# Patient Record
Sex: MALE | Race: WHITE | NOT HISPANIC OR LATINO | Employment: OTHER | URBAN - METROPOLITAN AREA
[De-identification: names, ages, dates, MRNs, and addresses within clinical notes are randomized per-mention and may not be internally consistent; named-entity substitution may affect disease eponyms.]

---

## 2018-12-18 ENCOUNTER — OFFICE VISIT (OUTPATIENT)
Dept: FAMILY MEDICINE CLINIC | Facility: CLINIC | Age: 48
End: 2018-12-18
Payer: MEDICARE

## 2018-12-18 ENCOUNTER — TRANSCRIBE ORDERS (OUTPATIENT)
Dept: ADMINISTRATIVE | Facility: HOSPITAL | Age: 48
End: 2018-12-18

## 2018-12-18 ENCOUNTER — APPOINTMENT (OUTPATIENT)
Dept: LAB | Facility: HOSPITAL | Age: 48
End: 2018-12-18
Payer: MEDICARE

## 2018-12-18 VITALS
TEMPERATURE: 97.8 F | HEIGHT: 70 IN | WEIGHT: 155 LBS | OXYGEN SATURATION: 98 % | DIASTOLIC BLOOD PRESSURE: 70 MMHG | BODY MASS INDEX: 22.19 KG/M2 | HEART RATE: 72 BPM | SYSTOLIC BLOOD PRESSURE: 130 MMHG

## 2018-12-18 DIAGNOSIS — F31.32 MODERATE DEPRESSED BIPOLAR I DISORDER (HCC): ICD-10-CM

## 2018-12-18 DIAGNOSIS — Z11.4 SCREENING FOR HIV (HUMAN IMMUNODEFICIENCY VIRUS): ICD-10-CM

## 2018-12-18 DIAGNOSIS — Z23 NEED FOR INFLUENZA VACCINATION: ICD-10-CM

## 2018-12-18 DIAGNOSIS — Z00.01 ENCOUNTER FOR WELL ADULT EXAM WITH ABNORMAL FINDINGS: Primary | ICD-10-CM

## 2018-12-18 DIAGNOSIS — F17.200 TOBACCO DEPENDENCE SYNDROME: ICD-10-CM

## 2018-12-18 DIAGNOSIS — Z11.59 NEED FOR HEPATITIS C SCREENING TEST: ICD-10-CM

## 2018-12-18 DIAGNOSIS — E78.2 MIXED HYPERLIPIDEMIA: ICD-10-CM

## 2018-12-18 DIAGNOSIS — Z79.899 ENCOUNTER FOR LONG-TERM (CURRENT) USE OF MEDICATIONS: Primary | ICD-10-CM

## 2018-12-18 DIAGNOSIS — Z79.899 ENCOUNTER FOR LONG-TERM (CURRENT) USE OF MEDICATIONS: ICD-10-CM

## 2018-12-18 LAB
ALBUMIN SERPL BCP-MCNC: 4.5 G/DL (ref 3–5.2)
ALP SERPL-CCNC: 100 U/L (ref 43–122)
ALT SERPL W P-5'-P-CCNC: 41 U/L (ref 9–52)
ANION GAP SERPL CALCULATED.3IONS-SCNC: 10 MMOL/L (ref 5–14)
AST SERPL W P-5'-P-CCNC: 28 U/L (ref 17–59)
BASOPHILS # BLD AUTO: 0.1 THOUSANDS/ΜL (ref 0–0.1)
BASOPHILS NFR BLD AUTO: 1 % (ref 0–1)
BILIRUB SERPL-MCNC: 0.8 MG/DL
BUN SERPL-MCNC: 10 MG/DL (ref 5–25)
CALCIUM SERPL-MCNC: 10.1 MG/DL (ref 8.4–10.2)
CHLORIDE SERPL-SCNC: 100 MMOL/L (ref 97–108)
CHOLEST SERPL-MCNC: 231 MG/DL
CO2 SERPL-SCNC: 28 MMOL/L (ref 22–30)
CREAT SERPL-MCNC: 0.67 MG/DL (ref 0.7–1.5)
EOSINOPHIL # BLD AUTO: 0.1 THOUSAND/ΜL (ref 0–0.4)
EOSINOPHIL NFR BLD AUTO: 1 % (ref 0–6)
ERYTHROCYTE [DISTWIDTH] IN BLOOD BY AUTOMATED COUNT: 13.7 %
GFR SERPL CREATININE-BSD FRML MDRD: 113 ML/MIN/1.73SQ M
GLUCOSE P FAST SERPL-MCNC: 71 MG/DL (ref 70–99)
HCT VFR BLD AUTO: 48.6 % (ref 41–53)
HDLC SERPL-MCNC: 44 MG/DL (ref 40–59)
HGB BLD-MCNC: 16.1 G/DL (ref 13.5–17.5)
LDLC SERPL CALC-MCNC: 165 MG/DL
LYMPHOCYTES # BLD AUTO: 2.2 THOUSANDS/ΜL (ref 0.5–4)
LYMPHOCYTES NFR BLD AUTO: 20 % (ref 25–45)
MCH RBC QN AUTO: 32.8 PG (ref 26–34)
MCHC RBC AUTO-ENTMCNC: 33.1 G/DL (ref 31–36)
MCV RBC AUTO: 99 FL (ref 80–100)
MONOCYTES # BLD AUTO: 0.5 THOUSAND/ΜL (ref 0.2–0.9)
MONOCYTES NFR BLD AUTO: 5 % (ref 1–10)
NEUTROPHILS # BLD AUTO: 7.9 THOUSANDS/ΜL (ref 1.8–7.8)
NEUTS SEG NFR BLD AUTO: 73 % (ref 45–65)
NONHDLC SERPL-MCNC: 187 MG/DL
PLATELET # BLD AUTO: 315 THOUSANDS/UL (ref 150–450)
PMV BLD AUTO: 8.4 FL (ref 8.9–12.7)
POTASSIUM SERPL-SCNC: 3.9 MMOL/L (ref 3.6–5)
PROT SERPL-MCNC: 7.5 G/DL (ref 5.9–8.4)
RBC # BLD AUTO: 4.9 MILLION/UL (ref 4.5–5.9)
SODIUM SERPL-SCNC: 138 MMOL/L (ref 137–147)
TRIGL SERPL-MCNC: 109 MG/DL
WBC # BLD AUTO: 10.7 THOUSAND/UL (ref 4.5–11)

## 2018-12-18 PROCEDURE — G0439 PPPS, SUBSEQ VISIT: HCPCS | Performed by: FAMILY MEDICINE

## 2018-12-18 PROCEDURE — 87389 HIV-1 AG W/HIV-1&-2 AB AG IA: CPT

## 2018-12-18 PROCEDURE — 99406 BEHAV CHNG SMOKING 3-10 MIN: CPT | Performed by: FAMILY MEDICINE

## 2018-12-18 PROCEDURE — 86803 HEPATITIS C AB TEST: CPT

## 2018-12-18 PROCEDURE — 80061 LIPID PANEL: CPT

## 2018-12-18 PROCEDURE — 85025 COMPLETE CBC W/AUTO DIFF WBC: CPT

## 2018-12-18 PROCEDURE — 36415 COLL VENOUS BLD VENIPUNCTURE: CPT

## 2018-12-18 PROCEDURE — 80053 COMPREHEN METABOLIC PANEL: CPT

## 2018-12-18 RX ORDER — LITHIUM CARBONATE 300 MG/1
CAPSULE ORAL
COMMUNITY
Start: 2012-11-05

## 2018-12-18 RX ORDER — LISINOPRIL 10 MG/1
TABLET ORAL EVERY 24 HOURS
COMMUNITY
Start: 2017-10-03 | End: 2019-03-14 | Stop reason: SDUPTHER

## 2018-12-18 RX ORDER — QUETIAPINE FUMARATE 200 MG/1
TABLET, FILM COATED ORAL
COMMUNITY
Start: 2012-11-05 | End: 2020-05-27 | Stop reason: DRUGHIGH

## 2018-12-18 NOTE — PROGRESS NOTES
Assessment and Plan:    Problem List Items Addressed This Visit     Tobacco dependence syndrome     Tobacco Use/Cessation  i     I advised patient to quit, and offered support  Discussed current use pattern  Asked patient to inform me when they set a quit date     At discussed with the patient complication of for smoking increase the risk of multiple kind of cancer he is aware  We offer him script for nicotine patch patient decline it                       Encounter for well adult exam with abnormal findings - Primary     Advice and education were given regarding nutrition, aerobic exercises, weight bearing exercises, cardiovascular risk reduction, fall risk reduction, and age appropriate supplements  The patient was counseled regarding instructions for management, risk factor reductions, prognosis, risks and benefits of treatment options, patient and family education, and importance of compliance with treatment  Other Visit Diagnoses     Need for influenza vaccination        Relevant Orders    SYRINGE/SINGLE-DOSE VIAL: influenza vaccine, [id number], quadrivalent, 0 5 mL, preservative-free, for patients 3+ yr Am[facility] Amb[facility])        Health Maintenance Due   Topic Date Due    Medicare Annual Wellness Visit (AWV)  1970    Pneumococcal PPSV23 Medium Risk Adult (1 of 1 - PPSV23) 04/09/1988         HPI:  Ramin Marques is a 51 y o  male here for his Subsequent Wellness Visit  Patient Active Problem List   Diagnosis    Acne    Benign essential hypertension    Mixed hyperlipidemia    [facility]    Tobacco dependence syndrome    Encounter for well adult exam with abnormal findings     Past Medical History:   Diagnosis Date    Acne     [facility]ipolar disorder ([facility] [facility]     Hyperlipidemia     Hypertension     Manic depression ([facility] [facility]     Tobacco abuse      History reviewed  No pertinent surgical history    Family History   Problem Relation Age of Onset    Depression Mother    Enriqueta Vipin Hyperlipidemia Mother     Hypertension Father     Anxiety disorder Sister      History   Smoking Status    Current Every Day Smoker    Types: Cigarettes   Smokeless Tobacco    Current User     Comment: no passive smoke exposure     History   Alcohol Use No      History   Drug Use No       Current Outpatient Prescriptions   Medication Sig Dispense Refill    lisinopril (ZESTRIL) 10 mg tablet every 24 hours      lithium carbonate 300 mg capsule take 2 capsules daily      metroNIDAZOLE (METROCREAM) 0 75 % cream Every 12 hours      QUEtiapine (SEROQUEL) 200 mg tablet take 1 tablet daily       No current facility-administered medications for this visit  No Known Allergies  Immunization History   Administered Date(s) Administered    Tdap 04/04/2017       Patient Care Team:  Doris Velásquez MD as PCP - General (Family Medicine)    Medicare Screening Tests and Risk Assessments:  Lew Sommer is here for his Subsequent Wellness visit  Last Medicare Wellness visit information reviewed, patient interviewed and updates made to the record today  Health Risk Assessment:  Patient rates overall health as good  Patient feels that their physical health rating is Same  Eyesight was rated as Same  Hearing was rated as Same  Patient feels that their emotional and mental health rating is Same  Pain experienced by patient in the last 7 days has been None  Patient states that he has experienced no weight loss or gain in last 6 months  Emotional/Mental Health:  Patient has been feeling nervous/anxious  PHQ-9 Depression Screening:    Frequency of the following problems over the past two weeks:      1  Little interest or pleasure in doing things: 0 - not at all      2  Feeling down, depressed, or hopeless: 0 - not at all  PHQ-2 Score: 0          Broken Bones/Falls:     Fall Risk Assessment:    In the past year, patient has experienced: No history of falling in past year          Bladder/Bowel:  Patient has not leaked urine accidently in the last six months  Patient reports no loss of bowel control  Immunizations:  Patient has not had a flu vaccination within the last year  Patient has not received a pneumonia shot  Patient has not received a shingles shot  Patient has received tetanus/diphtheria shot  Home Safety:  Patient does not have trouble with stairs inside or outside of their home  Patient currently reports that there are no safety hazards present in home, working smoke alarms, working carbon monoxide detectors  Preventative Screenings:   no prostate cancer screen performed, no colon cancer screen completed, cholesterol screen completed, glaucoma eye exam completed,     Nutrition:  Current diet: Regular with servings of the following:    Medications:  Patient is currently taking over-the-counter supplements  List of OTC medications includes: vitamins tylenol  Patient is able to manage medications  Lifestyle Choices:  Patient reports current tobacco use  Patient reports no alcohol use  Patient does not drive a vehicle  Patient wears seat belt  Current level of exercise of physical activity described by patient as: walking sit-ups  Activities of Daily Living:  Can get out of bed by his or her self, able to dress self, able to make own meals, able to do own shopping, able to bathe self, can do own laundry/housekeeping, can manage own money, pay bills and track expenses    Previous Hospitalizations:  No hospitalization or ED visit in past 12 months        Advanced Directives:  Patient has not decided on power of   Patient has not completed advanced directive          Preventative Screening/Counseling:      Cardiovascular:      General: Risks and Benefits Discussed      Counseling: Healthy Diet, Healthy Weight, Improve Cholesterol and Improve Blood Pressure          Diabetes:      General: Risks and Benefits Discussed      Counseling: Healthy Diet and Healthy Weight          Colorectal Cancer:      General: Risks and Benefits Discussed and Screening Not Indicated          Prostate Cancer:      General: Risks and Benefits Discussed and Patient Declines          Osteoporosis:      General: Risks and Benefits Discussed and Screening Not Indicated          AAA:      General: Risks and Benefits Discussed and Screening Not Indicated          Glaucoma:      General: Risks and Benefits Discussed and Screening Current      Comments: Patient does followed by Ophthalmology        HIV:      General: Risks and Benefits Discussed          Hepatitis C:      General: Risks and Benefits Discussed        Advanced Directives:   Patient has no living will for healthcare, does not have durable POA for healthcare, 5 wishes given  Other Preventative Counseling (Non-Medicare):   Fall Prevention and Nutrition Counseling

## 2018-12-18 NOTE — ASSESSMENT & PLAN NOTE
Tobacco Use/Cessation  i     I advised patient to quit, and offered support  Discussed current use pattern  Asked patient to inform me when they set a quit date     At discussed with the patient complication of for smoking increase the risk of multiple kind of cancer he is aware  We offer him script for nicotine patch patient decline it

## 2018-12-19 LAB
HCV AB SER QL: NORMAL
HIV 1+2 AB+HIV1 P24 AG SERPL QL IA: NORMAL

## 2018-12-26 ENCOUNTER — TRANSCRIBE ORDERS (OUTPATIENT)
Dept: ADMINISTRATIVE | Facility: HOSPITAL | Age: 48
End: 2018-12-26

## 2018-12-26 ENCOUNTER — APPOINTMENT (OUTPATIENT)
Dept: LAB | Facility: HOSPITAL | Age: 48
End: 2018-12-26
Payer: MEDICARE

## 2018-12-26 DIAGNOSIS — Z11.59 SCREENING EXAMINATION FOR POLIOMYELITIS: ICD-10-CM

## 2018-12-26 DIAGNOSIS — Z11.4 SCREENING FOR HUMAN IMMUNODEFICIENCY VIRUS: ICD-10-CM

## 2018-12-26 DIAGNOSIS — Z11.4 SCREENING FOR HUMAN IMMUNODEFICIENCY VIRUS: Primary | ICD-10-CM

## 2018-12-26 LAB — TSH SERPL DL<=0.05 MIU/L-ACNC: 2.53 UIU/ML (ref 0.47–4.68)

## 2018-12-26 PROCEDURE — 87389 HIV-1 AG W/HIV-1&-2 AB AG IA: CPT

## 2018-12-26 PROCEDURE — 36415 COLL VENOUS BLD VENIPUNCTURE: CPT

## 2018-12-26 PROCEDURE — 86803 HEPATITIS C AB TEST: CPT

## 2018-12-26 PROCEDURE — 84443 ASSAY THYROID STIM HORMONE: CPT

## 2018-12-27 LAB
HCV AB SER QL: NORMAL
HIV 1+2 AB+HIV1 P24 AG SERPL QL IA: NORMAL

## 2019-01-02 DIAGNOSIS — L70.9 ACNE, UNSPECIFIED ACNE TYPE: Primary | ICD-10-CM

## 2019-01-18 ENCOUNTER — OFFICE VISIT (OUTPATIENT)
Dept: FAMILY MEDICINE CLINIC | Facility: CLINIC | Age: 49
End: 2019-01-18
Payer: MEDICARE

## 2019-01-18 VITALS
HEIGHT: 69 IN | SYSTOLIC BLOOD PRESSURE: 124 MMHG | WEIGHT: 159 LBS | OXYGEN SATURATION: 97 % | DIASTOLIC BLOOD PRESSURE: 70 MMHG | HEART RATE: 93 BPM | TEMPERATURE: 98.3 F | BODY MASS INDEX: 23.55 KG/M2

## 2019-01-18 DIAGNOSIS — I10 BENIGN ESSENTIAL HYPERTENSION: ICD-10-CM

## 2019-01-18 DIAGNOSIS — Z23 NEED FOR PNEUMOCOCCAL VACCINATION: ICD-10-CM

## 2019-01-18 DIAGNOSIS — F31.9 BIPOLAR I DISORDER (HCC): ICD-10-CM

## 2019-01-18 DIAGNOSIS — E78.2 MIXED HYPERLIPIDEMIA: Primary | ICD-10-CM

## 2019-01-18 PROCEDURE — 90732 PPSV23 VACC 2 YRS+ SUBQ/IM: CPT | Performed by: FAMILY MEDICINE

## 2019-01-18 PROCEDURE — 99214 OFFICE O/P EST MOD 30 MIN: CPT | Performed by: FAMILY MEDICINE

## 2019-01-18 PROCEDURE — G0009 ADMIN PNEUMOCOCCAL VACCINE: HCPCS | Performed by: FAMILY MEDICINE

## 2019-01-18 RX ORDER — ATORVASTATIN CALCIUM 10 MG/1
10 TABLET, FILM COATED ORAL DAILY
Qty: 30 TABLET | Refills: 2 | Status: SHIPPED | OUTPATIENT
Start: 2019-01-18 | End: 2019-03-13 | Stop reason: SDUPTHER

## 2019-01-18 NOTE — PROGRESS NOTES
Subjective:   Chief Complaint   Patient presents with    Follow-up     chronic conditions        Patient ID: Sakina Grady is a 51 y o  male  Patient office follow up with a chronic condition   The patient has long history of hypertension the blood pressure today is in control patient tolerates medication well and no chest pain or short of breath no palpitation no headache  Patient's history of hyperlipidemia try to controlled with the low-fat diet deny any chest pain short of breath no palpitation no headache no blurred vision no weakness or lateralized of the symptom  Patient's history of bipolar disorder the chronic and he has been in leads him and Seroquel tolerated well without any side effect he patient is stable and he does follow up with the psychiatric  Recent blood work discussed with the patient        The following portions of the patient's history were reviewed and updated as appropriate: allergies, current medications, past family history, past medical history, past social history, past surgical history and problem list     Review of Systems   Constitutional: Negative for fatigue and fever  HENT: Negative for ear pain, sinus pain, sinus pressure and sore throat  Eyes: Negative for pain and redness  Respiratory: Negative for cough, chest tightness and shortness of breath  Cardiovascular: Negative for chest pain, palpitations and leg swelling  Gastrointestinal: Negative for abdominal pain, blood in stool, constipation, diarrhea and nausea  Genitourinary: Negative for flank pain, frequency and hematuria  Musculoskeletal: Negative for back pain and joint swelling  Skin: Negative for rash  Neurological: Negative for dizziness, numbness and headaches  Hematological: Does not bruise/bleed easily           Objective:  Vitals:    01/18/19 1014   BP: 124/70   Pulse: 93   Temp: 98 3 °F (36 8 °C)   TempSrc: Oral   SpO2: 97%   Weight: 72 1 kg (159 lb)   Height: 5' 9" (1 753 m) Physical Exam   Constitutional: He is oriented to person, place, and time  He appears well-developed and well-nourished  HENT:   Head: Normocephalic  Right Ear: External ear normal    Left Ear: External ear normal    Eyes: Conjunctivae and EOM are normal  Right eye exhibits no discharge  Left eye exhibits no discharge  Neck: No JVD present  Cardiovascular: Normal rate, regular rhythm and normal heart sounds  Exam reveals no gallop  No murmur heard  Pulmonary/Chest: Effort normal  No respiratory distress  He has no wheezes  He has no rales  He exhibits no tenderness  Abdominal: He exhibits no mass  There is no tenderness  There is no rebound  Musculoskeletal: He exhibits no edema or tenderness  Neurological: He is alert and oriented to person, place, and time  Skin: No rash noted  No erythema  Assessment/Plan:    Benign essential hypertension  Chronic well controlled continue current medication  Lisinopril 10 mg po/day   low-salt diet less than 2 g a day ,   low caffeine intake   regular aerobic exercise 20 to totally minute a day diet and important lose weight discussed with patient    Mixed hyperlipidemia  Chronic uncontrolled will start patient on atorvastatin 10 mg once a day proper use of medication possible side effect discussed with the patient we discussed with the patient the low-fat diet    [facility]  Chronic , fair control on current medication list him and Seroquel   Patient does F/up with psychiatric       Diagnoses and all orders for this visit:    Mixed hyperlipidemia  -     atorvastatin (LIPITOR) 10 mg tablet; Take 1 tablet (10 mg total) by mouth daily  -     CBC and differential; Future  -     Comprehensive metabolic panel; Future  -     Lipid Panel with Direct LDL reflex; Future  -     TSH, 3rd generation with Free T4 reflex; Future    Bipolar I disorder (HCC)  -     CBC and differential; Future  -     Comprehensive metabolic panel;  Future  -     Lipid Panel with Direct LDL reflex; Future  -     TSH, 3rd generation with Free T4 reflex; Future    Benign essential hypertension  -     CBC and differential; Future  -     Comprehensive metabolic panel; Future  -     Lipid Panel with Direct LDL reflex; Future  -     TSH, 3rd generation with Free T4 reflex;  Future    Need for pneumococcal vaccination  -     Pneumococcal Polysaccharide Vaccine 23-Valent =>3yo SQ IM

## 2019-01-19 NOTE — ASSESSMENT & PLAN NOTE
Chronic , fair control on current medication list him and Seroquel   Patient does F/up with psychiatric

## 2019-01-19 NOTE — ASSESSMENT & PLAN NOTE
Chronic uncontrolled will start patient on atorvastatin 10 mg once a day proper use of medication possible side effect discussed with the patient we discussed with the patient the low-fat diet

## 2019-01-19 NOTE — ASSESSMENT & PLAN NOTE
Chronic well controlled continue current medication  Lisinopril 10 mg po/day   low-salt diet less than 2 g a day ,   low caffeine intake   regular aerobic exercise 20 to totally minute a day diet and important lose weight discussed with patient

## 2019-01-23 ENCOUNTER — APPOINTMENT (OUTPATIENT)
Dept: LAB | Facility: HOSPITAL | Age: 49
End: 2019-01-23
Payer: MEDICARE

## 2019-01-23 DIAGNOSIS — I10 BENIGN ESSENTIAL HYPERTENSION: ICD-10-CM

## 2019-01-23 DIAGNOSIS — E78.2 MIXED HYPERLIPIDEMIA: ICD-10-CM

## 2019-01-23 DIAGNOSIS — F31.9 BIPOLAR I DISORDER (HCC): ICD-10-CM

## 2019-01-23 LAB
ALBUMIN SERPL BCP-MCNC: 4.5 G/DL (ref 3–5.2)
ALP SERPL-CCNC: 100 U/L (ref 43–122)
ALT SERPL W P-5'-P-CCNC: 32 U/L (ref 9–52)
ANION GAP SERPL CALCULATED.3IONS-SCNC: 8 MMOL/L (ref 5–14)
AST SERPL W P-5'-P-CCNC: 26 U/L (ref 17–59)
BASOPHILS # BLD AUTO: 0.1 THOUSANDS/ΜL (ref 0–0.1)
BASOPHILS NFR BLD AUTO: 1 % (ref 0–1)
BILIRUB SERPL-MCNC: 0.6 MG/DL
BUN SERPL-MCNC: 11 MG/DL (ref 5–25)
CALCIUM ALBUM COR SERPL-MCNC: 10.1 MG/DL (ref 8.3–10.1)
CALCIUM SERPL-MCNC: 10.5 MG/DL (ref 8.4–10.2)
CHLORIDE SERPL-SCNC: 100 MMOL/L (ref 97–108)
CHOLEST SERPL-MCNC: 220 MG/DL
CO2 SERPL-SCNC: 30 MMOL/L (ref 22–30)
CREAT SERPL-MCNC: 0.74 MG/DL (ref 0.7–1.5)
EOSINOPHIL # BLD AUTO: 0.1 THOUSAND/ΜL (ref 0–0.4)
EOSINOPHIL NFR BLD AUTO: 2 % (ref 0–6)
ERYTHROCYTE [DISTWIDTH] IN BLOOD BY AUTOMATED COUNT: 14.2 %
GFR SERPL CREATININE-BSD FRML MDRD: 108 ML/MIN/1.73SQ M
GLUCOSE P FAST SERPL-MCNC: 86 MG/DL (ref 70–99)
HCT VFR BLD AUTO: 49.5 % (ref 41–53)
HDLC SERPL-MCNC: 45 MG/DL (ref 40–59)
HGB BLD-MCNC: 16.5 G/DL (ref 13.5–17.5)
LDLC SERPL CALC-MCNC: 149 MG/DL
LYMPHOCYTES # BLD AUTO: 1.9 THOUSANDS/ΜL (ref 0.5–4)
LYMPHOCYTES NFR BLD AUTO: 22 % (ref 25–45)
MCH RBC QN AUTO: 33.2 PG (ref 26–34)
MCHC RBC AUTO-ENTMCNC: 33.4 G/DL (ref 31–36)
MCV RBC AUTO: 100 FL (ref 80–100)
MONOCYTES # BLD AUTO: 0.4 THOUSAND/ΜL (ref 0.2–0.9)
MONOCYTES NFR BLD AUTO: 4 % (ref 1–10)
NEUTROPHILS # BLD AUTO: 6.3 THOUSANDS/ΜL (ref 1.8–7.8)
NEUTS SEG NFR BLD AUTO: 71 % (ref 45–65)
PLATELET # BLD AUTO: 313 THOUSANDS/UL (ref 150–450)
PMV BLD AUTO: 7.8 FL (ref 8.9–12.7)
POTASSIUM SERPL-SCNC: 4.2 MMOL/L (ref 3.6–5)
PROT SERPL-MCNC: 7.8 G/DL (ref 5.9–8.4)
RBC # BLD AUTO: 4.97 MILLION/UL (ref 4.5–5.9)
SODIUM SERPL-SCNC: 138 MMOL/L (ref 137–147)
TRIGL SERPL-MCNC: 131 MG/DL
TSH SERPL DL<=0.05 MIU/L-ACNC: 2.05 UIU/ML (ref 0.47–4.68)
WBC # BLD AUTO: 8.9 THOUSAND/UL (ref 4.5–11)

## 2019-01-23 PROCEDURE — 36415 COLL VENOUS BLD VENIPUNCTURE: CPT

## 2019-01-23 PROCEDURE — 85025 COMPLETE CBC W/AUTO DIFF WBC: CPT

## 2019-01-23 PROCEDURE — 80061 LIPID PANEL: CPT

## 2019-01-23 PROCEDURE — 80053 COMPREHEN METABOLIC PANEL: CPT

## 2019-01-23 PROCEDURE — 84443 ASSAY THYROID STIM HORMONE: CPT

## 2019-03-13 DIAGNOSIS — E78.2 MIXED HYPERLIPIDEMIA: ICD-10-CM

## 2019-03-13 RX ORDER — ATORVASTATIN CALCIUM 10 MG/1
10 TABLET, FILM COATED ORAL DAILY
Qty: 90 TABLET | Refills: 0 | Status: SHIPPED | OUTPATIENT
Start: 2019-03-13 | End: 2019-03-14 | Stop reason: SDUPTHER

## 2019-03-14 DIAGNOSIS — E78.2 MIXED HYPERLIPIDEMIA: ICD-10-CM

## 2019-03-14 DIAGNOSIS — L70.9 ACNE, UNSPECIFIED ACNE TYPE: ICD-10-CM

## 2019-03-14 DIAGNOSIS — I10 BENIGN ESSENTIAL HYPERTENSION: Primary | ICD-10-CM

## 2019-03-14 RX ORDER — ATORVASTATIN CALCIUM 10 MG/1
10 TABLET, FILM COATED ORAL DAILY
Qty: 90 TABLET | Refills: 1 | Status: SHIPPED | OUTPATIENT
Start: 2019-03-14 | End: 2019-04-08 | Stop reason: SDUPTHER

## 2019-03-14 RX ORDER — LISINOPRIL 10 MG/1
10 TABLET ORAL DAILY
Qty: 90 TABLET | Refills: 1 | Status: SHIPPED | OUTPATIENT
Start: 2019-03-14 | End: 2019-05-20 | Stop reason: SDUPTHER

## 2019-04-08 DIAGNOSIS — E78.2 MIXED HYPERLIPIDEMIA: ICD-10-CM

## 2019-04-08 RX ORDER — ATORVASTATIN CALCIUM 10 MG/1
10 TABLET, FILM COATED ORAL DAILY
Qty: 90 TABLET | Refills: 1 | Status: SHIPPED | OUTPATIENT
Start: 2019-04-08 | End: 2019-05-20 | Stop reason: SDUPTHER

## 2019-04-23 ENCOUNTER — APPOINTMENT (OUTPATIENT)
Dept: LAB | Facility: HOSPITAL | Age: 49
End: 2019-04-23
Payer: MEDICARE

## 2019-04-23 ENCOUNTER — TRANSCRIBE ORDERS (OUTPATIENT)
Dept: ADMINISTRATIVE | Facility: HOSPITAL | Age: 49
End: 2019-04-23

## 2019-04-23 DIAGNOSIS — Z79.899 ENCOUNTER FOR LONG-TERM (CURRENT) USE OF OTHER MEDICATIONS: Primary | ICD-10-CM

## 2019-04-23 DIAGNOSIS — Z79.899 ENCOUNTER FOR LONG-TERM (CURRENT) USE OF OTHER MEDICATIONS: ICD-10-CM

## 2019-04-23 LAB — LITHIUM SERPL-SCNC: 0.5 MMOL/L (ref 0.6–1.2)

## 2019-04-23 PROCEDURE — 80178 ASSAY OF LITHIUM: CPT

## 2019-04-23 PROCEDURE — 36415 COLL VENOUS BLD VENIPUNCTURE: CPT

## 2019-05-20 ENCOUNTER — OFFICE VISIT (OUTPATIENT)
Dept: FAMILY MEDICINE CLINIC | Facility: CLINIC | Age: 49
End: 2019-05-20
Payer: MEDICARE

## 2019-05-20 VITALS
OXYGEN SATURATION: 96 % | HEART RATE: 87 BPM | TEMPERATURE: 98.8 F | HEIGHT: 69 IN | WEIGHT: 161 LBS | DIASTOLIC BLOOD PRESSURE: 78 MMHG | SYSTOLIC BLOOD PRESSURE: 132 MMHG | RESPIRATION RATE: 16 BRPM | BODY MASS INDEX: 23.85 KG/M2

## 2019-05-20 DIAGNOSIS — J30.89 SEASONAL ALLERGIC RHINITIS DUE TO OTHER ALLERGIC TRIGGER: Primary | ICD-10-CM

## 2019-05-20 DIAGNOSIS — I10 BENIGN ESSENTIAL HYPERTENSION: ICD-10-CM

## 2019-05-20 DIAGNOSIS — F17.200 TOBACCO DEPENDENCE SYNDROME: ICD-10-CM

## 2019-05-20 DIAGNOSIS — L70.9 ACNE, UNSPECIFIED ACNE TYPE: ICD-10-CM

## 2019-05-20 DIAGNOSIS — E78.2 MIXED HYPERLIPIDEMIA: ICD-10-CM

## 2019-05-20 DIAGNOSIS — Z12.11 ENCOUNTER FOR SCREENING COLONOSCOPY: ICD-10-CM

## 2019-05-20 PROBLEM — J30.9 ALLERGIC RHINITIS DUE TO ALLERGEN: Status: ACTIVE | Noted: 2019-05-20

## 2019-05-20 PROCEDURE — 99214 OFFICE O/P EST MOD 30 MIN: CPT | Performed by: FAMILY MEDICINE

## 2019-05-20 RX ORDER — ATORVASTATIN CALCIUM 10 MG/1
10 TABLET, FILM COATED ORAL DAILY
Qty: 90 TABLET | Refills: 1 | Status: SHIPPED | OUTPATIENT
Start: 2019-05-20 | End: 2019-09-23 | Stop reason: SDUPTHER

## 2019-05-20 RX ORDER — LISINOPRIL 10 MG/1
10 TABLET ORAL DAILY
Qty: 90 TABLET | Refills: 1 | Status: SHIPPED | OUTPATIENT
Start: 2019-05-20 | End: 2019-09-23 | Stop reason: SDUPTHER

## 2019-05-20 RX ORDER — LORATADINE 10 MG/1
10 TABLET ORAL DAILY
Qty: 30 TABLET | Refills: 1 | Status: SHIPPED | OUTPATIENT
Start: 2019-05-20

## 2019-05-21 ENCOUNTER — APPOINTMENT (OUTPATIENT)
Dept: LAB | Facility: HOSPITAL | Age: 49
End: 2019-05-21
Payer: MEDICARE

## 2019-05-21 DIAGNOSIS — Z12.11 ENCOUNTER FOR SCREENING COLONOSCOPY: ICD-10-CM

## 2019-05-21 DIAGNOSIS — E78.2 MIXED HYPERLIPIDEMIA: ICD-10-CM

## 2019-05-21 DIAGNOSIS — I10 BENIGN ESSENTIAL HYPERTENSION: ICD-10-CM

## 2019-05-21 LAB
ALBUMIN SERPL BCP-MCNC: 4.6 G/DL (ref 3–5.2)
ANION GAP SERPL CALCULATED.3IONS-SCNC: 9 MMOL/L (ref 5–14)
BASOPHILS # BLD AUTO: 0.1 THOUSANDS/ΜL (ref 0–0.1)
BASOPHILS NFR BLD AUTO: 1 % (ref 0–1)
BUN SERPL-MCNC: 14 MG/DL (ref 5–25)
CALCIUM ALBUM COR SERPL-MCNC: 9.7 MG/DL (ref 8.3–10.1)
CALCIUM SERPL-MCNC: 10.2 MG/DL (ref 8.3–10.1)
CALCIUM SERPL-MCNC: 10.2 MG/DL (ref 8.4–10.2)
CHLORIDE SERPL-SCNC: 96 MMOL/L (ref 97–108)
CHOLEST SERPL-MCNC: 174 MG/DL
CO2 SERPL-SCNC: 29 MMOL/L (ref 22–30)
CREAT SERPL-MCNC: 0.72 MG/DL (ref 0.7–1.5)
EOSINOPHIL # BLD AUTO: 0.2 THOUSAND/ΜL (ref 0–0.4)
EOSINOPHIL NFR BLD AUTO: 2 % (ref 0–6)
ERYTHROCYTE [DISTWIDTH] IN BLOOD BY AUTOMATED COUNT: 14 %
GFR SERPL CREATININE-BSD FRML MDRD: 109 ML/MIN/1.73SQ M
GLUCOSE P FAST SERPL-MCNC: 74 MG/DL (ref 70–99)
HCT VFR BLD AUTO: 47.6 % (ref 41–53)
HDLC SERPL-MCNC: 33 MG/DL (ref 40–59)
HEMOCCULT STL QL IA: POSITIVE
HGB BLD-MCNC: 16.2 G/DL (ref 13.5–17.5)
LDLC SERPL CALC-MCNC: 117 MG/DL
LYMPHOCYTES # BLD AUTO: 2.6 THOUSANDS/ΜL (ref 0.5–4)
LYMPHOCYTES NFR BLD AUTO: 28 % (ref 25–45)
MCH RBC QN AUTO: 33.1 PG (ref 26–34)
MCHC RBC AUTO-ENTMCNC: 34.1 G/DL (ref 31–36)
MCV RBC AUTO: 97 FL (ref 80–100)
MONOCYTES # BLD AUTO: 0.5 THOUSAND/ΜL (ref 0.2–0.9)
MONOCYTES NFR BLD AUTO: 5 % (ref 1–10)
NEUTROPHILS # BLD AUTO: 6 THOUSANDS/ΜL (ref 1.8–7.8)
NEUTS SEG NFR BLD AUTO: 64 % (ref 45–65)
PLATELET # BLD AUTO: 327 THOUSANDS/UL (ref 150–450)
PMV BLD AUTO: 8 FL (ref 8.9–12.7)
POTASSIUM SERPL-SCNC: 4.1 MMOL/L (ref 3.6–5)
RBC # BLD AUTO: 4.89 MILLION/UL (ref 4.5–5.9)
SODIUM SERPL-SCNC: 134 MMOL/L (ref 137–147)
TRIGL SERPL-MCNC: 121 MG/DL
TSH SERPL DL<=0.05 MIU/L-ACNC: 2.3 UIU/ML (ref 0.47–4.68)
WBC # BLD AUTO: 9.3 THOUSAND/UL (ref 4.5–11)

## 2019-05-21 PROCEDURE — 84443 ASSAY THYROID STIM HORMONE: CPT

## 2019-05-21 PROCEDURE — G0328 FECAL BLOOD SCRN IMMUNOASSAY: HCPCS

## 2019-05-21 PROCEDURE — 36415 COLL VENOUS BLD VENIPUNCTURE: CPT

## 2019-05-21 PROCEDURE — 80061 LIPID PANEL: CPT

## 2019-05-21 PROCEDURE — 85025 COMPLETE CBC W/AUTO DIFF WBC: CPT

## 2019-05-21 PROCEDURE — 80048 BASIC METABOLIC PNL TOTAL CA: CPT

## 2019-05-21 PROCEDURE — 82040 ASSAY OF SERUM ALBUMIN: CPT

## 2019-05-24 ENCOUNTER — TELEPHONE (OUTPATIENT)
Dept: FAMILY MEDICINE CLINIC | Facility: CLINIC | Age: 49
End: 2019-05-24

## 2019-05-24 DIAGNOSIS — R19.5 POSITIVE OCCULT STOOL BLOOD TEST: Primary | ICD-10-CM

## 2019-08-14 ENCOUNTER — APPOINTMENT (OUTPATIENT)
Dept: LAB | Facility: HOSPITAL | Age: 49
End: 2019-08-14
Payer: MEDICARE

## 2019-08-14 ENCOUNTER — TRANSCRIBE ORDERS (OUTPATIENT)
Dept: ADMINISTRATIVE | Facility: HOSPITAL | Age: 49
End: 2019-08-14

## 2019-08-14 DIAGNOSIS — Z79.899 ENCOUNTER FOR LONG-TERM (CURRENT) USE OF OTHER MEDICATIONS: ICD-10-CM

## 2019-08-14 DIAGNOSIS — F31.32 MODERATE DEPRESSED BIPOLAR I DISORDER (HCC): ICD-10-CM

## 2019-08-14 DIAGNOSIS — Z79.899 ENCOUNTER FOR LONG-TERM (CURRENT) USE OF OTHER MEDICATIONS: Primary | ICD-10-CM

## 2019-08-14 LAB
25(OH)D3 SERPL-MCNC: 25.5 NG/ML (ref 30–100)
ALBUMIN SERPL BCP-MCNC: 4.5 G/DL (ref 3–5.2)
ALP SERPL-CCNC: 99 U/L (ref 43–122)
ALT SERPL W P-5'-P-CCNC: 24 U/L (ref 9–52)
ANION GAP SERPL CALCULATED.3IONS-SCNC: 10 MMOL/L (ref 5–14)
AST SERPL W P-5'-P-CCNC: 23 U/L (ref 17–59)
BASOPHILS # BLD AUTO: 0.1 THOUSANDS/ΜL (ref 0–0.1)
BASOPHILS NFR BLD AUTO: 1 % (ref 0–1)
BILIRUB SERPL-MCNC: 0.7 MG/DL
BUN SERPL-MCNC: 9 MG/DL (ref 5–25)
CALCIUM SERPL-MCNC: 10 MG/DL (ref 8.4–10.2)
CHLORIDE SERPL-SCNC: 99 MMOL/L (ref 97–108)
CHOLEST SERPL-MCNC: 227 MG/DL
CO2 SERPL-SCNC: 26 MMOL/L (ref 22–30)
CREAT SERPL-MCNC: 0.7 MG/DL (ref 0.7–1.5)
EOSINOPHIL # BLD AUTO: 0.1 THOUSAND/ΜL (ref 0–0.4)
EOSINOPHIL NFR BLD AUTO: 1 % (ref 0–6)
ERYTHROCYTE [DISTWIDTH] IN BLOOD BY AUTOMATED COUNT: 14.2 %
FOLATE SERPL-MCNC: >20 NG/ML (ref 3.1–17.5)
GFR SERPL CREATININE-BSD FRML MDRD: 110 ML/MIN/1.73SQ M
GLUCOSE P FAST SERPL-MCNC: 72 MG/DL (ref 70–99)
HCT VFR BLD AUTO: 46.1 % (ref 41–53)
HDLC SERPL-MCNC: 34 MG/DL (ref 40–59)
HGB BLD-MCNC: 15.9 G/DL (ref 13.5–17.5)
LDLC SERPL CALC-MCNC: 151 MG/DL
LITHIUM SERPL-SCNC: 0.6 MMOL/L (ref 0.6–1.2)
LYMPHOCYTES # BLD AUTO: 2.3 THOUSANDS/ΜL (ref 0.5–4)
LYMPHOCYTES NFR BLD AUTO: 21 % (ref 25–45)
MCH RBC QN AUTO: 33.7 PG (ref 26–34)
MCHC RBC AUTO-ENTMCNC: 34.4 G/DL (ref 31–36)
MCV RBC AUTO: 98 FL (ref 80–100)
MONOCYTES # BLD AUTO: 0.5 THOUSAND/ΜL (ref 0.2–0.9)
MONOCYTES NFR BLD AUTO: 5 % (ref 1–10)
NEUTROPHILS # BLD AUTO: 7.9 THOUSANDS/ΜL (ref 1.8–7.8)
NEUTS SEG NFR BLD AUTO: 72 % (ref 45–65)
NONHDLC SERPL-MCNC: 193 MG/DL
PLATELET # BLD AUTO: 308 THOUSANDS/UL (ref 150–450)
PMV BLD AUTO: 8.5 FL (ref 8.9–12.7)
POTASSIUM SERPL-SCNC: 3.8 MMOL/L (ref 3.6–5)
PROT SERPL-MCNC: 7.5 G/DL (ref 5.9–8.4)
RBC # BLD AUTO: 4.71 MILLION/UL (ref 4.5–5.9)
SODIUM SERPL-SCNC: 135 MMOL/L (ref 137–147)
TRIGL SERPL-MCNC: 212 MG/DL
TSH SERPL DL<=0.05 MIU/L-ACNC: 1.57 UIU/ML (ref 0.47–4.68)
VIT B12 SERPL-MCNC: 533 PG/ML (ref 100–900)
WBC # BLD AUTO: 10.9 THOUSAND/UL (ref 4.5–11)

## 2019-08-14 PROCEDURE — 82607 VITAMIN B-12: CPT

## 2019-08-14 PROCEDURE — 36415 COLL VENOUS BLD VENIPUNCTURE: CPT

## 2019-08-14 PROCEDURE — 80307 DRUG TEST PRSMV CHEM ANLYZR: CPT | Performed by: PSYCHIATRY & NEUROLOGY

## 2019-08-14 PROCEDURE — 80061 LIPID PANEL: CPT

## 2019-08-14 PROCEDURE — 80178 ASSAY OF LITHIUM: CPT

## 2019-08-14 PROCEDURE — 80053 COMPREHEN METABOLIC PANEL: CPT

## 2019-08-14 PROCEDURE — 84443 ASSAY THYROID STIM HORMONE: CPT

## 2019-08-14 PROCEDURE — 85025 COMPLETE CBC W/AUTO DIFF WBC: CPT

## 2019-08-14 PROCEDURE — 82306 VITAMIN D 25 HYDROXY: CPT

## 2019-08-14 PROCEDURE — 82746 ASSAY OF FOLIC ACID SERUM: CPT

## 2019-08-15 LAB
AMPHETAMINES UR QL SCN: NEGATIVE NG/ML
BARBITURATES UR QL SCN: NEGATIVE NG/ML
BENZODIAZ UR QL: NEGATIVE NG/ML
BZE UR QL: NEGATIVE NG/ML
CANNABINOIDS UR QL SCN: NEGATIVE NG/ML
METHADONE UR QL SCN: NEGATIVE NG/ML
OPIATES UR QL: NEGATIVE NG/ML
PCP UR QL: NEGATIVE NG/ML
PROPOXYPH UR QL SCN: NEGATIVE NG/ML

## 2019-08-19 DIAGNOSIS — L70.9 ACNE, UNSPECIFIED ACNE TYPE: ICD-10-CM

## 2019-09-23 ENCOUNTER — OFFICE VISIT (OUTPATIENT)
Dept: FAMILY MEDICINE CLINIC | Facility: CLINIC | Age: 49
End: 2019-09-23
Payer: MEDICARE

## 2019-09-23 VITALS
BODY MASS INDEX: 23.19 KG/M2 | DIASTOLIC BLOOD PRESSURE: 80 MMHG | TEMPERATURE: 99.1 F | SYSTOLIC BLOOD PRESSURE: 130 MMHG | HEIGHT: 70 IN | HEART RATE: 91 BPM | WEIGHT: 162 LBS | OXYGEN SATURATION: 96 %

## 2019-09-23 DIAGNOSIS — E78.2 MIXED HYPERLIPIDEMIA: Primary | ICD-10-CM

## 2019-09-23 DIAGNOSIS — I10 BENIGN ESSENTIAL HYPERTENSION: ICD-10-CM

## 2019-09-23 DIAGNOSIS — Z12.5 SCREENING FOR PROSTATE CANCER: ICD-10-CM

## 2019-09-23 DIAGNOSIS — F17.200 TOBACCO DEPENDENCE SYNDROME: ICD-10-CM

## 2019-09-23 DIAGNOSIS — L70.9 ACNE, UNSPECIFIED ACNE TYPE: ICD-10-CM

## 2019-09-23 PROCEDURE — 90686 IIV4 VACC NO PRSV 0.5 ML IM: CPT | Performed by: FAMILY MEDICINE

## 2019-09-23 PROCEDURE — G0008 ADMIN INFLUENZA VIRUS VAC: HCPCS | Performed by: FAMILY MEDICINE

## 2019-09-23 PROCEDURE — 99214 OFFICE O/P EST MOD 30 MIN: CPT | Performed by: FAMILY MEDICINE

## 2019-09-23 RX ORDER — LISINOPRIL 10 MG/1
10 TABLET ORAL DAILY
Qty: 90 TABLET | Refills: 1 | Status: SHIPPED | OUTPATIENT
Start: 2019-09-23 | End: 2020-01-27 | Stop reason: SDUPTHER

## 2019-09-23 RX ORDER — ATORVASTATIN CALCIUM 10 MG/1
10 TABLET, FILM COATED ORAL DAILY
Qty: 90 TABLET | Refills: 1 | Status: SHIPPED | OUTPATIENT
Start: 2019-09-23 | End: 2020-01-27 | Stop reason: SDUPTHER

## 2019-09-23 NOTE — PROGRESS NOTES
Subjective:   Chief Complaint   Patient presents with    Follow-up     chronic conditions        Patient ID: Rosanna Colorado is a 47 y o  male  Patient here follow-up with a chronic condition patient was history of hypertension on lisinopril tolerated well without any side effect blood pressure fair control deny any chest pain short of breath no dyspnea on exertion and no lower extremity edema patient was history of hyperlipidemia on statin tolerated well no rash no muscle pain deny any chest pain short of breath no palpitation no TIA symptom patient was history of smoking he been smoking for more than 30 years the he is aware of the complication of smoking patient he is not ready to quit smoke and patient was history of the acne he been on Metrogel and he tolerated well without any side effect and the his face clear on it  Recent blood work discussed with the patient      The following portions of the patient's history were reviewed and updated as appropriate: allergies, current medications, past family history, past medical history, past social history, past surgical history and problem list     Review of Systems   Constitutional: Negative for fatigue and fever  HENT: Negative for ear pain, sinus pressure, sinus pain and sore throat  Eyes: Negative for pain and redness  Respiratory: Negative for cough, chest tightness and shortness of breath  Cardiovascular: Negative for chest pain, palpitations and leg swelling  Gastrointestinal: Negative for abdominal pain, blood in stool, constipation, diarrhea and nausea  Genitourinary: Negative for flank pain, frequency and hematuria  Musculoskeletal: Negative for back pain and joint swelling  Skin: Negative for rash  Neurological: Negative for dizziness, numbness and headaches  Hematological: Does not bruise/bleed easily           Objective:  Vitals:    09/23/19 0953   BP: 130/80   Pulse: 91   Temp: 99 1 °F (37 3 °C)   TempSrc: Tympanic   SpO2: 96% Weight: 73 5 kg (162 lb)   Height: 5' 10 25" (1 784 m)      Physical Exam   Constitutional: He is oriented to person, place, and time  He appears well-developed and well-nourished  HENT:   Head: Normocephalic  Right Ear: External ear normal    Left Ear: External ear normal    Eyes: Conjunctivae and EOM are normal  Right eye exhibits no discharge  Left eye exhibits no discharge  Neck: No JVD present  Cardiovascular: Normal rate, regular rhythm and normal heart sounds  Exam reveals no gallop  No murmur heard  Pulmonary/Chest: Effort normal  No respiratory distress  He has no wheezes  He has no rales  He exhibits no tenderness  Abdominal: He exhibits no mass  There is no tenderness  There is no rebound  Musculoskeletal: He exhibits no edema or tenderness  Neurological: He is alert and oriented to person, place, and time  Skin: No rash noted  No erythema           Assessment/Plan:    Benign essential hypertension  Chronic asymptomatic fair control continue with lisinopril 10 mg once a day encouraged patient to follow up with the low-salt diet important to quit smoking increase physical activity    Tobacco dependence syndrome  The chronic uncontrolled I discussed with the patient sedated smoking cessation he decline patient his not ready to quit smoking he is aware of the complication of the smoking    Mixed hyperlipidemia  A chronic asymptomatic patient has not been taking his cholesterol medication every day a discussed the patient important compliant with the medication to avoid complication of hyperlipidemia continue current dose increase the physical activity and low-fat diet discussed with the patient    Acne  A chronic fair control continue current management   The wash face no more than twice a day use warm water  Use most are eyes without sent or dyes  Do not take or squeeze a pimple  Avoid oral base makeup       Diagnoses and all orders for this visit:    Mixed hyperlipidemia  - atorvastatin (LIPITOR) 10 mg tablet; Take 1 tablet (10 mg total) by mouth daily  -     CBC and differential; Future  -     Basic metabolic panel; Future  -     Lipid panel; Future  -     TSH, 3rd generation with Free T4 reflex; Future    Benign essential hypertension  -     lisinopril (ZESTRIL) 10 mg tablet; Take 1 tablet (10 mg total) by mouth daily  -     CBC and differential; Future  -     Basic metabolic panel; Future  -     Lipid panel; Future  -     TSH, 3rd generation with Free T4 reflex; Future    Screening for prostate cancer  -     PSA, Total Screen; Future    Acne, unspecified acne type  -     metroNIDAZOLE (METROCREAM) 0 75 % cream; Apply topically 2 (two) times a day  -     CBC and differential; Future  -     Basic metabolic panel; Future  -     Lipid panel; Future  -     TSH, 3rd generation with Free T4 reflex;  Future    Tobacco dependence syndrome

## 2019-09-23 NOTE — ASSESSMENT & PLAN NOTE
A chronic fair control continue current management   The wash face no more than twice a day use warm water  Use most are eyes without sent or dyes  Do not take or squeeze a pimple  Avoid oral base makeup

## 2019-09-23 NOTE — ASSESSMENT & PLAN NOTE
The chronic uncontrolled I discussed with the patient sedated smoking cessation he decline patient his not ready to quit smoking he is aware of the complication of the smoking

## 2019-09-23 NOTE — ASSESSMENT & PLAN NOTE
A chronic asymptomatic patient has not been taking his cholesterol medication every day a discussed the patient important compliant with the medication to avoid complication of hyperlipidemia continue current dose increase the physical activity and low-fat diet discussed with the patient

## 2019-09-23 NOTE — ASSESSMENT & PLAN NOTE
Chronic asymptomatic fair control continue with lisinopril 10 mg once a day encouraged patient to follow up with the low-salt diet important to quit smoking increase physical activity

## 2019-12-30 DIAGNOSIS — L70.9 ACNE, UNSPECIFIED ACNE TYPE: ICD-10-CM

## 2020-01-14 ENCOUNTER — APPOINTMENT (OUTPATIENT)
Dept: LAB | Facility: HOSPITAL | Age: 50
End: 2020-01-14
Payer: MEDICARE

## 2020-01-14 ENCOUNTER — TRANSCRIBE ORDERS (OUTPATIENT)
Dept: ADMINISTRATIVE | Facility: HOSPITAL | Age: 50
End: 2020-01-14

## 2020-01-14 DIAGNOSIS — L70.9 ACNE, UNSPECIFIED ACNE TYPE: ICD-10-CM

## 2020-01-14 DIAGNOSIS — Z12.5 SCREENING FOR PROSTATE CANCER: ICD-10-CM

## 2020-01-14 DIAGNOSIS — Z79.899 ENCOUNTER FOR LONG-TERM (CURRENT) USE OF OTHER MEDICATIONS: Primary | ICD-10-CM

## 2020-01-14 DIAGNOSIS — E78.2 MIXED HYPERLIPIDEMIA: ICD-10-CM

## 2020-01-14 DIAGNOSIS — I10 BENIGN ESSENTIAL HYPERTENSION: ICD-10-CM

## 2020-01-14 DIAGNOSIS — Z79.899 ENCOUNTER FOR LONG-TERM (CURRENT) USE OF OTHER MEDICATIONS: ICD-10-CM

## 2020-01-14 LAB
25(OH)D3 SERPL-MCNC: 30 NG/ML (ref 30–100)
ALBUMIN SERPL BCP-MCNC: 4.5 G/DL (ref 3–5.2)
ALP SERPL-CCNC: 89 U/L (ref 43–122)
ALT SERPL W P-5'-P-CCNC: 54 U/L (ref 9–52)
ANION GAP SERPL CALCULATED.3IONS-SCNC: 11 MMOL/L (ref 5–14)
AST SERPL W P-5'-P-CCNC: 39 U/L (ref 17–59)
BASOPHILS # BLD AUTO: 0.1 THOUSANDS/ΜL (ref 0–0.1)
BASOPHILS NFR BLD AUTO: 1 % (ref 0–1)
BILIRUB SERPL-MCNC: 0.6 MG/DL
BUN SERPL-MCNC: 11 MG/DL (ref 5–25)
CALCIUM SERPL-MCNC: 10.1 MG/DL (ref 8.4–10.2)
CHLORIDE SERPL-SCNC: 100 MMOL/L (ref 97–108)
CHOLEST SERPL-MCNC: 174 MG/DL
CO2 SERPL-SCNC: 25 MMOL/L (ref 22–30)
CREAT SERPL-MCNC: 0.67 MG/DL (ref 0.7–1.5)
EOSINOPHIL # BLD AUTO: 0.2 THOUSAND/ΜL (ref 0–0.4)
EOSINOPHIL NFR BLD AUTO: 2 % (ref 0–6)
ERYTHROCYTE [DISTWIDTH] IN BLOOD BY AUTOMATED COUNT: 13.7 %
FOLATE SERPL-MCNC: >20 NG/ML (ref 3.1–17.5)
GFR SERPL CREATININE-BSD FRML MDRD: 112 ML/MIN/1.73SQ M
GLUCOSE P FAST SERPL-MCNC: 91 MG/DL (ref 70–99)
HCT VFR BLD AUTO: 46.6 % (ref 41–53)
HDLC SERPL-MCNC: 37 MG/DL
HGB BLD-MCNC: 16.1 G/DL (ref 13.5–17.5)
LDLC SERPL CALC-MCNC: 103 MG/DL
LITHIUM SERPL-SCNC: 0.6 MMOL/L (ref 0.6–1.2)
LYMPHOCYTES # BLD AUTO: 2.5 THOUSANDS/ΜL (ref 0.5–4)
LYMPHOCYTES NFR BLD AUTO: 27 % (ref 25–45)
MCH RBC QN AUTO: 34.5 PG (ref 26–34)
MCHC RBC AUTO-ENTMCNC: 34.5 G/DL (ref 31–36)
MCV RBC AUTO: 100 FL (ref 80–100)
MONOCYTES # BLD AUTO: 0.5 THOUSAND/ΜL (ref 0.2–0.9)
MONOCYTES NFR BLD AUTO: 5 % (ref 1–10)
NEUTROPHILS # BLD AUTO: 5.9 THOUSANDS/ΜL (ref 1.8–7.8)
NEUTS SEG NFR BLD AUTO: 65 % (ref 45–65)
NONHDLC SERPL-MCNC: 137 MG/DL
PLATELET # BLD AUTO: 319 THOUSANDS/UL (ref 150–450)
PLATELET BLD QL SMEAR: ADEQUATE
PMV BLD AUTO: 8.1 FL (ref 8.9–12.7)
POTASSIUM SERPL-SCNC: 3.8 MMOL/L (ref 3.6–5)
PROT SERPL-MCNC: 7.5 G/DL (ref 5.9–8.4)
PSA SERPL-MCNC: 0.8 NG/ML (ref 0–4)
RBC # BLD AUTO: 4.65 MILLION/UL (ref 4.5–5.9)
RBC MORPH BLD: NORMAL
SODIUM SERPL-SCNC: 136 MMOL/L (ref 137–147)
TRIGL SERPL-MCNC: 170 MG/DL
TSH SERPL DL<=0.05 MIU/L-ACNC: 2.35 UIU/ML (ref 0.47–4.68)
VIT B12 SERPL-MCNC: 526 PG/ML (ref 100–900)
WBC # BLD AUTO: 9.1 THOUSAND/UL (ref 4.5–11)

## 2020-01-14 PROCEDURE — G0103 PSA SCREENING: HCPCS

## 2020-01-14 PROCEDURE — 84443 ASSAY THYROID STIM HORMONE: CPT

## 2020-01-14 PROCEDURE — 82306 VITAMIN D 25 HYDROXY: CPT

## 2020-01-14 PROCEDURE — 85025 COMPLETE CBC W/AUTO DIFF WBC: CPT

## 2020-01-14 PROCEDURE — 80053 COMPREHEN METABOLIC PANEL: CPT

## 2020-01-14 PROCEDURE — 80307 DRUG TEST PRSMV CHEM ANLYZR: CPT | Performed by: PSYCHIATRY & NEUROLOGY

## 2020-01-14 PROCEDURE — 36415 COLL VENOUS BLD VENIPUNCTURE: CPT

## 2020-01-14 PROCEDURE — 80061 LIPID PANEL: CPT

## 2020-01-14 PROCEDURE — 80178 ASSAY OF LITHIUM: CPT

## 2020-01-14 PROCEDURE — 82746 ASSAY OF FOLIC ACID SERUM: CPT

## 2020-01-14 PROCEDURE — 82607 VITAMIN B-12: CPT

## 2020-01-27 ENCOUNTER — OFFICE VISIT (OUTPATIENT)
Dept: FAMILY MEDICINE CLINIC | Facility: CLINIC | Age: 50
End: 2020-01-27
Payer: MEDICARE

## 2020-01-27 VITALS
HEART RATE: 92 BPM | DIASTOLIC BLOOD PRESSURE: 80 MMHG | HEIGHT: 70 IN | WEIGHT: 162 LBS | BODY MASS INDEX: 23.19 KG/M2 | SYSTOLIC BLOOD PRESSURE: 130 MMHG | OXYGEN SATURATION: 98 % | TEMPERATURE: 98.1 F

## 2020-01-27 DIAGNOSIS — L70.9 ACNE, UNSPECIFIED ACNE TYPE: ICD-10-CM

## 2020-01-27 DIAGNOSIS — Z12.11 COLON CANCER SCREENING: ICD-10-CM

## 2020-01-27 DIAGNOSIS — F17.200 TOBACCO DEPENDENCE SYNDROME: ICD-10-CM

## 2020-01-27 DIAGNOSIS — I10 BENIGN ESSENTIAL HYPERTENSION: ICD-10-CM

## 2020-01-27 DIAGNOSIS — E78.2 MIXED HYPERLIPIDEMIA: ICD-10-CM

## 2020-01-27 DIAGNOSIS — F31.32 MODERATE DEPRESSED BIPOLAR I DISORDER (HCC): ICD-10-CM

## 2020-01-27 DIAGNOSIS — Z00.00 MEDICARE ANNUAL WELLNESS VISIT, SUBSEQUENT: Primary | ICD-10-CM

## 2020-01-27 PROCEDURE — 99214 OFFICE O/P EST MOD 30 MIN: CPT | Performed by: FAMILY MEDICINE

## 2020-01-27 PROCEDURE — G0439 PPPS, SUBSEQ VISIT: HCPCS | Performed by: FAMILY MEDICINE

## 2020-01-27 RX ORDER — ATORVASTATIN CALCIUM 10 MG/1
10 TABLET, FILM COATED ORAL DAILY
Qty: 90 TABLET | Refills: 1 | Status: SHIPPED | OUTPATIENT
Start: 2020-01-27 | End: 2020-05-27 | Stop reason: SDUPTHER

## 2020-01-27 RX ORDER — LISINOPRIL 10 MG/1
10 TABLET ORAL DAILY
Qty: 90 TABLET | Refills: 1 | Status: SHIPPED | OUTPATIENT
Start: 2020-01-27 | End: 2020-05-27 | Stop reason: SDUPTHER

## 2020-01-27 RX ORDER — AVOBENZONE, HOMOSALATE, OCTISALATE, OCTOCRYLENE 30; 100; 50; 25 MG/ML; MG/ML; MG/ML; MG/ML
1000 SPRAY TOPICAL DAILY
COMMUNITY
Start: 2020-01-03

## 2020-01-27 NOTE — PROGRESS NOTES
Subjective:   Chief Complaint   Patient presents with    Medicare Wellness Visit    Follow-up     chronic conditions        Patient ID: Elmer Angeles is a 47 y o  male  Patient here for annual physical exam follow-up with a chronic condition patient history of hyperlipidemia on statin tolerated well without any side effect deny any chest pain short of breath no palpitation no TIA symptom patient history of hypertension on lisinopril 10 mg tolerated well without any side effect deny any chest pain short of breath no palpitation no dyspnea on exertion no lower extremity edema no headache no blurred vision patient was history of acne he is on Metrogel tolerated well no recent exacerbation and no rash no dryness in the skin and patient been smoking and he been smoking for more than 30 years and he is not ready to quit smoking yet he used to smoke cigar now uses cigarette and cigar no cough no wheezing no hematosis  Recent blood work discussed with the patient      The following portions of the patient's history were reviewed and updated as appropriate: allergies, current medications, past family history, past medical history, past social history, past surgical history and problem list     Review of Systems   Constitutional: Negative for fatigue and fever  HENT: Negative for ear pain, sinus pressure, sinus pain and sore throat  Eyes: Negative for pain and redness  Respiratory: Negative for cough, chest tightness and shortness of breath  Cardiovascular: Negative for chest pain, palpitations and leg swelling  Gastrointestinal: Negative for abdominal pain, blood in stool, constipation, diarrhea and nausea  Genitourinary: Negative for flank pain, frequency and hematuria  Musculoskeletal: Negative for back pain and joint swelling  Skin: Negative for rash  Neurological: Negative for dizziness, numbness and headaches  Hematological: Does not bruise/bleed easily               Objective:  Vitals: 01/27/20 0948 01/27/20 1016   BP: 150/90 130/80   Pulse: 92    Temp: 98 1 °F (36 7 °C)    TempSrc: Tympanic    SpO2: 98%    Weight: 73 5 kg (162 lb)    Height: 5' 10" (1 778 m)       Physical Exam   Constitutional: He is oriented to person, place, and time  He appears well-developed and well-nourished  HENT:   Head: Normocephalic  Right Ear: External ear normal    Left Ear: External ear normal    Eyes: Conjunctivae and EOM are normal  Right eye exhibits no discharge  Left eye exhibits no discharge  Neck: No JVD present  Cardiovascular: Normal rate, regular rhythm and normal heart sounds  Exam reveals no gallop  No murmur heard  Pulmonary/Chest: Effort normal  No respiratory distress  He has no wheezes  He has no rales  He exhibits no tenderness  Abdominal: He exhibits no mass  There is no tenderness  There is no rebound  Musculoskeletal: He exhibits no edema or tenderness  Neurological: He is alert and oriented to person, place, and time  Skin: No rash noted  No erythema  Assessment/Plan:    Medicare annual wellness visit, subsequent  Advice and education were given regarding nutrition, aerobic exercises, weight bearing exercises, cardiovascular risk reduction, fall risk reduction, and age appropriate supplements  The patient was counseled regarding instructions for management, risk factor reductions, prognosis, risks and benefits of treatment options, patient and family education, and importance of compliance with treatment           Tobacco dependence syndrome  Patient has been smoking for more than 2 years and he is aware of the complication of smoking he is not ready to quit smoking we did offer him help to quit and he decline it    Mixed hyperlipidemia  Chronic asymptomatic fair control continue current management the low-fat diet increase physical activity discussed with the patient    Bipolar I disorder ([facility] [address] )  Chronic asymptomatic no recent exacerbation no recent hospitalization tolerated his medication patient does follow up with the psychiatric who managed his medication    Benign essential hypertension  Chronic asymptomatic fair control continue current management encouraged patient to stop smoke increase physical activity and low-salt diet    Acne  Chronic asymptomatic fair control continue current management patient tolerated medication without side effect       Diagnoses and all orders for this visit:    Medicare annual wellness visit, subsequent    Tobacco dependence syndrome  -     CBC and differential; Future  -     Basic metabolic panel; Future  -     Lipid Panel with Direct LDL reflex; Future  -     TSH, 3rd generation with Free T4 reflex; Future    Mixed hyperlipidemia  -     atorvastatin (LIPITOR) 10 mg tablet; Take 1 tablet (10 mg total) by mouth daily  -     CBC and differential; Future  -     Basic metabolic panel; Future  -     Lipid Panel with Direct LDL reflex; Future  -     TSH, 3rd generation with Free T4 reflex; Future    Benign essential hypertension  -     lisinopril (ZESTRIL) 10 mg tablet; Take 1 tablet (10 mg total) by mouth daily  -     CBC and differential; Future  -     Basic metabolic panel; Future  -     Lipid Panel with Direct LDL reflex; Future  -     TSH, 3rd generation with Free T4 reflex; Future    Acne, unspecified acne type  -     metroNIDAZOLE (METROCREAM) 0 75 % cream; Apply topically 2 (two) times a day    Moderate depressed bipolar I disorder (HCC)  -     CBC and differential; Future  -     Basic metabolic panel; Future  -     Lipid Panel with Direct LDL reflex; Future  -     TSH, 3rd generation with Free T4 reflex; Future    Colon cancer screening  -     Cologuard; Future    Other orders  -     RA VITAMIN D-3 25 MCG (1000 UT) tablet;  Take 1,000 Units by mouth daily

## 2020-01-27 NOTE — ASSESSMENT & PLAN NOTE
Chronic asymptomatic fair control continue current management the low-fat diet increase physical activity discussed with the patient

## 2020-01-27 NOTE — PATIENT INSTRUCTIONS
Medicare Preventive Visit Patient Instructions  Thank you for completing your Welcome to Medicare Visit or Medicare Annual Wellness Visit today  Your next wellness visit will be due in one year (1/27/2021)  The screening/preventive services that you may require over the next 5-10 years are detailed below  Some tests may not apply to you based off risk factors and/or age  Screening tests ordered at today's visit but not completed yet may show as past due  Also, please note that scanned in results may not display below  Preventive Screenings:  Service Recommendations Previous Testing/Comments   Colorectal Cancer Screening  · Colonoscopy    · Fecal Occult Blood Test (FOBT)/Fecal Immunochemical Test (FIT)  · Fecal DNA/Cologuard Test  · Flexible Sigmoidoscopy Age: 54-65 years old   Colonoscopy: every 10 years (May be performed more frequently if at higher risk)  OR  FOBT/FIT: every 1 year  OR  Cologuard: every 3 years  OR  Sigmoidoscopy: every 5 years  Screening may be recommended earlier than age 48 if at higher risk for colorectal cancer  Also, an individualized decision between you and your healthcare provider will decide whether screening between the ages of 74-80 would be appropriate   Colonoscopy: Not on file  FOBT/FIT: 05/21/2019  Cologuard: Not on file  Sigmoidoscopy: Not on file    Screening Current     Prostate Cancer Screening Individualized decision between patient and health care provider in men between ages of 53-78   Medicare will cover every 12 months beginning on the day after your 50th birthday PSA: 0 8 ng/mL     Screening Not Indicated     Hepatitis C Screening Once for adults born between 1945 and 1965  More frequently in patients at high risk for Hepatitis C Hep C Antibody: 12/26/2018    Screening Current   Diabetes Screening 1-2 times per year if you're at risk for diabetes or have pre-diabetes Fasting glucose: 91 mg/dL   A1C: No results in last 5 years    Screening Current   Cholesterol Screening Once every 5 years if you don't have a lipid disorder  May order more often based on risk factors  Lipid panel: 01/14/2020    Screening Not Indicated  History Lipid Disorder      Other Preventive Screenings Covered by Medicare:  1  Abdominal Aortic Aneurysm (AAA) Screening: covered once if your at risk  You're considered to be at risk if you have a family history of AAA or a male between the age of 73-68 who smoking at least 100 cigarettes in your lifetime  2  Lung Cancer Screening: covers low dose CT scan once per year if you meet all of the following conditions: (1) Age 50-69; (2) No signs or symptoms of lung cancer; (3) Current smoker or have quit smoking within the last 15 years; (4) You have a tobacco smoking history of at least 30 pack years (packs per day x number of years you smoked); (5) You get a written order from a healthcare provider  3  Glaucoma Screening: covered annually if you're considered high risk: (1) You have diabetes OR (2) Family history of glaucoma OR (3)  aged 48 and older OR (3)  American aged 72 and older  3  Osteoporosis Screening: covered every 2 years if you meet one of the following conditions: (1) Have a vertebral abnormality; (2) On glucocorticoid therapy for more than 3 months; (3) Have primary hyperparathyroidism; (4) On osteoporosis medications and need to assess response to drug therapy  5  HIV Screening: covered annually if you're between the age of 12-76  Also covered annually if you are younger than 13 and older than 72 with risk factors for HIV infection  For pregnant patients, it is covered up to 3 times per pregnancy      Immunizations:  Immunization Recommendations   Influenza Vaccine Annual influenza vaccination during flu season is recommended for all persons aged >= 6 months who do not have contraindications   Pneumococcal Vaccine (Prevnar and Pneumovax)  * Prevnar = PCV13  * Pneumovax = PPSV23 Adults 25-60 years old: 1-3 doses may be recommended based on certain risk factors  Adults 72 years old: Prevnar (PCV13) vaccine recommended followed by Pneumovax (PPSV23) vaccine  If already received PPSV23 since turning 65, then PCV13 recommended at least one year after PPSV23 dose  Hepatitis B Vaccine 3 dose series if at intermediate or high risk (ex: diabetes, end stage renal disease, liver disease)   Tetanus (Td) Vaccine - COST NOT COVERED BY MEDICARE PART B Following completion of primary series, a booster dose should be given every 10 years to maintain immunity against tetanus  Td may also be given as tetanus wound prophylaxis  Tdap Vaccine - COST NOT COVERED BY MEDICARE PART B Recommended at least once for all adults  For pregnant patients, recommended with each pregnancy  Shingles Vaccine (Shingrix) - COST NOT COVERED BY MEDICARE PART B  2 shot series recommended in those aged 48 and above     Health Maintenance Due:      Topic Date Due    CRC Screening: FOBTx3/FIT  05/21/2020    Hepatitis C Screening  Completed     Immunizations Due:  There are no preventive care reminders to display for this patient  Advance Directives   What are advance directives? Advance directives are legal documents that state your wishes and plans for medical care  These plans are made ahead of time in case you lose your ability to make decisions for yourself  Advance directives can apply to any medical decision, such as the treatments you want, and if you want to donate organs  What are the types of advance directives? There are many types of advance directives, and each state has rules about how to use them  You may choose a combination of any of the following:  · Living will: This is a written record of the treatment you want  You can also choose which treatments you do not want, which to limit, and which to stop at a certain time  This includes surgery, medicine, IV fluid, and tube feedings  · Durable power of  for healthcare [facility]):   This is a written record that states who you want to make healthcare choices for you when you are unable to make them for yourself  This person, called a proxy, is usually a family member or a friend  You may choose more than 1 proxy  · Do not resuscitate (DNR) order:  A DNR order is used in case your heart stops beating or you stop breathing  It is a request not to have certain forms of treatment, such as CPR  A DNR order may be included in other types of advance directives  · Medical directive: This covers the care that you want if you are in a coma, near death, or unable to make decisions for yourself  You can list the treatments you want for each condition  Treatment may include pain medicine, surgery, blood transfusions, dialysis, IV or tube feedings, and a ventilator (breathing machine)  · Values history: This document has questions about your views, beliefs, and how you feel and think about life  This information can help others choose the care that you would choose  Why are advance directives important? An advance directive helps you control your care  Although spoken wishes may be used, it is better to have your wishes written down  Spoken wishes can be misunderstood, or not followed  Treatments may be given even if you do not want them  An advance directive may make it easier for your family to make difficult choices about your care  Cigarette Smoking and Your Health   Risks to your health if you smoke:  Nicotine and other chemicals found in tobacco damage every cell in your body  Even if you are a light smoker, you have an increased risk for cancer, heart disease, and lung disease  If you are pregnant or have diabetes, smoking increases your risk for complications  Benefits to your health if you stop smoking:   · You decrease respiratory symptoms such as coughing, wheezing, and shortness of breath     · You reduce your risk for cancers of the lung, mouth, throat, kidney, bladder, pancreas, stomach, and cervix  If you already have cancer, you increase the benefits of chemotherapy  You also reduce your risk for cancer returning or a second cancer from developing  · You reduce your risk for heart disease, blood clots, heart attack, and stroke  · You reduce your risk for lung infections, and diseases such as pneumonia, asthma, chronic bronchitis, and emphysema  · Your circulation improves  More oxygen can be delivered to your body  If you have diabetes, you lower your risk for complications, such as kidney, artery, and eye diseases  You also lower your risk for nerve damage  Nerve damage can lead to amputations, poor vision, and blindness  · You improve your body's ability to heal and to fight infections  For more information and support to stop smoking:   · [employer]  Phone: 5- 342 - 267-4179  Web Address: [employer]  How to Quit Using Smokeless Tobacco   Why it is important to stop using smokeless tobacco:  Smokeless tobacco comes in many forms  Examples include chew, snuff, dip, dissolvable tobacco, and snus  All smokeless tobacco products contain nicotine and may contain as much nicotine as 3 cigarettes  You may be physically dependent on nicotine  You may also be emotionally addicted to it  The cravings can be strong, but it is important to quit using smokeless tobacco  You will improve your health and decrease your cancer, stroke, and heart attack risk  Mouth sores and tooth problems will also improve when you quit  You can benefit from quitting no matter how long you have used smokeless tobacco    Prepare to stop using smokeless tobacco:  Nicotine is a highly addictive drug  Withdrawal symptoms can happen when you stop and make it hard to quit  The following can help keep you on track:  · Set a quit date  · Tell friends, family, and coworkers that you plan to quit  · Remove all smokeless tobacco products from your home, car, and workplace      Manage weight gain after you quit: Nicotine can affect your metabolism  You may gain a few pounds after you quit  The following can help you control your weight:  · Eat healthy foods  · Drink water before, during, and between meals  · Exercise as directed  © Copyright [employer] 2018 Information is for End User's use only and may not be sold, redistributed or otherwise used for commercial purposes   All illustrations and images included in CareNotes® are the copyrighted property of [employer] D A M , [employer]  or [address] [place] [address]

## 2020-01-27 NOTE — ASSESSMENT & PLAN NOTE
Patient has been smoking for more than 2 years and he is aware of the complication of smoking he is not ready to quit smoking we did offer him help to quit and he decline it

## 2020-01-27 NOTE — ASSESSMENT & PLAN NOTE
Chronic asymptomatic no recent exacerbation no recent hospitalization tolerated his medication patient does follow up with the psychiatric who managed his medication

## 2020-01-27 NOTE — ASSESSMENT & PLAN NOTE
Chronic asymptomatic fair control continue current management encouraged patient to stop smoke increase physical activity and low-salt diet

## 2020-01-27 NOTE — ASSESSMENT & PLAN NOTE
Chronic asymptomatic fair control continue current management patient tolerated medication without side effect

## 2020-01-27 NOTE — PROGRESS NOTES
Assessment and Plan:     Problem List Items Addressed This Visit        Cardiovascular and Mediastinum    Benign essential hypertension     Chronic asymptomatic fair control continue current management encouraged patient to stop smoke increase physical activity and low-salt diet         Relevant Medications    lisinopril (ZESTRIL) 10 mg tablet    Other Relevant Orders    CBC and differential    Basic metabolic panel    Lipid Panel with Direct LDL reflex    TSH, 3rd generation with Free T4 reflex       Musculoskeletal and Integument    Acne     Chronic asymptomatic fair control continue current management patient tolerated medication without side effect         Relevant Medications    metroNIDAZOLE (METROCREAM) 0 75 % cream       Other    Mixed hyperlipidemia     Chronic asymptomatic fair control continue current management the low-fat diet increase physical activity discussed with the patient         Relevant Medications    atorvastatin (LIPITOR) 10 mg tablet    Other Relevant Orders    CBC and differential    Basic metabolic panel    Lipid Panel with Direct LDL reflex    TSH, 3rd generation with Free T4 reflex    Tobacco dependence syndrome     Patient has been smoking for more than 2 years and he is aware of the complication of smoking he is not ready to quit smoking we did offer him help to quit and he decline it         Relevant Orders    CBC and differential    Basic metabolic panel    Lipid Panel with Direct LDL reflex    TSH, 3rd generation with Free T4 reflex    Medicare annual wellness visit, subsequent - Primary     Advice and education were given regarding nutrition, aerobic exercises, weight bearing exercises, cardiovascular risk reduction, fall risk reduction, and age appropriate supplements         The patient was counseled regarding instructions for management, risk factor reductions, prognosis, risks and benefits of treatment options, patient and family education, and importance of compliance with treatment  Other Visit Diagnoses     Moderate depressed bipolar I disorder (HCC)        Relevant Orders    CBC and differential    Basic metabolic panel    Lipid Panel with Direct LDL reflex    TSH, 3rd generation with Free T4 reflex    Colon cancer screening        Relevant Orders    Cologuard          Tobacco Cessation Counseling: Tobacco cessation counseling was provided  The patient is sincerely urged to quit consumption of tobacco  He is not ready to quit tobacco  Medication options discussed  Patient refused medication  Preventive health issues were discussed with patient, and age appropriate screening tests were ordered as noted in patient's After Visit Summary  Personalized health advice and appropriate referrals for health education or preventive services given if needed, as noted in patient's After Visit Summary  History of Present Illness:     Patient presents for Medicare Annual Wellness visit    Patient Care Team:  Forest Coles MD as PCP - General (Family Medicine)  Marietta Nj MD (Psychiatry)     Problem List:     Patient Active Problem List   Diagnosis    Acne    Benign essential hypertension    Mixed hyperlipidemia    [facility]    Tobacco dependence syndrome    Medicare annual wellness visit, subsequent    Allergic rhinitis due to allergen      Past Medical and Surgical History:     Past Medical History:   Diagnosis Date    Acne     [facility]     Hyperlipidemia     Hypertension     [facility]ic depression [facility]     Tobacco abuse      History reviewed  No pertinent surgical history     Family History:     Family History   Problem Relation Age of Onset    Depression Mother     Hyperlipidemia Mother     Hypertension Father     Anxiety disorder Sister       Social History:     Social History     Socioeconomic History    Marital status: Single     Spouse name: None    Number of children: None    Years of education: None    Highest education level: None   Occupational History    None   Social Needs    Financial resource strain: Not hard at all   [employer] insecurity:     Worry: Never true     Inability: Never true   sougou needs:     Medical: No     Non-medical: No   Tobacco Use    Smoking status: Current Every Day Smoker     Types: Cigarettes    Smokeless tobacco: Current User    Tobacco comment: no passive smoke exposure   Substance and Sexual Activity    Alcohol use: No    Drug use: No    Sexual activity: None   Lifestyle    Physical activity:     Days per week: 3 days     Minutes per session: 30 min    Stress: Not at all   Relationships    Social connections:     Talks on phone: Once a week     Gets together: Never     Attends Sikhism service: Never     Active member of club or organization: No     Attends meetings of clubs or organizations: Never     Relationship status: Never     Intimate partner violence:     Fear of current or ex partner: No     Emotionally abused: No     Physically abused: No     Forced sexual activity: No   Other Topics Concern    None   Social History Narrative    None       Medications and Allergies:     Current Outpatient Medications   Medication Sig Dispense Refill    atorvastatin (LIPITOR) 10 mg tablet Take 1 tablet (10 mg total) by mouth daily 90 tablet 1    lisinopril (ZESTRIL) 10 mg tablet Take 1 tablet (10 mg total) by mouth daily 90 tablet 1    lithium carbonate 300 mg capsule take 2 capsules daily      loratadine (CLARITIN) 10 mg tablet Take 1 tablet (10 mg total) by mouth daily 30 tablet 1    metroNIDAZOLE (METROCREAM) 0 75 % cream Apply topically 2 (two) times a day 45 g 2    QUEtiapine (SEROQUEL) 200 mg tablet take 1 tablet daily      RA VITAMIN D-3 25 MCG (1000 UT) tablet Take 1,000 Units by mouth daily       No current facility-administered medications for this visit        No Known Allergies   Immunizations:     Immunization History   Administered Date(s) Administered    Influenza, injectable, quadrivalent, preservative free 0 5 mL 09/23/2019    Pneumococcal Polysaccharide PPV23 01/18/2019    Tdap 04/04/2017      Health Maintenance:         Topic Date Due    CRC Screening: FOBTx3/FIT  05/21/2020    Hepatitis C Screening  Completed     There are no preventive care reminders to display for this patient  Medicare Health Risk Assessment:     /80   Pulse 92   Temp 98 1 °F (36 7 °C) (Tympanic)   Ht 5' 10" (1 778 m)   Wt 73 5 kg (162 lb)   SpO2 98%   BMI 23 24 kg/m²      Eduin Swenson is here for his Subsequent Wellness visit  Last Medicare Wellness visit information reviewed, patient interviewed and updates made to the record today  Health Risk Assessment:   Patient rates overall health as good  Patient feels that their physical health rating is same  Eyesight was rated as same  Hearing was rated as same  Patient feels that their emotional and mental health rating is same  Pain experienced in the last 7 days has been none  Patient states that he has experienced no weight loss or gain in last 6 months  Depression Screening:   PHQ-2 Score: 0      Fall Risk Screening: In the past year, patient has experienced: no history of falling in past year      Home Safety:  Patient does not have trouble with stairs inside or outside of their home  Patient has working smoke alarms and has working carbon monoxide detector  Home safety hazards include: none  Nutrition:   Current diet is Regular  Medications:   Patient is currently taking over-the-counter supplements  OTC medications include: see medication list  Patient is able to manage medications  Activities of Daily Living (ADLs)/Instrumental Activities of Daily Living (IADLs):   Walk and transfer into and out of bed and chair?: Yes  Dress and groom yourself?: Yes    Bathe or shower yourself?: Yes    Feed yourself?  Yes  Do your laundry/housekeeping?: Yes  Manage your money, pay your bills and track your expenses?: Yes  Make your own meals?: Yes    Do your own shopping?: Yes    Durable Medical Equipment Suppliers  none    Previous Hospitalizations:   Any hospitalizations or ED visits within the last 12 months?: No      Advance Care Planning:   Living will: No    Durable POA for healthcare: No    Advanced directive: No    Advanced directive counseling given: No    Five wishes given: Yes    Patient declined ACP directive: No    End of Life Decisions reviewed with patient: No    Provider agrees with end of life decisions: Yes      Cognitive Screening:   Provider or family/friend/caregiver concerned regarding cognition?: No    PREVENTIVE SCREENINGS      Cardiovascular Screening:    General: Screening Not Indicated and History Lipid Disorder      Diabetes Screening:     General: Screening Current      Colorectal Cancer Screening:     General: Screening Current      Prostate Cancer Screening:    General: Screening Not Indicated      Osteoporosis Screening:    General: Screening Not Indicated      Abdominal Aortic Aneurysm (AAA) Screening:    Risk factors include: tobacco use        General: Screening Not Indicated      Lung Cancer Screening:     General: Screening Not Indicated      Hepatitis C Screening:    General: Screening Current      Herve Dunlap MD

## 2020-04-29 DIAGNOSIS — L70.9 ACNE, UNSPECIFIED ACNE TYPE: ICD-10-CM

## 2020-05-13 ENCOUNTER — APPOINTMENT (OUTPATIENT)
Dept: LAB | Facility: HOSPITAL | Age: 50
End: 2020-05-13
Payer: MEDICARE

## 2020-05-13 DIAGNOSIS — E78.2 MIXED HYPERLIPIDEMIA: ICD-10-CM

## 2020-05-13 DIAGNOSIS — F31.32 MODERATE DEPRESSED BIPOLAR I DISORDER (HCC): ICD-10-CM

## 2020-05-13 DIAGNOSIS — I10 BENIGN ESSENTIAL HYPERTENSION: ICD-10-CM

## 2020-05-13 DIAGNOSIS — F17.200 TOBACCO DEPENDENCE SYNDROME: ICD-10-CM

## 2020-05-13 LAB
ALBUMIN SERPL BCP-MCNC: 4.6 G/DL (ref 3–5.2)
ANION GAP SERPL CALCULATED.3IONS-SCNC: 9 MMOL/L (ref 5–14)
BASOPHILS # BLD AUTO: 0.1 THOUSANDS/ΜL (ref 0–0.1)
BASOPHILS NFR BLD AUTO: 1 % (ref 0–1)
BUN SERPL-MCNC: 11 MG/DL (ref 5–25)
CALCIUM ALBUM COR SERPL-MCNC: 9.9 MG/DL (ref 8.3–10.1)
CALCIUM SERPL-MCNC: 10.4 MG/DL (ref 8.3–10.1)
CALCIUM SERPL-MCNC: 10.4 MG/DL (ref 8.4–10.2)
CHLORIDE SERPL-SCNC: 97 MMOL/L (ref 97–108)
CHOLEST SERPL-MCNC: 167 MG/DL
CO2 SERPL-SCNC: 29 MMOL/L (ref 22–30)
CREAT SERPL-MCNC: 0.64 MG/DL (ref 0.7–1.5)
EOSINOPHIL # BLD AUTO: 0.2 THOUSAND/ΜL (ref 0–0.4)
EOSINOPHIL NFR BLD AUTO: 2 % (ref 0–6)
ERYTHROCYTE [DISTWIDTH] IN BLOOD BY AUTOMATED COUNT: 13.7 %
GFR SERPL CREATININE-BSD FRML MDRD: 113 ML/MIN/1.73SQ M
GLUCOSE P FAST SERPL-MCNC: 85 MG/DL (ref 70–99)
HCT VFR BLD AUTO: 46.5 % (ref 41–53)
HDLC SERPL-MCNC: 42 MG/DL
HGB BLD-MCNC: 15.9 G/DL (ref 13.5–17.5)
LDLC SERPL CALC-MCNC: 107 MG/DL
LYMPHOCYTES # BLD AUTO: 2.3 THOUSANDS/ΜL (ref 0.5–4)
LYMPHOCYTES NFR BLD AUTO: 23 % (ref 25–45)
MCH RBC QN AUTO: 33.7 PG (ref 26–34)
MCHC RBC AUTO-ENTMCNC: 34.1 G/DL (ref 31–36)
MCV RBC AUTO: 99 FL (ref 80–100)
MONOCYTES # BLD AUTO: 0.5 THOUSAND/ΜL (ref 0.2–0.9)
MONOCYTES NFR BLD AUTO: 5 % (ref 1–10)
NEUTROPHILS # BLD AUTO: 7.2 THOUSANDS/ΜL (ref 1.8–7.8)
NEUTS SEG NFR BLD AUTO: 71 % (ref 45–65)
PLATELET # BLD AUTO: 330 THOUSANDS/UL (ref 150–450)
PMV BLD AUTO: 8.4 FL (ref 8.9–12.7)
POTASSIUM SERPL-SCNC: 4.2 MMOL/L (ref 3.6–5)
RBC # BLD AUTO: 4.71 MILLION/UL (ref 4.5–5.9)
SODIUM SERPL-SCNC: 135 MMOL/L (ref 137–147)
TRIGL SERPL-MCNC: 91 MG/DL
TSH SERPL DL<=0.05 MIU/L-ACNC: 2.02 UIU/ML (ref 0.47–4.68)
WBC # BLD AUTO: 10.2 THOUSAND/UL (ref 4.5–11)

## 2020-05-13 PROCEDURE — 82040 ASSAY OF SERUM ALBUMIN: CPT

## 2020-05-13 PROCEDURE — 36415 COLL VENOUS BLD VENIPUNCTURE: CPT

## 2020-05-13 PROCEDURE — 80048 BASIC METABOLIC PNL TOTAL CA: CPT

## 2020-05-13 PROCEDURE — 85025 COMPLETE CBC W/AUTO DIFF WBC: CPT

## 2020-05-13 PROCEDURE — 80061 LIPID PANEL: CPT

## 2020-05-13 PROCEDURE — 84443 ASSAY THYROID STIM HORMONE: CPT

## 2020-05-27 ENCOUNTER — OFFICE VISIT (OUTPATIENT)
Dept: FAMILY MEDICINE CLINIC | Facility: CLINIC | Age: 50
End: 2020-05-27
Payer: MEDICARE

## 2020-05-27 VITALS
HEIGHT: 71 IN | DIASTOLIC BLOOD PRESSURE: 80 MMHG | OXYGEN SATURATION: 96 % | BODY MASS INDEX: 23.1 KG/M2 | SYSTOLIC BLOOD PRESSURE: 138 MMHG | HEART RATE: 85 BPM | TEMPERATURE: 99.2 F | WEIGHT: 165 LBS

## 2020-05-27 DIAGNOSIS — L70.8 OTHER ACNE: ICD-10-CM

## 2020-05-27 DIAGNOSIS — Z12.11 COLON CANCER SCREENING: ICD-10-CM

## 2020-05-27 DIAGNOSIS — E78.2 MIXED HYPERLIPIDEMIA: Primary | ICD-10-CM

## 2020-05-27 DIAGNOSIS — I10 BENIGN ESSENTIAL HYPERTENSION: ICD-10-CM

## 2020-05-27 PROCEDURE — 3079F DIAST BP 80-89 MM HG: CPT | Performed by: FAMILY MEDICINE

## 2020-05-27 PROCEDURE — 3075F SYST BP GE 130 - 139MM HG: CPT | Performed by: FAMILY MEDICINE

## 2020-05-27 PROCEDURE — 3008F BODY MASS INDEX DOCD: CPT | Performed by: FAMILY MEDICINE

## 2020-05-27 PROCEDURE — 4004F PT TOBACCO SCREEN RCVD TLK: CPT | Performed by: FAMILY MEDICINE

## 2020-05-27 PROCEDURE — 99214 OFFICE O/P EST MOD 30 MIN: CPT | Performed by: FAMILY MEDICINE

## 2020-05-27 RX ORDER — QUETIAPINE FUMARATE 300 MG/1
300 TABLET, FILM COATED ORAL
COMMUNITY

## 2020-05-27 RX ORDER — ATORVASTATIN CALCIUM 10 MG/1
10 TABLET, FILM COATED ORAL DAILY
Qty: 90 TABLET | Refills: 1 | Status: SHIPPED | OUTPATIENT
Start: 2020-05-27 | End: 2020-11-16 | Stop reason: SDUPTHER

## 2020-05-27 RX ORDER — LISINOPRIL 10 MG/1
10 TABLET ORAL DAILY
Qty: 90 TABLET | Refills: 1 | Status: SHIPPED | OUTPATIENT
Start: 2020-05-27 | End: 2020-11-16 | Stop reason: SDUPTHER

## 2020-06-08 DIAGNOSIS — Z12.11 COLON CANCER SCREENING: ICD-10-CM

## 2020-06-17 ENCOUNTER — TELEPHONE (OUTPATIENT)
Dept: FAMILY MEDICINE CLINIC | Facility: CLINIC | Age: 50
End: 2020-06-17

## 2020-06-17 DIAGNOSIS — R19.5 POSITIVE COLORECTAL CANCER SCREENING USING COLOGUARD TEST: Primary | ICD-10-CM

## 2020-07-10 ENCOUNTER — CONSULT (OUTPATIENT)
Dept: GASTROENTEROLOGY | Facility: MEDICAL CENTER | Age: 50
End: 2020-07-10
Payer: MEDICARE

## 2020-07-10 VITALS
BODY MASS INDEX: 22.12 KG/M2 | TEMPERATURE: 97.7 F | HEIGHT: 71 IN | DIASTOLIC BLOOD PRESSURE: 84 MMHG | HEART RATE: 87 BPM | SYSTOLIC BLOOD PRESSURE: 151 MMHG | WEIGHT: 158 LBS

## 2020-07-10 DIAGNOSIS — R19.5 POSITIVE COLORECTAL CANCER SCREENING USING COLOGUARD TEST: ICD-10-CM

## 2020-07-10 PROCEDURE — 99203 OFFICE O/P NEW LOW 30 MIN: CPT | Performed by: INTERNAL MEDICINE

## 2020-07-10 NOTE — PROGRESS NOTES
[facility] - Outpatient Consultation  Giovanny Lorenzana 45 y o  male MRN: [id number]  Encounter: [id number]          ASSESSMENT AND PLAN:      1  Positive colorectal cancer screening using Cologuard test  Stool testing June 2020 colo guard was positive  He also has positive fit testing from May of 2019 for which she was referred to GI but did not schedule appointment  I discussed given his 2 positive tests he will require diagnostic colonoscopy for evaluation  I discussed that a positive colo guard test We discussed the colo guard test and how a  positive result could indicate advanced adenomas (20%), non advanced adenomas (31%), colorectal cancer (4%) or nocolorectal neoplasia (45%)  The patient states multiple times during the office visit that he has a living will stating he would have no life-saving measures including resuscitation or intubation performed  I discussed I would strongly recommend diagnostic colonoscopy given that he has had a positive stool test at least for the last year  We discussed that sedation includes monitored anesthesia care with propofol and he would discuss the risk/benefits with the anesthesiologist that day  We discussed the indication, risk and benefit of colonoscopy for diagnostic purposes  He states that even if colon polyps were discovered on his colonoscopy he would not want them to be removed  He states if colon cancer was discovered he would not want medical or surgical treatment  He states that he wants to pass this life naturally and without intervention  I recommended if he changes his mind regarding diagnostic colonoscopy he can call to schedule or if he has additional questions we would be happy to speak with him over the phone or another office visit if needed      Follow up as needed      ______________________________________________________________________    HPI:  Giovanny Lorenzana is a 45 y o  male with a history of hypertension, bipolar disorder, and hyperlipidemia who presents for evaluation of positive Cologuard testing  He had colo guard testing in June 2020 which was positive  He also had positive fit testing May 2019 was referred to GI at that time but did not schedule appointment  He reports or every other day bowel movements which are formed  He intermittently has constipation for which he uses a high-fiber diet  He denies melena or hematochezia  He states his appetite is good and his weight has been stable  He denies abdominal pain, nausea, vomiting or change in bowel habits  He has no prior colonoscopy in the past   He reports no family history gastrointestinal disease including colorectal cancer  He reports no GI surgery history  He takes no anti-platelet or anticoagulant medications  REVIEW OF SYSTEMS:    CONSTITUTIONAL: Denies any fever, chills, rigors, and weight loss  HEENT: No earache or tinnitus  Denies hearing loss or visual disturbances  CARDIOVASCULAR: No chest pain or palpitations  RESPIRATORY: Denies any cough, hemoptysis, shortness of breath or dyspnea on exertion  GASTROINTESTINAL: As noted in the History of Present Illness  GENITOURINARY: No problems with urination  Denies any hematuria or dysuria  NEUROLOGIC: No dizziness or vertigo, denies headaches  MUSCULOSKELETAL: Denies any muscle or joint pain  SKIN: Denies skin rashes or itching  ENDOCRINE: Denies excessive thirst  Denies intolerance to heat or cold  PSYCHOSOCIAL: Denies depression or anxiety  Denies any recent memory loss  Historical Information   Past Medical History:   Diagnosis Date    Acne     [facility]     Hyperlipidemia     Hypertension     [facility] ([facility]     Tobacco abuse      No past surgical history on file    Social History   Social History     Substance and Sexual Activity   Alcohol Use No     Social History     Substance and Sexual Activity   Drug Use No     Social History Tobacco Use   Smoking Status Current Every Day Smoker    Types: Cigarettes   Smokeless Tobacco Current User   Tobacco Comment    no passive smoke exposure     Family History   Problem Relation Age of Onset    Depression Mother     Hyperlipidemia Mother     Hypertension Father     Anxiety disorder Sister        Meds/Allergies       Current Outpatient Medications:     atorvastatin (LIPITOR) 10 mg tablet    lisinopril (ZESTRIL) 10 mg tablet    lithium carbonate 300 mg capsule    loratadine (CLARITIN) 10 mg tablet    metroNIDAZOLE (MetroCream) 0 75 % cream    QUEtiapine (SEROquel) 300 mg tablet    RA VITAMIN D-3 25 MCG (1000 UT) tablet    No Known Allergies        Objective     Blood pressure 151/84, pulse 87, temperature 97 7 °F (36 5 °C), temperature source Tympanic, height 5' 10 5" (1 791 m), weight 71 7 kg (158 lb)  Body mass index is 22 35 kg/m²  PHYSICAL EXAM:      General Appearance:   Well-appearing older male Alert, cooperative, no distress   HEENT:   Normocephalic, atraumatic, anicteric  Neck:  Supple, symmetrical, trachea midline   Lungs:   Clear to auscultation bilaterally; no rales, rhonchi or wheezing; respirations unlabored    Heart[de-identified]   Regular rate and rhythm; no murmur, rub, or gallop  Abdomen:   Soft, non-tender, non-distended; normal bowel sounds; no masses, no organomegaly    Genitalia:   Deferred    Rectal:   Deferred    Extremities:  No cyanosis, clubbing or edema    Pulses:  2+ and symmetric    Skin:  No jaundice, rashes, or lesions    Lymph nodes:  No palpable cervical lymphadenopathy        Lab Results:   No visits with results within 1 Day(s) from this visit     Latest known visit with results is:   Appointment on 05/13/2020   Component Date Value    WBC 05/13/2020 10 20     RBC 05/13/2020 4 71     Hemoglobin 05/13/2020 15 9     Hematocrit 05/13/2020 46 5     MCV 05/13/2020 99     MCH 05/13/2020 33 7     MCHC 05/13/2020 34 1     RDW 05/13/2020 13 7     MPV 05/13/2020 8 4*    Platelets 39/40/4861 330     Neutrophils Relative 05/13/2020 71*    Lymphocytes Relative 05/13/2020 23*    Monocytes Relative 05/13/2020 5     Eosinophils Relative 05/13/2020 2     Basophils Relative 05/13/2020 1     Neutrophils Absolute 05/13/2020 7 20     Lymphocytes Absolute 05/13/2020 2 30     Monocytes Absolute 05/13/2020 0 50     Eosinophils Absolute 05/13/2020 0 20     Basophils Absolute 05/13/2020 0 10     Sodium 05/13/2020 135*    Potassium 05/13/2020 4 2     Chloride 05/13/2020 97     CO2 05/13/2020 29     ANION GAP 05/13/2020 9     BUN 05/13/2020 11     Creatinine 05/13/2020 0 64*    Glucose, Fasting 05/13/2020 85     Calcium 05/13/2020 10 4*    eGFR 05/13/2020 113     Cholesterol 05/13/2020 167     Triglycerides 05/13/2020 91     HDL, Direct 05/13/2020 42     LDL Calculated 05/13/2020 107     TSH 3RD GENERATON 05/13/2020 2 020     Albumin 05/13/2020 4 6     Corrected Calcium 05/13/2020 9 9     CALCIUM FOR CA/ALB 05/13/2020 10 4*         Radiology Results:   No results found

## 2020-08-31 DIAGNOSIS — L70.8 OTHER ACNE: ICD-10-CM

## 2020-09-14 ENCOUNTER — APPOINTMENT (OUTPATIENT)
Dept: LAB | Facility: HOSPITAL | Age: 50
End: 2020-09-14
Payer: MEDICARE

## 2020-09-14 DIAGNOSIS — E78.2 MIXED HYPERLIPIDEMIA: ICD-10-CM

## 2020-09-14 DIAGNOSIS — I10 BENIGN ESSENTIAL HYPERTENSION: ICD-10-CM

## 2020-09-14 LAB
ALBUMIN SERPL BCP-MCNC: 4.5 G/DL (ref 3–5.2)
ANION GAP SERPL CALCULATED.3IONS-SCNC: 8 MMOL/L (ref 5–14)
BASOPHILS # BLD AUTO: 0.1 THOUSANDS/ΜL (ref 0–0.1)
BASOPHILS NFR BLD AUTO: 1 % (ref 0–1)
BUN SERPL-MCNC: 15 MG/DL (ref 5–25)
CALCIUM ALBUM COR SERPL-MCNC: 10.2 MG/DL (ref 8.3–10.1)
CALCIUM SERPL-MCNC: 10.6 MG/DL (ref 8.3–10.1)
CALCIUM SERPL-MCNC: 10.6 MG/DL (ref 8.4–10.2)
CHLORIDE SERPL-SCNC: 99 MMOL/L (ref 97–108)
CHOLEST SERPL-MCNC: 177 MG/DL
CO2 SERPL-SCNC: 29 MMOL/L (ref 22–30)
CREAT SERPL-MCNC: 0.69 MG/DL (ref 0.7–1.5)
EOSINOPHIL # BLD AUTO: 0.1 THOUSAND/ΜL (ref 0–0.4)
EOSINOPHIL NFR BLD AUTO: 1 % (ref 0–6)
ERYTHROCYTE [DISTWIDTH] IN BLOOD BY AUTOMATED COUNT: 14.1 %
GFR SERPL CREATININE-BSD FRML MDRD: 110 ML/MIN/1.73SQ M
GLUCOSE P FAST SERPL-MCNC: 91 MG/DL (ref 70–99)
HCT VFR BLD AUTO: 45.8 % (ref 41–53)
HDLC SERPL-MCNC: 38 MG/DL
HGB BLD-MCNC: 15.8 G/DL (ref 13.5–17.5)
LDLC SERPL CALC-MCNC: 107 MG/DL
LYMPHOCYTES # BLD AUTO: 2.1 THOUSANDS/ΜL (ref 0.5–4)
LYMPHOCYTES NFR BLD AUTO: 24 % (ref 25–45)
MCH RBC QN AUTO: 33.9 PG (ref 26–34)
MCHC RBC AUTO-ENTMCNC: 34.5 G/DL (ref 31–36)
MCV RBC AUTO: 98 FL (ref 80–100)
MONOCYTES # BLD AUTO: 0.4 THOUSAND/ΜL (ref 0.2–0.9)
MONOCYTES NFR BLD AUTO: 5 % (ref 1–10)
NEUTROPHILS # BLD AUTO: 6 THOUSANDS/ΜL (ref 1.8–7.8)
NEUTS SEG NFR BLD AUTO: 69 % (ref 45–65)
PLATELET # BLD AUTO: 359 THOUSANDS/UL (ref 150–450)
PMV BLD AUTO: 8 FL (ref 8.9–12.7)
POTASSIUM SERPL-SCNC: 4.1 MMOL/L (ref 3.6–5)
RBC # BLD AUTO: 4.66 MILLION/UL (ref 4.5–5.9)
SODIUM SERPL-SCNC: 136 MMOL/L (ref 137–147)
TRIGL SERPL-MCNC: 161 MG/DL
WBC # BLD AUTO: 8.8 THOUSAND/UL (ref 4.5–11)

## 2020-09-14 PROCEDURE — 36415 COLL VENOUS BLD VENIPUNCTURE: CPT

## 2020-09-14 PROCEDURE — 80061 LIPID PANEL: CPT

## 2020-09-14 PROCEDURE — 85025 COMPLETE CBC W/AUTO DIFF WBC: CPT

## 2020-09-14 PROCEDURE — 82040 ASSAY OF SERUM ALBUMIN: CPT

## 2020-09-14 PROCEDURE — 80048 BASIC METABOLIC PNL TOTAL CA: CPT

## 2020-09-28 ENCOUNTER — OFFICE VISIT (OUTPATIENT)
Dept: FAMILY MEDICINE CLINIC | Facility: CLINIC | Age: 50
End: 2020-09-28
Payer: MEDICARE

## 2020-09-28 VITALS
BODY MASS INDEX: 23.19 KG/M2 | OXYGEN SATURATION: 97 % | SYSTOLIC BLOOD PRESSURE: 120 MMHG | HEIGHT: 70 IN | WEIGHT: 162 LBS | DIASTOLIC BLOOD PRESSURE: 80 MMHG | HEART RATE: 106 BPM | TEMPERATURE: 99.5 F

## 2020-09-28 DIAGNOSIS — Z23 NEED FOR INFLUENZA VACCINATION: ICD-10-CM

## 2020-09-28 DIAGNOSIS — L70.8 OTHER ACNE: ICD-10-CM

## 2020-09-28 DIAGNOSIS — I10 BENIGN ESSENTIAL HYPERTENSION: ICD-10-CM

## 2020-09-28 DIAGNOSIS — F17.200 TOBACCO DEPENDENCE SYNDROME: ICD-10-CM

## 2020-09-28 DIAGNOSIS — E78.2 MIXED HYPERLIPIDEMIA: Primary | ICD-10-CM

## 2020-09-28 PROCEDURE — 90686 IIV4 VACC NO PRSV 0.5 ML IM: CPT

## 2020-09-28 PROCEDURE — 99214 OFFICE O/P EST MOD 30 MIN: CPT | Performed by: FAMILY MEDICINE

## 2020-09-28 PROCEDURE — G0008 ADMIN INFLUENZA VIRUS VAC: HCPCS

## 2020-09-28 NOTE — ASSESSMENT & PLAN NOTE
Chronic asymptomatic fair control continue current management  The wash face no more than twice a day use warm water  Use most are eyes without sent or dyes  Do not take or squeeze a pimple  Avoid oral base makeup

## 2020-09-28 NOTE — PROGRESS NOTES
Tobacco Cessation Counseling: Tobacco cessation counseling was provided  The patient is sincerely urged to quit consumption of tobacco  He is not ready to quit tobacco  Medication options discussed  Patient refused medication  Subjective:   Chief Complaint   Patient presents with    Follow-up     chronic conditions        Patient ID: Sakina Grady is a 45 y o  male  Patient here follow-up with a chronic condition patient was history of the hyperlipidemia on atorvastatin tolerated well without any side effect deny any chest pain short of breath no palpitation no TIA symptom patient's history  acne he been using the Metro cream and he tolerated well improved on the acne on his face happy with the result the patient history of hypertension on lisinopril tolerated well without any side effect deny any chest pain short of breath no palpitation no cough no lower extremity edema and no dyspnea on exertion the patient continued to smoke but he decrease the number of the cigarette smoke per day and he try to cut down gradually  Recent blood work discussed with the patient        The following portions of the patient's history were reviewed and updated as appropriate: allergies, current medications, past family history, past medical history, past social history, past surgical history and problem list     Review of Systems   Constitutional: Negative for activity change, appetite change, fatigue and fever  HENT: Negative for congestion, ear pain, sinus pressure, sinus pain and sore throat  Eyes: Negative for pain, discharge, redness and itching  Respiratory: Negative for cough, chest tightness, shortness of breath and stridor  Cardiovascular: Negative for chest pain, palpitations and leg swelling  Gastrointestinal: Negative for abdominal pain, blood in stool, constipation, diarrhea and nausea  Genitourinary: Negative for dysuria, flank pain, frequency and hematuria     Musculoskeletal: Negative for back pain, joint swelling and neck pain  Skin: Negative for pallor and rash  Neurological: Negative for dizziness, tremors, weakness, numbness and headaches  Hematological: Does not bruise/bleed easily  Objective:  Vitals:    09/28/20 1031 09/28/20 1046   BP: 130/90 120/80   Pulse: (!) 106    Temp: 99 5 °F (37 5 °C)    TempSrc: Tympanic    SpO2: 97%    Weight: 73 5 kg (162 lb)    Height: 5' 10" (1 778 m)       Physical Exam  Vitals signs and nursing note reviewed  Constitutional:       General: He is not in acute distress  Appearance: Normal appearance  He is well-developed  He is not diaphoretic  HENT:      Head: Normocephalic  Right Ear: Tympanic membrane, ear canal and external ear normal       Left Ear: Tympanic membrane, ear canal and external ear normal       Nose: Nose normal  No congestion or rhinorrhea  Mouth/Throat:      Mouth: Mucous membranes are moist       Pharynx: Oropharynx is clear  No oropharyngeal exudate or posterior oropharyngeal erythema  Eyes:      General:         Right eye: No discharge  Left eye: No discharge  Conjunctiva/sclera: Conjunctivae normal    Neck:      Musculoskeletal: Normal range of motion and neck supple  Vascular: No JVD  Cardiovascular:      Rate and Rhythm: Normal rate and regular rhythm  Heart sounds: Normal heart sounds  No murmur  No gallop  Pulmonary:      Effort: Pulmonary effort is normal  No respiratory distress  Breath sounds: Normal breath sounds  No stridor  No wheezing or rales  Chest:      Chest wall: No tenderness  Abdominal:      General: There is no distension  Palpations: Abdomen is soft  There is no mass  Tenderness: There is no abdominal tenderness  There is no rebound  Musculoskeletal: Normal range of motion  General: No tenderness  Lymphadenopathy:      Cervical: No cervical adenopathy  Skin:     General: Skin is warm  Findings: No erythema or rash  Neurological:      Mental Status: He is alert and oriented to person, place, and time  Sensory: No sensory deficit        Gait: Gait normal    Psychiatric:         Mood and Affect: Mood normal          Behavior: Behavior normal            Assessment/Plan:    Benign essential hypertension  Chronic asymptomatic fair controlled continue Lisinopril 10 mg po/day   Low-salt diet increase physical activity discussed with the patient also recommend important stop smoking    Mixed hyperlipidemia  Chronic asymptomatic fair controlled continue with atorvastatin 10 mg once a day low-fat diet discussed the patient    Tobacco dependence syndrome  A uncontrolled patient try to cut down number of cigarettes a day patient aware of tobacco use complication    Acne  Chronic asymptomatic fair control continue current management  The wash face no more than twice a day use warm water  Use most are eyes without sent or dyes  Do not take or squeeze a pimple  Avoid oral base makeup       Diagnoses and all orders for this visit:    Mixed hyperlipidemia    Benign essential hypertension    Tobacco dependence syndrome    Need for influenza vaccination  -     FLUZONE: influenza vaccine, quadrivalent, 0 5 mL    Other acne

## 2020-09-28 NOTE — ASSESSMENT & PLAN NOTE
Chronic asymptomatic fair controlled continue with atorvastatin 10 mg once a day low-fat diet discussed the patient

## 2020-09-28 NOTE — ASSESSMENT & PLAN NOTE
A uncontrolled patient try to cut down number of cigarettes a day patient aware of tobacco use complication

## 2020-09-28 NOTE — ASSESSMENT & PLAN NOTE
Chronic asymptomatic fair controlled continue Lisinopril 10 mg po/day   Low-salt diet increase physical activity discussed with the patient also recommend important stop smoking

## 2020-11-16 DIAGNOSIS — L70.8 OTHER ACNE: ICD-10-CM

## 2020-11-16 DIAGNOSIS — E78.2 MIXED HYPERLIPIDEMIA: ICD-10-CM

## 2020-11-16 DIAGNOSIS — I10 BENIGN ESSENTIAL HYPERTENSION: ICD-10-CM

## 2020-11-16 RX ORDER — ATORVASTATIN CALCIUM 10 MG/1
10 TABLET, FILM COATED ORAL DAILY
Qty: 90 TABLET | Refills: 0 | Status: SHIPPED | OUTPATIENT
Start: 2020-11-16 | End: 2021-01-27 | Stop reason: SDUPTHER

## 2020-11-16 RX ORDER — LISINOPRIL 10 MG/1
10 TABLET ORAL DAILY
Qty: 90 TABLET | Refills: 0 | Status: SHIPPED | OUTPATIENT
Start: 2020-11-16 | End: 2021-01-27 | Stop reason: SDUPTHER

## 2020-12-28 DIAGNOSIS — L70.8 OTHER ACNE: ICD-10-CM

## 2021-01-14 ENCOUNTER — TRANSCRIBE ORDERS (OUTPATIENT)
Dept: LAB | Facility: HOSPITAL | Age: 51
End: 2021-01-14

## 2021-01-27 ENCOUNTER — OFFICE VISIT (OUTPATIENT)
Dept: FAMILY MEDICINE CLINIC | Facility: CLINIC | Age: 51
End: 2021-01-27
Payer: MEDICARE

## 2021-01-27 VITALS
SYSTOLIC BLOOD PRESSURE: 130 MMHG | DIASTOLIC BLOOD PRESSURE: 80 MMHG | TEMPERATURE: 99.5 F | HEART RATE: 88 BPM | BODY MASS INDEX: 21.33 KG/M2 | OXYGEN SATURATION: 97 % | HEIGHT: 70 IN | WEIGHT: 149 LBS

## 2021-01-27 DIAGNOSIS — I10 BENIGN ESSENTIAL HYPERTENSION: ICD-10-CM

## 2021-01-27 DIAGNOSIS — Z23 NEED FOR SHINGLES VACCINE: Primary | ICD-10-CM

## 2021-01-27 DIAGNOSIS — E78.2 MIXED HYPERLIPIDEMIA: ICD-10-CM

## 2021-01-27 DIAGNOSIS — F31.32 MODERATE DEPRESSED BIPOLAR I DISORDER (HCC): ICD-10-CM

## 2021-01-27 DIAGNOSIS — F17.200 TOBACCO DEPENDENCE SYNDROME: ICD-10-CM

## 2021-01-27 DIAGNOSIS — L70.8 OTHER ACNE: ICD-10-CM

## 2021-01-27 DIAGNOSIS — Z12.5 SCREENING PSA (PROSTATE SPECIFIC ANTIGEN): ICD-10-CM

## 2021-01-27 DIAGNOSIS — Z00.00 MEDICARE ANNUAL WELLNESS VISIT, SUBSEQUENT: ICD-10-CM

## 2021-01-27 DIAGNOSIS — F31.9 BIPOLAR I DISORDER (HCC): ICD-10-CM

## 2021-01-27 PROCEDURE — 99214 OFFICE O/P EST MOD 30 MIN: CPT | Performed by: FAMILY MEDICINE

## 2021-01-27 PROCEDURE — G0439 PPPS, SUBSEQ VISIT: HCPCS | Performed by: FAMILY MEDICINE

## 2021-01-27 RX ORDER — LISINOPRIL 10 MG/1
10 TABLET ORAL DAILY
Qty: 90 TABLET | Refills: 0 | Status: SHIPPED | OUTPATIENT
Start: 2021-01-27 | End: 2021-04-26 | Stop reason: SDUPTHER

## 2021-01-27 RX ORDER — ATORVASTATIN CALCIUM 10 MG/1
10 TABLET, FILM COATED ORAL DAILY
Qty: 90 TABLET | Refills: 0 | Status: SHIPPED | OUTPATIENT
Start: 2021-01-27 | End: 2021-04-26 | Stop reason: SDUPTHER

## 2021-01-27 NOTE — PROGRESS NOTES
Assessment and Plan:     Problem List Items Addressed This Visit        Cardiovascular and Mediastinum    Benign essential hypertension     Chronic asymptomatic fair control continue with the lisinopril 10 mg once a day a low-salt diet discussed the patient         Relevant Medications    lisinopril (ZESTRIL) 10 mg tablet    Other Relevant Orders    CBC and differential    Comprehensive metabolic panel    Lipid Panel with Direct LDL reflex    TSH, 3rd generation with Free T4 reflex       Musculoskeletal and Integument    Acne     A chronic asymptomatic fair control continue MetroCream The wash face no more than twice a day use warm water  Use most are eyes without sent or dyes  Do not take or squeeze a pimple  Avoid oral base makeup         Relevant Medications    metroNIDAZOLE (MetroCream) 0 75 % cream       Other    Mixed hyperlipidemia     Chronic asymptomatic continue atorvastatin 10 mg once a day due for lipid panel before next appointment         Relevant Medications    atorvastatin (LIPITOR) 10 mg tablet    Other Relevant Orders    CBC and differential    Comprehensive metabolic panel    Lipid Panel with Direct LDL reflex    TSH, 3rd generation with Free T4 reflex    Bipolar I disorder (HCC)     Chronic asymptomatic patient does follow up with the psychiatric who managed his medication currently on lithium and Seroquel         Tobacco dependence syndrome     A chronic uncontrolled patient continued to smoking he is down to half pack a day aware of the complication of smoking not ready to quit smoking yet         Relevant Orders    CBC and differential    Comprehensive metabolic panel    Lipid Panel with Direct LDL reflex    TSH, 3rd generation with Free T4 reflex    Medicare annual wellness visit, subsequent     Advice and education were given regarding nutrition, aerobic exercises, weight bearing exercises, cardiovascular risk reduction, fall risk reduction, and age appropriate supplements         The patient was counseled regarding instructions for management, risk factor reductions, prognosis, risks and benefits of treatment options, patient and family education, and importance of compliance with treatment  I discussed with the patient shingle vaccine and he agree script has been given to the patient to take it in the pharmacy secondary to coverage           Other Visit Diagnoses     Need for shingles vaccine    -  Primary    Relevant Medications    Zoster Vac Recomb Adjuvanted 50 MCG/0 5ML SUSR    Moderate depressed bipolar I disorder (HCC)        Relevant Orders    CBC and differential    Comprehensive metabolic panel    Lipid Panel with Direct LDL reflex    TSH, 3rd generation with Free T4 reflex    Lithium level    Screening PSA (prostate specific antigen)        Relevant Orders    PSA, Total Screen          Tobacco Cessation Counseling: Tobacco cessation counseling was provided  The patient is sincerely urged to quit consumption of tobacco  He is not ready to quit tobacco  Medication options discussed  Patient refused medication  Preventive health issues were discussed with patient, and age appropriate screening tests were ordered as noted in patient's After Visit Summary  Personalized health advice and appropriate referrals for health education or preventive services given if needed, as noted in patient's After Visit Summary       History of Present Illness:     Patient presents for Medicare Annual Wellness visit    Patient Care Team:  Kathy Norris MD as PCP - General (Family Medicine)  Ethan Duque MD (Psychiatry)  Taylor Finn NP as Nurse Practitioner     Problem List:     Patient Active Problem List   Diagnosis    Acne    Benign essential hypertension    Mixed hyperlipidemia    [facility]    Tobacco dependence syndrome    Medicare annual wellness visit, subsequent    Allergic rhinitis due to allergen    Colon cancer screening      Past Medical and Surgical History: Past Medical History:   Diagnosis Date    Acne     Bipolar disorder ([facility] )     Hyperlipidemia     Hypertension     Manic depression ([facility] )     Tobacco abuse      History reviewed  No pertinent surgical history     Family History:     Family History   Problem Relation Age of Onset    Depression Mother     Hyperlipidemia Mother     Hypertension Father     Anxiety disorder Sister       Social History:        Social History     Socioeconomic History    Marital status: Single     Spouse name: None    Number of children: None    Years of education: None    Highest education level: None   Occupational History    None   Social Needs    Financial resource strain: Not hard at all   [place] insecurity     Worry: Never true     Inability: Never true    Transportation needs     Medical: No     Non-medical: No   Tobacco Use    Smoking status: Current Every Day Smoker     Types: Cigarettes    Smokeless tobacco: Current User    Tobacco comment: no passive smoke exposure   Substance and Sexual Activity    Alcohol use: No    Drug use: No    Sexual activity: None   Lifestyle    Physical activity     Days per week: 3 days     Minutes per session: 30 min    Stress: Not at all   Relationships    Social connections     Talks on phone: Once a week     Gets together: Never     Attends [employer] service: Never     Active member of club or organization: No     Attends meetings of clubs or organizations: Never     Relationship status: Never     Intimate partner violence     Fear of current or ex partner: No     Emotionally abused: No     Physically abused: No     Forced sexual activity: No   Other Topics Concern    None   Social History Narrative    None      Medications and Allergies:     Current Outpatient Medications   Medication Sig Dispense Refill    atorvastatin (LIPITOR) 10 mg tablet Take 1 tablet (10 mg total) by mouth daily 90 tablet 0    lisinopril (ZESTRIL) 10 mg tablet Take 1 tablet (10 mg total) by mouth daily 90 tablet 0    lithium carbonate 300 mg capsule take 2 capsules daily      loratadine (CLARITIN) 10 mg tablet Take 1 tablet (10 mg total) by mouth daily 30 tablet 1    metroNIDAZOLE (MetroCream) 0 75 % cream Apply topically 2 (two) times a day 45 g 2    QUEtiapine (SEROquel) 300 mg tablet Take 300 mg by mouth daily at bedtime      RA VITAMIN D-3 25 MCG (1000 UT) tablet Take 1,000 Units by mouth daily      Zoster Vac Recomb Adjuvanted 50 MCG/0 5ML SUSR Inject 1 vial into a muscle once for 1 dose 1 each 1     No current facility-administered medications for this visit  No Known Allergies   Immunizations:     Immunization History   Administered Date(s) Administered    Influenza, injectable, quadrivalent, preservative free 0 5 mL 09/23/2019, 09/28/2020    Pneumococcal Polysaccharide PPV23 01/18/2019    Tdap 04/04/2017      Health Maintenance:         Topic Date Due    Colorectal Cancer Screening  06/08/2023    HIV Screening  Completed    Hepatitis C Screening  Completed     There are no preventive care reminders to display for this patient  Medicare Health Risk Assessment:     /80   Pulse 88   Temp 99 5 °F (37 5 °C) (Tympanic)   Ht 5' 10 25" (1 784 m)   Wt 67 6 kg (149 lb)   SpO2 97%   BMI 21 23 kg/m²      Polly Abrams is here for his Subsequent Wellness visit  Last Medicare Wellness visit information reviewed, patient interviewed and updates made to the record today  Health Risk Assessment:   Patient rates overall health as good  Patient feels that their physical health rating is slightly better  Eyesight was rated as same  Hearing was rated as same  Patient feels that their emotional and mental health rating is same  Pain experienced in the last 7 days has been none  Patient states that he has experienced no weight loss or gain in last 6 months  Fall Risk Screening:    In the past year, patient has experienced: no history of falling in past year      Home Safety:  Patient does not have trouble with stairs inside or outside of their home  Patient has working smoke alarms and has working carbon monoxide detector  Home safety hazards include: none  Nutrition:   Current diet is Low Saturated Fat  Medications:   Patient is currently taking over-the-counter supplements  OTC medications include: see medication list  Patient is able to manage medications  Activities of Daily Living (ADLs)/Instrumental Activities of Daily Living (IADLs):   Walk and transfer into and out of bed and chair?: Yes  Dress and groom yourself?: Yes    Bathe or shower yourself?: Yes    Feed yourself? Yes  Do your laundry/housekeeping?: Yes  Manage your money, pay your bills and track your expenses?: Yes  Make your own meals?: Yes    Do your own shopping?: Yes    Durable Medical Equipment Suppliers  none    Previous Hospitalizations:   Any hospitalizations or ED visits within the last 12 months?: No      Advance Care Planning:   Living will: No    Durable POA for healthcare:  Yes    Advanced directive: No    Advanced directive counseling given: No    Five wishes given: Yes    Patient declined ACP directive: No    End of Life Decisions reviewed with patient: Yes    Provider agrees with end of life decisions: Yes      Cognitive Screening:   Provider or family/friend/caregiver concerned regarding cognition?: No    PREVENTIVE SCREENINGS      Cardiovascular Screening:    General: Screening Not Indicated and History Lipid Disorder      Diabetes Screening:     General: Screening Current      Colorectal Cancer Screening:     General: Screening Current      Prostate Cancer Screening:    General: Risks and Benefits Discussed    Due for: PSA      Osteoporosis Screening:    General: Screening Not Indicated      Abdominal Aortic Aneurysm (AAA) Screening:    Risk factors include: tobacco use        General: Screening Not Indicated      Lung Cancer Screening:     General: Screening Not Indicated Hepatitis C Screening:    General: Screening Current      Beulah Cruz MD

## 2021-01-27 NOTE — ASSESSMENT & PLAN NOTE
Chronic asymptomatic continue atorvastatin 10 mg once a day due for lipid panel before next appointment

## 2021-01-27 NOTE — PATIENT INSTRUCTIONS
Medicare Preventive Visit Patient Instructions  Thank you for completing your Welcome to Medicare Visit or Medicare Annual Wellness Visit today  Your next wellness visit will be due in one year (1/27/2022)  The screening/preventive services that you may require over the next 5-10 years are detailed below  Some tests may not apply to you based off risk factors and/or age  Screening tests ordered at today's visit but not completed yet may show as past due  Also, please note that scanned in results may not display below  Preventive Screenings:  Service Recommendations Previous Testing/Comments   Colorectal Cancer Screening  · Colonoscopy    · Fecal Occult Blood Test (FOBT)/Fecal Immunochemical Test (FIT)  · Fecal DNA/Cologuard Test  · Flexible Sigmoidoscopy Age: 54-65 years old   Colonoscopy: every 10 years (May be performed more frequently if at higher risk)  OR  FOBT/FIT: every 1 year  OR  Cologuard: every 3 years  OR  Sigmoidoscopy: every 5 years  Screening may be recommended earlier than age 48 if at higher risk for colorectal cancer  Also, an individualized decision between you and your healthcare provider will decide whether screening between the ages of 74-80 would be appropriate   Colonoscopy: Not on file  FOBT/FIT: 05/21/2019  Cologuard: 06/08/2020  Sigmoidoscopy: Not on file    Screening Current     Prostate Cancer Screening Individualized decision between patient and health care provider in men between ages of 53-78   Medicare will cover every 12 months beginning on the day after your 50th birthday PSA: 0 8 ng/mL          Hepatitis C Screening Once for adults born between 1945 and 1965  More frequently in patients at high risk for Hepatitis C Hep C Antibody: 12/26/2018    Screening Current   Diabetes Screening 1-2 times per year if you're at risk for diabetes or have pre-diabetes Fasting glucose: 91 mg/dL   A1C: No results in last 5 years    Screening Current   Cholesterol Screening Once every 5 years if you don't have a lipid disorder  May order more often based on risk factors  Lipid panel: 09/14/2020    Screening Not Indicated  History Lipid Disorder      Other Preventive Screenings Covered by Medicare:  1  Abdominal Aortic Aneurysm (AAA) Screening: covered once if your at risk  You're considered to be at risk if you have a family history of AAA or a male between the age of 73-68 who smoking at least 100 cigarettes in your lifetime  2  Lung Cancer Screening: covers low dose CT scan once per year if you meet all of the following conditions: (1) Age 50-69; (2) No signs or symptoms of lung cancer; (3) Current smoker or have quit smoking within the last 15 years; (4) You have a tobacco smoking history of at least 30 pack years (packs per day x number of years you smoked); (5) You get a written order from a healthcare provider  3  Glaucoma Screening: covered annually if you're considered high risk: (1) You have diabetes OR (2) Family history of glaucoma OR (3)  aged 48 and older OR (3)  American aged 72 and older  3  Osteoporosis Screening: covered every 2 years if you meet one of the following conditions: (1) Have a vertebral abnormality; (2) On glucocorticoid therapy for more than 3 months; (3) Have primary hyperparathyroidism; (4) On osteoporosis medications and need to assess response to drug therapy  5  HIV Screening: covered annually if you're between the age of 12-76  Also covered annually if you are younger than 13 and older than 72 with risk factors for HIV infection  For pregnant patients, it is covered up to 3 times per pregnancy      Immunizations:  Immunization Recommendations   Influenza Vaccine Annual influenza vaccination during flu season is recommended for all persons aged >= 6 months who do not have contraindications   Pneumococcal Vaccine (Prevnar and Pneumovax)  * Prevnar = PCV13  * Pneumovax = PPSV23 Adults 25-60 years old: 1-3 doses may be recommended based on certain risk factors  Adults 72 years old: Prevnar (PCV13) vaccine recommended followed by Pneumovax (PPSV23) vaccine  If already received PPSV23 since turning 65, then PCV13 recommended at least one year after PPSV23 dose  Hepatitis B Vaccine 3 dose series if at intermediate or high risk (ex: diabetes, end stage renal disease, liver disease)   Tetanus (Td) Vaccine - COST NOT COVERED BY MEDICARE PART B Following completion of primary series, a booster dose should be given every 10 years to maintain immunity against tetanus  Td may also be given as tetanus wound prophylaxis  Tdap Vaccine - COST NOT COVERED BY MEDICARE PART B Recommended at least once for all adults  For pregnant patients, recommended with each pregnancy  Shingles Vaccine (Shingrix) - COST NOT COVERED BY MEDICARE PART B  2 shot series recommended in those aged 48 and above     Health Maintenance Due:      Topic Date Due    Colorectal Cancer Screening  06/08/2023    HIV Screening  Completed    Hepatitis C Screening  Completed     Immunizations Due:  There are no preventive care reminders to display for this patient  Advance Directives   What are advance directives? Advance directives are legal documents that state your wishes and plans for medical care  These plans are made ahead of time in case you lose your ability to make decisions for yourself  Advance directives can apply to any medical decision, such as the treatments you want, and if you want to donate organs  What are the types of advance directives? There are many types of advance directives, and each state has rules about how to use them  You may choose a combination of any of the following:  · Living will: This is a written record of the treatment you want  You can also choose which treatments you do not want, which to limit, and which to stop at a certain time  This includes surgery, medicine, IV fluid, and tube feedings  · Durable power of  for healthcare [facility]):   This is a written record that states who you want to make healthcare choices for you when you are unable to make them for yourself  This person, called a proxy, is usually a family member or a friend  You may choose more than 1 proxy  · Do not resuscitate (DNR) order:  A DNR order is used in case your heart stops beating or you stop breathing  It is a request not to have certain forms of treatment, such as CPR  A DNR order may be included in other types of advance directives  · Medical directive: This covers the care that you want if you are in a coma, near death, or unable to make decisions for yourself  You can list the treatments you want for each condition  Treatment may include pain medicine, surgery, blood transfusions, dialysis, IV or tube feedings, and a ventilator (breathing machine)  · Values history: This document has questions about your views, beliefs, and how you feel and think about life  This information can help others choose the care that you would choose  Why are advance directives important? An advance directive helps you control your care  Although spoken wishes may be used, it is better to have your wishes written down  Spoken wishes can be misunderstood, or not followed  Treatments may be given even if you do not want them  An advance directive may make it easier for your family to make difficult choices about your care  Cigarette Smoking and Your Health   Risks to your health if you smoke:  Nicotine and other chemicals found in tobacco damage every cell in your body  Even if you are a light smoker, you have an increased risk for cancer, heart disease, and lung disease  If you are pregnant or have diabetes, smoking increases your risk for complications  Benefits to your health if you stop smoking:   · You decrease respiratory symptoms such as coughing, wheezing, and shortness of breath     · You reduce your risk for cancers of the lung, mouth, throat, kidney, bladder, pancreas, stomach, and cervix  If you already have cancer, you increase the benefits of chemotherapy  You also reduce your risk for cancer returning or a second cancer from developing  · You reduce your risk for heart disease, blood clots, heart attack, and stroke  · You reduce your risk for lung infections, and diseases such as pneumonia, asthma, chronic bronchitis, and emphysema  · Your circulation improves  More oxygen can be delivered to your body  If you have diabetes, you lower your risk for complications, such as kidney, artery, and eye diseases  You also lower your risk for nerve damage  Nerve damage can lead to amputations, poor vision, and blindness  · You improve your body's ability to heal and to fight infections  For more information and support to stop smoking:   · "[employer]  Phone: 6- 581 - 240-0674  Web Address: [employer]  How to Quit Using Smokeless Tobacco   Why it is important to stop using smokeless tobacco:  Smokeless tobacco comes in many forms  Examples include chew, snuff, dip, dissolvable tobacco, and snus  All smokeless tobacco products contain nicotine and may contain as much nicotine as 3 cigarettes  You may be physically dependent on nicotine  You may also be emotionally addicted to it  The cravings can be strong, but it is important to quit using smokeless tobacco  You will improve your health and decrease your cancer, stroke, and heart attack risk  Mouth sores and tooth problems will also improve when you quit  You can benefit from quitting no matter how long you have used smokeless tobacco    Prepare to stop using smokeless tobacco:  Nicotine is a highly addictive drug  Withdrawal symptoms can happen when you stop and make it hard to quit  The following can help keep you on track:  · Set a quit date  · Tell friends, family, and coworkers that you plan to quit  · Remove all smokeless tobacco products from your home, car, and workplace      Manage weight gain after you quit: Nicotine can affect your metabolism  You may gain a few pounds after you quit  The following can help you control your weight:  · Eat healthy foods  · Drink water before, during, and between meals  · Exercise as directed  © Copyright [employer] 2018 Information is for End User's use only and may not be sold, redistributed or otherwise used for commercial purposes   All illustrations and images included in CareNotes® are the copyrighted property of [employer]  or [address] [place] [address]

## 2021-01-27 NOTE — ASSESSMENT & PLAN NOTE
Chronic asymptomatic fair control continue with the lisinopril 10 mg once a day a low-salt diet discussed the patient

## 2021-01-27 NOTE — ASSESSMENT & PLAN NOTE
Advice and education were given regarding nutrition, aerobic exercises, weight bearing exercises, cardiovascular risk reduction, fall risk reduction, and age appropriate supplements  The patient was counseled regarding instructions for management, risk factor reductions, prognosis, risks and benefits of treatment options, patient and family education, and importance of compliance with treatment       I discussed with the patient shingle vaccine and he agree script has been given to the patient to take it in the pharmacy secondary to coverage

## 2021-01-27 NOTE — ASSESSMENT & PLAN NOTE
A chronic uncontrolled patient continued to smoking he is down to half pack a day aware of the complication of smoking not ready to quit smoking yet

## 2021-01-27 NOTE — ASSESSMENT & PLAN NOTE
A chronic asymptomatic fair control continue MetroCream The wash face no more than twice a day use warm water  Use most are eyes without sent or dyes  Do not take or squeeze a pimple  Avoid oral base makeup

## 2021-01-27 NOTE — PROGRESS NOTES
Subjective:   Chief Complaint   Patient presents with    Medicare Wellness Visit        Patient ID: Randell Reeder is a 45 y o  male  Patient here Medicare physical exam follow-up with a chronic condition patient history of hypertension on lisinopril tolerated well deny any chest pain short of breath no palpitation no dyspnea on exertion no lower extremity edema patient history of acne on Metrogel and has been watching for his diet improve in his acne he is happy with the result patient history of hyperlipidemia on statin tolerated well no rash or muscle pain      The following portions of the patient's history were reviewed and updated as appropriate: allergies, current medications, past family history, past medical history, past social history, past surgical history and problem list     Review of Systems   Constitutional: Negative for activity change, appetite change, fatigue and fever  HENT: Negative for congestion, ear pain, sinus pressure, sinus pain and sore throat  Eyes: Negative for pain, discharge, redness and itching  Respiratory: Negative for cough, chest tightness, shortness of breath and stridor  Cardiovascular: Negative for chest pain, palpitations and leg swelling  Gastrointestinal: Negative for abdominal pain, blood in stool, constipation, diarrhea and nausea  Genitourinary: Negative for dysuria, flank pain, frequency and hematuria  Musculoskeletal: Negative for back pain, joint swelling and neck pain  Skin: Negative for pallor and rash  Neurological: Negative for dizziness, tremors, weakness, numbness and headaches  Hematological: Does not bruise/bleed easily  Objective:  Vitals:    01/27/21 1026   BP: 130/80   Pulse: 88   Temp: 99 5 °F (37 5 °C)   TempSrc: Tympanic   SpO2: 97%   Weight: 67 6 kg (149 lb)   Height: 5' 10 25" (1 784 m)      Physical Exam  Vitals signs and nursing note reviewed  Constitutional:       General: He is not in acute distress  Appearance: Normal appearance  He is well-developed  He is not diaphoretic  HENT:      Head: Normocephalic  Right Ear: Tympanic membrane, ear canal and external ear normal       Left Ear: Tympanic membrane, ear canal and external ear normal       Nose: Nose normal  No congestion or rhinorrhea  Mouth/Throat:      Mouth: Mucous membranes are moist       Pharynx: Oropharynx is clear  No oropharyngeal exudate or posterior oropharyngeal erythema  Eyes:      General:         Right eye: No discharge  Left eye: No discharge  Conjunctiva/sclera: Conjunctivae normal    Neck:      Musculoskeletal: Normal range of motion and neck supple  Vascular: No JVD  Cardiovascular:      Rate and Rhythm: Normal rate and regular rhythm  Heart sounds: Normal heart sounds  No murmur  No gallop  Pulmonary:      Effort: Pulmonary effort is normal  No respiratory distress  Breath sounds: Normal breath sounds  No stridor  No wheezing or rales  Chest:      Chest wall: No tenderness  Abdominal:      General: There is no distension  Palpations: Abdomen is soft  There is no mass  Tenderness: There is no abdominal tenderness  There is no rebound  Musculoskeletal: Normal range of motion  General: No tenderness  Lymphadenopathy:      Cervical: No cervical adenopathy  Skin:     General: Skin is warm  Findings: No erythema or rash  Neurological:      Mental Status: He is alert and oriented to person, place, and time  Sensory: No sensory deficit  Gait: Gait normal    Psychiatric:         Mood and Affect: Mood normal          Behavior: Behavior normal            Assessment/Plan:    Medicare annual wellness visit, subsequent  Advice and education were given regarding nutrition, aerobic exercises, weight bearing exercises, cardiovascular risk reduction, fall risk reduction, and age appropriate supplements         The patient was counseled regarding instructions for management, risk factor reductions, prognosis, risks and benefits of treatment options, patient and family education, and importance of compliance with treatment  I discussed with the patient shingle vaccine and he agree script has been given to the patient to take it in the pharmacy secondary to coverage    Tobacco dependence syndrome  A chronic uncontrolled patient continued to smoking he is down to half pack a day aware of the complication of smoking not ready to quit smoking yet    Mixed hyperlipidemia  Chronic asymptomatic continue atorvastatin 10 mg once a day due for lipid panel before next appointment    Acne  A chronic asymptomatic fair control continue MetroCream The wash face no more than twice a day use warm water  Use most are eyes without sent or dyes  Do not take or squeeze a pimple  Avoid oral base makeup    Benign essential hypertension  Chronic asymptomatic fair control continue with the lisinopril 10 mg once a day a low-salt diet discussed the patient    Bipolar I disorder ([facility] [address] )  Chronic asymptomatic patient does follow up with the psychiatric who managed his medication currently on lithium and Seroquel       Diagnoses and all orders for this visit:    Need for shingles vaccine  -     Zoster Vac Recomb Adjuvanted 50 MCG/0 5ML SUSR; Inject 1 vial into a muscle once for 1 dose    Mixed hyperlipidemia  -     atorvastatin (LIPITOR) 10 mg tablet; Take 1 tablet (10 mg total) by mouth daily  -     CBC and differential; Future  -     Comprehensive metabolic panel; Future  -     Lipid Panel with Direct LDL reflex; Future  -     TSH, 3rd generation with Free T4 reflex; Future    Other acne  -     metroNIDAZOLE (MetroCream) 0 75 % cream; Apply topically 2 (two) times a day    Benign essential hypertension  -     lisinopril (ZESTRIL) 10 mg tablet; Take 1 tablet (10 mg total) by mouth daily  -     CBC and differential; Future  -     Comprehensive metabolic panel;  Future  -     Lipid Panel with Direct LDL reflex; Future  -     TSH, 3rd generation with Free T4 reflex; Future    Moderate depressed bipolar I disorder (HCC)  -     CBC and differential; Future  -     Comprehensive metabolic panel; Future  -     Lipid Panel with Direct LDL reflex; Future  -     TSH, 3rd generation with Free T4 reflex; Future  -     Lithium level; Future    Medicare annual wellness visit, subsequent    Tobacco dependence syndrome  -     CBC and differential; Future  -     Comprehensive metabolic panel; Future  -     Lipid Panel with Direct LDL reflex; Future  -     TSH, 3rd generation with Free T4 reflex; Future    Screening PSA (prostate specific antigen)  -     PSA, Total Screen;  Future    Bipolar I disorder ([facility] [address] )

## 2021-01-27 NOTE — ASSESSMENT & PLAN NOTE
Chronic asymptomatic patient does follow up with the psychiatric who managed his medication currently on lithium and Seroquel

## 2021-01-28 ENCOUNTER — LAB (OUTPATIENT)
Dept: LAB | Facility: HOSPITAL | Age: 51
End: 2021-01-28
Payer: MEDICARE

## 2021-01-28 ENCOUNTER — TELEPHONE (OUTPATIENT)
Dept: FAMILY MEDICINE CLINIC | Facility: CLINIC | Age: 51
End: 2021-01-28

## 2021-01-28 DIAGNOSIS — F31.32 MODERATE DEPRESSED BIPOLAR I DISORDER (HCC): ICD-10-CM

## 2021-01-28 DIAGNOSIS — E78.2 MIXED HYPERLIPIDEMIA: ICD-10-CM

## 2021-01-28 DIAGNOSIS — F17.200 TOBACCO DEPENDENCE SYNDROME: ICD-10-CM

## 2021-01-28 DIAGNOSIS — Z12.5 SCREENING PSA (PROSTATE SPECIFIC ANTIGEN): ICD-10-CM

## 2021-01-28 DIAGNOSIS — I10 BENIGN ESSENTIAL HYPERTENSION: ICD-10-CM

## 2021-01-28 LAB
ALBUMIN SERPL BCP-MCNC: 4.7 G/DL (ref 3–5.2)
ALP SERPL-CCNC: 100 U/L (ref 43–122)
ALT SERPL W P-5'-P-CCNC: 31 U/L (ref 9–52)
ANION GAP SERPL CALCULATED.3IONS-SCNC: 7 MMOL/L (ref 5–14)
AST SERPL W P-5'-P-CCNC: 27 U/L (ref 17–59)
BASOPHILS # BLD AUTO: 0.1 THOUSANDS/ΜL (ref 0–0.1)
BASOPHILS NFR BLD AUTO: 1 % (ref 0–1)
BILIRUB SERPL-MCNC: 0.8 MG/DL
BUN SERPL-MCNC: 12 MG/DL (ref 5–25)
CALCIUM SERPL-MCNC: 10.7 MG/DL (ref 8.4–10.2)
CHLORIDE SERPL-SCNC: 98 MMOL/L (ref 97–108)
CHOLEST SERPL-MCNC: 173 MG/DL
CO2 SERPL-SCNC: 31 MMOL/L (ref 22–30)
CREAT SERPL-MCNC: 0.66 MG/DL (ref 0.7–1.5)
EOSINOPHIL # BLD AUTO: 0.1 THOUSAND/ΜL (ref 0–0.4)
EOSINOPHIL NFR BLD AUTO: 1 % (ref 0–6)
ERYTHROCYTE [DISTWIDTH] IN BLOOD BY AUTOMATED COUNT: 13.9 %
GFR SERPL CREATININE-BSD FRML MDRD: 112 ML/MIN/1.73SQ M
GLUCOSE P FAST SERPL-MCNC: 88 MG/DL (ref 70–99)
HCT VFR BLD AUTO: 46.8 % (ref 41–53)
HDLC SERPL-MCNC: 47 MG/DL
HGB BLD-MCNC: 15.7 G/DL (ref 13.5–17.5)
LDLC SERPL CALC-MCNC: 107 MG/DL
LITHIUM SERPL-SCNC: 0.4 MMOL/L (ref 0.6–1.2)
LYMPHOCYTES # BLD AUTO: 2.3 THOUSANDS/ΜL (ref 0.5–4)
LYMPHOCYTES NFR BLD AUTO: 17 % (ref 25–45)
MCH RBC QN AUTO: 33.3 PG (ref 26–34)
MCHC RBC AUTO-ENTMCNC: 33.5 G/DL (ref 31–36)
MCV RBC AUTO: 99 FL (ref 80–100)
MONOCYTES # BLD AUTO: 0.6 THOUSAND/ΜL (ref 0.2–0.9)
MONOCYTES NFR BLD AUTO: 4 % (ref 1–10)
NEUTROPHILS # BLD AUTO: 10.3 THOUSANDS/ΜL (ref 1.8–7.8)
NEUTS SEG NFR BLD AUTO: 78 % (ref 45–65)
PLATELET # BLD AUTO: 327 THOUSANDS/UL (ref 150–450)
PMV BLD AUTO: 8.1 FL (ref 8.9–12.7)
POTASSIUM SERPL-SCNC: 4.6 MMOL/L (ref 3.6–5)
PROT SERPL-MCNC: 7.5 G/DL (ref 5.9–8.4)
PSA SERPL-MCNC: 0.8 NG/ML (ref 0–4)
RBC # BLD AUTO: 4.71 MILLION/UL (ref 4.5–5.9)
SODIUM SERPL-SCNC: 136 MMOL/L (ref 137–147)
TRIGL SERPL-MCNC: 93 MG/DL
TSH SERPL DL<=0.05 MIU/L-ACNC: 1.78 UIU/ML (ref 0.47–4.68)
WBC # BLD AUTO: 13.3 THOUSAND/UL (ref 4.5–11)

## 2021-01-28 PROCEDURE — 80178 ASSAY OF LITHIUM: CPT

## 2021-01-28 PROCEDURE — 80053 COMPREHEN METABOLIC PANEL: CPT

## 2021-01-28 PROCEDURE — 84443 ASSAY THYROID STIM HORMONE: CPT

## 2021-01-28 PROCEDURE — G0103 PSA SCREENING: HCPCS

## 2021-01-28 PROCEDURE — 80061 LIPID PANEL: CPT

## 2021-01-28 PROCEDURE — 36415 COLL VENOUS BLD VENIPUNCTURE: CPT

## 2021-01-28 PROCEDURE — 85025 COMPLETE CBC W/AUTO DIFF WBC: CPT

## 2021-04-26 DIAGNOSIS — I10 BENIGN ESSENTIAL HYPERTENSION: ICD-10-CM

## 2021-04-26 DIAGNOSIS — L70.8 OTHER ACNE: ICD-10-CM

## 2021-04-26 DIAGNOSIS — E78.2 MIXED HYPERLIPIDEMIA: ICD-10-CM

## 2021-04-26 RX ORDER — ATORVASTATIN CALCIUM 10 MG/1
10 TABLET, FILM COATED ORAL DAILY
Qty: 90 TABLET | Refills: 0 | Status: SHIPPED | OUTPATIENT
Start: 2021-04-26 | End: 2021-07-26 | Stop reason: SDUPTHER

## 2021-04-26 RX ORDER — LISINOPRIL 10 MG/1
10 TABLET ORAL DAILY
Qty: 90 TABLET | Refills: 0 | Status: SHIPPED | OUTPATIENT
Start: 2021-04-26 | End: 2021-07-26 | Stop reason: SDUPTHER

## 2021-04-28 ENCOUNTER — IMMUNIZATIONS (OUTPATIENT)
Dept: FAMILY MEDICINE CLINIC | Facility: HOSPITAL | Age: 51
End: 2021-04-28

## 2021-04-28 DIAGNOSIS — Z23 ENCOUNTER FOR IMMUNIZATION: Primary | ICD-10-CM

## 2021-04-28 PROCEDURE — 0011A SARS-COV-2 / COVID-19 MRNA VACCINE (MODERNA) 100 MCG: CPT

## 2021-04-28 PROCEDURE — 91301 SARS-COV-2 / COVID-19 MRNA VACCINE (MODERNA) 100 MCG: CPT

## 2021-05-26 ENCOUNTER — IMMUNIZATIONS (OUTPATIENT)
Dept: FAMILY MEDICINE CLINIC | Facility: HOSPITAL | Age: 51
End: 2021-05-26

## 2021-05-26 DIAGNOSIS — Z23 ENCOUNTER FOR IMMUNIZATION: Primary | ICD-10-CM

## 2021-05-26 PROCEDURE — 91301 SARS-COV-2 / COVID-19 MRNA VACCINE (MODERNA) 100 MCG: CPT

## 2021-05-26 PROCEDURE — 0012A SARS-COV-2 / COVID-19 MRNA VACCINE (MODERNA) 100 MCG: CPT

## 2021-05-27 ENCOUNTER — OFFICE VISIT (OUTPATIENT)
Dept: FAMILY MEDICINE CLINIC | Facility: CLINIC | Age: 51
End: 2021-05-27
Payer: MEDICARE

## 2021-05-27 VITALS
TEMPERATURE: 99.4 F | SYSTOLIC BLOOD PRESSURE: 130 MMHG | DIASTOLIC BLOOD PRESSURE: 70 MMHG | OXYGEN SATURATION: 96 % | HEIGHT: 70 IN | WEIGHT: 151 LBS | HEART RATE: 93 BPM | BODY MASS INDEX: 21.62 KG/M2

## 2021-05-27 DIAGNOSIS — J30.89 SEASONAL ALLERGIC RHINITIS DUE TO OTHER ALLERGIC TRIGGER: ICD-10-CM

## 2021-05-27 DIAGNOSIS — I10 BENIGN ESSENTIAL HYPERTENSION: Primary | ICD-10-CM

## 2021-05-27 DIAGNOSIS — E78.2 MIXED HYPERLIPIDEMIA: ICD-10-CM

## 2021-05-27 DIAGNOSIS — F31.32 MODERATE DEPRESSED BIPOLAR I DISORDER (HCC): ICD-10-CM

## 2021-05-27 DIAGNOSIS — Z53.20 LUNG CANCER SCREENING DECLINED BY PATIENT: ICD-10-CM

## 2021-05-27 PROCEDURE — 99214 OFFICE O/P EST MOD 30 MIN: CPT | Performed by: FAMILY MEDICINE

## 2021-05-27 NOTE — PROGRESS NOTES
Subjective:   Chief Complaint   Patient presents with    Follow-up     chronic conditions        Patient ID: Caitlin Medina is a 45 y o  male  The patient here follow-up with a chronic condition patient history of hypertension blood pressure fair control on current medication tolerated well without any side effect and he had history of hyperlipidemia on statin tolerated well without any muscle pain or rash patient asymptomatic deny any chest pain short of breath no palpitation no dyspnea on exertion no extremity edema patient history of allergy and recently with the weather change he had some congestion and runny nose start taking his loratadine 10 mg once a day deny any itchy a to the no wheezing no cough recent blood work review with the patient      The following portions of the patient's history were reviewed and updated as appropriate: allergies, current medications, past family history, past medical history, past social history, past surgical history and problem list     Review of Systems   Constitutional: Negative for activity change, appetite change, fatigue and fever  HENT: Positive for congestion and rhinorrhea  Negative for ear pain, sinus pressure, sinus pain and sore throat  Eyes: Negative for pain, discharge, redness and itching  Respiratory: Negative for cough, chest tightness, shortness of breath and stridor  Cardiovascular: Negative for chest pain, palpitations and leg swelling  Gastrointestinal: Negative for abdominal pain, blood in stool, constipation, diarrhea and nausea  Genitourinary: Negative for dysuria, flank pain, frequency and hematuria  Musculoskeletal: Negative for back pain, joint swelling and neck pain  Skin: Negative for pallor and rash  Neurological: Negative for dizziness, tremors, weakness, numbness and headaches  Hematological: Does not bruise/bleed easily               Objective:  Vitals:    05/27/21 1341   BP: 130/70   Pulse: 93   Temp: 99 4 °F (37 4 °C)   TempSrc: Tympanic   SpO2: 96%   Weight: 68 5 kg (151 lb)   Height: 5' 10" (1 778 m)      Physical Exam  Vitals signs and nursing note reviewed  Constitutional:       General: He is not in acute distress  Appearance: Normal appearance  He is well-developed  He is not diaphoretic  HENT:      Head: Normocephalic  Right Ear: Tympanic membrane, ear canal and external ear normal       Left Ear: Tympanic membrane, ear canal and external ear normal       Nose: Nose normal  No congestion or rhinorrhea  Mouth/Throat:      Mouth: Mucous membranes are moist       Pharynx: Oropharynx is clear  No oropharyngeal exudate or posterior oropharyngeal erythema  Eyes:      General:         Right eye: No discharge  Left eye: No discharge  Conjunctiva/sclera: Conjunctivae normal    Neck:      Musculoskeletal: Normal range of motion and neck supple  Vascular: No JVD  Cardiovascular:      Rate and Rhythm: Normal rate and regular rhythm  Heart sounds: Normal heart sounds  No murmur  No gallop  Pulmonary:      Effort: Pulmonary effort is normal  No respiratory distress  Breath sounds: Normal breath sounds  No stridor  No wheezing or rales  Chest:      Chest wall: No tenderness  Abdominal:      General: There is no distension  Palpations: Abdomen is soft  There is no mass  Tenderness: There is no abdominal tenderness  There is no rebound  Musculoskeletal: Normal range of motion  General: No tenderness  Lymphadenopathy:      Cervical: No cervical adenopathy  Skin:     General: Skin is warm  Findings: No erythema or rash  Neurological:      Mental Status: He is alert and oriented to person, place, and time  Sensory: No sensory deficit        Gait: Gait normal    Psychiatric:         Mood and Affect: Mood normal          Behavior: Behavior normal            Assessment/Plan:    Benign essential hypertension   Chronic asymptomatic fair control continue current recent medication lisinopril 10 mg once a day low-salt diet discussed with the patient    Allergic rhinitis due to allergen   Chronic symptomatic was sinus congestion with the weather change patient has been using his loratadine 10 mg and it help with his symptom proper use discussed with the patient    Mixed hyperlipidemia   A chronic asymptomatic fair control continue with the atorvastatin 10 mg once a day low fat diet discussed the patient       Diagnoses and all orders for this visit:    Benign essential hypertension  -     CBC and differential; Future  -     Comprehensive metabolic panel; Future  -     Lipid Panel with Direct LDL reflex; Future  -     TSH, 3rd generation with Free T4 reflex; Future    Mixed hyperlipidemia  -     CBC and differential; Future  -     Comprehensive metabolic panel; Future  -     Lipid Panel with Direct LDL reflex; Future  -     TSH, 3rd generation with Free T4 reflex; Future    Lung cancer screening declined by patient    Seasonal allergic rhinitis due to other allergic trigger    Moderate depressed bipolar I disorder (HCC)  -     TSH, 3rd generation with Free T4 reflex;  Future No

## 2021-05-29 NOTE — ASSESSMENT & PLAN NOTE
Chronic symptomatic was sinus congestion with the weather change patient has been using his loratadine 10 mg and it help with his symptom proper use discussed with the patient

## 2021-05-29 NOTE — ASSESSMENT & PLAN NOTE
A chronic asymptomatic fair control continue with the atorvastatin 10 mg once a day low fat diet discussed the patient

## 2021-05-29 NOTE — ASSESSMENT & PLAN NOTE
Chronic asymptomatic fair control continue current recent medication lisinopril 10 mg once a day low-salt diet discussed with the patient

## 2021-07-26 DIAGNOSIS — I10 BENIGN ESSENTIAL HYPERTENSION: ICD-10-CM

## 2021-07-26 DIAGNOSIS — E78.2 MIXED HYPERLIPIDEMIA: ICD-10-CM

## 2021-07-26 DIAGNOSIS — L70.8 OTHER ACNE: ICD-10-CM

## 2021-07-26 RX ORDER — ATORVASTATIN CALCIUM 10 MG/1
10 TABLET, FILM COATED ORAL DAILY
Qty: 90 TABLET | Refills: 0 | Status: SHIPPED | OUTPATIENT
Start: 2021-07-26 | End: 2021-09-27 | Stop reason: SDUPTHER

## 2021-07-26 RX ORDER — LISINOPRIL 10 MG/1
10 TABLET ORAL DAILY
Qty: 90 TABLET | Refills: 0 | Status: SHIPPED | OUTPATIENT
Start: 2021-07-26 | End: 2021-09-27 | Stop reason: SDUPTHER

## 2021-09-13 ENCOUNTER — APPOINTMENT (OUTPATIENT)
Dept: LAB | Facility: HOSPITAL | Age: 51
End: 2021-09-13
Payer: MEDICARE

## 2021-09-13 DIAGNOSIS — F31.32 MODERATE DEPRESSED BIPOLAR I DISORDER (HCC): ICD-10-CM

## 2021-09-13 DIAGNOSIS — E78.2 MIXED HYPERLIPIDEMIA: ICD-10-CM

## 2021-09-13 DIAGNOSIS — I10 BENIGN ESSENTIAL HYPERTENSION: ICD-10-CM

## 2021-09-13 LAB
ALBUMIN SERPL BCP-MCNC: 4.6 G/DL (ref 3–5.2)
ALP SERPL-CCNC: 112 U/L (ref 43–122)
ALT SERPL W P-5'-P-CCNC: 35 U/L
ANION GAP SERPL CALCULATED.3IONS-SCNC: 10 MMOL/L (ref 5–14)
AST SERPL W P-5'-P-CCNC: 32 U/L (ref 17–59)
BASOPHILS # BLD AUTO: 0.1 THOUSANDS/ΜL (ref 0–0.1)
BASOPHILS NFR BLD AUTO: 1 % (ref 0–1)
BILIRUB SERPL-MCNC: 0.64 MG/DL
BUN SERPL-MCNC: 12 MG/DL (ref 5–25)
CALCIUM SERPL-MCNC: 10.1 MG/DL (ref 8.4–10.2)
CHLORIDE SERPL-SCNC: 100 MMOL/L (ref 97–108)
CHOLEST SERPL-MCNC: 169 MG/DL
CO2 SERPL-SCNC: 27 MMOL/L (ref 22–30)
CREAT SERPL-MCNC: 0.64 MG/DL (ref 0.7–1.5)
EOSINOPHIL # BLD AUTO: 0.2 THOUSAND/ΜL (ref 0–0.4)
EOSINOPHIL NFR BLD AUTO: 2 % (ref 0–6)
ERYTHROCYTE [DISTWIDTH] IN BLOOD BY AUTOMATED COUNT: 13.8 %
GFR SERPL CREATININE-BSD FRML MDRD: 113 ML/MIN/1.73SQ M
GLUCOSE P FAST SERPL-MCNC: 92 MG/DL (ref 70–99)
HCT VFR BLD AUTO: 46.8 % (ref 41–53)
HDLC SERPL-MCNC: 43 MG/DL
HGB BLD-MCNC: 16.1 G/DL (ref 13.5–17.5)
LDLC SERPL CALC-MCNC: 111 MG/DL
LYMPHOCYTES # BLD AUTO: 2.3 THOUSANDS/ΜL (ref 0.5–4)
LYMPHOCYTES NFR BLD AUTO: 25 % (ref 25–45)
MCH RBC QN AUTO: 34.5 PG (ref 26–34)
MCHC RBC AUTO-ENTMCNC: 34.4 G/DL (ref 31–36)
MCV RBC AUTO: 100 FL (ref 80–100)
MONOCYTES # BLD AUTO: 0.5 THOUSAND/ΜL (ref 0.2–0.9)
MONOCYTES NFR BLD AUTO: 5 % (ref 1–10)
NEUTROPHILS # BLD AUTO: 6.2 THOUSANDS/ΜL (ref 1.8–7.8)
NEUTS SEG NFR BLD AUTO: 67 % (ref 45–65)
PLATELET # BLD AUTO: 345 THOUSANDS/UL (ref 150–450)
PMV BLD AUTO: 8.1 FL (ref 8.9–12.7)
POTASSIUM SERPL-SCNC: 4.1 MMOL/L (ref 3.6–5)
PROT SERPL-MCNC: 7.8 G/DL (ref 5.9–8.4)
RBC # BLD AUTO: 4.68 MILLION/UL (ref 4.5–5.9)
SODIUM SERPL-SCNC: 137 MMOL/L (ref 137–147)
TRIGL SERPL-MCNC: 75 MG/DL
TSH SERPL DL<=0.05 MIU/L-ACNC: 1.63 UIU/ML (ref 0.47–4.68)
WBC # BLD AUTO: 9.3 THOUSAND/UL (ref 4.5–11)

## 2021-09-13 PROCEDURE — 80053 COMPREHEN METABOLIC PANEL: CPT

## 2021-09-13 PROCEDURE — 85025 COMPLETE CBC W/AUTO DIFF WBC: CPT

## 2021-09-13 PROCEDURE — 36415 COLL VENOUS BLD VENIPUNCTURE: CPT

## 2021-09-13 PROCEDURE — 84443 ASSAY THYROID STIM HORMONE: CPT

## 2021-09-13 PROCEDURE — 80061 LIPID PANEL: CPT

## 2021-09-27 ENCOUNTER — OFFICE VISIT (OUTPATIENT)
Dept: FAMILY MEDICINE CLINIC | Facility: CLINIC | Age: 51
End: 2021-09-27
Payer: MEDICARE

## 2021-09-27 VITALS
WEIGHT: 160 LBS | DIASTOLIC BLOOD PRESSURE: 70 MMHG | TEMPERATURE: 98.2 F | HEART RATE: 84 BPM | OXYGEN SATURATION: 96 % | BODY MASS INDEX: 22.9 KG/M2 | SYSTOLIC BLOOD PRESSURE: 130 MMHG | HEIGHT: 70 IN

## 2021-09-27 DIAGNOSIS — Z23 NEED FOR INFLUENZA VACCINATION: ICD-10-CM

## 2021-09-27 DIAGNOSIS — L70.8 OTHER ACNE: ICD-10-CM

## 2021-09-27 DIAGNOSIS — E78.2 MIXED HYPERLIPIDEMIA: ICD-10-CM

## 2021-09-27 DIAGNOSIS — I10 BENIGN ESSENTIAL HYPERTENSION: Primary | ICD-10-CM

## 2021-09-27 PROCEDURE — G0008 ADMIN INFLUENZA VIRUS VAC: HCPCS

## 2021-09-27 PROCEDURE — 90682 RIV4 VACC RECOMBINANT DNA IM: CPT

## 2021-09-27 PROCEDURE — 99214 OFFICE O/P EST MOD 30 MIN: CPT | Performed by: FAMILY MEDICINE

## 2021-09-27 RX ORDER — ATORVASTATIN CALCIUM 10 MG/1
10 TABLET, FILM COATED ORAL DAILY
Qty: 90 TABLET | Refills: 0 | Status: SHIPPED | OUTPATIENT
Start: 2021-09-27 | End: 2022-01-28 | Stop reason: SDUPTHER

## 2021-09-27 RX ORDER — LISINOPRIL 10 MG/1
10 TABLET ORAL DAILY
Qty: 90 TABLET | Refills: 0 | Status: SHIPPED | OUTPATIENT
Start: 2021-09-27 | End: 2022-01-28 | Stop reason: SDUPTHER

## 2021-09-27 NOTE — ASSESSMENT & PLAN NOTE
A chronic asymptomatic fair control continue with the lisinopril 10 mg once a day low-salt diet increase physical activity discussed with the patient

## 2021-09-27 NOTE — PROGRESS NOTES
Subjective:   Chief Complaint   Patient presents with    Follow-up     review labs         Patient ID: Chelbrandy Pinzon is a 45 y o  male  The patient here follow-up with a chronic condition patient history of hypertension on lisinopril tolerated well without side effect deny any chest pain short of breath no palpitation no lower extremity edema patient history of hyperlipidemia on statin deny any rash or muscle pain asymptomatic deny any chest pain short of breath no palpitation no TIA symptom patient history of acne Metrogel tolerated well on respond well to the medication happy with the result recent blood work review with the patient      The following portions of the patient's history were reviewed and updated as appropriate: allergies, current medications, past family history, past medical history, past social history, past surgical history and problem list     Review of Systems   Constitutional: Negative for activity change, appetite change, fatigue and fever  HENT: Negative for congestion, ear pain, sinus pressure, sinus pain and sore throat  Eyes: Negative for pain, discharge, redness and itching  Respiratory: Negative for cough, chest tightness, shortness of breath and stridor  Cardiovascular: Negative for chest pain, palpitations and leg swelling  Gastrointestinal: Negative for abdominal pain, blood in stool, constipation, diarrhea and nausea  Genitourinary: Negative for dysuria, flank pain, frequency and hematuria  Musculoskeletal: Negative for back pain, joint swelling and neck pain  Skin: Negative for pallor and rash  Neurological: Negative for dizziness, tremors, weakness, numbness and headaches  Hematological: Does not bruise/bleed easily               Objective:  Vitals:    09/27/21 1107   BP: 130/70   BP Location: Left arm   Patient Position: Sitting   Cuff Size: Large   Pulse: 84   Temp: 98 2 °F (36 8 °C)   TempSrc: Tympanic   SpO2: 96%   Weight: 72 6 kg (160 lb) Height: 5' 10" (1 778 m)      Physical Exam  Vitals and nursing note reviewed  Constitutional:       General: He is not in acute distress  Appearance: Normal appearance  He is well-developed  He is not diaphoretic  HENT:      Head: Normocephalic  Right Ear: Tympanic membrane, ear canal and external ear normal       Left Ear: Tympanic membrane, ear canal and external ear normal       Nose: Nose normal  No congestion or rhinorrhea  Mouth/Throat:      Mouth: Mucous membranes are moist       Pharynx: Oropharynx is clear  No oropharyngeal exudate or posterior oropharyngeal erythema  Eyes:      General:         Right eye: No discharge  Left eye: No discharge  Conjunctiva/sclera: Conjunctivae normal    Neck:      Vascular: No JVD  Cardiovascular:      Rate and Rhythm: Normal rate and regular rhythm  Heart sounds: Normal heart sounds  No murmur heard  No gallop  Pulmonary:      Effort: Pulmonary effort is normal  No respiratory distress  Breath sounds: Normal breath sounds  No stridor  No wheezing or rales  Chest:      Chest wall: No tenderness  Abdominal:      General: There is no distension  Palpations: Abdomen is soft  There is no mass  Tenderness: There is no abdominal tenderness  There is no rebound  Musculoskeletal:         General: No tenderness  Normal range of motion  Cervical back: Normal range of motion and neck supple  Lymphadenopathy:      Cervical: No cervical adenopathy  Skin:     General: Skin is warm  Findings: No erythema or rash  Neurological:      Mental Status: He is alert and oriented to person, place, and time  Sensory: No sensory deficit        Gait: Gait normal    Psychiatric:         Mood and Affect: Mood normal          Behavior: Behavior normal            Assessment/Plan:    Benign essential hypertension  A chronic asymptomatic fair control continue with the lisinopril 10 mg once a day low-salt diet increase physical activity discussed with the patient    Mixed hyperlipidemia  Chronic asymptomatic fair control continue with atorvastatin 10 mg once a day low-fat diet discussed the patient    Acne  Chronic asymptomatic will control with the MetroCream continue current dose  The wash face no more than twice a day use warm water  Use most are eyes without sent or dyes  Do not take or squeeze a pimple  Avoid oral base makeup       Diagnoses and all orders for this visit:    Benign essential hypertension  -     lisinopril (ZESTRIL) 10 mg tablet; Take 1 tablet (10 mg total) by mouth daily    Other acne  -     metroNIDAZOLE (MetroCream) 0 75 % cream; Apply topically 2 (two) times a day    Mixed hyperlipidemia  -     atorvastatin (LIPITOR) 10 mg tablet;  Take 1 tablet (10 mg total) by mouth daily    Need for influenza vaccination  -     FLUBLOK: influenza vaccine, quadrivalent, recombinant, PF, 0 5 mL

## 2021-09-27 NOTE — ASSESSMENT & PLAN NOTE
Chronic asymptomatic will control with the MetroCream continue current dose  The wash face no more than twice a day use warm water  Use most are eyes without sent or dyes  Do not take or squeeze a pimple  Avoid oral base makeup

## 2021-09-27 NOTE — ASSESSMENT & PLAN NOTE
Chronic asymptomatic fair control continue with atorvastatin 10 mg once a day low-fat diet discussed the patient

## 2021-12-27 DIAGNOSIS — L70.8 OTHER ACNE: ICD-10-CM

## 2022-01-28 ENCOUNTER — OFFICE VISIT (OUTPATIENT)
Dept: FAMILY MEDICINE CLINIC | Facility: CLINIC | Age: 52
End: 2022-01-28
Payer: MEDICARE

## 2022-01-28 VITALS
DIASTOLIC BLOOD PRESSURE: 80 MMHG | TEMPERATURE: 99.2 F | OXYGEN SATURATION: 97 % | WEIGHT: 165 LBS | HEART RATE: 82 BPM | SYSTOLIC BLOOD PRESSURE: 130 MMHG | HEIGHT: 70 IN | BODY MASS INDEX: 23.62 KG/M2

## 2022-01-28 DIAGNOSIS — E78.2 MIXED HYPERLIPIDEMIA: ICD-10-CM

## 2022-01-28 DIAGNOSIS — F31.9 BIPOLAR I DISORDER (HCC): ICD-10-CM

## 2022-01-28 DIAGNOSIS — I10 BENIGN ESSENTIAL HYPERTENSION: ICD-10-CM

## 2022-01-28 DIAGNOSIS — L70.8 OTHER ACNE: ICD-10-CM

## 2022-01-28 DIAGNOSIS — F31.32 MODERATE DEPRESSED BIPOLAR I DISORDER (HCC): ICD-10-CM

## 2022-01-28 DIAGNOSIS — Z00.00 MEDICARE ANNUAL WELLNESS VISIT, SUBSEQUENT: Primary | ICD-10-CM

## 2022-01-28 DIAGNOSIS — F17.200 TOBACCO DEPENDENCE SYNDROME: ICD-10-CM

## 2022-01-28 DIAGNOSIS — Z12.5 ENCOUNTER FOR SCREENING FOR MALIGNANT NEOPLASM OF PROSTATE: ICD-10-CM

## 2022-01-28 PROCEDURE — G0439 PPPS, SUBSEQ VISIT: HCPCS | Performed by: FAMILY MEDICINE

## 2022-01-28 PROCEDURE — 99214 OFFICE O/P EST MOD 30 MIN: CPT | Performed by: FAMILY MEDICINE

## 2022-01-28 RX ORDER — LISINOPRIL 10 MG/1
10 TABLET ORAL DAILY
Qty: 90 TABLET | Refills: 0 | Status: SHIPPED | OUTPATIENT
Start: 2022-01-28

## 2022-01-28 RX ORDER — ATORVASTATIN CALCIUM 10 MG/1
10 TABLET, FILM COATED ORAL DAILY
Qty: 90 TABLET | Refills: 0 | Status: SHIPPED | OUTPATIENT
Start: 2022-01-28

## 2022-01-28 NOTE — PATIENT INSTRUCTIONS
Medicare Preventive Visit Patient Instructions  Thank you for completing your Welcome to Medicare Visit or Medicare Annual Wellness Visit today  Your next wellness visit will be due in one year (1/29/2023)  The screening/preventive services that you may require over the next 5-10 years are detailed below  Some tests may not apply to you based off risk factors and/or age  Screening tests ordered at today's visit but not completed yet may show as past due  Also, please note that scanned in results may not display below  Preventive Screenings:  Service Recommendations Previous Testing/Comments   Colorectal Cancer Screening  · Colonoscopy    · Fecal Occult Blood Test (FOBT)/Fecal Immunochemical Test (FIT)  · Fecal DNA/Cologuard Test  · Flexible Sigmoidoscopy Age: 54-65 years old   Colonoscopy: every 10 years (May be performed more frequently if at higher risk)  OR  FOBT/FIT: every 1 year  OR  Cologuard: every 3 years  OR  Sigmoidoscopy: every 5 years  Screening may be recommended earlier than age 48 if at higher risk for colorectal cancer  Also, an individualized decision between you and your healthcare provider will decide whether screening between the ages of 74-80 would be appropriate   Colonoscopy: Not on file  FOBT/FIT: 05/21/2019  Cologuard: 06/08/2020  Sigmoidoscopy: Not on file    Screening Current     Prostate Cancer Screening Individualized decision between patient and health care provider in men between ages of 53-78   Medicare will cover every 12 months beginning on the day after your 50th birthday PSA: 0 8 ng/mL     Screening Current     Hepatitis C Screening Once for adults born between 1945 and 1965  More frequently in patients at high risk for Hepatitis C Hep C Antibody: 12/26/2018    Screening Current   Diabetes Screening 1-2 times per year if you're at risk for diabetes or have pre-diabetes Fasting glucose: 92 mg/dL   A1C: No results in last 5 years    Screening Current   Cholesterol Screening Once every 5 years if you don't have a lipid disorder  May order more often based on risk factors  Lipid panel: 09/13/2021    Screening Not Indicated  History Lipid Disorder      Other Preventive Screenings Covered by Medicare:  1  Abdominal Aortic Aneurysm (AAA) Screening: covered once if your at risk  You're considered to be at risk if you have a family history of AAA or a male between the age of 73-68 who smoking at least 100 cigarettes in your lifetime  2  Lung Cancer Screening: covers low dose CT scan once per year if you meet all of the following conditions: (1) Age 50-69; (2) No signs or symptoms of lung cancer; (3) Current smoker or have quit smoking within the last 15 years; (4) You have a tobacco smoking history of at least 30 pack years (packs per day x number of years you smoked); (5) You get a written order from a healthcare provider  3  Glaucoma Screening: covered annually if you're considered high risk: (1) You have diabetes OR (2) Family history of glaucoma OR (3)  aged 48 and older OR (3)  American aged 72 and older  3  Osteoporosis Screening: covered every 2 years if you meet one of the following conditions: (1) Have a vertebral abnormality; (2) On glucocorticoid therapy for more than 3 months; (3) Have primary hyperparathyroidism; (4) On osteoporosis medications and need to assess response to drug therapy  5  HIV Screening: covered annually if you're between the age of 12-76  Also covered annually if you are younger than 13 and older than 72 with risk factors for HIV infection  For pregnant patients, it is covered up to 3 times per pregnancy      Immunizations:  Immunization Recommendations   Influenza Vaccine Annual influenza vaccination during flu season is recommended for all persons aged >= 6 months who do not have contraindications   Pneumococcal Vaccine (Prevnar and Pneumovax)  * Prevnar = PCV13  * Pneumovax = PPSV23 Adults 25-60 years old: 1-3 doses may be recommended based on certain risk factors  Adults 72 years old: Prevnar (PCV13) vaccine recommended followed by Pneumovax (PPSV23) vaccine  If already received PPSV23 since turning 65, then PCV13 recommended at least one year after PPSV23 dose  Hepatitis B Vaccine 3 dose series if at intermediate or high risk (ex: diabetes, end stage renal disease, liver disease)   Tetanus (Td) Vaccine - COST NOT COVERED BY MEDICARE PART B Following completion of primary series, a booster dose should be given every 10 years to maintain immunity against tetanus  Td may also be given as tetanus wound prophylaxis  Tdap Vaccine - COST NOT COVERED BY MEDICARE PART B Recommended at least once for all adults  For pregnant patients, recommended with each pregnancy  Shingles Vaccine (Shingrix) - COST NOT COVERED BY MEDICARE PART B  2 shot series recommended in those aged 48 and above     Health Maintenance Due:      Topic Date Due    Colorectal Cancer Screening  06/08/2023    HIV Screening  Completed    Hepatitis C Screening  Completed     Immunizations Due:      Topic Date Due    COVID-19 Vaccine (3 - Booster for Arielle Semaj series) 11/26/2021     Advance Directives   What are advance directives? Advance directives are legal documents that state your wishes and plans for medical care  These plans are made ahead of time in case you lose your ability to make decisions for yourself  Advance directives can apply to any medical decision, such as the treatments you want, and if you want to donate organs  What are the types of advance directives? There are many types of advance directives, and each state has rules about how to use them  You may choose a combination of any of the following:  · Living will: This is a written record of the treatment you want  You can also choose which treatments you do not want, which to limit, and which to stop at a certain time  This includes surgery, medicine, IV fluid, and tube feedings     · Durable power of  for [facility]): This is a written record that states who you want to make healthcare choices for you when you are unable to make them for yourself  This person, called a proxy, is usually a family member or a friend  You may choose more than 1 proxy  · Do not resuscitate (DNR) order:  A DNR order is used in case your heart stops beating or you stop breathing  It is a request not to have certain forms of treatment, such as CPR  A DNR order may be included in other types of advance directives  · Medical directive: This covers the care that you want if you are in a coma, near death, or unable to make decisions for yourself  You can list the treatments you want for each condition  Treatment may include pain medicine, surgery, blood transfusions, dialysis, IV or tube feedings, and a ventilator (breathing machine)  · Values history: This document has questions about your views, beliefs, and how you feel and think about life  This information can help others choose the care that you would choose  Why are advance directives important? An advance directive helps you control your care  Although spoken wishes may be used, it is better to have your wishes written down  Spoken wishes can be misunderstood, or not followed  Treatments may be given even if you do not want them  An advance directive may make it easier for your family to make difficult choices about your care  Cigarette Smoking and Your Health   Risks to your health if you smoke:  Nicotine and other chemicals found in tobacco damage every cell in your body  Even if you are a light smoker, you have an increased risk for cancer, heart disease, and lung disease  If you are pregnant or have diabetes, smoking increases your risk for complications  Benefits to your health if you stop smoking:   · You decrease respiratory symptoms such as coughing, wheezing, and shortness of breath     · You reduce your risk for cancers of the lung, mouth, throat, kidney, bladder, pancreas, stomach, and cervix  If you already have cancer, you increase the benefits of chemotherapy  You also reduce your risk for cancer returning or a second cancer from developing  · You reduce your risk for heart disease, blood clots, heart attack, and stroke  · You reduce your risk for lung infections, and diseases such as pneumonia, asthma, chronic bronchitis, and emphysema  · Your circulation improves  More oxygen can be delivered to your body  If you have diabetes, you lower your risk for complications, such as kidney, artery, and eye diseases  You also lower your risk for nerve damage  Nerve damage can lead to amputations, poor vision, and blindness  · You improve your body's ability to heal and to fight infections  For more information and support to stop smoking:   · [employer]  Phone: 1- 115 - 745-3048  Web Address: [employer]  How to Quit Using Smokeless Tobacco   Why it is important to stop using smokeless tobacco:  Smokeless tobacco comes in many forms  Examples include chew, snuff, dip, dissolvable tobacco, and snus  All smokeless tobacco products contain nicotine and may contain as much nicotine as 3 cigarettes  You may be physically dependent on nicotine  You may also be emotionally addicted to it  The cravings can be strong, but it is important to quit using smokeless tobacco  You will improve your health and decrease your cancer, stroke, and heart attack risk  Mouth sores and tooth problems will also improve when you quit  You can benefit from quitting no matter how long you have used smokeless tobacco    Prepare to stop using smokeless tobacco:  Nicotine is a highly addictive drug  Withdrawal symptoms can happen when you stop and make it hard to quit  The following can help keep you on track:  · Set a quit date  · Tell friends, family, and coworkers that you plan to quit  · Remove all smokeless tobacco products from your home, car, and workplace  Manage weight gain after you quit:  Nicotine can affect your metabolism  You may gain a few pounds after you quit  The following can help you control your weight:  · Eat healthy foods  · Drink water before, during, and between meals  · Exercise as directed  © Copyright [employer] 2018 Information is for End User's use only and may not be sold, redistributed or otherwise used for commercial purposes   All illustrations and images included in CareNotes® are the copyrighted property of [employer]  or [address] [place] [address]

## 2022-01-28 NOTE — PROGRESS NOTES
Assessment and Plan:     Problem List Items Addressed This Visit        Cardiovascular and Mediastinum    Benign essential hypertension     A chronic asymptomatic fair control continue with the lisinopril 10 mg once a day low-salt diet discussed with the patient         Relevant Medications    lisinopril (ZESTRIL) 10 mg tablet    Other Relevant Orders    CBC and differential    Comprehensive metabolic panel    Lipid Panel with Direct LDL reflex    PSA, Total Screen       Musculoskeletal and Integument    Acne     Chronic will control on Metrogel tolerated well without side effect  The wash face no more than twice a day use warm water  Use most are eyes without sent or dyes  Do not take or squeeze a pimple  Avoid oral base makeup            Other    Mixed hyperlipidemia    Relevant Medications    atorvastatin (LIPITOR) 10 mg tablet    Other Relevant Orders    CBC and differential    Comprehensive metabolic panel    Lipid Panel with Direct LDL reflex    PSA, Total Screen    Bipolar I disorder (HCC)     Chronic no recent the hospitalization tolerate the med without the side effect the manage by psychiatric         Tobacco dependence syndrome     A chronic uncontrolled patient continues smoke wear of the complication of smoking not ready to quit smoking it  Patient candidate for low dose CT scan screening for lung cancer he would like to put that on hold until he come back from his trip         Medicare annual wellness visit, subsequent - Primary     Advice and education were given regarding nutrition, aerobic exercises, weight bearing exercises, cardiovascular risk reduction, fall risk reduction, and age appropriate supplements  The patient was counseled regarding instructions for management, risk factor reductions, prognosis, risks and benefits of treatment options, patient and family education, and importance of compliance with treatment                Relevant Orders    CBC and differential    Comprehensive metabolic panel    Lipid Panel with Direct LDL reflex    PSA, Total Screen      Other Visit Diagnoses     Moderate depressed bipolar I disorder ([place] [address] )        Encounter for screening for malignant neoplasm of prostate         Relevant Orders    PSA, Total Screen          Tobacco Cessation Counseling: Tobacco cessation counseling was provided  The patient is sincerely urged to quit consumption of tobacco  He is not ready to quit tobacco  Medication options discussed  Patient refused medication  Preventive health issues were discussed with patient, and age appropriate screening tests were ordered as noted in patient's After Visit Summary  Personalized health advice and appropriate referrals for health education or preventive services given if needed, as noted in patient's After Visit Summary  History of Present Illness:     Patient presents for Medicare Annual Wellness visit    Patient Care Team:  Vikram De Jesus MD as PCP - General (Family Medicine)  Tony Waldron MD (Psychiatry)  Grisel Anderson NP as Nurse Practitioner     Problem List:     Patient Active Problem List   Diagnosis    Acne    Benign essential hypertension    Mixed hyperlipidemia    Bipolar I disorder ([place] [address] )    Tobacco dependence syndrome    Medicare annual wellness visit, subsequent    Allergic rhinitis due to allergen    Colon cancer screening      Past Medical and Surgical History:     Past Medical History:   Diagnosis Date    Acne     Bipolar disorder ([place] [address] )     Hyperlipidemia     Hypertension     Manic depression ([place] [address] )     Tobacco abuse      History reviewed  No pertinent surgical history     Family History:     Family History   Problem Relation Age of Onset    Depression Mother     Hyperlipidemia Mother     Hypertension Father     Anxiety disorder Sister       Social History:     Social History     Socioeconomic History    Marital status: Single     Spouse name: None    Number of children: None    Years of education: None  Highest education level: None   Occupational History    None   Tobacco Use    Smoking status: Current Every Day Smoker     Types: Cigarettes    Smokeless tobacco: Current User    Tobacco comment: no passive smoke exposure   Substance and Sexual Activity    Alcohol use: No    Drug use: No    Sexual activity: None   Other Topics Concern    None   Social History Narrative    None     Social Determinants of Health     Financial Resource Strain: Low Risk     Difficulty of Paying Living Expenses: Not hard at all   Food Insecurity: No Food Insecurity    Worried About Running Out of Food in the Last Year: Never true    Adan of Food in the Last Year: Never true   Transportation Needs: No Transportation Needs    Lack of Transportation (Medical): No    Lack of Transportation (Non-Medical):  No   Physical Activity: Insufficiently Active    Days of Exercise per Week: 3 days    Minutes of Exercise per Session: 30 min   Stress: No Stress Concern Present    Feeling of Stress : Not at all   Social Connections: Socially Isolated    Frequency of Communication with Friends and Family: Once a week    Frequency of Social Gatherings with Friends and Family: Never    Attends Baptism Services: Never    Active Member of Clubs or Organizations: No    Attends Club or Organization Meetings: Never    Marital Status: Never    Intimate Partner Violence: Not At Risk    Fear of Current or Ex-Partner: No    Emotionally Abused: No    Physically Abused: No    Sexually Abused: No   Housing Stability: Not on file      Medications and Allergies:     Current Outpatient Medications   Medication Sig Dispense Refill    atorvastatin (LIPITOR) 10 mg tablet Take 1 tablet (10 mg total) by mouth daily 90 tablet 0    lisinopril (ZESTRIL) 10 mg tablet Take 1 tablet (10 mg total) by mouth daily 90 tablet 0    lithium carbonate 300 mg capsule take 2 capsules daily      loratadine (CLARITIN) 10 mg tablet Take 1 tablet (10 mg total) by mouth daily 30 tablet 1    metroNIDAZOLE (MetroCream) 0 75 % cream Apply topically 2 (two) times a day 45 g 2    QUEtiapine (SEROquel) 300 mg tablet Take 300 mg by mouth daily at bedtime      RA VITAMIN D-3 25 MCG (1000 UT) tablet Take 1,000 Units by mouth daily       No current facility-administered medications for this visit  No Known Allergies   Immunizations:     Immunization History   Administered Date(s) Administered    COVID-19 MODERNA VACC 0 5 ML IM 04/28/2021, 05/26/2021    Influenza, injectable, quadrivalent, preservative free 0 5 mL 09/23/2019, 09/28/2020    Influenza, recombinant, quadrivalent,injectable, preservative free 09/27/2021    Pneumococcal Polysaccharide PPV23 01/18/2019    Tdap 04/04/2017      Health Maintenance:         Topic Date Due    Colorectal Cancer Screening  06/08/2023    HIV Screening  Completed    Hepatitis C Screening  Completed         Topic Date Due    COVID-19 Vaccine (3 - Booster for Moderna series) 10/26/2021      Medicare Health Risk Assessment:     /80   Pulse 82   Temp 99 2 °F (37 3 °C) (Tympanic)   Ht 5' 10" (1 778 m)   Wt 74 8 kg (165 lb)   SpO2 97%   BMI 23 68 kg/m²      Susana Isidro is here for his Subsequent Wellness visit  Last Medicare Wellness visit information reviewed, patient interviewed and updates made to the record today  Health Risk Assessment:   Patient rates overall health as good  Patient feels that their physical health rating is same  Patient is satisfied with their life  Eyesight was rated as same  Hearing was rated as same  Patient feels that their emotional and mental health rating is same  Patients states they are never, rarely angry  Patient states they are never, rarely unusually tired/fatigued  Pain experienced in the last 7 days has been none  Patient states that he has experienced no weight loss or gain in last 6 months  Fall Risk Screening:    In the past year, patient has experienced: no history of falling in past year      Home Safety:  Patient does not have trouble with stairs inside or outside of their home  Patient has working smoke alarms and has working carbon monoxide detector  Home safety hazards include: none  Nutrition:   Current diet is Regular  Medications:   Patient is currently taking over-the-counter supplements  OTC medications include: see medication list  Patient is able to manage medications  Activities of Daily Living (ADLs)/Instrumental Activities of Daily Living (IADLs):   Walk and transfer into and out of bed and chair?: Yes  Dress and groom yourself?: Yes    Bathe or shower yourself?: Yes    Feed yourself?  Yes  Do your laundry/housekeeping?: Yes  Manage your money, pay your bills and track your expenses?: Yes  Make your own meals?: Yes    Do your own shopping?: Yes    Durable Medical Equipment Suppliers  none    Previous Hospitalizations:   Any hospitalizations or ED visits within the last 12 months?: No      Advance Care Planning:   Living will: No    Durable POA for healthcare: No    Advanced directive: No    Advanced directive counseling given: No    Five wishes given: Yes    Patient declined ACP directive: Yes    End of Life Decisions reviewed with patient: Yes    Provider agrees with end of life decisions: Yes      Cognitive Screening:   Provider or family/friend/caregiver concerned regarding cognition?: No    PREVENTIVE SCREENINGS      Cardiovascular Screening:    General: Screening Not Indicated and History Lipid Disorder      Diabetes Screening:     General: Screening Current      Colorectal Cancer Screening:     General: Screening Current      Prostate Cancer Screening:    General: Screening Current      Osteoporosis Screening:    General: Screening Not Indicated      Abdominal Aortic Aneurysm (AAA) Screening:    Risk factors include: tobacco use        General: Screening Not Indicated      Lung Cancer Screening:     General: Screening Not Indicated      Hepatitis C Screening:    General: Screening Current    Screening, Brief Intervention, and Referral to Treatment (SBIRT)    Screening  Typical number of drinks in a day: 0  Typical number of drinks in a week: 0  Interpretation: Low risk drinking behavior  AUDIT-C Screenin) How often did you have a drink containing alcohol in the past year? never  2) How many drinks did you have on a typical day when you were drinking in the past year? 0  3) How often did you have 6 or more drinks on one occasion in the past year? never    AUDIT-C Score: 0  Interpretation: Score 0-3 (male): Negative screen for alcohol misuse    Single Item Drug Screening:  How often have you used an illegal drug (including marijuana) or a prescription medication for non-medical reasons in the past year? never    Single Item Drug Screen Score: 0  Interpretation: Negative screen for possible drug use disorder    Brief Intervention  Alcohol & drug use screenings were reviewed  No concerns regarding substance use disorder identified         Lluvia Hernandez MD

## 2022-01-29 NOTE — ASSESSMENT & PLAN NOTE
A chronic uncontrolled patient continues smoke wear of the complication of smoking not ready to quit smoking it  Patient candidate for low dose CT scan screening for lung cancer he would like to put that on hold until he come back from his trip

## 2022-01-29 NOTE — ASSESSMENT & PLAN NOTE
Chronic will control on Metrogel tolerated well without side effect  The wash face no more than twice a day use warm water  Use most are eyes without sent or dyes  Do not take or squeeze a pimple  Avoid oral base makeup

## 2022-01-29 NOTE — PROGRESS NOTES
Subjective:   Chief Complaint   Patient presents with    Medicare Wellness Visit        Patient ID: Alfonso Hwang is a 45 y o  male  The patient here for Medicare exam follow-up with a chronic condition including hypertension the acne tobacco use patient the tolerated medication well without side effect the and he asymptomatic blood pressure will control and patient continued to smoke he has the aware of the complication smoking no cough no wheezing no hematosis      The following portions of the patient's history were reviewed and updated as appropriate: allergies, current medications, past family history, past medical history, past social history, past surgical history and problem list     Review of Systems   Constitutional: Negative for activity change, appetite change, fatigue and fever  HENT: Negative for congestion, ear pain, sinus pressure, sinus pain and sore throat  Eyes: Negative for pain, discharge, redness and itching  Respiratory: Negative for cough, chest tightness, shortness of breath and stridor  Cardiovascular: Negative for chest pain, palpitations and leg swelling  Gastrointestinal: Negative for abdominal pain, blood in stool, constipation, diarrhea and nausea  Genitourinary: Negative for dysuria, flank pain, frequency and hematuria  Musculoskeletal: Negative for back pain, joint swelling and neck pain  Skin: Negative for pallor and rash  Neurological: Negative for dizziness, tremors, weakness, numbness and headaches  Hematological: Does not bruise/bleed easily  Objective:  Vitals:    01/28/22 0952   BP: 130/80   Pulse: 82   Temp: 99 2 °F (37 3 °C)   TempSrc: Tympanic   SpO2: 97%   Weight: 74 8 kg (165 lb)   Height: 5' 10" (1 778 m)      Physical Exam  Vitals and nursing note reviewed  Constitutional:       General: He is not in acute distress  Appearance: Normal appearance  He is well-developed  He is not diaphoretic     HENT:      Head: Normocephalic  Right Ear: Tympanic membrane, ear canal and external ear normal       Left Ear: Tympanic membrane, ear canal and external ear normal       Nose: Nose normal  No congestion or rhinorrhea  Mouth/Throat:      Mouth: Mucous membranes are moist       Pharynx: Oropharynx is clear  No oropharyngeal exudate or posterior oropharyngeal erythema  Eyes:      General:         Right eye: No discharge  Left eye: No discharge  Conjunctiva/sclera: Conjunctivae normal    Neck:      Vascular: No JVD  Cardiovascular:      Rate and Rhythm: Normal rate and regular rhythm  Heart sounds: Normal heart sounds  No murmur heard  No gallop  Pulmonary:      Effort: Pulmonary effort is normal  No respiratory distress  Breath sounds: Normal breath sounds  No stridor  No wheezing or rales  Chest:      Chest wall: No tenderness  Abdominal:      General: There is no distension  Palpations: Abdomen is soft  There is no mass  Tenderness: There is no abdominal tenderness  There is no rebound  Musculoskeletal:         General: No tenderness  Normal range of motion  Cervical back: Normal range of motion and neck supple  Lymphadenopathy:      Cervical: No cervical adenopathy  Skin:     General: Skin is warm  Coloration: Skin is not pale  Neurological:      Mental Status: He is alert and oriented to person, place, and time  Sensory: No sensory deficit  Gait: Gait normal    Psychiatric:         Mood and Affect: Mood normal          Behavior: Behavior normal            Assessment/Plan:    Medicare annual wellness visit, subsequent  Advice and education were given regarding nutrition, aerobic exercises, weight bearing exercises, cardiovascular risk reduction, fall risk reduction, and age appropriate supplements         The patient was counseled regarding instructions for management, risk factor reductions, prognosis, risks and benefits of treatment options, patient and family education, and importance of compliance with treatment  Tobacco dependence syndrome  A chronic uncontrolled patient continues smoke wear of the complication of smoking not ready to quit smoking it  Patient candidate for low dose CT scan screening for lung cancer he would like to put that on hold until he come back from his trip    Benign essential hypertension  A chronic asymptomatic fair control continue with the lisinopril 10 mg once a day low-salt diet discussed with the patient    Bipolar I disorder ([place] [address] )  Chronic no recent the hospitalization tolerate the med without the side effect the manage by psychiatric    Acne  Chronic will control on Metrogel tolerated well without side effect  The wash face no more than twice a day use warm water  Use most are eyes without sent or dyes  Do not take or squeeze a pimple  Avoid oral base makeup       Diagnoses and all orders for this visit:    Medicare annual wellness visit, subsequent  -     CBC and differential; Future  -     Comprehensive metabolic panel; Future  -     Lipid Panel with Direct LDL reflex; Future  -     PSA, Total Screen; Future    Tobacco dependence syndrome    Mixed hyperlipidemia  -     atorvastatin (LIPITOR) 10 mg tablet; Take 1 tablet (10 mg total) by mouth daily  -     CBC and differential; Future  -     Comprehensive metabolic panel; Future  -     Lipid Panel with Direct LDL reflex; Future  -     PSA, Total Screen; Future    Benign essential hypertension  -     lisinopril (ZESTRIL) 10 mg tablet; Take 1 tablet (10 mg total) by mouth daily  -     CBC and differential; Future  -     Comprehensive metabolic panel; Future  -     Lipid Panel with Direct LDL reflex; Future  -     PSA, Total Screen; Future    Moderate depressed bipolar I disorder ([place] [address] )    Encounter for screening for malignant neoplasm of prostate   -     PSA, Total Screen;  Future    Bipolar I disorder (HCC)    Other acne

## 2022-01-29 NOTE — ASSESSMENT & PLAN NOTE
A chronic asymptomatic fair control continue with the lisinopril 10 mg once a day low-salt diet discussed with the patient

## 2022-01-31 ENCOUNTER — APPOINTMENT (OUTPATIENT)
Dept: LAB | Facility: HOSPITAL | Age: 52
End: 2022-01-31
Payer: MEDICARE

## 2022-01-31 DIAGNOSIS — Z00.00 MEDICARE ANNUAL WELLNESS VISIT, SUBSEQUENT: ICD-10-CM

## 2022-01-31 DIAGNOSIS — Z12.5 ENCOUNTER FOR SCREENING FOR MALIGNANT NEOPLASM OF PROSTATE: ICD-10-CM

## 2022-01-31 DIAGNOSIS — I10 BENIGN ESSENTIAL HYPERTENSION: ICD-10-CM

## 2022-01-31 DIAGNOSIS — E78.2 MIXED HYPERLIPIDEMIA: ICD-10-CM

## 2022-01-31 LAB
ALBUMIN SERPL BCP-MCNC: 4.5 G/DL (ref 3–5.2)
ALP SERPL-CCNC: 105 U/L (ref 43–122)
ALT SERPL W P-5'-P-CCNC: 29 U/L
ANION GAP SERPL CALCULATED.3IONS-SCNC: 6 MMOL/L (ref 5–14)
AST SERPL W P-5'-P-CCNC: 28 U/L (ref 17–59)
BASOPHILS # BLD AUTO: 0.1 THOUSANDS/ΜL (ref 0–0.1)
BASOPHILS NFR BLD AUTO: 1 % (ref 0–1)
BILIRUB SERPL-MCNC: 0.58 MG/DL
BUN SERPL-MCNC: 14 MG/DL (ref 5–25)
CALCIUM SERPL-MCNC: 9.7 MG/DL (ref 8.4–10.2)
CHLORIDE SERPL-SCNC: 101 MMOL/L (ref 97–108)
CHOLEST SERPL-MCNC: 196 MG/DL
CO2 SERPL-SCNC: 29 MMOL/L (ref 22–30)
CREAT SERPL-MCNC: 0.63 MG/DL (ref 0.7–1.5)
EOSINOPHIL # BLD AUTO: 0.1 THOUSAND/ΜL (ref 0–0.4)
EOSINOPHIL NFR BLD AUTO: 1 % (ref 0–6)
ERYTHROCYTE [DISTWIDTH] IN BLOOD BY AUTOMATED COUNT: 14.3 %
GFR SERPL CREATININE-BSD FRML MDRD: 113 ML/MIN/1.73SQ M
GLUCOSE P FAST SERPL-MCNC: 96 MG/DL (ref 70–99)
HCT VFR BLD AUTO: 46.4 % (ref 41–53)
HDLC SERPL-MCNC: 38 MG/DL
HGB BLD-MCNC: 16.1 G/DL (ref 13.5–17.5)
LDLC SERPL CALC-MCNC: 132 MG/DL
LYMPHOCYTES # BLD AUTO: 1.5 THOUSANDS/ΜL (ref 0.5–4)
LYMPHOCYTES NFR BLD AUTO: 17 % (ref 25–45)
MCH RBC QN AUTO: 33.9 PG (ref 26–34)
MCHC RBC AUTO-ENTMCNC: 34.8 G/DL (ref 31–36)
MCV RBC AUTO: 97 FL (ref 80–100)
MONOCYTES # BLD AUTO: 0.4 THOUSAND/ΜL (ref 0.2–0.9)
MONOCYTES NFR BLD AUTO: 5 % (ref 1–10)
NEUTROPHILS # BLD AUTO: 6.9 THOUSANDS/ΜL (ref 1.8–7.8)
NEUTS SEG NFR BLD AUTO: 77 % (ref 45–65)
PLATELET # BLD AUTO: 352 THOUSANDS/UL (ref 150–450)
PMV BLD AUTO: 7.5 FL (ref 8.9–12.7)
POTASSIUM SERPL-SCNC: 3.9 MMOL/L (ref 3.6–5)
PROT SERPL-MCNC: 7.6 G/DL (ref 5.9–8.4)
PSA SERPL-MCNC: 1 NG/ML (ref 0–4)
RBC # BLD AUTO: 4.77 MILLION/UL (ref 4.5–5.9)
SODIUM SERPL-SCNC: 136 MMOL/L (ref 137–147)
TRIGL SERPL-MCNC: 129 MG/DL
WBC # BLD AUTO: 8.9 THOUSAND/UL (ref 4.5–11)

## 2022-01-31 PROCEDURE — 85025 COMPLETE CBC W/AUTO DIFF WBC: CPT

## 2022-01-31 PROCEDURE — 80053 COMPREHEN METABOLIC PANEL: CPT

## 2022-01-31 PROCEDURE — G0103 PSA SCREENING: HCPCS

## 2022-01-31 PROCEDURE — 36415 COLL VENOUS BLD VENIPUNCTURE: CPT

## 2022-01-31 PROCEDURE — 80061 LIPID PANEL: CPT

## 2023-03-19 ENCOUNTER — HOSPITAL ENCOUNTER (EMERGENCY)
Facility: HOSPITAL | Age: 53
Discharge: HOME | End: 2023-03-19
Attending: INTERNAL MEDICINE

## 2023-03-19 VITALS
HEART RATE: 100 BPM | RESPIRATION RATE: 18 BRPM | SYSTOLIC BLOOD PRESSURE: 177 MMHG | WEIGHT: 160.3 LBS | HEIGHT: 71 IN | BODY MASS INDEX: 22.44 KG/M2 | DIASTOLIC BLOOD PRESSURE: 92 MMHG | OXYGEN SATURATION: 98 % | TEMPERATURE: 97.8 F

## 2023-03-19 DIAGNOSIS — Z00.8 ENCOUNTER FOR PSYCHOLOGICAL EVALUATION: Primary | ICD-10-CM

## 2023-03-19 NOTE — ED PROVIDER NOTES
History  Chief Complaint   Patient presents with   • Psychiatric Evaluation     Pt looking for outpatient resources in [facility]  54-year-old man presents to ED for outpatient resources  Patient states he just [place] state  He states he has long history of psychiatric disorder and he needs to establish a psychiatrist here  He would also like outpatient psychiatric resources  He  denies any suicide ideation, homicidal ideation, auditory hallucinations or visual hallucinations  He states he has been taking his medications as prescribed and has had no issues  He denies any physical complaints or concerns  Prior to Admission Medications   Prescriptions Last Dose Informant Patient Reported? Taking? QUEtiapine (SEROquel) 300 mg tablet   Yes No   Sig: Take 300 mg by mouth daily at bedtime   RA VITAMIN D-3 25 MCG (1000 UT) tablet   Yes No   Sig: Take 1,000 Units by mouth daily   atorvastatin (LIPITOR) 10 mg tablet   No No   Sig: Take 1 tablet (10 mg total) by mouth daily   lisinopril (ZESTRIL) 10 mg tablet   No No   Sig: Take 1 tablet (10 mg total) by mouth daily   lithium carbonate 300 mg capsule   Yes No   Sig: take 2 capsules daily   loratadine (CLARITIN) 10 mg tablet   No No   Sig: Take 1 tablet (10 mg total) by mouth daily   metroNIDAZOLE (MetroCream) 0 75 % cream   No No   Sig: Apply topically 2 (two) times a day      Facility-Administered Medications: None       Past Medical History:   Diagnosis Date   • Acne    • Bipolar disorder (HCC)    • Hyperlipidemia    • Hypertension    • Manic depression ([address] )    • Psychiatric disorder    • Tobacco abuse        History reviewed  No pertinent surgical history  Family History   Problem Relation Age of Onset   • Depression Mother    • Hyperlipidemia Mother    • Hypertension Father    • Anxiety disorder Sister      I have reviewed and agree with the history as documented      E-Cigarette/Vaping   • E-Cigarette Use Never User E-Cigarette/Vaping Substances     Social History     Tobacco Use   • Smoking status: Every Day     Packs/day: 1 00     Types: Cigarettes   • Smokeless tobacco: Current   • Tobacco comments:     no passive smoke exposure   Vaping Use   • Vaping Use: Never used   Substance Use Topics   • Alcohol use: No   • Drug use: No       Review of Systems   All other systems reviewed and are negative  Physical Exam  Physical Exam   PHYSICAL EXAM    Constitutional:  Well developed, no acute distress  HEENT:  Conjunctiva normal  Oropharynx moist  Respiratory:  No respiratory distress  Cardiovascular:  Normal rate  GI:  Soft, nondistended, nontender  :  No costovertebral angle tenderness   Musculoskeletal:  No edema, no tenderness, no deformities  Integument:  Well hydrated, no rash   Lymphatic:  No lymphadenopathy noted   Neurologic:  Alert & oriented x 3, normal motor function, no focal deficits noted   Psychiatric:  Speech and behavior appropriate       Vital Signs  ED Triage Vitals [03/19/23 1032]   Temperature Pulse Respirations Blood Pressure SpO2   97 8 °F (36 6 °C) 100 18 (!) 177/92 98 %      Temp Source Heart Rate Source Patient Position - Orthostatic VS BP Location FiO2 (%)   Oral Monitor Sitting Left arm --      Pain Score       No Pain           Vitals:    03/19/23 1032   BP: (!) 177/92   Pulse: 100   Patient Position - Orthostatic VS: Sitting         Visual Acuity      ED Medications  Medications - No data to display    Diagnostic Studies  Results Reviewed     None                 No orders to display              Procedures  Procedures         ED Course                               SBIRT 22yo+    Flowsheet Row Most Recent Value   SBIRT (25 yo +)    In order to provide better care to our patients, we are screening all of our patients for alcohol and drug use  Would it be okay to ask you these screening questions? Yes Filed at: 03/19/2023 1037   Initial Alcohol Screen: US AUDIT-C     1   How often do you have a drink containing alcohol? 0 Filed at: 03/19/2023 1037   2  How many drinks containing alcohol do you have on a typical day you are drinking? 0 Filed at: 03/19/2023 1037   3a  Male UNDER 65: How often do you have five or more drinks on one occasion? 0 Filed at: 03/19/2023 1037   Audit-C Score 0 Filed at: 03/19/2023 1037   EDITH: How many times in the past year have you    Used an illegal drug or used a prescription medication for non-medical reasons? Never Filed at: 03/19/2023 1037                    Medical Decision Making  Ambulatory referral placed for psychiatry  [facility] information given to patient  Return to the ER with any new or worsening psychiatric issues    Encounter for psychological evaluation: self-limited or minor problem      Disposition  Final diagnoses:   Encounter for psychological evaluation     Time reflects when diagnosis was documented in both MDM as applicable and the Disposition within this note     Time User Action Codes Description Comment    3/19/2023 10:33 AM Diedra Aschoff Add [Z00 8] Encounter for psychological evaluation       ED Disposition     ED Disposition   Discharge    Condition   Stable    Date/Time   Sun Mar 19, 2023 10:33 AM    Comment   Kun Perkins discharge to home/self care                 Follow-up Information     Follow up With Specialties Details Why [facility] [address]  Call  As needed [address] [place] [address]  [phone]          Discharge Medication List as of 3/19/2023 10:34 AM      CONTINUE these medications which have NOT CHANGED    Details   atorvastatin (LIPITOR) 10 mg tablet Take 1 tablet (10 mg total) by mouth daily, Starting Fri 1/28/2022, Normal      lisinopril (ZESTRIL) 10 mg tablet Take 1 tablet (10 mg total) by mouth daily, Starting Fri 1/28/2022, Normal      lithium carbonate 300 mg capsule take 2 capsules daily, Historical Med      loratadine (CLARITIN) 10 mg tablet Take 1 tablet (10 mg total) by mouth daily, Starting Mon 5/20/2019, Normal      metroNIDAZOLE (MetroCream) 0 75 % cream Apply topically 2 (two) times a day, Starting Mon 12/27/2021, Normal      QUEtiapine (SEROquel) 300 mg tablet Take 300 mg by mouth daily at bedtime, Historical Med      RA VITAMIN D-3 25 MCG (1000 UT) tablet Take 1,000 Units by mouth daily, Starting Fri 1/3/2020, Historical Med                 PDMP Review     None          ED Provider  Electronically Signed by           Shirley Bell MD  03/19/23 3753

## 2023-03-27 ENCOUNTER — TELEPHONE (OUTPATIENT)
Dept: PSYCHIATRY | Facility: CLINIC | Age: 53
End: 2023-03-27

## 2023-03-31 ENCOUNTER — TELEPHONE (OUTPATIENT)
Dept: PSYCHIATRY | Facility: CLINIC | Age: 53
End: 2023-03-31

## 2023-04-05 ENCOUNTER — OFFICE VISIT (OUTPATIENT)
Dept: FAMILY MEDICINE CLINIC | Facility: CLINIC | Age: 53
End: 2023-04-05

## 2023-04-05 VITALS
BODY MASS INDEX: 22.33 KG/M2 | TEMPERATURE: 97.9 F | OXYGEN SATURATION: 98 % | DIASTOLIC BLOOD PRESSURE: 70 MMHG | HEART RATE: 92 BPM | WEIGHT: 156 LBS | SYSTOLIC BLOOD PRESSURE: 110 MMHG | HEIGHT: 70 IN

## 2023-04-05 DIAGNOSIS — I10 BENIGN ESSENTIAL HYPERTENSION: ICD-10-CM

## 2023-04-05 DIAGNOSIS — Z12.5 SCREENING FOR PROSTATE CANCER: ICD-10-CM

## 2023-04-05 DIAGNOSIS — Z00.00 MEDICARE ANNUAL WELLNESS VISIT, SUBSEQUENT: Primary | ICD-10-CM

## 2023-04-05 DIAGNOSIS — Z12.11 SCREENING FOR COLON CANCER: ICD-10-CM

## 2023-04-05 DIAGNOSIS — F17.200 TOBACCO DEPENDENCE SYNDROME: ICD-10-CM

## 2023-04-05 DIAGNOSIS — Z53.20 LUNG CANCER SCREENING DECLINED BY PATIENT: ICD-10-CM

## 2023-04-05 DIAGNOSIS — L70.8 OTHER ACNE: ICD-10-CM

## 2023-04-05 DIAGNOSIS — Z13.29 SCREENING FOR THYROID DISORDER: ICD-10-CM

## 2023-04-05 DIAGNOSIS — E78.2 MIXED HYPERLIPIDEMIA: ICD-10-CM

## 2023-04-05 DIAGNOSIS — F31.32 MODERATE DEPRESSED BIPOLAR I DISORDER (HCC): ICD-10-CM

## 2023-04-05 RX ORDER — ATORVASTATIN CALCIUM 10 MG/1
10 TABLET, FILM COATED ORAL DAILY
Qty: 30 TABLET | Refills: 0 | Status: SHIPPED | OUTPATIENT
Start: 2023-04-05

## 2023-04-05 RX ORDER — LISINOPRIL 10 MG/1
10 TABLET ORAL DAILY
Qty: 30 TABLET | Refills: 0 | Status: SHIPPED | OUTPATIENT
Start: 2023-04-05

## 2023-04-05 NOTE — ASSESSMENT & PLAN NOTE
Advice and education were given regarding nutrition, aerobic exercises, weight-bearing exercises, cardiovascular risk reduction, fall risk reduction, and age-appropriate supplements  The patient was counseled regarding instructions for management, risk factor reductions, prognosis, risks and benefits of treatment options, patient and family education, and importance of compliance with treatment     Patient declined Shingrix for today and he declined pneumonia shot

## 2023-04-05 NOTE — ASSESSMENT & PLAN NOTE
Chronic asymptomatic fair control continue current management lisinopril 10 mg once a day low-salt diet importance stop smoking discussed the patient

## 2023-04-05 NOTE — PROGRESS NOTES
I discussed with him that he is a candidate for lung cancer CT screening  The following Shared Decision-Making points were covered:  Benefits of screening were discussed, including the rates of reduction in death from lung cancer and other causes  Harms of screening were reviewed, including false positive tests, radiation exposure levels, risks of invasive procedures, risks of complications of screening, and risk of overdiagnosis  I counseled on the importance of adherence to annual lung cancer LDCT screening, impact of co-morbidities, and ability or willingness to undergo diagnosis and treatment  I counseled on the importance of maintaining abstinence as a former smoker or was counseled on the importance of smoking cessation if a current smoker    Review of Eligibility Criteria: He meets all of the criteria for Lung Cancer Screening  He is 47 y o  He has 20 pack year tobacco history and is a current smoker or has quit within the past 15 years  He presents no signs or symptoms of lung cancer    After discussion, the patient decided to decline lung cancer screening   Assessment and Plan:     Problem List Items Addressed This Visit        Cardiovascular and Mediastinum    Benign essential hypertension     Chronic asymptomatic fair control continue current management lisinopril 10 mg once a day low-salt diet importance stop smoking discussed the patient         Relevant Medications    lisinopril (ZESTRIL) 10 mg tablet    Other Relevant Orders    CBC and differential    Comprehensive metabolic panel    Lipid Panel with Direct LDL reflex    TSH, 3rd generation with Free T4 reflex       Musculoskeletal and Integument    Acne    Relevant Medications    metroNIDAZOLE (MetroCream) 0 75 % cream    Other Relevant Orders    CBC and differential    Comprehensive metabolic panel    Lipid Panel with Direct LDL reflex    TSH, 3rd generation with Free T4 reflex       Other    Mixed hyperlipidemia    Relevant Medications atorvastatin (LIPITOR) 10 mg tablet    Other Relevant Orders    CBC and differential    Comprehensive metabolic panel    Lipid Panel with Direct LDL reflex    TSH, 3rd generation with Free T4 reflex    Tobacco dependence syndrome     Patient has been smoking since 1990 one pack a day he had 32-year history of smoking discussed with patient stop smoking he declined he is candidate for lung cancer screening but patient declined         Medicare annual wellness visit, subsequent - Primary     Advice and education were given regarding nutrition, aerobic exercises, weight-bearing exercises, cardiovascular risk reduction, fall risk reduction, and age-appropriate supplements  The patient was counseled regarding instructions for management, risk factor reductions, prognosis, risks and benefits of treatment options, patient and family education, and importance of compliance with treatment     Patient declined Shingrix for today and he declined pneumonia shot         Relevant Orders    CBC and differential    Comprehensive metabolic panel    Lipid Panel with Direct LDL reflex    TSH, 3rd generation with Free T4 reflex    Lung cancer screening declined by patient    Relevant Orders    CBC and differential    Comprehensive metabolic panel    Lipid Panel with Direct LDL reflex    TSH, 3rd generation with Free T4 reflex    Moderate depressed bipolar I disorder (HCC)     Patient follow-up with psychiatric currently on lithium and Seroquel         Relevant Orders    CBC and differential    Comprehensive metabolic panel    Lipid Panel with Direct LDL reflex    TSH, 3rd generation with Free T4 reflex   Other Visit Diagnoses     Screening for colon cancer        Relevant Orders    CBC and differential    Comprehensive metabolic panel    Lipid Panel with Direct LDL reflex    TSH, 3rd generation with Free T4 reflex    Cologuard    Screening for prostate cancer        Relevant Orders    PSA, Total Screen    TSH, 3rd generation with Free T4 reflex    Screening for thyroid disorder        Relevant Orders    TSH, 3rd generation with Free T4 reflex          Tobacco Cessation Counseling: Tobacco cessation counseling was provided  The patient is sincerely urged to quit consumption of tobacco  He is not ready to quit tobacco  Medication options discussed  Patient refused medication  Preventive health issues were discussed with patient, and age appropriate screening tests were ordered as noted in patient's After Visit Summary  Personalized health advice and appropriate referrals for health education or preventive services given if needed, as noted in patient's After Visit Summary  History of Present Illness:     Patient presents for a Medicare Wellness Visit    Patient here for Medicare exam and follow-up of the chronic condition patient moved recently from [place] and has been evaluated by psychiatry today recently was in the ER for psych evaluation and patient at that time referred to sleep psychiatric no appointment yet past medical surgical family and social history reviewed     No care team member to display     Review of Systems:     Review of Systems   Constitutional: Negative for chills and fever  HENT: Negative for congestion, ear pain and sore throat  Eyes: Negative for pain and visual disturbance  Respiratory: Negative for cough and shortness of breath  Cardiovascular: Negative for chest pain and palpitations  Gastrointestinal: Negative for abdominal pain, blood in stool and vomiting  Genitourinary: Negative for dysuria and hematuria  Musculoskeletal: Negative for arthralgias and back pain  Skin: Negative for color change and rash  Neurological: Negative for seizures and syncope  Psychiatric/Behavioral: Negative for behavioral problems  All other systems reviewed and are negative         Problem List:     Patient Active Problem List   Diagnosis   • Acne   • Benign essential hypertension   • Mixed hyperlipidemia   • Tobacco dependence syndrome   • Medicare annual wellness visit, subsequent   • Allergic rhinitis due to allergen   • Colon cancer screening   • Lung cancer screening declined by patient   • Moderate depressed bipolar I disorder [facility])      Past Medical and Surgical History:     Past Medical History:   Diagnosis Date   • Acne    • Bipolar disorder ([facility] )    • Bipolar I disorder ([facility] ) 10/31/2012   • Hyperlipidemia    • Hypertension    • Manic depression ([facility] )    • Psychiatric disorder    • Tobacco abuse      History reviewed  No pertinent surgical history  Family History:     Family History   Problem Relation Age of Onset   • Depression Mother    • Hyperlipidemia Mother    • Hypertension Father    • Anxiety disorder Sister       Social History:     Social History     Socioeconomic History   • Marital status: Single     Spouse name: None   • Number of children: None   • Years of education: None   • Highest education level: None   Occupational History   • None   Tobacco Use   • Smoking status: Every Day     Packs/day: 1 00     Years: 33 00     Pack years: 33 00     Types: Cigarettes     Start date: 1990   • Smokeless tobacco: Current   • Tobacco comments:     no passive smoke exposure   Vaping Use   • Vaping Use: Never used   Substance and Sexual Activity   • Alcohol use: No   • Drug use: No   • Sexual activity: None   Other Topics Concern   • None   Social History Narrative   • None     Social Determinants of Health     Financial Resource Strain: Low Risk    • Difficulty of Paying Living Expenses: Not hard at all   Food Insecurity: Not on file   Transportation Needs: No Transportation Needs   • Lack of Transportation (Medical): No   • Lack of Transportation (Non-Medical):  No   Physical Activity: Not on file   Stress: Not on file   Social Connections: Not on file   Intimate Partner Violence: Not on file   Housing Stability: Not on file      Medications and Allergies:     Current Outpatient Medications Medication Sig Dispense Refill   • atorvastatin (LIPITOR) 10 mg tablet Take 1 tablet (10 mg total) by mouth daily 30 tablet 0   • lisinopril (ZESTRIL) 10 mg tablet Take 1 tablet (10 mg total) by mouth daily 30 tablet 0   • metroNIDAZOLE (MetroCream) 0 75 % cream Apply topically 2 (two) times a day 30 g 0   • lithium carbonate 300 mg capsule take 2 capsules daily     • loratadine (CLARITIN) 10 mg tablet Take 1 tablet (10 mg total) by mouth daily 30 tablet 1   • QUEtiapine (SEROquel) 300 mg tablet Take 300 mg by mouth daily at bedtime     • RA VITAMIN D-3 25 MCG (1000 UT) tablet Take 1,000 Units by mouth daily       No current facility-administered medications for this visit  No Known Allergies   Immunizations:     Immunization History   Administered Date(s) Administered   • COVID-19 MODERNA VACC 0 5 ML IM 04/28/2021, 05/26/2021   • Influenza, injectable, quadrivalent, preservative free 0 5 mL 09/23/2019, 09/28/2020   • Influenza, recombinant, quadrivalent,injectable, preservative free 09/27/2021   • Pneumococcal Polysaccharide PPV23 01/18/2019   • Tdap 04/04/2017      Health Maintenance:         Topic Date Due   • Lung Cancer Screening  Never done   • Colorectal Cancer Screening  06/08/2023   • HIV Screening  Completed   • Hepatitis C Screening  Completed         Topic Date Due   • Hepatitis A Vaccine (1 of 2 - Risk 2-dose series) Never done   • Pneumococcal Vaccine: Pediatrics (0 to 5 Years) and At-Risk Patients (6 to 59 Years) (2 - PCV) 01/18/2020   • COVID-19 Vaccine (3 - Booster for Moderna series) 07/21/2021   • Influenza Vaccine (1) 09/01/2022      Medicare Screening Tests and Risk Assessments:     Parker Bernal is here for his Subsequent Wellness visit  Last Medicare Wellness visit information reviewed, patient interviewed and updates made to the record today  Health Risk Assessment:   Patient rates overall health as good  Patient feels that their physical health rating is same   Patient is satisfied with their life  Eyesight was rated as same  Hearing was rated as same  Patient feels that their emotional and mental health rating is same  Patients states they are never, rarely angry  Patient states they are never, rarely unusually tired/fatigued  Pain experienced in the last 7 days has been none  Patient states that he has experienced no weight loss or gain in last 6 months  Fall Risk Screening: In the past year, patient has experienced: no history of falling in past year      Home Safety:  Patient does not have trouble with stairs inside or outside of their home  Patient has working smoke alarms and has working carbon monoxide detector  Home safety hazards include: none  Nutrition:   Current diet is Regular  Medications:   Patient is currently taking over-the-counter supplements  OTC medications include: see medication list  Patient is able to manage medications  Activities of Daily Living (ADLs)/Instrumental Activities of Daily Living (IADLs):   Walk and transfer into and out of bed and chair?: Yes  Dress and groom yourself?: Yes    Bathe or shower yourself?: Yes    Feed yourself?  Yes  Do your laundry/housekeeping?: Yes  Manage your money, pay your bills and track your expenses?: Yes  Make your own meals?: Yes    Do your own shopping?: Yes    Durable Medical Equipment Suppliers  none    Previous Hospitalizations:   Any hospitalizations or ED visits within the last 12 months?: Yes    How many hospitalizations have you had in the last year?: 1-2    Advance Care Planning:   Living will: No    Durable POA for healthcare: No    Advanced directive: Yes    Advanced directive counseling given: No    Five wishes given: Yes    Patient declined ACP directive: No    End of Life Decisions reviewed with patient: No    Provider agrees with end of life decisions: Yes      Cognitive Screening:   Provider or family/friend/caregiver concerned regarding cognition?: No    PREVENTIVE SCREENINGS      Cardiovascular "Screening:    General: Screening Not Indicated and History Lipid Disorder      Diabetes Screening:     General: Risks and Benefits Discussed    Due for: Blood Glucose      Colorectal Cancer Screening:     General: Screening Current      Osteoporosis Screening:    General: Screening Not Indicated      Abdominal Aortic Aneurysm (AAA) Screening:    Risk factors include: tobacco use        Lung Cancer Screening:     General: Screening Not Indicated      Hepatitis C Screening:    General: Screening Current    Screening, Brief Intervention, and Referral to Treatment (SBIRT)    Screening  Typical number of drinks in a day: 0  Typical number of drinks in a week: 0  Interpretation: Low risk drinking behavior  AUDIT-C Screenin) How often did you have a drink containing alcohol in the past year? never  2) How many drinks did you have on a typical day when you were drinking in the past year? 0  3) How often did you have 6 or more drinks on one occasion in the past year? never    AUDIT-C Score: 0  Interpretation: Score 0-3 (male): Negative screen for alcohol misuse    Single Item Drug Screening:  How often have you used an illegal drug (including marijuana) or a prescription medication for non-medical reasons in the past year? never    Single Item Drug Screen Score: 0  Interpretation: Negative screen for possible drug use disorder    Brief Intervention  Alcohol & drug use screenings were reviewed  No concerns regarding substance use disorder identified  No results found  Physical Exam:     /70 (BP Location: Left arm, Patient Position: Sitting)   Pulse 92   Temp 97 9 °F (36 6 °C) (Tympanic)   Ht 5' 9 5\" (1 765 m)   Wt 70 8 kg (156 lb)   SpO2 98%   BMI 22 71 kg/m²     Physical Exam  Vitals and nursing note reviewed  Constitutional:       General: He is not in acute distress  Appearance: He is well-developed  HENT:      Head: Normocephalic and atraumatic        Right Ear: Tympanic membrane normal  " There is no impacted cerumen  Left Ear: Tympanic membrane normal  There is no impacted cerumen  Nose: No congestion  Mouth/Throat:      Pharynx: No oropharyngeal exudate  Eyes:      Conjunctiva/sclera: Conjunctivae normal    Cardiovascular:      Rate and Rhythm: Normal rate and regular rhythm  Heart sounds: No murmur heard  Pulmonary:      Effort: Pulmonary effort is normal  No respiratory distress  Breath sounds: Normal breath sounds  Abdominal:      Palpations: Abdomen is soft  Tenderness: There is no abdominal tenderness  Musculoskeletal:         General: No swelling  Cervical back: Neck supple  Skin:     General: Skin is warm and dry  Capillary Refill: Capillary refill takes less than 2 seconds  Findings: No bruising or erythema  Neurological:      Mental Status: He is alert and oriented to person, place, and time     Psychiatric:         Mood and Affect: Mood normal           Josefina Gibbons MD

## 2023-04-05 NOTE — PATIENT INSTRUCTIONS
Medicare Preventive Visit Patient Instructions  Thank you for completing your Welcome to Medicare Visit or Medicare Annual Wellness Visit today  Your next wellness visit will be due in one year (4/5/2024)  The screening/preventive services that you may require over the next 5-10 years are detailed below  Some tests may not apply to you based off risk factors and/or age  Screening tests ordered at today's visit but not completed yet may show as past due  Also, please note that scanned in results may not display below  Preventive Screenings:  Service Recommendations Previous Testing/Comments   Colorectal Cancer Screening  · Colonoscopy    · Fecal Occult Blood Test (FOBT)/Fecal Immunochemical Test (FIT)  · Fecal DNA/Cologuard Test  · Flexible Sigmoidoscopy Age: 39-70 years old   Colonoscopy: every 10 years (May be performed more frequently if at higher risk)  OR  FOBT/FIT: every 1 year  OR  Cologuard: every 3 years  OR  Sigmoidoscopy: every 5 years  Screening may be recommended earlier than age 39 if at higher risk for colorectal cancer  Also, an individualized decision between you and your healthcare provider will decide whether screening between the ages of 74-80 would be appropriate   Colonoscopy: Not on file  FOBT/FIT: Not on file  Cologuard: 06/08/2020  Sigmoidoscopy: Not on file    Screening Current     Prostate Cancer Screening Individualized decision between patient and health care provider in men between ages of 53-78   Medicare will cover every 12 months beginning on the day after your 50th birthday PSA: 1 0 ng/mL           Hepatitis C Screening Once for adults born between 1945 and 1965  More frequently in patients at high risk for Hepatitis C Hep C Antibody: 12/26/2018    Screening Current   Diabetes Screening 1-2 times per year if you're at risk for diabetes or have pre-diabetes Fasting glucose: 96 mg/dL (1/31/2022)  A1C: No results in last 5 years (No results in last 5 years)      Cholesterol Screening Once every 5 years if you don't have a lipid disorder  May order more often based on risk factors  Lipid panel: 01/31/2022  Screening Not Indicated  History Lipid Disorder      Other Preventive Screenings Covered by Medicare:  1  Abdominal Aortic Aneurysm (AAA) Screening: covered once if your at risk  You're considered to be at risk if you have a family history of AAA or a male between the age of 73-68 who smoking at least 100 cigarettes in your lifetime  2  Lung Cancer Screening: covers low dose CT scan once per year if you meet all of the following conditions: (1) Age 50-69; (2) No signs or symptoms of lung cancer; (3) Current smoker or have quit smoking within the last 15 years; (4) You have a tobacco smoking history of at least 20 pack years (packs per day x number of years you smoked); (5) You get a written order from a healthcare provider  3  Glaucoma Screening: covered annually if you're considered high risk: (1) You have diabetes OR (2) Family history of glaucoma OR (3)  aged 48 and older OR (3)  American aged 72 and older  3  Osteoporosis Screening: covered every 2 years if you meet one of the following conditions: (1) Have a vertebral abnormality; (2) On glucocorticoid therapy for more than 3 months; (3) Have primary hyperparathyroidism; (4) On osteoporosis medications and need to assess response to drug therapy  5  HIV Screening: covered annually if you're between the age of 12-76  Also covered annually if you are younger than 13 and older than 72 with risk factors for HIV infection  For pregnant patients, it is covered up to 3 times per pregnancy      Immunizations:  Immunization Recommendations   Influenza Vaccine Annual influenza vaccination during flu season is recommended for all persons aged >= 6 months who do not have contraindications   Pneumococcal Vaccine   * Pneumococcal conjugate vaccine = PCV13 (Prevnar 13), PCV15 (Vaxneuvance), PCV20 (Prevnar 20)  * Pneumococcal polysaccharide vaccine = PPSV23 (Pneumovax) Adults 2364 years old: 1-3 doses may be recommended based on certain risk factors  Adults 72 years old: 1-2 doses may be recommended based off what pneumonia vaccine you previously received   Hepatitis B Vaccine 3 dose series if at intermediate or high risk (ex: diabetes, end stage renal disease, liver disease)   Tetanus (Td) Vaccine - COST NOT COVERED BY MEDICARE PART B Following completion of primary series, a booster dose should be given every 10 years to maintain immunity against tetanus  Td may also be given as tetanus wound prophylaxis  Tdap Vaccine - COST NOT COVERED BY MEDICARE PART B Recommended at least once for all adults  For pregnant patients, recommended with each pregnancy  Shingles Vaccine (Shingrix) - COST NOT COVERED BY MEDICARE PART B  2 shot series recommended in those aged 48 and above     Health Maintenance Due:      Topic Date Due   • Colorectal Cancer Screening  06/08/2023   • HIV Screening  Completed   • Hepatitis C Screening  Completed     Immunizations Due:      Topic Date Due   • Hepatitis A Vaccine (1 of 2 - Risk 2-dose series) Never done   • Pneumococcal Vaccine: Pediatrics (0 to 5 Years) and At-Risk Patients (6 to 59 Years) (2 - PCV) 01/18/2020   • COVID-19 Vaccine (3 - Booster for Moderna series) 07/21/2021   • Influenza Vaccine (1) 09/01/2022     Advance Directives   What are advance directives? Advance directives are legal documents that state your wishes and plans for medical care  These plans are made ahead of time in case you lose your ability to make decisions for yourself  Advance directives can apply to any medical decision, such as the treatments you want, and if you want to donate organs  What are the types of advance directives? There are many types of advance directives, and each state has rules about how to use them  You may choose a combination of any of the following:  · Living will:   This is a written record of the treatment you want  You can also choose which treatments you do not want, which to limit, and which to stop at a certain time  This includes surgery, medicine, IV fluid, and tube feedings  · Durable power of  for healthcare [facility]): This is a written record that states who you want to make healthcare choices for you when you are unable to make them for yourself  This person, called a proxy, is usually a family member or a friend  You may choose more than 1 proxy  · Do not resuscitate (DNR) order:  A DNR order is used in case your heart stops beating or you stop breathing  It is a request not to have certain forms of treatment, such as CPR  A DNR order may be included in other types of advance directives  · Medical directive: This covers the care that you want if you are in a coma, near death, or unable to make decisions for yourself  You can list the treatments you want for each condition  Treatment may include pain medicine, surgery, blood transfusions, dialysis, IV or tube feedings, and a ventilator (breathing machine)  · Values history: This document has questions about your views, beliefs, and how you feel and think about life  This information can help others choose the care that you would choose  Why are advance directives important? An advance directive helps you control your care  Although spoken wishes may be used, it is better to have your wishes written down  Spoken wishes can be misunderstood, or not followed  Treatments may be given even if you do not want them  An advance directive may make it easier for your family to make difficult choices about your care  Cigarette Smoking and Your Health   Risks to your health if you smoke:  Nicotine and other chemicals found in tobacco damage every cell in your body  Even if you are a light smoker, you have an increased risk for cancer, heart disease, and lung disease   If you are pregnant or have diabetes, smoking increases your risk for complications  Benefits to your health if you stop smoking:   · You decrease respiratory symptoms such as coughing, wheezing, and shortness of breath  · You reduce your risk for cancers of the lung, mouth, throat, kidney, bladder, pancreas, stomach, and cervix  If you already have cancer, you increase the benefits of chemotherapy  You also reduce your risk for cancer returning or a second cancer from developing  · You reduce your risk for heart disease, blood clots, heart attack, and stroke  · You reduce your risk for lung infections, and diseases such as pneumonia, asthma, chronic bronchitis, and emphysema  · Your circulation improves  More oxygen can be delivered to your body  If you have diabetes, you lower your risk for complications, such as kidney, artery, and eye diseases  You also lower your risk for nerve damage  Nerve damage can lead to amputations, poor vision, and blindness  · You improve your body's ability to heal and to fight infections  For more information and support to stop smoking:   · [employer] [facility]  Phone: 5- 250 - 732-1081  Web Address: [employer]  How to Quit Using Smokeless Tobacco   Why it is important to stop using smokeless tobacco:  Smokeless tobacco comes in many forms  Examples include chew, snuff, dip, dissolvable tobacco, and snus  All smokeless tobacco products contain nicotine and may contain as much nicotine as 3 cigarettes  You may be physically dependent on nicotine  You may also be emotionally addicted to it  The cravings can be strong, but it is important to quit using smokeless tobacco  You will improve your health and decrease your cancer, stroke, and heart attack risk  Mouth sores and tooth problems will also improve when you quit  You can benefit from quitting no matter how long you have used smokeless tobacco    Prepare to stop using smokeless tobacco:  Nicotine is a highly addictive drug   Withdrawal symptoms can happen when you stop and make it hard to quit  The following can help keep you on track:  · Set a quit date  · Tell friends, family, and coworkers that you plan to quit  · Remove all smokeless tobacco products from your home, car, and workplace  Manage weight gain after you quit:  Nicotine can affect your metabolism  You may gain a few pounds after you quit  The following can help you control your weight:  · Eat healthy foods  · Drink water before, during, and between meals  · Exercise as directed  © Copyright [employer] 2018 Information is for End User's use only and may not be sold, redistributed or otherwise used for commercial purposes   All illustrations and images included in CareNotes® are the copyrighted property of [employer]  or [address] [place] [address] [employer]

## 2023-04-05 NOTE — ASSESSMENT & PLAN NOTE
Patient has been smoking since 1990 one pack a day he had 32-year history of smoking discussed with patient stop smoking he declined he is candidate for lung cancer screening but patient declined

## 2023-04-06 ENCOUNTER — TELEPHONE (OUTPATIENT)
Dept: FAMILY MEDICINE CLINIC | Facility: CLINIC | Age: 53
End: 2023-04-06

## 2023-04-06 ENCOUNTER — APPOINTMENT (OUTPATIENT)
Dept: LAB | Facility: HOSPITAL | Age: 53
End: 2023-04-06

## 2023-04-06 DIAGNOSIS — I10 BENIGN ESSENTIAL HYPERTENSION: ICD-10-CM

## 2023-04-06 DIAGNOSIS — Z13.29 SCREENING FOR THYROID DISORDER: ICD-10-CM

## 2023-04-06 DIAGNOSIS — Z12.5 SCREENING FOR PROSTATE CANCER: ICD-10-CM

## 2023-04-06 DIAGNOSIS — Z12.11 SCREENING FOR COLON CANCER: ICD-10-CM

## 2023-04-06 DIAGNOSIS — Z00.00 MEDICARE ANNUAL WELLNESS VISIT, SUBSEQUENT: ICD-10-CM

## 2023-04-06 DIAGNOSIS — L70.8 OTHER ACNE: ICD-10-CM

## 2023-04-06 DIAGNOSIS — F31.32 MODERATE DEPRESSED BIPOLAR I DISORDER (HCC): ICD-10-CM

## 2023-04-06 DIAGNOSIS — E78.2 MIXED HYPERLIPIDEMIA: ICD-10-CM

## 2023-04-06 DIAGNOSIS — Z53.20 LUNG CANCER SCREENING DECLINED BY PATIENT: ICD-10-CM

## 2023-04-06 LAB
ALBUMIN SERPL BCP-MCNC: 4.5 G/DL (ref 3.5–5)
ALP SERPL-CCNC: 101 U/L (ref 34–104)
ALT SERPL W P-5'-P-CCNC: 15 U/L (ref 7–52)
ANION GAP SERPL CALCULATED.3IONS-SCNC: 9 MMOL/L (ref 4–13)
AST SERPL W P-5'-P-CCNC: 17 U/L (ref 13–39)
BASOPHILS # BLD AUTO: 0.12 THOUSANDS/ÂΜL (ref 0–0.1)
BASOPHILS NFR BLD AUTO: 1 % (ref 0–1)
BILIRUB SERPL-MCNC: 0.47 MG/DL (ref 0.2–1)
BUN SERPL-MCNC: 9 MG/DL (ref 5–25)
CALCIUM SERPL-MCNC: 10 MG/DL (ref 8.4–10.2)
CHLORIDE SERPL-SCNC: 104 MMOL/L (ref 96–108)
CHOLEST SERPL-MCNC: 146 MG/DL
CO2 SERPL-SCNC: 25 MMOL/L (ref 21–32)
CREAT SERPL-MCNC: 0.7 MG/DL (ref 0.6–1.3)
EOSINOPHIL # BLD AUTO: 0.19 THOUSAND/ÂΜL (ref 0–0.61)
EOSINOPHIL NFR BLD AUTO: 2 % (ref 0–6)
ERYTHROCYTE [DISTWIDTH] IN BLOOD BY AUTOMATED COUNT: 14.3 % (ref 11.6–15.1)
GFR SERPL CREATININE-BSD FRML MDRD: 107 ML/MIN/1.73SQ M
GLUCOSE P FAST SERPL-MCNC: 87 MG/DL (ref 65–99)
HCT VFR BLD AUTO: 47.4 % (ref 36.5–49.3)
HDLC SERPL-MCNC: 51 MG/DL
HGB BLD-MCNC: 15.3 G/DL (ref 12–17)
IMM GRANULOCYTES # BLD AUTO: 0.04 THOUSAND/UL (ref 0–0.2)
IMM GRANULOCYTES NFR BLD AUTO: 0 % (ref 0–2)
LDLC SERPL CALC-MCNC: 82 MG/DL (ref 0–100)
LYMPHOCYTES # BLD AUTO: 2.8 THOUSANDS/ÂΜL (ref 0.6–4.47)
LYMPHOCYTES NFR BLD AUTO: 22 % (ref 14–44)
MCH RBC QN AUTO: 33.3 PG (ref 26.8–34.3)
MCHC RBC AUTO-ENTMCNC: 32.3 G/DL (ref 31.4–37.4)
MCV RBC AUTO: 103 FL (ref 82–98)
MONOCYTES # BLD AUTO: 0.64 THOUSAND/ÂΜL (ref 0.17–1.22)
MONOCYTES NFR BLD AUTO: 5 % (ref 4–12)
NEUTROPHILS # BLD AUTO: 8.83 THOUSANDS/ÂΜL (ref 1.85–7.62)
NEUTS SEG NFR BLD AUTO: 70 % (ref 43–75)
NRBC BLD AUTO-RTO: 0 /100 WBCS
PLATELET # BLD AUTO: 402 THOUSANDS/UL (ref 149–390)
PMV BLD AUTO: 9.6 FL (ref 8.9–12.7)
POTASSIUM SERPL-SCNC: 4.1 MMOL/L (ref 3.5–5.3)
PROT SERPL-MCNC: 7.2 G/DL (ref 6.4–8.4)
PSA SERPL-MCNC: 1.1 NG/ML (ref 0–4)
RBC # BLD AUTO: 4.6 MILLION/UL (ref 3.88–5.62)
SODIUM SERPL-SCNC: 138 MMOL/L (ref 135–147)
TRIGL SERPL-MCNC: 65 MG/DL
TSH SERPL DL<=0.05 MIU/L-ACNC: 1.56 UIU/ML (ref 0.45–4.5)
WBC # BLD AUTO: 12.62 THOUSAND/UL (ref 4.31–10.16)

## 2023-04-06 NOTE — TELEPHONE ENCOUNTER
fyi- patient said he was going to ween himself off lithium  Was on 2 daily now taking 1 daily   Hasnt taking seroquel in years so took off med list  Will call office if he needs something

## 2023-05-03 DIAGNOSIS — I10 BENIGN ESSENTIAL HYPERTENSION: ICD-10-CM

## 2023-05-03 DIAGNOSIS — E78.2 MIXED HYPERLIPIDEMIA: ICD-10-CM

## 2023-05-03 RX ORDER — LISINOPRIL 10 MG/1
TABLET ORAL
Qty: 30 TABLET | Refills: 0 | Status: SHIPPED | OUTPATIENT
Start: 2023-05-03

## 2023-05-03 RX ORDER — ATORVASTATIN CALCIUM 10 MG/1
TABLET, FILM COATED ORAL
Qty: 30 TABLET | Refills: 0 | Status: SHIPPED | OUTPATIENT
Start: 2023-05-03

## 2023-05-10 ENCOUNTER — OFFICE VISIT (OUTPATIENT)
Dept: FAMILY MEDICINE CLINIC | Facility: CLINIC | Age: 53
End: 2023-05-10

## 2023-05-10 VITALS
HEIGHT: 70 IN | TEMPERATURE: 99.3 F | DIASTOLIC BLOOD PRESSURE: 80 MMHG | SYSTOLIC BLOOD PRESSURE: 130 MMHG | WEIGHT: 150 LBS | HEART RATE: 89 BPM | BODY MASS INDEX: 21.47 KG/M2 | OXYGEN SATURATION: 96 %

## 2023-05-10 DIAGNOSIS — I10 BENIGN ESSENTIAL HYPERTENSION: ICD-10-CM

## 2023-05-10 DIAGNOSIS — E78.2 MIXED HYPERLIPIDEMIA: ICD-10-CM

## 2023-05-10 DIAGNOSIS — L70.8 OTHER ACNE: ICD-10-CM

## 2023-05-10 RX ORDER — LISINOPRIL 10 MG/1
10 TABLET ORAL DAILY
Qty: 90 TABLET | Refills: 1 | Status: SHIPPED | OUTPATIENT
Start: 2023-05-10

## 2023-05-10 RX ORDER — ATORVASTATIN CALCIUM 10 MG/1
10 TABLET, FILM COATED ORAL DAILY
Qty: 90 TABLET | Refills: 1 | Status: SHIPPED | OUTPATIENT
Start: 2023-05-10

## 2023-05-10 NOTE — ASSESSMENT & PLAN NOTE
Chronic asymptomatic fair control continue current dose of atorvastatin 10 mg once a day low-fat diet reviewed

## 2023-05-10 NOTE — PROGRESS NOTES
Name: Chidi Morrison      : 1970      MRN: [id number]  Encounter Provider: Agnes Vasquez MD  Encounter Date: 5/10/2023   Encounter department: [place] [address]  Mixed hyperlipidemia  Assessment & Plan:  Chronic asymptomatic fair control continue current dose of atorvastatin 10 mg once a day low-fat diet reviewed    Orders:  -     atorvastatin (LIPITOR) 10 mg tablet; Take 1 tablet (10 mg total) by mouth daily  -     Basic metabolic panel; Future  -     CBC and differential; Future  -     Lipid Panel with Direct LDL reflex; Future    2  Benign essential hypertension  Assessment & Plan:  Chronic asymptomatic fair control continue current dose of lisinopril 10 mg once a day low-salt diet reviewed    Orders:  -     lisinopril (ZESTRIL) 10 mg tablet; Take 1 tablet (10 mg total) by mouth daily  -     Basic metabolic panel; Future  -     CBC and differential; Future  -     Lipid Panel with Direct LDL reflex; Future    3  Other acne  Assessment & Plan:  Chronic well-controlled on current management continue metronidazole cream  The wash face no more than twice a day use warm water  Use most are eyes without sent or dyes  Do not take or squeeze a pimple  Avoid oral base makeup    Orders:  -     metroNIDAZOLE (MetroCream) 0 75 % cream; Apply topically 2 (two) times a day  -     Basic metabolic panel; Future  -     CBC and differential; Future  -     Lipid Panel with Direct LDL reflex; Future           Subjective      Patient here to follow-up with a chronic condition compliant with the medication tolerated well without side effect no new concerns recent blood work reviewed with the patient    Review of Systems   Constitutional: Negative for chills and fever  HENT: Negative for ear pain and sore throat  Eyes: Negative for pain and visual disturbance  Respiratory: Negative for cough and shortness of breath      Cardiovascular: Negative for chest pain and "palpitations  Gastrointestinal: Negative for abdominal pain, blood in stool, constipation, diarrhea and vomiting  Genitourinary: Negative for dysuria and hematuria  Musculoskeletal: Negative for arthralgias and back pain  Skin: Negative for color change and rash  Neurological: Negative for seizures and syncope  All other systems reviewed and are negative  Current Outpatient Medications on File Prior to Visit   Medication Sig   • lithium carbonate 300 mg capsule take 2 capsules daily   • loratadine (CLARITIN) 10 mg tablet Take 1 tablet (10 mg total) by mouth daily   • RA VITAMIN D-3 25 MCG (1000 UT) tablet Take 1,000 Units by mouth daily   • [DISCONTINUED] atorvastatin (LIPITOR) 10 mg tablet take 1 tablet by mouth once daily   • [DISCONTINUED] lisinopril (ZESTRIL) 10 mg tablet take 1 tablet by mouth once daily   • [DISCONTINUED] metroNIDAZOLE (MetroCream) 0 75 % cream Apply topically 2 (two) times a day       Objective     /80 (BP Location: Left arm, Patient Position: Sitting)   Pulse 89   Temp 99 3 °F (37 4 °C) (Tympanic)   Ht 5' 9 5\" (1 765 m)   Wt 68 kg (150 lb)   SpO2 96%   BMI 21 83 kg/m²     Physical Exam  Vitals and nursing note reviewed  Constitutional:       General: He is not in acute distress  Appearance: He is well-developed  He is not diaphoretic  HENT:      Head: Normocephalic  Right Ear: Tympanic membrane and external ear normal       Left Ear: Tympanic membrane and external ear normal       Nose: No congestion  Mouth/Throat:      Pharynx: No oropharyngeal exudate  Eyes:      General:         Right eye: No discharge  Left eye: No discharge  Conjunctiva/sclera: Conjunctivae normal    Neck:      Vascular: No JVD  Cardiovascular:      Rate and Rhythm: Normal rate and regular rhythm  Heart sounds: Normal heart sounds  No murmur heard  No gallop  Pulmonary:      Effort: Pulmonary effort is normal  No respiratory distress        Breath " sounds: Normal breath sounds  No stridor  No wheezing or rales  Chest:      Chest wall: No tenderness  Abdominal:      General: There is no distension  Palpations: Abdomen is soft  There is no mass  Tenderness: There is no abdominal tenderness  There is no rebound  Musculoskeletal:         General: No tenderness  Cervical back: Normal range of motion and neck supple  Lymphadenopathy:      Cervical: No cervical adenopathy  Skin:     General: Skin is warm  Findings: No erythema or rash  Neurological:      Mental Status: He is alert and oriented to person, place, and time         Ra Page MD

## 2023-05-10 NOTE — ASSESSMENT & PLAN NOTE
Chronic well-controlled on current management continue metronidazole cream  The wash face no more than twice a day use warm water  Use most are eyes without sent or dyes  Do not take or squeeze a pimple  Avoid oral base makeup

## 2023-06-28 DIAGNOSIS — L70.8 OTHER ACNE: ICD-10-CM

## 2023-07-31 DIAGNOSIS — I10 BENIGN ESSENTIAL HYPERTENSION: ICD-10-CM

## 2023-07-31 DIAGNOSIS — E78.2 MIXED HYPERLIPIDEMIA: ICD-10-CM

## 2023-07-31 RX ORDER — LISINOPRIL 10 MG/1
10 TABLET ORAL DAILY
Qty: 90 TABLET | Refills: 0 | Status: SHIPPED | OUTPATIENT
Start: 2023-07-31

## 2023-07-31 RX ORDER — ATORVASTATIN CALCIUM 10 MG/1
10 TABLET, FILM COATED ORAL DAILY
Qty: 90 TABLET | Refills: 0 | Status: SHIPPED | OUTPATIENT
Start: 2023-07-31

## 2023-10-25 DIAGNOSIS — I10 BENIGN ESSENTIAL HYPERTENSION: ICD-10-CM

## 2023-10-25 DIAGNOSIS — E78.2 MIXED HYPERLIPIDEMIA: ICD-10-CM

## 2023-10-25 RX ORDER — LISINOPRIL 10 MG/1
10 TABLET ORAL DAILY
Qty: 90 TABLET | Refills: 0 | Status: SHIPPED | OUTPATIENT
Start: 2023-10-25

## 2023-10-25 RX ORDER — ATORVASTATIN CALCIUM 10 MG/1
10 TABLET, FILM COATED ORAL DAILY
Qty: 90 TABLET | Refills: 0 | Status: SHIPPED | OUTPATIENT
Start: 2023-10-25

## 2023-10-28 ENCOUNTER — HOSPITAL ENCOUNTER (EMERGENCY)
Facility: HOSPITAL | Age: 53
Discharge: HOME | End: 2023-10-28
Attending: EMERGENCY MEDICINE | Admitting: EMERGENCY MEDICINE
Payer: COMMERCIAL

## 2023-10-28 VITALS
HEART RATE: 92 BPM | TEMPERATURE: 98.3 F | OXYGEN SATURATION: 95 % | DIASTOLIC BLOOD PRESSURE: 87 MMHG | RESPIRATION RATE: 20 BRPM | SYSTOLIC BLOOD PRESSURE: 150 MMHG

## 2023-10-28 DIAGNOSIS — R45.89 DEPRESSED MOOD: ICD-10-CM

## 2023-10-28 DIAGNOSIS — F43.9 STRESS: Primary | ICD-10-CM

## 2023-10-28 LAB — ETHANOL EXG-MCNC: 0 MG/DL

## 2023-10-28 PROCEDURE — 99284 EMERGENCY DEPT VISIT MOD MDM: CPT

## 2023-10-28 PROCEDURE — 82075 ASSAY OF BREATH ETHANOL: CPT

## 2023-10-28 NOTE — ED PROVIDER NOTES
History  Chief Complaint   Patient presents with    Psychiatric Evaluation     Pt arrives via [employer] stating he is still depressed. Recent admission to [facility], discharged yesterday     46 y.o. M with PMH of bipolar disorder presents to ED with [employer] for psychiatric evaluation. He was Discharged from 13 day psych admission at [facility] yesterday, at that time he had no SI or HI. He was asked if anything changed from yesterday to day and he said no. He reports he is still depressed and he was overwhelmed at [facility]. He denies SI or SI with plan. He states at [facility] today he called the crisis number he was given to ask if he could go back to [facility]. When asked why he called the police he said it was because crisis took 30 minutes to call him back and he wanted an answer as to whether or not he could go back to [facility] and he deserved an answer after that long. He reports he feels overwhelmed by his medications and where he is living. States he is still depressed but improved from when he was initially admitted at [facility]. Denies self harm, HI, hallucinations. Denies any acute medical complaints. History provided by:  Patient and medical records  Psychiatric Evaluation  Presenting symptoms: depression    Presenting symptoms: no self-mutilation and no suicidal thoughts    Presenting symptoms comment:  "Overwhelmed"  Context: not alcohol use and not drug abuse    Compliance with total regimen: adherent during entirety of hospitalization, was released yesterday. Time since last psychoactive medication taken:  1 day  Associated symptoms: no abdominal pain and no chest pain    Risk factors: recent psychiatric admission        Prior to Admission Medications   Prescriptions Last Dose Informant Patient Reported? Taking?    RA VITAMIN D-3 25 MCG (1000 UT) tablet  Self Yes No   Sig: Take 1,000 Units by mouth daily   atorvastatin (LIPITOR) 10 mg tablet   No No   Sig: take 1 tablet by mouth once daily   lisinopril (ZESTRIL) 10 mg tablet   No No   Sig: take 1 tablet by mouth once daily   lithium carbonate 300 mg capsule  Self Yes No   Sig: take 2 capsules daily   loratadine (CLARITIN) 10 mg tablet  Self No No   Sig: Take 1 tablet (10 mg total) by mouth daily   metroNIDAZOLE (MetroCream) 0.75 % cream   No No   Sig: Apply topically 2 (two) times a day      Facility-Administered Medications: None       Past Medical History:   Diagnosis Date    Acne     Bipolar disorder ([facility])     Bipolar I disorder ([facility]) 10/31/2012    Hyperlipidemia     Hypertension     Manic depression ([facility])     Psychiatric disorder     Tobacco abuse        History reviewed. No pertinent surgical history. Family History   Problem Relation Age of Onset    Depression Mother     Hyperlipidemia Mother     Hypertension Father     Anxiety disorder Sister      I have reviewed and agree with the history as documented. E-Cigarette/Vaping    E-Cigarette Use Never User      E-Cigarette/Vaping Substances     Social History     Tobacco Use    Smoking status: Every Day     Packs/day: 1.00     Years: 33.00     Total pack years: 33.00     Types: Cigarettes     Start date: 200     Passive exposure: Never    Smokeless tobacco: Current    Tobacco comments:     no passive smoke exposure   Vaping Use    Vaping Use: Never used   Substance Use Topics    Alcohol use: No    Drug use: No       Review of Systems   Constitutional:  Negative for fever. Respiratory:  Negative for shortness of breath. Cardiovascular:  Negative for chest pain. Gastrointestinal:  Negative for abdominal pain. Neurological:  Negative for syncope and weakness. Psychiatric/Behavioral:  Negative for confusion, self-injury and suicidal ideas. Stress, depressed mood. All other systems reviewed and are negative. Physical Exam  Physical Exam  Vitals and nursing note reviewed. Constitutional:       General: He is awake. He is not in acute distress. Appearance: Normal appearance. He is not ill-appearing. HENT:      Head: Normocephalic. Mouth/Throat:      Lips: Pink. Mouth: Mucous membranes are moist.   Eyes:      General: Vision grossly intact. Pupils: Pupils are equal, round, and reactive to light. Cardiovascular:      Rate and Rhythm: Normal rate and regular rhythm. Heart sounds: Normal heart sounds. Pulmonary:      Effort: Pulmonary effort is normal. No respiratory distress. Breath sounds: Normal breath sounds. Abdominal:      General: There is no distension. Skin:     General: Skin is warm and dry. Comments: No visible wounds   Neurological:      Mental Status: He is alert and oriented to person, place, and time. GCS: GCS eye subscore is 4. GCS verbal subscore is 5. GCS motor subscore is 6. Psychiatric:         Mood and Affect: Mood is depressed. Thought Content: Thought content does not include suicidal ideation. Thought content does not include suicidal plan.          Vital Signs  ED Triage Vitals   Temperature Pulse Respirations Blood Pressure SpO2   10/28/23 0847 10/28/23 0847 10/28/23 0847 10/28/23 0847 10/28/23 0847   98.3 °F (36.8 °C) 92 20 150/87 95 %      Temp Source Heart Rate Source Patient Position - Orthostatic VS BP Location FiO2 (%)   10/28/23 0847 10/28/23 0847 10/28/23 0847 10/28/23 0847 --   Oral Monitor Sitting Left arm       Pain Score       10/28/23 0850       No Pain           Vitals:    10/28/23 0847   BP: 150/87   Pulse: 92   Patient Position - Orthostatic VS: Sitting         Visual Acuity      ED Medications  Medications - No data to display    Diagnostic Studies  Results Reviewed       Procedure Component Value Units Date/Time    POCT alcohol breath test [[id number]]  (Normal) Resulted: 10/28/23 0905    Lab Status: Final result Updated: 10/28/23 0905     EXTBreath Alcohol 0.000    Rapid drug screen, urine [[id number]]     Lab Status: No result Specimen: Urine                    No orders to display              Procedures  Procedures ED Course  ED Course as of 10/28/23 0953   Sat Oct 28, 2023   0933 Prior to discharge again denies suicidal ideation, homicidal ideation or hallucination. SBIRT 20yo+      Flowsheet Row Most Recent Value   Initial Alcohol Screen: US AUDIT-C     1. How often do you have a drink containing alcohol? 0 Filed at: 10/28/2023 0919   2. How many drinks containing alcohol do you have on a typical day you are drinking? 0 Filed at: 10/28/2023 0919   3a. Male UNDER 65: How often do you have five or more drinks on one occasion? 0 Filed at: 10/28/2023 0919   Audit-C Score 0 Filed at: 10/28/2023 6354   EDITH: How many times in the past year have you. .. Used an illegal drug or used a prescription medication for non-medical reasons? Never Filed at: 10/28/2023 1567                      Medical Decision Making  Clinically sober- aaox4, ambulating wit steady gait. VSS. Benign exam. Denies SI/HI/hallucinations. States he is still depressed and was overwhelmed at home. Reviewed safety plan from OSH discharge yesterday, reviewed his medications and where to pick them up. Reviewed his referral to case management. Reviewed his upcoming outpatient psych appointment in 2 days. He is requesting discharge. Again denies SI, states he feels safe and will follow safety plan. Seen by crisis- see note. Stable for discharge. All imaging and/or lab testing discussed with patient, strict return to ED precautions discussed. Patient recommended to follow up promptly with appropriate outpatient provider. Patient and/or family members verbalizes understanding and agrees with plan. Patient and/or family members were given opportunity to ask questions, all questions were answered at this time. Patient is stable for discharge. Portions of the record may have been created with voice recognition software.  Occasional wrong word or "sound a like" substitutions may have occurred due to the inherent limitations of voice recognition software. Read the chart carefully and recognize, using context, where substitutions have occurred. Amount and/or Complexity of Data Reviewed  Labs: ordered. Disposition  Final diagnoses:   Stress   Depressed mood     Time reflects when diagnosis was documented in both MDM as applicable and the Disposition within this note       Time User Action Codes Description Comment    10/28/2023  9:27 AM Vlad Wynn Add [F43.9] Stress     10/28/2023  9:28 AM Vlad Wynn Add [R45.89] Depressed mood           ED Disposition       ED Disposition   Discharge    Condition   Stable    Date/Time   Sat Oct 28, 2023 0963    Comment   Thor Rodriguez discharge to home/self care. Follow-up Information       Follow up With Specialties Details [facility], 3rd floor, [address] [facility] [address] [place] [address], [phone]    Keep your 1PM appointment on Monday October 30            Patient's Medications   Discharge Prescriptions    No medications on file       No discharge procedures on file.     PDMP Review       None            ED Provider  Electronically Signed by             Damaso Carranza PA-C  10/28/23 8896

## 2023-10-28 NOTE — ED NOTES
Patient was discharged from [facility] [address] [facility] yesterday. He states that he wants to go back because he was told that he can go back. He is not suicidal or homicidal, no psychosis. He has housing issues, Lives in a boarding home and is leaving there in 1 week. He brought all his clothes with him to the Ed. Copied his discharge instructions from [facility]. He has a followup appointment at [address] [place] [address] clinic [address], and also was referred to [facility] [place] [facility] for a . Patient to be discharged, said he will get a taxi     Patient said that he did not review his discharge information. Seems very overwhelmed with his social issues. Discharge from [facility] [address]  after a 2 week stay yesterday. Has a follow up psychiatric appointment on 10/30 at [address] [place] [address] Clinic [address].      Beverly BARBA

## 2023-10-28 NOTE — DISCHARGE INSTRUCTIONS
Continue your follow up instructions given to you by [facility] yesterday and reprinted for you today. Continue to follow your safety plan signed at [facility] yesterday. Keep your follow up appointment on Monday. Take home medications as prescribed and as we went over again today. Return to ED for new or symptoms as discussed.      In the case of EMERGENCY I can call:  [facility] Crisis: [facility] Crisis: [phone]  [facility] Crisis: ________________________  [facility] Suicide Hotline: 9-518.502.3448  Crisis Text Line: Text Connect to 634072

## 2023-11-27 ENCOUNTER — TELEPHONE (OUTPATIENT)
Dept: FAMILY MEDICINE CLINIC | Facility: CLINIC | Age: 53
End: 2023-11-27

## 2023-11-27 NOTE — TELEPHONE ENCOUNTER
Patient calling to inform Dr. Helag Paez he will be moving back home to [place]. "He appreciate and thank Dr. Helga Paez and Staff for all they have done".

## 2024-02-21 PROBLEM — Z00.00 MEDICARE ANNUAL WELLNESS VISIT, SUBSEQUENT: Status: RESOLVED | Noted: 2018-12-18 | Resolved: 2024-02-21

## 2024-02-21 PROBLEM — Z12.11 COLON CANCER SCREENING: Status: RESOLVED | Noted: 2020-05-27 | Resolved: 2024-02-21

## 2024-06-25 ENCOUNTER — HOSPITAL ENCOUNTER (INPATIENT)
Facility: HOSPITAL | Age: 54
DRG: 885 | End: 2024-06-25
Attending: PSYCHIATRY & NEUROLOGY | Admitting: PSYCHIATRY & NEUROLOGY
Payer: COMMERCIAL

## 2024-06-25 DIAGNOSIS — E55.9 VITAMIN D3 DEFICIENCY: ICD-10-CM

## 2024-06-25 DIAGNOSIS — R51.9 HEADACHE: ICD-10-CM

## 2024-06-25 DIAGNOSIS — J30.89 SEASONAL ALLERGIC RHINITIS DUE TO OTHER ALLERGIC TRIGGER: ICD-10-CM

## 2024-06-25 DIAGNOSIS — F31.4 BIPOLAR DISORDER WITH SEVERE DEPRESSION (HCC): ICD-10-CM

## 2024-06-25 DIAGNOSIS — Z00.8 MEDICAL CLEARANCE FOR PSYCHIATRIC ADMISSION: Primary | ICD-10-CM

## 2024-06-25 DIAGNOSIS — E78.2 MIXED HYPERLIPIDEMIA: ICD-10-CM

## 2024-06-25 DIAGNOSIS — I10 BENIGN ESSENTIAL HYPERTENSION: ICD-10-CM

## 2024-06-25 DIAGNOSIS — R79.89 LOW VITAMIN B12 LEVEL: ICD-10-CM

## 2024-06-25 RX ORDER — ACETAMINOPHEN 325 MG/1
975 TABLET ORAL EVERY 6 HOURS PRN
Status: DISCONTINUED | OUTPATIENT
Start: 2024-06-25 | End: 2024-12-04 | Stop reason: HOSPADM

## 2024-06-25 RX ORDER — OLANZAPINE 2.5 MG/1
2.5 TABLET, FILM COATED ORAL
Status: DISCONTINUED | OUTPATIENT
Start: 2024-06-25 | End: 2024-12-04 | Stop reason: HOSPADM

## 2024-06-25 RX ORDER — HYDROXYZINE HYDROCHLORIDE 50 MG/1
50 TABLET, FILM COATED ORAL
Status: DISCONTINUED | OUTPATIENT
Start: 2024-06-25 | End: 2024-12-04 | Stop reason: HOSPADM

## 2024-06-25 RX ORDER — MAGNESIUM HYDROXIDE/ALUMINUM HYDROXICE/SIMETHICONE 120; 1200; 1200 MG/30ML; MG/30ML; MG/30ML
30 SUSPENSION ORAL EVERY 4 HOURS PRN
Status: DISCONTINUED | OUTPATIENT
Start: 2024-06-25 | End: 2024-12-04 | Stop reason: HOSPADM

## 2024-06-25 RX ORDER — OLANZAPINE 5 MG/1
5 TABLET ORAL
Status: DISCONTINUED | OUTPATIENT
Start: 2024-06-25 | End: 2024-12-04 | Stop reason: HOSPADM

## 2024-06-25 RX ORDER — BENZTROPINE MESYLATE 1 MG/1
1 TABLET ORAL
Status: DISCONTINUED | OUTPATIENT
Start: 2024-06-25 | End: 2024-12-04 | Stop reason: HOSPADM

## 2024-06-25 RX ORDER — OLANZAPINE 10 MG/2ML
5 INJECTION, POWDER, FOR SOLUTION INTRAMUSCULAR
Status: DISCONTINUED | OUTPATIENT
Start: 2024-06-25 | End: 2024-12-04 | Stop reason: HOSPADM

## 2024-06-25 RX ORDER — OLANZAPINE 10 MG/1
10 TABLET ORAL
Status: DISCONTINUED | OUTPATIENT
Start: 2024-06-25 | End: 2024-12-04 | Stop reason: HOSPADM

## 2024-06-25 RX ORDER — AMOXICILLIN 250 MG
1 CAPSULE ORAL DAILY PRN
Status: DISCONTINUED | OUTPATIENT
Start: 2024-06-25 | End: 2024-12-04 | Stop reason: HOSPADM

## 2024-06-25 RX ORDER — POLYETHYLENE GLYCOL 3350 17 G/17G
17 POWDER, FOR SOLUTION ORAL DAILY PRN
Status: DISCONTINUED | OUTPATIENT
Start: 2024-06-25 | End: 2024-12-04 | Stop reason: HOSPADM

## 2024-06-25 RX ORDER — ACETAMINOPHEN 325 MG/1
650 TABLET ORAL EVERY 6 HOURS PRN
Status: DISCONTINUED | OUTPATIENT
Start: 2024-06-25 | End: 2024-12-04 | Stop reason: HOSPADM

## 2024-06-25 RX ORDER — BENZTROPINE MESYLATE 1 MG/ML
1 INJECTION, SOLUTION INTRAMUSCULAR; INTRAVENOUS
Status: DISCONTINUED | OUTPATIENT
Start: 2024-06-25 | End: 2024-12-04 | Stop reason: HOSPADM

## 2024-06-25 RX ORDER — ATORVASTATIN CALCIUM 10 MG/1
10 TABLET, FILM COATED ORAL
Status: DISCONTINUED | OUTPATIENT
Start: 2024-06-25 | End: 2024-06-26

## 2024-06-25 RX ORDER — HYDROXYZINE HYDROCHLORIDE 25 MG/1
25 TABLET, FILM COATED ORAL
Status: DISCONTINUED | OUTPATIENT
Start: 2024-06-25 | End: 2024-12-04 | Stop reason: HOSPADM

## 2024-06-25 RX ORDER — BISACODYL 10 MG
10 SUPPOSITORY, RECTAL RECTAL DAILY PRN
Status: DISCONTINUED | OUTPATIENT
Start: 2024-06-25 | End: 2024-12-04 | Stop reason: HOSPADM

## 2024-06-25 RX ORDER — ARIPIPRAZOLE 5 MG/1
10 TABLET ORAL
Status: DISCONTINUED | OUTPATIENT
Start: 2024-06-25 | End: 2024-06-26

## 2024-06-25 RX ORDER — ACETAMINOPHEN 325 MG/1
650 TABLET ORAL EVERY 4 HOURS PRN
Status: DISCONTINUED | OUTPATIENT
Start: 2024-06-25 | End: 2024-12-04 | Stop reason: HOSPADM

## 2024-06-25 RX ORDER — LORAZEPAM 1 MG/1
1 TABLET ORAL
Status: DISCONTINUED | OUTPATIENT
Start: 2024-06-25 | End: 2024-12-04 | Stop reason: HOSPADM

## 2024-06-25 RX ORDER — SERTRALINE HYDROCHLORIDE 100 MG/1
100 TABLET, FILM COATED ORAL
Status: DISCONTINUED | OUTPATIENT
Start: 2024-06-25 | End: 2024-06-26

## 2024-06-25 RX ORDER — LORAZEPAM 2 MG/ML
2 INJECTION INTRAMUSCULAR
Status: DISCONTINUED | OUTPATIENT
Start: 2024-06-25 | End: 2024-12-04 | Stop reason: HOSPADM

## 2024-06-25 RX ORDER — LORAZEPAM 2 MG/ML
1 INJECTION INTRAMUSCULAR EVERY 4 HOURS PRN
Status: DISCONTINUED | OUTPATIENT
Start: 2024-06-25 | End: 2024-12-04 | Stop reason: HOSPADM

## 2024-06-25 RX ORDER — LISINOPRIL 5 MG/1
10 TABLET ORAL DAILY
Status: DISCONTINUED | OUTPATIENT
Start: 2024-06-26 | End: 2024-06-26

## 2024-06-25 RX ORDER — PROPRANOLOL HYDROCHLORIDE 10 MG/1
10 TABLET ORAL EVERY 8 HOURS PRN
Status: DISCONTINUED | OUTPATIENT
Start: 2024-06-25 | End: 2024-12-04 | Stop reason: HOSPADM

## 2024-06-25 RX ORDER — OLANZAPINE 10 MG/2ML
10 INJECTION, POWDER, FOR SOLUTION INTRAMUSCULAR
Status: DISCONTINUED | OUTPATIENT
Start: 2024-06-25 | End: 2024-12-04 | Stop reason: HOSPADM

## 2024-06-25 RX ORDER — HYDROXYZINE HYDROCHLORIDE 25 MG/1
25 TABLET, FILM COATED ORAL 2 TIMES DAILY
Status: DISCONTINUED | OUTPATIENT
Start: 2024-06-25 | End: 2024-06-26

## 2024-06-25 RX ADMIN — HYDROXYZINE HYDROCHLORIDE 25 MG: 25 TABLET, FILM COATED ORAL at 17:40

## 2024-06-25 RX ADMIN — HYDROXYZINE HYDROCHLORIDE 25 MG: 25 TABLET, FILM COATED ORAL at 21:28

## 2024-06-25 RX ADMIN — ARIPIPRAZOLE 10 MG: 5 TABLET ORAL at 21:34

## 2024-06-25 RX ADMIN — ATORVASTATIN CALCIUM 10 MG: 10 TABLET, FILM COATED ORAL at 17:40

## 2024-06-25 RX ADMIN — SERTRALINE HYDROCHLORIDE 100 MG: 100 TABLET ORAL at 21:21

## 2024-06-25 NOTE — NURSING NOTE
"Pt is a 201 arriving on the unit from [facility]. Pt reports he was having SI to jump off bridge and choke himself. Pt states, I was very tired and staying in bed because I felt extremely down and miserable with myself.\" Pt currently reporting depression and anxiety; denies SI/HI/AH/VH. Pt reports currently being homeless and residing at a motel. Pt has hx of hypertension and states he wears contacts but did not wear them to the hospital. Pleasant and cooperative with admission process. Pt oriented to unit and q7 min checks initiated.   "

## 2024-06-25 NOTE — PROGRESS NOTES
06/25/24 1337   Patient Intake   Living Arrangement Homeless   Can patient return home? 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 Yes   Patient History   Presenting Problems Patient has a significant history of hospitalizations due to severe depression and suicidal ideation. Patient has been unsuccessful with several outpatient services.   Treatment History Patient has had several inpatient stays including his most recent hospitalization at [facility] 4/26/24-6/25/24 prior to transfer to the [facility].   Currently in Treatment No   ICM [place]: Other   Community Agency Supports TBD   Medical Problems hypertension and hyperlipidemia   Legal Issues none noted   Substance Abuse No  (Tobacco use previously)   Crisis Info   Release of Information Signed Yes

## 2024-06-25 NOTE — QUICK NOTE
Admission orders placed for patient.  Chart review utilized for medication reconciliation and continued medications from [facility] hospitalization.    ALVINA Harley

## 2024-06-25 NOTE — PLAN OF CARE
Problem: Alteration in Thoughts and Perception  Goal: Treatment Goal: Gain control of psychotic behaviors/thinking, reduce/eliminate presenting symptoms and demonstrate improved reality functioning upon discharge  Outcome: Not Progressing  Goal: Verbalize thoughts and feelings  Description: Interventions:  - Promote a nonjudgmental and trusting relationship with the patient through active listening and therapeutic communication  - Assess patient's level of functioning, behavior and potential for risk  - Engage patient in 1 on 1 interactions  - Encourage patient to express fears, feelings, frustrations, and discuss symptoms    - [place] patient to reality, help patient recognize reality-based thinking   - Administer medications as ordered and assess for potential side effects  - Provide the patient education related to the signs and symptoms of the illness and desired effects of prescribed medications  Outcome: Not Progressing

## 2024-06-25 NOTE — PROGRESS NOTES
06/25/24 1332   Referral Data   Referral Source Other (Comment)  ([facility])   Referral Name [email or web address]   Referral Reason Psych   County Information   County [place]idence [place]   Readmission Root Cause   30 Day Readmission Yes   Who directed you to return to the hospital? Self   Did you understand whom to contact if you had questions or problems? Yes   Did you get your prescriptions before you left the hospital? Yes   Were you able to get your prescriptions filled when you left the hospital? Yes   Did you take your medications as prescribed? Yes   Were you able to get to your follow-up appointments? Yes   During previous admission, was a post-acute recommendation made? No   Patient was readmitted due to Increased suicidality. Pt was at [facility] 3/26/24-4/4/24, discharged then readmitted to [facility] 4/26/24   Action Plan Long term inpatient psychiatric care for patient to stabilize then return to the community with supports   Patient Information   Mental Status Alert   Primary Caregiver Self   Legal Information   Tx Plan Signed Yes   Current Status: 201   Legal Documentation Status Filed   Advance Directives Status Discussed - Patient/Family Declined   Health Care Proxy Appointed No   Activities of Daily Living Prior to Admission   Functional Status Independent   Assistive Device No device needed   Living Arrangement Homeless   Ambulation Independent   Access to Firearms   Access to Firearms No   Income Information   Income Source SSI/SSD   Means of Transportation   [facility]
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
## 2024-06-26 PROBLEM — Z00.8 MEDICAL CLEARANCE FOR PSYCHIATRIC ADMISSION: Status: ACTIVE | Noted: 2020-05-27

## 2024-06-26 PROBLEM — F31.4 BIPOLAR DISORDER WITH SEVERE DEPRESSION (HCC): Status: ACTIVE | Noted: 2024-06-26

## 2024-06-26 PROBLEM — L71.9 ROSACEA: Status: ACTIVE | Noted: 2024-06-26

## 2024-06-26 LAB
25(OH)D3 SERPL-MCNC: 19.2 NG/ML (ref 30–100)
ALBUMIN SERPL BCG-MCNC: 4.4 G/DL (ref 3.5–5)
ALP SERPL-CCNC: 114 U/L (ref 34–104)
ALT SERPL W P-5'-P-CCNC: 45 U/L (ref 7–52)
ANION GAP SERPL CALCULATED.3IONS-SCNC: 10 MMOL/L (ref 4–13)
AST SERPL W P-5'-P-CCNC: 25 U/L (ref 13–39)
ATRIAL RATE: 75 BPM
BASOPHILS # BLD AUTO: 0.08 THOUSANDS/ÂΜL (ref 0–0.1)
BASOPHILS NFR BLD AUTO: 1 % (ref 0–1)
BILIRUB SERPL-MCNC: 0.44 MG/DL (ref 0.2–1)
BUN SERPL-MCNC: 17 MG/DL (ref 5–25)
CALCIUM SERPL-MCNC: 9.6 MG/DL (ref 8.4–10.2)
CHLORIDE SERPL-SCNC: 101 MMOL/L (ref 96–108)
CHOLEST SERPL-MCNC: 177 MG/DL
CO2 SERPL-SCNC: 26 MMOL/L (ref 21–32)
CREAT SERPL-MCNC: 0.63 MG/DL (ref 0.6–1.3)
EOSINOPHIL # BLD AUTO: 0.22 THOUSAND/ÂΜL (ref 0–0.61)
EOSINOPHIL NFR BLD AUTO: 3 % (ref 0–6)
ERYTHROCYTE [DISTWIDTH] IN BLOOD BY AUTOMATED COUNT: 12.9 % (ref 11.6–15.1)
FOLATE SERPL-MCNC: 13 NG/ML
GFR SERPL CREATININE-BSD FRML MDRD: 110 ML/MIN/1.73SQ M
GLUCOSE P FAST SERPL-MCNC: 98 MG/DL (ref 65–99)
GLUCOSE SERPL-MCNC: 98 MG/DL (ref 65–140)
HCT VFR BLD AUTO: 45.5 % (ref 36.5–49.3)
HDLC SERPL-MCNC: 52 MG/DL
HGB BLD-MCNC: 15.2 G/DL (ref 12–17)
IMM GRANULOCYTES # BLD AUTO: 0.03 THOUSAND/UL (ref 0–0.2)
IMM GRANULOCYTES NFR BLD AUTO: 0 % (ref 0–2)
LDLC SERPL CALC-MCNC: 110 MG/DL (ref 0–100)
LYMPHOCYTES # BLD AUTO: 1.88 THOUSANDS/ÂΜL (ref 0.6–4.47)
LYMPHOCYTES NFR BLD AUTO: 25 % (ref 14–44)
MCH RBC QN AUTO: 32.3 PG (ref 26.8–34.3)
MCHC RBC AUTO-ENTMCNC: 33.4 G/DL (ref 31.4–37.4)
MCV RBC AUTO: 97 FL (ref 82–98)
MONOCYTES # BLD AUTO: 0.55 THOUSAND/ÂΜL (ref 0.17–1.22)
MONOCYTES NFR BLD AUTO: 7 % (ref 4–12)
NEUTROPHILS # BLD AUTO: 4.63 THOUSANDS/ÂΜL (ref 1.85–7.62)
NEUTS SEG NFR BLD AUTO: 64 % (ref 43–75)
NONHDLC SERPL-MCNC: 125 MG/DL
NRBC BLD AUTO-RTO: 0 /100 WBCS
P AXIS: 76 DEGREES
PLATELET # BLD AUTO: 246 THOUSANDS/UL (ref 149–390)
PMV BLD AUTO: 8.9 FL (ref 8.9–12.7)
POTASSIUM SERPL-SCNC: 4.1 MMOL/L (ref 3.5–5.3)
PR INTERVAL: 176 MS
PROT SERPL-MCNC: 7 G/DL (ref 6.4–8.4)
QRS AXIS: 73 DEGREES
QRSD INTERVAL: 104 MS
QT INTERVAL: 396 MS
QTC INTERVAL: 442 MS
RBC # BLD AUTO: 4.7 MILLION/UL (ref 3.88–5.62)
SODIUM SERPL-SCNC: 137 MMOL/L (ref 135–147)
T WAVE AXIS: 69 DEGREES
TRIGL SERPL-MCNC: 73 MG/DL
TSH SERPL DL<=0.05 MIU/L-ACNC: 2.64 UIU/ML (ref 0.45–4.5)
VENTRICULAR RATE: 75 BPM
VIT B12 SERPL-MCNC: 367 PG/ML (ref 180–914)
WBC # BLD AUTO: 7.39 THOUSAND/UL (ref 4.31–10.16)

## 2024-06-26 PROCEDURE — 99223 1ST HOSP IP/OBS HIGH 75: CPT | Performed by: PSYCHIATRY & NEUROLOGY

## 2024-06-26 PROCEDURE — 82306 VITAMIN D 25 HYDROXY: CPT

## 2024-06-26 PROCEDURE — 93005 ELECTROCARDIOGRAM TRACING: CPT

## 2024-06-26 PROCEDURE — 82746 ASSAY OF FOLIC ACID SERUM: CPT

## 2024-06-26 PROCEDURE — 85025 COMPLETE CBC W/AUTO DIFF WBC: CPT

## 2024-06-26 PROCEDURE — 82607 VITAMIN B-12: CPT

## 2024-06-26 PROCEDURE — 93010 ELECTROCARDIOGRAM REPORT: CPT | Performed by: INTERNAL MEDICINE

## 2024-06-26 PROCEDURE — 80061 LIPID PANEL: CPT

## 2024-06-26 PROCEDURE — 80053 COMPREHEN METABOLIC PANEL: CPT

## 2024-06-26 PROCEDURE — 99222 1ST HOSP IP/OBS MODERATE 55: CPT | Performed by: NURSE PRACTITIONER

## 2024-06-26 PROCEDURE — 84443 ASSAY THYROID STIM HORMONE: CPT

## 2024-06-26 RX ORDER — LISINOPRIL 5 MG/1
10 TABLET ORAL DAILY
Status: DISCONTINUED | OUTPATIENT
Start: 2024-06-26 | End: 2024-06-26

## 2024-06-26 RX ORDER — LISINOPRIL 10 MG/1
10 TABLET ORAL DAILY
Status: DISCONTINUED | OUTPATIENT
Start: 2024-06-27 | End: 2024-12-04 | Stop reason: HOSPADM

## 2024-06-26 RX ORDER — HYDROXYZINE HYDROCHLORIDE 25 MG/1
25 TABLET, FILM COATED ORAL 3 TIMES DAILY
Status: DISCONTINUED | OUTPATIENT
Start: 2024-06-26 | End: 2024-12-04 | Stop reason: HOSPADM

## 2024-06-26 RX ORDER — ARIPIPRAZOLE 5 MG/1
5 TABLET ORAL
Status: COMPLETED | OUTPATIENT
Start: 2024-06-26 | End: 2024-06-30

## 2024-06-26 RX ORDER — ATORVASTATIN CALCIUM 10 MG/1
10 TABLET, FILM COATED ORAL DAILY
Status: DISCONTINUED | OUTPATIENT
Start: 2024-06-26 | End: 2024-12-04 | Stop reason: HOSPADM

## 2024-06-26 RX ORDER — LAMOTRIGINE 25 MG/1
25 TABLET ORAL DAILY
Status: COMPLETED | OUTPATIENT
Start: 2024-06-26 | End: 2024-07-09

## 2024-06-26 RX ORDER — LAMOTRIGINE 25 MG/1
50 TABLET ORAL DAILY
Status: DISCONTINUED | OUTPATIENT
Start: 2024-07-10 | End: 2024-12-04 | Stop reason: HOSPADM

## 2024-06-26 RX ADMIN — HYDROXYZINE HYDROCHLORIDE 25 MG: 25 TABLET ORAL at 17:11

## 2024-06-26 RX ADMIN — LAMOTRIGINE 25 MG: 25 TABLET ORAL at 13:14

## 2024-06-26 RX ADMIN — ATORVASTATIN CALCIUM 10 MG: 10 TABLET, FILM COATED ORAL at 13:14

## 2024-06-26 RX ADMIN — HYDROXYZINE HYDROCHLORIDE 25 MG: 25 TABLET ORAL at 21:41

## 2024-06-26 RX ADMIN — LISINOPRIL 10 MG: 5 TABLET ORAL at 09:23

## 2024-06-26 RX ADMIN — CARIPRAZINE 1.5 MG: 1.5 CAPSULE, GELATIN COATED ORAL at 13:14

## 2024-06-26 RX ADMIN — ARIPIPRAZOLE 5 MG: 5 TABLET ORAL at 21:41

## 2024-06-26 RX ADMIN — SERTRALINE HYDROCHLORIDE 150 MG: 100 TABLET ORAL at 21:41

## 2024-06-26 RX ADMIN — HYDROXYZINE HYDROCHLORIDE 25 MG: 25 TABLET, FILM COATED ORAL at 09:22

## 2024-06-26 NOTE — CONSULTS
[facility]  Consult  Name: Deyvi Cao 54 y.o. male I MRN: [id number]  Unit/Bed#: [facility] 109-01 I Date of Admission: 6/25/2024   Date of Service: 6/26/2024 I Hospital Day: 1    Inpatient consult for Medical Clearance for  patient  Consult performed by: ALVINA Lewis  Consult ordered by: ALVINA Harley        Assessment & Plan   Medical clearance for psychiatric admission  Assessment & Plan  Admission labs: CBC, CMP, lipid panel, TSH acceptable  Folate, B12, Vitamin D 25 hydroxy labs pending  Vitals stable   EKG pending   At this time, patient is medically cleared for admission to [facility] and treatment of underlying psychiatric illness based on available results  Please contact SLMELINDA with any questions or concerns    Rosacea  Assessment & Plan  See picture in media from 6/26  Restart home Metronidazole cream daily     Allergic rhinitis due to allergen  Assessment & Plan  Consider restarting Claritin    Mixed hyperlipidemia  Assessment & Plan  Continue home medication, Lipitor 10 mg daily    Benign essential hypertension  Assessment & Plan  Blood pressure stable, continue home medication lisinopril 10 mg daily with holding parameters    * Bipolar disorder with severe depression (HCC)  Assessment & Plan  [facility]  Management per primary service            Recommendations for Discharge:  SLIM will sign off - please call with questions or concerns.  Follow up with PCP upon discharge.     Counseling / Coordination of Care Time: 30 minutes.  Greater than 50% of total time spent on patient counseling and coordination of care.    Collaboration of Care: Were Recommendations Directly Discussed with Primary Treatment Team? - Yes     History of Present Illness:    Deyvi Cao is a 54 y.o. male past medical history including depression, hypertension, hyperlipidemia, seasonal allergies who is originally admitted to the psychiatric service due to depression and suicide  ideation. We are consulted for medical clearance for psychiatric hospitalization and medical management.  Per chart review, patient was recently admitted to [facility] psychiatric inpatient unit on a 201 voluntary status.  He is now admitted to the extended acute care unit at [facility] as of 6/25/2024 on a 201 voluntary status.  Currently, patient offers no complaints.  He is ambulating the hallways.    Review of Systems:    Review of Systems   Constitutional:  Negative for appetite change and chills.   HENT:  Negative for congestion and sore throat.    Eyes:  Negative for visual disturbance.   Respiratory:  Negative for cough and shortness of breath.    Cardiovascular:  Negative for chest pain.   Gastrointestinal:  Negative for abdominal pain, constipation, diarrhea, nausea and vomiting.   Genitourinary:  Negative for difficulty urinating.   Musculoskeletal:  Negative for gait problem.   Skin:  Negative for wound.   Neurological:  Negative for dizziness, light-headedness and headaches.       Past Medical and Surgical History:     Past Medical History:   Diagnosis Date    Acne     Bipolar disorder ([facility])     Bipolar disorder with severe depression ([facility]) 6/26/2024    Bipolar I disorder ([facility]) 10/31/2012    Hyperlipidemia     Hypertension     Manic depression ([facility])     Psychiatric disorder     Tobacco abuse        No past surgical history on file.    Meds/Allergies:    all medications and allergies reviewed    Allergies: No Known Allergies    Social History:     Marital Status: Single    Substance Use History:   Social History     Substance and Sexual Activity   Alcohol Use No     Social History     Tobacco Use   Smoking Status Former    Types: Cigars    Passive exposure: Past   Smokeless Tobacco Never   Tobacco Comments    no passive smoke exposure     Social History     Substance and Sexual Activity   Drug Use No       Family History:    Family History   Problem Relation Age of Onset     Depression Mother     Hyperlipidemia Mother     Hypertension Father     Anxiety disorder Sister        Physical Exam:     Vitals:   Blood Pressure: 126/64 (06/26/24 0730)  Pulse: 78 (06/26/24 0730)  Temperature: 97.5 °F (36.4 °C) (06/26/24 0730)  Temp Source: Temporal (06/26/24 0730)  Respirations: 18 (06/26/24 0730)  Height: 6' (182.9 cm) (06/25/24 1300)  Weight - Scale: 84.6 kg (186 lb 6.4 oz) (06/25/24 1300)  SpO2: 95 % (06/26/24 0730)    Physical Exam  Vitals and nursing note reviewed.   Constitutional:       General: He is not in acute distress.     Appearance: He is not toxic-appearing or diaphoretic.   HENT:      Head: Normocephalic.      Mouth/Throat:      Mouth: Mucous membranes are moist.   Eyes:      Conjunctiva/sclera: Conjunctivae normal.   Cardiovascular:      Rate and Rhythm: Normal rate.   Pulmonary:      Effort: Pulmonary effort is normal.      Breath sounds: Normal breath sounds.   Abdominal:      General: Bowel sounds are normal.      Palpations: Abdomen is soft.   Musculoskeletal:         General: Normal range of motion.      Cervical back: Normal range of motion.      Right lower leg: No edema.      Left lower leg: No edema.   Skin:     General: Skin is warm and dry.      Capillary Refill: Capillary refill takes less than 2 seconds.   Neurological:      Mental Status: He is alert and oriented to person, place, and time. Mental status is at baseline.   Psychiatric:         Speech: Speech normal.         Behavior: Behavior is cooperative.         Additional Data:     Lab Results:     Results from last 7 days   Lab Units 06/26/24  0615   WBC Thousand/uL 7.39   HEMOGLOBIN g/dL 15.2   HEMATOCRIT % 45.5   PLATELETS Thousands/uL 246   SEGS PCT % 64   LYMPHO PCT % 25   MONO PCT % 7   EOS PCT % 3     Results from last 7 days   Lab Units 06/26/24  0615   SODIUM mmol/L 137   POTASSIUM mmol/L 4.1   CHLORIDE mmol/L 101   CO2 mmol/L 26   BUN mg/dL 17   CREATININE mg/dL 0.63   ANION GAP mmol/L 10   CALCIUM  mg/dL 9.6   ALBUMIN g/dL 4.4   TOTAL BILIRUBIN mg/dL 0.44   ALK PHOS U/L 114*   ALT U/L 45   AST U/L 25   GLUCOSE RANDOM mg/dL 98             Lab Results   Component Value Date/Time    HGBA1C 5.2 11/22/2023 06:32 AM    HGBA1C 5.2 10/16/2023 06:28 AM               Imaging: I have personally reviewed pertinent reports.      No orders to display       EKG, Pathology, and Other Studies Reviewed on Admission:   EKG: see above documentation    ** Please Note: This note has been constructed using a voice recognition system. **

## 2024-06-26 NOTE — H&P
"Psychiatric Evaluation - Behavioral Health   Deyvi Cao 54 y.o. male MRN: [id number]  Unit/Bed#: [facility] 109-01 Encounter: [id number]      Identification: A 54-year-old  single male from [facility] who was living in a hotel for a few weeks after discharge from [facility] psychiatric inpatient unit after a brief stay for 2 weeks on a 201 voluntary status admitted on 4/26/2024 and now sent to extended acute care unit at the [facility] on 6/25/2024 on a 201 voluntary status.  He has no children and is unemployed on Social Security benefits for the last 20 years and has had school diploma and unemployed since 2017.  Chief Complaint: \"I wanted to die\"    HPI patient was supposed to be taking Abilify (Celexa and the Abilify Maintena injection at the clinic but he did not even show up for the injection as he was too depressed.  He was feeling down in the dumps was feeling hopeless and worthless with no energy or motivation and felt like wanting to die.  He could not enjoy anything and was having difficulty with appetite and sleep even though he did not lose any weight.  He was feeling tired and tried to choke himself with a bottle caps which did not work and then he wanted to jump off a bridge and end his life and changed his mind and sought help.  He was not having any active psychotic symptoms or manic symptoms and was not under the influence of drugs or alcohol and his urine drug screen was negative in the emergency room.  He was admitted to the inpatient psychiatric unit and placed on Zoloft 100 mg a day and Abilify 10 mg a day and sent to extended acute care unit for further stabilization because of 6 hospitalizations since October 2023 all of them mostly at [facility].  He did notice endorse any active psychotic symptoms or drug use and it appears that he was not responding to it treatment with medications and even trial with ECTs on 3 different occasions within the last 3 " months which was not effective according to him.  At the time of his transfer, he is on Abilify 10 mg a day hydroxyzine 25 mg twice a day and Zoloft 100 mg a day and has limited understanding of the nature of his illness and need for hospitalization.  He reports that he still has death wishes and has no hope for his future as his mother is in some nursing home with Alzheimer's and he lost touch with his sister who is his mother's guardian he has not heard from both in a while and has no family support  Psychiatric Review Of Systems:  sleep: yes but too much  appetite changes: no  weight changes: no  energy/anergy: yes had no energy or interest  interest/pleasure/anhedonia: yes had severe loss of interest  somatic symptoms: no  anxiety/panic: yes  guillermo: no  guilty/hopeless: yes was feeling extremely hopeless  self injurious behavior/risky behavior: yes did attempt to choke himself once or AND wanted to jump off a bridge      Past Psychiatric History:     Patient reports that his illness started in 1995 with depression and he was also hearing voices putting him down but never commanding.  He was not eating or sleeping and was also feeling like wanting to give up on his life for no reason and he was laid off from work and had quit his job because of lack of interest before he was laid off and was feeling like a failure.  He was in and out of various hospitals from 18 .  Then he went to a clinic called [facility] and then switched to [facility] until 2022 when they closed the clinic and he was not able to get into a clinic or go back on medications for almost a year until October 2023 when he started going into hospitals again 6 times mostly at [facility] once in [place] once at [facility] [place] and all of them for severe depression.  He says he has had recurrent suicidal thoughts and has not acted on suicide any other time.  No self-injurious behaviors reported any other time.  He was told that he was having manic  symptoms when his 30s when he was feeling like on top of the world excessively happy almost running around like a maniac when he would stay up all night and people thought he was on drugs even though he was not and he was labeled as bipolar at that time.  He claims that he was on lithium for the longest time he was out of hospitals but when they put him back on it since October 2023 that did not work and the ECTs on 3 separate occasions.  In the last 6 months or so did not work    Currently in treatment with Abilify 10 mg a day Zoloft 100 mg a day and hydroxyzine 25 mg twice a day.  Past Suicide attempts: Most recent attempt by trying to choke himself with swallowing somewhat AND wanting to jump off a bridge  Past Violent behavior: Denied by the patient  Past Psychiatric medication trial: Celexa, lithium, Zoloft, Abilify never tried Lamictal or Depakote or cariprazine  Substance Abuse History:  Completely denied by the patient    Family Psychiatric History:   His sister had an anger problem and was hospitalized.  Father was suicidal in 1995 and was hospitalized.  Mother was depressed before diagnosed with Alzheimer's disease    Social History: He was born in [place] is the oldest of 2 children with a sister 6 years younger to him.  Father worked in a packaging factory as a  and mother was a housewife and later when he was in high school she worked in retail.  He was in regular classes did not have to repeat grades and had no behavioral issues or out-of-home placements and was not identified as having ADD.  He was in regular classes did not have to repeat grades and finished high school.  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  He was on unemployment for a while and then on Social Security disability since 2004 for the last 20 years.  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  He lived in [facility] for 3 years from [id number] and then came back because his mother was diagnosed with Alzheimer's at that time.  When he was 24 his parents split up.  His father is  since 2016 and he does not know about his mother is most likely in a nursing home as his sister whom he lost contact is his mother's guardian.  He considers himself as bisexual and has had a few relationships with both males and females longest for a year last time was in  and he has been basically single since then.  He was never  and has no children.  No legal problems reported in time in the past      Education: high school diploma/GED  Learning Disabilities:  Denied by the patient  Marital history: single  Living arrangement, social support:  Was basically homeless for the last few years.  Occupational History: unemployed  Functioning Relationships: alone & isolated, poor relationship with parents, and poor support system.  Other Pertinent History: Was alienated from his mother and sister with no support from anyone in the community and felt like a failure with chronic depression      Traumatic History:   Abuse: Denied by the patient  Other Traumatic Events:  Denied by the patient    Past Medical History:   Diagnosis Date    Acne     Bipolar disorder (HCC)     Bipolar I disorder (HCC) 10/31/2012    Hyperlipidemia     Hypertension     Manic depression (HCC)     Psychiatric disorder     Tobacco abuse        Medical Review Of Systems:  Pertinent items are noted in HPI.  To be done for physical by medical.  He is 6 foot tall weighs about 186 pounds with no suspicious head injuries and takes medications for hypertension and cholesterol with no known allergies or major medical issues    Meds/Allergies     No Known Allergies    Risks, benefits and possible side effects of Medications:   Risks, benefits, and possible side effects of medications explained to patient and patient  verbalizes understanding.      Objective   Vital signs in last 24 hours:  Temp:  [97.5 °F (36.4 °C)-98 °F (36.7 °C)] 97.5 °F (36.4 °C)  HR:  [] 78  Resp:  [18] 18  BP: (123-137)/(64-83) 126/64      Current mental Status Evaluation :  Appearance:  casually dressed and younger than stated age with good eye contact somewhat aloof and cold with no good mood reactivity   Behavior:  Superficial guarded but with good eye contact slow responses with no good mood reactivity somewhat aloof   Speech:  delayed, increased latency of response, and soft   Mood:  anxious, decreased range, and depressed   Affect:  blunted, flat, and mood-congruent   Language: naming objects and repeating phrases   Thought Process:  logical and somewhat limited in production   Thought Content:  normal no current suicidal or homicidal thoughts and no plans verbalized.  No phobias obsessions compulsions or distorted body perceptions reported.  No overt delusional material elicited and the nature of persecutory, grandiose, bizarre or somatic nature and not appearing as if paranoid or responding to any delusions. He still expresses hopelessness worthlessness and low self-esteem   Perceptual Disturbances: None and not appearing to respond   Risk Potential: Suicidal Ideations without plan and history of recent suicide attempt by trying to choke himself with sore AND wanting to jump off a bridge and continuing to feel hopeless   Sensorium:  person, place, time/date, situation, day of week, month of year, and year   Cognition:  recent and remote memory grossly intact   Consciousness:  alert and awake    Attention: attention span and concentration were age appropriate   Intellect: within normal limits   Insight:  limited   Judgment: limited   Motor Activity: no abnormal movements     Lab Results: I have personally reviewed pertinent lab results.    Imaging Studies: None pending  EKG, Pathology, and Other Studies: Pending EKG    Code Status: Level 1 -  Full Code  Advance Directive and Living Will: no  Power of : no      Diagnosis /Plan:  54-year-old  male with clear history of onset of depressive symptoms since age 25 with psychosis and then had bipolar symptoms in his 30s for the whole summer with clear manic symptoms and then was able to stay out of hospitals from 2004 until 2023 and at that time he was on lithium and Zoloft.  He was off medications for almost a year until October 2023 and became suicidally depressed with no relapse of psychosis or manic symptoms needing at least 6 hospitalizations since then most all of them for suicidal thoughts and severe depressive symptoms with neurovegetative signs tempted choking himself on soda cans and wanting to jump off a bridge recently.  He was given 3 series of ECTs without help and even lithium retrial without any response.  He continues to present with symptoms of depression and because of history of guillermo I diagnose him as follows    Bipolar depression severe with history of psychosis    Patient Strengths/Assets: ability for insight, average or above intelligence, cooperative, communication skills, financial means, general fund of knowledge, good physical health, motivation for treatment/growth, negotiates basic needs, patient is on a voluntary commitment, patient is willing to work on problems    Patient Barriers/Limitations: homeless, lack of social/family support, lack of stable employment, limited family ties, limited support system, low self esteem, no/few hobbies or interests, noncompliant with treatment, poor insight, poor past treatment response, poor self-care, poor support system, resistance to treatment, self-care deficit, unresourceful    Recommended Treatment:    Patient will be admitted to the inpatient psychiatric unit at the extended acute care unit and incorporated into the recovery program.  With respect to medications I will increase the Zoloft gradually to maximum dose and  try Vraylar instead of Abilify for bipolar depression and also had lamotrigine which was now tried on him for bipolar depression and increase the hydroxyzine as 3 times a day 25 mg for anxiety.  Medications:    1) Zoloft 150 mg a day to be titrated up to 200 mg eventually, start Vraylar  1.5 mg a day for 2 days followed by 3 mg a day and decrease the Abilify as 5 mg a day f for 5 days and discontinue , increase hydroxyzine is 25 mg 3 times a day, start lamotrigine 25 mg once a day for 2 weeks followed by 50 mg a day after reviewing the risks side effects benefits precautions especially risk for rashes from being on lamotrigine and precautions to take and he did verbalize an understanding    Non-pharmacological treatments:    1) Cooperate in individual therapy, group therapy, milieu therapy, and art therapy.     2) Participate with the multidisciplinary treatment team (consisting of psychiatrist, nursing, psychotherapist, case management, pharmacist and county representatives) in reintegrating back into the community as well as working through the recovery program provided at the extended acute care psychiatric unit.     3) Medical will be consulted to help manage comorbid conditions.    Safety:    1) Safety/communication plan established targeting dynamic risk factors above.      Counseling / Coordination of Care  Total floor / unit time spent today 60 minutes. Greater than 50% of total time was spent with the patient and / or family counseling and / or coordination of care.

## 2024-06-26 NOTE — PLAN OF CARE
Problem: Alteration in Thoughts and Perception  Goal: Agree to be compliant with medication regime, as prescribed and report medication side effects  Description: Interventions:  - Offer appropriate PRN medication and supervise ingestion; conduct AIMS, as needed   Outcome: Progressing     Problem: Ineffective Coping  Goal: Cooperates with admission process  Description: Interventions:   - Complete admission process  Outcome: Progressing  Goal: Understands least restrictive measures  Description: Interventions:  - Utilize least restrictive behavior  Outcome: Progressing     Problem: Risk for Violence/Aggression Toward Others  Goal: Refrain from harming others  Outcome: Progressing     Problem: SELF HARM/SUICIDALITY  Goal: Will have no self-injury during hospital stay  Description: INTERVENTIONS:  - Q 15 MINUTES: Routine safety checks  - Q WAKING SHIFT & PRN: Assess risk to determine if routine checks are adequate to maintain patient safety  - Encourage patient to participate actively in care by formulating a plan to combat response to suicidal ideation, identify supports and resources  Outcome: Progressing     Problem: INVOLUNTARY ADMIT  Goal: Will cooperate with staff recommendations and doctor's orders and will demonstrate appropriate behavior  Description: INTERVENTIONS:  - Treat underlying conditions and offer medication as ordered  - Educate regarding involuntary admission procedures and rules  - Utilize positive consistent limit setting strategies to support patient and staff safety  Outcome: Progressing

## 2024-06-26 NOTE — NURSING NOTE
Germain maintained on ongoing Safe precaution without incident  on this shift.  Awake, alert, visible, pleasant and cooperative upon approach.  Attended and participated 5 out of 8 group today.  Expresses he need to work on his thoughts during nursing group, and ways how to improve them in a positive light..  He show interest in learning his meds.  He did not freely engage with his peers but will if they approach him, same with staff.  Denies any pain, or discomfort.  Denies delusion or anxiety.  No overt delusion or A/T/V hallucination noted. Behavior control.

## 2024-06-26 NOTE — ASSESSMENT & PLAN NOTE
Admission labs: CBC, CMP, lipid panel, TSH acceptable  Folate, B12, Vitamin D 25 hydroxy labs pending  Vitals stable   EKG pending   At this time, patient is medically cleared for admission to [facility] and treatment of underlying psychiatric illness based on available results  Please contact [facility] with any questions or concerns

## 2024-06-26 NOTE — TREATMENT PLAN
TREATMENT PLAN REVIEW - Behavioral Health Deyvi Cao 54 y.o. 1970 male MRN: [id number]    [facility] Room / Bed: [facility] 109/[facility] 109-01 Encounter: [id number]          Admit Date/Time:  6/25/2024 12:32 PM    Treatment Team:   MD Sary Ge CRNP Caitlin Kolba Sol Betancourt Nicole Walrath Mary Polizzano, RN Karissa Marie Kormandy, CRNP Rachel Edelman, RN    Diagnosis: Principal Problem:    Bipolar disorder with severe depression (HCC)  Active Problems:    Medical clearance for psychiatric admission      Patient Strengths/Assets: ability for insight, average or above intelligence, cooperative, communication skills, financial means, general fund of knowledge, good physical health, motivation for treatment/growth, negotiates basic needs, patient is on a voluntary commitment, patient is willing to work on problems    Patient Barriers/Limitations: homeless, lack of social/family support, lack of stable employment, limited family ties, limited support system, low self esteem, no/few hobbies or interests, noncompliant with treatment, poor insight, poor past treatment response, poor self-care, poor support system, resistance to treatment, self-care deficit, unresourceful      Short Term Goals: decrease in depressive symptoms, decrease in suicidal thoughts, improvement in insight, improvement in reasoning ability, improvement in self care, increase in socialization with peers on the unit, acceptance of need for psychiatric treatment    Long Term Goals: improvement in depression, improvement in anxiety, stabilization of mood, free of suicidal thoughts, improvement in reasoning ability, improved insight, acceptance of need for psychiatric medications, acceptance of need for psychiatric treatment, acceptance of need for psychiatric follow up after discharge, adequate self care, appropriate interaction with peers, stable living arrangements upon  discharge, establishment of family support upon discharge    Progress Towards Goals: starting psychiatric medications as prescribed, continue psychiatric medications as prescribed, attends groups more often, participates more in milieu therapy, mood is stabilizing gradually, less depressed, no longer suicidal, working on coping skills, discharge planning, still has suicidal thoughts    Recommended Treatment: medication management, patient medication education, group therapy, milieu therapy, supportive therapy, continued Behavioral Health psychiatric evaluation/assessment process, medical follow up with medical team, individual therapy    Treatment Frequency: daily medication monitoring, group and milieu therapy daily, monitoring through interdisciplinary rounds, monitoring through weekly patient care conferences, individual psychotherapy on the unit daily, monitoring medication levels as indicated    Expected Discharge Date:  12/24/24    Discharge Plan: placement in group home, referral to [facility] for close psychiatric monitoring    Treatment Plan Created/Updated By: Martin Cardenas MD

## 2024-06-26 NOTE — CMS CERTIFICATION NOTE
Certification: Based upon physical, mental and social evaluations, I certify that inpatient psychiatric services are medically necessary for this patient for a duration of 30 midnights for the treatment of severe bipolar depression.

## 2024-06-26 NOTE — NURSING NOTE
Patient asked the writer if he could have his medication earlier than the time pharmacy had scheduled it.  Patient reported he prefers to go to bed around 2030 eulalia does not mind waiting till 2100 for his HS scheduled medications. Pharmacy called and was asked to move his 2200 medications to 2100.  Pharmacy did as per patient requested. Patient pleasant and behaviors controlled and appropriate. Medication compliant as well. Patient was visible but not seen interacting much with peers. Cooperative with staff and following directions. Patient offered no complaints. No prn medications requested or required.

## 2024-06-26 NOTE — NURSING NOTE
Pt is accepting of medications without incidence and meal compliant. Pt is polite, pleasant and visible in the milieu. Pt is quiet and soft spoken reports depression, but denies all other s/s currently. Pt is social with select few peers when sitting in milieu, but otherwise keeps to self. No new concerns at this time.

## 2024-06-26 NOTE — SOCIAL WORK
SW met 1:1 with pt.  Pt is sad/flat but pleasant, polite and willing to engage.  Pt completed ROIs for Dr. Chris, [facility], [facility] and [facility] [place] to check on the status of his ICM referral.  Pt participated in intake discussion/information gathering. Pt denied awareness of any current goals for treatment or therapy at this time.

## 2024-06-26 NOTE — NURSING NOTE
Deyvi went to bed not too long after night time snack and HS medications and appeared to resting comfortably with no apparent distress as observed by staff ion Q 7 minute rounds throughout the night with no early morning awakening noted. It seemed to be a restful first night on the unit for most indications with no complaints noted. He is due for bloodwork this morning

## 2024-06-26 NOTE — PROGRESS NOTES
06/26/24 0738   Team Meeting   Meeting Type Daily Rounds   Team Members Present   Team Members Present Physician;Nurse;;Other (Discipline and Name)   Patient/Family Present   Patient Present No   Patient's Family Present No     In attendance:  MD Pedro Sousa, MAIKEL Knutson, ALVINA Haywood, RN  Judi Garcia, [facility]  Mer Sales, [facility]  Gris Arizmendi M.S.    Groups: 5/5    Pt arrived to [facility] yesterday on a 201 from [facility]. Pt has severe depression, denied SI, HI, VH, AH. Pt is pleasant, visible and med compliant.

## 2024-06-27 PROCEDURE — 99232 SBSQ HOSP IP/OBS MODERATE 35: CPT

## 2024-06-27 RX ORDER — BENZOCAINE/MENTHOL 6 MG-10 MG
LOZENGE MUCOUS MEMBRANE 4 TIMES DAILY PRN
Status: CANCELLED | OUTPATIENT
Start: 2024-06-27

## 2024-06-27 RX ADMIN — ATORVASTATIN CALCIUM 10 MG: 10 TABLET, FILM COATED ORAL at 08:38

## 2024-06-27 RX ADMIN — HYDROXYZINE HYDROCHLORIDE 25 MG: 25 TABLET ORAL at 21:11

## 2024-06-27 RX ADMIN — HYDROXYZINE HYDROCHLORIDE 25 MG: 25 TABLET ORAL at 16:43

## 2024-06-27 RX ADMIN — ARIPIPRAZOLE 5 MG: 5 TABLET ORAL at 21:11

## 2024-06-27 RX ADMIN — LISINOPRIL 10 MG: 10 TABLET ORAL at 08:38

## 2024-06-27 RX ADMIN — SERTRALINE HYDROCHLORIDE 150 MG: 100 TABLET ORAL at 21:11

## 2024-06-27 RX ADMIN — HYDROXYZINE HYDROCHLORIDE 25 MG: 25 TABLET ORAL at 08:38

## 2024-06-27 RX ADMIN — LAMOTRIGINE 25 MG: 25 TABLET ORAL at 08:38

## 2024-06-27 RX ADMIN — CARIPRAZINE 1.5 MG: 1.5 CAPSULE, GELATIN COATED ORAL at 08:39

## 2024-06-27 NOTE — PROGRESS NOTES
06/27/24 0737   Team Meeting   Meeting Type Daily Rounds   Team Members Present   Team Members Present Physician;Nurse;;Other (Discipline and Name)   Patient/Family Present   Patient Present No   Patient's Family Present No     In attendance:  MAIKEL Barrera CRNP Daniel Teles, RN  Judi Garcia, [facility]  Mer Sales, [facility]  Gris Arizmendi M.S.    Groups: 5/8    Pt is requesting a hair cut. Pt is pleasant and visible. Pt is adjusting to the unit appropriately. Pt remains depressive/flat and anxious but denies SI/HI. EKG completed WNL.

## 2024-06-27 NOTE — NURSING NOTE
Pt is accepting of medications without incidence and meal compliant. Pt endorses depression, but denies all other s/s. Pt is visible in the milieu, attends groups and attempts to be social with peers. Pt is polite, pleasant and soft spoken. No new concerns at this time.

## 2024-06-27 NOTE — NURSING NOTE
Patient pleasant, calm and cooperative, able to make needs known. Is visible on the module, social with peers, attends some groups. Denies SI/HI/AVH, moderate anxiety and moderate depression. But reports improving from yesterday. Medication compliant. Will continue to monitor.

## 2024-06-27 NOTE — PROGRESS NOTES
06/27/24 1019   Team Meeting   Meeting Type Tx Team Meeting   Initial Conference Date 06/27/24   Next Conference Date 07/27/24   Team Members Present   Team Members Present Physician;Nurse;;Other (Discipline and Name)   Physician Team Member Nydia KO   Nursing Team Member Chastity   Social Work Team Member Jose FRY   Other (Discipline and Name) Ugo MS   Patient/Family Present   Patient Present Yes   Patient's Family Present No     Pt attended a review of his treatment plan. Pt signed his plan and denied any questions or concerns.Pt requested a haircut during treatment plan review and that was communicated to appropriate staff. Copy was provided to patient and an additional copy was placed in pt's chart.

## 2024-06-27 NOTE — PROGRESS NOTES
Progress Note - Behavioral Health     Deyvi Cao 54 y.o. male MRN: [id number]   Unit/Bed#: [facility] 109-01 Encounter: [id number]    Behavior over the last 24 hours: unchanged.     Deyvi is seen today for psychiatric follow up. Per nursing notes, medication compliant, pleasant, soft spoken/quiet, reports depression, and adjusting to unit.  Patient remains in behavioral control. No psych prns in last 24 hours. Attended 5 of 8 groups.    Today Deyvi is seen sitting on a chair calmly for assessment. Calm and cooperative. Quiet and minimal in conversation. He reports he feels depressed and that at baseline he feels it as a 8/10 with 10 being the worst. He reports it is pretty stable around that but can fluctuate depending on things that happen. Reports feeling hopeless often. Also reports anxiety to be around the same rating and says they coexist. Denies active and passive suicidal and homicidal ideations. Patient denies auditory and visual hallucinations, and does not appear to be responding internal stimuli. No overt delusional content or guillermo noted. He appears somewhat withdrawn to room. Appears dysphoric and anxious. Reports he wants to engage in treatment to better control his depression and anxiety. Has been adjusting to unit and attending groups. Patient had treatment plan meeting today and is agreeable to plan moving forward.    Denies medication side effects. Denies any questions/concerns today when asked. Discharge disposition ongoing.    Sleep: normal  Appetite: normal  Medication side effects: No   ROS: all other systems are negative    Mental Status Evaluation:    Appearance:  age appropriate, casually dressed, adequate grooming   Behavior:  pleasant, cooperative, calm, guarded, good eye contact, no change in emotion throughout conversation   Speech:  clear, soft, quiet   Mood:  depressed, anxious   Affect:  flat   Thought Process:  Goal directed, possibly slowing of thoughts   Associations: intact  associations   Thought Content:  no overt delusions   Perceptual Disturbances: denies auditory hallucinations when asked, does not appear responding to internal stimuli, denies visual hallucinations when asked   Risk Potential: Suicidal ideation - None at present  Homicidal ideation - None  Potential for aggression - No   Sensorium:  oriented to person, place, and time/date   Memory:  recent and remote memory grossly intact   Consciousness:  alert and awake   Attention/Concentration: attention span and concentration are age appropriate   Insight:  limited   Judgment: limited   Gait/Station: normal gait/station   Motor Activity: no abnormal movements     Vital signs in last 24 hours:    Temp:  [97.4 °F (36.3 °C)-97.5 °F (36.4 °C)] 97.4 °F (36.3 °C)  HR:  [84-96] 96  Resp:  [18] 18  BP: (124-143)/(65-75) 143/75    Laboratory results: I have personally reviewed all pertinent laboratory/tests results    Labs in last 72 hours:   Recent Labs     06/26/24  0615   WBC 7.39   RBC 4.70   HGB 15.2   HCT 45.5      RDW 12.9   NEUTROABS 4.63   SODIUM 137   K 4.1      CO2 26   BUN 17   CREATININE 0.63   GLUC 98   CALCIUM 9.6   AST 25   ALT 45   ALKPHOS 114*   TP 7.0   ALB 4.4   TBILI 0.44   CHOLESTEROL 177   HDL 52   TRIG 73   LDLCALC 110*   [id number] 2.636       Progress Toward Goals: Adjusting to unit, attending groups, moderate to severe depression/anxiety as baseline, denies SI/HI/AVH when asked today.    Assessment & Plan   Principal Problem:    Bipolar disorder with severe depression (HCC)  Active Problems:    Benign essential hypertension    Mixed hyperlipidemia    Allergic rhinitis due to allergen    Medical clearance for psychiatric admission    Tracey      Recommended Treatment:     Planned medication and treatment changes:    All current active medications have been reviewed  Encourage group therapy, milieu therapy and occupational therapy  Behavioral Health checks every 7 minutes  Continue current  medications:  Discharge disposition ongoing.    Current Facility-Administered Medications   Medication Dose Route Frequency Provider Last Rate    acetaminophen  650 mg Oral Q6H PRN ALVINA Harley      acetaminophen  650 mg Oral Q4H PRN ALVINA Harley      acetaminophen  975 mg Oral Q6H PRN ALVINA Harley      aluminum-magnesium hydroxide-simethicone  30 mL Oral Q4H PRN ALVINA Harley      ARIPiprazole  5 mg Oral HS Martin Cardenas MD      atorvastatin  10 mg Oral Daily ALVINA Lewis      benztropine  1 mg Intramuscular Q4H PRN Max 6/day ALVINA Harley      benztropine  1 mg Oral Q4H PRN Max 6/day ALVINA Harley      bisacodyl  10 mg Rectal Daily PRN ALVINA Harley      [START ON 6/28/2024] cariprazine  3 mg Oral Daily Martin Cardenas MD      hydrOXYzine HCL  25 mg Oral Q6H PRN Max 4/day ALVINA Harley      hydrOXYzine HCL  25 mg Oral TID Martin Cardenas MD      hydrOXYzine HCL  50 mg Oral Q4H PRN Max 4/day ALVINA Harley      Or    LORazepam  1 mg Intramuscular Q4H PRN ALVINA Harley      lamoTRIgine  25 mg Oral Daily Martin Cardenas MD      [START ON 7/10/2024] lamoTRIgine  50 mg Oral Daily Martin Cardenas MD      lisinopril  10 mg Oral Daily ALVINA Lewis      LORazepam  1 mg Oral Q4H PRN Max 6/day ALVINA Harley      Or    LORazepam  2 mg Intramuscular Q6H PRN Max 3/day ALVINA Harley      OLANZapine  10 mg Oral Q3H PRN Max 3/day ALVINA Harley      Or    OLANZapine  10 mg Intramuscular Q3H PRN Max 3/day ALVINA Harley      OLANZapine  5 mg Oral Q3H PRN Max 6/day ALVINA Harley      Or    OLANZapine  5 mg Intramuscular Q3H PRN Max 6/day ALVINA Harley      OLANZapine  2.5 mg Oral Q3H PRN Max 8/day ALVINA Harley      polyethylene glycol  17 g Oral Daily PRN ALVINA Harley      propranolol  10 mg Oral Q8H PRN ALVINA Harley      senna-docusate sodium  1 tablet Oral Daily PRN ALVINA Harley      sertraline  150  mg Oral HS Martin Cardenas MD       Risks / Benefits of Treatment:    Risks, benefits, and possible side effects of medications explained to patient and patient verbalizes understanding and agreement for treatment.    Counseling / Coordination of Care:    Patient's progress discussed with staff in treatment team meeting.  Medications, treatment progress and treatment plan reviewed with patient.  Medication education provided to patient.  Educated on importance of medication and treatment compliance.  Reassurance and supportive therapy provided.  Group attendance encouraged.    Pedro Stafford PA-C 06/27/24

## 2024-06-27 NOTE — PLAN OF CARE
Problem: Ineffective Coping  Goal: Identifies ineffective coping skills  Outcome: Progressing  Goal: Identifies healthy coping skills  Outcome: Progressing  Goal: Demonstrates healthy coping skills  Outcome: Progressing  Goal: Participates in unit activities  Description: Interventions:  - Provide therapeutic environment   - Provide required programming   - Redirect inappropriate behaviors   Outcome: Progressing   Attended 10/18 groups offered in the past 2 days.

## 2024-06-27 NOTE — SOCIAL WORK
STEFAN obtained pt's belongings from security so pt could access his phone and his wallet in order to check on a credit card he paid off and pay his phone bill that is due July 1.   Pt's belongings were transferred into new security bags, signed and submitted to security.   Pt completed his bill-pay and will not need to pay another bill until the end of July for August 1. Pt's credit card now has a zero balance as it has been successfully paid off.

## 2024-06-27 NOTE — PLAN OF CARE
Problem: Alteration in Thoughts and Perception  Goal: Treatment Goal: Gain control of psychotic behaviors/thinking, reduce/eliminate presenting symptoms and demonstrate improved reality functioning upon discharge  Outcome: Progressing  Goal: Verbalize thoughts and feelings  Description: Interventions:  - Promote a nonjudgmental and trusting relationship with the patient through active listening and therapeutic communication  - Assess patient's level of functioning, behavior and potential for risk  - Engage patient in 1 on 1 interactions  - Encourage patient to express fears, feelings, frustrations, and discuss symptoms    - Leola patient to reality, help patient recognize reality-based thinking   - Administer medications as ordered and assess for potential side effects  - Provide the patient education related to the signs and symptoms of the illness and desired effects of prescribed medications  Outcome: Progressing  Goal: Refrain from acting on delusional thinking/internal stimuli  Description: Interventions:  - Monitor patient closely, per order   - Utilize least restrictive measures   - Set reasonable limits, give positive feedback for acceptable   - Administer medications as ordered and monitor of potential side effects  Outcome: Progressing  Goal: Agree to be compliant with medication regime, as prescribed and report medication side effects  Description: Interventions:  - Offer appropriate PRN medication and supervise ingestion; conduct AIMS, as needed   Outcome: Progressing  Goal: Attend and participate in unit activities, including therapeutic, recreational, and educational groups  Description: Interventions:  -Encourage Visitation and family involvement in care  Outcome: Progressing  Goal: Recognize dysfunctional thoughts, communicate reality-based thoughts at the time of discharge  Description: Interventions:  - Provide medication and psycho-education to assist patient in compliance and developing  insight into his/her illness   Outcome: Progressing  Goal: Complete daily ADLs, including personal hygiene independently, as able  Description: Interventions:  - Observe, teach, and assist patient with ADLS  - Monitor and promote a balance of rest/activity, with adequate nutrition and elimination   Outcome: Progressing     Problem: Ineffective Coping  Goal: Identifies ineffective coping skills  Outcome: Progressing  Goal: Identifies healthy coping skills  Outcome: Progressing  Goal: Demonstrates healthy coping skills  Outcome: Progressing  Goal: Participates in unit activities  Description: Interventions:  - Provide therapeutic environment   - Provide required programming   - Redirect inappropriate behaviors   Outcome: Progressing     Problem: Risk for Self Injury/Neglect  Goal: Treatment Goal: Remain safe during length of stay, learn and adopt new coping skills, and be free of self-injurious ideation, impulses and acts at the time of discharge  Outcome: Progressing  Goal: Refrain from harming self  Description: Interventions:  - Monitor patient closely, per order  - Develop a trusting relationship  - Supervise medication ingestion, monitor effects and side effects   Outcome: Progressing  Goal: Attend and participate in unit activities, including therapeutic, recreational, and educational groups  Description: Interventions:  - Provide therapeutic and educational activities daily, encourage attendance and participation, and document same in the medical record  - Obtain collateral information, encourage visitation and family involvement in care   Outcome: Progressing     Problem: Depression  Goal: Treatment Goal: Demonstrate behavioral control of depressive symptoms, verbalize feelings of improved mood/affect, and adopt new coping skills prior to discharge  Outcome: Progressing  Goal: Refrain from harming self  Description: Interventions:  - Monitor patient closely, per order   - Supervise medication ingestion,  monitor effects and side effects   Outcome: Progressing  Goal: Refrain from isolation  Description: Interventions:  - Develop a trusting relationship   - Encourage socialization   Outcome: Progressing     Problem: Anxiety  Goal: Anxiety is at manageable level  Description: Interventions:  - Assess and monitor patient's anxiety level.   - Monitor for signs and symptoms (heart palpitations, chest pain, shortness of breath, headaches, nausea, feeling jumpy, restlessness, irritable, apprehensive).   - Collaborate with interdisciplinary team and initiate plan and interventions as ordered.  - Fleming patient to unit/surroundings  - Explain treatment plan  - Encourage participation in care  - Encourage verbalization of concerns/fears  - Identify coping mechanisms  - Assist in developing anxiety-reducing skills  - Administer/offer alternative therapies  - Limit or eliminate stimulants  Outcome: Progressing

## 2024-06-28 PROBLEM — F31.4 BIPOLAR DISORDER WITH SEVERE DEPRESSION (HCC): Chronic | Status: ACTIVE | Noted: 2024-06-26

## 2024-06-28 PROCEDURE — 99232 SBSQ HOSP IP/OBS MODERATE 35: CPT | Performed by: PSYCHIATRY & NEUROLOGY

## 2024-06-28 RX ADMIN — SERTRALINE HYDROCHLORIDE 150 MG: 100 TABLET ORAL at 21:09

## 2024-06-28 RX ADMIN — HYDROXYZINE HYDROCHLORIDE 25 MG: 25 TABLET ORAL at 17:10

## 2024-06-28 RX ADMIN — HYDROXYZINE HYDROCHLORIDE 25 MG: 25 TABLET ORAL at 08:09

## 2024-06-28 RX ADMIN — HYDROXYZINE HYDROCHLORIDE 25 MG: 25 TABLET ORAL at 21:09

## 2024-06-28 RX ADMIN — ATORVASTATIN CALCIUM 10 MG: 10 TABLET, FILM COATED ORAL at 08:09

## 2024-06-28 RX ADMIN — LISINOPRIL 10 MG: 10 TABLET ORAL at 08:09

## 2024-06-28 RX ADMIN — LAMOTRIGINE 25 MG: 25 TABLET ORAL at 08:09

## 2024-06-28 RX ADMIN — CARIPRAZINE 3 MG: 3 CAPSULE, GELATIN COATED ORAL at 08:09

## 2024-06-28 RX ADMIN — ARIPIPRAZOLE 5 MG: 5 TABLET ORAL at 21:09

## 2024-06-28 NOTE — PROGRESS NOTES
Progress Note - Behavioral Health   Deyvi Cao 54 y.o. male MRN: [id number]  Unit/Bed#: [facility] 109-01 Encounter: [id number]  Code Status: Level 1 - Full Code    Assessment & Plan   Principal Problem:    Bipolar disorder with severe depression (HCC)  Active Problems:    Benign essential hypertension    Mixed hyperlipidemia    Allergic rhinitis due to allergen    Medical clearance for psychiatric admission    Rosacea    Recommended Treatment:     Treatment plan, treatment progress and medication changes were reviewed with Nursing Staff, Pharmacy Service and Case Management in Treatment Team:  1.Continue with group therapy, milieu therapy and occupational therapy   2.Behavioral Health checks every 7 minutes   3.Continue frequent safety checks and vitals per unit protocol  4.Continue with SLIM medical management as indicated  5.Continue with current medication regimen for symptom management: Abilify 5mg PO QHS, Vraylar 3mg PO Daily, Atarax 25mg PO TID, Lamictal 50mg PO Daily, Zoloft 150mg PO QHS  6.Disposition Planning: Discharge planning and efforts remain ongoing - Will return to private residence    Subjective:    Patient was seen today for continuation of care, records reviewed and patient was discussed with the morning case review team.    Deyvi was seen today for psychiatric follow-up.  On assessment today, Deyvi was found in his room.  He is guarded but polite and able to engage in an appropriate conversation.  He reports only a minimal improvement in his mood.  Unit and program details explained to patient.  Deyvi reports adequate daytime energy and denies any difficulties with initiating or staying asleep.  Oral appetite and hydration is adequate.  He seems motivated to participate in the program.  We reviewed once more the specific as-needed medications they can use going forward if they experience any insomnia or destabilization of their mood, they understood and were agreeable. Milieu visibility and  group attendance encouraged to promote an active participation in treatment.    Deyvi denies acute suicidal/self-harm ideation/intent/plan upon direct inquiry at this time. Deyvi is able to contract for safety while on the unit and would feel comfortable seeking staff support should suicidal symptoms or urges appear or worsen. Deyvi remains behaviorally appropriate, no agitation or aggression noted on exam or in report. Deyvi also denies HI/AH/VH, and does not appear overtly manic.  Patient does not verbalize any experiences that can be categorized as paranoid, persecutory, bizarre, or somatic delusions. Deyvi remains adherent to his current psychotropic medication regimen and denies any side effects from medications, as well as none noted on exam.    Group Attendance: 5 / 11  Treatment Team: SOLEDAD  Psychiatric PRN's Needed: None    Review of Systems:  Behavior over the last 24 hours: Unchanged  Sleep: sleeping okay throughout the night  Appetite: adequate  Medication side effects: none reported  ROS:no complaints, all other systems are negative    Objective:    Vitals:  Vitals:    06/28/24 0726   BP: 127/78   Pulse: 82   Resp: 18   Temp: (!) 97.2 °F (36.2 °C)   SpO2: 94%     Laboratory Results:  I have personally reviewed all pertinent laboratory/tests results.  Most Recent Labs:   Lab Results   Component Value Date    WBC 7.39 06/26/2024    RBC 4.70 06/26/2024    HGB 15.2 06/26/2024    HCT 45.5 06/26/2024     06/26/2024    RDW 12.9 06/26/2024    NEUTROABS 4.63 06/26/2024    SODIUM 137 06/26/2024    K 4.1 06/26/2024     06/26/2024    CO2 26 06/26/2024    BUN 17 06/26/2024    CREATININE 0.63 06/26/2024    GLUC 98 06/26/2024    GLUF 98 06/26/2024    CALCIUM 9.6 06/26/2024    AST 25 06/26/2024    ALT 45 06/26/2024    ALKPHOS 114 (H) 06/26/2024    TP 7.0 06/26/2024    ALB 4.4 06/26/2024    TBILI 0.44 06/26/2024    CHOLESTEROL 177 06/26/2024    HDL 52 06/26/2024    TRIG 73 06/26/2024    LDLCALC 110  (H) 06/26/2024    NONHDLC 125 06/26/2024    LITHIUM 0.4 (L) 01/28/2021    QGL1TEOWWLCG 2.636 06/26/2024    HGBA1C 5.2 11/22/2023     11/22/2023     Mental Status Evaluation:  Appearance:  age appropriate, casually dressed   Behavior:  cooperative, calm, guarded, restless   Speech:  soft   Mood:  depressed, anxious   Affect:  constricted   Thought Process:  goal directed   Associations: intact associations   Thought Content:  no overt delusions   Perceptual Disturbances: no auditory hallucinations, no visual hallucinations, denies when asked, does not appear responding to internal stimuli   Risk Potential: Suicidal ideation - None at present, contracts for safety on the unit, would talk to staff if not feeling safe on the unit  Homicidal ideation - None at present  Potential for aggression - Not at present   Sensorium:  oriented to person, place, and time/date   Memory:  recent memory intact   Consciousness:  alert and awake   Attention/Concentration: attention span and concentration appear shorter than expected for age   Insight:  limited   Judgment: limited   Gait/Station: normal gait/station, normal balance   Motor Activity: no abnormal movements     Progress Toward Goals: no significant improvement.  Deyvi continues to require inpatient psychiatric hospitalization for continued medication management and titration to optimize symptom reduction, improve sleep hygiene, and demonstrate adequate self-care.     Suicide/Homicide Risk Assessment:  Risk of Harm to Self:   Nursing Suicide Risk Assessment Last 24 hours: C-SSRS Risk (Since Last Contact)  Calculated C-SSRS Risk Score (Since Last Contact): No Risk Indicated    Risk of Harm to Others:  Nursing Homicide Risk Assessment: Violence Risk to Others: Denies within past 6 months    Behavioral Health Medications: all current active meds have been reviewed and continue current psychiatric medications.  Current Facility-Administered Medications   Medication Dose  Route Frequency Provider Last Rate    acetaminophen  650 mg Oral Q6H PRN ALVINA Harley      acetaminophen  650 mg Oral Q4H PRN ALVINA Harley      acetaminophen  975 mg Oral Q6H PRN ALVINA Harley      aluminum-magnesium hydroxide-simethicone  30 mL Oral Q4H PRN ALVINA Harley      ARIPiprazole  5 mg Oral HS Martin Cardenas MD      atorvastatin  10 mg Oral Daily ALVINA Lewis      benztropine  1 mg Intramuscular Q4H PRN Max 6/day ALVINA Harley      benztropine  1 mg Oral Q4H PRN Max 6/day ALVINA Harley      bisacodyl  10 mg Rectal Daily PRN ALVINA Harley      cariprazine  3 mg Oral Daily Martin Cardenas MD      hydrOXYzine HCL  25 mg Oral Q6H PRN Max 4/day ALVINA Harley      hydrOXYzine HCL  25 mg Oral TID Martin Cardenas MD      hydrOXYzine HCL  50 mg Oral Q4H PRN Max 4/day ALVINA Harley      Or    LORazepam  1 mg Intramuscular Q4H PRN ALVINA Harley      lamoTRIgine  25 mg Oral Daily Martin Cardenas MD      [START ON 7/10/2024] lamoTRIgine  50 mg Oral Daily Martin Cardenas MD      lisinopril  10 mg Oral Daily ALVINA Lewis      LORazepam  1 mg Oral Q4H PRN Max 6/day ALVINA Harley      Or    LORazepam  2 mg Intramuscular Q6H PRN Max 3/day ALVINA Harley      OLANZapine  10 mg Oral Q3H PRN Max 3/day ALVINA Harley      Or    OLANZapine  10 mg Intramuscular Q3H PRN Max 3/day ALVINA Harley      OLANZapine  5 mg Oral Q3H PRN Max 6/day ALVINA Harley      Or    OLANZapine  5 mg Intramuscular Q3H PRN Max 6/day ALVINA Harley      OLANZapine  2.5 mg Oral Q3H PRN Max 8/day ALVINA Harley      polyethylene glycol  17 g Oral Daily PRN ALVINA Harley      propranolol  10 mg Oral Q8H PRN ALVINA Harley      senna-docusate sodium  1 tablet Oral Daily PRN ALVINA Harley      sertraline  150 mg Oral HS Martin Cardenas MD       Risks / Benefits of Treatment:  Risks, benefits, and possible side effects of medications  explained to patient. Patient has limited understanding of risks and benefits of treatment at this time, but agrees to take medications as prescribed.    Counseling / Coordination of Care:  Total floor/unit time spent today 25 minutes. Greater than 50% of total time was spent with the patient and / or family counseling and / or coordination of care. A description of the counseling / coordination of care:   Patient's progress discussed with staff in treatment team meeting.  Medications, treatment progress and treatment plan reviewed with patient.   Educated on importance of medication and treatment compliance.  Reassurance and supportive therapy provided.   Encouraged participation in milieu and group therapy on the unit.    ALVINA Harley 06/28/24

## 2024-06-28 NOTE — NURSING NOTE
"Vitamin D and Vitamin B12 prescribed by medical provider. When this writer went to give it to pt he refused saying he \"is not interested.\" Pt said \"I was on it before and it made me gain weight.\" Education attempted and pt still refused. Medical provider notified.   "

## 2024-06-28 NOTE — PROGRESS NOTES
06/28/24 0742   Team Meeting   Meeting Type Daily Rounds   Team Members Present   Team Members Present Physician;Nurse;;Other (Discipline and Name)   Patient/Family Present   Patient Present No   Patient's Family Present No     In attendance:  MD Rosalia Sousa, RN  Judi Garcia, [facility]  Mer Sales, [facility]  Gris Arizmendi M.S.    Groups: 5/11    Pt reports ongoing anxiety and depression. Pt is social and had his hair cut. No bx concerns noted. Pt denies SI.

## 2024-06-28 NOTE — PLAN OF CARE
Problem: Alteration in Thoughts and Perception  Goal: Refrain from acting on delusional thinking/internal stimuli  Description: Interventions:  - Monitor patient closely, per order   - Utilize least restrictive measures   - Set reasonable limits, give positive feedback for acceptable   - Administer medications as ordered and monitor of potential side effects  Outcome: Progressing  Goal: Agree to be compliant with medication regime, as prescribed and report medication side effects  Description: Interventions:  - Offer appropriate PRN medication and supervise ingestion; conduct AIMS, as needed   Outcome: Progressing  Goal: Attend and participate in unit activities, including therapeutic, recreational, and educational groups  Description: Interventions:  -Encourage Visitation and family involvement in care  Outcome: Progressing  Goal: Complete daily ADLs, including personal hygiene independently, as able  Description: Interventions:  - Observe, teach, and assist patient with ADLS  - Monitor and promote a balance of rest/activity, with adequate nutrition and elimination   Outcome: Progressing     Problem: Ineffective Coping  Goal: Identifies ineffective coping skills  Outcome: Progressing  Goal: Identifies healthy coping skills  Outcome: Progressing  Goal: Demonstrates healthy coping skills  Outcome: Progressing     Problem: Risk for Self Injury/Neglect  Goal: Treatment Goal: Remain safe during length of stay, learn and adopt new coping skills, and be free of self-injurious ideation, impulses and acts at the time of discharge  Outcome: Progressing  Goal: Refrain from harming self  Description: Interventions:  - Monitor patient closely, per order  - Develop a trusting relationship  - Supervise medication ingestion, monitor effects and side effects   Outcome: Progressing     Problem: Depression  Goal: Refrain from isolation  Description: Interventions:  - Develop a trusting relationship   - Encourage socialization    Outcome: Progressing     Problem: Alteration in Thoughts and Perception  Goal: Treatment Goal: Gain control of psychotic behaviors/thinking, reduce/eliminate presenting symptoms and demonstrate improved reality functioning upon discharge  Outcome: Not Progressing  Goal: Verbalize thoughts and feelings  Description: Interventions:  - Promote a nonjudgmental and trusting relationship with the patient through active listening and therapeutic communication  - Assess patient's level of functioning, behavior and potential for risk  - Engage patient in 1 on 1 interactions  - Encourage patient to express fears, feelings, frustrations, and discuss symptoms    - [place] patient to reality, help patient recognize reality-based thinking   - Administer medications as ordered and assess for potential side effects  - Provide the patient education related to the signs and symptoms of the illness and desired effects of prescribed medications  Outcome: Not Progressing     Problem: Depression  Goal: Treatment Goal: Demonstrate behavioral control of depressive symptoms, verbalize feelings of improved mood/affect, and adopt new coping skills prior to discharge  Outcome: Not Progressing     Problem: Anxiety  Goal: Anxiety is at manageable level  Description: Interventions:  - Assess and monitor patient's anxiety level.   - Monitor for signs and symptoms (heart palpitations, chest pain, shortness of breath, headaches, nausea, feeling jumpy, restlessness, irritable, apprehensive).   - Collaborate with interdisciplinary team and initiate plan and interventions as ordered.  - [place] patient to unit/surroundings  - Explain treatment plan  - Encourage participation in care  - Encourage verbalization of concerns/fears  - Identify coping mechanisms  - Assist in developing anxiety-reducing skills  - Administer/offer alternative therapies  - Limit or eliminate stimulants  Outcome: Not Progressing

## 2024-06-28 NOTE — NURSING NOTE
Pt is present on the milieu and mostly isolative to self. He consumed 100% of breakfast and lunch. Took his medications without incidence. Denied psychiatric symptoms except reports feeling depressed and anxious at times. No behavioral issues.

## 2024-06-29 PROCEDURE — 99232 SBSQ HOSP IP/OBS MODERATE 35: CPT

## 2024-06-29 RX ADMIN — LAMOTRIGINE 25 MG: 25 TABLET ORAL at 08:03

## 2024-06-29 RX ADMIN — HYDROXYZINE HYDROCHLORIDE 25 MG: 25 TABLET ORAL at 17:16

## 2024-06-29 RX ADMIN — ARIPIPRAZOLE 5 MG: 5 TABLET ORAL at 21:49

## 2024-06-29 RX ADMIN — CYANOCOBALAMIN TAB 1000 MCG 1000 MCG: 1000 TAB at 08:03

## 2024-06-29 RX ADMIN — HYDROXYZINE HYDROCHLORIDE 25 MG: 25 TABLET ORAL at 08:03

## 2024-06-29 RX ADMIN — CARIPRAZINE 3 MG: 3 CAPSULE, GELATIN COATED ORAL at 08:03

## 2024-06-29 RX ADMIN — HYDROXYZINE HYDROCHLORIDE 25 MG: 25 TABLET ORAL at 21:50

## 2024-06-29 RX ADMIN — SERTRALINE HYDROCHLORIDE 150 MG: 100 TABLET ORAL at 21:49

## 2024-06-29 RX ADMIN — LISINOPRIL 10 MG: 10 TABLET ORAL at 08:03

## 2024-06-29 RX ADMIN — CHOLECALCIFEROL TAB 25 MCG (1000 UNIT) 2000 UNITS: 25 TAB at 08:03

## 2024-06-29 RX ADMIN — ATORVASTATIN CALCIUM 10 MG: 10 TABLET, FILM COATED ORAL at 08:03

## 2024-06-29 NOTE — NURSING NOTE
Alert, cooperative and visible intermittently. Isolative to self. Pt noted preoccupied. No SI or HI noted. Denies depression, anxiety and pain. Attended community, exercise, art therapy, nursing education. Consumed of 100% of breakfast and 100% of lunch. Took all medication without prompting. Maintained on safe precautions without incident. Will continue to monitor progress and recovery program.

## 2024-06-29 NOTE — NURSING NOTE
Patient slept through the entire night, eight hours.  Patient slept comfortably with no signs of distress.

## 2024-06-29 NOTE — PLAN OF CARE
Problem: Alteration in Thoughts and Perception  Goal: Treatment Goal: Gain control of psychotic behaviors/thinking, reduce/eliminate presenting symptoms and demonstrate improved reality functioning upon discharge  Outcome: Progressing  Goal: Verbalize thoughts and feelings  Description: Interventions:  - Promote a nonjudgmental and trusting relationship with the patient through active listening and therapeutic communication  - Assess patient's level of functioning, behavior and potential for risk  - Engage patient in 1 on 1 interactions  - Encourage patient to express fears, feelings, frustrations, and discuss symptoms    - [place] patient to reality, help patient recognize reality-based thinking   - Administer medications as ordered and assess for potential side effects  - Provide the patient education related to the signs and symptoms of the illness and desired effects of prescribed medications  Outcome: Progressing  Goal: Refrain from acting on delusional thinking/internal stimuli  Description: Interventions:  - Monitor patient closely, per order   - Utilize least restrictive measures   - Set reasonable limits, give positive feedback for acceptable   - Administer medications as ordered and monitor of potential side effects  Outcome: Progressing  Goal: Agree to be compliant with medication regime, as prescribed and report medication side effects  Description: Interventions:  - Offer appropriate PRN medication and supervise ingestion; conduct AIMS, as needed   Outcome: Progressing  Goal: Attend and participate in unit activities, including therapeutic, recreational, and educational groups  Description: Interventions:  -Encourage Visitation and family involvement in care  Outcome: Progressing  Goal: Recognize dysfunctional thoughts, communicate reality-based thoughts at the time of discharge  Description: Interventions:  - Provide medication and psycho-education to assist patient in compliance and developing  insight into his/her illness   Outcome: Progressing  Goal: Complete daily ADLs, including personal hygiene independently, as able  Description: Interventions:  - Observe, teach, and assist patient with ADLS  - Monitor and promote a balance of rest/activity, with adequate nutrition and elimination   Outcome: Progressing     Problem: Ineffective Coping  Goal: Cooperates with admission process  Description: Interventions:   - Complete admission process  Outcome: Progressing  Goal: Identifies ineffective coping skills  Outcome: Progressing  Goal: Identifies healthy coping skills  Outcome: Progressing  Goal: Demonstrates healthy coping skills  Outcome: Progressing  Goal: Participates in unit activities  Description: Interventions:  - Provide therapeutic environment   - Provide required programming   - Redirect inappropriate behaviors   Outcome: Progressing  Goal: Patient/Family participate in treatment and DC plans  Description: Interventions:  - Provide therapeutic environment  Outcome: Progressing  Goal: Patient/Family verbalizes awareness of resources  Outcome: Progressing  Goal: Understands least restrictive measures  Description: Interventions:  - Utilize least restrictive behavior  Outcome: Progressing  Goal: Free from restraint events  Description: - Utilize least restrictive measures   - Provide behavioral interventions   - Redirect inappropriate behaviors   Outcome: Progressing     Problem: Risk for Self Injury/Neglect  Goal: Treatment Goal: Remain safe during length of stay, learn and adopt new coping skills, and be free of self-injurious ideation, impulses and acts at the time of discharge  Outcome: Progressing  Goal: Verbalize thoughts and feelings  Description: Interventions:  - Assess and re-assess patient's lethality and potential for self-injury  - Engage patient in 1:1 interactions, daily, for a minimum of 15 minutes  - Encourage patient to express feelings, fears, frustrations, hopes  - Establish  rapport/trust with patient   Outcome: Progressing  Goal: Refrain from harming self  Description: Interventions:  - Monitor patient closely, per order  - Develop a trusting relationship  - Supervise medication ingestion, monitor effects and side effects   Outcome: Progressing  Goal: Attend and participate in unit activities, including therapeutic, recreational, and educational groups  Description: Interventions:  - Provide therapeutic and educational activities daily, encourage attendance and participation, and document same in the medical record  - Obtain collateral information, encourage visitation and family involvement in care   Outcome: Progressing  Goal: Recognize maladaptive responses and adopt new coping mechanisms  Outcome: Progressing  Goal: Complete daily ADLs, including personal hygiene independently, as able  Description: Interventions:  - Observe, teach, and assist patient with ADLS  - Monitor and promote a balance of rest/activity, with adequate nutrition and elimination  Outcome: Progressing     Problem: Depression  Goal: Treatment Goal: Demonstrate behavioral control of depressive symptoms, verbalize feelings of improved mood/affect, and adopt new coping skills prior to discharge  Outcome: Progressing  Goal: Verbalize thoughts and feelings  Description: Interventions:  - Assess and re-assess patient's level of risk   - Engage patient in 1:1 interactions, daily, for a minimum of 15 minutes   - Encourage patient to express feelings, fears, frustrations, hopes   Outcome: Progressing  Goal: Refrain from harming self  Description: Interventions:  - Monitor patient closely, per order   - Supervise medication ingestion, monitor effects and side effects   Outcome: Progressing  Goal: Refrain from isolation  Description: Interventions:  - Develop a trusting relationship   - Encourage socialization   Outcome: Progressing  Goal: Refrain from self-neglect  Outcome: Progressing  Goal: Attend and participate in  unit activities, including therapeutic, recreational, and educational groups  Description: Interventions:  - Provide therapeutic and educational activities daily, encourage attendance and participation, and document same in the medical record   Outcome: Progressing  Goal: Complete daily ADLs, including personal hygiene independently, as able  Description: Interventions:  - Observe, teach, and assist patient with ADLS  -  Monitor and promote a balance of rest/activity, with adequate nutrition and elimination   Outcome: Progressing     Problem: Anxiety  Goal: Anxiety is at manageable level  Description: Interventions:  - Assess and monitor patient's anxiety level.   - Monitor for signs and symptoms (heart palpitations, chest pain, shortness of breath, headaches, nausea, feeling jumpy, restlessness, irritable, apprehensive).   - Collaborate with interdisciplinary team and initiate plan and interventions as ordered.  - [place] patient to unit/surroundings  - Explain treatment plan  - Encourage participation in care  - Encourage verbalization of concerns/fears  - Identify coping mechanisms  - Assist in developing anxiety-reducing skills  - Administer/offer alternative therapies  - Limit or eliminate stimulants  Outcome: Progressing     Problem: Risk for Violence/Aggression Toward Others  Goal: Treatment Goal: Refrain from acts of violence/aggression during length of stay, and demonstrate improved impulse control at the time of discharge  Outcome: Progressing  Goal: Verbalize thoughts and feelings  Description: Interventions:  - Assess and re-assess patient's level of risk, every waking shift  - Engage patient in 1:1 interactions, daily, for a minimum of 15 minutes   - Allow patient to express feelings and frustrations in a safe and non-threatening manner   - Establish rapport/trust with patient   Outcome: Progressing  Goal: Refrain from harming others  Outcome: Progressing  Goal: Refrain from destructive acts on the  environment or property  Outcome: Progressing  Goal: Control angry outbursts  Description: Interventions:  - Monitor patient closely, per order  - Ensure early verbal de-escalation  - Monitor prn medication needs  - Set reasonable/therapeutic limits, outline behavioral expectations, and consequences   - Provide a non-threatening milieu, utilizing the least restrictive interventions   Outcome: Progressing  Goal: Attend and participate in unit activities, including therapeutic, recreational, and educational groups  Description: Interventions:  - Provide therapeutic and educational activities daily, encourage attendance and participation, and document same in the medical record   Outcome: Progressing  Goal: Identify appropriate positive anger management techniques  Description: Interventions:  - Offer anger management and coping skills groups   - Staff will provide positive feedback for appropriate anger control  Outcome: Progressing     Problem: Alteration in Orientation  Goal: Treatment Goal: Demonstrate a reduction of confusion and improved orientation to person, place, time and/or situation upon discharge, according to optimum baseline/ability  Outcome: Progressing  Goal: Interact with staff daily  Description: Interventions:  - Assess and re-assess patient's level of orientation  - Engage patient in 1 on 1 interactions, daily, for a minimum of 15 minutes   - Establish rapport/trust with patient   Outcome: Progressing  Goal: Express concerns related to confused thinking related to:  Description: Interventions:  - Encourage patient to express feelings, fears, frustrations, hopes  - Assign consistent caregivers   - [place]/re-orient patient as needed  - Allow comfort items, as appropriate  - Provide visual cues, signs, etc.   Outcome: Progressing  Goal: Allow medical examinations, as recommended  Description: Interventions:  - Provide physical/neurological exams and/or referrals, per provider   Outcome:  Progressing  Goal: Cooperate with recommended testing/procedures  Description: Interventions:  - Determine need for ancillary testing  - Observe for mental status changes  - Implement falls/precaution protocol   Outcome: Progressing  Goal: Attend and participate in unit activities, including therapeutic, recreational, and educational groups  Description: Interventions:  - Provide therapeutic and educational activities daily, encourage attendance and participation, and document same in the medical record   - Provide appropriate opportunities for reminiscence   - Provide a consistent daily routine   - Encourage family contact/visitation   Outcome: Progressing  Goal: Complete daily ADLs, including personal hygiene independently, as able  Description: Interventions:  - Observe, teach, and assist patient with ADLS  Outcome: Progressing     Problem: Individualized Interventions  Goal: Patient will verbalize appropriate use of telephone within 5 days  Description: Interventions:  - Treatment team to determine use of supervised phone privileges   Outcome: Progressing  Goal: Patient will verbalize need for hospitalization and will no longer attempt elopement within 5 days  Description: Interventions:  - Ongoing education to help patient understand need for hospitalization  Outcome: Progressing  Goal: Patient will recognize inappropriate behaviors and develop alternative behaviors within 5 days  Description: Interventions:  - Patient in collaboration with Treatment Team will develop a behavior management plan to help identify effective coping skills to deal with stressors  Outcome: Progressing     Problem: Electroconvulsive therapy (ECT)  Goal: Treatment Goal: Demonstrate a reduction of confusion and improved orientation to person, place, time and/or situation upon discharge, according to optimum baseline/ability  Outcome: Progressing  Goal: Verbalizes/displays adequate comfort level or baseline comfort level  Description:  Interventions:  - Encourage patient to monitor pain and request assistance  - Assess pain using appropriate pain scale  - Administer analgesics based on type and severity of pain and evaluate response  - Implement non-pharmacological measures as appropriate and evaluate response  - Consider cultural and social influences on pain and pain management  - Notify physician/advanced practitioner if interventions unsuccessful or patient reports new pain  Outcome: Progressing  Goal: Achieves stable or improved neurological status  Description: INTERVENTIONS  - Monitor and report changes in neurological status  - Monitor vital signs such as temperature, blood pressure, glucose, and any other labs ordered   - Initiate measures to prevent increased intracranial pressure  - Monitor for seizure activity and implement precautions if appropriate      Outcome: Progressing  Goal: Achieves maximal functionality and self care  Description: INTERVENTIONS  - Monitor swallowing and airway patency with patient fatigue and changes in neurological status  - Encourage and assist patient to increase activity and self care.   - Encourage visually impaired, hearing impaired and aphasic patients to use assistive/communication devices  Outcome: Progressing  Goal: Maintain or return mobility to safest level of function  Description: INTERVENTIONS:  - Assess patient's ability to carry out ADLs; assess patient's baseline for ADL function and identify physical deficits which impact ability to perform ADLs (bathing, care of mouth/teeth, toileting, grooming, dressing, etc.)  - Assess/evaluate cause of self-care deficits   - Assess range of motion  - Assess patient's mobility  - Assess patient's need for assistive devices and provide as appropriate  - Encourage maximum independence but intervene and supervise when necessary  - Involve family in performance of ADLs  - Assess for home care needs following discharge   - Consider OT consult to assist with  ADL evaluation and planning for discharge  - Provide patient education as appropriate  Outcome: Progressing  Goal: Absence of urinary retention  Description: INTERVENTIONS:  - Assess patient’s ability to void and empty bladder  - Monitor I/O  - Bladder scan as needed  - Discuss with physician/AP medications to alleviate retention as needed  - Discuss catheterization for long term situations as appropriate  Outcome: Progressing  Goal: Minimal or absence of nausea and/or vomiting  Description: INTERVENTIONS:  - Administer IV fluids if ordered to ensure adequate hydration  - Maintain NPO status until nausea and vomiting are resolved  - Nasogastric tube if ordered  - Administer ordered antiemetic medications as needed  - Provide nonpharmacologic comfort measures as appropriate  - Advance diet as tolerated, if ordered  - Consider nutrition services referral to assist patient with adequate nutrition and appropriate food choices  Outcome: Progressing  Goal: Maintains adequate nutritional intake  Description: INTERVENTIONS:  - Monitor percentage of each meal consumed  - Identify factors contributing to decreased intake, treat as appropriate  - Assist with meals as needed  - Monitor I&O, weight, and lab values if indicated  - Obtain nutrition services referral as needed  Outcome: Progressing     Problem: PAIN - ADULT  Goal: Verbalizes/displays adequate comfort level or baseline comfort level  Description: Interventions:  - Encourage patient to monitor pain and request assistance  - Assess pain using appropriate pain scale  - Administer analgesics based on type and severity of pain and evaluate response  - Implement non-pharmacological measures as appropriate and evaluate response  - Consider cultural and social influences on pain and pain management  - Notify physician/advanced practitioner if interventions unsuccessful or patient reports new pain  Outcome: Progressing     Problem: INFECTION - ADULT  Goal: Absence or  prevention of progression during hospitalization  Description: INTERVENTIONS:  - Assess and monitor for signs and symptoms of infection  - Monitor lab/diagnostic results  - Monitor all insertion sites, i.e. indwelling lines, tubes, and drains  - Monitor endotracheal if appropriate and nasal secretions for changes in amount and color  - [place] appropriate cooling/warming therapies per order  - Administer medications as ordered  - Instruct and encourage patient and family to use good hand hygiene technique  - Identify and instruct in appropriate isolation precautions for identified infection/condition  Outcome: Progressing  Goal: Absence of fever/infection during neutropenic period  Description: INTERVENTIONS:  - Monitor WBC    Outcome: Progressing     Problem: SAFETY ADULT  Goal: Patient will remain free of falls  Description: INTERVENTIONS:  - Educate patient/family on patient safety including physical limitations  - Instruct patient to call for assistance with activity   - Consult OT/PT to assist with strengthening/mobility   - Keep Call bell within reach  - Keep bed low and locked with side rails adjusted as appropriate  - Keep care items and personal belongings within reach  - Initiate and maintain comfort rounds  - Make Fall Risk Sign visible to staff  - Obtain necessary fall risk management equipment  - Apply yellow socks and bracelet for high fall risk patients  - Consider moving patient to room near nurses station  Outcome: Progressing  Goal: Maintain or return to baseline ADL function  Description: INTERVENTIONS:  -  Assess patient's ability to carry out ADLs; assess patient's baseline for ADL function and identify physical deficits which impact ability to perform ADLs (bathing, care of mouth/teeth, toileting, grooming, dressing, etc.)  - Assess/evaluate cause of self-care deficits   - Assess range of motion  - Assess patient's mobility; develop plan if impaired  - Assess patient's need for assistive  devices and provide as appropriate  - Encourage maximum independence but intervene and supervise when necessary  - Involve family in performance of ADLs  - Assess for home care needs following discharge   - Consider OT consult to assist with ADL evaluation and planning for discharge  - Provide patient education as appropriate  Outcome: Progressing  Goal: Maintains/Returns to pre admission functional level  Description: INTERVENTIONS:  - Perform AM-PAC 6 Click Basic Mobility/ Daily Activity assessment daily.  - Set and communicate daily mobility goal to care team and patient/family/caregiver.   - Collaborate with rehabilitation services on mobility goals if consulted  - Out of bed for toileting  - Record patient progress and toleration of activity level   Outcome: Progressing     Problem: SELF HARM/SUICIDALITY  Goal: Will have no self-injury during hospital stay  Description: INTERVENTIONS:  - Q 15 MINUTES: Routine safety checks  - Q WAKING SHIFT & PRN: Assess risk to determine if routine checks are adequate to maintain patient safety  - Encourage patient to participate actively in care by formulating a plan to combat response to suicidal ideation, identify supports and resources  Outcome: Progressing

## 2024-06-29 NOTE — PROGRESS NOTES
Progress Note - Behavioral Health   Deyvi Cao 54 y.o. male MRN: [id number]  Unit/Bed#: [facility] 109-01 Encounter: [id number]      Subjective:     Documentation, nursing notes, medication reconciliation, labs, and vitals reviewed. Patient was seen for continuing care and reviewed with treatment team.  No acute events over the past 24 hours. Per nursing report, Deyvi has been pleasant and cooperative, eating well, sleeping well. No medication changes over the past 24 hours.     On evaluation today, Deyvi is seen sitting in dining room, listening to music.  He rated depression a 5 on a 0-10 scale with 10 being most severe.  He appears mildly anxious with constricted affect.  He denies current SI/HI with plan or intent.  He denies thoughts to self injure.  He denies AVH and no paranoid thoughts elicited during assessment and does not appear to be responding to IS.  He reports he is sleeping well.     Continues to tolerate current medications with no adverse effects.     No self-harming/suicidal ideation, plan, or intent upon direct inquiry. No thoughts to harm others.  No agitation or aggression noted. Denies perceptual disturbances, with no delusional or paranoid content elicited. Does not appear overtly manic. Offers no further complaints.       Psychiatric ROS:  Behavior over the last 24 hours: slowly improving  Sleep: normal  Appetite: normal  Medication side effects: No   ROS: no complaints, all other systems are negative      Mental Status Evaluation:    Appearance:  age appropriate, casually dressed, dressed appropriately   Behavior:  pleasant, cooperative, calm   Speech:  normal rate, normal volume, normal pitch   Mood:  anxious   Affect:  constricted   Thought Process:  coherent, goal directed   Associations: intact associations   Thought Content:  no overt delusions   Perceptual Disturbances: denies auditory or visual hallucinations when asked, does not appear responding to internal stimuli   Risk  Potential: Suicidal ideation - None at present  Homicidal ideation - None at present  Potential for aggression - No   Sensorium:  oriented to person, place, and time/date   Memory:  recent and remote memory grossly intact   Consciousness:  alert and awake   Attention/Concentration: attention span and concentration are age appropriate   Insight:  fair   Judgment: fair   Gait/Station: normal gait/station   Motor Activity: no abnormal movements       Vital signs in last 24 hours:    Temp:  [97.3 °F (36.3 °C)-97.6 °F (36.4 °C)] 97.6 °F (36.4 °C)  HR:  [88-89] 88  Resp:  [18] 18  BP: (133-140)/(75-84) 140/75    Laboratory results: I have personally reviewed all pertinent laboratory/tests results    Results from the past 24 hours: No results found for this or any previous visit (from the past 24 hour(s)).      Progress Toward Goals: gradual improvement    Suicide/Homicide Risk Assessment:    Risk of Harm to Self:   Nursing Suicide Risk Assessment Last 24 hours: C-SSRS Risk (Since Last Contact)  Calculated C-SSRS Risk Score (Since Last Contact): No Risk Indicated    Risk of Harm to Others:  Nursing Homicide Risk Assessment: Violence Risk to Others: Denies within past 6 months    Assessment & Plan   Principal Problem:    Bipolar disorder with severe depression (HCC)  Active Problems:    Benign essential hypertension    Mixed hyperlipidemia    Allergic rhinitis due to allergen    Medical clearance for psychiatric admission    Tracey      Recommended Treatment:     Planned medication and treatment changes:    All current active medications have been reviewed  Encourage group therapy, milieu therapy and occupational therapy  Behavioral Health checks every 7 minutes  Continue with SLIM medical management as indicated  Disposition planning ongoing    Continue current medications:    Current Facility-Administered Medications   Medication Dose Route Frequency Provider Last Rate    acetaminophen  650 mg Oral Q6H PRN Melva Knutson  ALVINA      acetaminophen  650 mg Oral Q4H PRN ALVINA Harley      acetaminophen  975 mg Oral Q6H PRN ALVINA Harley      aluminum-magnesium hydroxide-simethicone  30 mL Oral Q4H PRN ALVINA Harley      ARIPiprazole  5 mg Oral HS Martin Cardenas MD      atorvastatin  10 mg Oral Daily ALVINA Lewis      benztropine  1 mg Intramuscular Q4H PRN Max 6/day ALVINA Harley      benztropine  1 mg Oral Q4H PRN Max 6/day ALVINA Harley      bisacodyl  10 mg Rectal Daily PRN ALVINA Harley      cariprazine  3 mg Oral Daily Martin Cardenas MD      Cholecalciferol  2,000 Units Oral Daily ALVINA Lewis      cyanocobalamin  1,000 mcg Oral Daily ALVINA Lewis      hydrOXYzine HCL  25 mg Oral Q6H PRN Max 4/day ALVINA Harley      hydrOXYzine HCL  25 mg Oral TID Martin Cardenas MD      hydrOXYzine HCL  50 mg Oral Q4H PRN Max 4/day ALVINA Harley      Or    LORazepam  1 mg Intramuscular Q4H PRN ALVINA Harley      lamoTRIgine  25 mg Oral Daily Martin Cardenas MD      [START ON 7/10/2024] lamoTRIgine  50 mg Oral Daily Martin Cardenas MD      lisinopril  10 mg Oral Daily ALVINA Lewis      LORazepam  1 mg Oral Q4H PRN Max 6/day ALVINA Harley      Or    LORazepam  2 mg Intramuscular Q6H PRN Max 3/day ALVINA Harley      OLANZapine  10 mg Oral Q3H PRN Max 3/day ALVINA Harley      Or    OLANZapine  10 mg Intramuscular Q3H PRN Max 3/day ALVINA Harley      OLANZapine  5 mg Oral Q3H PRN Max 6/day ALVINA Harley      Or    OLANZapine  5 mg Intramuscular Q3H PRN Max 6/day ALVINA Harley      OLANZapine  2.5 mg Oral Q3H PRN Max 8/day ALVINA Harley      polyethylene glycol  17 g Oral Daily PRN ALVINA Harley      propranolol  10 mg Oral Q8H PRN ALVINA Harley      senna-docusate sodium  1 tablet Oral Daily PRN ALVINA Harley      sertraline  150 mg Oral HS Martin Cardenas MD           Risks / Benefits of  Treatment:    Risks, benefits, and possible side effects of medications explained to patient and patient verbalizes understanding and agreement for treatment.    Counseling / Coordination of Care:    Patient's progress discussed with staff in treatment team meeting.  Medications, treatment progress and treatment plan reviewed with patient.    Note Share    This note was not shared with the patient due to reasonable likelihood of causing patient harm    ALVINA Whitney 06/29/24

## 2024-06-29 NOTE — NURSING NOTE
Deyvi reported feeling mildly depressed and anxious.  Pt offers no complaints or concerns.  Bright on approach.  Med and meal compliant, 100% x 3.

## 2024-06-30 PROCEDURE — 99232 SBSQ HOSP IP/OBS MODERATE 35: CPT

## 2024-06-30 RX ADMIN — CARIPRAZINE 3 MG: 3 CAPSULE, GELATIN COATED ORAL at 08:40

## 2024-06-30 RX ADMIN — HYDROXYZINE HYDROCHLORIDE 25 MG: 25 TABLET ORAL at 17:26

## 2024-06-30 RX ADMIN — HYDROXYZINE HYDROCHLORIDE 25 MG: 25 TABLET ORAL at 08:40

## 2024-06-30 RX ADMIN — SERTRALINE HYDROCHLORIDE 150 MG: 100 TABLET ORAL at 21:20

## 2024-06-30 RX ADMIN — CYANOCOBALAMIN TAB 1000 MCG 1000 MCG: 1000 TAB at 08:40

## 2024-06-30 RX ADMIN — LISINOPRIL 10 MG: 10 TABLET ORAL at 08:40

## 2024-06-30 RX ADMIN — ARIPIPRAZOLE 5 MG: 5 TABLET ORAL at 21:20

## 2024-06-30 RX ADMIN — LAMOTRIGINE 25 MG: 25 TABLET ORAL at 08:40

## 2024-06-30 RX ADMIN — CHOLECALCIFEROL TAB 25 MCG (1000 UNIT) 2000 UNITS: 25 TAB at 08:40

## 2024-06-30 RX ADMIN — HYDROXYZINE HYDROCHLORIDE 25 MG: 25 TABLET ORAL at 21:21

## 2024-06-30 RX ADMIN — ATORVASTATIN CALCIUM 10 MG: 10 TABLET, FILM COATED ORAL at 08:40

## 2024-06-30 NOTE — NURSING NOTE
Patient only visible intermittently.  Patient isolative to room and self most of the evening. Not seen interacting with anyone other than staff. Comes to staff to have his needs met. Cooperative and medication compliant as well. Behaviors controlled and appropriate. Pleasant upon approach. Offered no complaints. No prn medication requested or require

## 2024-06-30 NOTE — PROGRESS NOTES
Progress Note - Behavioral Health   Deyvi Cao 54 y.o. male MRN: [id number]  Unit/Bed#: [facility] 109-01 Encounter: [id number]      Subjective:     Documentation, nursing notes, medication reconciliation, labs, and vitals reviewed. Patient was seen for continuing care and reviewed with treatment team.  No acute events over the past 24 hours. Per nursing report, patient has remained isolative to self, no behavioral concerns. No medication changes over the past 24 hours.     On evaluation today, patient appears mildly depressed with blunted affect.  He is pleasant and cooperative.  He rates depression a 5 on a 0-10 scale.  He denies passive or active SI/HI with plan or intent.  He denies thoughts to self injure.  He denies AVH or paranoid thoughts.  No overt delusional content elicited during conversation.  He reports he has been eating and sleeping well.  He denies any side effects from psychotropic medications.     Continues to tolerate current medications with no adverse effects.     No self-harming/suicidal ideation, plan, or intent upon direct inquiry. No thoughts to harm others.  No agitation or aggression noted. Denies perceptual disturbances, with no delusional or paranoid content elicited. Does not appear overtly manic. Offers no further complaints.       Psychiatric ROS:  Behavior over the last 24 hours: unchanged  Sleep: normal  Appetite: normal  Medication side effects: No   ROS: no complaints, all other systems are negative      Mental Status Evaluation:    Appearance:  age appropriate, casually dressed, marginal hygiene   Behavior:  pleasant, cooperative, calm   Speech:  normal rate, normal volume, normal pitch   Mood:  depressed   Affect:  blunted   Thought Process:  coherent, goal directed   Associations: intact associations   Thought Content:  no overt delusions   Perceptual Disturbances: denies auditory or visual hallucinations when asked, does not appear responding to internal stimuli   Risk  Potential: Suicidal ideation - None at present  Homicidal ideation - None at present  Potential for aggression - Not at present   Sensorium:  oriented to person, place, and time/date   Memory:  recent and remote memory grossly intact   Consciousness:  alert and awake   Attention/Concentration: attention span and concentration are age appropriate   Insight:  fair   Judgment: fair   Gait/Station: normal gait/station, normal balance   Motor Activity: no abnormal movements       Vital signs in last 24 hours:    Temp:  [97.8 °F (36.6 °C)-98 °F (36.7 °C)] 98 °F (36.7 °C)  HR:  [88-93] 88  Resp:  [17-18] 17  BP: (129-132)/(75-87) 129/87    Laboratory results: I have personally reviewed all pertinent laboratory/tests results    Results from the past 24 hours: No results found for this or any previous visit (from the past 24 hour(s)).      Progress Toward Goals: no significant improvement today    Suicide/Homicide Risk Assessment:    Risk of Harm to Self:   Nursing Suicide Risk Assessment Last 24 hours: C-SSRS Risk (Since Last Contact)  Calculated C-SSRS Risk Score (Since Last Contact): No Risk Indicated    Risk of Harm to Others:  Nursing Homicide Risk Assessment: Violence Risk to Others: Denies within past 6 months    Assessment & Plan   Principal Problem:    Bipolar disorder with severe depression (HCC)  Active Problems:    Benign essential hypertension    Mixed hyperlipidemia    Allergic rhinitis due to allergen    Medical clearance for psychiatric admission    Tracey      Recommended Treatment:     Planned medication and treatment changes:    All current active medications have been reviewed  Encourage group therapy, milieu therapy and occupational therapy  Behavioral Health checks every 7 minutes  Continue with SLIM medical management as indicated  Disposition planning ongoing    Continue current medications:    Current Facility-Administered Medications   Medication Dose Route Frequency Provider Last Rate     acetaminophen  650 mg Oral Q6H PRN ALVINA Harley      acetaminophen  650 mg Oral Q4H PRN ALVINA Harley      acetaminophen  975 mg Oral Q6H PRN ALVINA Harley      aluminum-magnesium hydroxide-simethicone  30 mL Oral Q4H PRN ALVINA Harley      ARIPiprazole  5 mg Oral HS Martin Cardenas MD      atorvastatin  10 mg Oral Daily ALVINA Lewis      benztropine  1 mg Intramuscular Q4H PRN Max 6/day ALVINA Harley      benztropine  1 mg Oral Q4H PRN Max 6/day ALVINA Harley      bisacodyl  10 mg Rectal Daily PRN ALVINA Harley      cariprazine  3 mg Oral Daily Martin Cardenas MD      Cholecalciferol  2,000 Units Oral Daily ALVINA Lewis      cyanocobalamin  1,000 mcg Oral Daily ALVINA Lewis      hydrOXYzine HCL  25 mg Oral Q6H PRN Max 4/day ALVINA Harley      hydrOXYzine HCL  25 mg Oral TID Martin Cardenas MD      hydrOXYzine HCL  50 mg Oral Q4H PRN Max 4/day ALVINA Harley      Or    LORazepam  1 mg Intramuscular Q4H PRN ALVINA Harley      lamoTRIgine  25 mg Oral Daily Martin Cardenas MD      [START ON 7/10/2024] lamoTRIgine  50 mg Oral Daily Martin Cardenas MD      lisinopril  10 mg Oral Daily ALVINA Lewis      LORazepam  1 mg Oral Q4H PRN Max 6/day ALVINA Harley      Or    LORazepam  2 mg Intramuscular Q6H PRN Max 3/day ALVINA Harley      OLANZapine  10 mg Oral Q3H PRN Max 3/day ALVINA Harley      Or    OLANZapine  10 mg Intramuscular Q3H PRN Max 3/day ALVINA Harley      OLANZapine  5 mg Oral Q3H PRN Max 6/day ALVINA Harley      Or    OLANZapine  5 mg Intramuscular Q3H PRN Max 6/day ALVINA Harley      OLANZapine  2.5 mg Oral Q3H PRN Max 8/day ALVINA Harley      polyethylene glycol  17 g Oral Daily PRN ALVINA Harley      propranolol  10 mg Oral Q8H PRN ALVINA Harley      senna-docusate sodium  1 tablet Oral Daily PRN ALVINA Harley      sertraline  150 mg Oral HS Martin SIMMS  MD Ronald           Risks / Benefits of Treatment:    Risks, benefits, and possible side effects of medications explained to patient and patient verbalizes understanding and agreement for treatment.    Counseling / Coordination of Care:    Patient's progress discussed with staff in treatment team meeting.  Medications, treatment progress and treatment plan reviewed with patient.    Note Share    This note was not shared with the patient due to reasonable likelihood of causing patient harm    ALVINA Whitney 06/30/24

## 2024-06-30 NOTE — PLAN OF CARE
Problem: Alteration in Thoughts and Perception  Goal: Refrain from acting on delusional thinking/internal stimuli  Description: Interventions:  - Monitor patient closely, per order   - Utilize least restrictive measures   - Set reasonable limits, give positive feedback for acceptable   - Administer medications as ordered and monitor of potential side effects  Outcome: Progressing  Goal: Agree to be compliant with medication regime, as prescribed and report medication side effects  Description: Interventions:  - Offer appropriate PRN medication and supervise ingestion; conduct AIMS, as needed   Outcome: Progressing  Goal: Attend and participate in unit activities, including therapeutic, recreational, and educational groups  Description: Interventions:  -Encourage Visitation and family involvement in care  Outcome: Progressing     Problem: Ineffective Coping  Goal: Participates in unit activities  Description: Interventions:  - Provide therapeutic environment   - Provide required programming   - Redirect inappropriate behaviors   Outcome: Progressing  Goal: Understands least restrictive measures  Description: Interventions:  - Utilize least restrictive behavior  Outcome: Progressing  Goal: Free from restraint events  Description: - Utilize least restrictive measures   - Provide behavioral interventions   - Redirect inappropriate behaviors   Outcome: Progressing     Problem: Depression  Goal: Refrain from harming self  Description: Interventions:  - Monitor patient closely, per order   - Supervise medication ingestion, monitor effects and side effects   Outcome: Progressing  Goal: Refrain from isolation  Description: Interventions:  - Develop a trusting relationship   - Encourage socialization   Outcome: Progressing

## 2024-06-30 NOTE — NURSING NOTE
Medical Provider made aware of pt's L pedal pulse being weak on palpation and b/l feet noted cool to touch. NNO.

## 2024-06-30 NOTE — NURSING NOTE
Alert, cooperative and visible intermittently. Socializing with selective peer. No SI or HI noted. Denies depression, anxiety and pain. Attended community meeting, coping skills, and spirituality. Consumed 100% of breakfast and 100% of lunch. Took all medication without prompting. Maintained on safe precautions without incident. Will continue to monitor progress and recovery program.

## 2024-06-30 NOTE — NURSING NOTE
Weekly wellness assessment completed. Pt lung sounds clear in all lung fields. No edema noted. Bowel sounds +x4. R pedal pulses papable. L pedal pulse noted weak. Skin intact, warm and color within normal limits for patient except b/l feet noted a little cool to touch.

## 2024-07-01 PROCEDURE — 99232 SBSQ HOSP IP/OBS MODERATE 35: CPT | Performed by: PSYCHIATRY & NEUROLOGY

## 2024-07-01 RX ADMIN — ATORVASTATIN CALCIUM 10 MG: 10 TABLET, FILM COATED ORAL at 08:43

## 2024-07-01 RX ADMIN — LAMOTRIGINE 25 MG: 25 TABLET ORAL at 08:42

## 2024-07-01 RX ADMIN — CHOLECALCIFEROL TAB 25 MCG (1000 UNIT) 2000 UNITS: 25 TAB at 08:42

## 2024-07-01 RX ADMIN — LISINOPRIL 10 MG: 10 TABLET ORAL at 08:42

## 2024-07-01 RX ADMIN — SERTRALINE HYDROCHLORIDE 150 MG: 100 TABLET ORAL at 21:41

## 2024-07-01 RX ADMIN — CYANOCOBALAMIN TAB 1000 MCG 1000 MCG: 1000 TAB at 08:42

## 2024-07-01 RX ADMIN — HYDROXYZINE HYDROCHLORIDE 25 MG: 25 TABLET ORAL at 17:07

## 2024-07-01 RX ADMIN — HYDROXYZINE HYDROCHLORIDE 25 MG: 25 TABLET ORAL at 21:41

## 2024-07-01 RX ADMIN — CARIPRAZINE 3 MG: 3 CAPSULE, GELATIN COATED ORAL at 08:42

## 2024-07-01 RX ADMIN — HYDROXYZINE HYDROCHLORIDE 25 MG: 25 TABLET ORAL at 08:42

## 2024-07-01 NOTE — NURSING NOTE
Patient quiet and keeping to himself.  Visible at times but not seen interacting with peers. Patient cooperative with staff and medication compliant as well. Behaviors controlled and appropriate. Offered no complaints. No prn medication requested or required this shift.

## 2024-07-01 NOTE — SOCIAL WORK
SW checked in with pt.  Pt is polite but guarded and quiet. Pt reports he's settled in well and had no issues over the weekend. Pt denied any current symptoms or concerns.   SW inquired about pt's personal goals and plans for the day. Pt reported he's trying to get to groups and just continue settling into the unit.   Pt exhibited some shakiness that may be related to anxiety regarding the interaction. SW will continue to work on rapport building with pt.

## 2024-07-01 NOTE — NURSING NOTE
Pt is accepting of medications without incidence and meal compliant. Pt is visible in the milieu and social with select peers during groups. Pt reports depression, but denies all other s/s. Pt it polite, pleasant and attends groups. Flat affect and at times resting in bed. No new concerns.

## 2024-07-01 NOTE — PROGRESS NOTES
07/01/24 0748   Team Meeting   Meeting Type Daily Rounds   Team Members Present   Team Members Present Physician;Nurse;;Other (Discipline and Name)   Patient/Family Present   Patient Present No   Patient's Family Present No     In attendance:  MD Melva Sousa, ALVINA Haywood, LYNNE Garcia, hospitalsW  BARRETT Elmore.S.    Groups: 6/9    Pt's feet were noted to be cold during assessment; medical notified. Pt is quiet and keeps to self, depressed affect, denies symptoms. No bx issues noted.

## 2024-07-01 NOTE — PROGRESS NOTES
Psychiatry Progress Note [facility]    Deyvi Cao 54 y.o. male MRN: [id number]  Unit/Bed#: -01 Encounter: [id number]  Code Status: Level 1 - Full Code    PCP: Manuel Chris MD    Date of Admission:  6/25/2024 1232   Date of Service:  07/01/24    Patient Active Problem List   Diagnosis    Acne    Benign essential hypertension    Mixed hyperlipidemia    Tobacco dependence syndrome    Allergic rhinitis due to allergen    Medical clearance for psychiatric admission    Lung cancer screening declined by patient    Moderate depressed bipolar I disorder (HCC)    Bipolar disorder with severe depression (HCC)    Rosacea         Review of systems: Unremarkable and refused B12 and vitamin D offered by medical 2 days ago but compliant for the last 2 days  Psychiatric Diagnosis: Bipolar depression    Assessment  Overall Status: Adjusting to the unit but somewhat withdrawn and isolated preoccupied and not verbalizing any suicidal thoughts but preoccupied internally attuned with not much interaction with peers staying to himself  Certification Statement: The patient will continue to require additional inpatient hospital stay due to recurrent depression and repeated suicide attempts in the community       Medications: Vraylar 3 mg a day, hydroxyzine 25 mg 3 times a day, Zoloft 150 mg a day, Lamictal 25 mg a day,  All medications reviewed and recommend they be continued for symptom management  Side effects from treatment: None reported  Medication changes   No significant changes but medications were being titrated as ordered   Medication education   Risks side effects benefits and precautions of medications discussed with patient and he did verbalize an understanding about risks for metabolic syndrome from being on neuroleptics and is form tardive dyskinesia etc.  All medications reviewed and I recommend that they be continued for symptom management   Understanding of medications: Has good  understanding about medications and side effects of his precautions benefits   Justification for dual anti-psychotics: Not applicable    Non-pharmacological treatments  Continue with individual, group, milieu and occupational therapy using recovery principles and psycho-education about accepting illness and the need for treatment.  Behavioral health checks every 7 minutes  Safety with the patient about illness and need for treatment    Safety  Safety and communication plan established to target dynamic risk factors discussed above.    Discharge Plan   Consider a supervised setting with an ACT team    Interval Progress   Adjusting to the unit still somewhat withdrawn and isolated stating depression 5 out of 10 but not suicidal and not verbalizing any overt hallucinations or delusional experiences not appearing to respond but somewhat withdrawn and isolated with a constricted affect, interacting with select peers ,attending some of the groups.  Continues to have a constricted affect with no good mood reactivity when approached casually dressed fairly groomed but not aggressive or agitated or threatening or self-abusive with no need for behavioral PRNs  Acceptance by patient: Accepting  Hopefulness in recovery: Living in a group home  Involved in reintegration process: Not contacting anyone in community  Trusting in relationship with psychiatrist: Trusting  Sleep: Good  Appetite: Good  Compliance with Medications: Good  Group attendance: Improving 6/9  Significant events: Still depressed and isolated with minimal mood reactivity and constricted affect      Mental Status Exam  Appearance: casually dressed, dressed appropriately, adequate grooming, looks stated age  Behavior: cooperative, mildly anxious, slow responses  Speech: slow, scant, increased latency of response, delayed, soft, decreased volume  Mood: depressed, dysphoric, anxious  Affect: constricted, slightly brighter, mood-congruent  Thought Process:  organized, goal directed, decreased rate of thoughts, slowing of thoughts, negative thinking, concrete  Thought Content: no overt delusions, no current suicidal or homicidal thoughts and no plans verbalized.  No overt delusional content elicited.  Not appearing to respond.  No phobias obsessions compulsions reported.  No distorted body perceptions reported.  Perceptual Disturbances: no auditory hallucinations, no visual hallucinations  Hx Risk Factors: chronic depressive symptoms, chronic anxiety symptoms, history of suicide attempts  Sensorium: alert, oriented x 3 spheres and situation  Cognition: recent and remote memory grossly intact  Consciousness: alert, awake, and not sedated  Attention: attention span and concentration are age appropriate  Intellect: appears to be of average intelligence  Insight: limited  Judgement: limited  Motor Activity: no abnormal movements     Vitals  Temp:  [97.7 °F (36.5 °C)-98 °F (36.7 °C)] 97.7 °F (36.5 °C)  HR:  [88-93] 93  Resp:  [17-18] 18  BP: (125-129)/(79-87) 125/79  SpO2:  [95 %-96 %] 95 %  No intake or output data in the 24 hours ending 07/01/24 0508    Lab Results: All Labs For Current Hospital Admission Reviewed      Current Facility-Administered Medications   Medication Dose Route Frequency Provider Last Rate    acetaminophen  650 mg Oral Q6H PRN ALVINA Harley      acetaminophen  650 mg Oral Q4H PRN ALVINA Harley      acetaminophen  975 mg Oral Q6H PRN ALVINA Harley      aluminum-magnesium hydroxide-simethicone  30 mL Oral Q4H PRN ALVINA Harley      atorvastatin  10 mg Oral Daily ALVINA Lewis      benztropine  1 mg Intramuscular Q4H PRN Max 6/day ALVINA Harley      benztropine  1 mg Oral Q4H PRN Max 6/day ALVINA Harley      bisacodyl  10 mg Rectal Daily PRN ALVINA Harley      cariprazine  3 mg Oral Daily Martin Cardenas MD      Cholecalciferol  2,000 Units Oral Daily ALVINA Lewis      cyanocobalamin   1,000 mcg Oral Daily Sary LinkALVINA Thompson      hydrOXYzine HCL  25 mg Oral Q6H PRN Max 4/day ALVINA Harley      hydrOXYzine HCL  25 mg Oral TID Martin Cardenas MD      hydrOXYzine HCL  50 mg Oral Q4H PRN Max 4/day ALVINA Harley      Or    LORazepam  1 mg Intramuscular Q4H PRN ALVINA Harley      lamoTRIgine  25 mg Oral Daily Martin Cardenas MD      [START ON 7/10/2024] lamoTRIgine  50 mg Oral Daily Martin Cardenas MD      lisinopril  10 mg Oral Daily Sary LinkALVINA Thompson      LORazepam  1 mg Oral Q4H PRN Max 6/day ALVINA Harley      Or    LORazepam  2 mg Intramuscular Q6H PRN Max 3/day ALVINA Harley      OLANZapine  10 mg Oral Q3H PRN Max 3/day ALVINA Harley      Or    OLANZapine  10 mg Intramuscular Q3H PRN Max 3/day ALVINA Harley      OLANZapine  5 mg Oral Q3H PRN Max 6/day ALVINA Harley      Or    OLANZapine  5 mg Intramuscular Q3H PRN Max 6/day ALVINA Harley      OLANZapine  2.5 mg Oral Q3H PRN Max 8/day ALVINA Harley      polyethylene glycol  17 g Oral Daily PRN ALVINA Harley      propranolol  10 mg Oral Q8H PRN ALVINA Harley      senna-docusate sodium  1 tablet Oral Daily PRN ALVINA Harley      sertraline  150 mg Oral HS Martin Cardenas MD         Counseling / Coordination of Care: Total floor / unit time spent today 15 minutes. Greater than 50% of total time was spent with the patient and / or family counseling and / or somewhat receptive to supportive listening and teaching positive coping skills to deal with symptom mangement.     Patient's Rights, confidentiality and exceptions to confidentiality, use of automated medical record, Behavioral Health Services staff access to medical record, and consent to treatment reviewed.    This note has been dictated and hence there may be problems with punctuation, spelling and formatting and if anyone has any concerns please address them to Dr. Cardenas   This note is not shared with patient due  to potential for making patient's condition worse by knowing the content of the note.

## 2024-07-01 NOTE — PLAN OF CARE
Problem: Alteration in Thoughts and Perception  Goal: Treatment Goal: Gain control of psychotic behaviors/thinking, reduce/eliminate presenting symptoms and demonstrate improved reality functioning upon discharge  Outcome: Progressing  Goal: Verbalize thoughts and feelings  Description: Interventions:  - Promote a nonjudgmental and trusting relationship with the patient through active listening and therapeutic communication  - Assess patient's level of functioning, behavior and potential for risk  - Engage patient in 1 on 1 interactions  - Encourage patient to express fears, feelings, frustrations, and discuss symptoms    - [place] patient to reality, help patient recognize reality-based thinking   - Administer medications as ordered and assess for potential side effects  - Provide the patient education related to the signs and symptoms of the illness and desired effects of prescribed medications  Outcome: Progressing  Goal: Agree to be compliant with medication regime, as prescribed and report medication side effects  Description: Interventions:  - Offer appropriate PRN medication and supervise ingestion; conduct AIMS, as needed   Outcome: Progressing  Goal: Attend and participate in unit activities, including therapeutic, recreational, and educational groups  Description: Interventions:  -Encourage Visitation and family involvement in care  Outcome: Progressing     Problem: Ineffective Coping  Goal: Participates in unit activities  Description: Interventions:  - Provide therapeutic environment   - Provide required programming   - Redirect inappropriate behaviors   Outcome: Progressing  Goal: Understands least restrictive measures  Description: Interventions:  - Utilize least restrictive behavior  Outcome: Progressing  Goal: Free from restraint events  Description: - Utilize least restrictive measures   - Provide behavioral interventions   - Redirect inappropriate behaviors   Outcome: Progressing     Problem: Risk  for Violence/Aggression Toward Others  Goal: Attend and participate in unit activities, including therapeutic, recreational, and educational groups  Description: Interventions:  - Provide therapeutic and educational activities daily, encourage attendance and participation, and document same in the medical record   Outcome: Progressing     Problem: Alteration in Orientation  Goal: Interact with staff daily  Description: Interventions:  - Assess and re-assess patient's level of orientation  - Engage patient in 1 on 1 interactions, daily, for a minimum of 15 minutes   - Establish rapport/trust with patient   Outcome: Progressing  Goal: Allow medical examinations, as recommended  Description: Interventions:  - Provide physical/neurological exams and/or referrals, per provider   Outcome: Progressing  Goal: Cooperate with recommended testing/procedures  Description: Interventions:  - Determine need for ancillary testing  - Observe for mental status changes  - Implement falls/precaution protocol   Outcome: Progressing  Goal: Attend and participate in unit activities, including therapeutic, recreational, and educational groups  Description: Interventions:  - Provide therapeutic and educational activities daily, encourage attendance and participation, and document same in the medical record   - Provide appropriate opportunities for reminiscence   - Provide a consistent daily routine   - Encourage family contact/visitation   Outcome: Progressing  Goal: Complete daily ADLs, including personal hygiene independently, as able  Description: Interventions:  - Observe, teach, and assist patient with ADLS  Outcome: Progressing

## 2024-07-01 NOTE — PLAN OF CARE
Problem: Ineffective Coping  Goal: Identifies ineffective coping skills  Outcome: Progressing  Goal: Identifies healthy coping skills  Outcome: Progressing  Goal: Demonstrates healthy coping skills  Outcome: Progressing  Goal: Participates in unit activities  Description: Interventions:  - Provide therapeutic environment   - Provide required programming   - Redirect inappropriate behaviors   Outcome: Progressing   Attended 60% of the groups offered last week.

## 2024-07-02 PROCEDURE — 99232 SBSQ HOSP IP/OBS MODERATE 35: CPT | Performed by: PSYCHIATRY & NEUROLOGY

## 2024-07-02 RX ADMIN — HYDROXYZINE HYDROCHLORIDE 25 MG: 25 TABLET ORAL at 08:34

## 2024-07-02 RX ADMIN — CYANOCOBALAMIN TAB 1000 MCG 1000 MCG: 1000 TAB at 08:34

## 2024-07-02 RX ADMIN — LAMOTRIGINE 25 MG: 25 TABLET ORAL at 08:34

## 2024-07-02 RX ADMIN — LISINOPRIL 10 MG: 10 TABLET ORAL at 08:34

## 2024-07-02 RX ADMIN — SERTRALINE HYDROCHLORIDE 150 MG: 100 TABLET ORAL at 21:15

## 2024-07-02 RX ADMIN — ATORVASTATIN CALCIUM 10 MG: 10 TABLET, FILM COATED ORAL at 08:34

## 2024-07-02 RX ADMIN — HYDROXYZINE HYDROCHLORIDE 25 MG: 25 TABLET ORAL at 21:15

## 2024-07-02 RX ADMIN — CARIPRAZINE 3 MG: 3 CAPSULE, GELATIN COATED ORAL at 08:34

## 2024-07-02 RX ADMIN — HYDROXYZINE HYDROCHLORIDE 25 MG: 25 TABLET ORAL at 17:18

## 2024-07-02 RX ADMIN — CHOLECALCIFEROL TAB 25 MCG (1000 UNIT) 2000 UNITS: 25 TAB at 08:34

## 2024-07-02 NOTE — PROGRESS NOTES
Psychiatry Progress Note [facility]    Deyvi Cao 54 y.o. male MRN: [id number]  Unit/Bed#: -01 Encounter: [id number]  Code Status: Level 1 - Full Code    PCP: Manuel Chris MD    Date of Admission:  6/25/2024 1232   Date of Service:  07/02/24    Patient Active Problem List   Diagnosis    Acne    Benign essential hypertension    Mixed hyperlipidemia    Tobacco dependence syndrome    Allergic rhinitis due to allergen    Medical clearance for psychiatric admission    Lung cancer screening declined by patient    Moderate depressed bipolar I disorder (HCC)    Bipolar disorder with severe depression (HCC)    Rosacea         Review of systems: Unremarkable  Psychiatric Diagnosis: Bipolar depression    Assessment  Overall Status: Adjusting to the unit but withdrawn and isolated preoccupied and anxious rating depression 5 out of 10 provide with a flat to constricted affect with minimal socialization attending some groups denying any active suicidal thoughts  certification Statement: The patient will continue to require additional inpatient hospital stay due to recurrent depression and repeated suicide attempts in the community     Medications: Vraylar 3 mg a day, hydroxyzine 25 mg 3 times a day, Zoloft 150 mg a day, Lamictal 25 mg a day,  All medications reviewed and recommend they be continued for symptom management   Side effects from treatment: None reported  Medication changes   No significant changes but medications were being titrated as ordered   Medication education   Risks side effects benefits and precautions of medications discussed with patient and he did verbalize an understanding about risks for metabolic syndrome from being on neuroleptics and is form tardive dyskinesia etc.     Understanding of medications: Has good understanding about medications and side effects of his precautions benefits   Justification for dual anti-psychotics: Not applicable    Non-pharmacological  treatments  Continue with individual, group, milieu and occupational therapy using recovery principles and psycho-education about accepting illness and the need for treatment.  Behavioral health checks every 7 minutes  Safety with the patient about illness and need for treatment    Safety  Safety and communication plan established to target dynamic risk factors discussed above.    Discharge Plan   Consider a supervised setting with an ACT team    Interval Progress   Not verbalizing any suicidal thoughts or plans or death wishes but still somewhat depressed and not sure how he would feel when he gets out.  Remains isolated and withdrawn with regard to constricted affect with no good mood reactivity interacting with select peers attending some of the groups.  Casually dressed fairly groomed not aggressive or agitated or threatening or self-abusive with no need for behavioral PRNs    Acceptance by patient: Accepting  Hopefulness in recovery: Living in a group home  Involved in reintegration process: Not contacting anyone in the community   trusting in relationship with psychiatrist: Trusting  Sleep: Good  Appetite: Good  Compliance with Medications: Good  Group attendance: Improving  Significant events: Still depressed and isolated with minimal mood reactivity and a constricted affect      Mental Status Exam  Appearance: casually dressed, dressed appropriately, adequate grooming, looks stated age  Behavior: cooperative, mildly anxious, slow responses cold and aloof with no good mood reactivity  Speech: slow, scant, increased latency of response, delayed, soft, decreased volume  Mood: depressed, dysphoric, anxious  Affect: constricted, slightly brighter, mood-congruent  Thought Process: organized, goal directed, decreased rate of thoughts, slowing of thoughts, negative thinking, concrete  Thought Content: no overt delusions, no current suicidal or homicidal thoughts and no plans verbalized.  No phobias obsessions  compulsions reported.  Not appearing to respond to any delusional beliefs  Perceptual Disturbances: no auditory hallucinations, no visual hallucinations  Hx Risk Factors: chronic depressive symptoms, chronic anxiety symptoms, history of suicide attempts  Sensorium: Alert and oriented x 3 spheres and situation  Cognition: recent and remote memory grossly intact  Consciousness: alert, awake, and not sedated  Attention: attention span and concentration are age appropriate  Intellect: appears to be of average intelligence  Insight: limited  Judgement: limited  Motor Activity: no abnormal movements     Vitals  Temp:  [97.6 °F (36.4 °C)-97.8 °F (36.6 °C)] 97.8 °F (36.6 °C)  HR:  [78-90] 90  Resp:  [18] 18  BP: (142-147)/(73-77) 147/73  SpO2:  [94 %-95 %] 95 %  No intake or output data in the 24 hours ending 07/02/24 0524    Lab Results: All Labs For Current Hospital Admission Reviewed      Current Facility-Administered Medications   Medication Dose Route Frequency Provider Last Rate    acetaminophen  650 mg Oral Q6H PRN ALVINA Harley      acetaminophen  650 mg Oral Q4H PRN ALVINA Harley      acetaminophen  975 mg Oral Q6H PRN ALVINA Harley      aluminum-magnesium hydroxide-simethicone  30 mL Oral Q4H PRN ALVINA Harley      atorvastatin  10 mg Oral Daily ALVINA Lewis      benztropine  1 mg Intramuscular Q4H PRN Max 6/day ALVINA Harley      benztropine  1 mg Oral Q4H PRN Max 6/day ALVINA Harley      bisacodyl  10 mg Rectal Daily PRN ALVINA Harley      cariprazine  3 mg Oral Daily Martin Cardenas MD      Cholecalciferol  2,000 Units Oral Daily ALVINA Lewis      cyanocobalamin  1,000 mcg Oral Daily ALVINA Lewis      hydrOXYzine HCL  25 mg Oral Q6H PRN Max 4/day ALVINA Harley      hydrOXYzine HCL  25 mg Oral TID Martin Cardenas MD      hydrOXYzine HCL  50 mg Oral Q4H PRN Max 4/day ALVINA Harley      Or    LORazepam  1 mg Intramuscular  Q4H PRN ALVINA Harley      lamoTRIgine  25 mg Oral Daily Martin Cardenas MD      [START ON 7/10/2024] lamoTRIgine  50 mg Oral Daily aMrtin Cardenas MD      lisinopril  10 mg Oral Daily Sary Rodriguez ALVINA Biggs      LORazepam  1 mg Oral Q4H PRN Max 6/day ALVINA Harley      Or    LORazepam  2 mg Intramuscular Q6H PRN Max 3/day Mevla Knutson, TITINP      OLANZapine  10 mg Oral Q3H PRN Max 3/day Melvamelanie Knutson, TITINP      Or    OLANZapine  10 mg Intramuscular Q3H PRN Max 3/day Melvamelanie Knutson, CRNP      OLANZapine  5 mg Oral Q3H PRN Max 6/day ALVINA Harley      Or    OLANZapine  5 mg Intramuscular Q3H PRN Max 6/day Melva Knutson, TITINP      OLANZapine  2.5 mg Oral Q3H PRN Max 8/day Melva Knutson, TITINP      polyethylene glycol  17 g Oral Daily PRN ALVINA Harley      propranolol  10 mg Oral Q8H PRN ALVINA Harley      senna-docusate sodium  1 tablet Oral Daily PRN ALVINA Harley      sertraline  150 mg Oral HS Martin Cardenas MD         Counseling / Coordination of Care: Total floor / unit time spent today 15 minutes. Greater than 50% of total time was spent with the patient and / or family counseling and / or somewhat receptive to supportive listening and teaching positive coping skills to deal with symptom mangement.     Patient's Rights, confidentiality and exceptions to confidentiality, use of automated medical record, Behavioral Health Services staff access to medical record, and consent to treatment reviewed.    This note has been dictated and hence there may be problems with punctuation, spelling and formatting and if anyone has any concerns please address them to Dr. Cardenas   This note is not shared with patient due to potential for making patient's condition worse by knowing the content of the note.

## 2024-07-02 NOTE — NURSING NOTE
"Pt is accepting of medications without incidence and meal compliant. Pt is visible in the milieu and social with select peers. Pt sits with others and attends groups. Flat affect, with minimal laughing during medication pass in regards to a comment a peer said to writer. Pt reports depression, but denies all other s/s and states \" I'm doing okay\". Pt otherwise offers no new concerns.   "

## 2024-07-02 NOTE — PROGRESS NOTES
07/02/24 0738   Team Meeting   Meeting Type Daily Rounds   Team Members Present   Team Members Present Physician;Nurse;;Other (Discipline and Name)   Patient/Family Present   Patient Present No   Patient's Family Present No     In attendance:  MD Melva Sousa, ALVINA Haywood, LYNNE Garcia, [facility]  Mer Sales, [facility]  Gris Arizmendi, BARRETT.S.    Groups: 5/8    Pt is quiet and has a flat affect. Pt is visible and denies symptoms. No bx concerns.

## 2024-07-03 PROCEDURE — 99232 SBSQ HOSP IP/OBS MODERATE 35: CPT | Performed by: PSYCHIATRY & NEUROLOGY

## 2024-07-03 RX ADMIN — HYDROXYZINE HYDROCHLORIDE 25 MG: 25 TABLET ORAL at 17:07

## 2024-07-03 RX ADMIN — SERTRALINE HYDROCHLORIDE 150 MG: 100 TABLET ORAL at 21:29

## 2024-07-03 RX ADMIN — HYDROXYZINE HYDROCHLORIDE 25 MG: 25 TABLET ORAL at 21:29

## 2024-07-03 RX ADMIN — LAMOTRIGINE 25 MG: 25 TABLET ORAL at 08:19

## 2024-07-03 RX ADMIN — CHOLECALCIFEROL TAB 25 MCG (1000 UNIT) 2000 UNITS: 25 TAB at 08:19

## 2024-07-03 RX ADMIN — ATORVASTATIN CALCIUM 10 MG: 10 TABLET, FILM COATED ORAL at 08:18

## 2024-07-03 RX ADMIN — HYDROXYZINE HYDROCHLORIDE 25 MG: 25 TABLET ORAL at 08:18

## 2024-07-03 RX ADMIN — CARIPRAZINE 3 MG: 3 CAPSULE, GELATIN COATED ORAL at 08:18

## 2024-07-03 RX ADMIN — LISINOPRIL 10 MG: 10 TABLET ORAL at 08:18

## 2024-07-03 RX ADMIN — CYANOCOBALAMIN TAB 1000 MCG 1000 MCG: 1000 TAB at 08:18

## 2024-07-03 NOTE — NURSING NOTE
"Pt is visible on the unit but isolative to self. Consumed 100% of breakfast and 100% of lunch. Took medications without incidence. Pt is polite and cooperative. Attended select groups. Reports \"a little\" anxiety and depression. No behavioral issues. Pt offers no concerns or complaints. VSS. Continuous safety checks maintained.  "

## 2024-07-03 NOTE — PROGRESS NOTES
07/03/24 0736   Team Meeting   Meeting Type Daily Rounds   Team Members Present   Team Members Present Physician;Nurse;;Other (Discipline and Name)   Patient/Family Present   Patient Present No   Patient's Family Present No     In attendance:  MD Melva Sousa, ALVINA Charles, LYNNE Gracia, \[facility]\""W  Mer Sales, \[facility]\""W  BARRETT Elmore.S.    Groups: 5/9    Pt is visible and social with select peers. Pt denies symptoms other than mild anxiety and depression. Pt is quiet, flat, depressive.

## 2024-07-03 NOTE — PLAN OF CARE
Problem: Ineffective Coping  Goal: Identifies ineffective coping skills  Outcome: Progressing  Goal: Identifies healthy coping skills  Outcome: Progressing  Goal: Demonstrates healthy coping skills  Outcome: Progressing  Goal: Participates in unit activities  Description: Interventions:  - Provide therapeutic environment   - Provide required programming   - Redirect inappropriate behaviors   Outcome: Progressing    Attended 10/17 groups offered in the past 2 days.

## 2024-07-03 NOTE — PROGRESS NOTES
Inpatient Behavioral Health Admission Self-Assessment with the following results:  1.The biggest stressor leading to this admission is : not knowing what to expect  2.I enjoy:   [] Reading   [x] Cards/Board Games  [] Gardening  [] Music  [] Singing  [] Sports  [x] Art  [x] Crafts/art  [] Exercise  [x] Computers  [] Social Media  [] Writing     3.Choose the top 4 items that you want to work on here in the hospital to prep for discharge:   [x] Self-Esteem  [] Assertiveness/ communication  [] Anger Management  [] Life Skills  [] Improving concentration and memory  [] Coping Strategies  [] Helping my family to understand my struggles  [] Developing relationships  [] Relaxation techniques   [] Knowledge about my mental health  [] Knowledge about my medication  [] Healthy lifestyles  [] Community support and resources  [] Managing conflict

## 2024-07-03 NOTE — NURSING NOTE
Received pt in bed at change of shift with eyes closed; chest movement noted.  Continues the same thus this far as per q 7 min room checks.   Will continue to monitor behavior, sleeping pattern and any medical issues that may arise.    0601:  Sleeping 7+ hrs thus this far

## 2024-07-03 NOTE — NURSING NOTE
Present in milieu, social with select peers. Pleasant , cooperative, flat affect. Feels slightly anxious and a little depressed but does not want to elaborate. Compliant with meds,  No behavior issues, Denies SI/HI.

## 2024-07-03 NOTE — PLAN OF CARE
Problem: Alteration in Thoughts and Perception  Goal: Treatment Goal: Gain control of psychotic behaviors/thinking, reduce/eliminate presenting symptoms and demonstrate improved reality functioning upon discharge  Outcome: Progressing  Goal: Verbalize thoughts and feelings  Description: Interventions:  - Promote a nonjudgmental and trusting relationship with the patient through active listening and therapeutic communication  - Assess patient's level of functioning, behavior and potential for risk  - Engage patient in 1 on 1 interactions  - Encourage patient to express fears, feelings, frustrations, and discuss symptoms    - Elaine patient to reality, help patient recognize reality-based thinking   - Administer medications as ordered and assess for potential side effects  - Provide the patient education related to the signs and symptoms of the illness and desired effects of prescribed medications  Outcome: Progressing  Goal: Refrain from acting on delusional thinking/internal stimuli  Description: Interventions:  - Monitor patient closely, per order   - Utilize least restrictive measures   - Set reasonable limits, give positive feedback for acceptable   - Administer medications as ordered and monitor of potential side effects  Outcome: Progressing  Goal: Agree to be compliant with medication regime, as prescribed and report medication side effects  Description: Interventions:  - Offer appropriate PRN medication and supervise ingestion; conduct AIMS, as needed   Outcome: Progressing  Goal: Attend and participate in unit activities, including therapeutic, recreational, and educational groups  Description: Interventions:  -Encourage Visitation and family involvement in care  Outcome: Progressing  Goal: Recognize dysfunctional thoughts, communicate reality-based thoughts at the time of discharge  Description: Interventions:  - Provide medication and psycho-education to assist patient in compliance and developing  insight into his/her illness   Outcome: Progressing  Goal: Complete daily ADLs, including personal hygiene independently, as able  Description: Interventions:  - Observe, teach, and assist patient with ADLS  - Monitor and promote a balance of rest/activity, with adequate nutrition and elimination   Outcome: Progressing     Problem: Ineffective Coping  Goal: Cooperates with admission process  Description: Interventions:   - Complete admission process  Outcome: Progressing  Goal: Identifies ineffective coping skills  Outcome: Progressing  Goal: Identifies healthy coping skills  Outcome: Progressing  Goal: Demonstrates healthy coping skills  Outcome: Progressing  Goal: Participates in unit activities  Description: Interventions:  - Provide therapeutic environment   - Provide required programming   - Redirect inappropriate behaviors   Outcome: Progressing  Goal: Free from restraint events  Description: - Utilize least restrictive measures   - Provide behavioral interventions   - Redirect inappropriate behaviors   Outcome: Progressing     Problem: Risk for Self Injury/Neglect  Goal: Treatment Goal: Remain safe during length of stay, learn and adopt new coping skills, and be free of self-injurious ideation, impulses and acts at the time of discharge  Outcome: Progressing  Goal: Verbalize thoughts and feelings  Description: Interventions:  - Assess and re-assess patient's lethality and potential for self-injury  - Engage patient in 1:1 interactions, daily, for a minimum of 15 minutes  - Encourage patient to express feelings, fears, frustrations, hopes  - Establish rapport/trust with patient   Outcome: Progressing  Goal: Refrain from harming self  Description: Interventions:  - Monitor patient closely, per order  - Develop a trusting relationship  - Supervise medication ingestion, monitor effects and side effects   Outcome: Progressing  Goal: Attend and participate in unit activities, including therapeutic, recreational,  and educational groups  Description: Interventions:  - Provide therapeutic and educational activities daily, encourage attendance and participation, and document same in the medical record  - Obtain collateral information, encourage visitation and family involvement in care   Outcome: Progressing  Goal: Complete daily ADLs, including personal hygiene independently, as able  Description: Interventions:  - Observe, teach, and assist patient with ADLS  - Monitor and promote a balance of rest/activity, with adequate nutrition and elimination  Outcome: Progressing     Problem: Depression  Goal: Treatment Goal: Demonstrate behavioral control of depressive symptoms, verbalize feelings of improved mood/affect, and adopt new coping skills prior to discharge  Outcome: Progressing  Goal: Refrain from harming self  Description: Interventions:  - Monitor patient closely, per order   - Supervise medication ingestion, monitor effects and side effects   Outcome: Progressing  Goal: Refrain from isolation  Description: Interventions:  - Develop a trusting relationship   - Encourage socialization   Outcome: Progressing  Goal: Refrain from self-neglect  Outcome: Progressing  Goal: Attend and participate in unit activities, including therapeutic, recreational, and educational groups  Description: Interventions:  - Provide therapeutic and educational activities daily, encourage attendance and participation, and document same in the medical record   Outcome: Progressing  Goal: Complete daily ADLs, including personal hygiene independently, as able  Description: Interventions:  - Observe, teach, and assist patient with ADLS  -  Monitor and promote a balance of rest/activity, with adequate nutrition and elimination   Outcome: Progressing     Problem: Anxiety  Goal: Anxiety is at manageable level  Description: Interventions:  - Assess and monitor patient's anxiety level.   - Monitor for signs and symptoms (heart palpitations, chest pain,  shortness of breath, headaches, nausea, feeling jumpy, restlessness, irritable, apprehensive).   - Collaborate with interdisciplinary team and initiate plan and interventions as ordered.  - [place] patient to unit/surroundings  - Explain treatment plan  - Encourage participation in care  - Encourage verbalization of concerns/fears  - Identify coping mechanisms  - Assist in developing anxiety-reducing skills  - Administer/offer alternative therapies  - Limit or eliminate stimulants  Outcome: Progressing     Problem: Risk for Violence/Aggression Toward Others  Goal: Treatment Goal: Refrain from acts of violence/aggression during length of stay, and demonstrate improved impulse control at the time of discharge  Outcome: Progressing  Goal: Verbalize thoughts and feelings  Description: Interventions:  - Assess and re-assess patient's level of risk, every waking shift  - Engage patient in 1:1 interactions, daily, for a minimum of 15 minutes   - Allow patient to express feelings and frustrations in a safe and non-threatening manner   - Establish rapport/trust with patient   Outcome: Progressing  Goal: Refrain from harming others  Outcome: Progressing  Goal: Refrain from destructive acts on the environment or property  Outcome: Progressing  Goal: Control angry outbursts  Description: Interventions:  - Monitor patient closely, per order  - Ensure early verbal de-escalation  - Monitor prn medication needs  - Set reasonable/therapeutic limits, outline behavioral expectations, and consequences   - Provide a non-threatening milieu, utilizing the least restrictive interventions   Outcome: Progressing  Goal: Identify appropriate positive anger management techniques  Description: Interventions:  - Offer anger management and coping skills groups   - Staff will provide positive feedback for appropriate anger control  Outcome: Progressing     Problem: Alteration in Orientation  Goal: Treatment Goal: Demonstrate a reduction of confusion  and improved orientation to person, place, time and/or situation upon discharge, according to optimum baseline/ability  Outcome: Progressing  Goal: Interact with staff daily  Description: Interventions:  - Assess and re-assess patient's level of orientation  - Engage patient in 1 on 1 interactions, daily, for a minimum of 15 minutes   - Establish rapport/trust with patient   Outcome: Progressing  Goal: Attend and participate in unit activities, including therapeutic, recreational, and educational groups  Description: Interventions:  - Provide therapeutic and educational activities daily, encourage attendance and participation, and document same in the medical record   - Provide appropriate opportunities for reminiscence   - Provide a consistent daily routine   - Encourage family contact/visitation   Outcome: Progressing  Goal: Complete daily ADLs, including personal hygiene independently, as able  Description: Interventions:  - Observe, teach, and assist patient with ADLS  Outcome: Progressing

## 2024-07-03 NOTE — SOCIAL WORK
SW met with pt 1:1  SW reviewed how pt is settling in with peers on the unit. Pt is scant and quiet in his responses but polite and willing to engage.   SW and Pt processed recent social interactions and instances that led to his most recent hospitalization.   Pt identified a willingness to try another CRR in the future when ready for discharge; SW noted hopefulness for the future.   Pt inquired about obtaining additional clothing within the next few days/weeks; SW will assist later in the week.

## 2024-07-03 NOTE — PROGRESS NOTES
Psychiatry Progress Note [facility]    Deyvi Cao 54 y.o. male MRN: [id number]  Unit/Bed#: [facility] 109-01 Encounter: [id number]  Code Status: Level 1 - Full Code    PCP: Manuel Chris MD    Date of Admission:  6/25/2024 1232   Date of Service:  07/03/24    Patient Active Problem List   Diagnosis    Acne    Benign essential hypertension    Mixed hyperlipidemia    Tobacco dependence syndrome    Allergic rhinitis due to allergen    Medical clearance for psychiatric admission    Lung cancer screening declined by patient    Moderate depressed bipolar I disorder (HCC)    Bipolar disorder with severe depression (HCC)    Rosacea     Review of systems: Unremarkable  Psychiatric Diagnosis: Bipolar depression    Assessment  Overall Status: Continues to appear sad with no good mood reactivity claiming depression is 5 out of 10 but not admitting to any suicidal thoughts intent or plans or death wishes  certification Statement: The patient will continue to require additional inpatient hospital stay due to recurrent depression and repeated suicide attempts in the community     Medications: Vraylar 3 mg a day, hydroxyzine 25 mg 3 times a day, Zoloft 150 mg a day, Lamictal 25 mg a day,  All medications reviewed and recommend they be continued for symptom management  Side effects from treatment: None reported  Medication changes   No significant changes but medications were being titrated as ordered   Medication education   Risks side effects benefits and precautions of medications discussed with patient and he did verbalize an understanding about risks for metabolic syndrome from being on neuroleptics and is form tardive dyskinesia etc.     Understanding of medications: Has good understanding about medications and side effects of his precautions benefits   Justification for dual anti-psychotics: Not applicable    Non-pharmacological treatments  Continue with individual, group, milieu and occupational therapy  using recovery principles and psycho-education about accepting illness and the need for treatment.  Behavioral health checks every 7 minutes  Safety with the patient about illness and need for treatment    Safety  Safety and communication plan established to target dynamic risk factors discussed above.    Discharge Plan   Consider a supervised setting with an ACT team    Interval Progress   Still somewhat withdrawn and isolated staying to himself and does attend about half of the groups and admits to feeling sad rating depression as 5 out of 10 with no death wishes or suicidal thoughts and no plans but not sure what he would do when he gets out.  Receptive to verbal support and reassurance that we are trying to send him to a supervised setting rather than to the street and with an ACT team and he agreed to contract for safety.  Casually dressed fairly groomed and has not been aggressive or agitated or threatening or self-abusive with no need for behavioral PRNs  Acceptance by patient: Accepting  Hopefulness in recovery: Living in a group home  Involved in reintegration process: Not contacting anyone in the community   trusting in relationship with psychiatrist: Trusting  Sleep: Good  Appetite: Good  Compliance with Medications: Good  Group attendance: Improving 5/9  Significant events: Still depressed, isolated with minimal mood reactivity and a constricted affect      Mental Status Exam  Appearance: casually dressed, dressed appropriately, adequate grooming, looks stated age  Behavior: cooperative, mildly anxious, slow responses cold and aloof with no good mood reactivity but with good eye contact  Speech: slow, scant, increased latency of response, delayed, soft, decreased volume  Mood: depressed, dysphoric, anxious  Affect: constricted, slightly brighter, mood-congruent  Thought Process: organized, goal directed, decreased rate of thoughts, slowing of thoughts, negative thinking, concrete  Thought Content: no  overt delusions, no current suicidal or homicidal thoughts and no plans verbalized.  No phobias obsessions compulsions reported.  Not appearing to respond to any delusional beliefs  Perceptual Disturbances: no auditory hallucinations, no visual hallucinations  Hx Risk Factors: chronic depressive symptoms, chronic anxiety symptoms, history of suicide attempts  Sensorium: alert and oriented x 3 spheres and situation   Cognition: recent and remote memory grossly intact  Consciousness: alert, awake, and not sedated  Attention: attention span and concentration are age appropriate  Intellect: appears to be of average intelligence  Insight: limited  Judgement: limited  Motor Activity: no abnormal movements     Vitals  Temp:  [97.5 °F (36.4 °C)] 97.5 °F (36.4 °C)  HR:  [78-85] 85  Resp:  [18] 18  BP: (129-132)/(68-74) 132/68  SpO2:  [96 %-100 %] 100 %  No intake or output data in the 24 hours ending 07/03/24 0503    Lab Results: All Labs For Current Hospital Admission Reviewed      Current Facility-Administered Medications   Medication Dose Route Frequency Provider Last Rate    acetaminophen  650 mg Oral Q6H PRN TITI HarleyNP      acetaminophen  650 mg Oral Q4H PRN ALVINA Harley      acetaminophen  975 mg Oral Q6H PRN ALVINA Harley      aluminum-magnesium hydroxide-simethicone  30 mL Oral Q4H PRN ALVINA Harley      atorvastatin  10 mg Oral Daily ALVINA Lewis      benztropine  1 mg Intramuscular Q4H PRN Max 6/day ALVINA Harley      benztropine  1 mg Oral Q4H PRN Max 6/day ALVINA Harley      bisacodyl  10 mg Rectal Daily PRN ALVINA Harley      cariprazine  3 mg Oral Daily Martin Cardenas MD      Cholecalciferol  2,000 Units Oral Daily ALVINA Lewis      cyanocobalamin  1,000 mcg Oral Daily ALVINA Lewis      hydrOXYzine HCL  25 mg Oral Q6H PRN Max 4/day ALVINA Harley      hydrOXYzine HCL  25 mg Oral TID Martin Cardenas MD      hydrOXYzine HCL   50 mg Oral Q4H PRN Max 4/day ALVINA Harley      Or    LORazepam  1 mg Intramuscular Q4H PRN ALVINA Harley      lamoTRIgine  25 mg Oral Daily Martin Cardenas MD      [START ON 7/10/2024] lamoTRIgine  50 mg Oral Daily Martin Cardenas MD      lisinopril  10 mg Oral Daily Sary Coradonolbertogretchen, ALVINA      LORazepam  1 mg Oral Q4H PRN Max 6/day ALVINA Harley      Or    LORazepam  2 mg Intramuscular Q6H PRN Max 3/day Melvamelanie Knutson, TITINP      OLANZapine  10 mg Oral Q3H PRN Max 3/day MelvaALVINA Cordova      Or    OLANZapine  10 mg Intramuscular Q3H PRN Max 3/day Melva Knutson, TITINP      OLANZapine  5 mg Oral Q3H PRN Max 6/day ALVINA Harley      Or    OLANZapine  5 mg Intramuscular Q3H PRN Max 6/day Melva Knutson, TITINP      OLANZapine  2.5 mg Oral Q3H PRN Max 8/day Melva Knutson, TITINP      polyethylene glycol  17 g Oral Daily PRN TITI HarleyNP      propranolol  10 mg Oral Q8H PRN ALVINA Harley      senna-docusate sodium  1 tablet Oral Daily PRN ALVINA Harley      sertraline  150 mg Oral HS Martin Cardenas MD         Counseling / Coordination of Care: Total floor / unit time spent today 15 minutes. Greater than 50% of total time was spent with the patient and / or family counseling and / or somewhat receptive to supportive listening and teaching positive coping skills to deal with symptom mangement.     Patient's Rights, confidentiality and exceptions to confidentiality, use of automated medical record, Behavioral Health Services staff access to medical record, and consent to treatment reviewed.    This note has been dictated and hence there may be problems with punctuation, spelling and formatting and if anyone has any concerns please address them to Dr. Cardenas   This note is not shared with patient due to potential for making patient's condition worse by knowing the content of the note.

## 2024-07-03 NOTE — PROGRESS NOTES
Inpatient Behavioral Health Safety/Relapse Prevention Plan with the following results:    My strengths and things worth living for include: Helpful, myself    Triggers, stressors, and situations that place me at risk for a crisis situation are:  []Things people say to me  []Things people do  []Losses  []My own negative thoughts  []My own behaviors  []Being alone  []Loneliness   [x]Preoccupation with the past  []Perceived failure  []Sleep problems  []Work or School  [x]Things I am worried about  []Substance abuse  []Financial problems  []Physical problems    Warning Signs (thoughts, image, feelings and behaviors) that indicate I may need help: Not taking care of myself    Coping Skills, I can use/things that help me calm down and keep me safe  [x]Taking my medication appropriately  []Deep Breathing  [x]Exercise  []Journaling  [x]Keeping my mental health appointments  []Talking with supports  []Crafts/Arts  []Reading  [x]Taking a walk break  [x]Helping others  []Music  []Spirituality  []Volunteering   []Mindfulness   []Keeping a daily schedule for structure/purpose  []Hot shower or bath   []Support groups    Family, friends and organizations I can call for support: Traci Perea

## 2024-07-04 PROCEDURE — 99232 SBSQ HOSP IP/OBS MODERATE 35: CPT | Performed by: PHYSICIAN ASSISTANT

## 2024-07-04 RX ADMIN — CARIPRAZINE 3 MG: 3 CAPSULE, GELATIN COATED ORAL at 08:35

## 2024-07-04 RX ADMIN — LAMOTRIGINE 25 MG: 25 TABLET ORAL at 08:35

## 2024-07-04 RX ADMIN — CHOLECALCIFEROL TAB 25 MCG (1000 UNIT) 2000 UNITS: 25 TAB at 08:35

## 2024-07-04 RX ADMIN — SERTRALINE HYDROCHLORIDE 150 MG: 100 TABLET ORAL at 21:17

## 2024-07-04 RX ADMIN — HYDROXYZINE HYDROCHLORIDE 25 MG: 25 TABLET ORAL at 21:17

## 2024-07-04 RX ADMIN — CYANOCOBALAMIN TAB 1000 MCG 1000 MCG: 1000 TAB at 08:35

## 2024-07-04 RX ADMIN — LISINOPRIL 10 MG: 10 TABLET ORAL at 08:36

## 2024-07-04 RX ADMIN — HYDROXYZINE HYDROCHLORIDE 25 MG: 25 TABLET ORAL at 08:36

## 2024-07-04 RX ADMIN — HYDROXYZINE HYDROCHLORIDE 25 MG: 25 TABLET ORAL at 17:16

## 2024-07-04 RX ADMIN — ATORVASTATIN CALCIUM 10 MG: 10 TABLET, FILM COATED ORAL at 08:35

## 2024-07-04 NOTE — PLAN OF CARE
Problem: Alteration in Thoughts and Perception  Goal: Treatment Goal: Gain control of psychotic behaviors/thinking, reduce/eliminate presenting symptoms and demonstrate improved reality functioning upon discharge  Outcome: Progressing  Goal: Refrain from acting on delusional thinking/internal stimuli  Description: Interventions:  - Monitor patient closely, per order   - Utilize least restrictive measures   - Set reasonable limits, give positive feedback for acceptable   - Administer medications as ordered and monitor of potential side effects  Outcome: Progressing  Goal: Agree to be compliant with medication regime, as prescribed and report medication side effects  Description: Interventions:  - Offer appropriate PRN medication and supervise ingestion; conduct AIMS, as needed   Outcome: Progressing  Goal: Attend and participate in unit activities, including therapeutic, recreational, and educational groups  Description: Interventions:  -Encourage Visitation and family involvement in care  Outcome: Progressing  Goal: Recognize dysfunctional thoughts, communicate reality-based thoughts at the time of discharge  Description: Interventions:  - Provide medication and psycho-education to assist patient in compliance and developing insight into his/her illness   Outcome: Progressing  Goal: Complete daily ADLs, including personal hygiene independently, as able  Description: Interventions:  - Observe, teach, and assist patient with ADLS  - Monitor and promote a balance of rest/activity, with adequate nutrition and elimination   Outcome: Progressing     Problem: Risk for Self Injury/Neglect  Goal: Treatment Goal: Remain safe during length of stay, learn and adopt new coping skills, and be free of self-injurious ideation, impulses and acts at the time of discharge  Outcome: Progressing  Goal: Refrain from harming self  Description: Interventions:  - Monitor patient closely, per order  - Develop a trusting relationship  -  Supervise medication ingestion, monitor effects and side effects   Outcome: Progressing  Goal: Attend and participate in unit activities, including therapeutic, recreational, and educational groups  Description: Interventions:  - Provide therapeutic and educational activities daily, encourage attendance and participation, and document same in the medical record  - Obtain collateral information, encourage visitation and family involvement in care   Outcome: Progressing  Goal: Recognize maladaptive responses and adopt new coping mechanisms  Outcome: Progressing  Goal: Complete daily ADLs, including personal hygiene independently, as able  Description: Interventions:  - Observe, teach, and assist patient with ADLS  - Monitor and promote a balance of rest/activity, with adequate nutrition and elimination  Outcome: Progressing     Problem: Depression  Goal: Treatment Goal: Demonstrate behavioral control of depressive symptoms, verbalize feelings of improved mood/affect, and adopt new coping skills prior to discharge  Outcome: Progressing  Goal: Verbalize thoughts and feelings  Description: Interventions:  - Assess and re-assess patient's level of risk   - Engage patient in 1:1 interactions, daily, for a minimum of 15 minutes   - Encourage patient to express feelings, fears, frustrations, hopes   Outcome: Progressing  Goal: Refrain from harming self  Description: Interventions:  - Monitor patient closely, per order   - Supervise medication ingestion, monitor effects and side effects   Outcome: Progressing  Goal: Refrain from isolation  Description: Interventions:  - Develop a trusting relationship   - Encourage socialization   Outcome: Progressing  Goal: Attend and participate in unit activities, including therapeutic, recreational, and educational groups  Description: Interventions:  - Provide therapeutic and educational activities daily, encourage attendance and participation, and document same in the medical record    Outcome: Progressing     Problem: Anxiety  Goal: Anxiety is at manageable level  Description: Interventions:  - Assess and monitor patient's anxiety level.   - Monitor for signs and symptoms (heart palpitations, chest pain, shortness of breath, headaches, nausea, feeling jumpy, restlessness, irritable, apprehensive).   - Collaborate with interdisciplinary team and initiate plan and interventions as ordered.  - [place] patient to unit/surroundings  - Explain treatment plan  - Encourage participation in care  - Encourage verbalization of concerns/fears  - Identify coping mechanisms  - Assist in developing anxiety-reducing skills  - Administer/offer alternative therapies  - Limit or eliminate stimulants  Outcome: Progressing

## 2024-07-04 NOTE — PROGRESS NOTES
"Progress Note - Behavioral Health     Deyvi Cao 54 y.o. male MRN: [id number]  Unit/Bed#: [facility] 112-02 Encounter: [id number]      Deyvi Cao was seen for continuing care and reviewed with treatment team.  Pt was in bed and scant, appeared guarded, internally preoccupied and a bit distracted.  He reported feeling \"The same\" as when he first arrived on unit-- but then stated feeling \"Slightly better\" in regards to depression. His last SI was approx 2 months ago.  He reports the anxiety is the same.  Pt denied any present SI, HI, hallucinations or paranoia.  Pt reports sleep and appetite are \"Normal\" (nursing is in agreement that these have been good).  He is currently homeless and when asked, he was open to transition to a group home.          Per EMR and floor team, the Pt has been calm, cooperative, medication compliant, and described as generally quiet though polite.  He is intermittently visible in the milieu but often socially isolative.  He has attended many therapeutic groups in the past few days with appropriate behavior among peers.  Hygiene is noted to be adequate    Sleep:Good  Appetite: Good   Medication side effects: None per Pt    ROS: No complaints per Pt, (All ROS Negative)    Labs/Tests: Reviewed    Mental Status Evaluation:  Appearance:  Dressed, clean, good eye contact   Behavior:  Calm, cooperative, pleasant, Withdrawn   Speech:  Clear, normal rate and volume, not very spontaneous but answers readily   Mood:  Anxious, Depressed   Affect:  Constricted   Thought Process:  Organized, paucity of thought   Associations: Intact associations   Thought Content:  No verbalized delusions   Perceptual Disturbances: Pt denies any hallucinations or paranoia and does not appear to be responding to internal stimuli   Risk Potential: Pt presently denies SI or HI    Sensorium:  Self, birthday, Place, Day of the week, Month, Year   Memory:  short term memory grossly intact   Consciousness:  alert, awake "   Attention: Good   Insight:  Fair   Judgment: Good   Gait/Station: Normal gait/station   Motor Activity: No abnormal movements     Vitals:    07/03/24 1514 07/03/24 1516 07/04/24 0740 07/04/24 1530   BP: 126/84 112/73 132/75 133/76   BP Location: Right arm Right arm Left arm Right arm   Pulse: 86 (!) 106 77 87   Resp: 18 18 18 18   Temp: 97.7 °F (36.5 °C) 97.9 °F (36.6 °C) 97.8 °F (36.6 °C) 97.5 °F (36.4 °C)   TempSrc: Temporal Temporal Temporal Temporal   SpO2: 94% 95% 97% 98%   Weight:       Height:           Admission on 06/25/2024   Component Date Value    TSH 3RD GENERATON 06/26/2024 2.636     Cholesterol 06/26/2024 177     Triglycerides 06/26/2024 73     HDL, Direct 06/26/2024 52     LDL Calculated 06/26/2024 110 (H)     Non-HDL-Chol (CHOL-HDL) 06/26/2024 125     Vitamin B-12 06/26/2024 367     Folate 06/26/2024 13.0     Vit D, 25-Hydroxy 06/26/2024 19.2 (L)     Sodium 06/26/2024 137     Potassium 06/26/2024 4.1     Chloride 06/26/2024 101     CO2 06/26/2024 26     ANION GAP 06/26/2024 10     BUN 06/26/2024 17     Creatinine 06/26/2024 0.63     Glucose 06/26/2024 98     Glucose, Fasting 06/26/2024 98     Calcium 06/26/2024 9.6     AST 06/26/2024 25     ALT 06/26/2024 45     Alkaline Phosphatase 06/26/2024 114 (H)     Total Protein 06/26/2024 7.0     Albumin 06/26/2024 4.4     Total Bilirubin 06/26/2024 0.44     eGFR 06/26/2024 110     WBC 06/26/2024 7.39     RBC 06/26/2024 4.70     Hemoglobin 06/26/2024 15.2     Hematocrit 06/26/2024 45.5     MCV 06/26/2024 97     MCH 06/26/2024 32.3     MCHC 06/26/2024 33.4     RDW 06/26/2024 12.9     MPV 06/26/2024 8.9     Platelets 06/26/2024 246     nRBC 06/26/2024 0     Segmented % 06/26/2024 64     Immature Grans % 06/26/2024 0     Lymphocytes % 06/26/2024 25     Monocytes % 06/26/2024 7     Eosinophils Relative 06/26/2024 3     Basophils Relative 06/26/2024 1     Absolute Neutrophils 06/26/2024 4.63     Absolute Immature Grans 06/26/2024 0.03     Absolute Lymphocytes  06/26/2024 1.88     Absolute Monocytes 06/26/2024 0.55     Eosinophils Absolute 06/26/2024 0.22     Basophils Absolute 06/26/2024 0.08     Ventricular Rate 06/26/2024 75     Atrial Rate 06/26/2024 75     VA Interval 06/26/2024 176     QRSD Interval 06/26/2024 104     QT Interval 06/26/2024 396     QTC Interval 06/26/2024 442     P Axis 06/26/2024 76     QRS Axis 06/26/2024 73     T Wave Axis 06/26/2024 69        Progress Toward Goals:  Based on today's interview and review of prior notes, Pt is making slow progress on unit.  Continue the present medication regimen unchanged      Assessment & Plan   Principal Problem:    Bipolar disorder with severe depression (HCC)  Active Problems:    Benign essential hypertension    Mixed hyperlipidemia    Allergic rhinitis due to allergen    Medical clearance for psychiatric admission    Rosacea      Recommended Treatment: Continue with pharmacotherapy, group therapy, milieu therapy and occupational therapy.  The patient will be maintained on the following medications:  Current Facility-Administered Medications   Medication Dose Route Frequency Provider Last Rate    acetaminophen  650 mg Oral Q6H PRN TITI HarleyNP      acetaminophen  650 mg Oral Q4H PRN ALVINA Harley      acetaminophen  975 mg Oral Q6H PRN ALVINA Harley      aluminum-magnesium hydroxide-simethicone  30 mL Oral Q4H PRN ALVINA Harley      atorvastatin  10 mg Oral Daily ALVINA Lewis      benztropine  1 mg Intramuscular Q4H PRN Max 6/day ALVINA Harley      benztropine  1 mg Oral Q4H PRN Max 6/day ALVINA Harley      bisacodyl  10 mg Rectal Daily PRN ALVINA Harley      cariprazine  3 mg Oral Daily Martin Cardenas MD      Cholecalciferol  2,000 Units Oral Daily ALVINA Lewis      cyanocobalamin  1,000 mcg Oral Daily ALVINA Lewis      hydrOXYzine HCL  25 mg Oral Q6H PRN Max 4/day ALVINA Harley      hydrOXYzine HCL  25 mg Oral TID Martin SIMMS  MD Ronald      hydrOXYzine HCL  50 mg Oral Q4H PRN Max 4/day ALVINA Harley      Or    LORazepam  1 mg Intramuscular Q4H PRN ALVINA Harley      lamoTRIgine  25 mg Oral Daily Martin Cardenas MD      [START ON 7/10/2024] lamoTRIgine  50 mg Oral Daily Martin Cardenas MD      lisinopril  10 mg Oral Daily Sary LinkALVINA Thompson      LORazepam  1 mg Oral Q4H PRN Max 6/day ALVINA Harley      Or    LORazepam  2 mg Intramuscular Q6H PRN Max 3/day ALVINA Harley      OLANZapine  10 mg Oral Q3H PRN Max 3/day ALVINA Harley      Or    OLANZapine  10 mg Intramuscular Q3H PRN Max 3/day ALVINA Harley      OLANZapine  5 mg Oral Q3H PRN Max 6/day ALVINA Harley      Or    OLANZapine  5 mg Intramuscular Q3H PRN Max 6/day ALVINA Harley      OLANZapine  2.5 mg Oral Q3H PRN Max 8/day ALVINA Harley      polyethylene glycol  17 g Oral Daily PRN ALVINA Harley      propranolol  10 mg Oral Q8H PRN ALVINA Harley      senna-docusate sodium  1 tablet Oral Daily PRN ALVINA Harley      sertraline  150 mg Oral HS Martin Cardenas MD         Risks, benefits and possible side effects of Medications:   Risks, benefits, and possible side effects of medications explained to patient and patient verbalizes understanding

## 2024-07-04 NOTE — NURSING NOTE
Patient is flat, scant, and cooperative. Visible intermittently and no peer interactions observed. Med and meal compliant. Attended community meeting. Continuous rounding maintained.

## 2024-07-04 NOTE — NURSING NOTE
"Pt quiet and isolative to room. On approach presents as anxious and guarded. Encouraged to increased visibility on the unit and group attendance; does not seem interested. Scant but appropriate on assessment. Denies SI/HI. Denies psychosis. Endorses \" five out of ten depression\" describes as no change from baseline, and denies need to utilize PRN medication. +meals and meds. Good appetite. Compliant w/ mouth checks. Q7's maintained. Will cont to provide support as needed.   "

## 2024-07-05 PROCEDURE — 99232 SBSQ HOSP IP/OBS MODERATE 35: CPT | Performed by: PSYCHIATRY & NEUROLOGY

## 2024-07-05 RX ADMIN — SERTRALINE HYDROCHLORIDE 150 MG: 100 TABLET ORAL at 21:14

## 2024-07-05 RX ADMIN — CARIPRAZINE 3 MG: 3 CAPSULE, GELATIN COATED ORAL at 08:24

## 2024-07-05 RX ADMIN — HYDROXYZINE HYDROCHLORIDE 25 MG: 25 TABLET ORAL at 21:14

## 2024-07-05 RX ADMIN — LISINOPRIL 10 MG: 10 TABLET ORAL at 08:24

## 2024-07-05 RX ADMIN — CYANOCOBALAMIN TAB 1000 MCG 1000 MCG: 1000 TAB at 08:24

## 2024-07-05 RX ADMIN — HYDROXYZINE HYDROCHLORIDE 25 MG: 25 TABLET ORAL at 17:16

## 2024-07-05 RX ADMIN — HYDROXYZINE HYDROCHLORIDE 25 MG: 25 TABLET ORAL at 08:24

## 2024-07-05 RX ADMIN — LAMOTRIGINE 25 MG: 25 TABLET ORAL at 08:24

## 2024-07-05 RX ADMIN — ATORVASTATIN CALCIUM 10 MG: 10 TABLET, FILM COATED ORAL at 08:24

## 2024-07-05 RX ADMIN — CHOLECALCIFEROL TAB 25 MCG (1000 UNIT) 2000 UNITS: 25 TAB at 08:24

## 2024-07-05 NOTE — PLAN OF CARE
Problem: Ineffective Coping  Goal: Identifies ineffective coping skills  Outcome: Progressing  Goal: Identifies healthy coping skills  Outcome: Progressing  Goal: Demonstrates healthy coping skills  Outcome: Progressing  Goal: Participates in unit activities  Description: Interventions:  - Provide therapeutic environment   - Provide required programming   - Redirect inappropriate behaviors   Outcome: Progressing   Attended 5/13 groups offered during the past 2 days.

## 2024-07-05 NOTE — PROGRESS NOTES
Psychiatry Progress Note [facility]    Deyvi Cao 54 y.o. male MRN: [id number]  Unit/Bed#: [facility] 112-02 Encounter: [id number]  Code Status: Level 1 - Full Code    PCP: Manuel Chris MD    Date of Admission:  6/25/2024 1232   Date of Service:  07/05/24    Patient Active Problem List   Diagnosis    Acne    Benign essential hypertension    Mixed hyperlipidemia    Tobacco dependence syndrome    Allergic rhinitis due to allergen    Medical clearance for psychiatric admission    Lung cancer screening declined by patient    Moderate depressed bipolar I disorder (HCC)    Bipolar disorder with severe depression (HCC)    Rosacea     Review of systems: Unremarkable  Psychiatric Diagnosis: Bipolar depression    Assessment  Overall Status: Continues to report depression is still a 5 out of 10 but feels he is not getting any worse and has not had any suicidal thoughts in over 2 months.  Still somewhat isolated scant with minimal responses appearing sad withdrawn with a constricted to flat affect with no good mood reactivity but attending groups reporting mild anxiety and some depression  certification Statement: The patient will continue to require additional inpatient hospital stay due to recurrent depression and repeated suicide attempts in the community     Medications: Vraylar 3 mg a day, hydroxyzine 25 mg 3 times a day, Zoloft 150 mg a day, Lamictal 25 mg a day,  All medications reviewed and recommend they be continued for symptom management  Side effects from treatment: None reported  Medication changes   No significant changes but medications were being titrated as ordered   Medication education   Risks side effects benefits and precautions of medications discussed with patient and he did verbalize an understanding about risks for metabolic syndrome from being on neuroleptics and is form tardive dyskinesia etc.     Understanding of medications: Has good understanding about medications and side  effects of his precautions benefits   Justification for dual anti-psychotics: Not applicable    Non-pharmacological treatments  Continue with individual, group, milieu and occupational therapy using recovery principles and psycho-education about accepting illness and the need for treatment.  Behavioral health checks every 7 minutes  Safety with the patient about illness and need for treatment    Safety  Safety and communication plan established to target dynamic risk factors discussed above.    Discharge Plan   Consider a supervised setting with an ACT team    Interval Progress   Remains scant and isolated withdrawn staying to himself spending a lot of time in his room not socializing with peers but attends more than half of the groups.  She still admits to feeling sad rating depression as 5 out of 10 with no worsening and no death wishes or suicidal thoughts in over 2 months and able to continue to contract for safety.  Receptive to verbal support and reassurance and understands she has to be discharged for the medications to kicking as it takes a while for lamotrigine to act and titrate which he has tolerated well with no rashes or other side effects.  Casually dressed fairly groomed not aggressive or agitated or threatening or self-abusive with no need for behavioral PRNs    Acceptance by patient: Accepting  Hopefulness in recovery: Living in a group home  Involved in reintegration process: Not contacting anyone in the community   trusting in relationship with psychiatrist: Trusting  Sleep: Good  Appetite: Good  Compliance with Medications: Good  Group attendance: Improving more than 50%  Significant events: Still isolated withdrawn depressed with minimal mood reactivity and a constricted affect      Mental Status Exam  Appearance: casually dressed, dressed appropriately, adequate grooming, looks stated age  Behavior: cooperative, mildly anxious, slow responses and aloof cold with no good mood reactivity but with  better eye contact  Speech: slow, scant, increased latency of response, delayed, soft, decreased volume  Mood: depressed, dysphoric, anxious  Affect: constricted, slightly brighter, mood-congruent  Thought Process: organized, goal directed, decreased rate of thoughts, slowing of thoughts, negative thinking, concrete  Thought Content: no overt delusions, no current suicidal or homicidal thoughts and no plans verbalized.  No phobias obsessions compulsions reported.  Not appearing to respond to any delusional beliefs  Perceptual Disturbances: no auditory hallucinations, no visual hallucinations  Hx Risk Factors: chronic depressive symptoms, chronic anxiety symptoms, history of suicide attempts  Sensorium: Alert and oriented x 3 spheres and situation  Cognition: recent and remote memory grossly intact  Consciousness: alert, awake, and not sedated  Attention: attention span and concentration are age appropriate  Intellect: appears to be of average intelligence  Insight: limited  Judgement: limited  Motor Activity: no abnormal movements     Vitals  Temp:  [97.5 °F (36.4 °C)-97.8 °F (36.6 °C)] 97.5 °F (36.4 °C)  HR:  [77-87] 87  Resp:  [18] 18  BP: (132-133)/(75-76) 133/76  SpO2:  [97 %-98 %] 98 %  No intake or output data in the 24 hours ending 07/05/24 0507    Lab Results: All Labs For Current Hospital Admission Reviewed      Current Facility-Administered Medications   Medication Dose Route Frequency Provider Last Rate    acetaminophen  650 mg Oral Q6H PRN ALVINA Harley      acetaminophen  650 mg Oral Q4H PRN ALVINA Harley      acetaminophen  975 mg Oral Q6H PRN ALVINA Harley      aluminum-magnesium hydroxide-simethicone  30 mL Oral Q4H PRN ALVINA Harley      atorvastatin  10 mg Oral Daily ALVINA Lewis      benztropine  1 mg Intramuscular Q4H PRN Max 6/day ALVINA Harley      benztropine  1 mg Oral Q4H PRN Max 6/day ALVINA Harley      bisacodyl  10 mg Rectal Daily PRN Melva  ALVINA Knutson      cariprazine  3 mg Oral Daily Martin Cardenas MD      Cholecalciferol  2,000 Units Oral Daily ALVINA Lewis      cyanocobalamin  1,000 mcg Oral Daily ALVINA Lewis      hydrOXYzine HCL  25 mg Oral Q6H PRN Max 4/day ALVINA Harley      hydrOXYzine HCL  25 mg Oral TID Martin Cardenas MD      hydrOXYzine HCL  50 mg Oral Q4H PRN Max 4/day ALVINA Harley      Or    LORazepam  1 mg Intramuscular Q4H PRN ALVINA Harley      lamoTRIgine  25 mg Oral Daily Martin Cardenas MD      [START ON 7/10/2024] lamoTRIgine  50 mg Oral Daily Martin Cardenas MD      lisinopril  10 mg Oral Daily ALVINA Lewis      LORazepam  1 mg Oral Q4H PRN Max 6/day ALVINA Harley      Or    LORazepam  2 mg Intramuscular Q6H PRN Max 3/day ALVINA Harley      OLANZapine  10 mg Oral Q3H PRN Max 3/day ALVINA Harley      Or    OLANZapine  10 mg Intramuscular Q3H PRN Max 3/day ALVINA Harley      OLANZapine  5 mg Oral Q3H PRN Max 6/day ALVINA Harley      Or    OLANZapine  5 mg Intramuscular Q3H PRN Max 6/day ALVINA Harley      OLANZapine  2.5 mg Oral Q3H PRN Max 8/day ALVINA Harley      polyethylene glycol  17 g Oral Daily PRN ALVINA Harley      propranolol  10 mg Oral Q8H PRN ALVINA Harley      senna-docusate sodium  1 tablet Oral Daily PRN ALVINA Harley      sertraline  150 mg Oral HS Martin Cardenas MD         Counseling / Coordination of Care: Total floor / unit time spent today 15 minutes. Greater than 50% of total time was spent with the patient and / or family counseling and / or somewhat receptive to supportive listening and teaching positive coping skills to deal with symptom mangement.     Patient's Rights, confidentiality and exceptions to confidentiality, use of automated medical record, Behavioral Health Services staff access to medical record, and consent to treatment reviewed.    This note has been dictated and hence there may be  problems with punctuation, spelling and formatting and if anyone has any concerns please address them to Dr. Cardenas   This note is not shared with patient due to potential for making patient's condition worse by knowing the content of the note.

## 2024-07-05 NOTE — NURSING NOTE
Patient has been isolative to his room since 7pm when I came on shift.  Pleasant upon approach.  Appetite good. 100 x3.  Group attendance today 2/4.   He rates his depression a 5 on 1-10 scale. Denies suicidal ideations.  He says the medications are working and keeping him from getting worse but can be stronger. He states he is anxious all the time.  Did  not request any PRN's this shift. Minimal socialization with peers. Good eye contact. Affect flat.

## 2024-07-05 NOTE — SOCIAL WORK
"SW met with pt 1:1  Reviewed his past experiences at [facility]. Pt reported that it was too \"busy\" and they expected too much of him and it didn't work out. Further processed impacts on his life and challenges from an inappropriate level of care.   SW completed CRR application with pt. Pt was in agreement. Pt requested the ability to order clothing for himself; SW will assist this afternoon.     SW attempted to assist pt with ordering necessities this afternoon. Pt expressed a desire to address this next week instead as he was lying in bed resting.   "

## 2024-07-05 NOTE — PROGRESS NOTES
07/05/24 0801   Team Meeting   Meeting Type Daily Rounds   Team Members Present   Team Members Present Physician;Nurse;;Other (Discipline and Name)   Patient/Family Present   Patient Present No   Patient's Family Present No     In attendance:  MD Melva Sousa, ALVINA Haywood, RN  Judi Garcia, [facility]  Mer Sales, [facility]  BARRETT Elmore.S.    Groups: 2/4    Pt is soft spoken and keeps to safe. Pt is guarded at times and reports depression and anxiety but no SI. No bx issues noted.

## 2024-07-05 NOTE — NURSING NOTE
Patient is visible on unit, attending groups and in dining room with peers. Pt is quiet but able to make needs known. Pt reports no s/s today, voices no concerns. No behavioral issues. Pt takes all medications without issue.

## 2024-07-05 NOTE — PLAN OF CARE
Problem: Ineffective Coping  Goal: Cooperates with admission process  Description: Interventions:   - Complete admission process  Outcome: Progressing  Goal: Identifies ineffective coping skills  Outcome: Not Progressing  Goal: Identifies healthy coping skills  Outcome: Not Progressing  Goal: Demonstrates healthy coping skills  Outcome: Not Progressing  Goal: Participates in unit activities  Description: Interventions:  - Provide therapeutic environment   - Provide required programming   - Redirect inappropriate behaviors   Outcome: Not Progressing  Goal: Patient/Family participate in treatment and DC plans  Description: Interventions:  - Provide therapeutic environment  Outcome: Not Progressing  Goal: Patient/Family verbalizes awareness of resources  Outcome: Not Progressing  Goal: Understands least restrictive measures  Description: Interventions:  - Utilize least restrictive behavior  Outcome: Progressing  Goal: Free from restraint events  Description: - Utilize least restrictive measures   - Provide behavioral interventions   - Redirect inappropriate behaviors   Outcome: Progressing     Problem: Alteration in Thoughts and Perception  Goal: Verbalize thoughts and feelings  Description: Interventions:  - Promote a nonjudgmental and trusting relationship with the patient through active listening and therapeutic communication  - Assess patient's level of functioning, behavior and potential for risk  - Engage patient in 1 on 1 interactions  - Encourage patient to express fears, feelings, frustrations, and discuss symptoms    - [place] patient to reality, help patient recognize reality-based thinking   - Administer medications as ordered and assess for potential side effects  - Provide the patient education related to the signs and symptoms of the illness and desired effects of prescribed medications  Outcome: Not Progressing  Goal: Refrain from acting on delusional thinking/internal stimuli  Description:  Interventions:  - Monitor patient closely, per order   - Utilize least restrictive measures   - Set reasonable limits, give positive feedback for acceptable   - Administer medications as ordered and monitor of potential side effects  Outcome: Not Progressing  Goal: Agree to be compliant with medication regime, as prescribed and report medication side effects  Description: Interventions:  - Offer appropriate PRN medication and supervise ingestion; conduct AIMS, as needed   Outcome: Progressing  Goal: Attend and participate in unit activities, including therapeutic, recreational, and educational groups  Description: Interventions:  -Encourage Visitation and family involvement in care  Outcome: Not Progressing  Goal: Recognize dysfunctional thoughts, communicate reality-based thoughts at the time of discharge  Description: Interventions:  - Provide medication and psycho-education to assist patient in compliance and developing insight into his/her illness   Outcome: Not Progressing  Goal: Complete daily ADLs, including personal hygiene independently, as able  Description: Interventions:  - Observe, teach, and assist patient with ADLS  - Monitor and promote a balance of rest/activity, with adequate nutrition and elimination   Outcome: Not Progressing     Problem: Risk for Self Injury/Neglect  Goal: Verbalize thoughts and feelings  Description: Interventions:  - Assess and re-assess patient's lethality and potential for self-injury  - Engage patient in 1:1 interactions, daily, for a minimum of 15 minutes  - Encourage patient to express feelings, fears, frustrations, hopes  - Establish rapport/trust with patient   Outcome: Not Progressing  Goal: Refrain from harming self  Description: Interventions:  - Monitor patient closely, per order  - Develop a trusting relationship  - Supervise medication ingestion, monitor effects and side effects   Outcome: Progressing  Goal: Attend and participate in unit activities, including  therapeutic, recreational, and educational groups  Description: Interventions:  - Provide therapeutic and educational activities daily, encourage attendance and participation, and document same in the medical record  - Obtain collateral information, encourage visitation and family involvement in care   Outcome: Not Progressing  Goal: Complete daily ADLs, including personal hygiene independently, as able  Description: Interventions:  - Observe, teach, and assist patient with ADLS  - Monitor and promote a balance of rest/activity, with adequate nutrition and elimination  Outcome: Not Progressing     Problem: Depression  Goal: Verbalize thoughts and feelings  Description: Interventions:  - Assess and re-assess patient's level of risk   - Engage patient in 1:1 interactions, daily, for a minimum of 15 minutes   - Encourage patient to express feelings, fears, frustrations, hopes   Outcome: Not Progressing  Goal: Refrain from harming self  Description: Interventions:  - Monitor patient closely, per order   - Supervise medication ingestion, monitor effects and side effects   Outcome: Progressing  Goal: Refrain from isolation  Description: Interventions:  - Develop a trusting relationship   - Encourage socialization   Outcome: Not Progressing  Goal: Attend and participate in unit activities, including therapeutic, recreational, and educational groups  Description: Interventions:  - Provide therapeutic and educational activities daily, encourage attendance and participation, and document same in the medical record   Outcome: Not Progressing  Goal: Complete daily ADLs, including personal hygiene independently, as able  Description: Interventions:  - Observe, teach, and assist patient with ADLS  -  Monitor and promote a balance of rest/activity, with adequate nutrition and elimination   Outcome: Not Progressing     Problem: Anxiety  Goal: Anxiety is at manageable level  Description: Interventions:  - Assess and monitor  patient's anxiety level.   - Monitor for signs and symptoms (heart palpitations, chest pain, shortness of breath, headaches, nausea, feeling jumpy, restlessness, irritable, apprehensive).   - Collaborate with interdisciplinary team and initiate plan and interventions as ordered.  - [place] patient to unit/surroundings  - Explain treatment plan  - Encourage participation in care  - Encourage verbalization of concerns/fears  - Identify coping mechanisms  - Assist in developing anxiety-reducing skills  - Administer/offer alternative therapies  - Limit or eliminate stimulants  Outcome: Not Progressing     Problem: Risk for Violence/Aggression Toward Others  Goal: Verbalize thoughts and feelings  Description: Interventions:  - Assess and re-assess patient's level of risk, every waking shift  - Engage patient in 1:1 interactions, daily, for a minimum of 15 minutes   - Allow patient to express feelings and frustrations in a safe and non-threatening manner   - Establish rapport/trust with patient   Outcome: Not Progressing  Goal: Control angry outbursts  Description: Interventions:  - Monitor patient closely, per order  - Ensure early verbal de-escalation  - Monitor prn medication needs  - Set reasonable/therapeutic limits, outline behavioral expectations, and consequences   - Provide a non-threatening milieu, utilizing the least restrictive interventions   Outcome: Progressing  Goal: Attend and participate in unit activities, including therapeutic, recreational, and educational groups  Description: Interventions:  - Provide therapeutic and educational activities daily, encourage attendance and participation, and document same in the medical record   Outcome: Not Progressing  Goal: Identify appropriate positive anger management techniques  Description: Interventions:  - Offer anger management and coping skills groups   - Staff will provide positive feedback for appropriate anger control  Outcome: Not Progressing     Problem:  Alteration in Orientation  Goal: Interact with staff daily  Description: Interventions:  - Assess and re-assess patient's level of orientation  - Engage patient in 1 on 1 interactions, daily, for a minimum of 15 minutes   - Establish rapport/trust with patient   Outcome: Progressing  Goal: Express concerns related to confused thinking related to:  Description: Interventions:  - Encourage patient to express feelings, fears, frustrations, hopes  - Assign consistent caregivers   - [place]/re-orient patient as needed  - Allow comfort items, as appropriate  - Provide visual cues, signs, etc.   Outcome: Not Progressing  Goal: Allow medical examinations, as recommended  Description: Interventions:  - Provide physical/neurological exams and/or referrals, per provider   Outcome: Progressing  Goal: Cooperate with recommended testing/procedures  Description: Interventions:  - Determine need for ancillary testing  - Observe for mental status changes  - Implement falls/precaution protocol   Outcome: Progressing  Goal: Attend and participate in unit activities, including therapeutic, recreational, and educational groups  Description: Interventions:  - Provide therapeutic and educational activities daily, encourage attendance and participation, and document same in the medical record   - Provide appropriate opportunities for reminiscence   - Provide a consistent daily routine   - Encourage family contact/visitation   Outcome: Not Progressing  Goal: Complete daily ADLs, including personal hygiene independently, as able  Description: Interventions:  - Observe, teach, and assist patient with ADLS  Outcome: Not Progressing     Problem: Individualized Interventions  Goal: Patient will verbalize appropriate use of telephone within 5 days  Description: Interventions:  - Treatment team to determine use of supervised phone privileges   Outcome: Completed  Goal: Patient will verbalize need for hospitalization and will no longer attempt  elopement within 5 days  Description: Interventions:  - Ongoing education to help patient understand need for hospitalization  Outcome: Completed  Goal: Patient will recognize inappropriate behaviors and develop alternative behaviors within 5 days  Description: Interventions:  - Patient in collaboration with Treatment Team will develop a behavior management plan to help identify effective coping skills to deal with stressors  Outcome: Not Progressing     Problem: SELF HARM/SUICIDALITY  Goal: Will have no self-injury during hospital stay  Description: INTERVENTIONS:  - Q 15 MINUTES: Routine safety checks  - Q WAKING SHIFT & PRN: Assess risk to determine if routine checks are adequate to maintain patient safety  - Encourage patient to participate actively in care by formulating a plan to combat response to suicidal ideation, identify supports and resources  Outcome: Progressing     Problem: DEPRESSION  Goal: Will be euthymic at discharge  Description: INTERVENTIONS:  - Administer medication as ordered  - Provide emotional support via 1:1 interaction with staff  - Encourage involvement in milieu/groups/activities  - Monitor for social isolation  Outcome: Not Progressing     Problem: TALIB  Goal: Will exhibit normal sleep and speech and no impulsivity  Description: INTERVENTIONS:  - Administer medication as ordered  - Set limits on impulsive behavior  - Make attempts to decrease external stimuli as possible  Outcome: Not Progressing     Problem: PSYCHOSIS  Goal: Will report no hallucinations or delusions  Description: Interventions:  - Administer medication as  ordered  - Every waking shifts and PRN assess for the presence of hallucinations and or delusions  - Assist with reality testing to support increasing orientation  - Assess if patient's hallucinations or delusions are encouraging self-harm or harm to others and intervene as appropriate  Outcome: Progressing     Problem: SELF HARM/SUICIDALITY  Goal: Will have no  self-injury during hospital stay  Description: INTERVENTIONS:  - Q 15 MINUTES: Routine safety checks  - Q WAKING SHIFT & PRN: Assess risk to determine if routine checks are adequate to maintain patient safety  - Encourage patient to participate actively in care by formulating a plan to combat response to suicidal ideation, identify supports and resources  Outcome: Progressing     Problem: BEHAVIOR  Goal: Pt/Family maintain appropriate behavior and adhere to behavioral management agreement, if implemented  Description: INTERVENTIONS:  - Assess the family dynamic   - Encourage verbalization of thoughts and concerns in a socially appropriate manner  - Assess patient/family's coping skills and non-compliant behavior (including use of illegal substances).  - Utilize positive, consistent limit setting strategies supporting safety of patient, staff and others  - Initiate consult with Case Management, Spiritual Care or other ancillary services as appropriate  - If a patient's/visitor's behavior jeopardizes the safety of the patient, staff, or others, refer to organization procedure.   - Notify Security of behavior or suspected illegal substances which indicate the need for search of the patient and/or belongings  - Encourage participation in the decision making process about a behavioral management agreement; implement if patient meets criteria  Outcome: Not Progressing     Problem: ANXIETY  Goal: Will report anxiety at manageable levels  Description: INTERVENTIONS:  - Administer medication as ordered  - Teach and encourage coping skills  - Provide emotional support  - Assess patient/family for anxiety and ability to cope  Outcome: Not Progressing

## 2024-07-06 PROCEDURE — 99232 SBSQ HOSP IP/OBS MODERATE 35: CPT | Performed by: NURSE PRACTITIONER

## 2024-07-06 RX ADMIN — LISINOPRIL 10 MG: 10 TABLET ORAL at 08:23

## 2024-07-06 RX ADMIN — CYANOCOBALAMIN TAB 1000 MCG 1000 MCG: 1000 TAB at 08:23

## 2024-07-06 RX ADMIN — LAMOTRIGINE 25 MG: 25 TABLET ORAL at 08:23

## 2024-07-06 RX ADMIN — HYDROXYZINE HYDROCHLORIDE 25 MG: 25 TABLET ORAL at 08:23

## 2024-07-06 RX ADMIN — ATORVASTATIN CALCIUM 10 MG: 10 TABLET, FILM COATED ORAL at 08:23

## 2024-07-06 RX ADMIN — HYDROXYZINE HYDROCHLORIDE 25 MG: 25 TABLET ORAL at 21:27

## 2024-07-06 RX ADMIN — SERTRALINE HYDROCHLORIDE 150 MG: 100 TABLET ORAL at 21:27

## 2024-07-06 RX ADMIN — CARIPRAZINE 3 MG: 3 CAPSULE, GELATIN COATED ORAL at 08:23

## 2024-07-06 RX ADMIN — CHOLECALCIFEROL TAB 25 MCG (1000 UNIT) 2000 UNITS: 25 TAB at 08:23

## 2024-07-06 RX ADMIN — HYDROXYZINE HYDROCHLORIDE 25 MG: 25 TABLET ORAL at 16:53

## 2024-07-06 NOTE — PLAN OF CARE
Problem: Alteration in Thoughts and Perception  Goal: Verbalize thoughts and feelings  Description: Interventions:  - Promote a nonjudgmental and trusting relationship with the patient through active listening and therapeutic communication  - Assess patient's level of functioning, behavior and potential for risk  - Engage patient in 1 on 1 interactions  - Encourage patient to express fears, feelings, frustrations, and discuss symptoms    - [place] patient to reality, help patient recognize reality-based thinking   - Administer medications as ordered and assess for potential side effects  - Provide the patient education related to the signs and symptoms of the illness and desired effects of prescribed medications  Outcome: Not Progressing  Goal: Refrain from acting on delusional thinking/internal stimuli  Description: Interventions:  - Monitor patient closely, per order   - Utilize least restrictive measures   - Set reasonable limits, give positive feedback for acceptable   - Administer medications as ordered and monitor of potential side effects  Outcome: Not Progressing  Goal: Agree to be compliant with medication regime, as prescribed and report medication side effects  Description: Interventions:  - Offer appropriate PRN medication and supervise ingestion; conduct AIMS, as needed   Outcome: Progressing  Goal: Attend and participate in unit activities, including therapeutic, recreational, and educational groups  Description: Interventions:  -Encourage Visitation and family involvement in care  Outcome: Not Progressing  Goal: Recognize dysfunctional thoughts, communicate reality-based thoughts at the time of discharge  Description: Interventions:  - Provide medication and psycho-education to assist patient in compliance and developing insight into his/her illness   Outcome: Not Progressing  Goal: Complete daily ADLs, including personal hygiene independently, as able  Description: Interventions:  - Observe, teach,  and assist patient with ADLS  - Monitor and promote a balance of rest/activity, with adequate nutrition and elimination   Outcome: Progressing     Problem: Ineffective Coping  Goal: Cooperates with admission process  Description: Interventions:   - Complete admission process  Outcome: Progressing  Goal: Identifies ineffective coping skills  Outcome: Not Progressing  Goal: Identifies healthy coping skills  Outcome: Not Progressing  Goal: Demonstrates healthy coping skills  Outcome: Not Progressing  Goal: Participates in unit activities  Description: Interventions:  - Provide therapeutic environment   - Provide required programming   - Redirect inappropriate behaviors   Outcome: Not Progressing  Goal: Patient/Family participate in treatment and DC plans  Description: Interventions:  - Provide therapeutic environment  Outcome: Not Progressing  Goal: Patient/Family verbalizes awareness of resources  Outcome: Not Progressing  Goal: Understands least restrictive measures  Description: Interventions:  - Utilize least restrictive behavior  Outcome: Progressing  Goal: Free from restraint events  Description: - Utilize least restrictive measures   - Provide behavioral interventions   - Redirect inappropriate behaviors   Outcome: Progressing     Problem: Risk for Self Injury/Neglect  Goal: Verbalize thoughts and feelings  Description: Interventions:  - Assess and re-assess patient's lethality and potential for self-injury  - Engage patient in 1:1 interactions, daily, for a minimum of 15 minutes  - Encourage patient to express feelings, fears, frustrations, hopes  - Establish rapport/trust with patient   Outcome: Not Progressing  Goal: Refrain from harming self  Description: Interventions:  - Monitor patient closely, per order  - Develop a trusting relationship  - Supervise medication ingestion, monitor effects and side effects   Outcome: Progressing  Goal: Attend and participate in unit activities, including therapeutic,  recreational, and educational groups  Description: Interventions:  - Provide therapeutic and educational activities daily, encourage attendance and participation, and document same in the medical record  - Obtain collateral information, encourage visitation and family involvement in care   Outcome: Not Progressing  Goal: Complete daily ADLs, including personal hygiene independently, as able  Description: Interventions:  - Observe, teach, and assist patient with ADLS  - Monitor and promote a balance of rest/activity, with adequate nutrition and elimination  Outcome: Progressing     Problem: Depression  Goal: Verbalize thoughts and feelings  Description: Interventions:  - Assess and re-assess patient's level of risk   - Engage patient in 1:1 interactions, daily, for a minimum of 15 minutes   - Encourage patient to express feelings, fears, frustrations, hopes   Outcome: Not Progressing  Goal: Refrain from harming self  Description: Interventions:  - Monitor patient closely, per order   - Supervise medication ingestion, monitor effects and side effects   Outcome: Progressing  Goal: Refrain from isolation  Description: Interventions:  - Develop a trusting relationship   - Encourage socialization   Outcome: Not Progressing  Goal: Attend and participate in unit activities, including therapeutic, recreational, and educational groups  Description: Interventions:  - Provide therapeutic and educational activities daily, encourage attendance and participation, and document same in the medical record   Outcome: Not Progressing  Goal: Complete daily ADLs, including personal hygiene independently, as able  Description: Interventions:  - Observe, teach, and assist patient with ADLS  -  Monitor and promote a balance of rest/activity, with adequate nutrition and elimination   Outcome: Not Progressing     Problem: Anxiety  Goal: Anxiety is at manageable level  Description: Interventions:  - Assess and monitor patient's anxiety level.    - Monitor for signs and symptoms (heart palpitations, chest pain, shortness of breath, headaches, nausea, feeling jumpy, restlessness, irritable, apprehensive).   - Collaborate with interdisciplinary team and initiate plan and interventions as ordered.  - [place] patient to unit/surroundings  - Explain treatment plan  - Encourage participation in care  - Encourage verbalization of concerns/fears  - Identify coping mechanisms  - Assist in developing anxiety-reducing skills  - Administer/offer alternative therapies  - Limit or eliminate stimulants  Outcome: Not Progressing     Problem: Risk for Violence/Aggression Toward Others  Goal: Verbalize thoughts and feelings  Description: Interventions:  - Assess and re-assess patient's level of risk, every waking shift  - Engage patient in 1:1 interactions, daily, for a minimum of 15 minutes   - Allow patient to express feelings and frustrations in a safe and non-threatening manner   - Establish rapport/trust with patient   Outcome: Not Progressing  Goal: Control angry outbursts  Description: Interventions:  - Monitor patient closely, per order  - Ensure early verbal de-escalation  - Monitor prn medication needs  - Set reasonable/therapeutic limits, outline behavioral expectations, and consequences   - Provide a non-threatening milieu, utilizing the least restrictive interventions   Outcome: Progressing  Goal: Attend and participate in unit activities, including therapeutic, recreational, and educational groups  Description: Interventions:  - Provide therapeutic and educational activities daily, encourage attendance and participation, and document same in the medical record   Outcome: Not Progressing  Goal: Identify appropriate positive anger management techniques  Description: Interventions:  - Offer anger management and coping skills groups   - Staff will provide positive feedback for appropriate anger control  Outcome: Not Progressing     Problem: Alteration in  Orientation  Goal: Interact with staff daily  Description: Interventions:  - Assess and re-assess patient's level of orientation  - Engage patient in 1 on 1 interactions, daily, for a minimum of 15 minutes   - Establish rapport/trust with patient   Outcome: Progressing  Goal: Express concerns related to confused thinking related to:  Description: Interventions:  - Encourage patient to express feelings, fears, frustrations, hopes  - Assign consistent caregivers   - [place]/re-orient patient as needed  - Allow comfort items, as appropriate  - Provide visual cues, signs, etc.   Outcome: Not Progressing  Goal: Allow medical examinations, as recommended  Description: Interventions:  - Provide physical/neurological exams and/or referrals, per provider   Outcome: Progressing  Goal: Cooperate with recommended testing/procedures  Description: Interventions:  - Determine need for ancillary testing  - Observe for mental status changes  - Implement falls/precaution protocol   Outcome: Progressing  Goal: Attend and participate in unit activities, including therapeutic, recreational, and educational groups  Description: Interventions:  - Provide therapeutic and educational activities daily, encourage attendance and participation, and document same in the medical record   - Provide appropriate opportunities for reminiscence   - Provide a consistent daily routine   - Encourage family contact/visitation   Outcome: Not Progressing  Goal: Complete daily ADLs, including personal hygiene independently, as able  Description: Interventions:  - Observe, teach, and assist patient with ADLS  Outcome: Progressing     Problem: Individualized Interventions  Goal: Patient will recognize inappropriate behaviors and develop alternative behaviors within 5 days  Description: Interventions:  - Patient in collaboration with Treatment Team will develop a behavior management plan to help identify effective coping skills to deal with stressors  Outcome:  Not Progressing     Problem: SELF HARM/SUICIDALITY  Goal: Will have no self-injury during hospital stay  Description: INTERVENTIONS:  - Q 15 MINUTES: Routine safety checks  - Q WAKING SHIFT & PRN: Assess risk to determine if routine checks are adequate to maintain patient safety  - Encourage patient to participate actively in care by formulating a plan to combat response to suicidal ideation, identify supports and resources  Outcome: Progressing     Problem: DEPRESSION  Goal: Will be euthymic at discharge  Description: INTERVENTIONS:  - Administer medication as ordered  - Provide emotional support via 1:1 interaction with staff  - Encourage involvement in milieu/groups/activities  - Monitor for social isolation  Outcome: Not Progressing     Problem: TALIB  Goal: Will exhibit normal sleep and speech and no impulsivity  Description: INTERVENTIONS:  - Administer medication as ordered  - Set limits on impulsive behavior  - Make attempts to decrease external stimuli as possible  Outcome: Not Progressing     Problem: PSYCHOSIS  Goal: Will report no hallucinations or delusions  Description: Interventions:  - Administer medication as  ordered  - Every waking shifts and PRN assess for the presence of hallucinations and or delusions  - Assist with reality testing to support increasing orientation  - Assess if patient's hallucinations or delusions are encouraging self-harm or harm to others and intervene as appropriate  Outcome: Not Progressing     Problem: BEHAVIOR  Goal: Pt/Family maintain appropriate behavior and adhere to behavioral management agreement, if implemented  Description: INTERVENTIONS:  - Assess the family dynamic   - Encourage verbalization of thoughts and concerns in a socially appropriate manner  - Assess patient/family's coping skills and non-compliant behavior (including use of illegal substances).  - Utilize positive, consistent limit setting strategies supporting safety of patient, staff and others  -  Initiate consult with Case Management, Spiritual Care or other ancillary services as appropriate  - If a patient's/visitor's behavior jeopardizes the safety of the patient, staff, or others, refer to organization procedure.   - Notify Security of behavior or suspected illegal substances which indicate the need for search of the patient and/or belongings  - Encourage participation in the decision making process about a behavioral management agreement; implement if patient meets criteria  Outcome: Not Progressing     Problem: ANXIETY  Goal: Will report anxiety at manageable levels  Description: INTERVENTIONS:  - Administer medication as ordered  - Teach and encourage coping skills  - Provide emotional support  - Assess patient/family for anxiety and ability to cope  Outcome: Not Progressing  Goal: By discharge: Patient will verbalize 2 strategies to deal with anxiety  Description: Interventions:  - Identify any obvious source/trigger to anxiety  - Staff will assist patient in applying identified coping technique/skills  - Encourage attendance of scheduled groups and activities  Outcome: Not Progressing     Problem: SLEEP DISTURBANCE  Goal: Will exhibit normal sleeping pattern  Description: Interventions:  -  Assess the patients sleep pattern, noting recent changes  - Administer medication as ordered  - Decrease environmental stimuli, including noise, as appropriate during the night  - Encourage the patient to actively participate in unit groups and or exercise during the day to enhance ability to achieve adequate sleep at night  - Assess the patient, in the morning, encouraging a description of sleep experience  Outcome: Not Progressing     Problem: INVOLUNTARY ADMIT  Goal: Will cooperate with staff recommendations and doctor's orders and will demonstrate appropriate behavior  Description: INTERVENTIONS:  - Treat underlying conditions and offer medication as ordered  - Educate regarding involuntary admission  procedures and rules  - Utilize positive consistent limit setting strategies to support patient and staff safety  Outcome: Not Progressing     Problem: SELF CARE DEFICIT  Goal: Return ADL status to a safe level of function  Description: INTERVENTIONS:  - Administer medication as ordered  - Assess ADL deficits and provide assistive devices as needed  - Obtain PT/OT consults as needed  - Assist and instruct patient to increase activity and self care as tolerated  Outcome: Progressing     Problem: DISCHARGE PLANNING - CARE MANAGEMENT  Goal: Discharge to post-acute care or home with appropriate resources  Description: INTERVENTIONS:  - Conduct assessment to determine patient/family and health care team treatment goals, and need for post-acute services based on payer coverage, community resources, and patient preferences, and barriers to discharge  - Address psychosocial, clinical, and financial barriers to discharge as identified in assessment in conjunction with the patient/family and health care team  - Arrange appropriate level of post-acute services according to patient’s   needs and preference and payer coverage in collaboration with the physician and health care team  - Communicate with and update the patient/family, physician, and health care team regarding progress on the discharge plan  - Arrange appropriate transportation to post-acute venues  Outcome: Not Progressing

## 2024-07-06 NOTE — NURSING NOTE
Patient is blunted and guarded. Withdrawn to room. Med and meal comp. Reports depression 5/10. Denies SI. States coping skills he is uses at home are taking walks and shopping. Encouraged to participate in unit activities. He then played table pong w/ peers. Continuous rounding maintained.

## 2024-07-06 NOTE — PROGRESS NOTES
Progress Note - Behavioral Health   Deyvi Cao 54 y.o. male MRN: [id number]  Unit/Bed#: [facility] 112-02 Encounter: [id number]    Assessment & Plan   Principal Problem:    Bipolar disorder with severe depression (HCC)  Active Problems:    Benign essential hypertension    Mixed hyperlipidemia    Allergic rhinitis due to allergen    Medical clearance for psychiatric admission    Rosacea  Patient was seen for continuing care and treatment.  Case was discussed with the nursing staff.  On examination today, the patient is seen lying in his bed resting.  Still reports depression.  Not experienced any suicidal ideation.  Mostly sad and withdrawn to room.  No behavior issues.  Behavior over the last 24 hours has been unchanged.  Patient remains depressed.  Patient has been sleeping well, appetite is fair.  No complaints of medication side effects and no new medical concerns.  Continue current meds and treatment.  Discharge planning and disposition is ongoing.    Mental Status Evaluation:  Appearance:  age appropriate and casually dressed   Behavior:  psychomotor retardation   Speech:  soft   Mood:  depressed   Affect:  flat   Thought Process:  circumstantial   Associations: circumstantial associations   Thought Content:  No overt delusions   Perceptual Disturbances: None   Risk Potential: Suicidal Ideations none.  Has history of suicide attempts.  Homicidal Ideations none  Potential for Aggression No   Sensorium:  person, place, time/date, and situation   Memory:  recent and remote memory grossly intact   Consciousness:  alert and awake    Attention: attention span appeared shorter than expected for age   Insight:  limited   Judgment: limited   Gait/Station: normal gait/station   Motor Activity: no abnormal movements     Progress Toward Goals: Remains depressed.  Lacks energy interest and motivation.  Requires inpatient care and treatment.    Recommended Treatment: Continue with group therapy, milieu therapy and occupational  therapy.      Risks, benefits and possible side effects of Medications:   Risks, benefits, and possible side effects of medications explained to patient and patient verbalizes understanding.      Medications: current meds:   Current Facility-Administered Medications   Medication Dose Route Frequency    acetaminophen (TYLENOL) tablet 650 mg  650 mg Oral Q6H PRN    acetaminophen (TYLENOL) tablet 650 mg  650 mg Oral Q4H PRN    acetaminophen (TYLENOL) tablet 975 mg  975 mg Oral Q6H PRN    aluminum-magnesium hydroxide-simethicone (MAALOX) oral suspension 30 mL  30 mL Oral Q4H PRN    atorvastatin (LIPITOR) tablet 10 mg  10 mg Oral Daily    benztropine (COGENTIN) injection 1 mg  1 mg Intramuscular Q4H PRN Max 6/day    benztropine (COGENTIN) tablet 1 mg  1 mg Oral Q4H PRN Max 6/day    bisacodyl (DULCOLAX) rectal suppository 10 mg  10 mg Rectal Daily PRN    cariprazine (VRAYLAR) capsule 3 mg  3 mg Oral Daily    Cholecalciferol (VITAMIN D3) tablet 2,000 Units  2,000 Units Oral Daily    cyanocobalamin (VITAMIN B-12) tablet 1,000 mcg  1,000 mcg Oral Daily    hydrOXYzine HCL (ATARAX) tablet 25 mg  25 mg Oral Q6H PRN Max 4/day    hydrOXYzine HCL (ATARAX) tablet 25 mg  25 mg Oral TID    hydrOXYzine HCL (ATARAX) tablet 50 mg  50 mg Oral Q4H PRN Max 4/day    Or    LORazepam (ATIVAN) injection 1 mg  1 mg Intramuscular Q4H PRN    lamoTRIgine (LaMICtal) tablet 25 mg  25 mg Oral Daily    [START ON 7/10/2024] lamoTRIgine (LaMICtal) tablet 50 mg  50 mg Oral Daily    lisinopril (ZESTRIL) tablet 10 mg  10 mg Oral Daily    LORazepam (ATIVAN) tablet 1 mg  1 mg Oral Q4H PRN Max 6/day    Or    LORazepam (ATIVAN) injection 2 mg  2 mg Intramuscular Q6H PRN Max 3/day    OLANZapine (ZyPREXA) tablet 10 mg  10 mg Oral Q3H PRN Max 3/day    Or    OLANZapine (ZyPREXA) IM injection 10 mg  10 mg Intramuscular Q3H PRN Max 3/day    OLANZapine (ZyPREXA) tablet 5 mg  5 mg Oral Q3H PRN Max 6/day    Or    OLANZapine (ZyPREXA) IM injection 5 mg  5 mg  Intramuscular Q3H PRN Max 6/day    OLANZapine (ZyPREXA) tablet 2.5 mg  2.5 mg Oral Q3H PRN Max 8/day    polyethylene glycol (MIRALAX) packet 17 g  17 g Oral Daily PRN    propranolol (INDERAL) tablet 10 mg  10 mg Oral Q8H PRN    senna-docusate sodium (SENOKOT S) 8.6-50 mg per tablet 1 tablet  1 tablet Oral Daily PRN    sertraline (ZOLOFT) tablet 150 mg  150 mg Oral HS   .    Labs: I have personally reviewed all pertinent laboratory/tests results.     Counseling / Coordination of Care  Total floor / unit time spent today 30 minutes. Greater than 50% of total time was spent with the patient and / or family counseling and / or coordination of care. A description of the counseling / coordination of care: Medication management, treatment and chart review

## 2024-07-06 NOTE — NURSING NOTE
Patient has been isolative to his room and himself. Affect flat. Remains with intermittent anxiety, he stated. Hopeful. Denies any suicidal ideations. No interaction. Appetite 100% x3.  Group attendance today 3/9. No PRN medications given. Pleasant.

## 2024-07-07 PROCEDURE — 99232 SBSQ HOSP IP/OBS MODERATE 35: CPT | Performed by: NURSE PRACTITIONER

## 2024-07-07 RX ADMIN — HYDROXYZINE HYDROCHLORIDE 25 MG: 25 TABLET ORAL at 21:49

## 2024-07-07 RX ADMIN — HYDROXYZINE HYDROCHLORIDE 25 MG: 25 TABLET ORAL at 16:49

## 2024-07-07 RX ADMIN — ATORVASTATIN CALCIUM 10 MG: 10 TABLET, FILM COATED ORAL at 08:29

## 2024-07-07 RX ADMIN — LAMOTRIGINE 25 MG: 25 TABLET ORAL at 08:29

## 2024-07-07 RX ADMIN — CARIPRAZINE 3 MG: 3 CAPSULE, GELATIN COATED ORAL at 08:29

## 2024-07-07 RX ADMIN — CYANOCOBALAMIN TAB 1000 MCG 1000 MCG: 1000 TAB at 08:29

## 2024-07-07 RX ADMIN — SERTRALINE HYDROCHLORIDE 150 MG: 100 TABLET ORAL at 21:48

## 2024-07-07 RX ADMIN — CHOLECALCIFEROL TAB 25 MCG (1000 UNIT) 2000 UNITS: 25 TAB at 08:29

## 2024-07-07 RX ADMIN — HYDROXYZINE HYDROCHLORIDE 25 MG: 25 TABLET ORAL at 08:29

## 2024-07-07 RX ADMIN — LISINOPRIL 10 MG: 10 TABLET ORAL at 08:29

## 2024-07-07 NOTE — NURSING NOTE
4lb weight increase from previous week. Lungs CTA. Bowel sounds present in all quadrants. Last BM 7/6. No edema observed. No skin issues observed. Denies any medical concerns.

## 2024-07-07 NOTE — PLAN OF CARE
Problem: Alteration in Thoughts and Perception  Goal: Treatment Goal: Gain control of psychotic behaviors/thinking, reduce/eliminate presenting symptoms and demonstrate improved reality functioning upon discharge  Outcome: Progressing  Goal: Refrain from acting on delusional thinking/internal stimuli  Description: Interventions:  - Monitor patient closely, per order   - Utilize least restrictive measures   - Set reasonable limits, give positive feedback for acceptable   - Administer medications as ordered and monitor of potential side effects  Outcome: Progressing  Goal: Agree to be compliant with medication regime, as prescribed and report medication side effects  Description: Interventions:  - Offer appropriate PRN medication and supervise ingestion; conduct AIMS, as needed   Outcome: Progressing     Problem: Depression  Goal: Refrain from harming self  Description: Interventions:  - Monitor patient closely, per order   - Supervise medication ingestion, monitor effects and side effects   Outcome: Progressing  Goal: Refrain from isolation  Description: Interventions:  - Develop a trusting relationship   - Encourage socialization   Outcome: Progressing  Goal: Refrain from self-neglect  Outcome: Progressing  Goal: Complete daily ADLs, including personal hygiene independently, as able  Description: Interventions:  - Observe, teach, and assist patient with ADLS  -  Monitor and promote a balance of rest/activity, with adequate nutrition and elimination   Outcome: Progressing     Problem: Anxiety  Goal: Anxiety is at manageable level  Description: Interventions:  - Assess and monitor patient's anxiety level.   - Monitor for signs and symptoms (heart palpitations, chest pain, shortness of breath, headaches, nausea, feeling jumpy, restlessness, irritable, apprehensive).   - Collaborate with interdisciplinary team and initiate plan and interventions as ordered.  - [place] patient to unit/surroundings  - Explain treatment  plan  - Encourage participation in care  - Encourage verbalization of concerns/fears  - Identify coping mechanisms  - Assist in developing anxiety-reducing skills  - Administer/offer alternative therapies  - Limit or eliminate stimulants  Outcome: Progressing     Problem: Risk for Violence/Aggression Toward Others  Goal: Refrain from destructive acts on the environment or property  Outcome: Progressing  Goal: Control angry outbursts  Description: Interventions:  - Monitor patient closely, per order  - Ensure early verbal de-escalation  - Monitor prn medication needs  - Set reasonable/therapeutic limits, outline behavioral expectations, and consequences   - Provide a non-threatening milieu, utilizing the least restrictive interventions   Outcome: Progressing  Goal: Identify appropriate positive anger management techniques  Description: Interventions:  - Offer anger management and coping skills groups   - Staff will provide positive feedback for appropriate anger control  Outcome: Progressing     Problem: Alteration in Orientation  Goal: Allow medical examinations, as recommended  Description: Interventions:  - Provide physical/neurological exams and/or referrals, per provider   Outcome: Progressing  Goal: Cooperate with recommended testing/procedures  Description: Interventions:  - Determine need for ancillary testing  - Observe for mental status changes  - Implement falls/precaution protocol   Outcome: Progressing  Goal: Attend and participate in unit activities, including therapeutic, recreational, and educational groups  Description: Interventions:  - Provide therapeutic and educational activities daily, encourage attendance and participation, and document same in the medical record   - Provide appropriate opportunities for reminiscence   - Provide a consistent daily routine   - Encourage family contact/visitation   Outcome: Progressing     Problem: PSYCHOSIS  Goal: Will report no hallucinations or  delusions  Description: Interventions:  - Administer medication as  ordered  - Every waking shifts and PRN assess for the presence of hallucinations and or delusions  - Assist with reality testing to support increasing orientation  - Assess if patient's hallucinations or delusions are encouraging self-harm or harm to others and intervene as appropriate  Outcome: Progressing     Problem: ANXIETY  Goal: Will report anxiety at manageable levels  Description: INTERVENTIONS:  - Administer medication as ordered  - Teach and encourage coping skills  - Provide emotional support  - Assess patient/family for anxiety and ability to cope  Outcome: Progressing

## 2024-07-07 NOTE — NURSING NOTE
Patient visible all evening. Quiet and keeps to himself.  Behaviors controlled and appropriate. Pleasant upon approach. Medication compliant. Cooperative with staff.  Not seen interacting with peers.

## 2024-07-07 NOTE — NURSING NOTE
Patient is calm, quiet, and cooperative. Withdrawn to bedroom. Med and meal compliant. Hygiene appears fair. No group attendance. Rates depression 5/10 and anxiety 2/4. Denies SI. Encouragement given to refrain from isolation. Continuous rounding maintained.

## 2024-07-07 NOTE — NURSING NOTE
Patient slept well throughout the night, 8 hours. Appears to be resting comfortably. Eyes closed and chest movements noted. No issues related to sleep.

## 2024-07-07 NOTE — PROGRESS NOTES
Progress Note - Behavioral Health   Deyvi Cao 54 y.o. male MRN: [id number]  Unit/Bed#: [facility] 112-02 Encounter: [id number]    Assessment & Plan   Principal Problem:    Bipolar disorder with severe depression (HCC)  Active Problems:    Benign essential hypertension    Mixed hyperlipidemia    Allergic rhinitis due to allergen    Medical clearance for psychiatric admission    Rosacea  Patient was seen for continuing care and treatment.  Case was discussed with the nursing staff.  On examination today, the patient is seen lying in his bed.  He offers no complaints.  No new clinical issues or concerns voiced over the last 24 hours by patient or staff.  Behavior over the last 24 hours has been unchanged.  Patient is sleeping well and he is eating fairly well.  No complaints of medication side effects and no new medical concerns.      Mental Status Evaluation:  Appearance:  age appropriate and casually dressed   Behavior:  Quiet but cooperative   Speech:  soft   Mood:  depressed   Affect:  flat   Thought Process:  disorganized and illogical   Associations: loose associations   Thought Content:  No overt delusions   Perceptual Disturbances: None   Risk Potential: Suicidal Ideations none  Homicidal Ideations none  Potential for Aggression No   Sensorium:  person, place, and time/date   Memory:  recent and remote memory grossly intact   Consciousness:  alert and awake    Attention: attention span appeared shorter than expected for age   Insight:  limited   Judgment: limited   Gait/Station: normal gait/station   Motor Activity: no abnormal movements     Progress Toward Goals: Remains depressed and isolated to her room.    Recommended Treatment: Continue with group therapy, milieu therapy and occupational therapy.      Risks, benefits and possible side effects of Medications:   Risks, benefits, and possible side effects of medications explained to patient and patient verbalizes understanding.      Medications: current meds:    Current Facility-Administered Medications   Medication Dose Route Frequency    acetaminophen (TYLENOL) tablet 650 mg  650 mg Oral Q6H PRN    acetaminophen (TYLENOL) tablet 650 mg  650 mg Oral Q4H PRN    acetaminophen (TYLENOL) tablet 975 mg  975 mg Oral Q6H PRN    aluminum-magnesium hydroxide-simethicone (MAALOX) oral suspension 30 mL  30 mL Oral Q4H PRN    atorvastatin (LIPITOR) tablet 10 mg  10 mg Oral Daily    benztropine (COGENTIN) injection 1 mg  1 mg Intramuscular Q4H PRN Max 6/day    benztropine (COGENTIN) tablet 1 mg  1 mg Oral Q4H PRN Max 6/day    bisacodyl (DULCOLAX) rectal suppository 10 mg  10 mg Rectal Daily PRN    cariprazine (VRAYLAR) capsule 3 mg  3 mg Oral Daily    Cholecalciferol (VITAMIN D3) tablet 2,000 Units  2,000 Units Oral Daily    cyanocobalamin (VITAMIN B-12) tablet 1,000 mcg  1,000 mcg Oral Daily    hydrOXYzine HCL (ATARAX) tablet 25 mg  25 mg Oral Q6H PRN Max 4/day    hydrOXYzine HCL (ATARAX) tablet 25 mg  25 mg Oral TID    hydrOXYzine HCL (ATARAX) tablet 50 mg  50 mg Oral Q4H PRN Max 4/day    Or    LORazepam (ATIVAN) injection 1 mg  1 mg Intramuscular Q4H PRN    lamoTRIgine (LaMICtal) tablet 25 mg  25 mg Oral Daily    [START ON 7/10/2024] lamoTRIgine (LaMICtal) tablet 50 mg  50 mg Oral Daily    lisinopril (ZESTRIL) tablet 10 mg  10 mg Oral Daily    LORazepam (ATIVAN) tablet 1 mg  1 mg Oral Q4H PRN Max 6/day    Or    LORazepam (ATIVAN) injection 2 mg  2 mg Intramuscular Q6H PRN Max 3/day    OLANZapine (ZyPREXA) tablet 10 mg  10 mg Oral Q3H PRN Max 3/day    Or    OLANZapine (ZyPREXA) IM injection 10 mg  10 mg Intramuscular Q3H PRN Max 3/day    OLANZapine (ZyPREXA) tablet 5 mg  5 mg Oral Q3H PRN Max 6/day    Or    OLANZapine (ZyPREXA) IM injection 5 mg  5 mg Intramuscular Q3H PRN Max 6/day    OLANZapine (ZyPREXA) tablet 2.5 mg  2.5 mg Oral Q3H PRN Max 8/day    polyethylene glycol (MIRALAX) packet 17 g  17 g Oral Daily PRN    propranolol (INDERAL) tablet 10 mg  10 mg Oral Q8H PRN     senna-docusate sodium (SENOKOT S) 8.6-50 mg per tablet 1 tablet  1 tablet Oral Daily PRN    sertraline (ZOLOFT) tablet 150 mg  150 mg Oral HS   .    Labs: I have personally reviewed all pertinent laboratory/tests results.     Counseling / Coordination of Care  Total floor / unit time spent today 30 minutes. Greater than 50% of total time was spent with the patient and / or family counseling and / or coordination of care. A description of the counseling / coordination of care: Medication management, treatment and chart review

## 2024-07-08 PROCEDURE — 99232 SBSQ HOSP IP/OBS MODERATE 35: CPT | Performed by: PSYCHIATRY & NEUROLOGY

## 2024-07-08 RX ADMIN — LISINOPRIL 10 MG: 10 TABLET ORAL at 09:01

## 2024-07-08 RX ADMIN — HYDROXYZINE HYDROCHLORIDE 25 MG: 25 TABLET ORAL at 21:03

## 2024-07-08 RX ADMIN — CYANOCOBALAMIN TAB 1000 MCG 1000 MCG: 1000 TAB at 09:01

## 2024-07-08 RX ADMIN — SERTRALINE HYDROCHLORIDE 150 MG: 100 TABLET ORAL at 21:03

## 2024-07-08 RX ADMIN — ATORVASTATIN CALCIUM 10 MG: 10 TABLET, FILM COATED ORAL at 09:01

## 2024-07-08 RX ADMIN — CARIPRAZINE 3 MG: 3 CAPSULE, GELATIN COATED ORAL at 09:01

## 2024-07-08 RX ADMIN — HYDROXYZINE HYDROCHLORIDE 25 MG: 25 TABLET ORAL at 09:01

## 2024-07-08 RX ADMIN — LAMOTRIGINE 25 MG: 25 TABLET ORAL at 09:01

## 2024-07-08 RX ADMIN — HYDROXYZINE HYDROCHLORIDE 25 MG: 25 TABLET ORAL at 16:36

## 2024-07-08 RX ADMIN — CHOLECALCIFEROL TAB 25 MCG (1000 UNIT) 2000 UNITS: 25 TAB at 09:01

## 2024-07-08 NOTE — PROGRESS NOTES
07/08/24 0803   Team Meeting   Meeting Type Daily Rounds   Team Members Present   Team Members Present Physician;Nurse;;Other (Discipline and Name)   Patient/Family Present   Patient Present No   Patient's Family Present No     In attendance:  MD Melva Sousa, ALVINA Haywood, LYNNE Garcia, [facility]  Mer Sales, [facility]  Gris Arizmendi, M.S.    Groups: 3/9    Pt reports no complaints or issues. Pt is mostly isolative and reports ongoing depression and anxiety. No bx concerns noted.

## 2024-07-08 NOTE — NURSING NOTE
Patient isolative to room and self. Pleasant upon approach. Medication compliant and offered no complaints. Patient's behaviors were controlled and appropriate. No prn medication requested or required. Visible intermittently.

## 2024-07-08 NOTE — NURSING NOTE
Patient is scant, guarded, and withdrawn. Spending a lot of time laying in bed and looking at ceiling. No napping observed. Med and meal comp. Attended mindfulness group. Reports mild anxiety and depression 3/10. Pleasant and superficial in conversation. Continuous rounding maintained.

## 2024-07-08 NOTE — PLAN OF CARE
Problem: Ineffective Coping  Goal: Identifies ineffective coping skills  Outcome: Progressing  Goal: Identifies healthy coping skills  Outcome: Progressing  Goal: Demonstrates healthy coping skills  Outcome: Progressing  Goal: Participates in unit activities  Description: Interventions:  - Provide therapeutic environment   - Provide required programming   - Redirect inappropriate behaviors   Outcome: Progressing    Attended 18/39 of the groups offered last week.

## 2024-07-08 NOTE — PLAN OF CARE
Problem: Alteration in Thoughts and Perception  Goal: Treatment Goal: Gain control of psychotic behaviors/thinking, reduce/eliminate presenting symptoms and demonstrate improved reality functioning upon discharge  Outcome: Progressing  Goal: Refrain from acting on delusional thinking/internal stimuli  Description: Interventions:  - Monitor patient closely, per order   - Utilize least restrictive measures   - Set reasonable limits, give positive feedback for acceptable   - Administer medications as ordered and monitor of potential side effects  Outcome: Progressing  Goal: Agree to be compliant with medication regime, as prescribed and report medication side effects  Description: Interventions:  - Offer appropriate PRN medication and supervise ingestion; conduct AIMS, as needed   Outcome: Progressing  Goal: Attend and participate in unit activities, including therapeutic, recreational, and educational groups  Description: Interventions:  -Encourage Visitation and family involvement in care  Outcome: Progressing  Goal: Complete daily ADLs, including personal hygiene independently, as able  Description: Interventions:  - Observe, teach, and assist patient with ADLS  - Monitor and promote a balance of rest/activity, with adequate nutrition and elimination   Outcome: Progressing     Problem: Depression  Goal: Refrain from harming self  Description: Interventions:  - Monitor patient closely, per order   - Supervise medication ingestion, monitor effects and side effects   Outcome: Progressing  Goal: Refrain from self-neglect  Outcome: Progressing     Problem: Anxiety  Goal: Anxiety is at manageable level  Description: Interventions:  - Assess and monitor patient's anxiety level.   - Monitor for signs and symptoms (heart palpitations, chest pain, shortness of breath, headaches, nausea, feeling jumpy, restlessness, irritable, apprehensive).   - Collaborate with interdisciplinary team and initiate plan and interventions as  ordered.  - [place] patient to unit/surroundings  - Explain treatment plan  - Encourage participation in care  - Encourage verbalization of concerns/fears  - Identify coping mechanisms  - Assist in developing anxiety-reducing skills  - Administer/offer alternative therapies  - Limit or eliminate stimulants  Outcome: Progressing     Problem: Risk for Violence/Aggression Toward Others  Goal: Refrain from harming others  Outcome: Progressing  Goal: Refrain from destructive acts on the environment or property  Outcome: Progressing  Goal: Control angry outbursts  Description: Interventions:  - Monitor patient closely, per order  - Ensure early verbal de-escalation  - Monitor prn medication needs  - Set reasonable/therapeutic limits, outline behavioral expectations, and consequences   - Provide a non-threatening milieu, utilizing the least restrictive interventions   Outcome: Progressing     Problem: Depression  Goal: Refrain from isolation  Description: Interventions:  - Develop a trusting relationship   - Encourage socialization   Outcome: Not Progressing     Problem: Risk for Violence/Aggression Toward Others  Goal: Verbalize thoughts and feelings  Description: Interventions:  - Assess and re-assess patient's level of risk, every waking shift  - Engage patient in 1:1 interactions, daily, for a minimum of 15 minutes   - Allow patient to express feelings and frustrations in a safe and non-threatening manner   - Establish rapport/trust with patient   Outcome: Not Progressing

## 2024-07-08 NOTE — SOCIAL WORK
STEFAN verified with Sydnie Chin at [facility] that pt already had an Enhanced CRR application submitted when he was at [facility]. Pt was referred to [facility] on 6/3 due to previous difficulty maintaining his independent tasks at [employer]. STEFAN will continue to follow up with the [facility] regarding this referral as part of pt's discharge disposition.

## 2024-07-08 NOTE — PROGRESS NOTES
Progress Note - Behavioral Health   Deyvi Cao 54 y.o. male MRN: [id number]  Unit/Bed#: [facility] 112-02 Encounter: [id number]  Code Status: Level 1 - Full Code    Assessment & Plan   Principal Problem:    Bipolar disorder with severe depression (HCC)  Active Problems:    Benign essential hypertension    Mixed hyperlipidemia    Allergic rhinitis due to allergen    Medical clearance for psychiatric admission    Rosacea    Recommended Treatment:     Treatment plan, treatment progress and medication changes were reviewed with Nursing Staff, Pharmacy Service and Case Management in Treatment Team:  1.Continue with group therapy, milieu therapy and occupational therapy   2.Behavioral Health checks every 7 minutes   3.Continue frequent safety checks and vitals per unit protocol  4.Continue with SLIM medical management as indicated  5.Continue with current medication regimen for symptom management: Vraylar 3mg PO Daily, Atarax 25mg PO TID, Lamictal 25mg PO Daily, Lamictal 50mg PO Daily, Zoloft 150mg PO QHS  6.Disposition Planning: Discharge planning and efforts remain ongoing - Awaiting group home placement    Subjective:    Patient was seen today for continuation of care, records reviewed and patient was discussed with the morning case review team.    Deyvi was seen today for psychiatric follow-up.  On assessment today, Deyvi was found in his room.  Calm and polite on approach.  He reports depression is 3/10 (10 being the worse) and anxiety is 2/4 (4 being the worse).  Overall, reports acclimation to the unit.  Deyvi reports adequate daytime energy and denies any difficulties with initiating or staying asleep.  Oral appetite and hydration is adequate.   We reviewed once more the specific as-needed medications they can use going forward if they experience any insomnia or destabilization of their mood, they understood and were agreeable. Milieu visibility and group attendance encouraged to promote an active  participation in treatment.    Deyvi denies acute suicidal/self-harm ideation/intent/plan upon direct inquiry at this time. Deyvi is able to contract for safety while on the unit and would feel comfortable seeking staff support should suicidal symptoms or urges appear or worsen. Deyvi remains behaviorally appropriate, no agitation or aggression noted on exam or in report. Deyvi also denies HI/AH/VH, and does not appear overtly manic.  Patient does not verbalize any experiences that can be categorized as paranoid, persecutory, bizarre, or somatic delusions. Deyvi remains adherent to his current psychotropic medication regimen and denies any side effects from medications, as well as none noted on exam.    Group Attendance: 3 / 9  Treatment Team: This Thursday  Psychiatric PRN's Needed: None    Review of Systems:  Behavior over the last 24 hours: Unchanged  Sleep: sleeping okay throughout the night  Appetite: adequate  Medication side effects: none reported  ROS:no complaints, all other systems are negative    Objective:    Vitals:  Vitals:    07/08/24 0740   BP: 138/62   Pulse: 77   Resp: 18   Temp: 97.5 °F (36.4 °C)   SpO2: 97%     Laboratory Results:  I have personally reviewed all pertinent laboratory/tests results.  Most Recent Labs:   Lab Results   Component Value Date    WBC 7.39 06/26/2024    RBC 4.70 06/26/2024    HGB 15.2 06/26/2024    HCT 45.5 06/26/2024     06/26/2024    RDW 12.9 06/26/2024    NEUTROABS 4.63 06/26/2024    SODIUM 137 06/26/2024    K 4.1 06/26/2024     06/26/2024    CO2 26 06/26/2024    BUN 17 06/26/2024    CREATININE 0.63 06/26/2024    GLUC 98 06/26/2024    GLUF 98 06/26/2024    CALCIUM 9.6 06/26/2024    AST 25 06/26/2024    ALT 45 06/26/2024    ALKPHOS 114 (H) 06/26/2024    TP 7.0 06/26/2024    ALB 4.4 06/26/2024    TBILI 0.44 06/26/2024    CHOLESTEROL 177 06/26/2024    HDL 52 06/26/2024    TRIG 73 06/26/2024    LDLCALC 110 (H) 06/26/2024    NONHDLC 125 06/26/2024     LITHIUM 0.4 (L) 01/28/2021    XQO4HYSTLOEB 2.636 06/26/2024    HGBA1C 5.2 11/22/2023     11/22/2023     Mental Status Evaluation:  Appearance:  age appropriate, casually dressed   Behavior:  guarded   Speech:  scant, soft   Mood:  dysphoric, anxious   Affect:  blunted   Thought Process:  goal directed   Associations: intact associations   Thought Content:  no overt delusions   Perceptual Disturbances: no auditory hallucinations, no visual hallucinations, denies when asked, does not appear responding to internal stimuli   Risk Potential: Suicidal ideation - None at present, contracts for safety on the unit, would talk to staff if not feeling safe on the unit  Homicidal ideation - None at present  Potential for aggression - Not at present   Sensorium:  oriented to person, place, and time/date   Memory:  recent memory intact   Consciousness:  alert and awake   Attention/Concentration: decreased concentration and decreased attention span   Insight:  limited   Judgment: limited   Gait/Station: normal gait/station, normal balance   Motor Activity: no abnormal movements     Progress Toward Goals: no significant improvement.  Deyvi continues to require inpatient psychiatric hospitalization for continued medication management and titration to optimize symptom reduction, improve sleep hygiene, and demonstrate adequate self-care.     Suicide/Homicide Risk Assessment:  Risk of Harm to Self:   Nursing Suicide Risk Assessment Last 24 hours: C-SSRS Risk (Since Last Contact)  Calculated C-SSRS Risk Score (Since Last Contact): No Risk Indicated    Risk of Harm to Others:  Nursing Homicide Risk Assessment: Violence Risk to Others: Denies within past 6 months    Behavioral Health Medications: all current active meds have been reviewed and continue current psychiatric medications.  Current Facility-Administered Medications   Medication Dose Route Frequency Provider Last Rate    acetaminophen  650 mg Oral Q6H PRN Melva Knutson  ALVINA      acetaminophen  650 mg Oral Q4H PRN ALVINA Harley      acetaminophen  975 mg Oral Q6H PRN ALVINA Harley      aluminum-magnesium hydroxide-simethicone  30 mL Oral Q4H PRN ALVINA Harley      atorvastatin  10 mg Oral Daily ALVINA Lewis      benztropine  1 mg Intramuscular Q4H PRN Max 6/day ALVINA Harley      benztropine  1 mg Oral Q4H PRN Max 6/day ALVINA Harley      bisacodyl  10 mg Rectal Daily PRN ALVINA Harley      cariprazine  3 mg Oral Daily Martin Cardenas MD      Cholecalciferol  2,000 Units Oral Daily ALVINA Lewis      cyanocobalamin  1,000 mcg Oral Daily ALVINA Lewis      hydrOXYzine HCL  25 mg Oral Q6H PRN Max 4/day ALVINA Harley      hydrOXYzine HCL  25 mg Oral TID Martin Cardenas MD      hydrOXYzine HCL  50 mg Oral Q4H PRN Max 4/day ALVINA Harley      Or    LORazepam  1 mg Intramuscular Q4H PRN ALVINA Harley      lamoTRIgine  25 mg Oral Daily Martin Cardenas MD      [START ON 7/10/2024] lamoTRIgine  50 mg Oral Daily Martin Cardenas MD      lisinopril  10 mg Oral Daily ALVINA Lewis      LORazepam  1 mg Oral Q4H PRN Max 6/day ALVINA Harley      Or    LORazepam  2 mg Intramuscular Q6H PRN Max 3/day ALVINA Harley      OLANZapine  10 mg Oral Q3H PRN Max 3/day ALVINA Harley      Or    OLANZapine  10 mg Intramuscular Q3H PRN Max 3/day ALVINA Harley      OLANZapine  5 mg Oral Q3H PRN Max 6/day ALVINA Harley      Or    OLANZapine  5 mg Intramuscular Q3H PRN Max 6/day ALVINA Harley      OLANZapine  2.5 mg Oral Q3H PRN Max 8/day ALVINA Harley      polyethylene glycol  17 g Oral Daily PRN ALVINA Harley      propranolol  10 mg Oral Q8H PRN ALVINA Harley      senna-docusate sodium  1 tablet Oral Daily PRN ALVINA Harley      sertraline  150 mg Oral HS Martin Cardenas MD       Risks / Benefits of Treatment:  Risks, benefits, and possible side effects of  medications explained to patient. Patient has limited understanding of risks and benefits of treatment at this time, but agrees to take medications as prescribed.    Counseling / Coordination of Care:  Total floor/unit time spent today 25 minutes. Greater than 50% of total time was spent with the patient and / or family counseling and / or coordination of care. A description of the counseling / coordination of care:   Patient's progress discussed with staff in treatment team meeting.  Medications, treatment progress and treatment plan reviewed with patient.   Educated on importance of medication and treatment compliance.  Reassurance and supportive therapy provided.   Encouraged participation in milieu and group therapy on the unit.    ALVINA Harley 07/08/24

## 2024-07-09 PROCEDURE — 99232 SBSQ HOSP IP/OBS MODERATE 35: CPT | Performed by: PSYCHIATRY & NEUROLOGY

## 2024-07-09 RX ADMIN — CYANOCOBALAMIN TAB 1000 MCG 1000 MCG: 1000 TAB at 08:45

## 2024-07-09 RX ADMIN — ATORVASTATIN CALCIUM 10 MG: 10 TABLET, FILM COATED ORAL at 08:45

## 2024-07-09 RX ADMIN — LISINOPRIL 10 MG: 10 TABLET ORAL at 08:45

## 2024-07-09 RX ADMIN — HYDROXYZINE HYDROCHLORIDE 25 MG: 25 TABLET ORAL at 08:45

## 2024-07-09 RX ADMIN — LAMOTRIGINE 25 MG: 25 TABLET ORAL at 08:45

## 2024-07-09 RX ADMIN — CHOLECALCIFEROL TAB 25 MCG (1000 UNIT) 2000 UNITS: 25 TAB at 08:45

## 2024-07-09 RX ADMIN — SERTRALINE HYDROCHLORIDE 150 MG: 100 TABLET ORAL at 21:43

## 2024-07-09 RX ADMIN — HYDROXYZINE HYDROCHLORIDE 25 MG: 25 TABLET ORAL at 16:55

## 2024-07-09 RX ADMIN — HYDROXYZINE HYDROCHLORIDE 25 MG: 25 TABLET ORAL at 21:43

## 2024-07-09 RX ADMIN — CARIPRAZINE 3 MG: 3 CAPSULE, GELATIN COATED ORAL at 08:45

## 2024-07-09 NOTE — NURSING NOTE
Patient slept well throughout the night, 8 hours. Appears to be resting comfortably at this time. Eyes closed and chest movements noted.

## 2024-07-09 NOTE — PROGRESS NOTES
"Progress Note - Behavioral Health   Deyvi Cao 54 y.o. male MRN: [id number]  Unit/Bed#: [facility] 112-02 Encounter: [id number]  Code Status: Level 1 - Full Code    Assessment & Plan   Principal Problem:    Bipolar disorder with severe depression (HCC)  Active Problems:    Benign essential hypertension    Mixed hyperlipidemia    Allergic rhinitis due to allergen    Medical clearance for psychiatric admission    Rosacea    Recommended Treatment:     Treatment plan, treatment progress and medication changes were reviewed with Nursing Staff, Pharmacy Service and Case Management in Treatment Team:  1.Continue with group therapy, milieu therapy and occupational therapy   2.Behavioral Health checks every 7 minutes   3.Continue frequent safety checks and vitals per unit protocol  4.Continue with SLIM medical management as indicated  5.Continue with current medication regimen for symptom management: Vraylar 3mg PO Daily, Atarax 25mg PO TID, Lamictal 25mg PO Daily, Lamictal 50mg PO Daily, Zoloft 150mg PO QHS   6.Disposition Planning: Discharge planning and efforts remain ongoing - Awaiting group home placement    Subjective:    Patient was seen today for continuation of care, records reviewed and patient was discussed with the morning case review team.    Deyvi was seen today for psychiatric follow-up.  On assessment today, Deyvi was found laying in his bed.  He is guarded and calm.  Reports anxiety 3/10.  Denies racing thoughts.  I asked him what he does when laying in his bed and he said \"just resting\".  Deyvi reports adequate daytime energy and denies any difficulties with initiating or staying asleep.  Oral appetite and hydration is adequate.   We reviewed once more the specific as-needed medications they can use going forward if they experience any insomnia or destabilization of their mood, they understood and were agreeable. Milieu visibility and group attendance encouraged to promote an active participation in " treatment.    Deyvi denies acute suicidal/self-harm ideation/intent/plan upon direct inquiry at this time. Deyvi is able to contract for safety while on the unit and would feel comfortable seeking staff support should suicidal symptoms or urges appear or worsen. Deyvi remains behaviorally appropriate, no agitation or aggression noted on exam or in report. Deyvi also denies HI/AH/VH, and does not appear overtly manic.  Patient does not verbalize any experiences that can be categorized as paranoid, persecutory, bizarre, or somatic delusions. Deyvi remains adherent to his current psychotropic medication regimen and denies any side effects from medications, as well as none noted on exam.    Group Attendance: 2 / 8  Treatment Team: This Thursday  Psychiatric PRN's Needed: None    Review of Systems:  Behavior over the last 24 hours: Unchanged  Sleep: sleeping okay throughout the night  Appetite: adequate  Medication side effects: none reported  ROS:no complaints, all other systems are negative    Objective:    Vitals:  Vitals:    07/09/24 0747   BP: 138/74   Pulse: 81   Resp: 18   Temp: (!) 97.2 °F (36.2 °C)   SpO2: 99%     Laboratory Results:  I have personally reviewed all pertinent laboratory/tests results.  Most Recent Labs:   Lab Results   Component Value Date    WBC 7.39 06/26/2024    RBC 4.70 06/26/2024    HGB 15.2 06/26/2024    HCT 45.5 06/26/2024     06/26/2024    RDW 12.9 06/26/2024    NEUTROABS 4.63 06/26/2024    SODIUM 137 06/26/2024    K 4.1 06/26/2024     06/26/2024    CO2 26 06/26/2024    BUN 17 06/26/2024    CREATININE 0.63 06/26/2024    GLUC 98 06/26/2024    GLUF 98 06/26/2024    CALCIUM 9.6 06/26/2024    AST 25 06/26/2024    ALT 45 06/26/2024    ALKPHOS 114 (H) 06/26/2024    TP 7.0 06/26/2024    ALB 4.4 06/26/2024    TBILI 0.44 06/26/2024    CHOLESTEROL 177 06/26/2024    HDL 52 06/26/2024    TRIG 73 06/26/2024    LDLCALC 110 (H) 06/26/2024    NONHDLC 125 06/26/2024    LITHIUM 0.4  (L) 01/28/2021    [id number] 2.636 06/26/2024    HGBA1C 5.2 11/22/2023     11/22/2023     Mental Status Evaluation:  Appearance:  age appropriate, casually dressed   Behavior:  guarded   Speech:  scant, soft   Mood:  depressed, anxious   Affect:  blunted   Thought Process:  goal directed   Associations: intact associations   Thought Content:  no overt delusions   Perceptual Disturbances: no auditory hallucinations, no visual hallucinations, denies when asked, does not appear responding to internal stimuli   Risk Potential: Suicidal ideation - None at present, contracts for safety on the unit, would talk to staff if not feeling safe on the unit  Homicidal ideation - None at present  Potential for aggression - Not at present   Sensorium:  oriented to person, place, and time/date   Memory:  recent memory intact   Consciousness:  alert and awake   Attention/Concentration: attention span and concentration appear shorter than expected for age   Insight:  limited   Judgment: limited   Gait/Station: normal gait/station, normal balance   Motor Activity: no abnormal movements     Progress Toward Goals: no significant improvement.  Deyvi continues to require inpatient psychiatric hospitalization for continued medication management and titration to optimize symptom reduction, improve sleep hygiene, and demonstrate adequate self-care.     Suicide/Homicide Risk Assessment:  Risk of Harm to Self:   Nursing Suicide Risk Assessment Last 24 hours: C-SSRS Risk (Since Last Contact)  Calculated C-SSRS Risk Score (Since Last Contact): No Risk Indicated    Risk of Harm to Others:  Nursing Homicide Risk Assessment: Violence Risk to Others: Denies within past 6 months    Behavioral Health Medications: all current active meds have been reviewed and continue current psychiatric medications.  Current Facility-Administered Medications   Medication Dose Route Frequency Provider Last Rate    acetaminophen  650 mg Oral Q6H PRN Melva  ALVINA Knutson      acetaminophen  650 mg Oral Q4H PRN ALVINA Harley      acetaminophen  975 mg Oral Q6H PRN ALVINA Harley      aluminum-magnesium hydroxide-simethicone  30 mL Oral Q4H PRN ALVINA Harley      atorvastatin  10 mg Oral Daily ALVINA Lewis      benztropine  1 mg Intramuscular Q4H PRN Max 6/day ALVINA Harley      benztropine  1 mg Oral Q4H PRN Max 6/day ALVINA Harley      bisacodyl  10 mg Rectal Daily PRN ALVINA Harley      cariprazine  3 mg Oral Daily Martin Cardenas MD      Cholecalciferol  2,000 Units Oral Daily ALVINA Lewis      cyanocobalamin  1,000 mcg Oral Daily ALVINA Lewis      hydrOXYzine HCL  25 mg Oral Q6H PRN Max 4/day ALVINA Harley      hydrOXYzine HCL  25 mg Oral TID Martin Cardenas MD      hydrOXYzine HCL  50 mg Oral Q4H PRN Max 4/day ALVINA Harley      Or    LORazepam  1 mg Intramuscular Q4H PRN ALVINA Harley      lamoTRIgine  25 mg Oral Daily Martin Cardenas MD      [START ON 7/10/2024] lamoTRIgine  50 mg Oral Daily Martin Cardenas MD      lisinopril  10 mg Oral Daily ALVINA Lewis      LORazepam  1 mg Oral Q4H PRN Max 6/day ALVINA Harley      Or    LORazepam  2 mg Intramuscular Q6H PRN Max 3/day ALVINA Harley      OLANZapine  10 mg Oral Q3H PRN Max 3/day ALVINA Harley      Or    OLANZapine  10 mg Intramuscular Q3H PRN Max 3/day ALVINA Harley      OLANZapine  5 mg Oral Q3H PRN Max 6/day ALVINA Harley      Or    OLANZapine  5 mg Intramuscular Q3H PRN Max 6/day ALVINA Harley      OLANZapine  2.5 mg Oral Q3H PRN Max 8/day ALVINA Harley      polyethylene glycol  17 g Oral Daily PRN ALVINA Harley      propranolol  10 mg Oral Q8H PRN ALVINA Harley      senna-docusate sodium  1 tablet Oral Daily PRN ALVINA Harley      sertraline  150 mg Oral HS Martin Cardenas MD       Risks / Benefits of Treatment:  Risks, benefits, and possible side effects of  medications explained to patient. Patient has limited understanding of risks and benefits of treatment at this time, but agrees to take medications as prescribed.    Counseling / Coordination of Care:  Total floor/unit time spent today 25 minutes. Greater than 50% of total time was spent with the patient and / or family counseling and / or coordination of care. A description of the counseling / coordination of care:   Patient's progress discussed with staff in treatment team meeting.  Medications, treatment progress and treatment plan reviewed with patient.   Educated on importance of medication and treatment compliance.  Reassurance and supportive therapy provided.   Encouraged participation in milieu and group therapy on the unit.    ALVINA Harley 07/09/24

## 2024-07-09 NOTE — NURSING NOTE
Patient isolative to self. Not seen interacting with anyone. Quiet and keeps to himself. Offered no complaints. Medication compliant as well. Behaviors controlled and appropriate. Uneventful evening.

## 2024-07-09 NOTE — PROGRESS NOTES
07/09/24 0735   Team Meeting   Meeting Type Daily Rounds   Team Members Present   Team Members Present Physician;Nurse;;Other (Discipline and Name)   Patient/Family Present   Patient Present No   Patient's Family Present No     In attendance:  Dr. Jordan Holter, ALVINA Melgoza, RN  Judi Garcia, [facility]  BARRETT Elmore.S.    Groups: 2/8    Pt remains quiet and isolative. Pt observed playing cards with peers at times. Depression rating improved slightly.

## 2024-07-09 NOTE — SOCIAL WORK
SW checked in with pt. Pt observed laying in bed staring at the ceiling. SW offered to assist pt with ordering clothing as he had requested last week; pt stated he will let SW know when he is ready and politely declined to work on that task at this time. Pt presents as depressive and flat.

## 2024-07-09 NOTE — PLAN OF CARE
Problem: Alteration in Thoughts and Perception  Goal: Treatment Goal: Gain control of psychotic behaviors/thinking, reduce/eliminate presenting symptoms and demonstrate improved reality functioning upon discharge  Outcome: Progressing  Goal: Refrain from acting on delusional thinking/internal stimuli  Description: Interventions:  - Monitor patient closely, per order   - Utilize least restrictive measures   - Set reasonable limits, give positive feedback for acceptable   - Administer medications as ordered and monitor of potential side effects  Outcome: Progressing  Goal: Agree to be compliant with medication regime, as prescribed and report medication side effects  Description: Interventions:  - Offer appropriate PRN medication and supervise ingestion; conduct AIMS, as needed   Outcome: Progressing  Goal: Complete daily ADLs, including personal hygiene independently, as able  Description: Interventions:  - Observe, teach, and assist patient with ADLS  - Monitor and promote a balance of rest/activity, with adequate nutrition and elimination   Outcome: Progressing     Problem: Ineffective Coping  Goal: Participates in unit activities  Description: Interventions:  - Provide therapeutic environment   - Provide required programming   - Redirect inappropriate behaviors   Outcome: Progressing  Goal: Understands least restrictive measures  Description: Interventions:  - Utilize least restrictive behavior  Outcome: Progressing  Goal: Free from restraint events  Description: - Utilize least restrictive measures   - Provide behavioral interventions   - Redirect inappropriate behaviors   Outcome: Progressing     Problem: Risk for Self Injury/Neglect  Goal: Treatment Goal: Remain safe during length of stay, learn and adopt new coping skills, and be free of self-injurious ideation, impulses and acts at the time of discharge  Outcome: Progressing

## 2024-07-09 NOTE — NURSING NOTE
Patient is cooperative. Patient appears anxious. He states his anxiety is baseline. Patient compliant with medications. Appetitive is good. Patient attended groups.

## 2024-07-10 PROCEDURE — 99232 SBSQ HOSP IP/OBS MODERATE 35: CPT | Performed by: PSYCHIATRY & NEUROLOGY

## 2024-07-10 RX ADMIN — LAMOTRIGINE 50 MG: 25 TABLET ORAL at 08:45

## 2024-07-10 RX ADMIN — ATORVASTATIN CALCIUM 10 MG: 10 TABLET, FILM COATED ORAL at 08:45

## 2024-07-10 RX ADMIN — HYDROXYZINE HYDROCHLORIDE 25 MG: 25 TABLET ORAL at 21:42

## 2024-07-10 RX ADMIN — CHOLECALCIFEROL TAB 25 MCG (1000 UNIT) 2000 UNITS: 25 TAB at 08:45

## 2024-07-10 RX ADMIN — HYDROXYZINE HYDROCHLORIDE 25 MG: 25 TABLET ORAL at 08:45

## 2024-07-10 RX ADMIN — CARIPRAZINE 3 MG: 3 CAPSULE, GELATIN COATED ORAL at 08:46

## 2024-07-10 RX ADMIN — HYDROXYZINE HYDROCHLORIDE 25 MG: 25 TABLET ORAL at 17:09

## 2024-07-10 RX ADMIN — LISINOPRIL 10 MG: 10 TABLET ORAL at 08:46

## 2024-07-10 RX ADMIN — SERTRALINE HYDROCHLORIDE 150 MG: 100 TABLET ORAL at 21:42

## 2024-07-10 RX ADMIN — CYANOCOBALAMIN TAB 1000 MCG 1000 MCG: 1000 TAB at 08:46

## 2024-07-10 NOTE — PROGRESS NOTES
07/10/24 0743   Team Meeting   Meeting Type Daily Rounds   Team Members Present   Team Members Present Physician;Nurse;;Other (Discipline and Name)   Patient/Family Present   Patient Present No   Patient's Family Present No     In attendance:  Dr. Alex Thomas, MD Dr. Jordan Holter, DO Melva Knutson, ALVINA Haywood, RN  Judi Garcia, hospitalsW  [employer], hospitalsW  BARRETT Elmore.S.    Groups: 1/9    Pt reports ongoing anxiety. Pt is isolating more and spending time lying in bed staring at the ceiling. Pt Lamictal changed to 50mg daily. Pt is pleasant and cooperative but isolative.

## 2024-07-10 NOTE — PROGRESS NOTES
Progress Note - Behavioral Health   Deyvi Cao 54 y.o. male MRN: [id number]  Unit/Bed#: [facility] 112-02 Encounter: [id number]  Code Status: Level 1 - Full Code    Assessment & Plan   Principal Problem:    Bipolar disorder with severe depression (HCC)  Active Problems:    Benign essential hypertension    Mixed hyperlipidemia    Allergic rhinitis due to allergen    Medical clearance for psychiatric admission    Rosacea    Recommended Treatment:     Treatment plan, treatment progress and medication changes were reviewed with Nursing Staff, Pharmacy Service and Case Management in Treatment Team:  1.Continue with group therapy, milieu therapy and occupational therapy   2.Behavioral Health checks every 7 minutes   3.Continue frequent safety checks and vitals per unit protocol  4.Continue with SLIM medical management as indicated  5.Continue with current medication regimen for symptom management: Vraylar 3mg PO Daily, Atarax 25mg PO TID, Lamictal 50mg PO Daily, Zoloft 150mg PO QHS   Lamictal increased from 25mg PO Daily to 50mg PO Daily  6.Disposition Planning: Discharge planning and efforts remain ongoing - Awaiting group home placement    Subjective:    Patient was seen today for continuation of care, records reviewed and patient was discussed with the morning case review team.    Deyvi was seen today for psychiatric follow-up.  On assessment today, Deyvi was found in the dayroom.  He is doing okay, offers no new concerns.  Does report depression and anxiety.  Deyvi reports adequate daytime energy and denies any difficulties with initiating or staying asleep.  Oral appetite and hydration is adequate.  We reviewed once more the specific as-needed medications they can use going forward if they experience any insomnia or destabilization of their mood, they understood and were agreeable. Milieu visibility and group attendance encouraged to promote an active participation in treatment.    Deyvi denies acute  suicidal/self-harm ideation/intent/plan upon direct inquiry at this time. Deyvi is able to contract for safety while on the unit and would feel comfortable seeking staff support should suicidal symptoms or urges appear or worsen. Deyvi remains behaviorally appropriate, no agitation or aggression noted on exam or in report. Deyvi also denies HI/AH/VH, and does not appear overtly manic.  Patient does not verbalize any experiences that can be categorized as paranoid, persecutory, bizarre, or somatic delusions. Deyvi remains adherent to his current psychotropic medication regimen and denies any side effects from medications, as well as none noted on exam.    Review of Systems:  Behavior over the last 24 hours: Unchanged  Sleep: sleeping okay throughout the night  Appetite: adequate  Medication side effects: none reported  ROS:no complaints, all other systems are negative    Objective:    Vitals:  Vitals:    07/10/24 0738   BP: 141/89   Pulse: 80   Resp: 18   Temp: 97.5 °F (36.4 °C)   SpO2: 97%     Laboratory Results:  I have personally reviewed all pertinent laboratory/tests results.  Most Recent Labs:   Lab Results   Component Value Date    WBC 7.39 06/26/2024    RBC 4.70 06/26/2024    HGB 15.2 06/26/2024    HCT 45.5 06/26/2024     06/26/2024    RDW 12.9 06/26/2024    NEUTROABS 4.63 06/26/2024    SODIUM 137 06/26/2024    K 4.1 06/26/2024     06/26/2024    CO2 26 06/26/2024    BUN 17 06/26/2024    CREATININE 0.63 06/26/2024    GLUC 98 06/26/2024    GLUF 98 06/26/2024    CALCIUM 9.6 06/26/2024    AST 25 06/26/2024    ALT 45 06/26/2024    ALKPHOS 114 (H) 06/26/2024    TP 7.0 06/26/2024    ALB 4.4 06/26/2024    TBILI 0.44 06/26/2024    CHOLESTEROL 177 06/26/2024    HDL 52 06/26/2024    TRIG 73 06/26/2024    LDLCALC 110 (H) 06/26/2024    NONHDLC 125 06/26/2024    LITHIUM 0.4 (L) 01/28/2021    [id number] 2.636 06/26/2024    HGBA1C 5.2 11/22/2023     11/22/2023     Mental Status  Evaluation:  Appearance:  age appropriate, casually dressed   Behavior:  guarded   Speech:  scant, soft   Mood:  depressed, anxious   Affect:  blunted   Thought Process:  goal directed   Associations: intact associations   Thought Content:  no overt delusions   Perceptual Disturbances: no auditory hallucinations, no visual hallucinations, denies when asked, does not appear responding to internal stimuli   Risk Potential: Suicidal ideation - None at present, contracts for safety on the unit, would talk to staff if not feeling safe on the unit  Homicidal ideation - None at present  Potential for aggression - Not at present   Sensorium:  oriented to person, place, and time/date   Memory:  recent memory intact   Consciousness:  alert and awake   Attention/Concentration: attention span and concentration appear shorter than expected for age   Insight:  limited   Judgment: limited   Gait/Station: normal gait/station, normal balance   Motor Activity: no abnormal movements     Progress Toward Goals: no significant improvement.  Deyvi continues to require inpatient psychiatric hospitalization for continued medication management and titration to optimize symptom reduction, improve sleep hygiene, and demonstrate adequate self-care.     Suicide/Homicide Risk Assessment:  Risk of Harm to Self:   Nursing Suicide Risk Assessment Last 24 hours: C-SSRS Risk (Since Last Contact)  Calculated C-SSRS Risk Score (Since Last Contact): No Risk Indicated    Risk of Harm to Others:  Nursing Homicide Risk Assessment: Violence Risk to Others: Denies within past 6 months    Behavioral Health Medications: all current active meds have been reviewed and continue current psychiatric medications.  Current Facility-Administered Medications   Medication Dose Route Frequency Provider Last Rate    acetaminophen  650 mg Oral Q6H PRN ALVINA Harley      acetaminophen  650 mg Oral Q4H PRN ALVINA Harley      acetaminophen  975 mg Oral Q6H PRN  ALVINA Harley      aluminum-magnesium hydroxide-simethicone  30 mL Oral Q4H PRN ALVINA Harley      atorvastatin  10 mg Oral Daily Sary Rodriguez ALVINA Biggs      benztropine  1 mg Intramuscular Q4H PRN Max 6/day ALVINA Harley      benztropine  1 mg Oral Q4H PRN Max 6/day ALVINA Harley      bisacodyl  10 mg Rectal Daily PRN ALVINA Harley      cariprazine  3 mg Oral Daily Martin Cardenas MD      Cholecalciferol  2,000 Units Oral Daily Sary LinkALVINA Thompson      cyanocobalamin  1,000 mcg Oral Daily Sary ALVINA Gupta      hydrOXYzine HCL  25 mg Oral Q6H PRN Max 4/day ALVINA Harley      hydrOXYzine HCL  25 mg Oral TID Martin Cardenas MD      hydrOXYzine HCL  50 mg Oral Q4H PRN Max 4/day ALVINA Harley      Or    LORazepam  1 mg Intramuscular Q4H PRN ALVINA Harley      lamoTRIgine  50 mg Oral Daily Martin Cardenas MD      lisinopril  10 mg Oral Daily Sary LinkALVINA Thompson      LORazepam  1 mg Oral Q4H PRN Max 6/day ALVINA Harley      Or    LORazepam  2 mg Intramuscular Q6H PRN Max 3/day ALVINA Harley      OLANZapine  10 mg Oral Q3H PRN Max 3/day ALVINA Harley      Or    OLANZapine  10 mg Intramuscular Q3H PRN Max 3/day ALVINA Harley      OLANZapine  5 mg Oral Q3H PRN Max 6/day ALVINA Harley      Or    OLANZapine  5 mg Intramuscular Q3H PRN Max 6/day ALVINA Harley      OLANZapine  2.5 mg Oral Q3H PRN Max 8/day ALVINA Harley      polyethylene glycol  17 g Oral Daily PRN ALVINA Harley      propranolol  10 mg Oral Q8H PRN ALVINA Harley      senna-docusate sodium  1 tablet Oral Daily PRN ALVINA Harley      sertraline  150 mg Oral HS Martin Cardenas MD       Risks / Benefits of Treatment:  Risks, benefits, and possible side effects of medications explained to patient. Patient has limited understanding of risks and benefits of treatment at this time, but agrees to take medications as prescribed.    Counseling /  Coordination of Care:  Total floor/unit time spent today 25 minutes. Greater than 50% of total time was spent with the patient and / or family counseling and / or coordination of care. A description of the counseling / coordination of care:   Patient's progress discussed with staff in treatment team meeting.  Medications, treatment progress and treatment plan reviewed with patient.   Educated on importance of medication and treatment compliance.  Reassurance and supportive therapy provided.   Encouraged participation in milieu and group therapy on the unit.    ALVINA Harley 07/10/24

## 2024-07-10 NOTE — NURSING NOTE
Patient isolative in own room this evening. No group attendance this evening, skipped snack. Med and meal compliant and cooperative upon approach. Patient behaviors controlled, offered no complaints, calm. Patient currently sleeping, will continue to monitor.

## 2024-07-10 NOTE — NURSING NOTE
Received pt in bed at change of shift with eyes closed; chest movement noted.  Continues the same thus this far as per q 7 min room checks.   Will continue to monitor behavior, sleeping pattern and any medical issues that may arise.    0614:  Sleeping 7+ hrs thus this far

## 2024-07-10 NOTE — NURSING NOTE
"Pt is accepting of medications without incidence and meal compliant. Pt is visible in the milieu and attends groups. Pt reports \"anxiety is normal\", but denies all other s/s. No new concerns.   "

## 2024-07-10 NOTE — PLAN OF CARE
Problem: Alteration in Thoughts and Perception  Goal: Treatment Goal: Gain control of psychotic behaviors/thinking, reduce/eliminate presenting symptoms and demonstrate improved reality functioning upon discharge  Outcome: Progressing  Goal: Verbalize thoughts and feelings  Description: Interventions:  - Promote a nonjudgmental and trusting relationship with the patient through active listening and therapeutic communication  - Assess patient's level of functioning, behavior and potential for risk  - Engage patient in 1 on 1 interactions  - Encourage patient to express fears, feelings, frustrations, and discuss symptoms    - [place] patient to reality, help patient recognize reality-based thinking   - Administer medications as ordered and assess for potential side effects  - Provide the patient education related to the signs and symptoms of the illness and desired effects of prescribed medications  Outcome: Progressing  Goal: Agree to be compliant with medication regime, as prescribed and report medication side effects  Description: Interventions:  - Offer appropriate PRN medication and supervise ingestion; conduct AIMS, as needed   Outcome: Progressing  Goal: Attend and participate in unit activities, including therapeutic, recreational, and educational groups  Description: Interventions:  -Encourage Visitation and family involvement in care  Outcome: Progressing  Goal: Recognize dysfunctional thoughts, communicate reality-based thoughts at the time of discharge  Description: Interventions:  - Provide medication and psycho-education to assist patient in compliance and developing insight into his/her illness   Outcome: Progressing  Goal: Complete daily ADLs, including personal hygiene independently, as able  Description: Interventions:  - Observe, teach, and assist patient with ADLS  - Monitor and promote a balance of rest/activity, with adequate nutrition and elimination   Outcome: Progressing     Problem:  Ineffective Coping  Goal: Identifies healthy coping skills  Outcome: Progressing  Goal: Demonstrates healthy coping skills  Outcome: Progressing  Goal: Participates in unit activities  Description: Interventions:  - Provide therapeutic environment   - Provide required programming   - Redirect inappropriate behaviors   Outcome: Progressing  Goal: Understands least restrictive measures  Description: Interventions:  - Utilize least restrictive behavior  Outcome: Progressing  Goal: Free from restraint events  Description: - Utilize least restrictive measures   - Provide behavioral interventions   - Redirect inappropriate behaviors   Outcome: Progressing     Problem: Risk for Self Injury/Neglect  Goal: Verbalize thoughts and feelings  Description: Interventions:  - Assess and re-assess patient's lethality and potential for self-injury  - Engage patient in 1:1 interactions, daily, for a minimum of 15 minutes  - Encourage patient to express feelings, fears, frustrations, hopes  - Establish rapport/trust with patient   Outcome: Progressing  Goal: Refrain from harming self  Description: Interventions:  - Monitor patient closely, per order  - Develop a trusting relationship  - Supervise medication ingestion, monitor effects and side effects   Outcome: Progressing  Goal: Complete daily ADLs, including personal hygiene independently, as able  Description: Interventions:  - Observe, teach, and assist patient with ADLS  - Monitor and promote a balance of rest/activity, with adequate nutrition and elimination  Outcome: Progressing     Problem: Depression  Goal: Refrain from harming self  Description: Interventions:  - Monitor patient closely, per order   - Supervise medication ingestion, monitor effects and side effects   Outcome: Progressing  Goal: Refrain from isolation  Description: Interventions:  - Develop a trusting relationship   - Encourage socialization   Outcome: Progressing  Goal: Refrain from self-neglect  Outcome:  Progressing  Goal: Complete daily ADLs, including personal hygiene independently, as able  Description: Interventions:  - Observe, teach, and assist patient with ADLS  -  Monitor and promote a balance of rest/activity, with adequate nutrition and elimination   Outcome: Progressing     Problem: Anxiety  Goal: Anxiety is at manageable level  Description: Interventions:  - Assess and monitor patient's anxiety level.   - Monitor for signs and symptoms (heart palpitations, chest pain, shortness of breath, headaches, nausea, feeling jumpy, restlessness, irritable, apprehensive).   - Collaborate with interdisciplinary team and initiate plan and interventions as ordered.  - [place] patient to unit/surroundings  - Explain treatment plan  - Encourage participation in care  - Encourage verbalization of concerns/fears  - Identify coping mechanisms  - Assist in developing anxiety-reducing skills  - Administer/offer alternative therapies  - Limit or eliminate stimulants  Outcome: Progressing     Problem: Risk for Violence/Aggression Toward Others  Goal: Treatment Goal: Refrain from acts of violence/aggression during length of stay, and demonstrate improved impulse control at the time of discharge  Outcome: Progressing  Goal: Verbalize thoughts and feelings  Description: Interventions:  - Assess and re-assess patient's level of risk, every waking shift  - Engage patient in 1:1 interactions, daily, for a minimum of 15 minutes   - Allow patient to express feelings and frustrations in a safe and non-threatening manner   - Establish rapport/trust with patient   Outcome: Progressing  Goal: Refrain from harming others  Outcome: Progressing  Goal: Control angry outbursts  Description: Interventions:  - Monitor patient closely, per order  - Ensure early verbal de-escalation  - Monitor prn medication needs  - Set reasonable/therapeutic limits, outline behavioral expectations, and consequences   - Provide a non-threatening milieu, utilizing  the least restrictive interventions   Outcome: Progressing  Goal: Attend and participate in unit activities, including therapeutic, recreational, and educational groups  Description: Interventions:  - Provide therapeutic and educational activities daily, encourage attendance and participation, and document same in the medical record   Outcome: Progressing

## 2024-07-10 NOTE — PLAN OF CARE
Problem: Ineffective Coping  Goal: Identifies ineffective coping skills  Outcome: Not Progressing  Goal: Identifies healthy coping skills  Outcome: Not Progressing  Goal: Demonstrates healthy coping skills  Outcome: Not Progressing  Goal: Participates in unit activities  Description: Interventions:  - Provide therapeutic environment   - Provide required programming   - Redirect inappropriate behaviors   Outcome: Not Progressing    Attended 3/17 of the groups offered in the last 2 days.

## 2024-07-11 PROCEDURE — 99232 SBSQ HOSP IP/OBS MODERATE 35: CPT | Performed by: PSYCHIATRY & NEUROLOGY

## 2024-07-11 RX ADMIN — HYDROXYZINE HYDROCHLORIDE 25 MG: 25 TABLET ORAL at 08:42

## 2024-07-11 RX ADMIN — LISINOPRIL 10 MG: 10 TABLET ORAL at 08:42

## 2024-07-11 RX ADMIN — SERTRALINE HYDROCHLORIDE 150 MG: 100 TABLET ORAL at 21:36

## 2024-07-11 RX ADMIN — ATORVASTATIN CALCIUM 10 MG: 10 TABLET, FILM COATED ORAL at 08:42

## 2024-07-11 RX ADMIN — CARIPRAZINE 3 MG: 3 CAPSULE, GELATIN COATED ORAL at 08:42

## 2024-07-11 RX ADMIN — CHOLECALCIFEROL TAB 25 MCG (1000 UNIT) 2000 UNITS: 25 TAB at 08:41

## 2024-07-11 RX ADMIN — CYANOCOBALAMIN TAB 1000 MCG 1000 MCG: 1000 TAB at 08:42

## 2024-07-11 RX ADMIN — LAMOTRIGINE 50 MG: 25 TABLET ORAL at 08:42

## 2024-07-11 RX ADMIN — HYDROXYZINE HYDROCHLORIDE 25 MG: 25 TABLET ORAL at 16:52

## 2024-07-11 RX ADMIN — HYDROXYZINE HYDROCHLORIDE 25 MG: 25 TABLET ORAL at 21:36

## 2024-07-11 NOTE — PROGRESS NOTES
07/11/24 1135   Team Meeting   Meeting Type Tx Team Meeting   Initial Conference Date 07/11/24   Next Conference Date 07/25/24   Team Members Present   Team Members Present Physician;Nurse;;Other (Discipline and Name)   Physician Team Member Zenia TINSLEY   Nursing Team Member Melvin   Social Work Team Member Jose FRY   Other (Discipline and Name) Giuseppe Jefferson   Patient/Family Present   Patient Present Yes   Patient's Family Present No   OTHER   Team Meeting - Additional Comments Pt attended his tx team meeting. Pt reports ongoing anxiety and depression but denied any medication complications or concerns. Pt reports feeling tired, but a depressive tired rather than a sleep tired. Pt and SW will work on getting pt set up to order clothing at his request. Pt has a [facility] referral and ACT referral submitted to [facility] when he is ready to discharge.

## 2024-07-11 NOTE — NURSING NOTE
Compliant with medications. Good appetite. Pt present in milieu intermittently. Denies psychiatric symptoms. No behavior issues.

## 2024-07-11 NOTE — PROGRESS NOTES
Progress Note - Behavioral Health   Deyvi Cao 54 y.o. male MRN: [id number]  Unit/Bed#: [facility] 112-02 Encounter: [id number]  Code Status: Level 1 - Full Code    Assessment & Plan   Principal Problem:    Bipolar disorder with severe depression (HCC)  Active Problems:    Benign essential hypertension    Mixed hyperlipidemia    Allergic rhinitis due to allergen    Medical clearance for psychiatric admission    Rosacea    Recommended Treatment:     Treatment plan, treatment progress and medication changes were reviewed with Nursing Staff, Pharmacy Service and Case Management in Treatment Team:  1.Continue with group therapy, milieu therapy and occupational therapy   2.Behavioral Health checks every 7 minutes   3.Continue frequent safety checks and vitals per unit protocol  4.Continue with SLIM medical management as indicated  5.Continue with current medication regimen for symptom management: Vraylar 3mg PO Daily, Atarax 25mg PO TID, Lamictal 50mg PO Daily, Zoloft 150mg PO QHS, Lamictal 50mg PO Daily  6.Disposition Planning: Discharge planning and efforts remain ongoing - Awaiting group home placement    Subjective:    Patient was seen today for continuation of care, records reviewed and patient was discussed with the morning case review team.    Deyvi was seen today for psychiatric follow-up.  On assessment today, Deyvi was present during his treatment team meeting.  He says he feels like his depression is improving however he appears blunted, sad, and depressed.  More withdrawn this week, reports feeling tired.  Deyvi reports adequate daytime energy and denies any difficulties with initiating or staying asleep.  Oral appetite and hydration is adequate.   We reviewed once more the specific as-needed medications they can use going forward if they experience any insomnia or destabilization of their mood, they understood and were agreeable. Milieu visibility and group attendance encouraged to promote an active  participation in treatment.    Deyvi denies acute suicidal/self-harm ideation/intent/plan upon direct inquiry at this time. Deyvi is able to contract for safety while on the unit and would feel comfortable seeking staff support should suicidal symptoms or urges appear or worsen. Deyvi remains behaviorally appropriate, no agitation or aggression noted on exam or in report. Deyvi also denies HI/AH/VH, and does not appear overtly manic.  Patient does not verbalize any experiences that can be categorized as paranoid, persecutory, bizarre, or somatic delusions. Deyvi remains adherent to his current psychotropic medication regimen and denies any side effects from medications, as well as none noted on exam.    Group Attendance: 5 / 9  Treatment Team: Today  Psychiatric PRN's Needed: None    Review of Systems:  Behavior over the last 24 hours: Unchanged  Sleep: sleeping okay throughout the night  Appetite: adequate  Medication side effects: none reported  ROS:no complaints, all other systems are negative    Objective:    Vitals:  Vitals:    07/11/24 0742   BP: 131/76   Pulse: 83   Resp: 18   Temp: 97.8 °F (36.6 °C)   SpO2: 97%     Laboratory Results:  I have personally reviewed all pertinent laboratory/tests results.  Most Recent Labs:   Lab Results   Component Value Date    WBC 7.39 06/26/2024    RBC 4.70 06/26/2024    HGB 15.2 06/26/2024    HCT 45.5 06/26/2024     06/26/2024    RDW 12.9 06/26/2024    NEUTROABS 4.63 06/26/2024    SODIUM 137 06/26/2024    K 4.1 06/26/2024     06/26/2024    CO2 26 06/26/2024    BUN 17 06/26/2024    CREATININE 0.63 06/26/2024    GLUC 98 06/26/2024    GLUF 98 06/26/2024    CALCIUM 9.6 06/26/2024    AST 25 06/26/2024    ALT 45 06/26/2024    ALKPHOS 114 (H) 06/26/2024    TP 7.0 06/26/2024    ALB 4.4 06/26/2024    TBILI 0.44 06/26/2024    CHOLESTEROL 177 06/26/2024    HDL 52 06/26/2024    TRIG 73 06/26/2024    LDLCALC 110 (H) 06/26/2024    NONHDLC 125 06/26/2024    LITHIUM  0.4 (L) 01/28/2021    USL1GKSJONWN 2.636 06/26/2024    HGBA1C 5.2 11/22/2023     11/22/2023     Mental Status Evaluation:  Appearance:  age appropriate, casually dressed   Behavior:  guarded   Speech:  scant, soft   Mood:  dysphoric, anxious   Affect:  blunted   Thought Process:  goal directed   Associations: intact associations   Thought Content:  no overt delusions   Perceptual Disturbances: no auditory hallucinations, no visual hallucinations, denies when asked, does not appear responding to internal stimuli   Risk Potential: Suicidal ideation - None at present, contracts for safety on the unit, would talk to staff if not feeling safe on the unit  Homicidal ideation - None at present  Potential for aggression - Not at present   Sensorium:  oriented to person, place, and time/date   Memory:  recent memory intact   Consciousness:  alert and awake   Attention/Concentration: attention span and concentration appear shorter than expected for age   Insight:  partial   Judgment: partial   Gait/Station: normal gait/station, normal balance   Motor Activity: no abnormal movements     Progress Toward Goals: no significant improvement.  Deyvi continues to require inpatient psychiatric hospitalization for continued medication management and titration to optimize symptom reduction, improve sleep hygiene, and demonstrate adequate self-care.     Suicide/Homicide Risk Assessment:  Risk of Harm to Self:   Nursing Suicide Risk Assessment Last 24 hours: C-SSRS Risk (Since Last Contact)  Calculated C-SSRS Risk Score (Since Last Contact): No Risk Indicated    Risk of Harm to Others:  Nursing Homicide Risk Assessment: Violence Risk to Others: Denies within past 6 months    Behavioral Health Medications: all current active meds have been reviewed and continue current psychiatric medications.  Current Facility-Administered Medications   Medication Dose Route Frequency Provider Last Rate    acetaminophen  650 mg Oral Q6H EZEKIELN Melav  ALVINA Knutson      acetaminophen  650 mg Oral Q4H PRN ALVINA Harley      acetaminophen  975 mg Oral Q6H PRN ALVINA Harley      aluminum-magnesium hydroxide-simethicone  30 mL Oral Q4H PRN ALVINA Harley      atorvastatin  10 mg Oral Daily Sary LinkALVINA Thompson      benztropine  1 mg Intramuscular Q4H PRN Max 6/day ALVINA Harley      benztropine  1 mg Oral Q4H PRN Max 6/day ALVINA Harley      bisacodyl  10 mg Rectal Daily PRN ALVINA Harley      cariprazine  3 mg Oral Daily Martin Cardenas MD      Cholecalciferol  2,000 Units Oral Daily Sary Rodriguez ALVINA Biggs      cyanocobalamin  1,000 mcg Oral Daily Sary ALVINA Gupta      hydrOXYzine HCL  25 mg Oral Q6H PRN Max 4/day ALVINA Harley      hydrOXYzine HCL  25 mg Oral TID Martin Cardenas MD      hydrOXYzine HCL  50 mg Oral Q4H PRN Max 4/day ALVINA Harley      Or    LORazepam  1 mg Intramuscular Q4H PRN ALVINA Harley      lamoTRIgine  50 mg Oral Daily Martin Cardenas MD      lisinopril  10 mg Oral Daily Sary Rodriguez ALVINA Biggs      LORazepam  1 mg Oral Q4H PRN Max 6/day ALVINA Harley      Or    LORazepam  2 mg Intramuscular Q6H PRN Max 3/day ALVINA Harley      OLANZapine  10 mg Oral Q3H PRN Max 3/day ALVINA Harley      Or    OLANZapine  10 mg Intramuscular Q3H PRN Max 3/day ALVINA Harley      OLANZapine  5 mg Oral Q3H PRN Max 6/day ALVINA Harley      Or    OLANZapine  5 mg Intramuscular Q3H PRN Max 6/day ALVINA Harley      OLANZapine  2.5 mg Oral Q3H PRN Max 8/day ALVINA Harley      polyethylene glycol  17 g Oral Daily PRN ALVINA Harley      propranolol  10 mg Oral Q8H PRN ALVINA Harley      senna-docusate sodium  1 tablet Oral Daily PRN ALVINA Harley      sertraline  150 mg Oral HS Martin Cardenas MD       Risks / Benefits of Treatment:  Risks, benefits, and possible side effects of medications explained to patient. Patient has limited understanding of  risks and benefits of treatment at this time, but agrees to take medications as prescribed.    Counseling / Coordination of Care:  Total floor/unit time spent today 25 minutes. Greater than 50% of total time was spent with the patient and / or family counseling and / or coordination of care. A description of the counseling / coordination of care:   Patient's progress discussed with staff in treatment team meeting.  Medications, treatment progress and treatment plan reviewed with patient.   Educated on importance of medication and treatment compliance.  Reassurance and supportive therapy provided.   Encouraged participation in milieu and group therapy on the unit.    ALVINA Harley 07/11/24

## 2024-07-11 NOTE — PLAN OF CARE
Problem: Alteration in Thoughts and Perception  Goal: Verbalize thoughts and feelings  Description: Interventions:  - Promote a nonjudgmental and trusting relationship with the patient through active listening and therapeutic communication  - Assess patient's level of functioning, behavior and potential for risk  - Engage patient in 1 on 1 interactions  - Encourage patient to express fears, feelings, frustrations, and discuss symptoms    - [place] patient to reality, help patient recognize reality-based thinking   - Administer medications as ordered and assess for potential side effects  - Provide the patient education related to the signs and symptoms of the illness and desired effects of prescribed medications  Outcome: Progressing  Goal: Refrain from acting on delusional thinking/internal stimuli  Description: Interventions:  - Monitor patient closely, per order   - Utilize least restrictive measures   - Set reasonable limits, give positive feedback for acceptable   - Administer medications as ordered and monitor of potential side effects  Outcome: Progressing  Goal: Agree to be compliant with medication regime, as prescribed and report medication side effects  Description: Interventions:  - Offer appropriate PRN medication and supervise ingestion; conduct AIMS, as needed   Outcome: Progressing  Goal: Attend and participate in unit activities, including therapeutic, recreational, and educational groups  Description: Interventions:  -Encourage Visitation and family involvement in care  Outcome: Progressing  Goal: Complete daily ADLs, including personal hygiene independently, as able  Description: Interventions:  - Observe, teach, and assist patient with ADLS  - Monitor and promote a balance of rest/activity, with adequate nutrition and elimination   Outcome: Progressing     Problem: Ineffective Coping  Goal: Participates in unit activities  Description: Interventions:  - Provide therapeutic environment   -  Provide required programming   - Redirect inappropriate behaviors   Outcome: Progressing  Goal: Understands least restrictive measures  Description: Interventions:  - Utilize least restrictive behavior  Outcome: Progressing  Goal: Free from restraint events  Description: - Utilize least restrictive measures   - Provide behavioral interventions   - Redirect inappropriate behaviors   Outcome: Progressing     Problem: Risk for Self Injury/Neglect  Goal: Treatment Goal: Remain safe during length of stay, learn and adopt new coping skills, and be free of self-injurious ideation, impulses and acts at the time of discharge  Outcome: Progressing  Goal: Refrain from harming self  Description: Interventions:  - Monitor patient closely, per order  - Develop a trusting relationship  - Supervise medication ingestion, monitor effects and side effects   Outcome: Progressing  Goal: Complete daily ADLs, including personal hygiene independently, as able  Description: Interventions:  - Observe, teach, and assist patient with ADLS  - Monitor and promote a balance of rest/activity, with adequate nutrition and elimination  Outcome: Progressing     Problem: Risk for Violence/Aggression Toward Others  Goal: Treatment Goal: Refrain from acts of violence/aggression during length of stay, and demonstrate improved impulse control at the time of discharge  Outcome: Progressing  Goal: Verbalize thoughts and feelings  Description: Interventions:  - Assess and re-assess patient's level of risk, every waking shift  - Engage patient in 1:1 interactions, daily, for a minimum of 15 minutes   - Allow patient to express feelings and frustrations in a safe and non-threatening manner   - Establish rapport/trust with patient   Outcome: Progressing  Goal: Refrain from harming others  Outcome: Progressing  Goal: Refrain from destructive acts on the environment or property  Outcome: Progressing  Goal: Control angry outbursts  Description: Interventions:  -  Monitor patient closely, per order  - Ensure early verbal de-escalation  - Monitor prn medication needs  - Set reasonable/therapeutic limits, outline behavioral expectations, and consequences   - Provide a non-threatening milieu, utilizing the least restrictive interventions   Outcome: Progressing  Goal: Attend and participate in unit activities, including therapeutic, recreational, and educational groups  Description: Interventions:  - Provide therapeutic and educational activities daily, encourage attendance and participation, and document same in the medical record   Outcome: Progressing     Problem: Alteration in Orientation  Goal: Treatment Goal: Demonstrate a reduction of confusion and improved orientation to person, place, time and/or situation upon discharge, according to optimum baseline/ability  Outcome: Progressing  Goal: Attend and participate in unit activities, including therapeutic, recreational, and educational groups  Description: Interventions:  - Provide therapeutic and educational activities daily, encourage attendance and participation, and document same in the medical record   - Provide appropriate opportunities for reminiscence   - Provide a consistent daily routine   - Encourage family contact/visitation   Outcome: Progressing     Problem: SELF HARM/SUICIDALITY  Goal: Will have no self-injury during hospital stay  Description: INTERVENTIONS:  - Q 15 MINUTES: Routine safety checks  - Q WAKING SHIFT & PRN: Assess risk to determine if routine checks are adequate to maintain patient safety  - Encourage patient to participate actively in care by formulating a plan to combat response to suicidal ideation, identify supports and resources  Outcome: Progressing     Problem: DEPRESSION  Goal: Will be euthymic at discharge  Description: INTERVENTIONS:  - Administer medication as ordered  - Provide emotional support via 1:1 interaction with staff  - Encourage involvement in milieu/groups/activities  -  Monitor for social isolation  Outcome: Progressing     Problem: TALIB  Goal: Will exhibit normal sleep and speech and no impulsivity  Description: INTERVENTIONS:  - Administer medication as ordered  - Set limits on impulsive behavior  - Make attempts to decrease external stimuli as possible  Outcome: Progressing     Problem: PSYCHOSIS  Goal: Will report no hallucinations or delusions  Description: Interventions:  - Administer medication as  ordered  - Every waking shifts and PRN assess for the presence of hallucinations and or delusions  - Assist with reality testing to support increasing orientation  - Assess if patient's hallucinations or delusions are encouraging self-harm or harm to others and intervene as appropriate  Outcome: Progressing     Problem: BEHAVIOR  Goal: Pt/Family maintain appropriate behavior and adhere to behavioral management agreement, if implemented  Description: INTERVENTIONS:  - Assess the family dynamic   - Encourage verbalization of thoughts and concerns in a socially appropriate manner  - Assess patient/family's coping skills and non-compliant behavior (including use of illegal substances).  - Utilize positive, consistent limit setting strategies supporting safety of patient, staff and others  - Initiate consult with Case Management, Spiritual Care or other ancillary services as appropriate  - If a patient's/visitor's behavior jeopardizes the safety of the patient, staff, or others, refer to organization procedure.   - Notify Security of behavior or suspected illegal substances which indicate the need for search of the patient and/or belongings  - Encourage participation in the decision making process about a behavioral management agreement; implement if patient meets criteria  Outcome: Progressing     Problem: SELF CARE DEFICIT  Goal: Return ADL status to a safe level of function  Description: INTERVENTIONS:  - Administer medication as ordered  - Assess ADL deficits and provide assistive  devices as needed  - Obtain PT/OT consults as needed  - Assist and instruct patient to increase activity and self care as tolerated  Outcome: Progressing     Problem: Alteration in Thoughts and Perception  Goal: Verbalize thoughts and feelings  Description: Interventions:  - Promote a nonjudgmental and trusting relationship with the patient through active listening and therapeutic communication  - Assess patient's level of functioning, behavior and potential for risk  - Engage patient in 1 on 1 interactions  - Encourage patient to express fears, feelings, frustrations, and discuss symptoms    - [place] patient to reality, help patient recognize reality-based thinking   - Administer medications as ordered and assess for potential side effects  - Provide the patient education related to the signs and symptoms of the illness and desired effects of prescribed medications  Outcome: Progressing  Goal: Refrain from acting on delusional thinking/internal stimuli  Description: Interventions:  - Monitor patient closely, per order   - Utilize least restrictive measures   - Set reasonable limits, give positive feedback for acceptable   - Administer medications as ordered and monitor of potential side effects  Outcome: Progressing  Goal: Agree to be compliant with medication regime, as prescribed and report medication side effects  Description: Interventions:  - Offer appropriate PRN medication and supervise ingestion; conduct AIMS, as needed   Outcome: Progressing  Goal: Attend and participate in unit activities, including therapeutic, recreational, and educational groups  Description: Interventions:  -Encourage Visitation and family involvement in care  Outcome: Progressing  Goal: Complete daily ADLs, including personal hygiene independently, as able  Description: Interventions:  - Observe, teach, and assist patient with ADLS  - Monitor and promote a balance of rest/activity, with adequate nutrition and elimination   Outcome:  Progressing     Problem: Ineffective Coping  Goal: Participates in unit activities  Description: Interventions:  - Provide therapeutic environment   - Provide required programming   - Redirect inappropriate behaviors   Outcome: Progressing  Goal: Understands least restrictive measures  Description: Interventions:  - Utilize least restrictive behavior  Outcome: Progressing  Goal: Free from restraint events  Description: - Utilize least restrictive measures   - Provide behavioral interventions   - Redirect inappropriate behaviors   Outcome: Progressing     Problem: Risk for Self Injury/Neglect  Goal: Treatment Goal: Remain safe during length of stay, learn and adopt new coping skills, and be free of self-injurious ideation, impulses and acts at the time of discharge  Outcome: Progressing  Goal: Refrain from harming self  Description: Interventions:  - Monitor patient closely, per order  - Develop a trusting relationship  - Supervise medication ingestion, monitor effects and side effects   Outcome: Progressing  Goal: Complete daily ADLs, including personal hygiene independently, as able  Description: Interventions:  - Observe, teach, and assist patient with ADLS  - Monitor and promote a balance of rest/activity, with adequate nutrition and elimination  Outcome: Progressing     Problem: Risk for Violence/Aggression Toward Others  Goal: Treatment Goal: Refrain from acts of violence/aggression during length of stay, and demonstrate improved impulse control at the time of discharge  Outcome: Progressing  Goal: Verbalize thoughts and feelings  Description: Interventions:  - Assess and re-assess patient's level of risk, every waking shift  - Engage patient in 1:1 interactions, daily, for a minimum of 15 minutes   - Allow patient to express feelings and frustrations in a safe and non-threatening manner   - Establish rapport/trust with patient   Outcome: Progressing  Goal: Refrain from harming others  Outcome:  Progressing  Goal: Refrain from destructive acts on the environment or property  Outcome: Progressing  Goal: Control angry outbursts  Description: Interventions:  - Monitor patient closely, per order  - Ensure early verbal de-escalation  - Monitor prn medication needs  - Set reasonable/therapeutic limits, outline behavioral expectations, and consequences   - Provide a non-threatening milieu, utilizing the least restrictive interventions   Outcome: Progressing  Goal: Attend and participate in unit activities, including therapeutic, recreational, and educational groups  Description: Interventions:  - Provide therapeutic and educational activities daily, encourage attendance and participation, and document same in the medical record   Outcome: Progressing     Problem: Alteration in Orientation  Goal: Treatment Goal: Demonstrate a reduction of confusion and improved orientation to person, place, time and/or situation upon discharge, according to optimum baseline/ability  Outcome: Progressing  Goal: Attend and participate in unit activities, including therapeutic, recreational, and educational groups  Description: Interventions:  - Provide therapeutic and educational activities daily, encourage attendance and participation, and document same in the medical record   - Provide appropriate opportunities for reminiscence   - Provide a consistent daily routine   - Encourage family contact/visitation   Outcome: Progressing     Problem: SELF HARM/SUICIDALITY  Goal: Will have no self-injury during hospital stay  Description: INTERVENTIONS:  - Q 15 MINUTES: Routine safety checks  - Q WAKING SHIFT & PRN: Assess risk to determine if routine checks are adequate to maintain patient safety  - Encourage patient to participate actively in care by formulating a plan to combat response to suicidal ideation, identify supports and resources  Outcome: Progressing     Problem: DEPRESSION  Goal: Will be euthymic at discharge  Description:  INTERVENTIONS:  - Administer medication as ordered  - Provide emotional support via 1:1 interaction with staff  - Encourage involvement in milieu/groups/activities  - Monitor for social isolation  Outcome: Progressing     Problem: TALIB  Goal: Will exhibit normal sleep and speech and no impulsivity  Description: INTERVENTIONS:  - Administer medication as ordered  - Set limits on impulsive behavior  - Make attempts to decrease external stimuli as possible  Outcome: Progressing     Problem: PSYCHOSIS  Goal: Will report no hallucinations or delusions  Description: Interventions:  - Administer medication as  ordered  - Every waking shifts and PRN assess for the presence of hallucinations and or delusions  - Assist with reality testing to support increasing orientation  - Assess if patient's hallucinations or delusions are encouraging self-harm or harm to others and intervene as appropriate  Outcome: Progressing     Problem: BEHAVIOR  Goal: Pt/Family maintain appropriate behavior and adhere to behavioral management agreement, if implemented  Description: INTERVENTIONS:  - Assess the family dynamic   - Encourage verbalization of thoughts and concerns in a socially appropriate manner  - Assess patient/family's coping skills and non-compliant behavior (including use of illegal substances).  - Utilize positive, consistent limit setting strategies supporting safety of patient, staff and others  - Initiate consult with Case Management, Spiritual Care or other ancillary services as appropriate  - If a patient's/visitor's behavior jeopardizes the safety of the patient, staff, or others, refer to organization procedure.   - Notify Security of behavior or suspected illegal substances which indicate the need for search of the patient and/or belongings  - Encourage participation in the decision making process about a behavioral management agreement; implement if patient meets criteria  Outcome: Progressing     Problem: SELF CARE  DEFICIT  Goal: Return ADL status to a safe level of function  Description: INTERVENTIONS:  - Administer medication as ordered  - Assess ADL deficits and provide assistive devices as needed  - Obtain PT/OT consults as needed  - Assist and instruct patient to increase activity and self care as tolerated  Outcome: Progressing     Problem: Alteration in Thoughts and Perception  Goal: Treatment Goal: Gain control of psychotic behaviors/thinking, reduce/eliminate presenting symptoms and demonstrate improved reality functioning upon discharge  Outcome: Not Progressing     Problem: Ineffective Coping  Goal: Patient/Family participate in treatment and DC plans  Description: Interventions:  - Provide therapeutic environment  Outcome: Not Progressing  Goal: Patient/Family verbalizes awareness of resources  Outcome: Not Progressing     Problem: Risk for Self Injury/Neglect  Goal: Verbalize thoughts and feelings  Description: Interventions:  - Assess and re-assess patient's lethality and potential for self-injury  - Engage patient in 1:1 interactions, daily, for a minimum of 15 minutes  - Encourage patient to express feelings, fears, frustrations, hopes  - Establish rapport/trust with patient   Outcome: Not Progressing     Problem: Anxiety  Goal: Anxiety is at manageable level  Description: Interventions:  - Assess and monitor patient's anxiety level.   - Monitor for signs and symptoms (heart palpitations, chest pain, shortness of breath, headaches, nausea, feeling jumpy, restlessness, irritable, apprehensive).   - Collaborate with interdisciplinary team and initiate plan and interventions as ordered.  - [place] patient to unit/surroundings  - Explain treatment plan  - Encourage participation in care  - Encourage verbalization of concerns/fears  - Identify coping mechanisms  - Assist in developing anxiety-reducing skills  - Administer/offer alternative therapies  - Limit or eliminate stimulants  Outcome: Not Progressing      Problem: ANXIETY  Goal: Will report anxiety at manageable levels  Description: INTERVENTIONS:  - Administer medication as ordered  - Teach and encourage coping skills  - Provide emotional support  - Assess patient/family for anxiety and ability to cope  Outcome: Not Progressing     Problem: Alteration in Thoughts and Perception  Goal: Treatment Goal: Gain control of psychotic behaviors/thinking, reduce/eliminate presenting symptoms and demonstrate improved reality functioning upon discharge  Outcome: Not Progressing     Problem: Ineffective Coping  Goal: Patient/Family participate in treatment and DC plans  Description: Interventions:  - Provide therapeutic environment  Outcome: Not Progressing  Goal: Patient/Family verbalizes awareness of resources  Outcome: Not Progressing     Problem: Risk for Self Injury/Neglect  Goal: Verbalize thoughts and feelings  Description: Interventions:  - Assess and re-assess patient's lethality and potential for self-injury  - Engage patient in 1:1 interactions, daily, for a minimum of 15 minutes  - Encourage patient to express feelings, fears, frustrations, hopes  - Establish rapport/trust with patient   Outcome: Not Progressing     Problem: Anxiety  Goal: Anxiety is at manageable level  Description: Interventions:  - Assess and monitor patient's anxiety level.   - Monitor for signs and symptoms (heart palpitations, chest pain, shortness of breath, headaches, nausea, feeling jumpy, restlessness, irritable, apprehensive).   - Collaborate with interdisciplinary team and initiate plan and interventions as ordered.  - [place] patient to unit/surroundings  - Explain treatment plan  - Encourage participation in care  - Encourage verbalization of concerns/fears  - Identify coping mechanisms  - Assist in developing anxiety-reducing skills  - Administer/offer alternative therapies  - Limit or eliminate stimulants  Outcome: Not Progressing     Problem: ANXIETY  Goal: Will report anxiety at  manageable levels  Description: INTERVENTIONS:  - Administer medication as ordered  - Teach and encourage coping skills  - Provide emotional support  - Assess patient/family for anxiety and ability to cope  Outcome: Not Progressing     Problem: Alteration in Thoughts and Perception  Goal: Treatment Goal: Gain control of psychotic behaviors/thinking, reduce/eliminate presenting symptoms and demonstrate improved reality functioning upon discharge  Outcome: Not Progressing     Problem: Ineffective Coping  Goal: Patient/Family participate in treatment and DC plans  Description: Interventions:  - Provide therapeutic environment  Outcome: Not Progressing  Goal: Patient/Family verbalizes awareness of resources  Outcome: Not Progressing     Problem: Risk for Self Injury/Neglect  Goal: Verbalize thoughts and feelings  Description: Interventions:  - Assess and re-assess patient's lethality and potential for self-injury  - Engage patient in 1:1 interactions, daily, for a minimum of 15 minutes  - Encourage patient to express feelings, fears, frustrations, hopes  - Establish rapport/trust with patient   Outcome: Not Progressing     Problem: Anxiety  Goal: Anxiety is at manageable level  Description: Interventions:  - Assess and monitor patient's anxiety level.   - Monitor for signs and symptoms (heart palpitations, chest pain, shortness of breath, headaches, nausea, feeling jumpy, restlessness, irritable, apprehensive).   - Collaborate with interdisciplinary team and initiate plan and interventions as ordered.  - [place] patient to unit/surroundings  - Explain treatment plan  - Encourage participation in care  - Encourage verbalization of concerns/fears  - Identify coping mechanisms  - Assist in developing anxiety-reducing skills  - Administer/offer alternative therapies  - Limit or eliminate stimulants  Outcome: Not Progressing     Problem: ANXIETY  Goal: Will report anxiety at manageable levels  Description: INTERVENTIONS:  -  Administer medication as ordered  - Teach and encourage coping skills  - Provide emotional support  - Assess patient/family for anxiety and ability to cope  Outcome: Not Progressing

## 2024-07-11 NOTE — NURSING NOTE
Received pt in bed at change of shift with eyes closed; chest movement noted.  Continues the same thus this far as per q 7 min room checks.   Will continue to monitor behavior, sleeping pattern and any medical issues that may arise.    Sleeping 7+ hrs thus this far

## 2024-07-11 NOTE — PROGRESS NOTES
07/11/24 0742   Team Meeting   Meeting Type Daily Rounds   Team Members Present   Team Members Present Physician;Nurse;;Other (Discipline and Name)   Patient/Family Present   Patient Present No   Patient's Family Present No     In attendance:  Dr. Alex Thomas, MD Dr. Jordan Holter, ALVINA Melgoza, RN  Judi Garcia, hospitalsW  [facility] hospitalsFREDDY    Groups: 5/9    Pt reports ongoing anxiety/depression. Pt is more visible and less isolative. Pt is pleasant with no bx concerns.

## 2024-07-11 NOTE — NURSING NOTE
Patient is cooperative. He is complaint with medications. He rated depression 3/10 an anxiety 2/4. He states that is baseline. He is quiet and soft spoken. He has good eye contact during conversation.  His appetite is good.

## 2024-07-12 PROCEDURE — 99232 SBSQ HOSP IP/OBS MODERATE 35: CPT | Performed by: PSYCHIATRY & NEUROLOGY

## 2024-07-12 RX ADMIN — ATORVASTATIN CALCIUM 10 MG: 10 TABLET, FILM COATED ORAL at 08:42

## 2024-07-12 RX ADMIN — SERTRALINE HYDROCHLORIDE 150 MG: 100 TABLET ORAL at 21:38

## 2024-07-12 RX ADMIN — HYDROXYZINE HYDROCHLORIDE 25 MG: 25 TABLET ORAL at 08:42

## 2024-07-12 RX ADMIN — HYDROXYZINE HYDROCHLORIDE 25 MG: 25 TABLET ORAL at 21:38

## 2024-07-12 RX ADMIN — CARIPRAZINE 3 MG: 3 CAPSULE, GELATIN COATED ORAL at 08:42

## 2024-07-12 RX ADMIN — LISINOPRIL 10 MG: 10 TABLET ORAL at 08:42

## 2024-07-12 RX ADMIN — LAMOTRIGINE 50 MG: 25 TABLET ORAL at 08:41

## 2024-07-12 RX ADMIN — CYANOCOBALAMIN TAB 1000 MCG 1000 MCG: 1000 TAB at 08:42

## 2024-07-12 RX ADMIN — CHOLECALCIFEROL TAB 25 MCG (1000 UNIT) 2000 UNITS: 25 TAB at 08:42

## 2024-07-12 RX ADMIN — HYDROXYZINE HYDROCHLORIDE 25 MG: 25 TABLET ORAL at 16:45

## 2024-07-12 NOTE — PROGRESS NOTES
"Progress Note - Behavioral Health   Deyvi Cao 54 y.o. male MRN: [id number]  Unit/Bed#: [facility] 112-02 Encounter: [id number]  Code Status: Level 1 - Full Code    Assessment & Plan   Principal Problem:    Bipolar disorder with severe depression (HCC)  Active Problems:    Benign essential hypertension    Mixed hyperlipidemia    Allergic rhinitis due to allergen    Medical clearance for psychiatric admission    Rosacea    Recommended Treatment:     Treatment plan, treatment progress and medication changes were reviewed with Nursing Staff, Pharmacy Service and Case Management in Treatment Team:  1.Continue with group therapy, milieu therapy and occupational therapy   2.Behavioral Health checks every 7 minutes   3.Continue frequent safety checks and vitals per unit protocol  4.Continue with SLIM medical management as indicated  5.Continue with current medication regimen for symptom management: Vraylar 3mg PO Daily, Atarax 25mg PO TID, Lamictal 50mg PO Daily, Zoloft 150mg PO QHS, Lamictal 50mg PO Daily   6.Disposition Planning: Discharge planning and efforts remain ongoing - Awaiting group home placement    Subjective:    Patient was seen today for continuation of care, records reviewed and patient was discussed with the morning case review team.    Deyvi was seen today for psychiatric follow-up.  On assessment today, Deyvi was found eating breakfast.  He is doing \"okay\".  Offers no new concerns.  Still has depression and anxiety, found laying in bed more often throughout the day.  Deyvi reports adequate daytime energy and denies any difficulties with initiating or staying asleep.  Oral appetite and hydration is adequate.   We reviewed once more the specific as-needed medications they can use going forward if they experience any insomnia or destabilization of their mood, they understood and were agreeable. Milieu visibility and group attendance encouraged to promote an active participation in treatment.    Deyvi " denies acute suicidal/self-harm ideation/intent/plan upon direct inquiry at this time. Deyvi is able to contract for safety while on the unit and would feel comfortable seeking staff support should suicidal symptoms or urges appear or worsen. Deyvi remains behaviorally appropriate, no agitation or aggression noted on exam or in report. Deyvi also denies HI/AH/VH, and does not appear overtly manic.  Patient does not verbalize any experiences that can be categorized as paranoid, persecutory, bizarre, or somatic delusions. Deyvi remains adherent to his current psychotropic medication regimen and denies any side effects from medications, as well as none noted on exam.    Review of Systems:  Behavior over the last 24 hours: Unchanged  Sleep: sleeping okay throughout the night  Appetite: adequate  Medication side effects: none reported  ROS:no complaints, all other systems are negative    Objective:    Vitals:  Vitals:    07/12/24 0742   BP: 135/80   Pulse: 81   Resp: 18   Temp: 97.7 °F (36.5 °C)   SpO2: 98%     Laboratory Results:  I have personally reviewed all pertinent laboratory/tests results.  Most Recent Labs:   Lab Results   Component Value Date    WBC 7.39 06/26/2024    RBC 4.70 06/26/2024    HGB 15.2 06/26/2024    HCT 45.5 06/26/2024     06/26/2024    RDW 12.9 06/26/2024    NEUTROABS 4.63 06/26/2024    SODIUM 137 06/26/2024    K 4.1 06/26/2024     06/26/2024    CO2 26 06/26/2024    BUN 17 06/26/2024    CREATININE 0.63 06/26/2024    GLUC 98 06/26/2024    GLUF 98 06/26/2024    CALCIUM 9.6 06/26/2024    AST 25 06/26/2024    ALT 45 06/26/2024    ALKPHOS 114 (H) 06/26/2024    TP 7.0 06/26/2024    ALB 4.4 06/26/2024    TBILI 0.44 06/26/2024    CHOLESTEROL 177 06/26/2024    HDL 52 06/26/2024    TRIG 73 06/26/2024    LDLCALC 110 (H) 06/26/2024    NONHDLC 125 06/26/2024    LITHIUM 0.4 (L) 01/28/2021    [id number] 2.636 06/26/2024    HGBA1C 5.2 11/22/2023     11/22/2023       Mental Status  Evaluation:  Appearance:  age appropriate, casually dressed   Behavior:  guarded   Speech:  scant, soft   Mood:  dysphoric, anxious   Affect:  blunted   Thought Process:  poverty of thought   Associations: intact associations   Thought Content:  no overt delusions   Perceptual Disturbances: no auditory hallucinations, no visual hallucinations, denies when asked, does not appear responding to internal stimuli   Risk Potential: Suicidal ideation - None at present, contracts for safety on the unit, would talk to staff if not feeling safe on the unit  Homicidal ideation - None at present  Potential for aggression - Not at present   Sensorium:  oriented to person, place, and time/date   Memory:  recent memory intact   Consciousness:  alert and awake   Attention/Concentration: attention span and concentration: unable to assess due to lack of cooperation   Insight:  limited   Judgment: limited   Gait/Station: normal gait/station, normal balance   Motor Activity: no abnormal movements     Progress Toward Goals: making slow improvement.  Deyvi continues to require inpatient psychiatric hospitalization for continued medication management and titration to optimize symptom reduction, improve sleep hygiene, and demonstrate adequate self-care.     Suicide/Homicide Risk Assessment:  Risk of Harm to Self:   Nursing Suicide Risk Assessment Last 24 hours: C-SSRS Risk (Since Last Contact)  Calculated C-SSRS Risk Score (Since Last Contact): No Risk Indicated    Risk of Harm to Others:  Nursing Homicide Risk Assessment: Violence Risk to Others: Denies within past 6 months    Behavioral Health Medications: all current active meds have been reviewed and continue current psychiatric medications.  Current Facility-Administered Medications   Medication Dose Route Frequency Provider Last Rate    acetaminophen  650 mg Oral Q6H PRN ALVINA Harley      acetaminophen  650 mg Oral Q4H PRN ALVINA Harley      acetaminophen  975 mg Oral  Q6H PRN ALVINA Harley      aluminum-magnesium hydroxide-simethicone  30 mL Oral Q4H PRN ALVINA Harley      atorvastatin  10 mg Oral Daily Sary LinkALVINA Thompson      benztropine  1 mg Intramuscular Q4H PRN Max 6/day ALVINA Harley      benztropine  1 mg Oral Q4H PRN Max 6/day ALVINA Harley      bisacodyl  10 mg Rectal Daily PRN ALVINA Harley      cariprazine  3 mg Oral Daily Martin Cardenas MD      Cholecalciferol  2,000 Units Oral Daily Sary LinkALVINA Thompson      cyanocobalamin  1,000 mcg Oral Daily Sary LinkALVINA Thompson      hydrOXYzine HCL  25 mg Oral Q6H PRN Max 4/day ALVINA Harley      hydrOXYzine HCL  25 mg Oral TID Martin Cardenas MD      hydrOXYzine HCL  50 mg Oral Q4H PRN Max 4/day ALVINA Harley      Or    LORazepam  1 mg Intramuscular Q4H PRN ALVINA Harley      lamoTRIgine  50 mg Oral Daily Martin Cardenas MD      lisinopril  10 mg Oral Daily Sary LinkALVINA Thompson      LORazepam  1 mg Oral Q4H PRN Max 6/day ALVINA Harley      Or    LORazepam  2 mg Intramuscular Q6H PRN Max 3/day ALVINA Harley      OLANZapine  10 mg Oral Q3H PRN Max 3/day ALVINA Harley      Or    OLANZapine  10 mg Intramuscular Q3H PRN Max 3/day ALVINA Harley      OLANZapine  5 mg Oral Q3H PRN Max 6/day ALVINA Harley      Or    OLANZapine  5 mg Intramuscular Q3H PRN Max 6/day ALVINA Harley      OLANZapine  2.5 mg Oral Q3H PRN Max 8/day ALVINA Harley      polyethylene glycol  17 g Oral Daily PRN ALVINA Harley      propranolol  10 mg Oral Q8H PRN ALVINA Harley      senna-docusate sodium  1 tablet Oral Daily PRN ALVINA Harley      sertraline  150 mg Oral HS Martin Cardenas MD       Risks / Benefits of Treatment:  Risks, benefits, and possible side effects of medications explained to patient. Patient has limited understanding of risks and benefits of treatment at this time, but agrees to take medications as prescribed.    Counseling /  Coordination of Care:  Total floor/unit time spent today 25 minutes. Greater than 50% of total time was spent with the patient and / or family counseling and / or coordination of care. A description of the counseling / coordination of care:   Patient's progress discussed with staff in treatment team meeting.  Medications, treatment progress and treatment plan reviewed with patient.   Educated on importance of medication and treatment compliance.  Reassurance and supportive therapy provided.   Encouraged participation in milieu and group therapy on the unit.    ALVINA Harley 07/12/24

## 2024-07-12 NOTE — NURSING NOTE
Patient isolative to self in room this evening, out for snack only. Patient cooperative upon approach, offered no complaints, behaviors controlled. Patient compliant with bedtime meds, in own room sleeping, will continue to monitor.

## 2024-07-12 NOTE — PLAN OF CARE
Problem: Alteration in Thoughts and Perception  Goal: Treatment Goal: Gain control of psychotic behaviors/thinking, reduce/eliminate presenting symptoms and demonstrate improved reality functioning upon discharge  Outcome: Progressing  Goal: Verbalize thoughts and feelings  Description: Interventions:  - Promote a nonjudgmental and trusting relationship with the patient through active listening and therapeutic communication  - Assess patient's level of functioning, behavior and potential for risk  - Engage patient in 1 on 1 interactions  - Encourage patient to express fears, feelings, frustrations, and discuss symptoms    - [place] patient to reality, help patient recognize reality-based thinking   - Administer medications as ordered and assess for potential side effects  - Provide the patient education related to the signs and symptoms of the illness and desired effects of prescribed medications  Outcome: Progressing  Goal: Refrain from acting on delusional thinking/internal stimuli  Description: Interventions:  - Monitor patient closely, per order   - Utilize least restrictive measures   - Set reasonable limits, give positive feedback for acceptable   - Administer medications as ordered and monitor of potential side effects  Outcome: Progressing  Goal: Agree to be compliant with medication regime, as prescribed and report medication side effects  Description: Interventions:  - Offer appropriate PRN medication and supervise ingestion; conduct AIMS, as needed   Outcome: Progressing  Goal: Attend and participate in unit activities, including therapeutic, recreational, and educational groups  Description: Interventions:  -Encourage Visitation and family involvement in care  Outcome: Progressing  Goal: Complete daily ADLs, including personal hygiene independently, as able  Description: Interventions:  - Observe, teach, and assist patient with ADLS  - Monitor and promote a balance of rest/activity, with adequate  nutrition and elimination   Outcome: Progressing     Problem: Ineffective Coping  Goal: Identifies healthy coping skills  Outcome: Progressing  Goal: Demonstrates healthy coping skills  Outcome: Progressing  Goal: Participates in unit activities  Description: Interventions:  - Provide therapeutic environment   - Provide required programming   - Redirect inappropriate behaviors   Outcome: Progressing  Goal: Patient/Family participate in treatment and DC plans  Description: Interventions:  - Provide therapeutic environment  Outcome: Progressing  Goal: Free from restraint events  Description: - Utilize least restrictive measures   - Provide behavioral interventions   - Redirect inappropriate behaviors   Outcome: Progressing     Problem: Risk for Self Injury/Neglect  Goal: Treatment Goal: Remain safe during length of stay, learn and adopt new coping skills, and be free of self-injurious ideation, impulses and acts at the time of discharge  Outcome: Progressing  Goal: Verbalize thoughts and feelings  Description: Interventions:  - Assess and re-assess patient's lethality and potential for self-injury  - Engage patient in 1:1 interactions, daily, for a minimum of 15 minutes  - Encourage patient to express feelings, fears, frustrations, hopes  - Establish rapport/trust with patient   Outcome: Progressing  Goal: Refrain from harming self  Description: Interventions:  - Monitor patient closely, per order  - Develop a trusting relationship  - Supervise medication ingestion, monitor effects and side effects   Outcome: Progressing     Problem: Depression  Goal: Treatment Goal: Demonstrate behavioral control of depressive symptoms, verbalize feelings of improved mood/affect, and adopt new coping skills prior to discharge  Outcome: Progressing  Goal: Refrain from harming self  Description: Interventions:  - Monitor patient closely, per order   - Supervise medication ingestion, monitor effects and side effects   Outcome:  Progressing  Goal: Refrain from isolation  Description: Interventions:  - Develop a trusting relationship   - Encourage socialization   Outcome: Progressing  Goal: Refrain from self-neglect  Outcome: Progressing  Goal: Complete daily ADLs, including personal hygiene independently, as able  Description: Interventions:  - Observe, teach, and assist patient with ADLS  -  Monitor and promote a balance of rest/activity, with adequate nutrition and elimination   Outcome: Progressing     Problem: Risk for Violence/Aggression Toward Others  Goal: Verbalize thoughts and feelings  Description: Interventions:  - Assess and re-assess patient's level of risk, every waking shift  - Engage patient in 1:1 interactions, daily, for a minimum of 15 minutes   - Allow patient to express feelings and frustrations in a safe and non-threatening manner   - Establish rapport/trust with patient   Outcome: Progressing  Goal: Refrain from harming others  Outcome: Progressing  Goal: Refrain from destructive acts on the environment or property  Outcome: Progressing  Goal: Control angry outbursts  Description: Interventions:  - Monitor patient closely, per order  - Ensure early verbal de-escalation  - Monitor prn medication needs  - Set reasonable/therapeutic limits, outline behavioral expectations, and consequences   - Provide a non-threatening milieu, utilizing the least restrictive interventions   Outcome: Progressing  Goal: Attend and participate in unit activities, including therapeutic, recreational, and educational groups  Description: Interventions:  - Provide therapeutic and educational activities daily, encourage attendance and participation, and document same in the medical record   Outcome: Progressing     Problem: Alteration in Orientation  Goal: Allow medical examinations, as recommended  Description: Interventions:  - Provide physical/neurological exams and/or referrals, per provider   Outcome: Progressing  Goal: Cooperate with  recommended testing/procedures  Description: Interventions:  - Determine need for ancillary testing  - Observe for mental status changes  - Implement falls/precaution protocol   Outcome: Progressing  Goal: Attend and participate in unit activities, including therapeutic, recreational, and educational groups  Description: Interventions:  - Provide therapeutic and educational activities daily, encourage attendance and participation, and document same in the medical record   - Provide appropriate opportunities for reminiscence   - Provide a consistent daily routine   - Encourage family contact/visitation   Outcome: Progressing  Goal: Complete daily ADLs, including personal hygiene independently, as able  Description: Interventions:  - Observe, teach, and assist patient with ADLS  Outcome: Progressing     Problem: SELF HARM/SUICIDALITY  Goal: Will have no self-injury during hospital stay  Description: INTERVENTIONS:  - Q 15 MINUTES: Routine safety checks  - Q WAKING SHIFT & PRN: Assess risk to determine if routine checks are adequate to maintain patient safety  - Encourage patient to participate actively in care by formulating a plan to combat response to suicidal ideation, identify supports and resources  Outcome: Progressing     Problem: TALIB  Goal: Will exhibit normal sleep and speech and no impulsivity  Description: INTERVENTIONS:  - Administer medication as ordered  - Set limits on impulsive behavior  - Make attempts to decrease external stimuli as possible  Outcome: Progressing     Problem: INVOLUNTARY ADMIT  Goal: Will cooperate with staff recommendations and doctor's orders and will demonstrate appropriate behavior  Description: INTERVENTIONS:  - Treat underlying conditions and offer medication as ordered  - Educate regarding involuntary admission procedures and rules  - Utilize positive consistent limit setting strategies to support patient and staff safety  Outcome: Progressing     Problem: DISCHARGE PLANNING  - CARE MANAGEMENT  Goal: Discharge to post-acute care or home with appropriate resources  Description: INTERVENTIONS:  - Conduct assessment to determine patient/family and health care team treatment goals, and need for post-acute services based on payer coverage, community resources, and patient preferences, and barriers to discharge  - Address psychosocial, clinical, and financial barriers to discharge as identified in assessment in conjunction with the patient/family and health care team  - Arrange appropriate level of post-acute services according to patient’s   needs and preference and payer coverage in collaboration with the physician and health care team  - Communicate with and update the patient/family, physician, and health care team regarding progress on the discharge plan  - Arrange appropriate transportation to post-acute venues  Outcome: Progressing

## 2024-07-12 NOTE — SOCIAL WORK
SW assisted pt with accessing online account to order clothing for himself with his own funds. Pt was polite, engaged and quiet but discussed progress made and experiences on the unit while completing his clothing shopping online.   Pt denied any major concerns but endorses ongoing anxiety and depression.

## 2024-07-12 NOTE — NURSING NOTE
Received pt in bed at change of shift with eyes closed; chest movement noted.  Continues the same thus this far as per q 7 min room checks.   Will continue to monitor behavior, sleeping pattern and any medical issues that may arise.    0557:  Sleeping 7+ hrs thus this far       abdominal region

## 2024-07-12 NOTE — PROGRESS NOTES
07/12/24 0751   Team Meeting   Meeting Type Daily Rounds   Team Members Present   Team Members Present Physician;Nurse;;Other (Discipline and Name)   Physician Team Member Thomas, Holter, Hangey CRNP   Nursing Team Member Kunal   Social Work Team Member Henrry FRY   Patient/Family Present   Patient Present No   Patient's Family Present No     Groups Participation  4/8.   Patient's compliant with medications. He's reports depression and anxiety. Isolates at times.

## 2024-07-12 NOTE — NURSING NOTE
Patient is pleasant and cooperative. He states that he is starting to feel better. He states that his depression and anxiety are improving. Patient appetite is good. He did not attend groups today but did go outside and played game with writer and another peer.

## 2024-07-13 PROCEDURE — 99232 SBSQ HOSP IP/OBS MODERATE 35: CPT | Performed by: NURSE PRACTITIONER

## 2024-07-13 RX ADMIN — LISINOPRIL 10 MG: 10 TABLET ORAL at 08:21

## 2024-07-13 RX ADMIN — HYDROXYZINE HYDROCHLORIDE 25 MG: 25 TABLET ORAL at 08:21

## 2024-07-13 RX ADMIN — CARIPRAZINE 3 MG: 3 CAPSULE, GELATIN COATED ORAL at 08:22

## 2024-07-13 RX ADMIN — HYDROXYZINE HYDROCHLORIDE 25 MG: 25 TABLET ORAL at 21:32

## 2024-07-13 RX ADMIN — CYANOCOBALAMIN TAB 1000 MCG 1000 MCG: 1000 TAB at 08:21

## 2024-07-13 RX ADMIN — LAMOTRIGINE 50 MG: 25 TABLET ORAL at 08:21

## 2024-07-13 RX ADMIN — SERTRALINE HYDROCHLORIDE 150 MG: 100 TABLET ORAL at 21:32

## 2024-07-13 RX ADMIN — ATORVASTATIN CALCIUM 10 MG: 10 TABLET, FILM COATED ORAL at 08:21

## 2024-07-13 RX ADMIN — HYDROXYZINE HYDROCHLORIDE 25 MG: 25 TABLET ORAL at 15:58

## 2024-07-13 RX ADMIN — CHOLECALCIFEROL TAB 25 MCG (1000 UNIT) 2000 UNITS: 25 TAB at 08:22

## 2024-07-13 NOTE — NURSING NOTE
Patient quiet and keeps to himself. Medication compliant. Behaviors controlled and appropriate.Offered no complaints.  Visible intermittently. Not seen interacting with peers. Did not come to staff to have any needs met this evening.

## 2024-07-13 NOTE — PLAN OF CARE
Problem: Alteration in Thoughts and Perception  Goal: Treatment Goal: Gain control of psychotic behaviors/thinking, reduce/eliminate presenting symptoms and demonstrate improved reality functioning upon discharge  Outcome: Progressing  Goal: Refrain from acting on delusional thinking/internal stimuli  Description: Interventions:  - Monitor patient closely, per order   - Utilize least restrictive measures   - Set reasonable limits, give positive feedback for acceptable   - Administer medications as ordered and monitor of potential side effects  Outcome: Progressing  Goal: Agree to be compliant with medication regime, as prescribed and report medication side effects  Description: Interventions:  - Offer appropriate PRN medication and supervise ingestion; conduct AIMS, as needed   Outcome: Progressing  Goal: Recognize dysfunctional thoughts, communicate reality-based thoughts at the time of discharge  Description: Interventions:  - Provide medication and psycho-education to assist patient in compliance and developing insight into his/her illness   Outcome: Progressing  Goal: Complete daily ADLs, including personal hygiene independently, as able  Description: Interventions:  - Observe, teach, and assist patient with ADLS  - Monitor and promote a balance of rest/activity, with adequate nutrition and elimination   Outcome: Progressing     Problem: Risk for Self Injury/Neglect  Goal: Refrain from harming self  Description: Interventions:  - Monitor patient closely, per order  - Develop a trusting relationship  - Supervise medication ingestion, monitor effects and side effects   Outcome: Progressing     Problem: Risk for Violence/Aggression Toward Others  Goal: Refrain from harming others  Outcome: Progressing  Goal: Refrain from destructive acts on the environment or property  Outcome: Progressing     Problem: Alteration in Thoughts and Perception  Goal: Verbalize thoughts and feelings  Description: Interventions:  -  Promote a nonjudgmental and trusting relationship with the patient through active listening and therapeutic communication  - Assess patient's level of functioning, behavior and potential for risk  - Engage patient in 1 on 1 interactions  - Encourage patient to express fears, feelings, frustrations, and discuss symptoms    - [place] patient to reality, help patient recognize reality-based thinking   - Administer medications as ordered and assess for potential side effects  - Provide the patient education related to the signs and symptoms of the illness and desired effects of prescribed medications  Outcome: Not Progressing  Goal: Attend and participate in unit activities, including therapeutic, recreational, and educational groups  Description: Interventions:  -Encourage Visitation and family involvement in care  Outcome: Not Progressing

## 2024-07-13 NOTE — NURSING NOTE
Pt is present on the milieu for meals. No peer interaction. He consumed 100 % of dinner. Took his medications without incidence. Pt is pleasant, polite, and cooperative. Pt has flat affect. Denies all psychiatric symptoms. No behavioral issues.

## 2024-07-13 NOTE — NURSING NOTE
Deyvi has been visible intermittently. Isolative to self. No interaction with peers noted. Able to make needs known to staff. Denies anxiety, depression, voices and pain. Took pills without difficulty. Ate 100% of breakfast. Attended Coping Skills group. He did his laundry today. Requesting to shave this evening. No issues or behaviors. Continue to monitor. Precautions maintained.

## 2024-07-13 NOTE — PROGRESS NOTES
Progress Note - Behavioral Health     Deyvi Cao 54 y.o. male MRN: [id number]   Unit/Bed#: [facility] 112-02 Encounter: [id number]      Documentation, nursing notes, medication reconciliation, labs, and vitals reviewed. Patient was seen for continuing care and reviewed with treatment team. No acute events over the past 24 hours.  Per nursing report, quiet, cooperative and calm and keeps to himself.    No medication changes over the past 24 hours. Continues to tolerate current medications with no adverse effects.     On evaluation today, he is seen in his room and resting in bed can be slow to respond and with blunted affect.  But reports some improvement in depressive symptoms.  No suicidal ideation, plan, or intent. Denies perceptual disturbances and does not exhibit any symptoms of guillermo on evaluation.    Sleep: slept off and on  Appetite: fair  Medication side effects: No   ROS: no complaints, all other systems are negative    Mental Status Evaluation:    Appearance:  marginal hygiene   Behavior:  calm, guarded   Speech:  slow, scant   Mood:  depressed   Affect:  blunted   Thought Process:  decreased rate of thoughts   Associations: concrete associations   Thought Content:  no overt delusions   Perceptual Disturbances: none   Risk Potential: Suicidal ideation - None  Homicidal ideation - None  Potential for aggression - No   Sensorium:  oriented to person, place, and time/date   Memory:  recent and remote memory grossly intact   Consciousness:  alert and awake   Attention: decreased concentration and decreased attention span   Insight:  limited   Judgment: limited   Gait/Station: normal gait/station, normal balance   Motor Activity: no abnormal movements     Vital signs in last 24 hours:    Temp:  [97 °F (36.1 °C)-97.5 °F (36.4 °C)] 97 °F (36.1 °C)  HR:  [81-94] 81  Resp:  [18] 18  BP: (121-124)/(69-81) 124/81    Laboratory results: I have personally reviewed all pertinent laboratory/tests results.      Progress  Toward Goals: progressing    Assessment & Plan   Principal Problem:    Bipolar disorder with severe depression (HCC)  Active Problems:    Benign essential hypertension    Mixed hyperlipidemia    Allergic rhinitis due to allergen    Medical clearance for psychiatric admission    Rosacea    Recommended Treatment:     Planned medication and treatment changes:    All current active medications have been reviewed  Encourage group therapy, milieu therapy and occupational therapy  Behavioral Health checks every 7 minutes    Requires continued inpatient treatment due to chronic illness and high risk of decompensation if discharged before long term stability is achieved.    Continue current medications:  Current Facility-Administered Medications   Medication Dose Route Frequency Provider Last Rate    acetaminophen  650 mg Oral Q6H PRN ALVINA Harley      acetaminophen  650 mg Oral Q4H PRN ALVINA Harley      acetaminophen  975 mg Oral Q6H PRN ALVINA Harley      aluminum-magnesium hydroxide-simethicone  30 mL Oral Q4H PRN ALVINA Harley      atorvastatin  10 mg Oral Daily ALVINA Lewis      benztropine  1 mg Intramuscular Q4H PRN Max 6/day ALVINA Harley      benztropine  1 mg Oral Q4H PRN Max 6/day ALVINA Harley      bisacodyl  10 mg Rectal Daily PRN ALVINA Harley      cariprazine  3 mg Oral Daily Martin Cardenas MD      Cholecalciferol  2,000 Units Oral Daily ALVINA Lewis      cyanocobalamin  1,000 mcg Oral Daily ALVINA Lewis      hydrOXYzine HCL  25 mg Oral Q6H PRN Max 4/day ALVINA Harley      hydrOXYzine HCL  25 mg Oral TID Martin Cardenas MD      hydrOXYzine HCL  50 mg Oral Q4H PRN Max 4/day ALVINA Harley      Or    LORazepam  1 mg Intramuscular Q4H PRN ALVINA Harley      lamoTRIgine  50 mg Oral Daily Martin Cardenas MD      lisinopril  10 mg Oral Daily ALVINA Lewis      LORazepam  1 mg Oral Q4H PRN Max 6/day Melva  ALVINA Knutson      Or    LORazepam  2 mg Intramuscular Q6H PRN Max 3/day ALVINA Harley      OLANZapine  10 mg Oral Q3H PRN Max 3/day ALVINA Harley      Or    OLANZapine  10 mg Intramuscular Q3H PRN Max 3/day ALVINA Harley      OLANZapine  5 mg Oral Q3H PRN Max 6/day ALVINA Harley      Or    OLANZapine  5 mg Intramuscular Q3H PRN Max 6/day ALVINA Harley      OLANZapine  2.5 mg Oral Q3H PRN Max 8/day ALVINA Harley      polyethylene glycol  17 g Oral Daily PRN ALVINA Harley      propranolol  10 mg Oral Q8H PRN ALVINA Harley      senna-docusate sodium  1 tablet Oral Daily PRN ALVINA Harley      sertraline  150 mg Oral HS Martin Cardenas MD         Risks / Benefits of Treatment:    Risks, benefits, and possible side effects of medications explained to patient. Patient has limited understanding of risks and benefits of treatment at this time, but agrees to take medications as prescribed.    Counseling / Coordination of Care:    Patient's progress discussed with staff in treatment team meeting.  Medications, treatment progress and treatment plan reviewed with patient.    ALVINA Gay 07/13/24

## 2024-07-14 PROCEDURE — 99232 SBSQ HOSP IP/OBS MODERATE 35: CPT | Performed by: NURSE PRACTITIONER

## 2024-07-14 RX ADMIN — CARIPRAZINE 3 MG: 3 CAPSULE, GELATIN COATED ORAL at 08:54

## 2024-07-14 RX ADMIN — HYDROXYZINE HYDROCHLORIDE 25 MG: 25 TABLET ORAL at 08:54

## 2024-07-14 RX ADMIN — LAMOTRIGINE 50 MG: 25 TABLET ORAL at 08:54

## 2024-07-14 RX ADMIN — ATORVASTATIN CALCIUM 10 MG: 10 TABLET, FILM COATED ORAL at 08:54

## 2024-07-14 RX ADMIN — HYDROXYZINE HYDROCHLORIDE 25 MG: 25 TABLET ORAL at 17:00

## 2024-07-14 RX ADMIN — HYDROXYZINE HYDROCHLORIDE 25 MG: 25 TABLET ORAL at 21:30

## 2024-07-14 RX ADMIN — CHOLECALCIFEROL TAB 25 MCG (1000 UNIT) 2000 UNITS: 25 TAB at 08:54

## 2024-07-14 RX ADMIN — LISINOPRIL 10 MG: 10 TABLET ORAL at 08:54

## 2024-07-14 RX ADMIN — SERTRALINE HYDROCHLORIDE 150 MG: 100 TABLET ORAL at 21:30

## 2024-07-14 RX ADMIN — CYANOCOBALAMIN TAB 1000 MCG 1000 MCG: 1000 TAB at 08:54

## 2024-07-14 NOTE — NURSING NOTE
Pt is accepting of medications without incidence and meal compliant. Pt is visible in the milieu intermittently and keeps to self, denies s/s and sits with peers at times in dining room. Pt otherwise with minimal peer conversation, but polite and pleasant with writer. Weekly assessment completed. Lungs CTA, bowel sounds present in all four quadrants. Skin is warm, dry and intact. Bilateral pedal and radial pulses palpable. No new concerns otherwise.

## 2024-07-14 NOTE — PLAN OF CARE
Problem: Alteration in Thoughts and Perception  Goal: Treatment Goal: Gain control of psychotic behaviors/thinking, reduce/eliminate presenting symptoms and demonstrate improved reality functioning upon discharge  Outcome: Progressing  Goal: Refrain from acting on delusional thinking/internal stimuli  Description: Interventions:  - Monitor patient closely, per order   - Utilize least restrictive measures   - Set reasonable limits, give positive feedback for acceptable   - Administer medications as ordered and monitor of potential side effects  Outcome: Progressing  Goal: Agree to be compliant with medication regime, as prescribed and report medication side effects  Description: Interventions:  - Offer appropriate PRN medication and supervise ingestion; conduct AIMS, as needed   Outcome: Progressing  Goal: Recognize dysfunctional thoughts, communicate reality-based thoughts at the time of discharge  Description: Interventions:  - Provide medication and psycho-education to assist patient in compliance and developing insight into his/her illness   Outcome: Progressing  Goal: Complete daily ADLs, including personal hygiene independently, as able  Description: Interventions:  - Observe, teach, and assist patient with ADLS  - Monitor and promote a balance of rest/activity, with adequate nutrition and elimination   Outcome: Progressing     Problem: Anxiety  Goal: Anxiety is at manageable level  Description: Interventions:  - Assess and monitor patient's anxiety level.   - Monitor for signs and symptoms (heart palpitations, chest pain, shortness of breath, headaches, nausea, feeling jumpy, restlessness, irritable, apprehensive).   - Collaborate with interdisciplinary team and initiate plan and interventions as ordered.  - [place] patient to unit/surroundings  - Explain treatment plan  - Encourage participation in care  - Encourage verbalization of concerns/fears  - Identify coping mechanisms  - Assist in developing  anxiety-reducing skills  - Administer/offer alternative therapies  - Limit or eliminate stimulants  Outcome: Progressing     Problem: Alteration in Thoughts and Perception  Goal: Verbalize thoughts and feelings  Description: Interventions:  - Promote a nonjudgmental and trusting relationship with the patient through active listening and therapeutic communication  - Assess patient's level of functioning, behavior and potential for risk  - Engage patient in 1 on 1 interactions  - Encourage patient to express fears, feelings, frustrations, and discuss symptoms    - [place] patient to reality, help patient recognize reality-based thinking   - Administer medications as ordered and assess for potential side effects  - Provide the patient education related to the signs and symptoms of the illness and desired effects of prescribed medications  Outcome: Not Progressing  Goal: Attend and participate in unit activities, including therapeutic, recreational, and educational groups  Description: Interventions:  -Encourage Visitation and family involvement in care  Outcome: Not Progressing     Problem: Ineffective Coping  Goal: Identifies ineffective coping skills  Outcome: Not Progressing  Goal: Identifies healthy coping skills  Outcome: Not Progressing  Goal: Demonstrates healthy coping skills  Outcome: Not Progressing     Problem: Depression  Goal: Treatment Goal: Demonstrate behavioral control of depressive symptoms, verbalize feelings of improved mood/affect, and adopt new coping skills prior to discharge  Outcome: Not Progressing

## 2024-07-14 NOTE — PROGRESS NOTES
Progress Note - Behavioral Health     Deyvi Cao 54 y.o. male MRN: [id number]   Unit/Bed#: [facility] 112-02 Encounter: [id number]      Documentation, nursing notes, medication reconciliation, labs, and vitals reviewed. Patient was seen for continuing care and reviewed with treatment team. No acute events over the past 24 hours.  Per nursing report, out of his room for meals only, does not participate in groups, does not socialize with peers..  Pleasant polite and cooperative.  Flat affect.    No medication changes over the past 24 hours. Continues to tolerate current medications with no adverse effects.     On evaluation today, he is seen in his room and resting in bed.  Blunted affect, but states he is feeling less depressed and rates his depression only 2 out of 10-10 being worst.  Affect is incongruent to mood.  Appears to be poorly motivated and spends much of the day in bed.    No suicidal ideation, plan, or intent. Denies perceptual disturbances and does not exhibit any symptoms of guillermo on evaluation.    Sleep: hypersomnia  Appetite: fair  Medication side effects: No   ROS: no complaints, all other systems are negative    Mental Status Evaluation:    Appearance:  marginal hygiene   Behavior:  cooperative, guarded   Speech:  slow, scant   Mood:  depressed   Affect:  blunted   Thought Process:  decreased rate of thoughts   Associations: concrete associations   Thought Content:  no overt delusions   Perceptual Disturbances: none   Risk Potential: Suicidal ideation - None  Homicidal ideation - None  Potential for aggression - No   Sensorium:  oriented to person, place, and time/date   Memory:  recent and remote memory grossly intact   Consciousness:  alert and awake   Attention: decreased concentration and decreased attention span   Insight:  poor   Judgment: poor   Gait/Station: normal gait/station, normal balance   Motor Activity: no abnormal movements     Vital signs in last 24 hours:    Temp:  [97.9 °F (36.6  °C)-98.2 °F (36.8 °C)] 98.2 °F (36.8 °C)  HR:  [87-89] 87  Resp:  [18] 18  BP: (126-134)/(71-82) 134/82    Laboratory results: I have personally reviewed all pertinent laboratory/tests results.      Progress Toward Goals: progressing slowly    Assessment & Plan   Principal Problem:    Bipolar disorder with severe depression (HCC)  Active Problems:    Benign essential hypertension    Mixed hyperlipidemia    Allergic rhinitis due to allergen    Medical clearance for psychiatric admission    Rosacea    Recommended Treatment:     Planned medication and treatment changes:    All current active medications have been reviewed  Encourage group therapy, milieu therapy and occupational therapy  Behavioral Health checks every 7 minutes    Requires continued inpatient treatment due to chronic illness and high risk of decompensation if discharged before long term stability is achieved.    Continue current medications:  Current Facility-Administered Medications   Medication Dose Route Frequency Provider Last Rate    acetaminophen  650 mg Oral Q6H PRN ALVINA Harley      acetaminophen  650 mg Oral Q4H PRN ALVINA Harley      acetaminophen  975 mg Oral Q6H PRN ALVINA Harley      aluminum-magnesium hydroxide-simethicone  30 mL Oral Q4H PRN ALVINA Harley      atorvastatin  10 mg Oral Daily ALVINA Lewis      benztropine  1 mg Intramuscular Q4H PRN Max 6/day ALVINA Harley      benztropine  1 mg Oral Q4H PRN Max 6/day ALVINA Harley      bisacodyl  10 mg Rectal Daily PRN ALVINA Harley      cariprazine  3 mg Oral Daily Martin Cardenas MD      Cholecalciferol  2,000 Units Oral Daily ALVINA Lewis      cyanocobalamin  1,000 mcg Oral Daily ALVINA Lewis      hydrOXYzine HCL  25 mg Oral Q6H PRN Max 4/day ALVINA Harley      hydrOXYzine HCL  25 mg Oral TID Martin Cardenas MD      hydrOXYzine HCL  50 mg Oral Q4H PRN Max 4/day ALVINA Harley      Or    LORazepam   1 mg Intramuscular Q4H PRN ALVINA Harley      lamoTRIgine  50 mg Oral Daily Martin Cardenas MD      lisinopril  10 mg Oral Daily Sary Rodriguez ALVINA Biggs      LORazepam  1 mg Oral Q4H PRN Max 6/day ALVINA Harley      Or    LORazepam  2 mg Intramuscular Q6H PRN Max 3/day ALVINA Harley      OLANZapine  10 mg Oral Q3H PRN Max 3/day ALVINA Harley      Or    OLANZapine  10 mg Intramuscular Q3H PRN Max 3/day ALVINA Harley      OLANZapine  5 mg Oral Q3H PRN Max 6/day ALVINA Harley      Or    OLANZapine  5 mg Intramuscular Q3H PRN Max 6/day ALVINA Harley      OLANZapine  2.5 mg Oral Q3H PRN Max 8/day ALVINA Harley      polyethylene glycol  17 g Oral Daily PRN ALVINA Harley      propranolol  10 mg Oral Q8H PRN ALVINA Harley      senna-docusate sodium  1 tablet Oral Daily PRN ALVINA Harley      sertraline  150 mg Oral HS Martin Cardenas MD         Risks / Benefits of Treatment:    Risks, benefits, and possible side effects of medications explained to patient. Patient has limited understanding of risks and benefits of treatment at this time, but agrees to take medications as prescribed.    Counseling / Coordination of Care:    Patient's progress discussed with staff in treatment team meeting.  Medications, treatment progress and treatment plan reviewed with patient.    ALVINA Gay 07/14/24

## 2024-07-14 NOTE — NURSING NOTE
Deyvi continues to be somewhat flat when you approach him. He is scant in conversation. He said he feels his medications are working well for him but does not elaborate.He denies SI/HI  He appears somewhat paranoid and suspicious and has poor eye contact  There is no peer interaction

## 2024-07-15 PROCEDURE — 99232 SBSQ HOSP IP/OBS MODERATE 35: CPT | Performed by: PSYCHIATRY & NEUROLOGY

## 2024-07-15 RX ADMIN — HYDROXYZINE HYDROCHLORIDE 25 MG: 25 TABLET ORAL at 21:10

## 2024-07-15 RX ADMIN — HYDROXYZINE HYDROCHLORIDE 25 MG: 25 TABLET ORAL at 08:21

## 2024-07-15 RX ADMIN — LAMOTRIGINE 50 MG: 25 TABLET ORAL at 08:21

## 2024-07-15 RX ADMIN — SERTRALINE HYDROCHLORIDE 150 MG: 100 TABLET ORAL at 21:10

## 2024-07-15 RX ADMIN — CYANOCOBALAMIN TAB 1000 MCG 1000 MCG: 1000 TAB at 08:21

## 2024-07-15 RX ADMIN — CARIPRAZINE 3 MG: 3 CAPSULE, GELATIN COATED ORAL at 08:21

## 2024-07-15 RX ADMIN — ATORVASTATIN CALCIUM 10 MG: 10 TABLET, FILM COATED ORAL at 08:21

## 2024-07-15 RX ADMIN — HYDROXYZINE HYDROCHLORIDE 25 MG: 25 TABLET ORAL at 17:01

## 2024-07-15 RX ADMIN — CHOLECALCIFEROL TAB 25 MCG (1000 UNIT) 2000 UNITS: 25 TAB at 08:21

## 2024-07-15 RX ADMIN — LISINOPRIL 10 MG: 10 TABLET ORAL at 08:21

## 2024-07-15 NOTE — PLAN OF CARE
Problem: Alteration in Thoughts and Perception  Goal: Treatment Goal: Gain control of psychotic behaviors/thinking, reduce/eliminate presenting symptoms and demonstrate improved reality functioning upon discharge  Outcome: Progressing  Goal: Refrain from acting on delusional thinking/internal stimuli  Description: Interventions:  - Monitor patient closely, per order   - Utilize least restrictive measures   - Set reasonable limits, give positive feedback for acceptable   - Administer medications as ordered and monitor of potential side effects  Outcome: Progressing  Goal: Agree to be compliant with medication regime, as prescribed and report medication side effects  Description: Interventions:  - Offer appropriate PRN medication and supervise ingestion; conduct AIMS, as needed   Outcome: Progressing  Goal: Attend and participate in unit activities, including therapeutic, recreational, and educational groups  Description: Interventions:  -Encourage Visitation and family involvement in care  Outcome: Progressing  Goal: Recognize dysfunctional thoughts, communicate reality-based thoughts at the time of discharge  Description: Interventions:  - Provide medication and psycho-education to assist patient in compliance and developing insight into his/her illness   Outcome: Progressing     Problem: Alteration in Thoughts and Perception  Goal: Verbalize thoughts and feelings  Description: Interventions:  - Promote a nonjudgmental and trusting relationship with the patient through active listening and therapeutic communication  - Assess patient's level of functioning, behavior and potential for risk  - Engage patient in 1 on 1 interactions  - Encourage patient to express fears, feelings, frustrations, and discuss symptoms    - [place] patient to reality, help patient recognize reality-based thinking   - Administer medications as ordered and assess for potential side effects  - Provide the patient education related to the signs  and symptoms of the illness and desired effects of prescribed medications  Outcome: Not Progressing

## 2024-07-15 NOTE — PROGRESS NOTES
Progress Note - Behavioral Health     Deyvi Cao 54 y.o. male MRN: [id number]   Unit/Bed#: [facility] 112-02 Encounter: [id number]    Behavior over the last 24 hours: unchanged.     Deyvi is seen today for psychiatric follow up. Per nursing notes, patient is med and meal compliant, sits with peers at times, minimal in conversation, polite, somewhat flat and scant, but denies SI/HI. Attended 1/6 groups.   Patient remains in behavioral control. No psych prns in last 24 hours.     Today Deyvi is calm and cooperative, however somewhat scant in conversation. He answers questions appropriately and does not offer further information unless asked. Somewhat guarded and reports depression and anxiety as a 2/10 with 10 being the worst. He appears withdrawn to room. He reports going to one group because he has been going to groups since last October and says that they get repetitive after awhile. Encouraged to go to more groups. He reports adequate sleep and appetite. Reports some fatigue and notes this has occurred since he has started taking medication. Adequate grooming and blunted. Denies suicidal and homicidal ideations. Denies hallucinations and does not appear to be responding to internal stimuli. Reports some fleeting intrusive thoughts as passive death wishes, but denies any today.     Denies medication side effects. Denies any questions/concerns at this time. Discharge disposition ongoing.    Sleep:  adequate  Appetite:  adequate  Medication side effects: No   ROS: all other systems are negative    Mental Status Evaluation:    Appearance:  age appropriate, casually dressed, adequate grooming   Behavior:  cooperative, calm, guarded   Speech:  scant, soft, delayed at times   Mood:  dysphoric, anxious   Affect:  blunted, mood-congruent   Thought Process:  linear, slowing of thoughts, concrete   Associations: concrete associations   Thought Content:  no overt delusions, negative thoughts, intrusive thoughts   Perceptual  Disturbances: denies auditory hallucinations when asked, does not appear responding to internal stimuli, denies visual hallucinations when asked   Risk Potential: Suicidal ideation -  Reports vague intrusive thoughts of passive death wishes  Homicidal ideation - None  Potential for aggression - No   Sensorium:  oriented to person, place, and time/date   Memory:  recent and remote memory grossly intact   Consciousness:  alert and awake   Attention/Concentration: attention span and concentration appear shorter than expected for age   Insight:  limited   Judgment: limited   Gait/Station: normal gait/station   Motor Activity: no abnormal movements     Vital signs in last 24 hours:    Temp:  [97.8 °F (36.6 °C)] 97.8 °F (36.6 °C)  HR:  [101-102] 102  Resp:  [18] 18  BP: (127-148)/(68-88) 127/68    Laboratory results: I have personally reviewed all pertinent laboratory/tests results    Results from the past 24 hours: No results found for this or any previous visit (from the past 24 hour(s)).    Progress Toward Goals: progressing slowly, reports mild depression/anxiety, attending less groups, pleasant, intact appetite and sleep pattern.    Assessment & Plan   Principal Problem:    Bipolar disorder with severe depression (HCC)  Active Problems:    Benign essential hypertension    Mixed hyperlipidemia    Allergic rhinitis due to allergen    Medical clearance for psychiatric admission    Rosacea      Recommended Treatment:     Planned medication and treatment changes:    All current active medications have been reviewed  Encourage group therapy, milieu therapy and occupational therapy  Behavioral Health checks every 7 minutes  Continue current medications:  Discharge disposition ongoing    Current Facility-Administered Medications   Medication Dose Route Frequency Provider Last Rate    acetaminophen  650 mg Oral Q6H PRN ALVINA Harley      acetaminophen  650 mg Oral Q4H PRN ALVINA Harley      acetaminophen  975 mg  Oral Q6H PRN ALVINA Harley      aluminum-magnesium hydroxide-simethicone  30 mL Oral Q4H PRN ALVINA Harley      atorvastatin  10 mg Oral Daily Sary ALVINA Gupta      benztropine  1 mg Intramuscular Q4H PRN Max 6/day ALVINA Harley      benztropine  1 mg Oral Q4H PRN Max 6/day ALVINA Harley      bisacodyl  10 mg Rectal Daily PRN ALVINA Harley      cariprazine  3 mg Oral Daily Martin Cardenas MD      Cholecalciferol  2,000 Units Oral Daily Sary ALVINA Gupta      cyanocobalamin  1,000 mcg Oral Daily SaryALVINA Starkey      hydrOXYzine HCL  25 mg Oral Q6H PRN Max 4/day ALVINA Harley      hydrOXYzine HCL  25 mg Oral TID Martin Cardenas MD      hydrOXYzine HCL  50 mg Oral Q4H PRN Max 4/day ALVINA Harley      Or    LORazepam  1 mg Intramuscular Q4H PRN ALVINA Harley      lamoTRIgine  50 mg Oral Daily Martin Cardenas MD      lisinopril  10 mg Oral Daily SaryALVINA Starkey      LORazepam  1 mg Oral Q4H PRN Max 6/day ALVINA Harley      Or    LORazepam  2 mg Intramuscular Q6H PRN Max 3/day ALVINA Harley      OLANZapine  10 mg Oral Q3H PRN Max 3/day ALVINA Harley      Or    OLANZapine  10 mg Intramuscular Q3H PRN Max 3/day ALVINA Harley      OLANZapine  5 mg Oral Q3H PRN Max 6/day ALVINA Harley      Or    OLANZapine  5 mg Intramuscular Q3H PRN Max 6/day ALVINA Harley      OLANZapine  2.5 mg Oral Q3H PRN Max 8/day ALVINA Harley      polyethylene glycol  17 g Oral Daily PRN ALVINA Harley      propranolol  10 mg Oral Q8H PRN ALVINA Harley      senna-docusate sodium  1 tablet Oral Daily PRN ALVINA Harley      sertraline  150 mg Oral HS Martin Cardenas MD       Risks / Benefits of Treatment:    Risks, benefits, and possible side effects of medications explained to patient and patient verbalizes understanding and agreement for treatment.    Counseling / Coordination of Care:    Patient's progress discussed  with staff in treatment team meeting.  Medications, treatment progress and treatment plan reviewed with patient.  Medication education provided to patient.  Educated on importance of medication and treatment compliance.  Reassurance and supportive therapy provided.  Group attendance encouraged.    Pedro Stafford PA-C 07/15/24

## 2024-07-15 NOTE — NURSING NOTE
Pt is accepting of medications without incidence and meal compliant. Pt is visible in the milieu and sits with peers. Minimal socialization noted and at times resting in bed or walking the unit. Denies s/s. No new concerns.

## 2024-07-15 NOTE — PLAN OF CARE
Problem: Ineffective Coping  Goal: Identifies ineffective coping skills  Outcome: Not Progressing  Goal: Identifies healthy coping skills  Outcome: Not Progressing  Goal: Demonstrates healthy coping skills  Outcome: Not Progressing  Goal: Participates in unit activities  Description: Interventions:  - Provide therapeutic environment   - Provide required programming   - Redirect inappropriate behaviors   Outcome: Not Progressing   Attended 38% of the groups offered last week.

## 2024-07-15 NOTE — NURSING NOTE
Patient slept well throughout the night, at least 8 hours. Eyes closed and chest movements noted. Resting comfortably at this time

## 2024-07-15 NOTE — PROGRESS NOTES
07/15/24 0731   Team Meeting   Meeting Type Daily Rounds   Team Members Present   Team Members Present Physician;Nurse;;Other (Discipline and Name)   Patient/Family Present   Patient Present No   Patient's Family Present No     In attendance:  Dr. Alex Thomas, MD Dr. Jordan Holter, DO Pedro Stafford, MAIKEL Syed, LYNNE Garcia, [facility]  Mer Sales, [facility]  Gris Arizmendi M.S.    Groups: 1/6    Pt remains flat, denies symptoms. Pt is guarded when social but pleasant with others. Pt and SW ordered new clothes for pt that should be arriving this week.

## 2024-07-16 PROCEDURE — 99232 SBSQ HOSP IP/OBS MODERATE 35: CPT | Performed by: PSYCHIATRY & NEUROLOGY

## 2024-07-16 RX ADMIN — CHOLECALCIFEROL TAB 25 MCG (1000 UNIT) 2000 UNITS: 25 TAB at 08:04

## 2024-07-16 RX ADMIN — HYDROXYZINE HYDROCHLORIDE 25 MG: 25 TABLET ORAL at 21:03

## 2024-07-16 RX ADMIN — SERTRALINE HYDROCHLORIDE 150 MG: 100 TABLET ORAL at 21:03

## 2024-07-16 RX ADMIN — HYDROXYZINE HYDROCHLORIDE 25 MG: 25 TABLET ORAL at 08:04

## 2024-07-16 RX ADMIN — ATORVASTATIN CALCIUM 10 MG: 10 TABLET, FILM COATED ORAL at 08:04

## 2024-07-16 RX ADMIN — CARIPRAZINE 3 MG: 3 CAPSULE, GELATIN COATED ORAL at 08:04

## 2024-07-16 RX ADMIN — CYANOCOBALAMIN TAB 1000 MCG 1000 MCG: 1000 TAB at 08:04

## 2024-07-16 RX ADMIN — HYDROXYZINE HYDROCHLORIDE 25 MG: 25 TABLET ORAL at 17:15

## 2024-07-16 RX ADMIN — LISINOPRIL 10 MG: 10 TABLET ORAL at 08:05

## 2024-07-16 RX ADMIN — LAMOTRIGINE 50 MG: 25 TABLET ORAL at 08:04

## 2024-07-16 NOTE — PROGRESS NOTES
07/16/24 0737   Team Meeting   Meeting Type Daily Rounds   Team Members Present   Team Members Present Physician;Nurse;;Other (Discipline and Name)   Patient/Family Present   Patient Present No   Patient's Family Present No     In attendance:  Dr. Alex Thomas, MD Dr. Jordan Holter, DO Melva Knutson, ALVINA Haywood, RN  Judi Garcia, [facility]  Mer Sales, [facility]  Gris Arizmendi M.S.    Groups: 1/8    Pt is generally quiet, keeps to himself. Pt reports anxiety has improved but remains isolative. Pt overall pleasant with no bx issues.

## 2024-07-16 NOTE — SOCIAL WORK
STEFAN met 1:1 with pt. SW provided pt with his package of clothing received in the mail. Pt opened package and indicated ordered products are present. Clothing returned to nurse's station for inventory then can be provided to the pt.

## 2024-07-16 NOTE — PLAN OF CARE
Problem: Depression  Goal: Refrain from harming self  Description: Interventions:  - Monitor patient closely, per order   - Supervise medication ingestion, monitor effects and side effects   Outcome: Progressing  Goal: Refrain from self-neglect  Outcome: Progressing  Goal: Complete daily ADLs, including personal hygiene independently, as able  Description: Interventions:  - Observe, teach, and assist patient with ADLS  -  Monitor and promote a balance of rest/activity, with adequate nutrition and elimination   Outcome: Progressing     Problem: SELF HARM/SUICIDALITY  Goal: Will have no self-injury during hospital stay  Description: INTERVENTIONS:  - Q 15 MINUTES: Routine safety checks  - Q WAKING SHIFT & PRN: Assess risk to determine if routine checks are adequate to maintain patient safety  - Encourage patient to participate actively in care by formulating a plan to combat response to suicidal ideation, identify supports and resources  Outcome: Progressing     Problem: PSYCHOSIS  Goal: Will report no hallucinations or delusions  Description: Interventions:  - Administer medication as  ordered  - Every waking shifts and PRN assess for the presence of hallucinations and or delusions  - Assist with reality testing to support increasing orientation  - Assess if patient's hallucinations or delusions are encouraging self-harm or harm to others and intervene as appropriate  Outcome: Progressing     Problem: BEHAVIOR  Goal: Pt/Family maintain appropriate behavior and adhere to behavioral management agreement, if implemented  Description: INTERVENTIONS:  - Assess the family dynamic   - Encourage verbalization of thoughts and concerns in a socially appropriate manner  - Assess patient/family's coping skills and non-compliant behavior (including use of illegal substances).  - Utilize positive, consistent limit setting strategies supporting safety of patient, staff and others  - Initiate consult with Case Management,  Spiritual Care or other ancillary services as appropriate  - If a patient's/visitor's behavior jeopardizes the safety of the patient, staff, or others, refer to organization procedure.   - Notify Security of behavior or suspected illegal substances which indicate the need for search of the patient and/or belongings  - Encourage participation in the decision making process about a behavioral management agreement; implement if patient meets criteria  Outcome: Progressing     Problem: ANXIETY  Goal: Will report anxiety at manageable levels  Description: INTERVENTIONS:  - Administer medication as ordered  - Teach and encourage coping skills  - Provide emotional support  - Assess patient/family for anxiety and ability to cope  Outcome: Progressing  Goal: By discharge: Patient will verbalize 2 strategies to deal with anxiety  Description: Interventions:  - Identify any obvious source/trigger to anxiety  - Staff will assist patient in applying identified coping technique/skills  - Encourage attendance of scheduled groups and activities  Outcome: Progressing     Problem: SLEEP DISTURBANCE  Goal: Will exhibit normal sleeping pattern  Description: Interventions:  -  Assess the patients sleep pattern, noting recent changes  - Administer medication as ordered  - Decrease environmental stimuli, including noise, as appropriate during the night  - Encourage the patient to actively participate in unit groups and or exercise during the day to enhance ability to achieve adequate sleep at night  - Assess the patient, in the morning, encouraging a description of sleep experience  Outcome: Progressing     Problem: Depression  Goal: Verbalize thoughts and feelings  Description: Interventions:  - Assess and re-assess patient's level of risk   - Engage patient in 1:1 interactions, daily, for a minimum of 15 minutes   - Encourage patient to express feelings, fears, frustrations, hopes   Outcome: Not Progressing  Goal: Refrain from  isolation  Description: Interventions:  - Develop a trusting relationship   - Encourage socialization   Outcome: Not Progressing

## 2024-07-16 NOTE — NURSING NOTE
Patient pleasant, calm and cooperative, able to make needs known. Is visible on the module, not very social with peers, attends select groups. Denies SI/HI/AVH, anxiety or depression. Medication compliant. Will continue to monitor.

## 2024-07-16 NOTE — NURSING NOTE
Patient visible on unit at short intervals.  No interaction. Attended 1/6 groups.  Isolative to himself.  Smiles upon approach. Good eye contact.  Medication compliant. Admits his anxiety is improving. Denies suicidal ideations. Appetite excellent. Pleasant and cooperative.

## 2024-07-17 PROCEDURE — 99232 SBSQ HOSP IP/OBS MODERATE 35: CPT | Performed by: PSYCHIATRY & NEUROLOGY

## 2024-07-17 RX ADMIN — CYANOCOBALAMIN TAB 1000 MCG 1000 MCG: 1000 TAB at 08:21

## 2024-07-17 RX ADMIN — ATORVASTATIN CALCIUM 10 MG: 10 TABLET, FILM COATED ORAL at 08:21

## 2024-07-17 RX ADMIN — CHOLECALCIFEROL TAB 25 MCG (1000 UNIT) 2000 UNITS: 25 TAB at 08:22

## 2024-07-17 RX ADMIN — HYDROXYZINE HYDROCHLORIDE 25 MG: 25 TABLET ORAL at 08:22

## 2024-07-17 RX ADMIN — SERTRALINE HYDROCHLORIDE 150 MG: 100 TABLET ORAL at 21:12

## 2024-07-17 RX ADMIN — HYDROXYZINE HYDROCHLORIDE 25 MG: 25 TABLET ORAL at 17:09

## 2024-07-17 RX ADMIN — CARIPRAZINE 3 MG: 3 CAPSULE, GELATIN COATED ORAL at 08:21

## 2024-07-17 RX ADMIN — HYDROXYZINE HYDROCHLORIDE 25 MG: 25 TABLET ORAL at 21:12

## 2024-07-17 RX ADMIN — LAMOTRIGINE 50 MG: 25 TABLET ORAL at 08:22

## 2024-07-17 RX ADMIN — LISINOPRIL 10 MG: 10 TABLET ORAL at 08:22

## 2024-07-17 NOTE — PLAN OF CARE
Problem: Alteration in Thoughts and Perception  Goal: Treatment Goal: Gain control of psychotic behaviors/thinking, reduce/eliminate presenting symptoms and demonstrate improved reality functioning upon discharge  Outcome: Progressing  Goal: Verbalize thoughts and feelings  Description: Interventions:  - Promote a nonjudgmental and trusting relationship with the patient through active listening and therapeutic communication  - Assess patient's level of functioning, behavior and potential for risk  - Engage patient in 1 on 1 interactions  - Encourage patient to express fears, feelings, frustrations, and discuss symptoms    - Comfort patient to reality, help patient recognize reality-based thinking   - Administer medications as ordered and assess for potential side effects  - Provide the patient education related to the signs and symptoms of the illness and desired effects of prescribed medications  Outcome: Progressing  Goal: Refrain from acting on delusional thinking/internal stimuli  Description: Interventions:  - Monitor patient closely, per order   - Utilize least restrictive measures   - Set reasonable limits, give positive feedback for acceptable   - Administer medications as ordered and monitor of potential side effects  Outcome: Progressing  Goal: Agree to be compliant with medication regime, as prescribed and report medication side effects  Description: Interventions:  - Offer appropriate PRN medication and supervise ingestion; conduct AIMS, as needed   Outcome: Progressing  Goal: Attend and participate in unit activities, including therapeutic, recreational, and educational groups  Description: Interventions:  -Encourage Visitation and family involvement in care  Outcome: Progressing  Goal: Recognize dysfunctional thoughts, communicate reality-based thoughts at the time of discharge  Description: Interventions:  - Provide medication and psycho-education to assist patient in compliance and developing  insight into his/her illness   Outcome: Progressing  Goal: Complete daily ADLs, including personal hygiene independently, as able  Description: Interventions:  - Observe, teach, and assist patient with ADLS  - Monitor and promote a balance of rest/activity, with adequate nutrition and elimination   Outcome: Progressing     Problem: Ineffective Coping  Goal: Cooperates with admission process  Description: Interventions:   - Complete admission process  Outcome: Progressing  Goal: Identifies ineffective coping skills  Outcome: Progressing  Goal: Identifies healthy coping skills  Outcome: Progressing  Goal: Demonstrates healthy coping skills  Outcome: Progressing  Goal: Participates in unit activities  Description: Interventions:  - Provide therapeutic environment   - Provide required programming   - Redirect inappropriate behaviors   Outcome: Progressing     Problem: Risk for Self Injury/Neglect  Goal: Treatment Goal: Remain safe during length of stay, learn and adopt new coping skills, and be free of self-injurious ideation, impulses and acts at the time of discharge  Outcome: Progressing  Goal: Refrain from harming self  Description: Interventions:  - Monitor patient closely, per order  - Develop a trusting relationship  - Supervise medication ingestion, monitor effects and side effects   Outcome: Progressing  Goal: Attend and participate in unit activities, including therapeutic, recreational, and educational groups  Description: Interventions:  - Provide therapeutic and educational activities daily, encourage attendance and participation, and document same in the medical record  - Obtain collateral information, encourage visitation and family involvement in care   Outcome: Progressing  Goal: Recognize maladaptive responses and adopt new coping mechanisms  Outcome: Progressing  Goal: Complete daily ADLs, including personal hygiene independently, as able  Description: Interventions:  - Observe, teach, and assist  patient with ADLS  - Monitor and promote a balance of rest/activity, with adequate nutrition and elimination  Outcome: Progressing     Problem: Depression  Goal: Treatment Goal: Demonstrate behavioral control of depressive symptoms, verbalize feelings of improved mood/affect, and adopt new coping skills prior to discharge  Outcome: Progressing  Goal: Refrain from harming self  Description: Interventions:  - Monitor patient closely, per order   - Supervise medication ingestion, monitor effects and side effects   Outcome: Progressing  Goal: Refrain from isolation  Description: Interventions:  - Develop a trusting relationship   - Encourage socialization   Outcome: Progressing     Problem: Anxiety  Goal: Anxiety is at manageable level  Description: Interventions:  - Assess and monitor patient's anxiety level.   - Monitor for signs and symptoms (heart palpitations, chest pain, shortness of breath, headaches, nausea, feeling jumpy, restlessness, irritable, apprehensive).   - Collaborate with interdisciplinary team and initiate plan and interventions as ordered.  - Oak patient to unit/surroundings  - Explain treatment plan  - Encourage participation in care  - Encourage verbalization of concerns/fears  - Identify coping mechanisms  - Assist in developing anxiety-reducing skills  - Administer/offer alternative therapies  - Limit or eliminate stimulants  Outcome: Progressing     Problem: SELF HARM/SUICIDALITY  Goal: Will have no self-injury during hospital stay  Description: INTERVENTIONS:  - Q 15 MINUTES: Routine safety checks  - Q WAKING SHIFT & PRN: Assess risk to determine if routine checks are adequate to maintain patient safety  - Encourage patient to participate actively in care by formulating a plan to combat response to suicidal ideation, identify supports and resources  Outcome: Progressing     Problem: DEPRESSION  Goal: Will be euthymic at discharge  Description: INTERVENTIONS:  - Administer medication as  ordered  - Provide emotional support via 1:1 interaction with staff  - Encourage involvement in milieu/groups/activities  - Monitor for social isolation  Outcome: Progressing     Problem: TALIB  Goal: Will exhibit normal sleep and speech and no impulsivity  Description: INTERVENTIONS:  - Administer medication as ordered  - Set limits on impulsive behavior  - Make attempts to decrease external stimuli as possible  Outcome: Progressing     Problem: PSYCHOSIS  Goal: Will report no hallucinations or delusions  Description: Interventions:  - Administer medication as  ordered  - Every waking shifts and PRN assess for the presence of hallucinations and or delusions  - Assist with reality testing to support increasing orientation  - Assess if patient's hallucinations or delusions are encouraging self-harm or harm to others and intervene as appropriate  Outcome: Progressing     Problem: ANXIETY  Goal: Will report anxiety at manageable levels  Description: INTERVENTIONS:  - Administer medication as ordered  - Teach and encourage coping skills  - Provide emotional support  - Assess patient/family for anxiety and ability to cope  Outcome: Progressing     Problem: SLEEP DISTURBANCE  Goal: Will exhibit normal sleeping pattern  Description: Interventions:  -  Assess the patients sleep pattern, noting recent changes  - Administer medication as ordered  - Decrease environmental stimuli, including noise, as appropriate during the night  - Encourage the patient to actively participate in unit groups and or exercise during the day to enhance ability to achieve adequate sleep at night  - Assess the patient, in the morning, encouraging a description of sleep experience  Outcome: Progressing

## 2024-07-17 NOTE — PROGRESS NOTES
07/17/24 0756   Team Meeting   Meeting Type Daily Rounds   Team Members Present   Team Members Present Physician;Nurse;;Other (Discipline and Name)   Patient/Family Present   Patient Present No   Patient's Family Present No     In attendance:  MD Melva Sousa, ALVINA Haywood, LYNNE Garcia, [facility]  BARRETT Elmore.S.    Groups: 6/9    Pt increased group attendance. Pt more visible yesterday; quiet, flat, keeps to self. No bx issues noted. Pt reports improved depression and anxiety but remains withdrawn.

## 2024-07-17 NOTE — PLAN OF CARE
Problem: Ineffective Coping  Goal: Identifies ineffective coping skills  Outcome: Not Progressing  Goal: Identifies healthy coping skills  Outcome: Not Progressing  Goal: Demonstrates healthy coping skills  Outcome: Not Progressing  Goal: Participates in unit activities  Description: Interventions:  - Provide therapeutic environment   - Provide required programming   - Redirect inappropriate behaviors   Outcome: Progressing   Attended 7/17 of the groups offered during the past 2 days.

## 2024-07-17 NOTE — NURSING NOTE
Patient has been slightly more visible on the milieu. He does no initiate conversations but will interact if spoken too.  Participates in groups.  Group attendance today 5/9.  Admits feeling less anxious. No thoughts of self harm verbalized.  He enjoys using the unit tablets.  Medication compliant. Smiles on approach. Somewhat guarded.

## 2024-07-17 NOTE — PROGRESS NOTES
"Progress Note - Behavioral Health   Deyvi Cao 54 y.o. male MRN: [id number]  Unit/Bed#: [facility] 112-02 Encounter: [id number]  Code Status: Level 1 - Full Code    Assessment & Plan   Principal Problem:    Bipolar disorder with severe depression (HCC)  Active Problems:    Benign essential hypertension    Mixed hyperlipidemia    Allergic rhinitis due to allergen    Medical clearance for psychiatric admission    Rosacea    Recommended Treatment:     Treatment plan, treatment progress and medication changes were reviewed with Nursing Staff, Pharmacy Service and Case Management in Treatment Team:  1.Continue with group therapy, milieu therapy and occupational therapy   2.Behavioral Health checks every 7 minutes   3.Continue frequent safety checks and vitals per unit protocol  4.Continue with SLIM medical management as indicated  5.Continue with current medication regimen for symptom management: Vraylar 3mg PO Daily, Atarax 25mg PO TID, Lamictal 50mg PO Daily, Zoloft 150mg PO QHS, Lamictal 50mg PO Daily    6.Disposition Planning: Discharge planning and efforts remain ongoing - Awaiting group home placement    Subjective:    Patient was seen today for continuation of care, records reviewed and patient was discussed with the morning case review team.    Deyvi was seen today for psychiatric follow-up.  On assessment today, Deyvi was found in his room.  He attended 6/9 groups yesterday so was praised for this effort.  He smiles when given positive feedback.  Deyvi reports adequate daytime energy and denies any difficulties with initiating or staying asleep.  Oral appetite and hydration is adequate.  Still can be guarded and withdrawn at times but otherswise he is going good.  Reports depression and anxiety is \"same as yesterday\" which was a 2/10.   We reviewed once more the specific as-needed medications they can use going forward if they experience any insomnia or destabilization of their mood, they understood and were " agreeable. Milieu visibility and group attendance encouraged to promote an active participation in treatment.    Deyvi denies acute suicidal/self-harm ideation/intent/plan upon direct inquiry at this time. Deyvi is able to contract for safety while on the unit and would feel comfortable seeking staff support should suicidal symptoms or urges appear or worsen. Deyvi remains behaviorally appropriate, no agitation or aggression noted on exam or in report. Deyvi also denies HI/AH/VH, and does not appear overtly manic.  Patient does not verbalize any experiences that can be categorized as paranoid, persecutory, bizarre, or somatic delusions. Deyvi remains adherent to his current psychotropic medication regimen and denies any side effects from medications, as well as none noted on exam.    Group Attendance: 6 / 9  Treatment Team: SOLEDAD  Psychiatric PRN's Needed: None    Review of Systems:  Behavior over the last 24 hours: Slowly improving  Sleep: sleeping okay throughout the night  Appetite: adequate  Medication side effects: none reported  ROS:no complaints, all other systems are negative    Objective:    Vitals:  Vitals:    07/17/24 0729   BP: 127/91   Pulse: 84   Resp: 18   Temp: 98.1 °F (36.7 °C)   SpO2: 98%     Laboratory Results:  I have personally reviewed all pertinent laboratory/tests results.  Most Recent Labs:   Lab Results   Component Value Date    WBC 7.39 06/26/2024    RBC 4.70 06/26/2024    HGB 15.2 06/26/2024    HCT 45.5 06/26/2024     06/26/2024    RDW 12.9 06/26/2024    NEUTROABS 4.63 06/26/2024    SODIUM 137 06/26/2024    K 4.1 06/26/2024     06/26/2024    CO2 26 06/26/2024    BUN 17 06/26/2024    CREATININE 0.63 06/26/2024    GLUC 98 06/26/2024    GLUF 98 06/26/2024    CALCIUM 9.6 06/26/2024    AST 25 06/26/2024    ALT 45 06/26/2024    ALKPHOS 114 (H) 06/26/2024    TP 7.0 06/26/2024    ALB 4.4 06/26/2024    TBILI 0.44 06/26/2024    CHOLESTEROL 177 06/26/2024    HDL 52 06/26/2024     TRIG 73 06/26/2024    LDLCALC 110 (H) 06/26/2024    NONHDLC 125 06/26/2024    LITHIUM 0.4 (L) 01/28/2021    IDY2RISMJKKR 2.636 06/26/2024    HGBA1C 5.2 11/22/2023     11/22/2023     Mental Status Evaluation:  Appearance:  dressed appropriately   Behavior:  guarded   Speech:  scant, soft   Mood:  depressed, anxious   Affect:  blunted   Thought Process:  poverty of thought   Associations: concrete associations   Thought Content:  no overt delusions   Perceptual Disturbances: no auditory hallucinations, no visual hallucinations, denies when asked, does not appear responding to internal stimuli   Risk Potential: Suicidal ideation - None at present, contracts for safety on the unit, would talk to staff if not feeling safe on the unit  Homicidal ideation - None at present  Potential for aggression - Not at present   Sensorium:  oriented to person, place, and time/date   Memory:  recent memory intact   Consciousness:  alert and awake   Attention/Concentration: attention span and concentration appear shorter than expected for age   Insight:  limited   Judgment: limited   Gait/Station: normal gait/station, normal balance   Motor Activity: no abnormal movements     Progress Toward Goals: no significant improvement.  Deyvi continues to require inpatient psychiatric hospitalization for continued medication management and titration to optimize symptom reduction, improve sleep hygiene, and demonstrate adequate self-care.     Suicide/Homicide Risk Assessment:  Risk of Harm to Self:   Nursing Suicide Risk Assessment Last 24 hours: C-SSRS Risk (Since Last Contact)  Calculated C-SSRS Risk Score (Since Last Contact): No Risk Indicated    Risk of Harm to Others:  Nursing Homicide Risk Assessment: Violence Risk to Others: Denies within past 6 months    Behavioral Health Medications: all current active meds have been reviewed and continue current psychiatric medications.  Current Facility-Administered Medications   Medication  Dose Route Frequency Provider Last Rate    acetaminophen  650 mg Oral Q6H PRN ALVINA Harley      acetaminophen  650 mg Oral Q4H PRN ALVINA Harley      acetaminophen  975 mg Oral Q6H PRN ALVINA Harley      aluminum-magnesium hydroxide-simethicone  30 mL Oral Q4H PRN ALVINA Harley      atorvastatin  10 mg Oral Daily ALVINA Lewis      benztropine  1 mg Intramuscular Q4H PRN Max 6/day ALVINA Harley      benztropine  1 mg Oral Q4H PRN Max 6/day ALVINA Harley      bisacodyl  10 mg Rectal Daily PRN ALVINA Harley      cariprazine  3 mg Oral Daily Martin Cardenas MD      Cholecalciferol  2,000 Units Oral Daily ALVINA Lewis      cyanocobalamin  1,000 mcg Oral Daily ALVINA Lewis      hydrOXYzine HCL  25 mg Oral Q6H PRN Max 4/day ALVINA Harley      hydrOXYzine HCL  25 mg Oral TID Martin Cardenas MD      hydrOXYzine HCL  50 mg Oral Q4H PRN Max 4/day ALVINA Harley      Or    LORazepam  1 mg Intramuscular Q4H PRN ALVINA Harley      lamoTRIgine  50 mg Oral Daily Martin Cardenas MD      lisinopril  10 mg Oral Daily ALVINA Lewis      LORazepam  1 mg Oral Q4H PRN Max 6/day ALVINA Harley      Or    LORazepam  2 mg Intramuscular Q6H PRN Max 3/day ALVINA Harley      OLANZapine  10 mg Oral Q3H PRN Max 3/day ALVINA Harley      Or    OLANZapine  10 mg Intramuscular Q3H PRN Max 3/day ALVINA Harley      OLANZapine  5 mg Oral Q3H PRN Max 6/day ALVINA Harley      Or    OLANZapine  5 mg Intramuscular Q3H PRN Max 6/day ALVINA Harley      OLANZapine  2.5 mg Oral Q3H PRN Max 8/day ALVINA Harley      polyethylene glycol  17 g Oral Daily PRN ALVINA Harley      propranolol  10 mg Oral Q8H PRN ALVINA Harley      senna-docusate sodium  1 tablet Oral Daily PRN ALVINA Harley      sertraline  150 mg Oral HS Martin Cardenas MD       Risks / Benefits of Treatment:  Risks, benefits, and possible side  effects of medications explained to patient. Patient has limited understanding of risks and benefits of treatment at this time, but agrees to take medications as prescribed.    Counseling / Coordination of Care:  Total floor/unit time spent today 25 minutes. Greater than 50% of total time was spent with the patient and / or family counseling and / or coordination of care. A description of the counseling / coordination of care:   Patient's progress discussed with staff in treatment team meeting.  Medications, treatment progress and treatment plan reviewed with patient.   Educated on importance of medication and treatment compliance.  Reassurance and supportive therapy provided.   Encouraged participation in milieu and group therapy on the unit.    ALVINA Harley 07/17/24

## 2024-07-17 NOTE — NURSING NOTE
Patient is visible on unit attending groups, out with peers but quiet, not much interactions. Pt able to make needs known. Pt reports no s/s, offers no complaints. Pt is commended for going to more groups. Pt takes all medications without issue.

## 2024-07-18 PROCEDURE — 99232 SBSQ HOSP IP/OBS MODERATE 35: CPT

## 2024-07-18 RX ADMIN — HYDROXYZINE HYDROCHLORIDE 25 MG: 25 TABLET ORAL at 08:19

## 2024-07-18 RX ADMIN — CYANOCOBALAMIN TAB 1000 MCG 1000 MCG: 1000 TAB at 08:19

## 2024-07-18 RX ADMIN — SERTRALINE HYDROCHLORIDE 150 MG: 100 TABLET ORAL at 21:40

## 2024-07-18 RX ADMIN — CARIPRAZINE 3 MG: 3 CAPSULE, GELATIN COATED ORAL at 08:19

## 2024-07-18 RX ADMIN — LAMOTRIGINE 50 MG: 25 TABLET ORAL at 08:19

## 2024-07-18 RX ADMIN — CHOLECALCIFEROL TAB 25 MCG (1000 UNIT) 2000 UNITS: 25 TAB at 08:19

## 2024-07-18 RX ADMIN — HYDROXYZINE HYDROCHLORIDE 25 MG: 25 TABLET ORAL at 17:22

## 2024-07-18 RX ADMIN — LISINOPRIL 10 MG: 10 TABLET ORAL at 08:19

## 2024-07-18 RX ADMIN — ATORVASTATIN CALCIUM 10 MG: 10 TABLET, FILM COATED ORAL at 08:19

## 2024-07-18 RX ADMIN — HYDROXYZINE HYDROCHLORIDE 25 MG: 25 TABLET ORAL at 21:40

## 2024-07-18 NOTE — NURSING NOTE
Patient visible this evening, calm and quiet. Patient cooperative upon approach, offered no complaints, behaviors controlled. Patient compliant with bedtime meds, currently in own room sleeping, will continue to monitor.

## 2024-07-18 NOTE — PROGRESS NOTES
07/18/24 0746   Team Meeting   Meeting Type Daily Rounds   Team Members Present   Team Members Present Physician;Nurse;;Other (Discipline and Name)   Patient/Family Present   Patient Present No   Patient's Family Present No     In attendance:  MD Pedro Persaud, PAGaganC  Mahamed Haywood, RN  Judi Garcia, \[facility] [place] [facility]\""W  Gris Arizmendi M.S.    Groups: 5/9    Pt increased group attendance. Pt is visible, reports depression and anxiety are improving.

## 2024-07-18 NOTE — PROGRESS NOTES
Progress Note - Behavioral Health     Deyvi Cao 54 y.o. male MRN: [id number]   Unit/Bed#: [facility] 112-02 Encounter: [id number]    Behavior over the last 24 hours: unchanged.     Deyvi is seen today for psychiatric follow up. Per nursing notes, patient is visible, quiet, minimal interactions with others,reports no symptoms, med compliant. Attended 5/9 groups.   Patient remains in behavioral control. No psych prns in last 24 hours.     Today Deyvi is seen attending a group and reports that he is feeling similar to how he felt earlier this week. He reports mild depression and anxiety, which he reports may slightly be better but admits it can fluctuate. He was encouraged to continue going to groups, but reports some of them are boring to him as he has been going to groups prior to this admission. He denies any suicidal and homicidal ideations. Denies any hallucinations when asked and does not appear to be responding to internal stimuli. He appears withdrawn despite going to groups. He appears depressed, however does smile once through conversation at an appropriate time. Mostly blunted affect throughout. Continues to have a lower self-esteem.     Denies medication side effects and currently tolerating medication regimen. Discharge disposition ongoing.    Sleep:  adequate  Appetite:  adequate  Medication side effects: No   ROS: all other systems are negative    Mental Status Evaluation:    Appearance:  age appropriate, casually dressed, adequate grooming   Behavior:  cooperative, calm, guarded   Speech:  scant, soft   Mood:  dysphoric   Affect:  blunted, mood-congruent   Thought Process:  linear, concrete   Associations: concrete associations   Thought Content:  no overt delusions, negative thoughts, intrusive thoughts   Perceptual Disturbances: denies auditory hallucinations when asked, does not appear responding to internal stimuli, denies visual hallucinations when asked   Risk Potential: Suicidal ideation -   Denies passive death wishes and suicidal ideations today when asked. Would reach out to staff if felt unsafe.  Homicidal ideation - None  Potential for aggression - No   Sensorium:  oriented to person, place, and time/date   Memory:  recent and remote memory grossly intact   Consciousness:  alert and awake   Attention/Concentration: attention span and concentration appear shorter than expected for age   Insight:  limited   Judgment: limited   Gait/Station: normal gait/station   Motor Activity: no abnormal movements     Vital signs in last 24 hours:    Temp:  [97.5 °F (36.4 °C)] 97.5 °F (36.4 °C)  HR:  [88-96] 96  Resp:  [18] 18  BP: (126-137)/(72-91) 137/91    Laboratory results: I have personally reviewed all pertinent laboratory/tests results    Results from the past 24 hours: No results found for this or any previous visit (from the past 24 hour(s)).    Progress Toward Goals: progressing slowly, continues to report mild anxiety/depression, quiet, scant and soft speech, attending more groups, visible, dysphoric appearing     Assessment & Plan   Principal Problem:    Bipolar disorder with severe depression (HCC)  Active Problems:    Benign essential hypertension    Mixed hyperlipidemia    Allergic rhinitis due to allergen    Medical clearance for psychiatric admission    Tracey      Recommended Treatment:     Planned medication and treatment changes:    All current active medications have been reviewed  Encourage group therapy, milieu therapy and occupational therapy  Behavioral Health checks every 7 minutes  Continue current medications.  Discharge disposition ongoing.    Current Facility-Administered Medications   Medication Dose Route Frequency Provider Last Rate    acetaminophen  650 mg Oral Q6H PRN ALVINA Harley      acetaminophen  650 mg Oral Q4H PRN ALVINA Harley      acetaminophen  975 mg Oral Q6H PRN ALVINA Harley      aluminum-magnesium hydroxide-simethicone  30 mL Oral Q4H PRN Melva  ALVINA Knutson      atorvastatin  10 mg Oral Daily Sary CoradoALVINA collins      benztropine  1 mg Intramuscular Q4H PRN Max 6/day ALVINA Harley      benztropine  1 mg Oral Q4H PRN Max 6/day ALVINA Harley      bisacodyl  10 mg Rectal Daily PRN ALVINA Harley      cariprazine  3 mg Oral Daily Martin Cardenas MD      Cholecalciferol  2,000 Units Oral Daily Sary CoradoALVINA collins      cyanocobalamin  1,000 mcg Oral Daily Sary Rodriguez ALVINA Biggs      hydrOXYzine HCL  25 mg Oral Q6H PRN Max 4/day ALVINA aHrley      hydrOXYzine HCL  25 mg Oral TID Martin Cardenas MD      hydrOXYzine HCL  50 mg Oral Q4H PRN Max 4/day ALVINA Harley      Or    LORazepam  1 mg Intramuscular Q4H PRN ALVINA Harley      lamoTRIgine  50 mg Oral Daily Martin Cardenas MD      lisinopril  10 mg Oral Daily Sary Rodriguez ALVINA Biggs      LORazepam  1 mg Oral Q4H PRN Max 6/day ALVINA Harley      Or    LORazepam  2 mg Intramuscular Q6H PRN Max 3/day ALVINA Harley      OLANZapine  10 mg Oral Q3H PRN Max 3/day ALVINA Harley      Or    OLANZapine  10 mg Intramuscular Q3H PRN Max 3/day ALVINA Harley      OLANZapine  5 mg Oral Q3H PRN Max 6/day ALVINA Harley      Or    OLANZapine  5 mg Intramuscular Q3H PRN Max 6/day ALVINA Harley      OLANZapine  2.5 mg Oral Q3H PRN Max 8/day ALVINA Harley      polyethylene glycol  17 g Oral Daily PRN ALVINA Harley      propranolol  10 mg Oral Q8H PRN ALVINA Harley      senna-docusate sodium  1 tablet Oral Daily PRN ALVINA Harley      sertraline  150 mg Oral HS Martin Cardenas MD       Risks / Benefits of Treatment:    Risks, benefits, and possible side effects of medications explained to patient and patient verbalizes understanding and agreement for treatment.    Counseling / Coordination of Care:    Patient's progress discussed with staff in treatment team meeting.  Medications, treatment progress and treatment plan reviewed with  patient.  Medication education provided to patient.  Educated on importance of medication and treatment compliance.  Reassurance and supportive therapy provided.  Group attendance encouraged.    Pedro Stafford PA-C 07/18/24

## 2024-07-18 NOTE — PLAN OF CARE
Problem: Alteration in Thoughts and Perception  Goal: Treatment Goal: Gain control of psychotic behaviors/thinking, reduce/eliminate presenting symptoms and demonstrate improved reality functioning upon discharge  Outcome: Progressing  Goal: Verbalize thoughts and feelings  Description: Interventions:  - Promote a nonjudgmental and trusting relationship with the patient through active listening and therapeutic communication  - Assess patient's level of functioning, behavior and potential for risk  - Engage patient in 1 on 1 interactions  - Encourage patient to express fears, feelings, frustrations, and discuss symptoms    - [place] patient to reality, help patient recognize reality-based thinking   - Administer medications as ordered and assess for potential side effects  - Provide the patient education related to the signs and symptoms of the illness and desired effects of prescribed medications  Outcome: Progressing  Goal: Refrain from acting on delusional thinking/internal stimuli  Description: Interventions:  - Monitor patient closely, per order   - Utilize least restrictive measures   - Set reasonable limits, give positive feedback for acceptable   - Administer medications as ordered and monitor of potential side effects  Outcome: Progressing  Goal: Agree to be compliant with medication regime, as prescribed and report medication side effects  Description: Interventions:  - Offer appropriate PRN medication and supervise ingestion; conduct AIMS, as needed   Outcome: Progressing  Goal: Attend and participate in unit activities, including therapeutic, recreational, and educational groups  Description: Interventions:  -Encourage Visitation and family involvement in care  Outcome: Progressing  Goal: Complete daily ADLs, including personal hygiene independently, as able  Description: Interventions:  - Observe, teach, and assist patient with ADLS  - Monitor and promote a balance of rest/activity, with adequate  nutrition and elimination   Outcome: Progressing     Problem: Ineffective Coping  Goal: Identifies healthy coping skills  Outcome: Progressing  Goal: Demonstrates healthy coping skills  Outcome: Progressing  Goal: Understands least restrictive measures  Description: Interventions:  - Utilize least restrictive behavior  Outcome: Progressing     Problem: Risk for Self Injury/Neglect  Goal: Treatment Goal: Remain safe during length of stay, learn and adopt new coping skills, and be free of self-injurious ideation, impulses and acts at the time of discharge  Outcome: Progressing     Problem: Anxiety  Goal: Anxiety is at manageable level  Description: Interventions:  - Assess and monitor patient's anxiety level.   - Monitor for signs and symptoms (heart palpitations, chest pain, shortness of breath, headaches, nausea, feeling jumpy, restlessness, irritable, apprehensive).   - Collaborate with interdisciplinary team and initiate plan and interventions as ordered.  - [place] patient to unit/surroundings  - Explain treatment plan  - Encourage participation in care  - Encourage verbalization of concerns/fears  - Identify coping mechanisms  - Assist in developing anxiety-reducing skills  - Administer/offer alternative therapies  - Limit or eliminate stimulants  Outcome: Progressing     Problem: Risk for Violence/Aggression Toward Others  Goal: Verbalize thoughts and feelings  Description: Interventions:  - Assess and re-assess patient's level of risk, every waking shift  - Engage patient in 1:1 interactions, daily, for a minimum of 15 minutes   - Allow patient to express feelings and frustrations in a safe and non-threatening manner   - Establish rapport/trust with patient   Outcome: Progressing  Goal: Refrain from harming others  Outcome: Progressing  Goal: Refrain from destructive acts on the environment or property  Outcome: Progressing  Goal: Control angry outbursts  Description: Interventions:  - Monitor patient closely,  per order  - Ensure early verbal de-escalation  - Monitor prn medication needs  - Set reasonable/therapeutic limits, outline behavioral expectations, and consequences   - Provide a non-threatening milieu, utilizing the least restrictive interventions   Outcome: Progressing  Goal: Attend and participate in unit activities, including therapeutic, recreational, and educational groups  Description: Interventions:  - Provide therapeutic and educational activities daily, encourage attendance and participation, and document same in the medical record   Outcome: Progressing  Goal: Identify appropriate positive anger management techniques  Description: Interventions:  - Offer anger management and coping skills groups   - Staff will provide positive feedback for appropriate anger control  Outcome: Progressing     Problem: Alteration in Orientation  Goal: Complete daily ADLs, including personal hygiene independently, as able  Description: Interventions:  - Observe, teach, and assist patient with ADLS  Outcome: Progressing     Problem: TALIB  Goal: Will exhibit normal sleep and speech and no impulsivity  Description: INTERVENTIONS:  - Administer medication as ordered  - Set limits on impulsive behavior  - Make attempts to decrease external stimuli as possible  Outcome: Progressing     Problem: PSYCHOSIS  Goal: Will report no hallucinations or delusions  Description: Interventions:  - Administer medication as  ordered  - Every waking shifts and PRN assess for the presence of hallucinations and or delusions  - Assist with reality testing to support increasing orientation  - Assess if patient's hallucinations or delusions are encouraging self-harm or harm to others and intervene as appropriate  Outcome: Progressing     Problem: BEHAVIOR  Goal: Pt/Family maintain appropriate behavior and adhere to behavioral management agreement, if implemented  Description: INTERVENTIONS:  - Assess the family dynamic   - Encourage verbalization of  thoughts and concerns in a socially appropriate manner  - Assess patient/family's coping skills and non-compliant behavior (including use of illegal substances).  - Utilize positive, consistent limit setting strategies supporting safety of patient, staff and others  - Initiate consult with Case Management, Spiritual Care or other ancillary services as appropriate  - If a patient's/visitor's behavior jeopardizes the safety of the patient, staff, or others, refer to organization procedure.   - Notify Security of behavior or suspected illegal substances which indicate the need for search of the patient and/or belongings  - Encourage participation in the decision making process about a behavioral management agreement; implement if patient meets criteria  Outcome: Progressing     Problem: ANXIETY  Goal: By discharge: Patient will verbalize 2 strategies to deal with anxiety  Description: Interventions:  - Identify any obvious source/trigger to anxiety  - Staff will assist patient in applying identified coping technique/skills  - Encourage attendance of scheduled groups and activities  Outcome: Progressing     Problem: SLEEP DISTURBANCE  Goal: Will exhibit normal sleeping pattern  Description: Interventions:  -  Assess the patients sleep pattern, noting recent changes  - Administer medication as ordered  - Decrease environmental stimuli, including noise, as appropriate during the night  - Encourage the patient to actively participate in unit groups and or exercise during the day to enhance ability to achieve adequate sleep at night  - Assess the patient, in the morning, encouraging a description of sleep experience  Outcome: Progressing     Problem: INVOLUNTARY ADMIT  Goal: Will cooperate with staff recommendations and doctor's orders and will demonstrate appropriate behavior  Description: INTERVENTIONS:  - Treat underlying conditions and offer medication as ordered  - Educate regarding involuntary admission procedures and  rules  - Utilize positive consistent limit setting strategies to support patient and staff safety  Outcome: Progressing     Problem: SELF CARE DEFICIT  Goal: Return ADL status to a safe level of function  Description: INTERVENTIONS:  - Administer medication as ordered  - Assess ADL deficits and provide assistive devices as needed  - Obtain PT/OT consults as needed  - Assist and instruct patient to increase activity and self care as tolerated  Outcome: Progressing     Problem: DISCHARGE PLANNING - CARE MANAGEMENT  Goal: Discharge to post-acute care or home with appropriate resources  Description: INTERVENTIONS:  - Conduct assessment to determine patient/family and health care team treatment goals, and need for post-acute services based on payer coverage, community resources, and patient preferences, and barriers to discharge  - Address psychosocial, clinical, and financial barriers to discharge as identified in assessment in conjunction with the patient/family and health care team  - Arrange appropriate level of post-acute services according to patient’s   needs and preference and payer coverage in collaboration with the physician and health care team  - Communicate with and update the patient/family, physician, and health care team regarding progress on the discharge plan  - Arrange appropriate transportation to post-acute venues  Outcome: Progressing

## 2024-07-18 NOTE — NURSING NOTE
Pt is visible on the unit but isolative to self. Consumed 100% of dinner. Took medications without incidence. Pt is pleasant and cooperative. Attended 4/9 groups. Denies all psych symptoms. No behavioral issues. Pt offers no concerns or complaints. VSS. Continuous safety checks maintained.

## 2024-07-18 NOTE — NURSING NOTE
Received pt in bed at change of shift with eyes closed; chest movement noted.  Continues the same thus this far as per q 7 min room checks.   Will continue to monitor behavior, sleeping pattern and any medical issues that may arise.    0551:  Sleeping 7+ hrs thus this far

## 2024-07-18 NOTE — NURSING NOTE
Patient is pleasant, calm and cooperative with care. Pt able to make needs known. Pt reports no s/s, is visible attending groups and takes all medications without issue.

## 2024-07-18 NOTE — SOCIAL WORK
SW and pt met 1:1  Pt reports still feeling a little anxious but overall doing well with no concerns. Pt and SW reviewed recent social interactions and use of learned skills to manage anxiety. SW provided update on the remainder of pt's clothing that he ordered as part of the order is delayed. Pt observed appropriately interacting with roommate and visible on the unit.

## 2024-07-19 PROCEDURE — 99232 SBSQ HOSP IP/OBS MODERATE 35: CPT | Performed by: PSYCHIATRY & NEUROLOGY

## 2024-07-19 RX ADMIN — HYDROXYZINE HYDROCHLORIDE 25 MG: 25 TABLET ORAL at 17:18

## 2024-07-19 RX ADMIN — LAMOTRIGINE 50 MG: 25 TABLET ORAL at 09:09

## 2024-07-19 RX ADMIN — HYDROXYZINE HYDROCHLORIDE 25 MG: 25 TABLET ORAL at 21:10

## 2024-07-19 RX ADMIN — ATORVASTATIN CALCIUM 10 MG: 10 TABLET, FILM COATED ORAL at 09:09

## 2024-07-19 RX ADMIN — LISINOPRIL 10 MG: 10 TABLET ORAL at 09:09

## 2024-07-19 RX ADMIN — SERTRALINE HYDROCHLORIDE 150 MG: 100 TABLET ORAL at 21:10

## 2024-07-19 RX ADMIN — HYDROXYZINE HYDROCHLORIDE 25 MG: 25 TABLET ORAL at 09:09

## 2024-07-19 RX ADMIN — CARIPRAZINE 3 MG: 3 CAPSULE, GELATIN COATED ORAL at 09:09

## 2024-07-19 RX ADMIN — CYANOCOBALAMIN TAB 1000 MCG 1000 MCG: 1000 TAB at 09:09

## 2024-07-19 RX ADMIN — CHOLECALCIFEROL TAB 25 MCG (1000 UNIT) 2000 UNITS: 25 TAB at 09:09

## 2024-07-19 NOTE — NURSING NOTE
Patient is pleasant ,quiet and visible on unit. Pt attends groups and sits with peers for meals. Pt reports no s/s, no distress noted. Pt takes medication without issue.

## 2024-07-19 NOTE — PROGRESS NOTES
Progress Note - Behavioral Health   Deyvi Cao 54 y.o. male MRN: [id number]  Unit/Bed#: [facility] 112-02 Encounter: [id number]  Code Status: Level 1 - Full Code    Assessment & Plan   Principal Problem:    Bipolar disorder with severe depression (HCC)  Active Problems:    Benign essential hypertension    Mixed hyperlipidemia    Allergic rhinitis due to allergen    Medical clearance for psychiatric admission    Rosacea    Recommended Treatment:     Treatment plan, treatment progress and medication changes were reviewed with Nursing Staff, Pharmacy Service and Case Management in Treatment Team:  1.Continue with group therapy, milieu therapy and occupational therapy   2.Behavioral Health checks every 7 minutes   3.Continue frequent safety checks and vitals per unit protocol  4.Continue with SLIM medical management as indicated  5.Continue with current medication regimen for symptom management: Vraylar 3mg PO Daily, Atarax 25mg PO TID, Lamictal 50mg PO Daily, Zoloft 150mg PO QHS, Lamictal 50mg PO Daily    6.Disposition Planning: Discharge planning and efforts remain ongoing - Awaiting group home placement    Subjective:    Patient was seen today for continuation of care, records reviewed and patient was discussed with the morning case review team.    Deyvi was seen today for psychiatric follow-up.  On assessment today, Deyvi was found in his room.  He is doing well, reports depression and anxiety 2/10.  Reports depression was 10/10 on admission to [facility].  Deyvi reports adequate daytime energy and denies any difficulties with initiating or staying asleep.  Oral appetite and hydration is adequate.  We reviewed once more the specific as-needed medications they can use going forward if they experience any insomnia or destabilization of their mood, they understood and were agreeable. Milieu visibility and group attendance encouraged to promote an active participation in treatment.    Deyvi denies acute  suicidal/self-harm ideation/intent/plan upon direct inquiry at this time. Dyevi is able to contract for safety while on the unit and would feel comfortable seeking staff support should suicidal symptoms or urges appear or worsen. Deyvi remains behaviorally appropriate, no agitation or aggression noted on exam or in report. Deyvi also denies HI/AH/VH, and does not appear overtly manic.  Patient does not verbalize any experiences that can be categorized as paranoid, persecutory, bizarre, or somatic delusions. Deyvi remains adherent to his current psychotropic medication regimen and denies any side effects from medications, as well as none noted on exam.    Group Attendance: 4 / 9  Treatment Team: SOLEDAD  Psychiatric PRN's Needed: None    Review of Systems:  Behavior over the last 24 hours: Slowly improving  Sleep: sleeping okay throughout the night  Appetite: adequate  Medication side effects: none reported  ROS:no complaints, all other systems are negative    Objective:    Vitals:  Vitals:    07/19/24 0736   BP: 164/90   Pulse: 88   Resp: 19   Temp: 97.8 °F (36.6 °C)   SpO2: 98%     Laboratory Results:  I have personally reviewed all pertinent laboratory/tests results.  Most Recent Labs:   Lab Results   Component Value Date    WBC 7.39 06/26/2024    RBC 4.70 06/26/2024    HGB 15.2 06/26/2024    HCT 45.5 06/26/2024     06/26/2024    RDW 12.9 06/26/2024    NEUTROABS 4.63 06/26/2024    SODIUM 137 06/26/2024    K 4.1 06/26/2024     06/26/2024    CO2 26 06/26/2024    BUN 17 06/26/2024    CREATININE 0.63 06/26/2024    GLUC 98 06/26/2024    GLUF 98 06/26/2024    CALCIUM 9.6 06/26/2024    AST 25 06/26/2024    ALT 45 06/26/2024    ALKPHOS 114 (H) 06/26/2024    TP 7.0 06/26/2024    ALB 4.4 06/26/2024    TBILI 0.44 06/26/2024    CHOLESTEROL 177 06/26/2024    HDL 52 06/26/2024    TRIG 73 06/26/2024    LDLCALC 110 (H) 06/26/2024    NONHDLC 125 06/26/2024    LITHIUM 0.4 (L) 01/28/2021    [id number] 2.636  06/26/2024    HGBA1C 5.2 11/22/2023     11/22/2023     Mental Status Evaluation:  Appearance:  dressed appropriately   Behavior:  guarded   Speech:  scant, soft   Mood:  depressed, anxious   Affect:  blunted   Thought Process:  poverty of thought   Associations: concrete associations   Thought Content:  no overt delusions   Perceptual Disturbances: no auditory hallucinations, no visual hallucinations, denies when asked, does not appear responding to internal stimuli   Risk Potential: Suicidal ideation - None at present, contracts for safety on the unit, would talk to staff if not feeling safe on the unit  Homicidal ideation - None at present  Potential for aggression - Not at present   Sensorium:  oriented to person, place, and time/date   Memory:  recent memory intact   Consciousness:  alert and awake   Attention/Concentration: attention span and concentration appear shorter than expected for age   Insight:  limited   Judgment: limited   Gait/Station: normal gait/station, normal balance   Motor Activity: no abnormal movements     Progress Toward Goals: no significant improvement.  Deyvi continues to require inpatient psychiatric hospitalization for continued medication management and titration to optimize symptom reduction, improve sleep hygiene, and demonstrate adequate self-care.     Suicide/Homicide Risk Assessment:  Risk of Harm to Self:   Nursing Suicide Risk Assessment Last 24 hours: C-SSRS Risk (Since Last Contact)  Calculated C-SSRS Risk Score (Since Last Contact): No Risk Indicated    Risk of Harm to Others:  Nursing Homicide Risk Assessment: Violence Risk to Others: Denies within past 6 months    Behavioral Health Medications: all current active meds have been reviewed and continue current psychiatric medications.  Current Facility-Administered Medications   Medication Dose Route Frequency Provider Last Rate    acetaminophen  650 mg Oral Q6H PRN ALVINA Harley      acetaminophen  650 mg Oral  Q4H PRN ALVINA Harley      acetaminophen  975 mg Oral Q6H PRN ALVINA Harley      aluminum-magnesium hydroxide-simethicone  30 mL Oral Q4H PRN ALVINA Harley      atorvastatin  10 mg Oral Daily Sary LinkALVINA Thompson      benztropine  1 mg Intramuscular Q4H PRN Max 6/day ALVINA Harley      benztropine  1 mg Oral Q4H PRN Max 6/day ALVINA Harley      bisacodyl  10 mg Rectal Daily PRN ALVINA Harley      cariprazine  3 mg Oral Daily Martin Cardenas MD      Cholecalciferol  2,000 Units Oral Daily Sary Rodriguez ALVINA Biggs      cyanocobalamin  1,000 mcg Oral Daily Sary LinkALVINA Thompson      hydrOXYzine HCL  25 mg Oral Q6H PRN Max 4/day ALVINA Harley      hydrOXYzine HCL  25 mg Oral TID Martin Cardenas MD      hydrOXYzine HCL  50 mg Oral Q4H PRN Max 4/day ALVINA Harley      Or    LORazepam  1 mg Intramuscular Q4H PRN ALVINA Harley      lamoTRIgine  50 mg Oral Daily Martin Cardenas MD      lisinopril  10 mg Oral Daily Sary LinkALVINA Thompson      LORazepam  1 mg Oral Q4H PRN Max 6/day ALVINA Harley      Or    LORazepam  2 mg Intramuscular Q6H PRN Max 3/day ALVINA Harley      OLANZapine  10 mg Oral Q3H PRN Max 3/day ALVINA Harley      Or    OLANZapine  10 mg Intramuscular Q3H PRN Max 3/day ALVINA Harley      OLANZapine  5 mg Oral Q3H PRN Max 6/day ALVINA Harley      Or    OLANZapine  5 mg Intramuscular Q3H PRN Max 6/day ALVINA Harley      OLANZapine  2.5 mg Oral Q3H PRN Max 8/day ALVINA Harley      polyethylene glycol  17 g Oral Daily PRN ALVINA Harley      propranolol  10 mg Oral Q8H PRN ALVINA Harley      senna-docusate sodium  1 tablet Oral Daily PRN ALVINA Harley      sertraline  150 mg Oral HS Martin Cardenas MD       Risks / Benefits of Treatment:  Risks, benefits, and possible side effects of medications explained to patient. Patient has limited understanding of risks and benefits of treatment at this time,  but agrees to take medications as prescribed.    Counseling / Coordination of Care:  Total floor/unit time spent today 25 minutes. Greater than 50% of total time was spent with the patient and / or family counseling and / or coordination of care. A description of the counseling / coordination of care:   Patient's progress discussed with staff in treatment team meeting.  Medications, treatment progress and treatment plan reviewed with patient.   Educated on importance of medication and treatment compliance.  Reassurance and supportive therapy provided.   Encouraged participation in milieu and group therapy on the unit.    ALVINA Harley 07/19/24

## 2024-07-19 NOTE — PLAN OF CARE
Problem: Ineffective Coping  Goal: Identifies ineffective coping skills  Outcome: Progressing  Goal: Identifies healthy coping skills  Outcome: Progressing  Goal: Demonstrates healthy coping skills  Outcome: Progressing  Goal: Participates in unit activities  Description: Interventions:  - Provide therapeutic environment   - Provide required programming   - Redirect inappropriate behaviors   Outcome: Progressing    Attended 16/35 of the groups offered during the past 4 days.

## 2024-07-19 NOTE — NURSING NOTE
Patient is visible attending groups and in dining room with peers. Pt sitting with peers but quiet, not much interactions. Pt reports no s/s, able to make needs known. Pt is polite and pleasant, takes all medications without issue.

## 2024-07-19 NOTE — PROGRESS NOTES
Progress Note - Behavioral Health   Deyvi Cao 54 y.o. male MRN: [id number]  Unit/Bed#: [facility] 112-02 Encounter: [id number]      Subjective:     Documentation, nursing notes, medication reconciliation, labs, and vitals reviewed. Patient was seen for continuing care and reviewed with treatment team.  No acute events over the past 24 hours. Per nursing report, Deyvi has been visible playing cards with peers.  Can be scant and guarded at times. No medication changes over the past 24 hours.     On evaluation today, Deyvi presents walking the hallways.  Deyvi appears guarded and his answers are scant.  He reports he slept well last night.  He denies any side effects from the medications.  His appetite is adequate.  Deyvi denies any anxiety or depression.  He denies any SI/HI and is without plan or intent.  He denies any AH/VH at this time.  Deyvi does not endorse any pain.    Continues to tolerate current medications with no adverse effects.     Denies depression and does not appear anxious.   No self-harming/suicidal ideation, plan, or intent upon direct inquiry. No thoughts to harm others.  No agitation or aggression noted. Denies perceptual disturbances, with no delusional or paranoid content elicited. Does not appear overtly manic. Offers no further complaints.       Psychiatric ROS:  Behavior over the last 24 hours: unchanged  Sleep: normal  Appetite: normal  Medication side effects: No   ROS: no complaints, all other systems are negative      Mental Status Evaluation:    Appearance:  age appropriate, casually dressed, dressed appropriately, adequate grooming   Behavior:  pleasant, cooperative, guarded   Speech:  scant   Mood:  euthymic   Affect:  flat   Thought Process:  coherent   Associations: intact associations   Thought Content:  no overt delusions   Perceptual Disturbances: no visual hallucinations, denies auditory hallucinations when asked, does not appear responding to internal stimuli   Risk  Potential: Suicidal ideation - None at present  Homicidal ideation - None  Potential for aggression - No   Sensorium:  oriented to person and place   Memory:  recent and remote memory grossly intact   Consciousness:  alert and awake   Attention/Concentration: attention span and concentration appear shorter than expected for age   Insight:  fair   Judgment: fair   Gait/Station: normal gait/station   Motor Activity: no abnormal movements       Vital signs in last 24 hours:    Temp:  [97.8 °F (36.6 °C)-98.1 °F (36.7 °C)] 98.1 °F (36.7 °C)  HR:  [88-99] 99  Resp:  [18-19] 18  BP: (112-164)/(70-90) 112/70    Laboratory results: I have personally reviewed all pertinent laboratory/tests results    Results from the past 24 hours: No results found for this or any previous visit (from the past 24 hour(s)).      Progress Toward Goals: progressing    Suicide/Homicide Risk Assessment:    Risk of Harm to Self:   Nursing Suicide Risk Assessment Last 24 hours: C-SSRS Risk (Since Last Contact)  Calculated C-SSRS Risk Score (Since Last Contact): No Risk Indicated    Risk of Harm to Others:  Nursing Homicide Risk Assessment: Violence Risk to Others: Denies within past 6 months    Assessment & Plan   Principal Problem:    Bipolar disorder with severe depression (HCC)  Active Problems:    Benign essential hypertension    Mixed hyperlipidemia    Allergic rhinitis due to allergen    Medical clearance for psychiatric admission    Tracey      Recommended Treatment:     Planned medication and treatment changes:    All current active medications have been reviewed  Encourage group therapy, milieu therapy and occupational therapy  Behavioral Health checks every 7 minutes  Continue with SLIM medical management as indicated  Disposition planning ongoing      Continue current medications:    Current Facility-Administered Medications   Medication Dose Route Frequency Provider Last Rate    acetaminophen  650 mg Oral Q6H PRN ALVINA Harley       acetaminophen  650 mg Oral Q4H PRN ALVINA Harley      acetaminophen  975 mg Oral Q6H PRN ALVINA Harley      aluminum-magnesium hydroxide-simethicone  30 mL Oral Q4H PRN ALVINA Harley      atorvastatin  10 mg Oral Daily Sary ALVINA Gupta      benztropine  1 mg Intramuscular Q4H PRN Max 6/day ALVINA Harley      benztropine  1 mg Oral Q4H PRN Max 6/day ALVINA Harley      bisacodyl  10 mg Rectal Daily PRN ALVINA Harley      cariprazine  3 mg Oral Daily Martin Cardenas MD      Cholecalciferol  2,000 Units Oral Daily Sary ALVINA Gupta      cyanocobalamin  1,000 mcg Oral Daily SaryALVINA Starkey      hydrOXYzine HCL  25 mg Oral Q6H PRN Max 4/day ALVINA Harley      hydrOXYzine HCL  25 mg Oral TID Martin Cardenas MD      hydrOXYzine HCL  50 mg Oral Q4H PRN Max 4/day ALVINA Harley      Or    LORazepam  1 mg Intramuscular Q4H PRN ALVINA Harley      lamoTRIgine  50 mg Oral Daily Martin Cardenas MD      lisinopril  10 mg Oral Daily ALVINA Lewis      LORazepam  1 mg Oral Q4H PRN Max 6/day ALVINA Harley      Or    LORazepam  2 mg Intramuscular Q6H PRN Max 3/day ALVINA Harley      OLANZapine  10 mg Oral Q3H PRN Max 3/day ALVINA Harley      Or    OLANZapine  10 mg Intramuscular Q3H PRN Max 3/day ALVINA Harley      OLANZapine  5 mg Oral Q3H PRN Max 6/day ALVINA Harley      Or    OLANZapine  5 mg Intramuscular Q3H PRN Max 6/day ALVINA Harley      OLANZapine  2.5 mg Oral Q3H PRN Max 8/day ALVINA Harley      polyethylene glycol  17 g Oral Daily PRN ALVINA Harley      propranolol  10 mg Oral Q8H PRN ALVINA Harley      senna-docusate sodium  1 tablet Oral Daily PRN ALVINA Harley      sertraline  150 mg Oral HS Martin T Ronald, MD           Risks / Benefits of Treatment:    Risks, benefits, and possible side effects of medications explained to patient and patient verbalizes understanding and agreement  for treatment.    Counseling / Coordination of Care:    Patient's progress discussed with staff in treatment team meeting.  Medications, treatment progress and treatment plan reviewed with patient.    Note Share    This note was not shared with the patient due to reasonable likelihood of causing patient harm    ALVINA Stanford 07/19/24

## 2024-07-19 NOTE — PLAN OF CARE
Problem: Alteration in Thoughts and Perception  Goal: Treatment Goal: Gain control of psychotic behaviors/thinking, reduce/eliminate presenting symptoms and demonstrate improved reality functioning upon discharge  Outcome: Progressing  Goal: Verbalize thoughts and feelings  Description: Interventions:  - Promote a nonjudgmental and trusting relationship with the patient through active listening and therapeutic communication  - Assess patient's level of functioning, behavior and potential for risk  - Engage patient in 1 on 1 interactions  - Encourage patient to express fears, feelings, frustrations, and discuss symptoms    - [place] patient to reality, help patient recognize reality-based thinking   - Administer medications as ordered and assess for potential side effects  - Provide the patient education related to the signs and symptoms of the illness and desired effects of prescribed medications  Outcome: Progressing  Goal: Agree to be compliant with medication regime, as prescribed and report medication side effects  Description: Interventions:  - Offer appropriate PRN medication and supervise ingestion; conduct AIMS, as needed   Outcome: Progressing  Goal: Attend and participate in unit activities, including therapeutic, recreational, and educational groups  Description: Interventions:  -Encourage Visitation and family involvement in care  Outcome: Progressing  Goal: Complete daily ADLs, including personal hygiene independently, as able  Description: Interventions:  - Observe, teach, and assist patient with ADLS  - Monitor and promote a balance of rest/activity, with adequate nutrition and elimination   Outcome: Progressing     Problem: Ineffective Coping  Goal: Participates in unit activities  Description: Interventions:  - Provide therapeutic environment   - Provide required programming   - Redirect inappropriate behaviors   Outcome: Progressing  Goal: Free from restraint events  Description: - Utilize least  restrictive measures   - Provide behavioral interventions   - Redirect inappropriate behaviors   Outcome: Progressing     Problem: Risk for Self Injury/Neglect  Goal: Treatment Goal: Remain safe during length of stay, learn and adopt new coping skills, and be free of self-injurious ideation, impulses and acts at the time of discharge  Outcome: Progressing  Goal: Refrain from harming self  Description: Interventions:  - Monitor patient closely, per order  - Develop a trusting relationship  - Supervise medication ingestion, monitor effects and side effects   Outcome: Progressing  Goal: Attend and participate in unit activities, including therapeutic, recreational, and educational groups  Description: Interventions:  - Provide therapeutic and educational activities daily, encourage attendance and participation, and document same in the medical record  - Obtain collateral information, encourage visitation and family involvement in care   Outcome: Progressing  Goal: Complete daily ADLs, including personal hygiene independently, as able  Description: Interventions:  - Observe, teach, and assist patient with ADLS  - Monitor and promote a balance of rest/activity, with adequate nutrition and elimination  Outcome: Progressing     Problem: Depression  Goal: Treatment Goal: Demonstrate behavioral control of depressive symptoms, verbalize feelings of improved mood/affect, and adopt new coping skills prior to discharge  Outcome: Progressing  Goal: Refrain from harming self  Description: Interventions:  - Monitor patient closely, per order   - Supervise medication ingestion, monitor effects and side effects   Outcome: Progressing  Goal: Refrain from isolation  Description: Interventions:  - Develop a trusting relationship   - Encourage socialization   Outcome: Progressing  Goal: Refrain from self-neglect  Outcome: Progressing  Goal: Attend and participate in unit activities, including therapeutic, recreational, and educational  groups  Description: Interventions:  - Provide therapeutic and educational activities daily, encourage attendance and participation, and document same in the medical record   Outcome: Progressing  Goal: Complete daily ADLs, including personal hygiene independently, as able  Description: Interventions:  - Observe, teach, and assist patient with ADLS  -  Monitor and promote a balance of rest/activity, with adequate nutrition and elimination   Outcome: Progressing     Problem: Anxiety  Goal: Anxiety is at manageable level  Description: Interventions:  - Assess and monitor patient's anxiety level.   - Monitor for signs and symptoms (heart palpitations, chest pain, shortness of breath, headaches, nausea, feeling jumpy, restlessness, irritable, apprehensive).   - Collaborate with interdisciplinary team and initiate plan and interventions as ordered.  - [place] patient to unit/surroundings  - Explain treatment plan  - Encourage participation in care  - Encourage verbalization of concerns/fears  - Identify coping mechanisms  - Assist in developing anxiety-reducing skills  - Administer/offer alternative therapies  - Limit or eliminate stimulants  Outcome: Progressing     Problem: Risk for Violence/Aggression Toward Others  Goal: Treatment Goal: Refrain from acts of violence/aggression during length of stay, and demonstrate improved impulse control at the time of discharge  Outcome: Progressing  Goal: Refrain from harming others  Outcome: Progressing  Goal: Control angry outbursts  Description: Interventions:  - Monitor patient closely, per order  - Ensure early verbal de-escalation  - Monitor prn medication needs  - Set reasonable/therapeutic limits, outline behavioral expectations, and consequences   - Provide a non-threatening milieu, utilizing the least restrictive interventions   Outcome: Progressing  Goal: Attend and participate in unit activities, including therapeutic, recreational, and educational groups  Description:  Interventions:  - Provide therapeutic and educational activities daily, encourage attendance and participation, and document same in the medical record   Outcome: Progressing  Goal: Identify appropriate positive anger management techniques  Description: Interventions:  - Offer anger management and coping skills groups   - Staff will provide positive feedback for appropriate anger control  Outcome: Progressing     Problem: Alteration in Orientation  Goal: Treatment Goal: Demonstrate a reduction of confusion and improved orientation to person, place, time and/or situation upon discharge, according to optimum baseline/ability  Outcome: Progressing  Goal: Attend and participate in unit activities, including therapeutic, recreational, and educational groups  Description: Interventions:  - Provide therapeutic and educational activities daily, encourage attendance and participation, and document same in the medical record   - Provide appropriate opportunities for reminiscence   - Provide a consistent daily routine   - Encourage family contact/visitation   Outcome: Progressing     Problem: SELF HARM/SUICIDALITY  Goal: Will have no self-injury during hospital stay  Description: INTERVENTIONS:  - Q 15 MINUTES: Routine safety checks  - Q WAKING SHIFT & PRN: Assess risk to determine if routine checks are adequate to maintain patient safety  - Encourage patient to participate actively in care by formulating a plan to combat response to suicidal ideation, identify supports and resources  Outcome: Progressing     Problem: DEPRESSION  Goal: Will be euthymic at discharge  Description: INTERVENTIONS:  - Administer medication as ordered  - Provide emotional support via 1:1 interaction with staff  - Encourage involvement in milieu/groups/activities  - Monitor for social isolation  Outcome: Progressing     Problem: TALIB  Goal: Will exhibit normal sleep and speech and no impulsivity  Description: INTERVENTIONS:  - Administer medication  as ordered  - Set limits on impulsive behavior  - Make attempts to decrease external stimuli as possible  Outcome: Progressing     Problem: PSYCHOSIS  Goal: Will report no hallucinations or delusions  Description: Interventions:  - Administer medication as  ordered  - Every waking shifts and PRN assess for the presence of hallucinations and or delusions  - Assist with reality testing to support increasing orientation  - Assess if patient's hallucinations or delusions are encouraging self-harm or harm to others and intervene as appropriate  Outcome: Progressing     Problem: ANXIETY  Goal: Will report anxiety at manageable levels  Description: INTERVENTIONS:  - Administer medication as ordered  - Teach and encourage coping skills  - Provide emotional support  - Assess patient/family for anxiety and ability to cope  Outcome: Progressing     Problem: INVOLUNTARY ADMIT  Goal: Will cooperate with staff recommendations and doctor's orders and will demonstrate appropriate behavior  Description: INTERVENTIONS:  - Treat underlying conditions and offer medication as ordered  - Educate regarding involuntary admission procedures and rules  - Utilize positive consistent limit setting strategies to support patient and staff safety  Outcome: Progressing     Problem: SELF CARE DEFICIT  Goal: Return ADL status to a safe level of function  Description: INTERVENTIONS:  - Administer medication as ordered  - Assess ADL deficits and provide assistive devices as needed  - Obtain PT/OT consults as needed  - Assist and instruct patient to increase activity and self care as tolerated  Outcome: Progressing     Problem: DISCHARGE PLANNING - CARE MANAGEMENT  Goal: Discharge to post-acute care or home with appropriate resources  Description: INTERVENTIONS:  - Conduct assessment to determine patient/family and health care team treatment goals, and need for post-acute services based on payer coverage, community resources, and patient preferences,  and barriers to discharge  - Address psychosocial, clinical, and financial barriers to discharge as identified in assessment in conjunction with the patient/family and health care team  - Arrange appropriate level of post-acute services according to patient’s   needs and preference and payer coverage in collaboration with the physician and health care team  - Communicate with and update the patient/family, physician, and health care team regarding progress on the discharge plan  - Arrange appropriate transportation to post-acute venues  Outcome: Progressing

## 2024-07-19 NOTE — NURSING NOTE
Pt is visible on the unit and mostly isolative to self, but was playing cards with peers on the deck during group. Consumed 100% of dinner. Took medications without incidence. Pt is pleasant and cooperative. Attended 6/8 groups. Denies all psych symptoms. No behavioral issues. Pt offers no concerns or complaints. VSS. Continuous safety checks maintained.

## 2024-07-19 NOTE — PROGRESS NOTES
07/19/24 0720   Team Meeting   Meeting Type Daily Rounds   Team Members Present   Team Members Present Physician;Nurse;;Other (Discipline and Name)   Patient/Family Present   Patient Present No   Patient's Family Present No     In attendance:  MD Mahamed Sousa, RN  Judi Garcia, [facility]  Gris Arizmendi M.S.    Groups: 4/9    Pt has been more visible and interacting more with select peers. No bx issues; pt generally keeps to himself.

## 2024-07-20 PROCEDURE — 99232 SBSQ HOSP IP/OBS MODERATE 35: CPT

## 2024-07-20 RX ADMIN — LISINOPRIL 10 MG: 10 TABLET ORAL at 08:27

## 2024-07-20 RX ADMIN — CHOLECALCIFEROL TAB 25 MCG (1000 UNIT) 2000 UNITS: 25 TAB at 08:27

## 2024-07-20 RX ADMIN — SERTRALINE HYDROCHLORIDE 150 MG: 100 TABLET ORAL at 21:30

## 2024-07-20 RX ADMIN — CYANOCOBALAMIN TAB 1000 MCG 1000 MCG: 1000 TAB at 08:27

## 2024-07-20 RX ADMIN — CARIPRAZINE 3 MG: 3 CAPSULE, GELATIN COATED ORAL at 08:27

## 2024-07-20 RX ADMIN — HYDROXYZINE HYDROCHLORIDE 25 MG: 25 TABLET ORAL at 08:27

## 2024-07-20 RX ADMIN — HYDROXYZINE HYDROCHLORIDE 25 MG: 25 TABLET ORAL at 21:30

## 2024-07-20 RX ADMIN — HYDROXYZINE HYDROCHLORIDE 25 MG: 25 TABLET ORAL at 17:02

## 2024-07-20 RX ADMIN — LAMOTRIGINE 50 MG: 25 TABLET ORAL at 08:27

## 2024-07-20 RX ADMIN — ATORVASTATIN CALCIUM 10 MG: 10 TABLET, FILM COATED ORAL at 08:27

## 2024-07-20 NOTE — NURSING NOTE
Deyvi has been quiet and keeping to himself most of the time. Brief interactions with select peer. Ate 100% of his meal. Took medication without difficulty. Continue to monitor. Precautions maintained.

## 2024-07-20 NOTE — NURSING NOTE
Deyvi has been visible intermittently in the milieu. Social with select peers. Able to make needs known to staff. Pleasant and cooperative upon approach. Denies anxiety, depression, voices and pain. Ate 100% of meals. Took medication without difficulty. Attended Community meeting and Coping Skills. Continue to monitor. Precautions maintained.

## 2024-07-20 NOTE — PLAN OF CARE
Problem: Alteration in Thoughts and Perception  Goal: Treatment Goal: Gain control of psychotic behaviors/thinking, reduce/eliminate presenting symptoms and demonstrate improved reality functioning upon discharge  Outcome: Progressing  Goal: Verbalize thoughts and feelings  Description: Interventions:  - Promote a nonjudgmental and trusting relationship with the patient through active listening and therapeutic communication  - Assess patient's level of functioning, behavior and potential for risk  - Engage patient in 1 on 1 interactions  - Encourage patient to express fears, feelings, frustrations, and discuss symptoms    - [place] patient to reality, help patient recognize reality-based thinking   - Administer medications as ordered and assess for potential side effects  - Provide the patient education related to the signs and symptoms of the illness and desired effects of prescribed medications  Outcome: Progressing  Goal: Refrain from acting on delusional thinking/internal stimuli  Description: Interventions:  - Monitor patient closely, per order   - Utilize least restrictive measures   - Set reasonable limits, give positive feedback for acceptable   - Administer medications as ordered and monitor of potential side effects  Outcome: Progressing  Goal: Agree to be compliant with medication regime, as prescribed and report medication side effects  Description: Interventions:  - Offer appropriate PRN medication and supervise ingestion; conduct AIMS, as needed   Outcome: Progressing  Goal: Attend and participate in unit activities, including therapeutic, recreational, and educational groups  Description: Interventions:  -Encourage Visitation and family involvement in care  Outcome: Progressing  Goal: Recognize dysfunctional thoughts, communicate reality-based thoughts at the time of discharge  Description: Interventions:  - Provide medication and psycho-education to assist patient in compliance and developing  insight into his/her illness   Outcome: Progressing  Goal: Complete daily ADLs, including personal hygiene independently, as able  Description: Interventions:  - Observe, teach, and assist patient with ADLS  - Monitor and promote a balance of rest/activity, with adequate nutrition and elimination   Outcome: Progressing     Problem: Ineffective Coping  Goal: Identifies ineffective coping skills  Outcome: Progressing  Goal: Participates in unit activities  Description: Interventions:  - Provide therapeutic environment   - Provide required programming   - Redirect inappropriate behaviors   Outcome: Progressing  Goal: Free from restraint events  Description: - Utilize least restrictive measures   - Provide behavioral interventions   - Redirect inappropriate behaviors   Outcome: Progressing     Problem: Risk for Self Injury/Neglect  Goal: Refrain from harming self  Description: Interventions:  - Monitor patient closely, per order  - Develop a trusting relationship  - Supervise medication ingestion, monitor effects and side effects   Outcome: Progressing  Goal: Attend and participate in unit activities, including therapeutic, recreational, and educational groups  Description: Interventions:  - Provide therapeutic and educational activities daily, encourage attendance and participation, and document same in the medical record  - Obtain collateral information, encourage visitation and family involvement in care   Outcome: Progressing  Goal: Recognize maladaptive responses and adopt new coping mechanisms  Outcome: Progressing     Problem: Depression  Goal: Treatment Goal: Demonstrate behavioral control of depressive symptoms, verbalize feelings of improved mood/affect, and adopt new coping skills prior to discharge  Outcome: Progressing  Goal: Verbalize thoughts and feelings  Description: Interventions:  - Assess and re-assess patient's level of risk   - Engage patient in 1:1 interactions, daily, for a minimum of 15 minutes    - Encourage patient to express feelings, fears, frustrations, hopes   Outcome: Progressing  Goal: Refrain from harming self  Description: Interventions:  - Monitor patient closely, per order   - Supervise medication ingestion, monitor effects and side effects   Outcome: Progressing  Goal: Refrain from isolation  Description: Interventions:  - Develop a trusting relationship   - Encourage socialization   Outcome: Progressing  Goal: Refrain from self-neglect  Outcome: Progressing     Problem: Anxiety  Goal: Anxiety is at manageable level  Description: Interventions:  - Assess and monitor patient's anxiety level.   - Monitor for signs and symptoms (heart palpitations, chest pain, shortness of breath, headaches, nausea, feeling jumpy, restlessness, irritable, apprehensive).   - Collaborate with interdisciplinary team and initiate plan and interventions as ordered.  - [place] patient to unit/surroundings  - Explain treatment plan  - Encourage participation in care  - Encourage verbalization of concerns/fears  - Identify coping mechanisms  - Assist in developing anxiety-reducing skills  - Administer/offer alternative therapies  - Limit or eliminate stimulants  Outcome: Progressing     Problem: Alteration in Orientation  Goal: Treatment Goal: Demonstrate a reduction of confusion and improved orientation to person, place, time and/or situation upon discharge, according to optimum baseline/ability  Outcome: Progressing  Goal: Interact with staff daily  Description: Interventions:  - Assess and re-assess patient's level of orientation  - Engage patient in 1 on 1 interactions, daily, for a minimum of 15 minutes   - Establish rapport/trust with patient   Outcome: Progressing  Goal: Complete daily ADLs, including personal hygiene independently, as able  Description: Interventions:  - Observe, teach, and assist patient with ADLS  Outcome: Progressing     Problem: SELF HARM/SUICIDALITY  Goal: Will have no self-injury during  hospital stay  Description: INTERVENTIONS:  - Q 15 MINUTES: Routine safety checks  - Q WAKING SHIFT & PRN: Assess risk to determine if routine checks are adequate to maintain patient safety  - Encourage patient to participate actively in care by formulating a plan to combat response to suicidal ideation, identify supports and resources  Outcome: Progressing     Problem: DEPRESSION  Goal: Will be euthymic at discharge  Description: INTERVENTIONS:  - Administer medication as ordered  - Provide emotional support via 1:1 interaction with staff  - Encourage involvement in milieu/groups/activities  - Monitor for social isolation  Outcome: Progressing     Problem: SLEEP DISTURBANCE  Goal: Will exhibit normal sleeping pattern  Description: Interventions:  -  Assess the patients sleep pattern, noting recent changes  - Administer medication as ordered  - Decrease environmental stimuli, including noise, as appropriate during the night  - Encourage the patient to actively participate in unit groups and or exercise during the day to enhance ability to achieve adequate sleep at night  - Assess the patient, in the morning, encouraging a description of sleep experience  Outcome: Progressing     Problem: INVOLUNTARY ADMIT  Goal: Will cooperate with staff recommendations and doctor's orders and will demonstrate appropriate behavior  Description: INTERVENTIONS:  - Treat underlying conditions and offer medication as ordered  - Educate regarding involuntary admission procedures and rules  - Utilize positive consistent limit setting strategies to support patient and staff safety  Outcome: Progressing     Problem: SELF CARE DEFICIT  Goal: Return ADL status to a safe level of function  Description: INTERVENTIONS:  - Administer medication as ordered  - Assess ADL deficits and provide assistive devices as needed  - Obtain PT/OT consults as needed  - Assist and instruct patient to increase activity and self care as tolerated  Outcome:  Progressing     Problem: DISCHARGE PLANNING - CARE MANAGEMENT  Goal: Discharge to post-acute care or home with appropriate resources  Description: INTERVENTIONS:  - Conduct assessment to determine patient/family and health care team treatment goals, and need for post-acute services based on payer coverage, community resources, and patient preferences, and barriers to discharge  - Address psychosocial, clinical, and financial barriers to discharge as identified in assessment in conjunction with the patient/family and health care team  - Arrange appropriate level of post-acute services according to patient’s   needs and preference and payer coverage in collaboration with the physician and health care team  - Communicate with and update the patient/family, physician, and health care team regarding progress on the discharge plan  - Arrange appropriate transportation to post-acute venues  Outcome: Progressing

## 2024-07-20 NOTE — NURSING NOTE
Received pt in bed at change of shift with eyes closed; chest movement noted.  Continues the same thus this far as per q 7 min room checks.   Will continue to monitor behavior, sleeping pattern and any medical issues that may arise.    0602:  Sleeping 7+ hrs thus this far

## 2024-07-20 NOTE — PROGRESS NOTES
Progress Note - Behavioral Health   Deyvi Cao 54 y.o. male MRN: [id number]  Unit/Bed#: [facility] 112-02 Encounter: [id number]      Subjective:     Documentation, nursing notes, medication reconciliation, labs, and vitals reviewed. Patient was seen for continuing care and reviewed with treatment team.  No acute events over the past 24 hours. Per nursing report, Deyvi remains guarded and scant. PRN medications administered: None No medication changes over the past 24 hours.     On evaluation today, Deyvi presents lying on top of a made bed.  He is pleasant, but appears guarded and suspicious with scant answers upon approach.  He denies any anxiety or depression this morning.  Deyvi denies SI/HI and is without plan or intent.  He denies any auditory or visual hallucinations.  Deyvi denies any side effects from the medications.  Sleep and appetite remain adequate.  Deyvi does not endorse any pain at this time.    Continues to tolerate current medications with no adverse effects.     Denies depression and does not appear anxious.   No self-harming/suicidal ideation, plan, or intent upon direct inquiry. No thoughts to harm others.  No agitation or aggression noted. Denies perceptual disturbances, with no delusional or paranoid content elicited. Does not appear overtly manic. Offers no further complaints.       Psychiatric ROS:  Behavior over the last 24 hours: unchanged  Sleep: normal  Appetite: normal  Medication side effects: No   ROS: no complaints, all other systems are negative      Mental Status Evaluation:    Appearance:  casually dressed, dressed appropriately, adequate grooming   Behavior:  cooperative, calm, bizarre, guarded   Speech:  normal rate and volume, scant   Mood:  euthymic   Affect:  flat   Thought Process:  coherent   Associations: intact associations   Thought Content:  no overt delusions   Perceptual Disturbances: no visual hallucinations, denies auditory hallucinations when asked, does  not appear responding to internal stimuli   Risk Potential: Suicidal ideation - None  Homicidal ideation - None  Potential for aggression - No   Sensorium:  oriented to person, place, time/date, and situation   Memory:  recent and remote memory grossly intact   Consciousness:  alert and awake   Attention/Concentration: attention span and concentration appear shorter than expected for age   Insight:  fair   Judgment: fair   Gait/Station: normal gait/station   Motor Activity: no abnormal movements       Vital signs in last 24 hours:    Temp:  [97.5 °F (36.4 °C)-98.1 °F (36.7 °C)] 97.5 °F (36.4 °C)  HR:  [88-99] 88  Resp:  [18] 18  BP: (112-141)/(70-80) 141/80    Laboratory results: I have personally reviewed all pertinent laboratory/tests results    Results from the past 24 hours: No results found for this or any previous visit (from the past 24 hour(s)).      Progress Toward Goals: progressing    Suicide/Homicide Risk Assessment:    Risk of Harm to Self:   Nursing Suicide Risk Assessment Last 24 hours: C-SSRS Risk (Since Last Contact)  Calculated C-SSRS Risk Score (Since Last Contact): No Risk Indicated    Risk of Harm to Others:  Nursing Homicide Risk Assessment: Violence Risk to Others: Denies within past 6 months    Assessment & Plan   Principal Problem:    Bipolar disorder with severe depression (HCC)  Active Problems:    Benign essential hypertension    Mixed hyperlipidemia    Allergic rhinitis due to allergen    Medical clearance for psychiatric admission    Tracey      Recommended Treatment:     Planned medication and treatment changes:    All current active medications have been reviewed  Encourage group therapy, milieu therapy and occupational therapy  Behavioral Health checks every 7 minutes  Continue with SLIM medical management as indicated  Disposition planning ongoing      Continue current medications:    Current Facility-Administered Medications   Medication Dose Route Frequency Provider Last Rate     acetaminophen  650 mg Oral Q6H PRN ALVINA Harley      acetaminophen  650 mg Oral Q4H PRN ALVINA Harley      acetaminophen  975 mg Oral Q6H PRN ALVINA Harley      aluminum-magnesium hydroxide-simethicone  30 mL Oral Q4H PRN ALVINA Harley      atorvastatin  10 mg Oral Daily ALVINA Lewis      benztropine  1 mg Intramuscular Q4H PRN Max 6/day ALVINA Harley      benztropine  1 mg Oral Q4H PRN Max 6/day ALVINA Harley      bisacodyl  10 mg Rectal Daily PRN ALVINA Harley      cariprazine  3 mg Oral Daily Martin Cardenas MD      Cholecalciferol  2,000 Units Oral Daily ALVINA Lewis      cyanocobalamin  1,000 mcg Oral Daily ALVINA Lewis      hydrOXYzine HCL  25 mg Oral Q6H PRN Max 4/day ALVINA Harley      hydrOXYzine HCL  25 mg Oral TID Martin Cardenas MD      hydrOXYzine HCL  50 mg Oral Q4H PRN Max 4/day ALVINA Hraley      Or    LORazepam  1 mg Intramuscular Q4H PRN ALVINA Harley      lamoTRIgine  50 mg Oral Daily Martin Cardenas MD      lisinopril  10 mg Oral Daily ALVINA Lewis      LORazepam  1 mg Oral Q4H PRN Max 6/day ALVINA Harley      Or    LORazepam  2 mg Intramuscular Q6H PRN Max 3/day ALVINA Harley      OLANZapine  10 mg Oral Q3H PRN Max 3/day ALVINA Harley      Or    OLANZapine  10 mg Intramuscular Q3H PRN Max 3/day ALVINA Harley      OLANZapine  5 mg Oral Q3H PRN Max 6/day ALVINA Harley      Or    OLANZapine  5 mg Intramuscular Q3H PRN Max 6/day ALVINA Harley      OLANZapine  2.5 mg Oral Q3H PRN Max 8/day ALVINA Harley      polyethylene glycol  17 g Oral Daily PRN ALVINA Harley      propranolol  10 mg Oral Q8H PRN ALVINA Harley      senna-docusate sodium  1 tablet Oral Daily PRN ALVINA Harley      sertraline  150 mg Oral HS Martin Cardenas MD           Risks / Benefits of Treatment:    Risks, benefits, and possible side effects of medications explained to  patient and patient verbalizes understanding and agreement for treatment.    Counseling / Coordination of Care:    Patient's progress discussed with staff in treatment team meeting.  Medications, treatment progress and treatment plan reviewed with patient.    Note Share    This note was not shared with the patient due to reasonable likelihood of causing patient harm    ALVINA Stanford 07/20/24

## 2024-07-21 PROCEDURE — 99232 SBSQ HOSP IP/OBS MODERATE 35: CPT

## 2024-07-21 RX ADMIN — CARIPRAZINE 3 MG: 3 CAPSULE, GELATIN COATED ORAL at 08:23

## 2024-07-21 RX ADMIN — ATORVASTATIN CALCIUM 10 MG: 10 TABLET, FILM COATED ORAL at 08:22

## 2024-07-21 RX ADMIN — HYDROXYZINE HYDROCHLORIDE 25 MG: 25 TABLET ORAL at 16:50

## 2024-07-21 RX ADMIN — SERTRALINE HYDROCHLORIDE 150 MG: 100 TABLET ORAL at 21:50

## 2024-07-21 RX ADMIN — LAMOTRIGINE 50 MG: 25 TABLET ORAL at 08:22

## 2024-07-21 RX ADMIN — CYANOCOBALAMIN TAB 1000 MCG 1000 MCG: 1000 TAB at 08:22

## 2024-07-21 RX ADMIN — HYDROXYZINE HYDROCHLORIDE 25 MG: 25 TABLET ORAL at 21:50

## 2024-07-21 RX ADMIN — CHOLECALCIFEROL TAB 25 MCG (1000 UNIT) 2000 UNITS: 25 TAB at 08:23

## 2024-07-21 RX ADMIN — LISINOPRIL 10 MG: 10 TABLET ORAL at 08:22

## 2024-07-21 RX ADMIN — HYDROXYZINE HYDROCHLORIDE 25 MG: 25 TABLET ORAL at 08:22

## 2024-07-21 NOTE — PLAN OF CARE
Problem: Alteration in Thoughts and Perception  Goal: Treatment Goal: Gain control of psychotic behaviors/thinking, reduce/eliminate presenting symptoms and demonstrate improved reality functioning upon discharge  Outcome: Progressing  Goal: Verbalize thoughts and feelings  Description: Interventions:  - Promote a nonjudgmental and trusting relationship with the patient through active listening and therapeutic communication  - Assess patient's level of functioning, behavior and potential for risk  - Engage patient in 1 on 1 interactions  - Encourage patient to express fears, feelings, frustrations, and discuss symptoms    - [place] patient to reality, help patient recognize reality-based thinking   - Administer medications as ordered and assess for potential side effects  - Provide the patient education related to the signs and symptoms of the illness and desired effects of prescribed medications  Outcome: Progressing  Goal: Refrain from acting on delusional thinking/internal stimuli  Description: Interventions:  - Monitor patient closely, per order   - Utilize least restrictive measures   - Set reasonable limits, give positive feedback for acceptable   - Administer medications as ordered and monitor of potential side effects  Outcome: Progressing  Goal: Agree to be compliant with medication regime, as prescribed and report medication side effects  Description: Interventions:  - Offer appropriate PRN medication and supervise ingestion; conduct AIMS, as needed   Outcome: Progressing  Goal: Attend and participate in unit activities, including therapeutic, recreational, and educational groups  Description: Interventions:  -Encourage Visitation and family involvement in care  Outcome: Progressing  Goal: Recognize dysfunctional thoughts, communicate reality-based thoughts at the time of discharge  Description: Interventions:  - Provide medication and psycho-education to assist patient in compliance and developing  insight into his/her illness   Outcome: Progressing  Goal: Complete daily ADLs, including personal hygiene independently, as able  Description: Interventions:  - Observe, teach, and assist patient with ADLS  - Monitor and promote a balance of rest/activity, with adequate nutrition and elimination   Outcome: Progressing     Problem: Ineffective Coping  Goal: Demonstrates healthy coping skills  Outcome: Progressing  Goal: Participates in unit activities  Description: Interventions:  - Provide therapeutic environment   - Provide required programming   - Redirect inappropriate behaviors   Outcome: Progressing  Goal: Understands least restrictive measures  Description: Interventions:  - Utilize least restrictive behavior  Outcome: Progressing

## 2024-07-21 NOTE — NURSING NOTE
Deyvi went out on the deck for [employer]. He played cards with female peer. Pleasant and cooperative upon approach. Took medication without difficulty. Ate 100% of his meal. No issues or behaviors. Continue to monitor. Precautions maintained.

## 2024-07-21 NOTE — NURSING NOTE
. Patient cooperative and medication compliant this shift. Behaviors controlled and appropriate. Offered no complaints. No prn medication required or requested this evening. Uneventful.

## 2024-07-22 PROCEDURE — 99232 SBSQ HOSP IP/OBS MODERATE 35: CPT | Performed by: PSYCHIATRY & NEUROLOGY

## 2024-07-22 RX ADMIN — HYDROXYZINE HYDROCHLORIDE 25 MG: 25 TABLET ORAL at 16:42

## 2024-07-22 RX ADMIN — LAMOTRIGINE 50 MG: 25 TABLET ORAL at 08:24

## 2024-07-22 RX ADMIN — CHOLECALCIFEROL TAB 25 MCG (1000 UNIT) 2000 UNITS: 25 TAB at 08:24

## 2024-07-22 RX ADMIN — HYDROXYZINE HYDROCHLORIDE 25 MG: 25 TABLET ORAL at 21:12

## 2024-07-22 RX ADMIN — SERTRALINE HYDROCHLORIDE 150 MG: 100 TABLET ORAL at 21:12

## 2024-07-22 RX ADMIN — CYANOCOBALAMIN TAB 1000 MCG 1000 MCG: 1000 TAB at 08:24

## 2024-07-22 RX ADMIN — ATORVASTATIN CALCIUM 10 MG: 10 TABLET, FILM COATED ORAL at 08:24

## 2024-07-22 RX ADMIN — HYDROXYZINE HYDROCHLORIDE 25 MG: 25 TABLET ORAL at 08:24

## 2024-07-22 RX ADMIN — LISINOPRIL 10 MG: 10 TABLET ORAL at 08:24

## 2024-07-22 RX ADMIN — CARIPRAZINE 3 MG: 3 CAPSULE, GELATIN COATED ORAL at 08:23

## 2024-07-22 NOTE — PROGRESS NOTES
"Progress Note - Behavioral Health   Deyvi Cao 54 y.o. male MRN: [id number]  Unit/Bed#: [facility] 112-02 Encounter: [id number]  Code Status: Level 1 - Full Code    Assessment & Plan   Principal Problem:    Bipolar disorder with severe depression (HCC)  Active Problems:    Benign essential hypertension    Mixed hyperlipidemia    Allergic rhinitis due to allergen    Medical clearance for psychiatric admission    Rosacea    Recommended Treatment:     Treatment plan, treatment progress and medication changes were reviewed with Nursing Staff, Pharmacy Service and Case Management in Treatment Team:  1.Continue with group therapy, milieu therapy and occupational therapy   2.Behavioral Health checks every 7 minutes   3.Continue frequent safety checks and vitals per unit protocol  4.Continue with SLIM medical management as indicated  5.Continue with current medication regimen for symptom management: Vraylar 3mg PO Daily, Atarax 25mg PO TID, Lamictal 50mg PO Daily, Zoloft 150mg PO QHS, Lamictal 50mg PO Daily    6.Disposition Planning: Discharge planning and efforts remain ongoing - Awaiting group home placement    Subjective:    Patient was seen today for continuation of care, records reviewed and patient was discussed with the morning case review team.    Deyvi was seen today for psychiatric follow-up.  On assessment today, Deyvi was found in his room.  He is calm and cooperative.  Seems slightly more bright and interactive as well.  He continues to progress slowly.  I asked him how the feeling of discharge sounds to him and he said \"it sounds nice\".  Deyvi reports adequate daytime energy and denies any difficulties with initiating or staying asleep.  Oral appetite and hydration is adequate.   We reviewed once more the specific as-needed medications they can use going forward if they experience any insomnia or destabilization of their mood, they understood and were agreeable. Milieu visibility and group attendance " encouraged to promote an active participation in treatment.    Deyvi denies acute suicidal/self-harm ideation/intent/plan upon direct inquiry at this time. Deyvi is able to contract for safety while on the unit and would feel comfortable seeking staff support should suicidal symptoms or urges appear or worsen. Deyvi remains behaviorally appropriate, no agitation or aggression noted on exam or in report. Deyvi also denies HI/AH/VH, and does not appear overtly manic.  Patient does not verbalize any experiences that can be categorized as paranoid, persecutory, bizarre, or somatic delusions. Deyvi remains adherent to his current psychotropic medication regimen and denies any side effects from medications, as well as none noted on exam.    Group Attendance: 6 / 9  Treatment Team: This Thursday  Psychiatric PRN's Needed: None    Review of Systems:  Behavior over the last 24 hours: Slowly improving  Sleep: sleeping okay throughout the night  Appetite: adequate  Medication side effects: none reported  ROS:no complaints, all other systems are negative    Objective:    Vitals:  Vitals:    07/22/24 0748   BP: 127/65   Pulse: 91   Resp: 18   Temp: 97.6 °F (36.4 °C)   SpO2: 96%     Laboratory Results:  I have personally reviewed all pertinent laboratory/tests results.  Most Recent Labs:   Lab Results   Component Value Date    WBC 7.39 06/26/2024    RBC 4.70 06/26/2024    HGB 15.2 06/26/2024    HCT 45.5 06/26/2024     06/26/2024    RDW 12.9 06/26/2024    NEUTROABS 4.63 06/26/2024    SODIUM 137 06/26/2024    K 4.1 06/26/2024     06/26/2024    CO2 26 06/26/2024    BUN 17 06/26/2024    CREATININE 0.63 06/26/2024    GLUC 98 06/26/2024    GLUF 98 06/26/2024    CALCIUM 9.6 06/26/2024    AST 25 06/26/2024    ALT 45 06/26/2024    ALKPHOS 114 (H) 06/26/2024    TP 7.0 06/26/2024    ALB 4.4 06/26/2024    TBILI 0.44 06/26/2024    CHOLESTEROL 177 06/26/2024    HDL 52 06/26/2024    TRIG 73 06/26/2024    LDLCALC 110 (H)  06/26/2024    NONHDLC 125 06/26/2024    LITHIUM 0.4 (L) 01/28/2021    WVJ5OZCIDDAX 2.636 06/26/2024    HGBA1C 5.2 11/22/2023     11/22/2023     Mental Status Evaluation:  Appearance:  dressed appropriately, adequate grooming   Behavior:  guarded   Speech:  scant, soft   Mood:  depressed, anxious   Affect:  blunted   Thought Process:  poverty of thought   Associations: intact associations   Thought Content:  no overt delusions   Perceptual Disturbances: no auditory hallucinations, no visual hallucinations, denies when asked, does not appear responding to internal stimuli   Risk Potential: Suicidal ideation - None at present, contracts for safety on the unit, would talk to staff if not feeling safe on the unit  Homicidal ideation - None at present  Potential for aggression - Not at present   Sensorium:  oriented to person, place, and time/date   Memory:  recent memory intact   Consciousness:  alert and awake   Attention/Concentration: attention span and concentration appear shorter than expected for age   Insight:  limited   Judgment: limited   Gait/Station: normal gait/station, normal balance   Motor Activity: no abnormal movements     Progress Toward Goals: no significant improvement.  Deyvi continues to require inpatient psychiatric hospitalization for continued medication management and titration to optimize symptom reduction, improve sleep hygiene, and demonstrate adequate self-care.     Suicide/Homicide Risk Assessment:  Risk of Harm to Self:   Nursing Suicide Risk Assessment Last 24 hours: C-SSRS Risk (Since Last Contact)  Calculated C-SSRS Risk Score (Since Last Contact): No Risk Indicated    Risk of Harm to Others:  Nursing Homicide Risk Assessment: Violence Risk to Others: Denies within past 6 months    Behavioral Health Medications: all current active meds have been reviewed and continue current psychiatric medications.  Current Facility-Administered Medications   Medication Dose Route Frequency  Provider Last Rate    acetaminophen  650 mg Oral Q6H PRN ALVINA Harley      acetaminophen  650 mg Oral Q4H PRN ALVINA Harley      acetaminophen  975 mg Oral Q6H PRN ALVINA Harley      aluminum-magnesium hydroxide-simethicone  30 mL Oral Q4H PRN ALVINA Harley      atorvastatin  10 mg Oral Daily ALVINA Lewis      benztropine  1 mg Intramuscular Q4H PRN Max 6/day ALVINA Harley      benztropine  1 mg Oral Q4H PRN Max 6/day ALVINA Harley      bisacodyl  10 mg Rectal Daily PRN ALVINA Harley      cariprazine  3 mg Oral Daily Martin Cardenas MD      Cholecalciferol  2,000 Units Oral Daily ALVINA Lewis      cyanocobalamin  1,000 mcg Oral Daily ALVINA Lewis      hydrOXYzine HCL  25 mg Oral Q6H PRN Max 4/day ALVINA Harley      hydrOXYzine HCL  25 mg Oral TID Martin Cardenas MD      hydrOXYzine HCL  50 mg Oral Q4H PRN Max 4/day ALVINA Harley      Or    LORazepam  1 mg Intramuscular Q4H PRN ALVINA Harley      lamoTRIgine  50 mg Oral Daily Martin Cardenas MD      lisinopril  10 mg Oral Daily ALVINA Lewis      LORazepam  1 mg Oral Q4H PRN Max 6/day ALVINA Harley      Or    LORazepam  2 mg Intramuscular Q6H PRN Max 3/day ALVINA Harley      OLANZapine  10 mg Oral Q3H PRN Max 3/day ALVINA Harley      Or    OLANZapine  10 mg Intramuscular Q3H PRN Max 3/day ALVINA Harley      OLANZapine  5 mg Oral Q3H PRN Max 6/day ALVINA Harley      Or    OLANZapine  5 mg Intramuscular Q3H PRN Max 6/day ALVINA Harley      OLANZapine  2.5 mg Oral Q3H PRN Max 8/day ALVINA Harley      polyethylene glycol  17 g Oral Daily PRN ALVINA Harley      propranolol  10 mg Oral Q8H PRN ALVINA aHrley      senna-docusate sodium  1 tablet Oral Daily PRN ALVINA Harley      sertraline  150 mg Oral HS Martin Cardenas MD       Risks / Benefits of Treatment:  Risks, benefits, and possible side effects of medications  explained to patient. Patient has limited understanding of risks and benefits of treatment at this time, but agrees to take medications as prescribed.    Counseling / Coordination of Care:  Total floor/unit time spent today 25 minutes. Greater than 50% of total time was spent with the patient and / or family counseling and / or coordination of care. A description of the counseling / coordination of care:   Patient's progress discussed with staff in treatment team meeting.  Medications, treatment progress and treatment plan reviewed with patient.   Educated on importance of medication and treatment compliance.  Reassurance and supportive therapy provided.   Encouraged participation in milieu and group therapy on the unit.    ALVINA Harley 07/22/24

## 2024-07-22 NOTE — NURSING NOTE
----- Message from Zenaida Spear MD sent at 4/15/2021  1:07 PM CDT -----  Regarding: FW:  Please notify family of negative results.  ----- Message -----  From: Lab, Background User  Sent: 4/15/2021  11:46 AM CDT  To: Zenaida Spear MD     Pt is visible on the unit but isolative to self. Consumed 100% of all meals today. Took medications without incidence. Pt is polite and cooperative. Attended 4/8 groups. Denies all psych symptoms. No behavioral issues. Pt offers no complaints. VSS. Continuous safety checks maintained.

## 2024-07-22 NOTE — PLAN OF CARE
Problem: Ineffective Coping  Goal: Identifies ineffective coping skills  Outcome: Progressing  Goal: Identifies healthy coping skills  Outcome: Progressing  Goal: Demonstrates healthy coping skills  Outcome: Progressing  Goal: Participates in unit activities  Description: Interventions:  - Provide therapeutic environment   - Provide required programming   - Redirect inappropriate behaviors   Outcome: Progressing   Attended 22/44 of the groups offered last week.

## 2024-07-22 NOTE — SOCIAL WORK
STEFAN emailed Sydnie with [facility] inquiring about status of pt's [facility] referral.

## 2024-07-22 NOTE — PLAN OF CARE
Problem: Alteration in Thoughts and Perception  Goal: Treatment Goal: Gain control of psychotic behaviors/thinking, reduce/eliminate presenting symptoms and demonstrate improved reality functioning upon discharge  Outcome: Progressing  Goal: Verbalize thoughts and feelings  Description: Interventions:  - Promote a nonjudgmental and trusting relationship with the patient through active listening and therapeutic communication  - Assess patient's level of functioning, behavior and potential for risk  - Engage patient in 1 on 1 interactions  - Encourage patient to express fears, feelings, frustrations, and discuss symptoms    - [place] patient to reality, help patient recognize reality-based thinking   - Administer medications as ordered and assess for potential side effects  - Provide the patient education related to the signs and symptoms of the illness and desired effects of prescribed medications  Outcome: Progressing  Goal: Refrain from acting on delusional thinking/internal stimuli  Description: Interventions:  - Monitor patient closely, per order   - Utilize least restrictive measures   - Set reasonable limits, give positive feedback for acceptable   - Administer medications as ordered and monitor of potential side effects  Outcome: Progressing  Goal: Agree to be compliant with medication regime, as prescribed and report medication side effects  Description: Interventions:  - Offer appropriate PRN medication and supervise ingestion; conduct AIMS, as needed   Outcome: Progressing  Goal: Attend and participate in unit activities, including therapeutic, recreational, and educational groups  Description: Interventions:  -Encourage Visitation and family involvement in care  Outcome: Progressing     Problem: Alteration in Orientation  Goal: Interact with staff daily  Description: Interventions:  - Assess and re-assess patient's level of orientation  - Engage patient in 1 on 1 interactions, daily, for a minimum of 15  minutes   - Establish rapport/trust with patient   Outcome: Progressing  Goal: Express concerns related to confused thinking related to:  Description: Interventions:  - Encourage patient to express feelings, fears, frustrations, hopes  - Assign consistent caregivers   - [place]/re-orient patient as needed  - Allow comfort items, as appropriate  - Provide visual cues, signs, etc.   Outcome: Progressing  Goal: Allow medical examinations, as recommended  Description: Interventions:  - Provide physical/neurological exams and/or referrals, per provider   Outcome: Progressing  Goal: Cooperate with recommended testing/procedures  Description: Interventions:  - Determine need for ancillary testing  - Observe for mental status changes  - Implement falls/precaution protocol   Outcome: Progressing     Problem: TALIB  Goal: Will exhibit normal sleep and speech and no impulsivity  Description: INTERVENTIONS:  - Administer medication as ordered  - Set limits on impulsive behavior  - Make attempts to decrease external stimuli as possible  Outcome: Progressing     Problem: PSYCHOSIS  Goal: Will report no hallucinations or delusions  Description: Interventions:  - Administer medication as  ordered  - Every waking shifts and PRN assess for the presence of hallucinations and or delusions  - Assist with reality testing to support increasing orientation  - Assess if patient's hallucinations or delusions are encouraging self-harm or harm to others and intervene as appropriate  Outcome: Progressing     Problem: SLEEP DISTURBANCE  Goal: Will exhibit normal sleeping pattern  Description: Interventions:  -  Assess the patients sleep pattern, noting recent changes  - Administer medication as ordered  - Decrease environmental stimuli, including noise, as appropriate during the night  - Encourage the patient to actively participate in unit groups and or exercise during the day to enhance ability to achieve adequate sleep at night  - Assess the  patient, in the morning, encouraging a description of sleep experience  Outcome: Progressing     Problem: INVOLUNTARY ADMIT  Goal: Will cooperate with staff recommendations and doctor's orders and will demonstrate appropriate behavior  Description: INTERVENTIONS:  - Treat underlying conditions and offer medication as ordered  - Educate regarding involuntary admission procedures and rules  - Utilize positive consistent limit setting strategies to support patient and staff safety  Outcome: Progressing     Problem: SELF CARE DEFICIT  Goal: Return ADL status to a safe level of function  Description: INTERVENTIONS:  - Administer medication as ordered  - Assess ADL deficits and provide assistive devices as needed  - Obtain PT/OT consults as needed  - Assist and instruct patient to increase activity and self care as tolerated  Outcome: Progressing

## 2024-07-22 NOTE — QUICK NOTE
Was contacted by nursing late yesterday afternoon that this patient has had a 4.7 pound weight gain in 2 weeks.  I think psych needs to look at his medications that he is on.  And they are the primary team if they feel that he needs a nutrition consult they can make that decision.  Otherwise I think the patient needs to be educated on better food choices to make during snack time.  There is no medical indications that I can see for his weight gain.

## 2024-07-22 NOTE — PROGRESS NOTES
07/22/24 0736   Team Meeting   Meeting Type Daily Rounds   Team Members Present   Team Members Present Physician;Nurse;;Other (Discipline and Name)   Patient/Family Present   Patient Present No   Patient's Family Present No     In attendance:  Dr. Alex Thomas, MD Dr. Jordan Holter, DO Melva Knutson, ALVINA Haywood, RN  Judi Garcia, hospitalsW  Mer Sales, hospitalsW  Gris Arizmendi M.S.    Groups: 6/9    Pt is quiet, generally keeps to self but has been observed interacting appropriately with specific female peer on the unit. Pt generally isolative but denies symptoms. Pt likely at baseline. ACT referral has been submitted; pt was previously referred for [facility].

## 2024-07-23 PROCEDURE — 99232 SBSQ HOSP IP/OBS MODERATE 35: CPT | Performed by: PSYCHIATRY & NEUROLOGY

## 2024-07-23 RX ADMIN — LAMOTRIGINE 50 MG: 25 TABLET ORAL at 08:26

## 2024-07-23 RX ADMIN — HYDROXYZINE HYDROCHLORIDE 25 MG: 25 TABLET ORAL at 08:26

## 2024-07-23 RX ADMIN — CARIPRAZINE 3 MG: 3 CAPSULE, GELATIN COATED ORAL at 08:26

## 2024-07-23 RX ADMIN — HYDROXYZINE HYDROCHLORIDE 25 MG: 25 TABLET ORAL at 17:08

## 2024-07-23 RX ADMIN — HYDROXYZINE HYDROCHLORIDE 25 MG: 25 TABLET ORAL at 21:30

## 2024-07-23 RX ADMIN — ATORVASTATIN CALCIUM 10 MG: 10 TABLET, FILM COATED ORAL at 08:26

## 2024-07-23 RX ADMIN — CHOLECALCIFEROL TAB 25 MCG (1000 UNIT) 2000 UNITS: 25 TAB at 08:27

## 2024-07-23 RX ADMIN — CYANOCOBALAMIN TAB 1000 MCG 1000 MCG: 1000 TAB at 08:27

## 2024-07-23 RX ADMIN — LISINOPRIL 10 MG: 10 TABLET ORAL at 08:25

## 2024-07-23 RX ADMIN — SERTRALINE HYDROCHLORIDE 150 MG: 100 TABLET ORAL at 21:30

## 2024-07-23 NOTE — NURSING NOTE
Pt is calm, cooperative and visible on unit. Pt denies depression and anxiety; denies SI/HI/AH/VH. Pt has minimal interaction with peers and reports sleeping well. Pt is able to make needs known and is medication compliant. Will maintain q7 min checks.

## 2024-07-23 NOTE — PROGRESS NOTES
07/23/24 0755   Team Meeting   Meeting Type Daily Rounds   Team Members Present   Team Members Present Physician;Nurse;;Other (Discipline and Name)   Patient/Family Present   Patient Present No   Patient's Family Present No     In attendance:  Dr. Alex Thomas, MD Dr. Jordan Holter, DO Mahamed Haywood, RN  Judi Garcia, [facility]  Gris Arizmendi M.S.    Groups: 4/8    Pt visible, quiet, no bx issues noted. Pt on [facility] waitlist. Pt selectively social with peer on the unit.

## 2024-07-23 NOTE — SOCIAL WORK
SW met 1:1 with pt. Pt reports no concerns other than occasional anxiety and mild depression.   Pt reports he's been adjusting well and reported just wanting to rest after lunch. Discussed limitations to social battery and sometimes needing to reset when generally an introverted person.   Pt reports he will need to pay some bills prior to the end of the month. SW and pt set up additional time to meet this week to ensure his bills get paid on time.

## 2024-07-23 NOTE — PROGRESS NOTES
Progress Note - Behavioral Health   Deyvi Cao 54 y.o. male MRN: [id number]  Unit/Bed#: [facility] 112-02 Encounter: [id number]  Code Status: Level 1 - Full Code    Assessment & Plan   Principal Problem:    Bipolar disorder with severe depression (HCC)  Active Problems:    Benign essential hypertension    Mixed hyperlipidemia    Allergic rhinitis due to allergen    Medical clearance for psychiatric admission    Rosacea    Recommended Treatment:     Treatment plan, treatment progress and medication changes were reviewed with Nursing Staff, Pharmacy Service and Case Management in Treatment Team:  1.Continue with group therapy, milieu therapy and occupational therapy   2.Behavioral Health checks every 7 minutes   3.Continue frequent safety checks and vitals per unit protocol  4.Continue with SLIM medical management as indicated  5.Continue with current medication regimen for symptom management: Vraylar 3mg PO Daily, Atarax 25mg PO TID, Lamictal 50mg PO Daily, Zoloft 150mg PO QHS, Lamictal 50mg PO Daily    6.Disposition Planning: Discharge planning and efforts remain ongoing - Awaiting group home placement    Subjective:    Patient was seen today for continuation of care, records reviewed and patient was discussed with the morning case review team.    Deyvi was seen today for psychiatric follow-up.  On assessment today, Deyvi was found in his bed.  Still seems constricted in his affect, but does show some small forms of brightness.  Does report feeling better than admission.  We spoke about a personal care home.  Deyvi reports adequate daytime energy and denies any difficulties with initiating or staying asleep.  Oral appetite and hydration is adequate.   We reviewed once more the specific as-needed medications they can use going forward if they experience any insomnia or destabilization of their mood, they understood and were agreeable. Milieu visibility and group attendance encouraged to promote an active  participation in treatment.    Deyvi denies acute suicidal/self-harm ideation/intent/plan upon direct inquiry at this time. Deyvi is able to contract for safety while on the unit and would feel comfortable seeking staff support should suicidal symptoms or urges appear or worsen. Deyvi remains behaviorally appropriate, no agitation or aggression noted on exam or in report. Deyvi also denies HI/AH/VH, and does not appear overtly manic.  Patient does not verbalize any experiences that can be categorized as paranoid, persecutory, bizarre, or somatic delusions. Dyevi remains adherent to his current psychotropic medication regimen and denies any side effects from medications, as well as none noted on exam.    Group Attendance: 4 / 8  Treatment Team: This Thursday  Psychiatric PRN's Needed: None    Review of Systems:  Behavior over the last 24 hours: Slowly improving  Sleep: sleeping okay throughout the night  Appetite: adequate  Medication side effects: none reported  ROS:no complaints, all other systems are negative    Objective:    Vitals:  Vitals:    07/23/24 0748   BP: 135/83   Pulse: 84   Resp: 18   Temp: 97.5 °F (36.4 °C)   SpO2: 97%     Laboratory Results:  I have personally reviewed all pertinent laboratory/tests results.  Most Recent Labs:   Lab Results   Component Value Date    WBC 7.39 06/26/2024    RBC 4.70 06/26/2024    HGB 15.2 06/26/2024    HCT 45.5 06/26/2024     06/26/2024    RDW 12.9 06/26/2024    NEUTROABS 4.63 06/26/2024    SODIUM 137 06/26/2024    K 4.1 06/26/2024     06/26/2024    CO2 26 06/26/2024    BUN 17 06/26/2024    CREATININE 0.63 06/26/2024    GLUC 98 06/26/2024    GLUF 98 06/26/2024    CALCIUM 9.6 06/26/2024    AST 25 06/26/2024    ALT 45 06/26/2024    ALKPHOS 114 (H) 06/26/2024    TP 7.0 06/26/2024    ALB 4.4 06/26/2024    TBILI 0.44 06/26/2024    CHOLESTEROL 177 06/26/2024    HDL 52 06/26/2024    TRIG 73 06/26/2024    LDLCALC 110 (H) 06/26/2024    NONHDLC 125  06/26/2024    LITHIUM 0.4 (L) 01/28/2021    YMY4IOBAWPLO 2.636 06/26/2024    HGBA1C 5.2 11/22/2023     11/22/2023     Mental Status Evaluation:  Appearance:  dressed appropriately   Behavior:  guarded   Speech:  scant, soft   Mood:  depressed, anxious   Affect:  blunted   Thought Process:  poverty of thought   Associations: intact associations   Thought Content:  no overt delusions   Perceptual Disturbances: no auditory hallucinations, no visual hallucinations, denies when asked, does not appear responding to internal stimuli   Risk Potential: Suicidal ideation - None at present, contracts for safety on the unit, would talk to staff if not feeling safe on the unit  Homicidal ideation - None at present  Potential for aggression - Not at present   Sensorium:  oriented to person, place, and time/date   Memory:  recent memory intact   Consciousness:  alert and awake   Attention/Concentration: attention span and concentration appear shorter than expected for age   Insight:  limited   Judgment: limited   Gait/Station: normal gait/station, normal balance   Motor Activity: no abnormal movements     Progress Toward Goals: no significant improvement.  Deyvi continues to require inpatient psychiatric hospitalization for continued medication management and titration to optimize symptom reduction, improve sleep hygiene, and demonstrate adequate self-care.     Suicide/Homicide Risk Assessment:  Risk of Harm to Self:   Nursing Suicide Risk Assessment Last 24 hours: C-SSRS Risk (Since Last Contact)  Calculated C-SSRS Risk Score (Since Last Contact): No Risk Indicated    Risk of Harm to Others:  Nursing Homicide Risk Assessment: Violence Risk to Others: Denies within past 6 months    Behavioral Health Medications: all current active meds have been reviewed and continue current psychiatric medications.  Current Facility-Administered Medications   Medication Dose Route Frequency Provider Last Rate    acetaminophen  650 mg  Oral Q6H PRN ALVINA Harley      acetaminophen  650 mg Oral Q4H PRN ALVINA Harley      acetaminophen  975 mg Oral Q6H PRN ALVINA Harley      aluminum-magnesium hydroxide-simethicone  30 mL Oral Q4H PRN ALVINA Harley      atorvastatin  10 mg Oral Daily Sarygurinder LinkALVINA Thompson      benztropine  1 mg Intramuscular Q4H PRN Max 6/day ALVINA Harley      benztropine  1 mg Oral Q4H PRN Max 6/day ALVINA Harley      bisacodyl  10 mg Rectal Daily PRN ALVINA Harley      cariprazine  3 mg Oral Daily Martin Cardenas MD      Cholecalciferol  2,000 Units Oral Daily Sary ALVINA Gupta      cyanocobalamin  1,000 mcg Oral Daily ALVINA Lewis      hydrOXYzine HCL  25 mg Oral Q6H PRN Max 4/day ALVINA Harley      hydrOXYzine HCL  25 mg Oral TID Martin Cardenas MD      hydrOXYzine HCL  50 mg Oral Q4H PRN Max 4/day ALVINA Harley      Or    LORazepam  1 mg Intramuscular Q4H PRN ALVINA Harley      lamoTRIgine  50 mg Oral Daily Martin Cardenas MD      lisinopril  10 mg Oral Daily ALVINA Lewis      LORazepam  1 mg Oral Q4H PRN Max 6/day ALVINA Harley      Or    LORazepam  2 mg Intramuscular Q6H PRN Max 3/day ALVINA Harley      OLANZapine  10 mg Oral Q3H PRN Max 3/day ALVINA Harley      Or    OLANZapine  10 mg Intramuscular Q3H PRN Max 3/day ALVINA Harley      OLANZapine  5 mg Oral Q3H PRN Max 6/day ALVINA Harley      Or    OLANZapine  5 mg Intramuscular Q3H PRN Max 6/day ALVINA Harley      OLANZapine  2.5 mg Oral Q3H PRN Max 8/day ALVINA Harley      polyethylene glycol  17 g Oral Daily PRN ALVINA Harley      propranolol  10 mg Oral Q8H PRN ALVINA Harley      senna-docusate sodium  1 tablet Oral Daily PRN ALVINA Harley      sertraline  150 mg Oral HS Martin Cardenas MD       Risks / Benefits of Treatment:  Risks, benefits, and possible side effects of medications explained to patient. Patient has limited  understanding of risks and benefits of treatment at this time, but agrees to take medications as prescribed.    Counseling / Coordination of Care:  Total floor/unit time spent today 25 minutes. Greater than 50% of total time was spent with the patient and / or family counseling and / or coordination of care. A description of the counseling / coordination of care:   Patient's progress discussed with staff in treatment team meeting.  Medications, treatment progress and treatment plan reviewed with patient.   Educated on importance of medication and treatment compliance.  Reassurance and supportive therapy provided.   Encouraged participation in milieu and group therapy on the unit.    ALVINA Harley 07/23/24

## 2024-07-23 NOTE — NURSING NOTE
Received pt in bed at change of shift with eyes closed; chest movement noted.  Continues the same thus this far as per q 7 min room checks.   Will continue to monitor behavior, sleeping pattern and any medical issues that may arise.    0548: Sleeping 7+ hrs thus this far

## 2024-07-23 NOTE — PLAN OF CARE
Problem: Alteration in Thoughts and Perception  Goal: Treatment Goal: Gain control of psychotic behaviors/thinking, reduce/eliminate presenting symptoms and demonstrate improved reality functioning upon discharge  Outcome: Progressing  Goal: Refrain from acting on delusional thinking/internal stimuli  Description: Interventions:  - Monitor patient closely, per order   - Utilize least restrictive measures   - Set reasonable limits, give positive feedback for acceptable   - Administer medications as ordered and monitor of potential side effects  Outcome: Progressing  Goal: Agree to be compliant with medication regime, as prescribed and report medication side effects  Description: Interventions:  - Offer appropriate PRN medication and supervise ingestion; conduct AIMS, as needed   Outcome: Progressing  Goal: Complete daily ADLs, including personal hygiene independently, as able  Description: Interventions:  - Observe, teach, and assist patient with ADLS  - Monitor and promote a balance of rest/activity, with adequate nutrition and elimination   Outcome: Progressing     Problem: Ineffective Coping  Goal: Participates in unit activities  Description: Interventions:  - Provide therapeutic environment   - Provide required programming   - Redirect inappropriate behaviors   Outcome: Progressing  Goal: Patient/Family participate in treatment and DC plans  Description: Interventions:  - Provide therapeutic environment  Outcome: Progressing  Goal: Free from restraint events  Description: - Utilize least restrictive measures   - Provide behavioral interventions   - Redirect inappropriate behaviors   Outcome: Progressing     Problem: Risk for Self Injury/Neglect  Goal: Treatment Goal: Remain safe during length of stay, learn and adopt new coping skills, and be free of self-injurious ideation, impulses and acts at the time of discharge  Outcome: Progressing  Goal: Refrain from harming self  Description: Interventions:  - Monitor  patient closely, per order  - Develop a trusting relationship  - Supervise medication ingestion, monitor effects and side effects   Outcome: Progressing  Goal: Attend and participate in unit activities, including therapeutic, recreational, and educational groups  Description: Interventions:  - Provide therapeutic and educational activities daily, encourage attendance and participation, and document same in the medical record  - Obtain collateral information, encourage visitation and family involvement in care   Outcome: Progressing  Goal: Complete daily ADLs, including personal hygiene independently, as able  Description: Interventions:  - Observe, teach, and assist patient with ADLS  - Monitor and promote a balance of rest/activity, with adequate nutrition and elimination  Outcome: Progressing     Problem: Depression  Goal: Treatment Goal: Demonstrate behavioral control of depressive symptoms, verbalize feelings of improved mood/affect, and adopt new coping skills prior to discharge  Outcome: Progressing  Goal: Refrain from harming self  Description: Interventions:  - Monitor patient closely, per order   - Supervise medication ingestion, monitor effects and side effects   Outcome: Progressing  Goal: Refrain from isolation  Description: Interventions:  - Develop a trusting relationship   - Encourage socialization   Outcome: Progressing  Goal: Refrain from self-neglect  Outcome: Progressing  Goal: Complete daily ADLs, including personal hygiene independently, as able  Description: Interventions:  - Observe, teach, and assist patient with ADLS  -  Monitor and promote a balance of rest/activity, with adequate nutrition and elimination   Outcome: Progressing     Problem: Anxiety  Goal: Anxiety is at manageable level  Description: Interventions:  - Assess and monitor patient's anxiety level.   - Monitor for signs and symptoms (heart palpitations, chest pain, shortness of breath, headaches, nausea, feeling jumpy,  restlessness, irritable, apprehensive).   - Collaborate with interdisciplinary team and initiate plan and interventions as ordered.  - [place] patient to unit/surroundings  - Explain treatment plan  - Encourage participation in care  - Encourage verbalization of concerns/fears  - Identify coping mechanisms  - Assist in developing anxiety-reducing skills  - Administer/offer alternative therapies  - Limit or eliminate stimulants  Outcome: Progressing     Problem: Risk for Violence/Aggression Toward Others  Goal: Treatment Goal: Refrain from acts of violence/aggression during length of stay, and demonstrate improved impulse control at the time of discharge  Outcome: Progressing  Goal: Refrain from harming others  Outcome: Progressing  Goal: Control angry outbursts  Description: Interventions:  - Monitor patient closely, per order  - Ensure early verbal de-escalation  - Monitor prn medication needs  - Set reasonable/therapeutic limits, outline behavioral expectations, and consequences   - Provide a non-threatening milieu, utilizing the least restrictive interventions   Outcome: Progressing     Problem: Alteration in Orientation  Goal: Treatment Goal: Demonstrate a reduction of confusion and improved orientation to person, place, time and/or situation upon discharge, according to optimum baseline/ability  Outcome: Progressing  Goal: Complete daily ADLs, including personal hygiene independently, as able  Description: Interventions:  - Observe, teach, and assist patient with ADLS  Outcome: Progressing     Problem: SELF HARM/SUICIDALITY  Goal: Will have no self-injury during hospital stay  Description: INTERVENTIONS:  - Q 15 MINUTES: Routine safety checks  - Q WAKING SHIFT & PRN: Assess risk to determine if routine checks are adequate to maintain patient safety  - Encourage patient to participate actively in care by formulating a plan to combat response to suicidal ideation, identify supports and resources  Outcome:  Progressing     Problem: TALIB  Goal: Will exhibit normal sleep and speech and no impulsivity  Description: INTERVENTIONS:  - Administer medication as ordered  - Set limits on impulsive behavior  - Make attempts to decrease external stimuli as possible  Outcome: Progressing     Problem: PSYCHOSIS  Goal: Will report no hallucinations or delusions  Description: Interventions:  - Administer medication as  ordered  - Every waking shifts and PRN assess for the presence of hallucinations and or delusions  - Assist with reality testing to support increasing orientation  - Assess if patient's hallucinations or delusions are encouraging self-harm or harm to others and intervene as appropriate  Outcome: Progressing     Problem: SLEEP DISTURBANCE  Goal: Will exhibit normal sleeping pattern  Description: Interventions:  -  Assess the patients sleep pattern, noting recent changes  - Administer medication as ordered  - Decrease environmental stimuli, including noise, as appropriate during the night  - Encourage the patient to actively participate in unit groups and or exercise during the day to enhance ability to achieve adequate sleep at night  - Assess the patient, in the morning, encouraging a description of sleep experience  Outcome: Progressing     Problem: DISCHARGE PLANNING - CARE MANAGEMENT  Goal: Discharge to post-acute care or home with appropriate resources  Description: INTERVENTIONS:  - Conduct assessment to determine patient/family and health care team treatment goals, and need for post-acute services based on payer coverage, community resources, and patient preferences, and barriers to discharge  - Address psychosocial, clinical, and financial barriers to discharge as identified in assessment in conjunction with the patient/family and health care team  - Arrange appropriate level of post-acute services according to patient’s   needs and preference and payer coverage in collaboration with the physician and health  care team  - Communicate with and update the patient/family, physician, and health care team regarding progress on the discharge plan  - Arrange appropriate transportation to post-acute venues  Outcome: Progressing

## 2024-07-24 PROCEDURE — 99232 SBSQ HOSP IP/OBS MODERATE 35: CPT | Performed by: PSYCHIATRY & NEUROLOGY

## 2024-07-24 RX ADMIN — HYDROXYZINE HYDROCHLORIDE 25 MG: 25 TABLET ORAL at 21:20

## 2024-07-24 RX ADMIN — LISINOPRIL 10 MG: 10 TABLET ORAL at 08:28

## 2024-07-24 RX ADMIN — HYDROXYZINE HYDROCHLORIDE 25 MG: 25 TABLET ORAL at 18:11

## 2024-07-24 RX ADMIN — HYDROXYZINE HYDROCHLORIDE 25 MG: 25 TABLET ORAL at 08:29

## 2024-07-24 RX ADMIN — LAMOTRIGINE 50 MG: 25 TABLET ORAL at 08:28

## 2024-07-24 RX ADMIN — SERTRALINE HYDROCHLORIDE 150 MG: 100 TABLET ORAL at 21:20

## 2024-07-24 RX ADMIN — ATORVASTATIN CALCIUM 10 MG: 10 TABLET, FILM COATED ORAL at 08:28

## 2024-07-24 RX ADMIN — CYANOCOBALAMIN TAB 1000 MCG 1000 MCG: 1000 TAB at 08:28

## 2024-07-24 RX ADMIN — CARIPRAZINE 3 MG: 3 CAPSULE, GELATIN COATED ORAL at 08:28

## 2024-07-24 RX ADMIN — CHOLECALCIFEROL TAB 25 MCG (1000 UNIT) 2000 UNITS: 25 TAB at 08:29

## 2024-07-24 NOTE — PROGRESS NOTES
Progress Note - Behavioral Health   Deyvi Cao 54 y.o. male MRN: [id number]  Unit/Bed#: [facility] 112-02 Encounter: [id number]  Code Status: Level 1 - Full Code    Assessment & Plan   Principal Problem:    Bipolar disorder with severe depression (HCC)  Active Problems:    Benign essential hypertension    Mixed hyperlipidemia    Allergic rhinitis due to allergen    Medical clearance for psychiatric admission    Rosacea    Recommended Treatment:     Treatment plan, treatment progress and medication changes were reviewed with Nursing Staff, Pharmacy Service and Case Management in Treatment Team:  1.Continue with group therapy, milieu therapy and occupational therapy   2.Behavioral Health checks every 7 minutes   3.Continue frequent safety checks and vitals per unit protocol  4.Continue with SLIM medical management as indicated  5.Continue with current medication regimen for symptom management: Vraylar 3mg PO Daily, Atarax 25mg PO TID, Lamictal 50mg PO Daily, Zoloft 150mg PO QHS, Lamictal 50mg PO Daily     6.Disposition Planning: Discharge planning and efforts remain ongoing - Awaiting group home placement    Subjective:    Patient was seen today for continuation of care, records reviewed and patient was discussed with the morning case review team.    Deyvi was seen today for psychiatric follow-up.  On assessment today, Deyvi was found in his room.  He is calm and cooperative.  Does appear to be slowly improving psychiatrically.  Can come off as guarded at times, but patient also states that he is quiet at his baseline.  Prefers to keep to himself, we discussed socialization and how it can be helpful for his mental health.  He is in agreement.  Praised on increased group attendance.  Deyvi reports adequate daytime energy and denies any difficulties with initiating or staying asleep.  Oral appetite and hydration is adequate.   We reviewed once more the specific as-needed medications they can use going forward if  they experience any insomnia or destabilization of their mood, they understood and were agreeable. Milieu visibility and group attendance encouraged to promote an active participation in treatment.    Deyvi denies acute suicidal/self-harm ideation/intent/plan upon direct inquiry at this time. Deyvi is able to contract for safety while on the unit and would feel comfortable seeking staff support should suicidal symptoms or urges appear or worsen. Deyvi remains behaviorally appropriate, no agitation or aggression noted on exam or in report. Deyvi also denies HI/AH/VH, and does not appear overtly manic.  Patient does not verbalize any experiences that can be categorized as paranoid, persecutory, bizarre, or somatic delusions. Deyvi remains adherent to his current psychotropic medication regimen and denies any side effects from medications, as well as none noted on exam.    Group Attendance: 6 / 10  Treatment Team: This Thursday  Psychiatric PRN's Needed: None    Review of Systems:  Behavior over the last 24 hours: Slowly improving  Sleep: sleeping okay throughout the night  Appetite: adequate  Medication side effects: none reported  ROS:no complaints, all other systems are negative    Objective:    Vitals:  Vitals:    07/24/24 0729   BP: 132/76   Pulse: 74   Resp: 18   Temp: 97.8 °F (36.6 °C)   SpO2: 96%     Laboratory Results:  I have personally reviewed all pertinent laboratory/tests results.  Most Recent Labs:   Lab Results   Component Value Date    WBC 7.39 06/26/2024    RBC 4.70 06/26/2024    HGB 15.2 06/26/2024    HCT 45.5 06/26/2024     06/26/2024    RDW 12.9 06/26/2024    NEUTROABS 4.63 06/26/2024    SODIUM 137 06/26/2024    K 4.1 06/26/2024     06/26/2024    CO2 26 06/26/2024    BUN 17 06/26/2024    CREATININE 0.63 06/26/2024    GLUC 98 06/26/2024    GLUF 98 06/26/2024    CALCIUM 9.6 06/26/2024    AST 25 06/26/2024    ALT 45 06/26/2024    ALKPHOS 114 (H) 06/26/2024    TP 7.0 06/26/2024     ALB 4.4 06/26/2024    TBILI 0.44 06/26/2024    CHOLESTEROL 177 06/26/2024    HDL 52 06/26/2024    TRIG 73 06/26/2024    LDLCALC 110 (H) 06/26/2024    NONHDLC 125 06/26/2024    LITHIUM 0.4 (L) 01/28/2021    [id number] 2.636 06/26/2024    HGBA1C 5.2 11/22/2023     11/22/2023     Mental Status Evaluation:  Appearance:  age appropriate, casually dressed   Behavior:  guarded   Speech:  scant, soft   Mood:  less anxious, less depressed   Affect:  slightly brighter   Thought Process:  goal directed   Associations: intact associations   Thought Content:  no overt delusions   Perceptual Disturbances: no auditory hallucinations, no visual hallucinations, denies when asked, does not appear responding to internal stimuli   Risk Potential: Suicidal ideation - None at present, contracts for safety on the unit, would talk to staff if not feeling safe on the unit  Homicidal ideation - None at present  Potential for aggression - Not at present   Sensorium:  oriented to person, place, and time/date   Memory:  recent memory intact   Consciousness:  alert and awake   Attention/Concentration: attention span and concentration appear shorter than expected for age   Insight:  limited   Judgment: limited   Gait/Station: normal gait/station, normal balance   Motor Activity: no abnormal movements     Progress Toward Goals: making gradual improvement.  Deyvi continues to require inpatient psychiatric hospitalization for continued medication management and titration to optimize symptom reduction, improve sleep hygiene, and demonstrate adequate self-care.     Suicide/Homicide Risk Assessment:  Risk of Harm to Self:   Nursing Suicide Risk Assessment Last 24 hours: C-SSRS Risk (Since Last Contact)  Calculated C-SSRS Risk Score (Since Last Contact): No Risk Indicated    Risk of Harm to Others:  Nursing Homicide Risk Assessment: Violence Risk to Others: Denies within past 6 months    Behavioral Health Medications: all current active  meds have been reviewed and continue current psychiatric medications.  Current Facility-Administered Medications   Medication Dose Route Frequency Provider Last Rate    acetaminophen  650 mg Oral Q6H PRN ALVINA Harley      acetaminophen  650 mg Oral Q4H PRN ALVINA Harley      acetaminophen  975 mg Oral Q6H PRN ALVINA Harley      aluminum-magnesium hydroxide-simethicone  30 mL Oral Q4H PRN ALVINA Harley      atorvastatin  10 mg Oral Daily ALVINA Lewis      benztropine  1 mg Intramuscular Q4H PRN Max 6/day ALVINA Harley      benztropine  1 mg Oral Q4H PRN Max 6/day ALVINA Harley      bisacodyl  10 mg Rectal Daily PRN ALVINA Harley      cariprazine  3 mg Oral Daily Martin Cardenas MD      Cholecalciferol  2,000 Units Oral Daily ALVINA Lewis      cyanocobalamin  1,000 mcg Oral Daily ALVINA Lewis      hydrOXYzine HCL  25 mg Oral Q6H PRN Max 4/day ALVINA Harley      hydrOXYzine HCL  25 mg Oral TID Martin Cardenas MD      hydrOXYzine HCL  50 mg Oral Q4H PRN Max 4/day ALVINA Harley      Or    LORazepam  1 mg Intramuscular Q4H PRN ALVINA Harley      lamoTRIgine  50 mg Oral Daily Martin Cardenas MD      lisinopril  10 mg Oral Daily ALVINA Lewis      LORazepam  1 mg Oral Q4H PRN Max 6/day ALVINA Harley      Or    LORazepam  2 mg Intramuscular Q6H PRN Max 3/day ALVINA Harley      OLANZapine  10 mg Oral Q3H PRN Max 3/day ALVINA Harley      Or    OLANZapine  10 mg Intramuscular Q3H PRN Max 3/day ALVINA Harley      OLANZapine  5 mg Oral Q3H PRN Max 6/day ALVINA Harley      Or    OLANZapine  5 mg Intramuscular Q3H PRN Max 6/day ALVINA Harley      OLANZapine  2.5 mg Oral Q3H PRN Max 8/day ALVINA Harley      polyethylene glycol  17 g Oral Daily PRN ALVINA Harley      propranolol  10 mg Oral Q8H PRN ALVINA Harley      senna-docusate sodium  1 tablet Oral Daily PRN Melva Knutson,  ALVINA      sertraline  150 mg Oral HS Martin Cardenas MD       Risks / Benefits of Treatment:  Risks, benefits, and possible side effects of medications explained to patient. Patient has limited understanding of risks and benefits of treatment at this time, but agrees to take medications as prescribed.    Counseling / Coordination of Care:  Total floor/unit time spent today 25 minutes. Greater than 50% of total time was spent with the patient and / or family counseling and / or coordination of care. A description of the counseling / coordination of care:   Patient's progress discussed with staff in treatment team meeting.  Medications, treatment progress and treatment plan reviewed with patient.   Educated on importance of medication and treatment compliance.  Reassurance and supportive therapy provided.   Encouraged participation in milieu and group therapy on the unit.    ALVINA Harley 07/24/24

## 2024-07-24 NOTE — NURSING NOTE
Pt is calm, cooperative and visible on unit. Pt denies depression and anxiety; denies SI/HI/AH/VH. Minimal peer interaction. Pt is able to make needs known and is medication compliant. Will maintain q7 min checks.

## 2024-07-24 NOTE — PROGRESS NOTES
07/24/24 0730   Team Meeting   Meeting Type Daily Rounds   Team Members Present   Team Members Present Physician;Nurse;;Other (Discipline and Name)   Patient/Family Present   Patient Present No   Patient's Family Present No     In attendance:  Dr. Alex Thomas, MD Dr. Jordan Holter, DO Melva Knutson, ALVINA Haywood, RN  Judi Garcia, [facility]  Mer Sales, [facility]  Gris Arizmendi M.S.    Groups: 6/10    Pt is quiet, pleasant, polite. Pt makes needs known and denies symptoms. No bx issues noted. Pt remains flat but more visible.

## 2024-07-24 NOTE — PLAN OF CARE
Problem: Ineffective Coping  Goal: Identifies ineffective coping skills  Outcome: Progressing  Goal: Identifies healthy coping skills  Outcome: Progressing  Goal: Demonstrates healthy coping skills  Outcome: Progressing  Goal: Participates in unit activities  Description: Interventions:  - Provide therapeutic environment   - Provide required programming   - Redirect inappropriate behaviors   Outcome: Progressing    Attended 10/18 of the groups offered in the past 2 days.

## 2024-07-24 NOTE — PLAN OF CARE
Problem: Alteration in Thoughts and Perception  Goal: Treatment Goal: Gain control of psychotic behaviors/thinking, reduce/eliminate presenting symptoms and demonstrate improved reality functioning upon discharge  Outcome: Progressing  Goal: Verbalize thoughts and feelings  Description: Interventions:  - Promote a nonjudgmental and trusting relationship with the patient through active listening and therapeutic communication  - Assess patient's level of functioning, behavior and potential for risk  - Engage patient in 1 on 1 interactions  - Encourage patient to express fears, feelings, frustrations, and discuss symptoms    - [place] patient to reality, help patient recognize reality-based thinking   - Administer medications as ordered and assess for potential side effects  - Provide the patient education related to the signs and symptoms of the illness and desired effects of prescribed medications  Outcome: Progressing  Goal: Refrain from acting on delusional thinking/internal stimuli  Description: Interventions:  - Monitor patient closely, per order   - Utilize least restrictive measures   - Set reasonable limits, give positive feedback for acceptable   - Administer medications as ordered and monitor of potential side effects  Outcome: Progressing  Goal: Agree to be compliant with medication regime, as prescribed and report medication side effects  Description: Interventions:  - Offer appropriate PRN medication and supervise ingestion; conduct AIMS, as needed   Outcome: Progressing  Goal: Attend and participate in unit activities, including therapeutic, recreational, and educational groups  Description: Interventions:  -Encourage Visitation and family involvement in care  Outcome: Progressing  Goal: Recognize dysfunctional thoughts, communicate reality-based thoughts at the time of discharge  Description: Interventions:  - Provide medication and psycho-education to assist patient in compliance and developing  insight into his/her illness   Outcome: Progressing  Goal: Complete daily ADLs, including personal hygiene independently, as able  Description: Interventions:  - Observe, teach, and assist patient with ADLS  - Monitor and promote a balance of rest/activity, with adequate nutrition and elimination   Outcome: Progressing     Problem: Ineffective Coping  Goal: Cooperates with admission process  Description: Interventions:   - Complete admission process  Outcome: Progressing  Goal: Identifies ineffective coping skills  Outcome: Progressing  Goal: Identifies healthy coping skills  Outcome: Progressing  Goal: Demonstrates healthy coping skills  Outcome: Progressing  Goal: Participates in unit activities  Description: Interventions:  - Provide therapeutic environment   - Provide required programming   - Redirect inappropriate behaviors   Outcome: Progressing  Goal: Patient/Family participate in treatment and DC plans  Description: Interventions:  - Provide therapeutic environment  Outcome: Progressing  Goal: Patient/Family verbalizes awareness of resources  Outcome: Progressing  Goal: Understands least restrictive measures  Description: Interventions:  - Utilize least restrictive behavior  Outcome: Progressing  Goal: Free from restraint events  Description: - Utilize least restrictive measures   - Provide behavioral interventions   - Redirect inappropriate behaviors   Outcome: Progressing     Problem: Risk for Self Injury/Neglect  Goal: Treatment Goal: Remain safe during length of stay, learn and adopt new coping skills, and be free of self-injurious ideation, impulses and acts at the time of discharge  Outcome: Progressing  Goal: Verbalize thoughts and feelings  Description: Interventions:  - Assess and re-assess patient's lethality and potential for self-injury  - Engage patient in 1:1 interactions, daily, for a minimum of 15 minutes  - Encourage patient to express feelings, fears, frustrations, hopes  - Establish  rapport/trust with patient   Outcome: Progressing  Goal: Refrain from harming self  Description: Interventions:  - Monitor patient closely, per order  - Develop a trusting relationship  - Supervise medication ingestion, monitor effects and side effects   Outcome: Progressing  Goal: Attend and participate in unit activities, including therapeutic, recreational, and educational groups  Description: Interventions:  - Provide therapeutic and educational activities daily, encourage attendance and participation, and document same in the medical record  - Obtain collateral information, encourage visitation and family involvement in care   Outcome: Progressing  Goal: Recognize maladaptive responses and adopt new coping mechanisms  Outcome: Progressing  Goal: Complete daily ADLs, including personal hygiene independently, as able  Description: Interventions:  - Observe, teach, and assist patient with ADLS  - Monitor and promote a balance of rest/activity, with adequate nutrition and elimination  Outcome: Progressing     Problem: Depression  Goal: Treatment Goal: Demonstrate behavioral control of depressive symptoms, verbalize feelings of improved mood/affect, and adopt new coping skills prior to discharge  Outcome: Progressing  Goal: Verbalize thoughts and feelings  Description: Interventions:  - Assess and re-assess patient's level of risk   - Engage patient in 1:1 interactions, daily, for a minimum of 15 minutes   - Encourage patient to express feelings, fears, frustrations, hopes   Outcome: Progressing  Goal: Refrain from harming self  Description: Interventions:  - Monitor patient closely, per order   - Supervise medication ingestion, monitor effects and side effects   Outcome: Progressing  Goal: Refrain from isolation  Description: Interventions:  - Develop a trusting relationship   - Encourage socialization   Outcome: Progressing  Goal: Refrain from self-neglect  Outcome: Progressing  Goal: Attend and participate in  unit activities, including therapeutic, recreational, and educational groups  Description: Interventions:  - Provide therapeutic and educational activities daily, encourage attendance and participation, and document same in the medical record   Outcome: Progressing  Goal: Complete daily ADLs, including personal hygiene independently, as able  Description: Interventions:  - Observe, teach, and assist patient with ADLS  -  Monitor and promote a balance of rest/activity, with adequate nutrition and elimination   Outcome: Progressing     Problem: Anxiety  Goal: Anxiety is at manageable level  Description: Interventions:  - Assess and monitor patient's anxiety level.   - Monitor for signs and symptoms (heart palpitations, chest pain, shortness of breath, headaches, nausea, feeling jumpy, restlessness, irritable, apprehensive).   - Collaborate with interdisciplinary team and initiate plan and interventions as ordered.  - [place] patient to unit/surroundings  - Explain treatment plan  - Encourage participation in care  - Encourage verbalization of concerns/fears  - Identify coping mechanisms  - Assist in developing anxiety-reducing skills  - Administer/offer alternative therapies  - Limit or eliminate stimulants  Outcome: Progressing     Problem: Risk for Violence/Aggression Toward Others  Goal: Treatment Goal: Refrain from acts of violence/aggression during length of stay, and demonstrate improved impulse control at the time of discharge  Outcome: Progressing  Goal: Verbalize thoughts and feelings  Description: Interventions:  - Assess and re-assess patient's level of risk, every waking shift  - Engage patient in 1:1 interactions, daily, for a minimum of 15 minutes   - Allow patient to express feelings and frustrations in a safe and non-threatening manner   - Establish rapport/trust with patient   Outcome: Progressing  Goal: Refrain from harming others  Outcome: Progressing  Goal: Refrain from destructive acts on the  environment or property  Outcome: Progressing  Goal: Control angry outbursts  Description: Interventions:  - Monitor patient closely, per order  - Ensure early verbal de-escalation  - Monitor prn medication needs  - Set reasonable/therapeutic limits, outline behavioral expectations, and consequences   - Provide a non-threatening milieu, utilizing the least restrictive interventions   Outcome: Progressing  Goal: Attend and participate in unit activities, including therapeutic, recreational, and educational groups  Description: Interventions:  - Provide therapeutic and educational activities daily, encourage attendance and participation, and document same in the medical record   Outcome: Progressing  Goal: Identify appropriate positive anger management techniques  Description: Interventions:  - Offer anger management and coping skills groups   - Staff will provide positive feedback for appropriate anger control  Outcome: Progressing     Problem: Alteration in Orientation  Goal: Treatment Goal: Demonstrate a reduction of confusion and improved orientation to person, place, time and/or situation upon discharge, according to optimum baseline/ability  Outcome: Progressing  Goal: Interact with staff daily  Description: Interventions:  - Assess and re-assess patient's level of orientation  - Engage patient in 1 on 1 interactions, daily, for a minimum of 15 minutes   - Establish rapport/trust with patient   Outcome: Progressing  Goal: Express concerns related to confused thinking related to:  Description: Interventions:  - Encourage patient to express feelings, fears, frustrations, hopes  - Assign consistent caregivers   - [place]/re-orient patient as needed  - Allow comfort items, as appropriate  - Provide visual cues, signs, etc.   Outcome: Progressing  Goal: Allow medical examinations, as recommended  Description: Interventions:  - Provide physical/neurological exams and/or referrals, per provider   Outcome:  Progressing  Goal: Cooperate with recommended testing/procedures  Description: Interventions:  - Determine need for ancillary testing  - Observe for mental status changes  - Implement falls/precaution protocol   Outcome: Progressing  Goal: Attend and participate in unit activities, including therapeutic, recreational, and educational groups  Description: Interventions:  - Provide therapeutic and educational activities daily, encourage attendance and participation, and document same in the medical record   - Provide appropriate opportunities for reminiscence   - Provide a consistent daily routine   - Encourage family contact/visitation   Outcome: Progressing  Goal: Complete daily ADLs, including personal hygiene independently, as able  Description: Interventions:  - Observe, teach, and assist patient with ADLS  Outcome: Progressing     Problem: Individualized Interventions  Goal: Patient will recognize inappropriate behaviors and develop alternative behaviors within 5 days  Description: Interventions:  - Patient in collaboration with Treatment Team will develop a behavior management plan to help identify effective coping skills to deal with stressors  Outcome: Progressing     Problem: SELF HARM/SUICIDALITY  Goal: Will have no self-injury during hospital stay  Description: INTERVENTIONS:  - Q 15 MINUTES: Routine safety checks  - Q WAKING SHIFT & PRN: Assess risk to determine if routine checks are adequate to maintain patient safety  - Encourage patient to participate actively in care by formulating a plan to combat response to suicidal ideation, identify supports and resources  Outcome: Progressing     Problem: DEPRESSION  Goal: Will be euthymic at discharge  Description: INTERVENTIONS:  - Administer medication as ordered  - Provide emotional support via 1:1 interaction with staff  - Encourage involvement in milieu/groups/activities  - Monitor for social isolation  Outcome: Progressing     Problem: TALIB  Goal: Will  exhibit normal sleep and speech and no impulsivity  Description: INTERVENTIONS:  - Administer medication as ordered  - Set limits on impulsive behavior  - Make attempts to decrease external stimuli as possible  Outcome: Progressing     Problem: PSYCHOSIS  Goal: Will report no hallucinations or delusions  Description: Interventions:  - Administer medication as  ordered  - Every waking shifts and PRN assess for the presence of hallucinations and or delusions  - Assist with reality testing to support increasing orientation  - Assess if patient's hallucinations or delusions are encouraging self-harm or harm to others and intervene as appropriate  Outcome: Progressing     Problem: BEHAVIOR  Goal: Pt/Family maintain appropriate behavior and adhere to behavioral management agreement, if implemented  Description: INTERVENTIONS:  - Assess the family dynamic   - Encourage verbalization of thoughts and concerns in a socially appropriate manner  - Assess patient/family's coping skills and non-compliant behavior (including use of illegal substances).  - Utilize positive, consistent limit setting strategies supporting safety of patient, staff and others  - Initiate consult with Case Management, Spiritual Care or other ancillary services as appropriate  - If a patient's/visitor's behavior jeopardizes the safety of the patient, staff, or others, refer to organization procedure.   - Notify Security of behavior or suspected illegal substances which indicate the need for search of the patient and/or belongings  - Encourage participation in the decision making process about a behavioral management agreement; implement if patient meets criteria  Outcome: Progressing     Problem: ANXIETY  Goal: Will report anxiety at manageable levels  Description: INTERVENTIONS:  - Administer medication as ordered  - Teach and encourage coping skills  - Provide emotional support  - Assess patient/family for anxiety and ability to cope  Outcome:  Progressing  Goal: By discharge: Patient will verbalize 2 strategies to deal with anxiety  Description: Interventions:  - Identify any obvious source/trigger to anxiety  - Staff will assist patient in applying identified coping technique/skills  - Encourage attendance of scheduled groups and activities  Outcome: Progressing     Problem: SLEEP DISTURBANCE  Goal: Will exhibit normal sleeping pattern  Description: Interventions:  -  Assess the patients sleep pattern, noting recent changes  - Administer medication as ordered  - Decrease environmental stimuli, including noise, as appropriate during the night  - Encourage the patient to actively participate in unit groups and or exercise during the day to enhance ability to achieve adequate sleep at night  - Assess the patient, in the morning, encouraging a description of sleep experience  Outcome: Progressing     Problem: INVOLUNTARY ADMIT  Goal: Will cooperate with staff recommendations and doctor's orders and will demonstrate appropriate behavior  Description: INTERVENTIONS:  - Treat underlying conditions and offer medication as ordered  - Educate regarding involuntary admission procedures and rules  - Utilize positive consistent limit setting strategies to support patient and staff safety  Outcome: Progressing     Problem: SELF CARE DEFICIT  Goal: Return ADL status to a safe level of function  Description: INTERVENTIONS:  - Administer medication as ordered  - Assess ADL deficits and provide assistive devices as needed  - Obtain PT/OT consults as needed  - Assist and instruct patient to increase activity and self care as tolerated  Outcome: Progressing     Problem: DISCHARGE PLANNING - CARE MANAGEMENT  Goal: Discharge to post-acute care or home with appropriate resources  Description: INTERVENTIONS:  - Conduct assessment to determine patient/family and health care team treatment goals, and need for post-acute services based on payer coverage, community resources, and  patient preferences, and barriers to discharge  - Address psychosocial, clinical, and financial barriers to discharge as identified in assessment in conjunction with the patient/family and health care team  - Arrange appropriate level of post-acute services according to patient’s   needs and preference and payer coverage in collaboration with the physician and health care team  - Communicate with and update the patient/family, physician, and health care team regarding progress on the discharge plan  - Arrange appropriate transportation to post-acute venues  Outcome: Progressing

## 2024-07-24 NOTE — NURSING NOTE
Patient denies psych symptoms when asked. Patient visible but isolative to self.  Patient is cooperative with staff.  Medication compliant as well. Not seen interacting with peers. Behaviors controlled all evening. No prn medication requested or required.

## 2024-07-24 NOTE — SOCIAL WORK
"Sydnie Palacios from [facility] responded to STEFAN's inquiry via email -   \"I think there might be a bed at [facility]. I need to check and will let you know. Last I heard there were 2 beds but they wanted to paint both rooms before anyone moved in. one bed was being looked at for someone but I'm not sure about the other since I don't over see that program.\"    "

## 2024-07-25 PROCEDURE — 99232 SBSQ HOSP IP/OBS MODERATE 35: CPT | Performed by: PSYCHIATRY & NEUROLOGY

## 2024-07-25 RX ADMIN — CHOLECALCIFEROL TAB 25 MCG (1000 UNIT) 2000 UNITS: 25 TAB at 08:21

## 2024-07-25 RX ADMIN — HYDROXYZINE HYDROCHLORIDE 25 MG: 25 TABLET ORAL at 08:21

## 2024-07-25 RX ADMIN — ATORVASTATIN CALCIUM 10 MG: 10 TABLET, FILM COATED ORAL at 08:21

## 2024-07-25 RX ADMIN — SERTRALINE HYDROCHLORIDE 150 MG: 100 TABLET ORAL at 21:25

## 2024-07-25 RX ADMIN — CARIPRAZINE 3 MG: 3 CAPSULE, GELATIN COATED ORAL at 08:21

## 2024-07-25 RX ADMIN — CYANOCOBALAMIN TAB 1000 MCG 1000 MCG: 1000 TAB at 08:21

## 2024-07-25 RX ADMIN — LISINOPRIL 10 MG: 10 TABLET ORAL at 08:21

## 2024-07-25 RX ADMIN — HYDROXYZINE HYDROCHLORIDE 25 MG: 25 TABLET ORAL at 21:25

## 2024-07-25 RX ADMIN — LAMOTRIGINE 50 MG: 25 TABLET ORAL at 08:21

## 2024-07-25 RX ADMIN — HYDROXYZINE HYDROCHLORIDE 25 MG: 25 TABLET ORAL at 16:53

## 2024-07-25 NOTE — PROGRESS NOTES
Progress Note - Behavioral Health   Deyvi Cao 54 y.o. male MRN: [id number]  Unit/Bed#: [facility] 112-02 Encounter: [id number]  Code Status: Level 1 - Full Code    Assessment & Plan   Principal Problem:    Bipolar disorder with severe depression (HCC)  Active Problems:    Benign essential hypertension    Mixed hyperlipidemia    Allergic rhinitis due to allergen    Medical clearance for psychiatric admission    Rosacea    Recommended Treatment:     Treatment plan, treatment progress and medication changes were reviewed with Nursing Staff, Pharmacy Service and Case Management in Treatment Team:  1.Continue with group therapy, milieu therapy and occupational therapy   2.Behavioral Health checks every 7 minutes   3.Continue frequent safety checks and vitals per unit protocol  4.Continue with SLIM medical management as indicated  5.Continue with current medication regimen for symptom management: Vraylar 3mg PO Daily, Atarax 25mg PO TID, Lamictal 50mg PO Daily, Zoloft 150mg PO QHS, Lamictal 50mg PO Daily     6.Disposition Planning: Discharge planning and efforts remain ongoing - Awaiting group home placement    Subjective:    Patient was seen today for continuation of care, records reviewed and patient was discussed with the morning case review team.    Deyvi was seen today for psychiatric follow-up.  On assessment today, Deyvi was present during his treatment team. He is calm and cooperative.  Has no new concerns.  Feels ready to proceed with discharge.  Deyvi reports adequate daytime energy and denies any difficulties with initiating or staying asleep.  Oral appetite and hydration is adequate.  We reviewed once more the specific as-needed medications they can use going forward if they experience any insomnia or destabilization of their mood, they understood and were agreeable. Milieu visibility and group attendance encouraged to promote an active participation in treatment.    Deyvi denies acute  suicidal/self-harm ideation/intent/plan upon direct inquiry at this time. Deyvi is able to contract for safety while on the unit and would feel comfortable seeking staff support should suicidal symptoms or urges appear or worsen. Deyvi remains behaviorally appropriate, no agitation or aggression noted on exam or in report. Deyvi also denies HI/AH/VH, and does not appear overtly manic.  Patient does not verbalize any experiences that can be categorized as paranoid, persecutory, bizarre, or somatic delusions. Deyvi remains adherent to his current psychotropic medication regimen and denies any side effects from medications, as well as none noted on exam.    Group Attendance: 5 / 9  Treatment Team: This Thursday  Psychiatric PRN's Needed: None    Review of Systems:  Behavior over the last 24 hours: Unchanged  Sleep: sleeping okay throughout the night  Appetite: adequate  Medication side effects: none reported  ROS:no complaints, all other systems are negative    Objective:    Vitals:  Vitals:    07/25/24 0736   BP: 122/65   Pulse: 85   Resp: 20   Temp: 97.5 °F (36.4 °C)   SpO2: 96%     Laboratory Results:  I have personally reviewed all pertinent laboratory/tests results.  Most Recent Labs:   Lab Results   Component Value Date    WBC 7.39 06/26/2024    RBC 4.70 06/26/2024    HGB 15.2 06/26/2024    HCT 45.5 06/26/2024     06/26/2024    RDW 12.9 06/26/2024    NEUTROABS 4.63 06/26/2024    SODIUM 137 06/26/2024    K 4.1 06/26/2024     06/26/2024    CO2 26 06/26/2024    BUN 17 06/26/2024    CREATININE 0.63 06/26/2024    GLUC 98 06/26/2024    GLUF 98 06/26/2024    CALCIUM 9.6 06/26/2024    AST 25 06/26/2024    ALT 45 06/26/2024    ALKPHOS 114 (H) 06/26/2024    TP 7.0 06/26/2024    ALB 4.4 06/26/2024    TBILI 0.44 06/26/2024    CHOLESTEROL 177 06/26/2024    HDL 52 06/26/2024    TRIG 73 06/26/2024    LDLCALC 110 (H) 06/26/2024    NONHDLC 125 06/26/2024    LITHIUM 0.4 (L) 01/28/2021    [id number] 2.636  06/26/2024    HGBA1C 5.2 11/22/2023     11/22/2023     Mental Status Evaluation:  Appearance:  casually dressed, dressed appropriately   Behavior:  guarded   Speech:  scant, soft   Mood:  less anxious, less depressed   Affect:  slightly brighter   Thought Process:  goal directed   Associations: intact associations   Thought Content:  no overt delusions   Perceptual Disturbances: no auditory hallucinations, no visual hallucinations, denies when asked, does not appear responding to internal stimuli   Risk Potential: Suicidal ideation - None at present, contracts for safety on the unit, would talk to staff if not feeling safe on the unit  Homicidal ideation - None at present  Potential for aggression - Not at present   Sensorium:  oriented to person, place, and time/date   Memory:  recent memory intact   Consciousness:  alert and awake   Attention/Concentration: attention span and concentration appear shorter than expected for age   Insight:  fair and improving   Judgment: fair and improving   Gait/Station: normal gait/station, normal balance   Motor Activity: no abnormal movements     Progress Toward Goals: making gradual improvement.  Deyvi continues to require inpatient psychiatric hospitalization for continued medication management and titration to optimize symptom reduction, improve sleep hygiene, and demonstrate adequate self-care.     Suicide/Homicide Risk Assessment:  Risk of Harm to Self:   Nursing Suicide Risk Assessment Last 24 hours: C-SSRS Risk (Since Last Contact)  Calculated C-SSRS Risk Score (Since Last Contact): No Risk Indicated    Risk of Harm to Others:  Nursing Homicide Risk Assessment: Violence Risk to Others: Denies within past 6 months    Behavioral Health Medications: all current active meds have been reviewed and continue current psychiatric medications.  Current Facility-Administered Medications   Medication Dose Route Frequency Provider Last Rate    acetaminophen  650 mg Oral Q6H  PRN ALVINA Harley      acetaminophen  650 mg Oral Q4H PRN ALVINA Harley      acetaminophen  975 mg Oral Q6H PRN ALVINA Harley      aluminum-magnesium hydroxide-simethicone  30 mL Oral Q4H PRN ALVINA Harley      atorvastatin  10 mg Oral Daily Sary LinkALVINA Thompson      benztropine  1 mg Intramuscular Q4H PRN Max 6/day ALVINA Harley      benztropine  1 mg Oral Q4H PRN Max 6/day ALVINA Harley      bisacodyl  10 mg Rectal Daily PRN ALVINA Harley      cariprazine  3 mg Oral Daily Martin Cardenas MD      Cholecalciferol  2,000 Units Oral Daily Sary LinkALVINA Thompson      cyanocobalamin  1,000 mcg Oral Daily SaryALVINA Starkey      hydrOXYzine HCL  25 mg Oral Q6H PRN Max 4/day ALVINA Harley      hydrOXYzine HCL  25 mg Oral TID Martin Cardenas MD      hydrOXYzine HCL  50 mg Oral Q4H PRN Max 4/day ALVINA Harley      Or    LORazepam  1 mg Intramuscular Q4H PRN ALVINA Harley      lamoTRIgine  50 mg Oral Daily Martin Cardenas MD      lisinopril  10 mg Oral Daily Sary Rodriguez ALVINA Biggs      LORazepam  1 mg Oral Q4H PRN Max 6/day ALVINA Harley      Or    LORazepam  2 mg Intramuscular Q6H PRN Max 3/day ALVINA Harley      OLANZapine  10 mg Oral Q3H PRN Max 3/day ALVINA Harley      Or    OLANZapine  10 mg Intramuscular Q3H PRN Max 3/day ALVINA Harley      OLANZapine  5 mg Oral Q3H PRN Max 6/day ALVINA Harley      Or    OLANZapine  5 mg Intramuscular Q3H PRN Max 6/day ALVINA Harley      OLANZapine  2.5 mg Oral Q3H PRN Max 8/day ALVINA Harley      polyethylene glycol  17 g Oral Daily PRN ALVINA Harley      propranolol  10 mg Oral Q8H PRN ALVINA Harley      senna-docusate sodium  1 tablet Oral Daily PRN ALVINA Halrey      sertraline  150 mg Oral HS Martin Cardenas MD       Risks / Benefits of Treatment:  Risks, benefits, and possible side effects of medications explained to patient. Patient has limited  understanding of risks and benefits of treatment at this time, but agrees to take medications as prescribed.    Counseling / Coordination of Care:  Total floor/unit time spent today 25 minutes. Greater than 50% of total time was spent with the patient and / or family counseling and / or coordination of care. A description of the counseling / coordination of care:   Patient's progress discussed with staff in treatment team meeting.  Medications, treatment progress and treatment plan reviewed with patient.   Educated on importance of medication and treatment compliance.  Reassurance and supportive therapy provided.   Encouraged participation in milieu and group therapy on the unit.    ALVINA Harley 07/25/24

## 2024-07-25 NOTE — PLAN OF CARE
Problem: Alteration in Thoughts and Perception  Goal: Treatment Goal: Gain control of psychotic behaviors/thinking, reduce/eliminate presenting symptoms and demonstrate improved reality functioning upon discharge  Outcome: Progressing  Goal: Verbalize thoughts and feelings  Description: Interventions:  - Promote a nonjudgmental and trusting relationship with the patient through active listening and therapeutic communication  - Assess patient's level of functioning, behavior and potential for risk  - Engage patient in 1 on 1 interactions  - Encourage patient to express fears, feelings, frustrations, and discuss symptoms    - [place] patient to reality, help patient recognize reality-based thinking   - Administer medications as ordered and assess for potential side effects  - Provide the patient education related to the signs and symptoms of the illness and desired effects of prescribed medications  Outcome: Progressing  Goal: Refrain from acting on delusional thinking/internal stimuli  Description: Interventions:  - Monitor patient closely, per order   - Utilize least restrictive measures   - Set reasonable limits, give positive feedback for acceptable   - Administer medications as ordered and monitor of potential side effects  Outcome: Progressing  Goal: Agree to be compliant with medication regime, as prescribed and report medication side effects  Description: Interventions:  - Offer appropriate PRN medication and supervise ingestion; conduct AIMS, as needed   Outcome: Progressing  Goal: Attend and participate in unit activities, including therapeutic, recreational, and educational groups  Description: Interventions:  -Encourage Visitation and family involvement in care  Outcome: Progressing  Goal: Recognize dysfunctional thoughts, communicate reality-based thoughts at the time of discharge  Description: Interventions:  - Provide medication and psycho-education to assist patient in compliance and developing  insight into his/her illness   Outcome: Progressing  Goal: Complete daily ADLs, including personal hygiene independently, as able  Description: Interventions:  - Observe, teach, and assist patient with ADLS  - Monitor and promote a balance of rest/activity, with adequate nutrition and elimination   Outcome: Progressing     Problem: Ineffective Coping  Goal: Demonstrates healthy coping skills  Outcome: Progressing  Goal: Participates in unit activities  Description: Interventions:  - Provide therapeutic environment   - Provide required programming   - Redirect inappropriate behaviors   Outcome: Progressing  Goal: Free from restraint events  Description: - Utilize least restrictive measures   - Provide behavioral interventions   - Redirect inappropriate behaviors   Outcome: Progressing     Problem: Risk for Self Injury/Neglect  Goal: Treatment Goal: Remain safe during length of stay, learn and adopt new coping skills, and be free of self-injurious ideation, impulses and acts at the time of discharge  Outcome: Progressing  Goal: Verbalize thoughts and feelings  Description: Interventions:  - Assess and re-assess patient's lethality and potential for self-injury  - Engage patient in 1:1 interactions, daily, for a minimum of 15 minutes  - Encourage patient to express feelings, fears, frustrations, hopes  - Establish rapport/trust with patient   Outcome: Progressing  Goal: Refrain from harming self  Description: Interventions:  - Monitor patient closely, per order  - Develop a trusting relationship  - Supervise medication ingestion, monitor effects and side effects   Outcome: Progressing  Goal: Attend and participate in unit activities, including therapeutic, recreational, and educational groups  Description: Interventions:  - Provide therapeutic and educational activities daily, encourage attendance and participation, and document same in the medical record  - Obtain collateral information, encourage visitation and  family involvement in care   Outcome: Progressing  Goal: Complete daily ADLs, including personal hygiene independently, as able  Description: Interventions:  - Observe, teach, and assist patient with ADLS  - Monitor and promote a balance of rest/activity, with adequate nutrition and elimination  Outcome: Progressing     Problem: Depression  Goal: Treatment Goal: Demonstrate behavioral control of depressive symptoms, verbalize feelings of improved mood/affect, and adopt new coping skills prior to discharge  Outcome: Progressing  Goal: Refrain from harming self  Description: Interventions:  - Monitor patient closely, per order   - Supervise medication ingestion, monitor effects and side effects   Outcome: Progressing  Goal: Refrain from isolation  Description: Interventions:  - Develop a trusting relationship   - Encourage socialization   Outcome: Progressing  Goal: Refrain from self-neglect  Outcome: Progressing     Problem: Anxiety  Goal: Anxiety is at manageable level  Description: Interventions:  - Assess and monitor patient's anxiety level.   - Monitor for signs and symptoms (heart palpitations, chest pain, shortness of breath, headaches, nausea, feeling jumpy, restlessness, irritable, apprehensive).   - Collaborate with interdisciplinary team and initiate plan and interventions as ordered.  - [place] patient to unit/surroundings  - Explain treatment plan  - Encourage participation in care  - Encourage verbalization of concerns/fears  - Identify coping mechanisms  - Assist in developing anxiety-reducing skills  - Administer/offer alternative therapies  - Limit or eliminate stimulants  Outcome: Progressing     Problem: Risk for Violence/Aggression Toward Others  Goal: Treatment Goal: Refrain from acts of violence/aggression during length of stay, and demonstrate improved impulse control at the time of discharge  Outcome: Progressing  Goal: Refrain from harming others  Outcome: Progressing  Goal: Refrain from  destructive acts on the environment or property  Outcome: Progressing  Goal: Control angry outbursts  Description: Interventions:  - Monitor patient closely, per order  - Ensure early verbal de-escalation  - Monitor prn medication needs  - Set reasonable/therapeutic limits, outline behavioral expectations, and consequences   - Provide a non-threatening milieu, utilizing the least restrictive interventions   Outcome: Progressing  Goal: Attend and participate in unit activities, including therapeutic, recreational, and educational groups  Description: Interventions:  - Provide therapeutic and educational activities daily, encourage attendance and participation, and document same in the medical record   Outcome: Progressing     Problem: Alteration in Orientation  Goal: Attend and participate in unit activities, including therapeutic, recreational, and educational groups  Description: Interventions:  - Provide therapeutic and educational activities daily, encourage attendance and participation, and document same in the medical record   - Provide appropriate opportunities for reminiscence   - Provide a consistent daily routine   - Encourage family contact/visitation   Outcome: Progressing  Goal: Complete daily ADLs, including personal hygiene independently, as able  Description: Interventions:  - Observe, teach, and assist patient with ADLS  Outcome: Progressing     Problem: DEPRESSION  Goal: Will be euthymic at discharge  Description: INTERVENTIONS:  - Administer medication as ordered  - Provide emotional support via 1:1 interaction with staff  - Encourage involvement in milieu/groups/activities  - Monitor for social isolation  Outcome: Progressing     Problem: ANXIETY  Goal: Will report anxiety at manageable levels  Description: INTERVENTIONS:  - Administer medication as ordered  - Teach and encourage coping skills  - Provide emotional support  - Assess patient/family for anxiety and ability to cope  Outcome:  Progressing     Problem: SELF CARE DEFICIT  Goal: Return ADL status to a safe level of function  Description: INTERVENTIONS:  - Administer medication as ordered  - Assess ADL deficits and provide assistive devices as needed  - Obtain PT/OT consults as needed  - Assist and instruct patient to increase activity and self care as tolerated  Outcome: Progressing     Problem: DISCHARGE PLANNING - CARE MANAGEMENT  Goal: Discharge to post-acute care or home with appropriate resources  Description: INTERVENTIONS:  - Conduct assessment to determine patient/family and health care team treatment goals, and need for post-acute services based on payer coverage, community resources, and patient preferences, and barriers to discharge  - Address psychosocial, clinical, and financial barriers to discharge as identified in assessment in conjunction with the patient/family and health care team  - Arrange appropriate level of post-acute services according to patient’s   needs and preference and payer coverage in collaboration with the physician and health care team  - Communicate with and update the patient/family, physician, and health care team regarding progress on the discharge plan  - Arrange appropriate transportation to post-acute venues  Outcome: Progressing

## 2024-07-25 NOTE — PROGRESS NOTES
07/25/24 0749   Team Meeting   Meeting Type Daily Rounds   Team Members Present   Team Members Present Physician;Nurse;;Other (Discipline and Name)   Patient/Family Present   Patient Present No   Patient's Family Present No     In attendance:  Dr. Alex Thomas, MD Dr. Jordan Holter, ALVINA Melgoza, RN  Judi Garcia, [facility]  BARRETT Elmore.S.    Groups: 5/9    Pt is calm, quiet, denies symptoms. Pt has been trying to support female peer who is taking advantage. Pt is working toward discharge and waiting on [facility] availability.

## 2024-07-25 NOTE — NURSING NOTE
Patient is pleasant and cooperative.  Patient's appetite is good. Patient attended three groups. Denied psych symptoms.

## 2024-07-25 NOTE — NURSING NOTE
Received pt in bed at change of shift with eyes closed; chest movement noted.  Continues the same thus this far as per q 7 min room checks.   Will continue to monitor behavior, sleeping pattern and any medical issues that may arise.    0553;  Sleeping 7+ hrs thus this far

## 2024-07-25 NOTE — PROGRESS NOTES
07/25/24 1111   Team Meeting   Meeting Type Tx Team Meeting   Initial Conference Date 07/25/24   Next Conference Date 08/08/24   Team Members Present   Team Members Present Physician;Nurse;;Other (Discipline and Name)   Physician Team Member Zenia TINSLEY   Nursing Team Member Chastity   Social Work Team Member Jose FRY   Other (Discipline and Name) [facility] [place]   Patient/Family Present   Patient Present Yes   Patient's Family Present No   OTHER   Team Meeting - Additional Comments Pt attended his tx team meeting. Pt reports that he is managing his depression and anxiety. Pt noted that others have expressed seeing changes in him that he does not necessarily see in himself at this time. Sydnie from [facility] reports that pt is on the waitlist for [facility] and the current  is on vacation. Pt will likely be able to enter that program within a few weeks if continued progress is made at the [facility].

## 2024-07-25 NOTE — NURSING NOTE
Pt is intermittently visible on the unit and social with select peers. Consumed 100% of dinner. Took medications without incidence. Pt is pleasant and cooperative. Attended 5/9 groups. Denies all psych symptoms. No behavioral issues. Pt offers no concerns or complaints. VSS. Continuous safety checks maintained.

## 2024-07-25 NOTE — PROGRESS NOTES
07/25/24 1119   Team Meeting   Meeting Type Tx Team Meeting   Initial Conference Date 07/25/24   Next Conference Date 08/24/24   Team Members Present   Team Members Present Physician;Nurse;;Other (Discipline and Name)   Physician Team Member Holter; Hangey CRNP   Nursing Team Member Chastity   Social Work Team Member Jose FRY   Other (Discipline and Name) [place] MS   Patient/Family Present   Patient Present Yes   Patient's Family Present No     Pt attended a review of his current treatment plan. Pt expressed agreement with his goals and noted no concerns. SW identified areas of progress during the review. Signed copy placed in pt's chart.

## 2024-07-25 NOTE — SOCIAL WORK
SW and pt met 1:1. SW supported pt with obtaining his belongings from security and paying his bills due August 1. Pt reports all bills are now paid and up to date.   Pt inquired about ordering additional clothing. SW offered to assist while pt had his bank card in his possession already. Pt reports that paying his bills is enough and he feels exhausted when he's done so he would rather do that order next week. SW and pt processed responsibilities and tasks that feel draining and take a lot out of him.   Pt would benefit from additional work to build distress tolerance.

## 2024-07-26 PROCEDURE — 99232 SBSQ HOSP IP/OBS MODERATE 35: CPT | Performed by: PSYCHIATRY & NEUROLOGY

## 2024-07-26 RX ADMIN — ATORVASTATIN CALCIUM 10 MG: 10 TABLET, FILM COATED ORAL at 08:30

## 2024-07-26 RX ADMIN — CHOLECALCIFEROL TAB 25 MCG (1000 UNIT) 2000 UNITS: 25 TAB at 08:31

## 2024-07-26 RX ADMIN — CARIPRAZINE 3 MG: 3 CAPSULE, GELATIN COATED ORAL at 08:30

## 2024-07-26 RX ADMIN — SERTRALINE HYDROCHLORIDE 150 MG: 100 TABLET ORAL at 21:19

## 2024-07-26 RX ADMIN — HYDROXYZINE HYDROCHLORIDE 25 MG: 25 TABLET ORAL at 21:19

## 2024-07-26 RX ADMIN — LAMOTRIGINE 50 MG: 25 TABLET ORAL at 08:31

## 2024-07-26 RX ADMIN — HYDROXYZINE HYDROCHLORIDE 25 MG: 25 TABLET ORAL at 08:31

## 2024-07-26 RX ADMIN — LISINOPRIL 10 MG: 10 TABLET ORAL at 08:30

## 2024-07-26 RX ADMIN — HYDROXYZINE HYDROCHLORIDE 25 MG: 25 TABLET ORAL at 16:59

## 2024-07-26 RX ADMIN — CYANOCOBALAMIN TAB 1000 MCG 1000 MCG: 1000 TAB at 08:30

## 2024-07-26 NOTE — NURSING NOTE
Patient is alert and oriented. He maintains good eye contact during conversation. He stated his depression and anxiety levels are baseline.  baseline. He is pleasant and cooperative. He is medication complaint. Appetite is good. He attended some groups.

## 2024-07-26 NOTE — PROGRESS NOTES
Progress Note - Behavioral Health   Deyvi Cao 54 y.o. male MRN: [id number]  Unit/Bed#: [facility] 112-02 Encounter: [id number]  Code Status: Level 1 - Full Code    Assessment & Plan   Principal Problem:    Bipolar disorder with severe depression (HCC)  Active Problems:    Benign essential hypertension    Mixed hyperlipidemia    Allergic rhinitis due to allergen    Medical clearance for psychiatric admission    Rosacea    Recommended Treatment:     Treatment plan, treatment progress and medication changes were reviewed with Nursing Staff, Pharmacy Service and Case Management in Treatment Team:  1.Continue with group therapy, milieu therapy and occupational therapy   2.Behavioral Health checks every 7 minutes   3.Continue frequent safety checks and vitals per unit protocol  4.Continue with SLIM medical management as indicated  5.Continue with current medication regimen for symptom management: Vraylar 3mg PO Daily, Atarax 25mg PO TID, Lamictal 50mg PO Daily, Zoloft 150mg PO QHS, Lamictal 50mg PO Daily     6.Disposition Planning: Discharge planning and efforts remain ongoing - Awaiting group home placement    Subjective:    Patient was seen today for continuation of care, records reviewed and patient was discussed with the morning case review team.    Deyvi was seen today for psychiatric follow-up.  On assessment today, Deyvi was found laying in his bed.  He is calm and cooperative.  Deyvi reports adequate daytime energy and denies any difficulties with initiating or staying asleep.  Oral appetite and hydration is adequate.  Does feel as though he is improving throughout his hospitalization.   We reviewed once more the specific as-needed medications they can use going forward if they experience any insomnia or destabilization of their mood, they understood and were agreeable. Milieu visibility and group attendance encouraged to promote an active participation in treatment.    Deyvi denies acute suicidal/self-harm  ideation/intent/plan upon direct inquiry at this time. Deyvi is able to contract for safety while on the unit and would feel comfortable seeking staff support should suicidal symptoms or urges appear or worsen. Deyvi remains behaviorally appropriate, no agitation or aggression noted on exam or in report. Deyvi also denies HI/AH/VH, and does not appear overtly manic.  Patient does not verbalize any experiences that can be categorized as paranoid, persecutory, bizarre, or somatic delusions. Deyvi remains adherent to his current psychotropic medication regimen and denies any side effects from medications, as well as none noted on exam.    Group Attendance: 5 / 9  Treatment Team: SOLEDAD  Psychiatric PRN's Needed: None    Review of Systems:  Behavior over the last 24 hours: Slowly improving  Sleep: sleeping okay throughout the night  Appetite: adequate  Medication side effects: none reported  ROS:no complaints, all other systems are negative    Objective:    Vitals:  Vitals:    07/26/24 0730   BP: 138/74   Pulse: 84   Resp: 18   Temp: 97.9 °F (36.6 °C)   SpO2: 98%     Laboratory Results:  I have personally reviewed all pertinent laboratory/tests results.  Most Recent Labs:   Lab Results   Component Value Date    WBC 7.39 06/26/2024    RBC 4.70 06/26/2024    HGB 15.2 06/26/2024    HCT 45.5 06/26/2024     06/26/2024    RDW 12.9 06/26/2024    NEUTROABS 4.63 06/26/2024    SODIUM 137 06/26/2024    K 4.1 06/26/2024     06/26/2024    CO2 26 06/26/2024    BUN 17 06/26/2024    CREATININE 0.63 06/26/2024    GLUC 98 06/26/2024    GLUF 98 06/26/2024    CALCIUM 9.6 06/26/2024    AST 25 06/26/2024    ALT 45 06/26/2024    ALKPHOS 114 (H) 06/26/2024    TP 7.0 06/26/2024    ALB 4.4 06/26/2024    TBILI 0.44 06/26/2024    CHOLESTEROL 177 06/26/2024    HDL 52 06/26/2024    TRIG 73 06/26/2024    LDLCALC 110 (H) 06/26/2024    NONHDLC 125 06/26/2024    LITHIUM 0.4 (L) 01/28/2021    VCX4HHQVIJXT 2.636 06/26/2024    HGBA1C 5.2  11/22/2023     11/22/2023     Mental Status Evaluation:  Appearance:  age appropriate, casually dressed   Behavior:  guarded   Speech:  scant, soft   Mood:  less anxious, less depressed   Affect:  slightly brighter   Thought Process:  goal directed   Associations: intact associations   Thought Content:  no overt delusions   Perceptual Disturbances: no auditory hallucinations, no visual hallucinations, denies when asked, does not appear responding to internal stimuli   Risk Potential: Suicidal ideation - None at present, contracts for safety on the unit, would talk to staff if not feeling safe on the unit  Homicidal ideation - None at present  Potential for aggression - Not at present   Sensorium:  oriented to person, place, and time/date   Memory:  recent memory intact   Consciousness:  alert and awake   Attention/Concentration: attention span and concentration appear shorter than expected for age   Insight:  fair and improving   Judgment: fair and improving   Gait/Station: normal gait/station, normal balance   Motor Activity: no abnormal movements     Progress Toward Goals: making gradual improvement.  Deyvi continues to require inpatient psychiatric hospitalization for continued medication management and titration to optimize symptom reduction, improve sleep hygiene, and demonstrate adequate self-care.     Suicide/Homicide Risk Assessment:  Risk of Harm to Self:   Nursing Suicide Risk Assessment Last 24 hours: C-SSRS Risk (Since Last Contact)  Calculated C-SSRS Risk Score (Since Last Contact): No Risk Indicated    Risk of Harm to Others:  Nursing Homicide Risk Assessment: Violence Risk to Others: Denies within past 6 months    Behavioral Health Medications: all current active meds have been reviewed and continue current psychiatric medications.  Current Facility-Administered Medications   Medication Dose Route Frequency Provider Last Rate    acetaminophen  650 mg Oral Q6H PRN ALVINA Harley       acetaminophen  650 mg Oral Q4H PRN ALVINA Harley      acetaminophen  975 mg Oral Q6H PRN ALVINA Harley      aluminum-magnesium hydroxide-simethicone  30 mL Oral Q4H PRN ALVINA Harley      atorvastatin  10 mg Oral Daily Sary ALVINA Gupta      benztropine  1 mg Intramuscular Q4H PRN Max 6/day ALVINA Harley      benztropine  1 mg Oral Q4H PRN Max 6/day ALVINA Harley      bisacodyl  10 mg Rectal Daily PRN ALVINA Harley      cariprazine  3 mg Oral Daily Martin Cardenas MD      Cholecalciferol  2,000 Units Oral Daily Sary ALVINA Gupta      cyanocobalamin  1,000 mcg Oral Daily SaryALVINA Starkey      hydrOXYzine HCL  25 mg Oral Q6H PRN Max 4/day ALVINA Harley      hydrOXYzine HCL  25 mg Oral TID Martin Cardenas MD      hydrOXYzine HCL  50 mg Oral Q4H PRN Max 4/day ALVINA Harley      Or    LORazepam  1 mg Intramuscular Q4H PRN ALVINA Harley      lamoTRIgine  50 mg Oral Daily Martin Cardenas MD      lisinopril  10 mg Oral Daily ALVINA Lewis      LORazepam  1 mg Oral Q4H PRN Max 6/day ALVINA Harley      Or    LORazepam  2 mg Intramuscular Q6H PRN Max 3/day ALVINA Harley      OLANZapine  10 mg Oral Q3H PRN Max 3/day ALVINA Harley      Or    OLANZapine  10 mg Intramuscular Q3H PRN Max 3/day ALVINA Harley      OLANZapine  5 mg Oral Q3H PRN Max 6/day ALVINA Harley      Or    OLANZapine  5 mg Intramuscular Q3H PRN Max 6/day ALVINA Halrey      OLANZapine  2.5 mg Oral Q3H PRN Max 8/day ALVINA Harley      polyethylene glycol  17 g Oral Daily PRN ALVINA Harley      propranolol  10 mg Oral Q8H PRN ALVINA Harley      senna-docusate sodium  1 tablet Oral Daily PRN ALVINA Harley      sertraline  150 mg Oral HS Martin T Ronald, MD       Risks / Benefits of Treatment:  Risks, benefits, and possible side effects of medications explained to patient. Patient has limited understanding of risks and benefits of  treatment at this time, but agrees to take medications as prescribed.    Counseling / Coordination of Care:  Total floor/unit time spent today 25 minutes. Greater than 50% of total time was spent with the patient and / or family counseling and / or coordination of care. A description of the counseling / coordination of care:   Patient's progress discussed with staff in treatment team meeting.  Medications, treatment progress and treatment plan reviewed with patient.   Educated on importance of medication and treatment compliance.  Reassurance and supportive therapy provided.   Encouraged participation in milieu and group therapy on the unit.    ALVINA Harley 07/26/24

## 2024-07-26 NOTE — PROGRESS NOTES
07/26/24 0753   Team Meeting   Meeting Type Daily Rounds   Team Members Present   Team Members Present Physician;Nurse;;Other (Discipline and Name)   Patient/Family Present   Patient Present No   Patient's Family Present No     In attendance:  Dr. Alex Thomas, MD Dr. Jordan Holter, ALVINA Melgoza, RN  Judi Garcia, JAYLONW  Gris Arizmendi M.S.    Groups: 5/9    Pt successfully paid bills for August. No bx issues noted. Pt remains flat/quiet.

## 2024-07-26 NOTE — NURSING NOTE
Received pt in bed at change of shift with eyes closed; chest movement noted.  Continues the same thus this far as per q 7 min room checks.   Will continue to monitor behavior, sleeping pattern and any medical issues that may arise.    0546:  Sleeping 7+ hrs thus this far

## 2024-07-26 NOTE — PLAN OF CARE
Problem: Ineffective Coping  Goal: Identifies ineffective coping skills  Outcome: Progressing  Goal: Identifies healthy coping skills  Outcome: Progressing  Goal: Demonstrates healthy coping skills  Outcome: Progressing  Goal: Participates in unit activities  Description: Interventions:  - Provide therapeutic environment   - Provide required programming   - Redirect inappropriate behaviors   Outcome: Progressing    Attended 20/35 of the groups offered during the past 4 days.

## 2024-07-26 NOTE — CMS CERTIFICATION NOTE
Recertification: Based upon physical, mental and social evaluations, I certify that inpatient psychiatric services continue to be medically necessary for this patient for a duration of 30 midnights for the treatment of  Bipolar disorder with severe depression (HCC) Available alternative community resources still do not meet the patient's mental health care needs. I further attest that an established written individualized plan of care has been updated and is outlined in the patient's medical records.

## 2024-07-26 NOTE — PLAN OF CARE
Problem: Alteration in Thoughts and Perception  Goal: Treatment Goal: Gain control of psychotic behaviors/thinking, reduce/eliminate presenting symptoms and demonstrate improved reality functioning upon discharge  Outcome: Progressing  Goal: Verbalize thoughts and feelings  Description: Interventions:  - Promote a nonjudgmental and trusting relationship with the patient through active listening and therapeutic communication  - Assess patient's level of functioning, behavior and potential for risk  - Engage patient in 1 on 1 interactions  - Encourage patient to express fears, feelings, frustrations, and discuss symptoms    - [place] patient to reality, help patient recognize reality-based thinking   - Administer medications as ordered and assess for potential side effects  - Provide the patient education related to the signs and symptoms of the illness and desired effects of prescribed medications  Outcome: Progressing  Goal: Refrain from acting on delusional thinking/internal stimuli  Description: Interventions:  - Monitor patient closely, per order   - Utilize least restrictive measures   - Set reasonable limits, give positive feedback for acceptable   - Administer medications as ordered and monitor of potential side effects  Outcome: Progressing  Goal: Agree to be compliant with medication regime, as prescribed and report medication side effects  Description: Interventions:  - Offer appropriate PRN medication and supervise ingestion; conduct AIMS, as needed   Outcome: Progressing  Goal: Attend and participate in unit activities, including therapeutic, recreational, and educational groups  Description: Interventions:  -Encourage Visitation and family involvement in care  Outcome: Progressing  Goal: Recognize dysfunctional thoughts, communicate reality-based thoughts at the time of discharge  Description: Interventions:  - Provide medication and psycho-education to assist patient in compliance and developing  insight into his/her illness   Outcome: Progressing  Goal: Complete daily ADLs, including personal hygiene independently, as able  Description: Interventions:  - Observe, teach, and assist patient with ADLS  - Monitor and promote a balance of rest/activity, with adequate nutrition and elimination   Outcome: Progressing     Problem: Ineffective Coping  Goal: Demonstrates healthy coping skills  Outcome: Progressing  Goal: Participates in unit activities  Description: Interventions:  - Provide therapeutic environment   - Provide required programming   - Redirect inappropriate behaviors   Outcome: Progressing     Problem: Risk for Self Injury/Neglect  Goal: Treatment Goal: Remain safe during length of stay, learn and adopt new coping skills, and be free of self-injurious ideation, impulses and acts at the time of discharge  Outcome: Progressing  Goal: Verbalize thoughts and feelings  Description: Interventions:  - Assess and re-assess patient's lethality and potential for self-injury  - Engage patient in 1:1 interactions, daily, for a minimum of 15 minutes  - Encourage patient to express feelings, fears, frustrations, hopes  - Establish rapport/trust with patient   Outcome: Progressing  Goal: Refrain from harming self  Description: Interventions:  - Monitor patient closely, per order  - Develop a trusting relationship  - Supervise medication ingestion, monitor effects and side effects   Outcome: Progressing  Goal: Complete daily ADLs, including personal hygiene independently, as able  Description: Interventions:  - Observe, teach, and assist patient with ADLS  - Monitor and promote a balance of rest/activity, with adequate nutrition and elimination  Outcome: Progressing     Problem: Depression  Goal: Treatment Goal: Demonstrate behavioral control of depressive symptoms, verbalize feelings of improved mood/affect, and adopt new coping skills prior to discharge  Outcome: Progressing  Goal: Verbalize thoughts and  feelings  Description: Interventions:  - Assess and re-assess patient's level of risk   - Engage patient in 1:1 interactions, daily, for a minimum of 15 minutes   - Encourage patient to express feelings, fears, frustrations, hopes   Outcome: Progressing  Goal: Refrain from harming self  Description: Interventions:  - Monitor patient closely, per order   - Supervise medication ingestion, monitor effects and side effects   Outcome: Progressing  Goal: Refrain from isolation  Description: Interventions:  - Develop a trusting relationship   - Encourage socialization   Outcome: Progressing  Goal: Refrain from self-neglect  Outcome: Progressing  Goal: Complete daily ADLs, including personal hygiene independently, as able  Description: Interventions:  - Observe, teach, and assist patient with ADLS  -  Monitor and promote a balance of rest/activity, with adequate nutrition and elimination   Outcome: Progressing     Problem: Anxiety  Goal: Anxiety is at manageable level  Description: Interventions:  - Assess and monitor patient's anxiety level.   - Monitor for signs and symptoms (heart palpitations, chest pain, shortness of breath, headaches, nausea, feeling jumpy, restlessness, irritable, apprehensive).   - Collaborate with interdisciplinary team and initiate plan and interventions as ordered.  - [place] patient to unit/surroundings  - Explain treatment plan  - Encourage participation in care  - Encourage verbalization of concerns/fears  - Identify coping mechanisms  - Assist in developing anxiety-reducing skills  - Administer/offer alternative therapies  - Limit or eliminate stimulants  Outcome: Progressing     Problem: Risk for Violence/Aggression Toward Others  Goal: Treatment Goal: Refrain from acts of violence/aggression during length of stay, and demonstrate improved impulse control at the time of discharge  Outcome: Progressing     Problem: Alteration in Orientation  Goal: Treatment Goal: Demonstrate a reduction of  confusion and improved orientation to person, place, time and/or situation upon discharge, according to optimum baseline/ability  Outcome: Progressing  Goal: Interact with staff daily  Description: Interventions:  - Assess and re-assess patient's level of orientation  - Engage patient in 1 on 1 interactions, daily, for a minimum of 15 minutes   - Establish rapport/trust with patient   Outcome: Progressing     Problem: DEPRESSION  Goal: Will be euthymic at discharge  Description: INTERVENTIONS:  - Administer medication as ordered  - Provide emotional support via 1:1 interaction with staff  - Encourage involvement in milieu/groups/activities  - Monitor for social isolation  Outcome: Progressing     Problem: ANXIETY  Goal: Will report anxiety at manageable levels  Description: INTERVENTIONS:  - Administer medication as ordered  - Teach and encourage coping skills  - Provide emotional support  - Assess patient/family for anxiety and ability to cope  Outcome: Progressing     Problem: SELF CARE DEFICIT  Goal: Return ADL status to a safe level of function  Description: INTERVENTIONS:  - Administer medication as ordered  - Assess ADL deficits and provide assistive devices as needed  - Obtain PT/OT consults as needed  - Assist and instruct patient to increase activity and self care as tolerated  Outcome: Progressing     Problem: DISCHARGE PLANNING - CARE MANAGEMENT  Goal: Discharge to post-acute care or home with appropriate resources  Description: INTERVENTIONS:  - Conduct assessment to determine patient/family and health care team treatment goals, and need for post-acute services based on payer coverage, community resources, and patient preferences, and barriers to discharge  - Address psychosocial, clinical, and financial barriers to discharge as identified in assessment in conjunction with the patient/family and health care team  - Arrange appropriate level of post-acute services according to patient’s   needs and  preference and payer coverage in collaboration with the physician and health care team  - Communicate with and update the patient/family, physician, and health care team regarding progress on the discharge plan  - Arrange appropriate transportation to post-acute venues  Outcome: Progressing

## 2024-07-26 NOTE — NURSING NOTE
Patient isolative to self in room, calm, behaviors controlled, offered no complaints. Patient visible for snack, calm and cooperative upon approach. Patient compliant with bedtime meds. Will continue to monitor.

## 2024-07-27 PROCEDURE — 99232 SBSQ HOSP IP/OBS MODERATE 35: CPT

## 2024-07-27 RX ADMIN — CARIPRAZINE 3 MG: 3 CAPSULE, GELATIN COATED ORAL at 08:20

## 2024-07-27 RX ADMIN — HYDROXYZINE HYDROCHLORIDE 25 MG: 25 TABLET ORAL at 08:19

## 2024-07-27 RX ADMIN — HYDROXYZINE HYDROCHLORIDE 25 MG: 25 TABLET ORAL at 17:16

## 2024-07-27 RX ADMIN — ATORVASTATIN CALCIUM 10 MG: 10 TABLET, FILM COATED ORAL at 08:19

## 2024-07-27 RX ADMIN — CYANOCOBALAMIN TAB 1000 MCG 1000 MCG: 1000 TAB at 08:20

## 2024-07-27 RX ADMIN — HYDROXYZINE HYDROCHLORIDE 25 MG: 25 TABLET ORAL at 21:19

## 2024-07-27 RX ADMIN — LAMOTRIGINE 50 MG: 25 TABLET ORAL at 08:20

## 2024-07-27 RX ADMIN — CHOLECALCIFEROL TAB 25 MCG (1000 UNIT) 2000 UNITS: 25 TAB at 08:20

## 2024-07-27 RX ADMIN — LISINOPRIL 10 MG: 10 TABLET ORAL at 08:19

## 2024-07-27 RX ADMIN — SERTRALINE HYDROCHLORIDE 150 MG: 100 TABLET ORAL at 21:19

## 2024-07-27 NOTE — PROGRESS NOTES
Progress Note - Behavioral Health   Deyvi Cao 54 y.o. male MRN: [id number]  Unit/Bed#: [facility] 112-02 Encounter: [id number]      Subjective:     Documentation, nursing notes, medication reconciliation, labs, and vitals reviewed. Patient was seen for continuing care and reviewed with treatment team.  No acute events over the past 24 hours. Per nursing report, patient remains anxious and isolative although has been reporting anxiety and depression are at baseline. No medication changes over the past 24 hours.     On evaluation today, Deyvi is laying in bed awake.  He rates depression a 2 on a 0-10 scale with 10 being most severe.  He is isolative to self with mildly depressed mood and affect is flat.  He reports anxiety has been manageable.  He denies passive or active SI/HI with plan or intent.  He denies AVH and no delusional content elicited throughout conversation.  He reports he has been sleeping and eating without issue.  He denies any concerns at this time.    Continues to tolerate current medications with no adverse effects.     No self-harming/suicidal ideation, plan, or intent upon direct inquiry. No thoughts to harm others.  No agitation or aggression noted. Denies perceptual disturbances, with no delusional or paranoid content elicited. Does not appear overtly manic. Offers no further complaints.       Psychiatric ROS:  Behavior over the last 24 hours: unchanged  Sleep: normal  Appetite: normal  Medication side effects: No   ROS: no complaints, all other systems are negative      Mental Status Evaluation:    Appearance:  age appropriate, casually dressed, marginal hygiene   Behavior:  cooperative, calm, laying on bed   Speech:  scant, soft   Mood:  mildly depressed   Affect:  flat   Thought Process:  coherent, goal directed   Associations: concrete associations   Thought Content:  no overt delusions   Perceptual Disturbances: denies auditory or visual hallucinations when asked   Risk Potential:  Suicidal ideation - None at present  Homicidal ideation - None at present  Potential for aggression - No   Sensorium:  oriented to person, place, and time/date   Memory:  recent and remote memory grossly intact   Consciousness:  alert and awake   Attention/Concentration: attention span and concentration are age appropriate   Insight:  limited   Judgment: limited   Gait/Station: normal gait/station   Motor Activity: no abnormal movements       Vital signs in last 24 hours:    Temp:  [97.9 °F (36.6 °C)] 97.9 °F (36.6 °C)  HR:  [80-90] 90  Resp:  [18] 18  BP: (122-135)/(63-67) 135/63    Laboratory results: I have personally reviewed all pertinent laboratory/tests results    Results from the past 24 hours: No results found for this or any previous visit (from the past 24 hour(s)).      Progress Toward Goals: no significant improvement today    Suicide/Homicide Risk Assessment:    Risk of Harm to Self:   Nursing Suicide Risk Assessment Last 24 hours: C-SSRS Risk (Since Last Contact)  Calculated C-SSRS Risk Score (Since Last Contact): No Risk Indicated    Risk of Harm to Others:  Nursing Homicide Risk Assessment: Violence Risk to Others: Denies within past 6 months    Assessment & Plan   Principal Problem:    Bipolar disorder with severe depression (HCC)  Active Problems:    Benign essential hypertension    Mixed hyperlipidemia    Allergic rhinitis due to allergen    Medical clearance for psychiatric admission    [place]      Recommended Treatment:     Planned medication and treatment changes:    All current active medications have been reviewed  Encourage group therapy, milieu therapy and occupational therapy  Behavioral Health checks every 7 minutes  Continue with SLIM medical management as indicated  Disposition planning ongoing    Continue current medications:    Current Facility-Administered Medications   Medication Dose Route Frequency Provider Last Rate    acetaminophen  650 mg Oral Q6H PRN ALVINA Harley       acetaminophen  650 mg Oral Q4H PRN ALVINA Harley      acetaminophen  975 mg Oral Q6H PRN ALVINA Harley      aluminum-magnesium hydroxide-simethicone  30 mL Oral Q4H PRN ALVINA Harley      atorvastatin  10 mg Oral Daily Sary ALVINA Gupta      benztropine  1 mg Intramuscular Q4H PRN Max 6/day ALVINA Harley      benztropine  1 mg Oral Q4H PRN Max 6/day ALVINA Harley      bisacodyl  10 mg Rectal Daily PRN ALVINA Harley      cariprazine  3 mg Oral Daily Martin Cardenas MD      Cholecalciferol  2,000 Units Oral Daily Sary ALVINA Gupta      cyanocobalamin  1,000 mcg Oral Daily SaryALVINA Starkey      hydrOXYzine HCL  25 mg Oral Q6H PRN Max 4/day ALVINA Harley      hydrOXYzine HCL  25 mg Oral TID Martin Cardenas MD      hydrOXYzine HCL  50 mg Oral Q4H PRN Max 4/day ALVINA Harley      Or    LORazepam  1 mg Intramuscular Q4H PRN ALVINA Harley      lamoTRIgine  50 mg Oral Daily Martin Cardenas MD      lisinopril  10 mg Oral Daily ALVINA Lewis      LORazepam  1 mg Oral Q4H PRN Max 6/day ALVINA Harley      Or    LORazepam  2 mg Intramuscular Q6H PRN Max 3/day ALVINA Harley      OLANZapine  10 mg Oral Q3H PRN Max 3/day ALVINA Harley      Or    OLANZapine  10 mg Intramuscular Q3H PRN Max 3/day ALVINA Harley      OLANZapine  5 mg Oral Q3H PRN Max 6/day ALVINA Harley      Or    OLANZapine  5 mg Intramuscular Q3H PRN Max 6/day ALVINA Harley      OLANZapine  2.5 mg Oral Q3H PRN Max 8/day ALVINA Harley      polyethylene glycol  17 g Oral Daily PRN ALVINA Harley      propranolol  10 mg Oral Q8H PRN ALVINA Harley      senna-docusate sodium  1 tablet Oral Daily PRN ALVINA Harley      sertraline  150 mg Oral HS Martin T Ronald, MD           Risks / Benefits of Treatment:    Risks, benefits, and possible side effects of medications explained to patient and patient verbalizes understanding and agreement  for treatment.    Counseling / Coordination of Care:    Patient's progress discussed with staff in treatment team meeting.  Medications, treatment progress and treatment plan reviewed with patient.    Note Share    This note was not shared with the patient due to reasonable likelihood of causing patient harm    ALVINA Whitney 07/27/24

## 2024-07-27 NOTE — NURSING NOTE
Alert, cooperative and visible intermittently. Isolative to self. No SI or HI noted. Denies depression, anxiety and pain. Attended community meeting. Consumed 100% of breakfast and 100% of lunch. Took all medication without prompting. Maintained on safe precautions without incident. Will continue to monitor progress and recovery program.

## 2024-07-27 NOTE — PLAN OF CARE
Problem: Alteration in Thoughts and Perception  Goal: Verbalize thoughts and feelings  Description: Interventions:  - Promote a nonjudgmental and trusting relationship with the patient through active listening and therapeutic communication  - Assess patient's level of functioning, behavior and potential for risk  - Engage patient in 1 on 1 interactions  - Encourage patient to express fears, feelings, frustrations, and discuss symptoms    - [place] patient to reality, help patient recognize reality-based thinking   - Administer medications as ordered and assess for potential side effects  - Provide the patient education related to the signs and symptoms of the illness and desired effects of prescribed medications  Outcome: Not Progressing  Goal: Refrain from acting on delusional thinking/internal stimuli  Description: Interventions:  - Monitor patient closely, per order   - Utilize least restrictive measures   - Set reasonable limits, give positive feedback for acceptable   - Administer medications as ordered and monitor of potential side effects  Outcome: Progressing  Goal: Agree to be compliant with medication regime, as prescribed and report medication side effects  Description: Interventions:  - Offer appropriate PRN medication and supervise ingestion; conduct AIMS, as needed   Outcome: Progressing  Goal: Attend and participate in unit activities, including therapeutic, recreational, and educational groups  Description: Interventions:  -Encourage Visitation and family involvement in care  Outcome: Not Progressing  Goal: Recognize dysfunctional thoughts, communicate reality-based thoughts at the time of discharge  Description: Interventions:  - Provide medication and psycho-education to assist patient in compliance and developing insight into his/her illness   Outcome: Not Progressing  Goal: Complete daily ADLs, including personal hygiene independently, as able  Description: Interventions:  - Observe, teach, and  assist patient with ADLS  - Monitor and promote a balance of rest/activity, with adequate nutrition and elimination   Outcome: Progressing     Problem: Ineffective Coping  Goal: Identifies ineffective coping skills  Outcome: Not Progressing  Goal: Identifies healthy coping skills  Outcome: Not Progressing  Goal: Demonstrates healthy coping skills  Outcome: Not Progressing  Goal: Participates in unit activities  Description: Interventions:  - Provide therapeutic environment   - Provide required programming   - Redirect inappropriate behaviors   Outcome: Not Progressing  Goal: Patient/Family participate in treatment and DC plans  Description: Interventions:  - Provide therapeutic environment  Outcome: Not Progressing  Goal: Patient/Family verbalizes awareness of resources  Outcome: Not Progressing  Goal: Understands least restrictive measures  Description: Interventions:  - Utilize least restrictive behavior  Outcome: Progressing  Goal: Free from restraint events  Description: - Utilize least restrictive measures   - Provide behavioral interventions   - Redirect inappropriate behaviors   Outcome: Progressing     Problem: Risk for Self Injury/Neglect  Goal: Verbalize thoughts and feelings  Description: Interventions:  - Assess and re-assess patient's lethality and potential for self-injury  - Engage patient in 1:1 interactions, daily, for a minimum of 15 minutes  - Encourage patient to express feelings, fears, frustrations, hopes  - Establish rapport/trust with patient   Outcome: Not Progressing  Goal: Refrain from harming self  Description: Interventions:  - Monitor patient closely, per order  - Develop a trusting relationship  - Supervise medication ingestion, monitor effects and side effects   Outcome: Progressing  Goal: Attend and participate in unit activities, including therapeutic, recreational, and educational groups  Description: Interventions:  - Provide therapeutic and educational activities daily, encourage  attendance and participation, and document same in the medical record  - Obtain collateral information, encourage visitation and family involvement in care   Outcome: Not Progressing  Goal: Complete daily ADLs, including personal hygiene independently, as able  Description: Interventions:  - Observe, teach, and assist patient with ADLS  - Monitor and promote a balance of rest/activity, with adequate nutrition and elimination  Outcome: Progressing     Problem: Depression  Goal: Verbalize thoughts and feelings  Description: Interventions:  - Assess and re-assess patient's level of risk   - Engage patient in 1:1 interactions, daily, for a minimum of 15 minutes   - Encourage patient to express feelings, fears, frustrations, hopes   Outcome: Not Progressing  Goal: Refrain from harming self  Description: Interventions:  - Monitor patient closely, per order   - Supervise medication ingestion, monitor effects and side effects   Outcome: Progressing  Goal: Refrain from isolation  Description: Interventions:  - Develop a trusting relationship   - Encourage socialization   Outcome: Not Progressing  Goal: Attend and participate in unit activities, including therapeutic, recreational, and educational groups  Description: Interventions:  - Provide therapeutic and educational activities daily, encourage attendance and participation, and document same in the medical record   Outcome: Not Progressing  Goal: Complete daily ADLs, including personal hygiene independently, as able  Description: Interventions:  - Observe, teach, and assist patient with ADLS  -  Monitor and promote a balance of rest/activity, with adequate nutrition and elimination   Outcome: Progressing     Problem: Anxiety  Goal: Anxiety is at manageable level  Description: Interventions:  - Assess and monitor patient's anxiety level.   - Monitor for signs and symptoms (heart palpitations, chest pain, shortness of breath, headaches, nausea, feeling jumpy, restlessness,  irritable, apprehensive).   - Collaborate with interdisciplinary team and initiate plan and interventions as ordered.  - [place] patient to unit/surroundings  - Explain treatment plan  - Encourage participation in care  - Encourage verbalization of concerns/fears  - Identify coping mechanisms  - Assist in developing anxiety-reducing skills  - Administer/offer alternative therapies  - Limit or eliminate stimulants  Outcome: Not Progressing     Problem: Risk for Violence/Aggression Toward Others  Goal: Verbalize thoughts and feelings  Description: Interventions:  - Assess and re-assess patient's level of risk, every waking shift  - Engage patient in 1:1 interactions, daily, for a minimum of 15 minutes   - Allow patient to express feelings and frustrations in a safe and non-threatening manner   - Establish rapport/trust with patient   Outcome: Not Progressing  Goal: Control angry outbursts  Description: Interventions:  - Monitor patient closely, per order  - Ensure early verbal de-escalation  - Monitor prn medication needs  - Set reasonable/therapeutic limits, outline behavioral expectations, and consequences   - Provide a non-threatening milieu, utilizing the least restrictive interventions   Outcome: Progressing  Goal: Attend and participate in unit activities, including therapeutic, recreational, and educational groups  Description: Interventions:  - Provide therapeutic and educational activities daily, encourage attendance and participation, and document same in the medical record   Outcome: Not Progressing  Goal: Identify appropriate positive anger management techniques  Description: Interventions:  - Offer anger management and coping skills groups   - Staff will provide positive feedback for appropriate anger control  Outcome: Not Progressing     Problem: Alteration in Orientation  Goal: Interact with staff daily  Description: Interventions:  - Assess and re-assess patient's level of orientation  - Engage patient  in 1 on 1 interactions, daily, for a minimum of 15 minutes   - Establish rapport/trust with patient   Outcome: Progressing  Goal: Express concerns related to confused thinking related to:  Description: Interventions:  - Encourage patient to express feelings, fears, frustrations, hopes  - Assign consistent caregivers   - [place]/re-orient patient as needed  - Allow comfort items, as appropriate  - Provide visual cues, signs, etc.   Outcome: Not Progressing  Goal: Allow medical examinations, as recommended  Description: Interventions:  - Provide physical/neurological exams and/or referrals, per provider   Outcome: Progressing  Goal: Cooperate with recommended testing/procedures  Description: Interventions:  - Determine need for ancillary testing  - Observe for mental status changes  - Implement falls/precaution protocol   Outcome: Progressing  Goal: Attend and participate in unit activities, including therapeutic, recreational, and educational groups  Description: Interventions:  - Provide therapeutic and educational activities daily, encourage attendance and participation, and document same in the medical record   - Provide appropriate opportunities for reminiscence   - Provide a consistent daily routine   - Encourage family contact/visitation   Outcome: Not Progressing  Goal: Complete daily ADLs, including personal hygiene independently, as able  Description: Interventions:  - Observe, teach, and assist patient with ADLS  Outcome: Progressing     Problem: Individualized Interventions  Goal: Patient will recognize inappropriate behaviors and develop alternative behaviors within 5 days  Description: Interventions:  - Patient in collaboration with Treatment Team will develop a behavior management plan to help identify effective coping skills to deal with stressors  Outcome: Completed     Problem: SELF HARM/SUICIDALITY  Goal: Will have no self-injury during hospital stay  Description: INTERVENTIONS:  - Q 15 MINUTES:  Routine safety checks  - Q WAKING SHIFT & PRN: Assess risk to determine if routine checks are adequate to maintain patient safety  - Encourage patient to participate actively in care by formulating a plan to combat response to suicidal ideation, identify supports and resources  Outcome: Progressing     Problem: DEPRESSION  Goal: Will be euthymic at discharge  Description: INTERVENTIONS:  - Administer medication as ordered  - Provide emotional support via 1:1 interaction with staff  - Encourage involvement in milieu/groups/activities  - Monitor for social isolation  Outcome: Not Progressing     Problem: ANXIETY  Goal: Will report anxiety at manageable levels  Description: INTERVENTIONS:  - Administer medication as ordered  - Teach and encourage coping skills  - Provide emotional support  - Assess patient/family for anxiety and ability to cope  Outcome: Not Progressing     Problem: SELF CARE DEFICIT  Goal: Return ADL status to a safe level of function  Description: INTERVENTIONS:  - Administer medication as ordered  - Assess ADL deficits and provide assistive devices as needed  - Obtain PT/OT consults as needed  - Assist and instruct patient to increase activity and self care as tolerated  Outcome: Not Progressing     Problem: DISCHARGE PLANNING - CARE MANAGEMENT  Goal: Discharge to post-acute care or home with appropriate resources  Description: INTERVENTIONS:  - Conduct assessment to determine patient/family and health care team treatment goals, and need for post-acute services based on payer coverage, community resources, and patient preferences, and barriers to discharge  - Address psychosocial, clinical, and financial barriers to discharge as identified in assessment in conjunction with the patient/family and health care team  - Arrange appropriate level of post-acute services according to patient’s   needs and preference and payer coverage in collaboration with the physician and health care team  - Communicate  with and update the patient/family, physician, and health care team regarding progress on the discharge plan  - Arrange appropriate transportation to post-acute venues  Outcome: Not Progressing

## 2024-07-28 PROCEDURE — 99232 SBSQ HOSP IP/OBS MODERATE 35: CPT

## 2024-07-28 RX ADMIN — ATORVASTATIN CALCIUM 10 MG: 10 TABLET, FILM COATED ORAL at 08:43

## 2024-07-28 RX ADMIN — LAMOTRIGINE 50 MG: 25 TABLET ORAL at 08:44

## 2024-07-28 RX ADMIN — CARIPRAZINE 3 MG: 3 CAPSULE, GELATIN COATED ORAL at 08:43

## 2024-07-28 RX ADMIN — HYDROXYZINE HYDROCHLORIDE 25 MG: 25 TABLET ORAL at 21:28

## 2024-07-28 RX ADMIN — HYDROXYZINE HYDROCHLORIDE 25 MG: 25 TABLET ORAL at 08:44

## 2024-07-28 RX ADMIN — CYANOCOBALAMIN TAB 1000 MCG 1000 MCG: 1000 TAB at 08:44

## 2024-07-28 RX ADMIN — LISINOPRIL 10 MG: 10 TABLET ORAL at 08:43

## 2024-07-28 RX ADMIN — HYDROXYZINE HYDROCHLORIDE 25 MG: 25 TABLET ORAL at 17:24

## 2024-07-28 RX ADMIN — CHOLECALCIFEROL TAB 25 MCG (1000 UNIT) 2000 UNITS: 25 TAB at 08:44

## 2024-07-28 RX ADMIN — SERTRALINE HYDROCHLORIDE 150 MG: 100 TABLET ORAL at 21:28

## 2024-07-28 NOTE — PLAN OF CARE
Problem: Alteration in Thoughts and Perception  Goal: Verbalize thoughts and feelings  Description: Interventions:  - Promote a nonjudgmental and trusting relationship with the patient through active listening and therapeutic communication  - Assess patient's level of functioning, behavior and potential for risk  - Engage patient in 1 on 1 interactions  - Encourage patient to express fears, feelings, frustrations, and discuss symptoms    - [place] patient to reality, help patient recognize reality-based thinking   - Administer medications as ordered and assess for potential side effects  - Provide the patient education related to the signs and symptoms of the illness and desired effects of prescribed medications  Outcome: Progressing  Goal: Refrain from acting on delusional thinking/internal stimuli  Description: Interventions:  - Monitor patient closely, per order   - Utilize least restrictive measures   - Set reasonable limits, give positive feedback for acceptable   - Administer medications as ordered and monitor of potential side effects  Outcome: Not Progressing  Goal: Agree to be compliant with medication regime, as prescribed and report medication side effects  Description: Interventions:  - Offer appropriate PRN medication and supervise ingestion; conduct AIMS, as needed   Outcome: Progressing  Goal: Attend and participate in unit activities, including therapeutic, recreational, and educational groups  Description: Interventions:  -Encourage Visitation and family involvement in care  Outcome: Not Progressing  Goal: Recognize dysfunctional thoughts, communicate reality-based thoughts at the time of discharge  Description: Interventions:  - Provide medication and psycho-education to assist patient in compliance and developing insight into his/her illness   Outcome: Not Progressing  Goal: Complete daily ADLs, including personal hygiene independently, as able  Description: Interventions:  - Observe, teach, and  assist patient with ADLS  - Monitor and promote a balance of rest/activity, with adequate nutrition and elimination   Outcome: Progressing     Problem: Ineffective Coping  Goal: Cooperates with admission process  Description: Interventions:   - Complete admission process  Outcome: Completed     Problem: Ineffective Coping  Goal: Cooperates with admission process  Description: Interventions:   - Complete admission process  Outcome: Completed  Goal: Identifies ineffective coping skills  Outcome: Progressing  Goal: Identifies healthy coping skills  Outcome: Progressing  Goal: Demonstrates healthy coping skills  Outcome: Not Progressing  Goal: Participates in unit activities  Description: Interventions:  - Provide therapeutic environment   - Provide required programming   - Redirect inappropriate behaviors   Outcome: Not Progressing  Goal: Patient/Family participate in treatment and DC plans  Description: Interventions:  - Provide therapeutic environment  Outcome: Not Progressing  Goal: Patient/Family verbalizes awareness of resources  Outcome: Not Progressing  Goal: Understands least restrictive measures  Description: Interventions:  - Utilize least restrictive behavior  Outcome: Progressing  Goal: Free from restraint events  Description: - Utilize least restrictive measures   - Provide behavioral interventions   - Redirect inappropriate behaviors   Outcome: Progressing     Problem: Ineffective Coping  Goal: Cooperates with admission process  Description: Interventions:   - Complete admission process  Outcome: Completed  Goal: Identifies ineffective coping skills  Outcome: Progressing  Goal: Identifies healthy coping skills  Outcome: Progressing  Goal: Demonstrates healthy coping skills  Outcome: Not Progressing  Goal: Participates in unit activities  Description: Interventions:  - Provide therapeutic environment   - Provide required programming   - Redirect inappropriate behaviors   Outcome: Not Progressing  Goal:  Patient/Family participate in treatment and DC plans  Description: Interventions:  - Provide therapeutic environment  Outcome: Not Progressing  Goal: Patient/Family verbalizes awareness of resources  Outcome: Not Progressing  Goal: Understands least restrictive measures  Description: Interventions:  - Utilize least restrictive behavior  Outcome: Progressing  Goal: Free from restraint events  Description: - Utilize least restrictive measures   - Provide behavioral interventions   - Redirect inappropriate behaviors   Outcome: Progressing     Problem: Risk for Self Injury/Neglect  Goal: Verbalize thoughts and feelings  Description: Interventions:  - Assess and re-assess patient's lethality and potential for self-injury  - Engage patient in 1:1 interactions, daily, for a minimum of 15 minutes  - Encourage patient to express feelings, fears, frustrations, hopes  - Establish rapport/trust with patient   Outcome: Progressing  Goal: Attend and participate in unit activities, including therapeutic, recreational, and educational groups  Description: Interventions:  - Provide therapeutic and educational activities daily, encourage attendance and participation, and document same in the medical record  - Obtain collateral information, encourage visitation and family involvement in care   Outcome: Progressing  Goal: Complete daily ADLs, including personal hygiene independently, as able  Description: Interventions:  - Observe, teach, and assist patient with ADLS  - Monitor and promote a balance of rest/activity, with adequate nutrition and elimination  Outcome: Progressing     Problem: Depression  Goal: Verbalize thoughts and feelings  Description: Interventions:  - Assess and re-assess patient's level of risk   - Engage patient in 1:1 interactions, daily, for a minimum of 15 minutes   - Encourage patient to express feelings, fears, frustrations, hopes   Outcome: Progressing  Goal: Refrain from harming self  Description:  Interventions:  - Monitor patient closely, per order   - Supervise medication ingestion, monitor effects and side effects   Outcome: Progressing  Goal: Refrain from isolation  Description: Interventions:  - Develop a trusting relationship   - Encourage socialization   Outcome: Not Progressing  Goal: Attend and participate in unit activities, including therapeutic, recreational, and educational groups  Description: Interventions:  - Provide therapeutic and educational activities daily, encourage attendance and participation, and document same in the medical record   Outcome: Not Progressing  Goal: Complete daily ADLs, including personal hygiene independently, as able  Description: Interventions:  - Observe, teach, and assist patient with ADLS  -  Monitor and promote a balance of rest/activity, with adequate nutrition and elimination   Outcome: Progressing     Problem: Anxiety  Goal: Anxiety is at manageable level  Description: Interventions:  - Assess and monitor patient's anxiety level.   - Monitor for signs and symptoms (heart palpitations, chest pain, shortness of breath, headaches, nausea, feeling jumpy, restlessness, irritable, apprehensive).   - Collaborate with interdisciplinary team and initiate plan and interventions as ordered.  - [place] patient to unit/surroundings  - Explain treatment plan  - Encourage participation in care  - Encourage verbalization of concerns/fears  - Identify coping mechanisms  - Assist in developing anxiety-reducing skills  - Administer/offer alternative therapies  - Limit or eliminate stimulants  Outcome: Not Progressing     Problem: Risk for Violence/Aggression Toward Others  Goal: Verbalize thoughts and feelings  Description: Interventions:  - Assess and re-assess patient's level of risk, every waking shift  - Engage patient in 1:1 interactions, daily, for a minimum of 15 minutes   - Allow patient to express feelings and frustrations in a safe and non-threatening manner   -  Establish rapport/trust with patient   Outcome: Progressing  Goal: Control angry outbursts  Description: Interventions:  - Monitor patient closely, per order  - Ensure early verbal de-escalation  - Monitor prn medication needs  - Set reasonable/therapeutic limits, outline behavioral expectations, and consequences   - Provide a non-threatening milieu, utilizing the least restrictive interventions   Outcome: Progressing  Goal: Attend and participate in unit activities, including therapeutic, recreational, and educational groups  Description: Interventions:  - Provide therapeutic and educational activities daily, encourage attendance and participation, and document same in the medical record   Outcome: Not Progressing  Goal: Identify appropriate positive anger management techniques  Description: Interventions:  - Offer anger management and coping skills groups   - Staff will provide positive feedback for appropriate anger control  Outcome: Not Progressing     Problem: Alteration in Orientation  Goal: Interact with staff daily  Description: Interventions:  - Assess and re-assess patient's level of orientation  - Engage patient in 1 on 1 interactions, daily, for a minimum of 15 minutes   - Establish rapport/trust with patient   Outcome: Progressing  Goal: Express concerns related to confused thinking related to:  Description: Interventions:  - Encourage patient to express feelings, fears, frustrations, hopes  - Assign consistent caregivers   - [place]/re-orient patient as needed  - Allow comfort items, as appropriate  - Provide visual cues, signs, etc.   Outcome: Progressing  Goal: Allow medical examinations, as recommended  Description: Interventions:  - Provide physical/neurological exams and/or referrals, per provider   Outcome: Progressing  Goal: Cooperate with recommended testing/procedures  Description: Interventions:  - Determine need for ancillary testing  - Observe for mental status changes  - Implement  falls/precaution protocol   Outcome: Progressing  Goal: Attend and participate in unit activities, including therapeutic, recreational, and educational groups  Description: Interventions:  - Provide therapeutic and educational activities daily, encourage attendance and participation, and document same in the medical record   - Provide appropriate opportunities for reminiscence   - Provide a consistent daily routine   - Encourage family contact/visitation   Outcome: Progressing  Goal: Complete daily ADLs, including personal hygiene independently, as able  Description: Interventions:  - Observe, teach, and assist patient with ADLS  Outcome: Progressing     Problem: Risk for Violence/Aggression Toward Others  Goal: Verbalize thoughts and feelings  Description: Interventions:  - Assess and re-assess patient's level of risk, every waking shift  - Engage patient in 1:1 interactions, daily, for a minimum of 15 minutes   - Allow patient to express feelings and frustrations in a safe and non-threatening manner   - Establish rapport/trust with patient   Outcome: Progressing  Goal: Control angry outbursts  Description: Interventions:  - Monitor patient closely, per order  - Ensure early verbal de-escalation  - Monitor prn medication needs  - Set reasonable/therapeutic limits, outline behavioral expectations, and consequences   - Provide a non-threatening milieu, utilizing the least restrictive interventions   Outcome: Progressing  Goal: Attend and participate in unit activities, including therapeutic, recreational, and educational groups  Description: Interventions:  - Provide therapeutic and educational activities daily, encourage attendance and participation, and document same in the medical record   Outcome: Not Progressing  Goal: Identify appropriate positive anger management techniques  Description: Interventions:  - Offer anger management and coping skills groups   - Staff will provide positive feedback for appropriate  anger control  Outcome: Not Progressing     Problem: SELF HARM/SUICIDALITY  Goal: Will have no self-injury during hospital stay  Description: INTERVENTIONS:  - Q 15 MINUTES: Routine safety checks  - Q WAKING SHIFT & PRN: Assess risk to determine if routine checks are adequate to maintain patient safety  - Encourage patient to participate actively in care by formulating a plan to combat response to suicidal ideation, identify supports and resources  Outcome: Progressing     Problem: DEPRESSION  Goal: Will be euthymic at discharge  Description: INTERVENTIONS:  - Administer medication as ordered  - Provide emotional support via 1:1 interaction with staff  - Encourage involvement in milieu/groups/activities  - Monitor for social isolation  Outcome: Not Progressing     Problem: ANXIETY  Goal: Will report anxiety at manageable levels  Description: INTERVENTIONS:  - Administer medication as ordered  - Teach and encourage coping skills  - Provide emotional support  - Assess patient/family for anxiety and ability to cope  Outcome: Progressing  Goal: By discharge: Patient will verbalize 2 strategies to deal with anxiety  Description: Interventions:  - Identify any obvious source/trigger to anxiety  - Staff will assist patient in applying identified coping technique/skills  - Encourage attendance of scheduled groups and activities  Outcome: Not Progressing     Problem: SELF CARE DEFICIT  Goal: Return ADL status to a safe level of function  Description: INTERVENTIONS:  - Administer medication as ordered  - Assess ADL deficits and provide assistive devices as needed  - Obtain PT/OT consults as needed  - Assist and instruct patient to increase activity and self care as tolerated  Outcome: Progressing     Problem: DISCHARGE PLANNING - CARE MANAGEMENT  Goal: Discharge to post-acute care or home with appropriate resources  Description: INTERVENTIONS:  - Conduct assessment to determine patient/family and health care team treatment  goals, and need for post-acute services based on payer coverage, community resources, and patient preferences, and barriers to discharge  - Address psychosocial, clinical, and financial barriers to discharge as identified in assessment in conjunction with the patient/family and health care team  - Arrange appropriate level of post-acute services according to patient’s   needs and preference and payer coverage in collaboration with the physician and health care team  - Communicate with and update the patient/family, physician, and health care team regarding progress on the discharge plan  - Arrange appropriate transportation to post-acute venues  Outcome: Not Progressing

## 2024-07-28 NOTE — NURSING NOTE
Alert, cooperative and isolative to self. Consumed 100% of dinner. Took all medication without prompting. Maintained on safe precautions without incident.

## 2024-07-28 NOTE — PROGRESS NOTES
Progress Note - Behavioral Health   Deyvi Cao 54 y.o. male MRN: [id number]  Unit/Bed#: [facility] 112-02 Encounter: [id number]      Subjective:     Documentation, nursing notes, medication reconciliation, labs, and vitals reviewed. Patient was seen for continuing care and reviewed with treatment team.  No acute events over the past 24 hours. Per nursing report, patient remains isolative to room and appears somewhat anxious. No medication changes over the past 24 hours.     On evaluation today, patient is laying in bed awake.  He is pleasant on approach and discusses upcoming anticipated plan for care home.  He speaks positively about plan and discusses that he is waiting for a bed to become available which he hopes is soon.  He reports anxiety and depression are well controlled.  He appears mildly anxious with flat affect.  He denies passive or active SI/HI with plan or intent.  He denies AVH and no over delusional content elicited during conversation.  He denies any concerns at time of assessment.  He was observed to be participating in coping skills group.     Continues to tolerate current medications with no adverse effects.     No self-harming/suicidal ideation, plan, or intent upon direct inquiry. No thoughts to harm others.  No agitation or aggression noted. Denies perceptual disturbances, with no delusional or paranoid content elicited. Does not appear overtly manic. Offers no further complaints.       Psychiatric ROS:  Behavior over the last 24 hours: unchanged  Sleep: normal  Appetite: normal  Medication side effects: No   ROS: no complaints, all other systems are negative      Mental Status Evaluation:    Appearance:  age appropriate, casually dressed, dressed appropriately, marginal hygiene   Behavior:  pleasant, cooperative, laying in bed   Speech:  scant, soft   Mood:  mildly anxious   Affect:  flat   Thought Process:  coherent, goal directed   Associations: concrete associations   Thought Content:  no  overt delusions   Perceptual Disturbances: denies auditory or visual hallucinations when asked   Risk Potential: Suicidal ideation - None at present  Homicidal ideation - None at present  Potential for aggression - No   Sensorium:  oriented to person, place, and time/date   Memory:  recent and remote memory grossly intact   Consciousness:  alert and awake   Attention/Concentration: attention span and concentration are age appropriate   Insight:  limited   Judgment: limited   Gait/Station: normal gait/station   Motor Activity: no abnormal movements       Vital signs in last 24 hours:    Temp:  [97.9 °F (36.6 °C)-98.2 °F (36.8 °C)] 97.9 °F (36.6 °C)  HR:  [79-98] 79  Resp:  [16-18] 16  BP: (125-130)/(73-82) 130/82    Laboratory results: I have personally reviewed all pertinent laboratory/tests results    Results from the past 24 hours: No results found for this or any previous visit (from the past 24 hour(s)).      Progress Toward Goals: no significant improvement today    Suicide/Homicide Risk Assessment:    Risk of Harm to Self:   Nursing Suicide Risk Assessment Last 24 hours: C-SSRS Risk (Since Last Contact)  Calculated C-SSRS Risk Score (Since Last Contact): No Risk Indicated    Risk of Harm to Others:  Nursing Homicide Risk Assessment: Violence Risk to Others: Denies within past 6 months    Assessment & Plan   Principal Problem:    Bipolar disorder with severe depression (HCC)  Active Problems:    Benign essential hypertension    Mixed hyperlipidemia    Allergic rhinitis due to allergen    Medical clearance for psychiatric admission    [place]      Recommended Treatment:     Planned medication and treatment changes:    All current active medications have been reviewed  Encourage group therapy, milieu therapy and occupational therapy  Behavioral Health checks every 7 minutes  Continue with SLIM medical management as indicated  Disposition planning ongoing      Continue current medications:    Current  Facility-Administered Medications   Medication Dose Route Frequency Provider Last Rate    acetaminophen  650 mg Oral Q6H PRN ALVINA Harley      acetaminophen  650 mg Oral Q4H PRN ALVINA Harley      acetaminophen  975 mg Oral Q6H PRN ALVINA Harley      aluminum-magnesium hydroxide-simethicone  30 mL Oral Q4H PRN ALVINA Harley      atorvastatin  10 mg Oral Daily ALVINA Lewis      benztropine  1 mg Intramuscular Q4H PRN Max 6/day ALVINA Harley      benztropine  1 mg Oral Q4H PRN Max 6/day ALVINA Harley      bisacodyl  10 mg Rectal Daily PRN ALVINA Harley      cariprazine  3 mg Oral Daily Martin Cardenas MD      Cholecalciferol  2,000 Units Oral Daily ALVINA Lewis      cyanocobalamin  1,000 mcg Oral Daily ALVINA Lewis      hydrOXYzine HCL  25 mg Oral Q6H PRN Max 4/day ALVINA Harley      hydrOXYzine HCL  25 mg Oral TID Martin Cardenas MD      hydrOXYzine HCL  50 mg Oral Q4H PRN Max 4/day ALVINA Harley      Or    LORazepam  1 mg Intramuscular Q4H PRN ALVINA Harley      lamoTRIgine  50 mg Oral Daily Martin Cardenas MD      lisinopril  10 mg Oral Daily ALVINA Lewis      LORazepam  1 mg Oral Q4H PRN Max 6/day ALVINA Harley      Or    LORazepam  2 mg Intramuscular Q6H PRN Max 3/day ALVINA Harley      OLANZapine  10 mg Oral Q3H PRN Max 3/day ALVINA Harley      Or    OLANZapine  10 mg Intramuscular Q3H PRN Max 3/day ALVINA Harley      OLANZapine  5 mg Oral Q3H PRN Max 6/day ALVINA Harley      Or    OLANZapine  5 mg Intramuscular Q3H PRN Max 6/day ALVINA Harley      OLANZapine  2.5 mg Oral Q3H PRN Max 8/day ALVINA Harley      polyethylene glycol  17 g Oral Daily PRN ALVINA Harley      propranolol  10 mg Oral Q8H PRN ALVINA Harley      senna-docusate sodium  1 tablet Oral Daily PRN ALVINA Harley      sertraline  150 mg Oral HS Martin Cardenas MD           Risks / Benefits  of Treatment:    Risks, benefits, and possible side effects of medications explained to patient and patient verbalizes understanding and agreement for treatment.    Counseling / Coordination of Care:    Patient's progress discussed with staff in treatment team meeting.  Medications, treatment progress and treatment plan reviewed with patient.    Note Share    This note was not shared with the patient due to reasonable likelihood of causing patient harm    ALVINA Whitney 07/28/24

## 2024-07-28 NOTE — NURSING NOTE
Weekly wellness assessment completed. Pt lung sounds clear in all lung fields. No edema noted. Bowel sounds +x4. B/l pedal pulses papable. Skin intact, warm and color within normal limits for patient. Dry skin noted to b/l heels and feet. Toe nails thick and unable to be cut with nail clippers.

## 2024-07-28 NOTE — NURSING NOTE
Alert, cooperative and isolative to self. No SI or HI noted. Denies depression, anxiety and pain. Attended community meeting and coping skills. Consumed 100% of breakfast and 100% of lunch. Took all medication without prompting. Maintained on safe precautions without incident. Will continue to monitor progress and recovery program.

## 2024-07-29 PROCEDURE — 0HBRXZZ EXCISION OF TOE NAIL, EXTERNAL APPROACH: ICD-10-PCS | Performed by: STUDENT IN AN ORGANIZED HEALTH CARE EDUCATION/TRAINING PROGRAM

## 2024-07-29 PROCEDURE — 99232 SBSQ HOSP IP/OBS MODERATE 35: CPT | Performed by: PSYCHIATRY & NEUROLOGY

## 2024-07-29 RX ORDER — AMMONIUM LACTATE 12 G/100G
LOTION TOPICAL 2 TIMES DAILY PRN
Status: DISCONTINUED | OUTPATIENT
Start: 2024-07-29 | End: 2024-12-04 | Stop reason: HOSPADM

## 2024-07-29 RX ADMIN — CYANOCOBALAMIN TAB 1000 MCG 1000 MCG: 1000 TAB at 08:22

## 2024-07-29 RX ADMIN — LAMOTRIGINE 50 MG: 25 TABLET ORAL at 08:22

## 2024-07-29 RX ADMIN — HYDROXYZINE HYDROCHLORIDE 25 MG: 25 TABLET ORAL at 21:25

## 2024-07-29 RX ADMIN — HYDROXYZINE HYDROCHLORIDE 25 MG: 25 TABLET ORAL at 17:31

## 2024-07-29 RX ADMIN — CHOLECALCIFEROL TAB 25 MCG (1000 UNIT) 2000 UNITS: 25 TAB at 08:22

## 2024-07-29 RX ADMIN — LISINOPRIL 10 MG: 10 TABLET ORAL at 08:22

## 2024-07-29 RX ADMIN — CARIPRAZINE 3 MG: 3 CAPSULE, GELATIN COATED ORAL at 08:22

## 2024-07-29 RX ADMIN — ATORVASTATIN CALCIUM 10 MG: 10 TABLET, FILM COATED ORAL at 08:22

## 2024-07-29 RX ADMIN — SERTRALINE HYDROCHLORIDE 150 MG: 100 TABLET ORAL at 21:25

## 2024-07-29 RX ADMIN — HYDROXYZINE HYDROCHLORIDE 25 MG: 25 TABLET ORAL at 08:22

## 2024-07-29 NOTE — SOCIAL WORK
STEFAN sent email to Sydnie with [facility] inquiring about any updates on the [facility] availability for pt.     Sydnie reports no updates at this time.

## 2024-07-29 NOTE — PLAN OF CARE
Problem: Alteration in Thoughts and Perception  Goal: Verbalize thoughts and feelings  Description: Interventions:  - Promote a nonjudgmental and trusting relationship with the patient through active listening and therapeutic communication  - Assess patient's level of functioning, behavior and potential for risk  - Engage patient in 1 on 1 interactions  - Encourage patient to express fears, feelings, frustrations, and discuss symptoms    - [place] patient to reality, help patient recognize reality-based thinking   - Administer medications as ordered and assess for potential side effects  - Provide the patient education related to the signs and symptoms of the illness and desired effects of prescribed medications  Outcome: Progressing  Goal: Refrain from acting on delusional thinking/internal stimuli  Description: Interventions:  - Monitor patient closely, per order   - Utilize least restrictive measures   - Set reasonable limits, give positive feedback for acceptable   - Administer medications as ordered and monitor of potential side effects  Outcome: Progressing  Goal: Agree to be compliant with medication regime, as prescribed and report medication side effects  Description: Interventions:  - Offer appropriate PRN medication and supervise ingestion; conduct AIMS, as needed   Outcome: Progressing  Goal: Attend and participate in unit activities, including therapeutic, recreational, and educational groups  Description: Interventions:  -Encourage Visitation and family involvement in care  Outcome: Not Progressing  Goal: Recognize dysfunctional thoughts, communicate reality-based thoughts at the time of discharge  Description: Interventions:  - Provide medication and psycho-education to assist patient in compliance and developing insight into his/her illness   Outcome: Not Progressing  Goal: Complete daily ADLs, including personal hygiene independently, as able  Description: Interventions:  - Observe, teach, and  assist patient with ADLS  - Monitor and promote a balance of rest/activity, with adequate nutrition and elimination   Outcome: Progressing     Problem: Ineffective Coping  Goal: Identifies ineffective coping skills  Outcome: Not Progressing  Goal: Identifies healthy coping skills  Outcome: Progressing  Goal: Demonstrates healthy coping skills  Outcome: Not Progressing  Goal: Participates in unit activities  Description: Interventions:  - Provide therapeutic environment   - Provide required programming   - Redirect inappropriate behaviors   Outcome: Not Progressing  Goal: Patient/Family participate in treatment and DC plans  Description: Interventions:  - Provide therapeutic environment  Outcome: Not Progressing  Goal: Patient/Family verbalizes awareness of resources  Outcome: Not Progressing  Goal: Understands least restrictive measures  Description: Interventions:  - Utilize least restrictive behavior  Outcome: Not Progressing  Goal: Free from restraint events  Description: - Utilize least restrictive measures   - Provide behavioral interventions   - Redirect inappropriate behaviors   Outcome: Progressing     Problem: Risk for Self Injury/Neglect  Goal: Verbalize thoughts and feelings  Description: Interventions:  - Assess and re-assess patient's lethality and potential for self-injury  - Engage patient in 1:1 interactions, daily, for a minimum of 15 minutes  - Encourage patient to express feelings, fears, frustrations, hopes  - Establish rapport/trust with patient   Outcome: Progressing  Goal: Refrain from harming self  Description: Interventions:  - Monitor patient closely, per order  - Develop a trusting relationship  - Supervise medication ingestion, monitor effects and side effects   Outcome: Progressing  Goal: Attend and participate in unit activities, including therapeutic, recreational, and educational groups  Description: Interventions:  - Provide therapeutic and educational activities daily, encourage  attendance and participation, and document same in the medical record  - Obtain collateral information, encourage visitation and family involvement in care   Outcome: Not Progressing  Goal: Complete daily ADLs, including personal hygiene independently, as able  Description: Interventions:  - Observe, teach, and assist patient with ADLS  - Monitor and promote a balance of rest/activity, with adequate nutrition and elimination  Outcome: Not Progressing     Problem: Depression  Goal: Verbalize thoughts and feelings  Description: Interventions:  - Assess and re-assess patient's level of risk   - Engage patient in 1:1 interactions, daily, for a minimum of 15 minutes   - Encourage patient to express feelings, fears, frustrations, hopes   Outcome: Progressing  Goal: Refrain from harming self  Description: Interventions:  - Monitor patient closely, per order   - Supervise medication ingestion, monitor effects and side effects   Outcome: Progressing  Goal: Refrain from isolation  Description: Interventions:  - Develop a trusting relationship   - Encourage socialization   Outcome: Not Progressing  Goal: Attend and participate in unit activities, including therapeutic, recreational, and educational groups  Description: Interventions:  - Provide therapeutic and educational activities daily, encourage attendance and participation, and document same in the medical record   Outcome: Not Progressing  Goal: Complete daily ADLs, including personal hygiene independently, as able  Description: Interventions:  - Observe, teach, and assist patient with ADLS  -  Monitor and promote a balance of rest/activity, with adequate nutrition and elimination   Outcome: Progressing     Problem: Anxiety  Goal: Anxiety is at manageable level  Description: Interventions:  - Assess and monitor patient's anxiety level.   - Monitor for signs and symptoms (heart palpitations, chest pain, shortness of breath, headaches, nausea, feeling jumpy, restlessness,  irritable, apprehensive).   - Collaborate with interdisciplinary team and initiate plan and interventions as ordered.  - [place] patient to unit/surroundings  - Explain treatment plan  - Encourage participation in care  - Encourage verbalization of concerns/fears  - Identify coping mechanisms  - Assist in developing anxiety-reducing skills  - Administer/offer alternative therapies  - Limit or eliminate stimulants  Outcome: Not Progressing     Problem: Risk for Violence/Aggression Toward Others  Goal: Verbalize thoughts and feelings  Description: Interventions:  - Assess and re-assess patient's level of risk, every waking shift  - Engage patient in 1:1 interactions, daily, for a minimum of 15 minutes   - Allow patient to express feelings and frustrations in a safe and non-threatening manner   - Establish rapport/trust with patient   Outcome: Progressing  Goal: Control angry outbursts  Description: Interventions:  - Monitor patient closely, per order  - Ensure early verbal de-escalation  - Monitor prn medication needs  - Set reasonable/therapeutic limits, outline behavioral expectations, and consequences   - Provide a non-threatening milieu, utilizing the least restrictive interventions   Outcome: Progressing  Goal: Attend and participate in unit activities, including therapeutic, recreational, and educational groups  Description: Interventions:  - Provide therapeutic and educational activities daily, encourage attendance and participation, and document same in the medical record   Outcome: Not Progressing  Goal: Identify appropriate positive anger management techniques  Description: Interventions:  - Offer anger management and coping skills groups   - Staff will provide positive feedback for appropriate anger control  Outcome: Progressing     Problem: Alteration in Orientation  Goal: Interact with staff daily  Description: Interventions:  - Assess and re-assess patient's level of orientation  - Engage patient in 1 on  1 interactions, daily, for a minimum of 15 minutes   - Establish rapport/trust with patient   Outcome: Progressing  Goal: Express concerns related to confused thinking related to:  Description: Interventions:  - Encourage patient to express feelings, fears, frustrations, hopes  - Assign consistent caregivers   - [place]/re-orient patient as needed  - Allow comfort items, as appropriate  - Provide visual cues, signs, etc.   Outcome: Progressing  Goal: Allow medical examinations, as recommended  Description: Interventions:  - Provide physical/neurological exams and/or referrals, per provider   Outcome: Progressing  Goal: Cooperate with recommended testing/procedures  Description: Interventions:  - Determine need for ancillary testing  - Observe for mental status changes  - Implement falls/precaution protocol   Outcome: Progressing  Goal: Attend and participate in unit activities, including therapeutic, recreational, and educational groups  Description: Interventions:  - Provide therapeutic and educational activities daily, encourage attendance and participation, and document same in the medical record   - Provide appropriate opportunities for reminiscence   - Provide a consistent daily routine   - Encourage family contact/visitation   Outcome: Not Progressing  Goal: Complete daily ADLs, including personal hygiene independently, as able  Description: Interventions:  - Observe, teach, and assist patient with ADLS  Outcome: Progressing     Problem: SELF HARM/SUICIDALITY  Goal: Will have no self-injury during hospital stay  Description: INTERVENTIONS:  - Q 15 MINUTES: Routine safety checks  - Q WAKING SHIFT & PRN: Assess risk to determine if routine checks are adequate to maintain patient safety  - Encourage patient to participate actively in care by formulating a plan to combat response to suicidal ideation, identify supports and resources  Outcome: Progressing     Problem: DEPRESSION  Goal: Will be euthymic at  discharge  Description: INTERVENTIONS:  - Administer medication as ordered  - Provide emotional support via 1:1 interaction with staff  - Encourage involvement in milieu/groups/activities  - Monitor for social isolation  Outcome: Not Progressing     Problem: ANXIETY  Goal: Will report anxiety at manageable levels  Description: INTERVENTIONS:  - Administer medication as ordered  - Teach and encourage coping skills  - Provide emotional support  - Assess patient/family for anxiety and ability to cope  Outcome: Not Progressing  Goal: By discharge: Patient will verbalize 2 strategies to deal with anxiety  Description: Interventions:  - Identify any obvious source/trigger to anxiety  - Staff will assist patient in applying identified coping technique/skills  - Encourage attendance of scheduled groups and activities  Outcome: Not Progressing     Problem: SELF CARE DEFICIT  Goal: Return ADL status to a safe level of function  Description: INTERVENTIONS:  - Administer medication as ordered  - Assess ADL deficits and provide assistive devices as needed  - Obtain PT/OT consults as needed  - Assist and instruct patient to increase activity and self care as tolerated  Outcome: Not Progressing     Problem: DISCHARGE PLANNING - CARE MANAGEMENT  Goal: Discharge to post-acute care or home with appropriate resources  Description: INTERVENTIONS:  - Conduct assessment to determine patient/family and health care team treatment goals, and need for post-acute services based on payer coverage, community resources, and patient preferences, and barriers to discharge  - Address psychosocial, clinical, and financial barriers to discharge as identified in assessment in conjunction with the patient/family and health care team  - Arrange appropriate level of post-acute services according to patient’s   needs and preference and payer coverage in collaboration with the physician and health care team  - Communicate with and update the  patient/family, physician, and health care team regarding progress on the discharge plan  - Arrange appropriate transportation to post-acute venues  Outcome: Not Progressing

## 2024-07-29 NOTE — PROGRESS NOTES
07/29/24 0751   Team Meeting   Meeting Type Daily Rounds   Team Members Present   Team Members Present Physician;Nurse;;Other (Discipline and Name)   Patient/Family Present   Patient Present No   Patient's Family Present No     In attendance:  Dr. Alex Thomas, MD Dr. Jordan Holter, DO Mahamed Haywood, RN  Judi Garcia, [facility]  Mer Sales, [facility]  BARRETT Elmore.S.    Groups: 3/8      Pt needs podiatry consult. Pt reports feeling as if he is at his baseline. Pt brightens on approach; pleasant, cooperative; no noted bx issues.    CC: Menstrual issue.    HPI:    Vy is a pleasant 13 year old female patient that is here for discussing menstrual issues. Patient states that she's been having bad cramping with associated heavy bleeding for the first 3 days. And this is been progressively getting worse. She does have family history of anemia or family history of DVT. No family history of breast cancer.  Maternal sister had PCOS and is not sexually active . She would like to discuss what her options would be to help relieving the menstrual issues and bad cramping. She is able to mother. Who does not have any other concerns. She would like to know if patient had PCOS. Patient current BMI is 22.3. She has no other symptoms of hirsutism.  She declines, oligomenorrhea and amenorrhea. After different options were discussed with the patient. Patient and her mom agreed upon using birth control pills in a continuous 3 month fashion. DVT risk. And precautions were discussed with the patient. Patient currently does not need any pelvic exam.    Vitals:    06/11/18 0950   BP: 118/68       Current Outpatient Prescriptions   Medication Sig Dispense Refill   • Multiple Vitamins-Minerals (MULTIVITAMIN PO)      • levonorgestrel-ethinyl estradiol (SEASONALE) 0.15-0.03 MG per tablet Take 1 tablet by mouth daily. 91 tablet 0   • cephALEXin (KEFLEX) 500 MG capsule 1 capsule by mouth twice a day 14 capsule 0     No current facility-administered medications for this visit.      Past Medical History:   Diagnosis Date   • Amblyopia    • Urinary tract infection      History reviewed. No pertinent surgical history.    Deferred pelvic exam

## 2024-07-29 NOTE — NURSING NOTE
Pt is visible on the unit but isolative to self. Consumed 100% of dinner. Took medications without incidence. Pt is pleasant and cooperative. Attended 2/9 groups. Denies all psych symptoms. No behavioral issues. Pt offers no concerns or complaints. VSS. Continuous safety checks maintained.

## 2024-07-29 NOTE — PLAN OF CARE
Problem: Ineffective Coping  Goal: Identifies ineffective coping skills  Outcome: Progressing  Goal: Identifies healthy coping skills  Outcome: Progressing  Goal: Demonstrates healthy coping skills  Outcome: Progressing  Goal: Participates in unit activities  Description: Interventions:  - Provide therapeutic environment   - Provide required programming   - Redirect inappropriate behaviors   Outcome: Progressing   Attended 23/43 of the groups offered last week.

## 2024-07-29 NOTE — SOCIAL WORK
SW checked in with pt.   Pt was found in bed, staring at the ceiling.   Pt reported the weekend was fine and he currently has no concerns. Pt appeared anxious and flat. SW inquired about current mental state and any current needs. SW offered 1:1 session and pt reported that he does not really have anything to discuss. Pt was polite and quiet.   Pt notified that SW is out for the remainder of the week; pt verbalized an understanding.

## 2024-07-29 NOTE — PROGRESS NOTES
Progress Note - Behavioral Health   Deyvi Cao 54 y.o. male MRN: [id number]  Unit/Bed#: [facility] 112-02 Encounter: [id number]  Code Status: Level 1 - Full Code    Assessment & Plan   Principal Problem:    Bipolar disorder with severe depression (HCC)  Active Problems:    Benign essential hypertension    Mixed hyperlipidemia    Allergic rhinitis due to allergen    Medical clearance for psychiatric admission    Rosacea    Recommended Treatment:     Treatment plan, treatment progress and medication changes were reviewed with Nursing Staff, Pharmacy Service and Case Management in Treatment Team:  1.Continue with group therapy, milieu therapy and occupational therapy   2.Behavioral Health checks every 7 minutes   3.Continue frequent safety checks and vitals per unit protocol  4.Continue with SLIM medical management as indicated  5.Continue with current medication regimen for symptom management: Vraylar 3mg PO Daily, Atarax 25mg PO TID, Lamictal 50mg PO Daily, Zoloft 150mg PO QHS, Lamictal 50mg PO Daily     6.Disposition Planning: Discharge planning and efforts remain ongoing - Awaiting group home placement    Subjective:    Patient was seen today for continuation of care, records reviewed and patient was discussed with the morning case review team.    Deyvi was seen today for psychiatric follow-up.  On assessment today, Deyvi was found in bed.  He is doing well. No new issues.  Does report an improvement in his symptoms. Encourgaed to get out of bed and attend groups.   We reviewed once more the specific as-needed medications they can use going forward if they experience any insomnia or destabilization of their mood, they understood and were agreeable. Milieu visibility and group attendance encouraged to promote an active participation in treatment.    Deyvi denies acute suicidal/self-harm ideation/intent/plan upon direct inquiry at this time. Deyvi is able to contract for safety while on the unit and would feel  comfortable seeking staff support should suicidal symptoms or urges appear or worsen. Deyvi remains behaviorally appropriate, no agitation or aggression noted on exam or in report. Deyvi also denies HI/AH/VH, and does not appear overtly manic.  Patient does not verbalize any experiences that can be categorized as paranoid, persecutory, bizarre, or somatic delusions. Deyvi remains adherent to his current psychotropic medication regimen and denies any side effects from medications, as well as none noted on exam.    Review of Systems:  Behavior over the last 24 hours: Slowly improving  Sleep: sleeping okay throughout the night  Appetite: adequate  Medication side effects: none reported  ROS:no complaints, all other systems are negative    Objective:    Vitals:  Vitals:    07/29/24 0748   BP: 143/75   Pulse: 96   Resp: 18   Temp: (!) 97.4 °F (36.3 °C)   SpO2: 95%       Laboratory Results:  I have personally reviewed all pertinent laboratory/tests results.  Most Recent Labs:   Lab Results   Component Value Date    WBC 7.39 06/26/2024    RBC 4.70 06/26/2024    HGB 15.2 06/26/2024    HCT 45.5 06/26/2024     06/26/2024    RDW 12.9 06/26/2024    NEUTROABS 4.63 06/26/2024    SODIUM 137 06/26/2024    K 4.1 06/26/2024     06/26/2024    CO2 26 06/26/2024    BUN 17 06/26/2024    CREATININE 0.63 06/26/2024    GLUC 98 06/26/2024    GLUF 98 06/26/2024    CALCIUM 9.6 06/26/2024    AST 25 06/26/2024    ALT 45 06/26/2024    ALKPHOS 114 (H) 06/26/2024    TP 7.0 06/26/2024    ALB 4.4 06/26/2024    TBILI 0.44 06/26/2024    CHOLESTEROL 177 06/26/2024    HDL 52 06/26/2024    TRIG 73 06/26/2024    LDLCALC 110 (H) 06/26/2024    NONHDLC 125 06/26/2024    LITHIUM 0.4 (L) 01/28/2021    VFV5CIFSRYPS 2.636 06/26/2024    HGBA1C 5.2 11/22/2023     11/22/2023       Mental Status Evaluation:  Appearance:  age appropriate, casually dressed   Behavior:  cooperative, calm   Speech:  normal rate, normal volume   Mood:  anxious    Affect:  constricted   Thought Process:  goal directed   Associations: intact associations   Thought Content:  no overt delusions   Perceptual Disturbances: no auditory hallucinations, no visual hallucinations, denies when asked, does not appear responding to internal stimuli   Risk Potential: Suicidal ideation - None at present, contracts for safety on the unit, would talk to staff if not feeling safe on the unit  Homicidal ideation - None at present  Potential for aggression - Not at present   Sensorium:  oriented to person, place, and time/date   Memory:  recent memory intact   Consciousness:  alert and awake   Attention/Concentration: attention span and concentration appear shorter than expected for age   Insight:  limited   Judgment: limited   Gait/Station: normal gait/station, normal balance   Motor Activity: no abnormal movements     Progress Toward Goals: making gradual improvement.  Deyvi continues to require inpatient psychiatric hospitalization for continued medication management and titration to optimize symptom reduction, improve sleep hygiene, and demonstrate adequate self-care.     Suicide/Homicide Risk Assessment:  Risk of Harm to Self:   Nursing Suicide Risk Assessment Last 24 hours: C-SSRS Risk (Since Last Contact)  Calculated C-SSRS Risk Score (Since Last Contact): No Risk Indicated    Risk of Harm to Others:  Nursing Homicide Risk Assessment: Violence Risk to Others: Denies within past 6 months    Behavioral Health Medications: all current active meds have been reviewed and continue current psychiatric medications.  Current Facility-Administered Medications   Medication Dose Route Frequency Provider Last Rate    acetaminophen  650 mg Oral Q6H PRN ALVINA Harley      acetaminophen  650 mg Oral Q4H PRN ALVINA Harley      acetaminophen  975 mg Oral Q6H PRN ALVINA Harley      aluminum-magnesium hydroxide-simethicone  30 mL Oral Q4H PRN ALVINA Harley      atorvastatin  10 mg  Oral Daily ALVINA Lewis      benztropine  1 mg Intramuscular Q4H PRN Max 6/day ALVINA Harley      benztropine  1 mg Oral Q4H PRN Max 6/day ALVINA Harley      bisacodyl  10 mg Rectal Daily PRN ALVINA Harley      cariprazine  3 mg Oral Daily Martin Cardenas MD      Cholecalciferol  2,000 Units Oral Daily ALVINA Lewis      cyanocobalamin  1,000 mcg Oral Daily ALVINA Lewis      hydrOXYzine HCL  25 mg Oral Q6H PRN Max 4/day ALVINA Harley      hydrOXYzine HCL  25 mg Oral TID Martin Cardenas MD      hydrOXYzine HCL  50 mg Oral Q4H PRN Max 4/day ALVINA Harley      Or    LORazepam  1 mg Intramuscular Q4H PRN ALVINA Harley      lamoTRIgine  50 mg Oral Daily Martin Cardenas MD      lisinopril  10 mg Oral Daily ALVINA Lewis      LORazepam  1 mg Oral Q4H PRN Max 6/day ALVINA Harley      Or    LORazepam  2 mg Intramuscular Q6H PRN Max 3/day ALVINA Harley      OLANZapine  10 mg Oral Q3H PRN Max 3/day ALVINA Harley      Or    OLANZapine  10 mg Intramuscular Q3H PRN Max 3/day ALVINA Harley      OLANZapine  5 mg Oral Q3H PRN Max 6/day ALVINA Harley      Or    OLANZapine  5 mg Intramuscular Q3H PRN Max 6/day ALVINA Harley      OLANZapine  2.5 mg Oral Q3H PRN Max 8/day ALVINA Harley      polyethylene glycol  17 g Oral Daily PRN ALVINA Harley      propranolol  10 mg Oral Q8H PRN ALVINA Harley      senna-docusate sodium  1 tablet Oral Daily PRN ALVINA Harley      sertraline  150 mg Oral HS Martin Cardenas MD         Risks / Benefits of Treatment:  Risks, benefits, and possible side effects of medications explained to patient. Patient has limited understanding of risks and benefits of treatment at this time, but agrees to take medications as prescribed.    Counseling / Coordination of Care:  Total floor/unit time spent today 25 minutes. Greater than 50% of total time was spent with the patient and /  or family counseling and / or coordination of care. A description of the counseling / coordination of care:   Patient's progress discussed with staff in treatment team meeting.  Medications, treatment progress and treatment plan reviewed with patient.   Educated on importance of medication and treatment compliance.  Reassurance and supportive therapy provided.   Encouraged participation in milieu and group therapy on the unit.    ALVINA Harley 07/29/24

## 2024-07-29 NOTE — NURSING NOTE
Patient is visible, quiet but able to make needs known. Podiatry saw pt today, pt cooperative with care. Pt reports no s/s, takes all medication without issue.

## 2024-07-29 NOTE — CONSULTS
Consult Note- Podiatry   [facility]  Deyvi Cao 54 y.o. male MRN: [id number]  Unit/Bed#: [facility] 112-02 Encounter: [id number]    Assessment & Plan     Assessment:  1. Onychomycosis x 10  2. PVD  3. Pain toes b/l     Plan:  - -pt eval and managed    - Number and complexity of problems addressed:  1 undiagnosed new problem with uncertain prognosis   as shown    - Amount/complexity of data reviewed and analyzed:     Category 1: prior patient notes were analyzed today before evaluating and managing patient. All PMH were discussed with pt today.     - Risk of complications: moderate risk of morbidity from additional testing or treatment involved with this patient, which includes but not limited to:    - discussed anatomy, condition, treatment plan and options. They were instructed on proper foot care. The patient was seen today for greater than total of  45-59 minutes   . This is total time spent today involving both face-to-face time and non face-to-face time. This time spent includes  reviewing their past medical history  , performing a medically appropriate examination and evaluation of the patient, counseling and educating the patient,  documenting all findings in EMR, and independently interpreting results and communicating results with  patient and discussing their condition and treatment options, risks, and potential complications. I have discussed the findings of this examination with the patient. The discussion included a complete verbal explanation of the examination results, diagnosis and planned treatment(s). A schedule for future care needs was explained. The patient has verbalized the understanding of these instructions at this time. If any questions should arise after returning home I have encouraged the patient to feel free to call the office.    - d/w pt that discomfort is secondary to toe nail thickening  - Hallucal mycotic nails x2 debrided decreasing thickness by 1 mm. Mycotic toe  nails 2-5 b/l were trimmed, decreasing length, without incidence utilizing a sharp nail nipper.    - All questions and concerns addressed.    - Podiatry signing off, thank you for the consult.     History of Present Illness     HPI:  Deyvi Cao is a 54 y.o. male who presents with painful, elongated toenails. They have difficulty applying their socks and shoes due to the elongation of the nails. The pressure within their shoe gear is painful and they have been unable to cut their nails adequately. Patient states pain is 1/10 in shoe gear. Pain with pressure. Requires at risk foot care.     Inpatient consult to Podiatry  Consult performed by: Isha Brink DPM  Consult ordered by: Martin Cardenas MD        Review of Systems   Constitutional: Negative.    HENT: Negative.    Eyes: Negative.    Respiratory: Negative.    Cardiovascular: Negative.    Gastrointestinal: Negative.    Musculoskeletal: Negative   Skin: elongated thickened toenails   Neurological: Negative.        Historical Information   Past Medical History:   Diagnosis Date    Acne     Bipolar disorder (HCC)     Bipolar disorder with severe depression ([facility]) 6/26/2024    Bipolar I disorder ([facility]) 10/31/2012    Hyperlipidemia     Hypertension     Manic depression (HCC)     Psychiatric disorder     Tobacco abuse      No past surgical history on file.  Social History   Social History     Substance and Sexual Activity   Alcohol Use No     Social History     Substance and Sexual Activity   Drug Use No     Social History     Tobacco Use   Smoking Status Former    Types: Cigars    Passive exposure: Past   Smokeless Tobacco Never   Tobacco Comments    no passive smoke exposure     Family History:   Family History   Problem Relation Age of Onset    Depression Mother     Hyperlipidemia Mother     Hypertension Father     Anxiety disorder Sister        Meds/Allergies     Medications Prior to Admission:     atorvastatin (LIPITOR) 10 mg tablet    lisinopril  (ZESTRIL) 10 mg tablet    metroNIDAZOLE (MetroCream) 0.75 % cream    lithium carbonate 300 mg capsule    loratadine (CLARITIN) 10 mg tablet    RA VITAMIN D-3 25 MCG (1000 UT) tablet  No Known Allergies    Objective   First Vitals:   Blood Pressure: 123/76 (06/25/24 1300)  Pulse: 83 (06/25/24 1300)  Temperature: 98 °F (36.7 °C) (06/25/24 1300)  Temp Source: Temporal (06/25/24 1300)  Respirations: 18 (06/25/24 1300)  Height: 6' (182.9 cm) (06/25/24 1300)  Weight - Scale: 84.6 kg (186 lb 6.4 oz) (06/25/24 1300)  SpO2: 94 % (06/25/24 1300)    Current Vitals:   Blood Pressure: 143/75 (07/29/24 0748)  Pulse: 96 (07/29/24 0748)  Temperature: (!) 97.4 °F (36.3 °C) (07/29/24 0748)  Temp Source: Temporal (07/29/24 0748)  Respirations: 18 (07/29/24 0748)  Height: 6' (182.9 cm) (06/25/24 1300)  Weight - Scale: 90.2 kg (198 lb 12.8 oz) (07/28/24 1100)  SpO2: 95 % (07/29/24 0748)    /75 (BP Location: Left arm)   Pulse 96   Temp (!) 97.4 °F (36.3 °C) (Temporal)   Resp 18   Ht 6' (1.829 m)   Wt 90.2 kg (198 lb 12.8 oz)   SpO2 95%   BMI 26.96 kg/m²     General Appearance:    Alert, cooperative, no distress            Extremities:   MMT is 5/5 to all compartments of the LE, +1/4 edema B/L, Digital ROM is intact,    Pulses:   R DP is +1/4, R PT is +1/4, L DP is +1/4, L PT is +1/4, CFT< 3sec to all digits. Thin/shiny skin noted to the B/L LE, pigmentary changes to B/L LE. Absent digital hair growth b/l.    Skin:   Nail thickening b/l. Nails are yellow, discolored, thickened, elongated, with notable subungual debris and > 2 mm thickness noted to toenails 1-5 B/L. No open Lesions.        Neurologic:   Normal strength, sensation and reflexes       Throughout. Gross sensation is intact. Protective sensation is diminished                Lab Results:   Admission on 06/25/2024   Component Date Value    TSH 3RD GENERATON 06/26/2024 2.636     Cholesterol 06/26/2024 177     Triglycerides 06/26/2024 73     HDL, Direct 06/26/2024 52      LDL Calculated 06/26/2024 110 (H)     Non-HDL-Chol (CHOL-HDL) 06/26/2024 125     Vitamin B-12 06/26/2024 367     Folate 06/26/2024 13.0     Vit D, 25-Hydroxy 06/26/2024 19.2 (L)     Sodium 06/26/2024 137     Potassium 06/26/2024 4.1     Chloride 06/26/2024 101     CO2 06/26/2024 26     ANION GAP 06/26/2024 10     BUN 06/26/2024 17     Creatinine 06/26/2024 0.63     Glucose 06/26/2024 98     Glucose, Fasting 06/26/2024 98     Calcium 06/26/2024 9.6     AST 06/26/2024 25     ALT 06/26/2024 45     Alkaline Phosphatase 06/26/2024 114 (H)     Total Protein 06/26/2024 7.0     Albumin 06/26/2024 4.4     Total Bilirubin 06/26/2024 0.44     eGFR 06/26/2024 110     WBC 06/26/2024 7.39     RBC 06/26/2024 4.70     Hemoglobin 06/26/2024 15.2     Hematocrit 06/26/2024 45.5     MCV 06/26/2024 97     MCH 06/26/2024 32.3     MCHC 06/26/2024 33.4     RDW 06/26/2024 12.9     MPV 06/26/2024 8.9     Platelets 06/26/2024 246     nRBC 06/26/2024 0     Segmented % 06/26/2024 64     Immature Grans % 06/26/2024 0     Lymphocytes % 06/26/2024 25     Monocytes % 06/26/2024 7     Eosinophils Relative 06/26/2024 3     Basophils Relative 06/26/2024 1     Absolute Neutrophils 06/26/2024 4.63     Absolute Immature Grans 06/26/2024 0.03     Absolute Lymphocytes 06/26/2024 1.88     Absolute Monocytes 06/26/2024 0.55     Eosinophils Absolute 06/26/2024 0.22     Basophils Absolute 06/26/2024 0.08     Ventricular Rate 06/26/2024 75     Atrial Rate 06/26/2024 75     VA Interval 06/26/2024 176     QRSD Interval 06/26/2024 104     QT Interval 06/26/2024 396     QTC Interval 06/26/2024 442     P Axis 06/26/2024 76     QRS Axis 06/26/2024 73     T Wave Axis 06/26/2024 69      Imaging: I have personally reviewed pertinent films in PACS  EKG, Pathology, and Other Studies: I have personally reviewed pertinent reports.      Code Status: Level 1 - Full Code  Advance Directive and Living Will:      Power of :

## 2024-07-30 PROCEDURE — 99232 SBSQ HOSP IP/OBS MODERATE 35: CPT | Performed by: PSYCHIATRY & NEUROLOGY

## 2024-07-30 RX ADMIN — ATORVASTATIN CALCIUM 10 MG: 10 TABLET, FILM COATED ORAL at 08:18

## 2024-07-30 RX ADMIN — HYDROXYZINE HYDROCHLORIDE 25 MG: 25 TABLET ORAL at 21:24

## 2024-07-30 RX ADMIN — LISINOPRIL 10 MG: 10 TABLET ORAL at 08:18

## 2024-07-30 RX ADMIN — LAMOTRIGINE 50 MG: 25 TABLET ORAL at 08:18

## 2024-07-30 RX ADMIN — CHOLECALCIFEROL TAB 25 MCG (1000 UNIT) 2000 UNITS: 25 TAB at 08:18

## 2024-07-30 RX ADMIN — HYDROXYZINE HYDROCHLORIDE 25 MG: 25 TABLET ORAL at 08:17

## 2024-07-30 RX ADMIN — HYDROXYZINE HYDROCHLORIDE 25 MG: 25 TABLET ORAL at 15:49

## 2024-07-30 RX ADMIN — SERTRALINE HYDROCHLORIDE 150 MG: 100 TABLET ORAL at 21:24

## 2024-07-30 RX ADMIN — CARIPRAZINE 3 MG: 3 CAPSULE, GELATIN COATED ORAL at 08:18

## 2024-07-30 RX ADMIN — CYANOCOBALAMIN TAB 1000 MCG 1000 MCG: 1000 TAB at 08:18

## 2024-07-30 NOTE — PROGRESS NOTES
Progress Note - Behavioral Health   Deyvi Cao 54 y.o. male MRN: [id number]  Unit/Bed#: [facility] 112-02 Encounter: [id number]  Code Status: Level 1 - Full Code    Assessment & Plan   Principal Problem:    Bipolar disorder with severe depression (HCC)  Active Problems:    Benign essential hypertension    Mixed hyperlipidemia    Allergic rhinitis due to allergen    Medical clearance for psychiatric admission    Rosacea    Recommended Treatment:     Treatment plan, treatment progress and medication changes were reviewed with Nursing Staff, Pharmacy Service and Case Management in Treatment Team:  1.Continue with group therapy, milieu therapy and occupational therapy   2.Behavioral Health checks every 7 minutes   3.Continue frequent safety checks and vitals per unit protocol  4.Continue with SLIM medical management as indicated  5.Continue with current medication regimen for symptom management: Vraylar 3mg PO Daily, Atarax 25mg PO TID, Lamictal 50mg PO Daily, Zoloft 150mg PO QHS, Lamictal 50mg PO Daily     6.Disposition Planning: Discharge planning and efforts remain ongoing - Awaiting group home placement    Subjective:    Patient was seen today for continuation of care, records reviewed and patient was discussed with the morning case review team.    Deyvi was seen today for psychiatric follow-up.  On assessment today, Deyvi was found in his room.  Reports depression and anxiety.  Otherwise feels as though he is improving.  Deyvi reports adequate daytime energy and denies any difficulties with initiating or staying asleep.  Oral appetite and hydration is adequate.   We reviewed once more the specific as-needed medications they can use going forward if they experience any insomnia or destabilization of their mood, they understood and were agreeable. Milieu visibility and group attendance encouraged to promote an active participation in treatment.    Deyvi denies acute suicidal/self-harm ideation/intent/plan upon  direct inquiry at this time. Deyvi is able to contract for safety while on the unit and would feel comfortable seeking staff support should suicidal symptoms or urges appear or worsen. Deyvi remains behaviorally appropriate, no agitation or aggression noted on exam or in report. Deyvi also denies HI/AH/VH, and does not appear overtly manic.  Patient does not verbalize any experiences that can be categorized as paranoid, persecutory, bizarre, or somatic delusions. Deyvi remains adherent to his current psychotropic medication regimen and denies any side effects from medications, as well as none noted on exam.    Review of Systems:  Behavior over the last 24 hours: Slowly improving  Sleep: sleeping okay throughout the night  Appetite: adequate  Medication side effects: none reported  ROS:no complaints, all other systems are negative    Objective:    Vitals:  Vitals:    07/30/24 0741   BP: 145/78   Pulse: 86   Resp: 19   Temp: 97.7 °F (36.5 °C)   SpO2: 98%     Laboratory Results:  I have personally reviewed all pertinent laboratory/tests results.  Most Recent Labs:   Lab Results   Component Value Date    WBC 7.39 06/26/2024    RBC 4.70 06/26/2024    HGB 15.2 06/26/2024    HCT 45.5 06/26/2024     06/26/2024    RDW 12.9 06/26/2024    NEUTROABS 4.63 06/26/2024    SODIUM 137 06/26/2024    K 4.1 06/26/2024     06/26/2024    CO2 26 06/26/2024    BUN 17 06/26/2024    CREATININE 0.63 06/26/2024    GLUC 98 06/26/2024    GLUF 98 06/26/2024    CALCIUM 9.6 06/26/2024    AST 25 06/26/2024    ALT 45 06/26/2024    ALKPHOS 114 (H) 06/26/2024    TP 7.0 06/26/2024    ALB 4.4 06/26/2024    TBILI 0.44 06/26/2024    CHOLESTEROL 177 06/26/2024    HDL 52 06/26/2024    TRIG 73 06/26/2024    LDLCALC 110 (H) 06/26/2024    NONHDLC 125 06/26/2024    LITHIUM 0.4 (L) 01/28/2021    [id number] 2.636 06/26/2024    HGBA1C 5.2 11/22/2023     11/22/2023     Mental Status Evaluation:  Appearance:  age appropriate, casually  dressed, dressed appropriately   Behavior:  cooperative, calm   Speech:  normal volume   Mood:  less anxious, less depressed   Affect:  constricted   Thought Process:  goal directed   Associations: intact associations   Thought Content:  no overt delusions   Perceptual Disturbances: no auditory hallucinations, no visual hallucinations, denies when asked, does not appear responding to internal stimuli   Risk Potential: Suicidal ideation - None at present, contracts for safety on the unit, would talk to staff if not feeling safe on the unit  Homicidal ideation - None at present  Potential for aggression - Not at present   Sensorium:  oriented to person, place, and time/date   Memory:  recent memory intact   Consciousness:  alert and awake   Attention/Concentration: attention span and concentration appear shorter than expected for age   Insight:  limited   Judgment: limited   Gait/Station: normal gait/station, normal balance   Motor Activity: no abnormal movements     Progress Toward Goals: making gradual improvement.  Deyvi continues to require inpatient psychiatric hospitalization for continued medication management and titration to optimize symptom reduction, improve sleep hygiene, and demonstrate adequate self-care.     Suicide/Homicide Risk Assessment:  Risk of Harm to Self:   Nursing Suicide Risk Assessment Last 24 hours: C-SSRS Risk (Since Last Contact)  Calculated C-SSRS Risk Score (Since Last Contact): No Risk Indicated    Risk of Harm to Others:  Nursing Homicide Risk Assessment: Violence Risk to Others: Denies within past 6 months    Behavioral Health Medications: all current active meds have been reviewed and continue current psychiatric medications.  Current Facility-Administered Medications   Medication Dose Route Frequency Provider Last Rate    acetaminophen  650 mg Oral Q6H PRN ALVINA Harley      acetaminophen  650 mg Oral Q4H PRN ALVINA Harley      acetaminophen  975 mg Oral Q6H PRN Melva  ALVINA Knutson      aluminum-magnesium hydroxide-simethicone  30 mL Oral Q4H PRN ALVINA Harley      ammonium lactate   Topical BID PRN ALVINA Harley      atorvastatin  10 mg Oral Daily Sary Rodriguez ALVINA Biggs      benztropine  1 mg Intramuscular Q4H PRN Max 6/day ALVINA Harley      benztropine  1 mg Oral Q4H PRN Max 6/day ALVINA Harley      bisacodyl  10 mg Rectal Daily PRN ALVINA Harley      cariprazine  3 mg Oral Daily Martin Cardenas MD      Cholecalciferol  2,000 Units Oral Daily Sary Rodriguez ALVINA Biggs      cyanocobalamin  1,000 mcg Oral Daily Sary LinkALVINA Thompson      hydrOXYzine HCL  25 mg Oral Q6H PRN Max 4/day ALVINA Harley      hydrOXYzine HCL  25 mg Oral TID Martin Cardenas MD      hydrOXYzine HCL  50 mg Oral Q4H PRN Max 4/day ALVINA Harley      Or    LORazepam  1 mg Intramuscular Q4H PRN ALVINA Harley      lamoTRIgine  50 mg Oral Daily Martin Cardenas MD      lisinopril  10 mg Oral Daily Sary LinkALVINA Thompson      LORazepam  1 mg Oral Q4H PRN Max 6/day ALVINA Harley      Or    LORazepam  2 mg Intramuscular Q6H PRN Max 3/day ALVINA Harley      OLANZapine  10 mg Oral Q3H PRN Max 3/day ALVINA Harley      Or    OLANZapine  10 mg Intramuscular Q3H PRN Max 3/day ALVINA Harley      OLANZapine  5 mg Oral Q3H PRN Max 6/day ALVINA Harley      Or    OLANZapine  5 mg Intramuscular Q3H PRN Max 6/day ALVINA Harley      OLANZapine  2.5 mg Oral Q3H PRN Max 8/day ALVINA Harley      polyethylene glycol  17 g Oral Daily PRN ALVINA Harley      propranolol  10 mg Oral Q8H PRN ALVINA Harley      senna-docusate sodium  1 tablet Oral Daily PRN ALVINA Harley      sertraline  150 mg Oral HS Martin Cardenas MD       Risks / Benefits of Treatment:  Risks, benefits, and possible side effects of medications explained to patient. Patient has limited understanding of risks and benefits of treatment at this time, but agrees to  take medications as prescribed.    Counseling / Coordination of Care:  Total floor/unit time spent today 25 minutes. Greater than 50% of total time was spent with the patient and / or family counseling and / or coordination of care. A description of the counseling / coordination of care:   Patient's progress discussed with staff in treatment team meeting.  Medications, treatment progress and treatment plan reviewed with patient.   Educated on importance of medication and treatment compliance.  Reassurance and supportive therapy provided.   Encouraged participation in milieu and group therapy on the unit.    ALVINA Harley 07/30/24

## 2024-07-30 NOTE — PLAN OF CARE
Problem: Alteration in Thoughts and Perception  Goal: Treatment Goal: Gain control of psychotic behaviors/thinking, reduce/eliminate presenting symptoms and demonstrate improved reality functioning upon discharge  Outcome: Progressing  Goal: Verbalize thoughts and feelings  Description: Interventions:  - Promote a nonjudgmental and trusting relationship with the patient through active listening and therapeutic communication  - Assess patient's level of functioning, behavior and potential for risk  - Engage patient in 1 on 1 interactions  - Encourage patient to express fears, feelings, frustrations, and discuss symptoms    - [place] patient to reality, help patient recognize reality-based thinking   - Administer medications as ordered and assess for potential side effects  - Provide the patient education related to the signs and symptoms of the illness and desired effects of prescribed medications  Outcome: Progressing  Goal: Refrain from acting on delusional thinking/internal stimuli  Description: Interventions:  - Monitor patient closely, per order   - Utilize least restrictive measures   - Set reasonable limits, give positive feedback for acceptable   - Administer medications as ordered and monitor of potential side effects  Outcome: Progressing  Goal: Agree to be compliant with medication regime, as prescribed and report medication side effects  Description: Interventions:  - Offer appropriate PRN medication and supervise ingestion; conduct AIMS, as needed   Outcome: Progressing  Goal: Attend and participate in unit activities, including therapeutic, recreational, and educational groups  Description: Interventions:  -Encourage Visitation and family involvement in care  Outcome: Progressing  Goal: Complete daily ADLs, including personal hygiene independently, as able  Description: Interventions:  - Observe, teach, and assist patient with ADLS  - Monitor and promote a balance of rest/activity, with adequate  nutrition and elimination   Outcome: Progressing     Problem: Ineffective Coping  Goal: Demonstrates healthy coping skills  Outcome: Progressing  Goal: Participates in unit activities  Description: Interventions:  - Provide therapeutic environment   - Provide required programming   - Redirect inappropriate behaviors   Outcome: Progressing  Goal: Patient/Family verbalizes awareness of resources  Outcome: Progressing  Goal: Free from restraint events  Description: - Utilize least restrictive measures   - Provide behavioral interventions   - Redirect inappropriate behaviors   Outcome: Progressing     Problem: Risk for Self Injury/Neglect  Goal: Treatment Goal: Remain safe during length of stay, learn and adopt new coping skills, and be free of self-injurious ideation, impulses and acts at the time of discharge  Outcome: Progressing  Goal: Verbalize thoughts and feelings  Description: Interventions:  - Assess and re-assess patient's lethality and potential for self-injury  - Engage patient in 1:1 interactions, daily, for a minimum of 15 minutes  - Encourage patient to express feelings, fears, frustrations, hopes  - Establish rapport/trust with patient   Outcome: Progressing  Goal: Refrain from harming self  Description: Interventions:  - Monitor patient closely, per order  - Develop a trusting relationship  - Supervise medication ingestion, monitor effects and side effects   Outcome: Progressing  Goal: Recognize maladaptive responses and adopt new coping mechanisms  Outcome: Progressing  Goal: Complete daily ADLs, including personal hygiene independently, as able  Description: Interventions:  - Observe, teach, and assist patient with ADLS  - Monitor and promote a balance of rest/activity, with adequate nutrition and elimination  Outcome: Progressing     Problem: Depression  Goal: Treatment Goal: Demonstrate behavioral control of depressive symptoms, verbalize feelings of improved mood/affect, and adopt new coping skills  prior to discharge  Outcome: Progressing  Goal: Verbalize thoughts and feelings  Description: Interventions:  - Assess and re-assess patient's level of risk   - Engage patient in 1:1 interactions, daily, for a minimum of 15 minutes   - Encourage patient to express feelings, fears, frustrations, hopes   Outcome: Progressing  Goal: Refrain from harming self  Description: Interventions:  - Monitor patient closely, per order   - Supervise medication ingestion, monitor effects and side effects   Outcome: Progressing  Goal: Refrain from self-neglect  Outcome: Progressing  Goal: Attend and participate in unit activities, including therapeutic, recreational, and educational groups  Description: Interventions:  - Provide therapeutic and educational activities daily, encourage attendance and participation, and document same in the medical record   Outcome: Progressing  Goal: Complete daily ADLs, including personal hygiene independently, as able  Description: Interventions:  - Observe, teach, and assist patient with ADLS  -  Monitor and promote a balance of rest/activity, with adequate nutrition and elimination   Outcome: Progressing     Problem: Anxiety  Goal: Anxiety is at manageable level  Description: Interventions:  - Assess and monitor patient's anxiety level.   - Monitor for signs and symptoms (heart palpitations, chest pain, shortness of breath, headaches, nausea, feeling jumpy, restlessness, irritable, apprehensive).   - Collaborate with interdisciplinary team and initiate plan and interventions as ordered.  - [employer] patient to unit/surroundings  - Explain treatment plan  - Encourage participation in care  - Encourage verbalization of concerns/fears  - Identify coping mechanisms  - Assist in developing anxiety-reducing skills  - Administer/offer alternative therapies  - Limit or eliminate stimulants  Outcome: Progressing     Problem: Risk for Violence/Aggression Toward Others  Goal: Treatment Goal: Refrain from acts  of violence/aggression during length of stay, and demonstrate improved impulse control at the time of discharge  Outcome: Progressing  Goal: Refrain from harming others  Outcome: Progressing  Goal: Refrain from destructive acts on the environment or property  Outcome: Progressing  Goal: Control angry outbursts  Description: Interventions:  - Monitor patient closely, per order  - Ensure early verbal de-escalation  - Monitor prn medication needs  - Set reasonable/therapeutic limits, outline behavioral expectations, and consequences   - Provide a non-threatening milieu, utilizing the least restrictive interventions   Outcome: Progressing     Problem: Alteration in Orientation  Goal: Treatment Goal: Demonstrate a reduction of confusion and improved orientation to person, place, time and/or situation upon discharge, according to optimum baseline/ability  Outcome: Progressing  Goal: Allow medical examinations, as recommended  Description: Interventions:  - Provide physical/neurological exams and/or referrals, per provider   Outcome: Progressing  Goal: Cooperate with recommended testing/procedures  Description: Interventions:  - Determine need for ancillary testing  - Observe for mental status changes  - Implement falls/precaution protocol   Outcome: Progressing  Goal: Attend and participate in unit activities, including therapeutic, recreational, and educational groups  Description: Interventions:  - Provide therapeutic and educational activities daily, encourage attendance and participation, and document same in the medical record   - Provide appropriate opportunities for reminiscence   - Provide a consistent daily routine   - Encourage family contact/visitation   Outcome: Progressing     Problem: SELF HARM/SUICIDALITY  Goal: Will have no self-injury during hospital stay  Description: INTERVENTIONS:  - Q 15 MINUTES: Routine safety checks  - Q WAKING SHIFT & PRN: Assess risk to determine if routine checks are adequate to  maintain patient safety  - Encourage patient to participate actively in care by formulating a plan to combat response to suicidal ideation, identify supports and resources  Outcome: Progressing     Problem: DEPRESSION  Goal: Will be euthymic at discharge  Description: INTERVENTIONS:  - Administer medication as ordered  - Provide emotional support via 1:1 interaction with staff  - Encourage involvement in milieu/groups/activities  - Monitor for social isolation  Outcome: Progressing     Problem: ANXIETY  Goal: Will report anxiety at manageable levels  Description: INTERVENTIONS:  - Administer medication as ordered  - Teach and encourage coping skills  - Provide emotional support  - Assess patient/family for anxiety and ability to cope  Outcome: Progressing     Problem: SELF CARE DEFICIT  Goal: Return ADL status to a safe level of function  Description: INTERVENTIONS:  - Administer medication as ordered  - Assess ADL deficits and provide assistive devices as needed  - Obtain PT/OT consults as needed  - Assist and instruct patient to increase activity and self care as tolerated  Outcome: Progressing     Problem: DISCHARGE PLANNING - CARE MANAGEMENT  Goal: Discharge to post-acute care or home with appropriate resources  Description: INTERVENTIONS:  - Conduct assessment to determine patient/family and health care team treatment goals, and need for post-acute services based on payer coverage, community resources, and patient preferences, and barriers to discharge  - Address psychosocial, clinical, and financial barriers to discharge as identified in assessment in conjunction with the patient/family and health care team  - Arrange appropriate level of post-acute services according to patient’s   needs and preference and payer coverage in collaboration with the physician and health care team  - Communicate with and update the patient/family, physician, and health care team regarding progress on the discharge plan  - Arrange  appropriate transportation to post-acute venues  Outcome: Progressing

## 2024-07-30 NOTE — PROGRESS NOTES
07/30/24 0812   Team Meeting   Meeting Type Daily Rounds   Team Members Present   Team Members Present Physician;Nurse;;Other (Discipline and Name)   Physician Team Member Thomas, Holter, Hangey CRNP   Nursing Team Member Chastity   Social Work Team Member Henrry FRY   Other (Discipline and Name) Ugo MS   Patient/Family Present   Patient Present No   Patient's Family Present No     Groups Participation  2/9.   Patient's compliant with medications. He's not engaged in his treatment. [facility] referral pending. Podiatry consult completed. He's appropriate.

## 2024-07-31 PROCEDURE — 99232 SBSQ HOSP IP/OBS MODERATE 35: CPT | Performed by: PSYCHIATRY & NEUROLOGY

## 2024-07-31 RX ADMIN — SERTRALINE HYDROCHLORIDE 150 MG: 100 TABLET ORAL at 21:36

## 2024-07-31 RX ADMIN — LAMOTRIGINE 50 MG: 25 TABLET ORAL at 08:21

## 2024-07-31 RX ADMIN — CYANOCOBALAMIN TAB 1000 MCG 1000 MCG: 1000 TAB at 08:21

## 2024-07-31 RX ADMIN — LISINOPRIL 10 MG: 10 TABLET ORAL at 08:22

## 2024-07-31 RX ADMIN — HYDROXYZINE HYDROCHLORIDE 25 MG: 25 TABLET ORAL at 16:18

## 2024-07-31 RX ADMIN — HYDROXYZINE HYDROCHLORIDE 25 MG: 25 TABLET ORAL at 21:36

## 2024-07-31 RX ADMIN — CARIPRAZINE 3 MG: 3 CAPSULE, GELATIN COATED ORAL at 08:21

## 2024-07-31 RX ADMIN — ATORVASTATIN CALCIUM 10 MG: 10 TABLET, FILM COATED ORAL at 08:21

## 2024-07-31 RX ADMIN — CHOLECALCIFEROL TAB 25 MCG (1000 UNIT) 2000 UNITS: 25 TAB at 08:21

## 2024-07-31 RX ADMIN — HYDROXYZINE HYDROCHLORIDE 25 MG: 25 TABLET ORAL at 08:21

## 2024-07-31 NOTE — PLAN OF CARE
Problem: Ineffective Coping  Goal: Identifies ineffective coping skills  Outcome: Progressing  Goal: Identifies healthy coping skills  Outcome: Progressing  Goal: Demonstrates healthy coping skills  Outcome: Progressing  Goal: Participates in unit activities  Description: Interventions:  - Provide therapeutic environment   - Provide required programming   - Redirect inappropriate behaviors   Outcome: Progressing   Attended 7/20 of the groups offered during the past 2 days.

## 2024-07-31 NOTE — PLAN OF CARE
Problem: Ineffective Coping  Goal: Identifies ineffective coping skills  Outcome: Progressing  Goal: Identifies healthy coping skills  Outcome: Progressing  Goal: Demonstrates healthy coping skills  Outcome: Progressing     Problem: Risk for Self Injury/Neglect  Goal: Treatment Goal: Remain safe during length of stay, learn and adopt new coping skills, and be free of self-injurious ideation, impulses and acts at the time of discharge  Outcome: Progressing  Goal: Verbalize thoughts and feelings  Description: Interventions:  - Assess and re-assess patient's lethality and potential for self-injury  - Engage patient in 1:1 interactions, daily, for a minimum of 15 minutes  - Encourage patient to express feelings, fears, frustrations, hopes  - Establish rapport/trust with patient   Outcome: Progressing  Goal: Refrain from harming self  Description: Interventions:  - Monitor patient closely, per order  - Develop a trusting relationship  - Supervise medication ingestion, monitor effects and side effects   Outcome: Progressing  Goal: Recognize maladaptive responses and adopt new coping mechanisms  Outcome: Progressing

## 2024-07-31 NOTE — PROGRESS NOTES
07/31/24 0819   Team Meeting   Meeting Type Daily Rounds   Team Members Present   Team Members Present Physician;Nurse;;Other (Discipline and Name)   Physician Team Member Holter   Nursing Team Member Chastity   Social Work Team Member Henrry FRY   Other (Discipline and Name) Ugo MS   Patient/Family Present   Patient Present No   Patient's Family Present No     Groups Participation  5/11.   Patient's compliant with medications. Patient's appropriate and engaged in his treatment.

## 2024-07-31 NOTE — PROGRESS NOTES
Progress Note - Behavioral Health   Deyvi Cao 54 y.o. male MRN: [id number]  Unit/Bed#: [facility] 112-02 Encounter: [id number]  Code Status: Level 1 - Full Code    Assessment & Plan   Principal Problem:    Bipolar disorder with severe depression (HCC)  Active Problems:    Benign essential hypertension    Mixed hyperlipidemia    Allergic rhinitis due to allergen    Medical clearance for psychiatric admission    Rosacea    Recommended Treatment:     Treatment plan, treatment progress and medication changes were reviewed with Nursing Staff, Pharmacy Service and Case Management in Treatment Team:  1.Continue with group therapy, milieu therapy and occupational therapy   2.Behavioral Health checks every 7 minutes   3.Continue frequent safety checks and vitals per unit protocol  4.Continue with SLIM medical management as indicated  5.Continue with current medication regimen for symptom management: Vraylar 3mg PO Daily, Atarax 25mg PO TID, Lamictal 50mg PO Daily, Zoloft 150mg PO QHS, Lamictal 50mg PO Daily   6.Disposition Planning: Discharge planning and efforts remain ongoing - Awaiting group home placement    Subjective:    Patient was seen today for continuation of care, records reviewed and patient was discussed with the morning case review team.    Deyvi was seen today for psychiatric follow-up.  On assessment today, Devyi was found laying in his bed.  He is calm and cooperative.  No major changes from yesterday, continues to report overall improvement in his symptoms.  Deyvi reports adequate daytime energy and denies any difficulties with initiating or staying asleep.  Oral appetite and hydration is adequate.  I updated him that the [facility] has no new updates regarding his referral to a personal care home.  We reviewed once more the specific as-needed medications they can use going forward if they experience any insomnia or destabilization of their mood, they understood and were agreeable. Milieu visibility and  group attendance encouraged to promote an active participation in treatment.    Deyvi denies acute suicidal/self-harm ideation/intent/plan upon direct inquiry at this time. Deyvi is able to contract for safety while on the unit and would feel comfortable seeking staff support should suicidal symptoms or urges appear or worsen. Deyvi remains behaviorally appropriate, no agitation or aggression noted on exam or in report. Deyvi also denies HI/AH/VH, and does not appear overtly manic.  Patient does not verbalize any experiences that can be categorized as paranoid, persecutory, bizarre, or somatic delusions. Deyvi remains adherent to his current psychotropic medication regimen and denies any side effects from medications, as well as none noted on exam.    Group Attendance: 5/11  Treatment Team: SOLEDAD  Psychiatric PRN's Needed: None    Review of Systems:  Behavior over the last 24 hours: Slowly improving  Sleep: sleeping okay throughout the night  Appetite: adequate  Medication side effects: none reported  ROS:no complaints, all other systems are negative    Objective:    Vitals:  Vitals:    07/31/24 0734   BP: 136/85   Pulse: 94   Resp: 18   Temp: 97.8 °F (36.6 °C)   SpO2: 92%     Laboratory Results:  I have personally reviewed all pertinent laboratory/tests results.  Most Recent Labs:   Lab Results   Component Value Date    WBC 7.39 06/26/2024    RBC 4.70 06/26/2024    HGB 15.2 06/26/2024    HCT 45.5 06/26/2024     06/26/2024    RDW 12.9 06/26/2024    NEUTROABS 4.63 06/26/2024    SODIUM 137 06/26/2024    K 4.1 06/26/2024     06/26/2024    CO2 26 06/26/2024    BUN 17 06/26/2024    CREATININE 0.63 06/26/2024    GLUC 98 06/26/2024    GLUF 98 06/26/2024    CALCIUM 9.6 06/26/2024    AST 25 06/26/2024    ALT 45 06/26/2024    ALKPHOS 114 (H) 06/26/2024    TP 7.0 06/26/2024    ALB 4.4 06/26/2024    TBILI 0.44 06/26/2024    CHOLESTEROL 177 06/26/2024    HDL 52 06/26/2024    TRIG 73 06/26/2024    LDLCALC 110  (H) 06/26/2024    NONHDLC 125 06/26/2024    LITHIUM 0.4 (L) 01/28/2021    MEQ4QGXXGVFL 2.636 06/26/2024    HGBA1C 5.2 11/22/2023     11/22/2023     Mental Status Evaluation:  Appearance:  age appropriate, casually dressed   Behavior:  cooperative, calm   Speech:  normal volume   Mood:  less anxious, less depressed   Affect:  constricted   Thought Process:  goal directed   Associations: intact associations   Thought Content:  no overt delusions   Perceptual Disturbances: no auditory hallucinations, no visual hallucinations, denies when asked, does not appear responding to internal stimuli   Risk Potential: Suicidal ideation - None at present, contracts for safety on the unit, would talk to staff if not feeling safe on the unit  Homicidal ideation - None at present  Potential for aggression - Not at present   Sensorium:  oriented to person, place, and time/date   Memory:  recent memory intact   Consciousness:  alert and awake   Attention/Concentration: attention span and concentration appear shorter than expected for age   Insight:  limited   Judgment: limited   Gait/Station: normal gait/station, normal balance   Motor Activity: no abnormal movements     Progress Toward Goals: making gradual improvement.  Deyvi continues to require inpatient psychiatric hospitalization for continued medication management and titration to optimize symptom reduction, improve sleep hygiene, and demonstrate adequate self-care.     Suicide/Homicide Risk Assessment:  Risk of Harm to Self:   Nursing Suicide Risk Assessment Last 24 hours: C-SSRS Risk (Since Last Contact)  Calculated C-SSRS Risk Score (Since Last Contact): No Risk Indicated    Risk of Harm to Others:  Nursing Homicide Risk Assessment: Violence Risk to Others: Denies within past 6 months    Behavioral Health Medications: all current active meds have been reviewed and continue current psychiatric medications.  Current Facility-Administered Medications   Medication Dose  Route Frequency Provider Last Rate    acetaminophen  650 mg Oral Q6H PRN ALVINA Harley      acetaminophen  650 mg Oral Q4H PRN ALVINA Harley      acetaminophen  975 mg Oral Q6H PRN ALVINA Harley      aluminum-magnesium hydroxide-simethicone  30 mL Oral Q4H PRN ALVINA Harley      ammonium lactate   Topical BID PRN ALVINA Harley      atorvastatin  10 mg Oral Daily ALVINA Lewis      benztropine  1 mg Intramuscular Q4H PRN Max 6/day ALVINA Harley      benztropine  1 mg Oral Q4H PRN Max 6/day ALVINA Harley      bisacodyl  10 mg Rectal Daily PRN ALVINA Harley      cariprazine  3 mg Oral Daily Martin Cardenas MD      Cholecalciferol  2,000 Units Oral Daily ALVINA Lewis      cyanocobalamin  1,000 mcg Oral Daily ALVINA Lewis      hydrOXYzine HCL  25 mg Oral Q6H PRN Max 4/day ALVINA Harley      hydrOXYzine HCL  25 mg Oral TID Martin Cardenas MD      hydrOXYzine HCL  50 mg Oral Q4H PRN Max 4/day ALVINA Harley      Or    LORazepam  1 mg Intramuscular Q4H PRN ALVINA Harley      lamoTRIgine  50 mg Oral Daily Martin Cardenas MD      lisinopril  10 mg Oral Daily ALVINA Lewis      LORazepam  1 mg Oral Q4H PRN Max 6/day ALVINA Harley      Or    LORazepam  2 mg Intramuscular Q6H PRN Max 3/day ALVINA Harley      OLANZapine  10 mg Oral Q3H PRN Max 3/day ALVINA Harley      Or    OLANZapine  10 mg Intramuscular Q3H PRN Max 3/day ALVINA Harley      OLANZapine  5 mg Oral Q3H PRN Max 6/day ALVINA Harley      Or    OLANZapine  5 mg Intramuscular Q3H PRN Max 6/day ALVINA Harley      OLANZapine  2.5 mg Oral Q3H PRN Max 8/day ALVINA Harley      polyethylene glycol  17 g Oral Daily PRN ALVINA Harley      propranolol  10 mg Oral Q8H PRN ALVINA Harley      senna-docusate sodium  1 tablet Oral Daily PRN ALVINA Harley      sertraline  150 mg Oral HS Martin Cardenas MD       Risks /  Benefits of Treatment:  Risks, benefits, and possible side effects of medications explained to patient. Patient has limited understanding of risks and benefits of treatment at this time, but agrees to take medications as prescribed.    Counseling / Coordination of Care:  Total floor/unit time spent today 25 minutes. Greater than 50% of total time was spent with the patient and / or family counseling and / or coordination of care. A description of the counseling / coordination of care:   Patient's progress discussed with staff in treatment team meeting.  Medications, treatment progress and treatment plan reviewed with patient.   Educated on importance of medication and treatment compliance.  Reassurance and supportive therapy provided.   Encouraged participation in milieu and group therapy on the unit.    ALVINA Harley 07/31/24

## 2024-07-31 NOTE — NURSING NOTE
Pt condition unchanged since AM assessment. Continues to deny SI/HI/AVH, denies anxiety or depression, will continue to monitor.

## 2024-08-01 PROCEDURE — 99232 SBSQ HOSP IP/OBS MODERATE 35: CPT | Performed by: PSYCHIATRY & NEUROLOGY

## 2024-08-01 RX ADMIN — HYDROXYZINE HYDROCHLORIDE 25 MG: 25 TABLET ORAL at 08:17

## 2024-08-01 RX ADMIN — HYDROXYZINE HYDROCHLORIDE 25 MG: 25 TABLET ORAL at 21:12

## 2024-08-01 RX ADMIN — CARIPRAZINE 3 MG: 3 CAPSULE, GELATIN COATED ORAL at 08:16

## 2024-08-01 RX ADMIN — CHOLECALCIFEROL TAB 25 MCG (1000 UNIT) 2000 UNITS: 25 TAB at 08:17

## 2024-08-01 RX ADMIN — SERTRALINE HYDROCHLORIDE 150 MG: 100 TABLET ORAL at 21:12

## 2024-08-01 RX ADMIN — ACETAMINOPHEN 650 MG: 325 TABLET ORAL at 19:16

## 2024-08-01 RX ADMIN — ATORVASTATIN CALCIUM 10 MG: 10 TABLET, FILM COATED ORAL at 08:17

## 2024-08-01 RX ADMIN — HYDROXYZINE HYDROCHLORIDE 25 MG: 25 TABLET ORAL at 17:08

## 2024-08-01 RX ADMIN — LISINOPRIL 10 MG: 10 TABLET ORAL at 08:17

## 2024-08-01 RX ADMIN — LAMOTRIGINE 50 MG: 25 TABLET ORAL at 08:17

## 2024-08-01 RX ADMIN — CYANOCOBALAMIN TAB 1000 MCG 1000 MCG: 1000 TAB at 08:17

## 2024-08-01 NOTE — PROGRESS NOTES
Progress Note - Behavioral Health   Deyvi Cao 54 y.o. male MRN: [id number]  Unit/Bed#: [facility] 112-02 Encounter: [id number]  Code Status: Level 1 - Full Code    Assessment & Plan   Principal Problem:    Bipolar disorder with severe depression (HCC)  Active Problems:    Benign essential hypertension    Mixed hyperlipidemia    Allergic rhinitis due to allergen    Medical clearance for psychiatric admission    Rosacea    Recommended Treatment:     Treatment plan, treatment progress and medication changes were reviewed with Nursing Staff, Pharmacy Service and Case Management in Treatment Team:  1.Continue with group therapy, milieu therapy and occupational therapy   2.Behavioral Health checks every 7 minutes   3.Continue frequent safety checks and vitals per unit protocol  4.Continue with SLIM medical management as indicated  5.Continue with current medication regimen for symptom management: Vraylar 3mg PO Daily, Atarax 25mg PO TID, Lamictal 50mg PO Daily, Zoloft 150mg PO QHS, Lamictal 50mg PO Daily   6.Disposition Planning: Discharge planning and efforts remain ongoing - Awaiting group home placement    Subjective:    Patient was seen today for continuation of care, records reviewed and patient was discussed with the morning case review team.    Deyvi was seen today for psychiatric follow-up.  On assessment today, Deyvi was found in his bed.  Still guarded and withdrawn at times.  Says he continues to make small improvements.  He is asking for an update on his place on the waitlist for personal care home.  Deyvi reports adequate daytime energy and denies any difficulties with initiating or staying asleep.  Oral appetite and hydration is adequate.   We reviewed once more the specific as-needed medications they can use going forward if they experience any insomnia or destabilization of their mood, they understood and were agreeable. Milieu visibility and group attendance encouraged to promote an active  participation in treatment.    Deyvi denies acute suicidal/self-harm ideation/intent/plan upon direct inquiry at this time. Deyvi is able to contract for safety while on the unit and would feel comfortable seeking staff support should suicidal symptoms or urges appear or worsen. Deyvi remains behaviorally appropriate, no agitation or aggression noted on exam or in report. Deyvi also denies HI/AH/VH, and does not appear overtly manic.  Patient does not verbalize any experiences that can be categorized as paranoid, persecutory, bizarre, or somatic delusions. Deyvi remains adherent to his current psychotropic medication regimen and denies any side effects from medications, as well as none noted on exam.    Group Attendance: 3 / 8  Treatment Team: SOLEDAD  Psychiatric PRN's Needed: None    Review of Systems:  Behavior over the last 24 hours: Slowly improving  Sleep: sleeping okay throughout the night  Appetite: adequate  Medication side effects: none reported  ROS:no complaints, all other systems are negative    Objective:    Vitals:  Vitals:    08/01/24 0733   BP: 131/80   Pulse: 93   Resp: 19   Temp: 97.7 °F (36.5 °C)   SpO2: 97%     Laboratory Results:  I have personally reviewed all pertinent laboratory/tests results.  Most Recent Labs:   Lab Results   Component Value Date    WBC 7.39 06/26/2024    RBC 4.70 06/26/2024    HGB 15.2 06/26/2024    HCT 45.5 06/26/2024     06/26/2024    RDW 12.9 06/26/2024    NEUTROABS 4.63 06/26/2024    SODIUM 137 06/26/2024    K 4.1 06/26/2024     06/26/2024    CO2 26 06/26/2024    BUN 17 06/26/2024    CREATININE 0.63 06/26/2024    GLUC 98 06/26/2024    GLUF 98 06/26/2024    CALCIUM 9.6 06/26/2024    AST 25 06/26/2024    ALT 45 06/26/2024    ALKPHOS 114 (H) 06/26/2024    TP 7.0 06/26/2024    ALB 4.4 06/26/2024    TBILI 0.44 06/26/2024    CHOLESTEROL 177 06/26/2024    HDL 52 06/26/2024    TRIG 73 06/26/2024    LDLCALC 110 (H) 06/26/2024    NONHDLC 125 06/26/2024     LITHIUM 0.4 (L) 01/28/2021    OFL0GWERHAMF 2.636 06/26/2024    HGBA1C 5.2 11/22/2023     11/22/2023     Mental Status Evaluation:  Appearance:  age appropriate, casually dressed   Behavior:  cooperative, calm   Speech:  normal volume   Mood:  less anxious, less depressed   Affect:  constricted   Thought Process:  goal directed   Associations: intact associations   Thought Content:  no overt delusions   Perceptual Disturbances: no auditory hallucinations, no visual hallucinations, denies when asked, does not appear responding to internal stimuli   Risk Potential: Suicidal ideation - None at present, contracts for safety on the unit, would talk to staff if not feeling safe on the unit  Homicidal ideation - None at present  Potential for aggression - Not at present   Sensorium:  oriented to person, place, and time/date   Memory:  recent memory intact   Consciousness:  alert and awake   Attention/Concentration: attention span and concentration appear shorter than expected for age   Insight:  limited   Judgment: limited   Gait/Station: normal gait/station, normal balance   Motor Activity: no abnormal movements     Progress Toward Goals: making gradual improvement.  Deyvi continues to require inpatient psychiatric hospitalization for continued medication management and titration to optimize symptom reduction, improve sleep hygiene, and demonstrate adequate self-care.     Suicide/Homicide Risk Assessment:  Risk of Harm to Self:   Nursing Suicide Risk Assessment Last 24 hours: C-SSRS Risk (Since Last Contact)  Calculated C-SSRS Risk Score (Since Last Contact): No Risk Indicated    Risk of Harm to Others:  Nursing Homicide Risk Assessment: Violence Risk to Others: Denies within past 6 months    Behavioral Health Medications: all current active meds have been reviewed and continue current psychiatric medications.  Current Facility-Administered Medications   Medication Dose Route Frequency Provider Last Rate     acetaminophen  650 mg Oral Q6H PRN ALVINA Harley      acetaminophen  650 mg Oral Q4H PRN ALVINA Harley      acetaminophen  975 mg Oral Q6H PRN ALVINA Harley      aluminum-magnesium hydroxide-simethicone  30 mL Oral Q4H PRN ALVINA Harley      ammonium lactate   Topical BID PRN ALVINA Harley      atorvastatin  10 mg Oral Daily ALVINA Lewis      benztropine  1 mg Intramuscular Q4H PRN Max 6/day ALVINA Harley      benztropine  1 mg Oral Q4H PRN Max 6/day ALVINA Harley      bisacodyl  10 mg Rectal Daily PRN ALVINA Harley      cariprazine  3 mg Oral Daily Martin Cardenas MD      Cholecalciferol  2,000 Units Oral Daily ALVINA Lewis      cyanocobalamin  1,000 mcg Oral Daily ALVINA Lewis      hydrOXYzine HCL  25 mg Oral Q6H PRN Max 4/day ALVINA Harley      hydrOXYzine HCL  25 mg Oral TID Martin Cardenas MD      hydrOXYzine HCL  50 mg Oral Q4H PRN Max 4/day ALVINA Harley      Or    LORazepam  1 mg Intramuscular Q4H PRN ALVINA Harley      lamoTRIgine  50 mg Oral Daily Martin Cardenas MD      lisinopril  10 mg Oral Daily ALVINA Lewis      LORazepam  1 mg Oral Q4H PRN Max 6/day ALVINA Harley      Or    LORazepam  2 mg Intramuscular Q6H PRN Max 3/day ALVINA Harley      OLANZapine  10 mg Oral Q3H PRN Max 3/day ALVINA Harley      Or    OLANZapine  10 mg Intramuscular Q3H PRN Max 3/day ALVINA Harley      OLANZapine  5 mg Oral Q3H PRN Max 6/day ALVINA Harley      Or    OLANZapine  5 mg Intramuscular Q3H PRN Max 6/day ALVINA Harley      OLANZapine  2.5 mg Oral Q3H PRN Max 8/day ALVINA Harley      polyethylene glycol  17 g Oral Daily PRN ALVINA Harley      propranolol  10 mg Oral Q8H PRN ALVINA Harley      senna-docusate sodium  1 tablet Oral Daily PRN ALVINA Harley      sertraline  150 mg Oral HS Martin Cardenas MD       Risks / Benefits of Treatment:  Risks, benefits, and  possible side effects of medications explained to patient. Patient has limited understanding of risks and benefits of treatment at this time, but agrees to take medications as prescribed.    Counseling / Coordination of Care:  Total floor/unit time spent today 25 minutes. Greater than 50% of total time was spent with the patient and / or family counseling and / or coordination of care. A description of the counseling / coordination of care:   Patient's progress discussed with staff in treatment team meeting.  Medications, treatment progress and treatment plan reviewed with patient.   Educated on importance of medication and treatment compliance.  Reassurance and supportive therapy provided.   Encouraged participation in milieu and group therapy on the unit.    ALVINA Harley 08/01/24

## 2024-08-01 NOTE — NURSING NOTE
Pt requested PRN pain medication for 5/10 headache. Pt received PRN Tylenol 650 mg at 1916 for moderate pain. Will monitor for effectiveness.

## 2024-08-01 NOTE — PROGRESS NOTES
08/01/24 0742   Team Meeting   Meeting Type Daily Rounds   Team Members Present   Team Members Present Physician;Nurse;;Other (Discipline and Name)   Physician Team Member Holter, Thomas, Hangey CRNP   Nursing Team Member Chastity   Social Work Team Member Henrry FRY   Other (Discipline and Name) Ugo MS   Patient/Family Present   Patient Present No   Patient's Family Present No     Groups Participation  3/8.   Patient's compliant with medications. He has minimal engagement in his treatment. He's on the wait list for [facility].

## 2024-08-01 NOTE — NURSING NOTE
Patient is quiet, isolative except for meals and one group today. Pt is pleasant and cooperative with care.Pt takes medications without issue, voices no concerns.

## 2024-08-01 NOTE — PLAN OF CARE
Problem: Alteration in Thoughts and Perception  Goal: Treatment Goal: Gain control of psychotic behaviors/thinking, reduce/eliminate presenting symptoms and demonstrate improved reality functioning upon discharge  Outcome: Progressing  Goal: Verbalize thoughts and feelings  Description: Interventions:  - Promote a nonjudgmental and trusting relationship with the patient through active listening and therapeutic communication  - Assess patient's level of functioning, behavior and potential for risk  - Engage patient in 1 on 1 interactions  - Encourage patient to express fears, feelings, frustrations, and discuss symptoms    - [place] patient to reality, help patient recognize reality-based thinking   - Administer medications as ordered and assess for potential side effects  - Provide the patient education related to the signs and symptoms of the illness and desired effects of prescribed medications  Outcome: Progressing  Goal: Refrain from acting on delusional thinking/internal stimuli  Description: Interventions:  - Monitor patient closely, per order   - Utilize least restrictive measures   - Set reasonable limits, give positive feedback for acceptable   - Administer medications as ordered and monitor of potential side effects  Outcome: Progressing  Goal: Agree to be compliant with medication regime, as prescribed and report medication side effects  Description: Interventions:  - Offer appropriate PRN medication and supervise ingestion; conduct AIMS, as needed   Outcome: Progressing  Goal: Attend and participate in unit activities, including therapeutic, recreational, and educational groups  Description: Interventions:  -Encourage Visitation and family involvement in care  Outcome: Progressing  Goal: Recognize dysfunctional thoughts, communicate reality-based thoughts at the time of discharge  Description: Interventions:  - Provide medication and psycho-education to assist patient in compliance and developing  insight into his/her illness   Outcome: Progressing  Goal: Complete daily ADLs, including personal hygiene independently, as able  Description: Interventions:  - Observe, teach, and assist patient with ADLS  - Monitor and promote a balance of rest/activity, with adequate nutrition and elimination   Outcome: Progressing     Problem: Ineffective Coping  Goal: Identifies ineffective coping skills  Outcome: Progressing  Goal: Identifies healthy coping skills  Outcome: Progressing  Goal: Demonstrates healthy coping skills  Outcome: Progressing  Goal: Participates in unit activities  Description: Interventions:  - Provide therapeutic environment   - Provide required programming   - Redirect inappropriate behaviors   Outcome: Progressing  Goal: Patient/Family participate in treatment and DC plans  Description: Interventions:  - Provide therapeutic environment  Outcome: Progressing  Goal: Patient/Family verbalizes awareness of resources  Outcome: Progressing  Goal: Understands least restrictive measures  Description: Interventions:  - Utilize least restrictive behavior  Outcome: Progressing  Goal: Free from restraint events  Description: - Utilize least restrictive measures   - Provide behavioral interventions   - Redirect inappropriate behaviors   Outcome: Progressing     Problem: Risk for Self Injury/Neglect  Goal: Treatment Goal: Remain safe during length of stay, learn and adopt new coping skills, and be free of self-injurious ideation, impulses and acts at the time of discharge  Outcome: Progressing  Goal: Verbalize thoughts and feelings  Description: Interventions:  - Assess and re-assess patient's lethality and potential for self-injury  - Engage patient in 1:1 interactions, daily, for a minimum of 15 minutes  - Encourage patient to express feelings, fears, frustrations, hopes  - Establish rapport/trust with patient   Outcome: Progressing  Goal: Refrain from harming self  Description: Interventions:  - Monitor  patient closely, per order  - Develop a trusting relationship  - Supervise medication ingestion, monitor effects and side effects   Outcome: Progressing  Goal: Attend and participate in unit activities, including therapeutic, recreational, and educational groups  Description: Interventions:  - Provide therapeutic and educational activities daily, encourage attendance and participation, and document same in the medical record  - Obtain collateral information, encourage visitation and family involvement in care   Outcome: Progressing  Goal: Recognize maladaptive responses and adopt new coping mechanisms  Outcome: Progressing  Goal: Complete daily ADLs, including personal hygiene independently, as able  Description: Interventions:  - Observe, teach, and assist patient with ADLS  - Monitor and promote a balance of rest/activity, with adequate nutrition and elimination  Outcome: Progressing     Problem: Depression  Goal: Treatment Goal: Demonstrate behavioral control of depressive symptoms, verbalize feelings of improved mood/affect, and adopt new coping skills prior to discharge  Outcome: Progressing  Goal: Verbalize thoughts and feelings  Description: Interventions:  - Assess and re-assess patient's level of risk   - Engage patient in 1:1 interactions, daily, for a minimum of 15 minutes   - Encourage patient to express feelings, fears, frustrations, hopes   Outcome: Progressing  Goal: Refrain from harming self  Description: Interventions:  - Monitor patient closely, per order   - Supervise medication ingestion, monitor effects and side effects   Outcome: Progressing  Goal: Refrain from isolation  Description: Interventions:  - Develop a trusting relationship   - Encourage socialization   Outcome: Progressing  Goal: Refrain from self-neglect  Outcome: Progressing  Goal: Attend and participate in unit activities, including therapeutic, recreational, and educational groups  Description: Interventions:  - Provide  therapeutic and educational activities daily, encourage attendance and participation, and document same in the medical record   Outcome: Progressing  Goal: Complete daily ADLs, including personal hygiene independently, as able  Description: Interventions:  - Observe, teach, and assist patient with ADLS  -  Monitor and promote a balance of rest/activity, with adequate nutrition and elimination   Outcome: Progressing     Problem: Anxiety  Goal: Anxiety is at manageable level  Description: Interventions:  - Assess and monitor patient's anxiety level.   - Monitor for signs and symptoms (heart palpitations, chest pain, shortness of breath, headaches, nausea, feeling jumpy, restlessness, irritable, apprehensive).   - Collaborate with interdisciplinary team and initiate plan and interventions as ordered.  - [place] patient to unit/surroundings  - Explain treatment plan  - Encourage participation in care  - Encourage verbalization of concerns/fears  - Identify coping mechanisms  - Assist in developing anxiety-reducing skills  - Administer/offer alternative therapies  - Limit or eliminate stimulants  Outcome: Progressing     Problem: Risk for Violence/Aggression Toward Others  Goal: Treatment Goal: Refrain from acts of violence/aggression during length of stay, and demonstrate improved impulse control at the time of discharge  Outcome: Progressing  Goal: Verbalize thoughts and feelings  Description: Interventions:  - Assess and re-assess patient's level of risk, every waking shift  - Engage patient in 1:1 interactions, daily, for a minimum of 15 minutes   - Allow patient to express feelings and frustrations in a safe and non-threatening manner   - Establish rapport/trust with patient   Outcome: Progressing  Goal: Refrain from harming others  Outcome: Progressing  Goal: Refrain from destructive acts on the environment or property  Outcome: Progressing  Goal: Control angry outbursts  Description: Interventions:  - Monitor  patient closely, per order  - Ensure early verbal de-escalation  - Monitor prn medication needs  - Set reasonable/therapeutic limits, outline behavioral expectations, and consequences   - Provide a non-threatening milieu, utilizing the least restrictive interventions   Outcome: Progressing  Goal: Attend and participate in unit activities, including therapeutic, recreational, and educational groups  Description: Interventions:  - Provide therapeutic and educational activities daily, encourage attendance and participation, and document same in the medical record   Outcome: Progressing  Goal: Identify appropriate positive anger management techniques  Description: Interventions:  - Offer anger management and coping skills groups   - Staff will provide positive feedback for appropriate anger control  Outcome: Progressing     Problem: Alteration in Orientation  Goal: Treatment Goal: Demonstrate a reduction of confusion and improved orientation to person, place, time and/or situation upon discharge, according to optimum baseline/ability  Outcome: Progressing  Goal: Interact with staff daily  Description: Interventions:  - Assess and re-assess patient's level of orientation  - Engage patient in 1 on 1 interactions, daily, for a minimum of 15 minutes   - Establish rapport/trust with patient   Outcome: Progressing  Goal: Express concerns related to confused thinking related to:  Description: Interventions:  - Encourage patient to express feelings, fears, frustrations, hopes  - Assign consistent caregivers   - [place]/re-orient patient as needed  - Allow comfort items, as appropriate  - Provide visual cues, signs, etc.   Outcome: Progressing  Goal: Allow medical examinations, as recommended  Description: Interventions:  - Provide physical/neurological exams and/or referrals, per provider   Outcome: Progressing  Goal: Cooperate with recommended testing/procedures  Description: Interventions:  - Determine need for ancillary  testing  - Observe for mental status changes  - Implement falls/precaution protocol   Outcome: Progressing  Goal: Attend and participate in unit activities, including therapeutic, recreational, and educational groups  Description: Interventions:  - Provide therapeutic and educational activities daily, encourage attendance and participation, and document same in the medical record   - Provide appropriate opportunities for reminiscence   - Provide a consistent daily routine   - Encourage family contact/visitation   Outcome: Progressing  Goal: Complete daily ADLs, including personal hygiene independently, as able  Description: Interventions:  - Observe, teach, and assist patient with ADLS  Outcome: Progressing     Problem: SELF HARM/SUICIDALITY  Goal: Will have no self-injury during hospital stay  Description: INTERVENTIONS:  - Q 15 MINUTES: Routine safety checks  - Q WAKING SHIFT & PRN: Assess risk to determine if routine checks are adequate to maintain patient safety  - Encourage patient to participate actively in care by formulating a plan to combat response to suicidal ideation, identify supports and resources  Outcome: Progressing     Problem: DEPRESSION  Goal: Will be euthymic at discharge  Description: INTERVENTIONS:  - Administer medication as ordered  - Provide emotional support via 1:1 interaction with staff  - Encourage involvement in milieu/groups/activities  - Monitor for social isolation  Outcome: Progressing     Problem: ANXIETY  Goal: Will report anxiety at manageable levels  Description: INTERVENTIONS:  - Administer medication as ordered  - Teach and encourage coping skills  - Provide emotional support  - Assess patient/family for anxiety and ability to cope  Outcome: Progressing  Goal: By discharge: Patient will verbalize 2 strategies to deal with anxiety  Description: Interventions:  - Identify any obvious source/trigger to anxiety  - Staff will assist patient in applying identified coping  technique/skills  - Encourage attendance of scheduled groups and activities  Outcome: Progressing     Problem: SELF CARE DEFICIT  Goal: Return ADL status to a safe level of function  Description: INTERVENTIONS:  - Administer medication as ordered  - Assess ADL deficits and provide assistive devices as needed  - Obtain PT/OT consults as needed  - Assist and instruct patient to increase activity and self care as tolerated  Outcome: Progressing

## 2024-08-02 PROCEDURE — 99232 SBSQ HOSP IP/OBS MODERATE 35: CPT | Performed by: PSYCHIATRY & NEUROLOGY

## 2024-08-02 RX ADMIN — HYDROXYZINE HYDROCHLORIDE 25 MG: 25 TABLET ORAL at 08:23

## 2024-08-02 RX ADMIN — LISINOPRIL 10 MG: 10 TABLET ORAL at 08:23

## 2024-08-02 RX ADMIN — CYANOCOBALAMIN TAB 1000 MCG 1000 MCG: 1000 TAB at 08:23

## 2024-08-02 RX ADMIN — CHOLECALCIFEROL TAB 25 MCG (1000 UNIT) 2000 UNITS: 25 TAB at 08:23

## 2024-08-02 RX ADMIN — ATORVASTATIN CALCIUM 10 MG: 10 TABLET, FILM COATED ORAL at 08:23

## 2024-08-02 RX ADMIN — HYDROXYZINE HYDROCHLORIDE 25 MG: 25 TABLET ORAL at 21:29

## 2024-08-02 RX ADMIN — SERTRALINE HYDROCHLORIDE 150 MG: 100 TABLET ORAL at 21:29

## 2024-08-02 RX ADMIN — LAMOTRIGINE 50 MG: 25 TABLET ORAL at 08:23

## 2024-08-02 RX ADMIN — HYDROXYZINE HYDROCHLORIDE 25 MG: 25 TABLET ORAL at 17:16

## 2024-08-02 RX ADMIN — CARIPRAZINE 3 MG: 3 CAPSULE, GELATIN COATED ORAL at 08:23

## 2024-08-02 NOTE — PROGRESS NOTES
Progress Note - Behavioral Health   Deyvi Cao 54 y.o. male MRN: [id number]  Unit/Bed#: [facility] 112-02 Encounter: [id number]  Code Status: Level 1 - Full Code    Assessment & Plan   Principal Problem:    Bipolar disorder with severe depression (HCC)  Active Problems:    Benign essential hypertension    Mixed hyperlipidemia    Allergic rhinitis due to allergen    Medical clearance for psychiatric admission    Rosacea    Recommended Treatment:     Treatment plan, treatment progress and medication changes were reviewed with Nursing Staff, Pharmacy Service and Case Management in Treatment Team:  1.Continue with group therapy, milieu therapy and occupational therapy   2.Behavioral Health checks every 7 minutes   3.Continue frequent safety checks and vitals per unit protocol  4.Continue with SLIM medical management as indicated  5.Continue with current medication regimen for symptom management: Vraylar 3mg PO Daily, Atarax 25mg PO TID, Lamictal 50mg PO Daily, Zoloft 150mg PO QHS, Lamictal 50mg PO Daily   6.Disposition Planning: Discharge planning and efforts remain ongoing - Awaiting group home placement    Subjective:    Patient was seen today for continuation of care, records reviewed and patient was discussed with the morning case review team.    Deyvi was seen today for psychiatric follow-up.  On assessment today, Deyvi was found in his bed.  Reports no changes in his symptoms.  He has some mild depression and anxiety.  Attended 3/10 groups yesterday.  Deyvi reports adequate daytime energy and denies any difficulties with initiating or staying asleep.  Oral appetite and hydration is adequate.  Patient was receptive to encouragement to continue to attend groups and participate in the treatment program.   We reviewed once more the specific as-needed medications they can use going forward if they experience any insomnia or destabilization of their mood, they understood and were agreeable. Milieu visibility and  group attendance encouraged to promote an active participation in treatment.    Deyvi denies acute suicidal/self-harm ideation/intent/plan upon direct inquiry at this time. Deyvi is able to contract for safety while on the unit and would feel comfortable seeking staff support should suicidal symptoms or urges appear or worsen. Deyvi remains behaviorally appropriate, no agitation or aggression noted on exam or in report. Deyvi also denies HI/AH/VH, and does not appear overtly manic.  Patient does not verbalize any experiences that can be categorized as paranoid, persecutory, bizarre, or somatic delusions. Deyvi remains adherent to his current psychotropic medication regimen and denies any side effects from medications, as well as none noted on exam.    Group Attendance: 3 / 10  Treatment Team: SOLEDAD  Psychiatric PRN's Needed: None    Review of Systems:  Behavior over the last 24 hours: Unchanged  Sleep: sleeping okay throughout the night  Appetite: adequate  Medication side effects: none reported  ROS:no complaints, all other systems are negative    Objective:    Vitals:  Vitals:    08/02/24 0731   BP: 126/76   Pulse: 83   Resp: 18   Temp: 97.8 °F (36.6 °C)   SpO2: 98%     Laboratory Results:  I have personally reviewed all pertinent laboratory/tests results.  Most Recent Labs:   Lab Results   Component Value Date    WBC 7.39 06/26/2024    RBC 4.70 06/26/2024    HGB 15.2 06/26/2024    HCT 45.5 06/26/2024     06/26/2024    RDW 12.9 06/26/2024    NEUTROABS 4.63 06/26/2024    SODIUM 137 06/26/2024    K 4.1 06/26/2024     06/26/2024    CO2 26 06/26/2024    BUN 17 06/26/2024    CREATININE 0.63 06/26/2024    GLUC 98 06/26/2024    GLUF 98 06/26/2024    CALCIUM 9.6 06/26/2024    AST 25 06/26/2024    ALT 45 06/26/2024    ALKPHOS 114 (H) 06/26/2024    TP 7.0 06/26/2024    ALB 4.4 06/26/2024    TBILI 0.44 06/26/2024    CHOLESTEROL 177 06/26/2024    HDL 52 06/26/2024    TRIG 73 06/26/2024    LDLCALC 110 (H)  06/26/2024    NONHDLC 125 06/26/2024    LITHIUM 0.4 (L) 01/28/2021    [id number] 2.636 06/26/2024    HGBA1C 5.2 11/22/2023     11/22/2023     Mental Status Evaluation:  Appearance:  age appropriate, casually dressed   Behavior:  cooperative, calm   Speech:  normal rate, normal volume   Mood:  less anxious, less depressed   Affect:  constricted   Thought Process:  goal directed   Associations: intact associations   Thought Content:  no overt delusions   Perceptual Disturbances: no auditory hallucinations, no visual hallucinations, denies when asked, does not appear responding to internal stimuli   Risk Potential: Suicidal ideation - None at present, contracts for safety on the unit, would talk to staff if not feeling safe on the unit  Homicidal ideation - None at present  Potential for aggression - Not at present   Sensorium:  oriented to person, place, and time/date   Memory:  recent memory intact   Consciousness:  alert and awake   Attention/Concentration: attention span and concentration appear shorter than expected for age   Insight:  limited   Judgment: limited   Gait/Station: normal gait/station, normal balance   Motor Activity: no abnormal movements     Progress Toward Goals: making gradual improvement.  Deyvi continues to require inpatient psychiatric hospitalization for continued medication management and titration to optimize symptom reduction, improve sleep hygiene, and demonstrate adequate self-care.     Suicide/Homicide Risk Assessment:  Risk of Harm to Self:   Nursing Suicide Risk Assessment Last 24 hours: C-SSRS Risk (Since Last Contact)  Calculated C-SSRS Risk Score (Since Last Contact): No Risk Indicated    Risk of Harm to Others:  Nursing Homicide Risk Assessment: Violence Risk to Others: Denies within past 6 months    Behavioral Health Medications: all current active meds have been reviewed and continue current psychiatric medications.  Current Facility-Administered Medications    Medication Dose Route Frequency Provider Last Rate    acetaminophen  650 mg Oral Q6H PRN ALVINA Harley      acetaminophen  650 mg Oral Q4H PRN ALVINA Harley      acetaminophen  975 mg Oral Q6H PRN ALVINA Harley      aluminum-magnesium hydroxide-simethicone  30 mL Oral Q4H PRN ALVINA Harley      ammonium lactate   Topical BID PRN ALVINA Harley      atorvastatin  10 mg Oral Daily ALVINA Lewis      benztropine  1 mg Intramuscular Q4H PRN Max 6/day ALVINA Harley      benztropine  1 mg Oral Q4H PRN Max 6/day ALVINA Harley      bisacodyl  10 mg Rectal Daily PRN ALVINA Harley      cariprazine  3 mg Oral Daily Martin Cardenas MD      Cholecalciferol  2,000 Units Oral Daily ALVINA Lewis      cyanocobalamin  1,000 mcg Oral Daily ALVINA Lewis      hydrOXYzine HCL  25 mg Oral Q6H PRN Max 4/day ALVINA Harley      hydrOXYzine HCL  25 mg Oral TID Martin Cardenas MD      hydrOXYzine HCL  50 mg Oral Q4H PRN Max 4/day ALVINA Harley      Or    LORazepam  1 mg Intramuscular Q4H PRN ALVINA Harley      lamoTRIgine  50 mg Oral Daily Martin Cardenas MD      lisinopril  10 mg Oral Daily ALVINA Lewis      LORazepam  1 mg Oral Q4H PRN Max 6/day ALVINA Harley      Or    LORazepam  2 mg Intramuscular Q6H PRN Max 3/day ALVINA Harley      OLANZapine  10 mg Oral Q3H PRN Max 3/day ALVINA Harley      Or    OLANZapine  10 mg Intramuscular Q3H PRN Max 3/day ALVINA Harley      OLANZapine  5 mg Oral Q3H PRN Max 6/day ALVINA Harley      Or    OLANZapine  5 mg Intramuscular Q3H PRN Max 6/day ALVINA Harley      OLANZapine  2.5 mg Oral Q3H PRN Max 8/day ALVINA Harley      polyethylene glycol  17 g Oral Daily PRN ALVINA Harley      propranolol  10 mg Oral Q8H PRN ALVINA Harley      senna-docusate sodium  1 tablet Oral Daily PRN ALVINA Harley      sertraline  150 mg Oral HS Martin Cardenas MD        Risks / Benefits of Treatment:  Risks, benefits, and possible side effects of medications explained to patient. Patient has limited understanding of risks and benefits of treatment at this time, but agrees to take medications as prescribed.    Counseling / Coordination of Care:  Total floor/unit time spent today 25 minutes. Greater than 50% of total time was spent with the patient and / or family counseling and / or coordination of care. A description of the counseling / coordination of care:   Patient's progress discussed with staff in treatment team meeting.  Medications, treatment progress and treatment plan reviewed with patient.   Educated on importance of medication and treatment compliance.  Reassurance and supportive therapy provided.   Encouraged participation in milieu and group therapy on the unit.    ALVINA Harley 08/02/24

## 2024-08-02 NOTE — PLAN OF CARE
Problem: Ineffective Coping  Goal: Identifies ineffective coping skills  Outcome: Progressing  Goal: Identifies healthy coping skills  Outcome: Progressing  Goal: Demonstrates healthy coping skills  Outcome: Progressing  Goal: Participates in unit activities  Description: Interventions:  - Provide therapeutic environment   - Provide required programming   - Redirect inappropriate behaviors   Outcome: Progressing   Attended 13/38 of the groups offered during the past 4 days.

## 2024-08-02 NOTE — PROGRESS NOTES
08/02/24 0828   Team Meeting   Meeting Type Daily Rounds   Team Members Present   Team Members Present Physician;Nurse;;Other (Discipline and Name)   Physician Team Member Holter, Thomas   Nursing Team Member Chastity   Social Work Team Member Henrry FRY   Other (Discipline and Name) Ugo MS   Patient/Family Present   Patient Present No   Patient's Family Present No     Groups Participation  3/10.   Patient's compliant with medications. He has minimal engagement in his treatment. Waiting for bed at [facility].

## 2024-08-02 NOTE — SOCIAL WORK
This writer met with patient for an individual session. Patient discussed his depression has improved and he's waiting for an open bed at [facility]. He discussed his history of noncompliance with medications, his fractured family and frequent relocation.

## 2024-08-02 NOTE — NURSING NOTE
Pt is visible on the unit and social with select peers. Consumed 100% of dinner. Took medications without incidence. Pt is pleasant and cooperative. Attended 3/10 groups. Denies all psych symptoms. No behavioral issues. Pt offers no concerns or complaints. VSS. Continuous safety checks maintained.

## 2024-08-02 NOTE — NURSING NOTE
Patient is quiet, isolative except for meals and one group today so far. Pt is pleasant and cooperative with care. Pt takes medications without issue, voices no concerns. No chnges or behaviors.

## 2024-08-02 NOTE — PLAN OF CARE
Problem: Alteration in Thoughts and Perception  Goal: Treatment Goal: Gain control of psychotic behaviors/thinking, reduce/eliminate presenting symptoms and demonstrate improved reality functioning upon discharge  Outcome: Progressing  Goal: Verbalize thoughts and feelings  Description: Interventions:  - Promote a nonjudgmental and trusting relationship with the patient through active listening and therapeutic communication  - Assess patient's level of functioning, behavior and potential for risk  - Engage patient in 1 on 1 interactions  - Encourage patient to express fears, feelings, frustrations, and discuss symptoms    - Industry patient to reality, help patient recognize reality-based thinking   - Administer medications as ordered and assess for potential side effects  - Provide the patient education related to the signs and symptoms of the illness and desired effects of prescribed medications  Outcome: Progressing  Goal: Refrain from acting on delusional thinking/internal stimuli  Description: Interventions:  - Monitor patient closely, per order   - Utilize least restrictive measures   - Set reasonable limits, give positive feedback for acceptable   - Administer medications as ordered and monitor of potential side effects  Outcome: Progressing  Goal: Agree to be compliant with medication regime, as prescribed and report medication side effects  Description: Interventions:  - Offer appropriate PRN medication and supervise ingestion; conduct AIMS, as needed   Outcome: Progressing  Goal: Attend and participate in unit activities, including therapeutic, recreational, and educational groups  Description: Interventions:  -Encourage Visitation and family involvement in care  Outcome: Progressing  Goal: Recognize dysfunctional thoughts, communicate reality-based thoughts at the time of discharge  Description: Interventions:  - Provide medication and psycho-education to assist patient in compliance and developing  insight into his/her illness   Outcome: Progressing  Goal: Complete daily ADLs, including personal hygiene independently, as able  Description: Interventions:  - Observe, teach, and assist patient with ADLS  - Monitor and promote a balance of rest/activity, with adequate nutrition and elimination   Outcome: Progressing     Problem: Ineffective Coping  Goal: Identifies ineffective coping skills  Outcome: Progressing  Goal: Identifies healthy coping skills  Outcome: Progressing  Goal: Demonstrates healthy coping skills  Outcome: Progressing  Goal: Participates in unit activities  Description: Interventions:  - Provide therapeutic environment   - Provide required programming   - Redirect inappropriate behaviors   Outcome: Progressing  Goal: Patient/Family participate in treatment and DC plans  Description: Interventions:  - Provide therapeutic environment  Outcome: Progressing  Goal: Patient/Family verbalizes awareness of resources  Outcome: Progressing  Goal: Understands least restrictive measures  Description: Interventions:  - Utilize least restrictive behavior  Outcome: Progressing  Goal: Free from restraint events  Description: - Utilize least restrictive measures   - Provide behavioral interventions   - Redirect inappropriate behaviors   Outcome: Progressing     Problem: Risk for Self Injury/Neglect  Goal: Treatment Goal: Remain safe during length of stay, learn and adopt new coping skills, and be free of self-injurious ideation, impulses and acts at the time of discharge  Outcome: Progressing  Goal: Verbalize thoughts and feelings  Description: Interventions:  - Assess and re-assess patient's lethality and potential for self-injury  - Engage patient in 1:1 interactions, daily, for a minimum of 15 minutes  - Encourage patient to express feelings, fears, frustrations, hopes  - Establish rapport/trust with patient   Outcome: Progressing  Goal: Refrain from harming self  Description: Interventions:  - Monitor  patient closely, per order  - Develop a trusting relationship  - Supervise medication ingestion, monitor effects and side effects   Outcome: Progressing  Goal: Attend and participate in unit activities, including therapeutic, recreational, and educational groups  Description: Interventions:  - Provide therapeutic and educational activities daily, encourage attendance and participation, and document same in the medical record  - Obtain collateral information, encourage visitation and family involvement in care   Outcome: Progressing  Goal: Recognize maladaptive responses and adopt new coping mechanisms  Outcome: Progressing  Goal: Complete daily ADLs, including personal hygiene independently, as able  Description: Interventions:  - Observe, teach, and assist patient with ADLS  - Monitor and promote a balance of rest/activity, with adequate nutrition and elimination  Outcome: Progressing     Problem: Depression  Goal: Treatment Goal: Demonstrate behavioral control of depressive symptoms, verbalize feelings of improved mood/affect, and adopt new coping skills prior to discharge  Outcome: Progressing  Goal: Verbalize thoughts and feelings  Description: Interventions:  - Assess and re-assess patient's level of risk   - Engage patient in 1:1 interactions, daily, for a minimum of 15 minutes   - Encourage patient to express feelings, fears, frustrations, hopes   Outcome: Progressing  Goal: Refrain from harming self  Description: Interventions:  - Monitor patient closely, per order   - Supervise medication ingestion, monitor effects and side effects   Outcome: Progressing  Goal: Refrain from isolation  Description: Interventions:  - Develop a trusting relationship   - Encourage socialization   Outcome: Progressing  Goal: Refrain from self-neglect  Outcome: Progressing  Goal: Attend and participate in unit activities, including therapeutic, recreational, and educational groups  Description: Interventions:  - Provide  therapeutic and educational activities daily, encourage attendance and participation, and document same in the medical record   Outcome: Progressing  Goal: Complete daily ADLs, including personal hygiene independently, as able  Description: Interventions:  - Observe, teach, and assist patient with ADLS  -  Monitor and promote a balance of rest/activity, with adequate nutrition and elimination   Outcome: Progressing     Problem: Anxiety  Goal: Anxiety is at manageable level  Description: Interventions:  - Assess and monitor patient's anxiety level.   - Monitor for signs and symptoms (heart palpitations, chest pain, shortness of breath, headaches, nausea, feeling jumpy, restlessness, irritable, apprehensive).   - Collaborate with interdisciplinary team and initiate plan and interventions as ordered.  - [place] patient to unit/surroundings  - Explain treatment plan  - Encourage participation in care  - Encourage verbalization of concerns/fears  - Identify coping mechanisms  - Assist in developing anxiety-reducing skills  - Administer/offer alternative therapies  - Limit or eliminate stimulants  Outcome: Progressing     Problem: Risk for Violence/Aggression Toward Others  Goal: Treatment Goal: Refrain from acts of violence/aggression during length of stay, and demonstrate improved impulse control at the time of discharge  Outcome: Progressing  Goal: Verbalize thoughts and feelings  Description: Interventions:  - Assess and re-assess patient's level of risk, every waking shift  - Engage patient in 1:1 interactions, daily, for a minimum of 15 minutes   - Allow patient to express feelings and frustrations in a safe and non-threatening manner   - Establish rapport/trust with patient   Outcome: Progressing  Goal: Refrain from harming others  Outcome: Progressing  Goal: Refrain from destructive acts on the environment or property  Outcome: Progressing  Goal: Control angry outbursts  Description: Interventions:  - Monitor  patient closely, per order  - Ensure early verbal de-escalation  - Monitor prn medication needs  - Set reasonable/therapeutic limits, outline behavioral expectations, and consequences   - Provide a non-threatening milieu, utilizing the least restrictive interventions   Outcome: Progressing  Goal: Attend and participate in unit activities, including therapeutic, recreational, and educational groups  Description: Interventions:  - Provide therapeutic and educational activities daily, encourage attendance and participation, and document same in the medical record   Outcome: Progressing  Goal: Identify appropriate positive anger management techniques  Description: Interventions:  - Offer anger management and coping skills groups   - Staff will provide positive feedback for appropriate anger control  Outcome: Progressing     Problem: Alteration in Orientation  Goal: Treatment Goal: Demonstrate a reduction of confusion and improved orientation to person, place, time and/or situation upon discharge, according to optimum baseline/ability  Outcome: Progressing  Goal: Interact with staff daily  Description: Interventions:  - Assess and re-assess patient's level of orientation  - Engage patient in 1 on 1 interactions, daily, for a minimum of 15 minutes   - Establish rapport/trust with patient   Outcome: Progressing  Goal: Express concerns related to confused thinking related to:  Description: Interventions:  - Encourage patient to express feelings, fears, frustrations, hopes  - Assign consistent caregivers   - [place]/re-orient patient as needed  - Allow comfort items, as appropriate  - Provide visual cues, signs, etc.   Outcome: Progressing  Goal: Allow medical examinations, as recommended  Description: Interventions:  - Provide physical/neurological exams and/or referrals, per provider   Outcome: Progressing  Goal: Cooperate with recommended testing/procedures  Description: Interventions:  - Determine need for ancillary  testing  - Observe for mental status changes  - Implement falls/precaution protocol   Outcome: Progressing  Goal: Attend and participate in unit activities, including therapeutic, recreational, and educational groups  Description: Interventions:  - Provide therapeutic and educational activities daily, encourage attendance and participation, and document same in the medical record   - Provide appropriate opportunities for reminiscence   - Provide a consistent daily routine   - Encourage family contact/visitation   Outcome: Progressing  Goal: Complete daily ADLs, including personal hygiene independently, as able  Description: Interventions:  - Observe, teach, and assist patient with ADLS  Outcome: Progressing     Problem: SELF HARM/SUICIDALITY  Goal: Will have no self-injury during hospital stay  Description: INTERVENTIONS:  - Q 15 MINUTES: Routine safety checks  - Q WAKING SHIFT & PRN: Assess risk to determine if routine checks are adequate to maintain patient safety  - Encourage patient to participate actively in care by formulating a plan to combat response to suicidal ideation, identify supports and resources  Outcome: Progressing     Problem: DEPRESSION  Goal: Will be euthymic at discharge  Description: INTERVENTIONS:  - Administer medication as ordered  - Provide emotional support via 1:1 interaction with staff  - Encourage involvement in milieu/groups/activities  - Monitor for social isolation  Outcome: Progressing     Problem: ANXIETY  Goal: Will report anxiety at manageable levels  Description: INTERVENTIONS:  - Administer medication as ordered  - Teach and encourage coping skills  - Provide emotional support  - Assess patient/family for anxiety and ability to cope  Outcome: Progressing  Goal: By discharge: Patient will verbalize 2 strategies to deal with anxiety  Description: Interventions:  - Identify any obvious source/trigger to anxiety  - Staff will assist patient in applying identified coping  technique/skills  - Encourage attendance of scheduled groups and activities  Outcome: Progressing     Problem: SELF CARE DEFICIT  Goal: Return ADL status to a safe level of function  Description: INTERVENTIONS:  - Administer medication as ordered  - Assess ADL deficits and provide assistive devices as needed  - Obtain PT/OT consults as needed  - Assist and instruct patient to increase activity and self care as tolerated  Outcome: Progressing     Problem: DISCHARGE PLANNING - CARE MANAGEMENT  Goal: Discharge to post-acute care or home with appropriate resources  Description: INTERVENTIONS:  - Conduct assessment to determine patient/family and health care team treatment goals, and need for post-acute services based on payer coverage, community resources, and patient preferences, and barriers to discharge  - Address psychosocial, clinical, and financial barriers to discharge as identified in assessment in conjunction with the patient/family and health care team  - Arrange appropriate level of post-acute services according to patient’s   needs and preference and payer coverage in collaboration with the physician and health care team  - Communicate with and update the patient/family, physician, and health care team regarding progress on the discharge plan  - Arrange appropriate transportation to post-acute venues  Outcome: Not Progressing

## 2024-08-02 NOTE — NURSING NOTE
Deyvi states his depression is more troublesome than his anxiety with it being 3/10. This he says is his baseline.  He states when he is not in the hospital he would take a walk outside for help with his depression. This usually helps he stated. He said he also likes to cook as a coping skill. When speaking his tone is mostly flat.   He does brighten a bit on approach. He denies suicidal ideation.  He is calm and pleasant

## 2024-08-03 PROCEDURE — 99232 SBSQ HOSP IP/OBS MODERATE 35: CPT | Performed by: PSYCHIATRY & NEUROLOGY

## 2024-08-03 RX ADMIN — CYANOCOBALAMIN TAB 1000 MCG 1000 MCG: 1000 TAB at 08:42

## 2024-08-03 RX ADMIN — HYDROXYZINE HYDROCHLORIDE 25 MG: 25 TABLET ORAL at 17:20

## 2024-08-03 RX ADMIN — SERTRALINE HYDROCHLORIDE 150 MG: 100 TABLET ORAL at 21:38

## 2024-08-03 RX ADMIN — ATORVASTATIN CALCIUM 10 MG: 10 TABLET, FILM COATED ORAL at 08:42

## 2024-08-03 RX ADMIN — CARIPRAZINE 3 MG: 3 CAPSULE, GELATIN COATED ORAL at 08:42

## 2024-08-03 RX ADMIN — CHOLECALCIFEROL TAB 25 MCG (1000 UNIT) 2000 UNITS: 25 TAB at 08:43

## 2024-08-03 RX ADMIN — LAMOTRIGINE 50 MG: 25 TABLET ORAL at 08:42

## 2024-08-03 RX ADMIN — HYDROXYZINE HYDROCHLORIDE 25 MG: 25 TABLET ORAL at 08:42

## 2024-08-03 RX ADMIN — LISINOPRIL 10 MG: 10 TABLET ORAL at 08:42

## 2024-08-03 RX ADMIN — HYDROXYZINE HYDROCHLORIDE 25 MG: 25 TABLET ORAL at 21:38

## 2024-08-03 NOTE — NURSING NOTE
Patient isolative to self in room today, visible for meals, med and meal compliant. Patient cooperative upon approach, behaviors controlled, offered no complaints. Patient no group attendance, currently in own room, will continue to monitor.

## 2024-08-03 NOTE — PLAN OF CARE
Problem: Alteration in Thoughts and Perception  Goal: Treatment Goal: Gain control of psychotic behaviors/thinking, reduce/eliminate presenting symptoms and demonstrate improved reality functioning upon discharge  Outcome: Progressing  Goal: Verbalize thoughts and feelings  Description: Interventions:  - Promote a nonjudgmental and trusting relationship with the patient through active listening and therapeutic communication  - Assess patient's level of functioning, behavior and potential for risk  - Engage patient in 1 on 1 interactions  - Encourage patient to express fears, feelings, frustrations, and discuss symptoms    - Dalton patient to reality, help patient recognize reality-based thinking   - Administer medications as ordered and assess for potential side effects  - Provide the patient education related to the signs and symptoms of the illness and desired effects of prescribed medications  Outcome: Progressing  Goal: Refrain from acting on delusional thinking/internal stimuli  Description: Interventions:  - Monitor patient closely, per order   - Utilize least restrictive measures   - Set reasonable limits, give positive feedback for acceptable   - Administer medications as ordered and monitor of potential side effects  Outcome: Progressing  Goal: Agree to be compliant with medication regime, as prescribed and report medication side effects  Description: Interventions:  - Offer appropriate PRN medication and supervise ingestion; conduct AIMS, as needed   Outcome: Progressing  Goal: Attend and participate in unit activities, including therapeutic, recreational, and educational groups  Description: Interventions:  -Encourage Visitation and family involvement in care  Outcome: Progressing  Goal: Recognize dysfunctional thoughts, communicate reality-based thoughts at the time of discharge  Description: Interventions:  - Provide medication and psycho-education to assist patient in compliance and developing  insight into his/her illness   Outcome: Progressing  Goal: Complete daily ADLs, including personal hygiene independently, as able  Description: Interventions:  - Observe, teach, and assist patient with ADLS  - Monitor and promote a balance of rest/activity, with adequate nutrition and elimination   Outcome: Progressing     Problem: Ineffective Coping  Goal: Participates in unit activities  Description: Interventions:  - Provide therapeutic environment   - Provide required programming   - Redirect inappropriate behaviors   Outcome: Progressing  Goal: Understands least restrictive measures  Description: Interventions:  - Utilize least restrictive behavior  Outcome: Progressing  Goal: Free from restraint events  Description: - Utilize least restrictive measures   - Provide behavioral interventions   - Redirect inappropriate behaviors   Outcome: Progressing

## 2024-08-03 NOTE — PROGRESS NOTES
Psychiatry Progress Note [facility]    Deyvi Cao 54 y.o. male MRN: [id number]  Unit/Bed#: [facility] 112-02 Encounter: [id number]  Code Status: Level 1 - Full Code    PCP: Manuel Chris MD    Date of Admission:  6/25/2024 1232   Date of Service:  08/03/24    Patient Active Problem List   Diagnosis    Acne    Benign essential hypertension    Mixed hyperlipidemia    Tobacco dependence syndrome    Allergic rhinitis due to allergen    Medical clearance for psychiatric admission    Lung cancer screening declined by patient    Moderate depressed bipolar I disorder (HCC)    Bipolar disorder with severe depression (HCC)    Rosacea     Review of systems: Unremarkable  Psychiatric Diagnosis: Bipolar depression    Assessment  Overall Status: Reports that depression is only 2 out of 10 with no he comes across as somewhat shy and withdrawn but with some mood reactivity and he claims that is his baseline.  Has not had any manic symptoms or psychotic symptoms or suicidal thoughts and is attending most of the groups interacting more with staff and peers.  Not aggressive or agitated and is waiting to hear from the [facility] personal care boarding home   certification Statement: The patient will continue to require additional inpatient hospital stay due to recurrent depression and repeated suicide attempts in the community     Medications: Vraylar 3 mg a day, hydroxyzine 25 mg 3 times a day, Zoloft 150 mg a day, Lamictal 50 mg a day,  All medications reviewed and recommend they be continued for symptom management  Side effects from treatment: None reported  Medication changes   No significant changes but medications were being titrated as ordered   Medication education   Risks side effects benefits and precautions of medications discussed with patient and he did verbalize an understanding about risks for metabolic syndrome from being on neuroleptics and is form tardive dyskinesia etc.     Understanding of  medications: Has good understanding about medications and side effects of his precautions benefits   Justification for dual anti-psychotics: Not applicable    Non-pharmacological treatments  Continue with individual, group, milieu and occupational therapy using recovery principles and psycho-education about accepting illness and the need for treatment.  Behavioral health checks every 7 minutes  Safety with the patient about illness and need for treatment    Safety  Safety and communication plan established to target dynamic risk factors discussed above.    Discharge Plan   Consider a supervised setting with an ACT team at the [facility] at the [place]    Interval Progress   Continues to claim depression has lifted and is only 2 out of 10 and he claims this is his baseline as he is usually shy and withdrawn and not associating with people.  Does attend most of the groups denies feeling sad or suicidal or having had any psychotic or manic symptoms at all.  No longer expresses any death wishes or suicidal thoughts since several months and receptive to verbal support reassurance and understands that he is on a waiting list to be accepted by the [facility] with an ACT team.  Remains casually dressed fairly groomed not aggressive or agitated or threatening at self-abusive with no need for behavioral PRNs  Acceptance by patient: Accepting  Hopefulness in recovery: Living in a group home  Involved in reintegration process: Not contacting anyone in the community  trusting in relationship with psychiatrist: Trusting  Sleep: Good  Appetite: Good  Compliance with Medications: Good  Group attendance: Improving more than 50%  Significant events and progress towards goals: Still withdrawn and isolated but denies feeling depressed or having any suicidal thoughts    Mental Status Exam  Appearance: casually dressed, dressed appropriately, adequate grooming, looks stated  age  Behavior: cooperative, mildly anxious, slow responses no good mood reactivity is still somewhat cold and aloof but eye contact is good found laying on bed as usual somewhat timid and shy   speech: slow, scant, increased latency of response, delayed, soft, decreased volume  Mood: depressed, dysphoric, anxious  Affect: constricted, slightly brighter, mood-congruent  Thought Process: organized, goal directed, decreased rate of thoughts, slowing of thoughts, negative thinking, concrete  Thought Content: no overt delusions, no current suicidal or homicidal thoughts and no plans verbalized.  No phobias obsessions compulsions reported.  Not appearing to respond to any delusional beliefs  Perceptual Disturbances: no auditory hallucinations, no visual hallucinations  Hx Risk Factors: chronic depressive symptoms, chronic anxiety symptoms, history of suicide attempts  Sensorium: Alert and oriented x 3 spheres and situation  Cognition: recent and remote memory grossly intact  Consciousness: alert, awake, and not sedated  Attention: attention span and concentration are age appropriate  Intellect: appears to be of average intelligence  Insight: limited  Judgement: limited  Motor Activity: no abnormal movements     Vitals  Temp:  [97.5 °F (36.4 °C)-97.8 °F (36.6 °C)] 97.8 °F (36.6 °C)  HR:  [90-99] 90  Resp:  [18] 18  BP: (122-138)/(70-77) 138/77  SpO2:  [95 %] 95 %  No intake or output data in the 24 hours ending 08/03/24 0913    Lab Results: All Labs For Current Hospital Admission Reviewed      Current Facility-Administered Medications   Medication Dose Route Frequency Provider Last Rate    acetaminophen  650 mg Oral Q6H PRN ALVINA Harley      acetaminophen  650 mg Oral Q4H PRN ALVINA Harley      acetaminophen  975 mg Oral Q6H PRN ALVINA Harley      aluminum-magnesium hydroxide-simethicone  30 mL Oral Q4H PRN ALVINA Harley      ammonium lactate   Topical BID PRN ALVINA Harley      atorvastatin  10  mg Oral Daily ALVINA Lewis      benztropine  1 mg Intramuscular Q4H PRN Max 6/day ALVINA Harley      benztropine  1 mg Oral Q4H PRN Max 6/day ALVINA Harley      bisacodyl  10 mg Rectal Daily PRN ALVINA Harley      cariprazine  3 mg Oral Daily Martin Cardenas MD      Cholecalciferol  2,000 Units Oral Daily ALVINA Lewis      cyanocobalamin  1,000 mcg Oral Daily ALVINA Lewis      hydrOXYzine HCL  25 mg Oral Q6H PRN Max 4/day ALVINA Harley      hydrOXYzine HCL  25 mg Oral TID Martin Cardenas MD      hydrOXYzine HCL  50 mg Oral Q4H PRN Max 4/day ALVINA Harley      Or    LORazepam  1 mg Intramuscular Q4H PRN ALVINA Harley      lamoTRIgine  50 mg Oral Daily Martin Cardenas MD      lisinopril  10 mg Oral Daily ALVINA Lewis      LORazepam  1 mg Oral Q4H PRN Max 6/day ALVINA Harley      Or    LORazepam  2 mg Intramuscular Q6H PRN Max 3/day ALVINA Harley      OLANZapine  10 mg Oral Q3H PRN Max 3/day ALVINA Harley      Or    OLANZapine  10 mg Intramuscular Q3H PRN Max 3/day ALVINA Harley      OLANZapine  5 mg Oral Q3H PRN Max 6/day ALVINA Harley      Or    OLANZapine  5 mg Intramuscular Q3H PRN Max 6/day ALVINA Harley      OLANZapine  2.5 mg Oral Q3H PRN Max 8/day ALVINA Harley      polyethylene glycol  17 g Oral Daily PRN ALVINA Harley      propranolol  10 mg Oral Q8H PRN ALVINA Harley      senna-docusate sodium  1 tablet Oral Daily PRN ALVINA Harley      sertraline  150 mg Oral HS Martin Cardenas MD         Counseling / Coordination of Care: Total floor / unit time spent today 15 minutes. Greater than 50% of total time was spent with the patient and / or family counseling and / or somewhat receptive to supportive listening and teaching positive coping skills to deal with symptom mangement.     Patient's Rights, confidentiality and exceptions to confidentiality, use of automated medical  record, Behavioral Health Services staff access to medical record, and consent to treatment reviewed.    This note has been dictated and hence there may be problems with punctuation, spelling and formatting and if anyone has any concerns please address them to Dr. Cardenas   This note is not shared with patient due to potential for making patient's condition worse by knowing the content of the note.

## 2024-08-03 NOTE — NURSING NOTE
Deyvi has been asleep throughout the shift. Respirations easy and non labored. No issues or behaviors. Continue to monitor. Precautions maintained.

## 2024-08-04 PROCEDURE — 99232 SBSQ HOSP IP/OBS MODERATE 35: CPT | Performed by: PSYCHIATRY & NEUROLOGY

## 2024-08-04 RX ADMIN — ATORVASTATIN CALCIUM 10 MG: 10 TABLET, FILM COATED ORAL at 08:29

## 2024-08-04 RX ADMIN — LISINOPRIL 10 MG: 10 TABLET ORAL at 08:29

## 2024-08-04 RX ADMIN — LAMOTRIGINE 50 MG: 25 TABLET ORAL at 08:29

## 2024-08-04 RX ADMIN — HYDROXYZINE HYDROCHLORIDE 25 MG: 25 TABLET ORAL at 08:29

## 2024-08-04 RX ADMIN — SERTRALINE HYDROCHLORIDE 150 MG: 100 TABLET ORAL at 21:34

## 2024-08-04 RX ADMIN — CARIPRAZINE 3 MG: 3 CAPSULE, GELATIN COATED ORAL at 08:29

## 2024-08-04 RX ADMIN — CHOLECALCIFEROL TAB 25 MCG (1000 UNIT) 2000 UNITS: 25 TAB at 08:29

## 2024-08-04 RX ADMIN — HYDROXYZINE HYDROCHLORIDE 25 MG: 25 TABLET ORAL at 21:34

## 2024-08-04 RX ADMIN — CYANOCOBALAMIN TAB 1000 MCG 1000 MCG: 1000 TAB at 08:29

## 2024-08-04 RX ADMIN — HYDROXYZINE HYDROCHLORIDE 25 MG: 25 TABLET ORAL at 17:13

## 2024-08-04 NOTE — NURSING NOTE
Deyvi is visible on the unit but stays on the outskirts of the milieu. He keeps to himself. He brightens on approach and easily joins in conversation although it may be somewhat minimal as he does not keep it going  States he is waiting to be accepted at the [facility]

## 2024-08-04 NOTE — NURSING NOTE
Pt had a 9 lb weight gain since 7/7, lungs CTA, normoactive bowl sounds in all quadrants, soft, nondistended, no tenderness or guarding. Skin intact, no edema.     Pt is visible on the unit for meals, isolative to self. Pt is calm and cooperative with assessment. Pt denies anxiety and depression, denies SI/HI/AVH. Pt is meal and medication complaint, attends groups, safety checks ongoing.

## 2024-08-04 NOTE — PROGRESS NOTES
Psychiatry Progress Note [facility]    Deyvi Cao 54 y.o. male MRN: [id number]  Unit/Bed#: [facility] 112-02 Encounter: [id number]  Code Status: Level 1 - Full Code    PCP: Manuel Chris MD    Date of Admission:  6/25/2024 1232   Date of Service:  08/04/24    Patient Active Problem List   Diagnosis    Acne    Benign essential hypertension    Mixed hyperlipidemia    Tobacco dependence syndrome    Allergic rhinitis due to allergen    Medical clearance for psychiatric admission    Lung cancer screening declined by patient    Moderate depressed bipolar I disorder (HCC)    Bipolar disorder with severe depression (HCC)    Rosacea     Review of systems: Unremarkable  Psychiatric Diagnosis: Bipolar depression    Assessment  Overall Status: No significant changes still reports depression is only 2 out of 10 but somewhat shy and timid with lack of initiation and avolition which he claims is his baseline, no relapse of psychotic or manic symptoms or suicidal thoughts and waiting for placement at the [facility] all medications reviewed and recommend they be continued for symptom management  certification Statement: The patient will continue to require additional inpatient hospital stay due to recurrent depression and repeated suicide attempts in the community     Medications: Vraylar 3 mg a day, hydroxyzine 25 mg 3 times a day, Zoloft 150 mg a day, Lamictal 50 mg a day,  All medications reviewed and recommend they be continued for symptom management  Side effects from treatment: None reported  Medication changes   No significant changes but medications were being titrated as ordered   Medication education   Risks side effects benefits and precautions of medications discussed with patient and he did verbalize an understanding about risks for metabolic syndrome from being on neuroleptics and is form tardive dyskinesia etc.     Understanding of medications: Has good understanding  about medications and side effects of his precautions benefits   Justification for dual anti-psychotics: Not applicable    Non-pharmacological treatments  Continue with individual, group, milieu and occupational therapy using recovery principles and psycho-education about accepting illness and the need for treatment.  Behavioral health checks every 7 minutes  Safety with the patient about illness and need for treatment    Safety  Safety and communication plan established to target dynamic risk factors discussed above.    Discharge Plan   Consider a supervised setting with an ACT team at the [facility] at the [place]    Interval Progress   Continues to report no major issues or concerns claiming he has not had any suicidal thoughts in several months and has not had any psychotic or manic symptoms either.  Still claims depression is only 2 out of 10 and is hoping to be accepted by the [facility] when a bed is available.  Still somewhat shy and withdrawn with minimal responses which he claims is his baseline not interacting with peers usually alone or does attend some groups.  Not aggressive or agitated or threatening or self-abusive with no need for behavioral PRNs of psychiatry or security to be called on his behalf since admission.  Compliant with medications and tolerating with no side effects or issues  Acceptance by patient: Accepting living in a group home  Hopefulness in recovery: Living in a group home  Involved in reintegration process: Not contacting anyone in the community  trusting in relationship with psychiatrist: Trusting  Sleep: Good  Appetite: Good  Compliance with Medications: Good  Group attendance: Improving more than 50%  Significant events and progress towards goals: Improving but still slightly withdrawn and isolated denying any suicidal thoughts and waiting for placement    Mental Status Exam  Appearance: casually dressed, dressed  appropriately, adequate grooming, looks stated age  Behavior: cooperative, mildly anxious, slow responses found in the dining mckoy checking electronic tablet  speech: slow, scant, increased latency of response, delayed, soft, decreased volume somewhat cold and aloof  Mood: depressed, dysphoric, anxious  Affect: constricted, slightly brighter, mood-congruent  Thought Process: organized, goal directed, decreased rate of thoughts, slowing of thoughts, negative thinking, concrete  Thought Content: no overt delusions, no current suicidal or homicidal thoughts and no plans verbalized.  No phobias obsessions compulsions reported.  Not appearing to respond to any delusional beliefs  Perceptual Disturbances: no auditory hallucinations, no visual hallucinations  Hx Risk Factors: chronic depressive symptoms, chronic anxiety symptoms, history of suicide attempts  Sensorium: Alert and oriented x 3 spheres and situation  Cognition: recent and remote memory grossly intact  Consciousness: alert, awake, and not sedated  Attention: attention span and concentration are age appropriate  Intellect: appears to be of average intelligence  Insight: limited  Judgement: limited  Motor Activity: no abnormal movements     Vitals  Temp:  [97.5 °F (36.4 °C)] 97.5 °F (36.4 °C)  HR:  [91-94] 91  Resp:  [18] 18  BP: (126-128)/(81) 126/81  SpO2:  [98 %] 98 %  No intake or output data in the 24 hours ending 08/04/24 0815    Lab Results: All Labs For Current Hospital Admission Reviewed      Current Facility-Administered Medications   Medication Dose Route Frequency Provider Last Rate    acetaminophen  650 mg Oral Q6H PRN ALVINA Harley      acetaminophen  650 mg Oral Q4H PRN ALVINA Harley      acetaminophen  975 mg Oral Q6H PRN ALVINA Harley      aluminum-magnesium hydroxide-simethicone  30 mL Oral Q4H PRN ALVINA Harley      ammonium lactate   Topical BID PRN ALVINA Harley      atorvastatin  10 mg Oral Daily Sary Rodriguez  ALVINA Biggs      benztropine  1 mg Intramuscular Q4H PRN Max 6/day Melva Knutson, ALVINA      benztropine  1 mg Oral Q4H PRN Max 6/day ALVINA Harley      bisacodyl  10 mg Rectal Daily PRN Melva Knutson, ALVINA      cariprazine  3 mg Oral Daily Martin Cardenas MD      Cholecalciferol  2,000 Units Oral Daily Sary LinkALVINA Thompson      cyanocobalamin  1,000 mcg Oral Daily Sary ALVINA Gupta      hydrOXYzine HCL  25 mg Oral Q6H PRN Max 4/day ALVINA Harley      hydrOXYzine HCL  25 mg Oral TID Martin Cardenas MD      hydrOXYzine HCL  50 mg Oral Q4H PRN Max 4/day ALVINA Harley      Or    LORazepam  1 mg Intramuscular Q4H PRN ALVINA Harley      lamoTRIgine  50 mg Oral Daily Martin Cardenas MD      lisinopril  10 mg Oral Daily Sary ALVINA Gupta      LORazepam  1 mg Oral Q4H PRN Max 6/day ALVINA Harley      Or    LORazepam  2 mg Intramuscular Q6H PRN Max 3/day Melva Knutson, ALVINA      OLANZapine  10 mg Oral Q3H PRN Max 3/day ALVINA Harley      Or    OLANZapine  10 mg Intramuscular Q3H PRN Max 3/day Melva Knutson, TITINP      OLANZapine  5 mg Oral Q3H PRN Max 6/day ALVINA Harley      Or    OLANZapine  5 mg Intramuscular Q3H PRN Max 6/day ALVINA Harley      OLANZapine  2.5 mg Oral Q3H PRN Max 8/day ALVINA Harley      polyethylene glycol  17 g Oral Daily PRN ALVINA Harley      propranolol  10 mg Oral Q8H PRN ALVINA Harley      senna-docusate sodium  1 tablet Oral Daily PRN ALVINA Harley      sertraline  150 mg Oral HS Martin Cardenas MD         Counseling / Coordination of Care: Total floor / unit time spent today 15 minutes. Greater than 50% of total time was spent with the patient and / or family counseling and / or somewhat receptive to supportive listening and teaching positive coping skills to deal with symptom mangement.     Patient's Rights, confidentiality and exceptions to confidentiality, use of automated medical record, Behavioral Health  Services staff access to medical record, and consent to treatment reviewed.    This note has been dictated and hence there may be problems with punctuation, spelling and formatting and if anyone has any concerns please address them to Dr. Cardenas   This note is not shared with patient due to potential for making patient's condition worse by knowing the content of the note.

## 2024-08-04 NOTE — PLAN OF CARE
Problem: Alteration in Thoughts and Perception  Goal: Treatment Goal: Gain control of psychotic behaviors/thinking, reduce/eliminate presenting symptoms and demonstrate improved reality functioning upon discharge  Outcome: Progressing  Goal: Verbalize thoughts and feelings  Description: Interventions:  - Promote a nonjudgmental and trusting relationship with the patient through active listening and therapeutic communication  - Assess patient's level of functioning, behavior and potential for risk  - Engage patient in 1 on 1 interactions  - Encourage patient to express fears, feelings, frustrations, and discuss symptoms    - [place] patient to reality, help patient recognize reality-based thinking   - Administer medications as ordered and assess for potential side effects  - Provide the patient education related to the signs and symptoms of the illness and desired effects of prescribed medications  Outcome: Progressing  Goal: Refrain from acting on delusional thinking/internal stimuli  Description: Interventions:  - Monitor patient closely, per order   - Utilize least restrictive measures   - Set reasonable limits, give positive feedback for acceptable   - Administer medications as ordered and monitor of potential side effects  Outcome: Progressing  Goal: Agree to be compliant with medication regime, as prescribed and report medication side effects  Description: Interventions:  - Offer appropriate PRN medication and supervise ingestion; conduct AIMS, as needed   Outcome: Progressing  Goal: Attend and participate in unit activities, including therapeutic, recreational, and educational groups  Description: Interventions:  -Encourage Visitation and family involvement in care  Outcome: Progressing  Goal: Recognize dysfunctional thoughts, communicate reality-based thoughts at the time of discharge  Description: Interventions:  - Provide medication and psycho-education to assist patient in compliance and developing  insight into his/her illness   Outcome: Progressing  Goal: Complete daily ADLs, including personal hygiene independently, as able  Description: Interventions:  - Observe, teach, and assist patient with ADLS  - Monitor and promote a balance of rest/activity, with adequate nutrition and elimination   Outcome: Progressing     Problem: Ineffective Coping  Goal: Identifies ineffective coping skills  Outcome: Progressing  Goal: Identifies healthy coping skills  Outcome: Progressing  Goal: Demonstrates healthy coping skills  Outcome: Progressing  Goal: Participates in unit activities  Description: Interventions:  - Provide therapeutic environment   - Provide required programming   - Redirect inappropriate behaviors   Outcome: Progressing  Goal: Patient/Family participate in treatment and DC plans  Description: Interventions:  - Provide therapeutic environment  Outcome: Progressing  Goal: Patient/Family verbalizes awareness of resources  Outcome: Progressing  Goal: Understands least restrictive measures  Description: Interventions:  - Utilize least restrictive behavior  Outcome: Progressing  Goal: Free from restraint events  Description: - Utilize least restrictive measures   - Provide behavioral interventions   - Redirect inappropriate behaviors   Outcome: Progressing     Problem: Risk for Self Injury/Neglect  Goal: Treatment Goal: Remain safe during length of stay, learn and adopt new coping skills, and be free of self-injurious ideation, impulses and acts at the time of discharge  Outcome: Progressing  Goal: Verbalize thoughts and feelings  Description: Interventions:  - Assess and re-assess patient's lethality and potential for self-injury  - Engage patient in 1:1 interactions, daily, for a minimum of 15 minutes  - Encourage patient to express feelings, fears, frustrations, hopes  - Establish rapport/trust with patient   Outcome: Progressing  Goal: Refrain from harming self  Description: Interventions:  - Monitor  patient closely, per order  - Develop a trusting relationship  - Supervise medication ingestion, monitor effects and side effects   Outcome: Progressing  Goal: Attend and participate in unit activities, including therapeutic, recreational, and educational groups  Description: Interventions:  - Provide therapeutic and educational activities daily, encourage attendance and participation, and document same in the medical record  - Obtain collateral information, encourage visitation and family involvement in care   Outcome: Progressing  Goal: Recognize maladaptive responses and adopt new coping mechanisms  Outcome: Progressing  Goal: Complete daily ADLs, including personal hygiene independently, as able  Description: Interventions:  - Observe, teach, and assist patient with ADLS  - Monitor and promote a balance of rest/activity, with adequate nutrition and elimination  Outcome: Progressing     Problem: Depression  Goal: Treatment Goal: Demonstrate behavioral control of depressive symptoms, verbalize feelings of improved mood/affect, and adopt new coping skills prior to discharge  Outcome: Progressing  Goal: Verbalize thoughts and feelings  Description: Interventions:  - Assess and re-assess patient's level of risk   - Engage patient in 1:1 interactions, daily, for a minimum of 15 minutes   - Encourage patient to express feelings, fears, frustrations, hopes   Outcome: Progressing  Goal: Refrain from harming self  Description: Interventions:  - Monitor patient closely, per order   - Supervise medication ingestion, monitor effects and side effects   Outcome: Progressing  Goal: Refrain from isolation  Description: Interventions:  - Develop a trusting relationship   - Encourage socialization   Outcome: Progressing  Goal: Refrain from self-neglect  Outcome: Progressing  Goal: Attend and participate in unit activities, including therapeutic, recreational, and educational groups  Description: Interventions:  - Provide  therapeutic and educational activities daily, encourage attendance and participation, and document same in the medical record   Outcome: Progressing  Goal: Complete daily ADLs, including personal hygiene independently, as able  Description: Interventions:  - Observe, teach, and assist patient with ADLS  -  Monitor and promote a balance of rest/activity, with adequate nutrition and elimination   Outcome: Progressing     Problem: Anxiety  Goal: Anxiety is at manageable level  Description: Interventions:  - Assess and monitor patient's anxiety level.   - Monitor for signs and symptoms (heart palpitations, chest pain, shortness of breath, headaches, nausea, feeling jumpy, restlessness, irritable, apprehensive).   - Collaborate with interdisciplinary team and initiate plan and interventions as ordered.  - [employer] patient to unit/surroundings  - Explain treatment plan  - Encourage participation in care  - Encourage verbalization of concerns/fears  - Identify coping mechanisms  - Assist in developing anxiety-reducing skills  - Administer/offer alternative therapies  - Limit or eliminate stimulants  Outcome: Progressing     Problem: Risk for Violence/Aggression Toward Others  Goal: Treatment Goal: Refrain from acts of violence/aggression during length of stay, and demonstrate improved impulse control at the time of discharge  Outcome: Progressing  Goal: Verbalize thoughts and feelings  Description: Interventions:  - Assess and re-assess patient's level of risk, every waking shift  - Engage patient in 1:1 interactions, daily, for a minimum of 15 minutes   - Allow patient to express feelings and frustrations in a safe and non-threatening manner   - Establish rapport/trust with patient   Outcome: Progressing  Goal: Refrain from harming others  Outcome: Progressing  Goal: Refrain from destructive acts on the environment or property  Outcome: Progressing  Goal: Control angry outbursts  Description: Interventions:  - Monitor  patient closely, per order  - Ensure early verbal de-escalation  - Monitor prn medication needs  - Set reasonable/therapeutic limits, outline behavioral expectations, and consequences   - Provide a non-threatening milieu, utilizing the least restrictive interventions   Outcome: Progressing  Goal: Attend and participate in unit activities, including therapeutic, recreational, and educational groups  Description: Interventions:  - Provide therapeutic and educational activities daily, encourage attendance and participation, and document same in the medical record   Outcome: Progressing  Goal: Identify appropriate positive anger management techniques  Description: Interventions:  - Offer anger management and coping skills groups   - Staff will provide positive feedback for appropriate anger control  Outcome: Progressing     Problem: Alteration in Orientation  Goal: Treatment Goal: Demonstrate a reduction of confusion and improved orientation to person, place, time and/or situation upon discharge, according to optimum baseline/ability  Outcome: Progressing  Goal: Interact with staff daily  Description: Interventions:  - Assess and re-assess patient's level of orientation  - Engage patient in 1 on 1 interactions, daily, for a minimum of 15 minutes   - Establish rapport/trust with patient   Outcome: Progressing  Goal: Express concerns related to confused thinking related to:  Description: Interventions:  - Encourage patient to express feelings, fears, frustrations, hopes  - Assign consistent caregivers   - [place]/re-orient patient as needed  - Allow comfort items, as appropriate  - Provide visual cues, signs, etc.   Outcome: Progressing  Goal: Allow medical examinations, as recommended  Description: Interventions:  - Provide physical/neurological exams and/or referrals, per provider   Outcome: Progressing  Goal: Cooperate with recommended testing/procedures  Description: Interventions:  - Determine need for ancillary  testing  - Observe for mental status changes  - Implement falls/precaution protocol   Outcome: Progressing  Goal: Attend and participate in unit activities, including therapeutic, recreational, and educational groups  Description: Interventions:  - Provide therapeutic and educational activities daily, encourage attendance and participation, and document same in the medical record   - Provide appropriate opportunities for reminiscence   - Provide a consistent daily routine   - Encourage family contact/visitation   Outcome: Progressing  Goal: Complete daily ADLs, including personal hygiene independently, as able  Description: Interventions:  - Observe, teach, and assist patient with ADLS  Outcome: Progressing     Problem: SELF HARM/SUICIDALITY  Goal: Will have no self-injury during hospital stay  Description: INTERVENTIONS:  - Q 15 MINUTES: Routine safety checks  - Q WAKING SHIFT & PRN: Assess risk to determine if routine checks are adequate to maintain patient safety  - Encourage patient to participate actively in care by formulating a plan to combat response to suicidal ideation, identify supports and resources  Outcome: Progressing     Problem: DEPRESSION  Goal: Will be euthymic at discharge  Description: INTERVENTIONS:  - Administer medication as ordered  - Provide emotional support via 1:1 interaction with staff  - Encourage involvement in milieu/groups/activities  - Monitor for social isolation  Outcome: Progressing     Problem: ANXIETY  Goal: Will report anxiety at manageable levels  Description: INTERVENTIONS:  - Administer medication as ordered  - Teach and encourage coping skills  - Provide emotional support  - Assess patient/family for anxiety and ability to cope  Outcome: Progressing  Goal: By discharge: Patient will verbalize 2 strategies to deal with anxiety  Description: Interventions:  - Identify any obvious source/trigger to anxiety  - Staff will assist patient in applying identified coping  technique/skills  - Encourage attendance of scheduled groups and activities  Outcome: Progressing     Problem: SELF CARE DEFICIT  Goal: Return ADL status to a safe level of function  Description: INTERVENTIONS:  - Administer medication as ordered  - Assess ADL deficits and provide assistive devices as needed  - Obtain PT/OT consults as needed  - Assist and instruct patient to increase activity and self care as tolerated  Outcome: Progressing     Problem: DISCHARGE PLANNING - CARE MANAGEMENT  Goal: Discharge to post-acute care or home with appropriate resources  Description: INTERVENTIONS:  - Conduct assessment to determine patient/family and health care team treatment goals, and need for post-acute services based on payer coverage, community resources, and patient preferences, and barriers to discharge  - Address psychosocial, clinical, and financial barriers to discharge as identified in assessment in conjunction with the patient/family and health care team  - Arrange appropriate level of post-acute services according to patient’s   needs and preference and payer coverage in collaboration with the physician and health care team  - Communicate with and update the patient/family, physician, and health care team regarding progress on the discharge plan  - Arrange appropriate transportation to post-acute venues  Outcome: Progressing

## 2024-08-04 NOTE — NURSING NOTE
Germain maintained on ongoing SAFE precaution without incident on this shift.  Observed in bed resting with eyes closed, respiration even and unlabored.  Q7 minutes rounding implemented. No behavioral noted overnight.

## 2024-08-04 NOTE — NURSING NOTE
Deyvi states he is doing well today. He is not having any bad thoughts to harm himself; he denies anxiety and states his depression is 2-3/10.  His affect was a bit less flat. He was brighter.  He remains soft spoken and self isolative. Conversation is scant but he engages easily  He was playing Yatzee with a peer and smiling. He participated during 1930 nursing group in a positive way

## 2024-08-05 PROCEDURE — 99232 SBSQ HOSP IP/OBS MODERATE 35: CPT | Performed by: PSYCHIATRY & NEUROLOGY

## 2024-08-05 RX ADMIN — LAMOTRIGINE 50 MG: 25 TABLET ORAL at 09:11

## 2024-08-05 RX ADMIN — HYDROXYZINE HYDROCHLORIDE 25 MG: 25 TABLET ORAL at 17:09

## 2024-08-05 RX ADMIN — CYANOCOBALAMIN TAB 1000 MCG 1000 MCG: 1000 TAB at 09:11

## 2024-08-05 RX ADMIN — LISINOPRIL 10 MG: 10 TABLET ORAL at 09:11

## 2024-08-05 RX ADMIN — CHOLECALCIFEROL TAB 25 MCG (1000 UNIT) 2000 UNITS: 25 TAB at 09:11

## 2024-08-05 RX ADMIN — HYDROXYZINE HYDROCHLORIDE 25 MG: 25 TABLET ORAL at 09:11

## 2024-08-05 RX ADMIN — SERTRALINE HYDROCHLORIDE 150 MG: 100 TABLET ORAL at 21:44

## 2024-08-05 RX ADMIN — ATORVASTATIN CALCIUM 10 MG: 10 TABLET, FILM COATED ORAL at 09:11

## 2024-08-05 RX ADMIN — HYDROXYZINE HYDROCHLORIDE 25 MG: 25 TABLET ORAL at 21:44

## 2024-08-05 RX ADMIN — CARIPRAZINE 3 MG: 3 CAPSULE, GELATIN COATED ORAL at 09:11

## 2024-08-05 NOTE — PROGRESS NOTES
08/05/24 0810   Team Meeting   Meeting Type Daily Rounds   Team Members Present   Team Members Present Physician;Nurse;;Other (Discipline and Name)   Patient/Family Present   Patient Present No   Patient's Family Present No     In attendance:  Dr. Alex Thomas, MD Dr. Jordan Holter, DO Melva Knutson, ALVINA Emerson, RN  Judi Garcia, [facility]  Mer Sales, [facility]  BARRETT Elmore.S.    Groups: 3/7    Pt had 9lb weight increase in a month; medical will review. Pt reportedly feels at his baseline; flat affect. Pt waiting on availability at [facility].

## 2024-08-05 NOTE — PROGRESS NOTES
Progress Note - Behavioral Health   Deyvi Cao 54 y.o. male MRN: [id number]  Unit/Bed#: [facility] 112-02 Encounter: [id number]  Code Status: Level 1 - Full Code    Assessment & Plan   Principal Problem:    Bipolar disorder with severe depression (HCC)  Active Problems:    Benign essential hypertension    Mixed hyperlipidemia    Allergic rhinitis due to allergen    Medical clearance for psychiatric admission    Rosacea    Recommended Treatment:     Treatment plan, treatment progress and medication changes were reviewed with Nursing Staff, Pharmacy Service and Case Management in Treatment Team:  1.Continue with group therapy, milieu therapy and occupational therapy   2.Behavioral Health checks every 7 minutes   3.Continue frequent safety checks and vitals per unit protocol  4.Continue with [facility] medical management as indicated  5.Continue with current medication regimen for symptom management: Vraylar 3mg PO Daily, Atarax 25mg PO TID, Lamictal 50mg PO Daily, Zoloft 150mg PO QHS, Lamictal 50mg PO Daily   6.Disposition Planning: Discharge planning and efforts remain ongoing - Awaiting group home placement    Subjective:    Patient was seen today for continuation of care, records reviewed and patient was discussed with the morning case review team.    Deyvi was seen today for psychiatric follow-up.  On assessment today, Deyvi was found in bed.  He is doing well.  Offers no new concerns. Pleasant and polite in conversation.  Deyvi reports adequate daytime energy and denies any difficulties with initiating or staying asleep.  Oral appetite and hydration is adequate.   We reviewed once more the specific as-needed medications they can use going forward if they experience any insomnia or destabilization of their mood, they understood and were agreeable. Milieu visibility and group attendance encouraged to promote an active participation in treatment.    Deyvi denies acute suicidal/self-harm ideation/intent/plan upon  direct inquiry at this time. Deyvi is able to contract for safety while on the unit and would feel comfortable seeking staff support should suicidal symptoms or urges appear or worsen. Deyvi remains behaviorally appropriate, no agitation or aggression noted on exam or in report. Deyvi also denies HI/AH/VH, and does not appear overtly manic.  Patient does not verbalize any experiences that can be categorized as paranoid, persecutory, bizarre, or somatic delusions. Deyvi remains adherent to his current psychotropic medication regimen and denies any side effects from medications, as well as none noted on exam.    Review of Systems:  Behavior over the last 24 hours: Unchanged  Sleep: sleeping okay throughout the night  Appetite: adequate  Medication side effects: none reported  ROS:no complaints, all other systems are negative    Objective:    Vitals:  Vitals:    08/05/24 0748   BP: 126/85   Pulse: 90   Resp: 18   Temp: 98.5 °F (36.9 °C)   SpO2: 97%     Laboratory Results:  I have personally reviewed all pertinent laboratory/tests results.  Most Recent Labs:   Lab Results   Component Value Date    WBC 7.39 06/26/2024    RBC 4.70 06/26/2024    HGB 15.2 06/26/2024    HCT 45.5 06/26/2024     06/26/2024    RDW 12.9 06/26/2024    NEUTROABS 4.63 06/26/2024    SODIUM 137 06/26/2024    K 4.1 06/26/2024     06/26/2024    CO2 26 06/26/2024    BUN 17 06/26/2024    CREATININE 0.63 06/26/2024    GLUC 98 06/26/2024    GLUF 98 06/26/2024    CALCIUM 9.6 06/26/2024    AST 25 06/26/2024    ALT 45 06/26/2024    ALKPHOS 114 (H) 06/26/2024    TP 7.0 06/26/2024    ALB 4.4 06/26/2024    TBILI 0.44 06/26/2024    CHOLESTEROL 177 06/26/2024    HDL 52 06/26/2024    TRIG 73 06/26/2024    LDLCALC 110 (H) 06/26/2024    NONHDLC 125 06/26/2024    LITHIUM 0.4 (L) 01/28/2021    LJI5OZGRZEAS 2.636 06/26/2024    HGBA1C 5.2 11/22/2023     11/22/2023     Mental Status Evaluation:  Appearance:  age appropriate, casually dressed    Behavior:  cooperative, calm   Speech:  normal volume, normal pitch   Mood:  less anxious, less depressed   Affect:  constricted   Thought Process:  goal directed   Associations: intact associations   Thought Content:  no overt delusions   Perceptual Disturbances: no auditory hallucinations, no visual hallucinations, denies when asked, does not appear responding to internal stimuli   Risk Potential: Suicidal ideation - None at present, contracts for safety on the unit, would talk to staff if not feeling safe on the unit  Homicidal ideation - None at present  Potential for aggression - Not at present   Sensorium:  oriented to person, place, and time/date   Memory:  recent memory intact   Consciousness:  alert and awake   Attention/Concentration: attention span and concentration appear shorter than expected for age   Insight:  limited   Judgment: limited   Gait/Station: normal gait/station, normal balance   Motor Activity: no abnormal movements     Progress Toward Goals: making gradual improvement.  Deyvi continues to require inpatient psychiatric hospitalization for continued medication management and titration to optimize symptom reduction, improve sleep hygiene, and demonstrate adequate self-care.     Suicide/Homicide Risk Assessment:  Risk of Harm to Self:   Nursing Suicide Risk Assessment Last 24 hours: C-SSRS Risk (Since Last Contact)  Calculated C-SSRS Risk Score (Since Last Contact): No Risk Indicated    Risk of Harm to Others:  Nursing Homicide Risk Assessment: Violence Risk to Others: Denies within past 6 months    Behavioral Health Medications: all current active meds have been reviewed and continue current psychiatric medications.  Current Facility-Administered Medications   Medication Dose Route Frequency Provider Last Rate    acetaminophen  650 mg Oral Q6H PRN ALVINA Harley      acetaminophen  650 mg Oral Q4H PRN ALVINA Harley      acetaminophen  975 mg Oral Q6H PRN ALVINA Harley       aluminum-magnesium hydroxide-simethicone  30 mL Oral Q4H PRN ALVINA Harley      ammonium lactate   Topical BID PRN ALVINA Harley      atorvastatin  10 mg Oral Daily SaryALVINA Starkey      benztropine  1 mg Intramuscular Q4H PRN Max 6/day ALVINA Harley      benztropine  1 mg Oral Q4H PRN Max 6/day ALVINA Harley      bisacodyl  10 mg Rectal Daily PRN ALVINA Harley      cariprazine  3 mg Oral Daily Martin Cardenas MD      Cholecalciferol  2,000 Units Oral Daily Sary LinkALVINA Thompson      cyanocobalamin  1,000 mcg Oral Daily Sary ALVINA Gupta      hydrOXYzine HCL  25 mg Oral Q6H PRN Max 4/day ALVINA Harley      hydrOXYzine HCL  25 mg Oral TID Martin Cardenas MD      hydrOXYzine HCL  50 mg Oral Q4H PRN Max 4/day ALVINA Harley      Or    LORazepam  1 mg Intramuscular Q4H PRN ALVINA Harley      lamoTRIgine  50 mg Oral Daily Martin Cardenas MD      lisinopril  10 mg Oral Daily SaryALVINA Starkey      LORazepam  1 mg Oral Q4H PRN Max 6/day ALVINA Harley      Or    LORazepam  2 mg Intramuscular Q6H PRN Max 3/day ALVINA Harley      OLANZapine  10 mg Oral Q3H PRN Max 3/day ALVINA Harley      Or    OLANZapine  10 mg Intramuscular Q3H PRN Max 3/day ALVINA Harley      OLANZapine  5 mg Oral Q3H PRN Max 6/day ALVINA Harley      Or    OLANZapine  5 mg Intramuscular Q3H PRN Max 6/day ALVINA Harley      OLANZapine  2.5 mg Oral Q3H PRN Max 8/day ALVINA Harley      polyethylene glycol  17 g Oral Daily PRN ALVINA Harley      propranolol  10 mg Oral Q8H PRN ALVINA Harley      senna-docusate sodium  1 tablet Oral Daily PRN ALVINA Harley      sertraline  150 mg Oral HS Martin Cardenas MD       Risks / Benefits of Treatment:  Risks, benefits, and possible side effects of medications explained to patient. Patient has limited understanding of risks and benefits of treatment at this time, but agrees to take medications as  prescribed.    Counseling / Coordination of Care:  Total floor/unit time spent today 25 minutes. Greater than 50% of total time was spent with the patient and / or family counseling and / or coordination of care. A description of the counseling / coordination of care:   Patient's progress discussed with staff in treatment team meeting.  Medications, treatment progress and treatment plan reviewed with patient.   Educated on importance of medication and treatment compliance.  Reassurance and supportive therapy provided.   Encouraged participation in milieu and group therapy on the unit.    ALVINA Harley 08/05/24

## 2024-08-05 NOTE — SOCIAL WORK
STEFAN located Rehabilitation Deferment Request from [facility] Student Aid site.   STEFAN assisted pt with completing and signing form. STEFAN submitted form to pt's assigned loan  [place].

## 2024-08-05 NOTE — QUICK NOTE
Was notified by nursing late in the day on 8/4/2024 that patient has had a 9 pound weight gain since 7/7/2024 patient's BMI is still stable at 26.9 to [place] is not going to make any changes to his diet.  If there are any further changes psychiatry can make changes as they are the primary team.

## 2024-08-05 NOTE — SOCIAL WORK
"STEFAN sent email to Sydnie at [employer] requesting update on status of [facility] availability     Sydnie reported \"there are no openings at this time\".  "

## 2024-08-05 NOTE — PLAN OF CARE
Problem: Ineffective Coping  Goal: Identifies ineffective coping skills  Outcome: Progressing  Goal: Identifies healthy coping skills  Outcome: Progressing  Goal: Demonstrates healthy coping skills  Outcome: Progressing  Goal: Participates in unit activities  Description: Interventions:  - Provide therapeutic environment   - Provide required programming   - Redirect inappropriate behaviors   Outcome: Not Progressing   Attended 16/45 of the groups offered last week.

## 2024-08-05 NOTE — NURSING NOTE
Pt is visible on the unit, minimal interaction with peers. Pt is pleasant and cooperative with assessment. Pt denies anxiety and depression, denies SI/HI/AVH.   Pt is meal and medication complaint, attends groups, safety checks ongoing.

## 2024-08-05 NOTE — PLAN OF CARE
Problem: Alteration in Thoughts and Perception  Goal: Treatment Goal: Gain control of psychotic behaviors/thinking, reduce/eliminate presenting symptoms and demonstrate improved reality functioning upon discharge  Outcome: Progressing  Goal: Verbalize thoughts and feelings  Description: Interventions:  - Promote a nonjudgmental and trusting relationship with the patient through active listening and therapeutic communication  - Assess patient's level of functioning, behavior and potential for risk  - Engage patient in 1 on 1 interactions  - Encourage patient to express fears, feelings, frustrations, and discuss symptoms    - [place] patient to reality, help patient recognize reality-based thinking   - Administer medications as ordered and assess for potential side effects  - Provide the patient education related to the signs and symptoms of the illness and desired effects of prescribed medications  Outcome: Progressing  Goal: Refrain from acting on delusional thinking/internal stimuli  Description: Interventions:  - Monitor patient closely, per order   - Utilize least restrictive measures   - Set reasonable limits, give positive feedback for acceptable   - Administer medications as ordered and monitor of potential side effects  Outcome: Progressing  Goal: Agree to be compliant with medication regime, as prescribed and report medication side effects  Description: Interventions:  - Offer appropriate PRN medication and supervise ingestion; conduct AIMS, as needed   Outcome: Progressing  Goal: Attend and participate in unit activities, including therapeutic, recreational, and educational groups  Description: Interventions:  -Encourage Visitation and family involvement in care  Outcome: Progressing  Goal: Recognize dysfunctional thoughts, communicate reality-based thoughts at the time of discharge  Description: Interventions:  - Provide medication and psycho-education to assist patient in compliance and developing  insight into his/her illness   Outcome: Progressing  Goal: Complete daily ADLs, including personal hygiene independently, as able  Description: Interventions:  - Observe, teach, and assist patient with ADLS  - Monitor and promote a balance of rest/activity, with adequate nutrition and elimination   Outcome: Progressing     Problem: Ineffective Coping  Goal: Identifies ineffective coping skills  Outcome: Progressing  Goal: Identifies healthy coping skills  Outcome: Progressing  Goal: Demonstrates healthy coping skills  Outcome: Progressing  Goal: Participates in unit activities  Description: Interventions:  - Provide therapeutic environment   - Provide required programming   - Redirect inappropriate behaviors   Outcome: Progressing  Goal: Patient/Family participate in treatment and DC plans  Description: Interventions:  - Provide therapeutic environment  Outcome: Progressing  Goal: Patient/Family verbalizes awareness of resources  Outcome: Progressing  Goal: Understands least restrictive measures  Description: Interventions:  - Utilize least restrictive behavior  Outcome: Progressing  Goal: Free from restraint events  Description: - Utilize least restrictive measures   - Provide behavioral interventions   - Redirect inappropriate behaviors   Outcome: Progressing     Problem: Risk for Self Injury/Neglect  Goal: Treatment Goal: Remain safe during length of stay, learn and adopt new coping skills, and be free of self-injurious ideation, impulses and acts at the time of discharge  Outcome: Progressing  Goal: Verbalize thoughts and feelings  Description: Interventions:  - Assess and re-assess patient's lethality and potential for self-injury  - Engage patient in 1:1 interactions, daily, for a minimum of 15 minutes  - Encourage patient to express feelings, fears, frustrations, hopes  - Establish rapport/trust with patient   Outcome: Progressing  Goal: Refrain from harming self  Description: Interventions:  - Monitor  patient closely, per order  - Develop a trusting relationship  - Supervise medication ingestion, monitor effects and side effects   Outcome: Progressing  Goal: Attend and participate in unit activities, including therapeutic, recreational, and educational groups  Description: Interventions:  - Provide therapeutic and educational activities daily, encourage attendance and participation, and document same in the medical record  - Obtain collateral information, encourage visitation and family involvement in care   Outcome: Progressing  Goal: Recognize maladaptive responses and adopt new coping mechanisms  Outcome: Progressing  Goal: Complete daily ADLs, including personal hygiene independently, as able  Description: Interventions:  - Observe, teach, and assist patient with ADLS  - Monitor and promote a balance of rest/activity, with adequate nutrition and elimination  Outcome: Progressing     Problem: Depression  Goal: Treatment Goal: Demonstrate behavioral control of depressive symptoms, verbalize feelings of improved mood/affect, and adopt new coping skills prior to discharge  Outcome: Progressing  Goal: Verbalize thoughts and feelings  Description: Interventions:  - Assess and re-assess patient's level of risk   - Engage patient in 1:1 interactions, daily, for a minimum of 15 minutes   - Encourage patient to express feelings, fears, frustrations, hopes   Outcome: Progressing  Goal: Refrain from harming self  Description: Interventions:  - Monitor patient closely, per order   - Supervise medication ingestion, monitor effects and side effects   Outcome: Progressing  Goal: Refrain from isolation  Description: Interventions:  - Develop a trusting relationship   - Encourage socialization   Outcome: Progressing  Goal: Refrain from self-neglect  Outcome: Progressing  Goal: Attend and participate in unit activities, including therapeutic, recreational, and educational groups  Description: Interventions:  - Provide  therapeutic and educational activities daily, encourage attendance and participation, and document same in the medical record   Outcome: Progressing  Goal: Complete daily ADLs, including personal hygiene independently, as able  Description: Interventions:  - Observe, teach, and assist patient with ADLS  -  Monitor and promote a balance of rest/activity, with adequate nutrition and elimination   Outcome: Progressing     Problem: Anxiety  Goal: Anxiety is at manageable level  Description: Interventions:  - Assess and monitor patient's anxiety level.   - Monitor for signs and symptoms (heart palpitations, chest pain, shortness of breath, headaches, nausea, feeling jumpy, restlessness, irritable, apprehensive).   - Collaborate with interdisciplinary team and initiate plan and interventions as ordered.  - [place] patient to unit/surroundings  - Explain treatment plan  - Encourage participation in care  - Encourage verbalization of concerns/fears  - Identify coping mechanisms  - Assist in developing anxiety-reducing skills  - Administer/offer alternative therapies  - Limit or eliminate stimulants  Outcome: Progressing     Problem: Risk for Violence/Aggression Toward Others  Goal: Treatment Goal: Refrain from acts of violence/aggression during length of stay, and demonstrate improved impulse control at the time of discharge  Outcome: Progressing  Goal: Verbalize thoughts and feelings  Description: Interventions:  - Assess and re-assess patient's level of risk, every waking shift  - Engage patient in 1:1 interactions, daily, for a minimum of 15 minutes   - Allow patient to express feelings and frustrations in a safe and non-threatening manner   - Establish rapport/trust with patient   Outcome: Progressing  Goal: Refrain from harming others  Outcome: Progressing  Goal: Refrain from destructive acts on the environment or property  Outcome: Progressing  Goal: Control angry outbursts  Description: Interventions:  - Monitor  patient closely, per order  - Ensure early verbal de-escalation  - Monitor prn medication needs  - Set reasonable/therapeutic limits, outline behavioral expectations, and consequences   - Provide a non-threatening milieu, utilizing the least restrictive interventions   Outcome: Progressing  Goal: Attend and participate in unit activities, including therapeutic, recreational, and educational groups  Description: Interventions:  - Provide therapeutic and educational activities daily, encourage attendance and participation, and document same in the medical record   Outcome: Progressing  Goal: Identify appropriate positive anger management techniques  Description: Interventions:  - Offer anger management and coping skills groups   - Staff will provide positive feedback for appropriate anger control  Outcome: Progressing     Problem: Alteration in Orientation  Goal: Treatment Goal: Demonstrate a reduction of confusion and improved orientation to person, place, time and/or situation upon discharge, according to optimum baseline/ability  Outcome: Progressing  Goal: Interact with staff daily  Description: Interventions:  - Assess and re-assess patient's level of orientation  - Engage patient in 1 on 1 interactions, daily, for a minimum of 15 minutes   - Establish rapport/trust with patient   Outcome: Progressing  Goal: Express concerns related to confused thinking related to:  Description: Interventions:  - Encourage patient to express feelings, fears, frustrations, hopes  - Assign consistent caregivers   - [place]/re-orient patient as needed  - Allow comfort items, as appropriate  - Provide visual cues, signs, etc.   Outcome: Progressing  Goal: Allow medical examinations, as recommended  Description: Interventions:  - Provide physical/neurological exams and/or referrals, per provider   Outcome: Progressing  Goal: Cooperate with recommended testing/procedures  Description: Interventions:  - Determine need for ancillary  testing  - Observe for mental status changes  - Implement falls/precaution protocol   Outcome: Progressing  Goal: Attend and participate in unit activities, including therapeutic, recreational, and educational groups  Description: Interventions:  - Provide therapeutic and educational activities daily, encourage attendance and participation, and document same in the medical record   - Provide appropriate opportunities for reminiscence   - Provide a consistent daily routine   - Encourage family contact/visitation   Outcome: Progressing  Goal: Complete daily ADLs, including personal hygiene independently, as able  Description: Interventions:  - Observe, teach, and assist patient with ADLS  Outcome: Progressing     Problem: SELF HARM/SUICIDALITY  Goal: Will have no self-injury during hospital stay  Description: INTERVENTIONS:  - Q 15 MINUTES: Routine safety checks  - Q WAKING SHIFT & PRN: Assess risk to determine if routine checks are adequate to maintain patient safety  - Encourage patient to participate actively in care by formulating a plan to combat response to suicidal ideation, identify supports and resources  Outcome: Progressing     Problem: DEPRESSION  Goal: Will be euthymic at discharge  Description: INTERVENTIONS:  - Administer medication as ordered  - Provide emotional support via 1:1 interaction with staff  - Encourage involvement in milieu/groups/activities  - Monitor for social isolation  Outcome: Progressing     Problem: ANXIETY  Goal: Will report anxiety at manageable levels  Description: INTERVENTIONS:  - Administer medication as ordered  - Teach and encourage coping skills  - Provide emotional support  - Assess patient/family for anxiety and ability to cope  Outcome: Progressing  Goal: By discharge: Patient will verbalize 2 strategies to deal with anxiety  Description: Interventions:  - Identify any obvious source/trigger to anxiety  - Staff will assist patient in applying identified coping  technique/skills  - Encourage attendance of scheduled groups and activities  Outcome: Progressing     Problem: SELF CARE DEFICIT  Goal: Return ADL status to a safe level of function  Description: INTERVENTIONS:  - Administer medication as ordered  - Assess ADL deficits and provide assistive devices as needed  - Obtain PT/OT consults as needed  - Assist and instruct patient to increase activity and self care as tolerated  Outcome: Progressing     Problem: DISCHARGE PLANNING - CARE MANAGEMENT  Goal: Discharge to post-acute care or home with appropriate resources  Description: INTERVENTIONS:  - Conduct assessment to determine patient/family and health care team treatment goals, and need for post-acute services based on payer coverage, community resources, and patient preferences, and barriers to discharge  - Address psychosocial, clinical, and financial barriers to discharge as identified in assessment in conjunction with the patient/family and health care team  - Arrange appropriate level of post-acute services according to patient’s   needs and preference and payer coverage in collaboration with the physician and health care team  - Communicate with and update the patient/family, physician, and health care team regarding progress on the discharge plan  - Arrange appropriate transportation to post-acute venues  Outcome: Progressing

## 2024-08-05 NOTE — NURSING NOTE
Deyvi is visible for short periods of time this evening. He is quiet as he sits among his peers; he has minimal interaction  He said he was tired and was already in bed when I came with his HS medications to his room at 2130. His affect was a bit more flat this evening and conversation was very minimal. He took his medications and went right back to bed

## 2024-08-05 NOTE — SOCIAL WORK
SW and pt met 1:1  Pt reports continuing to feel at his baseline. Pt expressed readiness for discharge but wanting adequate support. SW reviewed current plan for [facility] and [facility] services and discussed expected timeline.   Pt and SW reviewed pt's current sources of anxiety - needing to pay credit card bill and needing to address student loan bill.   SW will research deferment options and assist pt with paperwork.   Pt was quiet, flat/blunted but personable and pleasant.

## 2024-08-06 PROCEDURE — 99232 SBSQ HOSP IP/OBS MODERATE 35: CPT | Performed by: PSYCHIATRY & NEUROLOGY

## 2024-08-06 RX ADMIN — LAMOTRIGINE 50 MG: 25 TABLET ORAL at 08:28

## 2024-08-06 RX ADMIN — HYDROXYZINE HYDROCHLORIDE 25 MG: 25 TABLET ORAL at 21:20

## 2024-08-06 RX ADMIN — HYDROXYZINE HYDROCHLORIDE 25 MG: 25 TABLET ORAL at 17:12

## 2024-08-06 RX ADMIN — LISINOPRIL 10 MG: 10 TABLET ORAL at 08:26

## 2024-08-06 RX ADMIN — HYDROXYZINE HYDROCHLORIDE 25 MG: 25 TABLET ORAL at 08:27

## 2024-08-06 RX ADMIN — CARIPRAZINE 3 MG: 3 CAPSULE, GELATIN COATED ORAL at 08:27

## 2024-08-06 RX ADMIN — SERTRALINE HYDROCHLORIDE 150 MG: 100 TABLET ORAL at 21:20

## 2024-08-06 RX ADMIN — CHOLECALCIFEROL TAB 25 MCG (1000 UNIT) 2000 UNITS: 25 TAB at 08:28

## 2024-08-06 RX ADMIN — CYANOCOBALAMIN TAB 1000 MCG 1000 MCG: 1000 TAB at 08:27

## 2024-08-06 RX ADMIN — ATORVASTATIN CALCIUM 10 MG: 10 TABLET, FILM COATED ORAL at 08:27

## 2024-08-06 NOTE — PLAN OF CARE
Problem: Alteration in Thoughts and Perception  Goal: Treatment Goal: Gain control of psychotic behaviors/thinking, reduce/eliminate presenting symptoms and demonstrate improved reality functioning upon discharge  Outcome: Progressing  Goal: Verbalize thoughts and feelings  Description: Interventions:  - Promote a nonjudgmental and trusting relationship with the patient through active listening and therapeutic communication  - Assess patient's level of functioning, behavior and potential for risk  - Engage patient in 1 on 1 interactions  - Encourage patient to express fears, feelings, frustrations, and discuss symptoms    - [place] patient to reality, help patient recognize reality-based thinking   - Administer medications as ordered and assess for potential side effects  - Provide the patient education related to the signs and symptoms of the illness and desired effects of prescribed medications  Outcome: Progressing  Goal: Refrain from acting on delusional thinking/internal stimuli  Description: Interventions:  - Monitor patient closely, per order   - Utilize least restrictive measures   - Set reasonable limits, give positive feedback for acceptable   - Administer medications as ordered and monitor of potential side effects  Outcome: Progressing  Goal: Agree to be compliant with medication regime, as prescribed and report medication side effects  Description: Interventions:  - Offer appropriate PRN medication and supervise ingestion; conduct AIMS, as needed   Outcome: Progressing  Goal: Attend and participate in unit activities, including therapeutic, recreational, and educational groups  Description: Interventions:  -Encourage Visitation and family involvement in care  Outcome: Progressing  Goal: Recognize dysfunctional thoughts, communicate reality-based thoughts at the time of discharge  Description: Interventions:  - Provide medication and psycho-education to assist patient in compliance and developing  insight into his/her illness   Outcome: Progressing  Goal: Complete daily ADLs, including personal hygiene independently, as able  Description: Interventions:  - Observe, teach, and assist patient with ADLS  - Monitor and promote a balance of rest/activity, with adequate nutrition and elimination   Outcome: Progressing     Problem: Ineffective Coping  Goal: Participates in unit activities  Description: Interventions:  - Provide therapeutic environment   - Provide required programming   - Redirect inappropriate behaviors   Outcome: Progressing  Goal: Patient/Family participate in treatment and DC plans  Description: Interventions:  - Provide therapeutic environment  Outcome: Progressing  Goal: Patient/Family verbalizes awareness of resources  Outcome: Progressing  Goal: Free from restraint events  Description: - Utilize least restrictive measures   - Provide behavioral interventions   - Redirect inappropriate behaviors   Outcome: Progressing     Problem: Risk for Self Injury/Neglect  Goal: Verbalize thoughts and feelings  Description: Interventions:  - Assess and re-assess patient's lethality and potential for self-injury  - Engage patient in 1:1 interactions, daily, for a minimum of 15 minutes  - Encourage patient to express feelings, fears, frustrations, hopes  - Establish rapport/trust with patient   Outcome: Progressing  Goal: Refrain from harming self  Description: Interventions:  - Monitor patient closely, per order  - Develop a trusting relationship  - Supervise medication ingestion, monitor effects and side effects   Outcome: Progressing  Goal: Attend and participate in unit activities, including therapeutic, recreational, and educational groups  Description: Interventions:  - Provide therapeutic and educational activities daily, encourage attendance and participation, and document same in the medical record  - Obtain collateral information, encourage visitation and family involvement in care   Outcome:  Progressing  Goal: Complete daily ADLs, including personal hygiene independently, as able  Description: Interventions:  - Observe, teach, and assist patient with ADLS  - Monitor and promote a balance of rest/activity, with adequate nutrition and elimination  Outcome: Progressing     Problem: Depression  Goal: Treatment Goal: Demonstrate behavioral control of depressive symptoms, verbalize feelings of improved mood/affect, and adopt new coping skills prior to discharge  Outcome: Progressing  Goal: Verbalize thoughts and feelings  Description: Interventions:  - Assess and re-assess patient's level of risk   - Engage patient in 1:1 interactions, daily, for a minimum of 15 minutes   - Encourage patient to express feelings, fears, frustrations, hopes   Outcome: Progressing  Goal: Refrain from harming self  Description: Interventions:  - Monitor patient closely, per order   - Supervise medication ingestion, monitor effects and side effects   Outcome: Progressing  Goal: Refrain from isolation  Description: Interventions:  - Develop a trusting relationship   - Encourage socialization   Outcome: Progressing     Problem: Anxiety  Goal: Anxiety is at manageable level  Description: Interventions:  - Assess and monitor patient's anxiety level.   - Monitor for signs and symptoms (heart palpitations, chest pain, shortness of breath, headaches, nausea, feeling jumpy, restlessness, irritable, apprehensive).   - Collaborate with interdisciplinary team and initiate plan and interventions as ordered.  - [place] patient to unit/surroundings  - Explain treatment plan  - Encourage participation in care  - Encourage verbalization of concerns/fears  - Identify coping mechanisms  - Assist in developing anxiety-reducing skills  - Administer/offer alternative therapies  - Limit or eliminate stimulants  Outcome: Progressing     Problem: Risk for Violence/Aggression Toward Others  Goal: Treatment Goal: Refrain from acts of violence/aggression  during length of stay, and demonstrate improved impulse control at the time of discharge  Outcome: Progressing  Goal: Verbalize thoughts and feelings  Description: Interventions:  - Assess and re-assess patient's level of risk, every waking shift  - Engage patient in 1:1 interactions, daily, for a minimum of 15 minutes   - Allow patient to express feelings and frustrations in a safe and non-threatening manner   - Establish rapport/trust with patient   Outcome: Progressing  Goal: Refrain from harming others  Outcome: Progressing  Goal: Refrain from destructive acts on the environment or property  Outcome: Progressing  Goal: Control angry outbursts  Description: Interventions:  - Monitor patient closely, per order  - Ensure early verbal de-escalation  - Monitor prn medication needs  - Set reasonable/therapeutic limits, outline behavioral expectations, and consequences   - Provide a non-threatening milieu, utilizing the least restrictive interventions   Outcome: Progressing  Goal: Attend and participate in unit activities, including therapeutic, recreational, and educational groups  Description: Interventions:  - Provide therapeutic and educational activities daily, encourage attendance and participation, and document same in the medical record   Outcome: Progressing     Problem: Alteration in Orientation  Goal: Treatment Goal: Demonstrate a reduction of confusion and improved orientation to person, place, time and/or situation upon discharge, according to optimum baseline/ability  Outcome: Progressing  Goal: Complete daily ADLs, including personal hygiene independently, as able  Description: Interventions:  - Observe, teach, and assist patient with ADLS  Outcome: Progressing     Problem: ANXIETY  Goal: Will report anxiety at manageable levels  Description: INTERVENTIONS:  - Administer medication as ordered  - Teach and encourage coping skills  - Provide emotional support  - Assess patient/family for anxiety and  ability to cope  Outcome: Progressing     Problem: SELF CARE DEFICIT  Goal: Return ADL status to a safe level of function  Description: INTERVENTIONS:  - Administer medication as ordered  - Assess ADL deficits and provide assistive devices as needed  - Obtain PT/OT consults as needed  - Assist and instruct patient to increase activity and self care as tolerated  Outcome: Progressing     Problem: DISCHARGE PLANNING - CARE MANAGEMENT  Goal: Discharge to post-acute care or home with appropriate resources  Description: INTERVENTIONS:  - Conduct assessment to determine patient/family and health care team treatment goals, and need for post-acute services based on payer coverage, community resources, and patient preferences, and barriers to discharge  - Address psychosocial, clinical, and financial barriers to discharge as identified in assessment in conjunction with the patient/family and health care team  - Arrange appropriate level of post-acute services according to patient’s   needs and preference and payer coverage in collaboration with the physician and health care team  - Communicate with and update the patient/family, physician, and health care team regarding progress on the discharge plan  - Arrange appropriate transportation to post-acute venues  Outcome: Progressing     Problem: Alteration in Orientation  Goal: Attend and participate in unit activities, including therapeutic, recreational, and educational groups  Description: Interventions:  - Provide therapeutic and educational activities daily, encourage attendance and participation, and document same in the medical record   - Provide appropriate opportunities for reminiscence   - Provide a consistent daily routine   - Encourage family contact/visitation   Outcome: Not Progressing

## 2024-08-06 NOTE — NURSING NOTE
Pt is calm, cooperative and visible on unit. Pt denies all psych signs and symptoms. Pt has minimal interaction with peers and reports sleeping well. Pt is able to make needs known and is medication compliant. Will maintain q7 min checks.

## 2024-08-06 NOTE — PROGRESS NOTES
Progress Note - Behavioral Health   Deyvi Cao 54 y.o. male MRN: [id number]  Unit/Bed#: [facility] 112-02 Encounter: [id number]  Code Status: Level 1 - Full Code    Assessment & Plan   Principal Problem:    Bipolar disorder with severe depression (HCC)  Active Problems:    Benign essential hypertension    Mixed hyperlipidemia    Allergic rhinitis due to allergen    Medical clearance for psychiatric admission    Rosacea    Recommended Treatment:     Treatment plan, treatment progress and medication changes were reviewed with Nursing Staff, Pharmacy Service and Case Management in Treatment Team:  1.Continue with group therapy, milieu therapy and occupational therapy   2.Behavioral Health checks every 7 minutes   3.Continue frequent safety checks and vitals per unit protocol  4.Continue with SLIM medical management as indicated  5.Continue with current medication regimen for symptom management: Vraylar 3mg PO Daily, Atarax 25mg PO TID, Lamictal 50mg PO Daily, Zoloft 150mg PO QHS, Lamictal 50mg PO Daily   6.Disposition Planning: Discharge planning and efforts remain ongoing - Awaiting group home placement    Subjective:    Patient was seen today for continuation of care, records reviewed and patient was discussed with the morning case review team.    Deyvi was seen today for psychiatric follow-up.  On assessment today, Deyvi was found laying in bed.  He is calm and cooperative.  Offers no new complaints or concerns.  Deyvi reports adequate daytime energy and denies any difficulties with initiating or staying asleep.  Oral appetite and hydration is adequate.   We reviewed once more the specific as-needed medications they can use going forward if they experience any insomnia or destabilization of their mood, they understood and were agreeable. Milieu visibility and group attendance encouraged to promote an active participation in treatment.    Deyvi denies acute suicidal/self-harm ideation/intent/plan upon direct  inquiry at this time. Deyvi is able to contract for safety while on the unit and would feel comfortable seeking staff support should suicidal symptoms or urges appear or worsen. Deyvi remains behaviorally appropriate, no agitation or aggression noted on exam or in report. Deyvi also denies HI/AH/VH, and does not appear overtly manic.  Patient does not verbalize any experiences that can be categorized as paranoid, persecutory, bizarre, or somatic delusions. Deyvi remains adherent to his current psychotropic medication regimen and denies any side effects from medications, as well as none noted on exam.    Group Attendance: 5 / 8  Treatment Team: This Thursday  Psychiatric PRN's Needed: None    Review of Systems:  Behavior over the last 24 hours: Unchanged  Sleep: sleeping okay throughout the night  Appetite: adequate  Medication side effects: none reported  ROS:no complaints, all other systems are negative    Objective:    Vitals:  Vitals:    08/06/24 0738   BP: 158/75   Pulse: 87   Resp: 18   Temp: 98.4 °F (36.9 °C)   SpO2: 95%     Laboratory Results:  I have personally reviewed all pertinent laboratory/tests results.  Most Recent Labs:   Lab Results   Component Value Date    WBC 7.39 06/26/2024    RBC 4.70 06/26/2024    HGB 15.2 06/26/2024    HCT 45.5 06/26/2024     06/26/2024    RDW 12.9 06/26/2024    NEUTROABS 4.63 06/26/2024    SODIUM 137 06/26/2024    K 4.1 06/26/2024     06/26/2024    CO2 26 06/26/2024    BUN 17 06/26/2024    CREATININE 0.63 06/26/2024    GLUC 98 06/26/2024    GLUF 98 06/26/2024    CALCIUM 9.6 06/26/2024    AST 25 06/26/2024    ALT 45 06/26/2024    ALKPHOS 114 (H) 06/26/2024    TP 7.0 06/26/2024    ALB 4.4 06/26/2024    TBILI 0.44 06/26/2024    CHOLESTEROL 177 06/26/2024    HDL 52 06/26/2024    TRIG 73 06/26/2024    LDLCALC 110 (H) 06/26/2024    NONHDLC 125 06/26/2024    LITHIUM 0.4 (L) 01/28/2021    [id number] 2.636 06/26/2024    HGBA1C 5.2 11/22/2023      11/22/2023     Mental Status Evaluation:  Appearance:  age appropriate, casually dressed   Behavior:  cooperative, calm   Speech:  normal volume   Mood:  less anxious, less depressed   Affect:  constricted   Thought Process:  goal directed   Associations: intact associations   Thought Content:  no overt delusions   Perceptual Disturbances: no auditory hallucinations, no visual hallucinations, denies when asked, does not appear responding to internal stimuli   Risk Potential: Suicidal ideation - None at present, contracts for safety on the unit, would talk to staff if not feeling safe on the unit  Homicidal ideation - None at present  Potential for aggression - Not at present   Sensorium:  oriented to person, place, and time/date   Memory:  recent memory intact   Consciousness:  alert and awake   Attention/Concentration: attention span and concentration appear shorter than expected for age   Insight:  limited   Judgment: limited   Gait/Station: normal gait/station, normal balance   Motor Activity: no abnormal movements     Progress Toward Goals: making gradual improvement.  Deyvi continues to require inpatient psychiatric hospitalization for continued medication management and titration to optimize symptom reduction, improve sleep hygiene, and demonstrate adequate self-care.     Suicide/Homicide Risk Assessment:  Risk of Harm to Self:   Nursing Suicide Risk Assessment Last 24 hours: C-SSRS Risk (Since Last Contact)  Calculated C-SSRS Risk Score (Since Last Contact): No Risk Indicated    Risk of Harm to Others:  Nursing Homicide Risk Assessment: Violence Risk to Others: Denies within past 6 months    Behavioral Health Medications: all current active meds have been reviewed and continue current psychiatric medications.  Current Facility-Administered Medications   Medication Dose Route Frequency Provider Last Rate    acetaminophen  650 mg Oral Q6H PRN ALVINA Harley      acetaminophen  650 mg Oral Q4H PRN Melva  ALVINA Knutson      acetaminophen  975 mg Oral Q6H PRN ALVINA Harley      aluminum-magnesium hydroxide-simethicone  30 mL Oral Q4H PRN ALVINA Harley      ammonium lactate   Topical BID PRN ALVINA Harley      atorvastatin  10 mg Oral Daily Sary ALVINA Gupta      benztropine  1 mg Intramuscular Q4H PRN Max 6/day ALVINA Harley      benztropine  1 mg Oral Q4H PRN Max 6/day ALVINA Harley      bisacodyl  10 mg Rectal Daily PRN ALVINA Harley      cariprazine  3 mg Oral Daily Martin Cardenas MD      Cholecalciferol  2,000 Units Oral Daily Sary LinkALVINA Thompson      cyanocobalamin  1,000 mcg Oral Daily SaryALVINA Starkey      hydrOXYzine HCL  25 mg Oral Q6H PRN Max 4/day ALVINA Harley      hydrOXYzine HCL  25 mg Oral TID Martin Cardenas MD      hydrOXYzine HCL  50 mg Oral Q4H PRN Max 4/day ALVINA Harley      Or    LORazepam  1 mg Intramuscular Q4H PRN ALVINA Harley      lamoTRIgine  50 mg Oral Daily Martin Cardenas MD      lisinopril  10 mg Oral Daily ALVINA Lewis      LORazepam  1 mg Oral Q4H PRN Max 6/day ALVINA Harley      Or    LORazepam  2 mg Intramuscular Q6H PRN Max 3/day ALVINA Harley      OLANZapine  10 mg Oral Q3H PRN Max 3/day ALVINA Harley      Or    OLANZapine  10 mg Intramuscular Q3H PRN Max 3/day ALVINA Harley      OLANZapine  5 mg Oral Q3H PRN Max 6/day ALVINA Harley      Or    OLANZapine  5 mg Intramuscular Q3H PRN Max 6/day ALVINA Harley      OLANZapine  2.5 mg Oral Q3H PRN Max 8/day ALVINA Harley      polyethylene glycol  17 g Oral Daily PRN ALVINA Harley      propranolol  10 mg Oral Q8H PRN ALVINA Harley      senna-docusate sodium  1 tablet Oral Daily PRN ALVINA Harley      sertraline  150 mg Oral HS Martin Cardenas MD       Risks / Benefits of Treatment:  Risks, benefits, and possible side effects of medications explained to patient. Patient has limited understanding of  risks and benefits of treatment at this time, but agrees to take medications as prescribed.    Counseling / Coordination of Care:  Total floor/unit time spent today 25 minutes. Greater than 50% of total time was spent with the patient and / or family counseling and / or coordination of care. A description of the counseling / coordination of care:   Patient's progress discussed with staff in treatment team meeting.  Medications, treatment progress and treatment plan reviewed with patient.   Educated on importance of medication and treatment compliance.  Reassurance and supportive therapy provided.   Encouraged participation in milieu and group therapy on the unit.    ALVINA Harley 08/06/24

## 2024-08-06 NOTE — PROGRESS NOTES
08/06/24 0746   Team Meeting   Meeting Type Daily Rounds   Team Members Present   Team Members Present Physician;Nurse;;Other (Discipline and Name)   Patient/Family Present   Patient Present No   Patient's Family Present No     In attendance:  Meghann Noonan, CRNP Dr. Jordan Holter, ALVINA Melgoza, LYNNE Garcia, [facility]  Mer Sales, [facility]  Gris Arizmendi M.S.    Groups: 5/8    Pt quiet, blunted. Visible with minimal interactions with staff and peers. Pt waiting on [facility] availability.

## 2024-08-06 NOTE — NURSING NOTE
Received pt in bed at change of shift with eyes closed; chest movement noted.  Continues the same thus this far as per q 7 min room checks.   Will continue to monitor behavior, sleeping pattern and any medical issues that may arise.    0553:  Sleeping 7+ hrs thus this far

## 2024-08-07 PROCEDURE — 99232 SBSQ HOSP IP/OBS MODERATE 35: CPT | Performed by: PSYCHIATRY & NEUROLOGY

## 2024-08-07 RX ADMIN — LAMOTRIGINE 50 MG: 25 TABLET ORAL at 08:27

## 2024-08-07 RX ADMIN — CARIPRAZINE 3 MG: 3 CAPSULE, GELATIN COATED ORAL at 08:27

## 2024-08-07 RX ADMIN — CYANOCOBALAMIN TAB 1000 MCG 1000 MCG: 1000 TAB at 08:27

## 2024-08-07 RX ADMIN — HYDROXYZINE HYDROCHLORIDE 25 MG: 25 TABLET ORAL at 17:01

## 2024-08-07 RX ADMIN — SERTRALINE HYDROCHLORIDE 150 MG: 100 TABLET ORAL at 21:26

## 2024-08-07 RX ADMIN — LISINOPRIL 10 MG: 10 TABLET ORAL at 08:27

## 2024-08-07 RX ADMIN — CHOLECALCIFEROL TAB 25 MCG (1000 UNIT) 2000 UNITS: 25 TAB at 08:28

## 2024-08-07 RX ADMIN — HYDROXYZINE HYDROCHLORIDE 25 MG: 25 TABLET ORAL at 21:26

## 2024-08-07 RX ADMIN — ATORVASTATIN CALCIUM 10 MG: 10 TABLET, FILM COATED ORAL at 08:27

## 2024-08-07 RX ADMIN — HYDROXYZINE HYDROCHLORIDE 25 MG: 25 TABLET ORAL at 08:28

## 2024-08-07 NOTE — PROGRESS NOTES
Progress Note - Behavioral Health   Deyvi Cao 54 y.o. male MRN: [id number]  Unit/Bed#: [facility] 112-02 Encounter: [id number]  Code Status: Level 1 - Full Code    Assessment & Plan   Principal Problem:    Bipolar disorder with severe depression (HCC)  Active Problems:    Benign essential hypertension    Mixed hyperlipidemia    Allergic rhinitis due to allergen    Medical clearance for psychiatric admission    Rosacea    Recommended Treatment:     Treatment plan, treatment progress and medication changes were reviewed with Nursing Staff, Pharmacy Service and Case Management in Treatment Team:  1.Continue with group therapy, milieu therapy and occupational therapy   2.Behavioral Health checks every 7 minutes   3.Continue frequent safety checks and vitals per unit protocol  4.Continue with SLIM medical management as indicated  5.Continue with current medication regimen for symptom management: Vraylar 3mg PO Daily, Atarax 25mg PO TID, Lamictal 50mg PO Daily, Zoloft 150mg PO QHS, Lamictal 50mg PO Daily   6.Disposition Planning: Discharge planning and efforts remain ongoing - Awaiting group home placement    Subjective:    Patient was seen today for continuation of care, records reviewed and patient was discussed with the morning case review team.    Deyvi was seen today for psychiatric follow-up.  On assessment today, Deyvi was calm and cooperative.  He is doing well, he has no new concerns.  Reports mild depression and anxiety.  Deyvi reports adequate daytime energy and denies any difficulties with initiating or staying asleep.  Oral appetite and hydration is adequate.  We reviewed once more the specific as-needed medications they can use going forward if they experience any insomnia or destabilization of their mood, they understood and were agreeable. Milieu visibility and group attendance encouraged to promote an active participation in treatment.    Deyvi denies acute suicidal/self-harm ideation/intent/plan  upon direct inquiry at this time. Deyvi is able to contract for safety while on the unit and would feel comfortable seeking staff support should suicidal symptoms or urges appear or worsen. Deyvi remains behaviorally appropriate, no agitation or aggression noted on exam or in report. Deyvi also denies HI/AH/VH, and does not appear overtly manic.  Patient does not verbalize any experiences that can be categorized as paranoid, persecutory, bizarre, or somatic delusions. Deyvi remains adherent to his current psychotropic medication regimen and denies any side effects from medications, as well as none noted on exam.    Group Attendance: 4 / 9  Treatment Team: This Thursday  Psychiatric PRN's Needed: None    Review of Systems:  Behavior over the last 24 hours: Slowly improving  Sleep: sleeping okay throughout the night  Appetite: adequate  Medication side effects: none reported  ROS:no complaints, all other systems are negative    Objective:    Vitals:  Vitals:    08/07/24 0734   BP: 143/74   Pulse: 82   Resp: 18   Temp: 97.5 °F (36.4 °C)   SpO2: 92%     Laboratory Results:  I have personally reviewed all pertinent laboratory/tests results.  Most Recent Labs:   Lab Results   Component Value Date    WBC 7.39 06/26/2024    RBC 4.70 06/26/2024    HGB 15.2 06/26/2024    HCT 45.5 06/26/2024     06/26/2024    RDW 12.9 06/26/2024    NEUTROABS 4.63 06/26/2024    SODIUM 137 06/26/2024    K 4.1 06/26/2024     06/26/2024    CO2 26 06/26/2024    BUN 17 06/26/2024    CREATININE 0.63 06/26/2024    GLUC 98 06/26/2024    GLUF 98 06/26/2024    CALCIUM 9.6 06/26/2024    AST 25 06/26/2024    ALT 45 06/26/2024    ALKPHOS 114 (H) 06/26/2024    TP 7.0 06/26/2024    ALB 4.4 06/26/2024    TBILI 0.44 06/26/2024    CHOLESTEROL 177 06/26/2024    HDL 52 06/26/2024    TRIG 73 06/26/2024    LDLCALC 110 (H) 06/26/2024    NONHDLC 125 06/26/2024    LITHIUM 0.4 (L) 01/28/2021    [id number] 2.636 06/26/2024    HGBA1C 5.2 11/22/2023      11/22/2023     Mental Status Evaluation:  Appearance:  age appropriate, casually dressed   Behavior:  cooperative, calm   Speech:  normal rate, normal volume   Mood:  less anxious, less depressed   Affect:  constricted   Thought Process:  goal directed   Associations: intact associations   Thought Content:  no overt delusions   Perceptual Disturbances: no auditory hallucinations, no visual hallucinations, denies when asked, does not appear responding to internal stimuli   Risk Potential: Suicidal ideation - None at present, contracts for safety on the unit, would talk to staff if not feeling safe on the unit  Homicidal ideation - None at present  Potential for aggression - Not at present   Sensorium:  oriented to person, place, and time/date   Memory:  recent memory intact   Consciousness:  alert and awake   Attention/Concentration: attention span and concentration are age appropriate   Insight:  fair and improving   Judgment: fair and improving   Gait/Station: normal gait/station, normal balance   Motor Activity: no abnormal movements     Progress Toward Goals: making gradual improvement.  Deyvi continues to require inpatient psychiatric hospitalization for continued medication management and titration to optimize symptom reduction, improve sleep hygiene, and demonstrate adequate self-care.     Suicide/Homicide Risk Assessment:  Risk of Harm to Self:   Nursing Suicide Risk Assessment Last 24 hours: C-SSRS Risk (Since Last Contact)  Calculated C-SSRS Risk Score (Since Last Contact): No Risk Indicated    Risk of Harm to Others:  Nursing Homicide Risk Assessment: Violence Risk to Others: Denies within past 6 months    Behavioral Health Medications: all current active meds have been reviewed and continue current psychiatric medications.  Current Facility-Administered Medications   Medication Dose Route Frequency Provider Last Rate    acetaminophen  650 mg Oral Q6H PRN ALVINA Harley      acetaminophen   650 mg Oral Q4H PRN ALVINA Harley      acetaminophen  975 mg Oral Q6H PRN ALVINA Harley      aluminum-magnesium hydroxide-simethicone  30 mL Oral Q4H PRN ALVINA Harley      ammonium lactate   Topical BID PRN ALVINA Harley      atorvastatin  10 mg Oral Daily Sary LinkALVINA Thompson      benztropine  1 mg Intramuscular Q4H PRN Max 6/day ALVINA Harley      benztropine  1 mg Oral Q4H PRN Max 6/day ALVINA Harley      bisacodyl  10 mg Rectal Daily PRN ALVINA Harley      cariprazine  3 mg Oral Daily Martin Cardenas MD      Cholecalciferol  2,000 Units Oral Daily Sary ALVINA Gupta      cyanocobalamin  1,000 mcg Oral Daily ALVINA Lewis      hydrOXYzine HCL  25 mg Oral Q6H PRN Max 4/day ALVINA Harley      hydrOXYzine HCL  25 mg Oral TID Martin Cardenas MD      hydrOXYzine HCL  50 mg Oral Q4H PRN Max 4/day ALVINA Harley      Or    LORazepam  1 mg Intramuscular Q4H PRN ALVINA Harley      lamoTRIgine  50 mg Oral Daily Martin Cardenas MD      lisinopril  10 mg Oral Daily ALVINA Lewis      LORazepam  1 mg Oral Q4H PRN Max 6/day ALVINA Harley      Or    LORazepam  2 mg Intramuscular Q6H PRN Max 3/day ALVINA Harley      OLANZapine  10 mg Oral Q3H PRN Max 3/day ALVINA Harley      Or    OLANZapine  10 mg Intramuscular Q3H PRN Max 3/day ALVINA Harley      OLANZapine  5 mg Oral Q3H PRN Max 6/day ALVINA Harley      Or    OLANZapine  5 mg Intramuscular Q3H PRN Max 6/day ALVINA Harley      OLANZapine  2.5 mg Oral Q3H PRN Max 8/day ALVINA Harley      polyethylene glycol  17 g Oral Daily PRN ALVINA Harley      propranolol  10 mg Oral Q8H PRN ALVINA Harley      senna-docusate sodium  1 tablet Oral Daily PRN Melva Hangey, CRNP      sertraline  150 mg Oral HS Martin Cardenas MD       Risks / Benefits of Treatment:  Risks, benefits, and possible side effects of medications explained to patient. Patient has  limited understanding of risks and benefits of treatment at this time, but agrees to take medications as prescribed.    Counseling / Coordination of Care:  Total floor/unit time spent today 25 minutes. Greater than 50% of total time was spent with the patient and / or family counseling and / or coordination of care. A description of the counseling / coordination of care:   Patient's progress discussed with staff in treatment team meeting.  Medications, treatment progress and treatment plan reviewed with patient.   Educated on importance of medication and treatment compliance.  Reassurance and supportive therapy provided.   Encouraged participation in milieu and group therapy on the unit.    ALVINA Harley 08/07/24

## 2024-08-07 NOTE — NURSING NOTE
Pt is calm, cooperative and visible on unit. Pt denies all psych signs and symptoms but he appears depressed and is flat on approach. Compliant with medications and meals. Pt has minimal interaction with peers and reports sleeping well. Pt offers no complaints at this time. Will maintain q7 min checks.

## 2024-08-07 NOTE — PLAN OF CARE
Problem: Alteration in Thoughts and Perception  Goal: Treatment Goal: Gain control of psychotic behaviors/thinking, reduce/eliminate presenting symptoms and demonstrate improved reality functioning upon discharge  Outcome: Progressing  Goal: Verbalize thoughts and feelings  Description: Interventions:  - Promote a nonjudgmental and trusting relationship with the patient through active listening and therapeutic communication  - Assess patient's level of functioning, behavior and potential for risk  - Engage patient in 1 on 1 interactions  - Encourage patient to express fears, feelings, frustrations, and discuss symptoms    - [place] patient to reality, help patient recognize reality-based thinking   - Administer medications as ordered and assess for potential side effects  - Provide the patient education related to the signs and symptoms of the illness and desired effects of prescribed medications  Outcome: Progressing  Goal: Refrain from acting on delusional thinking/internal stimuli  Description: Interventions:  - Monitor patient closely, per order   - Utilize least restrictive measures   - Set reasonable limits, give positive feedback for acceptable   - Administer medications as ordered and monitor of potential side effects  Outcome: Progressing  Goal: Agree to be compliant with medication regime, as prescribed and report medication side effects  Description: Interventions:  - Offer appropriate PRN medication and supervise ingestion; conduct AIMS, as needed   Outcome: Progressing  Goal: Attend and participate in unit activities, including therapeutic, recreational, and educational groups  Description: Interventions:  -Encourage Visitation and family involvement in care  Outcome: Progressing  Goal: Complete daily ADLs, including personal hygiene independently, as able  Description: Interventions:  - Observe, teach, and assist patient with ADLS  - Monitor and promote a balance of rest/activity, with adequate  nutrition and elimination   Outcome: Progressing     Problem: Ineffective Coping  Goal: Participates in unit activities  Description: Interventions:  - Provide therapeutic environment   - Provide required programming   - Redirect inappropriate behaviors   Outcome: Progressing  Goal: Understands least restrictive measures  Description: Interventions:  - Utilize least restrictive behavior  Outcome: Progressing  Goal: Free from restraint events  Description: - Utilize least restrictive measures   - Provide behavioral interventions   - Redirect inappropriate behaviors   Outcome: Progressing     Problem: Depression  Goal: Treatment Goal: Demonstrate behavioral control of depressive symptoms, verbalize feelings of improved mood/affect, and adopt new coping skills prior to discharge  Outcome: Progressing  Goal: Refrain from harming self  Description: Interventions:  - Monitor patient closely, per order   - Supervise medication ingestion, monitor effects and side effects   Outcome: Progressing  Goal: Refrain from isolation  Description: Interventions:  - Develop a trusting relationship   - Encourage socialization   Outcome: Progressing  Goal: Refrain from self-neglect  Outcome: Progressing  Goal: Attend and participate in unit activities, including therapeutic, recreational, and educational groups  Description: Interventions:  - Provide therapeutic and educational activities daily, encourage attendance and participation, and document same in the medical record   Outcome: Progressing     Problem: Anxiety  Goal: Anxiety is at manageable level  Description: Interventions:  - Assess and monitor patient's anxiety level.   - Monitor for signs and symptoms (heart palpitations, chest pain, shortness of breath, headaches, nausea, feeling jumpy, restlessness, irritable, apprehensive).   - Collaborate with interdisciplinary team and initiate plan and interventions as ordered.  - [place] patient to unit/surroundings  - Explain treatment  plan  - Encourage participation in care  - Encourage verbalization of concerns/fears  - Identify coping mechanisms  - Assist in developing anxiety-reducing skills  - Administer/offer alternative therapies  - Limit or eliminate stimulants  Outcome: Progressing     Problem: Risk for Violence/Aggression Toward Others  Goal: Refrain from harming others  Outcome: Progressing  Goal: Control angry outbursts  Description: Interventions:  - Monitor patient closely, per order  - Ensure early verbal de-escalation  - Monitor prn medication needs  - Set reasonable/therapeutic limits, outline behavioral expectations, and consequences   - Provide a non-threatening milieu, utilizing the least restrictive interventions   Outcome: Progressing  Goal: Attend and participate in unit activities, including therapeutic, recreational, and educational groups  Description: Interventions:  - Provide therapeutic and educational activities daily, encourage attendance and participation, and document same in the medical record   Outcome: Progressing     Problem: Alteration in Orientation  Goal: Interact with staff daily  Description: Interventions:  - Assess and re-assess patient's level of orientation  - Engage patient in 1 on 1 interactions, daily, for a minimum of 15 minutes   - Establish rapport/trust with patient   Outcome: Progressing     Problem: ANXIETY  Goal: Will report anxiety at manageable levels  Description: INTERVENTIONS:  - Administer medication as ordered  - Teach and encourage coping skills  - Provide emotional support  - Assess patient/family for anxiety and ability to cope  Outcome: Progressing     Problem: SELF CARE DEFICIT  Goal: Return ADL status to a safe level of function  Description: INTERVENTIONS:  - Administer medication as ordered  - Assess ADL deficits and provide assistive devices as needed  - Obtain PT/OT consults as needed  - Assist and instruct patient to increase activity and self care as tolerated  Outcome:  Progressing     Problem: DISCHARGE PLANNING - CARE MANAGEMENT  Goal: Discharge to post-acute care or home with appropriate resources  Description: INTERVENTIONS:  - Conduct assessment to determine patient/family and health care team treatment goals, and need for post-acute services based on payer coverage, community resources, and patient preferences, and barriers to discharge  - Address psychosocial, clinical, and financial barriers to discharge as identified in assessment in conjunction with the patient/family and health care team  - Arrange appropriate level of post-acute services according to patient’s   needs and preference and payer coverage in collaboration with the physician and health care team  - Communicate with and update the patient/family, physician, and health care team regarding progress on the discharge plan  - Arrange appropriate transportation to post-acute venues  Outcome: Progressing

## 2024-08-07 NOTE — NURSING NOTE
"Pt visible on the milieu intermittently. He consumed 100 % of dinner. Took his medications without incidence. Pt is polite and cooperative. Denied all psychiatric symptoms. Pt stated \"I'm good.\" Attended Coping skills group. Offers no complaints. Social with select peers. Q7 minute safety checks maintained. No behavioral issues.   "

## 2024-08-07 NOTE — NURSING NOTE
Patient has been out of his room for meals and snack time. He was observed only interacting with male peer BECKY playing cards. Appetite excellent. Admits feeling less depressed and less anxious.  Denies suicidal ideations. Isolative to self most of the time in his room. Attended 4/9 groups today. Medication compliant.

## 2024-08-07 NOTE — PLAN OF CARE
Problem: Ineffective Coping  Goal: Identifies ineffective coping skills  Outcome: Progressing  Goal: Identifies healthy coping skills  Outcome: Progressing  Goal: Demonstrates healthy coping skills  Outcome: Progressing  Goal: Participates in unit activities  Description: Interventions:  - Provide therapeutic environment   - Provide required programming   - Redirect inappropriate behaviors   Outcome: Progressing    Attended 5/17 for the groups offered during the past 2 days.

## 2024-08-07 NOTE — PROGRESS NOTES
08/07/24 0748   Team Meeting   Meeting Type Daily Rounds   Team Members Present   Team Members Present Physician;Nurse;;Other (Discipline and Name)   Patient/Family Present   Patient Present No   Patient's Family Present No     In attendance:  Dr. Alex Thomas, MD Dr. Jordan Holter, DO Rosalia Syed, RN  Judi Garcia, [facility]  Mer Sales, [facility]  Gris Arizmendi, BARRETT.S.    Groups: 4/9    Pt quiet, flat, reserved. Pt has minimal interactions with peers and staff. No bx concerns. Pt is waiting on [facility] availability.

## 2024-08-08 PROCEDURE — 99232 SBSQ HOSP IP/OBS MODERATE 35: CPT | Performed by: PSYCHIATRY & NEUROLOGY

## 2024-08-08 RX ADMIN — LAMOTRIGINE 50 MG: 25 TABLET ORAL at 09:17

## 2024-08-08 RX ADMIN — HYDROXYZINE HYDROCHLORIDE 25 MG: 25 TABLET ORAL at 21:19

## 2024-08-08 RX ADMIN — SERTRALINE HYDROCHLORIDE 150 MG: 100 TABLET ORAL at 21:19

## 2024-08-08 RX ADMIN — LISINOPRIL 10 MG: 10 TABLET ORAL at 09:16

## 2024-08-08 RX ADMIN — CHOLECALCIFEROL TAB 25 MCG (1000 UNIT) 2000 UNITS: 25 TAB at 09:16

## 2024-08-08 RX ADMIN — HYDROXYZINE HYDROCHLORIDE 25 MG: 25 TABLET ORAL at 17:16

## 2024-08-08 RX ADMIN — HYDROXYZINE HYDROCHLORIDE 25 MG: 25 TABLET ORAL at 09:16

## 2024-08-08 RX ADMIN — CARIPRAZINE 3 MG: 3 CAPSULE, GELATIN COATED ORAL at 09:17

## 2024-08-08 RX ADMIN — ATORVASTATIN CALCIUM 10 MG: 10 TABLET, FILM COATED ORAL at 09:16

## 2024-08-08 RX ADMIN — CYANOCOBALAMIN TAB 1000 MCG 1000 MCG: 1000 TAB at 09:17

## 2024-08-08 NOTE — PROGRESS NOTES
08/08/24 1000   Team Meeting   Meeting Type Tx Team Meeting   Initial Conference Date 08/08/24   Next Conference Date 08/22/24   Team Members Present   Team Members Present Physician;;Other (Discipline and Name)   Physician Team Member Zenia TINSLEY   Social Work Team Member Jose FRY   Other (Discipline and Name) Giuseppe Luu Co   Patient/Family Present   Patient Present Yes   Patient's Family Present No   OTHER   Team Meeting - Additional Comments Pt attended his tx team meeting. Pt reports still experiencing some depression and anxiety but reports this is his baseline. Tx team discussed option of enhanced [facility] vs [facility] and pt is in agreement with a supportive living environment that helps him gain skills toward independence. SW will complete updated referral. Pt strengths and progress identified and processed with pt.

## 2024-08-08 NOTE — SOCIAL WORK
SW and pt met 1:1  SW obtained pt's belongings from security and provided them to pt. Pt utilized his phone and his bank card/credit card to make his bill payments for the month and check his bank balance. Pt reviewed recent emails to assess for any additional communication from his student loan ; none noted.   Pt was quiet but friendly/polite and tolerated his tasks well with no signs of distress. SW and pt processed history of college and stressors related to bills while pt took care of his bill payments.

## 2024-08-08 NOTE — NURSING NOTE
Pt is calm, cooperative and visible on unit. Pt denies depression and anxiety; denies SI/HI/AH/VH. Pt reports excitement for discharge to [place]. Pt has minimal interaction with peers and reports sleeping well. Pt is able to make needs known and is medication compliant. Will maintain q7 min checks.

## 2024-08-08 NOTE — PROGRESS NOTES
08/08/24 0730   Team Meeting   Meeting Type Daily Rounds   Team Members Present   Team Members Present Physician;Nurse;;Other (Discipline and Name)   Patient/Family Present   Patient Present No   Patient's Family Present No     In attendance:  Dr. Alex Thomas, MD Dr. Jordan Holter, DO Melva Knutson, ALVINA Goff, LYNNE Garcia, [facility]  Mer Sales, [facility]  BARRETT [place].S.    Groups: 3/9    Pt quiet, withdrawn, flat. Pt reports feeling at his baseline. Pt visible during groups and meals and with select peers.

## 2024-08-08 NOTE — PLAN OF CARE
Problem: Alteration in Thoughts and Perception  Goal: Treatment Goal: Gain control of psychotic behaviors/thinking, reduce/eliminate presenting symptoms and demonstrate improved reality functioning upon discharge  Outcome: Progressing  Goal: Refrain from acting on delusional thinking/internal stimuli  Description: Interventions:  - Monitor patient closely, per order   - Utilize least restrictive measures   - Set reasonable limits, give positive feedback for acceptable   - Administer medications as ordered and monitor of potential side effects  Outcome: Progressing  Goal: Agree to be compliant with medication regime, as prescribed and report medication side effects  Description: Interventions:  - Offer appropriate PRN medication and supervise ingestion; conduct AIMS, as needed   Outcome: Progressing  Goal: Attend and participate in unit activities, including therapeutic, recreational, and educational groups  Description: Interventions:  -Encourage Visitation and family involvement in care  Outcome: Progressing  Goal: Recognize dysfunctional thoughts, communicate reality-based thoughts at the time of discharge  Description: Interventions:  - Provide medication and psycho-education to assist patient in compliance and developing insight into his/her illness   Outcome: Progressing  Goal: Complete daily ADLs, including personal hygiene independently, as able  Description: Interventions:  - Observe, teach, and assist patient with ADLS  - Monitor and promote a balance of rest/activity, with adequate nutrition and elimination   Outcome: Progressing     Problem: Ineffective Coping  Goal: Identifies ineffective coping skills  Outcome: Progressing  Goal: Identifies healthy coping skills  Outcome: Progressing  Goal: Demonstrates healthy coping skills  Outcome: Progressing     Problem: Risk for Self Injury/Neglect  Goal: Treatment Goal: Remain safe during length of stay, learn and adopt new coping skills, and be free of  self-injurious ideation, impulses and acts at the time of discharge  Outcome: Progressing  Goal: Refrain from harming self  Description: Interventions:  - Monitor patient closely, per order  - Develop a trusting relationship  - Supervise medication ingestion, monitor effects and side effects   Outcome: Progressing  Goal: Recognize maladaptive responses and adopt new coping mechanisms  Outcome: Progressing     Problem: Depression  Goal: Treatment Goal: Demonstrate behavioral control of depressive symptoms, verbalize feelings of improved mood/affect, and adopt new coping skills prior to discharge  Outcome: Progressing     Problem: Anxiety  Goal: Anxiety is at manageable level  Description: Interventions:  - Assess and monitor patient's anxiety level.   - Monitor for signs and symptoms (heart palpitations, chest pain, shortness of breath, headaches, nausea, feeling jumpy, restlessness, irritable, apprehensive).   - Collaborate with interdisciplinary team and initiate plan and interventions as ordered.  - [place] patient to unit/surroundings  - Explain treatment plan  - Encourage participation in care  - Encourage verbalization of concerns/fears  - Identify coping mechanisms  - Assist in developing anxiety-reducing skills  - Administer/offer alternative therapies  - Limit or eliminate stimulants  Outcome: Progressing     Problem: ANXIETY  Goal: Will report anxiety at manageable levels  Description: INTERVENTIONS:  - Administer medication as ordered  - Teach and encourage coping skills  - Provide emotional support  - Assess patient/family for anxiety and ability to cope  Outcome: Progressing     Problem: Depression  Goal: Refrain from isolation  Description: Interventions:  - Develop a trusting relationship   - Encourage socialization   Outcome: Not Progressing

## 2024-08-08 NOTE — PROGRESS NOTES
Progress Note - Behavioral Health   Deyvi Cao 54 y.o. male MRN: [id number]  Unit/Bed#: [facility] 112-02 Encounter: [id number]  Code Status: Level 1 - Full Code    Assessment & Plan   Principal Problem:    Bipolar disorder with severe depression (HCC)  Active Problems:    Benign essential hypertension    Mixed hyperlipidemia    Allergic rhinitis due to allergen    Medical clearance for psychiatric admission    Rosacea    Recommended Treatment:     Treatment plan, treatment progress and medication changes were reviewed with Nursing Staff, Pharmacy Service and Case Management in Treatment Team:  1.Continue with group therapy, milieu therapy and occupational therapy   2.Behavioral Health checks every 7 minutes   3.Continue frequent safety checks and vitals per unit protocol  4.Continue with SLIM medical management as indicated  5.Continue with current medication regimen for symptom management: Vraylar 3mg PO Daily, Atarax 25mg PO TID, Lamictal 50mg PO Daily, Zoloft 150mg PO QHS, Lamictal 50mg PO Daily   6.Disposition Planning: Discharge planning and efforts remain ongoing - Awaiting group home placement    Subjective:    Patient was seen today for continuation of care, records reviewed and patient was discussed with the morning case review team.    Deyvi was seen today for psychiatric follow-up.  On assessment today, Deyvi was present during his treatment team.  He is calm, pleasant, and cooperative.  We spoke about his overall progress throughout his treatment.  He continues to show goal directed behaviors with support from staff.  Deyvi reports adequate daytime energy and denies any difficulties with initiating or staying asleep.  Oral appetite and hydration is adequate.  We reviewed once more the specific as-needed medications they can use going forward if they experience any insomnia or destabilization of their mood, they understood and were agreeable. Milieu visibility and group attendance encouraged to  promote an active participation in treatment.    Deyvi denies acute suicidal/self-harm ideation/intent/plan upon direct inquiry at this time. Deyvi is able to contract for safety while on the unit and would feel comfortable seeking staff support should suicidal symptoms or urges appear or worsen. Deyvi remains behaviorally appropriate, no agitation or aggression noted on exam or in report. Deyvi also denies HI/AH/VH, and does not appear overtly manic.  Patient does not verbalize any experiences that can be categorized as paranoid, persecutory, bizarre, or somatic delusions. Deyvi remains adherent to his current psychotropic medication regimen and denies any side effects from medications, as well as none noted on exam.    Group Attendance: 3 / 9  Treatment Team: Today  Psychiatric PRN's Needed: None    Review of Systems:  Behavior over the last 24 hours: Unchanged  Sleep: sleeping okay throughout the night  Appetite: adequate  Medication side effects: none reported  ROS:no complaints, all other systems are negative    Objective:    Vitals:  Vitals:    08/08/24 0728   BP: 140/86   Pulse: 89   Resp: 18   Temp: 97.8 °F (36.6 °C)   SpO2: 96%     Laboratory Results:  I have personally reviewed all pertinent laboratory/tests results.  Most Recent Labs:   Lab Results   Component Value Date    WBC 7.39 06/26/2024    RBC 4.70 06/26/2024    HGB 15.2 06/26/2024    HCT 45.5 06/26/2024     06/26/2024    RDW 12.9 06/26/2024    NEUTROABS 4.63 06/26/2024    SODIUM 137 06/26/2024    K 4.1 06/26/2024     06/26/2024    CO2 26 06/26/2024    BUN 17 06/26/2024    CREATININE 0.63 06/26/2024    GLUC 98 06/26/2024    GLUF 98 06/26/2024    CALCIUM 9.6 06/26/2024    AST 25 06/26/2024    ALT 45 06/26/2024    ALKPHOS 114 (H) 06/26/2024    TP 7.0 06/26/2024    ALB 4.4 06/26/2024    TBILI 0.44 06/26/2024    CHOLESTEROL 177 06/26/2024    HDL 52 06/26/2024    TRIG 73 06/26/2024    LDLCALC 110 (H) 06/26/2024    NONHDLC 125  06/26/2024    LITHIUM 0.4 (L) 01/28/2021    [id number] 2.636 06/26/2024    HGBA1C 5.2 11/22/2023     11/22/2023     Mental Status Evaluation:  Appearance:  age appropriate, casually dressed   Behavior:  cooperative, calm   Speech:  normal volume, normal pitch   Mood:  less anxious, less depressed   Affect:  constricted   Thought Process:  goal directed   Associations: intact associations   Thought Content:  no overt delusions   Perceptual Disturbances: no auditory hallucinations, no visual hallucinations, denies when asked, does not appear responding to internal stimuli   Risk Potential: Suicidal ideation - None at present, contracts for safety on the unit, would talk to staff if not feeling safe on the unit  Homicidal ideation - None at present  Potential for aggression - Not at present   Sensorium:  oriented to person, place, and time/date   Memory:  recent memory intact   Consciousness:  alert and awake   Attention/Concentration: attention span and concentration appear shorter than expected for age   Insight:  fair and improving   Judgment: fair and improving   Gait/Station: normal gait/station, normal balance   Motor Activity: no abnormal movements     Progress Toward Goals: making gradual improvement.  Deyvi continues to require inpatient psychiatric hospitalization for continued medication management and titration to optimize symptom reduction, improve sleep hygiene, and demonstrate adequate self-care.     Suicide/Homicide Risk Assessment:  Risk of Harm to Self:   Nursing Suicide Risk Assessment Last 24 hours: C-SSRS Risk (Since Last Contact)  Calculated C-SSRS Risk Score (Since Last Contact): No Risk Indicated    Risk of Harm to Others:  Nursing Homicide Risk Assessment: Violence Risk to Others: Denies within past 6 months    Behavioral Health Medications: all current active meds have been reviewed and continue current psychiatric medications.  Current Facility-Administered Medications   Medication  Dose Route Frequency Provider Last Rate    acetaminophen  650 mg Oral Q6H PRN ALVINA Harley      acetaminophen  650 mg Oral Q4H PRN ALVINA Harley      acetaminophen  975 mg Oral Q6H PRN ALVINA Harley      aluminum-magnesium hydroxide-simethicone  30 mL Oral Q4H PRN ALVINA Harley      ammonium lactate   Topical BID PRN ALVINA Harley      atorvastatin  10 mg Oral Daily ALVINA Lewis      benztropine  1 mg Intramuscular Q4H PRN Max 6/day ALVINA Harley      benztropine  1 mg Oral Q4H PRN Max 6/day ALVINA Harley      bisacodyl  10 mg Rectal Daily PRN ALVINA Harley      cariprazine  3 mg Oral Daily Martin Cardenas MD      Cholecalciferol  2,000 Units Oral Daily ALVINA Lewis      cyanocobalamin  1,000 mcg Oral Daily ALVINA Lewis      hydrOXYzine HCL  25 mg Oral Q6H PRN Max 4/day ALVINA Harley      hydrOXYzine HCL  25 mg Oral TID Martin Cardenas MD      hydrOXYzine HCL  50 mg Oral Q4H PRN Max 4/day ALVINA Harley      Or    LORazepam  1 mg Intramuscular Q4H PRN ALVINA Harley      lamoTRIgine  50 mg Oral Daily Martin Cardenas MD      lisinopril  10 mg Oral Daily ALVINA Lewis      LORazepam  1 mg Oral Q4H PRN Max 6/day ALVINA Harley      Or    LORazepam  2 mg Intramuscular Q6H PRN Max 3/day ALVINA Harley      OLANZapine  10 mg Oral Q3H PRN Max 3/day ALVINA Harley      Or    OLANZapine  10 mg Intramuscular Q3H PRN Max 3/day ALVINA Harley      OLANZapine  5 mg Oral Q3H PRN Max 6/day ALVINA Harley      Or    OLANZapine  5 mg Intramuscular Q3H PRN Max 6/day ALVINA Harley      OLANZapine  2.5 mg Oral Q3H PRN Max 8/day ALVINA Harley      polyethylene glycol  17 g Oral Daily PRN ALVINA Harley      propranolol  10 mg Oral Q8H PRN ALVINA Harley      senna-docusate sodium  1 tablet Oral Daily PRN ALVINA Harley      sertraline  150 mg Oral HS Martin Cardenas MD       Risks /  Benefits of Treatment:  Risks, benefits, and possible side effects of medications explained to patient. Patient has limited understanding of risks and benefits of treatment at this time, but agrees to take medications as prescribed.    Counseling / Coordination of Care:  Total floor/unit time spent today 25 minutes. Greater than 50% of total time was spent with the patient and / or family counseling and / or coordination of care. A description of the counseling / coordination of care:   Patient's progress discussed with staff in treatment team meeting.  Medications, treatment progress and treatment plan reviewed with patient.   Educated on importance of medication and treatment compliance.  Reassurance and supportive therapy provided.   Encouraged participation in milieu and group therapy on the unit.    ALVINA Harley 08/08/24

## 2024-08-09 PROCEDURE — 99232 SBSQ HOSP IP/OBS MODERATE 35: CPT | Performed by: PSYCHIATRY & NEUROLOGY

## 2024-08-09 RX ADMIN — HYDROXYZINE HYDROCHLORIDE 25 MG: 25 TABLET ORAL at 08:39

## 2024-08-09 RX ADMIN — LAMOTRIGINE 50 MG: 25 TABLET ORAL at 08:38

## 2024-08-09 RX ADMIN — CARIPRAZINE 3 MG: 3 CAPSULE, GELATIN COATED ORAL at 08:40

## 2024-08-09 RX ADMIN — LISINOPRIL 10 MG: 10 TABLET ORAL at 08:40

## 2024-08-09 RX ADMIN — CHOLECALCIFEROL TAB 25 MCG (1000 UNIT) 2000 UNITS: 25 TAB at 08:39

## 2024-08-09 RX ADMIN — ATORVASTATIN CALCIUM 10 MG: 10 TABLET, FILM COATED ORAL at 08:39

## 2024-08-09 RX ADMIN — SERTRALINE HYDROCHLORIDE 150 MG: 100 TABLET ORAL at 21:48

## 2024-08-09 RX ADMIN — HYDROXYZINE HYDROCHLORIDE 25 MG: 25 TABLET ORAL at 17:13

## 2024-08-09 RX ADMIN — HYDROXYZINE HYDROCHLORIDE 25 MG: 25 TABLET ORAL at 21:48

## 2024-08-09 RX ADMIN — CYANOCOBALAMIN TAB 1000 MCG 1000 MCG: 1000 TAB at 08:42

## 2024-08-09 NOTE — NURSING NOTE
Pt brightens on approach. He is visible on unit with minimal interaction with peers. Denies all psych signs and symptoms. Compliant with medications and meals. Pt offers no complaints at this time. Will maintain q7 min checks.

## 2024-08-09 NOTE — PLAN OF CARE
Problem: Ineffective Coping  Goal: Identifies ineffective coping skills  Outcome: Not Progressing  Goal: Identifies healthy coping skills  Outcome: Not Progressing  Goal: Demonstrates healthy coping skills  Outcome: Not Progressing  Goal: Participates in unit activities  Description: Interventions:  - Provide therapeutic environment   - Provide required programming   - Redirect inappropriate behaviors   Outcome: Not Progressing    Attended 12/36 of the groups offered during the past 4 days.

## 2024-08-09 NOTE — PROGRESS NOTES
Progress Note - Behavioral Health   Deyvi Cao 54 y.o. male MRN: [id number]  Unit/Bed#: [facility] 112-02 Encounter: [id number]  Code Status: Level 1 - Full Code    Assessment & Plan   Principal Problem:    Bipolar disorder with severe depression (HCC)  Active Problems:    Benign essential hypertension    Mixed hyperlipidemia    Allergic rhinitis due to allergen    Medical clearance for psychiatric admission    Rosacea    Recommended Treatment:     Treatment plan, treatment progress and medication changes were reviewed with Nursing Staff, Pharmacy Service and Case Management in Treatment Team:  1.Continue with group therapy, milieu therapy and occupational therapy   2.Behavioral Health checks every 7 minutes   3.Continue frequent safety checks and vitals per unit protocol  4.Continue with SLIM medical management as indicated  5.Continue with current medication regimen for symptom management: Vraylar 3mg PO Daily, Atarax 25mg PO TID, Lamictal 50mg PO Daily, Zoloft 150mg PO QHS, Lamictal 50mg PO Daily   6.Disposition Planning: Discharge planning and efforts remain ongoing - Awaiting group home placement    Subjective:    Patient was seen today for continuation of care, records reviewed and patient was discussed with the morning case review team.    Deyvi was seen today for psychiatric follow-up.  On assessment today, Deyvi was found in his room.  He is calm and cooperative.  He reports mild depression and anxiety.  Deyvi reports adequate daytime energy and denies any difficulties with initiating or staying asleep.  Oral appetite and hydration is adequate.  Reports gradual improvement in his symptoms.  We reviewed once more the specific as-needed medications they can use going forward if they experience any insomnia or destabilization of their mood, they understood and were agreeable. Milieu visibility and group attendance encouraged to promote an active participation in treatment.    Deyvi denies acute  suicidal/self-harm ideation/intent/plan upon direct inquiry at this time. Deyvi is able to contract for safety while on the unit and would feel comfortable seeking staff support should suicidal symptoms or urges appear or worsen. Deyvi remains behaviorally appropriate, no agitation or aggression noted on exam or in report. Deyvi also denies HI/AH/VH, and does not appear overtly manic.  Patient does not verbalize any experiences that can be categorized as paranoid, persecutory, bizarre, or somatic delusions. Deyvi remains adherent to his current psychotropic medication regimen and denies any side effects from medications, as well as none noted on exam.    Review of Systems:  Behavior over the last 24 hours: Unchanged  Sleep: sleeping okay throughout the night  Appetite: adequate  Medication side effects: none reported  ROS:no complaints, all other systems are negative    Objective:    Vitals:  Vitals:    08/09/24 0749   BP: 137/79   Pulse: 94   Resp: 18   Temp: 98.7 °F (37.1 °C)   SpO2: 97%     Laboratory Results:  I have personally reviewed all pertinent laboratory/tests results.  Most Recent Labs:   Lab Results   Component Value Date    WBC 7.39 06/26/2024    RBC 4.70 06/26/2024    HGB 15.2 06/26/2024    HCT 45.5 06/26/2024     06/26/2024    RDW 12.9 06/26/2024    NEUTROABS 4.63 06/26/2024    SODIUM 137 06/26/2024    K 4.1 06/26/2024     06/26/2024    CO2 26 06/26/2024    BUN 17 06/26/2024    CREATININE 0.63 06/26/2024    GLUC 98 06/26/2024    GLUF 98 06/26/2024    CALCIUM 9.6 06/26/2024    AST 25 06/26/2024    ALT 45 06/26/2024    ALKPHOS 114 (H) 06/26/2024    TP 7.0 06/26/2024    ALB 4.4 06/26/2024    TBILI 0.44 06/26/2024    CHOLESTEROL 177 06/26/2024    HDL 52 06/26/2024    TRIG 73 06/26/2024    LDLCALC 110 (H) 06/26/2024    NONHDLC 125 06/26/2024    LITHIUM 0.4 (L) 01/28/2021    [id number] 2.636 06/26/2024    HGBA1C 5.2 11/22/2023     11/22/2023     Mental Status  Evaluation:  Appearance:  age appropriate, casually dressed   Behavior:  cooperative, calm   Speech:  normal volume, normal pitch   Mood:  less anxious, less depressed   Affect:  constricted   Thought Process:  goal directed   Associations: intact associations   Thought Content:  no overt delusions   Perceptual Disturbances: no auditory hallucinations, no visual hallucinations, denies when asked, does not appear responding to internal stimuli   Risk Potential: Suicidal ideation - None at present, contracts for safety on the unit, would talk to staff if not feeling safe on the unit  Homicidal ideation - None at present  Potential for aggression - Not at present   Sensorium:  oriented to person, place, and time/date   Memory:  recent memory intact   Consciousness:  alert and awake   Attention/Concentration: attention span and concentration appear shorter than expected for age   Insight:  fair and improving   Judgment: fair and improving   Gait/Station: normal gait/station, normal balance   Motor Activity: no abnormal movements     Progress Toward Goals: making gradual improvement.  Deyvi continues to require inpatient psychiatric hospitalization for continued medication management and titration to optimize symptom reduction, improve sleep hygiene, and demonstrate adequate self-care.     Suicide/Homicide Risk Assessment:  Risk of Harm to Self:   Nursing Suicide Risk Assessment Last 24 hours: C-SSRS Risk (Since Last Contact)  Calculated C-SSRS Risk Score (Since Last Contact): No Risk Indicated    Risk of Harm to Others:  Nursing Homicide Risk Assessment: Violence Risk to Others: Denies within past 6 months    Behavioral Health Medications: all current active meds have been reviewed and continue current psychiatric medications.  Current Facility-Administered Medications   Medication Dose Route Frequency Provider Last Rate    acetaminophen  650 mg Oral Q6H PRN ALVINA Harley      acetaminophen  650 mg Oral Q4H PRN  ALVINA Harley      acetaminophen  975 mg Oral Q6H PRN ALVINA Harley      aluminum-magnesium hydroxide-simethicone  30 mL Oral Q4H PRN ALVINA Harley      ammonium lactate   Topical BID PRN ALVINA Harley      atorvastatin  10 mg Oral Daily Sary LinkALVINA Thompson      benztropine  1 mg Intramuscular Q4H PRN Max 6/day ALVINA Harley      benztropine  1 mg Oral Q4H PRN Max 6/day ALVINA Harley      bisacodyl  10 mg Rectal Daily PRN ALVINA Harley      cariprazine  3 mg Oral Daily Martin Cardenas MD      Cholecalciferol  2,000 Units Oral Daily Sary LinkALVINA Thompson      cyanocobalamin  1,000 mcg Oral Daily SaryALVINA Starkey      hydrOXYzine HCL  25 mg Oral Q6H PRN Max 4/day ALVINA Harley      hydrOXYzine HCL  25 mg Oral TID Martin Cardenas MD      hydrOXYzine HCL  50 mg Oral Q4H PRN Max 4/day ALVINA Harley      Or    LORazepam  1 mg Intramuscular Q4H PRN ALVINA Harley      lamoTRIgine  50 mg Oral Daily Martin Cardenas MD      lisinopril  10 mg Oral Daily Sary ALVINA Gupta      LORazepam  1 mg Oral Q4H PRN Max 6/day ALVINA Harley      Or    LORazepam  2 mg Intramuscular Q6H PRN Max 3/day ALVINA Harley      OLANZapine  10 mg Oral Q3H PRN Max 3/day ALVINA Harley      Or    OLANZapine  10 mg Intramuscular Q3H PRN Max 3/day ALVINA Harley      OLANZapine  5 mg Oral Q3H PRN Max 6/day ALVINA Harley      Or    OLANZapine  5 mg Intramuscular Q3H PRN Max 6/day ALVINA Harley      OLANZapine  2.5 mg Oral Q3H PRN Max 8/day ALVINA Harley      polyethylene glycol  17 g Oral Daily PRN ALVINA Harley      propranolol  10 mg Oral Q8H PRN ALVINA Harley      senna-docusate sodium  1 tablet Oral Daily PRN ALVINA Harley      sertraline  150 mg Oral HS Martin Cardenas MD       Risks / Benefits of Treatment:  Risks, benefits, and possible side effects of medications explained to patient. Patient has limited understanding  of risks and benefits of treatment at this time, but agrees to take medications as prescribed.    Counseling / Coordination of Care:  Total floor/unit time spent today 25 minutes. Greater than 50% of total time was spent with the patient and / or family counseling and / or coordination of care. A description of the counseling / coordination of care:   Patient's progress discussed with staff in treatment team meeting.  Medications, treatment progress and treatment plan reviewed with patient.   Educated on importance of medication and treatment compliance.  Reassurance and supportive therapy provided.   Encouraged participation in milieu and group therapy on the unit.    ALVINA Harley 08/09/24

## 2024-08-09 NOTE — PROGRESS NOTES
08/09/24 0758   Team Meeting   Meeting Type Daily Rounds   Team Members Present   Team Members Present Physician;Nurse;;Other (Discipline and Name)   Patient/Family Present   Patient Present No   Patient's Family Present No     In attendance:  Dr. Alex Thomas, MD Dr. Jordan Holter, DO Rosalia Syed, LYNNE Garcia, [facility]  Mer Sales, [facility] [place]  Gris Arizmendi, M.S.    Groups: 4/10    Pt quiet, visible, cooperative. No bx issues noted. Enhanced CRR referral submitted to [facility]

## 2024-08-09 NOTE — NURSING NOTE
Visible on unit, it is also noted that he interacts very little with peers. Denies psychiatric symptoms. Denies psychiatric symptoms. Medication compliant. Eating 100% for meals. Pleasant cooperative.

## 2024-08-09 NOTE — PLAN OF CARE
Problem: Alteration in Thoughts and Perception  Goal: Treatment Goal: Gain control of psychotic behaviors/thinking, reduce/eliminate presenting symptoms and demonstrate improved reality functioning upon discharge  Outcome: Progressing  Goal: Refrain from acting on delusional thinking/internal stimuli  Description: Interventions:  - Monitor patient closely, per order   - Utilize least restrictive measures   - Set reasonable limits, give positive feedback for acceptable   - Administer medications as ordered and monitor of potential side effects  Outcome: Progressing  Goal: Agree to be compliant with medication regime, as prescribed and report medication side effects  Description: Interventions:  - Offer appropriate PRN medication and supervise ingestion; conduct AIMS, as needed   Outcome: Progressing  Goal: Attend and participate in unit activities, including therapeutic, recreational, and educational groups  Description: Interventions:  -Encourage Visitation and family involvement in care  Outcome: Progressing  Goal: Recognize dysfunctional thoughts, communicate reality-based thoughts at the time of discharge  Description: Interventions:  - Provide medication and psycho-education to assist patient in compliance and developing insight into his/her illness   Outcome: Progressing  Goal: Complete daily ADLs, including personal hygiene independently, as able  Description: Interventions:  - Observe, teach, and assist patient with ADLS  - Monitor and promote a balance of rest/activity, with adequate nutrition and elimination   Outcome: Progressing     Problem: Ineffective Coping  Goal: Identifies ineffective coping skills  Outcome: Progressing  Goal: Identifies healthy coping skills  Outcome: Progressing  Goal: Demonstrates healthy coping skills  Outcome: Progressing  Goal: Participates in unit activities  Description: Interventions:  - Provide therapeutic environment   - Provide required programming   - Redirect  inappropriate behaviors   Outcome: Progressing     Problem: Risk for Self Injury/Neglect  Goal: Treatment Goal: Remain safe during length of stay, learn and adopt new coping skills, and be free of self-injurious ideation, impulses and acts at the time of discharge  Outcome: Progressing  Goal: Refrain from harming self  Description: Interventions:  - Monitor patient closely, per order  - Develop a trusting relationship  - Supervise medication ingestion, monitor effects and side effects   Outcome: Progressing  Goal: Recognize maladaptive responses and adopt new coping mechanisms  Outcome: Progressing     Problem: Depression  Goal: Treatment Goal: Demonstrate behavioral control of depressive symptoms, verbalize feelings of improved mood/affect, and adopt new coping skills prior to discharge  Outcome: Progressing     Problem: Anxiety  Goal: Anxiety is at manageable level  Description: Interventions:  - Assess and monitor patient's anxiety level.   - Monitor for signs and symptoms (heart palpitations, chest pain, shortness of breath, headaches, nausea, feeling jumpy, restlessness, irritable, apprehensive).   - Collaborate with interdisciplinary team and initiate plan and interventions as ordered.  - [place] patient to unit/surroundings  - Explain treatment plan  - Encourage participation in care  - Encourage verbalization of concerns/fears  - Identify coping mechanisms  - Assist in developing anxiety-reducing skills  - Administer/offer alternative therapies  - Limit or eliminate stimulants  Outcome: Progressing     Problem: Alteration in Thoughts and Perception  Goal: Verbalize thoughts and feelings  Description: Interventions:  - Promote a nonjudgmental and trusting relationship with the patient through active listening and therapeutic communication  - Assess patient's level of functioning, behavior and potential for risk  - Engage patient in 1 on 1 interactions  - Encourage patient to express fears, feelings,  frustrations, and discuss symptoms    - [place] patient to reality, help patient recognize reality-based thinking   - Administer medications as ordered and assess for potential side effects  - Provide the patient education related to the signs and symptoms of the illness and desired effects of prescribed medications  Outcome: Not Progressing

## 2024-08-10 PROCEDURE — 99232 SBSQ HOSP IP/OBS MODERATE 35: CPT | Performed by: PSYCHIATRY & NEUROLOGY

## 2024-08-10 RX ADMIN — LAMOTRIGINE 50 MG: 25 TABLET ORAL at 08:18

## 2024-08-10 RX ADMIN — HYDROXYZINE HYDROCHLORIDE 25 MG: 25 TABLET ORAL at 16:49

## 2024-08-10 RX ADMIN — ATORVASTATIN CALCIUM 10 MG: 10 TABLET, FILM COATED ORAL at 08:18

## 2024-08-10 RX ADMIN — CARIPRAZINE 3 MG: 3 CAPSULE, GELATIN COATED ORAL at 08:18

## 2024-08-10 RX ADMIN — HYDROXYZINE HYDROCHLORIDE 25 MG: 25 TABLET ORAL at 21:15

## 2024-08-10 RX ADMIN — LISINOPRIL 10 MG: 10 TABLET ORAL at 08:18

## 2024-08-10 RX ADMIN — CYANOCOBALAMIN TAB 1000 MCG 1000 MCG: 1000 TAB at 08:18

## 2024-08-10 RX ADMIN — CHOLECALCIFEROL TAB 25 MCG (1000 UNIT) 2000 UNITS: 25 TAB at 08:18

## 2024-08-10 RX ADMIN — HYDROXYZINE HYDROCHLORIDE 25 MG: 25 TABLET ORAL at 08:18

## 2024-08-10 RX ADMIN — SERTRALINE HYDROCHLORIDE 150 MG: 100 TABLET ORAL at 21:15

## 2024-08-10 NOTE — NURSING NOTE
Alert, cooperative and visible intermittently. No SI or HI noted. Denies depression, anxiety and pain. Attended exercise group. Consumed 100% of breakfast and 100% of lunch. Took all medication without prompting. Maintained on safe precautions without incident. Will continue to monitor progress and recovery program.

## 2024-08-10 NOTE — NURSING NOTE
Pt visible on unit , very little interaction with peers. Does brighten with approach. Smiles and has hopes for the future. Denies psychiatric symptoms. No behavior issues. Compliant with meds.

## 2024-08-10 NOTE — PROGRESS NOTES
"Progress Note - Behavioral Health     Deyvi Cao 54 y.o. male MRN: [id number]  Unit/Bed#: [facility] 112-02 Encounter: [id number]      Deyvi Cao was seen for continuing care and reviewed with treatment team.  Pt was cooperative and smiled pleasantly on my approach earlier for interview.  He was scant in conversation but answered questions readily, reporting that he felt \"A little bit\" depressed \"Off and on.\"  His anxiety is the same-- rated 1 to 2/10.  He stated \"I'm ready to go\" (meaning discharge from the unit).  When asked, he had no future plans other than to take the next step, which is to complete his care in the [facility], after which he will be discharged to a [facility] versus an enhanced group home.  Pt denied any present SI, HI, hallucinations or paranoia.  He reported sleeping \"Pretty good\" and eating well, to which nursing concurred.       Per EMR and floor team, the Pt has been calm, quiet, and medication compliant on unit.  He is visible in the milieu but not very socially interactive.  He is attending some therapeutic groups with appropriate behavior among peers.    Sleep:Good  Appetite: Good   Medication side effects: None per Pt    ROS: No complaints per Pt    Labs/Tests: Reviewed    Mental Status Evaluation:  Appearance:  Dressed, clean, good eye contact   Behavior:  Calm, cooperative, pleasant, still a bit withdrawn   Speech:  Clear, normal rate and volume   Mood:  Anxious, Depressed   Affect:  Constricted   Thought Process:  Organized, Goal directed, Paucity of thought, but a bit more positive in general   Associations: Intact associations   Thought Content:  No verbalized delusions   Perceptual Disturbances: Pt denies any hallucinations or paranoia and does not appear to be responding to internal stimuli   Risk Potential: Pt presently denies SI or HI    Sensorium:  Self, birthday, Place, Day of the week, Month, Year   Memory:  short term memory grossly intact   Consciousness:  alert, awake   Attention: " Good   Insight:  Fair   Judgment: Good   Gait/Station: Normal gait/station   Motor Activity: No abnormal movements     Vitals:    08/09/24 0749 08/09/24 1452 08/10/24 0732 08/10/24 1520   BP: 137/79 129/64 134/77 137/74   BP Location: Right arm Right arm Right arm Left arm   Pulse: 94 95 93 98   Resp: 18 18 18 18   Temp: 98.7 °F (37.1 °C) 97.8 °F (36.6 °C) 98.1 °F (36.7 °C) 97.8 °F (36.6 °C)   TempSrc: Temporal Skin Skin Temporal   SpO2: 97% 98% 98% 90%   Weight:       Height:           Admission on 06/25/2024   Component Date Value    TSH 3RD GENERATON 06/26/2024 2.636     Cholesterol 06/26/2024 177     Triglycerides 06/26/2024 73     HDL, Direct 06/26/2024 52     LDL Calculated 06/26/2024 110 (H)     Non-HDL-Chol (CHOL-HDL) 06/26/2024 125     Vitamin B-12 06/26/2024 367     Folate 06/26/2024 13.0     Vit D, 25-Hydroxy 06/26/2024 19.2 (L)     Sodium 06/26/2024 137     Potassium 06/26/2024 4.1     Chloride 06/26/2024 101     CO2 06/26/2024 26     ANION GAP 06/26/2024 10     BUN 06/26/2024 17     Creatinine 06/26/2024 0.63     Glucose 06/26/2024 98     Glucose, Fasting 06/26/2024 98     Calcium 06/26/2024 9.6     AST 06/26/2024 25     ALT 06/26/2024 45     Alkaline Phosphatase 06/26/2024 114 (H)     Total Protein 06/26/2024 7.0     Albumin 06/26/2024 4.4     Total Bilirubin 06/26/2024 0.44     eGFR 06/26/2024 110     WBC 06/26/2024 7.39     RBC 06/26/2024 4.70     Hemoglobin 06/26/2024 15.2     Hematocrit 06/26/2024 45.5     MCV 06/26/2024 97     MCH 06/26/2024 32.3     MCHC 06/26/2024 33.4     RDW 06/26/2024 12.9     MPV 06/26/2024 8.9     Platelets 06/26/2024 246     nRBC 06/26/2024 0     Segmented % 06/26/2024 64     Immature Grans % 06/26/2024 0     Lymphocytes % 06/26/2024 25     Monocytes % 06/26/2024 7     Eosinophils Relative 06/26/2024 3     Basophils Relative 06/26/2024 1     Absolute Neutrophils 06/26/2024 4.63     Absolute Immature Grans 06/26/2024 0.03     Absolute Lymphocytes 06/26/2024 1.88      Absolute Monocytes 06/26/2024 0.55     Eosinophils Absolute 06/26/2024 0.22     Basophils Absolute 06/26/2024 0.08     Ventricular Rate 06/26/2024 75     Atrial Rate 06/26/2024 75     SD Interval 06/26/2024 176     QRSD Interval 06/26/2024 104     QT Interval 06/26/2024 396     QTC Interval 06/26/2024 442     P Axis 06/26/2024 76     QRS Axis 06/26/2024 73     T Wave Axis 06/26/2024 69        Progress Toward Goals:  Based on today's interview and review of prior notes, Pt is gradually improving.  Continue the present medication regimen    Assessment & Plan   Principal Problem:    Bipolar disorder with severe depression (HCC)  Active Problems:    Benign essential hypertension    Mixed hyperlipidemia    Allergic rhinitis due to allergen    Medical clearance for psychiatric admission    Rosacea      Recommended Treatment: Continue with pharmacotherapy, group therapy, milieu therapy and occupational therapy.  The patient will be maintained on the following medications:  Current Facility-Administered Medications   Medication Dose Route Frequency Provider Last Rate    acetaminophen  650 mg Oral Q6H PRN ALVINA Harley      acetaminophen  650 mg Oral Q4H PRN ALVINA Harley      acetaminophen  975 mg Oral Q6H PRN ALVINA Harley      aluminum-magnesium hydroxide-simethicone  30 mL Oral Q4H PRN ALVINA Harley      ammonium lactate   Topical BID PRN ALVINA Harley      atorvastatin  10 mg Oral Daily ALVINA Lewis      benztropine  1 mg Intramuscular Q4H PRN Max 6/day ALVINA Harley      benztropine  1 mg Oral Q4H PRN Max 6/day ALVINA Harley      bisacodyl  10 mg Rectal Daily PRN ALVINA Harley      cariprazine  3 mg Oral Daily Martin Cardenas MD      Cholecalciferol  2,000 Units Oral Daily ALVINA Lewis      cyanocobalamin  1,000 mcg Oral Daily ALVINA Lewis      hydrOXYzine HCL  25 mg Oral Q6H PRN Max 4/day ALVINA Harley      hydrOXYzine HCL  25  mg Oral TID Martin Cardenas MD      hydrOXYzine HCL  50 mg Oral Q4H PRN Max 4/day ALVINA Harley      Or    LORazepam  1 mg Intramuscular Q4H PRN ALVINA Harley      lamoTRIgine  50 mg Oral Daily Martin Cardenas MD      lisinopril  10 mg Oral Daily Sary LinkALVINA Thompson      LORazepam  1 mg Oral Q4H PRN Max 6/day ALVINA Harley      Or    LORazepam  2 mg Intramuscular Q6H PRN Max 3/day ALVINA Harley      OLANZapine  10 mg Oral Q3H PRN Max 3/day ALVINA Harley      Or    OLANZapine  10 mg Intramuscular Q3H PRN Max 3/day ALVINA Harley      OLANZapine  5 mg Oral Q3H PRN Max 6/day ALVINA Harley      Or    OLANZapine  5 mg Intramuscular Q3H PRN Max 6/day ALVINA Harley      OLANZapine  2.5 mg Oral Q3H PRN Max 8/day ALVINA Harley      polyethylene glycol  17 g Oral Daily PRN ALVINA Harley      propranolol  10 mg Oral Q8H PRN ALVINA Harley      senna-docusate sodium  1 tablet Oral Daily PRN ALVINA Harley      sertraline  150 mg Oral HS Martin Cardenas MD         Risks, benefits and possible side effects of Medications:   Risks, benefits, and possible side effects of medications explained to patient and patient verbalizes understanding

## 2024-08-10 NOTE — PLAN OF CARE
Problem: Alteration in Thoughts and Perception  Goal: Treatment Goal: Gain control of psychotic behaviors/thinking, reduce/eliminate presenting symptoms and demonstrate improved reality functioning upon discharge  Outcome: Progressing  Goal: Verbalize thoughts and feelings  Description: Interventions:  - Promote a nonjudgmental and trusting relationship with the patient through active listening and therapeutic communication  - Assess patient's level of functioning, behavior and potential for risk  - Engage patient in 1 on 1 interactions  - Encourage patient to express fears, feelings, frustrations, and discuss symptoms    - [place] patient to reality, help patient recognize reality-based thinking   - Administer medications as ordered and assess for potential side effects  - Provide the patient education related to the signs and symptoms of the illness and desired effects of prescribed medications  Outcome: Progressing  Goal: Refrain from acting on delusional thinking/internal stimuli  Description: Interventions:  - Monitor patient closely, per order   - Utilize least restrictive measures   - Set reasonable limits, give positive feedback for acceptable   - Administer medications as ordered and monitor of potential side effects  Outcome: Progressing  Goal: Agree to be compliant with medication regime, as prescribed and report medication side effects  Description: Interventions:  - Offer appropriate PRN medication and supervise ingestion; conduct AIMS, as needed   Outcome: Progressing  Goal: Attend and participate in unit activities, including therapeutic, recreational, and educational groups  Description: Interventions:  -Encourage Visitation and family involvement in care  Outcome: Progressing  Goal: Recognize dysfunctional thoughts, communicate reality-based thoughts at the time of discharge  Description: Interventions:  - Provide medication and psycho-education to assist patient in compliance and developing  insight into his/her illness   Outcome: Progressing  Goal: Complete daily ADLs, including personal hygiene independently, as able  Description: Interventions:  - Observe, teach, and assist patient with ADLS  - Monitor and promote a balance of rest/activity, with adequate nutrition and elimination   Outcome: Progressing     Problem: Ineffective Coping  Goal: Identifies ineffective coping skills  Outcome: Progressing  Goal: Identifies healthy coping skills  Outcome: Progressing  Goal: Demonstrates healthy coping skills  Outcome: Progressing  Goal: Participates in unit activities  Description: Interventions:  - Provide therapeutic environment   - Provide required programming   - Redirect inappropriate behaviors   Outcome: Progressing  Goal: Patient/Family participate in treatment and DC plans  Description: Interventions:  - Provide therapeutic environment  Outcome: Progressing  Goal: Patient/Family verbalizes awareness of resources  Outcome: Progressing  Goal: Understands least restrictive measures  Description: Interventions:  - Utilize least restrictive behavior  Outcome: Progressing  Goal: Free from restraint events  Description: - Utilize least restrictive measures   - Provide behavioral interventions   - Redirect inappropriate behaviors   Outcome: Progressing     Problem: Risk for Self Injury/Neglect  Goal: Treatment Goal: Remain safe during length of stay, learn and adopt new coping skills, and be free of self-injurious ideation, impulses and acts at the time of discharge  Outcome: Progressing  Goal: Verbalize thoughts and feelings  Description: Interventions:  - Assess and re-assess patient's lethality and potential for self-injury  - Engage patient in 1:1 interactions, daily, for a minimum of 15 minutes  - Encourage patient to express feelings, fears, frustrations, hopes  - Establish rapport/trust with patient   Outcome: Progressing  Goal: Refrain from harming self  Description: Interventions:  - Monitor  patient closely, per order  - Develop a trusting relationship  - Supervise medication ingestion, monitor effects and side effects   Outcome: Progressing  Goal: Attend and participate in unit activities, including therapeutic, recreational, and educational groups  Description: Interventions:  - Provide therapeutic and educational activities daily, encourage attendance and participation, and document same in the medical record  - Obtain collateral information, encourage visitation and family involvement in care   Outcome: Progressing  Goal: Recognize maladaptive responses and adopt new coping mechanisms  Outcome: Progressing  Goal: Complete daily ADLs, including personal hygiene independently, as able  Description: Interventions:  - Observe, teach, and assist patient with ADLS  - Monitor and promote a balance of rest/activity, with adequate nutrition and elimination  Outcome: Progressing     Problem: Depression  Goal: Treatment Goal: Demonstrate behavioral control of depressive symptoms, verbalize feelings of improved mood/affect, and adopt new coping skills prior to discharge  Outcome: Progressing  Goal: Verbalize thoughts and feelings  Description: Interventions:  - Assess and re-assess patient's level of risk   - Engage patient in 1:1 interactions, daily, for a minimum of 15 minutes   - Encourage patient to express feelings, fears, frustrations, hopes   Outcome: Progressing  Goal: Refrain from harming self  Description: Interventions:  - Monitor patient closely, per order   - Supervise medication ingestion, monitor effects and side effects   Outcome: Progressing  Goal: Refrain from isolation  Description: Interventions:  - Develop a trusting relationship   - Encourage socialization   Outcome: Progressing  Goal: Refrain from self-neglect  Outcome: Progressing  Goal: Attend and participate in unit activities, including therapeutic, recreational, and educational groups  Description: Interventions:  - Provide  therapeutic and educational activities daily, encourage attendance and participation, and document same in the medical record   Outcome: Progressing  Goal: Complete daily ADLs, including personal hygiene independently, as able  Description: Interventions:  - Observe, teach, and assist patient with ADLS  -  Monitor and promote a balance of rest/activity, with adequate nutrition and elimination   Outcome: Progressing     Problem: Anxiety  Goal: Anxiety is at manageable level  Description: Interventions:  - Assess and monitor patient's anxiety level.   - Monitor for signs and symptoms (heart palpitations, chest pain, shortness of breath, headaches, nausea, feeling jumpy, restlessness, irritable, apprehensive).   - Collaborate with interdisciplinary team and initiate plan and interventions as ordered.  - [place] patient to unit/surroundings  - Explain treatment plan  - Encourage participation in care  - Encourage verbalization of concerns/fears  - Identify coping mechanisms  - Assist in developing anxiety-reducing skills  - Administer/offer alternative therapies  - Limit or eliminate stimulants  Outcome: Progressing     Problem: Risk for Violence/Aggression Toward Others  Goal: Treatment Goal: Refrain from acts of violence/aggression during length of stay, and demonstrate improved impulse control at the time of discharge  Outcome: Progressing  Goal: Verbalize thoughts and feelings  Description: Interventions:  - Assess and re-assess patient's level of risk, every waking shift  - Engage patient in 1:1 interactions, daily, for a minimum of 15 minutes   - Allow patient to express feelings and frustrations in a safe and non-threatening manner   - Establish rapport/trust with patient   Outcome: Progressing  Goal: Refrain from harming others  Outcome: Progressing  Goal: Refrain from destructive acts on the environment or property  Outcome: Progressing  Goal: Control angry outbursts  Description: Interventions:  - Monitor  patient closely, per order  - Ensure early verbal de-escalation  - Monitor prn medication needs  - Set reasonable/therapeutic limits, outline behavioral expectations, and consequences   - Provide a non-threatening milieu, utilizing the least restrictive interventions   Outcome: Progressing  Goal: Attend and participate in unit activities, including therapeutic, recreational, and educational groups  Description: Interventions:  - Provide therapeutic and educational activities daily, encourage attendance and participation, and document same in the medical record   Outcome: Progressing  Goal: Identify appropriate positive anger management techniques  Description: Interventions:  - Offer anger management and coping skills groups   - Staff will provide positive feedback for appropriate anger control  Outcome: Progressing     Problem: Alteration in Orientation  Goal: Treatment Goal: Demonstrate a reduction of confusion and improved orientation to person, place, time and/or situation upon discharge, according to optimum baseline/ability  Outcome: Progressing  Goal: Interact with staff daily  Description: Interventions:  - Assess and re-assess patient's level of orientation  - Engage patient in 1 on 1 interactions, daily, for a minimum of 15 minutes   - Establish rapport/trust with patient   Outcome: Progressing  Goal: Express concerns related to confused thinking related to:  Description: Interventions:  - Encourage patient to express feelings, fears, frustrations, hopes  - Assign consistent caregivers   - [place]/re-orient patient as needed  - Allow comfort items, as appropriate  - Provide visual cues, signs, etc.   Outcome: Progressing  Goal: Allow medical examinations, as recommended  Description: Interventions:  - Provide physical/neurological exams and/or referrals, per provider   Outcome: Progressing  Goal: Cooperate with recommended testing/procedures  Description: Interventions:  - Determine need for ancillary  testing  - Observe for mental status changes  - Implement falls/precaution protocol   Outcome: Progressing  Goal: Attend and participate in unit activities, including therapeutic, recreational, and educational groups  Description: Interventions:  - Provide therapeutic and educational activities daily, encourage attendance and participation, and document same in the medical record   - Provide appropriate opportunities for reminiscence   - Provide a consistent daily routine   - Encourage family contact/visitation   Outcome: Progressing  Goal: Complete daily ADLs, including personal hygiene independently, as able  Description: Interventions:  - Observe, teach, and assist patient with ADLS  Outcome: Progressing     Problem: SELF HARM/SUICIDALITY  Goal: Will have no self-injury during hospital stay  Description: INTERVENTIONS:  - Q 15 MINUTES: Routine safety checks  - Q WAKING SHIFT & PRN: Assess risk to determine if routine checks are adequate to maintain patient safety  - Encourage patient to participate actively in care by formulating a plan to combat response to suicidal ideation, identify supports and resources  Outcome: Progressing     Problem: DEPRESSION  Goal: Will be euthymic at discharge  Description: INTERVENTIONS:  - Administer medication as ordered  - Provide emotional support via 1:1 interaction with staff  - Encourage involvement in milieu/groups/activities  - Monitor for social isolation  Outcome: Progressing     Problem: ANXIETY  Goal: Will report anxiety at manageable levels  Description: INTERVENTIONS:  - Administer medication as ordered  - Teach and encourage coping skills  - Provide emotional support  - Assess patient/family for anxiety and ability to cope  Outcome: Progressing  Goal: By discharge: Patient will verbalize 2 strategies to deal with anxiety  Description: Interventions:  - Identify any obvious source/trigger to anxiety  - Staff will assist patient in applying identified coping  technique/skills  - Encourage attendance of scheduled groups and activities  Outcome: Progressing     Problem: SELF CARE DEFICIT  Goal: Return ADL status to a safe level of function  Description: INTERVENTIONS:  - Administer medication as ordered  - Assess ADL deficits and provide assistive devices as needed  - Obtain PT/OT consults as needed  - Assist and instruct patient to increase activity and self care as tolerated  Outcome: Progressing     Problem: DISCHARGE PLANNING - CARE MANAGEMENT  Goal: Discharge to post-acute care or home with appropriate resources  Description: INTERVENTIONS:  - Conduct assessment to determine patient/family and health care team treatment goals, and need for post-acute services based on payer coverage, community resources, and patient preferences, and barriers to discharge  - Address psychosocial, clinical, and financial barriers to discharge as identified in assessment in conjunction with the patient/family and health care team  - Arrange appropriate level of post-acute services according to patient’s   needs and preference and payer coverage in collaboration with the physician and health care team  - Communicate with and update the patient/family, physician, and health care team regarding progress on the discharge plan  - Arrange appropriate transportation to post-acute venues  Outcome: Progressing

## 2024-08-11 PROCEDURE — 99232 SBSQ HOSP IP/OBS MODERATE 35: CPT | Performed by: PSYCHIATRY & NEUROLOGY

## 2024-08-11 RX ADMIN — LISINOPRIL 10 MG: 10 TABLET ORAL at 08:18

## 2024-08-11 RX ADMIN — LAMOTRIGINE 50 MG: 25 TABLET ORAL at 08:19

## 2024-08-11 RX ADMIN — SERTRALINE HYDROCHLORIDE 150 MG: 100 TABLET ORAL at 21:24

## 2024-08-11 RX ADMIN — HYDROXYZINE HYDROCHLORIDE 25 MG: 25 TABLET ORAL at 17:08

## 2024-08-11 RX ADMIN — HYDROXYZINE HYDROCHLORIDE 25 MG: 25 TABLET ORAL at 21:23

## 2024-08-11 RX ADMIN — CHOLECALCIFEROL TAB 25 MCG (1000 UNIT) 2000 UNITS: 25 TAB at 08:18

## 2024-08-11 RX ADMIN — ATORVASTATIN CALCIUM 10 MG: 10 TABLET, FILM COATED ORAL at 08:18

## 2024-08-11 RX ADMIN — CARIPRAZINE 3 MG: 3 CAPSULE, GELATIN COATED ORAL at 08:18

## 2024-08-11 RX ADMIN — CYANOCOBALAMIN TAB 1000 MCG 1000 MCG: 1000 TAB at 08:18

## 2024-08-11 RX ADMIN — HYDROXYZINE HYDROCHLORIDE 25 MG: 25 TABLET ORAL at 08:18

## 2024-08-11 NOTE — NURSING NOTE
Alert, cooperative and visible intermittently. Isolative to self. Consumed 100% of dinner. Took all medication without prompting. Maintained on safe precautions without incident.

## 2024-08-11 NOTE — NURSING NOTE
Patient quiet and keeps to himself. Patient visible all evening. Medication compliant. Behaviors controlled and appropriate. Uneventful shift noted.  Behaviors were controlled and appropriate. Offered no complaints. No prn medication requested or required.

## 2024-08-11 NOTE — NURSING NOTE
Alert, cooperative and visible intermittently. Isolative to self. No SI or HI noted. Denies depression, anxiety and pain. Attended coping skills. Consumed 100% of breakfast and 100% of lunch. Took all medication without prompting. Maintained on safe precautions without incident. Will continue to monitor progress and recovery program.

## 2024-08-11 NOTE — NURSING NOTE
Weekly wellness assessment completed. Pt lung sounds clear in all lung fields. No edema noted. Bowel sounds +x4. B/l pedal pulses papable. Skin intact, warm and color within normal limits for patient.

## 2024-08-11 NOTE — PLAN OF CARE
Problem: Alteration in Thoughts and Perception  Goal: Treatment Goal: Gain control of psychotic behaviors/thinking, reduce/eliminate presenting symptoms and demonstrate improved reality functioning upon discharge  Outcome: Progressing  Goal: Verbalize thoughts and feelings  Description: Interventions:  - Promote a nonjudgmental and trusting relationship with the patient through active listening and therapeutic communication  - Assess patient's level of functioning, behavior and potential for risk  - Engage patient in 1 on 1 interactions  - Encourage patient to express fears, feelings, frustrations, and discuss symptoms    - [place] patient to reality, help patient recognize reality-based thinking   - Administer medications as ordered and assess for potential side effects  - Provide the patient education related to the signs and symptoms of the illness and desired effects of prescribed medications  Outcome: Progressing  Goal: Refrain from acting on delusional thinking/internal stimuli  Description: Interventions:  - Monitor patient closely, per order   - Utilize least restrictive measures   - Set reasonable limits, give positive feedback for acceptable   - Administer medications as ordered and monitor of potential side effects  Outcome: Progressing  Goal: Agree to be compliant with medication regime, as prescribed and report medication side effects  Description: Interventions:  - Offer appropriate PRN medication and supervise ingestion; conduct AIMS, as needed   Outcome: Progressing  Goal: Attend and participate in unit activities, including therapeutic, recreational, and educational groups  Description: Interventions:  -Encourage Visitation and family involvement in care  Outcome: Progressing  Goal: Recognize dysfunctional thoughts, communicate reality-based thoughts at the time of discharge  Description: Interventions:  - Provide medication and psycho-education to assist patient in compliance and developing  insight into his/her illness   Outcome: Progressing  Goal: Complete daily ADLs, including personal hygiene independently, as able  Description: Interventions:  - Observe, teach, and assist patient with ADLS  - Monitor and promote a balance of rest/activity, with adequate nutrition and elimination   Outcome: Progressing     Problem: Ineffective Coping  Goal: Identifies ineffective coping skills  Outcome: Progressing  Goal: Identifies healthy coping skills  Outcome: Progressing  Goal: Demonstrates healthy coping skills  Outcome: Progressing  Goal: Participates in unit activities  Description: Interventions:  - Provide therapeutic environment   - Provide required programming   - Redirect inappropriate behaviors   Outcome: Progressing  Goal: Patient/Family participate in treatment and DC plans  Description: Interventions:  - Provide therapeutic environment  Outcome: Progressing  Goal: Patient/Family verbalizes awareness of resources  Outcome: Progressing  Goal: Understands least restrictive measures  Description: Interventions:  - Utilize least restrictive behavior  Outcome: Progressing  Goal: Free from restraint events  Description: - Utilize least restrictive measures   - Provide behavioral interventions   - Redirect inappropriate behaviors   Outcome: Progressing     Problem: Risk for Self Injury/Neglect  Goal: Treatment Goal: Remain safe during length of stay, learn and adopt new coping skills, and be free of self-injurious ideation, impulses and acts at the time of discharge  Outcome: Progressing  Goal: Verbalize thoughts and feelings  Description: Interventions:  - Assess and re-assess patient's lethality and potential for self-injury  - Engage patient in 1:1 interactions, daily, for a minimum of 15 minutes  - Encourage patient to express feelings, fears, frustrations, hopes  - Establish rapport/trust with patient   Outcome: Progressing  Goal: Refrain from harming self  Description: Interventions:  - Monitor  patient closely, per order  - Develop a trusting relationship  - Supervise medication ingestion, monitor effects and side effects   Outcome: Progressing  Goal: Attend and participate in unit activities, including therapeutic, recreational, and educational groups  Description: Interventions:  - Provide therapeutic and educational activities daily, encourage attendance and participation, and document same in the medical record  - Obtain collateral information, encourage visitation and family involvement in care   Outcome: Progressing  Goal: Recognize maladaptive responses and adopt new coping mechanisms  Outcome: Progressing  Goal: Complete daily ADLs, including personal hygiene independently, as able  Description: Interventions:  - Observe, teach, and assist patient with ADLS  - Monitor and promote a balance of rest/activity, with adequate nutrition and elimination  Outcome: Progressing     Problem: Depression  Goal: Treatment Goal: Demonstrate behavioral control of depressive symptoms, verbalize feelings of improved mood/affect, and adopt new coping skills prior to discharge  Outcome: Progressing  Goal: Verbalize thoughts and feelings  Description: Interventions:  - Assess and re-assess patient's level of risk   - Engage patient in 1:1 interactions, daily, for a minimum of 15 minutes   - Encourage patient to express feelings, fears, frustrations, hopes   Outcome: Progressing  Goal: Refrain from harming self  Description: Interventions:  - Monitor patient closely, per order   - Supervise medication ingestion, monitor effects and side effects   Outcome: Progressing  Goal: Refrain from isolation  Description: Interventions:  - Develop a trusting relationship   - Encourage socialization   Outcome: Progressing  Goal: Refrain from self-neglect  Outcome: Progressing  Goal: Attend and participate in unit activities, including therapeutic, recreational, and educational groups  Description: Interventions:  - Provide  therapeutic and educational activities daily, encourage attendance and participation, and document same in the medical record   Outcome: Progressing  Goal: Complete daily ADLs, including personal hygiene independently, as able  Description: Interventions:  - Observe, teach, and assist patient with ADLS  -  Monitor and promote a balance of rest/activity, with adequate nutrition and elimination   Outcome: Progressing     Problem: Anxiety  Goal: Anxiety is at manageable level  Description: Interventions:  - Assess and monitor patient's anxiety level.   - Monitor for signs and symptoms (heart palpitations, chest pain, shortness of breath, headaches, nausea, feeling jumpy, restlessness, irritable, apprehensive).   - Collaborate with interdisciplinary team and initiate plan and interventions as ordered.  - [place] patient to unit/surroundings  - Explain treatment plan  - Encourage participation in care  - Encourage verbalization of concerns/fears  - Identify coping mechanisms  - Assist in developing anxiety-reducing skills  - Administer/offer alternative therapies  - Limit or eliminate stimulants  Outcome: Progressing     Problem: Risk for Violence/Aggression Toward Others  Goal: Treatment Goal: Refrain from acts of violence/aggression during length of stay, and demonstrate improved impulse control at the time of discharge  Outcome: Progressing  Goal: Verbalize thoughts and feelings  Description: Interventions:  - Assess and re-assess patient's level of risk, every waking shift  - Engage patient in 1:1 interactions, daily, for a minimum of 15 minutes   - Allow patient to express feelings and frustrations in a safe and non-threatening manner   - Establish rapport/trust with patient   Outcome: Progressing  Goal: Refrain from harming others  Outcome: Progressing  Goal: Refrain from destructive acts on the environment or property  Outcome: Progressing  Goal: Control angry outbursts  Description: Interventions:  - Monitor  patient closely, per order  - Ensure early verbal de-escalation  - Monitor prn medication needs  - Set reasonable/therapeutic limits, outline behavioral expectations, and consequences   - Provide a non-threatening milieu, utilizing the least restrictive interventions   Outcome: Progressing  Goal: Attend and participate in unit activities, including therapeutic, recreational, and educational groups  Description: Interventions:  - Provide therapeutic and educational activities daily, encourage attendance and participation, and document same in the medical record   Outcome: Progressing  Goal: Identify appropriate positive anger management techniques  Description: Interventions:  - Offer anger management and coping skills groups   - Staff will provide positive feedback for appropriate anger control  Outcome: Progressing     Problem: Alteration in Orientation  Goal: Treatment Goal: Demonstrate a reduction of confusion and improved orientation to person, place, time and/or situation upon discharge, according to optimum baseline/ability  Outcome: Progressing  Goal: Interact with staff daily  Description: Interventions:  - Assess and re-assess patient's level of orientation  - Engage patient in 1 on 1 interactions, daily, for a minimum of 15 minutes   - Establish rapport/trust with patient   Outcome: Progressing  Goal: Express concerns related to confused thinking related to:  Description: Interventions:  - Encourage patient to express feelings, fears, frustrations, hopes  - Assign consistent caregivers   - Golden/re-orient patient as needed  - Allow comfort items, as appropriate  - Provide visual cues, signs, etc.   Outcome: Progressing  Goal: Allow medical examinations, as recommended  Description: Interventions:  - Provide physical/neurological exams and/or referrals, per provider   Outcome: Progressing  Goal: Cooperate with recommended testing/procedures  Description: Interventions:  - Determine need for ancillary  testing  - Observe for mental status changes  - Implement falls/precaution protocol   Outcome: Progressing  Goal: Attend and participate in unit activities, including therapeutic, recreational, and educational groups  Description: Interventions:  - Provide therapeutic and educational activities daily, encourage attendance and participation, and document same in the medical record   - Provide appropriate opportunities for reminiscence   - Provide a consistent daily routine   - Encourage family contact/visitation   Outcome: Progressing  Goal: Complete daily ADLs, including personal hygiene independently, as able  Description: Interventions:  - Observe, teach, and assist patient with ADLS  Outcome: Progressing     Problem: SELF HARM/SUICIDALITY  Goal: Will have no self-injury during hospital stay  Description: INTERVENTIONS:  - Q 15 MINUTES: Routine safety checks  - Q WAKING SHIFT & PRN: Assess risk to determine if routine checks are adequate to maintain patient safety  - Encourage patient to participate actively in care by formulating a plan to combat response to suicidal ideation, identify supports and resources  Outcome: Progressing     Problem: DEPRESSION  Goal: Will be euthymic at discharge  Description: INTERVENTIONS:  - Administer medication as ordered  - Provide emotional support via 1:1 interaction with staff  - Encourage involvement in milieu/groups/activities  - Monitor for social isolation  Outcome: Progressing     Problem: ANXIETY  Goal: Will report anxiety at manageable levels  Description: INTERVENTIONS:  - Administer medication as ordered  - Teach and encourage coping skills  - Provide emotional support  - Assess patient/family for anxiety and ability to cope  Outcome: Progressing  Goal: By discharge: Patient will verbalize 2 strategies to deal with anxiety  Description: Interventions:  - Identify any obvious source/trigger to anxiety  - Staff will assist patient in applying identified coping  technique/skills  - Encourage attendance of scheduled groups and activities  Outcome: Progressing     Problem: SELF CARE DEFICIT  Goal: Return ADL status to a safe level of function  Description: INTERVENTIONS:  - Administer medication as ordered  - Assess ADL deficits and provide assistive devices as needed  - Obtain PT/OT consults as needed  - Assist and instruct patient to increase activity and self care as tolerated  Outcome: Progressing     Problem: DISCHARGE PLANNING - CARE MANAGEMENT  Goal: Discharge to post-acute care or home with appropriate resources  Description: INTERVENTIONS:  - Conduct assessment to determine patient/family and health care team treatment goals, and need for post-acute services based on payer coverage, community resources, and patient preferences, and barriers to discharge  - Address psychosocial, clinical, and financial barriers to discharge as identified in assessment in conjunction with the patient/family and health care team  - Arrange appropriate level of post-acute services according to patient’s   needs and preference and payer coverage in collaboration with the physician and health care team  - Communicate with and update the patient/family, physician, and health care team regarding progress on the discharge plan  - Arrange appropriate transportation to post-acute venues  Outcome: Progressing

## 2024-08-12 PROCEDURE — 99232 SBSQ HOSP IP/OBS MODERATE 35: CPT

## 2024-08-12 RX ADMIN — HYDROXYZINE HYDROCHLORIDE 25 MG: 25 TABLET ORAL at 08:23

## 2024-08-12 RX ADMIN — CARIPRAZINE 3 MG: 3 CAPSULE, GELATIN COATED ORAL at 08:22

## 2024-08-12 RX ADMIN — CYANOCOBALAMIN TAB 1000 MCG 1000 MCG: 1000 TAB at 08:23

## 2024-08-12 RX ADMIN — ATORVASTATIN CALCIUM 10 MG: 10 TABLET, FILM COATED ORAL at 08:23

## 2024-08-12 RX ADMIN — SERTRALINE HYDROCHLORIDE 150 MG: 100 TABLET ORAL at 21:15

## 2024-08-12 RX ADMIN — LAMOTRIGINE 50 MG: 25 TABLET ORAL at 08:23

## 2024-08-12 RX ADMIN — HYDROXYZINE HYDROCHLORIDE 25 MG: 25 TABLET ORAL at 21:15

## 2024-08-12 RX ADMIN — CHOLECALCIFEROL TAB 25 MCG (1000 UNIT) 2000 UNITS: 25 TAB at 08:23

## 2024-08-12 RX ADMIN — HYDROXYZINE HYDROCHLORIDE 25 MG: 25 TABLET ORAL at 17:04

## 2024-08-12 RX ADMIN — LISINOPRIL 10 MG: 10 TABLET ORAL at 08:23

## 2024-08-12 NOTE — NURSING NOTE
Pt is isolative to self and room. Consumed 100% of dinner. Took medications without incidence. Pt is polite and cooperative. Attended 6/9 groups. Denies all psych symptoms. No behavioral issues. Pt offers no complaints. VSS. Continuous safety checks maintained.

## 2024-08-12 NOTE — PROGRESS NOTES
Progress Note - Behavioral Health   Deyvi Cao 54 y.o. male MRN: [id number]  Unit/Bed#: [facility] 101-02 Encounter: [id number]  Code Status: Level 1 - Full Code    Assessment & Plan   Principal Problem:    Bipolar disorder with severe depression (HCC)  Active Problems:    Benign essential hypertension    Mixed hyperlipidemia    Allergic rhinitis due to allergen    Medical clearance for psychiatric admission    Rosacea    Recommended Treatment:     Treatment plan, treatment progress and medication changes were reviewed with Nursing Staff, Pharmacy Service and Case Management in Treatment Team:  1.Continue with group therapy, milieu therapy and occupational therapy   2.Behavioral Health checks every 7 minutes   3.Continue frequent safety checks and vitals per unit protocol  4.Continue with SLIM medical management as indicated  5.Continue with current medication regimen for symptom management: Vraylar 3mg PO Daily, Atarax 25mg PO TID, Lamictal 50mg PO Daily, Zoloft 150mg PO QHS, Lamictal 50mg PO Daily   6.Disposition Planning: Discharge planning and efforts remain ongoing - Awaiting group home placement    Subjective:    Patient was seen today for continuation of care, records reviewed and patient was discussed with the morning case review team.    Deyvi was seen today for psychiatric follow-up.  On assessment today, Deyvi was found laying in his bed.  Making appropriate jokes.  Deyvi reports adequate daytime energy and denies any difficulties with initiating or staying asleep.  Oral appetite and hydration is adequate.  He reports an overall improvement in his depression and anxiety.   We reviewed once more the specific as-needed medications they can use going forward if they experience any insomnia or destabilization of their mood, they understood and were agreeable. Milieu visibility and group attendance encouraged to promote an active participation in treatment.    Deyvi denies acute suicidal/self-harm  ideation/intent/plan upon direct inquiry at this time. Deyvi is able to contract for safety while on the unit and would feel comfortable seeking staff support should suicidal symptoms or urges appear or worsen. Deyvi remains behaviorally appropriate, no agitation or aggression noted on exam or in report. Deyvi also denies HI/AH/VH, and does not appear overtly manic.  Patient does not verbalize any experiences that can be categorized as paranoid, persecutory, bizarre, or somatic delusions. Deyvi remains adherent to his current psychotropic medication regimen and denies any side effects from medications, as well as none noted on exam.    Group Attendance: 6 / 9  Treatment Team: SOLEDAD  Psychiatric PRN's Needed: None    Review of Systems:  Behavior over the last 24 hours: Unchanged  Sleep: sleeping okay throughout the night  Appetite: adequate  Medication side effects: none reported  ROS:no complaints, all other systems are negative    Objective:    Vitals:  Vitals:    08/11/24 1519   BP: 119/69   Pulse: 96   Resp: 18   Temp: 97.9 °F (36.6 °C)   SpO2: 92%     Laboratory Results:  I have personally reviewed all pertinent laboratory/tests results.  Most Recent Labs:   Lab Results   Component Value Date    WBC 7.39 06/26/2024    RBC 4.70 06/26/2024    HGB 15.2 06/26/2024    HCT 45.5 06/26/2024     06/26/2024    RDW 12.9 06/26/2024    NEUTROABS 4.63 06/26/2024    SODIUM 137 06/26/2024    K 4.1 06/26/2024     06/26/2024    CO2 26 06/26/2024    BUN 17 06/26/2024    CREATININE 0.63 06/26/2024    GLUC 98 06/26/2024    GLUF 98 06/26/2024    CALCIUM 9.6 06/26/2024    AST 25 06/26/2024    ALT 45 06/26/2024    ALKPHOS 114 (H) 06/26/2024    TP 7.0 06/26/2024    ALB 4.4 06/26/2024    TBILI 0.44 06/26/2024    CHOLESTEROL 177 06/26/2024    HDL 52 06/26/2024    TRIG 73 06/26/2024    LDLCALC 110 (H) 06/26/2024    NONHDLC 125 06/26/2024    LITHIUM 0.4 (L) 01/28/2021    [id number] 2.636 06/26/2024    HGBA1C 5.2  11/22/2023     11/22/2023     Mental Status Evaluation:  Appearance:  age appropriate, casually dressed   Behavior:  cooperative, calm   Speech:  normal volume, normal pitch   Mood:  less anxious, less depressed   Affect:  constricted   Thought Process:  goal directed   Associations: intact associations   Thought Content:  no overt delusions   Perceptual Disturbances: no auditory hallucinations, no visual hallucinations, denies when asked, does not appear responding to internal stimuli   Risk Potential: Suicidal ideation - None at present, contracts for safety on the unit, would talk to staff if not feeling safe on the unit  Homicidal ideation - None at present  Potential for aggression - Not at present   Sensorium:  oriented to person, place, and time/date   Memory:  recent memory intact   Consciousness:  alert and awake   Attention/Concentration: attention span and concentration appear shorter than expected for age   Insight:  fair and improving   Judgment: fair and improving   Gait/Station: normal gait/station, normal balance   Motor Activity: no abnormal movements     Progress Toward Goals: making gradual improvement.  Deyvi continues to require inpatient psychiatric hospitalization for continued medication management and titration to optimize symptom reduction, improve sleep hygiene, and demonstrate adequate self-care.     Suicide/Homicide Risk Assessment:  Risk of Harm to Self:   Nursing Suicide Risk Assessment Last 24 hours: C-SSRS Risk (Since Last Contact)  Calculated C-SSRS Risk Score (Since Last Contact): No Risk Indicated    Risk of Harm to Others:  Nursing Homicide Risk Assessment: Violence Risk to Others: Denies within past 6 months    Behavioral Health Medications: all current active meds have been reviewed and continue current psychiatric medications.  Current Facility-Administered Medications   Medication Dose Route Frequency Provider Last Rate    acetaminophen  650 mg Oral Q6H EZEKIELN Melva  ALVINA Knutson      acetaminophen  650 mg Oral Q4H PRN ALVINA Harley      acetaminophen  975 mg Oral Q6H PRN ALVINA Harley      aluminum-magnesium hydroxide-simethicone  30 mL Oral Q4H PRN ALVINA Harley      ammonium lactate   Topical BID PRN ALVINA Harley      atorvastatin  10 mg Oral Daily ALVINA Lewis      benztropine  1 mg Intramuscular Q4H PRN Max 6/day ALVINA Harley      benztropine  1 mg Oral Q4H PRN Max 6/day ALVINA Harley      bisacodyl  10 mg Rectal Daily PRN ALVINA Harley      cariprazine  3 mg Oral Daily Martin Cardenas MD      Cholecalciferol  2,000 Units Oral Daily ALVINA Lewis      cyanocobalamin  1,000 mcg Oral Daily ALVINA Lewis      hydrOXYzine HCL  25 mg Oral Q6H PRN Max 4/day ALVINA Harley      hydrOXYzine HCL  25 mg Oral TID Martin Cardenas MD      hydrOXYzine HCL  50 mg Oral Q4H PRN Max 4/day ALVINA Harley      Or    LORazepam  1 mg Intramuscular Q4H PRN ALVINA Harley      lamoTRIgine  50 mg Oral Daily Martin Cardenas MD      lisinopril  10 mg Oral Daily ALVINA Lewis      LORazepam  1 mg Oral Q4H PRN Max 6/day ALVINA Harley      Or    LORazepam  2 mg Intramuscular Q6H PRN Max 3/day ALVINA Harley      OLANZapine  10 mg Oral Q3H PRN Max 3/day ALVINA Harley      Or    OLANZapine  10 mg Intramuscular Q3H PRN Max 3/day ALVINA Harley      OLANZapine  5 mg Oral Q3H PRN Max 6/day ALVINA Harley      Or    OLANZapine  5 mg Intramuscular Q3H PRN Max 6/day ALVINA Harley      OLANZapine  2.5 mg Oral Q3H PRN Max 8/day ALVINA Harley      polyethylene glycol  17 g Oral Daily PRN ALVINA Harley      propranolol  10 mg Oral Q8H PRN ALVINA Harley      senna-docusate sodium  1 tablet Oral Daily PRN ALVINA Harley      sertraline  150 mg Oral HS Martin Cardenas MD       Risks / Benefits of Treatment:  Risks, benefits, and possible side effects of medications  explained to patient. Patient has limited understanding of risks and benefits of treatment at this time, but agrees to take medications as prescribed.    Counseling / Coordination of Care:  Total floor/unit time spent today 25 minutes. Greater than 50% of total time was spent with the patient and / or family counseling and / or coordination of care. A description of the counseling / coordination of care:   Patient's progress discussed with staff in treatment team meeting.  Medications, treatment progress and treatment plan reviewed with patient.   Educated on importance of medication and treatment compliance.  Reassurance and supportive therapy provided.   Encouraged participation in milieu and group therapy on the unit.    ALVINA Harley 08/12/24

## 2024-08-12 NOTE — PLAN OF CARE
Problem: Alteration in Thoughts and Perception  Goal: Treatment Goal: Gain control of psychotic behaviors/thinking, reduce/eliminate presenting symptoms and demonstrate improved reality functioning upon discharge  Outcome: Progressing  Goal: Verbalize thoughts and feelings  Description: Interventions:  - Promote a nonjudgmental and trusting relationship with the patient through active listening and therapeutic communication  - Assess patient's level of functioning, behavior and potential for risk  - Engage patient in 1 on 1 interactions  - Encourage patient to express fears, feelings, frustrations, and discuss symptoms    - [place] patient to reality, help patient recognize reality-based thinking   - Administer medications as ordered and assess for potential side effects  - Provide the patient education related to the signs and symptoms of the illness and desired effects of prescribed medications  Outcome: Progressing  Goal: Refrain from acting on delusional thinking/internal stimuli  Description: Interventions:  - Monitor patient closely, per order   - Utilize least restrictive measures   - Set reasonable limits, give positive feedback for acceptable   - Administer medications as ordered and monitor of potential side effects  Outcome: Progressing  Goal: Agree to be compliant with medication regime, as prescribed and report medication side effects  Description: Interventions:  - Offer appropriate PRN medication and supervise ingestion; conduct AIMS, as needed   Outcome: Progressing  Goal: Attend and participate in unit activities, including therapeutic, recreational, and educational groups  Description: Interventions:  -Encourage Visitation and family involvement in care  Outcome: Progressing  Goal: Recognize dysfunctional thoughts, communicate reality-based thoughts at the time of discharge  Description: Interventions:  - Provide medication and psycho-education to assist patient in compliance and developing  insight into his/her illness   Outcome: Progressing  Goal: Complete daily ADLs, including personal hygiene independently, as able  Description: Interventions:  - Observe, teach, and assist patient with ADLS  - Monitor and promote a balance of rest/activity, with adequate nutrition and elimination   Outcome: Progressing     Problem: Ineffective Coping  Goal: Identifies ineffective coping skills  Outcome: Progressing  Goal: Identifies healthy coping skills  Outcome: Progressing  Goal: Demonstrates healthy coping skills  Outcome: Progressing  Goal: Participates in unit activities  Description: Interventions:  - Provide therapeutic environment   - Provide required programming   - Redirect inappropriate behaviors   Outcome: Progressing  Goal: Patient/Family participate in treatment and DC plans  Description: Interventions:  - Provide therapeutic environment  Outcome: Progressing  Goal: Patient/Family verbalizes awareness of resources  Outcome: Progressing  Goal: Understands least restrictive measures  Description: Interventions:  - Utilize least restrictive behavior  Outcome: Progressing  Goal: Free from restraint events  Description: - Utilize least restrictive measures   - Provide behavioral interventions   - Redirect inappropriate behaviors   Outcome: Progressing     Problem: Risk for Self Injury/Neglect  Goal: Treatment Goal: Remain safe during length of stay, learn and adopt new coping skills, and be free of self-injurious ideation, impulses and acts at the time of discharge  Outcome: Progressing  Goal: Verbalize thoughts and feelings  Description: Interventions:  - Assess and re-assess patient's lethality and potential for self-injury  - Engage patient in 1:1 interactions, daily, for a minimum of 15 minutes  - Encourage patient to express feelings, fears, frustrations, hopes  - Establish rapport/trust with patient   Outcome: Progressing  Goal: Refrain from harming self  Description: Interventions:  - Monitor  patient closely, per order  - Develop a trusting relationship  - Supervise medication ingestion, monitor effects and side effects   Outcome: Progressing  Goal: Attend and participate in unit activities, including therapeutic, recreational, and educational groups  Description: Interventions:  - Provide therapeutic and educational activities daily, encourage attendance and participation, and document same in the medical record  - Obtain collateral information, encourage visitation and family involvement in care   Outcome: Progressing  Goal: Recognize maladaptive responses and adopt new coping mechanisms  Outcome: Progressing  Goal: Complete daily ADLs, including personal hygiene independently, as able  Description: Interventions:  - Observe, teach, and assist patient with ADLS  - Monitor and promote a balance of rest/activity, with adequate nutrition and elimination  Outcome: Progressing     Problem: Depression  Goal: Treatment Goal: Demonstrate behavioral control of depressive symptoms, verbalize feelings of improved mood/affect, and adopt new coping skills prior to discharge  Outcome: Progressing  Goal: Verbalize thoughts and feelings  Description: Interventions:  - Assess and re-assess patient's level of risk   - Engage patient in 1:1 interactions, daily, for a minimum of 15 minutes   - Encourage patient to express feelings, fears, frustrations, hopes   Outcome: Progressing  Goal: Refrain from harming self  Description: Interventions:  - Monitor patient closely, per order   - Supervise medication ingestion, monitor effects and side effects   Outcome: Progressing  Goal: Refrain from isolation  Description: Interventions:  - Develop a trusting relationship   - Encourage socialization   Outcome: Progressing  Goal: Refrain from self-neglect  Outcome: Progressing  Goal: Attend and participate in unit activities, including therapeutic, recreational, and educational groups  Description: Interventions:  - Provide  therapeutic and educational activities daily, encourage attendance and participation, and document same in the medical record   Outcome: Progressing  Goal: Complete daily ADLs, including personal hygiene independently, as able  Description: Interventions:  - Observe, teach, and assist patient with ADLS  -  Monitor and promote a balance of rest/activity, with adequate nutrition and elimination   Outcome: Progressing     Problem: Anxiety  Goal: Anxiety is at manageable level  Description: Interventions:  - Assess and monitor patient's anxiety level.   - Monitor for signs and symptoms (heart palpitations, chest pain, shortness of breath, headaches, nausea, feeling jumpy, restlessness, irritable, apprehensive).   - Collaborate with interdisciplinary team and initiate plan and interventions as ordered.  - [place] patient to unit/surroundings  - Explain treatment plan  - Encourage participation in care  - Encourage verbalization of concerns/fears  - Identify coping mechanisms  - Assist in developing anxiety-reducing skills  - Administer/offer alternative therapies  - Limit or eliminate stimulants  Outcome: Progressing     Problem: Risk for Violence/Aggression Toward Others  Goal: Treatment Goal: Refrain from acts of violence/aggression during length of stay, and demonstrate improved impulse control at the time of discharge  Outcome: Progressing  Goal: Verbalize thoughts and feelings  Description: Interventions:  - Assess and re-assess patient's level of risk, every waking shift  - Engage patient in 1:1 interactions, daily, for a minimum of 15 minutes   - Allow patient to express feelings and frustrations in a safe and non-threatening manner   - Establish rapport/trust with patient   Outcome: Progressing  Goal: Refrain from harming others  Outcome: Progressing  Goal: Refrain from destructive acts on the environment or property  Outcome: Progressing  Goal: Control angry outbursts  Description: Interventions:  - Monitor  patient closely, per order  - Ensure early verbal de-escalation  - Monitor prn medication needs  - Set reasonable/therapeutic limits, outline behavioral expectations, and consequences   - Provide a non-threatening milieu, utilizing the least restrictive interventions   Outcome: Progressing  Goal: Attend and participate in unit activities, including therapeutic, recreational, and educational groups  Description: Interventions:  - Provide therapeutic and educational activities daily, encourage attendance and participation, and document same in the medical record   Outcome: Progressing  Goal: Identify appropriate positive anger management techniques  Description: Interventions:  - Offer anger management and coping skills groups   - Staff will provide positive feedback for appropriate anger control  Outcome: Progressing     Problem: Alteration in Orientation  Goal: Treatment Goal: Demonstrate a reduction of confusion and improved orientation to person, place, time and/or situation upon discharge, according to optimum baseline/ability  Outcome: Progressing  Goal: Interact with staff daily  Description: Interventions:  - Assess and re-assess patient's level of orientation  - Engage patient in 1 on 1 interactions, daily, for a minimum of 15 minutes   - Establish rapport/trust with patient   Outcome: Progressing  Goal: Express concerns related to confused thinking related to:  Description: Interventions:  - Encourage patient to express feelings, fears, frustrations, hopes  - Assign consistent caregivers   - [place]/re-orient patient as needed  - Allow comfort items, as appropriate  - Provide visual cues, signs, etc.   Outcome: Progressing  Goal: Allow medical examinations, as recommended  Description: Interventions:  - Provide physical/neurological exams and/or referrals, per provider   Outcome: Progressing  Goal: Cooperate with recommended testing/procedures  Description: Interventions:  - Determine need for ancillary  testing  - Observe for mental status changes  - Implement falls/precaution protocol   Outcome: Progressing  Goal: Attend and participate in unit activities, including therapeutic, recreational, and educational groups  Description: Interventions:  - Provide therapeutic and educational activities daily, encourage attendance and participation, and document same in the medical record   - Provide appropriate opportunities for reminiscence   - Provide a consistent daily routine   - Encourage family contact/visitation   Outcome: Progressing  Goal: Complete daily ADLs, including personal hygiene independently, as able  Description: Interventions:  - Observe, teach, and assist patient with ADLS  Outcome: Progressing     Problem: SELF HARM/SUICIDALITY  Goal: Will have no self-injury during hospital stay  Description: INTERVENTIONS:  - Q 15 MINUTES: Routine safety checks  - Q WAKING SHIFT & PRN: Assess risk to determine if routine checks are adequate to maintain patient safety  - Encourage patient to participate actively in care by formulating a plan to combat response to suicidal ideation, identify supports and resources  Outcome: Progressing     Problem: DEPRESSION  Goal: Will be euthymic at discharge  Description: INTERVENTIONS:  - Administer medication as ordered  - Provide emotional support via 1:1 interaction with staff  - Encourage involvement in milieu/groups/activities  - Monitor for social isolation  Outcome: Progressing     Problem: ANXIETY  Goal: Will report anxiety at manageable levels  Description: INTERVENTIONS:  - Administer medication as ordered  - Teach and encourage coping skills  - Provide emotional support  - Assess patient/family for anxiety and ability to cope  Outcome: Progressing  Goal: By discharge: Patient will verbalize 2 strategies to deal with anxiety  Description: Interventions:  - Identify any obvious source/trigger to anxiety  - Staff will assist patient in applying identified coping  technique/skills  - Encourage attendance of scheduled groups and activities  Outcome: Progressing     Problem: SELF CARE DEFICIT  Goal: Return ADL status to a safe level of function  Description: INTERVENTIONS:  - Administer medication as ordered  - Assess ADL deficits and provide assistive devices as needed  - Obtain PT/OT consults as needed  - Assist and instruct patient to increase activity and self care as tolerated  Outcome: Progressing     Problem: DISCHARGE PLANNING - CARE MANAGEMENT  Goal: Discharge to post-acute care or home with appropriate resources  Description: INTERVENTIONS:  - Conduct assessment to determine patient/family and health care team treatment goals, and need for post-acute services based on payer coverage, community resources, and patient preferences, and barriers to discharge  - Address psychosocial, clinical, and financial barriers to discharge as identified in assessment in conjunction with the patient/family and health care team  - Arrange appropriate level of post-acute services according to patient’s   needs and preference and payer coverage in collaboration with the physician and health care team  - Communicate with and update the patient/family, physician, and health care team regarding progress on the discharge plan  - Arrange appropriate transportation to post-acute venues  Outcome: Progressing

## 2024-08-12 NOTE — PLAN OF CARE
Problem: Ineffective Coping  Goal: Identifies ineffective coping skills  Outcome: Progressing  Goal: Identifies healthy coping skills  Outcome: Progressing  Goal: Demonstrates healthy coping skills  Outcome: Progressing  Goal: Participates in unit activities  Description: Interventions:  - Provide therapeutic environment   - Provide required programming   - Redirect inappropriate behaviors   Outcome: Progressing    Attended 40% of the groups offered last week.

## 2024-08-12 NOTE — NURSING NOTE
Patient visible in the evening watching. Pleasant upon approach. Behaviors appropriate. Offers no complaints. Medication compliant as well. Uneventful evening.

## 2024-08-12 NOTE — NURSING NOTE
Patient is quiet, sits with peers during meals but keeps mostly to self. Pt reports no s/s, voices no concerns. Pt takes all medications without issue.

## 2024-08-12 NOTE — PROGRESS NOTES
08/12/24 0739   Team Meeting   Meeting Type Daily Rounds   Team Members Present   Team Members Present Physician;Nurse;;Other (Discipline and Name)   Patient/Family Present   Patient Present No   Patient's Family Present No     In attendance:  Dr. Jordan Holter, DO Ashley O'Hara, LYNNE Garcia, [facility]  Gris Arizmendi M.S.    Groups: 6/9    Pt remains flat, blunted, quiet, keeps to self. Hygiene and appetite are good. Pt is polite, pleasant, no bx issues noted.

## 2024-08-13 PROCEDURE — 99232 SBSQ HOSP IP/OBS MODERATE 35: CPT

## 2024-08-13 RX ADMIN — LAMOTRIGINE 50 MG: 25 TABLET ORAL at 08:15

## 2024-08-13 RX ADMIN — CHOLECALCIFEROL TAB 25 MCG (1000 UNIT) 2000 UNITS: 25 TAB at 08:15

## 2024-08-13 RX ADMIN — HYDROXYZINE HYDROCHLORIDE 25 MG: 25 TABLET ORAL at 08:15

## 2024-08-13 RX ADMIN — SERTRALINE HYDROCHLORIDE 150 MG: 100 TABLET ORAL at 21:39

## 2024-08-13 RX ADMIN — HYDROXYZINE HYDROCHLORIDE 25 MG: 25 TABLET ORAL at 21:39

## 2024-08-13 RX ADMIN — HYDROXYZINE HYDROCHLORIDE 25 MG: 25 TABLET ORAL at 16:51

## 2024-08-13 RX ADMIN — LISINOPRIL 10 MG: 10 TABLET ORAL at 08:15

## 2024-08-13 RX ADMIN — ATORVASTATIN CALCIUM 10 MG: 10 TABLET, FILM COATED ORAL at 08:15

## 2024-08-13 RX ADMIN — CARIPRAZINE 3 MG: 3 CAPSULE, GELATIN COATED ORAL at 08:15

## 2024-08-13 RX ADMIN — CYANOCOBALAMIN TAB 1000 MCG 1000 MCG: 1000 TAB at 08:15

## 2024-08-13 NOTE — PLAN OF CARE
Problem: Alteration in Thoughts and Perception  Goal: Treatment Goal: Gain control of psychotic behaviors/thinking, reduce/eliminate presenting symptoms and demonstrate improved reality functioning upon discharge  Outcome: Progressing  Goal: Verbalize thoughts and feelings  Description: Interventions:  - Promote a nonjudgmental and trusting relationship with the patient through active listening and therapeutic communication  - Assess patient's level of functioning, behavior and potential for risk  - Engage patient in 1 on 1 interactions  - Encourage patient to express fears, feelings, frustrations, and discuss symptoms    - [place] patient to reality, help patient recognize reality-based thinking   - Administer medications as ordered and assess for potential side effects  - Provide the patient education related to the signs and symptoms of the illness and desired effects of prescribed medications  Outcome: Progressing  Goal: Refrain from acting on delusional thinking/internal stimuli  Description: Interventions:  - Monitor patient closely, per order   - Utilize least restrictive measures   - Set reasonable limits, give positive feedback for acceptable   - Administer medications as ordered and monitor of potential side effects  Outcome: Progressing  Goal: Agree to be compliant with medication regime, as prescribed and report medication side effects  Description: Interventions:  - Offer appropriate PRN medication and supervise ingestion; conduct AIMS, as needed   Outcome: Progressing  Goal: Complete daily ADLs, including personal hygiene independently, as able  Description: Interventions:  - Observe, teach, and assist patient with ADLS  - Monitor and promote a balance of rest/activity, with adequate nutrition and elimination   Outcome: Progressing     Problem: Ineffective Coping  Goal: Demonstrates healthy coping skills  Outcome: Progressing  Goal: Patient/Family participate in treatment and DC plans  Description:  Interventions:  - Provide therapeutic environment  Outcome: Progressing  Goal: Free from restraint events  Description: - Utilize least restrictive measures   - Provide behavioral interventions   - Redirect inappropriate behaviors   Outcome: Progressing     Problem: Risk for Self Injury/Neglect  Goal: Treatment Goal: Remain safe during length of stay, learn and adopt new coping skills, and be free of self-injurious ideation, impulses and acts at the time of discharge  Outcome: Progressing  Goal: Refrain from harming self  Description: Interventions:  - Monitor patient closely, per order  - Develop a trusting relationship  - Supervise medication ingestion, monitor effects and side effects   Outcome: Progressing  Goal: Complete daily ADLs, including personal hygiene independently, as able  Description: Interventions:  - Observe, teach, and assist patient with ADLS  - Monitor and promote a balance of rest/activity, with adequate nutrition and elimination  Outcome: Progressing     Problem: Depression  Goal: Refrain from harming self  Description: Interventions:  - Monitor patient closely, per order   - Supervise medication ingestion, monitor effects and side effects   Outcome: Progressing  Goal: Refrain from isolation  Description: Interventions:  - Develop a trusting relationship   - Encourage socialization   Outcome: Progressing  Goal: Refrain from self-neglect  Outcome: Progressing     Problem: Anxiety  Goal: Anxiety is at manageable level  Description: Interventions:  - Assess and monitor patient's anxiety level.   - Monitor for signs and symptoms (heart palpitations, chest pain, shortness of breath, headaches, nausea, feeling jumpy, restlessness, irritable, apprehensive).   - Collaborate with interdisciplinary team and initiate plan and interventions as ordered.  - [place] patient to unit/surroundings  - Explain treatment plan  - Encourage participation in care  - Encourage verbalization of concerns/fears  -  Identify coping mechanisms  - Assist in developing anxiety-reducing skills  - Administer/offer alternative therapies  - Limit or eliminate stimulants  Outcome: Progressing     Problem: Risk for Violence/Aggression Toward Others  Goal: Treatment Goal: Refrain from acts of violence/aggression during length of stay, and demonstrate improved impulse control at the time of discharge  Outcome: Progressing  Goal: Control angry outbursts  Description: Interventions:  - Monitor patient closely, per order  - Ensure early verbal de-escalation  - Monitor prn medication needs  - Set reasonable/therapeutic limits, outline behavioral expectations, and consequences   - Provide a non-threatening milieu, utilizing the least restrictive interventions   Outcome: Progressing     Problem: Alteration in Orientation  Goal: Interact with staff daily  Description: Interventions:  - Assess and re-assess patient's level of orientation  - Engage patient in 1 on 1 interactions, daily, for a minimum of 15 minutes   - Establish rapport/trust with patient   Outcome: Progressing  Goal: Allow medical examinations, as recommended  Description: Interventions:  - Provide physical/neurological exams and/or referrals, per provider   Outcome: Progressing  Goal: Attend and participate in unit activities, including therapeutic, recreational, and educational groups  Description: Interventions:  - Provide therapeutic and educational activities daily, encourage attendance and participation, and document same in the medical record   - Provide appropriate opportunities for reminiscence   - Provide a consistent daily routine   - Encourage family contact/visitation   Outcome: Progressing     Problem: SELF HARM/SUICIDALITY  Goal: Will have no self-injury during hospital stay  Description: INTERVENTIONS:  - Q 15 MINUTES: Routine safety checks  - Q WAKING SHIFT & PRN: Assess risk to determine if routine checks are adequate to maintain patient safety  - Encourage  patient to participate actively in care by formulating a plan to combat response to suicidal ideation, identify supports and resources  Outcome: Progressing     Problem: SELF CARE DEFICIT  Goal: Return ADL status to a safe level of function  Description: INTERVENTIONS:  - Administer medication as ordered  - Assess ADL deficits and provide assistive devices as needed  - Obtain PT/OT consults as needed  - Assist and instruct patient to increase activity and self care as tolerated  Outcome: Progressing     Problem: DISCHARGE PLANNING - CARE MANAGEMENT  Goal: Discharge to post-acute care or home with appropriate resources  Description: INTERVENTIONS:  - Conduct assessment to determine patient/family and health care team treatment goals, and need for post-acute services based on payer coverage, community resources, and patient preferences, and barriers to discharge  - Address psychosocial, clinical, and financial barriers to discharge as identified in assessment in conjunction with the patient/family and health care team  - Arrange appropriate level of post-acute services according to patient’s   needs and preference and payer coverage in collaboration with the physician and health care team  - Communicate with and update the patient/family, physician, and health care team regarding progress on the discharge plan  - Arrange appropriate transportation to post-acute venues  Outcome: Progressing

## 2024-08-13 NOTE — PROGRESS NOTES
08/13/24 0722   Team Meeting   Meeting Type Daily Rounds   Team Members Present   Team Members Present Physician;Nurse;;Other (Discipline and Name)   Patient/Family Present   Patient Present No   Patient's Family Present No     In attendance:  Dr. Jordan Holter, DO Rachel Edelman, RN  Judi Garcia, [facility]  Mer Sales, [facility]  Gris Arizmendi M.S.    Groups: 6/9    Pt remains quiet, polite, keeps to self. Pt denies symptoms. No bx issues noted.

## 2024-08-13 NOTE — PROGRESS NOTES
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 Escobar, [facility]       Initial /72 (BP Location: Right arm, Patient Position: Sitting, Cuff Size: Adult Large)  Pulse 75  Temp 97.6  F (36.4  C) (Tympanic)  Resp 16  Ht 6' 2\" (1.88 m)  Wt 263 lb (119.3 kg)  SpO2 98%  BMI 33.77 kg/m2 Estimated body mass index is 33.77 kg/(m^2) as calculated from the following:    Height as of this encounter: 6' 2\" (1.88 m).    Weight as of this encounter: 263 lb (119.3 kg).  Medication Reconciliation: complete     Cynthia Maxwell    " Progress Note - Behavioral Health   Deyvi Cao 54 y.o. male MRN: [id number]  Unit/Bed#: [facility] 101-02 Encounter: [id number]  Code Status: Level 1 - Full Code    Assessment & Plan   Principal Problem:    Bipolar disorder with severe depression (HCC)  Active Problems:    Benign essential hypertension    Mixed hyperlipidemia    Allergic rhinitis due to allergen    Medical clearance for psychiatric admission    Rosacea    Recommended Treatment:     Treatment plan, treatment progress and medication changes were reviewed with Nursing Staff, Pharmacy Service and Case Management in Treatment Team:  1.Continue with group therapy, milieu therapy and occupational therapy   2.Behavioral Health checks every 7 minutes   3.Continue frequent safety checks and vitals per unit protocol  4.Continue with SLIM medical management as indicated  5.Continue with current medication regimen for symptom management: Vraylar 3mg PO Daily, Atarax 25mg PO TID, Lamictal 50mg PO Daily, Zoloft 150mg PO QHS, Lamictal 50mg PO Daily   6.Disposition Planning: Discharge planning and efforts remain ongoing - Awaiting group home placement    Subjective:    Patient was seen today for continuation of care, records reviewed and patient was discussed with the morning case review team.    Deyvi was seen today for psychiatric follow-up.  On assessment today, Deyvi was calm and cooperative.  He is doing well, offers no new concerns.  Deyvi reports adequate daytime energy and denies any difficulties with initiating or staying asleep.  Oral appetite and hydration is adequate.  We reviewed once more the specific as-needed medications they can use going forward if they experience any insomnia or destabilization of their mood, they understood and were agreeable. Milieu visibility and group attendance encouraged to promote an active participation in treatment.    Deyvi denies acute suicidal/self-harm ideation/intent/plan upon direct inquiry at this time.  Deyvi is able to contract for safety while on the unit and would feel comfortable seeking staff support should suicidal symptoms or urges appear or worsen. Deyvi remains behaviorally appropriate, no agitation or aggression noted on exam or in report. Deyvi also denies HI/AH/VH, and does not appear overtly manic.  Patient does not verbalize any experiences that can be categorized as paranoid, persecutory, bizarre, or somatic delusions. Deyvi remains adherent to his current psychotropic medication regimen and denies any side effects from medications, as well as none noted on exam.    Group Attendance: 6 / 9  Treatment Team: TBD  Psychiatric PRN's Needed: None    Review of Systems:  Behavior over the last 24 hours: Slowly improving  Sleep: sleeping okay throughout the night  Appetite: adequate  Medication side effects: none reported  ROS:no complaints, all other systems are negative    Objective:    Vitals:  Vitals:    08/13/24 0724   BP: 142/79   Pulse: 88   Resp: 20   Temp: 97.7 °F (36.5 °C)   SpO2: 95%     Laboratory Results:  I have personally reviewed all pertinent laboratory/tests results.  Most Recent Labs:   Lab Results   Component Value Date    WBC 7.39 06/26/2024    RBC 4.70 06/26/2024    HGB 15.2 06/26/2024    HCT 45.5 06/26/2024     06/26/2024    RDW 12.9 06/26/2024    NEUTROABS 4.63 06/26/2024    SODIUM 137 06/26/2024    K 4.1 06/26/2024     06/26/2024    CO2 26 06/26/2024    BUN 17 06/26/2024    CREATININE 0.63 06/26/2024    GLUC 98 06/26/2024    GLUF 98 06/26/2024    CALCIUM 9.6 06/26/2024    AST 25 06/26/2024    ALT 45 06/26/2024    ALKPHOS 114 (H) 06/26/2024    TP 7.0 06/26/2024    ALB 4.4 06/26/2024    TBILI 0.44 06/26/2024    CHOLESTEROL 177 06/26/2024    HDL 52 06/26/2024    TRIG 73 06/26/2024    LDLCALC 110 (H) 06/26/2024    NONHDLC 125 06/26/2024    LITHIUM 0.4 (L) 01/28/2021    [id number] 2.636 06/26/2024    HGBA1C 5.2 11/22/2023     11/22/2023     Mental Status  Evaluation:  Appearance:  age appropriate, casually dressed   Behavior:  cooperative, calm   Speech:  normal rate, normal volume   Mood:  less anxious, less depressed   Affect:  constricted   Thought Process:  goal directed   Associations: intact associations   Thought Content:  no overt delusions   Perceptual Disturbances: no auditory hallucinations, no visual hallucinations, denies when asked, does not appear responding to internal stimuli   Risk Potential: Suicidal ideation - None at present, contracts for safety on the unit, would talk to staff if not feeling safe on the unit  Homicidal ideation - None at present  Potential for aggression - Not at present   Sensorium:  oriented to person, place, and time/date   Memory:  recent memory intact   Consciousness:  alert and awake   Attention/Concentration: attention span and concentration appear shorter than expected for age   Insight:  fair and improving   Judgment: fair and improving   Gait/Station: normal gait/station, normal balance   Motor Activity: no abnormal movements     Progress Toward Goals: making gradual improvement.  Deyvi continues to require inpatient psychiatric hospitalization for continued medication management and titration to optimize symptom reduction, improve sleep hygiene, and demonstrate adequate self-care.     Suicide/Homicide Risk Assessment:  Risk of Harm to Self:   Nursing Suicide Risk Assessment Last 24 hours: C-SSRS Risk (Since Last Contact)  Calculated C-SSRS Risk Score (Since Last Contact): No Risk Indicated    Risk of Harm to Others:  Nursing Homicide Risk Assessment: Violence Risk to Others: Denies within past 6 months    Behavioral Health Medications: all current active meds have been reviewed and continue current psychiatric medications.  Current Facility-Administered Medications   Medication Dose Route Frequency Provider Last Rate    acetaminophen  650 mg Oral Q6H PRN ALVINA Harley      acetaminophen  650 mg Oral Q4H PRN  ALVINA Harley      acetaminophen  975 mg Oral Q6H PRN ALVINA Harley      aluminum-magnesium hydroxide-simethicone  30 mL Oral Q4H PRN ALVINA Harley      ammonium lactate   Topical BID PRN ALVINA Harley      atorvastatin  10 mg Oral Daily Sary LinkALVINA Thompson      benztropine  1 mg Intramuscular Q4H PRN Max 6/day ALVINA Harley      benztropine  1 mg Oral Q4H PRN Max 6/day ALIVNA Harley      bisacodyl  10 mg Rectal Daily PRN ALVINA Harley      cariprazine  3 mg Oral Daily Martin Cardenas MD      Cholecalciferol  2,000 Units Oral Daily Sary LinkALVINA Thompson      cyanocobalamin  1,000 mcg Oral Daily SaryALVINA Starkey      hydrOXYzine HCL  25 mg Oral Q6H PRN Max 4/day ALVINA Harley      hydrOXYzine HCL  25 mg Oral TID Martin Cardenas MD      hydrOXYzine HCL  50 mg Oral Q4H PRN Max 4/day ALVINA Harley      Or    LORazepam  1 mg Intramuscular Q4H PRN ALVINA Harley      lamoTRIgine  50 mg Oral Daily Martin Cardenas MD      lisinopril  10 mg Oral Daily Sary ALVINA Gupta      LORazepam  1 mg Oral Q4H PRN Max 6/day ALVINA Harley      Or    LORazepam  2 mg Intramuscular Q6H PRN Max 3/day ALVINA Harley      OLANZapine  10 mg Oral Q3H PRN Max 3/day ALVINA Harley      Or    OLANZapine  10 mg Intramuscular Q3H PRN Max 3/day ALVINA Harley      OLANZapine  5 mg Oral Q3H PRN Max 6/day ALVINA Harley      Or    OLANZapine  5 mg Intramuscular Q3H PRN Max 6/day ALVINA Harley      OLANZapine  2.5 mg Oral Q3H PRN Max 8/day ALVINA Harley      polyethylene glycol  17 g Oral Daily PRN ALVINA Harley      propranolol  10 mg Oral Q8H PRN ALVINA Harley      senna-docusate sodium  1 tablet Oral Daily PRN ALVINA Harley      sertraline  150 mg Oral HS Martin Cardenas MD       Risks / Benefits of Treatment:  Risks, benefits, and possible side effects of medications explained to patient. Patient has limited understanding  of risks and benefits of treatment at this time, but agrees to take medications as prescribed.    Counseling / Coordination of Care:  Total floor/unit time spent today 25 minutes. Greater than 50% of total time was spent with the patient and / or family counseling and / or coordination of care. A description of the counseling / coordination of care:   Patient's progress discussed with staff in treatment team meeting.  Medications, treatment progress and treatment plan reviewed with patient.   Educated on importance of medication and treatment compliance.  Reassurance and supportive therapy provided.   Encouraged participation in milieu and group therapy on the unit.    ALVINA Harley 08/13/24

## 2024-08-13 NOTE — NURSING NOTE
Pt is accepting of medications without incidence and meal compliant. Pt is polite and pleasant in conversation with writer, but otherwise quiet and with minimal peer interactions. Pt denies s/s currently and offers no new concerns. Attends groups and sits in milieu during shift.

## 2024-08-14 PROCEDURE — 99232 SBSQ HOSP IP/OBS MODERATE 35: CPT

## 2024-08-14 RX ADMIN — HYDROXYZINE HYDROCHLORIDE 25 MG: 25 TABLET ORAL at 08:20

## 2024-08-14 RX ADMIN — ATORVASTATIN CALCIUM 10 MG: 10 TABLET, FILM COATED ORAL at 08:20

## 2024-08-14 RX ADMIN — SERTRALINE HYDROCHLORIDE 150 MG: 100 TABLET ORAL at 21:23

## 2024-08-14 RX ADMIN — CARIPRAZINE 3 MG: 3 CAPSULE, GELATIN COATED ORAL at 08:20

## 2024-08-14 RX ADMIN — CYANOCOBALAMIN TAB 1000 MCG 1000 MCG: 1000 TAB at 08:20

## 2024-08-14 RX ADMIN — HYDROXYZINE HYDROCHLORIDE 25 MG: 25 TABLET ORAL at 21:23

## 2024-08-14 RX ADMIN — HYDROXYZINE HYDROCHLORIDE 25 MG: 25 TABLET ORAL at 17:18

## 2024-08-14 RX ADMIN — CHOLECALCIFEROL TAB 25 MCG (1000 UNIT) 2000 UNITS: 25 TAB at 08:20

## 2024-08-14 RX ADMIN — LAMOTRIGINE 50 MG: 25 TABLET ORAL at 08:20

## 2024-08-14 RX ADMIN — LISINOPRIL 10 MG: 10 TABLET ORAL at 08:20

## 2024-08-14 NOTE — NURSING NOTE
Pt is present on the milieu intermittently able to make needs known. He consumed 100 % of dinner. Took his medications without incidence. Pt is polite, pleasant, and cooperative. Denied all psychiatric symptoms. Pt has minimal peer interaction. Pt offered no complaints. No behavioral issues. Continuous safety checks maintained.

## 2024-08-14 NOTE — PROGRESS NOTES
Progress Note - Behavioral Health   Deyvi Cao 54 y.o. male MRN: [id number]  Unit/Bed#: [facility] 101-02 Encounter: [id number]  Code Status: Level 1 - Full Code    Assessment & Plan   Principal Problem:    Bipolar disorder with severe depression (HCC)  Active Problems:    Benign essential hypertension    Mixed hyperlipidemia    Allergic rhinitis due to allergen    Medical clearance for psychiatric admission    Rosacea    Recommended Treatment:     Treatment plan, treatment progress and medication changes were reviewed with Nursing Staff, Pharmacy Service and Case Management in Treatment Team:  1.Continue with group therapy, milieu therapy and occupational therapy   2.Behavioral Health checks every 7 minutes   3.Continue frequent safety checks and vitals per unit protocol  4.Continue with SLIM medical management as indicated  5.Continue with current medication regimen for symptom management: Vraylar 3mg PO Daily, Atarax 25mg PO TID, Lamictal 50mg PO Daily, Zoloft 150mg PO QHS, Lamictal 50mg PO Daily   6.Disposition Planning: Discharge planning and efforts remain ongoing - Awaiting group home placement    Subjective:    Patient was seen today for continuation of care, records reviewed and patient was discussed with the morning case review team.    Deyvi was seen today for psychiatric follow-up.  On assessment today, Deyvi was found in his room.  He brightens on approach.  He is doing well, has no new concerns.  Deyvi reports adequate daytime energy and denies any difficulties with initiating or staying asleep.  Oral appetite and hydration is adequate.  We reviewed once more the specific as-needed medications they can use going forward if they experience any insomnia or destabilization of their mood, they understood and were agreeable. Milieu visibility and group attendance encouraged to promote an active participation in treatment.    Deyvi denies acute suicidal/self-harm ideation/intent/plan upon direct  inquiry at this time. Deyvi is able to contract for safety while on the unit and would feel comfortable seeking staff support should suicidal symptoms or urges appear or worsen. Deyvi remains behaviorally appropriate, no agitation or aggression noted on exam or in report. Deyvi also denies HI/AH/VH, and does not appear overtly manic.  Patient does not verbalize any experiences that can be categorized as paranoid, persecutory, bizarre, or somatic delusions. Deyvi remains adherent to his current psychotropic medication regimen and denies any side effects from medications, as well as none noted on exam.    Group Attendance: 6 / 11  Treatment Team: SOLEDAD  Psychiatric PRN's Needed: None    Review of Systems:  Behavior over the last 24 hours: Unchanged  Sleep: sleeping okay throughout the night  Appetite: adequate  Medication side effects: none reported  ROS:no complaints, all other systems are negative    Objective:    Vitals:  Vitals:    08/14/24 0728   BP: 139/84   Pulse: 89   Resp: 18   Temp: 97.8 °F (36.6 °C)   SpO2: 95%     Laboratory Results:  I have personally reviewed all pertinent laboratory/tests results.  Most Recent Labs:   Lab Results   Component Value Date    WBC 7.39 06/26/2024    RBC 4.70 06/26/2024    HGB 15.2 06/26/2024    HCT 45.5 06/26/2024     06/26/2024    RDW 12.9 06/26/2024    NEUTROABS 4.63 06/26/2024    SODIUM 137 06/26/2024    K 4.1 06/26/2024     06/26/2024    CO2 26 06/26/2024    BUN 17 06/26/2024    CREATININE 0.63 06/26/2024    GLUC 98 06/26/2024    GLUF 98 06/26/2024    CALCIUM 9.6 06/26/2024    AST 25 06/26/2024    ALT 45 06/26/2024    ALKPHOS 114 (H) 06/26/2024    TP 7.0 06/26/2024    ALB 4.4 06/26/2024    TBILI 0.44 06/26/2024    CHOLESTEROL 177 06/26/2024    HDL 52 06/26/2024    TRIG 73 06/26/2024    LDLCALC 110 (H) 06/26/2024    NONHDLC 125 06/26/2024    LITHIUM 0.4 (L) 01/28/2021    [id number] 2.636 06/26/2024    HGBA1C 5.2 11/22/2023     11/22/2023      Mental Status Evaluation:  Appearance:  age appropriate, casually dressed, dressed appropriately   Behavior:  pleasant, cooperative, calm   Speech:  normal rate, normal volume, normal pitch   Mood:  less anxious, less depressed   Affect:  slightly brighter   Thought Process:  logical, coherent, goal directed   Associations: intact associations   Thought Content:  no overt delusions   Perceptual Disturbances: no auditory hallucinations, no visual hallucinations, denies when asked, does not appear responding to internal stimuli   Risk Potential: Suicidal ideation - None at present, contracts for safety on the unit, would talk to staff if not feeling safe on the unit  Homicidal ideation - None at present  Potential for aggression - Not at present   Sensorium:  oriented to person, place, and time/date   Memory:  recent memory intact   Consciousness:  alert and awake   Attention/Concentration: attention span and concentration appear shorter than expected for age   Insight:  fair and improving   Judgment: fair and improving   Gait/Station: normal gait/station, normal balance   Motor Activity: no abnormal movements     Progress Toward Goals: making gradual improvement.  Deyvi continues to require inpatient psychiatric hospitalization for continued medication management and titration to optimize symptom reduction, improve sleep hygiene, and demonstrate adequate self-care.     Suicide/Homicide Risk Assessment:  Risk of Harm to Self:   Nursing Suicide Risk Assessment Last 24 hours: C-SSRS Risk (Since Last Contact)  Calculated C-SSRS Risk Score (Since Last Contact): No Risk Indicated    Risk of Harm to Others:  Nursing Homicide Risk Assessment: Violence Risk to Others: Denies within past 6 months    Behavioral Health Medications: all current active meds have been reviewed and continue current psychiatric medications.  Current Facility-Administered Medications   Medication Dose Route Frequency Provider Last Rate     acetaminophen  650 mg Oral Q6H PRN ALVINA Harley      acetaminophen  650 mg Oral Q4H PRN ALVINA Harley      acetaminophen  975 mg Oral Q6H PRN ALVINA Harley      aluminum-magnesium hydroxide-simethicone  30 mL Oral Q4H PRN ALVINA Harley      ammonium lactate   Topical BID PRN ALVINA Harley      atorvastatin  10 mg Oral Daily ALVINA Lewis      benztropine  1 mg Intramuscular Q4H PRN Max 6/day ALVINA Harley      benztropine  1 mg Oral Q4H PRN Max 6/day ALVINA Harley      bisacodyl  10 mg Rectal Daily PRN ALVINA Harley      cariprazine  3 mg Oral Daily Martin Cardenas MD      Cholecalciferol  2,000 Units Oral Daily ALVINA Lewis      cyanocobalamin  1,000 mcg Oral Daily ALVINA Lewis      hydrOXYzine HCL  25 mg Oral Q6H PRN Max 4/day ALVINA Harley      hydrOXYzine HCL  25 mg Oral TID Martin Cardenas MD      hydrOXYzine HCL  50 mg Oral Q4H PRN Max 4/day ALVINA Harley      Or    LORazepam  1 mg Intramuscular Q4H PRN ALVINA Harley      lamoTRIgine  50 mg Oral Daily Martin Cardenas MD      lisinopril  10 mg Oral Daily ALVINA Lewis      LORazepam  1 mg Oral Q4H PRN Max 6/day ALVINA Harley      Or    LORazepam  2 mg Intramuscular Q6H PRN Max 3/day ALVINA Harley      OLANZapine  10 mg Oral Q3H PRN Max 3/day ALVINA Harley      Or    OLANZapine  10 mg Intramuscular Q3H PRN Max 3/day ALVINA Harley      OLANZapine  5 mg Oral Q3H PRN Max 6/day ALVINA Harley      Or    OLANZapine  5 mg Intramuscular Q3H PRN Max 6/day ALVINA Harley      OLANZapine  2.5 mg Oral Q3H PRN Max 8/day ALVINA Harley      polyethylene glycol  17 g Oral Daily PRN ALVINA Harley      propranolol  10 mg Oral Q8H PRN ALVINA Harley      senna-docusate sodium  1 tablet Oral Daily PRN ALVINA Harley      sertraline  150 mg Oral HS Martin Cardenas MD       Risks / Benefits of Treatment:  Risks, benefits, and  possible side effects of medications explained to patient. Patient has limited understanding of risks and benefits of treatment at this time, but agrees to take medications as prescribed.    Counseling / Coordination of Care:  Total floor/unit time spent today 25 minutes. Greater than 50% of total time was spent with the patient and / or family counseling and / or coordination of care. A description of the counseling / coordination of care:   Patient's progress discussed with staff in treatment team meeting.  Medications, treatment progress and treatment plan reviewed with patient.   Educated on importance of medication and treatment compliance.  Reassurance and supportive therapy provided.   Encouraged participation in milieu and group therapy on the unit.    ALVINA Harley 08/14/24

## 2024-08-14 NOTE — PLAN OF CARE
Problem: Ineffective Coping  Goal: Identifies ineffective coping skills  Outcome: Progressing  Goal: Identifies healthy coping skills  Outcome: Progressing  Goal: Demonstrates healthy coping skills  Outcome: Progressing  Goal: Participates in unit activities  Description: Interventions:  - Provide therapeutic environment   - Provide required programming   - Redirect inappropriate behaviors   Outcome: Progressing    Attended 12/20 of the groups offered during the last 2 days.

## 2024-08-14 NOTE — PROGRESS NOTES
08/14/24 0742   Team Meeting   Meeting Type Daily Rounds   Team Members Present   Team Members Present Physician;Nurse;;Other (Discipline and Name)   Patient/Family Present   Patient Present No   Patient's Family Present No     In attendance:  Dr. Jordan Holter, DO Rachel Edelman, RN  Judi Garcia, [facility]  Mer Sales, [facility]  Gris Arizmendi, M.S.    Groups: 6/11    Pt quiet, denies symptoms. Pt has minimal peer interactions. No bx issues noted. Pt waiting on availability for [facility] or [facility] with [facility]

## 2024-08-14 NOTE — SOCIAL WORK
SW checked in with pt. Pt found in bed after group. Pt reported he's feeling tired and wanted to rest. Pt denied any current concerns and reports he's feeling alright.   SW will meet for 1:1 tomorrow.

## 2024-08-14 NOTE — NURSING NOTE
Pt is accepting of medications without incidence and meal compliant. Pt is polite, pleasant and quiet. Pt sits with peers, but has little peer interaction unless approached first. Pt denies s/s currently and offers no new concerns.

## 2024-08-14 NOTE — PLAN OF CARE
Problem: Alteration in Thoughts and Perception  Goal: Treatment Goal: Gain control of psychotic behaviors/thinking, reduce/eliminate presenting symptoms and demonstrate improved reality functioning upon discharge  Outcome: Progressing  Goal: Refrain from acting on delusional thinking/internal stimuli  Description: Interventions:  - Monitor patient closely, per order   - Utilize least restrictive measures   - Set reasonable limits, give positive feedback for acceptable   - Administer medications as ordered and monitor of potential side effects  Outcome: Progressing  Goal: Agree to be compliant with medication regime, as prescribed and report medication side effects  Description: Interventions:  - Offer appropriate PRN medication and supervise ingestion; conduct AIMS, as needed   Outcome: Progressing  Goal: Attend and participate in unit activities, including therapeutic, recreational, and educational groups  Description: Interventions:  -Encourage Visitation and family involvement in care  Outcome: Progressing  Goal: Recognize dysfunctional thoughts, communicate reality-based thoughts at the time of discharge  Description: Interventions:  - Provide medication and psycho-education to assist patient in compliance and developing insight into his/her illness   Outcome: Progressing  Goal: Complete daily ADLs, including personal hygiene independently, as able  Description: Interventions:  - Observe, teach, and assist patient with ADLS  - Monitor and promote a balance of rest/activity, with adequate nutrition and elimination   Outcome: Progressing     Problem: Ineffective Coping  Goal: Identifies ineffective coping skills  Outcome: Progressing  Goal: Identifies healthy coping skills  Outcome: Progressing  Goal: Demonstrates healthy coping skills  Outcome: Progressing     Problem: Risk for Self Injury/Neglect  Goal: Treatment Goal: Remain safe during length of stay, learn and adopt new coping skills, and be free of  self-injurious ideation, impulses and acts at the time of discharge  Outcome: Progressing  Goal: Refrain from harming self  Description: Interventions:  - Monitor patient closely, per order  - Develop a trusting relationship  - Supervise medication ingestion, monitor effects and side effects   Outcome: Progressing  Goal: Attend and participate in unit activities, including therapeutic, recreational, and educational groups  Description: Interventions:  - Provide therapeutic and educational activities daily, encourage attendance and participation, and document same in the medical record  - Obtain collateral information, encourage visitation and family involvement in care   Outcome: Progressing  Goal: Recognize maladaptive responses and adopt new coping mechanisms  Outcome: Progressing  Goal: Complete daily ADLs, including personal hygiene independently, as able  Description: Interventions:  - Observe, teach, and assist patient with ADLS  - Monitor and promote a balance of rest/activity, with adequate nutrition and elimination  Outcome: Progressing     Problem: Depression  Goal: Treatment Goal: Demonstrate behavioral control of depressive symptoms, verbalize feelings of improved mood/affect, and adopt new coping skills prior to discharge  Outcome: Progressing     Problem: Anxiety  Goal: Anxiety is at manageable level  Description: Interventions:  - Assess and monitor patient's anxiety level.   - Monitor for signs and symptoms (heart palpitations, chest pain, shortness of breath, headaches, nausea, feeling jumpy, restlessness, irritable, apprehensive).   - Collaborate with interdisciplinary team and initiate plan and interventions as ordered.  - [place] patient to unit/surroundings  - Explain treatment plan  - Encourage participation in care  - Encourage verbalization of concerns/fears  - Identify coping mechanisms  - Assist in developing anxiety-reducing skills  - Administer/offer alternative therapies  - Limit or  eliminate stimulants  Outcome: Progressing     Problem: ANXIETY  Goal: Will report anxiety at manageable levels  Description: INTERVENTIONS:  - Administer medication as ordered  - Teach and encourage coping skills  - Provide emotional support  - Assess patient/family for anxiety and ability to cope  Outcome: Progressing

## 2024-08-15 PROCEDURE — 99232 SBSQ HOSP IP/OBS MODERATE 35: CPT

## 2024-08-15 RX ADMIN — ATORVASTATIN CALCIUM 10 MG: 10 TABLET, FILM COATED ORAL at 08:18

## 2024-08-15 RX ADMIN — HYDROXYZINE HYDROCHLORIDE 25 MG: 25 TABLET ORAL at 17:14

## 2024-08-15 RX ADMIN — LISINOPRIL 10 MG: 10 TABLET ORAL at 08:18

## 2024-08-15 RX ADMIN — HYDROXYZINE HYDROCHLORIDE 25 MG: 25 TABLET ORAL at 21:16

## 2024-08-15 RX ADMIN — CARIPRAZINE 3 MG: 3 CAPSULE, GELATIN COATED ORAL at 08:17

## 2024-08-15 RX ADMIN — HYDROXYZINE HYDROCHLORIDE 25 MG: 25 TABLET ORAL at 08:17

## 2024-08-15 RX ADMIN — CHOLECALCIFEROL TAB 25 MCG (1000 UNIT) 2000 UNITS: 25 TAB at 08:17

## 2024-08-15 RX ADMIN — SERTRALINE HYDROCHLORIDE 150 MG: 100 TABLET ORAL at 21:16

## 2024-08-15 RX ADMIN — CYANOCOBALAMIN TAB 1000 MCG 1000 MCG: 1000 TAB at 08:17

## 2024-08-15 RX ADMIN — LAMOTRIGINE 50 MG: 25 TABLET ORAL at 08:17

## 2024-08-15 NOTE — PROGRESS NOTES
08/15/24 0736   Team Meeting   Meeting Type Daily Rounds   Team Members Present   Team Members Present Physician;Nurse;;Other (Discipline and Name)   Patient/Family Present   Patient Present No   Patient's Family Present No     In attendance:  Dr. Jordan Holter, DO Rachel Edelman, RN  Judi Garcia, [facility]  Mer Sales, JAYLONW    Groups: 5/9    Pt visible, keeps to self unless interaction is initiated by others. Pt cooperative, quiet. No bx issues noted. Pt waiting on [facility] availability.

## 2024-08-15 NOTE — NURSING NOTE
Pt is accepting of medications without incidence and meal compliant. Pt is visible in the milieu and sits with peers, but has little interaction with them. Pt denies s/s and remains polite and pleasant in interaction with writer. Offers no new concerns.

## 2024-08-15 NOTE — SOCIAL WORK
"SW and pt met 1:1  SW explored pt's current level of functioning, emotional state, and adjustment to room change.   SW probed pt regarding any contact with family members. Pt stated he and his sister have not spoken and he is by himself. SW processed their history, pt stated there was no falling out and that he would talk to her but doesn't have her number and she hasn't called him here at the [facility].   SW offered to search pt's chart to determine if there is a number for his sister and pt abruptly stated \"no\". Pt would not elaborate further and became visibly anxious.   SW discussed waiting on [facility] notification to move forward with residential placement; pt is still in agreement.   Pt stated that he needs to order some additional clothing items for himself; SW will assist next week.   "

## 2024-08-15 NOTE — NURSING NOTE
Pt is present on the milieu intermittently. Sits with peers, but minimal interaction noted. He consumed 100% of dinner. Took his medications without incidence. Pt is quite, calm, polite, and cooperative. Denied all psychiatric symptoms. Pt offers no complaints. Able to make needs known. Attended Community meeting group. No behavioral issues. Continuous safety checks maintained.

## 2024-08-15 NOTE — PLAN OF CARE
Problem: Alteration in Thoughts and Perception  Goal: Treatment Goal: Gain control of psychotic behaviors/thinking, reduce/eliminate presenting symptoms and demonstrate improved reality functioning upon discharge  Outcome: Progressing  Goal: Refrain from acting on delusional thinking/internal stimuli  Description: Interventions:  - Monitor patient closely, per order   - Utilize least restrictive measures   - Set reasonable limits, give positive feedback for acceptable   - Administer medications as ordered and monitor of potential side effects  Outcome: Progressing  Goal: Agree to be compliant with medication regime, as prescribed and report medication side effects  Description: Interventions:  - Offer appropriate PRN medication and supervise ingestion; conduct AIMS, as needed   Outcome: Progressing  Goal: Attend and participate in unit activities, including therapeutic, recreational, and educational groups  Description: Interventions:  -Encourage Visitation and family involvement in care  Outcome: Progressing  Goal: Recognize dysfunctional thoughts, communicate reality-based thoughts at the time of discharge  Description: Interventions:  - Provide medication and psycho-education to assist patient in compliance and developing insight into his/her illness   Outcome: Progressing  Goal: Complete daily ADLs, including personal hygiene independently, as able  Description: Interventions:  - Observe, teach, and assist patient with ADLS  - Monitor and promote a balance of rest/activity, with adequate nutrition and elimination   Outcome: Progressing     Problem: Ineffective Coping  Goal: Identifies ineffective coping skills  Outcome: Progressing  Goal: Identifies healthy coping skills  Outcome: Progressing  Goal: Demonstrates healthy coping skills  Outcome: Progressing  Goal: Participates in unit activities  Description: Interventions:  - Provide therapeutic environment   - Provide required programming   - Redirect  inappropriate behaviors   Outcome: Progressing     Problem: Risk for Self Injury/Neglect  Goal: Treatment Goal: Remain safe during length of stay, learn and adopt new coping skills, and be free of self-injurious ideation, impulses and acts at the time of discharge  Outcome: Progressing  Goal: Refrain from harming self  Description: Interventions:  - Monitor patient closely, per order  - Develop a trusting relationship  - Supervise medication ingestion, monitor effects and side effects   Outcome: Progressing  Goal: Recognize maladaptive responses and adopt new coping mechanisms  Outcome: Progressing  Goal: Complete daily ADLs, including personal hygiene independently, as able  Description: Interventions:  - Observe, teach, and assist patient with ADLS  - Monitor and promote a balance of rest/activity, with adequate nutrition and elimination  Outcome: Progressing     Problem: Depression  Goal: Treatment Goal: Demonstrate behavioral control of depressive symptoms, verbalize feelings of improved mood/affect, and adopt new coping skills prior to discharge  Outcome: Progressing  Goal: Refrain from harming self  Description: Interventions:  - Monitor patient closely, per order   - Supervise medication ingestion, monitor effects and side effects   Outcome: Progressing  Goal: Refrain from isolation  Description: Interventions:  - Develop a trusting relationship   - Encourage socialization   Outcome: Progressing     Problem: Anxiety  Goal: Anxiety is at manageable level  Description: Interventions:  - Assess and monitor patient's anxiety level.   - Monitor for signs and symptoms (heart palpitations, chest pain, shortness of breath, headaches, nausea, feeling jumpy, restlessness, irritable, apprehensive).   - Collaborate with interdisciplinary team and initiate plan and interventions as ordered.  - [place] patient to unit/surroundings  - Explain treatment plan  - Encourage participation in care  - Encourage verbalization of  concerns/fears  - Identify coping mechanisms  - Assist in developing anxiety-reducing skills  - Administer/offer alternative therapies  - Limit or eliminate stimulants  Outcome: Progressing     Problem: SELF HARM/SUICIDALITY  Goal: Will have no self-injury during hospital stay  Description: INTERVENTIONS:  - Q 15 MINUTES: Routine safety checks  - Q WAKING SHIFT & PRN: Assess risk to determine if routine checks are adequate to maintain patient safety  - Encourage patient to participate actively in care by formulating a plan to combat response to suicidal ideation, identify supports and resources  Outcome: Progressing     Problem: ANXIETY  Goal: Will report anxiety at manageable levels  Description: INTERVENTIONS:  - Administer medication as ordered  - Teach and encourage coping skills  - Provide emotional support  - Assess patient/family for anxiety and ability to cope  Outcome: Progressing

## 2024-08-15 NOTE — NURSING NOTE
Pt is isolative to self and room. Consumed 100% of all meals. Took medications without incidence. Pt is pleasant and cooperative. Attended 3/10 groups. Denies all psych symptoms. No behavioral issues. Pt offers no complaints. VSS. Continuous safety checks maintained.

## 2024-08-15 NOTE — PROGRESS NOTES
Progress Note - Behavioral Health   Deyvi Cao 54 y.o. male MRN: [id number]  Unit/Bed#: [facility] 101-02 Encounter: [id number]  Code Status: Level 1 - Full Code    Assessment & Plan   Principal Problem:    Bipolar disorder with severe depression (HCC)  Active Problems:    Benign essential hypertension    Mixed hyperlipidemia    Allergic rhinitis due to allergen    Medical clearance for psychiatric admission    Rosacea    Recommended Treatment:     Treatment plan, treatment progress and medication changes were reviewed with Nursing Staff, Pharmacy Service and Case Management in Treatment Team:  1.Continue with group therapy, milieu therapy and occupational therapy   2.Behavioral Health checks every 7 minutes   3.Continue frequent safety checks and vitals per unit protocol  4.Continue with SLIM medical management as indicated  5.Continue with current medication regimen for symptom management: Vraylar 3mg PO Daily, Atarax 25mg PO TID, Lamictal 50mg PO Daily, Zoloft 150mg PO QHS, Lamictal 50mg PO Daily   6.Disposition Planning: Discharge planning and efforts remain ongoing - Awaiting group home placement    Subjective:    Patient was seen today for continuation of care, records reviewed and patient was discussed with the morning case review team.    Deyvi was seen today for psychiatric follow-up.  On assessment today, Deyvi was found in his room.  He is doing well, offers no new concerns.  Reports mild depression and anxiety, but improving since admission.  He is hopeful more hopeful for this future.  Deyvi reports adequate daytime energy and denies any difficulties with initiating or staying asleep.  Oral appetite and hydration is adequate.   We reviewed once more the specific as-needed medications they can use going forward if they experience any insomnia or destabilization of their mood, they understood and were agreeable. Milieu visibility and group attendance encouraged to promote an active participation in  treatment.    Deyvi denies acute suicidal/self-harm ideation/intent/plan upon direct inquiry at this time. Deyvi is able to contract for safety while on the unit and would feel comfortable seeking staff support should suicidal symptoms or urges appear or worsen. Deyvi remains behaviorally appropriate, no agitation or aggression noted on exam or in report. Deyvi also denies HI/AH/VH, and does not appear overtly manic.  Patient does not verbalize any experiences that can be categorized as paranoid, persecutory, bizarre, or somatic delusions. Deyvi remains adherent to his current psychotropic medication regimen and denies any side effects from medications, as well as none noted on exam.    Group Attendance: 5 / 9  Treatment Team: SOLEDAD  Psychiatric PRN's Needed: None    Review of Systems:  Behavior over the last 24 hours: Unchanged  Sleep: sleeping okay throughout the night  Appetite: adequate  Medication side effects: none reported  ROS:no complaints, all other systems are negative    Objective:    Vitals:  Vitals:    08/15/24 0807   BP: 141/82   Pulse: 95   Resp: 18   Temp: (!) 97.1 °F (36.2 °C)   SpO2: 93%     Laboratory Results:  I have personally reviewed all pertinent laboratory/tests results.  Most Recent Labs:   Lab Results   Component Value Date    WBC 7.39 06/26/2024    RBC 4.70 06/26/2024    HGB 15.2 06/26/2024    HCT 45.5 06/26/2024     06/26/2024    RDW 12.9 06/26/2024    NEUTROABS 4.63 06/26/2024    SODIUM 137 06/26/2024    K 4.1 06/26/2024     06/26/2024    CO2 26 06/26/2024    BUN 17 06/26/2024    CREATININE 0.63 06/26/2024    GLUC 98 06/26/2024    GLUF 98 06/26/2024    CALCIUM 9.6 06/26/2024    AST 25 06/26/2024    ALT 45 06/26/2024    ALKPHOS 114 (H) 06/26/2024    TP 7.0 06/26/2024    ALB 4.4 06/26/2024    TBILI 0.44 06/26/2024    CHOLESTEROL 177 06/26/2024    HDL 52 06/26/2024    TRIG 73 06/26/2024    LDLCALC 110 (H) 06/26/2024    NONHDLC 125 06/26/2024    LITHIUM 0.4 (L)  01/28/2021    [id number] 2.636 06/26/2024    HGBA1C 5.2 11/22/2023     11/22/2023     Mental Status Evaluation:  Appearance:  age appropriate, casually dressed, dressed appropriately   Behavior:  pleasant, cooperative, calm   Speech:  normal rate, normal volume, normal pitch   Mood:  less anxious, less depressed   Affect:  slightly brighter   Thought Process:  logical, coherent, goal directed   Associations: intact associations   Thought Content:  no overt delusions   Perceptual Disturbances: no auditory hallucinations, no visual hallucinations, denies when asked, does not appear responding to internal stimuli   Risk Potential: Suicidal ideation - None at present, contracts for safety on the unit, would talk to staff if not feeling safe on the unit  Homicidal ideation - None at present  Potential for aggression - Not at present   Sensorium:  oriented to person, place, and time/date   Memory:  recent memory intact   Consciousness:  alert and awake   Attention/Concentration: attention span and concentration appear shorter than expected for age   Insight:  fair and improving   Judgment: fair and improving   Gait/Station: normal gait/station, normal balance   Motor Activity: no abnormal movements     Progress Toward Goals: making gradual improvement.  Deyvi continues to require inpatient psychiatric hospitalization for continued medication management and titration to optimize symptom reduction, improve sleep hygiene, and demonstrate adequate self-care.     Suicide/Homicide Risk Assessment:  Risk of Harm to Self:   Nursing Suicide Risk Assessment Last 24 hours: C-SSRS Risk (Since Last Contact)  Calculated C-SSRS Risk Score (Since Last Contact): No Risk Indicated    Risk of Harm to Others:  Nursing Homicide Risk Assessment: Violence Risk to Others: Denies within past 6 months    Behavioral Health Medications: all current active meds have been reviewed and continue current psychiatric medications.  Current  Facility-Administered Medications   Medication Dose Route Frequency Provider Last Rate    acetaminophen  650 mg Oral Q6H PRN ALVINA Harley      acetaminophen  650 mg Oral Q4H PRN ALVINA Harley      acetaminophen  975 mg Oral Q6H PRN ALVINA Harley      aluminum-magnesium hydroxide-simethicone  30 mL Oral Q4H PRN ALVINA Harley      ammonium lactate   Topical BID PRN ALVINA Harley      atorvastatin  10 mg Oral Daily ALVINA Lewis      benztropine  1 mg Intramuscular Q4H PRN Max 6/day ALVINA Harley      benztropine  1 mg Oral Q4H PRN Max 6/day ALVINA Harley      bisacodyl  10 mg Rectal Daily PRN ALVINA Harley      cariprazine  3 mg Oral Daily Martin Cardenas MD      Cholecalciferol  2,000 Units Oral Daily ALVINA Lewis      cyanocobalamin  1,000 mcg Oral Daily ALVINA Lewis      hydrOXYzine HCL  25 mg Oral Q6H PRN Max 4/day ALVINA Harley      hydrOXYzine HCL  25 mg Oral TID Martin Cardenas MD      hydrOXYzine HCL  50 mg Oral Q4H PRN Max 4/day ALVINA Harley      Or    LORazepam  1 mg Intramuscular Q4H PRN ALVINA Harley      lamoTRIgine  50 mg Oral Daily Martin Cardenas MD      lisinopril  10 mg Oral Daily ALVINA Lewis      LORazepam  1 mg Oral Q4H PRN Max 6/day ALVINA Harley      Or    LORazepam  2 mg Intramuscular Q6H PRN Max 3/day ALVINA Harley      OLANZapine  10 mg Oral Q3H PRN Max 3/day ALVINA Harley      Or    OLANZapine  10 mg Intramuscular Q3H PRN Max 3/day ALVINA Harley      OLANZapine  5 mg Oral Q3H PRN Max 6/day ALVINA Harley      Or    OLANZapine  5 mg Intramuscular Q3H PRN Max 6/day ALVINA Harley      OLANZapine  2.5 mg Oral Q3H PRN Max 8/day ALVINA Harley      polyethylene glycol  17 g Oral Daily PRN ALVINA Harley      propranolol  10 mg Oral Q8H PRN ALVINA Harley      senna-docusate sodium  1 tablet Oral Daily PRN ALVINA Harley      sertraline   150 mg Oral HS Martin Cardenas MD       Risks / Benefits of Treatment:  Risks, benefits, and possible side effects of medications explained to patient. Patient has limited understanding of risks and benefits of treatment at this time, but agrees to take medications as prescribed.    Counseling / Coordination of Care:  Total floor/unit time spent today 25 minutes. Greater than 50% of total time was spent with the patient and / or family counseling and / or coordination of care. A description of the counseling / coordination of care:   Patient's progress discussed with staff in treatment team meeting.  Medications, treatment progress and treatment plan reviewed with patient.   Educated on importance of medication and treatment compliance.  Reassurance and supportive therapy provided.   Encouraged participation in milieu and group therapy on the unit.    ALVINA Harley 08/15/24

## 2024-08-16 PROCEDURE — 99232 SBSQ HOSP IP/OBS MODERATE 35: CPT

## 2024-08-16 RX ADMIN — HYDROXYZINE HYDROCHLORIDE 25 MG: 25 TABLET ORAL at 16:46

## 2024-08-16 RX ADMIN — CYANOCOBALAMIN TAB 1000 MCG 1000 MCG: 1000 TAB at 08:30

## 2024-08-16 RX ADMIN — ATORVASTATIN CALCIUM 10 MG: 10 TABLET, FILM COATED ORAL at 08:30

## 2024-08-16 RX ADMIN — HYDROXYZINE HYDROCHLORIDE 25 MG: 25 TABLET ORAL at 21:23

## 2024-08-16 RX ADMIN — CHOLECALCIFEROL TAB 25 MCG (1000 UNIT) 2000 UNITS: 25 TAB at 08:30

## 2024-08-16 RX ADMIN — SERTRALINE HYDROCHLORIDE 150 MG: 100 TABLET ORAL at 21:23

## 2024-08-16 RX ADMIN — LISINOPRIL 10 MG: 10 TABLET ORAL at 08:30

## 2024-08-16 RX ADMIN — LAMOTRIGINE 50 MG: 25 TABLET ORAL at 08:30

## 2024-08-16 RX ADMIN — CARIPRAZINE 3 MG: 3 CAPSULE, GELATIN COATED ORAL at 08:30

## 2024-08-16 RX ADMIN — HYDROXYZINE HYDROCHLORIDE 25 MG: 25 TABLET ORAL at 08:30

## 2024-08-16 NOTE — PLAN OF CARE
Problem: Alteration in Thoughts and Perception  Goal: Treatment Goal: Gain control of psychotic behaviors/thinking, reduce/eliminate presenting symptoms and demonstrate improved reality functioning upon discharge  Outcome: Progressing  Goal: Verbalize thoughts and feelings  Description: Interventions:  - Promote a nonjudgmental and trusting relationship with the patient through active listening and therapeutic communication  - Assess patient's level of functioning, behavior and potential for risk  - Engage patient in 1 on 1 interactions  - Encourage patient to express fears, feelings, frustrations, and discuss symptoms    - [place] patient to reality, help patient recognize reality-based thinking   - Administer medications as ordered and assess for potential side effects  - Provide the patient education related to the signs and symptoms of the illness and desired effects of prescribed medications  Outcome: Progressing  Goal: Refrain from acting on delusional thinking/internal stimuli  Description: Interventions:  - Monitor patient closely, per order   - Utilize least restrictive measures   - Set reasonable limits, give positive feedback for acceptable   - Administer medications as ordered and monitor of potential side effects  Outcome: Progressing  Goal: Agree to be compliant with medication regime, as prescribed and report medication side effects  Description: Interventions:  - Offer appropriate PRN medication and supervise ingestion; conduct AIMS, as needed   Outcome: Progressing  Goal: Attend and participate in unit activities, including therapeutic, recreational, and educational groups  Description: Interventions:  -Encourage Visitation and family involvement in care  Outcome: Progressing  Goal: Recognize dysfunctional thoughts, communicate reality-based thoughts at the time of discharge  Description: Interventions:  - Provide medication and psycho-education to assist patient in compliance and developing  insight into his/her illness   Outcome: Progressing  Goal: Complete daily ADLs, including personal hygiene independently, as able  Description: Interventions:  - Observe, teach, and assist patient with ADLS  - Monitor and promote a balance of rest/activity, with adequate nutrition and elimination   Outcome: Progressing     Problem: Ineffective Coping  Goal: Identifies ineffective coping skills  Outcome: Progressing  Goal: Identifies healthy coping skills  Outcome: Progressing  Goal: Demonstrates healthy coping skills  Outcome: Progressing  Goal: Participates in unit activities  Description: Interventions:  - Provide therapeutic environment   - Provide required programming   - Redirect inappropriate behaviors   Outcome: Progressing  Goal: Patient/Family participate in treatment and DC plans  Description: Interventions:  - Provide therapeutic environment  Outcome: Progressing  Goal: Patient/Family verbalizes awareness of resources  Outcome: Progressing  Goal: Understands least restrictive measures  Description: Interventions:  - Utilize least restrictive behavior  Outcome: Progressing  Goal: Free from restraint events  Description: - Utilize least restrictive measures   - Provide behavioral interventions   - Redirect inappropriate behaviors   Outcome: Progressing     Problem: Risk for Self Injury/Neglect  Goal: Treatment Goal: Remain safe during length of stay, learn and adopt new coping skills, and be free of self-injurious ideation, impulses and acts at the time of discharge  Outcome: Progressing  Goal: Verbalize thoughts and feelings  Description: Interventions:  - Assess and re-assess patient's lethality and potential for self-injury  - Engage patient in 1:1 interactions, daily, for a minimum of 15 minutes  - Encourage patient to express feelings, fears, frustrations, hopes  - Establish rapport/trust with patient   Outcome: Progressing  Goal: Refrain from harming self  Description: Interventions:  - Monitor  patient closely, per order  - Develop a trusting relationship  - Supervise medication ingestion, monitor effects and side effects   Outcome: Progressing  Goal: Attend and participate in unit activities, including therapeutic, recreational, and educational groups  Description: Interventions:  - Provide therapeutic and educational activities daily, encourage attendance and participation, and document same in the medical record  - Obtain collateral information, encourage visitation and family involvement in care   Outcome: Progressing  Goal: Recognize maladaptive responses and adopt new coping mechanisms  Outcome: Progressing  Goal: Complete daily ADLs, including personal hygiene independently, as able  Description: Interventions:  - Observe, teach, and assist patient with ADLS  - Monitor and promote a balance of rest/activity, with adequate nutrition and elimination  Outcome: Progressing     Problem: Depression  Goal: Treatment Goal: Demonstrate behavioral control of depressive symptoms, verbalize feelings of improved mood/affect, and adopt new coping skills prior to discharge  Outcome: Progressing  Goal: Verbalize thoughts and feelings  Description: Interventions:  - Assess and re-assess patient's level of risk   - Engage patient in 1:1 interactions, daily, for a minimum of 15 minutes   - Encourage patient to express feelings, fears, frustrations, hopes   Outcome: Progressing  Goal: Refrain from harming self  Description: Interventions:  - Monitor patient closely, per order   - Supervise medication ingestion, monitor effects and side effects   Outcome: Progressing  Goal: Refrain from isolation  Description: Interventions:  - Develop a trusting relationship   - Encourage socialization   Outcome: Progressing  Goal: Refrain from self-neglect  Outcome: Progressing  Goal: Attend and participate in unit activities, including therapeutic, recreational, and educational groups  Description: Interventions:  - Provide  therapeutic and educational activities daily, encourage attendance and participation, and document same in the medical record   Outcome: Progressing  Goal: Complete daily ADLs, including personal hygiene independently, as able  Description: Interventions:  - Observe, teach, and assist patient with ADLS  -  Monitor and promote a balance of rest/activity, with adequate nutrition and elimination   Outcome: Progressing     Problem: Anxiety  Goal: Anxiety is at manageable level  Description: Interventions:  - Assess and monitor patient's anxiety level.   - Monitor for signs and symptoms (heart palpitations, chest pain, shortness of breath, headaches, nausea, feeling jumpy, restlessness, irritable, apprehensive).   - Collaborate with interdisciplinary team and initiate plan and interventions as ordered.  - [place] patient to unit/surroundings  - Explain treatment plan  - Encourage participation in care  - Encourage verbalization of concerns/fears  - Identify coping mechanisms  - Assist in developing anxiety-reducing skills  - Administer/offer alternative therapies  - Limit or eliminate stimulants  Outcome: Progressing     Problem: Risk for Violence/Aggression Toward Others  Goal: Treatment Goal: Refrain from acts of violence/aggression during length of stay, and demonstrate improved impulse control at the time of discharge  Outcome: Progressing  Goal: Verbalize thoughts and feelings  Description: Interventions:  - Assess and re-assess patient's level of risk, every waking shift  - Engage patient in 1:1 interactions, daily, for a minimum of 15 minutes   - Allow patient to express feelings and frustrations in a safe and non-threatening manner   - Establish rapport/trust with patient   Outcome: Progressing  Goal: Refrain from harming others  Outcome: Progressing  Goal: Refrain from destructive acts on the environment or property  Outcome: Progressing  Goal: Control angry outbursts  Description: Interventions:  - Monitor  patient closely, per order  - Ensure early verbal de-escalation  - Monitor prn medication needs  - Set reasonable/therapeutic limits, outline behavioral expectations, and consequences   - Provide a non-threatening milieu, utilizing the least restrictive interventions   Outcome: Progressing  Goal: Attend and participate in unit activities, including therapeutic, recreational, and educational groups  Description: Interventions:  - Provide therapeutic and educational activities daily, encourage attendance and participation, and document same in the medical record   Outcome: Progressing  Goal: Identify appropriate positive anger management techniques  Description: Interventions:  - Offer anger management and coping skills groups   - Staff will provide positive feedback for appropriate anger control  Outcome: Progressing     Problem: Alteration in Orientation  Goal: Treatment Goal: Demonstrate a reduction of confusion and improved orientation to person, place, time and/or situation upon discharge, according to optimum baseline/ability  Outcome: Progressing  Goal: Interact with staff daily  Description: Interventions:  - Assess and re-assess patient's level of orientation  - Engage patient in 1 on 1 interactions, daily, for a minimum of 15 minutes   - Establish rapport/trust with patient   Outcome: Progressing  Goal: Express concerns related to confused thinking related to:  Description: Interventions:  - Encourage patient to express feelings, fears, frustrations, hopes  - Assign consistent caregivers   - [place]/re-orient patient as needed  - Allow comfort items, as appropriate  - Provide visual cues, signs, etc.   Outcome: Progressing  Goal: Allow medical examinations, as recommended  Description: Interventions:  - Provide physical/neurological exams and/or referrals, per provider   Outcome: Progressing  Goal: Cooperate with recommended testing/procedures  Description: Interventions:  - Determine need for ancillary  testing  - Observe for mental status changes  - Implement falls/precaution protocol   Outcome: Progressing  Goal: Attend and participate in unit activities, including therapeutic, recreational, and educational groups  Description: Interventions:  - Provide therapeutic and educational activities daily, encourage attendance and participation, and document same in the medical record   - Provide appropriate opportunities for reminiscence   - Provide a consistent daily routine   - Encourage family contact/visitation   Outcome: Progressing  Goal: Complete daily ADLs, including personal hygiene independently, as able  Description: Interventions:  - Observe, teach, and assist patient with ADLS  Outcome: Progressing     Problem: SELF HARM/SUICIDALITY  Goal: Will have no self-injury during hospital stay  Description: INTERVENTIONS:  - Q 15 MINUTES: Routine safety checks  - Q WAKING SHIFT & PRN: Assess risk to determine if routine checks are adequate to maintain patient safety  - Encourage patient to participate actively in care by formulating a plan to combat response to suicidal ideation, identify supports and resources  Outcome: Progressing     Problem: DEPRESSION  Goal: Will be euthymic at discharge  Description: INTERVENTIONS:  - Administer medication as ordered  - Provide emotional support via 1:1 interaction with staff  - Encourage involvement in milieu/groups/activities  - Monitor for social isolation  Outcome: Progressing     Problem: ANXIETY  Goal: Will report anxiety at manageable levels  Description: INTERVENTIONS:  - Administer medication as ordered  - Teach and encourage coping skills  - Provide emotional support  - Assess patient/family for anxiety and ability to cope  Outcome: Progressing  Goal: By discharge: Patient will verbalize 2 strategies to deal with anxiety  Description: Interventions:  - Identify any obvious source/trigger to anxiety  - Staff will assist patient in applying identified coping  technique/skills  - Encourage attendance of scheduled groups and activities  Outcome: Progressing     Problem: SELF CARE DEFICIT  Goal: Return ADL status to a safe level of function  Description: INTERVENTIONS:  - Administer medication as ordered  - Assess ADL deficits and provide assistive devices as needed  - Obtain PT/OT consults as needed  - Assist and instruct patient to increase activity and self care as tolerated  Outcome: Progressing     Problem: DISCHARGE PLANNING - CARE MANAGEMENT  Goal: Discharge to post-acute care or home with appropriate resources  Description: INTERVENTIONS:  - Conduct assessment to determine patient/family and health care team treatment goals, and need for post-acute services based on payer coverage, community resources, and patient preferences, and barriers to discharge  - Address psychosocial, clinical, and financial barriers to discharge as identified in assessment in conjunction with the patient/family and health care team  - Arrange appropriate level of post-acute services according to patient’s   needs and preference and payer coverage in collaboration with the physician and health care team  - Communicate with and update the patient/family, physician, and health care team regarding progress on the discharge plan  - Arrange appropriate transportation to post-acute venues  Outcome: Progressing

## 2024-08-16 NOTE — PROGRESS NOTES
"Progress Note - Behavioral Health   Deyvi Cao 54 y.o. male MRN: [id number]  Unit/Bed#: [facility] 101-02 Encounter: [id number]    This note was not shared with the patient due to reasonable likelihood of causing patient harm    Patient was seen today for continuation of care, records reviewed and patient was discussed with the morning case review team. Per nursing report, patient has been pleasant and cooperative. Deyvi has been more visible although he still maintains a flat affect. Patient has been medication/meal compliant. He has been stating that the anxiety medications are working well. No acute changes or concerns in the past 24 hours.     Deyvi was seen today for psychiatric follow-up. On assessment today, Deyvi was approached while laying in bed. Patient was cooperative with interview and was calm. He stated that his mood today is \"good\". Reports that sleep has been good and his energy levels have been pretty good. Patient has had a good appetite, reports that he is eating his 3 meals a day. Deyvi denies any major concerns at this time.     Deyvi denies acute suicidal/self-harm ideation/intent/plan upon direct inquiry at this time. Deyvi remains behaviorally appropriate, no agitation or aggression noted on exam or in report. Deyvi also denies HI/AH/VH, and does not appear overtly manic. No overt delusions or paranoia are verbalized. Deyvi remains adherent to his current psychotropic medication regimen and denies any side effects from medications, as well as none noted on exam.    Vitals:  Vitals:    08/17/24 0747   BP: 145/81   Pulse: 94   Resp: 18   Temp: 97.8 °F (36.6 °C)   SpO2: 94%       Laboratory Results:  I have personally reviewed all pertinent laboratory/tests results.    Psychiatric Review of Systems:  Behavior over the last 24 hours:  unchanged.   Sleep: Adequate  Appetite: Adequate  Medication side effects: Denies  ROS: no complaints and all other systems are negative    Mental " Status Evaluation:  Appearance:  age appropriate, casually dressed, dressed appropriately   Behavior:  pleasant, cooperative, calm   Speech:  soft   Mood:  Less anxious and less depressed   Affect:  constricted   Thought Process:  coherent, goal directed   Thought Content:  no overt delusions, no overt paranoia noted on exam   Perceptual Disturbances: denies auditory or visual hallucinations when asked   Risk Potential: Suicidal ideation - None at present  Homicidal ideation - None at present  Potential for aggression - Not at present   Memory:  recent and remote memory grossly intact   Sensorium  person, place, time/date, and situation      Consciousness:  alert and awake   Attention: attention span and concentration appear shorter than expected for age   Insight:  limited   Judgment: limited   Gait/Station: in bed   Motor Activity: no abnormal movements     Progress Toward Goals:   Deyvi is progressing towards goals of inpatient psychiatric treatment by continued medication compliance and is attending therapeutic modalities on the milieu. However, the patient continues to require inpatient psychiatric hospitalization for continued medication management and titration to optimize symptom reduction, improve sleep hygiene, and demonstrate adequate self-care.    Assessment & Plan   Principal Problem:    Bipolar disorder with severe depression (HCC)  Active Problems:    Benign essential hypertension    Mixed hyperlipidemia    Allergic rhinitis due to allergen    Medical clearance for psychiatric admission    [place]      Recommended Treatment: Treatment plan and medication changes discussed and per the attending physician the plan is:    1.Continue with group therapy, milieu therapy and occupational therapy  2.Behavioral Health checks every 7 minutes  3.Continue frequent safety checks and vitals per unit protocol  4.Continue with SLIM medical management as indicated  5.Continue with current medication regimen: Vraylar  3 mg daily, hydroxyzine 25 mg 3 times daily, Lamictal 50 mg daily, sertraline 150 mg at bedtime  6.Will continue to monitor labs  7.Disposition Planning: Discharge planning and efforts remain ongoing    Behavioral Health Medications: all current active meds have been reviewed and continue current psychiatric medications.  Current Facility-Administered Medications   Medication Dose Route Frequency Provider Last Rate    acetaminophen  650 mg Oral Q6H PRN ALVINA Harley      acetaminophen  650 mg Oral Q4H PRN ALVINA Harley      acetaminophen  975 mg Oral Q6H PRN ALVINA Harley      aluminum-magnesium hydroxide-simethicone  30 mL Oral Q4H PRN ALVINA Harley      ammonium lactate   Topical BID PRN ALVINA Harley      atorvastatin  10 mg Oral Daily ALVINA Lewis      benztropine  1 mg Intramuscular Q4H PRN Max 6/day ALVINA Harley      benztropine  1 mg Oral Q4H PRN Max 6/day ALVINA Harley      bisacodyl  10 mg Rectal Daily PRN ALVINA Harley      cariprazine  3 mg Oral Daily Martin Cardenas MD      Cholecalciferol  2,000 Units Oral Daily ALVINA Lewis      cyanocobalamin  1,000 mcg Oral Daily ALVINA Lewis      hydrOXYzine HCL  25 mg Oral Q6H PRN Max 4/day ALVINA Harley      hydrOXYzine HCL  25 mg Oral TID Martin Cardenas MD      hydrOXYzine HCL  50 mg Oral Q4H PRN Max 4/day ALVINA Harley      Or    LORazepam  1 mg Intramuscular Q4H PRN ALVINA Harley      lamoTRIgine  50 mg Oral Daily Martin Cardenas MD      lisinopril  10 mg Oral Daily ALVINA Lewis      LORazepam  1 mg Oral Q4H PRN Max 6/day ALVINA Harley      Or    LORazepam  2 mg Intramuscular Q6H PRN Max 3/day ALVINA Harley      OLANZapine  10 mg Oral Q3H PRN Max 3/day ALVINA Harley      Or    OLANZapine  10 mg Intramuscular Q3H PRN Max 3/day ALVINA Harley      OLANZapine  5 mg Oral Q3H PRN Max 6/day ALVINA Harley      Or    OLANZapine   5 mg Intramuscular Q3H PRN Max 6/day ALVINA Harley      OLANZapine  2.5 mg Oral Q3H PRN Max 8/day ALVINA Harley      polyethylene glycol  17 g Oral Daily PRN ALVINA Harley      propranolol  10 mg Oral Q8H PRN ALVINA Harley      senna-docusate sodium  1 tablet Oral Daily PRN ALVINA Harley      sertraline  150 mg Oral HS Martin Cardenas MD         Risks / Benefits of Treatment:  Risks, benefits, and possible side effects of medications explained to patient and patient verbalizes understanding and agreement for treatment.    Counseling / Coordination of Care:  Patient's progress reviewed with nursing staff.  Medications, treatment progress and treatment plan reviewed with patient.  Supportive counseling provided to the patient.    Total floor/unit time spent today 15 minutes. Greater than 50% of total time was spent with the patient and / or family counseling and / or coordination of care. A description of the counseling / coordination of care: medication education, treatment plan, supportive therapy.    Melva Scruggs PA-C  08/17/24

## 2024-08-16 NOTE — PLAN OF CARE
Problem: Ineffective Coping  Goal: Identifies ineffective coping skills  Outcome: Progressing  Goal: Identifies healthy coping skills  Outcome: Progressing  Goal: Demonstrates healthy coping skills  Outcome: Progressing  Goal: Participates in unit activities  Description: Interventions:  - Provide therapeutic environment   - Provide required programming   - Redirect inappropriate behaviors   Outcome: Progressing    Attended 20/38 of the groups offered during the past 4 days.

## 2024-08-16 NOTE — PROGRESS NOTES
08/16/24 0737   Team Meeting   Meeting Type Daily Rounds   Team Members Present   Team Members Present Physician;Nurse;;Other (Discipline and Name)   Patient/Family Present   Patient Present No   Patient's Family Present No     In attendance:  Dr. Jordan Holter, DO Daniel Teles, RN  Judi Garcia, [facility]  Mer Sales, [facility]  Gris Arizmendi, M.S.    Groups: 3/10    Pt exhibiting no changes. Pt pleasant, keeps to self, minimal social engagements. No bx issues noted. Pt continues to wait on available CRR placement.

## 2024-08-16 NOTE — PROGRESS NOTES
Problem: Fall Injury Risk  Goal: Absence of Fall and Fall-Related Injury  Outcome: Ongoing, Progressing  Intervention: Identify and Manage Contributors to Fall Injury Risk  Flowsheets (Taken 9/19/2020 1430)  Self-Care Promotion:   BADL personal routines maintained   meal setup provided  Medication Review/Management:   medications reviewed   high risk medications identified  Intervention: Promote Injury-Free Environment  Flowsheets (Taken 9/19/2020 1430)  Safety Promotion/Fall Prevention:   bed alarm set   Fall Risk reviewed with patient/family   medications reviewed   muscle strengthening facilitated   room near unit station      Progress Note - Behavioral Health   Deyvi Cao 54 y.o. male MRN: [id number]  Unit/Bed#: [facility] 101-02 Encounter: [id number]  Code Status: Level 1 - Full Code    Assessment & Plan   Principal Problem:    Bipolar disorder with severe depression (HCC)  Active Problems:    Benign essential hypertension    Mixed hyperlipidemia    Allergic rhinitis due to allergen    Medical clearance for psychiatric admission    Rosacea    Recommended Treatment:     Treatment plan, treatment progress and medication changes were reviewed with Nursing Staff, Pharmacy Service and Case Management in Treatment Team:  1.Continue with group therapy, milieu therapy and occupational therapy   2.Behavioral Health checks every 7 minutes   3.Continue frequent safety checks and vitals per unit protocol  4.Continue with SLIM medical management as indicated  5.Continue with current medication regimen for symptom management: Vraylar 3mg PO Daily, Atarax 25mg PO TID, Lamictal 50mg PO Daily, Zoloft 150mg PO QHS, Lamictal 50mg PO Daily   6.Disposition Planning: Discharge planning and efforts remain ongoing - No anticipated medication changes over the weekend, patient continues to wait for group home placement    Subjective:    Patient was seen today for continuation of care, records reviewed and patient was discussed with the morning case review team.    Deyvi was seen today for psychiatric follow-up.  On assessment today, Deyvi was found in his room.  He is pleasant and cooperative.  Deyvi reports adequate daytime energy and denies any difficulties with initiating or staying asleep.  Oral appetite and hydration is adequate.  He offers no new concerns.  Reports an improvement in his mild anxiety and depression.  We reviewed once more the specific as-needed medications they can use going forward if they experience any insomnia or destabilization of their mood, they understood and were agreeable. Milieu visibility and group attendance encouraged to  promote an active participation in treatment.    Deyvi denies acute suicidal/self-harm ideation/intent/plan upon direct inquiry at this time. Deyvi is able to contract for safety while on the unit and would feel comfortable seeking staff support should suicidal symptoms or urges appear or worsen. Deyvi remains behaviorally appropriate, no agitation or aggression noted on exam or in report. Deyvi also denies HI/AH/VH, and does not appear overtly manic.  Patient does not verbalize any experiences that can be categorized as paranoid, persecutory, bizarre, or somatic delusions. Deyvi remains adherent to his current psychotropic medication regimen and denies any side effects from medications, as well as none noted on exam.    Deyvi is stable and ready for discharge, however, even at baseline, Deyvi continues with poor insight, judgement and coping skills that pose a risk to patient in a homeless shelter, or otherwise unsupervised setting. Deyvi is likely to decompensate rapidly, thus, becoming a risk of danger to self/others. Case management is assertively/actively working on securing the necessary level of care.    Group Attendance: 3 / 10  Treatment Team: SOLEDAD  Psychiatric PRN's Needed: None    Review of Systems:  Behavior over the last 24 hours: Slowly improving  Sleep: sleeping okay throughout the night  Appetite: adequate  Medication side effects: none reported  ROS:no complaints, all other systems are negative    Objective:    Vitals:  Vitals:    08/16/24 0734   BP: 153/79   Pulse: 91   Resp: 18   Temp: 98.2 °F (36.8 °C)   SpO2: 96%     Laboratory Results:  I have personally reviewed all pertinent laboratory/tests results.  Most Recent Labs:   Lab Results   Component Value Date    WBC 7.39 06/26/2024    RBC 4.70 06/26/2024    HGB 15.2 06/26/2024    HCT 45.5 06/26/2024     06/26/2024    RDW 12.9 06/26/2024    NEUTROABS 4.63 06/26/2024    SODIUM 137 06/26/2024    K 4.1 06/26/2024      06/26/2024    CO2 26 06/26/2024    BUN 17 06/26/2024    CREATININE 0.63 06/26/2024    GLUC 98 06/26/2024    GLUF 98 06/26/2024    CALCIUM 9.6 06/26/2024    AST 25 06/26/2024    ALT 45 06/26/2024    ALKPHOS 114 (H) 06/26/2024    TP 7.0 06/26/2024    ALB 4.4 06/26/2024    TBILI 0.44 06/26/2024    CHOLESTEROL 177 06/26/2024    HDL 52 06/26/2024    TRIG 73 06/26/2024    LDLCALC 110 (H) 06/26/2024    NONHDLC 125 06/26/2024    LITHIUM 0.4 (L) 01/28/2021    BRT5FXSYHQZN 2.636 06/26/2024    HGBA1C 5.2 11/22/2023     11/22/2023     Mental Status Evaluation:  Appearance:  age appropriate, casually dressed, dressed appropriately, adequate grooming   Behavior:  pleasant, cooperative, calm   Speech:  soft   Mood:  less anxious, less depressed   Affect:  constricted   Thought Process:  goal directed   Associations: intact associations   Thought Content:  no overt delusions   Perceptual Disturbances: no auditory hallucinations, no visual hallucinations, denies when asked, does not appear responding to internal stimuli   Risk Potential: Suicidal ideation - None at present, contracts for safety on the unit, would talk to staff if not feeling safe on the unit  Homicidal ideation - None at present  Potential for aggression - Not at present   Sensorium:  oriented to person, place, and time/date   Memory:  recent memory intact   Consciousness:  alert and awake   Attention/Concentration: attention span and concentration appear shorter than expected for age   Insight:  limited   Judgment: limited   Gait/Station: normal gait/station, normal balance   Motor Activity: no abnormal movements     Progress Toward Goals: making gradual improvement.  Deyvi continues to require inpatient psychiatric hospitalization for continued medication management and titration to optimize symptom reduction, improve sleep hygiene, and demonstrate adequate self-care.     Suicide/Homicide Risk Assessment:  Risk of Harm to Self:   Nursing Suicide Risk  Assessment Last 24 hours: C-SSRS Risk (Since Last Contact)  Calculated C-SSRS Risk Score (Since Last Contact): No Risk Indicated    Risk of Harm to Others:  Nursing Homicide Risk Assessment: Violence Risk to Others: Denies within past 6 months    Behavioral Health Medications: all current active meds have been reviewed and continue current psychiatric medications.  Current Facility-Administered Medications   Medication Dose Route Frequency Provider Last Rate    acetaminophen  650 mg Oral Q6H PRN ALVINA Harley      acetaminophen  650 mg Oral Q4H PRN ALVINA Harley      acetaminophen  975 mg Oral Q6H PRN ALVINA Harley      aluminum-magnesium hydroxide-simethicone  30 mL Oral Q4H PRN ALVINA Harley      ammonium lactate   Topical BID PRN ALVINA Harley      atorvastatin  10 mg Oral Daily ALVINA Lewis      benztropine  1 mg Intramuscular Q4H PRN Max 6/day ALVINA Harley      benztropine  1 mg Oral Q4H PRN Max 6/day ALVINA Harley      bisacodyl  10 mg Rectal Daily PRN ALVINA Harley      cariprazine  3 mg Oral Daily Martin Cardenas MD      Cholecalciferol  2,000 Units Oral Daily ALVINA Lewis      cyanocobalamin  1,000 mcg Oral Daily ALVINA Lewis      hydrOXYzine HCL  25 mg Oral Q6H PRN Max 4/day ALVINA Harley      hydrOXYzine HCL  25 mg Oral TID Martin Cardenas MD      hydrOXYzine HCL  50 mg Oral Q4H PRN Max 4/day ALVINA Harley      Or    LORazepam  1 mg Intramuscular Q4H PRN ALVINA Harley      lamoTRIgine  50 mg Oral Daily Martin Cardenas MD      lisinopril  10 mg Oral Daily ALVINA Lewis      LORazepam  1 mg Oral Q4H PRN Max 6/day ALVINA Harley      Or    LORazepam  2 mg Intramuscular Q6H PRN Max 3/day ALVINA Harley      OLANZapine  10 mg Oral Q3H PRN Max 3/day ALVINA Harley      Or    OLANZapine  10 mg Intramuscular Q3H PRN Max 3/day ALVINA Harley      OLANZapine  5 mg Oral Q3H PRN Max  6/day ALVINA Harley      Or    OLANZapine  5 mg Intramuscular Q3H PRN Max 6/day ALVINA Harley      OLANZapine  2.5 mg Oral Q3H PRN Max 8/day ALVINA Harley      polyethylene glycol  17 g Oral Daily PRN ALVINA Harley      propranolol  10 mg Oral Q8H PRN ALVINA Harley      senna-docusate sodium  1 tablet Oral Daily PRN ALVINA Harley      sertraline  150 mg Oral HS Martin Cardenas MD       Risks / Benefits of Treatment:  Risks, benefits, and possible side effects of medications explained to patient. Patient has limited understanding of risks and benefits of treatment at this time, but agrees to take medications as prescribed.    Counseling / Coordination of Care:  Total floor/unit time spent today 25 minutes. Greater than 50% of total time was spent with the patient and / or family counseling and / or coordination of care. A description of the counseling / coordination of care:   Patient's progress discussed with staff in treatment team meeting.  Medications, treatment progress and treatment plan reviewed with patient.   Educated on importance of medication and treatment compliance.  Reassurance and supportive therapy provided.   Encouraged participation in milieu and group therapy on the unit.    ALVINA Harley 08/16/24

## 2024-08-16 NOTE — NURSING NOTE
Pt is visible on the unit and social with select peers. Consumed 100% of all meals. Took medications without incidence. Pt is pleasant and cooperative. Attended 3/9 groups. Denies all psych symptoms. No behavioral issues. Pt offers no complaints. VSS. Continuous safety checks maintained.

## 2024-08-16 NOTE — NURSING NOTE
Pt is accepting of medications without incidence. Pt denies s/s and offers no concerns. Sits with peers at time, but remains quiet with minimal interaction this morning.

## 2024-08-17 PROCEDURE — 99232 SBSQ HOSP IP/OBS MODERATE 35: CPT | Performed by: PSYCHIATRY & NEUROLOGY

## 2024-08-17 RX ADMIN — HYDROXYZINE HYDROCHLORIDE 25 MG: 25 TABLET ORAL at 08:37

## 2024-08-17 RX ADMIN — CYANOCOBALAMIN TAB 1000 MCG 1000 MCG: 1000 TAB at 08:38

## 2024-08-17 RX ADMIN — CHOLECALCIFEROL TAB 25 MCG (1000 UNIT) 2000 UNITS: 25 TAB at 08:38

## 2024-08-17 RX ADMIN — ATORVASTATIN CALCIUM 10 MG: 10 TABLET, FILM COATED ORAL at 08:37

## 2024-08-17 RX ADMIN — LISINOPRIL 10 MG: 10 TABLET ORAL at 08:37

## 2024-08-17 RX ADMIN — SERTRALINE HYDROCHLORIDE 150 MG: 100 TABLET ORAL at 21:49

## 2024-08-17 RX ADMIN — CARIPRAZINE 3 MG: 3 CAPSULE, GELATIN COATED ORAL at 08:37

## 2024-08-17 RX ADMIN — HYDROXYZINE HYDROCHLORIDE 25 MG: 25 TABLET ORAL at 17:27

## 2024-08-17 RX ADMIN — HYDROXYZINE HYDROCHLORIDE 25 MG: 25 TABLET ORAL at 21:49

## 2024-08-17 RX ADMIN — LAMOTRIGINE 50 MG: 25 TABLET ORAL at 08:38

## 2024-08-17 NOTE — PLAN OF CARE
Problem: Alteration in Thoughts and Perception  Goal: Treatment Goal: Gain control of psychotic behaviors/thinking, reduce/eliminate presenting symptoms and demonstrate improved reality functioning upon discharge  Outcome: Progressing  Goal: Refrain from acting on delusional thinking/internal stimuli  Description: Interventions:  - Monitor patient closely, per order   - Utilize least restrictive measures   - Set reasonable limits, give positive feedback for acceptable   - Administer medications as ordered and monitor of potential side effects  Outcome: Progressing  Goal: Agree to be compliant with medication regime, as prescribed and report medication side effects  Description: Interventions:  - Offer appropriate PRN medication and supervise ingestion; conduct AIMS, as needed   Outcome: Progressing  Goal: Attend and participate in unit activities, including therapeutic, recreational, and educational groups  Description: Interventions:  -Encourage Visitation and family involvement in care  Outcome: Progressing  Goal: Recognize dysfunctional thoughts, communicate reality-based thoughts at the time of discharge  Description: Interventions:  - Provide medication and psycho-education to assist patient in compliance and developing insight into his/her illness   Outcome: Progressing  Goal: Complete daily ADLs, including personal hygiene independently, as able  Description: Interventions:  - Observe, teach, and assist patient with ADLS  - Monitor and promote a balance of rest/activity, with adequate nutrition and elimination   Outcome: Progressing     Problem: Ineffective Coping  Goal: Identifies ineffective coping skills  Outcome: Progressing  Goal: Identifies healthy coping skills  Outcome: Progressing  Goal: Demonstrates healthy coping skills  Outcome: Progressing     Problem: Risk for Self Injury/Neglect  Goal: Treatment Goal: Remain safe during length of stay, learn and adopt new coping skills, and be free of  self-injurious ideation, impulses and acts at the time of discharge  Outcome: Progressing  Goal: Refrain from harming self  Description: Interventions:  - Monitor patient closely, per order  - Develop a trusting relationship  - Supervise medication ingestion, monitor effects and side effects   Outcome: Progressing  Goal: Recognize maladaptive responses and adopt new coping mechanisms  Outcome: Progressing     Problem: Depression  Goal: Treatment Goal: Demonstrate behavioral control of depressive symptoms, verbalize feelings of improved mood/affect, and adopt new coping skills prior to discharge  Outcome: Progressing     Problem: Anxiety  Goal: Anxiety is at manageable level  Description: Interventions:  - Assess and monitor patient's anxiety level.   - Monitor for signs and symptoms (heart palpitations, chest pain, shortness of breath, headaches, nausea, feeling jumpy, restlessness, irritable, apprehensive).   - Collaborate with interdisciplinary team and initiate plan and interventions as ordered.  - [place] patient to unit/surroundings  - Explain treatment plan  - Encourage participation in care  - Encourage verbalization of concerns/fears  - Identify coping mechanisms  - Assist in developing anxiety-reducing skills  - Administer/offer alternative therapies  - Limit or eliminate stimulants  Outcome: Progressing     Problem: ANXIETY  Goal: Will report anxiety at manageable levels  Description: INTERVENTIONS:  - Administer medication as ordered  - Teach and encourage coping skills  - Provide emotional support  - Assess patient/family for anxiety and ability to cope  Outcome: Progressing     Problem: Alteration in Thoughts and Perception  Goal: Verbalize thoughts and feelings  Description: Interventions:  - Promote a nonjudgmental and trusting relationship with the patient through active listening and therapeutic communication  - Assess patient's level of functioning, behavior and potential for risk  - Engage patient  in 1 on 1 interactions  - Encourage patient to express fears, feelings, frustrations, and discuss symptoms    - [place] patient to reality, help patient recognize reality-based thinking   - Administer medications as ordered and assess for potential side effects  - Provide the patient education related to the signs and symptoms of the illness and desired effects of prescribed medications  Outcome: Not Progressing

## 2024-08-17 NOTE — PROGRESS NOTES
"Progress Note - Behavioral Health   Deyvi Cao 54 y.o. male MRN: [id number]  Unit/Bed#: [facility] 101-02 Encounter: [id number]    This note was not shared with the patient due to reasonable likelihood of causing patient harm    Patient was seen today for continuation of care, records reviewed and patient was discussed with the morning case review team. Per nursing report, patient continues to be quiet and he isolates to himself. He has been bright on approach with staff but does not interact with peers often. Patient slept all night. Deyvi remains medication/meal compliant. No acute events or major concerns in the past 24 hours.     Deyvi was seen today for psychiatric follow-up. On assessment today, Deyvi was approached while laying down. Patient was bright on approach and willingly engaged in conversation with this writer. He states that his mood today is \"good\". Sleep was good last night. Reports that appetite and energy levels have been fairly normal. Explains that his energy is lower sometimes and he believes this could be due to the medication. However, patient states that it is not too bothersome. Deyvi denies any major questions or concerns at this time.    Deyvi denies acute suicidal/self-harm ideation/intent/plan upon direct inquiry at this time. Deyvi remains behaviorally appropriate, no agitation or aggression noted on exam or in report. Deyvi also denies HI/AH/VH, and does not appear overtly manic. No overt delusions or paranoia are verbalized. Deyvi remains adherent to his current psychotropic medication regimen and denies any side effects from medications, as well as none noted on exam.    Vitals:  Vitals:    08/18/24 0758   BP: 144/75   Pulse: 81   Resp: 18   Temp: 97.5 °F (36.4 °C)   SpO2: 95%       Laboratory Results:  I have personally reviewed all pertinent laboratory/tests results.    Psychiatric Review of Systems:  Behavior over the last 24 hours:  unchanged.   Sleep: " Appropriate  Appetite: Appropriate  Medication side effects: None  ROS: no complaints and all other systems are negative    Mental Status Evaluation:  Appearance:  age appropriate, casually dressed, adequate grooming   Behavior:  pleasant, cooperative, calm   Speech:  coherent, scant, soft   Mood:  less depressed   Affect:  constricted, mood-congruent, bright on approach   Thought Process:  goal directed, linear   Thought Content:  no overt delusions   Perceptual Disturbances: denies auditory or visual hallucinations when asked, does not appear responding to internal stimuli   Risk Potential: Suicidal ideation - None at present  Homicidal ideation - None at present  Potential for aggression - Not at present   Memory:  recent and remote memory grossly intact   Sensorium  person, place, and time/date      Consciousness:  alert and awake   Attention: attention span and concentration appear shorter than expected for age   Insight:  limited   Judgment: limited   Gait/Station: in bed   Motor Activity: no abnormal movements     Progress Toward Goals:   Deyvi is progressing towards goals of inpatient psychiatric treatment by continued medication compliance and is attending therapeutic modalities on the milieu. However, the patient continues to require inpatient psychiatric hospitalization for continued medication management and titration to optimize symptom reduction, improve sleep hygiene, and demonstrate adequate self-care.    Assessment & Plan   Principal Problem:    Bipolar disorder with severe depression (HCC)  Active Problems:    Benign essential hypertension    Mixed hyperlipidemia    Allergic rhinitis due to allergen    Medical clearance for psychiatric admission    Rosacea      Recommended Treatment: Treatment plan and medication changes discussed and per the attending physician the plan is:    1.Continue with group therapy, milieu therapy and occupational therapy  2.Behavioral Health checks every 7  minutes  3.Continue frequent safety checks and vitals per unit protocol  4.Continue with SLIM medical management as indicated  5.Continue with current medication regimen: Vraylar 3 mg daily, hydroxyzine 25 mg 3 times daily, Lamictal 50 mg daily, sertraline 150 mg at bedtime  6.Will continue to monitor labs  7.Disposition Planning: Discharge planning and efforts remain ongoing    Behavioral Health Medications: all current active meds have been reviewed and continue current psychiatric medications.  Current Facility-Administered Medications   Medication Dose Route Frequency Provider Last Rate    acetaminophen  650 mg Oral Q6H PRN ALVINA Harley      acetaminophen  650 mg Oral Q4H PRN ALVINA Harley      acetaminophen  975 mg Oral Q6H PRN ALVINA Harley      aluminum-magnesium hydroxide-simethicone  30 mL Oral Q4H PRN ALVINA Harley      ammonium lactate   Topical BID PRN ALVINA Harley      atorvastatin  10 mg Oral Daily ALVINA Lewis      benztropine  1 mg Intramuscular Q4H PRN Max 6/day ALVINA Harley      benztropine  1 mg Oral Q4H PRN Max 6/day ALVINA Harley      bisacodyl  10 mg Rectal Daily PRN ALVINA Harley      cariprazine  3 mg Oral Daily Martin Cardenas MD      Cholecalciferol  2,000 Units Oral Daily ALVINA Lewis      cyanocobalamin  1,000 mcg Oral Daily ALVINA Lewis      hydrOXYzine HCL  25 mg Oral Q6H PRN Max 4/day ALVINA Harley      hydrOXYzine HCL  25 mg Oral TID Martin Cardenas MD      hydrOXYzine HCL  50 mg Oral Q4H PRN Max 4/day ALVINA Harley      Or    LORazepam  1 mg Intramuscular Q4H PRN ALVINA Harley      lamoTRIgine  50 mg Oral Daily Martin Cardenas MD      lisinopril  10 mg Oral Daily ALVINA Lewis      LORazepam  1 mg Oral Q4H PRN Max 6/day ALVINA Harley      Or    LORazepam  2 mg Intramuscular Q6H PRN Max 3/day ALVINA Harley      OLANZapine  10 mg Oral Q3H PRN Max 3/day Melva  ALVINA Knutson      Or    OLANZapine  10 mg Intramuscular Q3H PRN Max 3/day ALVINA Harley      OLANZapine  5 mg Oral Q3H PRN Max 6/day ALVINA Harley      Or    OLANZapine  5 mg Intramuscular Q3H PRN Max 6/day ALVINA Harley      OLANZapine  2.5 mg Oral Q3H PRN Max 8/day ALVINA Harley      polyethylene glycol  17 g Oral Daily PRN ALVINA Harley      propranolol  10 mg Oral Q8H PRN ALVINA Harley      senna-docusate sodium  1 tablet Oral Daily PRN ALVINA Harley      sertraline  150 mg Oral HS Martin Cardenas MD         Risks / Benefits of Treatment:  Risks, benefits, and possible side effects of medications explained to patient and patient verbalizes understanding and agreement for treatment.    Counseling / Coordination of Care:  Patient's progress reviewed with nursing staff.  Medications, treatment progress and treatment plan reviewed with patient.  Supportive counseling provided to the patient.    Total floor/unit time spent today 15 minutes. Greater than 50% of total time was spent with the patient and / or family counseling and / or coordination of care. A description of the counseling / coordination of care: medication education, treatment plan, supportive therapy.    Melva Scruggs PA-C  08/18/24

## 2024-08-17 NOTE — NURSING NOTE
Patient calm, quiet, cooperative, behaviors controlled today. Patient med and meal compliant, offers no complaints. Isolative to self in room, visible at times. Patient polite, minimal verbal communication with nursing. Will continue to monitor.

## 2024-08-18 PROCEDURE — 99232 SBSQ HOSP IP/OBS MODERATE 35: CPT | Performed by: PSYCHIATRY & NEUROLOGY

## 2024-08-18 RX ADMIN — CYANOCOBALAMIN TAB 1000 MCG 1000 MCG: 1000 TAB at 09:00

## 2024-08-18 RX ADMIN — HYDROXYZINE HYDROCHLORIDE 25 MG: 25 TABLET ORAL at 09:00

## 2024-08-18 RX ADMIN — CARIPRAZINE 3 MG: 3 CAPSULE, GELATIN COATED ORAL at 09:00

## 2024-08-18 RX ADMIN — LISINOPRIL 10 MG: 10 TABLET ORAL at 09:00

## 2024-08-18 RX ADMIN — HYDROXYZINE HYDROCHLORIDE 25 MG: 25 TABLET ORAL at 17:01

## 2024-08-18 RX ADMIN — CHOLECALCIFEROL TAB 25 MCG (1000 UNIT) 2000 UNITS: 25 TAB at 09:00

## 2024-08-18 RX ADMIN — SERTRALINE HYDROCHLORIDE 150 MG: 100 TABLET ORAL at 21:36

## 2024-08-18 RX ADMIN — LAMOTRIGINE 50 MG: 25 TABLET ORAL at 09:00

## 2024-08-18 RX ADMIN — HYDROXYZINE HYDROCHLORIDE 25 MG: 25 TABLET ORAL at 21:36

## 2024-08-18 RX ADMIN — ATORVASTATIN CALCIUM 10 MG: 10 TABLET, FILM COATED ORAL at 09:00

## 2024-08-18 NOTE — NURSING NOTE
Pt visible on the unit for dinner then in his room, ate 100%. Pt took scheduled medications this evening with no issues. Safety checks ongoing.

## 2024-08-18 NOTE — PLAN OF CARE
Problem: Alteration in Thoughts and Perception  Goal: Treatment Goal: Gain control of psychotic behaviors/thinking, reduce/eliminate presenting symptoms and demonstrate improved reality functioning upon discharge  Outcome: Progressing  Goal: Verbalize thoughts and feelings  Description: Interventions:  - Promote a nonjudgmental and trusting relationship with the patient through active listening and therapeutic communication  - Assess patient's level of functioning, behavior and potential for risk  - Engage patient in 1 on 1 interactions  - Encourage patient to express fears, feelings, frustrations, and discuss symptoms    - [place] patient to reality, help patient recognize reality-based thinking   - Administer medications as ordered and assess for potential side effects  - Provide the patient education related to the signs and symptoms of the illness and desired effects of prescribed medications  Outcome: Progressing  Goal: Refrain from acting on delusional thinking/internal stimuli  Description: Interventions:  - Monitor patient closely, per order   - Utilize least restrictive measures   - Set reasonable limits, give positive feedback for acceptable   - Administer medications as ordered and monitor of potential side effects  Outcome: Progressing  Goal: Agree to be compliant with medication regime, as prescribed and report medication side effects  Description: Interventions:  - Offer appropriate PRN medication and supervise ingestion; conduct AIMS, as needed   Outcome: Progressing  Goal: Attend and participate in unit activities, including therapeutic, recreational, and educational groups  Description: Interventions:  -Encourage Visitation and family involvement in care  Outcome: Progressing  Goal: Recognize dysfunctional thoughts, communicate reality-based thoughts at the time of discharge  Description: Interventions:  - Provide medication and psycho-education to assist patient in compliance and developing  insight into his/her illness   Outcome: Progressing  Goal: Complete daily ADLs, including personal hygiene independently, as able  Description: Interventions:  - Observe, teach, and assist patient with ADLS  - Monitor and promote a balance of rest/activity, with adequate nutrition and elimination   Outcome: Progressing     Problem: Ineffective Coping  Goal: Identifies ineffective coping skills  Outcome: Progressing  Goal: Identifies healthy coping skills  Outcome: Progressing  Goal: Demonstrates healthy coping skills  Outcome: Progressing  Goal: Participates in unit activities  Description: Interventions:  - Provide therapeutic environment   - Provide required programming   - Redirect inappropriate behaviors   Outcome: Progressing  Goal: Patient/Family participate in treatment and DC plans  Description: Interventions:  - Provide therapeutic environment  Outcome: Progressing  Goal: Patient/Family verbalizes awareness of resources  Outcome: Progressing  Goal: Understands least restrictive measures  Description: Interventions:  - Utilize least restrictive behavior  Outcome: Progressing  Goal: Free from restraint events  Description: - Utilize least restrictive measures   - Provide behavioral interventions   - Redirect inappropriate behaviors   Outcome: Progressing     Problem: Risk for Self Injury/Neglect  Goal: Treatment Goal: Remain safe during length of stay, learn and adopt new coping skills, and be free of self-injurious ideation, impulses and acts at the time of discharge  Outcome: Progressing  Goal: Verbalize thoughts and feelings  Description: Interventions:  - Assess and re-assess patient's lethality and potential for self-injury  - Engage patient in 1:1 interactions, daily, for a minimum of 15 minutes  - Encourage patient to express feelings, fears, frustrations, hopes  - Establish rapport/trust with patient   Outcome: Progressing  Goal: Refrain from harming self  Description: Interventions:  - Monitor  patient closely, per order  - Develop a trusting relationship  - Supervise medication ingestion, monitor effects and side effects   Outcome: Progressing  Goal: Attend and participate in unit activities, including therapeutic, recreational, and educational groups  Description: Interventions:  - Provide therapeutic and educational activities daily, encourage attendance and participation, and document same in the medical record  - Obtain collateral information, encourage visitation and family involvement in care   Outcome: Progressing  Goal: Recognize maladaptive responses and adopt new coping mechanisms  Outcome: Progressing  Goal: Complete daily ADLs, including personal hygiene independently, as able  Description: Interventions:  - Observe, teach, and assist patient with ADLS  - Monitor and promote a balance of rest/activity, with adequate nutrition and elimination  Outcome: Progressing     Problem: Depression  Goal: Treatment Goal: Demonstrate behavioral control of depressive symptoms, verbalize feelings of improved mood/affect, and adopt new coping skills prior to discharge  Outcome: Progressing  Goal: Verbalize thoughts and feelings  Description: Interventions:  - Assess and re-assess patient's level of risk   - Engage patient in 1:1 interactions, daily, for a minimum of 15 minutes   - Encourage patient to express feelings, fears, frustrations, hopes   Outcome: Progressing  Goal: Refrain from harming self  Description: Interventions:  - Monitor patient closely, per order   - Supervise medication ingestion, monitor effects and side effects   Outcome: Progressing  Goal: Refrain from isolation  Description: Interventions:  - Develop a trusting relationship   - Encourage socialization   Outcome: Progressing  Goal: Refrain from self-neglect  Outcome: Progressing  Goal: Attend and participate in unit activities, including therapeutic, recreational, and educational groups  Description: Interventions:  - Provide  therapeutic and educational activities daily, encourage attendance and participation, and document same in the medical record   Outcome: Progressing  Goal: Complete daily ADLs, including personal hygiene independently, as able  Description: Interventions:  - Observe, teach, and assist patient with ADLS  -  Monitor and promote a balance of rest/activity, with adequate nutrition and elimination   Outcome: Progressing     Problem: Anxiety  Goal: Anxiety is at manageable level  Description: Interventions:  - Assess and monitor patient's anxiety level.   - Monitor for signs and symptoms (heart palpitations, chest pain, shortness of breath, headaches, nausea, feeling jumpy, restlessness, irritable, apprehensive).   - Collaborate with interdisciplinary team and initiate plan and interventions as ordered.  - [facility] patient to unit/surroundings  - Explain treatment plan  - Encourage participation in care  - Encourage verbalization of concerns/fears  - Identify coping mechanisms  - Assist in developing anxiety-reducing skills  - Administer/offer alternative therapies  - Limit or eliminate stimulants  Outcome: Progressing     Problem: Risk for Violence/Aggression Toward Others  Goal: Treatment Goal: Refrain from acts of violence/aggression during length of stay, and demonstrate improved impulse control at the time of discharge  Outcome: Progressing  Goal: Verbalize thoughts and feelings  Description: Interventions:  - Assess and re-assess patient's level of risk, every waking shift  - Engage patient in 1:1 interactions, daily, for a minimum of 15 minutes   - Allow patient to express feelings and frustrations in a safe and non-threatening manner   - Establish rapport/trust with patient   Outcome: Progressing  Goal: Refrain from harming others  Outcome: Progressing  Goal: Refrain from destructive acts on the environment or property  Outcome: Progressing  Goal: Control angry outbursts  Description: Interventions:  - Monitor  patient closely, per order  - Ensure early verbal de-escalation  - Monitor prn medication needs  - Set reasonable/therapeutic limits, outline behavioral expectations, and consequences   - Provide a non-threatening milieu, utilizing the least restrictive interventions   Outcome: Progressing  Goal: Attend and participate in unit activities, including therapeutic, recreational, and educational groups  Description: Interventions:  - Provide therapeutic and educational activities daily, encourage attendance and participation, and document same in the medical record   Outcome: Progressing  Goal: Identify appropriate positive anger management techniques  Description: Interventions:  - Offer anger management and coping skills groups   - Staff will provide positive feedback for appropriate anger control  Outcome: Progressing     Problem: Alteration in Orientation  Goal: Treatment Goal: Demonstrate a reduction of confusion and improved orientation to person, place, time and/or situation upon discharge, according to optimum baseline/ability  Outcome: Progressing  Goal: Interact with staff daily  Description: Interventions:  - Assess and re-assess patient's level of orientation  - Engage patient in 1 on 1 interactions, daily, for a minimum of 15 minutes   - Establish rapport/trust with patient   Outcome: Progressing  Goal: Express concerns related to confused thinking related to:  Description: Interventions:  - Encourage patient to express feelings, fears, frustrations, hopes  - Assign consistent caregivers   - [place]/re-orient patient as needed  - Allow comfort items, as appropriate  - Provide visual cues, signs, etc.   Outcome: Progressing  Goal: Allow medical examinations, as recommended  Description: Interventions:  - Provide physical/neurological exams and/or referrals, per provider   Outcome: Progressing  Goal: Cooperate with recommended testing/procedures  Description: Interventions:  - Determine need for ancillary  testing  - Observe for mental status changes  - Implement falls/precaution protocol   Outcome: Progressing  Goal: Attend and participate in unit activities, including therapeutic, recreational, and educational groups  Description: Interventions:  - Provide therapeutic and educational activities daily, encourage attendance and participation, and document same in the medical record   - Provide appropriate opportunities for reminiscence   - Provide a consistent daily routine   - Encourage family contact/visitation   Outcome: Progressing  Goal: Complete daily ADLs, including personal hygiene independently, as able  Description: Interventions:  - Observe, teach, and assist patient with ADLS  Outcome: Progressing     Problem: SELF HARM/SUICIDALITY  Goal: Will have no self-injury during hospital stay  Description: INTERVENTIONS:  - Q 15 MINUTES: Routine safety checks  - Q WAKING SHIFT & PRN: Assess risk to determine if routine checks are adequate to maintain patient safety  - Encourage patient to participate actively in care by formulating a plan to combat response to suicidal ideation, identify supports and resources  Outcome: Progressing     Problem: DEPRESSION  Goal: Will be euthymic at discharge  Description: INTERVENTIONS:  - Administer medication as ordered  - Provide emotional support via 1:1 interaction with staff  - Encourage involvement in milieu/groups/activities  - Monitor for social isolation  Outcome: Progressing     Problem: ANXIETY  Goal: Will report anxiety at manageable levels  Description: INTERVENTIONS:  - Administer medication as ordered  - Teach and encourage coping skills  - Provide emotional support  - Assess patient/family for anxiety and ability to cope  Outcome: Progressing  Goal: By discharge: Patient will verbalize 2 strategies to deal with anxiety  Description: Interventions:  - Identify any obvious source/trigger to anxiety  - Staff will assist patient in applying identified coping  technique/skills  - Encourage attendance of scheduled groups and activities  Outcome: Progressing     Problem: SELF CARE DEFICIT  Goal: Return ADL status to a safe level of function  Description: INTERVENTIONS:  - Administer medication as ordered  - Assess ADL deficits and provide assistive devices as needed  - Obtain PT/OT consults as needed  - Assist and instruct patient to increase activity and self care as tolerated  Outcome: Progressing     Problem: DISCHARGE PLANNING - CARE MANAGEMENT  Goal: Discharge to post-acute care or home with appropriate resources  Description: INTERVENTIONS:  - Conduct assessment to determine patient/family and health care team treatment goals, and need for post-acute services based on payer coverage, community resources, and patient preferences, and barriers to discharge  - Address psychosocial, clinical, and financial barriers to discharge as identified in assessment in conjunction with the patient/family and health care team  - Arrange appropriate level of post-acute services according to patient’s   needs and preference and payer coverage in collaboration with the physician and health care team  - Communicate with and update the patient/family, physician, and health care team regarding progress on the discharge plan  - Arrange appropriate transportation to post-acute venues  Outcome: Progressing

## 2024-08-18 NOTE — NURSING NOTE
Pt is visible on the unit, minimal interaction with peers. Pt is scant in conversation. Pt calm and cooperative with assessment. Pt denies anxiety and depression, denies SI/HI/AVH. Pt is meal and medication complaint. Safety checks ongoing.

## 2024-08-18 NOTE — NURSING NOTE
Pt is calm, cooperative.  Compliant with meals and medications. Isolative to  self in room.  Denies psychiatric symptoms. Q 7min checks maintained. No complaints voiced.

## 2024-08-18 NOTE — NURSING NOTE
Patient remained in bed and slept through the night without incident. Staff to maintain continuous rounding for safety and support.

## 2024-08-19 PROCEDURE — 99232 SBSQ HOSP IP/OBS MODERATE 35: CPT | Performed by: PSYCHIATRY & NEUROLOGY

## 2024-08-19 RX ADMIN — ATORVASTATIN CALCIUM 10 MG: 10 TABLET, FILM COATED ORAL at 08:26

## 2024-08-19 RX ADMIN — HYDROXYZINE HYDROCHLORIDE 25 MG: 25 TABLET ORAL at 17:05

## 2024-08-19 RX ADMIN — SERTRALINE HYDROCHLORIDE 150 MG: 100 TABLET ORAL at 21:17

## 2024-08-19 RX ADMIN — CHOLECALCIFEROL TAB 25 MCG (1000 UNIT) 2000 UNITS: 25 TAB at 08:25

## 2024-08-19 RX ADMIN — LAMOTRIGINE 50 MG: 25 TABLET ORAL at 08:25

## 2024-08-19 RX ADMIN — CYANOCOBALAMIN TAB 1000 MCG 1000 MCG: 1000 TAB at 08:25

## 2024-08-19 RX ADMIN — LISINOPRIL 10 MG: 10 TABLET ORAL at 08:26

## 2024-08-19 RX ADMIN — CARIPRAZINE 3 MG: 3 CAPSULE, GELATIN COATED ORAL at 08:26

## 2024-08-19 RX ADMIN — HYDROXYZINE HYDROCHLORIDE 25 MG: 25 TABLET ORAL at 21:17

## 2024-08-19 RX ADMIN — HYDROXYZINE HYDROCHLORIDE 25 MG: 25 TABLET ORAL at 08:25

## 2024-08-19 NOTE — PROGRESS NOTES
08/19/24 0738   Team Meeting   Meeting Type Daily Rounds   Team Members Present   Team Members Present Physician;Nurse;;Other (Discipline and Name)   Patient/Family Present   Patient Present No   Patient's Family Present No     In attendance:  Dr. Alex Thomas, MD Dr. Jordan Holter, DO Melva Knutson, ALVINA Haywood, RN  Judi Garcia, [facility]  Mer Sales, [facility]  BARRETT Elmore.S.    Groups: 3/9    Pt minimally social; generally isolative. Pleasant and responds when engaged with. Pt waiting on [facility] or [facility] availability.

## 2024-08-19 NOTE — PROGRESS NOTES
"Progress Note - Behavioral Health   Deyvi Cao 54 y.o. male MRN: [id number]  Unit/Bed#: [facility] 101-02 Encounter: [id number]  Code Status: Level 1 - Full Code    Assessment & Plan   Principal Problem:    Bipolar disorder with severe depression (HCC)  Active Problems:    Benign essential hypertension    Mixed hyperlipidemia    Allergic rhinitis due to allergen    Medical clearance for psychiatric admission    Rosacea    Recommended Treatment:     Treatment plan, treatment progress and medication changes were reviewed with Nursing Staff, Pharmacy Service and Case Management in Treatment Team:  1.Continue with group therapy, milieu therapy and occupational therapy   2.Behavioral Health checks every 7 minutes   3.Continue frequent safety checks and vitals per unit protocol  4.Continue with SLIM medical management as indicated  5.Continue with current medication regimen for symptom management: Vraylar 3mg PO Daily, Atarax 25mg PO TID, Lamictal 50mg PO Daily, Zoloft 150mg PO QHS, Lamictal 50mg PO Daily   6.Disposition Planning: Discharge planning and efforts remain ongoing - Awaiting group home placement    Subjective:    Patient was seen today for continuation of care, records reviewed and patient was discussed with the morning case review team.    Deyvi was seen today for psychiatric follow-up.  On assessment today, Deyvi was found laying in bed.  He is calm and cooperative. Voices no new concerns.  He states he thinks the medications are \"working\".  He voices mild depression and anxiety, an improvement since admission.  Deyvi reports adequate daytime energy and denies any difficulties with initiating or staying asleep.  Oral appetite and hydration is adequate.   We reviewed once more the specific as-needed medications they can use going forward if they experience any insomnia or destabilization of their mood, they understood and were agreeable. Milieu visibility and group attendance encouraged to promote an " active participation in treatment.    Deyvi denies acute suicidal/self-harm ideation/intent/plan upon direct inquiry at this time. Deyvi is able to contract for safety while on the unit and would feel comfortable seeking staff support should suicidal symptoms or urges appear or worsen. Deyvi remains behaviorally appropriate, no agitation or aggression noted on exam or in report. Deyvi also denies HI/AH/VH, and does not appear overtly manic.  Patient does not verbalize any experiences that can be categorized as paranoid, persecutory, bizarre, or somatic delusions. Deyvi remains adherent to his current psychotropic medication regimen and denies any side effects from medications, as well as none noted on exam.    Group Attendance: 3 / 9  Treatment Team: This Thursday  Psychiatric PRN's Needed: None    Review of Systems:  Behavior over the last 24 hours: Unchanged  Sleep: sleeping okay throughout the night  Appetite: adequate  Medication side effects: none reported  ROS:no complaints, all other systems are negative    Objective:    Vitals:  Vitals:    08/19/24 0737   BP: 153/99   Pulse: 90   Resp: 17   Temp: 98 °F (36.7 °C)   SpO2: 94%     Laboratory Results:  I have personally reviewed all pertinent laboratory/tests results.  Most Recent Labs:   Lab Results   Component Value Date    WBC 7.39 06/26/2024    RBC 4.70 06/26/2024    HGB 15.2 06/26/2024    HCT 45.5 06/26/2024     06/26/2024    RDW 12.9 06/26/2024    NEUTROABS 4.63 06/26/2024    SODIUM 137 06/26/2024    K 4.1 06/26/2024     06/26/2024    CO2 26 06/26/2024    BUN 17 06/26/2024    CREATININE 0.63 06/26/2024    GLUC 98 06/26/2024    GLUF 98 06/26/2024    CALCIUM 9.6 06/26/2024    AST 25 06/26/2024    ALT 45 06/26/2024    ALKPHOS 114 (H) 06/26/2024    TP 7.0 06/26/2024    ALB 4.4 06/26/2024    TBILI 0.44 06/26/2024    CHOLESTEROL 177 06/26/2024    HDL 52 06/26/2024    TRIG 73 06/26/2024    LDLCALC 110 (H) 06/26/2024    NONHDLC 125  06/26/2024    LITHIUM 0.4 (L) 01/28/2021    TLQ4LERQLIRV 2.636 06/26/2024    HGBA1C 5.2 11/22/2023     11/22/2023     Mental Status Evaluation:  Appearance:  age appropriate, casually dressed, dressed appropriately   Behavior:  pleasant, cooperative, calm   Speech:  scant, soft   Mood:  less anxious, less depressed   Affect:  constricted   Thought Process:  goal directed   Associations: intact associations   Thought Content:  no overt delusions   Perceptual Disturbances: no auditory hallucinations, no visual hallucinations, denies when asked, does not appear responding to internal stimuli   Risk Potential: Suicidal ideation - None at present, contracts for safety on the unit, would talk to staff if not feeling safe on the unit  Homicidal ideation - None at present  Potential for aggression - Not at present   Sensorium:  oriented to person, place, and time/date   Memory:  recent memory intact   Consciousness:  alert and awake   Attention/Concentration: attention span and concentration appear shorter than expected for age   Insight:  limited   Judgment: limited   Gait/Station: normal gait/station, normal balance   Motor Activity: no abnormal movements     Progress Toward Goals: making gradual improvement.  Deyvi continues to require inpatient psychiatric hospitalization for continued medication management and titration to optimize symptom reduction, improve sleep hygiene, and demonstrate adequate self-care.     Suicide/Homicide Risk Assessment:  Risk of Harm to Self:   Nursing Suicide Risk Assessment Last 24 hours: C-SSRS Risk (Since Last Contact)  Calculated C-SSRS Risk Score (Since Last Contact): No Risk Indicated    Risk of Harm to Others:  Nursing Homicide Risk Assessment: Violence Risk to Others: Denies within past 6 months    Behavioral Health Medications: all current active meds have been reviewed and continue current psychiatric medications.  Current Facility-Administered Medications   Medication Dose  Route Frequency Provider Last Rate    acetaminophen  650 mg Oral Q6H PRN ALVINA Harley      acetaminophen  650 mg Oral Q4H PRN ALVINA Harley      acetaminophen  975 mg Oral Q6H PRN ALVINA Harley      aluminum-magnesium hydroxide-simethicone  30 mL Oral Q4H PRN ALVINA Harley      ammonium lactate   Topical BID PRN ALVINA Harley      atorvastatin  10 mg Oral Daily ALVINA Lewis      benztropine  1 mg Intramuscular Q4H PRN Max 6/day ALVINA Harley      benztropine  1 mg Oral Q4H PRN Max 6/day ALVINA Harley      bisacodyl  10 mg Rectal Daily PRN ALVINA Harley      cariprazine  3 mg Oral Daily Martin Cardenas MD      Cholecalciferol  2,000 Units Oral Daily ALVINA Lewis      cyanocobalamin  1,000 mcg Oral Daily ALVINA Lewis      hydrOXYzine HCL  25 mg Oral Q6H PRN Max 4/day ALVINA Harley      hydrOXYzine HCL  25 mg Oral TID Martin Cardenas MD      hydrOXYzine HCL  50 mg Oral Q4H PRN Max 4/day ALVINA Harley      Or    LORazepam  1 mg Intramuscular Q4H PRN ALVINA Harley      lamoTRIgine  50 mg Oral Daily Martin Cardenas MD      lisinopril  10 mg Oral Daily ALVINA Lewis      LORazepam  1 mg Oral Q4H PRN Max 6/day ALVINA Harley      Or    LORazepam  2 mg Intramuscular Q6H PRN Max 3/day ALVINA Harley      OLANZapine  10 mg Oral Q3H PRN Max 3/day ALVINA Harley      Or    OLANZapine  10 mg Intramuscular Q3H PRN Max 3/day ALVINA Harley      OLANZapine  5 mg Oral Q3H PRN Max 6/day ALVINA Harley      Or    OLANZapine  5 mg Intramuscular Q3H PRN Max 6/day ALVINA Harley      OLANZapine  2.5 mg Oral Q3H PRN Max 8/day ALVINA Harley      polyethylene glycol  17 g Oral Daily PRN ALVINA Harley      propranolol  10 mg Oral Q8H PRN ALVINA Harley      senna-docusate sodium  1 tablet Oral Daily PRN ALVINA Harley      sertraline  150 mg Oral HS Martin Cardenas MD       Risks /  Benefits of Treatment:  Risks, benefits, and possible side effects of medications explained to patient. Patient has limited understanding of risks and benefits of treatment at this time, but agrees to take medications as prescribed.    Counseling / Coordination of Care:  Total floor/unit time spent today 25 minutes. Greater than 50% of total time was spent with the patient and / or family counseling and / or coordination of care. A description of the counseling / coordination of care:   Patient's progress discussed with staff in treatment team meeting.  Medications, treatment progress and treatment plan reviewed with patient.   Educated on importance of medication and treatment compliance.  Reassurance and supportive therapy provided.   Encouraged participation in milieu and group therapy on the unit.    ALVINA Harley 08/19/24

## 2024-08-19 NOTE — SOCIAL WORK
SW completed brief check in with pt. Pt reports that he has no concerns and is feeling fine. SW inquired when pt would like to complete his ordering of necessary items. Pt struggled to make decision; SW encouraged pt to decide what works best and advocate for self. Pt and SW settled on tomorrow to assist pt with ordering clothing and necessary items.

## 2024-08-19 NOTE — NURSING NOTE
Pt is visible on the unit and social with select peers. Consumed 100% of all meals. Took medications without incidence. Pt is pleasant and cooperative. Attended 5/8 groups. Denies all psych symptoms. No behavioral issues. Pt offers no complaints. VSS. Continuous safety checks maintained.

## 2024-08-19 NOTE — NURSING NOTE
Pt is visible on the unit, social with peers at times. Pt is calm and cooperative with assessment. Pt denies all psych s/s, pt is meal, medication, and group complaint. Safety checks ongoing.

## 2024-08-19 NOTE — PLAN OF CARE
Problem: Alteration in Thoughts and Perception  Goal: Treatment Goal: Gain control of psychotic behaviors/thinking, reduce/eliminate presenting symptoms and demonstrate improved reality functioning upon discharge  Outcome: Progressing  Goal: Verbalize thoughts and feelings  Description: Interventions:  - Promote a nonjudgmental and trusting relationship with the patient through active listening and therapeutic communication  - Assess patient's level of functioning, behavior and potential for risk  - Engage patient in 1 on 1 interactions  - Encourage patient to express fears, feelings, frustrations, and discuss symptoms    - [place] patient to reality, help patient recognize reality-based thinking   - Administer medications as ordered and assess for potential side effects  - Provide the patient education related to the signs and symptoms of the illness and desired effects of prescribed medications  Outcome: Progressing  Goal: Refrain from acting on delusional thinking/internal stimuli  Description: Interventions:  - Monitor patient closely, per order   - Utilize least restrictive measures   - Set reasonable limits, give positive feedback for acceptable   - Administer medications as ordered and monitor of potential side effects  Outcome: Progressing  Goal: Agree to be compliant with medication regime, as prescribed and report medication side effects  Description: Interventions:  - Offer appropriate PRN medication and supervise ingestion; conduct AIMS, as needed   Outcome: Progressing  Goal: Attend and participate in unit activities, including therapeutic, recreational, and educational groups  Description: Interventions:  -Encourage Visitation and family involvement in care  Outcome: Progressing  Goal: Recognize dysfunctional thoughts, communicate reality-based thoughts at the time of discharge  Description: Interventions:  - Provide medication and psycho-education to assist patient in compliance and developing  insight into his/her illness   Outcome: Progressing  Goal: Complete daily ADLs, including personal hygiene independently, as able  Description: Interventions:  - Observe, teach, and assist patient with ADLS  - Monitor and promote a balance of rest/activity, with adequate nutrition and elimination   Outcome: Progressing     Problem: Ineffective Coping  Goal: Identifies ineffective coping skills  Outcome: Progressing  Goal: Identifies healthy coping skills  Outcome: Progressing  Goal: Demonstrates healthy coping skills  Outcome: Progressing  Goal: Participates in unit activities  Description: Interventions:  - Provide therapeutic environment   - Provide required programming   - Redirect inappropriate behaviors   Outcome: Progressing  Goal: Patient/Family participate in treatment and DC plans  Description: Interventions:  - Provide therapeutic environment  Outcome: Progressing  Goal: Patient/Family verbalizes awareness of resources  Outcome: Progressing  Goal: Understands least restrictive measures  Description: Interventions:  - Utilize least restrictive behavior  Outcome: Progressing  Goal: Free from restraint events  Description: - Utilize least restrictive measures   - Provide behavioral interventions   - Redirect inappropriate behaviors   Outcome: Progressing     Problem: Risk for Self Injury/Neglect  Goal: Treatment Goal: Remain safe during length of stay, learn and adopt new coping skills, and be free of self-injurious ideation, impulses and acts at the time of discharge  Outcome: Progressing  Goal: Verbalize thoughts and feelings  Description: Interventions:  - Assess and re-assess patient's lethality and potential for self-injury  - Engage patient in 1:1 interactions, daily, for a minimum of 15 minutes  - Encourage patient to express feelings, fears, frustrations, hopes  - Establish rapport/trust with patient   Outcome: Progressing  Goal: Refrain from harming self  Description: Interventions:  - Monitor  patient closely, per order  - Develop a trusting relationship  - Supervise medication ingestion, monitor effects and side effects   Outcome: Progressing  Goal: Attend and participate in unit activities, including therapeutic, recreational, and educational groups  Description: Interventions:  - Provide therapeutic and educational activities daily, encourage attendance and participation, and document same in the medical record  - Obtain collateral information, encourage visitation and family involvement in care   Outcome: Progressing  Goal: Recognize maladaptive responses and adopt new coping mechanisms  Outcome: Progressing  Goal: Complete daily ADLs, including personal hygiene independently, as able  Description: Interventions:  - Observe, teach, and assist patient with ADLS  - Monitor and promote a balance of rest/activity, with adequate nutrition and elimination  Outcome: Progressing     Problem: Depression  Goal: Treatment Goal: Demonstrate behavioral control of depressive symptoms, verbalize feelings of improved mood/affect, and adopt new coping skills prior to discharge  Outcome: Progressing  Goal: Verbalize thoughts and feelings  Description: Interventions:  - Assess and re-assess patient's level of risk   - Engage patient in 1:1 interactions, daily, for a minimum of 15 minutes   - Encourage patient to express feelings, fears, frustrations, hopes   Outcome: Progressing  Goal: Refrain from harming self  Description: Interventions:  - Monitor patient closely, per order   - Supervise medication ingestion, monitor effects and side effects   Outcome: Progressing  Goal: Refrain from isolation  Description: Interventions:  - Develop a trusting relationship   - Encourage socialization   Outcome: Progressing  Goal: Refrain from self-neglect  Outcome: Progressing  Goal: Attend and participate in unit activities, including therapeutic, recreational, and educational groups  Description: Interventions:  - Provide  therapeutic and educational activities daily, encourage attendance and participation, and document same in the medical record   Outcome: Progressing  Goal: Complete daily ADLs, including personal hygiene independently, as able  Description: Interventions:  - Observe, teach, and assist patient with ADLS  -  Monitor and promote a balance of rest/activity, with adequate nutrition and elimination   Outcome: Progressing     Problem: Anxiety  Goal: Anxiety is at manageable level  Description: Interventions:  - Assess and monitor patient's anxiety level.   - Monitor for signs and symptoms (heart palpitations, chest pain, shortness of breath, headaches, nausea, feeling jumpy, restlessness, irritable, apprehensive).   - Collaborate with interdisciplinary team and initiate plan and interventions as ordered.  - [place] patient to unit/surroundings  - Explain treatment plan  - Encourage participation in care  - Encourage verbalization of concerns/fears  - Identify coping mechanisms  - Assist in developing anxiety-reducing skills  - Administer/offer alternative therapies  - Limit or eliminate stimulants  Outcome: Progressing     Problem: Risk for Violence/Aggression Toward Others  Goal: Treatment Goal: Refrain from acts of violence/aggression during length of stay, and demonstrate improved impulse control at the time of discharge  Outcome: Progressing  Goal: Verbalize thoughts and feelings  Description: Interventions:  - Assess and re-assess patient's level of risk, every waking shift  - Engage patient in 1:1 interactions, daily, for a minimum of 15 minutes   - Allow patient to express feelings and frustrations in a safe and non-threatening manner   - Establish rapport/trust with patient   Outcome: Progressing  Goal: Refrain from harming others  Outcome: Progressing  Goal: Refrain from destructive acts on the environment or property  Outcome: Progressing  Goal: Control angry outbursts  Description: Interventions:  - Monitor  patient closely, per order  - Ensure early verbal de-escalation  - Monitor prn medication needs  - Set reasonable/therapeutic limits, outline behavioral expectations, and consequences   - Provide a non-threatening milieu, utilizing the least restrictive interventions   Outcome: Progressing  Goal: Attend and participate in unit activities, including therapeutic, recreational, and educational groups  Description: Interventions:  - Provide therapeutic and educational activities daily, encourage attendance and participation, and document same in the medical record   Outcome: Progressing  Goal: Identify appropriate positive anger management techniques  Description: Interventions:  - Offer anger management and coping skills groups   - Staff will provide positive feedback for appropriate anger control  Outcome: Progressing     Problem: Alteration in Orientation  Goal: Treatment Goal: Demonstrate a reduction of confusion and improved orientation to person, place, time and/or situation upon discharge, according to optimum baseline/ability  Outcome: Progressing  Goal: Interact with staff daily  Description: Interventions:  - Assess and re-assess patient's level of orientation  - Engage patient in 1 on 1 interactions, daily, for a minimum of 15 minutes   - Establish rapport/trust with patient   Outcome: Progressing  Goal: Express concerns related to confused thinking related to:  Description: Interventions:  - Encourage patient to express feelings, fears, frustrations, hopes  - Assign consistent caregivers   - [place]/re-orient patient as needed  - Allow comfort items, as appropriate  - Provide visual cues, signs, etc.   Outcome: Progressing  Goal: Allow medical examinations, as recommended  Description: Interventions:  - Provide physical/neurological exams and/or referrals, per provider   Outcome: Progressing  Goal: Cooperate with recommended testing/procedures  Description: Interventions:  - Determine need for ancillary  testing  - Observe for mental status changes  - Implement falls/precaution protocol   Outcome: Progressing  Goal: Attend and participate in unit activities, including therapeutic, recreational, and educational groups  Description: Interventions:  - Provide therapeutic and educational activities daily, encourage attendance and participation, and document same in the medical record   - Provide appropriate opportunities for reminiscence   - Provide a consistent daily routine   - Encourage family contact/visitation   Outcome: Progressing  Goal: Complete daily ADLs, including personal hygiene independently, as able  Description: Interventions:  - Observe, teach, and assist patient with ADLS  Outcome: Progressing     Problem: SELF HARM/SUICIDALITY  Goal: Will have no self-injury during hospital stay  Description: INTERVENTIONS:  - Q 15 MINUTES: Routine safety checks  - Q WAKING SHIFT & PRN: Assess risk to determine if routine checks are adequate to maintain patient safety  - Encourage patient to participate actively in care by formulating a plan to combat response to suicidal ideation, identify supports and resources  Outcome: Progressing     Problem: DEPRESSION  Goal: Will be euthymic at discharge  Description: INTERVENTIONS:  - Administer medication as ordered  - Provide emotional support via 1:1 interaction with staff  - Encourage involvement in milieu/groups/activities  - Monitor for social isolation  Outcome: Progressing     Problem: ANXIETY  Goal: Will report anxiety at manageable levels  Description: INTERVENTIONS:  - Administer medication as ordered  - Teach and encourage coping skills  - Provide emotional support  - Assess patient/family for anxiety and ability to cope  Outcome: Progressing  Goal: By discharge: Patient will verbalize 2 strategies to deal with anxiety  Description: Interventions:  - Identify any obvious source/trigger to anxiety  - Staff will assist patient in applying identified coping  technique/skills  - Encourage attendance of scheduled groups and activities  Outcome: Progressing     Problem: SELF CARE DEFICIT  Goal: Return ADL status to a safe level of function  Description: INTERVENTIONS:  - Administer medication as ordered  - Assess ADL deficits and provide assistive devices as needed  - Obtain PT/OT consults as needed  - Assist and instruct patient to increase activity and self care as tolerated  Outcome: Progressing     Problem: DISCHARGE PLANNING - CARE MANAGEMENT  Goal: Discharge to post-acute care or home with appropriate resources  Description: INTERVENTIONS:  - Conduct assessment to determine patient/family and health care team treatment goals, and need for post-acute services based on payer coverage, community resources, and patient preferences, and barriers to discharge  - Address psychosocial, clinical, and financial barriers to discharge as identified in assessment in conjunction with the patient/family and health care team  - Arrange appropriate level of post-acute services according to patient’s   needs and preference and payer coverage in collaboration with the physician and health care team  - Communicate with and update the patient/family, physician, and health care team regarding progress on the discharge plan  - Arrange appropriate transportation to post-acute venues  Outcome: Progressing

## 2024-08-20 PROCEDURE — 99232 SBSQ HOSP IP/OBS MODERATE 35: CPT | Performed by: PSYCHIATRY & NEUROLOGY

## 2024-08-20 RX ADMIN — HYDROXYZINE HYDROCHLORIDE 25 MG: 25 TABLET ORAL at 21:13

## 2024-08-20 RX ADMIN — CARIPRAZINE 3 MG: 3 CAPSULE, GELATIN COATED ORAL at 08:37

## 2024-08-20 RX ADMIN — ATORVASTATIN CALCIUM 10 MG: 10 TABLET, FILM COATED ORAL at 08:37

## 2024-08-20 RX ADMIN — CHOLECALCIFEROL TAB 25 MCG (1000 UNIT) 2000 UNITS: 25 TAB at 08:37

## 2024-08-20 RX ADMIN — SERTRALINE HYDROCHLORIDE 150 MG: 100 TABLET ORAL at 21:13

## 2024-08-20 RX ADMIN — HYDROXYZINE HYDROCHLORIDE 25 MG: 25 TABLET ORAL at 08:37

## 2024-08-20 RX ADMIN — HYDROXYZINE HYDROCHLORIDE 25 MG: 25 TABLET ORAL at 16:58

## 2024-08-20 RX ADMIN — CYANOCOBALAMIN TAB 1000 MCG 1000 MCG: 1000 TAB at 08:37

## 2024-08-20 RX ADMIN — LISINOPRIL 10 MG: 10 TABLET ORAL at 08:37

## 2024-08-20 RX ADMIN — LAMOTRIGINE 50 MG: 25 TABLET ORAL at 08:37

## 2024-08-20 NOTE — PROGRESS NOTES
Progress Note - Behavioral Health   Deyvi Cao 54 y.o. male MRN: [id number]  Unit/Bed#: [facility] 101-02 Encounter: [id number]  Code Status: Level 1 - Full Code    Assessment & Plan   Principal Problem:    Bipolar disorder with severe depression (HCC)  Active Problems:    Benign essential hypertension    Mixed hyperlipidemia    Allergic rhinitis due to allergen    Medical clearance for psychiatric admission    Rosacea    Recommended Treatment:     Treatment plan, treatment progress and medication changes were reviewed with Nursing Staff, Pharmacy Service and Case Management in Treatment Team:  1.Continue with group therapy, milieu therapy and occupational therapy   2.Behavioral Health checks every 7 minutes   3.Continue frequent safety checks and vitals per unit protocol  4.Continue with SLIM medical management as indicated  5.Continue with current medication regimen for symptom management: Vraylar 3mg PO Daily, Atarax 25mg PO TID, Lamictal 50mg PO Daily, Zoloft 150mg PO QHS, Lamictal 50mg PO Daily   6.Disposition Planning: Discharge planning and efforts remain ongoing - Awaiting group home placement    Subjective:    Patient was seen today for continuation of care, records reviewed and patient was discussed with the morning case review team.    Deyvi was seen today for psychiatric follow-up.  On assessment today, Deyvi was found in his room.  He is calm and cooperative.  Offers no new concerns.  Deyvi reports adequate daytime energy and denies any difficulties with initiating or staying asleep.  Oral appetite and hydration is adequate.   We reviewed once more the specific as-needed medications they can use going forward if they experience any insomnia or destabilization of their mood, they understood and were agreeable. Milieu visibility and group attendance encouraged to promote an active participation in treatment.    Deyvi denies acute suicidal/self-harm ideation/intent/plan upon direct inquiry at this  time. Deyvi is able to contract for safety while on the unit and would feel comfortable seeking staff support should suicidal symptoms or urges appear or worsen. Deyvi remains behaviorally appropriate, no agitation or aggression noted on exam or in report. Deyvi also denies HI/AH/VH, and does not appear overtly manic.  Patient does not verbalize any experiences that can be categorized as paranoid, persecutory, bizarre, or somatic delusions. Deyvi remains adherent to his current psychotropic medication regimen and denies any side effects from medications, as well as none noted on exam.    Group Attendance: 5 / 8  Treatment Team: This Thursday  Psychiatric PRN's Needed: None    Review of Systems:  Behavior over the last 24 hours: Unchanged  Sleep: sleeping okay throughout the night  Appetite: adequate  Medication side effects: none reported  ROS:no complaints, all other systems are negative    Objective:    Vitals:  Vitals:    08/20/24 0735   BP: 125/89   Pulse: 87   Resp: 18   Temp: 97.6 °F (36.4 °C)   SpO2: 95%     Laboratory Results:  I have personally reviewed all pertinent laboratory/tests results.  Most Recent Labs:   Lab Results   Component Value Date    WBC 7.39 06/26/2024    RBC 4.70 06/26/2024    HGB 15.2 06/26/2024    HCT 45.5 06/26/2024     06/26/2024    RDW 12.9 06/26/2024    NEUTROABS 4.63 06/26/2024    SODIUM 137 06/26/2024    K 4.1 06/26/2024     06/26/2024    CO2 26 06/26/2024    BUN 17 06/26/2024    CREATININE 0.63 06/26/2024    GLUC 98 06/26/2024    GLUF 98 06/26/2024    CALCIUM 9.6 06/26/2024    AST 25 06/26/2024    ALT 45 06/26/2024    ALKPHOS 114 (H) 06/26/2024    TP 7.0 06/26/2024    ALB 4.4 06/26/2024    TBILI 0.44 06/26/2024    CHOLESTEROL 177 06/26/2024    HDL 52 06/26/2024    TRIG 73 06/26/2024    LDLCALC 110 (H) 06/26/2024    NONHDLC 125 06/26/2024    LITHIUM 0.4 (L) 01/28/2021    RPR1IXIOJTBS 2.636 06/26/2024    HGBA1C 5.2 11/22/2023     11/22/2023     Mental  Status Evaluation:  Appearance:  age appropriate, casually dressed   Behavior:  pleasant, cooperative, calm   Speech:  scant, soft   Mood:  less anxious, less depressed   Affect:  constricted   Thought Process:  goal directed   Associations: intact associations   Thought Content:  no overt delusions   Perceptual Disturbances: no auditory hallucinations, no visual hallucinations, denies when asked, does not appear responding to internal stimuli   Risk Potential: Suicidal ideation - None at present, contracts for safety on the unit, would talk to staff if not feeling safe on the unit  Homicidal ideation - None at present  Potential for aggression - Not at present   Sensorium:  oriented to person, place, and time/date   Memory:  recent memory intact   Consciousness:  alert and awake   Attention/Concentration: attention span and concentration appear shorter than expected for age   Insight:  limited   Judgment: limited   Gait/Station: normal gait/station, normal balance   Motor Activity: no abnormal movements     Progress Toward Goals: making gradual improvement.  Deyvi continues to require inpatient psychiatric hospitalization for continued medication management and titration to optimize symptom reduction, improve sleep hygiene, and demonstrate adequate self-care.     Suicide/Homicide Risk Assessment:  Risk of Harm to Self:   Nursing Suicide Risk Assessment Last 24 hours: C-SSRS Risk (Since Last Contact)  Calculated C-SSRS Risk Score (Since Last Contact): No Risk Indicated    Risk of Harm to Others:  Nursing Homicide Risk Assessment: Violence Risk to Others: Denies within past 6 months    Behavioral Health Medications: all current active meds have been reviewed and continue current psychiatric medications.  Current Facility-Administered Medications   Medication Dose Route Frequency Provider Last Rate    acetaminophen  650 mg Oral Q6H PRN ALVINA Harley      acetaminophen  650 mg Oral Q4H PRN ALVINA Harley       acetaminophen  975 mg Oral Q6H PRN ALVINA Harley      aluminum-magnesium hydroxide-simethicone  30 mL Oral Q4H PRN ALVINA Harley      ammonium lactate   Topical BID PRN ALVINA Harley      atorvastatin  10 mg Oral Daily Sarygurinder LinkALVINA Thompson      benztropine  1 mg Intramuscular Q4H PRN Max 6/day ALVINA Harley      benztropine  1 mg Oral Q4H PRN Max 6/day ALVINA Harley      bisacodyl  10 mg Rectal Daily PRN ALVINA Harley      cariprazine  3 mg Oral Daily Martin Cardenas MD      Cholecalciferol  2,000 Units Oral Daily Sary LinkALVINA Thompson      cyanocobalamin  1,000 mcg Oral Daily SaryALVINA Starkey      hydrOXYzine HCL  25 mg Oral Q6H PRN Max 4/day ALVINA Harley      hydrOXYzine HCL  25 mg Oral TID Martin Cardenas MD      hydrOXYzine HCL  50 mg Oral Q4H PRN Max 4/day ALVINA Harley      Or    LORazepam  1 mg Intramuscular Q4H PRN ALVINA Harley      lamoTRIgine  50 mg Oral Daily Martin Cardenas MD      lisinopril  10 mg Oral Daily SaryALVINA Starkey      LORazepam  1 mg Oral Q4H PRN Max 6/day ALVINA Harley      Or    LORazepam  2 mg Intramuscular Q6H PRN Max 3/day ALVINA Harley      OLANZapine  10 mg Oral Q3H PRN Max 3/day ALVINA Harley      Or    OLANZapine  10 mg Intramuscular Q3H PRN Max 3/day ALVINA Harley      OLANZapine  5 mg Oral Q3H PRN Max 6/day ALVINA Harley      Or    OLANZapine  5 mg Intramuscular Q3H PRN Max 6/day ALVINA Harley      OLANZapine  2.5 mg Oral Q3H PRN Max 8/day ALVINA Harley      polyethylene glycol  17 g Oral Daily PRN ALVINA Harley      propranolol  10 mg Oral Q8H PRN ALVINA Harley      senna-docusate sodium  1 tablet Oral Daily PRN ALVINA Harley      sertraline  150 mg Oral HS Martin Cardenas MD       Risks / Benefits of Treatment:  Risks, benefits, and possible side effects of medications explained to patient. Patient has limited understanding of risks and benefits  of treatment at this time, but agrees to take medications as prescribed.    Counseling / Coordination of Care:  Total floor/unit time spent today 25 minutes. Greater than 50% of total time was spent with the patient and / or family counseling and / or coordination of care. A description of the counseling / coordination of care:   Patient's progress discussed with staff in treatment team meeting.  Medications, treatment progress and treatment plan reviewed with patient.   Educated on importance of medication and treatment compliance.  Reassurance and supportive therapy provided.   Encouraged participation in milieu and group therapy on the unit.    ALVINA Harley 08/20/24

## 2024-08-20 NOTE — PLAN OF CARE
Problem: Alteration in Thoughts and Perception  Goal: Treatment Goal: Gain control of psychotic behaviors/thinking, reduce/eliminate presenting symptoms and demonstrate improved reality functioning upon discharge  Outcome: Progressing  Goal: Verbalize thoughts and feelings  Description: Interventions:  - Promote a nonjudgmental and trusting relationship with the patient through active listening and therapeutic communication  - Assess patient's level of functioning, behavior and potential for risk  - Engage patient in 1 on 1 interactions  - Encourage patient to express fears, feelings, frustrations, and discuss symptoms    - [place] patient to reality, help patient recognize reality-based thinking   - Administer medications as ordered and assess for potential side effects  - Provide the patient education related to the signs and symptoms of the illness and desired effects of prescribed medications  Outcome: Progressing  Goal: Refrain from acting on delusional thinking/internal stimuli  Description: Interventions:  - Monitor patient closely, per order   - Utilize least restrictive measures   - Set reasonable limits, give positive feedback for acceptable   - Administer medications as ordered and monitor of potential side effects  Outcome: Progressing  Goal: Agree to be compliant with medication regime, as prescribed and report medication side effects  Description: Interventions:  - Offer appropriate PRN medication and supervise ingestion; conduct AIMS, as needed   Outcome: Progressing  Goal: Attend and participate in unit activities, including therapeutic, recreational, and educational groups  Description: Interventions:  -Encourage Visitation and family involvement in care  Outcome: Progressing  Goal: Recognize dysfunctional thoughts, communicate reality-based thoughts at the time of discharge  Description: Interventions:  - Provide medication and psycho-education to assist patient in compliance and developing  insight into his/her illness   Outcome: Progressing  Goal: Complete daily ADLs, including personal hygiene independently, as able  Description: Interventions:  - Observe, teach, and assist patient with ADLS  - Monitor and promote a balance of rest/activity, with adequate nutrition and elimination   Outcome: Progressing     Problem: Ineffective Coping  Goal: Identifies ineffective coping skills  Outcome: Progressing  Goal: Identifies healthy coping skills  Outcome: Progressing  Goal: Demonstrates healthy coping skills  Outcome: Progressing  Goal: Participates in unit activities  Description: Interventions:  - Provide therapeutic environment   - Provide required programming   - Redirect inappropriate behaviors   Outcome: Progressing     Problem: Risk for Self Injury/Neglect  Goal: Treatment Goal: Remain safe during length of stay, learn and adopt new coping skills, and be free of self-injurious ideation, impulses and acts at the time of discharge  Outcome: Progressing  Goal: Refrain from harming self  Description: Interventions:  - Monitor patient closely, per order  - Develop a trusting relationship  - Supervise medication ingestion, monitor effects and side effects   Outcome: Progressing  Goal: Attend and participate in unit activities, including therapeutic, recreational, and educational groups  Description: Interventions:  - Provide therapeutic and educational activities daily, encourage attendance and participation, and document same in the medical record  - Obtain collateral information, encourage visitation and family involvement in care   Outcome: Progressing  Goal: Recognize maladaptive responses and adopt new coping mechanisms  Outcome: Progressing  Goal: Complete daily ADLs, including personal hygiene independently, as able  Description: Interventions:  - Observe, teach, and assist patient with ADLS  - Monitor and promote a balance of rest/activity, with adequate nutrition and elimination  Outcome:  Progressing     Problem: Depression  Goal: Treatment Goal: Demonstrate behavioral control of depressive symptoms, verbalize feelings of improved mood/affect, and adopt new coping skills prior to discharge  Outcome: Progressing     Problem: Anxiety  Goal: Anxiety is at manageable level  Description: Interventions:  - Assess and monitor patient's anxiety level.   - Monitor for signs and symptoms (heart palpitations, chest pain, shortness of breath, headaches, nausea, feeling jumpy, restlessness, irritable, apprehensive).   - Collaborate with interdisciplinary team and initiate plan and interventions as ordered.  - [place] patient to unit/surroundings  - Explain treatment plan  - Encourage participation in care  - Encourage verbalization of concerns/fears  - Identify coping mechanisms  - Assist in developing anxiety-reducing skills  - Administer/offer alternative therapies  - Limit or eliminate stimulants  Outcome: Progressing     Problem: SELF HARM/SUICIDALITY  Goal: Will have no self-injury during hospital stay  Description: INTERVENTIONS:  - Q 15 MINUTES: Routine safety checks  - Q WAKING SHIFT & PRN: Assess risk to determine if routine checks are adequate to maintain patient safety  - Encourage patient to participate actively in care by formulating a plan to combat response to suicidal ideation, identify supports and resources  Outcome: Progressing     Problem: ANXIETY  Goal: Will report anxiety at manageable levels  Description: INTERVENTIONS:  - Administer medication as ordered  - Teach and encourage coping skills  - Provide emotional support  - Assess patient/family for anxiety and ability to cope  Outcome: Progressing

## 2024-08-20 NOTE — SOCIAL WORK
SW and pt met 1:1  SW provided support and resources for pt to order additional clothing for himself. SW reviewed pt's current functioning. Pt is quiet, reserved, polite, denies symptoms. Order completed for clothing items.

## 2024-08-20 NOTE — NURSING NOTE
Pt is visible on the unit, social with select peers. Pt is pleasant and cooperative with assessment. Pt denies anxiety and depression, denies SI/HI/AVH. Pt is meal, medication, and group compliant. Safety checks ongoing.

## 2024-08-20 NOTE — PROGRESS NOTES
08/20/24 0731   Team Meeting   Meeting Type Daily Rounds   Team Members Present   Team Members Present Physician;Nurse;;Other (Discipline and Name)   Patient/Family Present   Patient Present No   Patient's Family Present No     In attendance:  Dr. Alex Thomas, MD Dr. Jordan Holter, DO Mahamed Haywood, RN  Judi Garcia, \[facility] [place] [facility]\""W  Mer Sales, \[facility] [place] [facility]\""W  Gris Arizmendi M.S.    Groups: 5/8    Pt denies symptoms; visible and pleasant. Pt keeps to self; no bx issues noted. Pt waiting on [facility] or [facility] availability.

## 2024-08-21 PROCEDURE — 99232 SBSQ HOSP IP/OBS MODERATE 35: CPT | Performed by: PSYCHIATRY & NEUROLOGY

## 2024-08-21 RX ADMIN — HYDROXYZINE HYDROCHLORIDE 25 MG: 25 TABLET ORAL at 21:39

## 2024-08-21 RX ADMIN — CYANOCOBALAMIN TAB 1000 MCG 1000 MCG: 1000 TAB at 08:23

## 2024-08-21 RX ADMIN — LAMOTRIGINE 50 MG: 25 TABLET ORAL at 08:22

## 2024-08-21 RX ADMIN — HYDROXYZINE HYDROCHLORIDE 25 MG: 25 TABLET ORAL at 08:23

## 2024-08-21 RX ADMIN — ATORVASTATIN CALCIUM 10 MG: 10 TABLET, FILM COATED ORAL at 08:23

## 2024-08-21 RX ADMIN — CARIPRAZINE 3 MG: 3 CAPSULE, GELATIN COATED ORAL at 08:22

## 2024-08-21 RX ADMIN — LISINOPRIL 10 MG: 10 TABLET ORAL at 08:23

## 2024-08-21 RX ADMIN — HYDROXYZINE HYDROCHLORIDE 25 MG: 25 TABLET ORAL at 16:51

## 2024-08-21 RX ADMIN — CHOLECALCIFEROL TAB 25 MCG (1000 UNIT) 2000 UNITS: 25 TAB at 08:22

## 2024-08-21 RX ADMIN — SERTRALINE HYDROCHLORIDE 150 MG: 100 TABLET ORAL at 21:39

## 2024-08-21 NOTE — NURSING NOTE
The patient is alert and oriented. He is soft spoken and guarded in conversation. He is visible on the unit. He was more interactive with staff and peers. He played a game outside with writer and peers. He is compliant with medications. He denied symptoms. He are 100% of breakfast and lunch. He attended groups.

## 2024-08-21 NOTE — PLAN OF CARE
Problem: Alteration in Thoughts and Perception  Goal: Agree to be compliant with medication regime, as prescribed and report medication side effects  Description: Interventions:  - Offer appropriate PRN medication and supervise ingestion; conduct AIMS, as needed   Outcome: Progressing  Goal: Complete daily ADLs, including personal hygiene independently, as able  Description: Interventions:  - Observe, teach, and assist patient with ADLS  - Monitor and promote a balance of rest/activity, with adequate nutrition and elimination   Outcome: Progressing     Problem: Ineffective Coping  Goal: Demonstrates healthy coping skills  Outcome: Progressing     Problem: Risk for Self Injury/Neglect  Goal: Refrain from harming self  Description: Interventions:  - Monitor patient closely, per order  - Develop a trusting relationship  - Supervise medication ingestion, monitor effects and side effects   Outcome: Progressing  Goal: Complete daily ADLs, including personal hygiene independently, as able  Description: Interventions:  - Observe, teach, and assist patient with ADLS  - Monitor and promote a balance of rest/activity, with adequate nutrition and elimination  Outcome: Progressing     Problem: Risk for Violence/Aggression Toward Others  Goal: Control angry outbursts  Description: Interventions:  - Monitor patient closely, per order  - Ensure early verbal de-escalation  - Monitor prn medication needs  - Set reasonable/therapeutic limits, outline behavioral expectations, and consequences   - Provide a non-threatening milieu, utilizing the least restrictive interventions   Outcome: Progressing

## 2024-08-21 NOTE — PROGRESS NOTES
Progress Note - Behavioral Health   Deyvi Cao 54 y.o. male MRN: [id number]  Unit/Bed#: [facility] 101-02 Encounter: [id number]  Code Status: Level 1 - Full Code    Assessment & Plan   Principal Problem:    Bipolar disorder with severe depression (HCC)  Active Problems:    Benign essential hypertension    Mixed hyperlipidemia    Allergic rhinitis due to allergen    Medical clearance for psychiatric admission    Rosacea    Recommended Treatment:     Treatment plan, treatment progress and medication changes were reviewed with Nursing Staff, Pharmacy Service and Case Management in Treatment Team:  1.Continue with group therapy, milieu therapy and occupational therapy   2.Behavioral Health checks every 7 minutes   3.Continue frequent safety checks and vitals per unit protocol  4.Continue with SLIM medical management as indicated  5.Continue with current medication regimen for symptom management: Vraylar 3mg PO Daily, Atarax 25mg PO TID, Lamictal 50mg PO Daily, Zoloft 150mg PO QHS, Lamictal 50mg PO Daily   6.Disposition Planning: Discharge planning and efforts remain ongoing - Awaiting group home placement    Subjective:    Patient was seen today for continuation of care, records reviewed and patient was discussed with the morning case review team.    Deyvi was seen today for psychiatric follow-up.  On assessment today, Deyvi was found in his room.  He is doing okay, still guarded at times but states everything is okay.  Feels mild depression and anxiety, but states it is improving.  Deyvi reports adequate daytime energy and denies any difficulties with initiating or staying asleep.  Oral appetite and hydration is adequate.   We reviewed once more the specific as-needed medications they can use going forward if they experience any insomnia or destabilization of their mood, they understood and were agreeable. Milieu visibility and group attendance encouraged to promote an active participation in treatment.    Deyvi  denies acute suicidal/self-harm ideation/intent/plan upon direct inquiry at this time. Deyvi is able to contract for safety while on the unit and would feel comfortable seeking staff support should suicidal symptoms or urges appear or worsen. Deyvi remains behaviorally appropriate, no agitation or aggression noted on exam or in report. Deyvi also denies HI/AH/VH, and does not appear overtly manic.  Patient does not verbalize any experiences that can be categorized as paranoid, persecutory, bizarre, or somatic delusions. Deyvi remains adherent to his current psychotropic medication regimen and denies any side effects from medications, as well as none noted on exam.    Group Attendance: 3 / 10  Treatment Team: This Thursday  Psychiatric PRN's Needed: None    Review of Systems:  Behavior over the last 24 hours: Unchanged  Sleep: sleeping okay throughout the night  Appetite: adequate  Medication side effects: none reported  ROS:no complaints, all other systems are negative    Objective:    Vitals:  Vitals:    08/21/24 0727   BP: 152/88   Pulse: 89   Resp: 18   Temp: 98.1 °F (36.7 °C)   SpO2: 95%     Laboratory Results:  I have personally reviewed all pertinent laboratory/tests results.  Most Recent Labs:   Lab Results   Component Value Date    WBC 7.39 06/26/2024    RBC 4.70 06/26/2024    HGB 15.2 06/26/2024    HCT 45.5 06/26/2024     06/26/2024    RDW 12.9 06/26/2024    NEUTROABS 4.63 06/26/2024    SODIUM 137 06/26/2024    K 4.1 06/26/2024     06/26/2024    CO2 26 06/26/2024    BUN 17 06/26/2024    CREATININE 0.63 06/26/2024    GLUC 98 06/26/2024    GLUF 98 06/26/2024    CALCIUM 9.6 06/26/2024    AST 25 06/26/2024    ALT 45 06/26/2024    ALKPHOS 114 (H) 06/26/2024    TP 7.0 06/26/2024    ALB 4.4 06/26/2024    TBILI 0.44 06/26/2024    CHOLESTEROL 177 06/26/2024    HDL 52 06/26/2024    TRIG 73 06/26/2024    LDLCALC 110 (H) 06/26/2024    NONHDLC 125 06/26/2024    LITHIUM 0.4 (L) 01/28/2021     [id number] 2.636 06/26/2024    HGBA1C 5.2 11/22/2023     11/22/2023     Mental Status Evaluation:  Appearance:  age appropriate, casually dressed, dressed appropriately   Behavior:  pleasant, cooperative, calm   Speech:  scant, soft   Mood:  less anxious, less depressed   Affect:  constricted   Thought Process:  organized, logical, coherent, goal directed   Associations: intact associations   Thought Content:  no overt delusions   Perceptual Disturbances: no auditory hallucinations, no visual hallucinations, denies when asked, does not appear responding to internal stimuli   Risk Potential: Suicidal ideation - None at present, contracts for safety on the unit, would talk to staff if not feeling safe on the unit  Homicidal ideation - None at present  Potential for aggression - Not at present   Sensorium:  oriented to person, place, and time/date   Memory:  recent memory intact   Consciousness:  alert and awake   Attention/Concentration: attention span and concentration appear shorter than expected for age   Insight:  limited   Judgment: limited   Gait/Station: normal gait/station, normal balance   Motor Activity: no abnormal movements     Progress Toward Goals: making gradual improvement.  Deyvi continues to require inpatient psychiatric hospitalization for continued medication management and titration to optimize symptom reduction, improve sleep hygiene, and demonstrate adequate self-care.     Suicide/Homicide Risk Assessment:  Risk of Harm to Self:   Nursing Suicide Risk Assessment Last 24 hours: C-SSRS Risk (Since Last Contact)  Calculated C-SSRS Risk Score (Since Last Contact): No Risk Indicated    Risk of Harm to Others:  Nursing Homicide Risk Assessment: Violence Risk to Others: Denies within past 6 months    Behavioral Health Medications: all current active meds have been reviewed and continue current psychiatric medications.  Current Facility-Administered Medications   Medication Dose Route  Frequency Provider Last Rate    acetaminophen  650 mg Oral Q6H PRN ALVINA Harley      acetaminophen  650 mg Oral Q4H PRN ALVINA Harley      acetaminophen  975 mg Oral Q6H PRN ALVINA Harley      aluminum-magnesium hydroxide-simethicone  30 mL Oral Q4H PRN ALVINA Harley      ammonium lactate   Topical BID PRN ALVINA Harley      atorvastatin  10 mg Oral Daily ALVINA Lewis      benztropine  1 mg Intramuscular Q4H PRN Max 6/day ALVINA Harley      benztropine  1 mg Oral Q4H PRN Max 6/day ALVINA Harley      bisacodyl  10 mg Rectal Daily PRN ALVINA Harley      cariprazine  3 mg Oral Daily Martin Cardenas MD      Cholecalciferol  2,000 Units Oral Daily ALVINA Lewis      cyanocobalamin  1,000 mcg Oral Daily ALVINA Leiws      hydrOXYzine HCL  25 mg Oral Q6H PRN Max 4/day ALVINA Harley      hydrOXYzine HCL  25 mg Oral TID Martin Cardenas MD      hydrOXYzine HCL  50 mg Oral Q4H PRN Max 4/day ALVINA Harley      Or    LORazepam  1 mg Intramuscular Q4H PRN ALVINA Harley      lamoTRIgine  50 mg Oral Daily Martin Cardenas MD      lisinopril  10 mg Oral Daily ALVINA Lewis      LORazepam  1 mg Oral Q4H PRN Max 6/day ALVINA Harley      Or    LORazepam  2 mg Intramuscular Q6H PRN Max 3/day ALVINA Harley      OLANZapine  10 mg Oral Q3H PRN Max 3/day ALVINA Harley      Or    OLANZapine  10 mg Intramuscular Q3H PRN Max 3/day ALVINA Harley      OLANZapine  5 mg Oral Q3H PRN Max 6/day ALVINA Harley      Or    OLANZapine  5 mg Intramuscular Q3H PRN Max 6/day ALVINA Harley      OLANZapine  2.5 mg Oral Q3H PRN Max 8/day ALVINA Harley      polyethylene glycol  17 g Oral Daily PRN ALVINA Harley      propranolol  10 mg Oral Q8H PRN ALVINA Harley      senna-docusate sodium  1 tablet Oral Daily PRN ALVINA Harley      sertraline  150 mg Oral HS Martin Cardenas MD       Risks / Benefits of  Treatment:  Risks, benefits, and possible side effects of medications explained to patient. Patient has limited understanding of risks and benefits of treatment at this time, but agrees to take medications as prescribed.    Counseling / Coordination of Care:  Total floor/unit time spent today 25 minutes. Greater than 50% of total time was spent with the patient and / or family counseling and / or coordination of care. A description of the counseling / coordination of care:   Patient's progress discussed with staff in treatment team meeting.  Medications, treatment progress and treatment plan reviewed with patient.   Educated on importance of medication and treatment compliance.  Reassurance and supportive therapy provided.   Encouraged participation in milieu and group therapy on the unit.    ALVINA Harley 08/21/24

## 2024-08-21 NOTE — PROGRESS NOTES
08/21/24 0719   Team Meeting   Meeting Type Daily Rounds   Team Members Present   Team Members Present Physician;Nurse;;Other (Discipline and Name)   Patient/Family Present   Patient Present No   Patient's Family Present No     In attendance:  Dr. Alex Thomas, MD Dr. Jordan Holter, DO Mahamed Haywood, RN  Judi Garcia, [facility]  Mer Sales, [facility]  BARRETT Elmore.S.    Groups: 3/10    Pt quiet, pleasant, no bx issues noted. Pt denies symptoms and reports he is at his baseline. Pt continues to wait on [facility] availability within [facility].

## 2024-08-22 PROCEDURE — 99232 SBSQ HOSP IP/OBS MODERATE 35: CPT | Performed by: PSYCHIATRY & NEUROLOGY

## 2024-08-22 RX ADMIN — HYDROXYZINE HYDROCHLORIDE 25 MG: 25 TABLET ORAL at 08:35

## 2024-08-22 RX ADMIN — CHOLECALCIFEROL TAB 25 MCG (1000 UNIT) 2000 UNITS: 25 TAB at 08:35

## 2024-08-22 RX ADMIN — LAMOTRIGINE 50 MG: 25 TABLET ORAL at 08:35

## 2024-08-22 RX ADMIN — HYDROXYZINE HYDROCHLORIDE 25 MG: 25 TABLET ORAL at 21:14

## 2024-08-22 RX ADMIN — LISINOPRIL 10 MG: 10 TABLET ORAL at 08:35

## 2024-08-22 RX ADMIN — ATORVASTATIN CALCIUM 10 MG: 10 TABLET, FILM COATED ORAL at 08:35

## 2024-08-22 RX ADMIN — CYANOCOBALAMIN TAB 1000 MCG 1000 MCG: 1000 TAB at 08:35

## 2024-08-22 RX ADMIN — SERTRALINE HYDROCHLORIDE 150 MG: 100 TABLET ORAL at 21:14

## 2024-08-22 RX ADMIN — HYDROXYZINE HYDROCHLORIDE 25 MG: 25 TABLET ORAL at 17:06

## 2024-08-22 RX ADMIN — CARIPRAZINE 3 MG: 3 CAPSULE, GELATIN COATED ORAL at 08:35

## 2024-08-22 NOTE — NURSING NOTE
Visible on unit. Pleasant and cooperative. Brightens with approach.. Compliant with meds and meals. Denies psychiatric symptoms. Attending groups.  No behavior issues.

## 2024-08-22 NOTE — NURSING NOTE
Pt is visible in the milieu intermittently. Sits with peers, but minimal socialization. He consumed 100% of dinner. Took his medications without incidence. Pt is polite, pleasant, and cooperative. Denied all psychiatric symptoms. No behavioral issues.

## 2024-08-22 NOTE — PROGRESS NOTES
08/22/24 1230   Team Meeting   Meeting Type Tx Team Meeting   Initial Conference Date 08/22/24   Next Conference Date 09/21/24   Team Members Present   Team Members Present Physician;Nurse;   Physician Team Member Kim Knutson   Nursing Team Member Chastity   Social Work Team Member Jose FRY   Patient/Family Present   Patient Present Yes   Patient's Family Present No     Pt attended a review of his tx plan; pt expressed understanding and expressed no concerns. Pt and tx team signed plan; copy placed in pt's chart; pt denied need for additional copy.

## 2024-08-22 NOTE — PROGRESS NOTES
Progress Note - Behavioral Health   Deyvi Cao 54 y.o. male MRN: [id number]  Unit/Bed#: [facility] 101-02 Encounter: [id number]  Code Status: Level 1 - Full Code    Assessment & Plan   Principal Problem:    Bipolar disorder with severe depression (HCC)  Active Problems:    Benign essential hypertension    Mixed hyperlipidemia    Allergic rhinitis due to allergen    Medical clearance for psychiatric admission    Rosacea    Recommended Treatment:     Treatment plan, treatment progress and medication changes were reviewed with Nursing Staff, Pharmacy Service and Case Management in Treatment Team:  1.Continue with group therapy, milieu therapy and occupational therapy   2.Behavioral Health checks every 7 minutes   3.Continue frequent safety checks and vitals per unit protocol  4.Continue with SLIM medical management as indicated  5.Continue with current medication regimen for symptom management: Vraylar 3mg PO Daily, Atarax 25mg PO TID, Lamictal 50mg PO Daily, Zoloft 150mg PO QHS, Lamictal 50mg PO Daily   6.Disposition Planning: Discharge planning and efforts remain ongoing - Awaiting group home placement    Subjective:    Patient was seen today for continuation of care, records reviewed and patient was discussed with the morning case review team.    Deyvi was seen today for psychiatric follow-up.  On assessment today, Deyvi was present during his treatment team.  He is doing well.  Offers no new concerns.  Deyvi reports adequate daytime energy and denies any difficulties with initiating or staying asleep.  Oral appetite and hydration is adequate.   We reviewed once more the specific as-needed medications they can use going forward if they experience any insomnia or destabilization of their mood, they understood and were agreeable. Milieu visibility and group attendance encouraged to promote an active participation in treatment.    Deyvi denies acute suicidal/self-harm ideation/intent/plan upon direct inquiry at  this time. Deyvi is able to contract for safety while on the unit and would feel comfortable seeking staff support should suicidal symptoms or urges appear or worsen. Deyvi remains behaviorally appropriate, no agitation or aggression noted on exam or in report. Deyvi also denies HI/AH/VH, and does not appear overtly manic.  Patient does not verbalize any experiences that can be categorized as paranoid, persecutory, bizarre, or somatic delusions. Deyvi remains adherent to his current psychotropic medication regimen and denies any side effects from medications, as well as none noted on exam.    Review of Systems:  Behavior over the last 24 hours: Unchanged  Sleep: sleeping okay throughout the night  Appetite: adequate  Medication side effects: none reported  ROS:no complaints, all other systems are negative    Objective:    Vitals:  Vitals:    08/22/24 0731   BP: 143/73   Pulse: 86   Resp: 19   Temp: 98.2 °F (36.8 °C)   SpO2: 94%     Laboratory Results:  I have personally reviewed all pertinent laboratory/tests results.  Most Recent Labs:   Lab Results   Component Value Date    WBC 7.39 06/26/2024    RBC 4.70 06/26/2024    HGB 15.2 06/26/2024    HCT 45.5 06/26/2024     06/26/2024    RDW 12.9 06/26/2024    NEUTROABS 4.63 06/26/2024    SODIUM 137 06/26/2024    K 4.1 06/26/2024     06/26/2024    CO2 26 06/26/2024    BUN 17 06/26/2024    CREATININE 0.63 06/26/2024    GLUC 98 06/26/2024    GLUF 98 06/26/2024    CALCIUM 9.6 06/26/2024    AST 25 06/26/2024    ALT 45 06/26/2024    ALKPHOS 114 (H) 06/26/2024    TP 7.0 06/26/2024    ALB 4.4 06/26/2024    TBILI 0.44 06/26/2024    CHOLESTEROL 177 06/26/2024    HDL 52 06/26/2024    TRIG 73 06/26/2024    LDLCALC 110 (H) 06/26/2024    NONHDLC 125 06/26/2024    LITHIUM 0.4 (L) 01/28/2021    [id number] 2.636 06/26/2024    HGBA1C 5.2 11/22/2023     11/22/2023     Mental Status Evaluation:  Appearance:  age appropriate, casually dressed, dressed  appropriately   Behavior:  pleasant, cooperative, calm   Speech:  scant, soft   Mood:  less anxious, less depressed   Affect:  constricted   Thought Process:  organized, logical, coherent, goal directed   Associations: intact associations   Thought Content:  no overt delusions   Perceptual Disturbances: no auditory hallucinations, no visual hallucinations, denies when asked, does not appear responding to internal stimuli   Risk Potential: Suicidal ideation - None at present, contracts for safety on the unit, would talk to staff if not feeling safe on the unit  Homicidal ideation - None at present  Potential for aggression - Not at present   Sensorium:  oriented to person, place, and time/date   Memory:  recent memory intact   Consciousness:  alert and awake   Attention/Concentration: attention span and concentration appear shorter than expected for age   Insight:  limited   Judgment: limited   Gait/Station: normal gait/station, normal balance   Motor Activity: no abnormal movements     Progress Toward Goals: making gradual improvement.  Deyvi continues to require inpatient psychiatric hospitalization for continued medication management and titration to optimize symptom reduction, improve sleep hygiene, and demonstrate adequate self-care.     Suicide/Homicide Risk Assessment:  Risk of Harm to Self:   Nursing Suicide Risk Assessment Last 24 hours: C-SSRS Risk (Since Last Contact)  Calculated C-SSRS Risk Score (Since Last Contact): No Risk Indicated    Risk of Harm to Others:  Nursing Homicide Risk Assessment: Violence Risk to Others: Denies within past 6 months    Behavioral Health Medications: all current active meds have been reviewed and continue current psychiatric medications.  Current Facility-Administered Medications   Medication Dose Route Frequency Provider Last Rate    acetaminophen  650 mg Oral Q6H PRN ALVINA Harley      acetaminophen  650 mg Oral Q4H PRN ALVINA Harley      acetaminophen  975 mg  Oral Q6H PRN ALVINA Harley      aluminum-magnesium hydroxide-simethicone  30 mL Oral Q4H PRN ALVINA Harley      ammonium lactate   Topical BID PRN ALVINA Harley      atorvastatin  10 mg Oral Daily Sary LinkALVINA Thompson      benztropine  1 mg Intramuscular Q4H PRN Max 6/day ALVINA Harley      benztropine  1 mg Oral Q4H PRN Max 6/day ALVINA Harley      bisacodyl  10 mg Rectal Daily PRN ALVINA Harley      cariprazine  3 mg Oral Daily Martin Cardenas MD      Cholecalciferol  2,000 Units Oral Daily Sary Rodriguez ALVINA Biggs      cyanocobalamin  1,000 mcg Oral Daily Sary LinkALVINA Thompson      hydrOXYzine HCL  25 mg Oral Q6H PRN Max 4/day ALVINA Harley      hydrOXYzine HCL  25 mg Oral TID Martin Cardenas MD      hydrOXYzine HCL  50 mg Oral Q4H PRN Max 4/day ALVINA Harley      Or    LORazepam  1 mg Intramuscular Q4H PRN ALVINA Harley      lamoTRIgine  50 mg Oral Daily Martin Cardenas MD      lisinopril  10 mg Oral Daily Sary Rodriguez ALVINA Biggs      LORazepam  1 mg Oral Q4H PRN Max 6/day ALVINA Harley      Or    LORazepam  2 mg Intramuscular Q6H PRN Max 3/day ALVINA Harley      OLANZapine  10 mg Oral Q3H PRN Max 3/day ALVINA Harley      Or    OLANZapine  10 mg Intramuscular Q3H PRN Max 3/day ALVINA Harley      OLANZapine  5 mg Oral Q3H PRN Max 6/day ALVINA Harley      Or    OLANZapine  5 mg Intramuscular Q3H PRN Max 6/day ALVINA Harley      OLANZapine  2.5 mg Oral Q3H PRN Max 8/day ALVINA Harley      polyethylene glycol  17 g Oral Daily PRN ALVINA Harley      propranolol  10 mg Oral Q8H PRN ALVINA Harley      senna-docusate sodium  1 tablet Oral Daily PRN ALVINA Harley      sertraline  150 mg Oral HS Martin Cardenas MD       Risks / Benefits of Treatment:  Risks, benefits, and possible side effects of medications explained to patient. Patient has limited understanding of risks and benefits of treatment at this  time, but agrees to take medications as prescribed.    Counseling / Coordination of Care:  Total floor/unit time spent today 25 minutes. Greater than 50% of total time was spent with the patient and / or family counseling and / or coordination of care. A description of the counseling / coordination of care:   Patient's progress discussed with staff in treatment team meeting.  Medications, treatment progress and treatment plan reviewed with patient.   Educated on importance of medication and treatment compliance.  Reassurance and supportive therapy provided.   Encouraged participation in milieu and group therapy on the unit.    ALVINA Harley 08/22/24

## 2024-08-22 NOTE — PROGRESS NOTES
08/22/24 1227   Team Meeting   Meeting Type Tx Team Meeting   Initial Conference Date 08/22/24   Next Conference Date 09/05/24   Team Members Present   Team Members Present Physician;Nurse;;Other (Discipline and Name)   Physician Team Member ALVINA Knutson   Nursing Team Member Chastity   Social Work Team Member ANG Garcia   Other (Discipline and Name) Yasmin [place]   Patient/Family Present   Patient Present Yes   Patient's Family Present No   OTHER   Team Meeting - Additional Comments Pt attended his tx team meeting. Pt shared that he is feeling much better and feels ready to discharge and move forward with his life. Pt endorses ongoing mild depression and reports this is his baseline. SW inquired with the county if there is any movement toward pt's E[facility] or [facility] options; Yasmin reported no updates at this time.

## 2024-08-22 NOTE — PLAN OF CARE
Problem: Alteration in Thoughts and Perception  Goal: Treatment Goal: Gain control of psychotic behaviors/thinking, reduce/eliminate presenting symptoms and demonstrate improved reality functioning upon discharge  Outcome: Progressing  Goal: Verbalize thoughts and feelings  Description: Interventions:  - Promote a nonjudgmental and trusting relationship with the patient through active listening and therapeutic communication  - Assess patient's level of functioning, behavior and potential for risk  - Engage patient in 1 on 1 interactions  - Encourage patient to express fears, feelings, frustrations, and discuss symptoms    - Rigby patient to reality, help patient recognize reality-based thinking   - Administer medications as ordered and assess for potential side effects  - Provide the patient education related to the signs and symptoms of the illness and desired effects of prescribed medications  Outcome: Progressing  Goal: Refrain from acting on delusional thinking/internal stimuli  Description: Interventions:  - Monitor patient closely, per order   - Utilize least restrictive measures   - Set reasonable limits, give positive feedback for acceptable   - Administer medications as ordered and monitor of potential side effects  Outcome: Progressing  Goal: Agree to be compliant with medication regime, as prescribed and report medication side effects  Description: Interventions:  - Offer appropriate PRN medication and supervise ingestion; conduct AIMS, as needed   Outcome: Progressing  Goal: Attend and participate in unit activities, including therapeutic, recreational, and educational groups  Description: Interventions:  -Encourage Visitation and family involvement in care  Outcome: Progressing

## 2024-08-22 NOTE — SOCIAL WORK
SW brought pt's [employer] packages to him to open and make sure what he ordered is there. Pt identified one item is missing; SW verified it shipped separately and is delayed. BHT will inventory and assist pt with getting his clothing. Pt cooperative.

## 2024-08-22 NOTE — PROGRESS NOTES
08/22/24 2773   Team Meeting   Meeting Type Daily Rounds   Team Members Present   Team Members Present Physician;Nurse;;Other (Discipline and Name)   Patient/Family Present   Patient Present No   Patient's Family Present No     In attendance:  Dr. Alex Thomas, MD Dr. Jordan Holter, DO Mahamed Haywood, RN  Judi Garcia, \[facility]\""W  Mer Sales, \[facility]\""W  Gris Arizmendi, M.S.    Groups: 6/9    Pt visible, received new clothes ordered. Pt quiet, reserved; no bx issues noted. Pt waiting on ECRR or [facility] availability.

## 2024-08-22 NOTE — NURSING NOTE
Pt is accepting of medications without incidence and meal compliant. Pt is polite, but quiet in conversation. Guarded and denies s/s. Pt is visible sitting with peers, but with minimal conversation. No new concerns.

## 2024-08-23 PROCEDURE — 99232 SBSQ HOSP IP/OBS MODERATE 35: CPT | Performed by: PSYCHIATRY & NEUROLOGY

## 2024-08-23 RX ADMIN — SERTRALINE HYDROCHLORIDE 150 MG: 100 TABLET ORAL at 21:26

## 2024-08-23 RX ADMIN — HYDROXYZINE HYDROCHLORIDE 25 MG: 25 TABLET ORAL at 08:39

## 2024-08-23 RX ADMIN — LISINOPRIL 10 MG: 10 TABLET ORAL at 08:39

## 2024-08-23 RX ADMIN — HYDROXYZINE HYDROCHLORIDE 25 MG: 25 TABLET ORAL at 17:16

## 2024-08-23 RX ADMIN — CYANOCOBALAMIN TAB 1000 MCG 1000 MCG: 1000 TAB at 08:39

## 2024-08-23 RX ADMIN — ATORVASTATIN CALCIUM 10 MG: 10 TABLET, FILM COATED ORAL at 08:39

## 2024-08-23 RX ADMIN — CHOLECALCIFEROL TAB 25 MCG (1000 UNIT) 2000 UNITS: 25 TAB at 08:39

## 2024-08-23 RX ADMIN — HYDROXYZINE HYDROCHLORIDE 25 MG: 25 TABLET ORAL at 21:26

## 2024-08-23 RX ADMIN — CARIPRAZINE 3 MG: 3 CAPSULE, GELATIN COATED ORAL at 08:39

## 2024-08-23 RX ADMIN — LAMOTRIGINE 50 MG: 25 TABLET ORAL at 08:39

## 2024-08-23 NOTE — PROGRESS NOTES
08/23/24 0745   Team Meeting   Meeting Type Daily Rounds   Team Members Present   Team Members Present Physician;Nurse;;Other (Discipline and Name)   Patient/Family Present   Patient Present No   Patient's Family Present No     In attendance:  MD Mahamed Sousa, RN  Judi Garcia, [facility]  Mer Sales, [facility]  Gris Arizmendi, M.S.    Groups: 5/9    Pt obtained new clothing he ordered. Pt guarded at times, quiet, flat, blunted. No bx issues noted. Pt waiting on ECRR availability.

## 2024-08-23 NOTE — PROGRESS NOTES
Progress Note - Behavioral Health   Deyvi Cao 54 y.o. male MRN: [id number]  Unit/Bed#: [facility] 101-02 Encounter: [id number]  Code Status: Level 1 - Full Code    Assessment & Plan   Principal Problem:    Bipolar disorder with severe depression (HCC)  Active Problems:    Benign essential hypertension    Mixed hyperlipidemia    Allergic rhinitis due to allergen    Medical clearance for psychiatric admission    Rosacea    Recommended Treatment:     Treatment plan, treatment progress and medication changes were reviewed with Nursing Staff, Pharmacy Service and Case Management in Treatment Team:  1.Continue with group therapy, milieu therapy and occupational therapy   2.Behavioral Health checks every 7 minutes   3.Continue frequent safety checks and vitals per unit protocol  4.Continue with SLIM medical management as indicated  5.Continue with current medication regimen for symptom management: Vraylar 3mg PO Daily, Atarax 25mg PO TID, Lamictal 50mg PO Daily, Zoloft 150mg PO QHS, Lamictal 50mg PO Daily   6.Disposition Planning: Discharge planning and efforts remain ongoing - Awaiting group home placement    Subjective:    Patient was seen today for continuation of care, records reviewed and patient was discussed with the morning case review team.    Deyvi was seen today for psychiatric follow-up.  On assessment today, Deyvi was calm and cooperative.  He is doing well.  Offers no new concerns.  Deyvi reports adequate daytime energy and denies any difficulties with initiating or staying asleep.  Oral appetite and hydration is adequate.  We reviewed once more the specific as-needed medications they can use going forward if they experience any insomnia or destabilization of their mood, they understood and were agreeable. Milieu visibility and group attendance encouraged to promote an active participation in treatment.    Deyvi denies acute suicidal/self-harm ideation/intent/plan upon direct inquiry at this time.  Deyvi is able to contract for safety while on the unit and would feel comfortable seeking staff support should suicidal symptoms or urges appear or worsen. Deyvi remains behaviorally appropriate, no agitation or aggression noted on exam or in report. Deyvi also denies HI/AH/VH, and does not appear overtly manic.  Patient does not verbalize any experiences that can be categorized as paranoid, persecutory, bizarre, or somatic delusions. Deyvi remains adherent to his current psychotropic medication regimen and denies any side effects from medications, as well as none noted on exam.    Group Attendance: 5 / 9  Treatment Team: TBD  Psychiatric PRN's Needed: None    Review of Systems:  Behavior over the last 24 hours: Slowly improving  Sleep: sleeping okay throughout the night  Appetite: adequate  Medication side effects: none reported  ROS:no complaints, all other systems are negative    Objective:    Vitals:  Vitals:    08/23/24 0726   BP: 129/78   Pulse:    Resp: 18   Temp: 97.9 °F (36.6 °C)   SpO2: 98%     Laboratory Results:  I have personally reviewed all pertinent laboratory/tests results.  Most Recent Labs:   Lab Results   Component Value Date    WBC 7.39 06/26/2024    RBC 4.70 06/26/2024    HGB 15.2 06/26/2024    HCT 45.5 06/26/2024     06/26/2024    RDW 12.9 06/26/2024    NEUTROABS 4.63 06/26/2024    SODIUM 137 06/26/2024    K 4.1 06/26/2024     06/26/2024    CO2 26 06/26/2024    BUN 17 06/26/2024    CREATININE 0.63 06/26/2024    GLUC 98 06/26/2024    GLUF 98 06/26/2024    CALCIUM 9.6 06/26/2024    AST 25 06/26/2024    ALT 45 06/26/2024    ALKPHOS 114 (H) 06/26/2024    TP 7.0 06/26/2024    ALB 4.4 06/26/2024    TBILI 0.44 06/26/2024    CHOLESTEROL 177 06/26/2024    HDL 52 06/26/2024    TRIG 73 06/26/2024    LDLCALC 110 (H) 06/26/2024    NONHDLC 125 06/26/2024    LITHIUM 0.4 (L) 01/28/2021    [id number] 2.636 06/26/2024    HGBA1C 5.2 11/22/2023     11/22/2023     Mental Status  Evaluation:  Appearance:  age appropriate, casually dressed, dressed appropriately   Behavior:  pleasant, cooperative, calm   Speech:  scant, soft   Mood:  less anxious, less depressed   Affect:  constricted   Thought Process:  organized, logical, coherent, goal directed   Associations: intact associations   Thought Content:  no overt delusions   Perceptual Disturbances: no auditory hallucinations, no visual hallucinations, denies when asked, does not appear responding to internal stimuli   Risk Potential: Suicidal ideation - None at present, contracts for safety on the unit, would talk to staff if not feeling safe on the unit  Homicidal ideation - None at present  Potential for aggression - Not at present   Sensorium:  oriented to person, place, and time/date   Memory:  recent memory intact   Consciousness:  alert and awake   Attention/Concentration: attention span and concentration appear shorter than expected for age   Insight:  limited   Judgment: limited   Gait/Station: normal gait/station, normal balance   Motor Activity: no abnormal movements     Progress Toward Goals: making gradual improvement.  Deyvi continues to require inpatient psychiatric hospitalization for continued medication management and titration to optimize symptom reduction, improve sleep hygiene, and demonstrate adequate self-care.     Suicide/Homicide Risk Assessment:  Risk of Harm to Self:   Nursing Suicide Risk Assessment Last 24 hours: C-SSRS Risk (Since Last Contact)  Calculated C-SSRS Risk Score (Since Last Contact): No Risk Indicated    Risk of Harm to Others:  Nursing Homicide Risk Assessment: Violence Risk to Others: Denies within past 6 months    Behavioral Health Medications: all current active meds have been reviewed and continue current psychiatric medications.  Current Facility-Administered Medications   Medication Dose Route Frequency Provider Last Rate    acetaminophen  650 mg Oral Q6H PRN ALVINA Harley       acetaminophen  650 mg Oral Q4H PRN ALVINA Harley      acetaminophen  975 mg Oral Q6H PRN ALVINA Harley      aluminum-magnesium hydroxide-simethicone  30 mL Oral Q4H PRN ALVINA Harley      ammonium lactate   Topical BID PRN ALVINA Harley      atorvastatin  10 mg Oral Daily Sary ALVINA Gupta      benztropine  1 mg Intramuscular Q4H PRN Max 6/day ALVINA Harley      benztropine  1 mg Oral Q4H PRN Max 6/day ALVINA Harley      bisacodyl  10 mg Rectal Daily PRN ALVINA Harley      cariprazine  3 mg Oral Daily Martin Cardenas MD      Cholecalciferol  2,000 Units Oral Daily ALVINA Lewis      cyanocobalamin  1,000 mcg Oral Daily ALVINA Lewis      hydrOXYzine HCL  25 mg Oral Q6H PRN Max 4/day ALVINA Harley      hydrOXYzine HCL  25 mg Oral TID Martin Cardenas MD      hydrOXYzine HCL  50 mg Oral Q4H PRN Max 4/day ALVINA Harley      Or    LORazepam  1 mg Intramuscular Q4H PRN ALVINA Harley      lamoTRIgine  50 mg Oral Daily Martin Cardenas MD      lisinopril  10 mg Oral Daily ALVINA Lewis      LORazepam  1 mg Oral Q4H PRN Max 6/day ALVINA Harley      Or    LORazepam  2 mg Intramuscular Q6H PRN Max 3/day ALVINA Harley      OLANZapine  10 mg Oral Q3H PRN Max 3/day ALVINA Harley      Or    OLANZapine  10 mg Intramuscular Q3H PRN Max 3/day ALVINA Harley      OLANZapine  5 mg Oral Q3H PRN Max 6/day ALVINA Harley      Or    OLANZapine  5 mg Intramuscular Q3H PRN Max 6/day ALVINA Harley      OLANZapine  2.5 mg Oral Q3H PRN Max 8/day ALVINA Harley      polyethylene glycol  17 g Oral Daily PRN ALVINA Harley      propranolol  10 mg Oral Q8H PRN ALVINA Harley      senna-docusate sodium  1 tablet Oral Daily PRN ALVINA Harley      sertraline  150 mg Oral HS Martin Cardenas MD       Risks / Benefits of Treatment:  Risks, benefits, and possible side effects of medications explained to patient.  Patient has limited understanding of risks and benefits of treatment at this time, but agrees to take medications as prescribed.    Counseling / Coordination of Care:  Total floor/unit time spent today 25 minutes. Greater than 50% of total time was spent with the patient and / or family counseling and / or coordination of care. A description of the counseling / coordination of care:   Patient's progress discussed with staff in treatment team meeting.  Medications, treatment progress and treatment plan reviewed with patient.   Educated on importance of medication and treatment compliance.  Reassurance and supportive therapy provided.   Encouraged participation in milieu and group therapy on the unit.    ALVINA Harley 08/23/24

## 2024-08-23 NOTE — NURSING NOTE
Pt is accepting of morning medications and meal compliant. Polite, pleasant and quiet in conversation. Denies s/s and offers no new concerns.

## 2024-08-23 NOTE — NURSING NOTE
Patient has been visible on the unit at intervals for a brief time. Quiet. Smiles on approach. Medication compliant. Offers no complaints this shift. Appetite excellent. Attends all groups. Good eye contact. Pleasant and cooperative. No self harm. Denies depression, anxiety, or suicidal ideations.

## 2024-08-23 NOTE — PLAN OF CARE
Problem: Alteration in Thoughts and Perception  Goal: Treatment Goal: Gain control of psychotic behaviors/thinking, reduce/eliminate presenting symptoms and demonstrate improved reality functioning upon discharge  Outcome: Progressing  Goal: Refrain from acting on delusional thinking/internal stimuli  Description: Interventions:  - Monitor patient closely, per order   - Utilize least restrictive measures   - Set reasonable limits, give positive feedback for acceptable   - Administer medications as ordered and monitor of potential side effects  Outcome: Progressing  Goal: Agree to be compliant with medication regime, as prescribed and report medication side effects  Description: Interventions:  - Offer appropriate PRN medication and supervise ingestion; conduct AIMS, as needed   Outcome: Progressing  Goal: Attend and participate in unit activities, including therapeutic, recreational, and educational groups  Description: Interventions:  -Encourage Visitation and family involvement in care  Outcome: Progressing  Goal: Recognize dysfunctional thoughts, communicate reality-based thoughts at the time of discharge  Description: Interventions:  - Provide medication and psycho-education to assist patient in compliance and developing insight into his/her illness   Outcome: Progressing  Goal: Complete daily ADLs, including personal hygiene independently, as able  Description: Interventions:  - Observe, teach, and assist patient with ADLS  - Monitor and promote a balance of rest/activity, with adequate nutrition and elimination   Outcome: Progressing     Problem: Ineffective Coping  Goal: Identifies ineffective coping skills  Outcome: Progressing  Goal: Identifies healthy coping skills  Outcome: Progressing  Goal: Demonstrates healthy coping skills  Outcome: Progressing  Goal: Participates in unit activities  Description: Interventions:  - Provide therapeutic environment   - Provide required programming   - Redirect  inappropriate behaviors   Outcome: Progressing     Problem: Risk for Self Injury/Neglect  Goal: Treatment Goal: Remain safe during length of stay, learn and adopt new coping skills, and be free of self-injurious ideation, impulses and acts at the time of discharge  Outcome: Progressing  Goal: Refrain from harming self  Description: Interventions:  - Monitor patient closely, per order  - Develop a trusting relationship  - Supervise medication ingestion, monitor effects and side effects   Outcome: Progressing  Goal: Attend and participate in unit activities, including therapeutic, recreational, and educational groups  Description: Interventions:  - Provide therapeutic and educational activities daily, encourage attendance and participation, and document same in the medical record  - Obtain collateral information, encourage visitation and family involvement in care   Outcome: Progressing  Goal: Recognize maladaptive responses and adopt new coping mechanisms  Outcome: Progressing     Problem: Depression  Goal: Treatment Goal: Demonstrate behavioral control of depressive symptoms, verbalize feelings of improved mood/affect, and adopt new coping skills prior to discharge  Outcome: Progressing  Goal: Refrain from isolation  Description: Interventions:  - Develop a trusting relationship   - Encourage socialization   Outcome: Progressing     Problem: Anxiety  Goal: Anxiety is at manageable level  Description: Interventions:  - Assess and monitor patient's anxiety level.   - Monitor for signs and symptoms (heart palpitations, chest pain, shortness of breath, headaches, nausea, feeling jumpy, restlessness, irritable, apprehensive).   - Collaborate with interdisciplinary team and initiate plan and interventions as ordered.  - [place] patient to unit/surroundings  - Explain treatment plan  - Encourage participation in care  - Encourage verbalization of concerns/fears  - Identify coping mechanisms  - Assist in developing  anxiety-reducing skills  - Administer/offer alternative therapies  - Limit or eliminate stimulants  Outcome: Progressing     Problem: SELF HARM/SUICIDALITY  Goal: Will have no self-injury during hospital stay  Description: INTERVENTIONS:  - Q 15 MINUTES: Routine safety checks  - Q WAKING SHIFT & PRN: Assess risk to determine if routine checks are adequate to maintain patient safety  - Encourage patient to participate actively in care by formulating a plan to combat response to suicidal ideation, identify supports and resources  Outcome: Progressing     Problem: ANXIETY  Goal: Will report anxiety at manageable levels  Description: INTERVENTIONS:  - Administer medication as ordered  - Teach and encourage coping skills  - Provide emotional support  - Assess patient/family for anxiety and ability to cope  Outcome: Progressing     Problem: Alteration in Thoughts and Perception  Goal: Verbalize thoughts and feelings  Description: Interventions:  - Promote a nonjudgmental and trusting relationship with the patient through active listening and therapeutic communication  - Assess patient's level of functioning, behavior and potential for risk  - Engage patient in 1 on 1 interactions  - Encourage patient to express fears, feelings, frustrations, and discuss symptoms    - [place] patient to reality, help patient recognize reality-based thinking   - Administer medications as ordered and assess for potential side effects  - Provide the patient education related to the signs and symptoms of the illness and desired effects of prescribed medications  Outcome: Not Progressing

## 2024-08-24 PROCEDURE — 99232 SBSQ HOSP IP/OBS MODERATE 35: CPT | Performed by: PSYCHIATRY & NEUROLOGY

## 2024-08-24 RX ADMIN — HYDROXYZINE HYDROCHLORIDE 25 MG: 25 TABLET ORAL at 08:39

## 2024-08-24 RX ADMIN — LISINOPRIL 10 MG: 10 TABLET ORAL at 08:39

## 2024-08-24 RX ADMIN — HYDROXYZINE HYDROCHLORIDE 25 MG: 25 TABLET ORAL at 17:09

## 2024-08-24 RX ADMIN — CYANOCOBALAMIN TAB 1000 MCG 1000 MCG: 1000 TAB at 08:39

## 2024-08-24 RX ADMIN — HYDROXYZINE HYDROCHLORIDE 25 MG: 25 TABLET ORAL at 21:26

## 2024-08-24 RX ADMIN — SERTRALINE HYDROCHLORIDE 150 MG: 100 TABLET ORAL at 21:26

## 2024-08-24 RX ADMIN — CHOLECALCIFEROL TAB 25 MCG (1000 UNIT) 2000 UNITS: 25 TAB at 08:38

## 2024-08-24 RX ADMIN — CARIPRAZINE 3 MG: 3 CAPSULE, GELATIN COATED ORAL at 08:39

## 2024-08-24 RX ADMIN — LAMOTRIGINE 50 MG: 25 TABLET ORAL at 08:39

## 2024-08-24 RX ADMIN — ATORVASTATIN CALCIUM 10 MG: 10 TABLET, FILM COATED ORAL at 08:39

## 2024-08-24 NOTE — NURSING NOTE
Patient observed being out of his room briefly  at intervals for meals,snack and some groups.  Smiles when approached but guarded during conversation.  Appetite good. Medication compliant. Denies suicidal ideation or any urges to self harm. Anxious affect. Suspicious when this Nurse opened the closed door to the bedroom while another patient A.R. was lingering  outside this room for approx. 15 minutes after medication pass. See RIAN.B and A.R. Nursing notes by this RN. Room mate DARWIN was in the bathroom with the door closed at the time.

## 2024-08-24 NOTE — PLAN OF CARE
Problem: Alteration in Thoughts and Perception  Goal: Treatment Goal: Gain control of psychotic behaviors/thinking, reduce/eliminate presenting symptoms and demonstrate improved reality functioning upon discharge  Outcome: Progressing  Goal: Verbalize thoughts and feelings  Description: Interventions:  - Promote a nonjudgmental and trusting relationship with the patient through active listening and therapeutic communication  - Assess patient's level of functioning, behavior and potential for risk  - Engage patient in 1 on 1 interactions  - Encourage patient to express fears, feelings, frustrations, and discuss symptoms    - [place] patient to reality, help patient recognize reality-based thinking   - Administer medications as ordered and assess for potential side effects  - Provide the patient education related to the signs and symptoms of the illness and desired effects of prescribed medications  Outcome: Progressing  Goal: Refrain from acting on delusional thinking/internal stimuli  Description: Interventions:  - Monitor patient closely, per order   - Utilize least restrictive measures   - Set reasonable limits, give positive feedback for acceptable   - Administer medications as ordered and monitor of potential side effects  Outcome: Progressing  Goal: Agree to be compliant with medication regime, as prescribed and report medication side effects  Description: Interventions:  - Offer appropriate PRN medication and supervise ingestion; conduct AIMS, as needed   Outcome: Progressing  Goal: Attend and participate in unit activities, including therapeutic, recreational, and educational groups  Description: Interventions:  -Encourage Visitation and family involvement in care  Outcome: Progressing  Goal: Recognize dysfunctional thoughts, communicate reality-based thoughts at the time of discharge  Description: Interventions:  - Provide medication and psycho-education to assist patient in compliance and developing  insight into his/her illness   Outcome: Progressing  Goal: Complete daily ADLs, including personal hygiene independently, as able  Description: Interventions:  - Observe, teach, and assist patient with ADLS  - Monitor and promote a balance of rest/activity, with adequate nutrition and elimination   Outcome: Progressing     Problem: Ineffective Coping  Goal: Participates in unit activities  Description: Interventions:  - Provide therapeutic environment   - Provide required programming   - Redirect inappropriate behaviors   Outcome: Progressing  Goal: Understands least restrictive measures  Description: Interventions:  - Utilize least restrictive behavior  Outcome: Progressing  Goal: Free from restraint events  Description: - Utilize least restrictive measures   - Provide behavioral interventions   - Redirect inappropriate behaviors   Outcome: Progressing     Problem: Risk for Self Injury/Neglect  Goal: Refrain from harming self  Description: Interventions:  - Monitor patient closely, per order  - Develop a trusting relationship  - Supervise medication ingestion, monitor effects and side effects   Outcome: Progressing     Problem: Depression  Goal: Refrain from self-neglect  Outcome: Progressing

## 2024-08-24 NOTE — PROGRESS NOTES
Psychiatric Progress Note - Department of Behavioral Health   Deyvi Cao 54 y.o. male MRN: [id number]  Unit/Bed#: [facility] 101-02 Encounter: [id number]    ASSESSMENT & PLAN     Diagnoses:   Principal Problem:    Bipolar disorder with severe depression (HCC)  Active Problems:    Benign essential hypertension    Mixed hyperlipidemia    Allergic rhinitis due to allergen    Medical clearance for psychiatric admission    Rosacea      Treatment Recommendations/Precautions:  Continue to promote patient participation in therapeutic milieu.  Continue medical management per medicine.  Continue previously prescribed psychotropic medication regimen; see below.  Continue behavioral health checks q.7 minutes.   Continue vitals per behavioral health unit protocol.  Discharge date per primary team    SUBJECTIVE     Patient evaluated this a.m. for continuity of care. Patient was discussed at length with nursing and treatment team. Per nursing, patient remains calm, cooperative, although isolative at times in the milieu, adherent to his medications less any acute adverse effects . No acute distress is noted throughout evaluation. Deyvi Cao denies suicidal/homicidal ideation in addition to thoughts of self-injury, receptive to crisis planning provided by this writer, contacting for safety in the inpatient setting, admitting to an ability to appropriately confide in staff including this writer.  Patient appears somewhat superficial on approach, denying any/all psychiatric complaints/concerns despite previous complaints pertaining to depression and anxiety    PSYCHIATRIC REVIEW OF SYSTEMS     Behavior over the last 24 hours:  unchanged  Sleep: adequate  Appetite: adequate  Medication side effects: No    REVIEW OF SYSTEMS   Review of systems: no complaints    OBJECTIVE     Vital Signs in Past 24 Hours:  Temp:  [97.5 °F (36.4 °C)] 97.5 °F (36.4 °C)  HR:  [92-96] 92  Resp:  [18] 18  BP: (121-144)/(73-90) 144/90    Intake/Output in  Past 24 hours:  No intake/output data recorded.  No intake/output data recorded.        Laboratory Results:  I have personally reviewed all pertinent laboratory/tests results.  Most Recent Labs:   Lab Results   Component Value Date    WBC 7.39 06/26/2024    RBC 4.70 06/26/2024    HGB 15.2 06/26/2024    HCT 45.5 06/26/2024     06/26/2024    RDW 12.9 06/26/2024    NEUTROABS 4.63 06/26/2024    SODIUM 137 06/26/2024    K 4.1 06/26/2024     06/26/2024    CO2 26 06/26/2024    BUN 17 06/26/2024    CREATININE 0.63 06/26/2024    GLUC 98 06/26/2024    GLUF 98 06/26/2024    CALCIUM 9.6 06/26/2024    AST 25 06/26/2024    ALT 45 06/26/2024    ALKPHOS 114 (H) 06/26/2024    TP 7.0 06/26/2024    ALB 4.4 06/26/2024    TBILI 0.44 06/26/2024    CHOLESTEROL 177 06/26/2024    HDL 52 06/26/2024    TRIG 73 06/26/2024    LDLCALC 110 (H) 06/26/2024    NONHDLC 125 06/26/2024    LITHIUM 0.4 (L) 01/28/2021    ICA4QJWWOPLE 2.636 06/26/2024    HGBA1C 5.2 11/22/2023     11/22/2023       Behavioral Health Medications: all current active meds have been reviewed and current meds:   Current Facility-Administered Medications   Medication Dose Route Frequency    acetaminophen (TYLENOL) tablet 650 mg  650 mg Oral Q6H PRN    acetaminophen (TYLENOL) tablet 650 mg  650 mg Oral Q4H PRN    acetaminophen (TYLENOL) tablet 975 mg  975 mg Oral Q6H PRN    aluminum-magnesium hydroxide-simethicone (MAALOX) oral suspension 30 mL  30 mL Oral Q4H PRN    ammonium lactate (LAC-HYDRIN) 12 % lotion   Topical BID PRN    atorvastatin (LIPITOR) tablet 10 mg  10 mg Oral Daily    benztropine (COGENTIN) injection 1 mg  1 mg Intramuscular Q4H PRN Max 6/day    benztropine (COGENTIN) tablet 1 mg  1 mg Oral Q4H PRN Max 6/day    bisacodyl (DULCOLAX) rectal suppository 10 mg  10 mg Rectal Daily PRN    cariprazine (VRAYLAR) capsule 3 mg  3 mg Oral Daily    Cholecalciferol (VITAMIN D3) tablet 2,000 Units  2,000 Units Oral Daily    cyanocobalamin (VITAMIN B-12)  tablet 1,000 mcg  1,000 mcg Oral Daily    hydrOXYzine HCL (ATARAX) tablet 25 mg  25 mg Oral Q6H PRN Max 4/day    hydrOXYzine HCL (ATARAX) tablet 25 mg  25 mg Oral TID    hydrOXYzine HCL (ATARAX) tablet 50 mg  50 mg Oral Q4H PRN Max 4/day    Or    LORazepam (ATIVAN) injection 1 mg  1 mg Intramuscular Q4H PRN    lamoTRIgine (LaMICtal) tablet 50 mg  50 mg Oral Daily    lisinopril (ZESTRIL) tablet 10 mg  10 mg Oral Daily    LORazepam (ATIVAN) tablet 1 mg  1 mg Oral Q4H PRN Max 6/day    Or    LORazepam (ATIVAN) injection 2 mg  2 mg Intramuscular Q6H PRN Max 3/day    OLANZapine (ZyPREXA) tablet 10 mg  10 mg Oral Q3H PRN Max 3/day    Or    OLANZapine (ZyPREXA) IM injection 10 mg  10 mg Intramuscular Q3H PRN Max 3/day    OLANZapine (ZyPREXA) tablet 5 mg  5 mg Oral Q3H PRN Max 6/day    Or    OLANZapine (ZyPREXA) IM injection 5 mg  5 mg Intramuscular Q3H PRN Max 6/day    OLANZapine (ZyPREXA) tablet 2.5 mg  2.5 mg Oral Q3H PRN Max 8/day    polyethylene glycol (MIRALAX) packet 17 g  17 g Oral Daily PRN    propranolol (INDERAL) tablet 10 mg  10 mg Oral Q8H PRN    senna-docusate sodium (SENOKOT S) 8.6-50 mg per tablet 1 tablet  1 tablet Oral Daily PRN    sertraline (ZOLOFT) tablet 150 mg  150 mg Oral HS   .    Risks, benefits and possible side effects of Medications:   Risks, benefits, and possible side effects of medications explained to patient and patient verbalizes understanding.      Mental Status Evaluation:  Appearance:  age appropriate and casually dressed   Behavior:  Calm and cooperative although somewhat superficial   Speech:  soft and scant   Mood:  euthymic   Affect:  constricted and mood-incongruent   Language sparse   Thought Process:  goal directed   Thought Content:  no overt obsessions or delusions, negative thinking   Perceptual Disturbances: None   Risk Potential: Suicidal Ideations none, Homicidal Ideations none, and Potential for Aggression No   Sensorium:  person, place, and time/date   Cognition:  recent  and remote memory grossly intact   Consciousness:  alert and awake    Attention: attention span appeared shorter than expected for age   Insight:  limited   Judgment: limited   Intellect Not assessed   Gait/Station: normal gait/station and normal balance   Motor Activity: no abnormal movements     Memory: Short and long term memory fair     Progress Toward Goals: Unchanged, as evidenced by their participation (or lack thereof) in individual, social and therapeutic milieu in addition to adherence to their medication regimen.    Recommended Treatment:   See above for assessment and plan.    Inpatient Psychiatric Certification: Based upon physical, mental and social evaluations, I certify that inpatient psychiatric services are medically necessary for this patient for a duration of greater than 2 midnights for the treatment of Bipolar disorder with severe depression (HCC) including psychotropic medication management, participation in the therapeutic milieu and referrals as indicated. Available alternative community resources do not meet the patient's mental health care needs. I further attest that an established written individualized plan of care has been implemented and is outlined in the patient's medical records.    Counseling/Coordination of Care    Total unit time spent today was greater than 15 minutes. Greater than 50% of total time was spent with the patient and/or patient's relatives and/or coordination of patient's care.     Patient's rights, confidentiality, exceptions to confidentiality, use of electronic medical record including appropriate staff access to medical record regarding behavioral health services and consent to treatment were reviewed.    Note Share:     This note was not shared with the patient due to reasonable likelihood of causing patient harm     This note has been constructed using a voice recognition system.    There may be translation, syntax, or grammatical errors. If you have any  questions, please contact the dictating provider.    Jordan Christopher Holter, DO 08/24/24

## 2024-08-24 NOTE — NURSING NOTE
Deyvi slept throughout the shift. Respirations easy and non labored. No issues or behaviors. Continue to monitor. Precautions maintained.

## 2024-08-25 PROCEDURE — 99232 SBSQ HOSP IP/OBS MODERATE 35: CPT | Performed by: PSYCHIATRY & NEUROLOGY

## 2024-08-25 RX ADMIN — HYDROXYZINE HYDROCHLORIDE 25 MG: 25 TABLET ORAL at 08:49

## 2024-08-25 RX ADMIN — LAMOTRIGINE 50 MG: 25 TABLET ORAL at 08:49

## 2024-08-25 RX ADMIN — CHOLECALCIFEROL TAB 25 MCG (1000 UNIT) 2000 UNITS: 25 TAB at 08:48

## 2024-08-25 RX ADMIN — HYDROXYZINE HYDROCHLORIDE 25 MG: 25 TABLET ORAL at 17:14

## 2024-08-25 RX ADMIN — CARIPRAZINE 3 MG: 3 CAPSULE, GELATIN COATED ORAL at 08:49

## 2024-08-25 RX ADMIN — HYDROXYZINE HYDROCHLORIDE 25 MG: 25 TABLET ORAL at 21:37

## 2024-08-25 RX ADMIN — CYANOCOBALAMIN TAB 1000 MCG 1000 MCG: 1000 TAB at 08:49

## 2024-08-25 RX ADMIN — ATORVASTATIN CALCIUM 10 MG: 10 TABLET, FILM COATED ORAL at 08:49

## 2024-08-25 RX ADMIN — LISINOPRIL 10 MG: 10 TABLET ORAL at 08:48

## 2024-08-25 RX ADMIN — SERTRALINE HYDROCHLORIDE 150 MG: 100 TABLET ORAL at 21:37

## 2024-08-25 NOTE — PROGRESS NOTES
Psychiatric Progress Note - Department of Behavioral Health   Deyvi Cao 54 y.o. male MRN: [id number]  Unit/Bed#: [facility] 101-02 Encounter: [id number]    ASSESSMENT & PLAN     Diagnoses:   Principal Problem:    Bipolar disorder with severe depression (HCC)  Active Problems:    Benign essential hypertension    Mixed hyperlipidemia    Allergic rhinitis due to allergen    Medical clearance for psychiatric admission    Rosacea      Treatment Recommendations/Precautions:  Continue to promote patient participation in therapeutic milieu.  Continue medical management per medicine.  Continue previously prescribed psychotropic medication regimen; see below.  Continue behavioral health checks q.7 minutes.   Continue vitals per behavioral health unit protocol.  Discharge date per primary team    SUBJECTIVE     Patient evaluated this a.m. for continuity of care. Patient was discussed at length with nursing and treatment team. Per nursing, patient remains calm and cooperative although isolative in the milieu, adherent to his medications without any acute adverse effects. No acute distress is noted throughout evaluation. Deyvi Cao denies suicidal/homicidal ideation in addition to thoughts of self-injury, receptive to crisis planning provided by this writer, contacting for safety in the inpatient setting, admitting to an ability to appropriately confide in staff including this writer.  Patient remains scant, sparse, minimally interactive involving this writer, superficial, denying any/all psychiatric complaints/concerns, suspicious and paranoid on approach    PSYCHIATRIC REVIEW OF SYSTEMS     Behavior over the last 24 hours: Unchanged  Sleep: Adequate  Appetite: Adequate  Medication side effects: None    REVIEW OF SYSTEMS   Review of systems: No complaints    OBJECTIVE     Vital Signs in Past 24 Hours:  Temp:  [97.9 °F (36.6 °C)-98 °F (36.7 °C)] 97.9 °F (36.6 °C)  HR:  [] 100  Resp:  [18] 18  BP: (118-142)/(72)  142/72    Intake/Output in Past 24 hours:  No intake/output data recorded.  No intake/output data recorded.        Laboratory Results:  I have personally reviewed all pertinent laboratory/tests results.  Most Recent Labs:   Lab Results   Component Value Date    WBC 7.39 06/26/2024    RBC 4.70 06/26/2024    HGB 15.2 06/26/2024    HCT 45.5 06/26/2024     06/26/2024    RDW 12.9 06/26/2024    NEUTROABS 4.63 06/26/2024    SODIUM 137 06/26/2024    K 4.1 06/26/2024     06/26/2024    CO2 26 06/26/2024    BUN 17 06/26/2024    CREATININE 0.63 06/26/2024    GLUC 98 06/26/2024    GLUF 98 06/26/2024    CALCIUM 9.6 06/26/2024    AST 25 06/26/2024    ALT 45 06/26/2024    ALKPHOS 114 (H) 06/26/2024    TP 7.0 06/26/2024    ALB 4.4 06/26/2024    TBILI 0.44 06/26/2024    CHOLESTEROL 177 06/26/2024    HDL 52 06/26/2024    TRIG 73 06/26/2024    LDLCALC 110 (H) 06/26/2024    NONHDLC 125 06/26/2024    LITHIUM 0.4 (L) 01/28/2021    [id number] 2.636 06/26/2024    HGBA1C 5.2 11/22/2023     11/22/2023       Behavioral Health Medications: all current active meds have been reviewed and current meds:   Current Facility-Administered Medications   Medication Dose Route Frequency    acetaminophen (TYLENOL) tablet 650 mg  650 mg Oral Q6H PRN    acetaminophen (TYLENOL) tablet 650 mg  650 mg Oral Q4H PRN    acetaminophen (TYLENOL) tablet 975 mg  975 mg Oral Q6H PRN    aluminum-magnesium hydroxide-simethicone (MAALOX) oral suspension 30 mL  30 mL Oral Q4H PRN    ammonium lactate (LAC-HYDRIN) 12 % lotion   Topical BID PRN    atorvastatin (LIPITOR) tablet 10 mg  10 mg Oral Daily    benztropine (COGENTIN) injection 1 mg  1 mg Intramuscular Q4H PRN Max 6/day    benztropine (COGENTIN) tablet 1 mg  1 mg Oral Q4H PRN Max 6/day    bisacodyl (DULCOLAX) rectal suppository 10 mg  10 mg Rectal Daily PRN    cariprazine (VRAYLAR) capsule 3 mg  3 mg Oral Daily    Cholecalciferol (VITAMIN D3) tablet 2,000 Units  2,000 Units Oral Daily     cyanocobalamin (VITAMIN B-12) tablet 1,000 mcg  1,000 mcg Oral Daily    hydrOXYzine HCL (ATARAX) tablet 25 mg  25 mg Oral Q6H PRN Max 4/day    hydrOXYzine HCL (ATARAX) tablet 25 mg  25 mg Oral TID    hydrOXYzine HCL (ATARAX) tablet 50 mg  50 mg Oral Q4H PRN Max 4/day    Or    LORazepam (ATIVAN) injection 1 mg  1 mg Intramuscular Q4H PRN    lamoTRIgine (LaMICtal) tablet 50 mg  50 mg Oral Daily    lisinopril (ZESTRIL) tablet 10 mg  10 mg Oral Daily    LORazepam (ATIVAN) tablet 1 mg  1 mg Oral Q4H PRN Max 6/day    Or    LORazepam (ATIVAN) injection 2 mg  2 mg Intramuscular Q6H PRN Max 3/day    OLANZapine (ZyPREXA) tablet 10 mg  10 mg Oral Q3H PRN Max 3/day    Or    OLANZapine (ZyPREXA) IM injection 10 mg  10 mg Intramuscular Q3H PRN Max 3/day    OLANZapine (ZyPREXA) tablet 5 mg  5 mg Oral Q3H PRN Max 6/day    Or    OLANZapine (ZyPREXA) IM injection 5 mg  5 mg Intramuscular Q3H PRN Max 6/day    OLANZapine (ZyPREXA) tablet 2.5 mg  2.5 mg Oral Q3H PRN Max 8/day    polyethylene glycol (MIRALAX) packet 17 g  17 g Oral Daily PRN    propranolol (INDERAL) tablet 10 mg  10 mg Oral Q8H PRN    senna-docusate sodium (SENOKOT S) 8.6-50 mg per tablet 1 tablet  1 tablet Oral Daily PRN    sertraline (ZOLOFT) tablet 150 mg  150 mg Oral HS   .    Risks, benefits and possible side effects of Medications:   Risks, benefits, and possible side effects of medications explained to patient and patient verbalizes understanding.      Mental Status Evaluation:  Appearance:  age appropriate, casually dressed, and somewhat disheveled   Behavior:  Psychomotor retardation, superficial, somewhat suspicious   Speech:  soft and scant   Mood:  euthymic   Affect:  constricted and mood-incongruent   Language sparse   Thought Process:  goal directed   Thought Content:  Negative thinking , appearing paranoid   Perceptual Disturbances: None   Risk Potential: Suicidal Ideations none, Homicidal Ideations none, and Potential for Aggression No   Sensorium:   person, place, and time/date   Cognition:  recent and remote memory grossly intact   Consciousness:  alert and awake    Attention: attention span appeared shorter than expected for age   Insight:  limited   Judgment: limited   Intellect Not assessed   Gait/Station: normal gait/station and normal balance   Motor Activity: no abnormal movements     Memory: Short and long term memory fair     Progress Toward Goals: Unchanged, as evidenced by their participation (or lack thereof) in individual, social and therapeutic milieu in addition to adherence to their medication regimen.    Recommended Treatment:   See above for assessment and plan.    Inpatient Psychiatric Certification: Based upon physical, mental and social evaluations, I certify that inpatient psychiatric services are medically necessary for this patient for a duration of greater than 2 midnights for the treatment of Bipolar disorder with severe depression (HCC) including psychotropic medication management, participation in the therapeutic milieu and referrals as indicated. Available alternative community resources do not meet the patient's mental health care needs. I further attest that an established written individualized plan of care has been implemented and is outlined in the patient's medical records.    Counseling/Coordination of Care    Total unit time spent today was greater than 15 minutes. Greater than 50% of total time was spent with the patient and/or patient's relatives and/or coordination of patient's care.     Patient's rights, confidentiality, exceptions to confidentiality, use of electronic medical record including appropriate staff access to medical record regarding behavioral health services and consent to treatment were reviewed.    Note Share:     This note was not shared with the patient due to reasonable likelihood of causing patient harm     This note has been constructed using a voice recognition system.    There may be translation,  syntax, or grammatical errors. If you have any questions, please contact the dictating provider.    Jordan Christopher Holter, DO 08/25/24

## 2024-08-25 NOTE — PLAN OF CARE
Problem: Alteration in Thoughts and Perception  Goal: Agree to be compliant with medication regime, as prescribed and report medication side effects  Description: Interventions:  - Offer appropriate PRN medication and supervise ingestion; conduct AIMS, as needed   Outcome: Progressing  Goal: Recognize dysfunctional thoughts, communicate reality-based thoughts at the time of discharge  Description: Interventions:  - Provide medication and psycho-education to assist patient in compliance and developing insight into his/her illness   Outcome: Progressing  Goal: Complete daily ADLs, including personal hygiene independently, as able  Description: Interventions:  - Observe, teach, and assist patient with ADLS  - Monitor and promote a balance of rest/activity, with adequate nutrition and elimination   Outcome: Progressing     Problem: Alteration in Thoughts and Perception  Goal: Attend and participate in unit activities, including therapeutic, recreational, and educational groups  Description: Interventions:  -Encourage Visitation and family involvement in care  Outcome: Not Progressing     Problem: Ineffective Coping  Goal: Participates in unit activities  Description: Interventions:  - Provide therapeutic environment   - Provide required programming   - Redirect inappropriate behaviors   Outcome: Not Progressing

## 2024-08-25 NOTE — NURSING NOTE
Patient calm,quiet,isolative to self in room. Patient no group attendance, visible for lunch. Patient cooperative and offered no complaints upon approach. Patient behaviors controlled, currently in own room, will continue to monitor.

## 2024-08-26 PROCEDURE — 99232 SBSQ HOSP IP/OBS MODERATE 35: CPT

## 2024-08-26 RX ADMIN — SERTRALINE HYDROCHLORIDE 150 MG: 100 TABLET ORAL at 21:15

## 2024-08-26 RX ADMIN — LISINOPRIL 10 MG: 10 TABLET ORAL at 08:30

## 2024-08-26 RX ADMIN — HYDROXYZINE HYDROCHLORIDE 25 MG: 25 TABLET ORAL at 08:30

## 2024-08-26 RX ADMIN — CHOLECALCIFEROL TAB 25 MCG (1000 UNIT) 2000 UNITS: 25 TAB at 08:30

## 2024-08-26 RX ADMIN — ATORVASTATIN CALCIUM 10 MG: 10 TABLET, FILM COATED ORAL at 08:29

## 2024-08-26 RX ADMIN — CYANOCOBALAMIN TAB 1000 MCG 1000 MCG: 1000 TAB at 08:29

## 2024-08-26 RX ADMIN — HYDROXYZINE HYDROCHLORIDE 25 MG: 25 TABLET ORAL at 21:15

## 2024-08-26 RX ADMIN — HYDROXYZINE HYDROCHLORIDE 25 MG: 25 TABLET ORAL at 17:01

## 2024-08-26 RX ADMIN — CARIPRAZINE 3 MG: 3 CAPSULE, GELATIN COATED ORAL at 08:30

## 2024-08-26 RX ADMIN — LAMOTRIGINE 50 MG: 25 TABLET ORAL at 08:29

## 2024-08-26 NOTE — NURSING NOTE
Patient slept well throughout the night. (At least 8 hours) No signs of distress noted. Eyes closed and chest movements noted

## 2024-08-26 NOTE — PROGRESS NOTES
Progress Note - Behavioral Health   Deyvi Cao 54 y.o. male MRN: [id number]  Unit/Bed#: [facility] 101-02 Encounter: [id number]  Code Status: Level 1 - Full Code    Assessment & Plan   Principal Problem:    Bipolar disorder with severe depression (HCC)  Active Problems:    Benign essential hypertension    Mixed hyperlipidemia    Allergic rhinitis due to allergen    Medical clearance for psychiatric admission    Rosacea    Recommended Treatment:     Treatment plan, treatment progress and medication changes were reviewed with Nursing Staff, Pharmacy Service and Case Management in Treatment Team:  1.Continue with group therapy, milieu therapy and occupational therapy   2.Behavioral Health checks every 7 minutes   3.Continue frequent safety checks and vitals per unit protocol  4.Continue with SLIM medical management as indicated  5.Continue with current medication regimen for symptom management: Vraylar 3mg PO Daily, Atarax 25mg PO TID, Lamictal 50mg PO Daily, Zoloft 150mg PO QHS  6.Disposition Planning: Discharge planning and efforts remain ongoing - Awaiting group home placement    Subjective:    Patient was seen today for continuation of care, records reviewed and patient was discussed with the morning case review team.    Deyvi was seen today for psychiatric follow-up.  On assessment today, Deyvi was found in the day room.  He is doing well, offers no acute concerns.  Feels like his depression is minimal, states he always has depression and anxiety even at baseline.  He reports feeling ready for discharge.  Understands that he needs to continue to wait for placement at a supportive living facility.   We reviewed once more the specific as-needed medications they can use going forward if they experience any insomnia or destabilization of their mood, they understood and were agreeable. Milieu visibility and group attendance encouraged to promote an active participation in treatment.    Deyvi denies acute  suicidal/self-harm ideation/intent/plan upon direct inquiry at this time. Deyvi is able to contract for safety while on the unit and would feel comfortable seeking staff support should suicidal symptoms or urges appear or worsen. Deyvi remains behaviorally appropriate, no agitation or aggression noted on exam or in report. Deyvi also denies HI/AH/VH, and does not appear overtly manic.  Patient does not verbalize any experiences that can be categorized as paranoid, persecutory, bizarre, or somatic delusions. Deyvi remains adherent to his current psychotropic medication regimen and denies any side effects from medications, as well as none noted on exam.    Deyvi is currently assessed as being at their baseline with continued need for medication management, supervision for safety and ADL’s. These services are not currently available in a less restrictive environment necessitating continued hospital stay.  Deyvi should remain on the unit until these services are available, due to likelihood of mental decompensation and readmission if discharged to an unsupervised setting.  Assertive discharge planning and collaboration with multiple providers (inpatient and community based) remains ongoing.    Group Attendance: 5 / 9  Treatment Team: TBD  Psychiatric PRN's Needed: None    Review of Systems:  Behavior over the last 24 hours: Slowly improving  Sleep: sleeping okay throughout the night  Appetite: adequate  Medication side effects: none reported  ROS:no complaints, all other systems are negative    Objective:    Vitals:  Vitals:    08/26/24 0738   BP: 121/75   Pulse: 89   Resp: 18   Temp: 97.6 °F (36.4 °C)   SpO2: 92%     Laboratory Results:  I have personally reviewed all pertinent laboratory/tests results.  Most Recent Labs:   Lab Results   Component Value Date    WBC 7.39 06/26/2024    RBC 4.70 06/26/2024    HGB 15.2 06/26/2024    HCT 45.5 06/26/2024     06/26/2024    RDW 12.9 06/26/2024    NEUTROABS  4.63 06/26/2024    SODIUM 137 06/26/2024    K 4.1 06/26/2024     06/26/2024    CO2 26 06/26/2024    BUN 17 06/26/2024    CREATININE 0.63 06/26/2024    GLUC 98 06/26/2024    GLUF 98 06/26/2024    CALCIUM 9.6 06/26/2024    AST 25 06/26/2024    ALT 45 06/26/2024    ALKPHOS 114 (H) 06/26/2024    TP 7.0 06/26/2024    ALB 4.4 06/26/2024    TBILI 0.44 06/26/2024    CHOLESTEROL 177 06/26/2024    HDL 52 06/26/2024    TRIG 73 06/26/2024    LDLCALC 110 (H) 06/26/2024    NONHDLC 125 06/26/2024    LITHIUM 0.4 (L) 01/28/2021    [id number] 2.636 06/26/2024    HGBA1C 5.2 11/22/2023     11/22/2023     Mental Status Evaluation:  Appearance:  age appropriate, casually dressed   Behavior:  pleasant, cooperative, calm   Speech:  scant, soft   Mood:  less anxious, less depressed   Affect:  constricted   Thought Process:  organized, logical, coherent, goal directed   Associations: intact associations   Thought Content:  no overt delusions   Perceptual Disturbances: no auditory hallucinations, no visual hallucinations, denies when asked, does not appear responding to internal stimuli   Risk Potential: Suicidal ideation - None at present, contracts for safety on the unit, would talk to staff if not feeling safe on the unit  Homicidal ideation - None at present  Potential for aggression - Not at present   Sensorium:  oriented to person, place, and time/date   Memory:  recent memory intact   Consciousness:  alert and awake   Attention/Concentration: attention span and concentration appear shorter than expected for age   Insight:  limited   Judgment: limited   Gait/Station: normal gait/station, normal balance   Motor Activity: no abnormal movements     Progress Toward Goals: making gradual improvement.  Deyvi continues to require inpatient psychiatric hospitalization for continued medication management and titration to optimize symptom reduction, improve sleep hygiene, and demonstrate adequate self-care.     Suicide/Homicide  Risk Assessment:  Risk of Harm to Self:   Nursing Suicide Risk Assessment Last 24 hours: C-SSRS Risk (Since Last Contact)  Calculated C-SSRS Risk Score (Since Last Contact): No Risk Indicated    Risk of Harm to Others:  Nursing Homicide Risk Assessment: Violence Risk to Others: Denies within past 6 months    Behavioral Health Medications: all current active meds have been reviewed and continue current psychiatric medications.  Current Facility-Administered Medications   Medication Dose Route Frequency Provider Last Rate    acetaminophen  650 mg Oral Q6H PRN ALVINA Harley      acetaminophen  650 mg Oral Q4H PRN ALVINA Harley      acetaminophen  975 mg Oral Q6H PRN ALVINA Harley      aluminum-magnesium hydroxide-simethicone  30 mL Oral Q4H PRN ALVINA Harley      ammonium lactate   Topical BID PRN ALVINA Harley      atorvastatin  10 mg Oral Daily ALVINA Lewis      benztropine  1 mg Intramuscular Q4H PRN Max 6/day ALVINA Harley      benztropine  1 mg Oral Q4H PRN Max 6/day ALVINA Harley      bisacodyl  10 mg Rectal Daily PRN ALVINA Harley      cariprazine  3 mg Oral Daily Martin Cardenas MD      Cholecalciferol  2,000 Units Oral Daily ALVINA Leiws      cyanocobalamin  1,000 mcg Oral Daily ALVINA Lewis      hydrOXYzine HCL  25 mg Oral Q6H PRN Max 4/day ALVINA Harley      hydrOXYzine HCL  25 mg Oral TID Martin Cardenas MD      hydrOXYzine HCL  50 mg Oral Q4H PRN Max 4/day ALVINA Harley      Or    LORazepam  1 mg Intramuscular Q4H PRN ALVINA Harley      lamoTRIgine  50 mg Oral Daily Martin Cardenas MD      lisinopril  10 mg Oral Daily ALVINA Lewis      LORazepam  1 mg Oral Q4H PRN Max 6/day ALVINA Harley      Or    LORazepam  2 mg Intramuscular Q6H PRN Max 3/day ALVINA Harley      OLANZapine  10 mg Oral Q3H PRN Max 3/day ALVINA Harley      Or    OLANZapine  10 mg Intramuscular Q3H PRN Max 3/day  ALVINA Harley      OLANZapine  5 mg Oral Q3H PRN Max 6/day ALVINA Harley      Or    OLANZapine  5 mg Intramuscular Q3H PRN Max 6/day ALVINA Harley      OLANZapine  2.5 mg Oral Q3H PRN Max 8/day ALVINA Harley      polyethylene glycol  17 g Oral Daily PRN ALVINA Harley      propranolol  10 mg Oral Q8H PRN ALVINA Harley      senna-docusate sodium  1 tablet Oral Daily PRN ALVINA Harley      sertraline  150 mg Oral HS Martin Cardenas MD       Risks / Benefits of Treatment:  Risks, benefits, and possible side effects of medications explained to patient. Patient has limited understanding of risks and benefits of treatment at this time, but agrees to take medications as prescribed.    Counseling / Coordination of Care:  Total floor/unit time spent today 25 minutes. Greater than 50% of total time was spent with the patient and / or family counseling and / or coordination of care. A description of the counseling / coordination of care:   Patient's progress discussed with staff in treatment team meeting.  Medications, treatment progress and treatment plan reviewed with patient.   Educated on importance of medication and treatment compliance.  Reassurance and supportive therapy provided.   Encouraged participation in milieu and group therapy on the unit.    ALVINA Harley 08/26/24

## 2024-08-26 NOTE — SOCIAL WORK
SW checked in with pt, reviewed the weekend, assessed for needs and symptoms.   SW provided update that some of pt's clothing order is still delayed. Pt inquired about how to obtain the rest of his clothing order that came last week. STEFAN notified RN.

## 2024-08-26 NOTE — NURSING NOTE
Deyvi is doing well. He smiles more. He does not appear anxious. He states he is a little less anxious; a little less depressed. He is superficial because he appears not to be good at small talk so he seems uncomfortable at being able with extending a conversation. He is pleasant and polite and brightens on approach and is willing to carry on a brief conversation

## 2024-08-26 NOTE — NURSING NOTE
Pt is visible on the unit and social with select peers, playing cards and attending select groups. Consumed 100% of dinner. Took medications without incidence. Pt is pleasant and cooperative. Attended 3/9 groups. Denies anxiety, depression, and SI/HI/AVH. No behavioral issues. Pt offers no complaints. Continuous safety checks maintained.

## 2024-08-26 NOTE — PLAN OF CARE
Problem: Depression  Goal: Refrain from harming self  Description: Interventions:  - Monitor patient closely, per order   - Supervise medication ingestion, monitor effects and side effects   Outcome: Progressing  Goal: Refrain from self-neglect  Outcome: Progressing     Problem: Risk for Violence/Aggression Toward Others  Goal: Attend and participate in unit activities, including therapeutic, recreational, and educational groups  Description: Interventions:  - Provide therapeutic and educational activities daily, encourage attendance and participation, and document same in the medical record   Outcome: Progressing     Problem: Alteration in Orientation  Goal: Interact with staff daily  Description: Interventions:  - Assess and re-assess patient's level of orientation  - Engage patient in 1 on 1 interactions, daily, for a minimum of 15 minutes   - Establish rapport/trust with patient   Outcome: Progressing  Goal: Allow medical examinations, as recommended  Description: Interventions:  - Provide physical/neurological exams and/or referrals, per provider   Outcome: Progressing  Goal: Cooperate with recommended testing/procedures  Description: Interventions:  - Determine need for ancillary testing  - Observe for mental status changes  - Implement falls/precaution protocol   Outcome: Progressing  Goal: Attend and participate in unit activities, including therapeutic, recreational, and educational groups  Description: Interventions:  - Provide therapeutic and educational activities daily, encourage attendance and participation, and document same in the medical record   - Provide appropriate opportunities for reminiscence   - Provide a consistent daily routine   - Encourage family contact/visitation   Outcome: Progressing  Goal: Complete daily ADLs, including personal hygiene independently, as able  Description: Interventions:  - Observe, teach, and assist patient with ADLS  Outcome: Progressing     Problem: SELF  HARM/SUICIDALITY  Goal: Will have no self-injury during hospital stay  Description: INTERVENTIONS:  - Q 15 MINUTES: Routine safety checks  - Q WAKING SHIFT & PRN: Assess risk to determine if routine checks are adequate to maintain patient safety  - Encourage patient to participate actively in care by formulating a plan to combat response to suicidal ideation, identify supports and resources  Outcome: Progressing     Problem: ANXIETY  Goal: Will report anxiety at manageable levels  Description: INTERVENTIONS:  - Administer medication as ordered  - Teach and encourage coping skills  - Provide emotional support  - Assess patient/family for anxiety and ability to cope  Outcome: Progressing

## 2024-08-26 NOTE — PROGRESS NOTES
08/26/24 0748   Team Meeting   Meeting Type Daily Rounds   Team Members Present   Team Members Present Physician;Nurse;;Other (Discipline and Name)   Patient/Family Present   Patient Present No   Patient's Family Present No     In attendance:  Dr. Jordan Holter, ALVINA Melgoza, LYNNE Garcia, [facility]  BARRETT Elmore.S.    Groups: 5/9    Pt quiet, isolative, denies symptoms. Pt needed no PRNs over the weekend. No bx issues noted.

## 2024-08-26 NOTE — NURSING NOTE
Patient is visible and social playing cards with peers. Pt quiet, soft spoken, able to make needs known. Pt reports no s/s, takes all medications without issue.

## 2024-08-27 PROCEDURE — 99232 SBSQ HOSP IP/OBS MODERATE 35: CPT

## 2024-08-27 RX ADMIN — HYDROXYZINE HYDROCHLORIDE 25 MG: 25 TABLET ORAL at 08:11

## 2024-08-27 RX ADMIN — HYDROXYZINE HYDROCHLORIDE 25 MG: 25 TABLET ORAL at 21:28

## 2024-08-27 RX ADMIN — HYDROXYZINE HYDROCHLORIDE 25 MG: 25 TABLET ORAL at 17:18

## 2024-08-27 RX ADMIN — CHOLECALCIFEROL TAB 25 MCG (1000 UNIT) 2000 UNITS: 25 TAB at 08:11

## 2024-08-27 RX ADMIN — LISINOPRIL 10 MG: 10 TABLET ORAL at 08:11

## 2024-08-27 RX ADMIN — LAMOTRIGINE 50 MG: 25 TABLET ORAL at 08:11

## 2024-08-27 RX ADMIN — ATORVASTATIN CALCIUM 10 MG: 10 TABLET, FILM COATED ORAL at 08:12

## 2024-08-27 RX ADMIN — CYANOCOBALAMIN TAB 1000 MCG 1000 MCG: 1000 TAB at 08:12

## 2024-08-27 RX ADMIN — CARIPRAZINE 3 MG: 3 CAPSULE, GELATIN COATED ORAL at 08:12

## 2024-08-27 RX ADMIN — SERTRALINE HYDROCHLORIDE 150 MG: 100 TABLET ORAL at 21:28

## 2024-08-27 NOTE — PROGRESS NOTES
08/27/24 0748   Team Meeting   Meeting Type Daily Rounds   Team Members Present   Team Members Present Physician;Nurse;;Other (Discipline and Name)   Patient/Family Present   Patient Present No   Patient's Family Present No     In attendance:  Dr. Jordan Holter, DO Daniel Teles, RN  Judi Garcia, [facility]  Gris Arizmendi M.S.    Groups: 3/9    Pt remains quiet, denies symptoms. Social with select peers. No bx issues. Pt is waiting on ECRR availability.

## 2024-08-27 NOTE — PROGRESS NOTES
Progress Note - Behavioral Health   Deyvi Cao 54 y.o. male MRN: [id number]  Unit/Bed#: Located within [facility] 101-02 Encounter: [id number]  Code Status: Level 1 - Full Code    Assessment & Plan   Principal Problem:    Bipolar disorder with severe depression (HCC)  Active Problems:    Benign essential hypertension    Mixed hyperlipidemia    Allergic rhinitis due to allergen    Medical clearance for psychiatric admission    Rosacea    Recommended Treatment:     Treatment plan, treatment progress and medication changes were reviewed with Nursing Staff, Pharmacy Service and Case Management in Treatment Team:  1.Continue with group therapy, milieu therapy and occupational therapy   2.Behavioral Health checks every 7 minutes   3.Continue frequent safety checks and vitals per unit protocol  4.Continue with SLIM medical management as indicated  5.Continue with current medication regimen for symptom management: Vraylar 3mg PO Daily, Atarax 25mg PO TID, Lamictal 50mg PO Daily, Zoloft 150mg PO QHS  6.Disposition Planning: Discharge planning and efforts remain ongoing - Awaiting group home placement    Subjective:    Patient was seen today for continuation of care, records reviewed and patient was discussed with the morning case review team.    Deyvi was seen today for psychiatric follow-up.  On assessment today, Deyvi was found in his room.  He is doing well.  He offers no new concerns, just mild depression and anxiety.  Seems more affectively brighter, smiling, and even making jokes with this writer.  Deyvi reports adequate daytime energy and denies any difficulties with initiating or staying asleep.  Oral appetite and hydration is adequate.   We reviewed once more the specific as-needed medications they can use going forward if they experience any insomnia or destabilization of their mood, they understood and were agreeable. Milieu visibility and group attendance encouraged to promote an active participation in  treatment.    Deyvi denies acute suicidal/self-harm ideation/intent/plan upon direct inquiry at this time. Deyvi is able to contract for safety while on the unit and would feel comfortable seeking staff support should suicidal symptoms or urges appear or worsen. Deyvi remains behaviorally appropriate, no agitation or aggression noted on exam or in report. Deyvi also denies HI/AH/VH, and does not appear overtly manic.  Patient does not verbalize any experiences that can be categorized as paranoid, persecutory, bizarre, or somatic delusions. Deyvi remains adherent to his current psychotropic medication regimen and denies any side effects from medications, as well as none noted on exam.    Group Attendance: 3 / 9  Treatment Team: SOLEDAD  Psychiatric PRN's Needed: None    Review of Systems:  Behavior over the last 24 hours: Slowly improving  Sleep: sleeping okay throughout the night  Appetite: adequate  Medication side effects: none reported  ROS:no complaints, all other systems are negative    Objective:    Vitals:  Vitals:    08/27/24 0730   BP: 152/85   Pulse: 84   Resp: 18   Temp: 97.5 °F (36.4 °C)   SpO2: 95%     Laboratory Results:  I have personally reviewed all pertinent laboratory/tests results.  Most Recent Labs:   Lab Results   Component Value Date    WBC 7.39 06/26/2024    RBC 4.70 06/26/2024    HGB 15.2 06/26/2024    HCT 45.5 06/26/2024     06/26/2024    RDW 12.9 06/26/2024    NEUTROABS 4.63 06/26/2024    SODIUM 137 06/26/2024    K 4.1 06/26/2024     06/26/2024    CO2 26 06/26/2024    BUN 17 06/26/2024    CREATININE 0.63 06/26/2024    GLUC 98 06/26/2024    GLUF 98 06/26/2024    CALCIUM 9.6 06/26/2024    AST 25 06/26/2024    ALT 45 06/26/2024    ALKPHOS 114 (H) 06/26/2024    TP 7.0 06/26/2024    ALB 4.4 06/26/2024    TBILI 0.44 06/26/2024    CHOLESTEROL 177 06/26/2024    HDL 52 06/26/2024    TRIG 73 06/26/2024    LDLCALC 110 (H) 06/26/2024    NONHDLC 125 06/26/2024    LITHIUM 0.4 (L)  01/28/2021    LFO0EXZARHMV 2.636 06/26/2024    HGBA1C 5.2 11/22/2023     11/22/2023     Mental Status Evaluation:  Appearance:  age appropriate, casually dressed   Behavior:  pleasant, cooperative, calm   Speech:  scant, soft   Mood:  less anxious, less depressed   Affect:  constricted   Thought Process:  organized, logical, coherent, goal directed   Associations: intact associations   Thought Content:  no overt delusions   Perceptual Disturbances: no auditory hallucinations, no visual hallucinations, denies when asked, does not appear responding to internal stimuli   Risk Potential: Suicidal ideation - None at present, contracts for safety on the unit, would talk to staff if not feeling safe on the unit  Homicidal ideation - None at present  Potential for aggression - Not at present   Sensorium:  oriented to person, place, and time/date   Memory:  recent and remote memory grossly intact   Consciousness:  alert and awake   Attention/Concentration: attention span and concentration appear shorter than expected for age   Insight:  limited   Judgment: limited   Gait/Station: normal gait/station, normal balance   Motor Activity: no abnormal movements     Progress Toward Goals: making gradual improvement.  Deyvi continues to require inpatient psychiatric hospitalization for continued medication management and titration to optimize symptom reduction, improve sleep hygiene, and demonstrate adequate self-care.     Suicide/Homicide Risk Assessment:  Risk of Harm to Self:   Nursing Suicide Risk Assessment Last 24 hours: C-SSRS Risk (Since Last Contact)  Calculated C-SSRS Risk Score (Since Last Contact): No Risk Indicated    Risk of Harm to Others:  Nursing Homicide Risk Assessment: Violence Risk to Others: Denies within past 6 months    Behavioral Health Medications: all current active meds have been reviewed and continue current psychiatric medications.  Current Facility-Administered Medications   Medication Dose  Route Frequency Provider Last Rate    acetaminophen  650 mg Oral Q6H PRN ALVINA Harley      acetaminophen  650 mg Oral Q4H PRN ALVINA Harley      acetaminophen  975 mg Oral Q6H PRN ALVINA Harley      aluminum-magnesium hydroxide-simethicone  30 mL Oral Q4H PRN ALVINA Harley      ammonium lactate   Topical BID PRN ALVINA Harley      atorvastatin  10 mg Oral Daily ALVINA Lewis      benztropine  1 mg Intramuscular Q4H PRN Max 6/day ALVINA Harley      benztropine  1 mg Oral Q4H PRN Max 6/day ALVINA Harley      bisacodyl  10 mg Rectal Daily PRN ALVINA Harley      cariprazine  3 mg Oral Daily Martin Cardenas MD      Cholecalciferol  2,000 Units Oral Daily ALVINA Lewis      cyanocobalamin  1,000 mcg Oral Daily ALVINA Lewis      hydrOXYzine HCL  25 mg Oral Q6H PRN Max 4/day ALVINA Harley      hydrOXYzine HCL  25 mg Oral TID Martin Cardenas MD      hydrOXYzine HCL  50 mg Oral Q4H PRN Max 4/day ALVINA Harley      Or    LORazepam  1 mg Intramuscular Q4H PRN ALVINA Harley      lamoTRIgine  50 mg Oral Daily Martin Cardenas MD      lisinopril  10 mg Oral Daily ALVINA Lewis      LORazepam  1 mg Oral Q4H PRN Max 6/day ALVINA Harley      Or    LORazepam  2 mg Intramuscular Q6H PRN Max 3/day ALVINA Harley      OLANZapine  10 mg Oral Q3H PRN Max 3/day ALVINA Harley      Or    OLANZapine  10 mg Intramuscular Q3H PRN Max 3/day ALVINA Harley      OLANZapine  5 mg Oral Q3H PRN Max 6/day ALVINA Harley      Or    OLANZapine  5 mg Intramuscular Q3H PRN Max 6/day ALVINA Harley      OLANZapine  2.5 mg Oral Q3H PRN Max 8/day ALVINA Harley      polyethylene glycol  17 g Oral Daily PRN ALVINA Harley      propranolol  10 mg Oral Q8H PRN ALVINA Harley      senna-docusate sodium  1 tablet Oral Daily PRN ALVINA Harley      sertraline  150 mg Oral HS Martin Cardenas MD       Risks /  Benefits of Treatment:  Risks, benefits, and possible side effects of medications explained to patient. Patient has limited understanding of risks and benefits of treatment at this time, but agrees to take medications as prescribed.    Counseling / Coordination of Care:  Total floor/unit time spent today 25 minutes. Greater than 50% of total time was spent with the patient and / or family counseling and / or coordination of care. A description of the counseling / coordination of care:   Patient's progress discussed with staff in treatment team meeting.  Medications, treatment progress and treatment plan reviewed with patient.   Educated on importance of medication and treatment compliance.  Reassurance and supportive therapy provided.   Encouraged participation in milieu and group therapy on the unit.    ALVINA Harley 08/27/24

## 2024-08-27 NOTE — NURSING NOTE
Patient is out sitting with peers, quiet but social with select peers. Pt is pleasant and polite with interactions with staff, voices no concerns. Pt denies any s/s and takes meds without issue.

## 2024-08-27 NOTE — PLAN OF CARE
Problem: Alteration in Thoughts and Perception  Goal: Verbalize thoughts and feelings  Description: Interventions:  - Promote a nonjudgmental and trusting relationship with the patient through active listening and therapeutic communication  - Assess patient's level of functioning, behavior and potential for risk  - Engage patient in 1 on 1 interactions  - Encourage patient to express fears, feelings, frustrations, and discuss symptoms    - [place] patient to reality, help patient recognize reality-based thinking   - Administer medications as ordered and assess for potential side effects  - Provide the patient education related to the signs and symptoms of the illness and desired effects of prescribed medications  Outcome: Progressing  Goal: Complete daily ADLs, including personal hygiene independently, as able  Description: Interventions:  - Observe, teach, and assist patient with ADLS  - Monitor and promote a balance of rest/activity, with adequate nutrition and elimination   Outcome: Progressing     Problem: Ineffective Coping  Goal: Identifies healthy coping skills  Outcome: Progressing  Goal: Demonstrates healthy coping skills  Outcome: Progressing     Problem: Risk for Self Injury/Neglect  Goal: Refrain from harming self  Description: Interventions:  - Monitor patient closely, per order  - Develop a trusting relationship  - Supervise medication ingestion, monitor effects and side effects   Outcome: Progressing  Goal: Recognize maladaptive responses and adopt new coping mechanisms  Outcome: Progressing     Problem: Depression  Goal: Refrain from isolation  Description: Interventions:  - Develop a trusting relationship   - Encourage socialization   Outcome: Progressing  Goal: Complete daily ADLs, including personal hygiene independently, as able  Description: Interventions:  - Observe, teach, and assist patient with ADLS  -  Monitor and promote a balance of rest/activity, with adequate nutrition and elimination    Outcome: Progressing     Problem: Anxiety  Goal: Anxiety is at manageable level  Description: Interventions:  - Assess and monitor patient's anxiety level.   - Monitor for signs and symptoms (heart palpitations, chest pain, shortness of breath, headaches, nausea, feeling jumpy, restlessness, irritable, apprehensive).   - Collaborate with interdisciplinary team and initiate plan and interventions as ordered.  - [place] patient to unit/surroundings  - Explain treatment plan  - Encourage participation in care  - Encourage verbalization of concerns/fears  - Identify coping mechanisms  - Assist in developing anxiety-reducing skills  - Administer/offer alternative therapies  - Limit or eliminate stimulants  Outcome: Progressing

## 2024-08-27 NOTE — NURSING NOTE
Pt is present on the milieu intermittently and social with select peers. He consumed 100 % of dinner. Took his medications without incidence. Denied all psychiatric symptoms. Pt is polite, pleasant, and cooperative. Attended community meeting group. Pt offered no complaints. No behavioral issues.

## 2024-08-28 PROCEDURE — 99232 SBSQ HOSP IP/OBS MODERATE 35: CPT

## 2024-08-28 RX ADMIN — SERTRALINE HYDROCHLORIDE 150 MG: 100 TABLET ORAL at 21:23

## 2024-08-28 RX ADMIN — CHOLECALCIFEROL TAB 25 MCG (1000 UNIT) 2000 UNITS: 25 TAB at 08:11

## 2024-08-28 RX ADMIN — CARIPRAZINE 3 MG: 3 CAPSULE, GELATIN COATED ORAL at 08:11

## 2024-08-28 RX ADMIN — HYDROXYZINE HYDROCHLORIDE 25 MG: 25 TABLET ORAL at 21:23

## 2024-08-28 RX ADMIN — LISINOPRIL 10 MG: 10 TABLET ORAL at 08:11

## 2024-08-28 RX ADMIN — HYDROXYZINE HYDROCHLORIDE 25 MG: 25 TABLET ORAL at 08:15

## 2024-08-28 RX ADMIN — ATORVASTATIN CALCIUM 10 MG: 10 TABLET, FILM COATED ORAL at 08:11

## 2024-08-28 RX ADMIN — LAMOTRIGINE 50 MG: 25 TABLET ORAL at 08:11

## 2024-08-28 RX ADMIN — HYDROXYZINE HYDROCHLORIDE 25 MG: 25 TABLET ORAL at 17:11

## 2024-08-28 RX ADMIN — CYANOCOBALAMIN TAB 1000 MCG 1000 MCG: 1000 TAB at 08:14

## 2024-08-28 NOTE — NURSING NOTE
"Patient has been present in the milieu for meals and groups 2/5 this shift. Quiet and isolative to self. Denies any psychological symptoms or suicidal ideations. Good eye contact. Medication compliant. Flat affect. Offers no complaints. This shift. Observed looking out the window for long intervals while in his room stating, \"everything is ok\" when asked.   "

## 2024-08-28 NOTE — PLAN OF CARE
Problem: Alteration in Thoughts and Perception  Goal: Treatment Goal: Gain control of psychotic behaviors/thinking, reduce/eliminate presenting symptoms and demonstrate improved reality functioning upon discharge  Outcome: Progressing  Goal: Verbalize thoughts and feelings  Description: Interventions:  - Promote a nonjudgmental and trusting relationship with the patient through active listening and therapeutic communication  - Assess patient's level of functioning, behavior and potential for risk  - Engage patient in 1 on 1 interactions  - Encourage patient to express fears, feelings, frustrations, and discuss symptoms    - [place] patient to reality, help patient recognize reality-based thinking   - Administer medications as ordered and assess for potential side effects  - Provide the patient education related to the signs and symptoms of the illness and desired effects of prescribed medications  Outcome: Progressing  Goal: Refrain from acting on delusional thinking/internal stimuli  Description: Interventions:  - Monitor patient closely, per order   - Utilize least restrictive measures   - Set reasonable limits, give positive feedback for acceptable   - Administer medications as ordered and monitor of potential side effects  Outcome: Progressing  Goal: Agree to be compliant with medication regime, as prescribed and report medication side effects  Description: Interventions:  - Offer appropriate PRN medication and supervise ingestion; conduct AIMS, as needed   Outcome: Progressing  Goal: Attend and participate in unit activities, including therapeutic, recreational, and educational groups  Description: Interventions:  -Encourage Visitation and family involvement in care  Outcome: Progressing  Goal: Recognize dysfunctional thoughts, communicate reality-based thoughts at the time of discharge  Description: Interventions:  - Provide medication and psycho-education to assist patient in compliance and developing  insight into his/her illness   Outcome: Progressing  Goal: Complete daily ADLs, including personal hygiene independently, as able  Description: Interventions:  - Observe, teach, and assist patient with ADLS  - Monitor and promote a balance of rest/activity, with adequate nutrition and elimination   Outcome: Progressing     Problem: Ineffective Coping  Goal: Identifies ineffective coping skills  Outcome: Progressing  Goal: Identifies healthy coping skills  Outcome: Progressing  Goal: Demonstrates healthy coping skills  Outcome: Progressing     Problem: Risk for Self Injury/Neglect  Goal: Treatment Goal: Remain safe during length of stay, learn and adopt new coping skills, and be free of self-injurious ideation, impulses and acts at the time of discharge  Outcome: Progressing  Goal: Refrain from harming self  Description: Interventions:  - Monitor patient closely, per order  - Develop a trusting relationship  - Supervise medication ingestion, monitor effects and side effects   Outcome: Progressing     Problem: Depression  Goal: Treatment Goal: Demonstrate behavioral control of depressive symptoms, verbalize feelings of improved mood/affect, and adopt new coping skills prior to discharge  Outcome: Progressing  Goal: Refrain from isolation  Description: Interventions:  - Develop a trusting relationship   - Encourage socialization   Outcome: Progressing     Problem: Anxiety  Goal: Anxiety is at manageable level  Description: Interventions:  - Assess and monitor patient's anxiety level.   - Monitor for signs and symptoms (heart palpitations, chest pain, shortness of breath, headaches, nausea, feeling jumpy, restlessness, irritable, apprehensive).   - Collaborate with interdisciplinary team and initiate plan and interventions as ordered.  - [place] patient to unit/surroundings  - Explain treatment plan  - Encourage participation in care  - Encourage verbalization of concerns/fears  - Identify coping mechanisms  - Assist in  developing anxiety-reducing skills  - Administer/offer alternative therapies  - Limit or eliminate stimulants  Outcome: Progressing     Problem: SELF HARM/SUICIDALITY  Goal: Will have no self-injury during hospital stay  Description: INTERVENTIONS:  - Q 15 MINUTES: Routine safety checks  - Q WAKING SHIFT & PRN: Assess risk to determine if routine checks are adequate to maintain patient safety  - Encourage patient to participate actively in care by formulating a plan to combat response to suicidal ideation, identify supports and resources  Outcome: Progressing     Problem: ANXIETY  Goal: Will report anxiety at manageable levels  Description: INTERVENTIONS:  - Administer medication as ordered  - Teach and encourage coping skills  - Provide emotional support  - Assess patient/family for anxiety and ability to cope  Outcome: Progressing

## 2024-08-28 NOTE — PROGRESS NOTES
Progress Note - Behavioral Health   Deyvi Cao 54 y.o. male MRN: [id number]  Unit/Bed#: [facility] 101-02 Encounter: [id number]  Code Status: Level 1 - Full Code    Assessment & Plan   Principal Problem:    Bipolar disorder with severe depression (HCC)  Active Problems:    Benign essential hypertension    Mixed hyperlipidemia    Allergic rhinitis due to allergen    Medical clearance for psychiatric admission    Rosacea    Recommended Treatment:     Treatment plan, treatment progress and medication changes were reviewed with Nursing Staff, Pharmacy Service and Case Management in Treatment Team:  1.Continue with group therapy, milieu therapy and occupational therapy   2.Behavioral Health checks every 7 minutes   3.Continue frequent safety checks and vitals per unit protocol  4.Continue with SLIM medical management as indicated  5.Continue with current medication regimen for symptom management: Vraylar 3mg PO Daily, Atarax 25mg PO TID, Lamictal 50mg PO Daily, Zoloft 150mg PO QHS   6.Disposition Planning: Discharge planning and efforts remain ongoing - Awaiting group home placement    Subjective:    Patient was seen today for continuation of care, records reviewed and patient was discussed with the morning case review team.    Deyvi was seen today for psychiatric follow-up.  On assessment today, Deyvi was found in his room.  He is doing well, offers no new concerns.  He is pleasant, reports mild depression and anxiety.  Deyvi reports adequate daytime energy and denies any difficulties with initiating or staying asleep.  Oral appetite and hydration is adequate.  We reviewed once more the specific as-needed medications they can use going forward if they experience any insomnia or destabilization of their mood, they understood and were agreeable. Milieu visibility and group attendance encouraged to promote an active participation in treatment.    Deyvi denies acute suicidal/self-harm ideation/intent/plan upon direct  inquiry at this time. Deyvi is able to contract for safety while on the unit and would feel comfortable seeking staff support should suicidal symptoms or urges appear or worsen. Deyvi remains behaviorally appropriate, no agitation or aggression noted on exam or in report. Deyvi also denies HI/AH/VH, and does not appear overtly manic.  Patient does not verbalize any experiences that can be categorized as paranoid, persecutory, bizarre, or somatic delusions. Deyvi remains adherent to his current psychotropic medication regimen and denies any side effects from medications, as well as none noted on exam.    Group Attendance: 3 / 9  Treatment Team: SOLEDAD  Psychiatric PRN's Needed: None    Review of Systems:  Behavior over the last 24 hours: Slowly improving  Sleep: sleeping okay throughout the night  Appetite: adequate  Medication side effects: none reported  ROS:no complaints, all other systems are negative    Objective:    Vitals:  Vitals:    08/28/24 0728   BP: 135/72   Pulse: 90   Resp: 18   Temp: 97.5 °F (36.4 °C)   SpO2: 93%     Laboratory Results:  I have personally reviewed all pertinent laboratory/tests results.  Most Recent Labs:   Lab Results   Component Value Date    WBC 7.39 06/26/2024    RBC 4.70 06/26/2024    HGB 15.2 06/26/2024    HCT 45.5 06/26/2024     06/26/2024    RDW 12.9 06/26/2024    NEUTROABS 4.63 06/26/2024    SODIUM 137 06/26/2024    K 4.1 06/26/2024     06/26/2024    CO2 26 06/26/2024    BUN 17 06/26/2024    CREATININE 0.63 06/26/2024    GLUC 98 06/26/2024    GLUF 98 06/26/2024    CALCIUM 9.6 06/26/2024    AST 25 06/26/2024    ALT 45 06/26/2024    ALKPHOS 114 (H) 06/26/2024    TP 7.0 06/26/2024    ALB 4.4 06/26/2024    TBILI 0.44 06/26/2024    CHOLESTEROL 177 06/26/2024    HDL 52 06/26/2024    TRIG 73 06/26/2024    LDLCALC 110 (H) 06/26/2024    NONHDLC 125 06/26/2024    LITHIUM 0.4 (L) 01/28/2021    [id number] 2.636 06/26/2024    HGBA1C 5.2 11/22/2023     11/22/2023      Mental Status Evaluation:  Appearance:  age appropriate, casually dressed   Behavior:  pleasant, cooperative, calm   Speech:  scant, soft   Mood:  less anxious, less depressed   Affect:  constricted   Thought Process:  organized, logical, coherent   Associations: intact associations   Thought Content:  no overt delusions   Perceptual Disturbances: no auditory hallucinations, no visual hallucinations, denies when asked, does not appear responding to internal stimuli   Risk Potential: Suicidal ideation - None at present, contracts for safety on the unit, would talk to staff if not feeling safe on the unit  Homicidal ideation - None at present  Potential for aggression - Not at present   Sensorium:  oriented to person, place, and time/date   Memory:  recent memory intact   Consciousness:  alert and awake   Attention/Concentration: attention span and concentration appear shorter than expected for age   Insight:  limited   Judgment: limited   Gait/Station: normal gait/station, normal balance   Motor Activity: no abnormal movements     Progress Toward Goals: making gradual improvement.  Deyvi continues to require inpatient psychiatric hospitalization for continued medication management and titration to optimize symptom reduction, improve sleep hygiene, and demonstrate adequate self-care.     Suicide/Homicide Risk Assessment:  Risk of Harm to Self:   Nursing Suicide Risk Assessment Last 24 hours: C-SSRS Risk (Since Last Contact)  Calculated C-SSRS Risk Score (Since Last Contact): No Risk Indicated    Risk of Harm to Others:  Nursing Homicide Risk Assessment: Violence Risk to Others: Denies within past 6 months    Behavioral Health Medications: all current active meds have been reviewed and continue current psychiatric medications.  Current Facility-Administered Medications   Medication Dose Route Frequency Provider Last Rate    acetaminophen  650 mg Oral Q6H PRN ALVINA Harley      acetaminophen  650 mg Oral Q4H  PRN ALVINA Harley      acetaminophen  975 mg Oral Q6H PRN ALVINA Harley      aluminum-magnesium hydroxide-simethicone  30 mL Oral Q4H PRN ALVINA Harley      ammonium lactate   Topical BID PRN ALVINA Harley      atorvastatin  10 mg Oral Daily Sary Rodriguez ALVINA Biggs      benztropine  1 mg Intramuscular Q4H PRN Max 6/day ALVINA Harley      benztropine  1 mg Oral Q4H PRN Max 6/day ALVINA Harley      bisacodyl  10 mg Rectal Daily PRN ALVINA Harley      cariprazine  3 mg Oral Daily Martin Cardenas MD      Cholecalciferol  2,000 Units Oral Daily Sary Rodriguez ALVINA Biggs      cyanocobalamin  1,000 mcg Oral Daily Sary ALVINA Gupta      hydrOXYzine HCL  25 mg Oral Q6H PRN Max 4/day ALVINA Harley      hydrOXYzine HCL  25 mg Oral TID Martin Cardenas MD      hydrOXYzine HCL  50 mg Oral Q4H PRN Max 4/day ALVINA Harley      Or    LORazepam  1 mg Intramuscular Q4H PRN ALVINA Harley      lamoTRIgine  50 mg Oral Daily Martin Cardenas MD      lisinopril  10 mg Oral Daily Sary Rodriguez ALVINA Biggs      LORazepam  1 mg Oral Q4H PRN Max 6/day ALVINA Harley      Or    LORazepam  2 mg Intramuscular Q6H PRN Max 3/day ALVINA Harley      OLANZapine  10 mg Oral Q3H PRN Max 3/day ALVINA Harley      Or    OLANZapine  10 mg Intramuscular Q3H PRN Max 3/day ALVINA Harley      OLANZapine  5 mg Oral Q3H PRN Max 6/day ALVINA Harley      Or    OLANZapine  5 mg Intramuscular Q3H PRN Max 6/day ALVINA Harley      OLANZapine  2.5 mg Oral Q3H PRN Max 8/day ALVINA Harley      polyethylene glycol  17 g Oral Daily PRN ALVINA Harley      propranolol  10 mg Oral Q8H PRN ALVINA Harley      senna-docusate sodium  1 tablet Oral Daily PRN ALVINA Harley      sertraline  150 mg Oral HS Martin Cardenas MD       Risks / Benefits of Treatment:  Risks, benefits, and possible side effects of medications explained to patient. Patient has limited  understanding of risks and benefits of treatment at this time, but agrees to take medications as prescribed.    Counseling / Coordination of Care:  Total floor/unit time spent today 25 minutes. Greater than 50% of total time was spent with the patient and / or family counseling and / or coordination of care. A description of the counseling / coordination of care:   Patient's progress discussed with staff in treatment team meeting.  Medications, treatment progress and treatment plan reviewed with patient.   Educated on importance of medication and treatment compliance.  Reassurance and supportive therapy provided.   Encouraged participation in milieu and group therapy on the unit.    ALVINA Harley 08/28/24

## 2024-08-28 NOTE — PROGRESS NOTES
08/28/24 0754   Team Meeting   Meeting Type Daily Rounds   Team Members Present   Team Members Present Physician;Nurse;;Other (Discipline and Name)   Patient/Family Present   Patient Present No   Patient's Family Present No     In attendance:  Dr. Jordan Holter, DO Daniel Teles, RN  Judi Garcia, [facility]  Gris Arizmendi M.S.    Groups: 3/9    Pt is pleasant, cooperative, denies symptoms. Pt is visible, quiet, social with select peers.

## 2024-08-28 NOTE — NURSING NOTE
Patient is quiet, social though with peers and visible on unit. Pt takes medications without incidence.

## 2024-08-29 PROCEDURE — 99232 SBSQ HOSP IP/OBS MODERATE 35: CPT

## 2024-08-29 RX ADMIN — LISINOPRIL 10 MG: 10 TABLET ORAL at 08:12

## 2024-08-29 RX ADMIN — CHOLECALCIFEROL TAB 25 MCG (1000 UNIT) 2000 UNITS: 25 TAB at 08:13

## 2024-08-29 RX ADMIN — HYDROXYZINE HYDROCHLORIDE 25 MG: 25 TABLET ORAL at 21:05

## 2024-08-29 RX ADMIN — CYANOCOBALAMIN TAB 1000 MCG 1000 MCG: 1000 TAB at 08:13

## 2024-08-29 RX ADMIN — LAMOTRIGINE 50 MG: 25 TABLET ORAL at 08:13

## 2024-08-29 RX ADMIN — HYDROXYZINE HYDROCHLORIDE 25 MG: 25 TABLET ORAL at 08:13

## 2024-08-29 RX ADMIN — ATORVASTATIN CALCIUM 10 MG: 10 TABLET, FILM COATED ORAL at 08:12

## 2024-08-29 RX ADMIN — HYDROXYZINE HYDROCHLORIDE 25 MG: 25 TABLET ORAL at 17:50

## 2024-08-29 RX ADMIN — SERTRALINE HYDROCHLORIDE 150 MG: 100 TABLET ORAL at 21:05

## 2024-08-29 RX ADMIN — CARIPRAZINE 3 MG: 3 CAPSULE, GELATIN COATED ORAL at 08:13

## 2024-08-29 NOTE — NURSING NOTE
Patient is out of his room at intervals. Quiet, withdrawn and isolative to himself. Offers no current complaints. Attends about half of groups. Appetite excellent. Medication compliant. Denies suicidal ideations or urges to self harm.Minimal socialization with select male peers.

## 2024-08-29 NOTE — PLAN OF CARE
Problem: Alteration in Thoughts and Perception  Goal: Treatment Goal: Gain control of psychotic behaviors/thinking, reduce/eliminate presenting symptoms and demonstrate improved reality functioning upon discharge  Outcome: Progressing  Goal: Refrain from acting on delusional thinking/internal stimuli  Description: Interventions:  - Monitor patient closely, per order   - Utilize least restrictive measures   - Set reasonable limits, give positive feedback for acceptable   - Administer medications as ordered and monitor of potential side effects  Outcome: Progressing  Goal: Agree to be compliant with medication regime, as prescribed and report medication side effects  Description: Interventions:  - Offer appropriate PRN medication and supervise ingestion; conduct AIMS, as needed   Outcome: Progressing  Goal: Recognize dysfunctional thoughts, communicate reality-based thoughts at the time of discharge  Description: Interventions:  - Provide medication and psycho-education to assist patient in compliance and developing insight into his/her illness   Outcome: Progressing  Goal: Complete daily ADLs, including personal hygiene independently, as able  Description: Interventions:  - Observe, teach, and assist patient with ADLS  - Monitor and promote a balance of rest/activity, with adequate nutrition and elimination   Outcome: Progressing     Problem: Ineffective Coping  Goal: Identifies ineffective coping skills  Outcome: Progressing  Goal: Identifies healthy coping skills  Outcome: Progressing  Goal: Demonstrates healthy coping skills  Outcome: Progressing     Problem: Risk for Self Injury/Neglect  Goal: Treatment Goal: Remain safe during length of stay, learn and adopt new coping skills, and be free of self-injurious ideation, impulses and acts at the time of discharge  Outcome: Progressing     Problem: Depression  Goal: Treatment Goal: Demonstrate behavioral control of depressive symptoms, verbalize feelings of improved  mood/affect, and adopt new coping skills prior to discharge  Outcome: Progressing     Problem: ANXIETY  Goal: Will report anxiety at manageable levels  Description: INTERVENTIONS:  - Administer medication as ordered  - Teach and encourage coping skills  - Provide emotional support  - Assess patient/family for anxiety and ability to cope  Outcome: Progressing

## 2024-08-29 NOTE — PROGRESS NOTES
08/29/24 0744   Team Meeting   Meeting Type Daily Rounds   Team Members Present   Team Members Present Physician;Nurse;;Other (Discipline and Name)   Patient/Family Present   Patient Present No   Patient's Family Present No     In attendance:  Dr. Jordan Holter, DO Daniel Teles, RN  Judi Garcia, [facility]  Gris Arizmendi M.S.    Groups: 3/9    Pt generally denies symptoms; pt is pleasant, quiet. Flat affect, selectively social. Pt reported some increased anxiety and depression during the day yesterday.      - - -

## 2024-08-29 NOTE — SOCIAL WORK
SW and pt met 1:1 for a walk-and-talk.  Pt polite, pleasant. Pt reports feeling at his baseline and denied any current concerns. SW provided status update regarding housing options - no news from [facility].   SW inquired about pt's bill status, will need to pay bills next week at the start of the new month.

## 2024-08-29 NOTE — NURSING NOTE
Patient in room prior to breakfast, was pleasant and cooperative with interactions. Pt reports no s/s, took medications without issue.

## 2024-08-29 NOTE — NURSING NOTE
Patient has been visible on the unit minimally this shift. Quiet, and isolative to himself. Guarded during approach. Flat affect. Denies any psychological symptoms or suicidal ideations. Medication compliant. Encouraged to attend more groups.

## 2024-08-29 NOTE — NURSING NOTE
Patient has been out of his room at intervals for all meals and some groups. Medication compliant. Quiet. Denies suicidal ideations. Anxious affect. Appetite excellent. Offer no complaints. No interaction.

## 2024-08-29 NOTE — PROGRESS NOTES
Progress Note - Behavioral Health   Deyvi Cao 54 y.o. male MRN: [id number]  Unit/Bed#: [facility] 101-02 Encounter: [id number]  Code Status: Level 1 - Full Code    Assessment & Plan   Principal Problem:    Bipolar disorder with severe depression (HCC)  Active Problems:    Benign essential hypertension    Mixed hyperlipidemia    Allergic rhinitis due to allergen    Medical clearance for psychiatric admission    Rosacea    Recommended Treatment:     Treatment plan, treatment progress and medication changes were reviewed with Nursing Staff, Pharmacy Service and Case Management in Treatment Team:  1.Continue with group therapy, milieu therapy and occupational therapy   2.Behavioral Health checks every 7 minutes   3.Continue frequent safety checks and vitals per unit protocol  4.Continue with SLIM medical management as indicated  5.Continue with current medication regimen for symptom management: Vraylar 3mg PO Daily, Atarax 25mg PO TID, Lamictal 50mg PO Daily, Zoloft 150mg PO QHS   6.Disposition Planning: Discharge planning and efforts remain ongoing - Awaiting group home placement    Subjective:    Patient was seen today for continuation of care, records reviewed and patient was discussed with the morning case review team.    Deyvi was seen today for psychiatric follow-up.  On assessment today, Deyvi was found in his room.  He is pleasant on approach.  Offers no new concerns.  Seems slightly brighter in his interactions and more motivated. He is out of bed more than when he first arrived on the unit.  Deyvi reports adequate daytime energy and denies any difficulties with initiating or staying asleep.  Oral appetite and hydration is adequate.  We reviewed once more the specific as-needed medications they can use going forward if they experience any insomnia or destabilization of their mood, they understood and were agreeable. Milieu visibility and group attendance encouraged to promote an active participation in  treatment.    Deyvi denies acute suicidal/self-harm ideation/intent/plan upon direct inquiry at this time. Deyvi is able to contract for safety while on the unit and would feel comfortable seeking staff support should suicidal symptoms or urges appear or worsen. Deyvi remains behaviorally appropriate, no agitation or aggression noted on exam or in report. Deyvi also denies HI/AH/VH, and does not appear overtly manic.  Patient does not verbalize any experiences that can be categorized as paranoid, persecutory, bizarre, or somatic delusions. Deyvi remains adherent to his current psychotropic medication regimen and denies any side effects from medications, as well as none noted on exam.    Group Attendance: 3 / 8  Treatment Team: SOLEDAD  Psychiatric PRN's Needed: None    Review of Systems:  Behavior over the last 24 hours: Slowly improving  Sleep: sleeping okay throughout the night  Appetite: adequate  Medication side effects: none reported  ROS:no complaints, all other systems are negative    Objective:    Vitals:  Vitals:    08/29/24 0734   BP: 131/84   Pulse: 96   Resp: 18   Temp: (!) 97 °F (36.1 °C)   SpO2: 90%     Laboratory Results:  I have personally reviewed all pertinent laboratory/tests results.  Most Recent Labs:   Lab Results   Component Value Date    WBC 7.39 06/26/2024    RBC 4.70 06/26/2024    HGB 15.2 06/26/2024    HCT 45.5 06/26/2024     06/26/2024    RDW 12.9 06/26/2024    NEUTROABS 4.63 06/26/2024    SODIUM 137 06/26/2024    K 4.1 06/26/2024     06/26/2024    CO2 26 06/26/2024    BUN 17 06/26/2024    CREATININE 0.63 06/26/2024    GLUC 98 06/26/2024    GLUF 98 06/26/2024    CALCIUM 9.6 06/26/2024    AST 25 06/26/2024    ALT 45 06/26/2024    ALKPHOS 114 (H) 06/26/2024    TP 7.0 06/26/2024    ALB 4.4 06/26/2024    TBILI 0.44 06/26/2024    CHOLESTEROL 177 06/26/2024    HDL 52 06/26/2024    TRIG 73 06/26/2024    LDLCALC 110 (H) 06/26/2024    NONHDLC 125 06/26/2024    LITHIUM 0.4 (L)  01/28/2021    [id number] 2.636 06/26/2024    HGBA1C 5.2 11/22/2023     11/22/2023     Mental Status Evaluation:  Appearance:  age appropriate, casually dressed   Behavior:  pleasant, cooperative, calm   Speech:  scant, soft   Mood:  less anxious, less depressed   Affect:  constricted   Thought Process:  organized, logical, coherent   Associations: intact associations   Thought Content:  no overt delusions   Perceptual Disturbances: no auditory hallucinations, no visual hallucinations, denies when asked, does not appear responding to internal stimuli   Risk Potential: Suicidal ideation - None at present, contracts for safety on the unit, would talk to staff if not feeling safe on the unit  Homicidal ideation - None at present  Potential for aggression - Not at present   Sensorium:  oriented to person, place, and time/date   Memory:  recent memory intact   Consciousness:  alert and awake   Attention/Concentration: attention span and concentration appear shorter than expected for age   Insight:  limited   Judgment: limited   Gait/Station: normal gait/station, normal balance   Motor Activity: no abnormal movements     Progress Toward Goals: making gradual improvement.  Deyvi continues to require inpatient psychiatric hospitalization for continued medication management and titration to optimize symptom reduction, improve sleep hygiene, and demonstrate adequate self-care.     Suicide/Homicide Risk Assessment:  Risk of Harm to Self:   Nursing Suicide Risk Assessment Last 24 hours: C-SSRS Risk (Since Last Contact)  Calculated C-SSRS Risk Score (Since Last Contact): No Risk Indicated    Risk of Harm to Others:  Nursing Homicide Risk Assessment: Violence Risk to Others: Denies within past 6 months    Behavioral Health Medications: all current active meds have been reviewed and continue current psychiatric medications.  Current Facility-Administered Medications   Medication Dose Route Frequency Provider Last Rate     acetaminophen  650 mg Oral Q6H PRN ALVINA Harley      acetaminophen  650 mg Oral Q4H PRN ALVINA Harley      acetaminophen  975 mg Oral Q6H PRN ALVINA Harley      aluminum-magnesium hydroxide-simethicone  30 mL Oral Q4H PRN ALVINA Harley      ammonium lactate   Topical BID PRN ALVINA Harley      atorvastatin  10 mg Oral Daily ALVINA Lewis      benztropine  1 mg Intramuscular Q4H PRN Max 6/day ALVINA Harley      benztropine  1 mg Oral Q4H PRN Max 6/day ALVINA Harley      bisacodyl  10 mg Rectal Daily PRN ALVINA Harley      cariprazine  3 mg Oral Daily Martin Cardenas MD      Cholecalciferol  2,000 Units Oral Daily ALVINA Lewis      cyanocobalamin  1,000 mcg Oral Daily ALVINA Lewis      hydrOXYzine HCL  25 mg Oral Q6H PRN Max 4/day ALVINA Harley      hydrOXYzine HCL  25 mg Oral TID Martin Cardenas MD      hydrOXYzine HCL  50 mg Oral Q4H PRN Max 4/day ALVINA Harley      Or    LORazepam  1 mg Intramuscular Q4H PRN ALVINA Harley      lamoTRIgine  50 mg Oral Daily Martin Cardenas MD      lisinopril  10 mg Oral Daily ALVINA Lewis      LORazepam  1 mg Oral Q4H PRN Max 6/day ALVINA Harley      Or    LORazepam  2 mg Intramuscular Q6H PRN Max 3/day ALVINA Harley      OLANZapine  10 mg Oral Q3H PRN Max 3/day ALVINA Harley      Or    OLANZapine  10 mg Intramuscular Q3H PRN Max 3/day ALVINA Harley      OLANZapine  5 mg Oral Q3H PRN Max 6/day ALVINA Harley      Or    OLANZapine  5 mg Intramuscular Q3H PRN Max 6/day ALVINA Harley      OLANZapine  2.5 mg Oral Q3H PRN Max 8/day ALVINA Harley      polyethylene glycol  17 g Oral Daily PRN ALVINA Harley      propranolol  10 mg Oral Q8H PRN ALVINA Harley      senna-docusate sodium  1 tablet Oral Daily PRN ALVINA Harley      sertraline  150 mg Oral HS Martin Cardenas MD       Risks / Benefits of Treatment:  Risks, benefits, and  possible side effects of medications explained to patient. Patient has limited understanding of risks and benefits of treatment at this time, but agrees to take medications as prescribed.    Counseling / Coordination of Care:  Total floor/unit time spent today 25 minutes. Greater than 50% of total time was spent with the patient and / or family counseling and / or coordination of care. A description of the counseling / coordination of care:   Patient's progress discussed with staff in treatment team meeting.  Medications, treatment progress and treatment plan reviewed with patient.   Educated on importance of medication and treatment compliance.  Reassurance and supportive therapy provided.   Encouraged participation in milieu and group therapy on the unit.    ALVINA Harley 08/29/24

## 2024-08-30 PROCEDURE — 99232 SBSQ HOSP IP/OBS MODERATE 35: CPT

## 2024-08-30 RX ADMIN — CARIPRAZINE 3 MG: 3 CAPSULE, GELATIN COATED ORAL at 08:16

## 2024-08-30 RX ADMIN — SERTRALINE HYDROCHLORIDE 150 MG: 100 TABLET ORAL at 21:12

## 2024-08-30 RX ADMIN — CHOLECALCIFEROL TAB 25 MCG (1000 UNIT) 2000 UNITS: 25 TAB at 08:16

## 2024-08-30 RX ADMIN — CYANOCOBALAMIN TAB 1000 MCG 1000 MCG: 1000 TAB at 08:16

## 2024-08-30 RX ADMIN — HYDROXYZINE HYDROCHLORIDE 25 MG: 25 TABLET ORAL at 21:12

## 2024-08-30 RX ADMIN — LAMOTRIGINE 50 MG: 25 TABLET ORAL at 08:16

## 2024-08-30 RX ADMIN — LISINOPRIL 10 MG: 10 TABLET ORAL at 08:16

## 2024-08-30 RX ADMIN — ATORVASTATIN CALCIUM 10 MG: 10 TABLET, FILM COATED ORAL at 08:16

## 2024-08-30 RX ADMIN — HYDROXYZINE HYDROCHLORIDE 25 MG: 25 TABLET ORAL at 17:28

## 2024-08-30 RX ADMIN — HYDROXYZINE HYDROCHLORIDE 25 MG: 25 TABLET ORAL at 08:16

## 2024-08-30 NOTE — NURSING NOTE
Deyvi denied all psych sx on thi shift.  Deyvi has been visible, social, pleasant and polite.  No unmet needs.  No PRN's requested or administered.  No behavioral issues.

## 2024-08-30 NOTE — PLAN OF CARE
Problem: Alteration in Thoughts and Perception  Goal: Treatment Goal: Gain control of psychotic behaviors/thinking, reduce/eliminate presenting symptoms and demonstrate improved reality functioning upon discharge  Outcome: Progressing  Goal: Verbalize thoughts and feelings  Description: Interventions:  - Promote a nonjudgmental and trusting relationship with the patient through active listening and therapeutic communication  - Assess patient's level of functioning, behavior and potential for risk  - Engage patient in 1 on 1 interactions  - Encourage patient to express fears, feelings, frustrations, and discuss symptoms    - [place] patient to reality, help patient recognize reality-based thinking   - Administer medications as ordered and assess for potential side effects  - Provide the patient education related to the signs and symptoms of the illness and desired effects of prescribed medications  Outcome: Progressing  Goal: Refrain from acting on delusional thinking/internal stimuli  Description: Interventions:  - Monitor patient closely, per order   - Utilize least restrictive measures   - Set reasonable limits, give positive feedback for acceptable   - Administer medications as ordered and monitor of potential side effects  Outcome: Progressing  Goal: Agree to be compliant with medication regime, as prescribed and report medication side effects  Description: Interventions:  - Offer appropriate PRN medication and supervise ingestion; conduct AIMS, as needed   Outcome: Progressing  Goal: Attend and participate in unit activities, including therapeutic, recreational, and educational groups  Description: Interventions:  -Encourage Visitation and family involvement in care  Outcome: Progressing  Goal: Recognize dysfunctional thoughts, communicate reality-based thoughts at the time of discharge  Description: Interventions:  - Provide medication and psycho-education to assist patient in compliance and developing  insight into his/her illness   Outcome: Progressing  Goal: Complete daily ADLs, including personal hygiene independently, as able  Description: Interventions:  - Observe, teach, and assist patient with ADLS  - Monitor and promote a balance of rest/activity, with adequate nutrition and elimination   Outcome: Progressing     Problem: Ineffective Coping  Goal: Identifies ineffective coping skills  Outcome: Progressing  Goal: Identifies healthy coping skills  Outcome: Progressing  Goal: Demonstrates healthy coping skills  Outcome: Progressing  Goal: Participates in unit activities  Description: Interventions:  - Provide therapeutic environment   - Provide required programming   - Redirect inappropriate behaviors   Outcome: Progressing  Goal: Patient/Family participate in treatment and DC plans  Description: Interventions:  - Provide therapeutic environment  Outcome: Progressing  Goal: Patient/Family verbalizes awareness of resources  Outcome: Progressing  Goal: Understands least restrictive measures  Description: Interventions:  - Utilize least restrictive behavior  Outcome: Progressing  Goal: Free from restraint events  Description: - Utilize least restrictive measures   - Provide behavioral interventions   - Redirect inappropriate behaviors   Outcome: Progressing     Problem: Risk for Self Injury/Neglect  Goal: Treatment Goal: Remain safe during length of stay, learn and adopt new coping skills, and be free of self-injurious ideation, impulses and acts at the time of discharge  Outcome: Progressing  Goal: Verbalize thoughts and feelings  Description: Interventions:  - Assess and re-assess patient's lethality and potential for self-injury  - Engage patient in 1:1 interactions, daily, for a minimum of 15 minutes  - Encourage patient to express feelings, fears, frustrations, hopes  - Establish rapport/trust with patient   Outcome: Progressing  Goal: Refrain from harming self  Description: Interventions:  - Monitor  patient closely, per order  - Develop a trusting relationship  - Supervise medication ingestion, monitor effects and side effects   Outcome: Progressing  Goal: Attend and participate in unit activities, including therapeutic, recreational, and educational groups  Description: Interventions:  - Provide therapeutic and educational activities daily, encourage attendance and participation, and document same in the medical record  - Obtain collateral information, encourage visitation and family involvement in care   Outcome: Progressing  Goal: Recognize maladaptive responses and adopt new coping mechanisms  Outcome: Progressing  Goal: Complete daily ADLs, including personal hygiene independently, as able  Description: Interventions:  - Observe, teach, and assist patient with ADLS  - Monitor and promote a balance of rest/activity, with adequate nutrition and elimination  Outcome: Progressing     Problem: Depression  Goal: Treatment Goal: Demonstrate behavioral control of depressive symptoms, verbalize feelings of improved mood/affect, and adopt new coping skills prior to discharge  Outcome: Progressing  Goal: Verbalize thoughts and feelings  Description: Interventions:  - Assess and re-assess patient's level of risk   - Engage patient in 1:1 interactions, daily, for a minimum of 15 minutes   - Encourage patient to express feelings, fears, frustrations, hopes   Outcome: Progressing  Goal: Refrain from harming self  Description: Interventions:  - Monitor patient closely, per order   - Supervise medication ingestion, monitor effects and side effects   Outcome: Progressing  Goal: Refrain from isolation  Description: Interventions:  - Develop a trusting relationship   - Encourage socialization   Outcome: Progressing  Goal: Refrain from self-neglect  Outcome: Progressing  Goal: Attend and participate in unit activities, including therapeutic, recreational, and educational groups  Description: Interventions:  - Provide  therapeutic and educational activities daily, encourage attendance and participation, and document same in the medical record   Outcome: Progressing  Goal: Complete daily ADLs, including personal hygiene independently, as able  Description: Interventions:  - Observe, teach, and assist patient with ADLS  -  Monitor and promote a balance of rest/activity, with adequate nutrition and elimination   Outcome: Progressing     Problem: Anxiety  Goal: Anxiety is at manageable level  Description: Interventions:  - Assess and monitor patient's anxiety level.   - Monitor for signs and symptoms (heart palpitations, chest pain, shortness of breath, headaches, nausea, feeling jumpy, restlessness, irritable, apprehensive).   - Collaborate with interdisciplinary team and initiate plan and interventions as ordered.  - [place] patient to unit/surroundings  - Explain treatment plan  - Encourage participation in care  - Encourage verbalization of concerns/fears  - Identify coping mechanisms  - Assist in developing anxiety-reducing skills  - Administer/offer alternative therapies  - Limit or eliminate stimulants  Outcome: Progressing     Problem: Risk for Violence/Aggression Toward Others  Goal: Treatment Goal: Refrain from acts of violence/aggression during length of stay, and demonstrate improved impulse control at the time of discharge  Outcome: Progressing  Goal: Verbalize thoughts and feelings  Description: Interventions:  - Assess and re-assess patient's level of risk, every waking shift  - Engage patient in 1:1 interactions, daily, for a minimum of 15 minutes   - Allow patient to express feelings and frustrations in a safe and non-threatening manner   - Establish rapport/trust with patient   Outcome: Progressing  Goal: Refrain from harming others  Outcome: Progressing  Goal: Refrain from destructive acts on the environment or property  Outcome: Progressing  Goal: Control angry outbursts  Description: Interventions:  - Monitor  patient closely, per order  - Ensure early verbal de-escalation  - Monitor prn medication needs  - Set reasonable/therapeutic limits, outline behavioral expectations, and consequences   - Provide a non-threatening milieu, utilizing the least restrictive interventions   Outcome: Progressing  Goal: Attend and participate in unit activities, including therapeutic, recreational, and educational groups  Description: Interventions:  - Provide therapeutic and educational activities daily, encourage attendance and participation, and document same in the medical record   Outcome: Progressing  Goal: Identify appropriate positive anger management techniques  Description: Interventions:  - Offer anger management and coping skills groups   - Staff will provide positive feedback for appropriate anger control  Outcome: Progressing     Problem: Alteration in Orientation  Goal: Treatment Goal: Demonstrate a reduction of confusion and improved orientation to person, place, time and/or situation upon discharge, according to optimum baseline/ability  Outcome: Progressing  Goal: Interact with staff daily  Description: Interventions:  - Assess and re-assess patient's level of orientation  - Engage patient in 1 on 1 interactions, daily, for a minimum of 15 minutes   - Establish rapport/trust with patient   Outcome: Progressing  Goal: Express concerns related to confused thinking related to:  Description: Interventions:  - Encourage patient to express feelings, fears, frustrations, hopes  - Assign consistent caregivers   - [place]/re-orient patient as needed  - Allow comfort items, as appropriate  - Provide visual cues, signs, etc.   Outcome: Progressing  Goal: Allow medical examinations, as recommended  Description: Interventions:  - Provide physical/neurological exams and/or referrals, per provider   Outcome: Progressing  Goal: Cooperate with recommended testing/procedures  Description: Interventions:  - Determine need for ancillary  testing  - Observe for mental status changes  - Implement falls/precaution protocol   Outcome: Progressing  Goal: Attend and participate in unit activities, including therapeutic, recreational, and educational groups  Description: Interventions:  - Provide therapeutic and educational activities daily, encourage attendance and participation, and document same in the medical record   - Provide appropriate opportunities for reminiscence   - Provide a consistent daily routine   - Encourage family contact/visitation   Outcome: Progressing  Goal: Complete daily ADLs, including personal hygiene independently, as able  Description: Interventions:  - Observe, teach, and assist patient with ADLS  Outcome: Progressing     Problem: SELF HARM/SUICIDALITY  Goal: Will have no self-injury during hospital stay  Description: INTERVENTIONS:  - Q 15 MINUTES: Routine safety checks  - Q WAKING SHIFT & PRN: Assess risk to determine if routine checks are adequate to maintain patient safety  - Encourage patient to participate actively in care by formulating a plan to combat response to suicidal ideation, identify supports and resources  Outcome: Progressing     Problem: DEPRESSION  Goal: Will be euthymic at discharge  Description: INTERVENTIONS:  - Administer medication as ordered  - Provide emotional support via 1:1 interaction with staff  - Encourage involvement in milieu/groups/activities  - Monitor for social isolation  Outcome: Progressing     Problem: ANXIETY  Goal: Will report anxiety at manageable levels  Description: INTERVENTIONS:  - Administer medication as ordered  - Teach and encourage coping skills  - Provide emotional support  - Assess patient/family for anxiety and ability to cope  Outcome: Progressing  Goal: By discharge: Patient will verbalize 2 strategies to deal with anxiety  Description: Interventions:  - Identify any obvious source/trigger to anxiety  - Staff will assist patient in applying identified coping  technique/skills  - Encourage attendance of scheduled groups and activities  Outcome: Progressing     Problem: SELF CARE DEFICIT  Goal: Return ADL status to a safe level of function  Description: INTERVENTIONS:  - Administer medication as ordered  - Assess ADL deficits and provide assistive devices as needed  - Obtain PT/OT consults as needed  - Assist and instruct patient to increase activity and self care as tolerated  Outcome: Progressing     Problem: DISCHARGE PLANNING - CARE MANAGEMENT  Goal: Discharge to post-acute care or home with appropriate resources  Description: INTERVENTIONS:  - Conduct assessment to determine patient/family and health care team treatment goals, and need for post-acute services based on payer coverage, community resources, and patient preferences, and barriers to discharge  - Address psychosocial, clinical, and financial barriers to discharge as identified in assessment in conjunction with the patient/family and health care team  - Arrange appropriate level of post-acute services according to patient’s   needs and preference and payer coverage in collaboration with the physician and health care team  - Communicate with and update the patient/family, physician, and health care team regarding progress on the discharge plan  - Arrange appropriate transportation to post-acute venues  Outcome: Progressing

## 2024-08-30 NOTE — SOCIAL WORK
STEFAN checked in with pt. Pt reports no new concerns.   SW notified pt that SW contacted [employer] on his behalf regarding his shirt he ordered that was lost in the mail. Replacement has been issued and is set to arrive tomorrow.   Pt expressed appreciation and understanding.

## 2024-08-30 NOTE — PROGRESS NOTES
Progress Note - Behavioral Health   Deyvi Cao 54 y.o. male MRN: [id number]  Unit/Bed#: [facility] 101-02 Encounter: [id number]  Code Status: Level 1 - Full Code    Assessment & Plan   Principal Problem:    Bipolar disorder with severe depression (HCC)  Active Problems:    Benign essential hypertension    Mixed hyperlipidemia    Allergic rhinitis due to allergen    Medical clearance for psychiatric admission    Rosacea    Recommended Treatment:     Treatment plan, treatment progress and medication changes were reviewed with Nursing Staff, Pharmacy Service and Case Management in Treatment Team:  1.Continue with group therapy, milieu therapy and occupational therapy   2.Behavioral Health checks every 7 minutes   3.Continue frequent safety checks and vitals per unit protocol  4.Continue with SLIM medical management as indicated  5.Continue with current medication regimen for symptom management: Vraylar 3mg PO Daily, Atarax 25mg PO TID, Lamictal 50mg PO Daily, Zoloft 150mg PO QHS   6.Disposition Planning: Discharge planning and efforts remain ongoing - Awaiting group home placement    Subjective:    Patient was seen today for continuation of care, records reviewed and patient was discussed with the morning case review team.    Deyvi was seen today for psychiatric follow-up.  On assessment today, Deyvi was found laying in his bed.  Seems slightly more bright on approach, smiles appropriately but is constricted for the most part through conversation.  He reports mild depression and anxiety, but states it is improved than when first admitted. He feels he is ready for discharge, understands we are awaiting placement.  We reviewed once more the specific as-needed medications they can use going forward if they experience any insomnia or destabilization of their mood, they understood and were agreeable. Milieu visibility and group attendance encouraged to promote an active participation in treatment.    Deyvi denies acute  suicidal/self-harm ideation/intent/plan upon direct inquiry at this time. Deyvi is able to contract for safety while on the unit and would feel comfortable seeking staff support should suicidal symptoms or urges appear or worsen. Deyvi remains behaviorally appropriate, no agitation or aggression noted on exam or in report. Deyvi also denies HI/AH/VH, and does not appear overtly manic.  Patient does not verbalize any experiences that can be categorized as paranoid, persecutory, bizarre, or somatic delusions. Deyvi remains adherent to his current psychotropic medication regimen and denies any side effects from medications, as well as none noted on exam.    Review of Systems:  Behavior over the last 24 hours: Slowly improving  Sleep: sleeping okay throughout the night  Appetite: adequate  Medication side effects: none reported  ROS:no complaints, all other systems are negative    Objective:    Vitals:  Vitals:    08/30/24 0737   BP: 134/78   Pulse: 83   Resp: 18   Temp: (!) 97.2 °F (36.2 °C)   SpO2: 92%     Laboratory Results:  I have personally reviewed all pertinent laboratory/tests results.  Most Recent Labs:   Lab Results   Component Value Date    WBC 7.39 06/26/2024    RBC 4.70 06/26/2024    HGB 15.2 06/26/2024    HCT 45.5 06/26/2024     06/26/2024    RDW 12.9 06/26/2024    NEUTROABS 4.63 06/26/2024    SODIUM 137 06/26/2024    K 4.1 06/26/2024     06/26/2024    CO2 26 06/26/2024    BUN 17 06/26/2024    CREATININE 0.63 06/26/2024    GLUC 98 06/26/2024    GLUF 98 06/26/2024    CALCIUM 9.6 06/26/2024    AST 25 06/26/2024    ALT 45 06/26/2024    ALKPHOS 114 (H) 06/26/2024    TP 7.0 06/26/2024    ALB 4.4 06/26/2024    TBILI 0.44 06/26/2024    CHOLESTEROL 177 06/26/2024    HDL 52 06/26/2024    TRIG 73 06/26/2024    LDLCALC 110 (H) 06/26/2024    NONHDLC 125 06/26/2024    LITHIUM 0.4 (L) 01/28/2021    [id number] 2.636 06/26/2024    HGBA1C 5.2 11/22/2023     11/22/2023     Mental Status  Evaluation:  Appearance:  age appropriate, casually dressed   Behavior:  pleasant, cooperative, calm   Speech:  scant, soft   Mood:  less anxious, less depressed   Affect:  constricted   Thought Process:  organized, logical, coherent   Associations: intact associations   Thought Content:  no overt delusions   Perceptual Disturbances: no auditory hallucinations, no visual hallucinations, denies when asked, does not appear responding to internal stimuli   Risk Potential: Suicidal ideation - None at present, contracts for safety on the unit, would talk to staff if not feeling safe on the unit  Homicidal ideation - None at present  Potential for aggression - Not at present   Sensorium:  oriented to person, place, and time/date   Memory:  recent memory intact   Consciousness:  alert and awake   Attention/Concentration: attention span and concentration appear shorter than expected for age   Insight:  limited   Judgment: limited   Gait/Station: normal gait/station, normal balance   Motor Activity: no abnormal movements     Progress Toward Goals: making gradual improvement.  Deyvi continues to require inpatient psychiatric hospitalization for continued medication management and titration to optimize symptom reduction, improve sleep hygiene, and demonstrate adequate self-care.     Suicide/Homicide Risk Assessment:  Risk of Harm to Self:   Nursing Suicide Risk Assessment Last 24 hours: C-SSRS Risk (Since Last Contact)  Calculated C-SSRS Risk Score (Since Last Contact): No Risk Indicated    Risk of Harm to Others:  Nursing Homicide Risk Assessment: Violence Risk to Others: Denies within past 6 months    Behavioral Health Medications: all current active meds have been reviewed and continue current psychiatric medications.  Current Facility-Administered Medications   Medication Dose Route Frequency Provider Last Rate    acetaminophen  650 mg Oral Q6H PRN ALVINA Harley      acetaminophen  650 mg Oral Q4H PRN Melva Knutson  ALVINA      acetaminophen  975 mg Oral Q6H PRN ALVINA Harley      aluminum-magnesium hydroxide-simethicone  30 mL Oral Q4H PRN ALVINA Harley      ammonium lactate   Topical BID PRN ALVINA Harley      atorvastatin  10 mg Oral Daily Sary LinkALVINA Thompson      benztropine  1 mg Intramuscular Q4H PRN Max 6/day ALVINA Harley      benztropine  1 mg Oral Q4H PRN Max 6/day ALVINA Harley      bisacodyl  10 mg Rectal Daily PRN ALVINA Harley      cariprazine  3 mg Oral Daily Martin Cardenas MD      Cholecalciferol  2,000 Units Oral Daily Sary Rodriguez ALVINA Biggs      cyanocobalamin  1,000 mcg Oral Daily Sary LinkALVINA Thompson      hydrOXYzine HCL  25 mg Oral Q6H PRN Max 4/day ALVINA Harley      hydrOXYzine HCL  25 mg Oral TID Martin Cardenas MD      hydrOXYzine HCL  50 mg Oral Q4H PRN Max 4/day ALVINA Harley      Or    LORazepam  1 mg Intramuscular Q4H PRN ALVINA Harley      lamoTRIgine  50 mg Oral Daily Martin Cardenas MD      lisinopril  10 mg Oral Daily Sary LinkALVINA Thompson      LORazepam  1 mg Oral Q4H PRN Max 6/day ALVINA Harley      Or    LORazepam  2 mg Intramuscular Q6H PRN Max 3/day ALVINA Harley      OLANZapine  10 mg Oral Q3H PRN Max 3/day ALVINA Harley      Or    OLANZapine  10 mg Intramuscular Q3H PRN Max 3/day ALVINA Harley      OLANZapine  5 mg Oral Q3H PRN Max 6/day ALVINA Halrey      Or    OLANZapine  5 mg Intramuscular Q3H PRN Max 6/day ALVINA Harley      OLANZapine  2.5 mg Oral Q3H PRN Max 8/day ALVINA Harley      polyethylene glycol  17 g Oral Daily PRN ALVINA Harley      propranolol  10 mg Oral Q8H PRN ALVINA Harley      senna-docusate sodium  1 tablet Oral Daily PRN ALVINA Harley      sertraline  150 mg Oral HS Matrin Cardenas MD       Risks / Benefits of Treatment:  Risks, benefits, and possible side effects of medications explained to patient. Patient has limited understanding of risks and  benefits of treatment at this time, but agrees to take medications as prescribed.    Counseling / Coordination of Care:  Total floor/unit time spent today 25 minutes. Greater than 50% of total time was spent with the patient and / or family counseling and / or coordination of care. A description of the counseling / coordination of care:   Patient's progress discussed with staff in treatment team meeting.  Medications, treatment progress and treatment plan reviewed with patient.   Educated on importance of medication and treatment compliance.  Reassurance and supportive therapy provided.   Encouraged participation in milieu and group therapy on the unit.    ALVINA Harley 08/30/24

## 2024-08-30 NOTE — PROGRESS NOTES
08/30/24 0743   Team Meeting   Meeting Type Daily Rounds   Team Members Present   Team Members Present Physician;Nurse;;Other (Discipline and Name)   Patient/Family Present   Patient Present No   Patient's Family Present No     In attendance:  Dr. Jordan Holter, DO Daniel Teles, RN  Judi Garcia, \[facility] [place] [facility]\""W  Gris Arizmendi M.S.    Groups: 4/8    No changes noted; pt remains quiet. Anxious affect, guarded. Social with select peers. Pt waiting on [facility] availability.

## 2024-08-30 NOTE — NURSING NOTE
Patient visible, pleasant and cooperative with care. Pt reports no s/s, scant in conversation and voices no concerns. Pt takes medication without issue.

## 2024-08-31 PROCEDURE — 99232 SBSQ HOSP IP/OBS MODERATE 35: CPT | Performed by: NURSE PRACTITIONER

## 2024-08-31 RX ADMIN — ATORVASTATIN CALCIUM 10 MG: 10 TABLET, FILM COATED ORAL at 08:46

## 2024-08-31 RX ADMIN — CHOLECALCIFEROL TAB 25 MCG (1000 UNIT) 2000 UNITS: 25 TAB at 08:46

## 2024-08-31 RX ADMIN — HYDROXYZINE HYDROCHLORIDE 25 MG: 25 TABLET ORAL at 16:56

## 2024-08-31 RX ADMIN — LISINOPRIL 10 MG: 10 TABLET ORAL at 08:46

## 2024-08-31 RX ADMIN — CYANOCOBALAMIN TAB 1000 MCG 1000 MCG: 1000 TAB at 08:46

## 2024-08-31 RX ADMIN — HYDROXYZINE HYDROCHLORIDE 25 MG: 25 TABLET ORAL at 21:34

## 2024-08-31 RX ADMIN — LAMOTRIGINE 50 MG: 25 TABLET ORAL at 08:46

## 2024-08-31 RX ADMIN — SERTRALINE HYDROCHLORIDE 150 MG: 100 TABLET ORAL at 21:34

## 2024-08-31 RX ADMIN — CARIPRAZINE 3 MG: 3 CAPSULE, GELATIN COATED ORAL at 08:46

## 2024-08-31 RX ADMIN — HYDROXYZINE HYDROCHLORIDE 25 MG: 25 TABLET ORAL at 08:46

## 2024-08-31 NOTE — NURSING NOTE
[place]apping in room at the beginning of the shift. Denies psychiatric symptoms. Compliant with meds & meals. Pleasant, cooperative soft spoken.Q7 min checks ongoing. No behavior issues.Denies SI/HI.

## 2024-08-31 NOTE — PLAN OF CARE
Problem: Alteration in Thoughts and Perception  Goal: Verbalize thoughts and feelings  Description: Interventions:  - Promote a nonjudgmental and trusting relationship with the patient through active listening and therapeutic communication  - Assess patient's level of functioning, behavior and potential for risk  - Engage patient in 1 on 1 interactions  - Encourage patient to express fears, feelings, frustrations, and discuss symptoms    - [place] patient to reality, help patient recognize reality-based thinking   - Administer medications as ordered and assess for potential side effects  - Provide the patient education related to the signs and symptoms of the illness and desired effects of prescribed medications  Outcome: Not Progressing  Goal: Refrain from acting on delusional thinking/internal stimuli  Description: Interventions:  - Monitor patient closely, per order   - Utilize least restrictive measures   - Set reasonable limits, give positive feedback for acceptable   - Administer medications as ordered and monitor of potential side effects  Outcome: Not Progressing  Goal: Agree to be compliant with medication regime, as prescribed and report medication side effects  Description: Interventions:  - Offer appropriate PRN medication and supervise ingestion; conduct AIMS, as needed   Outcome: Progressing  Goal: Attend and participate in unit activities, including therapeutic, recreational, and educational groups  Description: Interventions:  -Encourage Visitation and family involvement in care  Outcome: Not Progressing  Goal: Recognize dysfunctional thoughts, communicate reality-based thoughts at the time of discharge  Description: Interventions:  - Provide medication and psycho-education to assist patient in compliance and developing insight into his/her illness   Outcome: Not Progressing  Goal: Complete daily ADLs, including personal hygiene independently, as able  Description: Interventions:  - Observe, teach,  and assist patient with ADLS  - Monitor and promote a balance of rest/activity, with adequate nutrition and elimination   Outcome: Progressing     Problem: Ineffective Coping  Goal: Identifies ineffective coping skills  Outcome: Not Progressing  Goal: Identifies healthy coping skills  Outcome: Not Progressing  Goal: Demonstrates healthy coping skills  Outcome: Not Progressing  Goal: Participates in unit activities  Description: Interventions:  - Provide therapeutic environment   - Provide required programming   - Redirect inappropriate behaviors   Outcome: Not Progressing  Goal: Patient/Family participate in treatment and DC plans  Description: Interventions:  - Provide therapeutic environment  Outcome: Not Progressing  Goal: Patient/Family verbalizes awareness of resources  Outcome: Not Progressing  Goal: Understands least restrictive measures  Description: Interventions:  - Utilize least restrictive behavior  Outcome: Not Progressing  Goal: Free from restraint events  Description: - Utilize least restrictive measures   - Provide behavioral interventions   - Redirect inappropriate behaviors   Outcome: Progressing     Problem: Risk for Self Injury/Neglect  Goal: Verbalize thoughts and feelings  Description: Interventions:  - Assess and re-assess patient's lethality and potential for self-injury  - Engage patient in 1:1 interactions, daily, for a minimum of 15 minutes  - Encourage patient to express feelings, fears, frustrations, hopes  - Establish rapport/trust with patient   Outcome: Not Progressing  Goal: Refrain from harming self  Description: Interventions:  - Monitor patient closely, per order  - Develop a trusting relationship  - Supervise medication ingestion, monitor effects and side effects   Outcome: Progressing  Goal: Attend and participate in unit activities, including therapeutic, recreational, and educational groups  Description: Interventions:  - Provide therapeutic and educational activities daily,  encourage attendance and participation, and document same in the medical record  - Obtain collateral information, encourage visitation and family involvement in care   Outcome: Not Progressing  Goal: Complete daily ADLs, including personal hygiene independently, as able  Description: Interventions:  - Observe, teach, and assist patient with ADLS  - Monitor and promote a balance of rest/activity, with adequate nutrition and elimination  Outcome: Not Progressing     Problem: Depression  Goal: Verbalize thoughts and feelings  Description: Interventions:  - Assess and re-assess patient's level of risk   - Engage patient in 1:1 interactions, daily, for a minimum of 15 minutes   - Encourage patient to express feelings, fears, frustrations, hopes   Outcome: Not Progressing  Goal: Refrain from harming self  Description: Interventions:  - Monitor patient closely, per order   - Supervise medication ingestion, monitor effects and side effects   Outcome: Progressing  Goal: Refrain from isolation  Description: Interventions:  - Develop a trusting relationship   - Encourage socialization   Outcome: Not Progressing  Goal: Attend and participate in unit activities, including therapeutic, recreational, and educational groups  Description: Interventions:  - Provide therapeutic and educational activities daily, encourage attendance and participation, and document same in the medical record   Outcome: Not Progressing  Goal: Complete daily ADLs, including personal hygiene independently, as able  Description: Interventions:  - Observe, teach, and assist patient with ADLS  -  Monitor and promote a balance of rest/activity, with adequate nutrition and elimination   Outcome: Not Progressing     Problem: Anxiety  Goal: Anxiety is at manageable level  Description: Interventions:  - Assess and monitor patient's anxiety level.   - Monitor for signs and symptoms (heart palpitations, chest pain, shortness of breath, headaches, nausea, feeling  jumpy, restlessness, irritable, apprehensive).   - Collaborate with interdisciplinary team and initiate plan and interventions as ordered.  - [place] patient to unit/surroundings  - Explain treatment plan  - Encourage participation in care  - Encourage verbalization of concerns/fears  - Identify coping mechanisms  - Assist in developing anxiety-reducing skills  - Administer/offer alternative therapies  - Limit or eliminate stimulants  Outcome: Not Progressing     Problem: Risk for Violence/Aggression Toward Others  Goal: Verbalize thoughts and feelings  Description: Interventions:  - Assess and re-assess patient's level of risk, every waking shift  - Engage patient in 1:1 interactions, daily, for a minimum of 15 minutes   - Allow patient to express feelings and frustrations in a safe and non-threatening manner   - Establish rapport/trust with patient   Outcome: Not Progressing  Goal: Control angry outbursts  Description: Interventions:  - Monitor patient closely, per order  - Ensure early verbal de-escalation  - Monitor prn medication needs  - Set reasonable/therapeutic limits, outline behavioral expectations, and consequences   - Provide a non-threatening milieu, utilizing the least restrictive interventions   Outcome: Progressing  Goal: Attend and participate in unit activities, including therapeutic, recreational, and educational groups  Description: Interventions:  - Provide therapeutic and educational activities daily, encourage attendance and participation, and document same in the medical record   Outcome: Not Progressing  Goal: Identify appropriate positive anger management techniques  Description: Interventions:  - Offer anger management and coping skills groups   - Staff will provide positive feedback for appropriate anger control  Outcome: Not Progressing     Problem: Alteration in Orientation  Goal: Interact with staff daily  Description: Interventions:  - Assess and re-assess patient's level of  orientation  - Engage patient in 1 on 1 interactions, daily, for a minimum of 15 minutes   - Establish rapport/trust with patient   Outcome: Not Progressing  Goal: Express concerns related to confused thinking related to:  Description: Interventions:  - Encourage patient to express feelings, fears, frustrations, hopes  - Assign consistent caregivers   - [place]/re-orient patient as needed  - Allow comfort items, as appropriate  - Provide visual cues, signs, etc.   Outcome: Not Progressing  Goal: Allow medical examinations, as recommended  Description: Interventions:  - Provide physical/neurological exams and/or referrals, per provider   Outcome: Progressing  Goal: Cooperate with recommended testing/procedures  Description: Interventions:  - Determine need for ancillary testing  - Observe for mental status changes  - Implement falls/precaution protocol   Outcome: Progressing  Goal: Attend and participate in unit activities, including therapeutic, recreational, and educational groups  Description: Interventions:  - Provide therapeutic and educational activities daily, encourage attendance and participation, and document same in the medical record   - Provide appropriate opportunities for reminiscence   - Provide a consistent daily routine   - Encourage family contact/visitation   Outcome: Not Progressing  Goal: Complete daily ADLs, including personal hygiene independently, as able  Description: Interventions:  - Observe, teach, and assist patient with ADLS  Outcome: Progressing     Problem: SELF HARM/SUICIDALITY  Goal: Will have no self-injury during hospital stay  Description: INTERVENTIONS:  - Q 15 MINUTES: Routine safety checks  - Q WAKING SHIFT & PRN: Assess risk to determine if routine checks are adequate to maintain patient safety  - Encourage patient to participate actively in care by formulating a plan to combat response to suicidal ideation, identify supports and resources  Outcome: Progressing     Problem:  DEPRESSION  Goal: Will be euthymic at discharge  Description: INTERVENTIONS:  - Administer medication as ordered  - Provide emotional support via 1:1 interaction with staff  - Encourage involvement in milieu/groups/activities  - Monitor for social isolation  Outcome: Not Progressing     Problem: ANXIETY  Goal: Will report anxiety at manageable levels  Description: INTERVENTIONS:  - Administer medication as ordered  - Teach and encourage coping skills  - Provide emotional support  - Assess patient/family for anxiety and ability to cope  Outcome: Not Progressing  Goal: By discharge: Patient will verbalize 2 strategies to deal with anxiety  Description: Interventions:  - Identify any obvious source/trigger to anxiety  - Staff will assist patient in applying identified coping technique/skills  - Encourage attendance of scheduled groups and activities  Outcome: Not Progressing     Problem: SELF CARE DEFICIT  Goal: Return ADL status to a safe level of function  Description: INTERVENTIONS:  - Administer medication as ordered  - Assess ADL deficits and provide assistive devices as needed  - Obtain PT/OT consults as needed  - Assist and instruct patient to increase activity and self care as tolerated  Outcome: Not Progressing     Problem: SELF CARE DEFICIT  Goal: Return ADL status to a safe level of function  Description: INTERVENTIONS:  - Administer medication as ordered  - Assess ADL deficits and provide assistive devices as needed  - Obtain PT/OT consults as needed  - Assist and instruct patient to increase activity and self care as tolerated  Outcome: Not Progressing     Problem: DISCHARGE PLANNING - CARE MANAGEMENT  Goal: Discharge to post-acute care or home with appropriate resources  Description: INTERVENTIONS:  - Conduct assessment to determine patient/family and health care team treatment goals, and need for post-acute services based on payer coverage, community resources, and patient preferences, and barriers to  discharge  - Address psychosocial, clinical, and financial barriers to discharge as identified in assessment in conjunction with the patient/family and health care team  - Arrange appropriate level of post-acute services according to patient’s   needs and preference and payer coverage in collaboration with the physician and health care team  - Communicate with and update the patient/family, physician, and health care team regarding progress on the discharge plan  - Arrange appropriate transportation to post-acute venues  Outcome: Not Progressing

## 2024-08-31 NOTE — PROGRESS NOTES
"Progress Note - Behavioral Health   Deyvi Cao 54 y.o. male MRN: [id number]  Unit/Bed#: [facility] 101-02 Encounter: [id number]      Subjective:     Documentation, nursing notes, medication reconciliation, labs, and vitals reviewed. Patient was seen for continuing care and reviewed with treatment team.  No acute events over the past 24 hours. Per nursing report, patient remains polite, visible, and social with peers. No medication changes over the past 24 hours.     On evaluation today, Deyvi is guarded, but polite and cooperative with evaluation.  Reports mood as \"stable\".  Reports that depressive symptoms have improved significantly since admission, rating current symptoms 1/10 (10 being worst).  He is looking forward to [facility] on Monday.  Enjoys unit group activities, particularly self-awareness group.  Denies anxiety. No self-harming/suicidal ideation, plan, or intent upon direct inquiry. No thoughts to harm others.  No agitation or aggression noted. Denies perceptual disturbances, with no delusional or paranoid content elicited. Does not appear overtly manic. Offers no further complaints.       Psychiatric ROS:  Behavior over the last 24 hours: slowly improving  Sleep: normal  Appetite: normal  Medication side effects: No   ROS: all other systems are negative      Mental Status Evaluation:    Appearance:  age appropriate, casually dressed   Behavior:  pleasant, cooperative, calm   Speech:  scant, soft   Mood:  not as anxious, minimally depressed   Affect:  constricted   Thought Process:  organized, logical, coherent, goal directed   Associations: intact associations   Thought Content:  no overt delusions   Perceptual Disturbances: no auditory hallucinations, no visual hallucinations, does not appear responding to internal stimuli   Risk Potential: Suicidal ideation - None at present, contracts for safety on the unit, would talk to staff if not feeling safe on the unit  Homicidal ideation - None  Potential for " aggression - No   Sensorium:  oriented to person, place, and time/date   Memory:  recent and remote memory grossly intact   Consciousness:  alert and awake   Attention/Concentration: attention span and concentration appear shorter than expected for age   Insight:  limited   Judgment: limited   Gait/Station: normal gait/station, normal balance   Motor Activity: no abnormal movements       Vital signs in last 24 hours:    Temp:  [97.5 °F (36.4 °C)] 97.5 °F (36.4 °C)  HR:  [] 88  Resp:  [18] 18  BP: (119-128)/(80-83) 128/83    Laboratory results: I have personally reviewed all pertinent laboratory/tests results    Results from the past 24 hours: No results found for this or any previous visit (from the past 24 hour(s)).      Progress Toward Goals: improving slowly    Suicide/Homicide Risk Assessment:    Risk of Harm to Self:   Nursing Suicide Risk Assessment Last 24 hours: C-SSRS Risk (Since Last Contact)  Calculated C-SSRS Risk Score (Since Last Contact): No Risk Indicated    Risk of Harm to Others:  Nursing Homicide Risk Assessment: Violence Risk to Others: Denies within past 6 months    Assessment & Plan   Principal Problem:    Bipolar disorder with severe depression (HCC)  Active Problems:    Benign essential hypertension    Mixed hyperlipidemia    Allergic rhinitis due to allergen    Medical clearance for psychiatric admission    Tracey      Recommended Treatment:     Planned medication and treatment changes:    All current active medications have been reviewed  Encourage group therapy, milieu therapy and occupational therapy  Behavioral Health checks every 7 minutes  Continue with [facility] medical management as indicated  Disposition planning ongoing; awaiting group home placement    Continue current medications:    Current Facility-Administered Medications   Medication Dose Route Frequency Provider Last Rate    acetaminophen  650 mg Oral Q6H PRN ALVINA Harley      acetaminophen  650 mg Oral Q4H PRN Melva  ALVINA Knutson      acetaminophen  975 mg Oral Q6H PRN ALVINA Harley      aluminum-magnesium hydroxide-simethicone  30 mL Oral Q4H PRN ALVINA Harley      ammonium lactate   Topical BID PRN ALVINA Harley      atorvastatin  10 mg Oral Daily Sary ALVINA Gupta      benztropine  1 mg Intramuscular Q4H PRN Max 6/day ALVINA Harley      benztropine  1 mg Oral Q4H PRN Max 6/day ALVINA Harley      bisacodyl  10 mg Rectal Daily PRN ALVINA Harley      cariprazine  3 mg Oral Daily Martin Cardenas MD      Cholecalciferol  2,000 Units Oral Daily Sary LinkALVINA Thompson      cyanocobalamin  1,000 mcg Oral Daily SaryALVINA Starkey      hydrOXYzine HCL  25 mg Oral Q6H PRN Max 4/day ALVINA Harley      hydrOXYzine HCL  25 mg Oral TID Martin Cardenas MD      hydrOXYzine HCL  50 mg Oral Q4H PRN Max 4/day ALVINA Harley      Or    LORazepam  1 mg Intramuscular Q4H PRN ALVINA Harley      lamoTRIgine  50 mg Oral Daily Martin Cardenas MD      lisinopril  10 mg Oral Daily ALVINA Lewis      LORazepam  1 mg Oral Q4H PRN Max 6/day ALVINA Harley      Or    LORazepam  2 mg Intramuscular Q6H PRN Max 3/day ALVINA Harley      OLANZapine  10 mg Oral Q3H PRN Max 3/day ALVINA Harley      Or    OLANZapine  10 mg Intramuscular Q3H PRN Max 3/day ALVINA Harley      OLANZapine  5 mg Oral Q3H PRN Max 6/day ALVINA Harley      Or    OLANZapine  5 mg Intramuscular Q3H PRN Max 6/day ALVINA Harley      OLANZapine  2.5 mg Oral Q3H PRN Max 8/day ALVINA Harley      polyethylene glycol  17 g Oral Daily PRN ALVINA Harley      propranolol  10 mg Oral Q8H PRN ALVINA Harley      senna-docusate sodium  1 tablet Oral Daily PRN ALVINA Harley      sertraline  150 mg Oral HS Martin Cardenas MD           Risks / Benefits of Treatment:    Risks, benefits, and possible side effects of medications explained to patient. Patient has limited understanding  of risks and benefits of treatment at this time and needs ongoing explanation of treatment benefits and treatment plan.    Counseling / Coordination of Care:    Patient's progress discussed with staff in treatment team meeting.  Medications, treatment progress and treatment plan reviewed with patient.    Note Share    This note was not shared with the patient due to reasonable likelihood of causing patient harm    ALVINA Quevedo 08/31/24

## 2024-08-31 NOTE — NURSING NOTE
Pt is visible on the unit, social with peers. Pt is pleasant and cooperative with assessment. Pt denies anxiety and depression, denies SI/HI/AVH. Pt is meal and medication complaint. Safety checks ongoing.

## 2024-09-01 VITALS
HEIGHT: 72 IN | OXYGEN SATURATION: 90 % | HEART RATE: 95 BPM | RESPIRATION RATE: 18 BRPM | WEIGHT: 206.8 LBS | DIASTOLIC BLOOD PRESSURE: 77 MMHG | BODY MASS INDEX: 28.01 KG/M2 | SYSTOLIC BLOOD PRESSURE: 142 MMHG | TEMPERATURE: 97.6 F

## 2024-09-01 PROCEDURE — 99232 SBSQ HOSP IP/OBS MODERATE 35: CPT | Performed by: NURSE PRACTITIONER

## 2024-09-01 RX ADMIN — HYDROXYZINE HYDROCHLORIDE 25 MG: 25 TABLET ORAL at 08:23

## 2024-09-01 RX ADMIN — HYDROXYZINE HYDROCHLORIDE 25 MG: 25 TABLET ORAL at 17:42

## 2024-09-01 RX ADMIN — LAMOTRIGINE 50 MG: 25 TABLET ORAL at 08:22

## 2024-09-01 RX ADMIN — CYANOCOBALAMIN TAB 1000 MCG 1000 MCG: 1000 TAB at 08:23

## 2024-09-01 RX ADMIN — CARIPRAZINE 3 MG: 3 CAPSULE, GELATIN COATED ORAL at 08:23

## 2024-09-01 RX ADMIN — ATORVASTATIN CALCIUM 10 MG: 10 TABLET, FILM COATED ORAL at 08:23

## 2024-09-01 RX ADMIN — LISINOPRIL 10 MG: 10 TABLET ORAL at 08:23

## 2024-09-01 RX ADMIN — CHOLECALCIFEROL TAB 25 MCG (1000 UNIT) 2000 UNITS: 25 TAB at 08:22

## 2024-09-01 RX ADMIN — HYDROXYZINE HYDROCHLORIDE 25 MG: 25 TABLET ORAL at 21:11

## 2024-09-01 RX ADMIN — SERTRALINE HYDROCHLORIDE 150 MG: 100 TABLET ORAL at 21:11

## 2024-09-01 NOTE — NURSING NOTE
Pt gained 2.8lbs in a wk, provider will be notified.     Pt is visible on the unit, social with select peers. Pt is pleasant and cooperative with assessment. Pt denies anxiety and depression, denies SI/HI/AVH. Pt is meal, medication, and group complaint. Safety checks ongoing.

## 2024-09-01 NOTE — PLAN OF CARE
Problem: Alteration in Thoughts and Perception  Goal: Treatment Goal: Gain control of psychotic behaviors/thinking, reduce/eliminate presenting symptoms and demonstrate improved reality functioning upon discharge  Outcome: Progressing  Goal: Verbalize thoughts and feelings  Description: Interventions:  - Promote a nonjudgmental and trusting relationship with the patient through active listening and therapeutic communication  - Assess patient's level of functioning, behavior and potential for risk  - Engage patient in 1 on 1 interactions  - Encourage patient to express fears, feelings, frustrations, and discuss symptoms    - [place] patient to reality, help patient recognize reality-based thinking   - Administer medications as ordered and assess for potential side effects  - Provide the patient education related to the signs and symptoms of the illness and desired effects of prescribed medications  Outcome: Progressing  Goal: Refrain from acting on delusional thinking/internal stimuli  Description: Interventions:  - Monitor patient closely, per order   - Utilize least restrictive measures   - Set reasonable limits, give positive feedback for acceptable   - Administer medications as ordered and monitor of potential side effects  Outcome: Progressing  Goal: Agree to be compliant with medication regime, as prescribed and report medication side effects  Description: Interventions:  - Offer appropriate PRN medication and supervise ingestion; conduct AIMS, as needed   Outcome: Progressing  Goal: Attend and participate in unit activities, including therapeutic, recreational, and educational groups  Description: Interventions:  -Encourage Visitation and family involvement in care  Outcome: Progressing  Goal: Recognize dysfunctional thoughts, communicate reality-based thoughts at the time of discharge  Description: Interventions:  - Provide medication and psycho-education to assist patient in compliance and developing  insight into his/her illness   Outcome: Progressing  Goal: Complete daily ADLs, including personal hygiene independently, as able  Description: Interventions:  - Observe, teach, and assist patient with ADLS  - Monitor and promote a balance of rest/activity, with adequate nutrition and elimination   Outcome: Progressing     Problem: Ineffective Coping  Goal: Identifies ineffective coping skills  Outcome: Progressing  Goal: Identifies healthy coping skills  Outcome: Progressing  Goal: Demonstrates healthy coping skills  Outcome: Progressing  Goal: Participates in unit activities  Description: Interventions:  - Provide therapeutic environment   - Provide required programming   - Redirect inappropriate behaviors   Outcome: Progressing  Goal: Patient/Family participate in treatment and DC plans  Description: Interventions:  - Provide therapeutic environment  Outcome: Progressing  Goal: Patient/Family verbalizes awareness of resources  Outcome: Progressing  Goal: Understands least restrictive measures  Description: Interventions:  - Utilize least restrictive behavior  Outcome: Progressing  Goal: Free from restraint events  Description: - Utilize least restrictive measures   - Provide behavioral interventions   - Redirect inappropriate behaviors   Outcome: Progressing     Problem: Risk for Self Injury/Neglect  Goal: Treatment Goal: Remain safe during length of stay, learn and adopt new coping skills, and be free of self-injurious ideation, impulses and acts at the time of discharge  Outcome: Progressing  Goal: Verbalize thoughts and feelings  Description: Interventions:  - Assess and re-assess patient's lethality and potential for self-injury  - Engage patient in 1:1 interactions, daily, for a minimum of 15 minutes  - Encourage patient to express feelings, fears, frustrations, hopes  - Establish rapport/trust with patient   Outcome: Progressing  Goal: Refrain from harming self  Description: Interventions:  - Monitor  patient closely, per order  - Develop a trusting relationship  - Supervise medication ingestion, monitor effects and side effects   Outcome: Progressing  Goal: Attend and participate in unit activities, including therapeutic, recreational, and educational groups  Description: Interventions:  - Provide therapeutic and educational activities daily, encourage attendance and participation, and document same in the medical record  - Obtain collateral information, encourage visitation and family involvement in care   Outcome: Progressing  Goal: Recognize maladaptive responses and adopt new coping mechanisms  Outcome: Progressing  Goal: Complete daily ADLs, including personal hygiene independently, as able  Description: Interventions:  - Observe, teach, and assist patient with ADLS  - Monitor and promote a balance of rest/activity, with adequate nutrition and elimination  Outcome: Progressing     Problem: Depression  Goal: Treatment Goal: Demonstrate behavioral control of depressive symptoms, verbalize feelings of improved mood/affect, and adopt new coping skills prior to discharge  Outcome: Progressing  Goal: Verbalize thoughts and feelings  Description: Interventions:  - Assess and re-assess patient's level of risk   - Engage patient in 1:1 interactions, daily, for a minimum of 15 minutes   - Encourage patient to express feelings, fears, frustrations, hopes   Outcome: Progressing  Goal: Refrain from harming self  Description: Interventions:  - Monitor patient closely, per order   - Supervise medication ingestion, monitor effects and side effects   Outcome: Progressing  Goal: Refrain from isolation  Description: Interventions:  - Develop a trusting relationship   - Encourage socialization   Outcome: Progressing  Goal: Refrain from self-neglect  Outcome: Progressing  Goal: Attend and participate in unit activities, including therapeutic, recreational, and educational groups  Description: Interventions:  - Provide  therapeutic and educational activities daily, encourage attendance and participation, and document same in the medical record   Outcome: Progressing  Goal: Complete daily ADLs, including personal hygiene independently, as able  Description: Interventions:  - Observe, teach, and assist patient with ADLS  -  Monitor and promote a balance of rest/activity, with adequate nutrition and elimination   Outcome: Progressing     Problem: Anxiety  Goal: Anxiety is at manageable level  Description: Interventions:  - Assess and monitor patient's anxiety level.   - Monitor for signs and symptoms (heart palpitations, chest pain, shortness of breath, headaches, nausea, feeling jumpy, restlessness, irritable, apprehensive).   - Collaborate with interdisciplinary team and initiate plan and interventions as ordered.  - [place] patient to unit/surroundings  - Explain treatment plan  - Encourage participation in care  - Encourage verbalization of concerns/fears  - Identify coping mechanisms  - Assist in developing anxiety-reducing skills  - Administer/offer alternative therapies  - Limit or eliminate stimulants  Outcome: Progressing     Problem: Risk for Violence/Aggression Toward Others  Goal: Treatment Goal: Refrain from acts of violence/aggression during length of stay, and demonstrate improved impulse control at the time of discharge  Outcome: Progressing  Goal: Verbalize thoughts and feelings  Description: Interventions:  - Assess and re-assess patient's level of risk, every waking shift  - Engage patient in 1:1 interactions, daily, for a minimum of 15 minutes   - Allow patient to express feelings and frustrations in a safe and non-threatening manner   - Establish rapport/trust with patient   Outcome: Progressing  Goal: Refrain from harming others  Outcome: Progressing  Goal: Refrain from destructive acts on the environment or property  Outcome: Progressing  Goal: Control angry outbursts  Description: Interventions:  - Monitor  patient closely, per order  - Ensure early verbal de-escalation  - Monitor prn medication needs  - Set reasonable/therapeutic limits, outline behavioral expectations, and consequences   - Provide a non-threatening milieu, utilizing the least restrictive interventions   Outcome: Progressing  Goal: Attend and participate in unit activities, including therapeutic, recreational, and educational groups  Description: Interventions:  - Provide therapeutic and educational activities daily, encourage attendance and participation, and document same in the medical record   Outcome: Progressing  Goal: Identify appropriate positive anger management techniques  Description: Interventions:  - Offer anger management and coping skills groups   - Staff will provide positive feedback for appropriate anger control  Outcome: Progressing     Problem: Alteration in Orientation  Goal: Treatment Goal: Demonstrate a reduction of confusion and improved orientation to person, place, time and/or situation upon discharge, according to optimum baseline/ability  Outcome: Progressing  Goal: Interact with staff daily  Description: Interventions:  - Assess and re-assess patient's level of orientation  - Engage patient in 1 on 1 interactions, daily, for a minimum of 15 minutes   - Establish rapport/trust with patient   Outcome: Progressing  Goal: Express concerns related to confused thinking related to:  Description: Interventions:  - Encourage patient to express feelings, fears, frustrations, hopes  - Assign consistent caregivers   - [place]/re-orient patient as needed  - Allow comfort items, as appropriate  - Provide visual cues, signs, etc.   Outcome: Progressing  Goal: Allow medical examinations, as recommended  Description: Interventions:  - Provide physical/neurological exams and/or referrals, per provider   Outcome: Progressing  Goal: Cooperate with recommended testing/procedures  Description: Interventions:  - Determine need for ancillary  testing  - Observe for mental status changes  - Implement falls/precaution protocol   Outcome: Progressing  Goal: Attend and participate in unit activities, including therapeutic, recreational, and educational groups  Description: Interventions:  - Provide therapeutic and educational activities daily, encourage attendance and participation, and document same in the medical record   - Provide appropriate opportunities for reminiscence   - Provide a consistent daily routine   - Encourage family contact/visitation   Outcome: Progressing  Goal: Complete daily ADLs, including personal hygiene independently, as able  Description: Interventions:  - Observe, teach, and assist patient with ADLS  Outcome: Progressing     Problem: SELF HARM/SUICIDALITY  Goal: Will have no self-injury during hospital stay  Description: INTERVENTIONS:  - Q 15 MINUTES: Routine safety checks  - Q WAKING SHIFT & PRN: Assess risk to determine if routine checks are adequate to maintain patient safety  - Encourage patient to participate actively in care by formulating a plan to combat response to suicidal ideation, identify supports and resources  Outcome: Progressing     Problem: DEPRESSION  Goal: Will be euthymic at discharge  Description: INTERVENTIONS:  - Administer medication as ordered  - Provide emotional support via 1:1 interaction with staff  - Encourage involvement in milieu/groups/activities  - Monitor for social isolation  Outcome: Progressing     Problem: ANXIETY  Goal: Will report anxiety at manageable levels  Description: INTERVENTIONS:  - Administer medication as ordered  - Teach and encourage coping skills  - Provide emotional support  - Assess patient/family for anxiety and ability to cope  Outcome: Progressing  Goal: By discharge: Patient will verbalize 2 strategies to deal with anxiety  Description: Interventions:  - Identify any obvious source/trigger to anxiety  - Staff will assist patient in applying identified coping  technique/skills  - Encourage attendance of scheduled groups and activities  Outcome: Progressing     Problem: SELF CARE DEFICIT  Goal: Return ADL status to a safe level of function  Description: INTERVENTIONS:  - Administer medication as ordered  - Assess ADL deficits and provide assistive devices as needed  - Obtain PT/OT consults as needed  - Assist and instruct patient to increase activity and self care as tolerated  Outcome: Progressing     Problem: DISCHARGE PLANNING - CARE MANAGEMENT  Goal: Discharge to post-acute care or home with appropriate resources  Description: INTERVENTIONS:  - Conduct assessment to determine patient/family and health care team treatment goals, and need for post-acute services based on payer coverage, community resources, and patient preferences, and barriers to discharge  - Address psychosocial, clinical, and financial barriers to discharge as identified in assessment in conjunction with the patient/family and health care team  - Arrange appropriate level of post-acute services according to patient’s   needs and preference and payer coverage in collaboration with the physician and health care team  - Communicate with and update the patient/family, physician, and health care team regarding progress on the discharge plan  - Arrange appropriate transportation to post-acute venues  Outcome: Progressing

## 2024-09-01 NOTE — NURSING NOTE
Pt had a weight gain in 1 week of 2.8lbs. medical provider made aware and states she will assess weight on 9/2/24.

## 2024-09-01 NOTE — NURSING NOTE
Pt is calm and cooperative, visible on the unit. Pt ate 100% of dinner. Pt is medication compliant, safety checks ongoing.

## 2024-09-01 NOTE — PROGRESS NOTES
"Progress Note - Behavioral Health   Deyvi Cao 54 y.o. male MRN: [id number]  Unit/Bed#: [facility] 101-02 Encounter: [id number]      Subjective:     Documentation, nursing notes, medication reconciliation, labs, and vitals reviewed. Patient was seen for continuing care and reviewed with treatment team.  No acute events over the past 24 hours. Per nursing report, patient remains calm, cooperative. No medication changes over the past 24 hours.      On evaluation today, Deyvi reports mood as \"good\" though affect remains constricted.  Feels that meds are working well as he no longer feels depressed.  [place] denies any thoughts of self-harm or suicide.  Does not feel anxious. Continues to tolerate current medications with no adverse effects. No thoughts to harm others.  No agitation or aggression noted. Denies perceptual disturbances, with no delusional or paranoid content elicited. Does not appear overtly manic. Offers no further complaints.       Psychiatric ROS:  Behavior over the last 24 hours: unchanged  Sleep: normal, improved  Appetite: normal  Medication side effects: No   ROS: all other systems are negative      Mental Status Evaluation:    Appearance:  age appropriate, casually dressed, adequate grooming   Behavior:  cooperative, calm   Speech:  scant, soft   Mood:  euthymic   Affect:  constricted   Thought Process:  organized, coherent, goal directed   Associations: intact associations   Thought Content:  no overt delusions   Perceptual Disturbances: no auditory hallucinations, no visual hallucinations, does not appear responding to internal stimuli   Risk Potential: Suicidal ideation - None at present, contracts for safety on the unit, would talk to staff if not feeling safe on the unit  Homicidal ideation - None  Potential for aggression - No   Sensorium:  oriented to person, place, and time/date   Memory:  recent and remote memory grossly intact   Consciousness:  alert and awake "   Attention/Concentration: attention span and concentration appear shorter than expected for age   Insight:  limited   Judgment: limited   Gait/Station: normal gait/station, normal balance   Motor Activity: no abnormal movements       Vital signs in last 24 hours:    Temp:  [97.8 °F (36.6 °C)-97.9 °F (36.6 °C)] 97.9 °F (36.6 °C)  HR:  [95] 95  Resp:  [18] 18  BP: (132-143)/(72-75) 132/75    Laboratory results: I have personally reviewed all pertinent laboratory/tests results    Results from the past 24 hours: No results found for this or any previous visit (from the past 24 hour(s)).      Progress Toward Goals: making slow improvement    Suicide/Homicide Risk Assessment:    Risk of Harm to Self:   Nursing Suicide Risk Assessment Last 24 hours: C-SSRS Risk (Since Last Contact)  Calculated C-SSRS Risk Score (Since Last Contact): No Risk Indicated    Risk of Harm to Others:  Nursing Homicide Risk Assessment: Violence Risk to Others: Denies within past 6 months    Assessment & Plan   Principal Problem:    Bipolar disorder with severe depression (HCC)  Active Problems:    Benign essential hypertension    Mixed hyperlipidemia    Allergic rhinitis due to allergen    Medical clearance for psychiatric admission    [place]      Recommended Treatment:     Planned medication and treatment changes:    All current active medications have been reviewed  Encourage group therapy, milieu therapy and occupational therapy  Behavioral Health checks every 7 minutes  Continue with [facility] medical management as indicated  Disposition planning ongoing; awaiting group home placement    Continue current medications:    Current Facility-Administered Medications   Medication Dose Route Frequency Provider Last Rate    acetaminophen  650 mg Oral Q6H PRN ALVINA Harley      acetaminophen  650 mg Oral Q4H PRN ALVINA Harley      acetaminophen  975 mg Oral Q6H PRN ALVINA Harley      aluminum-magnesium hydroxide-simethicone  30 mL Oral Q4H  PRN ALVINA Harley      ammonium lactate   Topical BID PRN ALVINA Harley      atorvastatin  10 mg Oral Daily Sary Rodriguez ALVINA Biggs      benztropine  1 mg Intramuscular Q4H PRN Max 6/day ALVINA Harley      benztropine  1 mg Oral Q4H PRN Max 6/day ALVINA Harley      bisacodyl  10 mg Rectal Daily PRN ALVINA Harley      cariprazine  3 mg Oral Daily Martin Cardenas MD      Cholecalciferol  2,000 Units Oral Daily Sary Rodriguez ALVINA Biggs      cyanocobalamin  1,000 mcg Oral Daily Sray LinkALVINA Thompson      hydrOXYzine HCL  25 mg Oral Q6H PRN Max 4/day ALVINA Harley      hydrOXYzine HCL  25 mg Oral TID Martin Cardenas MD      hydrOXYzine HCL  50 mg Oral Q4H PRN Max 4/day ALVINA Harley      Or    LORazepam  1 mg Intramuscular Q4H PRN ALVINA Harley      lamoTRIgine  50 mg Oral Daily Martin Cardenas MD      lisinopril  10 mg Oral Daily Sary LinkALVINA Thompson      LORazepam  1 mg Oral Q4H PRN Max 6/day ALVINA Harley      Or    LORazepam  2 mg Intramuscular Q6H PRN Max 3/day ALVINA Harley      OLANZapine  10 mg Oral Q3H PRN Max 3/day ALVINA Harley      Or    OLANZapine  10 mg Intramuscular Q3H PRN Max 3/day ALVINA Harley      OLANZapine  5 mg Oral Q3H PRN Max 6/day ALVINA Harley      Or    OLANZapine  5 mg Intramuscular Q3H PRN Max 6/day ALVINA Harley      OLANZapine  2.5 mg Oral Q3H PRN Max 8/day ALVINA Harley      polyethylene glycol  17 g Oral Daily PRN ALVINA Harley      propranolol  10 mg Oral Q8H PRN ALVINA Harley      senna-docusate sodium  1 tablet Oral Daily PRN ALVINA Harley      sertraline  150 mg Oral HS Martin Cardenas MD           Risks / Benefits of Treatment:    Risks, benefits, and possible side effects of medications explained to patient. Patient does not verbalize understanding of risks and benefits of treatment at this time and will require further explanation.      Counseling / Coordination of  Care:    Patient's progress discussed with staff in treatment team meeting.  Medications, treatment progress and treatment plan reviewed with patient.    Note Share    This note was not shared with the patient due to reasonable likelihood of causing patient harm    ALVINA Quevedo 09/01/24

## 2024-09-02 PROCEDURE — 99232 SBSQ HOSP IP/OBS MODERATE 35: CPT | Performed by: PSYCHIATRY & NEUROLOGY

## 2024-09-02 RX ADMIN — ATORVASTATIN CALCIUM 10 MG: 10 TABLET, FILM COATED ORAL at 08:55

## 2024-09-02 RX ADMIN — HYDROXYZINE HYDROCHLORIDE 25 MG: 25 TABLET ORAL at 16:30

## 2024-09-02 RX ADMIN — CHOLECALCIFEROL TAB 25 MCG (1000 UNIT) 2000 UNITS: 25 TAB at 08:56

## 2024-09-02 RX ADMIN — CYANOCOBALAMIN TAB 1000 MCG 1000 MCG: 1000 TAB at 08:55

## 2024-09-02 RX ADMIN — CARIPRAZINE 3 MG: 3 CAPSULE, GELATIN COATED ORAL at 08:56

## 2024-09-02 RX ADMIN — SERTRALINE HYDROCHLORIDE 150 MG: 100 TABLET ORAL at 21:12

## 2024-09-02 RX ADMIN — LAMOTRIGINE 50 MG: 25 TABLET ORAL at 08:56

## 2024-09-02 RX ADMIN — LISINOPRIL 10 MG: 10 TABLET ORAL at 08:55

## 2024-09-02 RX ADMIN — HYDROXYZINE HYDROCHLORIDE 25 MG: 25 TABLET ORAL at 08:56

## 2024-09-02 RX ADMIN — HYDROXYZINE HYDROCHLORIDE 25 MG: 25 TABLET ORAL at 21:12

## 2024-09-02 NOTE — PROGRESS NOTES
Progress Note - Behavioral Health   Deyvi Cao 54 y.o. male MRN: [id number]  Unit/Bed#: [facility] 101-02 Encounter: [id number]      Subjective:     Documentation, nursing notes, medication reconciliation, labs, and vitals reviewed. Patient was seen for continuing care and reviewed with treatment team.  No acute events over the past 24 hours. Per nursing report, hospitalist contacted for evaluation due to 2.8 pound weight gain over the past week; recommended education on making healthier snack choices. No medication changes over the past 24 hours.       On evaluation today, Deyvi reports that he is doing well.  Feels mood has been stable for some time with anxiety and depressive symptoms rated 1-2/10 (10 being worst) which is his baseline.  He adamantly denies any thoughts of self-harm or suicide.  No thoughts to harm others.  No agitation or aggression noted.  He denies perceptual disturbances and does not appear to be internally preoccupied.  He is not overtly manic.  He continues to tolerate current medication regimen without negative side effects.  No other questions or concerns.    Psychiatric ROS:  Behavior over the last 24 hours: unchanged  Sleep: normal  Appetite: normal  Medication side effects: No   ROS: all other systems are negative      Mental Status Evaluation:    Appearance:  age appropriate, casually dressed, dressed appropriately, adequate grooming   Behavior:  cooperative, calm   Speech:  normal rate, normal pitch, soft   Mood:  euthymic   Affect:  constricted   Thought Process:  coherent, goal directed, linear   Associations: intact associations   Thought Content:  no overt delusions   Perceptual Disturbances: no auditory hallucinations, no visual hallucinations, does not appear responding to internal stimuli   Risk Potential: Suicidal ideation - None  Homicidal ideation - None  Potential for aggression - No   Sensorium:  oriented to person, place, and time/date   Memory:  recent and remote memory  grossly intact   Consciousness:  alert and awake   Attention/Concentration: attention span and concentration appear shorter than expected for age   Insight:  limited   Judgment: limited   Gait/Station: normal gait/station, normal balance   Motor Activity: no abnormal movements       Vital signs in last 24 hours:    Temp:  [97.6 °F (36.4 °C)-97.8 °F (36.6 °C)] 97.8 °F (36.6 °C)  HR:  [89-95] 89  Resp:  [18] 18  BP: (126-142)/(77-79) 126/79    Laboratory results: I have personally reviewed all pertinent laboratory/tests results    Results from the past 24 hours: No results found for this or any previous visit (from the past 24 hour(s)).      Progress Toward Goals: progressing    Suicide/Homicide Risk Assessment:    Risk of Harm to Self:   Nursing Suicide Risk Assessment Last 24 hours: C-SSRS Risk (Since Last Contact)  Calculated C-SSRS Risk Score (Since Last Contact): No Risk Indicated    Risk of Harm to Others:  Nursing Homicide Risk Assessment: Violence Risk to Others: Denies within past 6 months    Assessment & Plan   Principal Problem:    Bipolar disorder with severe depression (HCC)  Active Problems:    Benign essential hypertension    Mixed hyperlipidemia    Allergic rhinitis due to allergen    Medical clearance for psychiatric admission    Tracey      Recommended Treatment:     Planned medication and treatment changes:    All current active medications have been reviewed  Encourage group therapy, milieu therapy and occupational therapy  Behavioral Health checks every 7 minutes  Continue with SLIM medical management as indicated  Disposition planning ongoing; awaiting group home placement      Continue current medications:    Current Facility-Administered Medications   Medication Dose Route Frequency Provider Last Rate    acetaminophen  650 mg Oral Q6H PRN ALVINA Harley      acetaminophen  650 mg Oral Q4H PRN ALVINA Harley      acetaminophen  975 mg Oral Q6H PRN ALVINA Harley       aluminum-magnesium hydroxide-simethicone  30 mL Oral Q4H PRN ALVINA Harley      ammonium lactate   Topical BID PRN ALVINA Harley      atorvastatin  10 mg Oral Daily ALVINA Lewis      benztropine  1 mg Intramuscular Q4H PRN Max 6/day ALVINA Harley      benztropine  1 mg Oral Q4H PRN Max 6/day ALVINA Harley      bisacodyl  10 mg Rectal Daily PRN ALVINA Harley      cariprazine  3 mg Oral Daily Martin Cardenas MD      Cholecalciferol  2,000 Units Oral Daily Sary ALVINA Gupta      cyanocobalamin  1,000 mcg Oral Daily SaryALVINA Starkey      hydrOXYzine HCL  25 mg Oral Q6H PRN Max 4/day ALVINA Harley      hydrOXYzine HCL  25 mg Oral TID Martin Cardenas MD      hydrOXYzine HCL  50 mg Oral Q4H PRN Max 4/day ALVINA Harley      Or    LORazepam  1 mg Intramuscular Q4H PRN ALVINA Harley      lamoTRIgine  50 mg Oral Daily Martin Cardenas MD      lisinopril  10 mg Oral Daily SaryALVINA Starkey      LORazepam  1 mg Oral Q4H PRN Max 6/day ALVINA Harley      Or    LORazepam  2 mg Intramuscular Q6H PRN Max 3/day ALVINA Harley      OLANZapine  10 mg Oral Q3H PRN Max 3/day ALVINA Harley      Or    OLANZapine  10 mg Intramuscular Q3H PRN Max 3/day ALVINA Harley      OLANZapine  5 mg Oral Q3H PRN Max 6/day ALVINA Harley      Or    OLANZapine  5 mg Intramuscular Q3H PRN Max 6/day ALVINA Harley      OLANZapine  2.5 mg Oral Q3H PRN Max 8/day ALVINA Harley      polyethylene glycol  17 g Oral Daily PRN ALVINA Harley      propranolol  10 mg Oral Q8H PRN ALVINA Harley      senna-docusate sodium  1 tablet Oral Daily PRN AVLINA Harley      sertraline  150 mg Oral HS Martin Cardenas MD           Risks / Benefits of Treatment:    Risks, benefits, and possible side effects of medications explained to patient. Patient does not verbalize understanding of risks and benefits of treatment at this time and will require  further explanation.      Counseling / Coordination of Care:    Patient's progress discussed with staff in treatment team meeting.  Medications, treatment progress and treatment plan reviewed with patient.    Note Share    This note was not shared with the patient due to reasonable likelihood of causing patient harm    ALVINA Quevedo 09/02/24

## 2024-09-02 NOTE — NURSING NOTE
Deyvi was resting in bed at the beginning of the shift (1900). Denies anxiety, depression, voices and pain. Pleasant and cooperative upon approach. Did not eat snack tonight. Took medication without difficulty. No issues or behaviors. Continue to monitor. Precautions maintained.

## 2024-09-02 NOTE — NURSING NOTE
"Pt is visible on the unit, social with select peers. Pt is pleasant and cooperative with assessment. Pt denies anxiety and depression, denies SI/HI/AVH. States \"Im Good\". No behaviors, pt is meal, medication, and group complaint. Safety checks ongoing.   "

## 2024-09-02 NOTE — NURSING NOTE
Napping in room at the beginning of shift. Offers no complaint. Pleasant & cooperative. Compliant with meds and meals No behavior issues, Denies psychiatric symptoms.  Q7 min checks ongoing.

## 2024-09-02 NOTE — QUICK NOTE
Was contacted by RN late yesterday afternoon that the patient has had a 2.8 pound weight gain in the last week.  From my standpoint of view there is nothing that can be done.  I think he needs to be educated on making healthier snack choices.

## 2024-09-02 NOTE — PLAN OF CARE
Problem: Alteration in Thoughts and Perception  Goal: Verbalize thoughts and feelings  Description: Interventions:  - Promote a nonjudgmental and trusting relationship with the patient through active listening and therapeutic communication  - Assess patient's level of functioning, behavior and potential for risk  - Engage patient in 1 on 1 interactions  - Encourage patient to express fears, feelings, frustrations, and discuss symptoms    - [place] patient to reality, help patient recognize reality-based thinking   - Administer medications as ordered and assess for potential side effects  - Provide the patient education related to the signs and symptoms of the illness and desired effects of prescribed medications  Outcome: Progressing     Problem: Alteration in Thoughts and Perception  Goal: Agree to be compliant with medication regime, as prescribed and report medication side effects  Description: Interventions:  - Offer appropriate PRN medication and supervise ingestion; conduct AIMS, as needed   Outcome: Progressing     Problem: Alteration in Thoughts and Perception  Goal: Attend and participate in unit activities, including therapeutic, recreational, and educational groups  Description: Interventions:  -Encourage Visitation and family involvement in care  Outcome: Progressing     Problem: Alteration in Thoughts and Perception  Goal: Complete daily ADLs, including personal hygiene independently, as able  Description: Interventions:  - Observe, teach, and assist patient with ADLS  - Monitor and promote a balance of rest/activity, with adequate nutrition and elimination   Outcome: Progressing     Problem: Alteration in Thoughts and Perception  Goal: Recognize dysfunctional thoughts, communicate reality-based thoughts at the time of discharge  Description: Interventions:  - Provide medication and psycho-education to assist patient in compliance and developing insight into his/her illness   Outcome: Progressing

## 2024-09-03 PROCEDURE — 99232 SBSQ HOSP IP/OBS MODERATE 35: CPT | Performed by: PSYCHIATRY & NEUROLOGY

## 2024-09-03 RX ADMIN — CHOLECALCIFEROL TAB 25 MCG (1000 UNIT) 2000 UNITS: 25 TAB at 08:58

## 2024-09-03 RX ADMIN — HYDROXYZINE HYDROCHLORIDE 25 MG: 25 TABLET ORAL at 08:58

## 2024-09-03 RX ADMIN — CYANOCOBALAMIN TAB 1000 MCG 1000 MCG: 1000 TAB at 08:58

## 2024-09-03 RX ADMIN — SERTRALINE HYDROCHLORIDE 150 MG: 100 TABLET ORAL at 21:24

## 2024-09-03 RX ADMIN — CARIPRAZINE 3 MG: 3 CAPSULE, GELATIN COATED ORAL at 08:58

## 2024-09-03 RX ADMIN — LAMOTRIGINE 50 MG: 25 TABLET ORAL at 08:58

## 2024-09-03 RX ADMIN — HYDROXYZINE HYDROCHLORIDE 25 MG: 25 TABLET ORAL at 16:05

## 2024-09-03 RX ADMIN — ATORVASTATIN CALCIUM 10 MG: 10 TABLET, FILM COATED ORAL at 08:58

## 2024-09-03 RX ADMIN — LISINOPRIL 10 MG: 10 TABLET ORAL at 08:58

## 2024-09-03 RX ADMIN — HYDROXYZINE HYDROCHLORIDE 25 MG: 25 TABLET ORAL at 21:24

## 2024-09-03 NOTE — SOCIAL WORK
SW met 1:1 with pt. Pt remains quiet, anxious, blunted, flat, guarded.   Pt reports no concerns and enjoyed his weekend and the pizza yesterday. SW notified pt his shirt arrived that was re-mailed after initially being lost in the mail. SW and pt set time to meet to address his bill pay needs this week. Pt denied any other concerns at this time.

## 2024-09-03 NOTE — NURSING NOTE
Pt is accepting of medications without incidence and meal compliant. Pt is visible in the milieu and attends groups, but keeps to self. Pt is polite and denies s/s. No new concerns at this time.

## 2024-09-03 NOTE — PROGRESS NOTES
09/03/24 0742   Team Meeting   Meeting Type Daily Rounds   Team Members Present   Team Members Present Physician;Nurse;;Other (Discipline and Name)   Patient/Family Present   Patient Present No   Patient's Family Present No     In attendance:  Dr. Alex Thomas, MD Dr. Jordan Holter, DO Mahamed Haywood, RN  Judi Garcia, [facility]  BARRETT Elmore.S.    Groups: 6/10    No bx issues noted. Pt had 2.8lb weight gain and was encouraged by medical provider to make good food choices. Pt waiting on [facility] placement.

## 2024-09-03 NOTE — PLAN OF CARE
Problem: Alteration in Thoughts and Perception  Goal: Treatment Goal: Gain control of psychotic behaviors/thinking, reduce/eliminate presenting symptoms and demonstrate improved reality functioning upon discharge  Outcome: Progressing  Goal: Verbalize thoughts and feelings  Description: Interventions:  - Promote a nonjudgmental and trusting relationship with the patient through active listening and therapeutic communication  - Assess patient's level of functioning, behavior and potential for risk  - Engage patient in 1 on 1 interactions  - Encourage patient to express fears, feelings, frustrations, and discuss symptoms    - [place] patient to reality, help patient recognize reality-based thinking   - Administer medications as ordered and assess for potential side effects  - Provide the patient education related to the signs and symptoms of the illness and desired effects of prescribed medications  Outcome: Progressing  Goal: Refrain from acting on delusional thinking/internal stimuli  Description: Interventions:  - Monitor patient closely, per order   - Utilize least restrictive measures   - Set reasonable limits, give positive feedback for acceptable   - Administer medications as ordered and monitor of potential side effects  Outcome: Progressing  Goal: Agree to be compliant with medication regime, as prescribed and report medication side effects  Description: Interventions:  - Offer appropriate PRN medication and supervise ingestion; conduct AIMS, as needed   Outcome: Progressing  Goal: Recognize dysfunctional thoughts, communicate reality-based thoughts at the time of discharge  Description: Interventions:  - Provide medication and psycho-education to assist patient in compliance and developing insight into his/her illness   Outcome: Progressing  Goal: Complete daily ADLs, including personal hygiene independently, as able  Description: Interventions:  - Observe, teach, and assist patient with ADLS  - Monitor and  promote a balance of rest/activity, with adequate nutrition and elimination   Outcome: Progressing     Problem: Ineffective Coping  Goal: Identifies ineffective coping skills  Outcome: Progressing  Goal: Identifies healthy coping skills  Outcome: Progressing     Problem: Risk for Self Injury/Neglect  Goal: Treatment Goal: Remain safe during length of stay, learn and adopt new coping skills, and be free of self-injurious ideation, impulses and acts at the time of discharge  Outcome: Progressing     Problem: Depression  Goal: Treatment Goal: Demonstrate behavioral control of depressive symptoms, verbalize feelings of improved mood/affect, and adopt new coping skills prior to discharge  Outcome: Progressing     Problem: Anxiety  Goal: Anxiety is at manageable level  Description: Interventions:  - Assess and monitor patient's anxiety level.   - Monitor for signs and symptoms (heart palpitations, chest pain, shortness of breath, headaches, nausea, feeling jumpy, restlessness, irritable, apprehensive).   - Collaborate with interdisciplinary team and initiate plan and interventions as ordered.  - [place] patient to unit/surroundings  - Explain treatment plan  - Encourage participation in care  - Encourage verbalization of concerns/fears  - Identify coping mechanisms  - Assist in developing anxiety-reducing skills  - Administer/offer alternative therapies  - Limit or eliminate stimulants  Outcome: Progressing     Problem: SELF HARM/SUICIDALITY  Goal: Will have no self-injury during hospital stay  Description: INTERVENTIONS:  - Q 15 MINUTES: Routine safety checks  - Q WAKING SHIFT & PRN: Assess risk to determine if routine checks are adequate to maintain patient safety  - Encourage patient to participate actively in care by formulating a plan to combat response to suicidal ideation, identify supports and resources  Outcome: Progressing     Problem: ANXIETY  Goal: Will report anxiety at manageable levels  Description:  INTERVENTIONS:  - Administer medication as ordered  - Teach and encourage coping skills  - Provide emotional support  - Assess patient/family for anxiety and ability to cope  Outcome: Progressing

## 2024-09-03 NOTE — PROGRESS NOTES
Progress Note - Behavioral Health   Deyvi Cao 54 y.o. male MRN: [id number]  Unit/Bed#: [facility] 101-02 Encounter: [id number]  Code Status: Level 1 - Full Code    Assessment & Plan   Principal Problem:    Bipolar disorder with severe depression (HCC)  Active Problems:    Benign essential hypertension    Mixed hyperlipidemia    Allergic rhinitis due to allergen    Medical clearance for psychiatric admission    Rosacea    Recommended Treatment:     Treatment plan, treatment progress and medication changes were reviewed with Nursing Staff, Pharmacy Service and Case Management in Treatment Team:  1.Continue with group therapy, milieu therapy and occupational therapy   2.Behavioral Health checks every 7 minutes   3.Continue frequent safety checks and vitals per unit protocol  4.Continue with SLIM medical management as indicated  5.Continue with current medication regimen for symptom management: Vraylar 3mg PO Daily, Atarax 25mg PO TID, Lamictal 50mg PO Daily, Zoloft 150mg PO QHS   6.Disposition Planning: Discharge planning and efforts remain ongoing - Awaiting group home placement    Subjective:    Patient was seen today for continuation of care, records reviewed and patient was discussed with the morning case review team.    Deyvi was seen today for psychiatric follow-up.  On assessment today, Deyvi was found in his room.  He is pleasant, smiling more, and cooperative.  Deyvi reports adequate daytime energy and denies any difficulties with initiating or staying asleep.  Oral appetite and hydration is adequate.  Reports no new changes.   Hopeful for discharge soon, understands he is waiting for placement.  We reviewed once more the specific as-needed medications they can use going forward if they experience any insomnia or destabilization of their mood, they understood and were agreeable. Milieu visibility and group attendance encouraged to promote an active participation in treatment.    Deyvi denies acute  suicidal/self-harm ideation/intent/plan upon direct inquiry at this time. Deyvi is able to contract for safety while on the unit and would feel comfortable seeking staff support should suicidal symptoms or urges appear or worsen. Deyvi remains behaviorally appropriate, no agitation or aggression noted on exam or in report. Deyvi also denies HI/AH/VH, and does not appear overtly manic.  Patient does not verbalize any experiences that can be categorized as paranoid, persecutory, bizarre, or somatic delusions. Deyvi remains adherent to his current psychotropic medication regimen and denies any side effects from medications, as well as none noted on exam.    Review of Systems:  Behavior over the last 24 hours: Slowly improving  Sleep: sleeping okay throughout the night  Appetite: adequate  Medication side effects: none reported  ROS:no complaints, all other systems are negative    Objective:    Vitals:  Vitals:    09/03/24 0739   BP: 133/90   Pulse: 92   Resp: 18   Temp: 97.9 °F (36.6 °C)   SpO2: 95%     Laboratory Results:  I have personally reviewed all pertinent laboratory/tests results.  Most Recent Labs:   Lab Results   Component Value Date    WBC 7.39 06/26/2024    RBC 4.70 06/26/2024    HGB 15.2 06/26/2024    HCT 45.5 06/26/2024     06/26/2024    RDW 12.9 06/26/2024    NEUTROABS 4.63 06/26/2024    SODIUM 137 06/26/2024    K 4.1 06/26/2024     06/26/2024    CO2 26 06/26/2024    BUN 17 06/26/2024    CREATININE 0.63 06/26/2024    GLUC 98 06/26/2024    GLUF 98 06/26/2024    CALCIUM 9.6 06/26/2024    AST 25 06/26/2024    ALT 45 06/26/2024    ALKPHOS 114 (H) 06/26/2024    TP 7.0 06/26/2024    ALB 4.4 06/26/2024    TBILI 0.44 06/26/2024    CHOLESTEROL 177 06/26/2024    HDL 52 06/26/2024    TRIG 73 06/26/2024    LDLCALC 110 (H) 06/26/2024    NONHDLC 125 06/26/2024    LITHIUM 0.4 (L) 01/28/2021    TOS6SNQYBHEH 2.636 06/26/2024    HGBA1C 5.2 11/22/2023     11/22/2023     Mental Status  Evaluation:  Appearance:  age appropriate, casually dressed   Behavior:  pleasant, cooperative, calm   Speech:  scant, soft   Mood:  less anxious, less depressed   Affect:  constricted   Thought Process:  organized, logical, coherent   Associations: intact associations   Thought Content:  no overt delusions   Perceptual Disturbances: no auditory hallucinations, no visual hallucinations, denies when asked, does not appear responding to internal stimuli   Risk Potential: Suicidal ideation - None at present, contracts for safety on the unit, would talk to staff if not feeling safe on the unit  Homicidal ideation - None at present  Potential for aggression - Not at present   Sensorium:  oriented to person, place, and time/date   Memory:  recent memory intact   Consciousness:  alert and awake   Attention/Concentration: attention span and concentration appear shorter than expected for age   Insight:  limited   Judgment: limited   Gait/Station: normal gait/station, normal balance   Motor Activity: no abnormal movements     Progress Toward Goals: making gradual improvement.  Deyvi continues to require inpatient psychiatric hospitalization for continued medication management and titration to optimize symptom reduction, improve sleep hygiene, and demonstrate adequate self-care.     Suicide/Homicide Risk Assessment:  Risk of Harm to Self:   Nursing Suicide Risk Assessment Last 24 hours: C-SSRS Risk (Since Last Contact)  Calculated C-SSRS Risk Score (Since Last Contact): No Risk Indicated    Risk of Harm to Others:  Nursing Homicide Risk Assessment: Violence Risk to Others: Denies within past 6 months    Behavioral Health Medications: all current active meds have been reviewed and continue current psychiatric medications.  Current Facility-Administered Medications   Medication Dose Route Frequency Provider Last Rate    acetaminophen  650 mg Oral Q6H PRN ALVINA Harley      acetaminophen  650 mg Oral Q4H PRN Melva Knutson  ALVINA      acetaminophen  975 mg Oral Q6H PRN ALVINA Harley      aluminum-magnesium hydroxide-simethicone  30 mL Oral Q4H PRN ALVINA Harley      ammonium lactate   Topical BID PRN ALVINA Harley      atorvastatin  10 mg Oral Daily Sary LinkALVINA Thompson      benztropine  1 mg Intramuscular Q4H PRN Max 6/day ALVINA Harley      benztropine  1 mg Oral Q4H PRN Max 6/day ALVINA Harley      bisacodyl  10 mg Rectal Daily PRN ALVINA Harley      cariprazine  3 mg Oral Daily Martin Cardenas MD      Cholecalciferol  2,000 Units Oral Daily Sary Rodriguez ALVINA Biggs      cyanocobalamin  1,000 mcg Oral Daily Sary LinkALVINA Thompson      hydrOXYzine HCL  25 mg Oral Q6H PRN Max 4/day ALVINA Harley      hydrOXYzine HCL  25 mg Oral TID Martin Cardenas MD      hydrOXYzine HCL  50 mg Oral Q4H PRN Max 4/day ALVINA Harley      Or    LORazepam  1 mg Intramuscular Q4H PRN ALVINA Harley      lamoTRIgine  50 mg Oral Daily Martin Cardenas MD      lisinopril  10 mg Oral Daily Sary LinkALVINA Thompson      LORazepam  1 mg Oral Q4H PRN Max 6/day ALVINA Harley      Or    LORazepam  2 mg Intramuscular Q6H PRN Max 3/day ALVINA Harley      OLANZapine  10 mg Oral Q3H PRN Max 3/day ALVINA Harley      Or    OLANZapine  10 mg Intramuscular Q3H PRN Max 3/day ALVINA Harley      OLANZapine  5 mg Oral Q3H PRN Max 6/day ALVINA Harley      Or    OLANZapine  5 mg Intramuscular Q3H PRN Max 6/day ALVINA Harley      OLANZapine  2.5 mg Oral Q3H PRN Max 8/day ALVINA Harley      polyethylene glycol  17 g Oral Daily PRN ALVINA Harley      propranolol  10 mg Oral Q8H PRN ALVINA Harley      senna-docusate sodium  1 tablet Oral Daily PRN ALVINA Harley      sertraline  150 mg Oral HS Martin Cardenas MD       Risks / Benefits of Treatment:  Risks, benefits, and possible side effects of medications explained to patient. Patient has limited understanding of risks and  benefits of treatment at this time, but agrees to take medications as prescribed.    Counseling / Coordination of Care:  Total floor/unit time spent today 25 minutes. Greater than 50% of total time was spent with the patient and / or family counseling and / or coordination of care. A description of the counseling / coordination of care:   Patient's progress discussed with staff in treatment team meeting.  Medications, treatment progress and treatment plan reviewed with patient.   Educated on importance of medication and treatment compliance.  Reassurance and supportive therapy provided.   Encouraged participation in milieu and group therapy on the unit.    ALVINA Harley 09/03/24

## 2024-09-04 PROCEDURE — 99232 SBSQ HOSP IP/OBS MODERATE 35: CPT | Performed by: PSYCHIATRY & NEUROLOGY

## 2024-09-04 RX ADMIN — CARIPRAZINE 3 MG: 3 CAPSULE, GELATIN COATED ORAL at 08:25

## 2024-09-04 RX ADMIN — ATORVASTATIN CALCIUM 10 MG: 10 TABLET, FILM COATED ORAL at 08:25

## 2024-09-04 RX ADMIN — CYANOCOBALAMIN TAB 1000 MCG 1000 MCG: 1000 TAB at 08:25

## 2024-09-04 RX ADMIN — LISINOPRIL 10 MG: 10 TABLET ORAL at 08:25

## 2024-09-04 RX ADMIN — HYDROXYZINE HYDROCHLORIDE 25 MG: 25 TABLET ORAL at 17:10

## 2024-09-04 RX ADMIN — CHOLECALCIFEROL TAB 25 MCG (1000 UNIT) 2000 UNITS: 25 TAB at 08:25

## 2024-09-04 RX ADMIN — LAMOTRIGINE 50 MG: 25 TABLET ORAL at 08:25

## 2024-09-04 RX ADMIN — HYDROXYZINE HYDROCHLORIDE 25 MG: 25 TABLET ORAL at 21:17

## 2024-09-04 RX ADMIN — SERTRALINE HYDROCHLORIDE 150 MG: 100 TABLET ORAL at 21:17

## 2024-09-04 RX ADMIN — HYDROXYZINE HYDROCHLORIDE 25 MG: 25 TABLET ORAL at 08:25

## 2024-09-04 NOTE — NURSING NOTE
Germain maintained on ongoing SAFE precaution without incident on this shift. Observed in bed resting with eyes closed, respiration even and unlabored. Q7 minutes rounding implemented. No behavioral noted overnight

## 2024-09-04 NOTE — NURSING NOTE
"Germain maintained on ongoing Safe precaution without incident  on this shift.  Awake, alert, irritable, isolative,  and defensive upon approach.  Attended and participated 3 out of 11 group today.  Germain was isolative to his room, during HS med pass, I ask Germain I have not seen him out in a long time, this evening there was group out o the desk, Do you think that it something you would like to do, get some fresh air.? Germain's tone and  demeanor change.  He became irritable stating \"I don't have to go outside if I don't want to? Writer You're right you don't have to Im just making conversation trying to find out what you like to do? Germain: \"I've been stuck in here waiting for a bed, and all you do is come here and shove pills down my throat.\"Germain continues to go on a verbal tantrum and he was getting more irritable.  Writer gave him his meds with water and he did take them.  This  short interaction was a new onset behavior this writer had with him. Denies any pain, or discomfort.    "

## 2024-09-04 NOTE — SOCIAL WORK
SW requested security bring up pt's personal items. SW provided support while pt accessed his banking and bill information to take care of September bills. SW and pt processed pt's feelings regarding waiting on available housing. Pt expressed that he is frustrated and feels ready to leave. Pt remains guarded, quiet, defensive at times. Pt's personal items were returned to security.

## 2024-09-04 NOTE — PLAN OF CARE
Problem: Alteration in Thoughts and Perception  Goal: Treatment Goal: Gain control of psychotic behaviors/thinking, reduce/eliminate presenting symptoms and demonstrate improved reality functioning upon discharge  Outcome: Progressing  Goal: Verbalize thoughts and feelings  Description: Interventions:  - Promote a nonjudgmental and trusting relationship with the patient through active listening and therapeutic communication  - Assess patient's level of functioning, behavior and potential for risk  - Engage patient in 1 on 1 interactions  - Encourage patient to express fears, feelings, frustrations, and discuss symptoms    - [place] patient to reality, help patient recognize reality-based thinking   - Administer medications as ordered and assess for potential side effects  - Provide the patient education related to the signs and symptoms of the illness and desired effects of prescribed medications  Outcome: Progressing  Goal: Agree to be compliant with medication regime, as prescribed and report medication side effects  Description: Interventions:  - Offer appropriate PRN medication and supervise ingestion; conduct AIMS, as needed   Outcome: Progressing  Goal: Attend and participate in unit activities, including therapeutic, recreational, and educational groups  Description: Interventions:  -Encourage Visitation and family involvement in care  Outcome: Progressing  Goal: Complete daily ADLs, including personal hygiene independently, as able  Description: Interventions:  - Observe, teach, and assist patient with ADLS  - Monitor and promote a balance of rest/activity, with adequate nutrition and elimination   Outcome: Progressing     Problem: Risk for Self Injury/Neglect  Goal: Treatment Goal: Remain safe during length of stay, learn and adopt new coping skills, and be free of self-injurious ideation, impulses and acts at the time of discharge  Outcome: Progressing  Goal: Refrain from harming self  Description:  Interventions:  - Monitor patient closely, per order  - Develop a trusting relationship  - Supervise medication ingestion, monitor effects and side effects   Outcome: Progressing  Goal: Attend and participate in unit activities, including therapeutic, recreational, and educational groups  Description: Interventions:  - Provide therapeutic and educational activities daily, encourage attendance and participation, and document same in the medical record  - Obtain collateral information, encourage visitation and family involvement in care   Outcome: Progressing     Problem: Depression  Goal: Refrain from isolation  Description: Interventions:  - Develop a trusting relationship   - Encourage socialization   Outcome: Progressing     Problem: Anxiety  Goal: Anxiety is at manageable level  Description: Interventions:  - Assess and monitor patient's anxiety level.   - Monitor for signs and symptoms (heart palpitations, chest pain, shortness of breath, headaches, nausea, feeling jumpy, restlessness, irritable, apprehensive).   - Collaborate with interdisciplinary team and initiate plan and interventions as ordered.  - [place] patient to unit/surroundings  - Explain treatment plan  - Encourage participation in care  - Encourage verbalization of concerns/fears  - Identify coping mechanisms  - Assist in developing anxiety-reducing skills  - Administer/offer alternative therapies  - Limit or eliminate stimulants  Outcome: Progressing     Problem: Risk for Violence/Aggression Toward Others  Goal: Treatment Goal: Refrain from acts of violence/aggression during length of stay, and demonstrate improved impulse control at the time of discharge  Outcome: Progressing     Problem: Alteration in Orientation  Goal: Interact with staff daily  Description: Interventions:  - Assess and re-assess patient's level of orientation  - Engage patient in 1 on 1 interactions, daily, for a minimum of 15 minutes   - Establish rapport/trust with patient    Outcome: Progressing     Problem: SELF HARM/SUICIDALITY  Goal: Will have no self-injury during hospital stay  Description: INTERVENTIONS:  - Q 15 MINUTES: Routine safety checks  - Q WAKING SHIFT & PRN: Assess risk to determine if routine checks are adequate to maintain patient safety  - Encourage patient to participate actively in care by formulating a plan to combat response to suicidal ideation, identify supports and resources  Outcome: Progressing     Problem: ANXIETY  Goal: Will report anxiety at manageable levels  Description: INTERVENTIONS:  - Administer medication as ordered  - Teach and encourage coping skills  - Provide emotional support  - Assess patient/family for anxiety and ability to cope  Outcome: Progressing     Problem: SELF CARE DEFICIT  Goal: Return ADL status to a safe level of function  Description: INTERVENTIONS:  - Administer medication as ordered  - Assess ADL deficits and provide assistive devices as needed  - Obtain PT/OT consults as needed  - Assist and instruct patient to increase activity and self care as tolerated  Outcome: Progressing     Problem: DISCHARGE PLANNING - CARE MANAGEMENT  Goal: Discharge to post-acute care or home with appropriate resources  Description: INTERVENTIONS:  - Conduct assessment to determine patient/family and health care team treatment goals, and need for post-acute services based on payer coverage, community resources, and patient preferences, and barriers to discharge  - Address psychosocial, clinical, and financial barriers to discharge as identified in assessment in conjunction with the patient/family and health care team  - Arrange appropriate level of post-acute services according to patient’s   needs and preference and payer coverage in collaboration with the physician and health care team  - Communicate with and update the patient/family, physician, and health care team regarding progress on the discharge plan  - Arrange appropriate transportation to  post-acute venues  Outcome: Progressing

## 2024-09-04 NOTE — PROGRESS NOTES
Progress Note - Behavioral Health   Deyvi Cao 54 y.o. male MRN: [id number]  Unit/Bed#: [facility] 101-02 Encounter: [id number]  Code Status: Level 1 - Full Code    Assessment & Plan   Principal Problem:    Bipolar disorder with severe depression (HCC)  Active Problems:    Benign essential hypertension    Mixed hyperlipidemia    Allergic rhinitis due to allergen    Medical clearance for psychiatric admission    Rosacea    Recommended Treatment:     Treatment plan, treatment progress and medication changes were reviewed with Nursing Staff, Pharmacy Service and Case Management in Treatment Team:  1.Continue with group therapy, milieu therapy and occupational therapy   2.Behavioral Health checks every 7 minutes   3.Continue frequent safety checks and vitals per unit protocol  4.Continue with SLIM medical management as indicated  5.Continue with current medication regimen for symptom management: Vraylar 3mg PO Daily, Atarax 25mg PO TID, Lamictal 50mg PO Daily, Zoloft 150mg PO QHS   6.Disposition Planning: Discharge planning and efforts remain ongoing - Awaiting group home placement    Subjective:    Patient was seen today for continuation of care, records reviewed and patient was discussed with the morning case review team.  Per RN, patient more irritable and argumentative last night.    Deyvi was seen today for psychiatric follow-up.  On assessment today, Deyvi was found in his room.  Brightens on approach, seems at his baseline to me.  He is quiet, pleasant, and cooperative with psychiatric questions.  Deyvi reports adequate daytime energy and denies any difficulties with initiating or staying asleep.  Oral appetite and hydration is adequate.   We reviewed once more the specific as-needed medications they can use going forward if they experience any insomnia or destabilization of their mood, they understood and were agreeable. Milieu visibility and group attendance encouraged to promote an active participation in  treatment.    Deyvi denies acute suicidal/self-harm ideation/intent/plan upon direct inquiry at this time. Deyvi is able to contract for safety while on the unit and would feel comfortable seeking staff support should suicidal symptoms or urges appear or worsen. Deyvi remains behaviorally appropriate, no agitation or aggression noted on exam or in report. Deyvi also denies HI/AH/VH, and does not appear overtly manic.  Patient does not verbalize any experiences that can be categorized as paranoid, persecutory, bizarre, or somatic delusions. Deyvi remains adherent to his current psychotropic medication regimen and denies any side effects from medications, as well as none noted on exam.    Group Attendance: 3 / 11  Treatment Team: This Thursday  Psychiatric PRN's Needed: None    Review of Systems:  Behavior over the last 24 hours: Slowly improving  Sleep: sleeping okay throughout the night  Appetite: adequate  Medication side effects: none reported  ROS:no complaints, all other systems are negative    Objective:    Vitals:  Vitals:    09/04/24 0737   BP: 135/68   Pulse: 57   Resp: 18   Temp: 97.6 °F (36.4 °C)   SpO2: 94%     Laboratory Results:  I have personally reviewed all pertinent laboratory/tests results.  Most Recent Labs:   Lab Results   Component Value Date    WBC 7.39 06/26/2024    RBC 4.70 06/26/2024    HGB 15.2 06/26/2024    HCT 45.5 06/26/2024     06/26/2024    RDW 12.9 06/26/2024    NEUTROABS 4.63 06/26/2024    SODIUM 137 06/26/2024    K 4.1 06/26/2024     06/26/2024    CO2 26 06/26/2024    BUN 17 06/26/2024    CREATININE 0.63 06/26/2024    GLUC 98 06/26/2024    GLUF 98 06/26/2024    CALCIUM 9.6 06/26/2024    AST 25 06/26/2024    ALT 45 06/26/2024    ALKPHOS 114 (H) 06/26/2024    TP 7.0 06/26/2024    ALB 4.4 06/26/2024    TBILI 0.44 06/26/2024    CHOLESTEROL 177 06/26/2024    HDL 52 06/26/2024    TRIG 73 06/26/2024    LDLCALC 110 (H) 06/26/2024    NONHDLC 125 06/26/2024    LITHIUM  0.4 (L) 01/28/2021    [id number] 2.636 06/26/2024    HGBA1C 5.2 11/22/2023     11/22/2023     Mental Status Evaluation:  Appearance:  age appropriate, casually dressed   Behavior:  pleasant, cooperative, calm   Speech:  scant, soft   Mood:  less anxious, less depressed   Affect:  constricted   Thought Process:  goal directed   Associations: intact associations   Thought Content:  no overt delusions   Perceptual Disturbances: no auditory hallucinations, no visual hallucinations, denies when asked, does not appear responding to internal stimuli   Risk Potential: Suicidal ideation - None at present, contracts for safety on the unit, would talk to staff if not feeling safe on the unit  Homicidal ideation - None at present  Potential for aggression - Not at present   Sensorium:  oriented to person, place, and time/date   Memory:  recent memory intact   Consciousness:  alert and awake   Attention/Concentration: attention span and concentration appear shorter than expected for age   Insight:  limited   Judgment: limited   Gait/Station: normal gait/station, normal balance   Motor Activity: no abnormal movements     Progress Toward Goals: making gradual improvement.  Deyvi continues to require inpatient psychiatric hospitalization for continued medication management and titration to optimize symptom reduction, improve sleep hygiene, and demonstrate adequate self-care.     Suicide/Homicide Risk Assessment:  Risk of Harm to Self:   Nursing Suicide Risk Assessment Last 24 hours: C-SSRS Risk (Since Last Contact)  Calculated C-SSRS Risk Score (Since Last Contact): No Risk Indicated    Risk of Harm to Others:  Nursing Homicide Risk Assessment: Violence Risk to Others: Denies within past 6 months    Behavioral Health Medications: all current active meds have been reviewed and continue current psychiatric medications.  Current Facility-Administered Medications   Medication Dose Route Frequency Provider Last Rate     acetaminophen  650 mg Oral Q6H PRN ALVINA Harley      acetaminophen  650 mg Oral Q4H PRN ALVINA Harley      acetaminophen  975 mg Oral Q6H PRN ALVINA Harley      aluminum-magnesium hydroxide-simethicone  30 mL Oral Q4H PRN ALVINA Harley      ammonium lactate   Topical BID PRN ALVINA Harley      atorvastatin  10 mg Oral Daily ALVINA Lewis      benztropine  1 mg Intramuscular Q4H PRN Max 6/day ALVINA Harley      benztropine  1 mg Oral Q4H PRN Max 6/day ALVINA Harley      bisacodyl  10 mg Rectal Daily PRN ALVINA Harley      cariprazine  3 mg Oral Daily Martin Cardenas MD      Cholecalciferol  2,000 Units Oral Daily ALVINA Lewis      cyanocobalamin  1,000 mcg Oral Daily ALVINA Lewis      hydrOXYzine HCL  25 mg Oral Q6H PRN Max 4/day ALVINA Harley      hydrOXYzine HCL  25 mg Oral TID Martin Cardenas MD      hydrOXYzine HCL  50 mg Oral Q4H PRN Max 4/day ALVINA Harley      Or    LORazepam  1 mg Intramuscular Q4H PRN ALVINA Harley      lamoTRIgine  50 mg Oral Daily Martin Cardenas MD      lisinopril  10 mg Oral Daily ALVINA Lewis      LORazepam  1 mg Oral Q4H PRN Max 6/day ALVINA Harley      Or    LORazepam  2 mg Intramuscular Q6H PRN Max 3/day ALVINA Harley      OLANZapine  10 mg Oral Q3H PRN Max 3/day ALVINA Harley      Or    OLANZapine  10 mg Intramuscular Q3H PRN Max 3/day ALVINA Harley      OLANZapine  5 mg Oral Q3H PRN Max 6/day ALVINA Harley      Or    OLANZapine  5 mg Intramuscular Q3H PRN Max 6/day ALVINA Harley      OLANZapine  2.5 mg Oral Q3H PRN Max 8/day ALVINA Harley      polyethylene glycol  17 g Oral Daily PRN ALVINA Harley      propranolol  10 mg Oral Q8H PRN ALVINA Harley      senna-docusate sodium  1 tablet Oral Daily PRN ALVINA Harley      sertraline  150 mg Oral HS Martin Cardenas MD       Risks / Benefits of Treatment:  Risks, benefits, and  possible side effects of medications explained to patient. Patient has limited understanding of risks and benefits of treatment at this time, but agrees to take medications as prescribed.    Counseling / Coordination of Care:  Total floor/unit time spent today 25 minutes. Greater than 50% of total time was spent with the patient and / or family counseling and / or coordination of care. A description of the counseling / coordination of care:   Patient's progress discussed with staff in treatment team meeting.  Medications, treatment progress and treatment plan reviewed with patient.   Educated on importance of medication and treatment compliance.  Reassurance and supportive therapy provided.   Encouraged participation in milieu and group therapy on the unit.    ALVINA Harley 09/04/24

## 2024-09-04 NOTE — NURSING NOTE
Pt is accepting of medications without incidence and meal compliant. Pt is polite, pleasant and denies s/s. No new concerns.

## 2024-09-04 NOTE — PROGRESS NOTES
09/04/24 0753   Team Meeting   Meeting Type Daily Rounds   Team Members Present   Team Members Present Physician;Nurse;;Other (Discipline and Name)   Patient/Family Present   Patient Present No   Patient's Family Present No     In attendance:  Dr. Alex Thomas, MD Dr. Jordan Holter, DO Mahamed Haywood, RN  Judi Garcia, [facility]  BARRETT Elmore.S.    Groups: 3/11    Pt quiet, reserved during the day. In the evening, pt became irritable with staff and lashed out verbally in frustration. No major bx issues noted.

## 2024-09-05 PROCEDURE — 99232 SBSQ HOSP IP/OBS MODERATE 35: CPT | Performed by: PSYCHIATRY & NEUROLOGY

## 2024-09-05 RX ADMIN — CHOLECALCIFEROL TAB 25 MCG (1000 UNIT) 2000 UNITS: 25 TAB at 08:32

## 2024-09-05 RX ADMIN — HYDROXYZINE HYDROCHLORIDE 25 MG: 25 TABLET ORAL at 21:13

## 2024-09-05 RX ADMIN — LAMOTRIGINE 50 MG: 25 TABLET ORAL at 08:32

## 2024-09-05 RX ADMIN — HYDROXYZINE HYDROCHLORIDE 25 MG: 25 TABLET ORAL at 08:32

## 2024-09-05 RX ADMIN — ATORVASTATIN CALCIUM 10 MG: 10 TABLET, FILM COATED ORAL at 08:32

## 2024-09-05 RX ADMIN — HYDROXYZINE HYDROCHLORIDE 25 MG: 25 TABLET ORAL at 16:57

## 2024-09-05 RX ADMIN — CARIPRAZINE 3 MG: 3 CAPSULE, GELATIN COATED ORAL at 08:32

## 2024-09-05 RX ADMIN — LISINOPRIL 10 MG: 10 TABLET ORAL at 08:32

## 2024-09-05 RX ADMIN — CYANOCOBALAMIN TAB 1000 MCG 1000 MCG: 1000 TAB at 08:32

## 2024-09-05 RX ADMIN — SERTRALINE HYDROCHLORIDE 150 MG: 100 TABLET ORAL at 21:13

## 2024-09-05 NOTE — NURSING NOTE
Quiet and isolative to himself most of the time. Visible on the unit at intervals. Appetite excellent. Group attendance today- 6/10. Minimal participation with select peers.  Offers no complaints or suicidal ideations. Medication compliant. Affect flat. He states he feels good.

## 2024-09-05 NOTE — NURSING NOTE
Pt is visible on the unit and social with select peers. Took medications without incidence. Pt is polite and cooperative. Attended 6/9 groups. Pt reports depression and anxiety are at baseline, denies other psych symptoms. No behavioral issues. Pt offers no complaints. Continuous safety checks maintained.

## 2024-09-05 NOTE — PROGRESS NOTES
09/05/24 0737   Team Meeting   Meeting Type Daily Rounds   Team Members Present   Team Members Present Physician;Nurse;;Other (Discipline and Name)   Patient/Family Present   Patient Present No   Patient's Family Present No     In attendance:  MD Mahamed Sousa, RN  Judi Garcia, JAYLONW  [employer] LCSW    Groups: 6/10    Pt preoccupied with discharge. Pt quiet, pleasant, reserved. No bx issues noted. Pt remains flat, depressive but at baseline. Waiting [facility] availability.

## 2024-09-05 NOTE — PROGRESS NOTES
Progress Note - Behavioral Health   Deyvi Cao 54 y.o. male MRN: [id number]  Unit/Bed#: [facility] 101-02 Encounter: [id number]  Code Status: Level 1 - Full Code    Assessment & Plan   Principal Problem:    Bipolar disorder with severe depression (HCC)  Active Problems:    Benign essential hypertension    Mixed hyperlipidemia    Allergic rhinitis due to allergen    Medical clearance for psychiatric admission    Rosacea    Recommended Treatment:     Treatment plan, treatment progress and medication changes were reviewed with Nursing Staff, Pharmacy Service and Case Management in Treatment Team:  1.Continue with group therapy, milieu therapy and occupational therapy   2.Behavioral Health checks every 7 minutes   3.Continue frequent safety checks and vitals per unit protocol  4.Continue with SLIM medical management as indicated  5.Continue with current medication regimen for symptom management: Vraylar 3mg PO Daily, Atarax 25mg PO TID, Lamictal 50mg PO Daily, Zoloft 150mg PO QHS   6.Disposition Planning: Discharge planning and efforts remain ongoing - Awaiting group home placement  Assessment & Plan  Bipolar disorder with severe depression (HCC)  Progressing  Awaiting placement    Subjective:    Patient was seen today for continuation of care, records reviewed and patient was discussed with the morning case review team.    Deyvi was seen today for psychiatric follow-up.  On assessment today, Deyvi was present during his treatment team.  Reports feeling well, some frustration regarding waiting for placement.  Overall, he has been calm and cooperative with this writer.  He speaks about the future and is able to complete task with the assistance of staff.  Deyvi reports adequate daytime energy and denies any difficulties with initiating or staying asleep.  Oral appetite and hydration is adequate.  We reviewed once more the specific as-needed medications they can use going forward if they experience any insomnia or  destabilization of their mood, they understood and were agreeable. Milieu visibility and group attendance encouraged to promote an active participation in treatment.    Deyvi denies acute suicidal/self-harm ideation/intent/plan upon direct inquiry at this time. Deyvi is able to contract for safety while on the unit and would feel comfortable seeking staff support should suicidal symptoms or urges appear or worsen. Deyvi remains behaviorally appropriate, no agitation or aggression noted on exam or in report. Deyvi also denies HI/AH/VH, and does not appear overtly manic.  Patient does not verbalize any experiences that can be categorized as paranoid, persecutory, bizarre, or somatic delusions. Deyvi remains adherent to his current psychotropic medication regimen and denies any side effects from medications, as well as none noted on exam.    Review of Systems:  Behavior over the last 24 hours: Unchanged  Sleep: sleeping okay throughout the night  Appetite: adequate  Medication side effects: none reported  ROS:no complaints, all other systems are negative    Objective:    Vitals:  Vitals:    09/05/24 0723   BP: 129/76   Pulse: 100   Resp: 18   Temp: (!) 97.2 °F (36.2 °C)   SpO2: 93%     Laboratory Results:  I have personally reviewed all pertinent laboratory/tests results.  Most Recent Labs:   Lab Results   Component Value Date    WBC 7.39 06/26/2024    RBC 4.70 06/26/2024    HGB 15.2 06/26/2024    HCT 45.5 06/26/2024     06/26/2024    RDW 12.9 06/26/2024    NEUTROABS 4.63 06/26/2024    SODIUM 137 06/26/2024    K 4.1 06/26/2024     06/26/2024    CO2 26 06/26/2024    BUN 17 06/26/2024    CREATININE 0.63 06/26/2024    GLUC 98 06/26/2024    GLUF 98 06/26/2024    CALCIUM 9.6 06/26/2024    AST 25 06/26/2024    ALT 45 06/26/2024    ALKPHOS 114 (H) 06/26/2024    TP 7.0 06/26/2024    ALB 4.4 06/26/2024    TBILI 0.44 06/26/2024    CHOLESTEROL 177 06/26/2024    HDL 52 06/26/2024    TRIG 73 06/26/2024     LDLCALC 110 (H) 06/26/2024    NONHDLC 125 06/26/2024    LITHIUM 0.4 (L) 01/28/2021    [id number] 2.636 06/26/2024    HGBA1C 5.2 11/22/2023     11/22/2023     Mental Status Evaluation:  Appearance:  age appropriate, casually dressed   Behavior:  cooperative, calm   Speech:  scant, soft   Mood:  less anxious, less depressed   Affect:  constricted   Thought Process:  goal directed   Associations: intact associations   Thought Content:  no overt delusions   Perceptual Disturbances: no auditory hallucinations, no visual hallucinations, denies when asked, does not appear responding to internal stimuli   Risk Potential: Suicidal ideation - None at present, contracts for safety on the unit, would talk to staff if not feeling safe on the unit  Homicidal ideation - None at present  Potential for aggression - Not at present   Sensorium:  oriented to person, place, and time/date   Memory:  recent memory intact   Consciousness:  alert and awake   Attention/Concentration: attention span and concentration appear shorter than expected for age   Insight:  limited   Judgment: limited   Gait/Station: normal gait/station, normal balance   Motor Activity: no abnormal movements     Progress Toward Goals: making gradual improvement.  Deyvi continues to require inpatient psychiatric hospitalization for continued medication management and titration to optimize symptom reduction, improve sleep hygiene, and demonstrate adequate self-care.     Suicide/Homicide Risk Assessment:  Risk of Harm to Self:   Nursing Suicide Risk Assessment Last 24 hours: C-SSRS Risk (Since Last Contact)  Calculated C-SSRS Risk Score (Since Last Contact): No Risk Indicated    Risk of Harm to Others:  Nursing Homicide Risk Assessment: Violence Risk to Others: Denies within past 6 months    Behavioral Health Medications: all current active meds have been reviewed and continue current psychiatric medications.  Current Facility-Administered Medications    Medication Dose Route Frequency Provider Last Rate    acetaminophen  650 mg Oral Q6H PRN ALVINA Harley      acetaminophen  650 mg Oral Q4H PRN ALVINA Harley      acetaminophen  975 mg Oral Q6H PRN ALVINA Harley      aluminum-magnesium hydroxide-simethicone  30 mL Oral Q4H PRN ALVINA Harley      ammonium lactate   Topical BID PRN ALVINA Harley      atorvastatin  10 mg Oral Daily ALVINA Lewis      benztropine  1 mg Intramuscular Q4H PRN Max 6/day ALVINA Harley      benztropine  1 mg Oral Q4H PRN Max 6/day ALVINA Harley      bisacodyl  10 mg Rectal Daily PRN ALVINA Harley      cariprazine  3 mg Oral Daily Martin Cardenas MD      Cholecalciferol  2,000 Units Oral Daily ALVINA Lewis      cyanocobalamin  1,000 mcg Oral Daily ALVINA Lewis      hydrOXYzine HCL  25 mg Oral Q6H PRN Max 4/day ALVINA Harley      hydrOXYzine HCL  25 mg Oral TID Martin Cardenas MD      hydrOXYzine HCL  50 mg Oral Q4H PRN Max 4/day ALVINA Harlye      Or    LORazepam  1 mg Intramuscular Q4H PRN ALVINA Harley      lamoTRIgine  50 mg Oral Daily Martin Cardenas MD      lisinopril  10 mg Oral Daily ALVINA Lewis      LORazepam  1 mg Oral Q4H PRN Max 6/day ALVINA Harley      Or    LORazepam  2 mg Intramuscular Q6H PRN Max 3/day ALVINA Harley      OLANZapine  10 mg Oral Q3H PRN Max 3/day ALVINA Harley      Or    OLANZapine  10 mg Intramuscular Q3H PRN Max 3/day ALVINA Harley      OLANZapine  5 mg Oral Q3H PRN Max 6/day ALVINA Harley      Or    OLANZapine  5 mg Intramuscular Q3H PRN Max 6/day ALVINA Harley      OLANZapine  2.5 mg Oral Q3H PRN Max 8/day ALVINA Harley      polyethylene glycol  17 g Oral Daily PRN ALVINA Harley      propranolol  10 mg Oral Q8H PRN ALVINA Harley      senna-docusate sodium  1 tablet Oral Daily PRN ALVINA Harley      sertraline  150 mg Oral HS Martin Cardenas MD        Risks / Benefits of Treatment:  Risks, benefits, and possible side effects of medications explained to patient. Patient has limited understanding of risks and benefits of treatment at this time, but agrees to take medications as prescribed.    Counseling / Coordination of Care:  Total floor/unit time spent today 25 minutes. Greater than 50% of total time was spent with the patient and / or family counseling and / or coordination of care. A description of the counseling / coordination of care:   Patient's progress discussed with staff in treatment team meeting.  Medications, treatment progress and treatment plan reviewed with patient.   Educated on importance of medication and treatment compliance.  Reassurance and supportive therapy provided.   Encouraged participation in milieu and group therapy on the unit.    ALVINA Harley 09/05/24

## 2024-09-05 NOTE — NURSING NOTE
Patient is quiet and polite. He has minimal interaction with peers and staff. He attends groups. He states his anxiety and depression are baseline. He is looking forward to being discharged.  Medication compliant. Appetite is good. Attends groups.

## 2024-09-05 NOTE — PLAN OF CARE
Problem: Alteration in Thoughts and Perception  Goal: Treatment Goal: Gain control of psychotic behaviors/thinking, reduce/eliminate presenting symptoms and demonstrate improved reality functioning upon discharge  Outcome: Progressing  Goal: Verbalize thoughts and feelings  Description: Interventions:  - Promote a nonjudgmental and trusting relationship with the patient through active listening and therapeutic communication  - Assess patient's level of functioning, behavior and potential for risk  - Engage patient in 1 on 1 interactions  - Encourage patient to express fears, feelings, frustrations, and discuss symptoms    - Ubly patient to reality, help patient recognize reality-based thinking   - Administer medications as ordered and assess for potential side effects  - Provide the patient education related to the signs and symptoms of the illness and desired effects of prescribed medications  Outcome: Progressing  Goal: Refrain from acting on delusional thinking/internal stimuli  Description: Interventions:  - Monitor patient closely, per order   - Utilize least restrictive measures   - Set reasonable limits, give positive feedback for acceptable   - Administer medications as ordered and monitor of potential side effects  Outcome: Progressing  Goal: Agree to be compliant with medication regime, as prescribed and report medication side effects  Description: Interventions:  - Offer appropriate PRN medication and supervise ingestion; conduct AIMS, as needed   Outcome: Progressing  Goal: Attend and participate in unit activities, including therapeutic, recreational, and educational groups  Description: Interventions:  -Encourage Visitation and family involvement in care  Outcome: Progressing  Goal: Recognize dysfunctional thoughts, communicate reality-based thoughts at the time of discharge  Description: Interventions:  - Provide medication and psycho-education to assist patient in compliance and developing  insight into his/her illness   Outcome: Progressing  Goal: Complete daily ADLs, including personal hygiene independently, as able  Description: Interventions:  - Observe, teach, and assist patient with ADLS  - Monitor and promote a balance of rest/activity, with adequate nutrition and elimination   Outcome: Progressing     Problem: Ineffective Coping  Goal: Participates in unit activities  Description: Interventions:  - Provide therapeutic environment   - Provide required programming   - Redirect inappropriate behaviors   Outcome: Progressing  Goal: Patient/Family participate in treatment and DC plans  Description: Interventions:  - Provide therapeutic environment  Outcome: Progressing  Goal: Patient/Family verbalizes awareness of resources  Outcome: Progressing  Goal: Free from restraint events  Description: - Utilize least restrictive measures   - Provide behavioral interventions   - Redirect inappropriate behaviors   Outcome: Progressing     Problem: Risk for Self Injury/Neglect  Goal: Treatment Goal: Remain safe during length of stay, learn and adopt new coping skills, and be free of self-injurious ideation, impulses and acts at the time of discharge  Outcome: Progressing  Goal: Verbalize thoughts and feelings  Description: Interventions:  - Assess and re-assess patient's lethality and potential for self-injury  - Engage patient in 1:1 interactions, daily, for a minimum of 15 minutes  - Encourage patient to express feelings, fears, frustrations, hopes  - Establish rapport/trust with patient   Outcome: Progressing  Goal: Refrain from harming self  Description: Interventions:  - Monitor patient closely, per order  - Develop a trusting relationship  - Supervise medication ingestion, monitor effects and side effects   Outcome: Progressing  Goal: Attend and participate in unit activities, including therapeutic, recreational, and educational groups  Description: Interventions:  - Provide therapeutic and educational  activities daily, encourage attendance and participation, and document same in the medical record  - Obtain collateral information, encourage visitation and family involvement in care   Outcome: Progressing  Goal: Complete daily ADLs, including personal hygiene independently, as able  Description: Interventions:  - Observe, teach, and assist patient with ADLS  - Monitor and promote a balance of rest/activity, with adequate nutrition and elimination  Outcome: Progressing     Problem: Depression  Goal: Treatment Goal: Demonstrate behavioral control of depressive symptoms, verbalize feelings of improved mood/affect, and adopt new coping skills prior to discharge  Outcome: Progressing  Goal: Verbalize thoughts and feelings  Description: Interventions:  - Assess and re-assess patient's level of risk   - Engage patient in 1:1 interactions, daily, for a minimum of 15 minutes   - Encourage patient to express feelings, fears, frustrations, hopes   Outcome: Progressing  Goal: Refrain from harming self  Description: Interventions:  - Monitor patient closely, per order   - Supervise medication ingestion, monitor effects and side effects   Outcome: Progressing  Goal: Refrain from isolation  Description: Interventions:  - Develop a trusting relationship   - Encourage socialization   Outcome: Progressing  Goal: Refrain from self-neglect  Outcome: Progressing  Goal: Complete daily ADLs, including personal hygiene independently, as able  Description: Interventions:  - Observe, teach, and assist patient with ADLS  -  Monitor and promote a balance of rest/activity, with adequate nutrition and elimination   Outcome: Progressing     Problem: Risk for Violence/Aggression Toward Others  Goal: Treatment Goal: Refrain from acts of violence/aggression during length of stay, and demonstrate improved impulse control at the time of discharge  Outcome: Progressing  Goal: Refrain from harming others  Outcome: Progressing  Goal: Refrain from  destructive acts on the environment or property  Outcome: Progressing  Goal: Control angry outbursts  Description: Interventions:  - Monitor patient closely, per order  - Ensure early verbal de-escalation  - Monitor prn medication needs  - Set reasonable/therapeutic limits, outline behavioral expectations, and consequences   - Provide a non-threatening milieu, utilizing the least restrictive interventions   Outcome: Progressing     Problem: Alteration in Orientation  Goal: Interact with staff daily  Description: Interventions:  - Assess and re-assess patient's level of orientation  - Engage patient in 1 on 1 interactions, daily, for a minimum of 15 minutes   - Establish rapport/trust with patient   Outcome: Progressing  Goal: Complete daily ADLs, including personal hygiene independently, as able  Description: Interventions:  - Observe, teach, and assist patient with ADLS  Outcome: Progressing     Problem: SELF HARM/SUICIDALITY  Goal: Will have no self-injury during hospital stay  Description: INTERVENTIONS:  - Q 15 MINUTES: Routine safety checks  - Q WAKING SHIFT & PRN: Assess risk to determine if routine checks are adequate to maintain patient safety  - Encourage patient to participate actively in care by formulating a plan to combat response to suicidal ideation, identify supports and resources  Outcome: Progressing     Problem: DEPRESSION  Goal: Will be euthymic at discharge  Description: INTERVENTIONS:  - Administer medication as ordered  - Provide emotional support via 1:1 interaction with staff  - Encourage involvement in milieu/groups/activities  - Monitor for social isolation  Outcome: Progressing     Problem: SELF CARE DEFICIT  Goal: Return ADL status to a safe level of function  Description: INTERVENTIONS:  - Administer medication as ordered  - Assess ADL deficits and provide assistive devices as needed  - Obtain PT/OT consults as needed  - Assist and instruct patient to increase activity and self care as  tolerated  Outcome: Progressing     Problem: DISCHARGE PLANNING - CARE MANAGEMENT  Goal: Discharge to post-acute care or home with appropriate resources  Description: INTERVENTIONS:  - Conduct assessment to determine patient/family and health care team treatment goals, and need for post-acute services based on payer coverage, community resources, and patient preferences, and barriers to discharge  - Address psychosocial, clinical, and financial barriers to discharge as identified in assessment in conjunction with the patient/family and health care team  - Arrange appropriate level of post-acute services according to patient’s   needs and preference and payer coverage in collaboration with the physician and health care team  - Communicate with and update the patient/family, physician, and health care team regarding progress on the discharge plan  - Arrange appropriate transportation to post-acute venues  Outcome: Progressing

## 2024-09-05 NOTE — PROGRESS NOTES
09/05/24 1049   Team Meeting   Meeting Type Tx Team Meeting   Initial Conference Date 09/05/24   Next Conference Date 09/19/24   Team Members Present   Team Members Present Physician;Nurse;;Other (Discipline and Name)   Physician Team Member Zenia TINSLEY   Nursing Team Member Chastity   Social Work Team Member Jose   Other (Discipline and Name) Giuseppe Jefferson   Patient/Family Present   Patient Present Yes   Patient's Family Present No   OTHER   Team Meeting - Additional Comments Pt attended tx team. Pt reports he's been doing well and coping with baseline depression. Pt reports feeling a bit frustrated with waiting to discharge. [facility] reports there is an opening with [facility] for an enhanced CRR. Tx team considered BCM for pt but determined ACT services are needed.  will coordinate with [facility] to attempt to gain timeline for ACT availability. No other concerns noted. Pt is doing well, paying bills, addressing ADLs, med compliant, attending groups.

## 2024-09-06 PROCEDURE — 99232 SBSQ HOSP IP/OBS MODERATE 35: CPT | Performed by: PSYCHIATRY & NEUROLOGY

## 2024-09-06 RX ADMIN — LAMOTRIGINE 50 MG: 25 TABLET ORAL at 08:31

## 2024-09-06 RX ADMIN — HYDROXYZINE HYDROCHLORIDE 25 MG: 25 TABLET ORAL at 21:19

## 2024-09-06 RX ADMIN — HYDROXYZINE HYDROCHLORIDE 25 MG: 25 TABLET ORAL at 08:31

## 2024-09-06 RX ADMIN — CYANOCOBALAMIN TAB 1000 MCG 1000 MCG: 1000 TAB at 08:31

## 2024-09-06 RX ADMIN — HYDROXYZINE HYDROCHLORIDE 25 MG: 25 TABLET ORAL at 17:02

## 2024-09-06 RX ADMIN — SERTRALINE HYDROCHLORIDE 150 MG: 100 TABLET ORAL at 21:19

## 2024-09-06 RX ADMIN — CARIPRAZINE 3 MG: 3 CAPSULE, GELATIN COATED ORAL at 08:31

## 2024-09-06 RX ADMIN — CHOLECALCIFEROL TAB 25 MCG (1000 UNIT) 2000 UNITS: 25 TAB at 08:31

## 2024-09-06 RX ADMIN — ATORVASTATIN CALCIUM 10 MG: 10 TABLET, FILM COATED ORAL at 08:30

## 2024-09-06 RX ADMIN — LISINOPRIL 10 MG: 10 TABLET ORAL at 08:31

## 2024-09-06 NOTE — PROGRESS NOTES
"Progress Note - Behavioral Health     Deyvi Cao 54 y.o. male MRN: [id number]   Unit/Bed#: [facility] 101-02 Encounter: [id number]    Behavior over the last 24 hours: unchanged.     Deyvi is seen today for psychiatric follow up. Per nursing notes, patient polite and quiet, interacts with peers, attends groups and notes anxiety and depression are at baseline.   Patient remains in behavioral control. No psych prns in last 24 hours.     Today Deyvi is seen sitting in the dayroom coloring/working on coping skills. Adequate grooming, casually dressed. Pleasant, quiet and cooperative, however scant. Reports depression and anxiety are a 1 out of 10 with ten being the worst. Reports no current issues or questions when asked. Attends groups and notes some groups are helpful and some just distract him. Reports adequate energy, sleep, and appetite. Notes he has a meeting on the 24th and he is looking forward to this and eventual discharge. Fair eye contact. Denies suicidal and homicidal ideations when asked and feels safe on the unit.    Denies medication side effects when asked. Attended 6/9 groups. Assessment with  ACT on 9/24/2024. Awaiting placement.    Sleep: normal  Appetite: normal  Medication side effects: No   ROS: all other systems are negative    Mental Status Evaluation:    Appearance:  age appropriate, casually dressed, adequate grooming, looks stated age   Behavior:  cooperative, calm   Speech:  normal rate, scant, soft   Mood:  \"I'm doing okay.\"   Affect:  constricted   Thought Process:  goal directed, linear   Associations: intact associations   Thought Content:  no overt delusions   Perceptual Disturbances: denies auditory hallucinations when asked, does not appear responding to internal stimuli, denies visual hallucinations when asked   Risk Potential: Suicidal ideation - None at present, would talk to staff if not feeling safe on the unit  Homicidal ideation - None at present  Potential for aggression - " No   Sensorium:  oriented to person, place, time/date, and situation   Memory:  recent and remote memory grossly intact   Consciousness:  alert and awake   Attention/Concentration: attention span and concentration are age appropriate   Insight:  limited   Judgment: limited   Gait/Station: normal gait/station   Motor Activity: no abnormal movements     Vital signs in last 24 hours:    Temp:  [97.6 °F (36.4 °C)-98 °F (36.7 °C)] 98 °F (36.7 °C)  HR:  [] 91  Resp:  [18] 18  BP: (126-146)/(76-92) 146/92    Laboratory results: I have personally reviewed all pertinent laboratory/tests results    Results from the past 24 hours: No results found for this or any previous visit (from the past 24 hour(s)).    Progress Toward Goals: progressing, reporting minimal depression/anxiety, however constricted. Scant and answers questions appropriately. Adequate energy, appetite, and sleep. Awaiting placement, with assessment later this month.    Assessment & Plan   Principal Problem:    Bipolar disorder with severe depression (HCC)  Active Problems:    Benign essential hypertension    Mixed hyperlipidemia    Allergic rhinitis due to allergen    Medical clearance for psychiatric admission    Tracey      Recommended Treatment:     Planned medication and treatment changes:    All current active medications have been reviewed  Encourage group therapy, milieu therapy and occupational therapy  Behavioral Health checks every 7 minutes  Medical management per SLIM as indicated.  Continue current medications:  Assessment with HH ACT on 09/24/2024  Awaiting placement.    Current Facility-Administered Medications   Medication Dose Route Frequency Provider Last Rate    acetaminophen  650 mg Oral Q6H PRN ALVINA Harley      acetaminophen  650 mg Oral Q4H PRN ALVINA Harley      acetaminophen  975 mg Oral Q6H PRN ALVINA Harley      aluminum-magnesium hydroxide-simethicone  30 mL Oral Q4H PRN ALVINA Harley      ammonium  lactate   Topical BID PRN ALVINA Harley      atorvastatin  10 mg Oral Daily Sarygurinder LinkALVINA Thompson      benztropine  1 mg Intramuscular Q4H PRN Max 6/day ALVINA Harley      benztropine  1 mg Oral Q4H PRN Max 6/day ALVINA Harley      bisacodyl  10 mg Rectal Daily PRN ALVINA Harley      cariprazine  3 mg Oral Daily Martin Cardenas MD      Cholecalciferol  2,000 Units Oral Daily Sary LinkALVINA Thompson      cyanocobalamin  1,000 mcg Oral Daily Sary LinkALVINA Thompson      hydrOXYzine HCL  25 mg Oral Q6H PRN Max 4/day ALVINA Harley      hydrOXYzine HCL  25 mg Oral TID Martin Cardenas MD      hydrOXYzine HCL  50 mg Oral Q4H PRN Max 4/day ALVINA Harley      Or    LORazepam  1 mg Intramuscular Q4H PRN ALVINA Harley      lamoTRIgine  50 mg Oral Daily Martin Cardenas MD      lisinopril  10 mg Oral Daily ALVINA Lewis      LORazepam  1 mg Oral Q4H PRN Max 6/day ALVINA Harley      Or    LORazepam  2 mg Intramuscular Q6H PRN Max 3/day ALVINA Harley      OLANZapine  10 mg Oral Q3H PRN Max 3/day ALVINA Harley      Or    OLANZapine  10 mg Intramuscular Q3H PRN Max 3/day ALVINA Harley      OLANZapine  5 mg Oral Q3H PRN Max 6/day ALVINA Harley      Or    OLANZapine  5 mg Intramuscular Q3H PRN Max 6/day ALVINA Harley      OLANZapine  2.5 mg Oral Q3H PRN Max 8/day ALVINA Harley      polyethylene glycol  17 g Oral Daily PRN ALVINA Harley      propranolol  10 mg Oral Q8H PRN ALVINA Harley      senna-docusate sodium  1 tablet Oral Daily PRN ALVINA Harley      sertraline  150 mg Oral HS Martin Cardenas MD       Risks / Benefits of Treatment:    Risks, benefits, and possible side effects of medications explained to patient. Patient has limited understanding of risks and benefits of treatment at this time, but agrees to take medications as prescribed.    Counseling / Coordination of Care:    Patient's progress discussed with staff  in treatment team meeting.  Medications, treatment progress and treatment plan reviewed with patient.  Medication education provided to patient.  Educated on importance of medication and treatment compliance.  Reassurance and supportive therapy provided.    Pedro Stafford PA-C 09/06/24

## 2024-09-06 NOTE — SOCIAL WORK
STEFAN met 1:1 with pt.  STEFAN notified pt of next steps regarding discharge. Pt is eligible for [facility] with [facility]. [facility] notified SW that there is currently COVID in the house so admits are put on hold.   STEFAN notified pt that he will have ACT intake 9/24 at 11am and likely timeframe for discharge will be around the end of the month.   STEFAN processed pt's thoughts and feelings regarding these options and timeframe.

## 2024-09-06 NOTE — PROGRESS NOTES
09/06/24 0721   Team Meeting   Meeting Type Daily Rounds   Team Members Present   Team Members Present Physician;Nurse;;Other (Discipline and Name)   Patient/Family Present   Patient Present No   Patient's Family Present No     In attendance:  Dr. Alex Thomas, MD Dr. Jordan Holter, DO Pedro Stafford, MAIKEL Hu, RN   Judi Garcia, [facility]  Mer Sales, [facility]  Gris Arizmendi M.S.    Groups: 6/9    Pt scheduled for ACT intake 9/24 at 11am, pending placement to [facility]. Pt at baseline, quiet, isolative at times. No bx issues noted.

## 2024-09-06 NOTE — SOCIAL WORK
STEFAN communicated with [facility]. CRR currently has positive COVID cases. STEFAN coordinated with EAC unit supervisor regarding this possible delay in pt's assessment. Assessing staff need to wear a mask if they have been exposed to COVID positive patients but may continue with the assessment process without need for delay.STEFAN informed [facility] staff and they are in agreement.    [facility] confirmed pt's [facility] assessment/evaluation will be 9/17 at 11am.

## 2024-09-06 NOTE — NURSING NOTE
Deyvi has been present in the milieu.  Denied all psych sx, as well as pain.  Med and meal compliant, consumed 100% x 3.  Remains quiet, but pleasant.  Pt offered no complaints or concerns.  No behavioral issues.

## 2024-09-06 NOTE — NURSING NOTE
Pt in bed asleep, observed eyes closed, and chest movement noted. Continuous safety checks maintained. No unmet needs at this time. Will continue to monitor patient needs, sleep pattern, and behaviors.     0623: Patient has slept all night, 7+ hours of uninterrupted sleep

## 2024-09-06 NOTE — NURSING NOTE
Patient was isolative to his room even with encouragement to attend group. Patient states his depression and anxiety are baseline. He is compliant with medications. Appetite is good. He is ready for discharge.

## 2024-09-06 NOTE — PLAN OF CARE
Problem: Alteration in Thoughts and Perception  Goal: Agree to be compliant with medication regime, as prescribed and report medication side effects  Description: Interventions:  - Offer appropriate PRN medication and supervise ingestion; conduct AIMS, as needed   Outcome: Progressing  Goal: Attend and participate in unit activities, including therapeutic, recreational, and educational groups  Description: Interventions:  -Encourage Visitation and family involvement in care  Outcome: Progressing  Goal: Complete daily ADLs, including personal hygiene independently, as able  Description: Interventions:  - Observe, teach, and assist patient with ADLS  - Monitor and promote a balance of rest/activity, with adequate nutrition and elimination   Outcome: Progressing     Problem: Ineffective Coping  Goal: Identifies healthy coping skills  Outcome: Progressing  Goal: Demonstrates healthy coping skills  Outcome: Progressing  Goal: Participates in unit activities  Description: Interventions:  - Provide therapeutic environment   - Provide required programming   - Redirect inappropriate behaviors   Outcome: Progressing  Goal: Patient/Family participate in treatment and DC plans  Description: Interventions:  - Provide therapeutic environment  Outcome: Progressing  Goal: Patient/Family verbalizes awareness of resources  Outcome: Progressing     Problem: Risk for Self Injury/Neglect  Goal: Refrain from harming self  Description: Interventions:  - Monitor patient closely, per order  - Develop a trusting relationship  - Supervise medication ingestion, monitor effects and side effects   Outcome: Progressing  Goal: Attend and participate in unit activities, including therapeutic, recreational, and educational groups  Description: Interventions:  - Provide therapeutic and educational activities daily, encourage attendance and participation, and document same in the medical record  - Obtain collateral information, encourage visitation  and family involvement in care   Outcome: Progressing  Goal: Complete daily ADLs, including personal hygiene independently, as able  Description: Interventions:  - Observe, teach, and assist patient with ADLS  - Monitor and promote a balance of rest/activity, with adequate nutrition and elimination  Outcome: Progressing     Problem: Depression  Goal: Refrain from isolation  Description: Interventions:  - Develop a trusting relationship   - Encourage socialization   Outcome: Progressing  Goal: Refrain from self-neglect  Outcome: Progressing  Goal: Attend and participate in unit activities, including therapeutic, recreational, and educational groups  Description: Interventions:  - Provide therapeutic and educational activities daily, encourage attendance and participation, and document same in the medical record   Outcome: Progressing  Goal: Complete daily ADLs, including personal hygiene independently, as able  Description: Interventions:  - Observe, teach, and assist patient with ADLS  -  Monitor and promote a balance of rest/activity, with adequate nutrition and elimination   Outcome: Progressing     Problem: Anxiety  Goal: Anxiety is at manageable level  Description: Interventions:  - Assess and monitor patient's anxiety level.   - Monitor for signs and symptoms (heart palpitations, chest pain, shortness of breath, headaches, nausea, feeling jumpy, restlessness, irritable, apprehensive).   - Collaborate with interdisciplinary team and initiate plan and interventions as ordered.  - [place] patient to unit/surroundings  - Explain treatment plan  - Encourage participation in care  - Encourage verbalization of concerns/fears  - Identify coping mechanisms  - Assist in developing anxiety-reducing skills  - Administer/offer alternative therapies  - Limit or eliminate stimulants  Outcome: Progressing

## 2024-09-07 PROCEDURE — 99232 SBSQ HOSP IP/OBS MODERATE 35: CPT | Performed by: PSYCHIATRY & NEUROLOGY

## 2024-09-07 RX ADMIN — HYDROXYZINE HYDROCHLORIDE 25 MG: 25 TABLET ORAL at 09:16

## 2024-09-07 RX ADMIN — LAMOTRIGINE 50 MG: 25 TABLET ORAL at 09:16

## 2024-09-07 RX ADMIN — CYANOCOBALAMIN TAB 1000 MCG 1000 MCG: 1000 TAB at 09:17

## 2024-09-07 RX ADMIN — HYDROXYZINE HYDROCHLORIDE 25 MG: 25 TABLET ORAL at 17:27

## 2024-09-07 RX ADMIN — CHOLECALCIFEROL TAB 25 MCG (1000 UNIT) 2000 UNITS: 25 TAB at 09:16

## 2024-09-07 RX ADMIN — CARIPRAZINE 3 MG: 3 CAPSULE, GELATIN COATED ORAL at 09:16

## 2024-09-07 RX ADMIN — SERTRALINE HYDROCHLORIDE 150 MG: 100 TABLET ORAL at 21:39

## 2024-09-07 RX ADMIN — ATORVASTATIN CALCIUM 10 MG: 10 TABLET, FILM COATED ORAL at 09:17

## 2024-09-07 RX ADMIN — LISINOPRIL 10 MG: 10 TABLET ORAL at 09:16

## 2024-09-07 RX ADMIN — HYDROXYZINE HYDROCHLORIDE 25 MG: 25 TABLET ORAL at 21:39

## 2024-09-07 NOTE — PROGRESS NOTES
"Progress Note - Behavioral Health   Deyvi Cao 54 y.o. male MRN: [id number]  Unit/Bed#: [facility] 101-02 Encounter: [id number]    Assessment & Plan   Principal Problem:    Bipolar disorder with severe depression (HCC)  Active Problems:    Benign essential hypertension    Mixed hyperlipidemia    Allergic rhinitis due to allergen    Medical clearance for psychiatric admission    [place]  Patient was seen for continuing care and treatment.  Case was discussed with the nursing staff.  On examination today, the patient is seen in his room.  Very pleasant on approach.  Quiet and subdued.  Does converse but not in any length.  He tells me he is doing \"okay \".  He is attending groups.  He is more visible out of his room.  Offering no other new clinical issues or concerns.  No other reports expressed by staff of concern.  Continue current meds and treatment.  Discharge planning and disposition is ongoing.  Per staff report, the patient has been set up for an appointment in approximately a week and a half regarding placement from here.  Behavior over the last 24 hours has been unchanged.  Patient is sleeping well and eating well.  He is offering no complaints regarding medication side effects and no new medical concerns.        Mental Status Evaluation:  Appearance:  age appropriate and casually dressed   Behavior:  Pleasant and cooperative on approach.   Speech:  Offers little in the way of conversation.   Mood:  euthymic   Affect:  constricted   Thought Process:  goal directed   Associations: intact associations   Thought Content:  No overt delusions.   Perceptual Disturbances: None   Risk Potential: Suicidal Ideations none  Homicidal Ideations none  No   Sensorium:  person, place, time/date, and situation   Memory:  recent and remote memory grossly intact   Consciousness:  alert and awake    Attention: attention span and concentration were age appropriate   Insight:  limited   Judgment: limited   Gait/Station: normal " gait/station   Motor Activity: no abnormal movements     Progress Toward Goals: Continue with medications and treatment.  Patient is progressing slowly.  Offering no other new concerns today.    Recommended Treatment: Continue with group therapy, milieu therapy and occupational therapy.      Risks, benefits and possible side effects of Medications:   Risks, benefits, and possible side effects of medications explained to patient and patient verbalizes understanding.      Medications: current meds:   Current Facility-Administered Medications   Medication Dose Route Frequency    acetaminophen (TYLENOL) tablet 650 mg  650 mg Oral Q6H PRN    acetaminophen (TYLENOL) tablet 650 mg  650 mg Oral Q4H PRN    acetaminophen (TYLENOL) tablet 975 mg  975 mg Oral Q6H PRN    aluminum-magnesium hydroxide-simethicone (MAALOX) oral suspension 30 mL  30 mL Oral Q4H PRN    ammonium lactate (LAC-HYDRIN) 12 % lotion   Topical BID PRN    atorvastatin (LIPITOR) tablet 10 mg  10 mg Oral Daily    benztropine (COGENTIN) injection 1 mg  1 mg Intramuscular Q4H PRN Max 6/day    benztropine (COGENTIN) tablet 1 mg  1 mg Oral Q4H PRN Max 6/day    bisacodyl (DULCOLAX) rectal suppository 10 mg  10 mg Rectal Daily PRN    cariprazine (VRAYLAR) capsule 3 mg  3 mg Oral Daily    Cholecalciferol (VITAMIN D3) tablet 2,000 Units  2,000 Units Oral Daily    cyanocobalamin (VITAMIN B-12) tablet 1,000 mcg  1,000 mcg Oral Daily    hydrOXYzine HCL (ATARAX) tablet 25 mg  25 mg Oral Q6H PRN Max 4/day    hydrOXYzine HCL (ATARAX) tablet 25 mg  25 mg Oral TID    hydrOXYzine HCL (ATARAX) tablet 50 mg  50 mg Oral Q4H PRN Max 4/day    Or    LORazepam (ATIVAN) injection 1 mg  1 mg Intramuscular Q4H PRN    lamoTRIgine (LaMICtal) tablet 50 mg  50 mg Oral Daily    lisinopril (ZESTRIL) tablet 10 mg  10 mg Oral Daily    LORazepam (ATIVAN) tablet 1 mg  1 mg Oral Q4H PRN Max 6/day    Or    LORazepam (ATIVAN) injection 2 mg  2 mg Intramuscular Q6H PRN Max 3/day    OLANZapine  (ZyPREXA) tablet 10 mg  10 mg Oral Q3H PRN Max 3/day    Or    OLANZapine (ZyPREXA) IM injection 10 mg  10 mg Intramuscular Q3H PRN Max 3/day    OLANZapine (ZyPREXA) tablet 5 mg  5 mg Oral Q3H PRN Max 6/day    Or    OLANZapine (ZyPREXA) IM injection 5 mg  5 mg Intramuscular Q3H PRN Max 6/day    OLANZapine (ZyPREXA) tablet 2.5 mg  2.5 mg Oral Q3H PRN Max 8/day    polyethylene glycol (MIRALAX) packet 17 g  17 g Oral Daily PRN    propranolol (INDERAL) tablet 10 mg  10 mg Oral Q8H PRN    senna-docusate sodium (SENOKOT S) 8.6-50 mg per tablet 1 tablet  1 tablet Oral Daily PRN    sertraline (ZOLOFT) tablet 150 mg  150 mg Oral HS   .    Labs: I have personally reviewed all pertinent laboratory/tests results.     Counseling / Coordination of Care  Total floor / unit time spent today 30 minutes. Greater than 50% of total time was spent with the patient and / or family counseling and / or coordination of care. A description of the counseling / coordination of care: Medication management, treatment and chart review

## 2024-09-07 NOTE — PLAN OF CARE
Problem: Alteration in Thoughts and Perception  Goal: Treatment Goal: Gain control of psychotic behaviors/thinking, reduce/eliminate presenting symptoms and demonstrate improved reality functioning upon discharge  Outcome: Progressing  Goal: Verbalize thoughts and feelings  Description: Interventions:  - Promote a nonjudgmental and trusting relationship with the patient through active listening and therapeutic communication  - Assess patient's level of functioning, behavior and potential for risk  - Engage patient in 1 on 1 interactions  - Encourage patient to express fears, feelings, frustrations, and discuss symptoms    - [place] patient to reality, help patient recognize reality-based thinking   - Administer medications as ordered and assess for potential side effects  - Provide the patient education related to the signs and symptoms of the illness and desired effects of prescribed medications  Outcome: Progressing  Goal: Refrain from acting on delusional thinking/internal stimuli  Description: Interventions:  - Monitor patient closely, per order   - Utilize least restrictive measures   - Set reasonable limits, give positive feedback for acceptable   - Administer medications as ordered and monitor of potential side effects  Outcome: Progressing  Goal: Agree to be compliant with medication regime, as prescribed and report medication side effects  Description: Interventions:  - Offer appropriate PRN medication and supervise ingestion; conduct AIMS, as needed   Outcome: Progressing  Goal: Attend and participate in unit activities, including therapeutic, recreational, and educational groups  Description: Interventions:  -Encourage Visitation and family involvement in care  Outcome: Progressing  Goal: Recognize dysfunctional thoughts, communicate reality-based thoughts at the time of discharge  Description: Interventions:  - Provide medication and psycho-education to assist patient in compliance and developing  insight into his/her illness   Outcome: Progressing  Goal: Complete daily ADLs, including personal hygiene independently, as able  Description: Interventions:  - Observe, teach, and assist patient with ADLS  - Monitor and promote a balance of rest/activity, with adequate nutrition and elimination   Outcome: Progressing     Problem: Ineffective Coping  Goal: Participates in unit activities  Description: Interventions:  - Provide therapeutic environment   - Provide required programming   - Redirect inappropriate behaviors   Outcome: Progressing  Goal: Patient/Family participate in treatment and DC plans  Description: Interventions:  - Provide therapeutic environment  Outcome: Progressing  Goal: Patient/Family verbalizes awareness of resources  Outcome: Progressing  Goal: Free from restraint events  Description: - Utilize least restrictive measures   - Provide behavioral interventions   - Redirect inappropriate behaviors   Outcome: Progressing     Problem: Risk for Self Injury/Neglect  Goal: Treatment Goal: Remain safe during length of stay, learn and adopt new coping skills, and be free of self-injurious ideation, impulses and acts at the time of discharge  Outcome: Progressing  Goal: Refrain from harming self  Description: Interventions:  - Monitor patient closely, per order  - Develop a trusting relationship  - Supervise medication ingestion, monitor effects and side effects   Outcome: Progressing  Goal: Attend and participate in unit activities, including therapeutic, recreational, and educational groups  Description: Interventions:  - Provide therapeutic and educational activities daily, encourage attendance and participation, and document same in the medical record  - Obtain collateral information, encourage visitation and family involvement in care   Outcome: Progressing  Goal: Recognize maladaptive responses and adopt new coping mechanisms  Outcome: Progressing  Goal: Complete daily ADLs, including personal  hygiene independently, as able  Description: Interventions:  - Observe, teach, and assist patient with ADLS  - Monitor and promote a balance of rest/activity, with adequate nutrition and elimination  Outcome: Progressing     Problem: Risk for Violence/Aggression Toward Others  Goal: Verbalize thoughts and feelings  Description: Interventions:  - Assess and re-assess patient's level of risk, every waking shift  - Engage patient in 1:1 interactions, daily, for a minimum of 15 minutes   - Allow patient to express feelings and frustrations in a safe and non-threatening manner   - Establish rapport/trust with patient   Outcome: Progressing  Goal: Refrain from harming others  Outcome: Progressing  Goal: Refrain from destructive acts on the environment or property  Outcome: Progressing  Goal: Control angry outbursts  Description: Interventions:  - Monitor patient closely, per order  - Ensure early verbal de-escalation  - Monitor prn medication needs  - Set reasonable/therapeutic limits, outline behavioral expectations, and consequences   - Provide a non-threatening milieu, utilizing the least restrictive interventions   Outcome: Progressing  Goal: Attend and participate in unit activities, including therapeutic, recreational, and educational groups  Description: Interventions:  - Provide therapeutic and educational activities daily, encourage attendance and participation, and document same in the medical record   Outcome: Progressing     Problem: Alteration in Orientation  Goal: Treatment Goal: Demonstrate a reduction of confusion and improved orientation to person, place, time and/or situation upon discharge, according to optimum baseline/ability  Outcome: Progressing  Goal: Interact with staff daily  Description: Interventions:  - Assess and re-assess patient's level of orientation  - Engage patient in 1 on 1 interactions, daily, for a minimum of 15 minutes   - Establish rapport/trust with patient   Outcome:  Progressing  Goal: Attend and participate in unit activities, including therapeutic, recreational, and educational groups  Description: Interventions:  - Provide therapeutic and educational activities daily, encourage attendance and participation, and document same in the medical record   - Provide appropriate opportunities for reminiscence   - Provide a consistent daily routine   - Encourage family contact/visitation   Outcome: Progressing     Problem: SELF HARM/SUICIDALITY  Goal: Will have no self-injury during hospital stay  Description: INTERVENTIONS:  - Q 15 MINUTES: Routine safety checks  - Q WAKING SHIFT & PRN: Assess risk to determine if routine checks are adequate to maintain patient safety  - Encourage patient to participate actively in care by formulating a plan to combat response to suicidal ideation, identify supports and resources  Outcome: Progressing     Problem: DEPRESSION  Goal: Will be euthymic at discharge  Description: INTERVENTIONS:  - Administer medication as ordered  - Provide emotional support via 1:1 interaction with staff  - Encourage involvement in milieu/groups/activities  - Monitor for social isolation  Outcome: Progressing     Problem: ANXIETY  Goal: Will report anxiety at manageable levels  Description: INTERVENTIONS:  - Administer medication as ordered  - Teach and encourage coping skills  - Provide emotional support  - Assess patient/family for anxiety and ability to cope  Outcome: Progressing     Problem: SELF CARE DEFICIT  Goal: Return ADL status to a safe level of function  Description: INTERVENTIONS:  - Administer medication as ordered  - Assess ADL deficits and provide assistive devices as needed  - Obtain PT/OT consults as needed  - Assist and instruct patient to increase activity and self care as tolerated  Outcome: Progressing     Problem: DISCHARGE PLANNING - CARE MANAGEMENT  Goal: Discharge to post-acute care or home with appropriate resources  Description:  INTERVENTIONS:  - Conduct assessment to determine patient/family and health care team treatment goals, and need for post-acute services based on payer coverage, community resources, and patient preferences, and barriers to discharge  - Address psychosocial, clinical, and financial barriers to discharge as identified in assessment in conjunction with the patient/family and health care team  - Arrange appropriate level of post-acute services according to patient’s   needs and preference and payer coverage in collaboration with the physician and health care team  - Communicate with and update the patient/family, physician, and health care team regarding progress on the discharge plan  - Arrange appropriate transportation to post-acute venues  Outcome: Progressing

## 2024-09-07 NOTE — NURSING NOTE
Patient is mostly isolative to his room this shift.  Pleasant on approach.  Medication and meal compliant.  Ate 100% of breakfast and 100% of lunch.  Denies depression, anxiety, SI/HI and AH/VH to this writer.  No behavioral issues noted this shift.

## 2024-09-08 VITALS
HEART RATE: 98 BPM | BODY MASS INDEX: 28.1 KG/M2 | TEMPERATURE: 97.8 F | WEIGHT: 207.5 LBS | HEIGHT: 72 IN | OXYGEN SATURATION: 99 % | DIASTOLIC BLOOD PRESSURE: 57 MMHG | SYSTOLIC BLOOD PRESSURE: 127 MMHG | RESPIRATION RATE: 18 BRPM

## 2024-09-08 PROCEDURE — 99232 SBSQ HOSP IP/OBS MODERATE 35: CPT | Performed by: PSYCHIATRY & NEUROLOGY

## 2024-09-08 RX ADMIN — CARIPRAZINE 3 MG: 3 CAPSULE, GELATIN COATED ORAL at 09:19

## 2024-09-08 RX ADMIN — SERTRALINE HYDROCHLORIDE 150 MG: 100 TABLET ORAL at 21:26

## 2024-09-08 RX ADMIN — CYANOCOBALAMIN TAB 1000 MCG 1000 MCG: 1000 TAB at 09:18

## 2024-09-08 RX ADMIN — HYDROXYZINE HYDROCHLORIDE 25 MG: 25 TABLET ORAL at 21:25

## 2024-09-08 RX ADMIN — LAMOTRIGINE 50 MG: 25 TABLET ORAL at 09:19

## 2024-09-08 RX ADMIN — CHOLECALCIFEROL TAB 25 MCG (1000 UNIT) 2000 UNITS: 25 TAB at 09:19

## 2024-09-08 RX ADMIN — HYDROXYZINE HYDROCHLORIDE 25 MG: 25 TABLET ORAL at 09:19

## 2024-09-08 RX ADMIN — ATORVASTATIN CALCIUM 10 MG: 10 TABLET, FILM COATED ORAL at 09:19

## 2024-09-08 RX ADMIN — HYDROXYZINE HYDROCHLORIDE 25 MG: 25 TABLET ORAL at 17:30

## 2024-09-08 RX ADMIN — LISINOPRIL 10 MG: 10 TABLET ORAL at 09:18

## 2024-09-08 NOTE — NURSING NOTE
Patient is pleasant and cooperative , with poor concentration. His behaviors and attitude is warm and friendly. Patient has clean appearance with worried affect. Good medication compliance. Impulsive behaviors not  observed. . Denies Si,HI. A,V/H.

## 2024-09-08 NOTE — PLAN OF CARE
Problem: Alteration in Thoughts and Perception  Goal: Treatment Goal: Gain control of psychotic behaviors/thinking, reduce/eliminate presenting symptoms and demonstrate improved reality functioning upon discharge  Outcome: Progressing  Goal: Verbalize thoughts and feelings  Description: Interventions:  - Promote a nonjudgmental and trusting relationship with the patient through active listening and therapeutic communication  - Assess patient's level of functioning, behavior and potential for risk  - Engage patient in 1 on 1 interactions  - Encourage patient to express fears, feelings, frustrations, and discuss symptoms    - [place] patient to reality, help patient recognize reality-based thinking   - Administer medications as ordered and assess for potential side effects  - Provide the patient education related to the signs and symptoms of the illness and desired effects of prescribed medications  Outcome: Progressing  Goal: Refrain from acting on delusional thinking/internal stimuli  Description: Interventions:  - Monitor patient closely, per order   - Utilize least restrictive measures   - Set reasonable limits, give positive feedback for acceptable   - Administer medications as ordered and monitor of potential side effects  Outcome: Progressing  Goal: Agree to be compliant with medication regime, as prescribed and report medication side effects  Description: Interventions:  - Offer appropriate PRN medication and supervise ingestion; conduct AIMS, as needed   Outcome: Progressing  Goal: Attend and participate in unit activities, including therapeutic, recreational, and educational groups  Description: Interventions:  -Encourage Visitation and family involvement in care  Outcome: Progressing  Goal: Recognize dysfunctional thoughts, communicate reality-based thoughts at the time of discharge  Description: Interventions:  - Provide medication and psycho-education to assist patient in compliance and developing  insight into his/her illness   Outcome: Progressing  Goal: Complete daily ADLs, including personal hygiene independently, as able  Description: Interventions:  - Observe, teach, and assist patient with ADLS  - Monitor and promote a balance of rest/activity, with adequate nutrition and elimination   Outcome: Progressing     Problem: Ineffective Coping  Goal: Identifies ineffective coping skills  Outcome: Progressing  Goal: Identifies healthy coping skills  Outcome: Progressing  Goal: Demonstrates healthy coping skills  Outcome: Progressing  Goal: Participates in unit activities  Description: Interventions:  - Provide therapeutic environment   - Provide required programming   - Redirect inappropriate behaviors   Outcome: Progressing  Goal: Patient/Family participate in treatment and DC plans  Description: Interventions:  - Provide therapeutic environment  Outcome: Progressing  Goal: Patient/Family verbalizes awareness of resources  Outcome: Progressing  Goal: Understands least restrictive measures  Description: Interventions:  - Utilize least restrictive behavior  Outcome: Progressing  Goal: Free from restraint events  Description: - Utilize least restrictive measures   - Provide behavioral interventions   - Redirect inappropriate behaviors   Outcome: Progressing     Problem: Risk for Self Injury/Neglect  Goal: Treatment Goal: Remain safe during length of stay, learn and adopt new coping skills, and be free of self-injurious ideation, impulses and acts at the time of discharge  Outcome: Progressing  Goal: Verbalize thoughts and feelings  Description: Interventions:  - Assess and re-assess patient's lethality and potential for self-injury  - Engage patient in 1:1 interactions, daily, for a minimum of 15 minutes  - Encourage patient to express feelings, fears, frustrations, hopes  - Establish rapport/trust with patient   Outcome: Progressing  Goal: Refrain from harming self  Description: Interventions:  - Monitor  patient closely, per order  - Develop a trusting relationship  - Supervise medication ingestion, monitor effects and side effects   Outcome: Progressing  Goal: Attend and participate in unit activities, including therapeutic, recreational, and educational groups  Description: Interventions:  - Provide therapeutic and educational activities daily, encourage attendance and participation, and document same in the medical record  - Obtain collateral information, encourage visitation and family involvement in care   Outcome: Progressing  Goal: Recognize maladaptive responses and adopt new coping mechanisms  Outcome: Progressing  Goal: Complete daily ADLs, including personal hygiene independently, as able  Description: Interventions:  - Observe, teach, and assist patient with ADLS  - Monitor and promote a balance of rest/activity, with adequate nutrition and elimination  Outcome: Progressing     Problem: Depression  Goal: Treatment Goal: Demonstrate behavioral control of depressive symptoms, verbalize feelings of improved mood/affect, and adopt new coping skills prior to discharge  Outcome: Progressing  Goal: Verbalize thoughts and feelings  Description: Interventions:  - Assess and re-assess patient's level of risk   - Engage patient in 1:1 interactions, daily, for a minimum of 15 minutes   - Encourage patient to express feelings, fears, frustrations, hopes   Outcome: Progressing  Goal: Refrain from harming self  Description: Interventions:  - Monitor patient closely, per order   - Supervise medication ingestion, monitor effects and side effects   Outcome: Progressing  Goal: Refrain from isolation  Description: Interventions:  - Develop a trusting relationship   - Encourage socialization   Outcome: Progressing  Goal: Refrain from self-neglect  Outcome: Progressing  Goal: Attend and participate in unit activities, including therapeutic, recreational, and educational groups  Description: Interventions:  - Provide  therapeutic and educational activities daily, encourage attendance and participation, and document same in the medical record   Outcome: Progressing  Goal: Complete daily ADLs, including personal hygiene independently, as able  Description: Interventions:  - Observe, teach, and assist patient with ADLS  -  Monitor and promote a balance of rest/activity, with adequate nutrition and elimination   Outcome: Progressing     Problem: Anxiety  Goal: Anxiety is at manageable level  Description: Interventions:  - Assess and monitor patient's anxiety level.   - Monitor for signs and symptoms (heart palpitations, chest pain, shortness of breath, headaches, nausea, feeling jumpy, restlessness, irritable, apprehensive).   - Collaborate with interdisciplinary team and initiate plan and interventions as ordered.  - [place] patient to unit/surroundings  - Explain treatment plan  - Encourage participation in care  - Encourage verbalization of concerns/fears  - Identify coping mechanisms  - Assist in developing anxiety-reducing skills  - Administer/offer alternative therapies  - Limit or eliminate stimulants  Outcome: Progressing     Problem: Risk for Violence/Aggression Toward Others  Goal: Treatment Goal: Refrain from acts of violence/aggression during length of stay, and demonstrate improved impulse control at the time of discharge  Outcome: Progressing  Goal: Verbalize thoughts and feelings  Description: Interventions:  - Assess and re-assess patient's level of risk, every waking shift  - Engage patient in 1:1 interactions, daily, for a minimum of 15 minutes   - Allow patient to express feelings and frustrations in a safe and non-threatening manner   - Establish rapport/trust with patient   Outcome: Progressing  Goal: Refrain from harming others  Outcome: Progressing  Goal: Refrain from destructive acts on the environment or property  Outcome: Progressing  Goal: Control angry outbursts  Description: Interventions:  - Monitor  patient closely, per order  - Ensure early verbal de-escalation  - Monitor prn medication needs  - Set reasonable/therapeutic limits, outline behavioral expectations, and consequences   - Provide a non-threatening milieu, utilizing the least restrictive interventions   Outcome: Progressing  Goal: Attend and participate in unit activities, including therapeutic, recreational, and educational groups  Description: Interventions:  - Provide therapeutic and educational activities daily, encourage attendance and participation, and document same in the medical record   Outcome: Progressing  Goal: Identify appropriate positive anger management techniques  Description: Interventions:  - Offer anger management and coping skills groups   - Staff will provide positive feedback for appropriate anger control  Outcome: Progressing     Problem: Alteration in Orientation  Goal: Treatment Goal: Demonstrate a reduction of confusion and improved orientation to person, place, time and/or situation upon discharge, according to optimum baseline/ability  Outcome: Progressing  Goal: Interact with staff daily  Description: Interventions:  - Assess and re-assess patient's level of orientation  - Engage patient in 1 on 1 interactions, daily, for a minimum of 15 minutes   - Establish rapport/trust with patient   Outcome: Progressing  Goal: Express concerns related to confused thinking related to:  Description: Interventions:  - Encourage patient to express feelings, fears, frustrations, hopes  - Assign consistent caregivers   - Rule/re-orient patient as needed  - Allow comfort items, as appropriate  - Provide visual cues, signs, etc.   Outcome: Progressing  Goal: Allow medical examinations, as recommended  Description: Interventions:  - Provide physical/neurological exams and/or referrals, per provider   Outcome: Progressing  Goal: Cooperate with recommended testing/procedures  Description: Interventions:  - Determine need for ancillary  testing  - Observe for mental status changes  - Implement falls/precaution protocol   Outcome: Progressing  Goal: Attend and participate in unit activities, including therapeutic, recreational, and educational groups  Description: Interventions:  - Provide therapeutic and educational activities daily, encourage attendance and participation, and document same in the medical record   - Provide appropriate opportunities for reminiscence   - Provide a consistent daily routine   - Encourage family contact/visitation   Outcome: Progressing  Goal: Complete daily ADLs, including personal hygiene independently, as able  Description: Interventions:  - Observe, teach, and assist patient with ADLS  Outcome: Progressing     Problem: SELF HARM/SUICIDALITY  Goal: Will have no self-injury during hospital stay  Description: INTERVENTIONS:  - Q 15 MINUTES: Routine safety checks  - Q WAKING SHIFT & PRN: Assess risk to determine if routine checks are adequate to maintain patient safety  - Encourage patient to participate actively in care by formulating a plan to combat response to suicidal ideation, identify supports and resources  Outcome: Progressing     Problem: DEPRESSION  Goal: Will be euthymic at discharge  Description: INTERVENTIONS:  - Administer medication as ordered  - Provide emotional support via 1:1 interaction with staff  - Encourage involvement in milieu/groups/activities  - Monitor for social isolation  Outcome: Progressing     Problem: ANXIETY  Goal: Will report anxiety at manageable levels  Description: INTERVENTIONS:  - Administer medication as ordered  - Teach and encourage coping skills  - Provide emotional support  - Assess patient/family for anxiety and ability to cope  Outcome: Progressing  Goal: By discharge: Patient will verbalize 2 strategies to deal with anxiety  Description: Interventions:  - Identify any obvious source/trigger to anxiety  - Staff will assist patient in applying identified coping  technique/skills  - Encourage attendance of scheduled groups and activities  Outcome: Progressing     Problem: SELF CARE DEFICIT  Goal: Return ADL status to a safe level of function  Description: INTERVENTIONS:  - Administer medication as ordered  - Assess ADL deficits and provide assistive devices as needed  - Obtain PT/OT consults as needed  - Assist and instruct patient to increase activity and self care as tolerated  Outcome: Progressing     Problem: DISCHARGE PLANNING - CARE MANAGEMENT  Goal: Discharge to post-acute care or home with appropriate resources  Description: INTERVENTIONS:  - Conduct assessment to determine patient/family and health care team treatment goals, and need for post-acute services based on payer coverage, community resources, and patient preferences, and barriers to discharge  - Address psychosocial, clinical, and financial barriers to discharge as identified in assessment in conjunction with the patient/family and health care team  - Arrange appropriate level of post-acute services according to patient’s   needs and preference and payer coverage in collaboration with the physician and health care team  - Communicate with and update the patient/family, physician, and health care team regarding progress on the discharge plan  - Arrange appropriate transportation to post-acute venues  Outcome: Progressing

## 2024-09-08 NOTE — PROGRESS NOTES
"Progress Note - Behavioral Health   Deyvi Cao 54 y.o. male MRN: [id number]  Unit/Bed#: [facility] 101-02 Encounter: [id number]    Assessment & Plan   Principal Problem:    Bipolar disorder with severe depression (HCC)  Active Problems:    Benign essential hypertension    Mixed hyperlipidemia    Allergic rhinitis due to allergen    Medical clearance for psychiatric admission    Rosacea    Patient was seen for continuing care and treatment.  Case was discussed with the nursing staff.  On examination today, the patient is seen in his room.  He is pleasant on approach.  He tells me he is doing \"okay \".  No other new clinical issues or concerns were expressed by patient or staff.  Behavior over last 24 hours has been unchanged.  Patient is sleeping well and eating well and reporting no side effects to medication.  No new medical concerns.      Mental Status Evaluation:  Appearance:  age appropriate and casually dressed   Behavior:  Cooperative   Speech:  normal pitch and normal volume   Mood:  euthymic   Affect:  constricted   Thought Process:  goal directed   Associations: intact associations   Thought Content:  No overt delusions   Perceptual Disturbances: None   Risk Potential: Suicidal Ideations none  Homicidal Ideations none  Potential for Aggression No   Sensorium:  person, place, time/date, and situation   Memory:  recent and remote memory grossly intact   Consciousness:  alert and awake    Attention: attention span and concentration were age appropriate   Insight:  limited   Judgment: limited   Gait/Station: normal gait/station   Motor Activity: no abnormal movements     Progress Toward Goals: Mood is stable.  Patient is progressing slowly.    Recommended Treatment: Continue with group therapy, milieu therapy and occupational therapy.      Risks, benefits and possible side effects of Medications:   Risks, benefits, and possible side effects of medications explained to patient and patient verbalizes understanding.  "     Medications: current meds:   Current Facility-Administered Medications   Medication Dose Route Frequency    acetaminophen (TYLENOL) tablet 650 mg  650 mg Oral Q6H PRN    acetaminophen (TYLENOL) tablet 650 mg  650 mg Oral Q4H PRN    acetaminophen (TYLENOL) tablet 975 mg  975 mg Oral Q6H PRN    aluminum-magnesium hydroxide-simethicone (MAALOX) oral suspension 30 mL  30 mL Oral Q4H PRN    ammonium lactate (LAC-HYDRIN) 12 % lotion   Topical BID PRN    atorvastatin (LIPITOR) tablet 10 mg  10 mg Oral Daily    benztropine (COGENTIN) injection 1 mg  1 mg Intramuscular Q4H PRN Max 6/day    benztropine (COGENTIN) tablet 1 mg  1 mg Oral Q4H PRN Max 6/day    bisacodyl (DULCOLAX) rectal suppository 10 mg  10 mg Rectal Daily PRN    cariprazine (VRAYLAR) capsule 3 mg  3 mg Oral Daily    Cholecalciferol (VITAMIN D3) tablet 2,000 Units  2,000 Units Oral Daily    cyanocobalamin (VITAMIN B-12) tablet 1,000 mcg  1,000 mcg Oral Daily    hydrOXYzine HCL (ATARAX) tablet 25 mg  25 mg Oral Q6H PRN Max 4/day    hydrOXYzine HCL (ATARAX) tablet 25 mg  25 mg Oral TID    hydrOXYzine HCL (ATARAX) tablet 50 mg  50 mg Oral Q4H PRN Max 4/day    Or    LORazepam (ATIVAN) injection 1 mg  1 mg Intramuscular Q4H PRN    lamoTRIgine (LaMICtal) tablet 50 mg  50 mg Oral Daily    lisinopril (ZESTRIL) tablet 10 mg  10 mg Oral Daily    LORazepam (ATIVAN) tablet 1 mg  1 mg Oral Q4H PRN Max 6/day    Or    LORazepam (ATIVAN) injection 2 mg  2 mg Intramuscular Q6H PRN Max 3/day    OLANZapine (ZyPREXA) tablet 10 mg  10 mg Oral Q3H PRN Max 3/day    Or    OLANZapine (ZyPREXA) IM injection 10 mg  10 mg Intramuscular Q3H PRN Max 3/day    OLANZapine (ZyPREXA) tablet 5 mg  5 mg Oral Q3H PRN Max 6/day    Or    OLANZapine (ZyPREXA) IM injection 5 mg  5 mg Intramuscular Q3H PRN Max 6/day    OLANZapine (ZyPREXA) tablet 2.5 mg  2.5 mg Oral Q3H PRN Max 8/day    polyethylene glycol (MIRALAX) packet 17 g  17 g Oral Daily PRN    propranolol (INDERAL) tablet 10 mg  10 mg Oral  Q8H PRN    senna-docusate sodium (SENOKOT S) 8.6-50 mg per tablet 1 tablet  1 tablet Oral Daily PRN    sertraline (ZOLOFT) tablet 150 mg  150 mg Oral HS   .    Labs: I have personally reviewed all pertinent laboratory/tests results. Most Recent Labs:   Lab Results   Component Value Date    WBC 7.39 06/26/2024    RBC 4.70 06/26/2024    HGB 15.2 06/26/2024    HCT 45.5 06/26/2024     06/26/2024    RDW 12.9 06/26/2024    NEUTROABS 4.63 06/26/2024    SODIUM 137 06/26/2024    K 4.1 06/26/2024     06/26/2024    CO2 26 06/26/2024    BUN 17 06/26/2024    CREATININE 0.63 06/26/2024    GLUC 98 06/26/2024    GLUF 98 06/26/2024    CALCIUM 9.6 06/26/2024    AST 25 06/26/2024    ALT 45 06/26/2024    ALKPHOS 114 (H) 06/26/2024    TP 7.0 06/26/2024    ALB 4.4 06/26/2024    TBILI 0.44 06/26/2024    CHOLESTEROL 177 06/26/2024    HDL 52 06/26/2024    TRIG 73 06/26/2024    LDLCALC 110 (H) 06/26/2024    NONHDLC 125 06/26/2024    LITHIUM 0.4 (L) 01/28/2021    CDK6NWAONCHT 2.636 06/26/2024    HGBA1C 5.2 11/22/2023     11/22/2023       Counseling / Coordination of Care  Total floor / unit time spent today 30 minutes. Greater than 50% of total time was spent with the patient and / or family counseling and / or coordination of care. A description of the counseling / coordination of care: Medication management, treatment and chart review

## 2024-09-08 NOTE — NURSING NOTE
Pt is present on the milieu intermittently. Able to make needs known. Minimal peer interaction. Took his medications without incidence. Pt is pleasant, polite, and cooperative. Denied all psychiatric symptoms. Pt offered no complaints. No behavioral issues.

## 2024-09-09 PROCEDURE — 99232 SBSQ HOSP IP/OBS MODERATE 35: CPT | Performed by: PSYCHIATRY & NEUROLOGY

## 2024-09-09 RX ADMIN — HYDROXYZINE HYDROCHLORIDE 25 MG: 25 TABLET ORAL at 17:02

## 2024-09-09 RX ADMIN — SERTRALINE HYDROCHLORIDE 150 MG: 100 TABLET ORAL at 21:17

## 2024-09-09 RX ADMIN — CYANOCOBALAMIN TAB 1000 MCG 1000 MCG: 1000 TAB at 08:54

## 2024-09-09 RX ADMIN — CARIPRAZINE 3 MG: 3 CAPSULE, GELATIN COATED ORAL at 08:54

## 2024-09-09 RX ADMIN — HYDROXYZINE HYDROCHLORIDE 25 MG: 25 TABLET ORAL at 08:54

## 2024-09-09 RX ADMIN — ATORVASTATIN CALCIUM 10 MG: 10 TABLET, FILM COATED ORAL at 08:54

## 2024-09-09 RX ADMIN — CHOLECALCIFEROL TAB 25 MCG (1000 UNIT) 2000 UNITS: 25 TAB at 08:54

## 2024-09-09 RX ADMIN — LAMOTRIGINE 50 MG: 25 TABLET ORAL at 08:53

## 2024-09-09 RX ADMIN — HYDROXYZINE HYDROCHLORIDE 25 MG: 25 TABLET ORAL at 21:17

## 2024-09-09 RX ADMIN — LISINOPRIL 10 MG: 10 TABLET ORAL at 08:54

## 2024-09-09 NOTE — PROGRESS NOTES
09/09/24 0727   Team Meeting   Meeting Type Daily Rounds   Team Members Present   Team Members Present Physician;Nurse;;Other (Discipline and Name)   Patient/Family Present   Patient Present No   Patient's Family Present No     In attendance:  Dr. Alex Thomas, MD Dr. Jordan Holter, DO Pedro Stafford, PATAZ Haywood, RN  Judi Garcia, [facility]  Mer Sales, [facility]  RODDY ElmoreSAntwan    Groups: 6/9    Pt did not need any PRNS. No bx issues noted. Pt CRR interview 9/17; ACT intake 9/24.

## 2024-09-09 NOTE — PROGRESS NOTES
"Progress Note - Behavioral Health     Deyvi Cao 54 y.o. male MRN: [id number]   Unit/Bed#: [facility] 101-02 Encounter: [id number]    Behavior over the last 24 hours: unchanged.     Deyvi is seen today for psychiatric follow up. Per nursing notes, patient pleasant and cooperative, adequately groomed, med compliant, denying psych symptoms, isolative to room/self.  Patient remains in behavioral control. No psych prns in last 24 hours.     Today Deyvi remains withdrawn/isolative to self, noting he is typically isolative to self at baseline. Denies any issues/concerns over the weekend/today. Appears constricted and dysphoric, however reports he is doing \"okay.\" Denies depression and anxiety when asked, however notes he is a bit frustrated with waiting for placement. Denies suicidal and homicidal ideations. Attends groups. Minimally interactive with peers. Superficially cooperative, somewhat scant in conversation, just answering questions without further comments. Reports adequate sleep/appetite.    Denies medication side effects when asked. Attended 6/9 groups on Friday. Josiah's CRR assessment will be 09/17 at 11 am. Awaiting placement.    Sleep: normal  Appetite: normal  Medication side effects: No   ROS: all other systems are negative    Mental Status Evaluation:    Appearance:  age appropriate, casually dressed, adequate grooming, looks stated age   Behavior:  calm, superficially cooperative/somewhat guarded   Speech:  normal rate, scant, soft   Mood:  euthymic   Affect:  constricted, appears dysphoric    Thought Process:  goal directed, linear   Associations: intact associations   Thought Content:  no overt delusions   Perceptual Disturbances: denies auditory hallucinations when asked, does not appear responding to internal stimuli, denies visual hallucinations when asked   Risk Potential: Suicidal ideation - None at present, would talk to staff if not feeling safe on the unit  Homicidal ideation - None at " present  Potential for aggression - No   Sensorium:  oriented to person, place, time/date, and situation   Memory:  recent and remote memory grossly intact   Consciousness:  alert and awake   Attention/Concentration: attention span and concentration are age appropriate   Insight:  limited   Judgment: limited   Gait/Station: normal gait/station   Motor Activity: no abnormal movements     Vital signs in last 24 hours:    Temp:  [97.7 °F (36.5 °C)-97.8 °F (36.6 °C)] 97.7 °F (36.5 °C)  HR:  [] 101  Resp:  [18] 18  BP: (127-134)/(57-83) 134/83    Laboratory results: I have personally reviewed all pertinent laboratory/tests results    Results from the past 24 hours: No results found for this or any previous visit (from the past 24 hour(s)).    Progress Toward Goals: progressing, reports anxiety and depression at baseline, appears dysphoric, constricted affect, denies psych symptoms but notes frustration with waiting for placement. Attends groups, adequate sleep/appetite.    Assessment & Plan   Principal Problem:    Bipolar disorder with severe depression (HCC)  Active Problems:    Benign essential hypertension    Mixed hyperlipidemia    Allergic rhinitis due to allergen    Medical clearance for psychiatric admission    Tracey      Recommended Treatment:     Planned medication and treatment changes:    All current active medications have been reviewed  Encourage group therapy, milieu therapy and occupational therapy  Behavioral Health checks every 7 minutes  Medical management per SLIM as indicated.  Continue current medications.  Discharge disposition ongoing, Josiah assessment 9/17 at 11am  Awaiting placement.    Current Facility-Administered Medications   Medication Dose Route Frequency Provider Last Rate    acetaminophen  650 mg Oral Q6H PRN ALVINA Harley      acetaminophen  650 mg Oral Q4H PRN ALVINA Harley      acetaminophen  975 mg Oral Q6H PRN ALVINA Harley      aluminum-magnesium  hydroxide-simethicone  30 mL Oral Q4H PRN ALVINA Harley      ammonium lactate   Topical BID PRN ALVINA Harley      atorvastatin  10 mg Oral Daily Sary ALVINA Gupta      benztropine  1 mg Intramuscular Q4H PRN Max 6/day ALVINA Harley      benztropine  1 mg Oral Q4H PRN Max 6/day ALVINA Harley      bisacodyl  10 mg Rectal Daily PRN ALVINA Harley      cariprazine  3 mg Oral Daily Martin Cardenas MD      Cholecalciferol  2,000 Units Oral Daily Sary ALVINA Gupta      cyanocobalamin  1,000 mcg Oral Daily ALVINA Lewis      hydrOXYzine HCL  25 mg Oral Q6H PRN Max 4/day ALVINA Harley      hydrOXYzine HCL  25 mg Oral TID Martin Cardenas MD      hydrOXYzine HCL  50 mg Oral Q4H PRN Max 4/day ALVINA Harley      Or    LORazepam  1 mg Intramuscular Q4H PRN ALVINA Harley      lamoTRIgine  50 mg Oral Daily Martin Cardenas MD      lisinopril  10 mg Oral Daily ALVINA Lewis      LORazepam  1 mg Oral Q4H PRN Max 6/day ALVINA aHrley      Or    LORazepam  2 mg Intramuscular Q6H PRN Max 3/day ALVINA Harley      OLANZapine  10 mg Oral Q3H PRN Max 3/day ALVINA Harley      Or    OLANZapine  10 mg Intramuscular Q3H PRN Max 3/day ALVINA Harley      OLANZapine  5 mg Oral Q3H PRN Max 6/day ALVINA Harley      Or    OLANZapine  5 mg Intramuscular Q3H PRN Max 6/day ALVINA Harley      OLANZapine  2.5 mg Oral Q3H PRN Max 8/day ALVINA Harley      polyethylene glycol  17 g Oral Daily PRN ALVINA Harley      propranolol  10 mg Oral Q8H PRN ALVINA Harley      senna-docusate sodium  1 tablet Oral Daily PRN ALVINA Harley      sertraline  150 mg Oral HS Martin Cardenas MD       Risks / Benefits of Treatment:    Risks, benefits, and possible side effects of medications explained to patient and patient verbalizes understanding and agreement for treatment.    Counseling / Coordination of Care:    Patient's progress discussed  with staff in treatment team meeting.  Medications, treatment progress and treatment plan reviewed with patient.  Medication education provided to patient.  Educated on importance of medication and treatment compliance.  Reassurance and supportive therapy provided.    Pedro Stafford PA-C 09/09/24

## 2024-09-09 NOTE — NURSING NOTE
"Pt is accepting of medications without incidence and meal compliant. Pt is visible in the milieu for meals and select groups. Pt denies s/s currently stating \"I'm okay\". No new concerns.   "

## 2024-09-09 NOTE — NURSING NOTE
Pt is visible on the unit, playing cards with peers. Consumed 100% of dinner. Took medications without incidence. Pt is polite and cooperative. Attended 4/9 groups. Denies anxiety, depression, and SI/HI/AVH. No behavioral issues. Pt offers no complaints. Continuous safety checks maintained.

## 2024-09-10 PROBLEM — Z00.8 MEDICAL CLEARANCE FOR PSYCHIATRIC ADMISSION: Status: RESOLVED | Noted: 2020-05-27 | Resolved: 2024-09-10

## 2024-09-10 PROCEDURE — 99232 SBSQ HOSP IP/OBS MODERATE 35: CPT

## 2024-09-10 RX ADMIN — HYDROXYZINE HYDROCHLORIDE 25 MG: 25 TABLET ORAL at 17:04

## 2024-09-10 RX ADMIN — CARIPRAZINE 3 MG: 3 CAPSULE, GELATIN COATED ORAL at 08:24

## 2024-09-10 RX ADMIN — ATORVASTATIN CALCIUM 10 MG: 10 TABLET, FILM COATED ORAL at 08:24

## 2024-09-10 RX ADMIN — HYDROXYZINE HYDROCHLORIDE 25 MG: 25 TABLET ORAL at 08:24

## 2024-09-10 RX ADMIN — CHOLECALCIFEROL TAB 25 MCG (1000 UNIT) 2000 UNITS: 25 TAB at 08:24

## 2024-09-10 RX ADMIN — CYANOCOBALAMIN TAB 1000 MCG 1000 MCG: 1000 TAB at 08:24

## 2024-09-10 RX ADMIN — SERTRALINE HYDROCHLORIDE 150 MG: 100 TABLET ORAL at 21:12

## 2024-09-10 RX ADMIN — LAMOTRIGINE 50 MG: 25 TABLET ORAL at 08:24

## 2024-09-10 RX ADMIN — HYDROXYZINE HYDROCHLORIDE 25 MG: 25 TABLET ORAL at 21:12

## 2024-09-10 RX ADMIN — LISINOPRIL 10 MG: 10 TABLET ORAL at 08:24

## 2024-09-10 NOTE — PLAN OF CARE
Problem: Alteration in Thoughts and Perception  Goal: Treatment Goal: Gain control of psychotic behaviors/thinking, reduce/eliminate presenting symptoms and demonstrate improved reality functioning upon discharge  Outcome: Progressing  Goal: Verbalize thoughts and feelings  Description: Interventions:  - Promote a nonjudgmental and trusting relationship with the patient through active listening and therapeutic communication  - Assess patient's level of functioning, behavior and potential for risk  - Engage patient in 1 on 1 interactions  - Encourage patient to express fears, feelings, frustrations, and discuss symptoms    - [place] patient to reality, help patient recognize reality-based thinking   - Administer medications as ordered and assess for potential side effects  - Provide the patient education related to the signs and symptoms of the illness and desired effects of prescribed medications  Outcome: Progressing  Goal: Refrain from acting on delusional thinking/internal stimuli  Description: Interventions:  - Monitor patient closely, per order   - Utilize least restrictive measures   - Set reasonable limits, give positive feedback for acceptable   - Administer medications as ordered and monitor of potential side effects  Outcome: Progressing  Goal: Agree to be compliant with medication regime, as prescribed and report medication side effects  Description: Interventions:  - Offer appropriate PRN medication and supervise ingestion; conduct AIMS, as needed   Outcome: Progressing  Goal: Attend and participate in unit activities, including therapeutic, recreational, and educational groups  Description: Interventions:  -Encourage Visitation and family involvement in care  Outcome: Progressing  Goal: Complete daily ADLs, including personal hygiene independently, as able  Description: Interventions:  - Observe, teach, and assist patient with ADLS  - Monitor and promote a balance of rest/activity, with adequate  nutrition and elimination   Outcome: Progressing     Problem: Ineffective Coping  Goal: Participates in unit activities  Description: Interventions:  - Provide therapeutic environment   - Provide required programming   - Redirect inappropriate behaviors   Outcome: Progressing  Goal: Patient/Family participate in treatment and DC plans  Description: Interventions:  - Provide therapeutic environment  Outcome: Progressing     Problem: Risk for Self Injury/Neglect  Goal: Treatment Goal: Remain safe during length of stay, learn and adopt new coping skills, and be free of self-injurious ideation, impulses and acts at the time of discharge  Outcome: Progressing  Goal: Verbalize thoughts and feelings  Description: Interventions:  - Assess and re-assess patient's lethality and potential for self-injury  - Engage patient in 1:1 interactions, daily, for a minimum of 15 minutes  - Encourage patient to express feelings, fears, frustrations, hopes  - Establish rapport/trust with patient   Outcome: Progressing  Goal: Refrain from harming self  Description: Interventions:  - Monitor patient closely, per order  - Develop a trusting relationship  - Supervise medication ingestion, monitor effects and side effects   Outcome: Progressing     Problem: Depression  Goal: Treatment Goal: Demonstrate behavioral control of depressive symptoms, verbalize feelings of improved mood/affect, and adopt new coping skills prior to discharge  Outcome: Progressing  Goal: Refrain from harming self  Description: Interventions:  - Monitor patient closely, per order   - Supervise medication ingestion, monitor effects and side effects   Outcome: Progressing  Goal: Refrain from isolation  Description: Interventions:  - Develop a trusting relationship   - Encourage socialization   Outcome: Progressing     Problem: Anxiety  Goal: Anxiety is at manageable level  Description: Interventions:  - Assess and monitor patient's anxiety level.   - Monitor for signs and  symptoms (heart palpitations, chest pain, shortness of breath, headaches, nausea, feeling jumpy, restlessness, irritable, apprehensive).   - Collaborate with interdisciplinary team and initiate plan and interventions as ordered.  - [place] patient to unit/surroundings  - Explain treatment plan  - Encourage participation in care  - Encourage verbalization of concerns/fears  - Identify coping mechanisms  - Assist in developing anxiety-reducing skills  - Administer/offer alternative therapies  - Limit or eliminate stimulants  Outcome: Progressing     Problem: Risk for Violence/Aggression Toward Others  Goal: Refrain from harming others  Outcome: Progressing  Goal: Control angry outbursts  Description: Interventions:  - Monitor patient closely, per order  - Ensure early verbal de-escalation  - Monitor prn medication needs  - Set reasonable/therapeutic limits, outline behavioral expectations, and consequences   - Provide a non-threatening milieu, utilizing the least restrictive interventions   Outcome: Progressing     Problem: Alteration in Orientation  Goal: Treatment Goal: Demonstrate a reduction of confusion and improved orientation to person, place, time and/or situation upon discharge, according to optimum baseline/ability  Outcome: Progressing  Goal: Interact with staff daily  Description: Interventions:  - Assess and re-assess patient's level of orientation  - Engage patient in 1 on 1 interactions, daily, for a minimum of 15 minutes   - Establish rapport/trust with patient   Outcome: Progressing  Goal: Attend and participate in unit activities, including therapeutic, recreational, and educational groups  Description: Interventions:  - Provide therapeutic and educational activities daily, encourage attendance and participation, and document same in the medical record   - Provide appropriate opportunities for reminiscence   - Provide a consistent daily routine   - Encourage family contact/visitation   Outcome:  Progressing     Problem: SELF HARM/SUICIDALITY  Goal: Will have no self-injury during hospital stay  Description: INTERVENTIONS:  - Q 15 MINUTES: Routine safety checks  - Q WAKING SHIFT & PRN: Assess risk to determine if routine checks are adequate to maintain patient safety  - Encourage patient to participate actively in care by formulating a plan to combat response to suicidal ideation, identify supports and resources  Outcome: Progressing     Problem: DEPRESSION  Goal: Will be euthymic at discharge  Description: INTERVENTIONS:  - Administer medication as ordered  - Provide emotional support via 1:1 interaction with staff  - Encourage involvement in milieu/groups/activities  - Monitor for social isolation  Outcome: Progressing     Problem: ANXIETY  Goal: Will report anxiety at manageable levels  Description: INTERVENTIONS:  - Administer medication as ordered  - Teach and encourage coping skills  - Provide emotional support  - Assess patient/family for anxiety and ability to cope  Outcome: Progressing     Problem: SELF CARE DEFICIT  Goal: Return ADL status to a safe level of function  Description: INTERVENTIONS:  - Administer medication as ordered  - Assess ADL deficits and provide assistive devices as needed  - Obtain PT/OT consults as needed  - Assist and instruct patient to increase activity and self care as tolerated  Outcome: Progressing     Problem: DISCHARGE PLANNING - CARE MANAGEMENT  Goal: Discharge to post-acute care or home with appropriate resources  Description: INTERVENTIONS:  - Conduct assessment to determine patient/family and health care team treatment goals, and need for post-acute services based on payer coverage, community resources, and patient preferences, and barriers to discharge  - Address psychosocial, clinical, and financial barriers to discharge as identified in assessment in conjunction with the patient/family and health care team  - Arrange appropriate level of post-acute services  according to patient’s   needs and preference and payer coverage in collaboration with the physician and health care team  - Communicate with and update the patient/family, physician, and health care team regarding progress on the discharge plan  - Arrange appropriate transportation to post-acute venues  Outcome: Progressing

## 2024-09-10 NOTE — PROGRESS NOTES
09/10/24 0734   Team Meeting   Meeting Type Daily Rounds   Team Members Present   Team Members Present Physician;Nurse;;Other (Discipline and Name)   Patient/Family Present   Patient Present No   Patient's Family Present No     In attendance:  Dr. Alex Thomas, MD Dr. Jordan Holter, DO Melva Knutson, ALVINA Haywood, RN  Judi Garcia, [facility]  Mer Sales, [facility]  BARRETT Elmore.S.    Groups: 4/9    Pt polite, cooperative. Pt reports some mild anxiety. Denies other symptoms. Pt CRR interview 9/17, ACT intake 9/24.

## 2024-09-10 NOTE — NURSING NOTE
Pt is visible on the unit, playing cards with peers. Consumed 100% of dinner. Took medications without incidence. Pt is polite and cooperative. Attended 5/11 groups. Reports that he feels his anxiety and depression are at baseline. No behavioral issues. Pt offers no complaints. Continuous safety checks maintained.

## 2024-09-10 NOTE — ASSESSMENT & PLAN NOTE
Progressing  Awaiting placement - CRR interview on 9/17 and ACT interview on 9/24  Continue Vraylar 3mg PO Daily, Atarax 25mg PO TID, Lamictal 50mg PO Daily, add Zoloft 150mg PO QHS  Continue with group therapy, milieu therapy and occupational therapy   Behavioral Health checks every 7 minutes   Continue frequent safety checks and vitals per unit protocol  Continue with SLIM medical management as indicated

## 2024-09-10 NOTE — NURSING NOTE
Received pt in bed at change of shift with eyes closed; chest movement noted.  Continues the same thus this far as per q 7 min room checks.   Will continue to monitor behavior, sleeping pattern and any medical issues that may arise.    0649:  Sleeping 7+ hrs thus this far

## 2024-09-10 NOTE — NURSING NOTE
Pt is accepting of medications without incidence and meal compliant. Pt is visible in the milieu, sits with peers, but keeps to self unless approached by a peer. Pt denies s/s and offers no new concerns.

## 2024-09-10 NOTE — SOCIAL WORK
SW met 1:1 with pt. Pt continues to report no concerns and feeling at his baseline. SW provided written paper indicating days and times for his CRR interview/assessment and ACT intake. Pt expressed appreciation and no other concerns or needs at this time.

## 2024-09-10 NOTE — PROGRESS NOTES
Progress Note - Behavioral Health   Deyvi Cao 54 y.o. male MRN: [id number]  Unit/Bed#: [facility] 101-02 Encounter: [id number]  Code Status: Level 1 - Full Code    Assessment & Plan   Principal Problem:    Bipolar disorder with severe depression (HCC)  Active Problems:    Benign essential hypertension    Mixed hyperlipidemia    Allergic rhinitis due to allergen    Rosacea    Assessment & Plan  Bipolar disorder with severe depression (HCC)  Progressing  Awaiting placement - CRR interview on 9/17 and ACT interview on 9/24  Continue Vraylar 3mg PO Daily, Atarax 25mg PO TID, Lamictal 50mg PO Daily, add Zoloft 150mg PO QHS  Continue with group therapy, milieu therapy and occupational therapy   Behavioral Health checks every 7 minutes   Continue frequent safety checks and vitals per unit protocol  Continue with SLIM medical management as indicated  Benign essential hypertension    Mixed hyperlipidemia    Allergic rhinitis due to allergen    Rosacea    Subjective:    Patient was seen today for continuation of care, records reviewed and patient was discussed with the morning case review team.    Deyvi was seen today for psychiatric follow-up.  On assessment today, Deyvi was found in his room.  He is doing well. Offers no new concerns.  He is happy to hear possible upcoming interview.  Smiles appropriately.   We reviewed once more the specific as-needed medications they can use going forward if they experience any insomnia or destabilization of their mood, they understood and were agreeable. Milieu visibility and group attendance encouraged to promote an active participation in treatment.    Deyvi denies acute suicidal/self-harm ideation/intent/plan upon direct inquiry at this time. Deyvi is able to contract for safety while on the unit and would feel comfortable seeking staff support should suicidal symptoms or urges appear or worsen. Deyvi remains behaviorally appropriate, no agitation or aggression noted on exam  or in report. Deyvi also denies HI/AH/VH, and does not appear overtly manic.  Patient does not verbalize any experiences that can be categorized as paranoid, persecutory, bizarre, or somatic delusions. Deyvi remains adherent to his current psychotropic medication regimen and denies any side effects from medications, as well as none noted on exam.    Group Attendance: 4 / 9  Treatment Team: TBD  Psychiatric PRN's Needed: None    Review of Systems:  Behavior over the last 24 hours: Unchanged  Sleep: sleeping okay throughout the night  Appetite: adequate  Medication side effects: none reported  ROS:no complaints, all other systems are negative    Objective:    Vitals:  Vitals:    09/10/24 0738   BP: 122/91   Pulse: 100   Resp: 18   Temp: 97.5 °F (36.4 °C)   SpO2: 94%     Laboratory Results:  I have personally reviewed all pertinent laboratory/tests results.  Most Recent Labs:   Lab Results   Component Value Date    WBC 7.39 06/26/2024    RBC 4.70 06/26/2024    HGB 15.2 06/26/2024    HCT 45.5 06/26/2024     06/26/2024    RDW 12.9 06/26/2024    NEUTROABS 4.63 06/26/2024    SODIUM 137 06/26/2024    K 4.1 06/26/2024     06/26/2024    CO2 26 06/26/2024    BUN 17 06/26/2024    CREATININE 0.63 06/26/2024    GLUC 98 06/26/2024    GLUF 98 06/26/2024    CALCIUM 9.6 06/26/2024    AST 25 06/26/2024    ALT 45 06/26/2024    ALKPHOS 114 (H) 06/26/2024    TP 7.0 06/26/2024    ALB 4.4 06/26/2024    TBILI 0.44 06/26/2024    CHOLESTEROL 177 06/26/2024    HDL 52 06/26/2024    TRIG 73 06/26/2024    LDLCALC 110 (H) 06/26/2024    NONHDLC 125 06/26/2024    LITHIUM 0.4 (L) 01/28/2021    [id number] 2.636 06/26/2024    HGBA1C 5.2 11/22/2023     11/22/2023     Mental Status Evaluation:  Appearance:  age appropriate, casually dressed, dressed appropriately, adequate grooming   Behavior:  pleasant, cooperative, calm   Speech:  normal volume   Mood:  improved   Affect:  slightly brighter   Thought Process:  goal directed    Associations: intact associations   Thought Content:  no overt delusions   Perceptual Disturbances: no auditory hallucinations, no visual hallucinations, denies when asked, does not appear responding to internal stimuli   Risk Potential: Suicidal ideation - None at present, contracts for safety on the unit, would talk to staff if not feeling safe on the unit  Homicidal ideation - None at present  Potential for aggression - Not at present   Sensorium:  oriented to person, place, and time/date   Memory:  recent memory intact   Consciousness:  alert and awake   Attention/Concentration: attention span and concentration appear shorter than expected for age   Insight:  fair and improving   Judgment: fair and improving   Gait/Station: normal gait/station, normal balance   Motor Activity: no abnormal movements     Progress Toward Goals: making gradual improvement.  Deyvi continues to require inpatient psychiatric hospitalization for continued medication management and titration to optimize symptom reduction, improve sleep hygiene, and demonstrate adequate self-care.     Suicide/Homicide Risk Assessment:  Risk of Harm to Self:   Nursing Suicide Risk Assessment Last 24 hours: C-SSRS Risk (Since Last Contact)  Calculated C-SSRS Risk Score (Since Last Contact): No Risk Indicated    Risk of Harm to Others:  Nursing Homicide Risk Assessment: Violence Risk to Others: Denies within past 6 months    Behavioral Health Medications: all current active meds have been reviewed and continue current psychiatric medications.  Current Facility-Administered Medications   Medication Dose Route Frequency Provider Last Rate    acetaminophen  650 mg Oral Q6H PRN ALVINA Harley      acetaminophen  650 mg Oral Q4H PRN ALVINA Harley      acetaminophen  975 mg Oral Q6H PRN ALVINA Harley      aluminum-magnesium hydroxide-simethicone  30 mL Oral Q4H PRN ALVINA Harley      ammonium lactate   Topical BID PRN ALVINA Harley       atorvastatin  10 mg Oral Daily Sary Rodriguez ALVINA Biggs      benztropine  1 mg Intramuscular Q4H PRN Max 6/day ALVINA Harley      benztropine  1 mg Oral Q4H PRN Max 6/day ALVINA Harley      bisacodyl  10 mg Rectal Daily PRN ALVINA Harley      cariprazine  3 mg Oral Daily Martin Cardneas MD      Cholecalciferol  2,000 Units Oral Daily Sary LinkALVINA Thompson      cyanocobalamin  1,000 mcg Oral Daily Sary LinkALVINA Thompson      hydrOXYzine HCL  25 mg Oral Q6H PRN Max 4/day ALVINA Harley      hydrOXYzine HCL  25 mg Oral TID Martin Cardenas MD      hydrOXYzine HCL  50 mg Oral Q4H PRN Max 4/day ALVINA Harley      Or    LORazepam  1 mg Intramuscular Q4H PRN ALVINA Harley      lamoTRIgine  50 mg Oral Daily Martin Cardenas MD      lisinopril  10 mg Oral Daily Sary LinkALVINA Thompson      LORazepam  1 mg Oral Q4H PRN Max 6/day ALVINA Harley      Or    LORazepam  2 mg Intramuscular Q6H PRN Max 3/day ALVINA Harley      OLANZapine  10 mg Oral Q3H PRN Max 3/day ALVINA Harley      Or    OLANZapine  10 mg Intramuscular Q3H PRN Max 3/day ALVINA Harley      OLANZapine  5 mg Oral Q3H PRN Max 6/day ALVINA Harley      Or    OLANZapine  5 mg Intramuscular Q3H PRN Max 6/day ALVINA Harley      OLANZapine  2.5 mg Oral Q3H PRN Max 8/day ALVINA Harley      polyethylene glycol  17 g Oral Daily PRN ALVINA Harley      propranolol  10 mg Oral Q8H PRN ALVINA Harley      senna-docusate sodium  1 tablet Oral Daily PRN ALVINA Harley      sertraline  150 mg Oral HS Martin Cardenas MD       Risks / Benefits of Treatment:  Risks, benefits, and possible side effects of medications explained to patient. Patient has limited understanding of risks and benefits of treatment at this time, but agrees to take medications as prescribed.    Counseling / Coordination of Care:  Total floor/unit time spent today 25 minutes. Greater than 50% of total time was spent with  the patient and / or family counseling and / or coordination of care. A description of the counseling / coordination of care:   Patient's progress discussed with staff in treatment team meeting.  Medications, treatment progress and treatment plan reviewed with patient.   Educated on importance of medication and treatment compliance.  Reassurance and supportive therapy provided.   Encouraged participation in milieu and group therapy on the unit.    ALVINA Harley 09/10/24

## 2024-09-11 PROCEDURE — 99232 SBSQ HOSP IP/OBS MODERATE 35: CPT | Performed by: PSYCHIATRY & NEUROLOGY

## 2024-09-11 RX ADMIN — LAMOTRIGINE 50 MG: 25 TABLET ORAL at 08:18

## 2024-09-11 RX ADMIN — HYDROXYZINE HYDROCHLORIDE 25 MG: 25 TABLET ORAL at 16:41

## 2024-09-11 RX ADMIN — CARIPRAZINE 3 MG: 3 CAPSULE, GELATIN COATED ORAL at 08:18

## 2024-09-11 RX ADMIN — CYANOCOBALAMIN TAB 1000 MCG 1000 MCG: 1000 TAB at 08:18

## 2024-09-11 RX ADMIN — SERTRALINE HYDROCHLORIDE 150 MG: 100 TABLET ORAL at 21:15

## 2024-09-11 RX ADMIN — HYDROXYZINE HYDROCHLORIDE 25 MG: 25 TABLET ORAL at 21:15

## 2024-09-11 RX ADMIN — HYDROXYZINE HYDROCHLORIDE 25 MG: 25 TABLET ORAL at 08:18

## 2024-09-11 RX ADMIN — CHOLECALCIFEROL TAB 25 MCG (1000 UNIT) 2000 UNITS: 25 TAB at 08:18

## 2024-09-11 RX ADMIN — ATORVASTATIN CALCIUM 10 MG: 10 TABLET, FILM COATED ORAL at 08:18

## 2024-09-11 RX ADMIN — LISINOPRIL 10 MG: 10 TABLET ORAL at 08:18

## 2024-09-11 NOTE — PROGRESS NOTES
Progress Note - Behavioral Health   Name: Deyvi Cao 54 y.o. male I MRN: [id number]   Unit/Bed#: [facility] 101-02 I Date of Admission: 6/25/2024   Date of Service: 9/11/2024 I Hospital Day: 78       Assessment & Plan  Bipolar disorder with severe depression (HCC)  Progressing  Awaiting placement - CRR interview on 9/17 and ACT interview on 9/24  Continue Vraylar 3mg PO Daily, Atarax 25mg PO TID, Lamictal 50mg PO Daily, add Zoloft 150mg PO QHS  Continue with group therapy, milieu therapy and occupational therapy   Behavioral Health checks every 7 minutes   Continue frequent safety checks and vitals per unit protocol  Continue with SLIM medical management as indicated  Benign essential hypertension  On lisiopril  Mixed hyperlipidemia    Allergic rhinitis due to allergen    Medical clearance for psychiatric admission (Resolved: 9/10/2024)    Rosacea       Subjective:    Patient was seen today for continuation of care, records reviewed and patient was discussed with the morning case review team.    Deyvi was seen today for psychiatric follow-up.  On assessment today, Deyvi was found in his room.  He is doing really well.  Deyvi reports adequate daytime energy and denies any difficulties with initiating or staying asleep.  Oral appetite and hydration is adequate.  He is looking forward to discharge soon.   We reviewed once more the specific as-needed medications they can use going forward if they experience any insomnia or destabilization of their mood, they understood and were agreeable. Milieu visibility and group attendance encouraged to promote an active participation in treatment.    Deyvi denies acute suicidal/self-harm ideation/intent/plan upon direct inquiry at this time. Deyvi is able to contract for safety while on the unit and would feel comfortable seeking staff support should suicidal symptoms or urges appear or worsen. Deyvi remains behaviorally appropriate, no agitation or aggression noted on  exam or in report. Deyvi also denies HI/AH/VH, and does not appear overtly manic.  Patient does not verbalize any experiences that can be categorized as paranoid, persecutory, bizarre, or somatic delusions. Deyvi remains adherent to his current psychotropic medication regimen and denies any side effects from medications, as well as none noted on exam.    Review of Systems:  Behavior over the last 24 hours: Slowly improving  Sleep: sleeping okay throughout the night  Appetite: adequate  Medication side effects: none reported  ROS:no complaints, all other systems are negative    Objective:    Vitals:  Vitals:    09/11/24 0724   BP: 129/83   Pulse: 100   Resp: 18   Temp: 97.5 °F (36.4 °C)   SpO2: 92%     Laboratory Results:  I have personally reviewed all pertinent laboratory/tests results.  Most Recent Labs:   Lab Results   Component Value Date    WBC 7.39 06/26/2024    RBC 4.70 06/26/2024    HGB 15.2 06/26/2024    HCT 45.5 06/26/2024     06/26/2024    RDW 12.9 06/26/2024    NEUTROABS 4.63 06/26/2024    SODIUM 137 06/26/2024    K 4.1 06/26/2024     06/26/2024    CO2 26 06/26/2024    BUN 17 06/26/2024    CREATININE 0.63 06/26/2024    GLUC 98 06/26/2024    GLUF 98 06/26/2024    CALCIUM 9.6 06/26/2024    AST 25 06/26/2024    ALT 45 06/26/2024    ALKPHOS 114 (H) 06/26/2024    TP 7.0 06/26/2024    ALB 4.4 06/26/2024    TBILI 0.44 06/26/2024    CHOLESTEROL 177 06/26/2024    HDL 52 06/26/2024    TRIG 73 06/26/2024    LDLCALC 110 (H) 06/26/2024    NONHDLC 125 06/26/2024    LITHIUM 0.4 (L) 01/28/2021    [id number] 2.636 06/26/2024    HGBA1C 5.2 11/22/2023     11/22/2023     Mental Status Evaluation:  Appearance:  age appropriate, dressed appropriately   Behavior:  pleasant, cooperative, calm   Speech:  normal volume   Mood:  improved, euthymic   Affect:  normal range and intensity, appropriate   Thought Process:  goal directed   Associations: intact associations   Thought Content:  no overt delusions    Perceptual Disturbances: no auditory hallucinations, no visual hallucinations, denies when asked, does not appear responding to internal stimuli   Risk Potential: Suicidal ideation - None at present, contracts for safety on the unit, would talk to staff if not feeling safe on the unit  Homicidal ideation - None at present  Potential for aggression - Not at present   Sensorium:  oriented to person, place, and time/date   Memory:  recent memory intact   Consciousness:  alert and awake   Attention/Concentration: attention span and concentration appear shorter than expected for age   Insight:  improving   Judgment: improving   Gait/Station: normal gait/station, normal balance   Motor Activity: no abnormal movements     Progress Toward Goals: making gradual improvement.  Deyvi continues to require inpatient psychiatric hospitalization for continued medication management and titration to optimize symptom reduction, improve sleep hygiene, and demonstrate adequate self-care.     Suicide/Homicide Risk Assessment:  Risk of Harm to Self:   Nursing Suicide Risk Assessment Last 24 hours: C-SSRS Risk (Since Last Contact)  Calculated C-SSRS Risk Score (Since Last Contact): No Risk Indicated    Risk of Harm to Others:  Nursing Homicide Risk Assessment: Violence Risk to Others: Denies within past 6 months    Behavioral Health Medications: all current active meds have been reviewed and continue current psychiatric medications.  Current Facility-Administered Medications   Medication Dose Route Frequency Provider Last Rate    acetaminophen  650 mg Oral Q6H PRN ALVINA Harley      acetaminophen  650 mg Oral Q4H PRN ALVINA Harley      acetaminophen  975 mg Oral Q6H PRN ALVINA Harley      aluminum-magnesium hydroxide-simethicone  30 mL Oral Q4H PRN ALVINA Harley      ammonium lactate   Topical BID PRN ALVINA Harley      atorvastatin  10 mg Oral Daily ALVINA Lewis      benztropine  1 mg  Intramuscular Q4H PRN Max 6/day ALVINA Harley      benztropine  1 mg Oral Q4H PRN Max 6/day ALVINA Harley      bisacodyl  10 mg Rectal Daily PRN ALVINA Harley      cariprazine  3 mg Oral Daily Martin Cardenas MD      Cholecalciferol  2,000 Units Oral Daily Sary Rodriguez ALVINA Biggs      cyanocobalamin  1,000 mcg Oral Daily Sary LinkALVINA Thompson      hydrOXYzine HCL  25 mg Oral Q6H PRN Max 4/day Melva Knutson, ALVINA      hydrOXYzine HCL  25 mg Oral TID Martin Cardenas MD      hydrOXYzine HCL  50 mg Oral Q4H PRN Max 4/day ALVINA Harley      Or    LORazepam  1 mg Intramuscular Q4H PRN ALVINA Harley      lamoTRIgine  50 mg Oral Daily Martin Cardenas MD      lisinopril  10 mg Oral Daily Sary Rodriguez Kalyan, ALVINA      LORazepam  1 mg Oral Q4H PRN Max 6/day ALVINA Harley      Or    LORazepam  2 mg Intramuscular Q6H PRN Max 3/day Melva Knutson, ALVINA      OLANZapine  10 mg Oral Q3H PRN Max 3/day ALVINA Harley      Or    OLANZapine  10 mg Intramuscular Q3H PRN Max 3/day Melva Knutson, ALVINA      OLANZapine  5 mg Oral Q3H PRN Max 6/day Melva ALVINA Knutson      Or    OLANZapine  5 mg Intramuscular Q3H PRN Max 6/day Melva Zenia, TITINP      OLANZapine  2.5 mg Oral Q3H PRN Max 8/day ALVINA Harley      polyethylene glycol  17 g Oral Daily PRN Melva Knutson, ALVINA      propranolol  10 mg Oral Q8H PRN ALVINA Harley      senna-docusate sodium  1 tablet Oral Daily PRN ALVINA Harley      sertraline  150 mg Oral HS Martin Cardenas MD       Risks / Benefits of Treatment:  Risks, benefits, and possible side effects of medications explained to patient. Patient has limited understanding of risks and benefits of treatment at this time, but agrees to take medications as prescribed.    Counseling / Coordination of Care:  Total floor/unit time spent today 25 minutes. Greater than 50% of total time was spent with the patient and / or family counseling and / or coordination of care. A description  of the counseling / coordination of care:   Patient's progress discussed with staff in treatment team meeting.  Medications, treatment progress and treatment plan reviewed with patient.   Educated on importance of medication and treatment compliance.  Reassurance and supportive therapy provided.   Encouraged participation in milieu and group therapy on the unit.    ALVINA Harley 09/11/24

## 2024-09-11 NOTE — PLAN OF CARE
Problem: Alteration in Thoughts and Perception  Goal: Treatment Goal: Gain control of psychotic behaviors/thinking, reduce/eliminate presenting symptoms and demonstrate improved reality functioning upon discharge  Outcome: Progressing  Goal: Verbalize thoughts and feelings  Description: Interventions:  - Promote a nonjudgmental and trusting relationship with the patient through active listening and therapeutic communication  - Assess patient's level of functioning, behavior and potential for risk  - Engage patient in 1 on 1 interactions  - Encourage patient to express fears, feelings, frustrations, and discuss symptoms    - [place] patient to reality, help patient recognize reality-based thinking   - Administer medications as ordered and assess for potential side effects  - Provide the patient education related to the signs and symptoms of the illness and desired effects of prescribed medications  Outcome: Progressing  Goal: Agree to be compliant with medication regime, as prescribed and report medication side effects  Description: Interventions:  - Offer appropriate PRN medication and supervise ingestion; conduct AIMS, as needed   Outcome: Progressing  Goal: Attend and participate in unit activities, including therapeutic, recreational, and educational groups  Description: Interventions:  -Encourage Visitation and family involvement in care  Outcome: Progressing  Goal: Complete daily ADLs, including personal hygiene independently, as able  Description: Interventions:  - Observe, teach, and assist patient with ADLS  - Monitor and promote a balance of rest/activity, with adequate nutrition and elimination   Outcome: Progressing     Problem: Ineffective Coping  Goal: Patient/Family verbalizes awareness of resources  Outcome: Progressing  Goal: Free from restraint events  Description: - Utilize least restrictive measures   - Provide behavioral interventions   - Redirect inappropriate behaviors   Outcome: Progressing      Problem: Risk for Self Injury/Neglect  Goal: Refrain from harming self  Description: Interventions:  - Monitor patient closely, per order  - Develop a trusting relationship  - Supervise medication ingestion, monitor effects and side effects   Outcome: Progressing  Goal: Attend and participate in unit activities, including therapeutic, recreational, and educational groups  Description: Interventions:  - Provide therapeutic and educational activities daily, encourage attendance and participation, and document same in the medical record  - Obtain collateral information, encourage visitation and family involvement in care   Outcome: Progressing  Goal: Complete daily ADLs, including personal hygiene independently, as able  Description: Interventions:  - Observe, teach, and assist patient with ADLS  - Monitor and promote a balance of rest/activity, with adequate nutrition and elimination  Outcome: Progressing     Problem: Depression  Goal: Refrain from harming self  Description: Interventions:  - Monitor patient closely, per order   - Supervise medication ingestion, monitor effects and side effects   Outcome: Progressing  Goal: Refrain from isolation  Description: Interventions:  - Develop a trusting relationship   - Encourage socialization   Outcome: Progressing  Goal: Refrain from self-neglect  Outcome: Progressing  Goal: Complete daily ADLs, including personal hygiene independently, as able  Description: Interventions:  - Observe, teach, and assist patient with ADLS  -  Monitor and promote a balance of rest/activity, with adequate nutrition and elimination   Outcome: Progressing     Problem: Anxiety  Goal: Anxiety is at manageable level  Description: Interventions:  - Assess and monitor patient's anxiety level.   - Monitor for signs and symptoms (heart palpitations, chest pain, shortness of breath, headaches, nausea, feeling jumpy, restlessness, irritable, apprehensive).   - Collaborate with interdisciplinary team  and initiate plan and interventions as ordered.  - [place] patient to unit/surroundings  - Explain treatment plan  - Encourage participation in care  - Encourage verbalization of concerns/fears  - Identify coping mechanisms  - Assist in developing anxiety-reducing skills  - Administer/offer alternative therapies  - Limit or eliminate stimulants  Outcome: Progressing     Problem: Risk for Violence/Aggression Toward Others  Goal: Treatment Goal: Refrain from acts of violence/aggression during length of stay, and demonstrate improved impulse control at the time of discharge  Outcome: Progressing     Problem: Alteration in Orientation  Goal: Treatment Goal: Demonstrate a reduction of confusion and improved orientation to person, place, time and/or situation upon discharge, according to optimum baseline/ability  Outcome: Progressing  Goal: Interact with staff daily  Description: Interventions:  - Assess and re-assess patient's level of orientation  - Engage patient in 1 on 1 interactions, daily, for a minimum of 15 minutes   - Establish rapport/trust with patient   Outcome: Progressing  Goal: Complete daily ADLs, including personal hygiene independently, as able  Description: Interventions:  - Observe, teach, and assist patient with ADLS  Outcome: Progressing     Problem: DEPRESSION  Goal: Will be euthymic at discharge  Description: INTERVENTIONS:  - Administer medication as ordered  - Provide emotional support via 1:1 interaction with staff  - Encourage involvement in milieu/groups/activities  - Monitor for social isolation  Outcome: Progressing     Problem: ANXIETY  Goal: Will report anxiety at manageable levels  Description: INTERVENTIONS:  - Administer medication as ordered  - Teach and encourage coping skills  - Provide emotional support  - Assess patient/family for anxiety and ability to cope  Outcome: Progressing     Problem: SELF CARE DEFICIT  Goal: Return ADL status to a safe level of function  Description:  INTERVENTIONS:  - Administer medication as ordered  - Assess ADL deficits and provide assistive devices as needed  - Obtain PT/OT consults as needed  - Assist and instruct patient to increase activity and self care as tolerated  Outcome: Progressing     Problem: DISCHARGE PLANNING - CARE MANAGEMENT  Goal: Discharge to post-acute care or home with appropriate resources  Description: INTERVENTIONS:  - Conduct assessment to determine patient/family and health care team treatment goals, and need for post-acute services based on payer coverage, community resources, and patient preferences, and barriers to discharge  - Address psychosocial, clinical, and financial barriers to discharge as identified in assessment in conjunction with the patient/family and health care team  - Arrange appropriate level of post-acute services according to patient’s   needs and preference and payer coverage in collaboration with the physician and health care team  - Communicate with and update the patient/family, physician, and health care team regarding progress on the discharge plan  - Arrange appropriate transportation to post-acute venues  Outcome: Progressing

## 2024-09-11 NOTE — PROGRESS NOTES
09/11/24 0724   Team Meeting   Meeting Type Daily Rounds   Team Members Present   Team Members Present Physician;Nurse;;Other (Discipline and Name)   Patient/Family Present   Patient Present No   Patient's Family Present No     In attendance:  Dr. Alex Thomas, MD Dr. Jordan Holter, DO Mahamed Haywood, RN  Judi Garcia, [facility] [place]  Mer Sales, [facility] [place]  Gris Arizmendi M.S.    Groups: 5/11    Pt has CRR assessment 9/17, ACT intake 9/24. Pt denies symptoms, reports he remains at baseline. No concerns.

## 2024-09-11 NOTE — NURSING NOTE
Patient is pleasant and cooperative. He is social and interactive with staff and peers. He is compliant with medications. Attends groups. States anxiety is baseline. His appetite is good.

## 2024-09-11 NOTE — NURSING NOTE
Received pt in bed at change of shift with eyes closed; chest movement noted.  Continues the same thus this far as per q 7 min room checks.   Will continue to monitor behavior, sleeping pattern and any medical issues that may arise.    0549;  Sleeping 7+ hrs thus this far

## 2024-09-11 NOTE — NURSING NOTE
Pt is present on the milieu intermittently. He consumed 100 % of dinner. Took his medications without incidence. Pt is polite, pleasant, and cooperative. Denied all psychiatric symptoms. Pt offered no complaints. No behavioral issues.

## 2024-09-12 PROCEDURE — 99232 SBSQ HOSP IP/OBS MODERATE 35: CPT | Performed by: PSYCHIATRY & NEUROLOGY

## 2024-09-12 RX ADMIN — ATORVASTATIN CALCIUM 10 MG: 10 TABLET, FILM COATED ORAL at 08:28

## 2024-09-12 RX ADMIN — HYDROXYZINE HYDROCHLORIDE 25 MG: 25 TABLET ORAL at 16:53

## 2024-09-12 RX ADMIN — HYDROXYZINE HYDROCHLORIDE 25 MG: 25 TABLET ORAL at 21:37

## 2024-09-12 RX ADMIN — LAMOTRIGINE 50 MG: 25 TABLET ORAL at 08:28

## 2024-09-12 RX ADMIN — CHOLECALCIFEROL TAB 25 MCG (1000 UNIT) 2000 UNITS: 25 TAB at 08:28

## 2024-09-12 RX ADMIN — CARIPRAZINE 3 MG: 3 CAPSULE, GELATIN COATED ORAL at 08:28

## 2024-09-12 RX ADMIN — SERTRALINE HYDROCHLORIDE 150 MG: 100 TABLET ORAL at 21:37

## 2024-09-12 RX ADMIN — LISINOPRIL 10 MG: 10 TABLET ORAL at 08:28

## 2024-09-12 RX ADMIN — CYANOCOBALAMIN TAB 1000 MCG 1000 MCG: 1000 TAB at 08:28

## 2024-09-12 RX ADMIN — HYDROXYZINE HYDROCHLORIDE 25 MG: 25 TABLET ORAL at 08:28

## 2024-09-12 NOTE — PLAN OF CARE
Problem: Alteration in Thoughts and Perception  Goal: Treatment Goal: Gain control of psychotic behaviors/thinking, reduce/eliminate presenting symptoms and demonstrate improved reality functioning upon discharge  Outcome: Progressing  Goal: Verbalize thoughts and feelings  Description: Interventions:  - Promote a nonjudgmental and trusting relationship with the patient through active listening and therapeutic communication  - Assess patient's level of functioning, behavior and potential for risk  - Engage patient in 1 on 1 interactions  - Encourage patient to express fears, feelings, frustrations, and discuss symptoms    - [place] patient to reality, help patient recognize reality-based thinking   - Administer medications as ordered and assess for potential side effects  - Provide the patient education related to the signs and symptoms of the illness and desired effects of prescribed medications  Outcome: Progressing  Goal: Refrain from acting on delusional thinking/internal stimuli  Description: Interventions:  - Monitor patient closely, per order   - Utilize least restrictive measures   - Set reasonable limits, give positive feedback for acceptable   - Administer medications as ordered and monitor of potential side effects  Outcome: Progressing  Goal: Agree to be compliant with medication regime, as prescribed and report medication side effects  Description: Interventions:  - Offer appropriate PRN medication and supervise ingestion; conduct AIMS, as needed   Outcome: Progressing  Goal: Attend and participate in unit activities, including therapeutic, recreational, and educational groups  Description: Interventions:  -Encourage Visitation and family involvement in care  Outcome: Progressing  Goal: Recognize dysfunctional thoughts, communicate reality-based thoughts at the time of discharge  Description: Interventions:  - Provide medication and psycho-education to assist patient in compliance and developing  insight into his/her illness   Outcome: Progressing  Goal: Complete daily ADLs, including personal hygiene independently, as able  Description: Interventions:  - Observe, teach, and assist patient with ADLS  - Monitor and promote a balance of rest/activity, with adequate nutrition and elimination   Outcome: Progressing     Problem: Ineffective Coping  Goal: Identifies ineffective coping skills  Outcome: Progressing  Goal: Identifies healthy coping skills  Outcome: Progressing  Goal: Demonstrates healthy coping skills  Outcome: Progressing  Goal: Participates in unit activities  Description: Interventions:  - Provide therapeutic environment   - Provide required programming   - Redirect inappropriate behaviors   Outcome: Progressing     Problem: Risk for Self Injury/Neglect  Goal: Refrain from harming self  Description: Interventions:  - Monitor patient closely, per order  - Develop a trusting relationship  - Supervise medication ingestion, monitor effects and side effects   Outcome: Progressing  Goal: Recognize maladaptive responses and adopt new coping mechanisms  Outcome: Progressing  Goal: Complete daily ADLs, including personal hygiene independently, as able  Description: Interventions:  - Observe, teach, and assist patient with ADLS  - Monitor and promote a balance of rest/activity, with adequate nutrition and elimination  Outcome: Progressing     Problem: Depression  Goal: Treatment Goal: Demonstrate behavioral control of depressive symptoms, verbalize feelings of improved mood/affect, and adopt new coping skills prior to discharge  Outcome: Progressing  Goal: Refrain from isolation  Description: Interventions:  - Develop a trusting relationship   - Encourage socialization   Outcome: Progressing     Problem: Anxiety  Goal: Anxiety is at manageable level  Description: Interventions:  - Assess and monitor patient's anxiety level.   - Monitor for signs and symptoms (heart palpitations, chest pain, shortness of  breath, headaches, nausea, feeling jumpy, restlessness, irritable, apprehensive).   - Collaborate with interdisciplinary team and initiate plan and interventions as ordered.  - [place] patient to unit/surroundings  - Explain treatment plan  - Encourage participation in care  - Encourage verbalization of concerns/fears  - Identify coping mechanisms  - Assist in developing anxiety-reducing skills  - Administer/offer alternative therapies  - Limit or eliminate stimulants  Outcome: Progressing     Problem: SELF HARM/SUICIDALITY  Goal: Will have no self-injury during hospital stay  Description: INTERVENTIONS:  - Q 15 MINUTES: Routine safety checks  - Q WAKING SHIFT & PRN: Assess risk to determine if routine checks are adequate to maintain patient safety  - Encourage patient to participate actively in care by formulating a plan to combat response to suicidal ideation, identify supports and resources  Outcome: Progressing     Problem: DEPRESSION  Goal: Will be euthymic at discharge  Description: INTERVENTIONS:  - Administer medication as ordered  - Provide emotional support via 1:1 interaction with staff  - Encourage involvement in milieu/groups/activities  - Monitor for social isolation  Outcome: Progressing     Problem: ANXIETY  Goal: Will report anxiety at manageable levels  Description: INTERVENTIONS:  - Administer medication as ordered  - Teach and encourage coping skills  - Provide emotional support  - Assess patient/family for anxiety and ability to cope  Outcome: Progressing

## 2024-09-12 NOTE — PROGRESS NOTES
Progress Note - Behavioral Health   Name: Deyvi Cao 54 y.o. male I MRN: [id number]   Unit/Bed#: [facility] 101-02 I Date of Admission: 6/25/2024   Date of Service: 9/12/2024 I Hospital Day: 79     Assessment & Plan  Bipolar disorder with severe depression (HCC)  Progressing  Awaiting placement - CRR interview on 9/17 and ACT interview on 9/24  Continue Vraylar 3mg PO Daily, Atarax 25mg PO TID, Lamictal 50mg PO Daily, add Zoloft 150mg PO QHS  Continue with group therapy, milieu therapy and occupational therapy   Behavioral Health checks every 7 minutes   Continue frequent safety checks and vitals per unit protocol  Continue with [facility] medical management as indicated  Benign essential hypertension  Managed by [facility]  Continue Lisinopril 10mg PO Daily  Mixed hyperlipidemia  Managed by [facility]  Continue Lipitor 10mg PO Daily    Allergic rhinitis due to allergen  Managed by [facility]  Rosacea  Managed by [facility]      Subjective:    Patient was seen today for continuation of care, records reviewed and patient was discussed with the morning case review team.    Deyvi was seen today for psychiatric follow-up.  On assessment today, Deyvi was found in his room.  He is very pleasant on approach.  Reports some frustrations with LOS but redirectable and able to understand the reasoning behind this.  He is looking forward to his tours.  Deyvi reports adequate daytime energy and denies any difficulties with initiating or staying asleep.  Oral appetite and hydration is adequate.   We reviewed once more the specific as-needed medications they can use going forward if they experience any insomnia or destabilization of their mood, they understood and were agreeable. Milieu visibility and group attendance encouraged to promote an active participation in treatment.    Deyvi denies acute suicidal/self-harm ideation/intent/plan upon direct inquiry at this time. Deyvi is able to contract for safety while on the unit and would feel comfortable  seeking staff support should suicidal symptoms or urges appear or worsen. Deyvi remains behaviorally appropriate, no agitation or aggression noted on exam or in report. Deyvi also denies HI/AH/VH, and does not appear overtly manic.  Patient does not verbalize any experiences that can be categorized as paranoid, persecutory, bizarre, or somatic delusions. Deyvi remains adherent to his current psychotropic medication regimen and denies any side effects from medications, as well as none noted on exam.    Review of Systems:  Behavior over the last 24 hours: Slowly improving  Sleep: sleeping okay throughout the night  Appetite: adequate  Medication side effects: none reported  ROS:no complaints    Objective:    Vitals:  Vitals:    09/12/24 0736   BP: 125/73   Pulse: 96   Resp: 18   Temp: 97.5 °F (36.4 °C)   SpO2: 96%       Laboratory Results:  I have personally reviewed all pertinent laboratory/tests results.  Most Recent Labs:   Lab Results   Component Value Date    WBC 7.39 06/26/2024    RBC 4.70 06/26/2024    HGB 15.2 06/26/2024    HCT 45.5 06/26/2024     06/26/2024    RDW 12.9 06/26/2024    NEUTROABS 4.63 06/26/2024    SODIUM 137 06/26/2024    K 4.1 06/26/2024     06/26/2024    CO2 26 06/26/2024    BUN 17 06/26/2024    CREATININE 0.63 06/26/2024    GLUC 98 06/26/2024    GLUF 98 06/26/2024    CALCIUM 9.6 06/26/2024    AST 25 06/26/2024    ALT 45 06/26/2024    ALKPHOS 114 (H) 06/26/2024    TP 7.0 06/26/2024    ALB 4.4 06/26/2024    TBILI 0.44 06/26/2024    CHOLESTEROL 177 06/26/2024    HDL 52 06/26/2024    TRIG 73 06/26/2024    LDLCALC 110 (H) 06/26/2024    NONHDLC 125 06/26/2024    LITHIUM 0.4 (L) 01/28/2021    XRD2QOXECCSB 2.636 06/26/2024    HGBA1C 5.2 11/22/2023     11/22/2023     Mental Status Evaluation:  Appearance:  age appropriate, casually dressed   Behavior:  pleasant, cooperative, calm   Speech:  normal rate   Mood:  mildly anxious, mildly depressed   Affect:  constricted   Thought  Process:  goal directed   Associations: intact associations   Thought Content:  no overt delusions   Perceptual Disturbances: no auditory hallucinations, no visual hallucinations, denies when asked, does not appear responding to internal stimuli   Risk Potential: Suicidal ideation - None at present, contracts for safety on the unit, would talk to staff if not feeling safe on the unit  Homicidal ideation - None at present  Potential for aggression - Not at present   Sensorium:  oriented to person, place, and time/date   Memory:  recent memory intact   Consciousness:  alert and awake   Attention/Concentration: attention span and concentration appear shorter than expected for age   Insight:  improving   Judgment: improving   Gait/Station: normal gait/station, normal balance   Motor Activity: no abnormal movements     Progress Toward Goals: making gradual improvement.  Deyvi continues to require inpatient psychiatric hospitalization for continued medication management and titration to optimize symptom reduction, improve sleep hygiene, and demonstrate adequate self-care.     Suicide/Homicide Risk Assessment:  Risk of Harm to Self:   Nursing Suicide Risk Assessment Last 24 hours: C-SSRS Risk (Since Last Contact)  Calculated C-SSRS Risk Score (Since Last Contact): No Risk Indicated    Risk of Harm to Others:  Nursing Homicide Risk Assessment: Violence Risk to Others: Denies within past 6 months    Behavioral Health Medications: all current active meds have been reviewed and continue current psychiatric medications.  Current Facility-Administered Medications   Medication Dose Route Frequency Provider Last Rate    acetaminophen  650 mg Oral Q6H PRN ALVINA Harley      acetaminophen  650 mg Oral Q4H PRN ALVINA Harley      acetaminophen  975 mg Oral Q6H PRN ALVINA Harley      aluminum-magnesium hydroxide-simethicone  30 mL Oral Q4H PRN ALVINA Harley      ammonium lactate   Topical BID PRN Melva Knutson  ALVINA      atorvastatin  10 mg Oral Daily Sary Rodriguez ALVINA Biggs      benztropine  1 mg Intramuscular Q4H PRN Max 6/day ALVINA Harley      benztropine  1 mg Oral Q4H PRN Max 6/day ALVINA Harley      bisacodyl  10 mg Rectal Daily PRN ALVINA Harley      cariprazine  3 mg Oral Daily Martin Cardenas MD      Cholecalciferol  2,000 Units Oral Daily Sary LinkALVINA Thompson      cyanocobalamin  1,000 mcg Oral Daily Sary LinkALVINA Thompson      hydrOXYzine HCL  25 mg Oral Q6H PRN Max 4/day ALVINA Harley      hydrOXYzine HCL  25 mg Oral TID Martin Cardenas MD      hydrOXYzine HCL  50 mg Oral Q4H PRN Max 4/day ALVINA Harley      Or    LORazepam  1 mg Intramuscular Q4H PRN ALVINA Harley      lamoTRIgine  50 mg Oral Daily Martin Cardenas MD      lisinopril  10 mg Oral Daily Sary Rodriguez ALVINA Biggs      LORazepam  1 mg Oral Q4H PRN Max 6/day ALVINA Harley      Or    LORazepam  2 mg Intramuscular Q6H PRN Max 3/day ALVINA Harley      OLANZapine  10 mg Oral Q3H PRN Max 3/day ALVINA Harley      Or    OLANZapine  10 mg Intramuscular Q3H PRN Max 3/day ALVINA Harley      OLANZapine  5 mg Oral Q3H PRN Max 6/day ALVINA Harley      Or    OLANZapine  5 mg Intramuscular Q3H PRN Max 6/day ALVINA Harley      OLANZapine  2.5 mg Oral Q3H PRN Max 8/day ALVINA Harley      polyethylene glycol  17 g Oral Daily PRN ALVINA Harley      propranolol  10 mg Oral Q8H PRN ALVINA Harley      senna-docusate sodium  1 tablet Oral Daily PRN ALVINA Harley      sertraline  150 mg Oral HS Martin Cardenas MD         Risks / Benefits of Treatment:  Risks, benefits, and possible side effects of medications explained to patient. Patient has limited understanding of risks and benefits of treatment at this time, but agrees to take medications as prescribed.    Counseling / Coordination of Care:  Total floor/unit time spent today 25 minutes. Greater than 50% of total time was  spent with the patient and / or family counseling and / or coordination of care. A description of the counseling / coordination of care:   Patient's progress discussed with staff in treatment team meeting.  Medications, treatment progress and treatment plan reviewed with patient.   Educated on importance of medication and treatment compliance.  Reassurance and supportive therapy provided.   Encouraged participation in milieu and group therapy on the unit.    ALVINA Harley 09/12/24

## 2024-09-12 NOTE — SOCIAL WORK
"SW checked in with pt. Pt reports \"I'm ok\". Pt denied any concerns. SW explored current status of anxiety and assessed for stressors due to roommates medical concerns today. Pt denied any concerns and reports he's been going to groups. SW encouraged continued group participation.   "

## 2024-09-12 NOTE — PROGRESS NOTES
09/12/24 0735   Team Meeting   Meeting Type Daily Rounds   Team Members Present   Team Members Present Physician;Nurse;;Other (Discipline and Name)   Patient/Family Present   Patient Present No   Patient's Family Present No     In attendance:  MD Mahamed Sousa, RN  Judi Garcia, [facility]  Mer Sales, [facility]  Gris Arizmendi M.S.    Groups: 5/10    Pt reports some anxiety but is overall at his baseline. Pt scheduled for CRR assessment 9/17, ACT assessment 9/24. No bx concerns noted; pt remains flat but polite, appropriate, engaged.

## 2024-09-12 NOTE — NURSING NOTE
Patient is polite and cooperative. He has been more interactive with writer and peers today. He is visible in the milieu and attending groups. Appetite is good. No behavior issues.

## 2024-09-13 PROCEDURE — 99232 SBSQ HOSP IP/OBS MODERATE 35: CPT | Performed by: PSYCHIATRY & NEUROLOGY

## 2024-09-13 RX ADMIN — ATORVASTATIN CALCIUM 10 MG: 10 TABLET, FILM COATED ORAL at 08:39

## 2024-09-13 RX ADMIN — CHOLECALCIFEROL TAB 25 MCG (1000 UNIT) 2000 UNITS: 25 TAB at 08:39

## 2024-09-13 RX ADMIN — LAMOTRIGINE 50 MG: 25 TABLET ORAL at 08:39

## 2024-09-13 RX ADMIN — SERTRALINE HYDROCHLORIDE 150 MG: 100 TABLET ORAL at 21:19

## 2024-09-13 RX ADMIN — HYDROXYZINE HYDROCHLORIDE 25 MG: 25 TABLET ORAL at 16:46

## 2024-09-13 RX ADMIN — HYDROXYZINE HYDROCHLORIDE 25 MG: 25 TABLET ORAL at 08:38

## 2024-09-13 RX ADMIN — LISINOPRIL 10 MG: 10 TABLET ORAL at 08:39

## 2024-09-13 RX ADMIN — CARIPRAZINE 3 MG: 3 CAPSULE, GELATIN COATED ORAL at 08:39

## 2024-09-13 RX ADMIN — CYANOCOBALAMIN TAB 1000 MCG 1000 MCG: 1000 TAB at 08:39

## 2024-09-13 RX ADMIN — HYDROXYZINE HYDROCHLORIDE 25 MG: 25 TABLET ORAL at 21:19

## 2024-09-13 NOTE — NURSING NOTE
Patient is pleasant and cooperative. He is quiet spoken. He is complaint with medications. He is anxious for discharge. He attends groups and is interactive with staff and select peers. No behavior issues.

## 2024-09-13 NOTE — PLAN OF CARE
Problem: Alteration in Thoughts and Perception  Goal: Treatment Goal: Gain control of psychotic behaviors/thinking, reduce/eliminate presenting symptoms and demonstrate improved reality functioning upon discharge  Outcome: Progressing  Goal: Verbalize thoughts and feelings  Description: Interventions:  - Promote a nonjudgmental and trusting relationship with the patient through active listening and therapeutic communication  - Assess patient's level of functioning, behavior and potential for risk  - Engage patient in 1 on 1 interactions  - Encourage patient to express fears, feelings, frustrations, and discuss symptoms    - Dale patient to reality, help patient recognize reality-based thinking   - Administer medications as ordered and assess for potential side effects  - Provide the patient education related to the signs and symptoms of the illness and desired effects of prescribed medications  Outcome: Progressing  Goal: Refrain from acting on delusional thinking/internal stimuli  Description: Interventions:  - Monitor patient closely, per order   - Utilize least restrictive measures   - Set reasonable limits, give positive feedback for acceptable   - Administer medications as ordered and monitor of potential side effects  Outcome: Progressing  Goal: Agree to be compliant with medication regime, as prescribed and report medication side effects  Description: Interventions:  - Offer appropriate PRN medication and supervise ingestion; conduct AIMS, as needed   Outcome: Progressing  Goal: Attend and participate in unit activities, including therapeutic, recreational, and educational groups  Description: Interventions:  -Encourage Visitation and family involvement in care  Outcome: Progressing  Goal: Recognize dysfunctional thoughts, communicate reality-based thoughts at the time of discharge  Description: Interventions:  - Provide medication and psycho-education to assist patient in compliance and developing  insight into his/her illness   Outcome: Progressing  Goal: Complete daily ADLs, including personal hygiene independently, as able  Description: Interventions:  - Observe, teach, and assist patient with ADLS  - Monitor and promote a balance of rest/activity, with adequate nutrition and elimination   Outcome: Progressing     Problem: Ineffective Coping  Goal: Patient/Family participate in treatment and DC plans  Description: Interventions:  - Provide therapeutic environment  Outcome: Progressing  Goal: Patient/Family verbalizes awareness of resources  Outcome: Progressing  Goal: Understands least restrictive measures  Description: Interventions:  - Utilize least restrictive behavior  Outcome: Progressing  Goal: Free from restraint events  Description: - Utilize least restrictive measures   - Provide behavioral interventions   - Redirect inappropriate behaviors   Outcome: Progressing     Problem: Risk for Self Injury/Neglect  Goal: Treatment Goal: Remain safe during length of stay, learn and adopt new coping skills, and be free of self-injurious ideation, impulses and acts at the time of discharge  Outcome: Progressing  Goal: Verbalize thoughts and feelings  Description: Interventions:  - Assess and re-assess patient's lethality and potential for self-injury  - Engage patient in 1:1 interactions, daily, for a minimum of 15 minutes  - Encourage patient to express feelings, fears, frustrations, hopes  - Establish rapport/trust with patient   Outcome: Progressing  Goal: Refrain from harming self  Description: Interventions:  - Monitor patient closely, per order  - Develop a trusting relationship  - Supervise medication ingestion, monitor effects and side effects   Outcome: Progressing  Goal: Attend and participate in unit activities, including therapeutic, recreational, and educational groups  Description: Interventions:  - Provide therapeutic and educational activities daily, encourage attendance and participation, and  document same in the medical record  - Obtain collateral information, encourage visitation and family involvement in care   Outcome: Progressing  Goal: Complete daily ADLs, including personal hygiene independently, as able  Description: Interventions:  - Observe, teach, and assist patient with ADLS  - Monitor and promote a balance of rest/activity, with adequate nutrition and elimination  Outcome: Progressing     Problem: Depression  Goal: Treatment Goal: Demonstrate behavioral control of depressive symptoms, verbalize feelings of improved mood/affect, and adopt new coping skills prior to discharge  Outcome: Progressing  Goal: Refrain from harming self  Description: Interventions:  - Monitor patient closely, per order   - Supervise medication ingestion, monitor effects and side effects   Outcome: Progressing  Goal: Refrain from isolation  Description: Interventions:  - Develop a trusting relationship   - Encourage socialization   Outcome: Progressing  Goal: Refrain from self-neglect  Outcome: Progressing  Goal: Complete daily ADLs, including personal hygiene independently, as able  Description: Interventions:  - Observe, teach, and assist patient with ADLS  -  Monitor and promote a balance of rest/activity, with adequate nutrition and elimination   Outcome: Progressing     Problem: Risk for Violence/Aggression Toward Others  Goal: Treatment Goal: Refrain from acts of violence/aggression during length of stay, and demonstrate improved impulse control at the time of discharge  Outcome: Progressing  Goal: Control angry outbursts  Description: Interventions:  - Monitor patient closely, per order  - Ensure early verbal de-escalation  - Monitor prn medication needs  - Set reasonable/therapeutic limits, outline behavioral expectations, and consequences   - Provide a non-threatening milieu, utilizing the least restrictive interventions   Outcome: Progressing  Goal: Identify appropriate positive anger management  techniques  Description: Interventions:  - Offer anger management and coping skills groups   - Staff will provide positive feedback for appropriate anger control  Outcome: Progressing     Problem: Alteration in Orientation  Goal: Treatment Goal: Demonstrate a reduction of confusion and improved orientation to person, place, time and/or situation upon discharge, according to optimum baseline/ability  Outcome: Progressing     Problem: SELF HARM/SUICIDALITY  Goal: Will have no self-injury during hospital stay  Description: INTERVENTIONS:  - Q 15 MINUTES: Routine safety checks  - Q WAKING SHIFT & PRN: Assess risk to determine if routine checks are adequate to maintain patient safety  - Encourage patient to participate actively in care by formulating a plan to combat response to suicidal ideation, identify supports and resources  Outcome: Progressing     Problem: DEPRESSION  Goal: Will be euthymic at discharge  Description: INTERVENTIONS:  - Administer medication as ordered  - Provide emotional support via 1:1 interaction with staff  - Encourage involvement in milieu/groups/activities  - Monitor for social isolation  Outcome: Progressing     Problem: ANXIETY  Goal: Will report anxiety at manageable levels  Description: INTERVENTIONS:  - Administer medication as ordered  - Teach and encourage coping skills  - Provide emotional support  - Assess patient/family for anxiety and ability to cope  Outcome: Progressing  Goal: By discharge: Patient will verbalize 2 strategies to deal with anxiety  Description: Interventions:  - Identify any obvious source/trigger to anxiety  - Staff will assist patient in applying identified coping technique/skills  - Encourage attendance of scheduled groups and activities  Outcome: Progressing     Problem: DISCHARGE PLANNING - CARE MANAGEMENT  Goal: Discharge to post-acute care or home with appropriate resources  Description: INTERVENTIONS:  - Conduct assessment to determine patient/family and  health care team treatment goals, and need for post-acute services based on payer coverage, community resources, and patient preferences, and barriers to discharge  - Address psychosocial, clinical, and financial barriers to discharge as identified in assessment in conjunction with the patient/family and health care team  - Arrange appropriate level of post-acute services according to patient’s   needs and preference and payer coverage in collaboration with the physician and health care team  - Communicate with and update the patient/family, physician, and health care team regarding progress on the discharge plan  - Arrange appropriate transportation to post-acute venues  Outcome: Progressing

## 2024-09-13 NOTE — SOCIAL WORK
"SW met 1:1 with pt.   Pt inquired about details/plan regarding his CRR assessment Tuesday. SW provided all details known at this time.   SW and pt processed feelings otherwise; pt denied any concerns or needs and reports feeling \"a little antsy\" regarding feeling ready to discharge but waiting.   SW validated challenges with waiting and encouraged continued engagement with treatment.   "

## 2024-09-13 NOTE — PROGRESS NOTES
09/13/24 0728   Team Meeting   Meeting Type Daily Rounds   Team Members Present   Team Members Present Physician;Nurse;;Other (Discipline and Name)   Patient/Family Present   Patient Present No   Patient's Family Present No     In attendance:  Dr. Alex Thomas, MD Dr. Jordan Holter, DO Mahamed Haywood, RN  Judi Garcia, [facility]  Mer Sales, [facility]  Gris Arizmendi, M.S.    Groups: 4/10    Pt denies symptoms, reports baseline, no changes. Pt pleasant and cooperative. Pt CRR assessment 9/17.

## 2024-09-13 NOTE — NURSING NOTE
Germain maintained on ongoing Safe precaution without incident  on this shift.  Awake, alert, isolative and cooperative upon approach.  Attended and participated 4 out of 10 group today.  Continues to be compliant with medication regimen.  Denies any pain, or discomfort.  Denies delusion or anxiety.  No overt delusion or A/T/V hallucination noted. Behavior control.

## 2024-09-13 NOTE — PROGRESS NOTES
Progress Note - Behavioral Health   Name: Deyvi Cao 54 y.o. male I MRN: [id number]   Unit/Bed#: [facility] 101-02 I Date of Admission: 6/25/2024   Date of Service: 9/13/2024 I Hospital Day: 80       Assessment & Plan  Bipolar disorder with severe depression (HCC)  Progressing  Awaiting placement - CRR interview on 9/17 and ACT interview on 9/24  Continue Vraylar 3mg PO Daily, Atarax 25mg PO TID, Lamictal 50mg PO Daily, add Zoloft 150mg PO QHS  Continue with group therapy, milieu therapy and occupational therapy   Behavioral Health checks every 7 minutes   Continue frequent safety checks and vitals per unit protocol  Continue with [facility] medical management as indicated  Benign essential hypertension  Managed by [facility]  Continue Lisinopril 10mg PO Daily  Mixed hyperlipidemia  Managed by [facility]  Continue Lipitor 10mg PO Daily    Allergic rhinitis due to allergen  Managed by [facility]  Rosacea  Managed by [facility]      Subjective:    Patient was seen today for continuation of care, records reviewed and patient was discussed with the morning case review team.    Deyvi was seen today for psychiatric follow-up.  On assessment today, Deyvi was found laying in bed.  He is calm and cooperative.  Deyvi reports adequate daytime energy and denies any difficulties with initiating or staying asleep.  Oral appetite and hydration is adequate.  Mild depression and anxiety, but looking forward to upcoming group home tour and ACT team tour.   We reviewed once more the specific as-needed medications they can use going forward if they experience any insomnia or destabilization of their mood, they understood and were agreeable. Milieu visibility and group attendance encouraged to promote an active participation in treatment.    Deyvi denies acute suicidal/self-harm ideation/intent/plan upon direct inquiry at this time. Deyvi is able to contract for safety while on the unit and would feel comfortable seeking staff support should suicidal  symptoms or urges appear or worsen. Deyvi remains behaviorally appropriate, no agitation or aggression noted on exam or in report. Deyvi also denies HI/AH/VH, and does not appear overtly manic.  Patient does not verbalize any experiences that can be categorized as paranoid, persecutory, bizarre, or somatic delusions. Deyvi remains adherent to his current psychotropic medication regimen and denies any side effects from medications, as well as none noted on exam.    Discharge to an unsupervised setting will represent a significant deterioration in ability to function and present a severe risk to the patients physical and mental health.  Patient cannot be safety treated at a lower level of care and requires further psychiatric evaluation, medication treatment, other treatment which can be reasonably be provided only in a psychiatric hospital.    Group Attendance: 4 / 10  Treatment Team: SOLEDAD  Psychiatric PRN's Needed: None    Review of Systems:  Behavior over the last 24 hours: Unchanged  Sleep: sleeping okay throughout the night  Appetite: adequate  Medication side effects: none reported  ROS:no complaints    Objective:    Vitals:  Vitals:    09/13/24 0741   BP: 138/79   Pulse: 90   Resp: 18   Temp: 97.8 °F (36.6 °C)   SpO2: 98%     Laboratory Results:  I have personally reviewed all pertinent laboratory/tests results.  Most Recent Labs:   Lab Results   Component Value Date    WBC 7.39 06/26/2024    RBC 4.70 06/26/2024    HGB 15.2 06/26/2024    HCT 45.5 06/26/2024     06/26/2024    RDW 12.9 06/26/2024    NEUTROABS 4.63 06/26/2024    SODIUM 137 06/26/2024    K 4.1 06/26/2024     06/26/2024    CO2 26 06/26/2024    BUN 17 06/26/2024    CREATININE 0.63 06/26/2024    GLUC 98 06/26/2024    GLUF 98 06/26/2024    CALCIUM 9.6 06/26/2024    AST 25 06/26/2024    ALT 45 06/26/2024    ALKPHOS 114 (H) 06/26/2024    TP 7.0 06/26/2024    ALB 4.4 06/26/2024    TBILI 0.44 06/26/2024    CHOLESTEROL 177 06/26/2024     HDL 52 06/26/2024    TRIG 73 06/26/2024    LDLCALC 110 (H) 06/26/2024    NONHDLC 125 06/26/2024    LITHIUM 0.4 (L) 01/28/2021    [id number] 2.636 06/26/2024    HGBA1C 5.2 11/22/2023     11/22/2023     Mental Status Evaluation:  Appearance:  age appropriate, dressed appropriately   Behavior:  pleasant, cooperative, calm   Speech:  normal volume   Mood:  mildly anxious, mildly depressed   Affect:  constricted   Thought Process:  goal directed   Associations: intact associations   Thought Content:  no overt delusions   Perceptual Disturbances: no auditory hallucinations, no visual hallucinations, denies when asked, does not appear responding to internal stimuli   Risk Potential: Suicidal ideation - None at present, contracts for safety on the unit, would talk to staff if not feeling safe on the unit  Homicidal ideation - None at present  Potential for aggression - Not at present   Sensorium:  oriented to person, place, and time/date   Memory:  recent memory intact   Consciousness:  alert and awake   Attention/Concentration: attention span and concentration appear shorter than expected for age   Insight:  improving   Judgment: improving   Gait/Station: normal gait/station, normal balance   Motor Activity: no abnormal movements     Progress Toward Goals: making gradual improvement.  Deyvi continues to require inpatient psychiatric hospitalization for continued medication management and titration to optimize symptom reduction, improve sleep hygiene, and demonstrate adequate self-care.     Suicide/Homicide Risk Assessment:  Risk of Harm to Self:   Nursing Suicide Risk Assessment Last 24 hours: C-SSRS Risk (Since Last Contact)  Calculated C-SSRS Risk Score (Since Last Contact): No Risk Indicated    Risk of Harm to Others:  Nursing Homicide Risk Assessment: Violence Risk to Others: Denies within past 6 months    Behavioral Health Medications: all current active meds have been reviewed and continue current  psychiatric medications.  Current Facility-Administered Medications   Medication Dose Route Frequency Provider Last Rate    acetaminophen  650 mg Oral Q6H PRN ALVINA Harley      acetaminophen  650 mg Oral Q4H PRN ALVINA Harley      acetaminophen  975 mg Oral Q6H PRN ALVINA Harley      aluminum-magnesium hydroxide-simethicone  30 mL Oral Q4H PRN ALVINA Harley      ammonium lactate   Topical BID PRN ALVINA Harley      atorvastatin  10 mg Oral Daily ALVINA Lewis      benztropine  1 mg Intramuscular Q4H PRN Max 6/day ALVINA Harley      benztropine  1 mg Oral Q4H PRN Max 6/day ALVINA Harley      bisacodyl  10 mg Rectal Daily PRN ALVINA Harley      cariprazine  3 mg Oral Daily Martin Cardenas MD      Cholecalciferol  2,000 Units Oral Daily ALVINA Lewis      cyanocobalamin  1,000 mcg Oral Daily ALVINA Lewis      hydrOXYzine HCL  25 mg Oral Q6H PRN Max 4/day ALVINA Harley      hydrOXYzine HCL  25 mg Oral TID Martin Cardenas MD      hydrOXYzine HCL  50 mg Oral Q4H PRN Max 4/day ALVINA Harley      Or    LORazepam  1 mg Intramuscular Q4H PRN ALVINA Harley      lamoTRIgine  50 mg Oral Daily Martin Cardenas MD      lisinopril  10 mg Oral Daily ALVINA Lewis      LORazepam  1 mg Oral Q4H PRN Max 6/day ALVINA Harley      Or    LORazepam  2 mg Intramuscular Q6H PRN Max 3/day ALVINA Harley      OLANZapine  10 mg Oral Q3H PRN Max 3/day ALVINA Harley      Or    OLANZapine  10 mg Intramuscular Q3H PRN Max 3/day ALVINA Harley      OLANZapine  5 mg Oral Q3H PRN Max 6/day ALVINA Harley      Or    OLANZapine  5 mg Intramuscular Q3H PRN Max 6/day ALVINA Harley      OLANZapine  2.5 mg Oral Q3H PRN Max 8/day ALVINA Harley      polyethylene glycol  17 g Oral Daily PRN ALVINA Harley      propranolol  10 mg Oral Q8H PRN ALVINA Harley-docusate sodium  1 tablet Oral Daily PRN  ALVINA Harley      sertraline  150 mg Oral HS Martin Cardenas MD       Risks / Benefits of Treatment:  Risks, benefits, and possible side effects of medications explained to patient. Patient has limited understanding of risks and benefits of treatment at this time, but agrees to take medications as prescribed.    Counseling / Coordination of Care:  Total floor/unit time spent today 25 minutes. Greater than 50% of total time was spent with the patient and / or family counseling and / or coordination of care. A description of the counseling / coordination of care:   Patient's progress discussed with staff in treatment team meeting.  Medications, treatment progress and treatment plan reviewed with patient.   Educated on importance of medication and treatment compliance.  Reassurance and supportive therapy provided.   Encouraged participation in milieu and group therapy on the unit.    ALVINA Harley 09/13/24

## 2024-09-13 NOTE — NURSING NOTE
Received pt in bed at change of shift with eyes closed; chest movement noted.  Continues the same thus this far as per q 7 min room checks.   Will continue to monitor behavior, sleeping pattern and any medical issues that may arise.    0652 Sleeping 7+ hrs thus this far

## 2024-09-14 PROCEDURE — 99232 SBSQ HOSP IP/OBS MODERATE 35: CPT

## 2024-09-14 RX ADMIN — LISINOPRIL 10 MG: 10 TABLET ORAL at 08:33

## 2024-09-14 RX ADMIN — ATORVASTATIN CALCIUM 10 MG: 10 TABLET, FILM COATED ORAL at 08:33

## 2024-09-14 RX ADMIN — HYDROXYZINE HYDROCHLORIDE 25 MG: 25 TABLET ORAL at 08:33

## 2024-09-14 RX ADMIN — HYDROXYZINE HYDROCHLORIDE 25 MG: 25 TABLET ORAL at 21:13

## 2024-09-14 RX ADMIN — LAMOTRIGINE 50 MG: 25 TABLET ORAL at 08:33

## 2024-09-14 RX ADMIN — CYANOCOBALAMIN TAB 1000 MCG 1000 MCG: 1000 TAB at 08:33

## 2024-09-14 RX ADMIN — CARIPRAZINE 3 MG: 3 CAPSULE, GELATIN COATED ORAL at 08:33

## 2024-09-14 RX ADMIN — HYDROXYZINE HYDROCHLORIDE 25 MG: 25 TABLET ORAL at 17:06

## 2024-09-14 RX ADMIN — SERTRALINE HYDROCHLORIDE 150 MG: 100 TABLET ORAL at 21:13

## 2024-09-14 RX ADMIN — CHOLECALCIFEROL TAB 25 MCG (1000 UNIT) 2000 UNITS: 25 TAB at 08:33

## 2024-09-14 NOTE — PLAN OF CARE
Problem: Alteration in Thoughts and Perception  Goal: Treatment Goal: Gain control of psychotic behaviors/thinking, reduce/eliminate presenting symptoms and demonstrate improved reality functioning upon discharge  Outcome: Progressing  Goal: Verbalize thoughts and feelings  Description: Interventions:  - Promote a nonjudgmental and trusting relationship with the patient through active listening and therapeutic communication  - Assess patient's level of functioning, behavior and potential for risk  - Engage patient in 1 on 1 interactions  - Encourage patient to express fears, feelings, frustrations, and discuss symptoms    - [place] patient to reality, help patient recognize reality-based thinking   - Administer medications as ordered and assess for potential side effects  - Provide the patient education related to the signs and symptoms of the illness and desired effects of prescribed medications  Outcome: Progressing  Goal: Refrain from acting on delusional thinking/internal stimuli  Description: Interventions:  - Monitor patient closely, per order   - Utilize least restrictive measures   - Set reasonable limits, give positive feedback for acceptable   - Administer medications as ordered and monitor of potential side effects  Outcome: Progressing  Goal: Agree to be compliant with medication regime, as prescribed and report medication side effects  Description: Interventions:  - Offer appropriate PRN medication and supervise ingestion; conduct AIMS, as needed   Outcome: Progressing  Goal: Attend and participate in unit activities, including therapeutic, recreational, and educational groups  Description: Interventions:  -Encourage Visitation and family involvement in care  Outcome: Progressing  Goal: Recognize dysfunctional thoughts, communicate reality-based thoughts at the time of discharge  Description: Interventions:  - Provide medication and psycho-education to assist patient in compliance and developing  insight into his/her illness   Outcome: Progressing  Goal: Complete daily ADLs, including personal hygiene independently, as able  Description: Interventions:  - Observe, teach, and assist patient with ADLS  - Monitor and promote a balance of rest/activity, with adequate nutrition and elimination   Outcome: Progressing     Problem: Ineffective Coping  Goal: Identifies ineffective coping skills  Outcome: Progressing  Goal: Identifies healthy coping skills  Outcome: Progressing  Goal: Demonstrates healthy coping skills  Outcome: Progressing  Goal: Participates in unit activities  Description: Interventions:  - Provide therapeutic environment   - Provide required programming   - Redirect inappropriate behaviors   Outcome: Progressing  Goal: Patient/Family participate in treatment and DC plans  Description: Interventions:  - Provide therapeutic environment  Outcome: Progressing  Goal: Patient/Family verbalizes awareness of resources  Outcome: Progressing  Goal: Understands least restrictive measures  Description: Interventions:  - Utilize least restrictive behavior  Outcome: Progressing  Goal: Free from restraint events  Description: - Utilize least restrictive measures   - Provide behavioral interventions   - Redirect inappropriate behaviors   Outcome: Progressing     Problem: Risk for Self Injury/Neglect  Goal: Treatment Goal: Remain safe during length of stay, learn and adopt new coping skills, and be free of self-injurious ideation, impulses and acts at the time of discharge  Outcome: Progressing  Goal: Verbalize thoughts and feelings  Description: Interventions:  - Assess and re-assess patient's lethality and potential for self-injury  - Engage patient in 1:1 interactions, daily, for a minimum of 15 minutes  - Encourage patient to express feelings, fears, frustrations, hopes  - Establish rapport/trust with patient   Outcome: Progressing  Goal: Refrain from harming self  Description: Interventions:  - Monitor  patient closely, per order  - Develop a trusting relationship  - Supervise medication ingestion, monitor effects and side effects   Outcome: Progressing  Goal: Attend and participate in unit activities, including therapeutic, recreational, and educational groups  Description: Interventions:  - Provide therapeutic and educational activities daily, encourage attendance and participation, and document same in the medical record  - Obtain collateral information, encourage visitation and family involvement in care   Outcome: Progressing  Goal: Recognize maladaptive responses and adopt new coping mechanisms  Outcome: Progressing  Goal: Complete daily ADLs, including personal hygiene independently, as able  Description: Interventions:  - Observe, teach, and assist patient with ADLS  - Monitor and promote a balance of rest/activity, with adequate nutrition and elimination  Outcome: Progressing     Problem: Depression  Goal: Treatment Goal: Demonstrate behavioral control of depressive symptoms, verbalize feelings of improved mood/affect, and adopt new coping skills prior to discharge  Outcome: Progressing  Goal: Verbalize thoughts and feelings  Description: Interventions:  - Assess and re-assess patient's level of risk   - Engage patient in 1:1 interactions, daily, for a minimum of 15 minutes   - Encourage patient to express feelings, fears, frustrations, hopes   Outcome: Progressing  Goal: Refrain from harming self  Description: Interventions:  - Monitor patient closely, per order   - Supervise medication ingestion, monitor effects and side effects   Outcome: Progressing  Goal: Refrain from isolation  Description: Interventions:  - Develop a trusting relationship   - Encourage socialization   Outcome: Progressing  Goal: Refrain from self-neglect  Outcome: Progressing  Goal: Attend and participate in unit activities, including therapeutic, recreational, and educational groups  Description: Interventions:  - Provide  therapeutic and educational activities daily, encourage attendance and participation, and document same in the medical record   Outcome: Progressing  Goal: Complete daily ADLs, including personal hygiene independently, as able  Description: Interventions:  - Observe, teach, and assist patient with ADLS  -  Monitor and promote a balance of rest/activity, with adequate nutrition and elimination   Outcome: Progressing     Problem: Anxiety  Goal: Anxiety is at manageable level  Description: Interventions:  - Assess and monitor patient's anxiety level.   - Monitor for signs and symptoms (heart palpitations, chest pain, shortness of breath, headaches, nausea, feeling jumpy, restlessness, irritable, apprehensive).   - Collaborate with interdisciplinary team and initiate plan and interventions as ordered.  - Susan patient to unit/surroundings  - Explain treatment plan  - Encourage participation in care  - Encourage verbalization of concerns/fears  - Identify coping mechanisms  - Assist in developing anxiety-reducing skills  - Administer/offer alternative therapies  - Limit or eliminate stimulants  Outcome: Progressing     Problem: Risk for Violence/Aggression Toward Others  Goal: Treatment Goal: Refrain from acts of violence/aggression during length of stay, and demonstrate improved impulse control at the time of discharge  Outcome: Progressing  Goal: Verbalize thoughts and feelings  Description: Interventions:  - Assess and re-assess patient's level of risk, every waking shift  - Engage patient in 1:1 interactions, daily, for a minimum of 15 minutes   - Allow patient to express feelings and frustrations in a safe and non-threatening manner   - Establish rapport/trust with patient   Outcome: Progressing  Goal: Refrain from harming others  Outcome: Progressing  Goal: Refrain from destructive acts on the environment or property  Outcome: Progressing  Goal: Control angry outbursts  Description: Interventions:  - Monitor  patient closely, per order  - Ensure early verbal de-escalation  - Monitor prn medication needs  - Set reasonable/therapeutic limits, outline behavioral expectations, and consequences   - Provide a non-threatening milieu, utilizing the least restrictive interventions   Outcome: Progressing  Goal: Attend and participate in unit activities, including therapeutic, recreational, and educational groups  Description: Interventions:  - Provide therapeutic and educational activities daily, encourage attendance and participation, and document same in the medical record   Outcome: Progressing  Goal: Identify appropriate positive anger management techniques  Description: Interventions:  - Offer anger management and coping skills groups   - Staff will provide positive feedback for appropriate anger control  Outcome: Progressing     Problem: Alteration in Orientation  Goal: Treatment Goal: Demonstrate a reduction of confusion and improved orientation to person, place, time and/or situation upon discharge, according to optimum baseline/ability  Outcome: Progressing  Goal: Interact with staff daily  Description: Interventions:  - Assess and re-assess patient's level of orientation  - Engage patient in 1 on 1 interactions, daily, for a minimum of 15 minutes   - Establish rapport/trust with patient   Outcome: Progressing  Goal: Express concerns related to confused thinking related to:  Description: Interventions:  - Encourage patient to express feelings, fears, frustrations, hopes  - Assign consistent caregivers   - Berger/re-orient patient as needed  - Allow comfort items, as appropriate  - Provide visual cues, signs, etc.   Outcome: Progressing  Goal: Allow medical examinations, as recommended  Description: Interventions:  - Provide physical/neurological exams and/or referrals, per provider   Outcome: Progressing  Goal: Cooperate with recommended testing/procedures  Description: Interventions:  - Determine need for ancillary  testing  - Observe for mental status changes  - Implement falls/precaution protocol   Outcome: Progressing  Goal: Attend and participate in unit activities, including therapeutic, recreational, and educational groups  Description: Interventions:  - Provide therapeutic and educational activities daily, encourage attendance and participation, and document same in the medical record   - Provide appropriate opportunities for reminiscence   - Provide a consistent daily routine   - Encourage family contact/visitation   Outcome: Progressing  Goal: Complete daily ADLs, including personal hygiene independently, as able  Description: Interventions:  - Observe, teach, and assist patient with ADLS  Outcome: Progressing     Problem: SELF HARM/SUICIDALITY  Goal: Will have no self-injury during hospital stay  Description: INTERVENTIONS:  - Q 15 MINUTES: Routine safety checks  - Q WAKING SHIFT & PRN: Assess risk to determine if routine checks are adequate to maintain patient safety  - Encourage patient to participate actively in care by formulating a plan to combat response to suicidal ideation, identify supports and resources  Outcome: Progressing     Problem: DEPRESSION  Goal: Will be euthymic at discharge  Description: INTERVENTIONS:  - Administer medication as ordered  - Provide emotional support via 1:1 interaction with staff  - Encourage involvement in milieu/groups/activities  - Monitor for social isolation  Outcome: Progressing     Problem: ANXIETY  Goal: Will report anxiety at manageable levels  Description: INTERVENTIONS:  - Administer medication as ordered  - Teach and encourage coping skills  - Provide emotional support  - Assess patient/family for anxiety and ability to cope  Outcome: Progressing  Goal: By discharge: Patient will verbalize 2 strategies to deal with anxiety  Description: Interventions:  - Identify any obvious source/trigger to anxiety  - Staff will assist patient in applying identified coping  technique/skills  - Encourage attendance of scheduled groups and activities  Outcome: Progressing     Problem: SELF CARE DEFICIT  Goal: Return ADL status to a safe level of function  Description: INTERVENTIONS:  - Administer medication as ordered  - Assess ADL deficits and provide assistive devices as needed  - Obtain PT/OT consults as needed  - Assist and instruct patient to increase activity and self care as tolerated  Outcome: Progressing     Problem: DISCHARGE PLANNING - CARE MANAGEMENT  Goal: Discharge to post-acute care or home with appropriate resources  Description: INTERVENTIONS:  - Conduct assessment to determine patient/family and health care team treatment goals, and need for post-acute services based on payer coverage, community resources, and patient preferences, and barriers to discharge  - Address psychosocial, clinical, and financial barriers to discharge as identified in assessment in conjunction with the patient/family and health care team  - Arrange appropriate level of post-acute services according to patient’s   needs and preference and payer coverage in collaboration with the physician and health care team  - Communicate with and update the patient/family, physician, and health care team regarding progress on the discharge plan  - Arrange appropriate transportation to post-acute venues  Outcome: Progressing

## 2024-09-14 NOTE — NURSING NOTE
Deyvi has been visible intermittently in the milieu. Quiet and keeps to himself most of the time, but interacts with select peers. Brightens upon approach. Denies anxiety, depression, voices and pain. Ate 100% of his meals. Took medication without difficulty. Did not attend groups this shift.  No behavioral issue. Continue to monitor. Precautions maintained.

## 2024-09-14 NOTE — PROGRESS NOTES
Progress Note - Behavioral Health   Name: Deyvi Cao 54 y.o. male I MRN: [id number]   Unit/Bed#: [facility] 101-02 I Date of Admission: 6/25/2024   Date of Service: 9/14/2024 I Hospital Day: 81       Assessment & Plan  Bipolar disorder with severe depression (HCC)  Progressing  Awaiting placement - CRR interview on 9/17 and ACT interview on 9/24  Continue Vraylar 3mg PO Daily, Atarax 25mg PO TID, Lamictal 50mg PO Daily, add Zoloft 150mg PO QHS  Continue with group therapy, milieu therapy and occupational therapy   Behavioral Health checks every 7 minutes   Continue frequent safety checks and vitals per unit protocol  Continue with [facility] medical management as indicated  Benign essential hypertension  Managed by [facility]  Continue Lisinopril 10mg PO Daily  Mixed hyperlipidemia  Managed by [facility]  Continue Lipitor 10mg PO Daily    Allergic rhinitis due to allergen  Managed by [facility]  Rosacea  Managed by [facility]      Subjective:    Patient was seen today for continuation of care, records reviewed and patient was discussed with the morning case review team.    Deyvi was seen today for psychiatric follow-up.  On assessment today, Deyvi was found pacing the halls. Per nursing patient has been pleasant and cooperative, soft spoken, compliant with medications, anxious for discharge, attends group and interactive with staff and select peers and no behavioral issues noted. Upon approach he is calm and cooperative.  Deyvi reports stable mood, rates both depression and anxiety 2/10, no issues with medications or side effects. Sleep and appetite is adequate.    Deyvi denies acute suicidal/self-harm ideation/intent/plan upon direct inquiry at this time. Deyvi is able to contract for safety while on the unit and would feel comfortable seeking staff support should suicidal symptoms or urges appear or worsen. Deyvi remains behaviorally appropriate, no agitation or aggression noted on exam or in report. Deyvi also denies HI/AH/VH, and  does not appear overtly manic.  Patient does not verbalize any experiences that can be categorized as paranoid, persecutory, bizarre, or somatic delusions. Deyvi remains adherent to his current psychotropic medication regimen and denies any side effects from medications, as well as none noted on exam.    Discharge to an unsupervised setting will represent a significant deterioration in ability to function and present a severe risk to the patients physical and mental health.  Patient cannot be safety treated at a lower level of care and requires further psychiatric evaluation, medication treatment, other treatment which can be reasonably be provided only in a psychiatric hospital.    Group Attendance: 4 / 10  Treatment Team: SOLEDAD  Psychiatric PRN's Needed: None    Review of Systems:  Behavior over the last 24 hours: Unchanged  Sleep: sleeping okay throughout the night  Appetite: adequate  Medication side effects: none reported  ROS:no complaints    Objective:    Vitals:  Vitals:    09/14/24 0740   BP: 123/92   Pulse: 95   Resp: 18   Temp: (!) 97.2 °F (36.2 °C)   SpO2: 94%     Laboratory Results:  I have personally reviewed all pertinent laboratory/tests results.  Most Recent Labs:   Lab Results   Component Value Date    WBC 7.39 06/26/2024    RBC 4.70 06/26/2024    HGB 15.2 06/26/2024    HCT 45.5 06/26/2024     06/26/2024    RDW 12.9 06/26/2024    NEUTROABS 4.63 06/26/2024    SODIUM 137 06/26/2024    K 4.1 06/26/2024     06/26/2024    CO2 26 06/26/2024    BUN 17 06/26/2024    CREATININE 0.63 06/26/2024    GLUC 98 06/26/2024    GLUF 98 06/26/2024    CALCIUM 9.6 06/26/2024    AST 25 06/26/2024    ALT 45 06/26/2024    ALKPHOS 114 (H) 06/26/2024    TP 7.0 06/26/2024    ALB 4.4 06/26/2024    TBILI 0.44 06/26/2024    CHOLESTEROL 177 06/26/2024    HDL 52 06/26/2024    TRIG 73 06/26/2024    LDLCALC 110 (H) 06/26/2024    NONHDLC 125 06/26/2024    LITHIUM 0.4 (L) 01/28/2021    [id number] 2.636 06/26/2024     HGBA1C 5.2 11/22/2023     11/22/2023     Mental Status Evaluation:  Appearance:  age appropriate, dressed appropriately   Behavior:  pleasant, cooperative, calm   Speech:  normal volume   Mood:  mildly anxious, mildly depressed   Affect:  constricted   Thought Process:  goal directed   Associations: intact associations   Thought Content:  no overt delusions   Perceptual Disturbances: no auditory hallucinations, no visual hallucinations, denies when asked, does not appear responding to internal stimuli   Risk Potential: Suicidal ideation - None at present, contracts for safety on the unit, would talk to staff if not feeling safe on the unit  Homicidal ideation - None at present  Potential for aggression - Not at present   Sensorium:  oriented to person, place, and time/date   Memory:  recent memory intact   Consciousness:  alert and awake   Attention/Concentration: attention span and concentration appear shorter than expected for age   Insight:  improving   Judgment: improving   Gait/Station: normal gait/station, normal balance   Motor Activity: no abnormal movements     Progress Toward Goals: making gradual improvement.  Deyvi continues to require inpatient psychiatric hospitalization for continued medication management and titration to optimize symptom reduction, improve sleep hygiene, and demonstrate adequate self-care.     Suicide/Homicide Risk Assessment:  Risk of Harm to Self:   Nursing Suicide Risk Assessment Last 24 hours: C-SSRS Risk (Since Last Contact)  Calculated C-SSRS Risk Score (Since Last Contact): No Risk Indicated    Risk of Harm to Others:  Nursing Homicide Risk Assessment: Violence Risk to Others: Denies within past 6 months    Behavioral Health Medications: all current active meds have been reviewed and continue current psychiatric medications.  Current Facility-Administered Medications   Medication Dose Route Frequency Provider Last Rate    acetaminophen  650 mg Oral Q6H EZEKIELN Melva  ALVINA Knutson      acetaminophen  650 mg Oral Q4H PRN ALVINA Harley      acetaminophen  975 mg Oral Q6H PRN ALVINA Harley      aluminum-magnesium hydroxide-simethicone  30 mL Oral Q4H PRN ALVINA Harley      ammonium lactate   Topical BID PRN ALVINA Harley      atorvastatin  10 mg Oral Daily ALVINA Lewis      benztropine  1 mg Intramuscular Q4H PRN Max 6/day ALVINA Harley      benztropine  1 mg Oral Q4H PRN Max 6/day ALVINA Harley      bisacodyl  10 mg Rectal Daily PRN ALVINA Harley      cariprazine  3 mg Oral Daily Martin Cardenas MD      Cholecalciferol  2,000 Units Oral Daily ALVINA Lewis      cyanocobalamin  1,000 mcg Oral Daily ALVINA Lewis      hydrOXYzine HCL  25 mg Oral Q6H PRN Max 4/day ALVINA Harley      hydrOXYzine HCL  25 mg Oral TID Martin Cardenas MD      hydrOXYzine HCL  50 mg Oral Q4H PRN Max 4/day ALVINA Harley      Or    LORazepam  1 mg Intramuscular Q4H PRN ALVINA Harley      lamoTRIgine  50 mg Oral Daily Martin Cardenas MD      lisinopril  10 mg Oral Daily ALVINA Lewis      LORazepam  1 mg Oral Q4H PRN Max 6/day ALVINA Harley      Or    LORazepam  2 mg Intramuscular Q6H PRN Max 3/day ALVINA Harley      OLANZapine  10 mg Oral Q3H PRN Max 3/day ALVINA Harley      Or    OLANZapine  10 mg Intramuscular Q3H PRN Max 3/day ALVINA Harley      OLANZapine  5 mg Oral Q3H PRN Max 6/day ALVINA Harley      Or    OLANZapine  5 mg Intramuscular Q3H PRN Max 6/day ALVINA Harley      OLANZapine  2.5 mg Oral Q3H PRN Max 8/day ALVINA Harley      polyethylene glycol  17 g Oral Daily PRN ALVINA Harley      propranolol  10 mg Oral Q8H PRN ALVINA Harley      senna-docusate sodium  1 tablet Oral Daily PRN ALVINA Harley      sertraline  150 mg Oral HS Martin Cardenas MD       Risks / Benefits of Treatment:  Risks, benefits, and possible side effects of medications  explained to patient. Patient has limited understanding of risks and benefits of treatment at this time, but agrees to take medications as prescribed.    Counseling / Coordination of Care:  Total floor/unit time spent today 25 minutes. Greater than 50% of total time was spent with the patient and / or family counseling and / or coordination of care. A description of the counseling / coordination of care:   Patient's progress discussed with staff in treatment team meeting.  Medications, treatment progress and treatment plan reviewed with patient.   Educated on importance of medication and treatment compliance.  Reassurance and supportive therapy provided.   Encouraged participation in milieu and group therapy on the unit.    ALVINA Correa 09/14/24

## 2024-09-15 VITALS
HEART RATE: 95 BPM | RESPIRATION RATE: 18 BRPM | OXYGEN SATURATION: 92 % | DIASTOLIC BLOOD PRESSURE: 74 MMHG | TEMPERATURE: 97.5 F | WEIGHT: 210.13 LBS | HEIGHT: 72 IN | BODY MASS INDEX: 28.46 KG/M2 | SYSTOLIC BLOOD PRESSURE: 122 MMHG

## 2024-09-15 PROCEDURE — 99232 SBSQ HOSP IP/OBS MODERATE 35: CPT

## 2024-09-15 RX ADMIN — CARIPRAZINE 3 MG: 3 CAPSULE, GELATIN COATED ORAL at 08:29

## 2024-09-15 RX ADMIN — HYDROXYZINE HYDROCHLORIDE 25 MG: 25 TABLET ORAL at 08:29

## 2024-09-15 RX ADMIN — CHOLECALCIFEROL TAB 25 MCG (1000 UNIT) 2000 UNITS: 25 TAB at 08:30

## 2024-09-15 RX ADMIN — HYDROXYZINE HYDROCHLORIDE 25 MG: 25 TABLET ORAL at 17:08

## 2024-09-15 RX ADMIN — SERTRALINE HYDROCHLORIDE 150 MG: 100 TABLET ORAL at 21:07

## 2024-09-15 RX ADMIN — LAMOTRIGINE 50 MG: 25 TABLET ORAL at 08:29

## 2024-09-15 RX ADMIN — CYANOCOBALAMIN TAB 1000 MCG 1000 MCG: 1000 TAB at 08:29

## 2024-09-15 RX ADMIN — ATORVASTATIN CALCIUM 10 MG: 10 TABLET, FILM COATED ORAL at 08:29

## 2024-09-15 RX ADMIN — LISINOPRIL 10 MG: 10 TABLET ORAL at 08:29

## 2024-09-15 RX ADMIN — HYDROXYZINE HYDROCHLORIDE 25 MG: 25 TABLET ORAL at 21:07

## 2024-09-15 NOTE — NURSING NOTE
Deyvi was alert and oriented.  Denied all psych sx on this shift.  Visible intermittently, keeps to himself.  Med and meal compliant, consumed 100% of dinner.  Pt offers no complaints or concerns.

## 2024-09-15 NOTE — NURSING NOTE
Deyvi has been visible intermittently in the milieu. No interaction with peers noted. Able to make needs known to staff. Denies anxiety, depression, voices and pain. Attended Community Meeting. Ate 100% of meals. Took medication without difficulty. Naps/rests in bed at times. Quiet and keeps to himself. Weekly Wellness Assessment WDL, except for dry skin on feet and thick yellowish toenails. No issues or behaviors. Continue to monitor. Precautions maintained.

## 2024-09-15 NOTE — PLAN OF CARE
Problem: Alteration in Thoughts and Perception  Goal: Refrain from acting on delusional thinking/internal stimuli  Description: Interventions:  - Monitor patient closely, per order   - Utilize least restrictive measures   - Set reasonable limits, give positive feedback for acceptable   - Administer medications as ordered and monitor of potential side effects  Outcome: Progressing  Goal: Agree to be compliant with medication regime, as prescribed and report medication side effects  Description: Interventions:  - Offer appropriate PRN medication and supervise ingestion; conduct AIMS, as needed   Outcome: Progressing  Goal: Recognize dysfunctional thoughts, communicate reality-based thoughts at the time of discharge  Description: Interventions:  - Provide medication and psycho-education to assist patient in compliance and developing insight into his/her illness   Outcome: Progressing     Problem: Ineffective Coping  Goal: Understands least restrictive measures  Description: Interventions:  - Utilize least restrictive behavior  Outcome: Progressing  Goal: Free from restraint events  Description: - Utilize least restrictive measures   - Provide behavioral interventions   - Redirect inappropriate behaviors   Outcome: Progressing     Problem: Risk for Self Injury/Neglect  Goal: Refrain from harming self  Description: Interventions:  - Monitor patient closely, per order  - Develop a trusting relationship  - Supervise medication ingestion, monitor effects and side effects   Outcome: Progressing     Problem: Alteration in Thoughts and Perception  Goal: Attend and participate in unit activities, including therapeutic, recreational, and educational groups  Description: Interventions:  -Encourage Visitation and family involvement in care  Outcome: Not Progressing     Problem: Ineffective Coping  Goal: Participates in unit activities  Description: Interventions:  - Provide therapeutic environment   - Provide required  programming   - Redirect inappropriate behaviors   Outcome: Not Progressing     Problem: Risk for Self Injury/Neglect  Goal: Attend and participate in unit activities, including therapeutic, recreational, and educational groups  Description: Interventions:  - Provide therapeutic and educational activities daily, encourage attendance and participation, and document same in the medical record  - Obtain collateral information, encourage visitation and family involvement in care   Outcome: Not Progressing

## 2024-09-15 NOTE — PROGRESS NOTES
Progress Note - Behavioral Health   Name: Deyvi Cao 54 y.o. male I MRN: [id number]   Unit/Bed#: [facility] 101-02 I Date of Admission: 6/25/2024   Date of Service: 9/15/2024 I Hospital Day: 82       Assessment & Plan  Bipolar disorder with severe depression (HCC)  Progressing  Awaiting placement - CRR interview on 9/17 and ACT interview on 9/24  Continue Vraylar 3mg PO Daily, Atarax 25mg PO TID, Lamictal 50mg PO Daily, add Zoloft 150mg PO QHS  Continue with group therapy, milieu therapy and occupational therapy   Behavioral Health checks every 7 minutes   Continue frequent safety checks and vitals per unit protocol  Continue with [facility] medical management as indicated  Benign essential hypertension  Managed by [facility]  Continue Lisinopril 10mg PO Daily  Mixed hyperlipidemia  Managed by [facility]  Continue Lipitor 10mg PO Daily    Allergic rhinitis due to allergen  Managed by [facility]  Rosacea  Managed by [facility]      Subjective:    Patient was seen today for continuation of care, records reviewed and patient was discussed with the morning case review team.    Deyvi was seen today for psychiatric follow-up.  On assessment today, Deyvi was found resting in his bed. Per nursing patient has been quiet and keeps to self mostly but interacts with select peers. No behavioral issues noted. He brightens upon approach, pleasant and cooperative, soft spoken, compliant with medications. Deyvi reports stable mood, reports minimal depression and anxiety, no issues with medications or side effects. Sleep and appetite is adequate.    Deyvi denies acute suicidal/self-harm ideation/intent/plan upon direct inquiry at this time. Deyvi is able to contract for safety while on the unit and would feel comfortable seeking staff support should suicidal symptoms or urges appear or worsen. Deyvi remains behaviorally appropriate, no agitation or aggression noted on exam or in report. Deyvi also denies HI/AH/VH, and does not appear overtly manic.   Patient does not verbalize any experiences that can be categorized as paranoid, persecutory, bizarre, or somatic delusions. Deyvi remains adherent to his current psychotropic medication regimen and denies any side effects from medications, as well as none noted on exam.    Discharge to an unsupervised setting will represent a significant deterioration in ability to function and present a severe risk to the patients physical and mental health.  Patient cannot be safety treated at a lower level of care and requires further psychiatric evaluation, medication treatment, other treatment which can be reasonably be provided only in a psychiatric hospital.    Group Attendance: 4 / 10  Treatment Team: SOLEDAD  Psychiatric PRN's Needed: None    Review of Systems:  Behavior over the last 24 hours: Unchanged  Sleep: sleeping okay throughout the night  Appetite: adequate  Medication side effects: none reported  ROS:no complaints    Objective:    Vitals:  Vitals:    09/15/24 0748   BP: 127/71   Pulse: 97   Resp: 16   Temp: 97.7 °F (36.5 °C)   SpO2: 91%     Laboratory Results:  I have personally reviewed all pertinent laboratory/tests results.  Most Recent Labs:   Lab Results   Component Value Date    WBC 7.39 06/26/2024    RBC 4.70 06/26/2024    HGB 15.2 06/26/2024    HCT 45.5 06/26/2024     06/26/2024    RDW 12.9 06/26/2024    NEUTROABS 4.63 06/26/2024    SODIUM 137 06/26/2024    K 4.1 06/26/2024     06/26/2024    CO2 26 06/26/2024    BUN 17 06/26/2024    CREATININE 0.63 06/26/2024    GLUC 98 06/26/2024    GLUF 98 06/26/2024    CALCIUM 9.6 06/26/2024    AST 25 06/26/2024    ALT 45 06/26/2024    ALKPHOS 114 (H) 06/26/2024    TP 7.0 06/26/2024    ALB 4.4 06/26/2024    TBILI 0.44 06/26/2024    CHOLESTEROL 177 06/26/2024    HDL 52 06/26/2024    TRIG 73 06/26/2024    LDLCALC 110 (H) 06/26/2024    NONHDLC 125 06/26/2024    LITHIUM 0.4 (L) 01/28/2021    [id number] 2.636 06/26/2024    HGBA1C 5.2 11/22/2023      11/22/2023     Mental Status Evaluation:  Appearance:  age appropriate, dressed appropriately   Behavior:  pleasant, cooperative, calm   Speech:  normal volume   Mood:  mildly anxious, mildly depressed   Affect:  constricted   Thought Process:  goal directed   Associations: intact associations   Thought Content:  no overt delusions   Perceptual Disturbances: no auditory hallucinations, no visual hallucinations, denies when asked, does not appear responding to internal stimuli   Risk Potential: Suicidal ideation - None at present, contracts for safety on the unit, would talk to staff if not feeling safe on the unit  Homicidal ideation - None at present  Potential for aggression - Not at present   Sensorium:  oriented to person, place, and time/date   Memory:  recent memory intact   Consciousness:  alert and awake   Attention/Concentration: attention span and concentration appear shorter than expected for age   Insight:  improving   Judgment: improving   Gait/Station: normal gait/station, normal balance   Motor Activity: no abnormal movements     Progress Toward Goals: making gradual improvement.  Deyvi continues to require inpatient psychiatric hospitalization for continued medication management and titration to optimize symptom reduction, improve sleep hygiene, and demonstrate adequate self-care.     Suicide/Homicide Risk Assessment:  Risk of Harm to Self:   Nursing Suicide Risk Assessment Last 24 hours: C-SSRS Risk (Since Last Contact)  Calculated C-SSRS Risk Score (Since Last Contact): No Risk Indicated    Risk of Harm to Others:  Nursing Homicide Risk Assessment: Violence Risk to Others: Denies within past 6 months    Behavioral Health Medications: all current active meds have been reviewed and continue current psychiatric medications.  Current Facility-Administered Medications   Medication Dose Route Frequency Provider Last Rate    acetaminophen  650 mg Oral Q6H PRN ALVINA Harley      acetaminophen  650  mg Oral Q4H PRN ALVINA Harley      acetaminophen  975 mg Oral Q6H PRN ALVINA Harley      aluminum-magnesium hydroxide-simethicone  30 mL Oral Q4H PRN ALVINA Harley      ammonium lactate   Topical BID PRN ALVINA Harley      atorvastatin  10 mg Oral Daily Sary LinkALVIAN Thompson      benztropine  1 mg Intramuscular Q4H PRN Max 6/day ALVINA Harley      benztropine  1 mg Oral Q4H PRN Max 6/day ALVINA Harley      bisacodyl  10 mg Rectal Daily PRN ALVINA Harley      cariprazine  3 mg Oral Daily Martin Cardenas MD      Cholecalciferol  2,000 Units Oral Daily Sary ALVINA Gupta      cyanocobalamin  1,000 mcg Oral Daily ALVINA Lewis      hydrOXYzine HCL  25 mg Oral Q6H PRN Max 4/day ALVINA Harley      hydrOXYzine HCL  25 mg Oral TID Martin Cardenas MD      hydrOXYzine HCL  50 mg Oral Q4H PRN Max 4/day ALVINA Harley      Or    LORazepam  1 mg Intramuscular Q4H PRN ALVINA Harley      lamoTRIgine  50 mg Oral Daily Martin Cardenas MD      lisinopril  10 mg Oral Daily ALVINA Lewis      LORazepam  1 mg Oral Q4H PRN Max 6/day ALVINA Harley      Or    LORazepam  2 mg Intramuscular Q6H PRN Max 3/day ALVINA Harley      OLANZapine  10 mg Oral Q3H PRN Max 3/day ALVINA Harley      Or    OLANZapine  10 mg Intramuscular Q3H PRN Max 3/day ALVINA Harley      OLANZapine  5 mg Oral Q3H PRN Max 6/day ALVINA Harlye      Or    OLANZapine  5 mg Intramuscular Q3H PRN Max 6/day ALVINA Harley      OLANZapine  2.5 mg Oral Q3H PRN Max 8/day ALVINA Harley      polyethylene glycol  17 g Oral Daily PRN ALVINA Harley      propranolol  10 mg Oral Q8H PRN ALVINA Harley      senna-docusate sodium  1 tablet Oral Daily PRN ALVINA Harley      sertraline  150 mg Oral HS Matrin Cardenas MD       Risks / Benefits of Treatment:  Risks, benefits, and possible side effects of medications explained to patient. Patient has  limited understanding of risks and benefits of treatment at this time, but agrees to take medications as prescribed.    Counseling / Coordination of Care:  Total floor/unit time spent today 25 minutes. Greater than 50% of total time was spent with the patient and / or family counseling and / or coordination of care. A description of the counseling / coordination of care:   Patient's progress discussed with staff in treatment team meeting.  Medications, treatment progress and treatment plan reviewed with patient.   Educated on importance of medication and treatment compliance.  Reassurance and supportive therapy provided.   Encouraged participation in milieu and group therapy on the unit.    ALVINA Correa 09/15/24

## 2024-09-16 PROCEDURE — 99232 SBSQ HOSP IP/OBS MODERATE 35: CPT | Performed by: PSYCHIATRY & NEUROLOGY

## 2024-09-16 RX ADMIN — ATORVASTATIN CALCIUM 10 MG: 10 TABLET, FILM COATED ORAL at 08:31

## 2024-09-16 RX ADMIN — CARIPRAZINE 3 MG: 3 CAPSULE, GELATIN COATED ORAL at 08:31

## 2024-09-16 RX ADMIN — HYDROXYZINE HYDROCHLORIDE 25 MG: 25 TABLET ORAL at 21:08

## 2024-09-16 RX ADMIN — CHOLECALCIFEROL TAB 25 MCG (1000 UNIT) 2000 UNITS: 25 TAB at 08:31

## 2024-09-16 RX ADMIN — HYDROXYZINE HYDROCHLORIDE 25 MG: 25 TABLET ORAL at 17:02

## 2024-09-16 RX ADMIN — CYANOCOBALAMIN TAB 1000 MCG 1000 MCG: 1000 TAB at 08:31

## 2024-09-16 RX ADMIN — LAMOTRIGINE 50 MG: 25 TABLET ORAL at 08:30

## 2024-09-16 RX ADMIN — HYDROXYZINE HYDROCHLORIDE 25 MG: 25 TABLET ORAL at 08:30

## 2024-09-16 RX ADMIN — SERTRALINE HYDROCHLORIDE 150 MG: 100 TABLET ORAL at 21:08

## 2024-09-16 RX ADMIN — LISINOPRIL 10 MG: 10 TABLET ORAL at 08:30

## 2024-09-16 NOTE — NURSING NOTE
Pt is visible on the unit and social with select peers. Consumed 100% of dinner. Took medications without incidence. Pt is pleasant and cooperative. Attended 4/8 groups. Denies anxiety, depression, and SI/HI/AVH. No behavioral issues. Pt offers no complaints. Continuous safety checks maintained.

## 2024-09-16 NOTE — QUICK NOTE
Was notified yesterday late in the afternoon that this patient's weekly assessment he has gained 2.4 pounds in 1 week.  From a slim standpoint of view I do not really think there is much that we can do I think psychiatry needs to look at it.  I think that the nursing staff on the unit needs to offer him healthier snacks and keep an eye on what he is eating.

## 2024-09-16 NOTE — NURSING NOTE
Deyvi has been playing cards with select peers.  Med and meal compliant.  Offers no complaints or concerns.

## 2024-09-16 NOTE — PLAN OF CARE
Problem: Alteration in Thoughts and Perception  Goal: Treatment Goal: Gain control of psychotic behaviors/thinking, reduce/eliminate presenting symptoms and demonstrate improved reality functioning upon discharge  Outcome: Progressing  Goal: Verbalize thoughts and feelings  Description: Interventions:  - Promote a nonjudgmental and trusting relationship with the patient through active listening and therapeutic communication  - Assess patient's level of functioning, behavior and potential for risk  - Engage patient in 1 on 1 interactions  - Encourage patient to express fears, feelings, frustrations, and discuss symptoms    - [place] patient to reality, help patient recognize reality-based thinking   - Administer medications as ordered and assess for potential side effects  - Provide the patient education related to the signs and symptoms of the illness and desired effects of prescribed medications  Outcome: Progressing  Goal: Refrain from acting on delusional thinking/internal stimuli  Description: Interventions:  - Monitor patient closely, per order   - Utilize least restrictive measures   - Set reasonable limits, give positive feedback for acceptable   - Administer medications as ordered and monitor of potential side effects  Outcome: Progressing  Goal: Agree to be compliant with medication regime, as prescribed and report medication side effects  Description: Interventions:  - Offer appropriate PRN medication and supervise ingestion; conduct AIMS, as needed   Outcome: Progressing  Goal: Attend and participate in unit activities, including therapeutic, recreational, and educational groups  Description: Interventions:  -Encourage Visitation and family involvement in care  Outcome: Progressing  Goal: Recognize dysfunctional thoughts, communicate reality-based thoughts at the time of discharge  Description: Interventions:  - Provide medication and psycho-education to assist patient in compliance and developing  insight into his/her illness   Outcome: Progressing  Goal: Complete daily ADLs, including personal hygiene independently, as able  Description: Interventions:  - Observe, teach, and assist patient with ADLS  - Monitor and promote a balance of rest/activity, with adequate nutrition and elimination   Outcome: Progressing     Problem: Ineffective Coping  Goal: Identifies ineffective coping skills  Outcome: Progressing  Goal: Identifies healthy coping skills  Outcome: Progressing  Goal: Demonstrates healthy coping skills  Outcome: Progressing  Goal: Participates in unit activities  Description: Interventions:  - Provide therapeutic environment   - Provide required programming   - Redirect inappropriate behaviors   Outcome: Progressing  Goal: Patient/Family participate in treatment and DC plans  Description: Interventions:  - Provide therapeutic environment  Outcome: Progressing  Goal: Patient/Family verbalizes awareness of resources  Outcome: Progressing  Goal: Understands least restrictive measures  Description: Interventions:  - Utilize least restrictive behavior  Outcome: Progressing  Goal: Free from restraint events  Description: - Utilize least restrictive measures   - Provide behavioral interventions   - Redirect inappropriate behaviors   Outcome: Progressing     Problem: Risk for Self Injury/Neglect  Goal: Treatment Goal: Remain safe during length of stay, learn and adopt new coping skills, and be free of self-injurious ideation, impulses and acts at the time of discharge  Outcome: Progressing  Goal: Verbalize thoughts and feelings  Description: Interventions:  - Assess and re-assess patient's lethality and potential for self-injury  - Engage patient in 1:1 interactions, daily, for a minimum of 15 minutes  - Encourage patient to express feelings, fears, frustrations, hopes  - Establish rapport/trust with patient   Outcome: Progressing  Goal: Refrain from harming self  Description: Interventions:  - Monitor  patient closely, per order  - Develop a trusting relationship  - Supervise medication ingestion, monitor effects and side effects   Outcome: Progressing  Goal: Attend and participate in unit activities, including therapeutic, recreational, and educational groups  Description: Interventions:  - Provide therapeutic and educational activities daily, encourage attendance and participation, and document same in the medical record  - Obtain collateral information, encourage visitation and family involvement in care   Outcome: Progressing  Goal: Recognize maladaptive responses and adopt new coping mechanisms  Outcome: Progressing  Goal: Complete daily ADLs, including personal hygiene independently, as able  Description: Interventions:  - Observe, teach, and assist patient with ADLS  - Monitor and promote a balance of rest/activity, with adequate nutrition and elimination  Outcome: Progressing     Problem: Depression  Goal: Treatment Goal: Demonstrate behavioral control of depressive symptoms, verbalize feelings of improved mood/affect, and adopt new coping skills prior to discharge  Outcome: Progressing  Goal: Verbalize thoughts and feelings  Description: Interventions:  - Assess and re-assess patient's level of risk   - Engage patient in 1:1 interactions, daily, for a minimum of 15 minutes   - Encourage patient to express feelings, fears, frustrations, hopes   Outcome: Progressing  Goal: Refrain from harming self  Description: Interventions:  - Monitor patient closely, per order   - Supervise medication ingestion, monitor effects and side effects   Outcome: Progressing  Goal: Refrain from isolation  Description: Interventions:  - Develop a trusting relationship   - Encourage socialization   Outcome: Progressing  Goal: Refrain from self-neglect  Outcome: Progressing  Goal: Attend and participate in unit activities, including therapeutic, recreational, and educational groups  Description: Interventions:  - Provide  therapeutic and educational activities daily, encourage attendance and participation, and document same in the medical record   Outcome: Progressing  Goal: Complete daily ADLs, including personal hygiene independently, as able  Description: Interventions:  - Observe, teach, and assist patient with ADLS  -  Monitor and promote a balance of rest/activity, with adequate nutrition and elimination   Outcome: Progressing     Problem: Anxiety  Goal: Anxiety is at manageable level  Description: Interventions:  - Assess and monitor patient's anxiety level.   - Monitor for signs and symptoms (heart palpitations, chest pain, shortness of breath, headaches, nausea, feeling jumpy, restlessness, irritable, apprehensive).   - Collaborate with interdisciplinary team and initiate plan and interventions as ordered.  - [place] patient to unit/surroundings  - Explain treatment plan  - Encourage participation in care  - Encourage verbalization of concerns/fears  - Identify coping mechanisms  - Assist in developing anxiety-reducing skills  - Administer/offer alternative therapies  - Limit or eliminate stimulants  Outcome: Progressing     Problem: Risk for Violence/Aggression Toward Others  Goal: Treatment Goal: Refrain from acts of violence/aggression during length of stay, and demonstrate improved impulse control at the time of discharge  Outcome: Progressing  Goal: Verbalize thoughts and feelings  Description: Interventions:  - Assess and re-assess patient's level of risk, every waking shift  - Engage patient in 1:1 interactions, daily, for a minimum of 15 minutes   - Allow patient to express feelings and frustrations in a safe and non-threatening manner   - Establish rapport/trust with patient   Outcome: Progressing  Goal: Refrain from harming others  Outcome: Progressing  Goal: Refrain from destructive acts on the environment or property  Outcome: Progressing  Goal: Control angry outbursts  Description: Interventions:  - Monitor  patient closely, per order  - Ensure early verbal de-escalation  - Monitor prn medication needs  - Set reasonable/therapeutic limits, outline behavioral expectations, and consequences   - Provide a non-threatening milieu, utilizing the least restrictive interventions   Outcome: Progressing  Goal: Attend and participate in unit activities, including therapeutic, recreational, and educational groups  Description: Interventions:  - Provide therapeutic and educational activities daily, encourage attendance and participation, and document same in the medical record   Outcome: Progressing  Goal: Identify appropriate positive anger management techniques  Description: Interventions:  - Offer anger management and coping skills groups   - Staff will provide positive feedback for appropriate anger control  Outcome: Progressing     Problem: Alteration in Orientation  Goal: Treatment Goal: Demonstrate a reduction of confusion and improved orientation to person, place, time and/or situation upon discharge, according to optimum baseline/ability  Outcome: Progressing  Goal: Interact with staff daily  Description: Interventions:  - Assess and re-assess patient's level of orientation  - Engage patient in 1 on 1 interactions, daily, for a minimum of 15 minutes   - Establish rapport/trust with patient   Outcome: Progressing  Goal: Express concerns related to confused thinking related to:  Description: Interventions:  - Encourage patient to express feelings, fears, frustrations, hopes  - Assign consistent caregivers   - [place]/re-orient patient as needed  - Allow comfort items, as appropriate  - Provide visual cues, signs, etc.   Outcome: Progressing  Goal: Allow medical examinations, as recommended  Description: Interventions:  - Provide physical/neurological exams and/or referrals, per provider   Outcome: Progressing  Goal: Cooperate with recommended testing/procedures  Description: Interventions:  - Determine need for ancillary  testing  - Observe for mental status changes  - Implement falls/precaution protocol   Outcome: Progressing  Goal: Attend and participate in unit activities, including therapeutic, recreational, and educational groups  Description: Interventions:  - Provide therapeutic and educational activities daily, encourage attendance and participation, and document same in the medical record   - Provide appropriate opportunities for reminiscence   - Provide a consistent daily routine   - Encourage family contact/visitation   Outcome: Progressing  Goal: Complete daily ADLs, including personal hygiene independently, as able  Description: Interventions:  - Observe, teach, and assist patient with ADLS  Outcome: Progressing     Problem: SELF HARM/SUICIDALITY  Goal: Will have no self-injury during hospital stay  Description: INTERVENTIONS:  - Q 15 MINUTES: Routine safety checks  - Q WAKING SHIFT & PRN: Assess risk to determine if routine checks are adequate to maintain patient safety  - Encourage patient to participate actively in care by formulating a plan to combat response to suicidal ideation, identify supports and resources  Outcome: Progressing     Problem: DEPRESSION  Goal: Will be euthymic at discharge  Description: INTERVENTIONS:  - Administer medication as ordered  - Provide emotional support via 1:1 interaction with staff  - Encourage involvement in milieu/groups/activities  - Monitor for social isolation  Outcome: Progressing     Problem: ANXIETY  Goal: Will report anxiety at manageable levels  Description: INTERVENTIONS:  - Administer medication as ordered  - Teach and encourage coping skills  - Provide emotional support  - Assess patient/family for anxiety and ability to cope  Outcome: Progressing  Goal: By discharge: Patient will verbalize 2 strategies to deal with anxiety  Description: Interventions:  - Identify any obvious source/trigger to anxiety  - Staff will assist patient in applying identified coping  technique/skills  - Encourage attendance of scheduled groups and activities  Outcome: Progressing     Problem: SELF CARE DEFICIT  Goal: Return ADL status to a safe level of function  Description: INTERVENTIONS:  - Administer medication as ordered  - Assess ADL deficits and provide assistive devices as needed  - Obtain PT/OT consults as needed  - Assist and instruct patient to increase activity and self care as tolerated  Outcome: Progressing     Problem: DISCHARGE PLANNING - CARE MANAGEMENT  Goal: Discharge to post-acute care or home with appropriate resources  Description: INTERVENTIONS:  - Conduct assessment to determine patient/family and health care team treatment goals, and need for post-acute services based on payer coverage, community resources, and patient preferences, and barriers to discharge  - Address psychosocial, clinical, and financial barriers to discharge as identified in assessment in conjunction with the patient/family and health care team  - Arrange appropriate level of post-acute services according to patient’s   needs and preference and payer coverage in collaboration with the physician and health care team  - Communicate with and update the patient/family, physician, and health care team regarding progress on the discharge plan  - Arrange appropriate transportation to post-acute venues  Outcome: Progressing

## 2024-09-16 NOTE — NURSING NOTE
Patient appears to be resting comfortably.  Eyes closed and chest movements noted. No signs of distress noted.

## 2024-09-16 NOTE — PROGRESS NOTES
09/16/24 0736   Team Meeting   Meeting Type Daily Rounds   Team Members Present   Team Members Present Physician;Nurse;;Other (Discipline and Name)   Patient/Family Present   Patient Present No   Patient's Family Present No     In attendance:  Dr. Alex Thomas, MD Dr. Jordan Holter, DO Mahamed Haywood, RN  Judi Garcia, [facility]  Mer Sales, [facility]  BARRETT Elmore.S.    Groups: 4/10    Pt has CRR assessment 9/17 at 11am. Pt had 2.4 lb weight gain; medical aware. Pt denies symptoms; no bx issues noted.

## 2024-09-16 NOTE — PROGRESS NOTES
Progress Note - Behavioral Health   Name: Deyvi Cao 54 y.o. male I MRN: [id number]   Unit/Bed#: [facility] 101-02 I Date of Admission: 6/25/2024   Date of Service: 9/16/2024 I Hospital Day: 83       Assessment & Plan  Bipolar disorder with severe depression (HCC)  Progressing  Awaiting placement - CRR interview on 9/17 and ACT interview on 9/24  Continue Vraylar 3mg PO Daily, Atarax 25mg PO TID, Lamictal 50mg PO Daily, add Zoloft 150mg PO QHS  Continue with group therapy, milieu therapy and occupational therapy   Behavioral Health checks every 7 minutes   Continue frequent safety checks and vitals per unit protocol  Continue with [facility] medical management as indicated  Benign essential hypertension  Managed by [facility]  Continue Lisinopril 10mg PO Daily  Mixed hyperlipidemia  Managed by [facility]  Continue Lipitor 10mg PO Daily    Allergic rhinitis due to allergen  Managed by [facility]  Rosacea  Managed by [facility]      Subjective:    Patient was seen today for continuation of care, records reviewed and patient was discussed with the morning case review team.    Deyvi was seen today for psychiatric follow-up.  On assessment today, Deyvi was found laying in his bed.  He is doing well, offers no new concerns.  Excited about upcoming tour and hopes to be discharged soon.  Deyvi reports adequate daytime energy and denies any difficulties with initiating or staying asleep.  Oral appetite and hydration is adequate.  We reviewed once more the specific as-needed medications they can use going forward if they experience any insomnia or destabilization of their mood, they understood and were agreeable. Milieu visibility and group attendance encouraged to promote an active participation in treatment.    Deyvi denies acute suicidal/self-harm ideation/intent/plan upon direct inquiry at this time. Deyvi is able to contract for safety while on the unit and would feel comfortable seeking staff support should suicidal symptoms or urges appear  or worsen. Deyvi remains behaviorally appropriate, no agitation or aggression noted on exam or in report. Deyvi also denies HI/AH/VH, and does not appear overtly manic.  Patient does not verbalize any experiences that can be categorized as paranoid, persecutory, bizarre, or somatic delusions. Deyvi remains adherent to his current psychotropic medication regimen and denies any side effects from medications, as well as none noted on exam.    Discharge to an unsupervised setting will represent a significant deterioration in ability to function and present a severe risk to the patients physical and mental health.  Patient cannot be safety treated at a lower level of care and requires further psychiatric evaluation, medication treatment, other treatment which can be reasonably be provided only in a psychiatric hospital.    Review of Systems:  Behavior over the last 24 hours: Slowly improving  Sleep: sleeping okay throughout the night  Appetite: adequate  Medication side effects: none reported  ROS:no complaints    Objective:    Vitals:  Vitals:    09/16/24 0741   BP: 147/78   Pulse: 96   Resp: 18   Temp: 97.7 °F (36.5 °C)   SpO2: 96%     Laboratory Results:  I have personally reviewed all pertinent laboratory/tests results.  Most Recent Labs:   Lab Results   Component Value Date    WBC 7.39 06/26/2024    RBC 4.70 06/26/2024    HGB 15.2 06/26/2024    HCT 45.5 06/26/2024     06/26/2024    RDW 12.9 06/26/2024    NEUTROABS 4.63 06/26/2024    SODIUM 137 06/26/2024    K 4.1 06/26/2024     06/26/2024    CO2 26 06/26/2024    BUN 17 06/26/2024    CREATININE 0.63 06/26/2024    GLUC 98 06/26/2024    GLUF 98 06/26/2024    CALCIUM 9.6 06/26/2024    AST 25 06/26/2024    ALT 45 06/26/2024    ALKPHOS 114 (H) 06/26/2024    TP 7.0 06/26/2024    ALB 4.4 06/26/2024    TBILI 0.44 06/26/2024    CHOLESTEROL 177 06/26/2024    HDL 52 06/26/2024    TRIG 73 06/26/2024    LDLCALC 110 (H) 06/26/2024    NONHDLC 125 06/26/2024     LITHIUM 0.4 (L) 01/28/2021    [id number] 2.636 06/26/2024    HGBA1C 5.2 11/22/2023     11/22/2023     Mental Status Evaluation:  Appearance:  age appropriate, casually dressed, dressed appropriately   Behavior:  pleasant, cooperative, calm   Speech:  normal rate, normal volume, normal pitch   Mood:  mildly anxious, mildly depressed   Affect:  constricted   Thought Process:  goal directed   Associations: intact associations   Thought Content:  no overt delusions   Perceptual Disturbances: no auditory hallucinations, no visual hallucinations, denies when asked, does not appear responding to internal stimuli   Risk Potential: Suicidal ideation - None at present, contracts for safety on the unit, would talk to staff if not feeling safe on the unit  Homicidal ideation - None at present  Potential for aggression - Not at present   Sensorium:  oriented to person, place, and time/date   Memory:  recent memory intact   Consciousness:  alert and awake   Attention/Concentration: attention span and concentration appear shorter than expected for age   Insight:  limited   Judgment: limited   Gait/Station: normal gait/station, normal balance   Motor Activity: no abnormal movements     Progress Toward Goals: making gradual improvement.  Deyvi continues to require inpatient psychiatric hospitalization for continued medication management and titration to optimize symptom reduction, improve sleep hygiene, and demonstrate adequate self-care.     Suicide/Homicide Risk Assessment:  Risk of Harm to Self:   Nursing Suicide Risk Assessment Last 24 hours: C-SSRS Risk (Since Last Contact)  Calculated C-SSRS Risk Score (Since Last Contact): No Risk Indicated    Risk of Harm to Others:  Nursing Homicide Risk Assessment: Violence Risk to Others: Denies within past 6 months    Behavioral Health Medications: all current active meds have been reviewed and continue current psychiatric medications.  Current Facility-Administered  Medications   Medication Dose Route Frequency Provider Last Rate    acetaminophen  650 mg Oral Q6H PRN ALVINA Harley      acetaminophen  650 mg Oral Q4H PRN ALVINA Harley      acetaminophen  975 mg Oral Q6H PRN ALVINA Harley      aluminum-magnesium hydroxide-simethicone  30 mL Oral Q4H PRN ALVINA Harley      ammonium lactate   Topical BID PRN ALVINA Harley      atorvastatin  10 mg Oral Daily ALVINA Lewis      benztropine  1 mg Intramuscular Q4H PRN Max 6/day ALVINA Harley      benztropine  1 mg Oral Q4H PRN Max 6/day ALVINA Harley      bisacodyl  10 mg Rectal Daily PRN ALVINA Harley      cariprazine  3 mg Oral Daily Martin Cardenas MD      Cholecalciferol  2,000 Units Oral Daily ALVINA Lewis      cyanocobalamin  1,000 mcg Oral Daily ALVINA Lewis      hydrOXYzine HCL  25 mg Oral Q6H PRN Max 4/day ALVINA Harley      hydrOXYzine HCL  25 mg Oral TID Martin Cardenas MD      hydrOXYzine HCL  50 mg Oral Q4H PRN Max 4/day ALVINA Harley      Or    LORazepam  1 mg Intramuscular Q4H PRN ALVINA Harley      lamoTRIgine  50 mg Oral Daily Martin Cardenas MD      lisinopril  10 mg Oral Daily ALVINA Lewis      LORazepam  1 mg Oral Q4H PRN Max 6/day ALVINA Harley      Or    LORazepam  2 mg Intramuscular Q6H PRN Max 3/day ALVINA Harley      OLANZapine  10 mg Oral Q3H PRN Max 3/day ALVINA Harley      Or    OLANZapine  10 mg Intramuscular Q3H PRN Max 3/day ALVINA Harley      OLANZapine  5 mg Oral Q3H PRN Max 6/day ALVINA Harley      Or    OLANZapine  5 mg Intramuscular Q3H PRN Max 6/day ALVINA Harley      OLANZapine  2.5 mg Oral Q3H PRN Max 8/day ALVINA Harley      polyethylene glycol  17 g Oral Daily PRN ALVINA Harley      propranolol  10 mg Oral Q8H PRN ALVINA Harley      senna-docusate sodium  1 tablet Oral Daily PRN ALVINA Harley      sertraline  150 mg Oral  Martin T  MD Ronald       Risks / Benefits of Treatment:  Risks, benefits, and possible side effects of medications explained to patient. Patient has limited understanding of risks and benefits of treatment at this time, but agrees to take medications as prescribed.    Counseling / Coordination of Care:  Total floor/unit time spent today 25 minutes. Greater than 50% of total time was spent with the patient and / or family counseling and / or coordination of care. A description of the counseling / coordination of care:   Patient's progress discussed with staff in treatment team meeting.  Medications, treatment progress and treatment plan reviewed with patient.   Educated on importance of medication and treatment compliance.  Reassurance and supportive therapy provided.   Encouraged participation in milieu and group therapy on the unit.    ALVINA Harley 09/16/24

## 2024-09-16 NOTE — NURSING NOTE
Patient is pleasant, cooperative and takes medication without issue. Pt is social with peers and attending groups, quiet , voices no concerns.

## 2024-09-16 NOTE — SOCIAL WORK
"SW and pt met 1:1  Pt is polite but guarded, quiet, scant. Pt denied any current concerns or difficulties. Pt reports he's \"doing ok\". SW reviewed plan for pt to have assessment for housing tomorrow. Pt denied having any questions at this time regarding that process. SW encouraged continued group attendance and offered additional support if needed.   "

## 2024-09-17 PROCEDURE — 99232 SBSQ HOSP IP/OBS MODERATE 35: CPT

## 2024-09-17 RX ADMIN — CYANOCOBALAMIN TAB 1000 MCG 1000 MCG: 1000 TAB at 08:33

## 2024-09-17 RX ADMIN — CARIPRAZINE 3 MG: 3 CAPSULE, GELATIN COATED ORAL at 08:34

## 2024-09-17 RX ADMIN — SERTRALINE HYDROCHLORIDE 150 MG: 100 TABLET ORAL at 21:17

## 2024-09-17 RX ADMIN — LAMOTRIGINE 50 MG: 25 TABLET ORAL at 08:34

## 2024-09-17 RX ADMIN — HYDROXYZINE HYDROCHLORIDE 25 MG: 25 TABLET ORAL at 08:33

## 2024-09-17 RX ADMIN — LISINOPRIL 10 MG: 10 TABLET ORAL at 08:34

## 2024-09-17 RX ADMIN — HYDROXYZINE HYDROCHLORIDE 25 MG: 25 TABLET ORAL at 16:56

## 2024-09-17 RX ADMIN — CHOLECALCIFEROL TAB 25 MCG (1000 UNIT) 2000 UNITS: 25 TAB at 08:33

## 2024-09-17 RX ADMIN — ATORVASTATIN CALCIUM 10 MG: 10 TABLET, FILM COATED ORAL at 08:34

## 2024-09-17 RX ADMIN — HYDROXYZINE HYDROCHLORIDE 25 MG: 25 TABLET ORAL at 21:17

## 2024-09-17 NOTE — NURSING NOTE
Pt is isolative to self and room, polite and cooperative. Consumed 100% of dinner. Took medications without incidence. Attended 0 groups today. Denies anxiety, depression, and SI/HI/AVH. No behavioral issues. Pt offers no complaints. Continuous safety checks maintained.

## 2024-09-17 NOTE — NURSING NOTE
Patient is pleasant and cooperative. He is quiet and soft spoken. States He is ready for discharge. He has some anxiety but is baseline. He did not attend groups today. Appetite is good. Medication complaint.

## 2024-09-17 NOTE — PROGRESS NOTES
09/17/24 0733   Team Meeting   Meeting Type Daily Rounds   Team Members Present   Team Members Present Physician;Nurse;;Other (Discipline and Name)   Patient/Family Present   Patient Present No   Patient's Family Present No     In attendance:  Dr. Alex Thomas, MD Dr. Jordan Holter, DO Mahamed Haywood, RN  Judi Garcia, [facility]  Mer Sales, [facility]  Gris Arizmendi M.S.    Groups: 4/8    Pt CRR assessment today at 11. No changes. Social with select peers. No symptoms reported; pt at baseline. ACT assessment next week.

## 2024-09-17 NOTE — SOCIAL WORK
Pt completed initial assessment with Josiah DOZIER. SW met with assessment team after they met with pt. Pt was reportedly quiet, reserved, not very forthcoming about his symptoms or past suicide attempts. SW provided additional clinical information. Assessment team reports feeling pt will be a good fit for their program. Pt's ACT assessment is next Tuesday at 11am.

## 2024-09-17 NOTE — PLAN OF CARE
Problem: Alteration in Thoughts and Perception  Goal: Treatment Goal: Gain control of psychotic behaviors/thinking, reduce/eliminate presenting symptoms and demonstrate improved reality functioning upon discharge  Outcome: Progressing  Goal: Refrain from acting on delusional thinking/internal stimuli  Description: Interventions:  - Monitor patient closely, per order   - Utilize least restrictive measures   - Set reasonable limits, give positive feedback for acceptable   - Administer medications as ordered and monitor of potential side effects  Outcome: Progressing  Goal: Agree to be compliant with medication regime, as prescribed and report medication side effects  Description: Interventions:  - Offer appropriate PRN medication and supervise ingestion; conduct AIMS, as needed   Outcome: Progressing  Goal: Recognize dysfunctional thoughts, communicate reality-based thoughts at the time of discharge  Description: Interventions:  - Provide medication and psycho-education to assist patient in compliance and developing insight into his/her illness   Outcome: Progressing     Problem: Ineffective Coping  Goal: Identifies ineffective coping skills  Outcome: Progressing  Goal: Identifies healthy coping skills  Outcome: Progressing  Goal: Demonstrates healthy coping skills  Outcome: Progressing     Problem: Risk for Self Injury/Neglect  Goal: Treatment Goal: Remain safe during length of stay, learn and adopt new coping skills, and be free of self-injurious ideation, impulses and acts at the time of discharge  Outcome: Progressing     Problem: Depression  Goal: Treatment Goal: Demonstrate behavioral control of depressive symptoms, verbalize feelings of improved mood/affect, and adopt new coping skills prior to discharge  Outcome: Progressing

## 2024-09-17 NOTE — PROGRESS NOTES
Progress Note - Behavioral Health   Name: Deyvi Cao 54 y.o. male I MRN: [id number]   Unit/Bed#: [facility] 101-02 I Date of Admission: 6/25/2024   Date of Service: 9/17/2024 I Hospital Day: 84       Assessment & Plan  Bipolar disorder with severe depression (HCC)  Progressing  Awaiting placement - CRR interview on 9/17 and ACT interview on 9/24  Continue Vraylar 3mg PO Daily, Atarax 25mg PO TID, Lamictal 50mg PO Daily, add Zoloft 150mg PO QHS  Continue with group therapy, milieu therapy and occupational therapy   Behavioral Health checks every 7 minutes   Continue frequent safety checks and vitals per unit protocol  Continue with [facility] medical management as indicated  Benign essential hypertension  Managed by [facility]  Continue Lisinopril 10mg PO Daily  Mixed hyperlipidemia  Managed by [facility]  Continue Lipitor 10mg PO Daily    Allergic rhinitis due to allergen  Managed by [facility]  Rosacea  Managed by [facility]    Subjective:    Patient was seen today for continuation of care, records reviewed and patient was discussed with the morning case review team.    Deyvi was seen today for psychiatric follow-up.  On assessment today, Deyvi was found in his bed.  He is doing well, offers no new concerns.  Deyiv reports adequate daytime energy and denies any difficulties with initiating or staying asleep.  Oral appetite and hydration is adequate.  He is excited about his upcoming interview today with potential group home.  We reviewed once more the specific as-needed medications they can use going forward if they experience any insomnia or destabilization of their mood, they understood and were agreeable. Milieu visibility and group attendance encouraged to promote an active participation in treatment.    Deyvi denies acute suicidal/self-harm ideation/intent/plan upon direct inquiry at this time. Deyvi is able to contract for safety while on the unit and would feel comfortable seeking staff support should suicidal symptoms or urges  appear or worsen. Deyvi remains behaviorally appropriate, no agitation or aggression noted on exam or in report. Deyvi also denies HI/AH/VH, and does not appear overtly manic.  Patient does not verbalize any experiences that can be categorized as paranoid, persecutory, bizarre, or somatic delusions. Deyvi remains adherent to his current psychotropic medication regimen and denies any side effects from medications, as well as none noted on exam.    Discharge to an unsupervised setting will represent a significant deterioration in ability to function and present a severe risk to the patients physical and mental health.  Patient cannot be safety treated at a lower level of care and requires further psychiatric evaluation, medication treatment, other treatment which can be reasonably be provided only in a psychiatric hospital.    Group Attendance: 4 / 8  Treatment Team: This Thursday  Psychiatric PRN's Needed: None    Review of Systems:  Behavior over the last 24 hours: Unchanged  Sleep: sleeping okay throughout the night  Appetite: adequate  Medication side effects: none reported  ROS:no complaints    Objective:    Vitals:  Vitals:    09/17/24 0737   BP: 136/77   Pulse: 94   Resp: 18   Temp: 98.1 °F (36.7 °C)   SpO2: 95%     Laboratory Results:  I have personally reviewed all pertinent laboratory/tests results.  Most Recent Labs:   Lab Results   Component Value Date    WBC 7.39 06/26/2024    RBC 4.70 06/26/2024    HGB 15.2 06/26/2024    HCT 45.5 06/26/2024     06/26/2024    RDW 12.9 06/26/2024    NEUTROABS 4.63 06/26/2024    SODIUM 137 06/26/2024    K 4.1 06/26/2024     06/26/2024    CO2 26 06/26/2024    BUN 17 06/26/2024    CREATININE 0.63 06/26/2024    GLUC 98 06/26/2024    GLUF 98 06/26/2024    CALCIUM 9.6 06/26/2024    AST 25 06/26/2024    ALT 45 06/26/2024    ALKPHOS 114 (H) 06/26/2024    TP 7.0 06/26/2024    ALB 4.4 06/26/2024    TBILI 0.44 06/26/2024    CHOLESTEROL 177 06/26/2024    HDL 52  06/26/2024    TRIG 73 06/26/2024    LDLCALC 110 (H) 06/26/2024    NONHDLC 125 06/26/2024    LITHIUM 0.4 (L) 01/28/2021    ZKC9FCUTMYYA 2.636 06/26/2024    HGBA1C 5.2 11/22/2023     11/22/2023     Mental Status Evaluation:  Appearance:  age appropriate, casually dressed   Behavior:  pleasant, cooperative, calm   Speech:  normal rate, normal volume, normal pitch   Mood:  mildly anxious, mildly depressed   Affect:  constricted   Thought Process:  goal directed   Associations: intact associations   Thought Content:  no overt delusions   Perceptual Disturbances: no auditory hallucinations, no visual hallucinations, denies when asked, does not appear responding to internal stimuli   Risk Potential: Suicidal ideation - None at present, contracts for safety on the unit, would talk to staff if not feeling safe on the unit  Homicidal ideation - None at present  Potential for aggression - Not at present   Sensorium:  oriented to person, place, and time/date   Memory:  recent and remote memory grossly intact   Consciousness:  alert and awake   Attention/Concentration: attention span and concentration appear shorter than expected for age   Insight:  limited   Judgment: limited   Gait/Station: normal gait/station, normal balance   Motor Activity: no abnormal movements     Progress Toward Goals: making gradual improvement.  Deyvi continues to require inpatient psychiatric hospitalization for continued medication management and titration to optimize symptom reduction, improve sleep hygiene, and demonstrate adequate self-care.     Suicide/Homicide Risk Assessment:  Risk of Harm to Self:   Nursing Suicide Risk Assessment Last 24 hours: C-SSRS Risk (Since Last Contact)  Calculated C-SSRS Risk Score (Since Last Contact): No Risk Indicated    Risk of Harm to Others:  Nursing Homicide Risk Assessment: Violence Risk to Others: Denies within past 6 months    Behavioral Health Medications: all current active meds have been reviewed  and continue current psychiatric medications.  Current Facility-Administered Medications   Medication Dose Route Frequency Provider Last Rate    acetaminophen  650 mg Oral Q6H PRN ALVINA Harley      acetaminophen  650 mg Oral Q4H PRN ALVINA Harley      acetaminophen  975 mg Oral Q6H PRN ALVINA Harley      aluminum-magnesium hydroxide-simethicone  30 mL Oral Q4H PRN ALVINA Harley      ammonium lactate   Topical BID PRN ALVINA Harley      atorvastatin  10 mg Oral Daily ALVINA Lewis      benztropine  1 mg Intramuscular Q4H PRN Max 6/day ALVINA Harley      benztropine  1 mg Oral Q4H PRN Max 6/day ALVINA Harley      bisacodyl  10 mg Rectal Daily PRN ALVINA Harley      cariprazine  3 mg Oral Daily Martin Cardenas MD      Cholecalciferol  2,000 Units Oral Daily ALVINA Lewis      cyanocobalamin  1,000 mcg Oral Daily ALVINA Lewis      hydrOXYzine HCL  25 mg Oral Q6H PRN Max 4/day ALVINA Harley      hydrOXYzine HCL  25 mg Oral TID Martin Cardenas MD      hydrOXYzine HCL  50 mg Oral Q4H PRN Max 4/day ALVINA Harley      Or    LORazepam  1 mg Intramuscular Q4H PRN ALVINA Harley      lamoTRIgine  50 mg Oral Daily Martin Cardenas MD      lisinopril  10 mg Oral Daily ALVINA Lewis      LORazepam  1 mg Oral Q4H PRN Max 6/day ALVINA Harley      Or    LORazepam  2 mg Intramuscular Q6H PRN Max 3/day ALVINA Harley      OLANZapine  10 mg Oral Q3H PRN Max 3/day ALVINA Harley      Or    OLANZapine  10 mg Intramuscular Q3H PRN Max 3/day ALVINA Harley      OLANZapine  5 mg Oral Q3H PRN Max 6/day ALVINA Harley      Or    OLANZapine  5 mg Intramuscular Q3H PRN Max 6/day ALVINA Harley      OLANZapine  2.5 mg Oral Q3H PRN Max 8/day ALVINA Harley      polyethylene glycol  17 g Oral Daily PRN ALVINA Harley      propranolol  10 mg Oral Q8H PRN ALVINA Harleyna-docusate sodium  1  tablet Oral Daily PRN ALVINA Harley      sertraline  150 mg Oral HS Martin Cardenas MD       Risks / Benefits of Treatment:  Risks, benefits, and possible side effects of medications explained to patient. Patient has limited understanding of risks and benefits of treatment at this time, but agrees to take medications as prescribed.    Counseling / Coordination of Care:  Total floor/unit time spent today 25 minutes. Greater than 50% of total time was spent with the patient and / or family counseling and / or coordination of care. A description of the counseling / coordination of care:   Patient's progress discussed with staff in treatment team meeting.  Medications, treatment progress and treatment plan reviewed with patient.   Educated on importance of medication and treatment compliance.  Reassurance and supportive therapy provided.   Encouraged participation in milieu and group therapy on the unit.    ALVINA Harley 09/17/24

## 2024-09-18 PROCEDURE — 99232 SBSQ HOSP IP/OBS MODERATE 35: CPT | Performed by: PSYCHIATRY & NEUROLOGY

## 2024-09-18 RX ADMIN — SERTRALINE HYDROCHLORIDE 150 MG: 100 TABLET ORAL at 21:20

## 2024-09-18 RX ADMIN — ATORVASTATIN CALCIUM 10 MG: 10 TABLET, FILM COATED ORAL at 08:40

## 2024-09-18 RX ADMIN — CHOLECALCIFEROL TAB 25 MCG (1000 UNIT) 2000 UNITS: 25 TAB at 08:41

## 2024-09-18 RX ADMIN — CYANOCOBALAMIN TAB 1000 MCG 1000 MCG: 1000 TAB at 08:41

## 2024-09-18 RX ADMIN — HYDROXYZINE HYDROCHLORIDE 25 MG: 25 TABLET ORAL at 21:20

## 2024-09-18 RX ADMIN — HYDROXYZINE HYDROCHLORIDE 25 MG: 25 TABLET ORAL at 17:19

## 2024-09-18 RX ADMIN — LISINOPRIL 10 MG: 10 TABLET ORAL at 08:40

## 2024-09-18 RX ADMIN — CARIPRAZINE 3 MG: 3 CAPSULE, GELATIN COATED ORAL at 08:40

## 2024-09-18 RX ADMIN — LAMOTRIGINE 50 MG: 25 TABLET ORAL at 08:40

## 2024-09-18 RX ADMIN — HYDROXYZINE HYDROCHLORIDE 25 MG: 25 TABLET ORAL at 08:40

## 2024-09-18 NOTE — PLAN OF CARE
Problem: Alteration in Thoughts and Perception  Goal: Refrain from acting on delusional thinking/internal stimuli  Description: Interventions:  - Monitor patient closely, per order   - Utilize least restrictive measures   - Set reasonable limits, give positive feedback for acceptable   - Administer medications as ordered and monitor of potential side effects  Outcome: Progressing  Goal: Agree to be compliant with medication regime, as prescribed and report medication side effects  Description: Interventions:  - Offer appropriate PRN medication and supervise ingestion; conduct AIMS, as needed   Outcome: Progressing  Goal: Recognize dysfunctional thoughts, communicate reality-based thoughts at the time of discharge  Description: Interventions:  - Provide medication and psycho-education to assist patient in compliance and developing insight into his/her illness   Outcome: Progressing  Goal: Complete daily ADLs, including personal hygiene independently, as able  Description: Interventions:  - Observe, teach, and assist patient with ADLS  - Monitor and promote a balance of rest/activity, with adequate nutrition and elimination   Outcome: Progressing     Problem: Ineffective Coping  Goal: Identifies ineffective coping skills  Outcome: Progressing  Goal: Identifies healthy coping skills  Outcome: Progressing  Goal: Demonstrates healthy coping skills  Outcome: Progressing     Problem: Risk for Self Injury/Neglect  Goal: Treatment Goal: Remain safe during length of stay, learn and adopt new coping skills, and be free of self-injurious ideation, impulses and acts at the time of discharge  Outcome: Progressing  Goal: Refrain from harming self  Description: Interventions:  - Monitor patient closely, per order  - Develop a trusting relationship  - Supervise medication ingestion, monitor effects and side effects   Outcome: Progressing  Goal: Recognize maladaptive responses and adopt new coping mechanisms  Outcome:  Progressing  Goal: Complete daily ADLs, including personal hygiene independently, as able  Description: Interventions:  - Observe, teach, and assist patient with ADLS  - Monitor and promote a balance of rest/activity, with adequate nutrition and elimination  Outcome: Progressing     Problem: Depression  Goal: Treatment Goal: Demonstrate behavioral control of depressive symptoms, verbalize feelings of improved mood/affect, and adopt new coping skills prior to discharge  Outcome: Progressing  Goal: Verbalize thoughts and feelings  Description: Interventions:  - Assess and re-assess patient's level of risk   - Engage patient in 1:1 interactions, daily, for a minimum of 15 minutes   - Encourage patient to express feelings, fears, frustrations, hopes   Outcome: Progressing  Goal: Complete daily ADLs, including personal hygiene independently, as able  Description: Interventions:  - Observe, teach, and assist patient with ADLS  -  Monitor and promote a balance of rest/activity, with adequate nutrition and elimination   Outcome: Progressing     Problem: Anxiety  Goal: Anxiety is at manageable level  Description: Interventions:  - Assess and monitor patient's anxiety level.   - Monitor for signs and symptoms (heart palpitations, chest pain, shortness of breath, headaches, nausea, feeling jumpy, restlessness, irritable, apprehensive).   - Collaborate with interdisciplinary team and initiate plan and interventions as ordered.  - Archie patient to unit/surroundings  - Explain treatment plan  - Encourage participation in care  - Encourage verbalization of concerns/fears  - Identify coping mechanisms  - Assist in developing anxiety-reducing skills  - Administer/offer alternative therapies  - Limit or eliminate stimulants  Outcome: Progressing     Problem: Risk for Violence/Aggression Toward Others  Goal: Refrain from destructive acts on the environment or property  Outcome: Progressing  Goal: Control angry outbursts  Description:  Interventions:  - Monitor patient closely, per order  - Ensure early verbal de-escalation  - Monitor prn medication needs  - Set reasonable/therapeutic limits, outline behavioral expectations, and consequences   - Provide a non-threatening milieu, utilizing the least restrictive interventions   Outcome: Progressing  Goal: Identify appropriate positive anger management techniques  Description: Interventions:  - Offer anger management and coping skills groups   - Staff will provide positive feedback for appropriate anger control  Outcome: Progressing     Problem: Alteration in Orientation  Goal: Interact with staff daily  Description: Interventions:  - Assess and re-assess patient's level of orientation  - Engage patient in 1 on 1 interactions, daily, for a minimum of 15 minutes   - Establish rapport/trust with patient   Outcome: Progressing     Problem: SELF HARM/SUICIDALITY  Goal: Will have no self-injury during hospital stay  Description: INTERVENTIONS:  - Q 15 MINUTES: Routine safety checks  - Q WAKING SHIFT & PRN: Assess risk to determine if routine checks are adequate to maintain patient safety  - Encourage patient to participate actively in care by formulating a plan to combat response to suicidal ideation, identify supports and resources  Outcome: Progressing     Problem: ANXIETY  Goal: Will report anxiety at manageable levels  Description: INTERVENTIONS:  - Administer medication as ordered  - Teach and encourage coping skills  - Provide emotional support  - Assess patient/family for anxiety and ability to cope  Outcome: Progressing

## 2024-09-18 NOTE — ASSESSMENT & PLAN NOTE
Progressing  Awaiting placement - CRR interview complete, ACT interview on 9/24  Continue Vraylar 3mg PO Daily, Atarax 25mg PO TID, Lamictal 50mg PO Daily, add Zoloft 150mg PO QHS  Continue with group therapy, milieu therapy and occupational therapy   Behavioral Health checks every 7 minutes   Continue frequent safety checks and vitals per unit protocol  Continue with [facility] medical management as indicated

## 2024-09-18 NOTE — NURSING NOTE
Deyvi was happy and smiling on approach today. He said his anxiety and depression are improving. He remains quiet and difficult to engage more than superficially as he seems somewhat shy  He denies SI and HI  He is excited about discharge back into the community

## 2024-09-18 NOTE — PROGRESS NOTES
09/18/24 0737   Team Meeting   Meeting Type Daily Rounds   Team Members Present   Team Members Present Physician;Nurse;;Other (Discipline and Name)   Patient/Family Present   Patient Present No   Patient's Family Present No     In attendance:  Dr. Alex Thomas, MD Dr. Jordan Holter, DO Mahamed Haywood, RN  Judi Garcia, [facility]  Mer Sales, [facility]  Gris Arizmendi, M.S.    Groups: 0/11    [facility] CRR assessment completed. Pt denies symptoms other than mild anxiety. No bx concerns. Waiting on ACT assessment 9/24.

## 2024-09-18 NOTE — PROGRESS NOTES
Progress Note - Behavioral Health   Name: Deyvi Cao 54 y.o. male I MRN: [id number]   Unit/Bed#: [facility] 101-02 I Date of Admission: 6/25/2024   Date of Service: 9/18/2024 I Hospital Day: 85       Assessment & Plan  Bipolar disorder with severe depression (HCC)  Progressing  Awaiting placement - CRR interview complete, ACT interview on 9/24  Continue Vraylar 3mg PO Daily, Atarax 25mg PO TID, Lamictal 50mg PO Daily, add Zoloft 150mg PO QHS  Continue with group therapy, milieu therapy and occupational therapy   Behavioral Health checks every 7 minutes   Continue frequent safety checks and vitals per unit protocol  Continue with [facility] medical management as indicated  Benign essential hypertension  Managed by [facility]  Continue Lisinopril 10mg PO Daily  Mixed hyperlipidemia  Managed by [facility]  Continue Lipitor 10mg PO Daily    Allergic rhinitis due to allergen  Managed by [facility]  Rosacea  Managed by [facility]    Subjective:    Patient was seen today for continuation of care, records reviewed and patient was discussed with the morning case review team.    Deyvi was seen today for psychiatric follow-up.  On assessment today, Deyvi was found in his room. He is doing okay, offers no new concerns.  Deyvi reports adequate daytime energy and denies any difficulties with initiating or staying asleep.  Oral appetite and hydration is adequate. Seemed optimistic about interview yesterday.  Reports only minimal depression and anxiety, but improved since admission.  We reviewed once more the specific as-needed medications they can use going forward if they experience any insomnia or destabilization of their mood, they understood and were agreeable. Milieu visibility and group attendance encouraged to promote an active participation in treatment.    Deyvi denies acute suicidal/self-harm ideation/intent/plan upon direct inquiry at this time. Deyvi is able to contract for safety while on the unit and would feel comfortable seeking staff  support should suicidal symptoms or urges appear or worsen. Deyvi remains behaviorally appropriate, no agitation or aggression noted on exam or in report. Deyvi also denies HI/AH/VH, and does not appear overtly manic.  Patient does not verbalize any experiences that can be categorized as paranoid, persecutory, bizarre, or somatic delusions. Deyvi remains adherent to his current psychotropic medication regimen and denies any side effects from medications, as well as none noted on exam.    Discharge to an unsupervised setting will represent a significant deterioration in ability to function and present a severe risk to the patients physical and mental health.  Patient cannot be safety treated at a lower level of care and requires further psychiatric evaluation, medication treatment, other treatment which can be reasonably be provided only in a psychiatric hospital.    Group Attendance: 0 / 11  Treatment Team: Tomorrow  Psychiatric PRN's Needed: None    Review of Systems:  Behavior over the last 24 hours: Unchanged  Sleep: sleeping okay throughout the night  Appetite: adequate  Medication side effects: none reported  ROS:no complaints    Objective:    Vitals:  Vitals:    09/18/24 0736   BP: 136/74   Pulse: 94   Resp: 18   Temp: 97.5 °F (36.4 °C)   SpO2: 93%     Laboratory Results:  I have personally reviewed all pertinent laboratory/tests results.  Most Recent Labs:   Lab Results   Component Value Date    WBC 7.39 06/26/2024    RBC 4.70 06/26/2024    HGB 15.2 06/26/2024    HCT 45.5 06/26/2024     06/26/2024    RDW 12.9 06/26/2024    NEUTROABS 4.63 06/26/2024    SODIUM 137 06/26/2024    K 4.1 06/26/2024     06/26/2024    CO2 26 06/26/2024    BUN 17 06/26/2024    CREATININE 0.63 06/26/2024    GLUC 98 06/26/2024    GLUF 98 06/26/2024    CALCIUM 9.6 06/26/2024    AST 25 06/26/2024    ALT 45 06/26/2024    ALKPHOS 114 (H) 06/26/2024    TP 7.0 06/26/2024    ALB 4.4 06/26/2024    TBILI 0.44 06/26/2024     CHOLESTEROL 177 06/26/2024    HDL 52 06/26/2024    TRIG 73 06/26/2024    LDLCALC 110 (H) 06/26/2024    NONHDLC 125 06/26/2024    LITHIUM 0.4 (L) 01/28/2021    LYG0PELNGHKR 2.636 06/26/2024    HGBA1C 5.2 11/22/2023     11/22/2023     Mental Status Evaluation:  Appearance:  age appropriate, casually dressed, dressed appropriately, adequate grooming   Behavior:  cooperative, calm   Speech:  scant, soft   Mood:  mildly anxious, mildly depressed   Affect:  constricted   Thought Process:  goal directed   Associations: intact associations   Thought Content:  no overt delusions   Perceptual Disturbances: no auditory hallucinations, no visual hallucinations, denies when asked, does not appear responding to internal stimuli   Risk Potential: Suicidal ideation - None at present, contracts for safety on the unit, would talk to staff if not feeling safe on the unit  Homicidal ideation - None at present  Potential for aggression - Not at present   Sensorium:  oriented to person, place, and time/date   Memory:  recent memory intact   Consciousness:  alert and awake   Attention/Concentration: attention span and concentration appear shorter than expected for age   Insight:  limited   Judgment: limited   Gait/Station: normal gait/station, normal balance   Motor Activity: no abnormal movements     Progress Toward Goals: making gradual improvement.  Deyvi continues to require inpatient psychiatric hospitalization for continued medication management and titration to optimize symptom reduction, improve sleep hygiene, and demonstrate adequate self-care.     Suicide/Homicide Risk Assessment:  Risk of Harm to Self:   Nursing Suicide Risk Assessment Last 24 hours: C-SSRS Risk (Since Last Contact)  Calculated C-SSRS Risk Score (Since Last Contact): No Risk Indicated    Risk of Harm to Others:  Nursing Homicide Risk Assessment: Violence Risk to Others: Denies within past 6 months    Behavioral Health Medications: all current active  meds have been reviewed and continue current psychiatric medications.  Current Facility-Administered Medications   Medication Dose Route Frequency Provider Last Rate    acetaminophen  650 mg Oral Q6H PRN ALVINA Harley      acetaminophen  650 mg Oral Q4H PRN ALVINA Harley      acetaminophen  975 mg Oral Q6H PRN ALVINA Harley      aluminum-magnesium hydroxide-simethicone  30 mL Oral Q4H PRN ALVINA Harley      ammonium lactate   Topical BID PRN ALVINA Harley      atorvastatin  10 mg Oral Daily ALVINA Lewis      benztropine  1 mg Intramuscular Q4H PRN Max 6/day ALVINA Harley      benztropine  1 mg Oral Q4H PRN Max 6/day ALVINA Harley      bisacodyl  10 mg Rectal Daily PRN ALVINA Harley      cariprazine  3 mg Oral Daily Martin Cardenas MD      Cholecalciferol  2,000 Units Oral Daily ALVINA Lewis      cyanocobalamin  1,000 mcg Oral Daily ALVINA Lewis      hydrOXYzine HCL  25 mg Oral Q6H PRN Max 4/day ALVINA Harley      hydrOXYzine HCL  25 mg Oral TID Martin Cardenas MD      hydrOXYzine HCL  50 mg Oral Q4H PRN Max 4/day ALVINA Harley      Or    LORazepam  1 mg Intramuscular Q4H PRN ALVINA Harley      lamoTRIgine  50 mg Oral Daily Martin Cardenas MD      lisinopril  10 mg Oral Daily ALVINA Lewis      LORazepam  1 mg Oral Q4H PRN Max 6/day ALVINA Harley      Or    LORazepam  2 mg Intramuscular Q6H PRN Max 3/day ALVINA Harley      OLANZapine  10 mg Oral Q3H PRN Max 3/day ALVINA Harley      Or    OLANZapine  10 mg Intramuscular Q3H PRN Max 3/day ALVINA Harley      OLANZapine  5 mg Oral Q3H PRN Max 6/day ALVINA Harley      Or    OLANZapine  5 mg Intramuscular Q3H PRN Max 6/day ALVINA Harley      OLANZapine  2.5 mg Oral Q3H PRN Max 8/day ALVINA Harley      polyethylene glycol  17 g Oral Daily PRN ALVINA Harley      propranolol  10 mg Oral Q8H PRN ALVINA Harley       senna-docusate sodium  1 tablet Oral Daily PRN ALVINA Harley      sertraline  150 mg Oral HS Martin Cardenas MD         Risks / Benefits of Treatment:  Risks, benefits, and possible side effects of medications explained to patient. Patient has limited understanding of risks and benefits of treatment at this time, but agrees to take medications as prescribed.    Counseling / Coordination of Care:  Total floor/unit time spent today 25 minutes. Greater than 50% of total time was spent with the patient and / or family counseling and / or coordination of care. A description of the counseling / coordination of care:   Patient's progress discussed with staff in treatment team meeting.  Medications, treatment progress and treatment plan reviewed with patient.   Educated on importance of medication and treatment compliance.  Reassurance and supportive therapy provided.   Encouraged participation in milieu and group therapy on the unit.    ALVINA Harley 09/18/24

## 2024-09-18 NOTE — NURSING NOTE
Pt in bed asleep, observed eyes closed, and chest movement noted. Continuous safety checks maintained. No unmet needs at this time. Will continue to monitor patient needs, sleep pattern, and behaviors.     0630: Patient has slept all night, 7+ hours of uninterrupted sleep

## 2024-09-18 NOTE — NURSING NOTE
"Deyvi was calm and cooperative during assessment. He denies anxiety and depression, and denies SI,SH, HI, and AVH. Deyvi was medication compliant and did not request PRN medications. He stated his goal for today was to \"attend groups.\" He is social with peers in the milieu and likes playing card games. He denies new concerns at this time.   "

## 2024-09-18 NOTE — SOCIAL WORK
STEFAN met 1:1 with pt.   SW reviewed pt's assessment with the CRR yesterday and provided update based off of their feedback after meeting with pt.   Pt expressed feeling good about it but feeling a little anxious about what comes next. STEFAN explained the process and reminded pt of ACT assessment next week virtually with  ACT.   Pt expressed understanding.   Pt denies any other concerns.

## 2024-09-19 PROCEDURE — 99232 SBSQ HOSP IP/OBS MODERATE 35: CPT | Performed by: PSYCHIATRY & NEUROLOGY

## 2024-09-19 RX ADMIN — LAMOTRIGINE 50 MG: 25 TABLET ORAL at 08:32

## 2024-09-19 RX ADMIN — LISINOPRIL 10 MG: 10 TABLET ORAL at 08:32

## 2024-09-19 RX ADMIN — CHOLECALCIFEROL TAB 25 MCG (1000 UNIT) 2000 UNITS: 25 TAB at 08:32

## 2024-09-19 RX ADMIN — SERTRALINE HYDROCHLORIDE 150 MG: 100 TABLET ORAL at 21:17

## 2024-09-19 RX ADMIN — CYANOCOBALAMIN TAB 1000 MCG 1000 MCG: 1000 TAB at 08:32

## 2024-09-19 RX ADMIN — CARIPRAZINE 3 MG: 3 CAPSULE, GELATIN COATED ORAL at 08:32

## 2024-09-19 RX ADMIN — HYDROXYZINE HYDROCHLORIDE 25 MG: 25 TABLET ORAL at 17:00

## 2024-09-19 RX ADMIN — HYDROXYZINE HYDROCHLORIDE 25 MG: 25 TABLET ORAL at 08:32

## 2024-09-19 RX ADMIN — HYDROXYZINE HYDROCHLORIDE 25 MG: 25 TABLET ORAL at 21:17

## 2024-09-19 RX ADMIN — ATORVASTATIN CALCIUM 10 MG: 10 TABLET, FILM COATED ORAL at 08:32

## 2024-09-19 NOTE — NURSING NOTE
Received pt in bed at change of shift with eyes closed; chest movement noted.  Continues the same thus this far as per q 7 min room checks.   Will continue to monitor behavior, sleeping pattern and any medical issues that may arise.    0550:  Sleeping 7+ hrs thus this far

## 2024-09-19 NOTE — PROGRESS NOTES
09/19/24 1353   Team Meeting   Meeting Type Tx Team Meeting   Initial Conference Date 09/19/24   Next Conference Date 10/19/24   Team Members Present   Team Members Present Physician;Nurse;;Other (Discipline and Name)   Physician Team Member Zenia TINSLEY   Nursing Team Member Chastity   Social Work Team Member Jose   Other (Discipline and Name) Giuseppe Jefferson   Patient/Family Present   Patient Present Yes   Patient's Family Present No   OTHER   Team Meeting - Additional Comments  --      Pt attended review of his tx plan. Pt expressed no questions or concerns. Pt and tx team signed completed plan. Copy placed in pt's chart.

## 2024-09-19 NOTE — PROGRESS NOTES
09/19/24 1353   Team Meeting   Meeting Type Tx Team Meeting   Initial Conference Date 09/19/24   Next Conference Date 10/03/24   Team Members Present   Team Members Present Physician;Nurse;;Other (Discipline and Name)   Physician Team Member Zenia TINSLEY   Nursing Team Member Chastity   Social Work Team Member Jose   Other (Discipline and Name) Giuseppe Jefferson   Patient/Family Present   Patient Present Yes   Patient's Family Present No   OTHER   Team Meeting - Additional Comments Pt attended tx team review. Pt did not have completed self-assessment. Pt reports feeling ok and at his baseline. Process and next steps explained for Enhanced CRR and ACT teams. Pt in agreement with discharge plan. No concerns noted.

## 2024-09-19 NOTE — PLAN OF CARE
Problem: Alteration in Thoughts and Perception  Goal: Treatment Goal: Gain control of psychotic behaviors/thinking, reduce/eliminate presenting symptoms and demonstrate improved reality functioning upon discharge  Outcome: Progressing  Goal: Verbalize thoughts and feelings  Description: Interventions:  - Promote a nonjudgmental and trusting relationship with the patient through active listening and therapeutic communication  - Assess patient's level of functioning, behavior and potential for risk  - Engage patient in 1 on 1 interactions  - Encourage patient to express fears, feelings, frustrations, and discuss symptoms    - [place] patient to reality, help patient recognize reality-based thinking   - Administer medications as ordered and assess for potential side effects  - Provide the patient education related to the signs and symptoms of the illness and desired effects of prescribed medications  Outcome: Progressing  Goal: Refrain from acting on delusional thinking/internal stimuli  Description: Interventions:  - Monitor patient closely, per order   - Utilize least restrictive measures   - Set reasonable limits, give positive feedback for acceptable   - Administer medications as ordered and monitor of potential side effects  Outcome: Progressing  Goal: Agree to be compliant with medication regime, as prescribed and report medication side effects  Description: Interventions:  - Offer appropriate PRN medication and supervise ingestion; conduct AIMS, as needed   Outcome: Progressing  Goal: Attend and participate in unit activities, including therapeutic, recreational, and educational groups  Description: Interventions:  -Encourage Visitation and family involvement in care  Outcome: Progressing     Problem: Ineffective Coping  Goal: Identifies ineffective coping skills  Outcome: Progressing  Goal: Understands least restrictive measures  Description: Interventions:  - Utilize least restrictive behavior  Outcome:  Progressing  Goal: Free from restraint events  Description: - Utilize least restrictive measures   - Provide behavioral interventions   - Redirect inappropriate behaviors   Outcome: Progressing     Problem: Risk for Self Injury/Neglect  Goal: Treatment Goal: Remain safe during length of stay, learn and adopt new coping skills, and be free of self-injurious ideation, impulses and acts at the time of discharge  Outcome: Progressing  Goal: Complete daily ADLs, including personal hygiene independently, as able  Description: Interventions:  - Observe, teach, and assist patient with ADLS  - Monitor and promote a balance of rest/activity, with adequate nutrition and elimination  Outcome: Progressing     Problem: Depression  Goal: Treatment Goal: Demonstrate behavioral control of depressive symptoms, verbalize feelings of improved mood/affect, and adopt new coping skills prior to discharge  Outcome: Progressing  Goal: Refrain from harming self  Description: Interventions:  - Monitor patient closely, per order   - Supervise medication ingestion, monitor effects and side effects   Outcome: Progressing  Goal: Refrain from isolation  Description: Interventions:  - Develop a trusting relationship   - Encourage socialization   Outcome: Progressing  Goal: Refrain from self-neglect  Outcome: Progressing     Problem: Risk for Violence/Aggression Toward Others  Goal: Treatment Goal: Refrain from acts of violence/aggression during length of stay, and demonstrate improved impulse control at the time of discharge  Outcome: Progressing  Goal: Refrain from harming others  Outcome: Progressing  Goal: Refrain from destructive acts on the environment or property  Outcome: Progressing  Goal: Control angry outbursts  Description: Interventions:  - Monitor patient closely, per order  - Ensure early verbal de-escalation  - Monitor prn medication needs  - Set reasonable/therapeutic limits, outline behavioral expectations, and consequences   -  Provide a non-threatening milieu, utilizing the least restrictive interventions   Outcome: Progressing     Problem: SELF HARM/SUICIDALITY  Goal: Will have no self-injury during hospital stay  Description: INTERVENTIONS:  - Q 15 MINUTES: Routine safety checks  - Q WAKING SHIFT & PRN: Assess risk to determine if routine checks are adequate to maintain patient safety  - Encourage patient to participate actively in care by formulating a plan to combat response to suicidal ideation, identify supports and resources  Outcome: Progressing     Problem: DEPRESSION  Goal: Will be euthymic at discharge  Description: INTERVENTIONS:  - Administer medication as ordered  - Provide emotional support via 1:1 interaction with staff  - Encourage involvement in milieu/groups/activities  - Monitor for social isolation  Outcome: Progressing     Problem: ANXIETY  Goal: Will report anxiety at manageable levels  Description: INTERVENTIONS:  - Administer medication as ordered  - Teach and encourage coping skills  - Provide emotional support  - Assess patient/family for anxiety and ability to cope  Outcome: Progressing     Problem: SELF CARE DEFICIT  Goal: Return ADL status to a safe level of function  Description: INTERVENTIONS:  - Administer medication as ordered  - Assess ADL deficits and provide assistive devices as needed  - Obtain PT/OT consults as needed  - Assist and instruct patient to increase activity and self care as tolerated  Outcome: Progressing     Problem: DISCHARGE PLANNING - CARE MANAGEMENT  Goal: Discharge to post-acute care or home with appropriate resources  Description: INTERVENTIONS:  - Conduct assessment to determine patient/family and health care team treatment goals, and need for post-acute services based on payer coverage, community resources, and patient preferences, and barriers to discharge  - Address psychosocial, clinical, and financial barriers to discharge as identified in assessment in conjunction with the  patient/family and health care team  - Arrange appropriate level of post-acute services according to patient’s   needs and preference and payer coverage in collaboration with the physician and health care team  - Communicate with and update the patient/family, physician, and health care team regarding progress on the discharge plan  - Arrange appropriate transportation to post-acute venues  Outcome: Progressing

## 2024-09-19 NOTE — NURSING NOTE
Deyvi was calm and cooperative during assessment. He denies SI, SH, HI, and AVH. He denies anxiety and depression. He was medication compliant and did not request any PRNs. He requested his pencil pouch to color during coping skills group programming. He denies new concerns at this time.

## 2024-09-19 NOTE — PROGRESS NOTES
Progress Note - Behavioral Health   Name: Deyvi Cao 54 y.o. male I MRN: [id number]   Unit/Bed#: [facility] 101-02 I Date of Admission: 6/25/2024   Date of Service: 9/19/2024 I Hospital Day: 86     Assessment & Plan  Bipolar disorder with severe depression (HCC)  Progressing  Awaiting placement - CRR interview complete, ACT interview on 9/24  Continue Vraylar 3mg PO Daily, Atarax 25mg PO TID, Lamictal 50mg PO Daily, add Zoloft 150mg PO QHS  Continue with group therapy, milieu therapy and occupational therapy   Behavioral Health checks every 7 minutes   Continue frequent safety checks and vitals per unit protocol  Continue with [facility] medical management as indicated  Benign essential hypertension  Managed by [facility]  Continue Lisinopril 10mg PO Daily  Mixed hyperlipidemia  Managed by [facility]  Continue Lipitor 10mg PO Daily  Allergic rhinitis due to allergen  Managed by [facility]  Rosacea  Managed by [facility]    Subjective:    Patient was seen today for continuation of care, records reviewed and patient was discussed with the morning case review team.    Deyvi was seen today for psychiatric follow-up.  On assessment today, Deyvi was present during treatment team.  He is doing well, smiles on approach.  Reports minimal depression and anxiety, feels like he is at his baseline with his mood.  Deyvi reports adequate daytime energy and denies any difficulties with initiating or staying asleep.  Oral appetite and hydration is adequate.  He is motivated to participate in treatment and looks forward to continue to progress in his treatment.  We reviewed once more the specific as-needed medications they can use going forward if they experience any insomnia or destabilization of their mood, they understood and were agreeable. Milieu visibility and group attendance encouraged to promote an active participation in treatment.    Deyvi denies acute suicidal/self-harm ideation/intent/plan upon direct inquiry at this time. Deyvi is able to  contract for safety while on the unit and would feel comfortable seeking staff support should suicidal symptoms or urges appear or worsen. Deyvi remains behaviorally appropriate, no agitation or aggression noted on exam or in report. Deyvi also denies HI/AH/VH, and does not appear overtly manic.  Patient does not verbalize any experiences that can be categorized as paranoid, persecutory, bizarre, or somatic delusions. Deyvi remains adherent to his current psychotropic medication regimen and denies any side effects from medications, as well as none noted on exam.    Deyvi is currently assessed as being at their baseline with continued need for medication management, supervision for safety and ADL’s. These services are not currently available in a less restrictive environment necessitating continued hospital stay.  Deyvi should remain on the unit until these services are available, due to likelihood of mental decompensation and readmission if discharged to an unsupervised setting.  Assertive discharge planning and collaboration with multiple providers (inpatient and community based) remains ongoing.  Deyvi is currently awaiting for placement at an enhanced CRR.    Group Attendance: 2 / 9  Treatment Team: Today  Psychiatric PRN's Needed: None    Review of Systems:  Behavior over the last 24 hours: Slowly improving  Sleep: sleeping okay throughout the night  Appetite: adequate  Medication side effects: none reported  ROS:no complaints    Objective:    Vitals:  Vitals:    09/19/24 0729   BP: 132/76   Pulse: 97   Resp: 18   Temp: (!) 97.2 °F (36.2 °C)   SpO2: 96%     Laboratory Results:  I have personally reviewed all pertinent laboratory/tests results.  Most Recent Labs:   Lab Results   Component Value Date    WBC 7.39 06/26/2024    RBC 4.70 06/26/2024    HGB 15.2 06/26/2024    HCT 45.5 06/26/2024     06/26/2024    RDW 12.9 06/26/2024    NEUTROABS 4.63 06/26/2024    SODIUM 137 06/26/2024    K 4.1  06/26/2024     06/26/2024    CO2 26 06/26/2024    BUN 17 06/26/2024    CREATININE 0.63 06/26/2024    GLUC 98 06/26/2024    GLUF 98 06/26/2024    CALCIUM 9.6 06/26/2024    AST 25 06/26/2024    ALT 45 06/26/2024    ALKPHOS 114 (H) 06/26/2024    TP 7.0 06/26/2024    ALB 4.4 06/26/2024    TBILI 0.44 06/26/2024    CHOLESTEROL 177 06/26/2024    HDL 52 06/26/2024    TRIG 73 06/26/2024    LDLCALC 110 (H) 06/26/2024    NONHDLC 125 06/26/2024    LITHIUM 0.4 (L) 01/28/2021    [id number] 2.636 06/26/2024    HGBA1C 5.2 11/22/2023     11/22/2023     Mental Status Evaluation:  Appearance:  age appropriate, casually dressed, dressed appropriately, adequate grooming   Behavior:  pleasant, cooperative, calm   Speech:  normal rate, normal volume, normal pitch   Mood:  mildly anxious, mildly depressed   Affect:  slightly brighter   Thought Process:  goal directed   Associations: intact associations   Thought Content:  no overt delusions   Perceptual Disturbances: no auditory hallucinations, no visual hallucinations, denies when asked, does not appear responding to internal stimuli   Risk Potential: Suicidal ideation - None at present, contracts for safety on the unit, would talk to staff if not feeling safe on the unit  Homicidal ideation - None at present  Potential for aggression - Not at present   Sensorium:  oriented to person, place, and time/date   Memory:  recent memory intact   Consciousness:  alert and awake   Attention/Concentration: attention span and concentration appear shorter than expected for age   Insight:  fair and improving   Judgment: fair and improving   Gait/Station: normal gait/station, normal balance   Motor Activity: no abnormal movements     Progress Toward Goals: making gradual improvement.  Deyvi continues to require inpatient psychiatric hospitalization for continued medication management and titration to optimize symptom reduction, improve sleep hygiene, and demonstrate adequate self-care.      Suicide/Homicide Risk Assessment:  Risk of Harm to Self:   Nursing Suicide Risk Assessment Last 24 hours: C-SSRS Risk (Since Last Contact)  Calculated C-SSRS Risk Score (Since Last Contact): No Risk Indicated    Risk of Harm to Others:  Nursing Homicide Risk Assessment: Violence Risk to Others: Denies within past 6 months    Behavioral Health Medications: all current active meds have been reviewed and continue current psychiatric medications.  Current Facility-Administered Medications   Medication Dose Route Frequency Provider Last Rate    acetaminophen  650 mg Oral Q6H PRN ALVINA Harley      acetaminophen  650 mg Oral Q4H PRN ALVINA Harley      acetaminophen  975 mg Oral Q6H PRN ALVINA Harley      aluminum-magnesium hydroxide-simethicone  30 mL Oral Q4H PRN ALVINA Harley      ammonium lactate   Topical BID PRN ALVINA Harley      atorvastatin  10 mg Oral Daily ALVINA Lewis      benztropine  1 mg Intramuscular Q4H PRN Max 6/day ALVINA Harley      benztropine  1 mg Oral Q4H PRN Max 6/day ALVINA Harley      bisacodyl  10 mg Rectal Daily PRN ALVINA Harley      cariprazine  3 mg Oral Daily Martin Cardenas MD      Cholecalciferol  2,000 Units Oral Daily ALVINA Lewis      cyanocobalamin  1,000 mcg Oral Daily ALVINA Lewis      hydrOXYzine HCL  25 mg Oral Q6H PRN Max 4/day ALVINA Harley      hydrOXYzine HCL  25 mg Oral TID Martin Cardenas MD      hydrOXYzine HCL  50 mg Oral Q4H PRN Max 4/day ALVINA Harlye      Or    LORazepam  1 mg Intramuscular Q4H PRN ALVINA Harley      lamoTRIgine  50 mg Oral Daily Martin Cardenas MD      lisinopril  10 mg Oral Daily ALVINA Lewis      LORazepam  1 mg Oral Q4H PRN Max 6/day ALVINA Harley      Or    LORazepam  2 mg Intramuscular Q6H PRN Max 3/day ALVINA Harley      OLANZapine  10 mg Oral Q3H PRN Max 3/day ALVINA Harley      Or    OLANZapine  10 mg  Intramuscular Q3H PRN Max 3/day ALVINA Harley      OLANZapine  5 mg Oral Q3H PRN Max 6/day ALVINA Harley      Or    OLANZapine  5 mg Intramuscular Q3H PRN Max 6/day ALVINA Harley      OLANZapine  2.5 mg Oral Q3H PRN Max 8/day ALVINA Harley      polyethylene glycol  17 g Oral Daily PRN ALVINA Harley      propranolol  10 mg Oral Q8H PRN ALIVNA Harley      senna-docusate sodium  1 tablet Oral Daily PRN ALVINA Harley      sertraline  150 mg Oral HS Martin Cardenas MD       Risks / Benefits of Treatment:  Risks, benefits, and possible side effects of medications explained to patient. Patient has limited understanding of risks and benefits of treatment at this time, but agrees to take medications as prescribed.    Counseling / Coordination of Care:  Total floor/unit time spent today 25 minutes. Greater than 50% of total time was spent with the patient and / or family counseling and / or coordination of care. A description of the counseling / coordination of care:   Patient's progress discussed with staff in treatment team meeting.  Medications, treatment progress and treatment plan reviewed with patient.   Educated on importance of medication and treatment compliance.  Reassurance and supportive therapy provided.   Encouraged participation in milieu and group therapy on the unit.    ALVINA Harley 09/19/24

## 2024-09-19 NOTE — NURSING NOTE
Pt is calm, quiet and cooperative. Denies SI/HI. Denies psychiatric symptoms. Med and meal compliant. Visible at times in unit.

## 2024-09-20 PROCEDURE — 99232 SBSQ HOSP IP/OBS MODERATE 35: CPT | Performed by: PSYCHIATRY & NEUROLOGY

## 2024-09-20 RX ADMIN — HYDROXYZINE HYDROCHLORIDE 25 MG: 25 TABLET ORAL at 08:11

## 2024-09-20 RX ADMIN — HYDROXYZINE HYDROCHLORIDE 25 MG: 25 TABLET ORAL at 21:24

## 2024-09-20 RX ADMIN — ATORVASTATIN CALCIUM 10 MG: 10 TABLET, FILM COATED ORAL at 08:11

## 2024-09-20 RX ADMIN — HYDROXYZINE HYDROCHLORIDE 25 MG: 25 TABLET ORAL at 17:00

## 2024-09-20 RX ADMIN — LISINOPRIL 10 MG: 10 TABLET ORAL at 08:11

## 2024-09-20 RX ADMIN — SERTRALINE HYDROCHLORIDE 150 MG: 100 TABLET ORAL at 21:25

## 2024-09-20 RX ADMIN — LAMOTRIGINE 50 MG: 25 TABLET ORAL at 08:11

## 2024-09-20 RX ADMIN — CHOLECALCIFEROL TAB 25 MCG (1000 UNIT) 2000 UNITS: 25 TAB at 08:11

## 2024-09-20 RX ADMIN — CYANOCOBALAMIN TAB 1000 MCG 1000 MCG: 1000 TAB at 08:11

## 2024-09-20 RX ADMIN — CARIPRAZINE 3 MG: 3 CAPSULE, GELATIN COATED ORAL at 08:11

## 2024-09-20 NOTE — PROGRESS NOTES
Progress Note - Behavioral Health   Name: Deyvi Cao 54 y.o. male I MRN: [id number]   Unit/Bed#: [facility] I Date of Admission: 6/25/2024   Date of Service: 9/20/2024 I Hospital Day: 87     Assessment & Plan  Bipolar disorder with severe depression (HCC)  Progressing  Awaiting placement - CRR interview complete, ACT interview on 9/24.  No anticipated medication changes over the weekend  Continue Vraylar 3mg PO Daily, Atarax 25mg PO TID, Lamictal 50mg PO Daily, add Zoloft 150mg PO QHS  Continue with group therapy, milieu therapy and occupational therapy   Behavioral Health checks every 7 minutes   Continue frequent safety checks and vitals per unit protocol  Continue with [facility] medical management as indicated  Benign essential hypertension  Managed by [facility]  Continue Lisinopril 10mg PO Daily  Mixed hyperlipidemia  Managed by [facility]  Continue Lipitor 10mg PO Daily  Allergic rhinitis due to allergen  Managed by [facility]  Rosacea  Managed by [facility]    Subjective:    Patient was seen today for continuation of care, records reviewed and patient was discussed with the morning case review team.    Deyvi was seen today for psychiatric follow-up.  On assessment today, Deyvi was calm and cooperative.  Brighter recently, reports being more hopeful for the future given movement in discharge planning.  Deyvi reports adequate daytime energy and denies any difficulties with initiating or staying asleep.  Oral appetite and hydration is adequate.   We reviewed once more the specific as-needed medications they can use going forward if they experience any insomnia or destabilization of their mood, they understood and were agreeable. Milieu visibility and group attendance encouraged to promote an active participation in treatment.    Deyvi denies acute suicidal/self-harm ideation/intent/plan upon direct inquiry at this time. Deyvi is able to contract for safety while on the unit and would feel comfortable seeking staff support  should suicidal symptoms or urges appear or worsen. Deyvi remains behaviorally appropriate, no agitation or aggression noted on exam or in report. Deyvi also denies HI/AH/VH, and does not appear overtly manic.  Patient does not verbalize any experiences that can be categorized as paranoid, persecutory, bizarre, or somatic delusions. Deyvi remains adherent to his current psychotropic medication regimen and denies any side effects from medications, as well as none noted on exam.    Deyvi is currently assessed as being at their baseline with continued need for medication management, supervision for safety and ADL’s. These services are not currently available in a less restrictive environment necessitating continued hospital stay.  Deyvi should remain on the unit until these services are available, due to likelihood of mental decompensation and readmission if discharged to an unsupervised setting.  Assertive discharge planning and collaboration with multiple providers (inpatient and community based) remains ongoing.  Deyvi is currently awaiting for Enhanced [facility]    Group Attendance: 8 / 11  Treatment Team: TBD  Psychiatric PRN's Needed: None    Review of Systems:  Behavior over the last 24 hours: Slowly improving  Sleep: sleeping okay throughout the night  Appetite: adequate  Medication side effects: none reported  ROS:no complaints    Objective:    Vitals:  Vitals:    09/20/24 0743   BP: 139/72   Pulse: 95   Resp: 19   Temp: 97.9 °F (36.6 °C)   SpO2: 95%     Laboratory Results:  I have personally reviewed all pertinent laboratory/tests results.  Most Recent Labs:   Lab Results   Component Value Date    WBC 7.39 06/26/2024    RBC 4.70 06/26/2024    HGB 15.2 06/26/2024    HCT 45.5 06/26/2024     06/26/2024    RDW 12.9 06/26/2024    NEUTROABS 4.63 06/26/2024    SODIUM 137 06/26/2024    K 4.1 06/26/2024     06/26/2024    CO2 26 06/26/2024    BUN 17 06/26/2024    CREATININE 0.63 06/26/2024    GLUC 98  06/26/2024    GLUF 98 06/26/2024    CALCIUM 9.6 06/26/2024    AST 25 06/26/2024    ALT 45 06/26/2024    ALKPHOS 114 (H) 06/26/2024    TP 7.0 06/26/2024    ALB 4.4 06/26/2024    TBILI 0.44 06/26/2024    CHOLESTEROL 177 06/26/2024    HDL 52 06/26/2024    TRIG 73 06/26/2024    LDLCALC 110 (H) 06/26/2024    NONHDLC 125 06/26/2024    LITHIUM 0.4 (L) 01/28/2021    [id number] 2.636 06/26/2024    HGBA1C 5.2 11/22/2023     11/22/2023     Mental Status Evaluation:  Appearance:  age appropriate, casually dressed, dressed appropriately   Behavior:  pleasant, cooperative, calm   Speech:  normal rate, normal volume, normal pitch   Mood:  mildly anxious, mildly depressed   Affect:  slightly brighter   Thought Process:  goal directed   Associations: intact associations   Thought Content:  no overt delusions   Perceptual Disturbances: no auditory hallucinations, no visual hallucinations, denies when asked, does not appear responding to internal stimuli   Risk Potential: Suicidal ideation - None at present, contracts for safety on the unit, would talk to staff if not feeling safe on the unit  Homicidal ideation - None at present  Potential for aggression - Not at present   Sensorium:  oriented to person, place, and time/date   Memory:  recent memory intact   Consciousness:  alert and awake   Attention/Concentration: attention span and concentration appear shorter than expected for age   Insight:  fair and improving   Judgment: fair and improving   Gait/Station: normal gait/station, normal balance   Motor Activity: no abnormal movements     Progress Toward Goals: making gradual improvement.  Deyvi continues to require inpatient psychiatric hospitalization for continued medication management and titration to optimize symptom reduction, improve sleep hygiene, and demonstrate adequate self-care.     Suicide/Homicide Risk Assessment:  Risk of Harm to Self:   Nursing Suicide Risk Assessment Last 24 hours: C-SSRS Risk (Since Last  Contact)  Calculated C-SSRS Risk Score (Since Last Contact): No Risk Indicated    Risk of Harm to Others:  Nursing Homicide Risk Assessment: Violence Risk to Others: Denies within past 6 months    Behavioral Health Medications: all current active meds have been reviewed and continue current psychiatric medications.  Current Facility-Administered Medications   Medication Dose Route Frequency Provider Last Rate    acetaminophen  650 mg Oral Q6H PRN ALVINA Harley      acetaminophen  650 mg Oral Q4H PRN ALVINA Harley      acetaminophen  975 mg Oral Q6H PRN ALVINA Harley      aluminum-magnesium hydroxide-simethicone  30 mL Oral Q4H PRN ALVINA Harley      ammonium lactate   Topical BID PRN ALVINA Harley      atorvastatin  10 mg Oral Daily ALVINA Lewis      benztropine  1 mg Intramuscular Q4H PRN Max 6/day ALVINA Harley      benztropine  1 mg Oral Q4H PRN Max 6/day ALVINA Harley      bisacodyl  10 mg Rectal Daily PRN ALVINA Harley      cariprazine  3 mg Oral Daily Martin Cardenas MD      Cholecalciferol  2,000 Units Oral Daily ALVINA Lewis      cyanocobalamin  1,000 mcg Oral Daily ALVINA Lewis      hydrOXYzine HCL  25 mg Oral Q6H PRN Max 4/day ALVINA Harley      hydrOXYzine HCL  25 mg Oral TID Martin Cardenas MD      hydrOXYzine HCL  50 mg Oral Q4H PRN Max 4/day ALVINA Harley      Or    LORazepam  1 mg Intramuscular Q4H PRN ALVINA Harley      lamoTRIgine  50 mg Oral Daily Martin Cardenas MD      lisinopril  10 mg Oral Daily ALVINA Lewis      LORazepam  1 mg Oral Q4H PRN Max 6/day ALVINA Harley      Or    LORazepam  2 mg Intramuscular Q6H PRN Max 3/day ALVINA Harley      OLANZapine  10 mg Oral Q3H PRN Max 3/day ALVINA Harley      Or    OLANZapine  10 mg Intramuscular Q3H PRN Max 3/day ALVINA Harley      OLANZapine  5 mg Oral Q3H PRN Max 6/day ALVINA Harley      Or    OLANZapine  5 mg  Intramuscular Q3H PRN Max 6/day ALVINA Harley      OLANZapine  2.5 mg Oral Q3H PRN Max 8/day ALVINA Harley      polyethylene glycol  17 g Oral Daily PRN ALVINA Harley      propranolol  10 mg Oral Q8H PRN ALVINA Harley      senna-docusate sodium  1 tablet Oral Daily PRN ALVINA Harley      sertraline  150 mg Oral HS Martin Cardenas MD       Risks / Benefits of Treatment:  Risks, benefits, and possible side effects of medications explained to patient. Patient has limited understanding of risks and benefits of treatment at this time, but agrees to take medications as prescribed.    Counseling / Coordination of Care:  Total floor/unit time spent today 25 minutes. Greater than 50% of total time was spent with the patient and / or family counseling and / or coordination of care. A description of the counseling / coordination of care:   Patient's progress discussed with staff in treatment team meeting.  Medications, treatment progress and treatment plan reviewed with patient.   Educated on importance of medication and treatment compliance.  Reassurance and supportive therapy provided.   Encouraged participation in milieu and group therapy on the unit.    ALVINA Harley 09/20/24

## 2024-09-20 NOTE — ASSESSMENT & PLAN NOTE
Progressing  Awaiting placement - CRR interview complete, ACT interview on 9/24.  No anticipated medication changes over the weekend  Continue Vraylar 3mg PO Daily, Atarax 25mg PO TID, Lamictal 50mg PO Daily, add Zoloft 150mg PO QHS  Continue with group therapy, milieu therapy and occupational therapy   Behavioral Health checks every 7 minutes   Continue frequent safety checks and vitals per unit protocol  Continue with [facility] medical management as indicated

## 2024-09-20 NOTE — SOCIAL WORK
SW checked in with pt. Pt denied any concerns, anxiety or depression. Pt reports feeling ok and planning to keep up with increased group attendance.

## 2024-09-20 NOTE — PLAN OF CARE
Problem: Alteration in Thoughts and Perception  Goal: Treatment Goal: Gain control of psychotic behaviors/thinking, reduce/eliminate presenting symptoms and demonstrate improved reality functioning upon discharge  Outcome: Progressing  Goal: Verbalize thoughts and feelings  Description: Interventions:  - Promote a nonjudgmental and trusting relationship with the patient through active listening and therapeutic communication  - Assess patient's level of functioning, behavior and potential for risk  - Engage patient in 1 on 1 interactions  - Encourage patient to express fears, feelings, frustrations, and discuss symptoms    - [place] patient to reality, help patient recognize reality-based thinking   - Administer medications as ordered and assess for potential side effects  - Provide the patient education related to the signs and symptoms of the illness and desired effects of prescribed medications  Outcome: Progressing  Goal: Refrain from acting on delusional thinking/internal stimuli  Description: Interventions:  - Monitor patient closely, per order   - Utilize least restrictive measures   - Set reasonable limits, give positive feedback for acceptable   - Administer medications as ordered and monitor of potential side effects  Outcome: Progressing  Goal: Agree to be compliant with medication regime, as prescribed and report medication side effects  Description: Interventions:  - Offer appropriate PRN medication and supervise ingestion; conduct AIMS, as needed   Outcome: Progressing  Goal: Attend and participate in unit activities, including therapeutic, recreational, and educational groups  Description: Interventions:  -Encourage Visitation and family involvement in care  Outcome: Progressing  Goal: Recognize dysfunctional thoughts, communicate reality-based thoughts at the time of discharge  Description: Interventions:  - Provide medication and psycho-education to assist patient in compliance and developing  insight into his/her illness   Outcome: Progressing  Goal: Complete daily ADLs, including personal hygiene independently, as able  Description: Interventions:  - Observe, teach, and assist patient with ADLS  - Monitor and promote a balance of rest/activity, with adequate nutrition and elimination   Outcome: Progressing     Problem: Ineffective Coping  Goal: Identifies ineffective coping skills  Outcome: Progressing  Goal: Identifies healthy coping skills  Outcome: Progressing  Goal: Demonstrates healthy coping skills  Outcome: Progressing  Goal: Participates in unit activities  Description: Interventions:  - Provide therapeutic environment   - Provide required programming   - Redirect inappropriate behaviors   Outcome: Progressing  Goal: Patient/Family participate in treatment and DC plans  Description: Interventions:  - Provide therapeutic environment  Outcome: Progressing  Goal: Patient/Family verbalizes awareness of resources  Outcome: Progressing  Goal: Understands least restrictive measures  Description: Interventions:  - Utilize least restrictive behavior  Outcome: Progressing  Goal: Free from restraint events  Description: - Utilize least restrictive measures   - Provide behavioral interventions   - Redirect inappropriate behaviors   Outcome: Progressing     Problem: Risk for Self Injury/Neglect  Goal: Treatment Goal: Remain safe during length of stay, learn and adopt new coping skills, and be free of self-injurious ideation, impulses and acts at the time of discharge  Outcome: Progressing  Goal: Verbalize thoughts and feelings  Description: Interventions:  - Assess and re-assess patient's lethality and potential for self-injury  - Engage patient in 1:1 interactions, daily, for a minimum of 15 minutes  - Encourage patient to express feelings, fears, frustrations, hopes  - Establish rapport/trust with patient   Outcome: Progressing  Goal: Refrain from harming self  Description: Interventions:  - Monitor  patient closely, per order  - Develop a trusting relationship  - Supervise medication ingestion, monitor effects and side effects   Outcome: Progressing  Goal: Attend and participate in unit activities, including therapeutic, recreational, and educational groups  Description: Interventions:  - Provide therapeutic and educational activities daily, encourage attendance and participation, and document same in the medical record  - Obtain collateral information, encourage visitation and family involvement in care   Outcome: Progressing  Goal: Recognize maladaptive responses and adopt new coping mechanisms  Outcome: Progressing  Goal: Complete daily ADLs, including personal hygiene independently, as able  Description: Interventions:  - Observe, teach, and assist patient with ADLS  - Monitor and promote a balance of rest/activity, with adequate nutrition and elimination  Outcome: Progressing     Problem: Depression  Goal: Treatment Goal: Demonstrate behavioral control of depressive symptoms, verbalize feelings of improved mood/affect, and adopt new coping skills prior to discharge  Outcome: Progressing  Goal: Verbalize thoughts and feelings  Description: Interventions:  - Assess and re-assess patient's level of risk   - Engage patient in 1:1 interactions, daily, for a minimum of 15 minutes   - Encourage patient to express feelings, fears, frustrations, hopes   Outcome: Progressing  Goal: Refrain from harming self  Description: Interventions:  - Monitor patient closely, per order   - Supervise medication ingestion, monitor effects and side effects   Outcome: Progressing  Goal: Refrain from isolation  Description: Interventions:  - Develop a trusting relationship   - Encourage socialization   Outcome: Progressing  Goal: Refrain from self-neglect  Outcome: Progressing  Goal: Attend and participate in unit activities, including therapeutic, recreational, and educational groups  Description: Interventions:  - Provide  therapeutic and educational activities daily, encourage attendance and participation, and document same in the medical record   Outcome: Progressing  Goal: Complete daily ADLs, including personal hygiene independently, as able  Description: Interventions:  - Observe, teach, and assist patient with ADLS  -  Monitor and promote a balance of rest/activity, with adequate nutrition and elimination   Outcome: Progressing     Problem: Anxiety  Goal: Anxiety is at manageable level  Description: Interventions:  - Assess and monitor patient's anxiety level.   - Monitor for signs and symptoms (heart palpitations, chest pain, shortness of breath, headaches, nausea, feeling jumpy, restlessness, irritable, apprehensive).   - Collaborate with interdisciplinary team and initiate plan and interventions as ordered.  - [place] patient to unit/surroundings  - Explain treatment plan  - Encourage participation in care  - Encourage verbalization of concerns/fears  - Identify coping mechanisms  - Assist in developing anxiety-reducing skills  - Administer/offer alternative therapies  - Limit or eliminate stimulants  Outcome: Progressing     Problem: Risk for Violence/Aggression Toward Others  Goal: Treatment Goal: Refrain from acts of violence/aggression during length of stay, and demonstrate improved impulse control at the time of discharge  Outcome: Progressing  Goal: Verbalize thoughts and feelings  Description: Interventions:  - Assess and re-assess patient's level of risk, every waking shift  - Engage patient in 1:1 interactions, daily, for a minimum of 15 minutes   - Allow patient to express feelings and frustrations in a safe and non-threatening manner   - Establish rapport/trust with patient   Outcome: Progressing  Goal: Refrain from harming others  Outcome: Progressing  Goal: Refrain from destructive acts on the environment or property  Outcome: Progressing  Goal: Control angry outbursts  Description: Interventions:  - Monitor  patient closely, per order  - Ensure early verbal de-escalation  - Monitor prn medication needs  - Set reasonable/therapeutic limits, outline behavioral expectations, and consequences   - Provide a non-threatening milieu, utilizing the least restrictive interventions   Outcome: Progressing  Goal: Attend and participate in unit activities, including therapeutic, recreational, and educational groups  Description: Interventions:  - Provide therapeutic and educational activities daily, encourage attendance and participation, and document same in the medical record   Outcome: Progressing  Goal: Identify appropriate positive anger management techniques  Description: Interventions:  - Offer anger management and coping skills groups   - Staff will provide positive feedback for appropriate anger control  Outcome: Progressing     Problem: Alteration in Orientation  Goal: Treatment Goal: Demonstrate a reduction of confusion and improved orientation to person, place, time and/or situation upon discharge, according to optimum baseline/ability  Outcome: Progressing  Goal: Interact with staff daily  Description: Interventions:  - Assess and re-assess patient's level of orientation  - Engage patient in 1 on 1 interactions, daily, for a minimum of 15 minutes   - Establish rapport/trust with patient   Outcome: Progressing  Goal: Express concerns related to confused thinking related to:  Description: Interventions:  - Encourage patient to express feelings, fears, frustrations, hopes  - Assign consistent caregivers   - [place]/re-orient patient as needed  - Allow comfort items, as appropriate  - Provide visual cues, signs, etc.   Outcome: Progressing  Goal: Allow medical examinations, as recommended  Description: Interventions:  - Provide physical/neurological exams and/or referrals, per provider   Outcome: Progressing  Goal: Cooperate with recommended testing/procedures  Description: Interventions:  - Determine need for ancillary  testing  - Observe for mental status changes  - Implement falls/precaution protocol   Outcome: Progressing  Goal: Attend and participate in unit activities, including therapeutic, recreational, and educational groups  Description: Interventions:  - Provide therapeutic and educational activities daily, encourage attendance and participation, and document same in the medical record   - Provide appropriate opportunities for reminiscence   - Provide a consistent daily routine   - Encourage family contact/visitation   Outcome: Progressing  Goal: Complete daily ADLs, including personal hygiene independently, as able  Description: Interventions:  - Observe, teach, and assist patient with ADLS  Outcome: Progressing     Problem: SELF HARM/SUICIDALITY  Goal: Will have no self-injury during hospital stay  Description: INTERVENTIONS:  - Q 15 MINUTES: Routine safety checks  - Q WAKING SHIFT & PRN: Assess risk to determine if routine checks are adequate to maintain patient safety  - Encourage patient to participate actively in care by formulating a plan to combat response to suicidal ideation, identify supports and resources  Outcome: Progressing     Problem: DEPRESSION  Goal: Will be euthymic at discharge  Description: INTERVENTIONS:  - Administer medication as ordered  - Provide emotional support via 1:1 interaction with staff  - Encourage involvement in milieu/groups/activities  - Monitor for social isolation  Outcome: Progressing     Problem: ANXIETY  Goal: Will report anxiety at manageable levels  Description: INTERVENTIONS:  - Administer medication as ordered  - Teach and encourage coping skills  - Provide emotional support  - Assess patient/family for anxiety and ability to cope  Outcome: Progressing  Goal: By discharge: Patient will verbalize 2 strategies to deal with anxiety  Description: Interventions:  - Identify any obvious source/trigger to anxiety  - Staff will assist patient in applying identified coping  technique/skills  - Encourage attendance of scheduled groups and activities  Outcome: Progressing     Problem: SELF CARE DEFICIT  Goal: Return ADL status to a safe level of function  Description: INTERVENTIONS:  - Administer medication as ordered  - Assess ADL deficits and provide assistive devices as needed  - Obtain PT/OT consults as needed  - Assist and instruct patient to increase activity and self care as tolerated  Outcome: Progressing     Problem: DISCHARGE PLANNING - CARE MANAGEMENT  Goal: Discharge to post-acute care or home with appropriate resources  Description: INTERVENTIONS:  - Conduct assessment to determine patient/family and health care team treatment goals, and need for post-acute services based on payer coverage, community resources, and patient preferences, and barriers to discharge  - Address psychosocial, clinical, and financial barriers to discharge as identified in assessment in conjunction with the patient/family and health care team  - Arrange appropriate level of post-acute services according to patient’s   needs and preference and payer coverage in collaboration with the physician and health care team  - Communicate with and update the patient/family, physician, and health care team regarding progress on the discharge plan  - Arrange appropriate transportation to post-acute venues  Outcome: Progressing

## 2024-09-20 NOTE — NURSING NOTE
Patient received resting comfortably in bed with visible chest movement and regular unlabored respirations at change of shift. Patient continues resting comfortably as per q 7 minute room checks. Will continue to monitor patient needs, behavior, and sleep pattern.      0539- Patient has slept all night, 7+ hours of uninterrupted sleep.

## 2024-09-20 NOTE — PROGRESS NOTES
09/20/24 0746   Team Meeting   Meeting Type Daily Rounds   Team Members Present   Team Members Present Physician;Nurse;;Other (Discipline and Name)   Patient/Family Present   Patient Present No   Patient's Family Present No     In attendance:  Dr. Alex Thomas, MD Dr. Jordan Holter, DO Mahamed Haywood, RN  Judi Garcia, JAYLONW  [employer] LCSW    Groups: 8/11    Pt had improved group attendance. Pt cooperative; ACT intake 9/24. CRR assessment completed.

## 2024-09-20 NOTE — NURSING NOTE
"Deyvi was calm and cooperative during assessment. He denies anxiety, depression, SI, SH, HI, and AVH. He was medication compliant and did not request PRNs. He attended meals and reported a good appetite. He stated that his goal today was to \"attend groups.\" He denies new concerns at this time.   "

## 2024-09-20 NOTE — NURSING NOTE
Pt is calm quiet. Denies psychiatric symptoms. Compliant with meds and meals. Denies SI/HI No behavior issues tonight.

## 2024-09-21 PROCEDURE — 99232 SBSQ HOSP IP/OBS MODERATE 35: CPT

## 2024-09-21 RX ADMIN — ATORVASTATIN CALCIUM 10 MG: 10 TABLET, FILM COATED ORAL at 08:34

## 2024-09-21 RX ADMIN — LAMOTRIGINE 50 MG: 25 TABLET ORAL at 08:34

## 2024-09-21 RX ADMIN — HYDROXYZINE HYDROCHLORIDE 25 MG: 25 TABLET ORAL at 16:55

## 2024-09-21 RX ADMIN — HYDROXYZINE HYDROCHLORIDE 25 MG: 25 TABLET ORAL at 21:14

## 2024-09-21 RX ADMIN — CHOLECALCIFEROL TAB 25 MCG (1000 UNIT) 2000 UNITS: 25 TAB at 08:34

## 2024-09-21 RX ADMIN — HYDROXYZINE HYDROCHLORIDE 25 MG: 25 TABLET ORAL at 08:34

## 2024-09-21 RX ADMIN — LISINOPRIL 10 MG: 10 TABLET ORAL at 08:34

## 2024-09-21 RX ADMIN — SERTRALINE HYDROCHLORIDE 150 MG: 100 TABLET ORAL at 21:14

## 2024-09-21 RX ADMIN — CARIPRAZINE 3 MG: 3 CAPSULE, GELATIN COATED ORAL at 08:34

## 2024-09-21 RX ADMIN — CYANOCOBALAMIN TAB 1000 MCG 1000 MCG: 1000 TAB at 08:34

## 2024-09-21 NOTE — PROGRESS NOTES
Psychiatric Progress Note - Department of Behavioral Health   Deyvi Cao 54 y.o. male MRN: [id number]  Unit/Bed#: [facility] 101-02 Encounter: [id number]    ASSESSMENT & PLAN     Principal Problem:    Bipolar disorder with severe depression (HCC)  Active Problems:    Benign essential hypertension    Mixed hyperlipidemia    Allergic rhinitis due to allergen    Rosacea      Continue to promote patient participation in therapeutic milieu.  Continue medical management per medicine.  Continue previously prescribed psychotropic medication regimen; see below.  Continue behavioral health checks q.7 minutes.   Continue vitals per behavioral health unit protocol.      SUBJECTIVE     Patient evaluated this a.m. for continuity of care. Patient was discussed at length with nursing and treatment team.  Per nursing report, Deyvi has been med and meal compliant.  He has an active intake assessment on the 24th.  Deyvi has been accepted to [facility].  He has exhibited no behaviors and has taken no PRNs in the past 24 hours.    Deyvi denies acute suicidal/self-harm ideation/intent/plan upon direct inquiry at this time. Deyvi is able to contract for safety while on the unit and would feel comfortable seeking staff support should suicidal symptoms or urges appear or worsen. Deyvi remains behaviorally appropriate, no agitation or aggression noted on exam or in report. Deyvi also denies HI/AH/VH, and does not appear overtly manic.  Patient does not verbalize any experiences that can be categorized as paranoid, persecutory, bizarre, or somatic delusions. Deyvi remains adherent to his current psychotropic medication regimen and denies any side effects from medications, as well as none noted on exam.     Deyvi is currently assessed as being at their baseline with continued need for medication management, supervision for safety and ADL’s. These services are not currently available in a less restrictive environment  necessitating continued hospital stay.  Deyvi should remain on the unit until these services are available, due to likelihood of mental decompensation and readmission if discharged to an unsupervised setting.  Assertive discharge planning and collaboration with multiple providers (inpatient and community based) remains ongoing.  Deyvi is currently awaiting for Enhanced CRR     PSYCHIATRIC REVIEW OF SYSTEMS     Behavior over the last 24 hours:  unchanged  Sleep: normal  Appetite: normal  Medication side effects: NO    REVIEW OF SYSTEMS   Review of systems: no complaints    OBJECTIVE     Vital Signs in Past 24 Hours:  Temp:  [97.6 °F (36.4 °C)-97.8 °F (36.6 °C)] 97.8 °F (36.6 °C)  HR:  [91-98] 98  Resp:  [16-18] 18  BP: (113-127)/(77-78) 127/78    Intake/Output in Past 24 hours:  No intake/output data recorded.  No intake/output data recorded.        Laboratory Results:  I have personally reviewed all pertinent laboratory/tests results.    Behavioral Health Medications: all current active meds have been reviewed.    Current Facility-Administered Medications   Medication Dose Route Frequency Provider Last Rate    acetaminophen  650 mg Oral Q6H PRN ALVINA Harley      acetaminophen  650 mg Oral Q4H PRN ALVINA Harley      acetaminophen  975 mg Oral Q6H PRN ALVINA Harley      aluminum-magnesium hydroxide-simethicone  30 mL Oral Q4H PRN ALVINA Harley      ammonium lactate   Topical BID PRN ALVINA Harley      atorvastatin  10 mg Oral Daily ALVINA Lewis      benztropine  1 mg Intramuscular Q4H PRN Max 6/day ALVINA Harley      benztropine  1 mg Oral Q4H PRN Max 6/day ALVINA Harley      bisacodyl  10 mg Rectal Daily PRN ALVINA Harley      cariprazine  3 mg Oral Daily Martin Cardenas MD      Cholecalciferol  2,000 Units Oral Daily ALVINA Lewis      cyanocobalamin  1,000 mcg Oral Daily ALVINA Lewis      hydrOXYzine HCL  25 mg Oral Q6H PRN  Max 4/day ALVINA Harley      hydrOXYzine HCL  25 mg Oral TID Martin Cardenas MD      hydrOXYzine HCL  50 mg Oral Q4H PRN Max 4/day ALVINA Harley      Or    LORazepam  1 mg Intramuscular Q4H PRN ALVINA Harley      lamoTRIgine  50 mg Oral Daily Martin Cardenas MD      lisinopril  10 mg Oral Daily Sary CoradoALVINA collins      LORazepam  1 mg Oral Q4H PRN Max 6/day ALVINA Harley      Or    LORazepam  2 mg Intramuscular Q6H PRN Max 3/day Melva Knutson, TITINP      OLANZapine  10 mg Oral Q3H PRN Max 3/day ALVINA Harley      Or    OLANZapine  10 mg Intramuscular Q3H PRN Max 3/day Melva Knutson, ALVINA      OLANZapine  5 mg Oral Q3H PRN Max 6/day ALVINA Harley      Or    OLANZapine  5 mg Intramuscular Q3H PRN Max 6/day TITI HarleyNP      OLANZapine  2.5 mg Oral Q3H PRN Max 8/day ALVINA Harley      polyethylene glycol  17 g Oral Daily PRN ALVINA Harley      propranolol  10 mg Oral Q8H PRN MelvaALVINA Cordova      senna-docusate sodium  1 tablet Oral Daily PRN ALVINA Harley      sertraline  150 mg Oral HS Martin Cardenas MD         Risks, benefits and possible side effects of Medications:   Risks, benefits, and possible side effects of medications explained to patient and patient verbalizes understanding.          Mental Status Evaluation:  Appearance:  age appropriate and casually dressed   Behavior:  normal   Speech:  normal pitch and normal volume   Mood:  euthymic   Affect:  normal       Thought Process:  goal directed   Thought Content:  No overt delusions   Perceptual Disturbances: no auditory hallucinations, no visual hallucinations, denies when asked, does not appear responding to internal stimuli    Risk Potential: None at present   Sensorium:  person, place, and time/date   Cognition:  recent and remote memory grossly intact   Consciousness:  alert and awake    Attention: attention span appeared shorter than expected for age   Insight:  fair   Judgment: fair   Intellect     Gait/Station: normal gait/station and normal balance   Motor Activity: no abnormal movements     Memory: Short and long term memory       Progress Toward Goals: Progressing, as evidenced by their participation (or lack thereof) in individual, social and therapeutic milieu in addition to adherence to their medication regimen.    Recommended Treatment:   See above for assessment and plan.      Counseling/Coordination of Care    Total unit time spent today ws greater than 15 minutes. Greater than 50% of total time was spent with the patient and/or patient's relatives and/or coordination of patient's care.     Patient's rights, confidentiality, exceptions to confidentiality, use of electronic medical record including appropriate staff access to medical record regarding behavioral health services and consent to treatment were reviewed.    Note Share:     This note was not shared with the patient due to reasonable likelihood of causing patient harm     This note has been constructed using a voice recognition system.    There may be translation, syntax, or grammatical errors. If you have any questions, please contact the dictating provider.    ALVINA Stanford

## 2024-09-21 NOTE — PLAN OF CARE
Problem: Alteration in Thoughts and Perception  Goal: Treatment Goal: Gain control of psychotic behaviors/thinking, reduce/eliminate presenting symptoms and demonstrate improved reality functioning upon discharge  Outcome: Progressing

## 2024-09-21 NOTE — NURSING NOTE
Received pt in bed quietly resting , eyes closed, chest noted to be rising, will monitor thru the night with every 7 min checks thru the night.

## 2024-09-21 NOTE — NURSING NOTE
Patient is pleasant and cooperative. He speaks quietly and maintains good eye contact. He is social with select peers however today did not attend groups and spent most of the day in his room. He is compliant with medications. No behavior issues.

## 2024-09-21 NOTE — NURSING NOTE
Deyvi has been visible intermittently in the milieu. Interacts with select peers. Pleasant, cooperative and brightens upon approach. Denies anxiety, depression, voices and pain. Ate 100% of his meal. Took medication without difficulty. Attended [place] [employer] group this evening. No issues or behaviors. Continue to monitor. Precautions maintained.

## 2024-09-22 PROCEDURE — 99232 SBSQ HOSP IP/OBS MODERATE 35: CPT

## 2024-09-22 RX ADMIN — CHOLECALCIFEROL TAB 25 MCG (1000 UNIT) 2000 UNITS: 25 TAB at 09:07

## 2024-09-22 RX ADMIN — LAMOTRIGINE 50 MG: 25 TABLET ORAL at 09:07

## 2024-09-22 RX ADMIN — HYDROXYZINE HYDROCHLORIDE 25 MG: 25 TABLET ORAL at 21:26

## 2024-09-22 RX ADMIN — SERTRALINE HYDROCHLORIDE 150 MG: 100 TABLET ORAL at 21:26

## 2024-09-22 RX ADMIN — ATORVASTATIN CALCIUM 10 MG: 10 TABLET, FILM COATED ORAL at 09:08

## 2024-09-22 RX ADMIN — HYDROXYZINE HYDROCHLORIDE 25 MG: 25 TABLET ORAL at 17:31

## 2024-09-22 RX ADMIN — CYANOCOBALAMIN TAB 1000 MCG 1000 MCG: 1000 TAB at 09:07

## 2024-09-22 RX ADMIN — LISINOPRIL 10 MG: 10 TABLET ORAL at 09:07

## 2024-09-22 RX ADMIN — HYDROXYZINE HYDROCHLORIDE 25 MG: 25 TABLET ORAL at 09:07

## 2024-09-22 RX ADMIN — CARIPRAZINE 3 MG: 3 CAPSULE, GELATIN COATED ORAL at 09:07

## 2024-09-22 NOTE — PROGRESS NOTES
Psychiatric Progress Note - Department of Behavioral Health   Deyvi Cao 54 y.o. male MRN: [id number]  Unit/Bed#: [facility] 101-02 Encounter: [id number]    ASSESSMENT & PLAN     Principal Problem:    Bipolar disorder with severe depression (HCC)  Active Problems:    Benign essential hypertension    Mixed hyperlipidemia    Allergic rhinitis due to allergen    Rosacea      Continue to promote patient participation in therapeutic milieu.  Continue medical management per medicine.  Continue previously prescribed psychotropic medication regimen; see below.  Continue behavioral health checks q.7 minutes.   Continue vitals per behavioral health unit protocol.      SUBJECTIVE     Patient evaluated this a.m. for continuity of care. Patient was discussed at length with nursing and treatment team.  Per nursing report, Deyvi has a referral for [facility].  He has been denying all psychiatric symptoms.  No PRNs administered.    Deyvi presents ambulating in his bedroom.  Deyvi denies acute suicidal/self-harm ideation/intent/plan upon direct inquiry at this time. Deyvi is able to contract for safety while on the unit and would feel comfortable seeking staff support should suicidal symptoms or urges appear or worsen. Deyvi remains behaviorally appropriate, no agitation or aggression noted on exam or in report. Deyvi also denies HI/AH/VH, and does not appear overtly manic.  Patient does not verbalize any experiences that can be categorized as paranoid, persecutory, bizarre, or somatic delusions. Deyvi remains adherent to his current psychotropic medication regimen and denies any side effects from medications, as well as none noted on exam.  Deyvi denies any pain at this time.    PSYCHIATRIC REVIEW OF SYSTEMS     Behavior over the last 24 hours:  unchanged  Sleep: normal  Appetite: normal  Medication side effects: NO    REVIEW OF SYSTEMS   Review of systems: no complaints    OBJECTIVE     Vital Signs in Past  24 Hours:  Temp:  [97.8 °F (36.6 °C)-97.9 °F (36.6 °C)] 97.8 °F (36.6 °C)  HR:  [85-99] 85  Resp:  [16-18] 18  BP: (122-144)/(75-82) 144/82    Intake/Output in Past 24 hours:  No intake/output data recorded.  No intake/output data recorded.        Laboratory Results:  I have personally reviewed all pertinent laboratory/tests results.    Behavioral Health Medications: all current active meds have been reviewed.    Current Facility-Administered Medications   Medication Dose Route Frequency Provider Last Rate    acetaminophen  650 mg Oral Q6H PRN ALVINA Harley      acetaminophen  650 mg Oral Q4H PRN ALVINA Harley      acetaminophen  975 mg Oral Q6H PRN ALVINA Harley      aluminum-magnesium hydroxide-simethicone  30 mL Oral Q4H PRN ALVINA Harley      ammonium lactate   Topical BID PRN ALVINA Harley      atorvastatin  10 mg Oral Daily ALVINA Lewis      benztropine  1 mg Intramuscular Q4H PRN Max 6/day ALVINA Harley      benztropine  1 mg Oral Q4H PRN Max 6/day ALVINA Harley      bisacodyl  10 mg Rectal Daily PRN ALVINA Harley      cariprazine  3 mg Oral Daily Martin Cardenas MD      Cholecalciferol  2,000 Units Oral Daily ALVINA Lewis      cyanocobalamin  1,000 mcg Oral Daily ALVINA Lewis      hydrOXYzine HCL  25 mg Oral Q6H PRN Max 4/day ALVINA Harley      hydrOXYzine HCL  25 mg Oral TID Martin Cardenas MD      hydrOXYzine HCL  50 mg Oral Q4H PRN Max 4/day ALVINA Harley      Or    LORazepam  1 mg Intramuscular Q4H PRN ALVINA Harley      lamoTRIgine  50 mg Oral Daily Martin Cardenas MD      lisinopril  10 mg Oral Daily ALVINA Lewis      LORazepam  1 mg Oral Q4H PRN Max 6/day ALVINA Harley      Or    LORazepam  2 mg Intramuscular Q6H PRN Max 3/day ALVINA Harley      OLANZapine  10 mg Oral Q3H PRN Max 3/day ALVINA Harley      Or    OLANZapine  10 mg Intramuscular Q3H PRN Max 3/day Melva Knutson,  ALVINA      OLANZapine  5 mg Oral Q3H PRN Max 6/day ALVINA Harley      Or    OLANZapine  5 mg Intramuscular Q3H PRN Max 6/day ALVINA Harley      OLANZapine  2.5 mg Oral Q3H PRN Max 8/day ALVINA Harley      polyethylene glycol  17 g Oral Daily PRN ALVINA Harley      propranolol  10 mg Oral Q8H PRN ALVINA Harley      senna-docusate sodium  1 tablet Oral Daily PRN ALVINA Harley      sertraline  150 mg Oral HS Martin Cardenas MD         Risks, benefits and possible side effects of Medications:   Risks, benefits, and possible side effects of medications explained to patient and patient verbalizes understanding.          Mental Status Evaluation:  Appearance:  age appropriate and casually dressed   Behavior:  normal   Speech:  normal pitch and normal volume   Mood:  euthymic   Affect:  normal   Behavior: Calm and cooperative   Thought Process:  goal directed   Thought Content:  No overt delusions   Perceptual Disturbances: no auditory hallucinations, no visual hallucinations, denies when asked, does not appear responding to internal stimuli    Risk Potential: None at present   Sensorium:  person, place, and time/date   Cognition:  recent and remote memory grossly intact   Consciousness:  alert and awake    Attention: attention span appeared shorter than expected for age   Insight:  fair   Judgment: fair   Intellect    Gait/Station: normal gait/station and normal balance   Motor Activity: no abnormal movements     Memory: Short and long term memory fair     Progress Toward Goals: Progressing, as evidenced by their participation (or lack thereof) in individual, social and therapeutic milieu in addition to adherence to their medication regimen.    Recommended Treatment:   See above for assessment and plan.      Counseling/Coordination of Care    Total unit time spent today ws greater than 15 minutes. Greater than 50% of total time was spent with the patient and/or patient's relatives and/or  coordination of patient's care.     Patient's rights, confidentiality, exceptions to confidentiality, use of electronic medical record including appropriate staff access to medical record regarding behavioral health services and consent to treatment were reviewed.    Note Share:     This note was not shared with the patient due to reasonable likelihood of causing patient harm     This note has been constructed using a voice recognition system.    There may be translation, syntax, or grammatical errors. If you have any questions, please contact the dictating provider.    ALVINA Stanford

## 2024-09-22 NOTE — NURSING NOTE
Medical Provider made aware of b/l feet slighlty blue, and +2  pedal pulses. NNO. Provider would like for Medical Provider to assess in am.

## 2024-09-22 NOTE — NURSING NOTE
Alert, cooperative and visible intermittently. No SI or HI noted. Denies depression, anxiety and pain. Did not attend any groups. Consumed 100% of breakfast and 100% of lunch. Took all medication without prompting. Maintained on safe precautions without incident. Will continue to monitor progress and recovery program.

## 2024-09-22 NOTE — NURSING NOTE
Weekly wellness assessment completed. Pt lung sounds clear in all lung fields. No edema noted. Bowel sounds +x4. B/l pedal pulses +2 moderate. B/L feet noted slightly blue in color, circulation check less than 2 sec in b/l toes. Skin intact, and warm.

## 2024-09-22 NOTE — PLAN OF CARE
Problem: Alteration in Thoughts and Perception  Goal: Agree to be compliant with medication regime, as prescribed and report medication side effects  Description: Interventions:  - Offer appropriate PRN medication and supervise ingestion; conduct AIMS, as needed   Outcome: Progressing  Goal: Attend and participate in unit activities, including therapeutic, recreational, and educational groups  Description: Interventions:  -Encourage Visitation and family involvement in care  Outcome: Progressing     Problem: Risk for Self Injury/Neglect  Goal: Refrain from harming self  Description: Interventions:  - Monitor patient closely, per order  - Develop a trusting relationship  - Supervise medication ingestion, monitor effects and side effects   Outcome: Progressing     Problem: Depression  Goal: Refrain from harming self  Description: Interventions:  - Monitor patient closely, per order   - Supervise medication ingestion, monitor effects and side effects   Outcome: Progressing  Goal: Refrain from self-neglect  Outcome: Progressing     Problem: Risk for Violence/Aggression Toward Others  Goal: Refrain from harming others  Outcome: Progressing  Goal: Control angry outbursts  Description: Interventions:  - Monitor patient closely, per order  - Ensure early verbal de-escalation  - Monitor prn medication needs  - Set reasonable/therapeutic limits, outline behavioral expectations, and consequences   - Provide a non-threatening milieu, utilizing the least restrictive interventions   Outcome: Progressing     Problem: Alteration in Orientation  Goal: Interact with staff daily  Description: Interventions:  - Assess and re-assess patient's level of orientation  - Engage patient in 1 on 1 interactions, daily, for a minimum of 15 minutes   - Establish rapport/trust with patient   Outcome: Progressing  Goal: Allow medical examinations, as recommended  Description: Interventions:  - Provide physical/neurological exams and/or referrals,  per provider   Outcome: Progressing  Goal: Cooperate with recommended testing/procedures  Description: Interventions:  - Determine need for ancillary testing  - Observe for mental status changes  - Implement falls/precaution protocol   Outcome: Progressing  Goal: Complete daily ADLs, including personal hygiene independently, as able  Description: Interventions:  - Observe, teach, and assist patient with ADLS  Outcome: Progressing

## 2024-09-23 PROCEDURE — 99232 SBSQ HOSP IP/OBS MODERATE 35: CPT | Performed by: PSYCHIATRY & NEUROLOGY

## 2024-09-23 RX ADMIN — LISINOPRIL 10 MG: 10 TABLET ORAL at 08:41

## 2024-09-23 RX ADMIN — HYDROXYZINE HYDROCHLORIDE 25 MG: 25 TABLET ORAL at 17:37

## 2024-09-23 RX ADMIN — LAMOTRIGINE 50 MG: 25 TABLET ORAL at 08:41

## 2024-09-23 RX ADMIN — CARIPRAZINE 3 MG: 3 CAPSULE, GELATIN COATED ORAL at 08:41

## 2024-09-23 RX ADMIN — SERTRALINE HYDROCHLORIDE 150 MG: 100 TABLET ORAL at 21:16

## 2024-09-23 RX ADMIN — CYANOCOBALAMIN TAB 1000 MCG 1000 MCG: 1000 TAB at 08:41

## 2024-09-23 RX ADMIN — HYDROXYZINE HYDROCHLORIDE 25 MG: 25 TABLET ORAL at 08:41

## 2024-09-23 RX ADMIN — ATORVASTATIN CALCIUM 10 MG: 10 TABLET, FILM COATED ORAL at 08:41

## 2024-09-23 RX ADMIN — HYDROXYZINE HYDROCHLORIDE 25 MG: 25 TABLET ORAL at 21:17

## 2024-09-23 RX ADMIN — CHOLECALCIFEROL TAB 25 MCG (1000 UNIT) 2000 UNITS: 25 TAB at 08:41

## 2024-09-23 NOTE — PLAN OF CARE
Problem: Alteration in Thoughts and Perception  Goal: Treatment Goal: Gain control of psychotic behaviors/thinking, reduce/eliminate presenting symptoms and demonstrate improved reality functioning upon discharge  Outcome: Progressing  Goal: Verbalize thoughts and feelings  Description: Interventions:  - Promote a nonjudgmental and trusting relationship with the patient through active listening and therapeutic communication  - Assess patient's level of functioning, behavior and potential for risk  - Engage patient in 1 on 1 interactions  - Encourage patient to express fears, feelings, frustrations, and discuss symptoms    - Jay patient to reality, help patient recognize reality-based thinking   - Administer medications as ordered and assess for potential side effects  - Provide the patient education related to the signs and symptoms of the illness and desired effects of prescribed medications  Outcome: Progressing  Goal: Refrain from acting on delusional thinking/internal stimuli  Description: Interventions:  - Monitor patient closely, per order   - Utilize least restrictive measures   - Set reasonable limits, give positive feedback for acceptable   - Administer medications as ordered and monitor of potential side effects  Outcome: Progressing  Goal: Agree to be compliant with medication regime, as prescribed and report medication side effects  Description: Interventions:  - Offer appropriate PRN medication and supervise ingestion; conduct AIMS, as needed   Outcome: Progressing  Goal: Attend and participate in unit activities, including therapeutic, recreational, and educational groups  Description: Interventions:  -Encourage Visitation and family involvement in care  Outcome: Progressing  Goal: Recognize dysfunctional thoughts, communicate reality-based thoughts at the time of discharge  Description: Interventions:  - Provide medication and psycho-education to assist patient in compliance and developing  insight into his/her illness   Outcome: Progressing  Goal: Complete daily ADLs, including personal hygiene independently, as able  Description: Interventions:  - Observe, teach, and assist patient with ADLS  - Monitor and promote a balance of rest/activity, with adequate nutrition and elimination   Outcome: Progressing     Problem: Ineffective Coping  Goal: Identifies ineffective coping skills  Outcome: Progressing  Goal: Identifies healthy coping skills  Outcome: Progressing  Goal: Demonstrates healthy coping skills  Outcome: Progressing  Goal: Participates in unit activities  Description: Interventions:  - Provide therapeutic environment   - Provide required programming   - Redirect inappropriate behaviors   Outcome: Progressing  Goal: Patient/Family participate in treatment and DC plans  Description: Interventions:  - Provide therapeutic environment  Outcome: Progressing  Goal: Patient/Family verbalizes awareness of resources  Outcome: Progressing  Goal: Understands least restrictive measures  Description: Interventions:  - Utilize least restrictive behavior  Outcome: Progressing  Goal: Free from restraint events  Description: - Utilize least restrictive measures   - Provide behavioral interventions   - Redirect inappropriate behaviors   Outcome: Progressing     Problem: Risk for Self Injury/Neglect  Goal: Treatment Goal: Remain safe during length of stay, learn and adopt new coping skills, and be free of self-injurious ideation, impulses and acts at the time of discharge  Outcome: Progressing  Goal: Verbalize thoughts and feelings  Description: Interventions:  - Assess and re-assess patient's lethality and potential for self-injury  - Engage patient in 1:1 interactions, daily, for a minimum of 15 minutes  - Encourage patient to express feelings, fears, frustrations, hopes  - Establish rapport/trust with patient   Outcome: Progressing  Goal: Refrain from harming self  Description: Interventions:  - Monitor  patient closely, per order  - Develop a trusting relationship  - Supervise medication ingestion, monitor effects and side effects   Outcome: Progressing  Goal: Attend and participate in unit activities, including therapeutic, recreational, and educational groups  Description: Interventions:  - Provide therapeutic and educational activities daily, encourage attendance and participation, and document same in the medical record  - Obtain collateral information, encourage visitation and family involvement in care   Outcome: Progressing  Goal: Recognize maladaptive responses and adopt new coping mechanisms  Outcome: Progressing  Goal: Complete daily ADLs, including personal hygiene independently, as able  Description: Interventions:  - Observe, teach, and assist patient with ADLS  - Monitor and promote a balance of rest/activity, with adequate nutrition and elimination  Outcome: Progressing     Problem: Depression  Goal: Verbalize thoughts and feelings  Description: Interventions:  - Assess and re-assess patient's level of risk   - Engage patient in 1:1 interactions, daily, for a minimum of 15 minutes   - Encourage patient to express feelings, fears, frustrations, hopes   Outcome: Progressing  Goal: Refrain from harming self  Description: Interventions:  - Monitor patient closely, per order   - Supervise medication ingestion, monitor effects and side effects   Outcome: Progressing  Goal: Refrain from isolation  Description: Interventions:  - Develop a trusting relationship   - Encourage socialization   Outcome: Progressing  Goal: Refrain from self-neglect  Outcome: Progressing  Goal: Attend and participate in unit activities, including therapeutic, recreational, and educational groups  Description: Interventions:  - Provide therapeutic and educational activities daily, encourage attendance and participation, and document same in the medical record   Outcome: Progressing  Goal: Complete daily ADLs, including personal  hygiene independently, as able  Description: Interventions:  - Observe, teach, and assist patient with ADLS  -  Monitor and promote a balance of rest/activity, with adequate nutrition and elimination   Outcome: Progressing     Problem: Anxiety  Goal: Anxiety is at manageable level  Description: Interventions:  - Assess and monitor patient's anxiety level.   - Monitor for signs and symptoms (heart palpitations, chest pain, shortness of breath, headaches, nausea, feeling jumpy, restlessness, irritable, apprehensive).   - Collaborate with interdisciplinary team and initiate plan and interventions as ordered.  - [place] patient to unit/surroundings  - Explain treatment plan  - Encourage participation in care  - Encourage verbalization of concerns/fears  - Identify coping mechanisms  - Assist in developing anxiety-reducing skills  - Administer/offer alternative therapies  - Limit or eliminate stimulants  Outcome: Progressing     Problem: Risk for Violence/Aggression Toward Others  Goal: Verbalize thoughts and feelings  Description: Interventions:  - Assess and re-assess patient's level of risk, every waking shift  - Engage patient in 1:1 interactions, daily, for a minimum of 15 minutes   - Allow patient to express feelings and frustrations in a safe and non-threatening manner   - Establish rapport/trust with patient   Outcome: Progressing  Goal: Refrain from harming others  Outcome: Progressing  Goal: Refrain from destructive acts on the environment or property  Outcome: Progressing  Goal: Control angry outbursts  Description: Interventions:  - Monitor patient closely, per order  - Ensure early verbal de-escalation  - Monitor prn medication needs  - Set reasonable/therapeutic limits, outline behavioral expectations, and consequences   - Provide a non-threatening milieu, utilizing the least restrictive interventions   Outcome: Progressing  Goal: Attend and participate in unit activities, including therapeutic,  recreational, and educational groups  Description: Interventions:  - Provide therapeutic and educational activities daily, encourage attendance and participation, and document same in the medical record   Outcome: Progressing     Problem: Alteration in Orientation  Goal: Allow medical examinations, as recommended  Description: Interventions:  - Provide physical/neurological exams and/or referrals, per provider   Outcome: Progressing  Goal: Cooperate with recommended testing/procedures  Description: Interventions:  - Determine need for ancillary testing  - Observe for mental status changes  - Implement falls/precaution protocol   Outcome: Progressing  Goal: Attend and participate in unit activities, including therapeutic, recreational, and educational groups  Description: Interventions:  - Provide therapeutic and educational activities daily, encourage attendance and participation, and document same in the medical record   - Provide appropriate opportunities for reminiscence   - Provide a consistent daily routine   - Encourage family contact/visitation   Outcome: Progressing  Goal: Complete daily ADLs, including personal hygiene independently, as able  Description: Interventions:  - Observe, teach, and assist patient with ADLS  Outcome: Progressing     Problem: SELF HARM/SUICIDALITY  Goal: Will have no self-injury during hospital stay  Description: INTERVENTIONS:  - Q 15 MINUTES: Routine safety checks  - Q WAKING SHIFT & PRN: Assess risk to determine if routine checks are adequate to maintain patient safety  - Encourage patient to participate actively in care by formulating a plan to combat response to suicidal ideation, identify supports and resources  Outcome: Progressing

## 2024-09-23 NOTE — NURSING NOTE
Patient is pleasant and cooperative , with poor concentration. His behaviors and attitude is quiet, brightens on approach, friendly. Patient has bizarre appearance with worried affect. Good medication compliance. Impulsive behaviors not  observed. Denies Si,HI. A,V/H.

## 2024-09-23 NOTE — PROGRESS NOTES
Progress Note - Behavioral Health   Name: Deyvi Cao 54 y.o. male I MRN: [id number]   Unit/Bed#: [facility] 101-02 I Date of Admission: 6/25/2024   Date of Service: 9/23/2024 I Hospital Day: 90     Assessment & Plan  Bipolar disorder with severe depression (HCC)  Progressing  Awaiting placement - CRR interview complete, ACT interview on 9/24  Continue Vraylar 3mg PO Daily, Atarax 25mg PO TID, Lamictal 50mg PO Daily, add Zoloft 150mg PO QHS  Continue with group therapy, milieu therapy and occupational therapy   Behavioral Health checks every 7 minutes   Continue frequent safety checks and vitals per unit protocol  Continue with [facility] medical management as indicated  Benign essential hypertension  Managed by [facility]  Continue Lisinopril 10mg PO Daily  Mixed hyperlipidemia  Managed by [facility]  Continue Lipitor 10mg PO Daily  Allergic rhinitis due to allergen  Managed by [facility]  Rosacea  Managed by [facility]    Subjective:    Patient was seen today for continuation of care, records reviewed and patient was discussed with the morning case review team.    Deyvi was seen today for psychiatric follow-up.  On assessment today, Deyvi was found in the halls.  He is doing well, brightens slightly on approach.  He is looking forward to his interview tomorrow.  Reports improvement in depression and anxiety.  Deyvi reports adequate daytime energy and denies any difficulties with initiating or staying asleep.  Oral appetite and hydration is adequate.   We reviewed once more the specific as-needed medications they can use going forward if they experience any insomnia or destabilization of their mood, they understood and were agreeable. Milieu visibility and group attendance encouraged to promote an active participation in treatment.    Deyvi denies acute suicidal/self-harm ideation/intent/plan upon direct inquiry at this time. Deyvi is able to contract for safety while on the unit and would feel comfortable seeking staff support should  suicidal symptoms or urges appear or worsen. Deyvi remains behaviorally appropriate, no agitation or aggression noted on exam or in report. Deyvi also denies HI/AH/VH, and does not appear overtly manic.  Patient does not verbalize any experiences that can be categorized as paranoid, persecutory, bizarre, or somatic delusions. Deyvi remains adherent to his current psychotropic medication regimen and denies any side effects from medications, as well as none noted on exam.    Deyvi is currently assessed as being at their baseline with continued need for medication management, supervision for safety and ADL’s. These services are not currently available in a less restrictive environment necessitating continued hospital stay.  Deyvi should remain on the unit until these services are available, due to likelihood of mental decompensation and readmission if discharged to an unsupervised setting.  Assertive discharge planning and collaboration with multiple providers (inpatient and community based) remains ongoing.  Deyvi is currently awaiting for CRR placement.     Group Attendance: 3 / 9  Treatment Team: Next Thursday  Psychiatric PRN's Needed: None    Review of Systems:  Behavior over the last 24 hours: Slowly improving  Sleep: sleeping okay throughout the night  Appetite: adequate  Medication side effects: none reported  ROS:no complaints    Objective:    Vitals:  Vitals:    09/23/24 0752   BP: (!) 172/99   Pulse: 90   Resp: 16   Temp: 97.5 °F (36.4 °C)   SpO2: 94%     Laboratory Results:  I have personally reviewed all pertinent laboratory/tests results.  Most Recent Labs:   Lab Results   Component Value Date    WBC 7.39 06/26/2024    RBC 4.70 06/26/2024    HGB 15.2 06/26/2024    HCT 45.5 06/26/2024     06/26/2024    RDW 12.9 06/26/2024    NEUTROABS 4.63 06/26/2024    SODIUM 137 06/26/2024    K 4.1 06/26/2024     06/26/2024    CO2 26 06/26/2024    BUN 17 06/26/2024    CREATININE 0.63 06/26/2024     GLUC 98 06/26/2024    GLUF 98 06/26/2024    CALCIUM 9.6 06/26/2024    AST 25 06/26/2024    ALT 45 06/26/2024    ALKPHOS 114 (H) 06/26/2024    TP 7.0 06/26/2024    ALB 4.4 06/26/2024    TBILI 0.44 06/26/2024    CHOLESTEROL 177 06/26/2024    HDL 52 06/26/2024    TRIG 73 06/26/2024    LDLCALC 110 (H) 06/26/2024    NONHDLC 125 06/26/2024    LITHIUM 0.4 (L) 01/28/2021    [id number] 2.636 06/26/2024    HGBA1C 5.2 11/22/2023     11/22/2023     Mental Status Evaluation:  Appearance:  age appropriate, casually dressed, dressed appropriately, adequate grooming   Behavior:  pleasant, cooperative, calm   Speech:  normal rate, normal volume   Mood:  mildly anxious, mildly depressed   Affect:  slightly brighter   Thought Process:  logical, coherent, goal directed   Associations: intact associations   Thought Content:  no overt delusions   Perceptual Disturbances: no auditory hallucinations, no visual hallucinations, denies when asked, does not appear responding to internal stimuli   Risk Potential: Suicidal ideation - None at present, contracts for safety on the unit, would talk to staff if not feeling safe on the unit  Homicidal ideation - None at present  Potential for aggression - Not at present   Sensorium:  oriented to person, place, and time/date   Memory:  recent memory intact   Consciousness:  alert and awake   Attention/Concentration: attention span and concentration appear shorter than expected for age   Insight:  fair and improving   Judgment: fair and improving   Gait/Station: normal gait/station, normal balance   Motor Activity: no abnormal movements     Progress Toward Goals: making slow improvement.  Deyvi continues to require inpatient psychiatric hospitalization for continued medication management and titration to optimize symptom reduction, improve sleep hygiene, and demonstrate adequate self-care.     Suicide/Homicide Risk Assessment:  Risk of Harm to Self:   Nursing Suicide Risk Assessment Last  24 hours: C-SSRS Risk (Since Last Contact)  Calculated C-SSRS Risk Score (Since Last Contact): No Risk Indicated    Risk of Harm to Others:  Nursing Homicide Risk Assessment: Violence Risk to Others: Denies within past 6 months    Behavioral Health Medications: all current active meds have been reviewed and continue current psychiatric medications.  Current Facility-Administered Medications   Medication Dose Route Frequency Provider Last Rate    acetaminophen  650 mg Oral Q6H PRN ALVINA Harley      acetaminophen  650 mg Oral Q4H PRN ALVINA Harley      acetaminophen  975 mg Oral Q6H PRN ALVINA Harley      aluminum-magnesium hydroxide-simethicone  30 mL Oral Q4H PRN ALVINA Harley      ammonium lactate   Topical BID PRN ALVINA Harley      atorvastatin  10 mg Oral Daily ALVINA Lewis      benztropine  1 mg Intramuscular Q4H PRN Max 6/day ALVINA Harley      benztropine  1 mg Oral Q4H PRN Max 6/day ALVINA Harley      bisacodyl  10 mg Rectal Daily PRN ALVINA Harley      cariprazine  3 mg Oral Daily Martin Cardenas MD      Cholecalciferol  2,000 Units Oral Daily ALVINA Lewis      cyanocobalamin  1,000 mcg Oral Daily ALVINA Lewis      hydrOXYzine HCL  25 mg Oral Q6H PRN Max 4/day ALVINA Harley      hydrOXYzine HCL  25 mg Oral TID Martin Cardenas MD      hydrOXYzine HCL  50 mg Oral Q4H PRN Max 4/day ALVINA Harley      Or    LORazepam  1 mg Intramuscular Q4H PRN ALVINA Harley      lamoTRIgine  50 mg Oral Daily Martin Cardenas MD      lisinopril  10 mg Oral Daily ALVINA Lewis      LORazepam  1 mg Oral Q4H PRN Max 6/day ALVINA Harley      Or    LORazepam  2 mg Intramuscular Q6H PRN Max 3/day ALVINA Harley      OLANZapine  10 mg Oral Q3H PRN Max 3/day ALVINA Harley      Or    OLANZapine  10 mg Intramuscular Q3H PRN Max 3/day ALVINA Harley      OLANZapine  5 mg Oral Q3H PRN Max 6/day Melva Knutson  ALVINA      Or    OLANZapine  5 mg Intramuscular Q3H PRN Max 6/day ALVINA Harley      OLANZapine  2.5 mg Oral Q3H PRN Max 8/day ALVINA Harley      polyethylene glycol  17 g Oral Daily PRN ALVINA Harley      propranolol  10 mg Oral Q8H PRN ALVINA Harley      senna-docusate sodium  1 tablet Oral Daily PRN ALVINA Harley      sertraline  150 mg Oral HS Martin Cardenas MD       Risks / Benefits of Treatment:  Risks, benefits, and possible side effects of medications explained to patient. Patient has limited understanding of risks and benefits of treatment at this time, but agrees to take medications as prescribed.    Counseling / Coordination of Care:  Total floor/unit time spent today 25 minutes. Greater than 50% of total time was spent with the patient and / or family counseling and / or coordination of care. A description of the counseling / coordination of care:   Patient's progress discussed with staff in treatment team meeting.  Medications, treatment progress and treatment plan reviewed with patient.   Educated on importance of medication and treatment compliance.  Reassurance and supportive therapy provided.   Encouraged participation in milieu and group therapy on the unit.    ALVINA Harley 09/23/24

## 2024-09-23 NOTE — PROGRESS NOTES
09/23/24 1000 09/23/24 1100 09/23/24 1300   Activity/Group Checklist   Group Other (Comment)  (Self care) Self Esteem  (Positive Strengths and qualities) Personal control  (Mindfulness coping)   Attendance Attended Attended Attended   Attendance Duration (min) 46-60 46-60 46-60   Interactions Interacted appropriately Interacted appropriately Interacted appropriately   Affect/Mood Appropriate Appropriate Appropriate   Goals Achieved Identified feelings;Able to listen to others;Able to engage in interactions;Able to self-disclose;Able to recieve feedback;Able to give feedback to another Identified feelings;Able to listen to others;Able to engage in interactions;Able to reflect/comment on own behavior;Able to self-disclose;Able to recieve feedback;Able to give feedback to another Identified feelings;Able to listen to others;Able to engage in interactions;Able to self-disclose;Able to recieve feedback;Able to give feedback to another      09/23/24 1400 09/23/24 1600   Activity/Group Checklist   Group Other (Comment)  (Store time) Community meeting   Attendance Attended Attended   Attendance Duration (min) 31-45 31-45   Interactions Interacted appropriately Interacted appropriately   Affect/Mood Appropriate Appropriate   Goals Achieved Identified feelings;Able to listen to others;Able to engage in interactions;Able to self-disclose;Able to recieve feedback;Able to give feedback to another Able to engage in interactions;Able to listen to others;Able to self-disclose;Able to recieve feedback;Able to give feedback to another;Identified feelings

## 2024-09-23 NOTE — PROGRESS NOTES
09/23/24 0755   Team Meeting   Meeting Type Daily Rounds   Team Members Present   Team Members Present Physician;Nurse;;Other (Discipline and Name)   Patient/Family Present   Patient Present No   Patient's Family Present No     In attendance:  MD Melva Sousa, ALVINA Haywood, LYNNE Garcia, [facility]  [employer] LCSW    Groups: 3/9    Pt checked by medical due to concerns with feet. No bx issues noted.  Med compliant. ACT assessment tomorrow.

## 2024-09-24 PROCEDURE — 99232 SBSQ HOSP IP/OBS MODERATE 35: CPT | Performed by: PSYCHIATRY & NEUROLOGY

## 2024-09-24 RX ADMIN — SERTRALINE HYDROCHLORIDE 150 MG: 100 TABLET ORAL at 21:24

## 2024-09-24 RX ADMIN — CYANOCOBALAMIN TAB 1000 MCG 1000 MCG: 1000 TAB at 08:39

## 2024-09-24 RX ADMIN — CARIPRAZINE 3 MG: 3 CAPSULE, GELATIN COATED ORAL at 08:38

## 2024-09-24 RX ADMIN — ATORVASTATIN CALCIUM 10 MG: 10 TABLET, FILM COATED ORAL at 08:39

## 2024-09-24 RX ADMIN — HYDROXYZINE HYDROCHLORIDE 25 MG: 25 TABLET ORAL at 17:18

## 2024-09-24 RX ADMIN — LISINOPRIL 10 MG: 10 TABLET ORAL at 08:39

## 2024-09-24 RX ADMIN — HYDROXYZINE HYDROCHLORIDE 25 MG: 25 TABLET ORAL at 21:24

## 2024-09-24 RX ADMIN — HYDROXYZINE HYDROCHLORIDE 25 MG: 25 TABLET ORAL at 08:38

## 2024-09-24 RX ADMIN — LAMOTRIGINE 50 MG: 25 TABLET ORAL at 08:38

## 2024-09-24 RX ADMIN — CHOLECALCIFEROL TAB 25 MCG (1000 UNIT) 2000 UNITS: 25 TAB at 08:38

## 2024-09-24 NOTE — SOCIAL WORK
SW sent email to Josiah to inquire about next steps for CRR and begin process of scheduling pt's tour of the house

## 2024-09-24 NOTE — PROGRESS NOTES
Progress Note - Behavioral Health   Name: Deyvi Cao 54 y.o. male I MRN: [id number]   Unit/Bed#: [facility] I Date of Admission: 6/25/2024   Date of Service: 9/24/2024 I Hospital Day: 91     Assessment & Plan  Bipolar disorder with severe depression (HCC)  Progressing  Awaiting placement - CRR interview complete, ACT interview on 9/24  Continue Vraylar 3mg PO Daily, Atarax 25mg PO TID, Lamictal 50mg PO Daily, add Zoloft 150mg PO QHS  Continue with group therapy, milieu therapy and occupational therapy   Behavioral Health checks every 7 minutes   Continue frequent safety checks and vitals per unit protocol  Continue with [facility] medical management as indicated  Benign essential hypertension  Managed by [facility]  Continue Lisinopril 10mg PO Daily  Mixed hyperlipidemia  Managed by [facility]  Continue Lipitor 10mg PO Daily  Allergic rhinitis due to allergen  Managed by [facility]  Rosacea  Managed by [facility]    Subjective:    Patient was seen today for continuation of care, records reviewed and patient was discussed with the morning case review team.    Deyvi was seen today for psychiatric follow-up.  On assessment today, Deyvi was calm and cooperative.  He is doing well, looking forward to his upcoming interview with ACT.  Deyvi reports adequate daytime energy and denies any difficulties with initiating or staying asleep.  Oral appetite and hydration is adequate.  We reviewed once more the specific as-needed medications they can use going forward if they experience any insomnia or destabilization of their mood, they understood and were agreeable. Milieu visibility and group attendance encouraged to promote an active participation in treatment.    Deyvi denies acute suicidal/self-harm ideation/intent/plan upon direct inquiry at this time. Deyvi is able to contract for safety while on the unit and would feel comfortable seeking staff support should suicidal symptoms or urges appear or worsen. Deyvi remains behaviorally  appropriate, no agitation or aggression noted on exam or in report. Deyvi also denies HI/AH/VH, and does not appear overtly manic.  Patient does not verbalize any experiences that can be categorized as paranoid, persecutory, bizarre, or somatic delusions. Deyvi remains adherent to his current psychotropic medication regimen and denies any side effects from medications, as well as none noted on exam.    Deyvi is currently assessed as being at their baseline with continued need for medication management, supervision for safety and ADL’s. These services are not currently available in a less restrictive environment necessitating continued hospital stay.  Deyvi should remain on the unit until these services are available, due to likelihood of mental decompensation and readmission if discharged to an unsupervised setting.  Assertive discharge planning and collaboration with multiple providers (inpatient and community based) remains ongoing.  Deyvi is currently awaiting for enhanced CRR    Group Attendance: 5 / 10  Treatment Team: SOL[facility]  Psychiatric PRN's Needed: None    Review of Systems:  Behavior over the last 24 hours: Slowly improving  Sleep: sleeping okay throughout the night  Appetite: adequate  Medication side effects: none reported  ROS:no complaints    Objective:    Vitals:  Vitals:    09/24/24 0734   BP: 133/83   Pulse: 92   Resp: 18   Temp: 97.5 °F (36.4 °C)   SpO2: 95%       Laboratory Results:  I have personally reviewed all pertinent laboratory/tests results.  Most Recent Labs:   Lab Results   Component Value Date    WBC 7.39 06/26/2024    RBC 4.70 06/26/2024    HGB 15.2 06/26/2024    HCT 45.5 06/26/2024     06/26/2024    RDW 12.9 06/26/2024    NEUTROABS 4.63 06/26/2024    SODIUM 137 06/26/2024    K 4.1 06/26/2024     06/26/2024    CO2 26 06/26/2024    BUN 17 06/26/2024    CREATININE 0.63 06/26/2024    GLUC 98 06/26/2024    GLUF 98 06/26/2024    CALCIUM 9.6 06/26/2024    AST 25  06/26/2024    ALT 45 06/26/2024    ALKPHOS 114 (H) 06/26/2024    TP 7.0 06/26/2024    ALB 4.4 06/26/2024    TBILI 0.44 06/26/2024    CHOLESTEROL 177 06/26/2024    HDL 52 06/26/2024    TRIG 73 06/26/2024    LDLCALC 110 (H) 06/26/2024    NONHDLC 125 06/26/2024    LITHIUM 0.4 (L) 01/28/2021    [id number] 2.636 06/26/2024    HGBA1C 5.2 11/22/2023     11/22/2023     Mental Status Evaluation:  Appearance:  age appropriate, casually dressed, dressed appropriately   Behavior:  pleasant, cooperative, calm   Speech:  normal rate, normal volume, normal pitch   Mood:  mildly anxious, mildly depressed   Affect:  slightly brighter   Thought Process:  organized, logical, coherent   Associations: intact associations   Thought Content:  no overt delusions   Perceptual Disturbances: no auditory hallucinations, no visual hallucinations, denies when asked, does not appear responding to internal stimuli   Risk Potential: Suicidal ideation - None at present, contracts for safety on the unit, would talk to staff if not feeling safe on the unit  Homicidal ideation - None at present  Potential for aggression - Not at present   Sensorium:  oriented to person, place, and time/date   Memory:  recent memory intact   Consciousness:  alert and awake   Attention/Concentration: attention span and concentration appear shorter than expected for age   Insight:  fair and improving   Judgment: fair and improving   Gait/Station: normal gait/station, normal balance   Motor Activity: no abnormal movements     Progress Toward Goals: making gradual improvement.  Deyvi continues to require inpatient psychiatric hospitalization for continued medication management and titration to optimize symptom reduction, improve sleep hygiene, and demonstrate adequate self-care.     Suicide/Homicide Risk Assessment:  Risk of Harm to Self:   Nursing Suicide Risk Assessment Last 24 hours: C-SSRS Risk (Since Last Contact)  Calculated C-SSRS Risk Score (Since Last  Contact): No Risk Indicated    Risk of Harm to Others:  Nursing Homicide Risk Assessment: Violence Risk to Others: Denies within past 6 months    Behavioral Health Medications: all current active meds have been reviewed and continue current psychiatric medications.  Current Facility-Administered Medications   Medication Dose Route Frequency Provider Last Rate    acetaminophen  650 mg Oral Q6H PRN ALVINA Harley      acetaminophen  650 mg Oral Q4H PRN ALVINA Harley      acetaminophen  975 mg Oral Q6H PRN ALVINA Harley      aluminum-magnesium hydroxide-simethicone  30 mL Oral Q4H PRN ALVINA Harley      ammonium lactate   Topical BID PRN ALVINA Harley      atorvastatin  10 mg Oral Daily ALVINA Lewis      benztropine  1 mg Intramuscular Q4H PRN Max 6/day ALVINA Harley      benztropine  1 mg Oral Q4H PRN Max 6/day ALVINA Harley      bisacodyl  10 mg Rectal Daily PRN ALVINA Harley      cariprazine  3 mg Oral Daily Martin Cardenas MD      Cholecalciferol  2,000 Units Oral Daily ALVINA Lewis      cyanocobalamin  1,000 mcg Oral Daily ALVINA Lewis      hydrOXYzine HCL  25 mg Oral Q6H PRN Max 4/day ALVINA Harley      hydrOXYzine HCL  25 mg Oral TID Martin Cardenas MD      hydrOXYzine HCL  50 mg Oral Q4H PRN Max 4/day ALVINA Harley      Or    LORazepam  1 mg Intramuscular Q4H PRN ALVINA Harley      lamoTRIgine  50 mg Oral Daily Martin Cardenas MD      lisinopril  10 mg Oral Daily ALVINA Lewis      LORazepam  1 mg Oral Q4H PRN Max 6/day ALVINA Harley      Or    LORazepam  2 mg Intramuscular Q6H PRN Max 3/day ALVINA Harley      OLANZapine  10 mg Oral Q3H PRN Max 3/day ALVINA Harley      Or    OLANZapine  10 mg Intramuscular Q3H PRN Max 3/day ALVINA Harley      OLANZapine  5 mg Oral Q3H PRN Max 6/day ALVINA Harley      Or    OLANZapine  5 mg Intramuscular Q3H PRN Max 6/day ALVINA Harley       OLANZapine  2.5 mg Oral Q3H PRN Max 8/day ALVINA Harley      polyethylene glycol  17 g Oral Daily PRN ALVINA Harley      propranolol  10 mg Oral Q8H PRN ALVINA Harley      senna-docusate sodium  1 tablet Oral Daily PRN ALVINA Harley      sertraline  150 mg Oral HS Martin Cardenas MD       Risks / Benefits of Treatment:  Risks, benefits, and possible side effects of medications explained to patient. Patient has limited understanding of risks and benefits of treatment at this time, but agrees to take medications as prescribed.    Counseling / Coordination of Care:  Total floor/unit time spent today 25 minutes. Greater than 50% of total time was spent with the patient and / or family counseling and / or coordination of care. A description of the counseling / coordination of care:   Patient's progress discussed with staff in treatment team meeting.  Medications, treatment progress and treatment plan reviewed with patient.   Educated on importance of medication and treatment compliance.  Reassurance and supportive therapy provided.   Encouraged participation in milieu and group therapy on the unit.    ALVINA Harley 09/24/24

## 2024-09-24 NOTE — PROGRESS NOTES
09/24/24 0732   Team Meeting   Meeting Type Daily Rounds   Team Members Present   Team Members Present Physician;Nurse;;Other (Discipline and Name)   Patient/Family Present   Patient Present No   Patient's Family Present No     In attendance:  MD Melav Sousa, ALVINA Haywood, RN  Judi Garcia, [facility]  [employer] [facility] [place] StatesFREDDY    Groups: 5/10    Pt depressed/worried affect yesterday. Pt med compliant. ACT assessment today at 11. No bx issues noted.

## 2024-09-24 NOTE — SOCIAL WORK
SW assisted pt with completing [facility] ACT initial assessment. Pt was calm, forthcoming and attentive. No bx issues noted, minimal anxiety noted. [facility] ACT is ok to move forward with accepting pt. Discussed next steps.

## 2024-09-24 NOTE — NURSING NOTE
Pt is visible in the milieu and social with select peers. He consumed 100 % of dinner. Took his medications without incidence. Pt is polite, pleasant, and cooperative. Denied all psychiatric symptoms. Pt offers no complaints. No behavioral issues.

## 2024-09-24 NOTE — NURSING NOTE
Patient has been visible on the unit only for needs, meals and a few groups. Otherwise he is isolative to his room. Social with female peer P.M. Appetite excellent. Admits feeling less anxious. Denies suicidal ideations. Guarded. Medication compliant. Support offered.

## 2024-09-24 NOTE — PLAN OF CARE
Problem: Alteration in Thoughts and Perception  Goal: Treatment Goal: Gain control of psychotic behaviors/thinking, reduce/eliminate presenting symptoms and demonstrate improved reality functioning upon discharge  Outcome: Progressing  Goal: Verbalize thoughts and feelings  Description: Interventions:  - Promote a nonjudgmental and trusting relationship with the patient through active listening and therapeutic communication  - Assess patient's level of functioning, behavior and potential for risk  - Engage patient in 1 on 1 interactions  - Encourage patient to express fears, feelings, frustrations, and discuss symptoms    - [place] patient to reality, help patient recognize reality-based thinking   - Administer medications as ordered and assess for potential side effects  - Provide the patient education related to the signs and symptoms of the illness and desired effects of prescribed medications  Outcome: Progressing  Goal: Refrain from acting on delusional thinking/internal stimuli  Description: Interventions:  - Monitor patient closely, per order   - Utilize least restrictive measures   - Set reasonable limits, give positive feedback for acceptable   - Administer medications as ordered and monitor of potential side effects  Outcome: Progressing  Goal: Agree to be compliant with medication regime, as prescribed and report medication side effects  Description: Interventions:  - Offer appropriate PRN medication and supervise ingestion; conduct AIMS, as needed   Outcome: Progressing  Goal: Attend and participate in unit activities, including therapeutic, recreational, and educational groups  Description: Interventions:  -Encourage Visitation and family involvement in care  Outcome: Progressing  Goal: Complete daily ADLs, including personal hygiene independently, as able  Description: Interventions:  - Observe, teach, and assist patient with ADLS  - Monitor and promote a balance of rest/activity, with adequate  nutrition and elimination   Outcome: Progressing     Problem: Ineffective Coping  Goal: Participates in unit activities  Description: Interventions:  - Provide therapeutic environment   - Provide required programming   - Redirect inappropriate behaviors   Outcome: Progressing  Goal: Free from restraint events  Description: - Utilize least restrictive measures   - Provide behavioral interventions   - Redirect inappropriate behaviors   Outcome: Progressing     Problem: Risk for Self Injury/Neglect  Goal: Treatment Goal: Remain safe during length of stay, learn and adopt new coping skills, and be free of self-injurious ideation, impulses and acts at the time of discharge  Outcome: Progressing  Goal: Verbalize thoughts and feelings  Description: Interventions:  - Assess and re-assess patient's lethality and potential for self-injury  - Engage patient in 1:1 interactions, daily, for a minimum of 15 minutes  - Encourage patient to express feelings, fears, frustrations, hopes  - Establish rapport/trust with patient   Outcome: Progressing  Goal: Refrain from harming self  Description: Interventions:  - Monitor patient closely, per order  - Develop a trusting relationship  - Supervise medication ingestion, monitor effects and side effects   Outcome: Progressing  Goal: Attend and participate in unit activities, including therapeutic, recreational, and educational groups  Description: Interventions:  - Provide therapeutic and educational activities daily, encourage attendance and participation, and document same in the medical record  - Obtain collateral information, encourage visitation and family involvement in care   Outcome: Progressing  Goal: Complete daily ADLs, including personal hygiene independently, as able  Description: Interventions:  - Observe, teach, and assist patient with ADLS  - Monitor and promote a balance of rest/activity, with adequate nutrition and elimination  Outcome: Progressing     Problem:  Depression  Goal: Treatment Goal: Demonstrate behavioral control of depressive symptoms, verbalize feelings of improved mood/affect, and adopt new coping skills prior to discharge  Outcome: Progressing  Goal: Refrain from harming self  Description: Interventions:  - Monitor patient closely, per order   - Supervise medication ingestion, monitor effects and side effects   Outcome: Progressing  Goal: Refrain from isolation  Description: Interventions:  - Develop a trusting relationship   - Encourage socialization   Outcome: Progressing  Goal: Refrain from self-neglect  Outcome: Progressing     Problem: SELF HARM/SUICIDALITY  Goal: Will have no self-injury during hospital stay  Description: INTERVENTIONS:  - Q 15 MINUTES: Routine safety checks  - Q WAKING SHIFT & PRN: Assess risk to determine if routine checks are adequate to maintain patient safety  - Encourage patient to participate actively in care by formulating a plan to combat response to suicidal ideation, identify supports and resources  Outcome: Progressing     Problem: DEPRESSION  Goal: Will be euthymic at discharge  Description: INTERVENTIONS:  - Administer medication as ordered  - Provide emotional support via 1:1 interaction with staff  - Encourage involvement in milieu/groups/activities  - Monitor for social isolation  Outcome: Progressing     Problem: ANXIETY  Goal: Will report anxiety at manageable levels  Description: INTERVENTIONS:  - Administer medication as ordered  - Teach and encourage coping skills  - Provide emotional support  - Assess patient/family for anxiety and ability to cope  Outcome: Progressing     Problem: DISCHARGE PLANNING - CARE MANAGEMENT  Goal: Discharge to post-acute care or home with appropriate resources  Description: INTERVENTIONS:  - Conduct assessment to determine patient/family and health care team treatment goals, and need for post-acute services based on payer coverage, community resources, and patient preferences, and  barriers to discharge  - Address psychosocial, clinical, and financial barriers to discharge as identified in assessment in conjunction with the patient/family and health care team  - Arrange appropriate level of post-acute services according to patient’s   needs and preference and payer coverage in collaboration with the physician and health care team  - Communicate with and update the patient/family, physician, and health care team regarding progress on the discharge plan  - Arrange appropriate transportation to post-acute venues  Outcome: Progressing

## 2024-09-24 NOTE — NURSING NOTE
Patient visible on the unit social with female peer P.M laughing at intervals. Pleasant and cooperative. Admits feeling less anxious. Medication compliant.Appetite excellent. Attended most groups today. Eager for discharge.

## 2024-09-25 PROCEDURE — 99232 SBSQ HOSP IP/OBS MODERATE 35: CPT | Performed by: PSYCHIATRY & NEUROLOGY

## 2024-09-25 RX ADMIN — CYANOCOBALAMIN TAB 1000 MCG 1000 MCG: 1000 TAB at 08:31

## 2024-09-25 RX ADMIN — ATORVASTATIN CALCIUM 10 MG: 10 TABLET, FILM COATED ORAL at 08:31

## 2024-09-25 RX ADMIN — SERTRALINE HYDROCHLORIDE 150 MG: 100 TABLET ORAL at 21:09

## 2024-09-25 RX ADMIN — LAMOTRIGINE 50 MG: 25 TABLET ORAL at 08:31

## 2024-09-25 RX ADMIN — HYDROXYZINE HYDROCHLORIDE 25 MG: 25 TABLET ORAL at 08:30

## 2024-09-25 RX ADMIN — LISINOPRIL 10 MG: 10 TABLET ORAL at 08:31

## 2024-09-25 RX ADMIN — HYDROXYZINE HYDROCHLORIDE 25 MG: 25 TABLET ORAL at 17:03

## 2024-09-25 RX ADMIN — CARIPRAZINE 3 MG: 3 CAPSULE, GELATIN COATED ORAL at 08:31

## 2024-09-25 RX ADMIN — HYDROXYZINE HYDROCHLORIDE 25 MG: 25 TABLET ORAL at 21:09

## 2024-09-25 RX ADMIN — CHOLECALCIFEROL TAB 25 MCG (1000 UNIT) 2000 UNITS: 25 TAB at 08:31

## 2024-09-25 NOTE — PLAN OF CARE
Problem: Alteration in Thoughts and Perception  Goal: Treatment Goal: Gain control of psychotic behaviors/thinking, reduce/eliminate presenting symptoms and demonstrate improved reality functioning upon discharge  Outcome: Progressing  Goal: Refrain from acting on delusional thinking/internal stimuli  Description: Interventions:  - Monitor patient closely, per order   - Utilize least restrictive measures   - Set reasonable limits, give positive feedback for acceptable   - Administer medications as ordered and monitor of potential side effects  Outcome: Progressing  Goal: Agree to be compliant with medication regime, as prescribed and report medication side effects  Description: Interventions:  - Offer appropriate PRN medication and supervise ingestion; conduct AIMS, as needed   Outcome: Progressing  Goal: Recognize dysfunctional thoughts, communicate reality-based thoughts at the time of discharge  Description: Interventions:  - Provide medication and psycho-education to assist patient in compliance and developing insight into his/her illness   Outcome: Progressing  Goal: Complete daily ADLs, including personal hygiene independently, as able  Description: Interventions:  - Observe, teach, and assist patient with ADLS  - Monitor and promote a balance of rest/activity, with adequate nutrition and elimination   Outcome: Progressing     Problem: Ineffective Coping  Goal: Identifies ineffective coping skills  Outcome: Progressing  Goal: Identifies healthy coping skills  Outcome: Progressing     Problem: Risk for Self Injury/Neglect  Goal: Treatment Goal: Remain safe during length of stay, learn and adopt new coping skills, and be free of self-injurious ideation, impulses and acts at the time of discharge  Outcome: Progressing     Problem: Depression  Goal: Treatment Goal: Demonstrate behavioral control of depressive symptoms, verbalize feelings of improved mood/affect, and adopt new coping skills prior to  discharge  Outcome: Progressing     Problem: Anxiety  Goal: Anxiety is at manageable level  Description: Interventions:  - Assess and monitor patient's anxiety level.   - Monitor for signs and symptoms (heart palpitations, chest pain, shortness of breath, headaches, nausea, feeling jumpy, restlessness, irritable, apprehensive).   - Collaborate with interdisciplinary team and initiate plan and interventions as ordered.  - [place] patient to unit/surroundings  - Explain treatment plan  - Encourage participation in care  - Encourage verbalization of concerns/fears  - Identify coping mechanisms  - Assist in developing anxiety-reducing skills  - Administer/offer alternative therapies  - Limit or eliminate stimulants  Outcome: Progressing     Problem: SELF HARM/SUICIDALITY  Goal: Will have no self-injury during hospital stay  Description: INTERVENTIONS:  - Q 15 MINUTES: Routine safety checks  - Q WAKING SHIFT & PRN: Assess risk to determine if routine checks are adequate to maintain patient safety  - Encourage patient to participate actively in care by formulating a plan to combat response to suicidal ideation, identify supports and resources  Outcome: Progressing

## 2024-09-25 NOTE — NURSING NOTE
Pt is calm and cooperative upon approach. Appears guarded and suspicious. Pt also appears anxious, though denies all psych symptoms at this time. Denies any needs at this time.

## 2024-09-25 NOTE — PROGRESS NOTES
09/25/24 0739   Team Meeting   Meeting Type Daily Rounds   Team Members Present   Team Members Present Physician;Nurse;;Other (Discipline and Name)   Patient/Family Present   Patient Present No   Patient's Family Present No     In attendance:  Dr. Alex Thomas, MD Dr. Jordan Holter, DO Mahamed Haywood, RN  Judi Garcia, [facility]  Mer Sales, [facility]    Groups: 2/10    Pt reported some anxiety during the day, less in evening. Pt accepted by [facility]. Pt pending tour for [facility] CRR.

## 2024-09-25 NOTE — PROGRESS NOTES
09/25/24 1000 09/25/24 1100   Activity/Group Checklist   Group Wellness Life Skills  (music and mindfulness activity)   Attendance Attended Attended   Attendance Duration (min) 46-60 46-60   Interactions Interacted appropriately Interacted appropriately   Affect/Mood Appropriate Appropriate   Goals Achieved Able to engage in interactions;Able to listen to others Identified feelings;Able to listen to others;Able to engage in interactions

## 2024-09-25 NOTE — NURSING NOTE
Pt is isolative to self and room this evening. Took medications without incidence. Pt is pleasant on approach and cooperative, guarded on assessment. Attended 3/10 groups. Denies anxiety, depression, and SI/HI/AVH at this time. Pt is looking forward to potential discharge soon. No behavioral issues. Pt offers no complaints. Continuous safety checks maintained.

## 2024-09-25 NOTE — PROGRESS NOTES
Progress Note - Behavioral Health   Name: Deyvi Cao 54 y.o. male I MRN: [id number]   Unit/Bed#: [facility] 101-02 I Date of Admission: 6/25/2024   Date of Service: 9/25/2024 I Hospital Day: 92     Assessment & Plan  Bipolar disorder with severe depression (HCC)  Progressing  Awaiting placement - CRR interview complete, ACT interview complete  Continue Vraylar 3mg PO Daily, Atarax 25mg PO TID, Lamictal 50mg PO Daily, add Zoloft 150mg PO QHS  Continue with group therapy, milieu therapy and occupational therapy   Behavioral Health checks every 7 minutes   Continue frequent safety checks and vitals per unit protocol  Continue with [facility] medical management as indicated  Benign essential hypertension  Managed by [facility]  Continue Lisinopril 10mg PO Daily  Mixed hyperlipidemia  Managed by [facility]  Continue Lipitor 10mg PO Daily  Allergic rhinitis due to allergen  Managed by [facility]  Rosacea  Managed by [facility]     Subjective:    Patient was seen today for continuation of care, records reviewed and patient was discussed with the morning case review team.    Deyvi was seen today for psychiatric follow-up.  On assessment today, Deyvi was found walking in the hallway.  He was doing well.  He has no new concerns.  He tells me his interview with ACT went well yesterday, smiles when talking about it.  Deyvi reports adequate daytime energy and denies any difficulties with initiating or staying asleep.  Oral appetite and hydration is adequate.   We reviewed once more the specific as-needed medications they can use going forward if they experience any insomnia or destabilization of their mood, they understood and were agreeable. Milieu visibility and group attendance encouraged to promote an active participation in treatment.    Deyvi denies acute suicidal/self-harm ideation/intent/plan upon direct inquiry at this time. Dyevi is able to contract for safety while on the unit and would feel comfortable seeking staff support should  suicidal symptoms or urges appear or worsen. Deyvi remains behaviorally appropriate, no agitation or aggression noted on exam or in report. Deyvi also denies HI/AH/VH, and does not appear overtly manic.  Patient does not verbalize any experiences that can be categorized as paranoid, persecutory, bizarre, or somatic delusions. Deyvi remains adherent to his current psychotropic medication regimen and denies any side effects from medications, as well as none noted on exam.    Deyvi is currently assessed as being at their baseline with continued need for medication management, supervision for safety and ADL’s. These services are not currently available in a less restrictive environment necessitating continued hospital stay.  Deyvi should remain on the unit until these services are available, due to likelihood of mental decompensation and readmission if discharged to an unsupervised setting.  Assertive discharge planning and collaboration with multiple providers (inpatient and community based) remains ongoing.     Group Attendance: 2 / 10  Treatment Team: SOLEDAD  Psychiatric PRN's Needed: None    Review of Systems:  Behavior over the last 24 hours: Slowly improving  Sleep: sleeping okay throughout the night  Appetite: adequate  Medication side effects: none reported  ROS:no complaints    Objective:    Vitals:  Vitals:    09/25/24 0739   BP: 117/79   Pulse: 91   Resp: 18   Temp: (!) 97 °F (36.1 °C)   SpO2: 95%     Laboratory Results:  I have personally reviewed all pertinent laboratory/tests results.  Most Recent Labs:   Lab Results   Component Value Date    WBC 7.39 06/26/2024    RBC 4.70 06/26/2024    HGB 15.2 06/26/2024    HCT 45.5 06/26/2024     06/26/2024    RDW 12.9 06/26/2024    NEUTROABS 4.63 06/26/2024    SODIUM 137 06/26/2024    K 4.1 06/26/2024     06/26/2024    CO2 26 06/26/2024    BUN 17 06/26/2024    CREATININE 0.63 06/26/2024    GLUC 98 06/26/2024    GLUF 98 06/26/2024    CALCIUM 9.6  06/26/2024    AST 25 06/26/2024    ALT 45 06/26/2024    ALKPHOS 114 (H) 06/26/2024    TP 7.0 06/26/2024    ALB 4.4 06/26/2024    TBILI 0.44 06/26/2024    CHOLESTEROL 177 06/26/2024    HDL 52 06/26/2024    TRIG 73 06/26/2024    LDLCALC 110 (H) 06/26/2024    NONHDLC 125 06/26/2024    LITHIUM 0.4 (L) 01/28/2021    [id number] 2.636 06/26/2024    HGBA1C 5.2 11/22/2023     11/22/2023     Mental Status Evaluation:  Appearance:  age appropriate, casually dressed, dressed appropriately   Behavior:  pleasant   Speech:  normal rate, normal volume   Mood:  mildly anxious, mildly depressed   Affect:  constricted   Thought Process:  goal directed   Associations: intact associations   Thought Content:  no overt delusions   Perceptual Disturbances: no auditory hallucinations, no visual hallucinations, denies when asked, does not appear responding to internal stimuli   Risk Potential: Suicidal ideation - None at present, contracts for safety on the unit, would talk to staff if not feeling safe on the unit  Homicidal ideation - None at present  Potential for aggression - Not at present   Sensorium:  oriented to person, place, and time/date   Memory:  recent memory intact   Consciousness:  alert and awake   Attention/Concentration: attention span and concentration appear shorter than expected for age   Insight:  limited   Judgment: limited   Gait/Station: normal gait/station, normal balance   Motor Activity: no abnormal movements     Progress Toward Goals: making gradual improvement.  Deyvi continues to require inpatient psychiatric hospitalization for continued medication management and titration to optimize symptom reduction, improve sleep hygiene, and demonstrate adequate self-care.     Suicide/Homicide Risk Assessment:  Risk of Harm to Self:   Nursing Suicide Risk Assessment Last 24 hours: C-SSRS Risk (Since Last Contact)  Calculated C-SSRS Risk Score (Since Last Contact): No Risk Indicated    Risk of Harm to  Others:  Nursing Homicide Risk Assessment: Violence Risk to Others: Denies within past 6 months    Behavioral Health Medications: all current active meds have been reviewed.  Current Facility-Administered Medications   Medication Dose Route Frequency Provider Last Rate    acetaminophen  650 mg Oral Q6H PRN ALVINA Harley      acetaminophen  650 mg Oral Q4H PRN ALVINA Harley      acetaminophen  975 mg Oral Q6H PRN ALVINA Harley      aluminum-magnesium hydroxide-simethicone  30 mL Oral Q4H PRN ALVINA Harley      ammonium lactate   Topical BID PRN ALVINA Harley      atorvastatin  10 mg Oral Daily ALVINA Lewis      benztropine  1 mg Intramuscular Q4H PRN Max 6/day ALVINA Harley      benztropine  1 mg Oral Q4H PRN Max 6/day ALVINA Harley      bisacodyl  10 mg Rectal Daily PRN ALVINA Harley      cariprazine  3 mg Oral Daily Martin Cardenas MD      Cholecalciferol  2,000 Units Oral Daily ALVINA Lewis      cyanocobalamin  1,000 mcg Oral Daily ALVINA Lewis      hydrOXYzine HCL  25 mg Oral Q6H PRN Max 4/day ALVINA Harley      hydrOXYzine HCL  25 mg Oral TID Martin Cardenas MD      hydrOXYzine HCL  50 mg Oral Q4H PRN Max 4/day ALVINA Harley      Or    LORazepam  1 mg Intramuscular Q4H PRN ALVINA Harley      lamoTRIgine  50 mg Oral Daily Martin Cardenas MD      lisinopril  10 mg Oral Daily ALVINA Lewis      LORazepam  1 mg Oral Q4H PRN Max 6/day ALVINA Harley      Or    LORazepam  2 mg Intramuscular Q6H PRN Max 3/day ALVINA Harley      OLANZapine  10 mg Oral Q3H PRN Max 3/day ALVINA Harley      Or    OLANZapine  10 mg Intramuscular Q3H PRN Max 3/day ALVINA Harley      OLANZapine  5 mg Oral Q3H PRN Max 6/day ALVINA Harley      Or    OLANZapine  5 mg Intramuscular Q3H PRN Max 6/day ALVIAN Harley      OLANZapine  2.5 mg Oral Q3H PRN Max 8/day ALVINA Harley      polyethylene glycol   17 g Oral Daily PRN ALVINA Harley      propranolol  10 mg Oral Q8H PRN ALVINA Harley      senna-docusate sodium  1 tablet Oral Daily PRN ALVINA Harley      sertraline  150 mg Oral HS Martin Cardenas MD       Risks / Benefits of Treatment:  Risks, benefits, and possible side effects of medications explained to patient. Patient has limited understanding of risks and benefits of treatment at this time, but agrees to take medications as prescribed.    Counseling / Coordination of Care:  Total floor/unit time spent today 25 minutes. Greater than 50% of total time was spent with the patient and / or family counseling and / or coordination of care. A description of the counseling / coordination of care:   Patient's progress discussed with staff in treatment team meeting.  Medications, treatment progress and treatment plan reviewed with patient.   Educated on importance of medication and treatment compliance.  Reassurance and supportive therapy provided.   Encouraged participation in milieu and group therapy on the unit.    ALVINA Harley 09/25/24

## 2024-09-25 NOTE — ASSESSMENT & PLAN NOTE
Progressing  Awaiting placement - CRR interview complete, ACT interview complete  Continue Vraylar 3mg PO Daily, Atarax 25mg PO TID, Lamictal 50mg PO Daily, add Zoloft 150mg PO QHS  Continue with group therapy, milieu therapy and occupational therapy   Behavioral Health checks every 7 minutes   Continue frequent safety checks and vitals per unit protocol  Continue with [facility] medical management as indicated

## 2024-09-26 PROCEDURE — 99232 SBSQ HOSP IP/OBS MODERATE 35: CPT | Performed by: PSYCHIATRY & NEUROLOGY

## 2024-09-26 RX ADMIN — LISINOPRIL 10 MG: 10 TABLET ORAL at 08:12

## 2024-09-26 RX ADMIN — ATORVASTATIN CALCIUM 10 MG: 10 TABLET, FILM COATED ORAL at 08:12

## 2024-09-26 RX ADMIN — SERTRALINE HYDROCHLORIDE 150 MG: 100 TABLET ORAL at 21:10

## 2024-09-26 RX ADMIN — HYDROXYZINE HYDROCHLORIDE 25 MG: 25 TABLET ORAL at 17:15

## 2024-09-26 RX ADMIN — CYANOCOBALAMIN TAB 1000 MCG 1000 MCG: 1000 TAB at 08:12

## 2024-09-26 RX ADMIN — LAMOTRIGINE 50 MG: 25 TABLET ORAL at 08:12

## 2024-09-26 RX ADMIN — CHOLECALCIFEROL TAB 25 MCG (1000 UNIT) 2000 UNITS: 25 TAB at 08:13

## 2024-09-26 RX ADMIN — HYDROXYZINE HYDROCHLORIDE 25 MG: 25 TABLET ORAL at 08:12

## 2024-09-26 RX ADMIN — HYDROXYZINE HYDROCHLORIDE 25 MG: 25 TABLET ORAL at 21:10

## 2024-09-26 RX ADMIN — CARIPRAZINE 3 MG: 3 CAPSULE, GELATIN COATED ORAL at 08:13

## 2024-09-26 NOTE — PROGRESS NOTES
Progress Note - Behavioral Health   Name: Deyvi Cao 54 y.o. male I MRN: [id number]   Unit/Bed#: [facility] 101-02 I Date of Admission: 6/25/2024   Date of Service: 9/26/2024 I Hospital Day: 93     Assessment & Plan  Bipolar disorder with severe depression (HCC)  Progressing  Awaiting placement - CRR interview complete, ACT interview complete, awaiting next steps  Continue Vraylar 3mg PO Daily, Atarax 25mg PO TID, Lamictal 50mg PO Daily, add Zoloft 150mg PO QHS  Continue with group therapy, milieu therapy and occupational therapy   Behavioral Health checks every 7 minutes   Continue frequent safety checks and vitals per unit protocol  Continue with [facility] medical management as indicated  Benign essential hypertension  Managed by [facility]  Continue Lisinopril 10mg PO Daily  Mixed hyperlipidemia  Managed by [facility]  Continue Lipitor 10mg PO Daily  Allergic rhinitis due to allergen  Managed by [facility]  Rosacea  Managed by [facility]    Subjective:    Patient was seen today for continuation of care, records reviewed and patient was discussed with the morning case review team.    Deyvi was seen today for psychiatric follow-up.  On assessment today, Deyvi was found in his room.  He is doing well, offers no new concerns.  He is looking forward to hopeful placement soon.  Deyvi reports adequate daytime energy and denies any difficulties with initiating or staying asleep.  Oral appetite and hydration is adequate.  He has mild depression and anxiety.   We reviewed once more the specific as-needed medications they can use going forward if they experience any insomnia or destabilization of their mood, they understood and were agreeable. Milieu visibility and group attendance encouraged to promote an active participation in treatment.    Deyvi denies acute suicidal/self-harm ideation/intent/plan upon direct inquiry at this time. Deyvi is able to contract for safety while on the unit and would feel comfortable seeking staff support should  suicidal symptoms or urges appear or worsen. Deyvi remains behaviorally appropriate, no agitation or aggression noted on exam or in report. Deyvi also denies HI/AH/VH, and does not appear overtly manic.  Patient does not verbalize any experiences that can be categorized as paranoid, persecutory, bizarre, or somatic delusions. Deyvi remains adherent to his current psychotropic medication regimen and denies any side effects from medications, as well as none noted on exam.    Deyvi is currently assessed as being at their baseline with continued need for medication management, supervision for safety and ADL’s. These services are not currently available in a less restrictive environment necessitating continued hospital stay.  Deyvi should remain on the unit until these services are available, due to likelihood of mental decompensation and readmission if discharged to an unsupervised setting.  Assertive discharge planning and collaboration with multiple providers (inpatient and community based) remains ongoing.     Review of Systems:  Behavior over the last 24 hours: Unchanged  Sleep: sleeping okay throughout the night  Appetite: adequate  Medication side effects: none reported  ROS:no complaints    Objective:    Vitals:  Vitals:    09/26/24 0731   BP: 133/79   Pulse: 97   Resp: 18   Temp: 98 °F (36.7 °C)   SpO2: 96%     Laboratory Results:  I have personally reviewed all pertinent laboratory/tests results.  Most Recent Labs:   Lab Results   Component Value Date    WBC 7.39 06/26/2024    RBC 4.70 06/26/2024    HGB 15.2 06/26/2024    HCT 45.5 06/26/2024     06/26/2024    RDW 12.9 06/26/2024    NEUTROABS 4.63 06/26/2024    SODIUM 137 06/26/2024    K 4.1 06/26/2024     06/26/2024    CO2 26 06/26/2024    BUN 17 06/26/2024    CREATININE 0.63 06/26/2024    GLUC 98 06/26/2024    GLUF 98 06/26/2024    CALCIUM 9.6 06/26/2024    AST 25 06/26/2024    ALT 45 06/26/2024    ALKPHOS 114 (H) 06/26/2024    TP 7.0  06/26/2024    ALB 4.4 06/26/2024    TBILI 0.44 06/26/2024    CHOLESTEROL 177 06/26/2024    HDL 52 06/26/2024    TRIG 73 06/26/2024    LDLCALC 110 (H) 06/26/2024    NONHDLC 125 06/26/2024    LITHIUM 0.4 (L) 01/28/2021    [id number] 2.636 06/26/2024    HGBA1C 5.2 11/22/2023     11/22/2023     Mental Status Evaluation:  Appearance:  age appropriate, casually dressed   Behavior:  pleasant   Speech:  normal volume   Mood:  mildly anxious, mildly depressed   Affect:  constricted   Thought Process:  goal directed   Associations: intact associations   Thought Content:  no overt delusions   Perceptual Disturbances: no auditory hallucinations, no visual hallucinations, denies when asked, does not appear responding to internal stimuli   Risk Potential: Suicidal ideation - None at present, contracts for safety on the unit, would talk to staff if not feeling safe on the unit  Homicidal ideation - None at present  Potential for aggression - Not at present   Sensorium:  oriented to person, place, and time/date   Memory:  recent memory intact   Consciousness:  alert and awake   Attention/Concentration: attention span and concentration appear shorter than expected for age   Insight:  limited   Judgment: limited   Gait/Station: normal gait/station, normal balance   Motor Activity: no abnormal movements     Progress Toward Goals: making gradual improvement.  Deyvi continues to require inpatient psychiatric hospitalization for continued medication management and titration to optimize symptom reduction, improve sleep hygiene, and demonstrate adequate self-care.     Suicide/Homicide Risk Assessment:  Risk of Harm to Self:   Nursing Suicide Risk Assessment Last 24 hours: C-SSRS Risk (Since Last Contact)  Calculated C-SSRS Risk Score (Since Last Contact): No Risk Indicated    Risk of Harm to Others:  Nursing Homicide Risk Assessment: Violence Risk to Others: Denies within past 6 months    Behavioral Health Medications: all  current active meds have been reviewed and continue current psychiatric medications.  Current Facility-Administered Medications   Medication Dose Route Frequency Provider Last Rate    acetaminophen  650 mg Oral Q6H PRN ALVINA Harley      acetaminophen  650 mg Oral Q4H PRN ALVINA Harley      acetaminophen  975 mg Oral Q6H PRN ALVINA Harley      aluminum-magnesium hydroxide-simethicone  30 mL Oral Q4H PRN ALVINA Harley      ammonium lactate   Topical BID PRN ALVINA Harley      atorvastatin  10 mg Oral Daily ALVINA Lewis      benztropine  1 mg Intramuscular Q4H PRN Max 6/day ALVINA Harley      benztropine  1 mg Oral Q4H PRN Max 6/day ALVINA Harley      bisacodyl  10 mg Rectal Daily PRN ALVINA Harley      cariprazine  3 mg Oral Daily Martin Cardenas MD      Cholecalciferol  2,000 Units Oral Daily ALVINA Lewis      cyanocobalamin  1,000 mcg Oral Daily ALVINA Lewis      hydrOXYzine HCL  25 mg Oral Q6H PRN Max 4/day ALVINA Harley      hydrOXYzine HCL  25 mg Oral TID Martin Cardenas MD      hydrOXYzine HCL  50 mg Oral Q4H PRN Max 4/day ALVINA Harley      Or    LORazepam  1 mg Intramuscular Q4H PRN ALVINA Harley      lamoTRIgine  50 mg Oral Daily Martin Cardenas MD      lisinopril  10 mg Oral Daily ALVINA Lewis      LORazepam  1 mg Oral Q4H PRN Max 6/day ALVINA Harley      Or    LORazepam  2 mg Intramuscular Q6H PRN Max 3/day ALVINA Harley      OLANZapine  10 mg Oral Q3H PRN Max 3/day ALVINA Harley      Or    OLANZapine  10 mg Intramuscular Q3H PRN Max 3/day ALVINA Harley      OLANZapine  5 mg Oral Q3H PRN Max 6/day ALVINA Harley      Or    OLANZapine  5 mg Intramuscular Q3H PRN Max 6/day ALVINA Harley      OLANZapine  2.5 mg Oral Q3H PRN Max 8/day ALVINA Harley      polyethylene glycol  17 g Oral Daily PRN ALVINA Harley      propranolol  10 mg Oral Q8H PRN Melva Knutson,  ALVINA      senna-docusate sodium  1 tablet Oral Daily PRN ALVINA Harley      sertraline  150 mg Oral HS Martin Cardenas MD       Risks / Benefits of Treatment:  Risks, benefits, and possible side effects of medications explained to patient. Patient has limited understanding of risks and benefits of treatment at this time, but agrees to take medications as prescribed.    Counseling / Coordination of Care:  Total floor/unit time spent today 25 minutes. Greater than 50% of total time was spent with the patient and / or family counseling and / or coordination of care. A description of the counseling / coordination of care:   Patient's progress discussed with staff in treatment team meeting.  Medications, treatment progress and treatment plan reviewed with patient.   Educated on importance of medication and treatment compliance.  Reassurance and supportive therapy provided.   Encouraged participation in milieu and group therapy on the unit.    ALVINA Harley 09/26/24

## 2024-09-26 NOTE — NURSING NOTE
Pt in bed asleep, observed eyes closed, and chest movement noted. Continuous safety checks maintained. No unmet needs at this time. Will continue to monitor patient needs, sleep pattern, and behaviors.     0633: Patient has slept all night, 7+ hours of uninterrupted sleep

## 2024-09-26 NOTE — PROGRESS NOTES
09/26/24 1000 09/26/24 1100   Activity/Group Checklist   Group Wellness Life Skills  (emotion regulation)   Attendance Attended Attended   Attendance Duration (min) 46-60 46-60   Interactions Interacted appropriately Interacted appropriately   Affect/Mood Appropriate Appropriate   Goals Achieved Able to listen to others;Able to engage in interactions Identified feelings;Identified triggers;Able to listen to others;Able to engage in interactions;Able to self-disclose;Able to recieve feedback

## 2024-09-26 NOTE — NURSING NOTE
Pt flat but pleasant. Pt appears depressed but denies psych symptoms. Visible on unit and groups with minimal interaction with peers. Pt is able to make needs known and is medication compliant. Q 7 min checks maintained.

## 2024-09-26 NOTE — ASSESSMENT & PLAN NOTE
Progressing  Awaiting placement - CRR interview complete, ACT interview complete, awaiting next steps  Continue Vraylar 3mg PO Daily, Atarax 25mg PO TID, Lamictal 50mg PO Daily, add Zoloft 150mg PO QHS  Continue with group therapy, milieu therapy and occupational therapy   Behavioral Health checks every 7 minutes   Continue frequent safety checks and vitals per unit protocol  Continue with [facility] medical management as indicated   fall

## 2024-09-26 NOTE — PROGRESS NOTES
09/26/24 0807   Team Meeting   Meeting Type Daily Rounds   Team Members Present   Team Members Present Physician;Nurse;;Other (Discipline and Name)   Patient/Family Present   Patient Present No   Patient's Family Present No     In attendance:  MD Mahamed Sousa, RN  Judi Garcia, JAYLONW  Mer Sales LCSW    Groups: 3/10    Pt quiet, guarded, looking forward to discharge. Pt waiting on timeframe from [facility] for tour of CRR. No bx issues noted.

## 2024-09-26 NOTE — PLAN OF CARE
Problem: Alteration in Thoughts and Perception  Goal: Refrain from acting on delusional thinking/internal stimuli  Description: Interventions:  - Monitor patient closely, per order   - Utilize least restrictive measures   - Set reasonable limits, give positive feedback for acceptable   - Administer medications as ordered and monitor of potential side effects  Outcome: Progressing  Goal: Agree to be compliant with medication regime, as prescribed and report medication side effects  Description: Interventions:  - Offer appropriate PRN medication and supervise ingestion; conduct AIMS, as needed   Outcome: Progressing  Goal: Complete daily ADLs, including personal hygiene independently, as able  Description: Interventions:  - Observe, teach, and assist patient with ADLS  - Monitor and promote a balance of rest/activity, with adequate nutrition and elimination   Outcome: Progressing     Problem: Ineffective Coping  Goal: Identifies ineffective coping skills  Outcome: Progressing  Goal: Identifies healthy coping skills  Outcome: Progressing  Goal: Demonstrates healthy coping skills  Outcome: Progressing     Problem: Risk for Self Injury/Neglect  Goal: Verbalize thoughts and feelings  Description: Interventions:  - Assess and re-assess patient's lethality and potential for self-injury  - Engage patient in 1:1 interactions, daily, for a minimum of 15 minutes  - Encourage patient to express feelings, fears, frustrations, hopes  - Establish rapport/trust with patient   Outcome: Progressing  Goal: Refrain from harming self  Description: Interventions:  - Monitor patient closely, per order  - Develop a trusting relationship  - Supervise medication ingestion, monitor effects and side effects   Outcome: Progressing  Goal: Recognize maladaptive responses and adopt new coping mechanisms  Outcome: Progressing  Goal: Complete daily ADLs, including personal hygiene independently, as able  Description: Interventions:  - Observe,  teach, and assist patient with ADLS  - Monitor and promote a balance of rest/activity, with adequate nutrition and elimination  Outcome: Progressing     Problem: Anxiety  Goal: Anxiety is at manageable level  Description: Interventions:  - Assess and monitor patient's anxiety level.   - Monitor for signs and symptoms (heart palpitations, chest pain, shortness of breath, headaches, nausea, feeling jumpy, restlessness, irritable, apprehensive).   - Collaborate with interdisciplinary team and initiate plan and interventions as ordered.  - [place] patient to unit/surroundings  - Explain treatment plan  - Encourage participation in care  - Encourage verbalization of concerns/fears  - Identify coping mechanisms  - Assist in developing anxiety-reducing skills  - Administer/offer alternative therapies  - Limit or eliminate stimulants  Outcome: Progressing     Problem: ANXIETY  Goal: Will report anxiety at manageable levels  Description: INTERVENTIONS:  - Administer medication as ordered  - Teach and encourage coping skills  - Provide emotional support  - Assess patient/family for anxiety and ability to cope  Outcome: Progressing

## 2024-09-26 NOTE — NURSING NOTE
Pt visible in the milieu and isolative to self. He consumed 100% of dinner. Took his medications without incidence. Denies all psychiatric symptoms. Appears anxious and depressed. Flat affect and guarded. Smiles on approach. Able to make needs known. No behavioral issues. Q7 minute checks maintained.

## 2024-09-26 NOTE — SOCIAL WORK
SW left voice message for Saba Kitchen with [facility] inquiring about next steps for pt. Email received no response.

## 2024-09-27 PROCEDURE — 99232 SBSQ HOSP IP/OBS MODERATE 35: CPT | Performed by: PSYCHIATRY & NEUROLOGY

## 2024-09-27 RX ADMIN — SERTRALINE HYDROCHLORIDE 150 MG: 100 TABLET ORAL at 21:19

## 2024-09-27 RX ADMIN — ATORVASTATIN CALCIUM 10 MG: 10 TABLET, FILM COATED ORAL at 08:11

## 2024-09-27 RX ADMIN — LAMOTRIGINE 50 MG: 25 TABLET ORAL at 08:11

## 2024-09-27 RX ADMIN — LISINOPRIL 10 MG: 10 TABLET ORAL at 08:11

## 2024-09-27 RX ADMIN — CHOLECALCIFEROL TAB 25 MCG (1000 UNIT) 2000 UNITS: 25 TAB at 08:11

## 2024-09-27 RX ADMIN — HYDROXYZINE HYDROCHLORIDE 25 MG: 25 TABLET ORAL at 16:55

## 2024-09-27 RX ADMIN — HYDROXYZINE HYDROCHLORIDE 25 MG: 25 TABLET ORAL at 21:19

## 2024-09-27 RX ADMIN — HYDROXYZINE HYDROCHLORIDE 25 MG: 25 TABLET ORAL at 08:11

## 2024-09-27 RX ADMIN — CARIPRAZINE 3 MG: 3 CAPSULE, GELATIN COATED ORAL at 08:11

## 2024-09-27 RX ADMIN — CYANOCOBALAMIN TAB 1000 MCG 1000 MCG: 1000 TAB at 08:11

## 2024-09-27 NOTE — PROGRESS NOTES
Progress Note - Behavioral Health   Name: Deyvi Cao 54 y.o. male I MRN: [id number]   Unit/Bed#: [facility] 101[facility]02 I Date of Admission: 6/25/2024   Date of Service: 9/27/2024 I Hospital Day: 94     Assessment & Plan  Bipolar disorder with severe depression (HCC)  Progressing  Awaiting placement - CRR interview complete, ACT interview complete, awaiting next steps  Continue Vraylar 3mg PO Daily, Atarax 25mg PO TID, Lamictal 50mg PO Daily, add Zoloft 150mg PO QHS  Continue with group therapy, milieu therapy and occupational therapy   Behavioral Health checks every 7 minutes   Continue frequent safety checks and vitals per unit protocol  Continue with [facility] medical management as indicated  Benign essential hypertension  Managed by [facility]  Continue Lisinopril 10mg PO Daily  Mixed hyperlipidemia  Managed by [facility]  Continue Lipitor 10mg PO Daily  Allergic rhinitis due to allergen  Managed by [facility]  Rosacea  Managed by [facility]    Subjective:    Patient was seen today for continuation of care, records reviewed and patient was discussed with the morning case review team.    Deyvi was seen today for psychiatric follow-up.  On assessment today, Deyvi was found in his room.  He doing well, offers no new concerns.  Deyvi reports adequate daytime energy and denies any difficulties with initiating or staying asleep.  Oral appetite and hydration is adequate.  He is hopeful for discharge soon.   We reviewed once more the specific as-needed medications they can use going forward if they experience any insomnia or destabilization of their mood, they understood and were agreeable. Milieu visibility and group attendance encouraged to promote an active participation in treatment.    Deyvi denies acute suicidal/self-harm ideation/intent/plan upon direct inquiry at this time. Deyvi is able to contract for safety while on the unit and would feel comfortable seeking staff support should suicidal symptoms or urges appear or worsen. Deyvi  remains behaviorally appropriate, no agitation or aggression noted on exam or in report. Deyvi also denies HI/AH/VH, and does not appear overtly manic.  Patient does not verbalize any experiences that can be categorized as paranoid, persecutory, bizarre, or somatic delusions. Deyvi remains adherent to his current psychotropic medication regimen and denies any side effects from medications, as well as none noted on exam.    Deyvi is currently assessed as being at their baseline with continued need for medication management, supervision for safety and ADL’s. These services are not currently available in a less restrictive environment necessitating continued hospital stay.  Deyvi should remain on the unit until these services are available, due to likelihood of mental decompensation and readmission if discharged to an unsupervised setting.  Assertive discharge planning and collaboration with multiple providers (inpatient and community based) remains ongoing.  Deyvi is currently awaiting for [facility]    Review of Systems:  Behavior over the last 24 hours: Unchanged  Sleep: sleeping okay throughout the night  Appetite: adequate  Medication side effects: none reported  ROS:no complaints    Objective:    Vitals:  Vitals:    09/27/24 0750   BP: 128/86   Pulse: 80   Resp: 18   Temp: 97.7 °F (36.5 °C)   SpO2: 95%     Laboratory Results:  I have personally reviewed all pertinent laboratory/tests results.  Most Recent Labs:   Lab Results   Component Value Date    WBC 7.39 06/26/2024    RBC 4.70 06/26/2024    HGB 15.2 06/26/2024    HCT 45.5 06/26/2024     06/26/2024    RDW 12.9 06/26/2024    NEUTROABS 4.63 06/26/2024    SODIUM 137 06/26/2024    K 4.1 06/26/2024     06/26/2024    CO2 26 06/26/2024    BUN 17 06/26/2024    CREATININE 0.63 06/26/2024    GLUC 98 06/26/2024    GLUF 98 06/26/2024    CALCIUM 9.6 06/26/2024    AST 25 06/26/2024    ALT 45 06/26/2024    ALKPHOS 114 (H) 06/26/2024    TP 7.0  06/26/2024    ALB 4.4 06/26/2024    TBILI 0.44 06/26/2024    CHOLESTEROL 177 06/26/2024    HDL 52 06/26/2024    TRIG 73 06/26/2024    LDLCALC 110 (H) 06/26/2024    NONHDLC 125 06/26/2024    LITHIUM 0.4 (L) 01/28/2021    [id number] 2.636 06/26/2024    HGBA1C 5.2 11/22/2023     11/22/2023     Mental Status Evaluation:  Appearance:  age appropriate, casually dressed   Behavior:  cooperative, calm   Speech:  scant, soft   Mood:  mildly anxious, mildly depressed   Affect:  constricted   Thought Process:  goal directed   Associations: intact associations   Thought Content:  no overt delusions   Perceptual Disturbances: no auditory hallucinations, no visual hallucinations, denies when asked, does not appear responding to internal stimuli   Risk Potential: Suicidal ideation - None at present, contracts for safety on the unit, would talk to staff if not feeling safe on the unit  Homicidal ideation - None at present  Potential for aggression - Not at present   Sensorium:  oriented to person, place, and time/date   Memory:  recent memory intact   Consciousness:  alert and awake   Attention/Concentration: attention span and concentration appear shorter than expected for age   Insight:  fair and improving   Judgment: fair and improving   Gait/Station: normal gait/station, normal balance   Motor Activity: no abnormal movements     Progress Toward Goals: making gradual improvement.  Deyvi continues to require inpatient psychiatric hospitalization for continued medication management and titration to optimize symptom reduction, improve sleep hygiene, and demonstrate adequate self-care.     Suicide/Homicide Risk Assessment:  Risk of Harm to Self:   Nursing Suicide Risk Assessment Last 24 hours: C-SSRS Risk (Since Last Contact)  Calculated C-SSRS Risk Score (Since Last Contact): No Risk Indicated    Risk of Harm to Others:  Nursing Homicide Risk Assessment: Violence Risk to Others: Denies within past 6 months    Behavioral  Health Medications: all current active meds have been reviewed and continue current psychiatric medications.  Current Facility-Administered Medications   Medication Dose Route Frequency Provider Last Rate    acetaminophen  650 mg Oral Q6H PRN ALVINA Harley      acetaminophen  650 mg Oral Q4H PRN ALVINA Harley      acetaminophen  975 mg Oral Q6H PRN ALVINA Harley      aluminum-magnesium hydroxide-simethicone  30 mL Oral Q4H PRN ALVINA Harley      ammonium lactate   Topical BID PRN ALVINA Harley      atorvastatin  10 mg Oral Daily ALVINA Lewis      benztropine  1 mg Intramuscular Q4H PRN Max 6/day ALVINA Harley      benztropine  1 mg Oral Q4H PRN Max 6/day ALVINA Harley      bisacodyl  10 mg Rectal Daily PRN ALVINA Harley      cariprazine  3 mg Oral Daily Martin Cardenas MD      Cholecalciferol  2,000 Units Oral Daily ALVINA Lewis      cyanocobalamin  1,000 mcg Oral Daily ALVINA Lewis      hydrOXYzine HCL  25 mg Oral Q6H PRN Max 4/day ALVINA Harley      hydrOXYzine HCL  25 mg Oral TID Martin Cardenas MD      hydrOXYzine HCL  50 mg Oral Q4H PRN Max 4/day ALVINA Harley      Or    LORazepam  1 mg Intramuscular Q4H PRN ALVINA Harley      lamoTRIgine  50 mg Oral Daily Martin Cardenas MD      lisinopril  10 mg Oral Daily ALVINA Lewis      LORazepam  1 mg Oral Q4H PRN Max 6/day ALVINA Harley      Or    LORazepam  2 mg Intramuscular Q6H PRN Max 3/day ALVINA Harley      OLANZapine  10 mg Oral Q3H PRN Max 3/day ALVINA Harley      Or    OLANZapine  10 mg Intramuscular Q3H PRN Max 3/day ALVINA Harley      OLANZapine  5 mg Oral Q3H PRN Max 6/day ALVINA Harley      Or    OLANZapine  5 mg Intramuscular Q3H PRN Max 6/day ALVINA Harley      OLANZapine  2.5 mg Oral Q3H PRN Max 8/day ALVINA Harley      polyethylene glycol  17 g Oral Daily PRN ALVINA Harley      propranolol  10 mg  Oral Q8H PRN ALVINA Harley      senna-docusate sodium  1 tablet Oral Daily PRN ALVINA Harley      sertraline  150 mg Oral HS Martin Cardenas MD       Risks / Benefits of Treatment:  Risks, benefits, and possible side effects of medications explained to patient. Patient has limited understanding of risks and benefits of treatment at this time, but agrees to take medications as prescribed.    Counseling / Coordination of Care:  Total floor/unit time spent today 25 minutes. Greater than 50% of total time was spent with the patient and / or family counseling and / or coordination of care. A description of the counseling / coordination of care:   Patient's progress discussed with staff in treatment team meeting.  Medications, treatment progress and treatment plan reviewed with patient.   Educated on importance of medication and treatment compliance.  Reassurance and supportive therapy provided.   Encouraged participation in milieu and group therapy on the unit.    ALVINA Harley 09/27/24

## 2024-09-27 NOTE — PROGRESS NOTES
09/27/24 0737   Team Meeting   Meeting Type Daily Rounds   Team Members Present   Team Members Present Physician;Nurse;;Other (Discipline and Name)   Patient/Family Present   Patient Present No   Patient's Family Present No     In attendance:  Dr. Alex Thomas, MD Dr. Jordan Holter, DO Mahamed Haywood, RN  Judi Garcia, [facility]  Mer Sales, [facility]    Groups: 2/10    Pt guarded/flat/quiet. Pt waiting on CRR acceptance and tour. Pt denies symptoms other than some anxiety. No bx issues noted.

## 2024-09-27 NOTE — SOCIAL WORK
STEFAN sent email to Sydnie Chin with [employer] inquiring about updates/next steps as no updates have been received from [facility] despite multiple communication attempts.

## 2024-09-27 NOTE — ASSESSMENT & PLAN NOTE
Progressing  Awaiting placement - CRR interview complete, ACT interview complete, awaiting next steps  Continue Vraylar 3mg PO Daily, Atarax 25mg PO TID, Lamictal 50mg PO Daily, add Zoloft 150mg PO QHS  Continue with group therapy, milieu therapy and occupational therapy   Behavioral Health checks every 7 minutes   Continue frequent safety checks and vitals per unit protocol  Continue with SLIM medical management as indicated

## 2024-09-27 NOTE — PLAN OF CARE
Problem: Alteration in Thoughts and Perception  Goal: Treatment Goal: Gain control of psychotic behaviors/thinking, reduce/eliminate presenting symptoms and demonstrate improved reality functioning upon discharge  Outcome: Progressing  Goal: Refrain from acting on delusional thinking/internal stimuli  Description: Interventions:  - Monitor patient closely, per order   - Utilize least restrictive measures   - Set reasonable limits, give positive feedback for acceptable   - Administer medications as ordered and monitor of potential side effects  Outcome: Progressing  Goal: Agree to be compliant with medication regime, as prescribed and report medication side effects  Description: Interventions:  - Offer appropriate PRN medication and supervise ingestion; conduct AIMS, as needed   Outcome: Progressing  Goal: Recognize dysfunctional thoughts, communicate reality-based thoughts at the time of discharge  Description: Interventions:  - Provide medication and psycho-education to assist patient in compliance and developing insight into his/her illness   Outcome: Progressing  Goal: Complete daily ADLs, including personal hygiene independently, as able  Description: Interventions:  - Observe, teach, and assist patient with ADLS  - Monitor and promote a balance of rest/activity, with adequate nutrition and elimination   Outcome: Progressing     Problem: Ineffective Coping  Goal: Identifies ineffective coping skills  Outcome: Progressing  Goal: Identifies healthy coping skills  Outcome: Progressing     Problem: Risk for Self Injury/Neglect  Goal: Treatment Goal: Remain safe during length of stay, learn and adopt new coping skills, and be free of self-injurious ideation, impulses and acts at the time of discharge  Outcome: Progressing     Problem: Depression  Goal: Treatment Goal: Demonstrate behavioral control of depressive symptoms, verbalize feelings of improved mood/affect, and adopt new coping skills prior to  discharge  Outcome: Progressing     Problem: Anxiety  Goal: Anxiety is at manageable level  Description: Interventions:  - Assess and monitor patient's anxiety level.   - Monitor for signs and symptoms (heart palpitations, chest pain, shortness of breath, headaches, nausea, feeling jumpy, restlessness, irritable, apprehensive).   - Collaborate with interdisciplinary team and initiate plan and interventions as ordered.  - [place] patient to unit/surroundings  - Explain treatment plan  - Encourage participation in care  - Encourage verbalization of concerns/fears  - Identify coping mechanisms  - Assist in developing anxiety-reducing skills  - Administer/offer alternative therapies  - Limit or eliminate stimulants  Outcome: Progressing     Problem: ANXIETY  Goal: Will report anxiety at manageable levels  Description: INTERVENTIONS:  - Administer medication as ordered  - Teach and encourage coping skills  - Provide emotional support  - Assess patient/family for anxiety and ability to cope  Outcome: Progressing

## 2024-09-27 NOTE — PROGRESS NOTES
09/27/24 1400   Activity/Group Checklist   Group Other (Comment)  (Mindfulness coping)   Attendance Attended   Attendance Duration (min) 31-45   Interactions Interacted appropriately   Affect/Mood Appropriate   Goals Achieved Able to engage in interactions;Able to listen to others;Able to self-disclose;Able to recieve feedback;Able to give feedback to another

## 2024-09-27 NOTE — SOCIAL WORK
"Reply from Saba with [facility] regarding pt's CRR placement -   \"[facility] will run Bandar's case through our clinical case review. I will keep you posted about the results. \"  "

## 2024-09-27 NOTE — NURSING NOTE
Patient is quiet, brightens on approach and is pleasant and polite. Pt reports no s/s, takes all medications without issue.

## 2024-09-27 NOTE — PROGRESS NOTES
09/27/24 1000 09/27/24 1100   Activity/Group Checklist   Group Other (Comment)  (self care) Wellness   Attendance Attended Attended   Attendance Duration (min) 46-60 46-60   Interactions Interacted appropriately Interacted appropriately   Affect/Mood Appropriate Appropriate   Goals Achieved Able to engage in interactions;Able to listen to others;Able to self-disclose;Able to recieve feedback;Able to give feedback to another;Identified feelings Able to engage in interactions;Able to listen to others;Able to self-disclose;Able to recieve feedback;Able to give feedback to another

## 2024-09-28 PROCEDURE — 99232 SBSQ HOSP IP/OBS MODERATE 35: CPT

## 2024-09-28 RX ADMIN — CHOLECALCIFEROL TAB 25 MCG (1000 UNIT) 2000 UNITS: 25 TAB at 08:36

## 2024-09-28 RX ADMIN — HYDROXYZINE HYDROCHLORIDE 25 MG: 25 TABLET ORAL at 08:36

## 2024-09-28 RX ADMIN — HYDROXYZINE HYDROCHLORIDE 25 MG: 25 TABLET ORAL at 17:23

## 2024-09-28 RX ADMIN — LAMOTRIGINE 50 MG: 25 TABLET ORAL at 08:36

## 2024-09-28 RX ADMIN — ATORVASTATIN CALCIUM 10 MG: 10 TABLET, FILM COATED ORAL at 08:36

## 2024-09-28 RX ADMIN — HYDROXYZINE HYDROCHLORIDE 25 MG: 25 TABLET ORAL at 21:27

## 2024-09-28 RX ADMIN — SERTRALINE HYDROCHLORIDE 150 MG: 100 TABLET ORAL at 21:28

## 2024-09-28 RX ADMIN — CYANOCOBALAMIN TAB 1000 MCG 1000 MCG: 1000 TAB at 08:36

## 2024-09-28 RX ADMIN — LISINOPRIL 10 MG: 10 TABLET ORAL at 08:36

## 2024-09-28 RX ADMIN — CARIPRAZINE 3 MG: 3 CAPSULE, GELATIN COATED ORAL at 08:36

## 2024-09-28 NOTE — NURSING NOTE
Alert, cooperative and visible intermittently. Isolative to self. No SI or HI noted. Denies depression, anxiety and pain. Did not attend any groups. Consumed 100% of breakfast and 100% of lunch. Took all medication without prompting. Maintained on safe precautions without incident. Will continue to monitor progress and recovery program.

## 2024-09-28 NOTE — PROGRESS NOTES
Progress Note - Behavioral Health   Name: Deyvi Cao 54 y.o. male I MRN: [id number]  Unit/Bed#: [facility] 101-02 I Date of Admission: 6/25/2024   Date of Service: 9/28/2024 I Hospital Day: 95        Assessment & Plan  Bipolar disorder with severe depression (HCC)  Progressing  Awaiting placement - CRR interview complete, ACT interview complete, awaiting next steps  Continue Vraylar 3mg PO Daily, Atarax 25mg PO TID, Lamictal 50mg PO Daily, add Zoloft 150mg PO QHS  Continue with group therapy, milieu therapy and occupational therapy   Behavioral Health checks every 7 minutes   Continue frequent safety checks and vitals per unit protocol  Continue with SLIM medical management as indicated  Continue current medications as per medication list.     Planned medication and treatment changes:    All current active medications have been reviewed  Encourage group therapy, milieu therapy and occupational therapy  Behavioral Health checks every 7 minutes  Continue with SLIM medical management as indicated  Disposition planning ongoing      Continue current medications:     Current medications:  Current Facility-Administered Medications   Medication Dose Route Frequency Provider Last Rate    acetaminophen  650 mg Oral Q6H PRN ALVINA Harley      acetaminophen  650 mg Oral Q4H PRN ALVINA Harley      acetaminophen  975 mg Oral Q6H PRN ALVINA Harley      aluminum-magnesium hydroxide-simethicone  30 mL Oral Q4H PRN ALVINA Harley      ammonium lactate   Topical BID PRN ALVINA Harley      atorvastatin  10 mg Oral Daily ALVINA Lewis      benztropine  1 mg Intramuscular Q4H PRN Max 6/day ALVINA Harley      benztropine  1 mg Oral Q4H PRN Max 6/day ALVINA Harley      bisacodyl  10 mg Rectal Daily PRN ALVINA Harley      cariprazine  3 mg Oral Daily Martin Cardenas MD      Cholecalciferol  2,000 Units Oral Daily ALVINA Lewis      cyanocobalamin  1,000 mcg Oral Daily Sary  ALVINA Gupta      hydrOXYzine HCL  25 mg Oral Q6H PRN Max 4/day Melva Zenia, ALVINA      hydrOXYzine HCL  25 mg Oral TID Martin Cardenas MD      hydrOXYzine HCL  50 mg Oral Q4H PRN Max 4/day Melva Zenia, ALVINA      Or    LORazepam  1 mg Intramuscular Q4H PRN Melva Zenia, CRNP      lamoTRIgine  50 mg Oral Daily Martin Cardenas MD      lisinopril  10 mg Oral Daily Sary Linkbraxton Biggs, ALVINA      LORazepam  1 mg Oral Q4H PRN Max 6/day Melva Zenia, TITINP      Or    LORazepam  2 mg Intramuscular Q6H PRN Max 3/day Melva Zenia, CRNP      OLANZapine  10 mg Oral Q3H PRN Max 3/day Melva Zenia, CRNP      Or    OLANZapine  10 mg Intramuscular Q3H PRN Max 3/day Melvamelanie Anedrsoney, CRNP      OLANZapine  5 mg Oral Q3H PRN Max 6/day Melva Zenia, ALVINA      Or    OLANZapine  5 mg Intramuscular Q3H PRN Max 6/day Melva Zenia, TITINP      OLANZapine  2.5 mg Oral Q3H PRN Max 8/day Melva Knutson, CRNP      polyethylene glycol  17 g Oral Daily PRN Melva Andersonrichard, CRNP      propranolol  10 mg Oral Q8H PRN Melva Andersonrichard, ALVINA      senna-docusate sodium  1 tablet Oral Daily PRN Melva Zenia, CRNP      sertraline  150 mg Oral HS Martin Cardenas MD         Risks / Benefits of Treatment:    Risks, benefits, and possible side effects of medications explained to patient and patient verbalizes understanding and agreement for treatment.    Subjective:    Behavior over the last 24 hours: unchanged.     Patient is received laying in bed awake.  He is pleasant on approach and reports depression has been well controlled.  He denies current depression symptoms and denies passive or active SI/HI with plan or intent.  He denies anxiety or panic symptoms.  He denies AVH and does not appear to be responding to IS.  No overt delusional content solicited during conversation.      As per nursing, he is somewhat guarded, and pleasant when approached.  No behavioral concerns observed over past 24 hours.      Sleep: normal  Appetite: normal  Medication side  effects: No   ROS: no complaints    Mental Status Evaluation:    Appearance:  age appropriate, casually dressed   Behavior:  pleasant, calm   Speech:  normal rate, normal volume, normal pitch   Mood:  mildly anxious, mildly depressed   Affect:  constricted   Thought Process:  logical, coherent, goal directed   Associations: intact associations   Thought Content:  no overt delusions   Perceptual Disturbances: does not appear responding to internal stimuli, denies auditory or visual hallucinations when asked   Risk Potential: Suicidal ideation - None at present  Homicidal ideation - None at present  Potential for aggression - No   Sensorium:  oriented to person, place, and time/date   Memory:  recent and remote memory grossly intact   Consciousness:  alert and awake   Attention/Concentration: attention span and concentration are age appropriate   Insight:  fair   Judgment: fair   Gait/Station: normal gait/station   Motor Activity: no abnormal movements     Vital signs in last 24 hours:    Temp:  [97.5 °F (36.4 °C)-97.6 °F (36.4 °C)] 97.6 °F (36.4 °C)  HR:  [91-92] 92  Resp:  [16-18] 16  BP: (122-155)/(70-72) 155/70         Laboratory results: I have personally reviewed all pertinent laboratory/tests results    Results from the past 24 hours: No results found for this or any previous visit (from the past 24 hour(s)).    Progress Toward Goals: improving slowly    Counseling / Coordination of Care:    Patient's progress reviewed with nursing staff.  Medications, treatment progress and treatment plan reviewed with patient.  Encouraged participation in milieu and group therapy on the unit.      ALVINA Whitney 09/28/24

## 2024-09-28 NOTE — PLAN OF CARE
Problem: Alteration in Thoughts and Perception  Goal: Treatment Goal: Gain control of psychotic behaviors/thinking, reduce/eliminate presenting symptoms and demonstrate improved reality functioning upon discharge  Outcome: Progressing  Goal: Verbalize thoughts and feelings  Description: Interventions:  - Promote a nonjudgmental and trusting relationship with the patient through active listening and therapeutic communication  - Assess patient's level of functioning, behavior and potential for risk  - Engage patient in 1 on 1 interactions  - Encourage patient to express fears, feelings, frustrations, and discuss symptoms    - [place] patient to reality, help patient recognize reality-based thinking   - Administer medications as ordered and assess for potential side effects  - Provide the patient education related to the signs and symptoms of the illness and desired effects of prescribed medications  Outcome: Progressing  Goal: Refrain from acting on delusional thinking/internal stimuli  Description: Interventions:  - Monitor patient closely, per order   - Utilize least restrictive measures   - Set reasonable limits, give positive feedback for acceptable   - Administer medications as ordered and monitor of potential side effects  Outcome: Progressing  Goal: Agree to be compliant with medication regime, as prescribed and report medication side effects  Description: Interventions:  - Offer appropriate PRN medication and supervise ingestion; conduct AIMS, as needed   Outcome: Progressing  Goal: Attend and participate in unit activities, including therapeutic, recreational, and educational groups  Description: Interventions:  -Encourage Visitation and family involvement in care  Outcome: Progressing  Goal: Recognize dysfunctional thoughts, communicate reality-based thoughts at the time of discharge  Description: Interventions:  - Provide medication and psycho-education to assist patient in compliance and developing  insight into his/her illness   Outcome: Progressing  Goal: Complete daily ADLs, including personal hygiene independently, as able  Description: Interventions:  - Observe, teach, and assist patient with ADLS  - Monitor and promote a balance of rest/activity, with adequate nutrition and elimination   Outcome: Progressing     Problem: Ineffective Coping  Goal: Identifies ineffective coping skills  Outcome: Progressing  Goal: Identifies healthy coping skills  Outcome: Progressing  Goal: Demonstrates healthy coping skills  Outcome: Progressing  Goal: Participates in unit activities  Description: Interventions:  - Provide therapeutic environment   - Provide required programming   - Redirect inappropriate behaviors   Outcome: Progressing  Goal: Patient/Family participate in treatment and DC plans  Description: Interventions:  - Provide therapeutic environment  Outcome: Progressing  Goal: Patient/Family verbalizes awareness of resources  Outcome: Progressing  Goal: Understands least restrictive measures  Description: Interventions:  - Utilize least restrictive behavior  Outcome: Progressing  Goal: Free from restraint events  Description: - Utilize least restrictive measures   - Provide behavioral interventions   - Redirect inappropriate behaviors   Outcome: Progressing     Problem: Risk for Self Injury/Neglect  Goal: Treatment Goal: Remain safe during length of stay, learn and adopt new coping skills, and be free of self-injurious ideation, impulses and acts at the time of discharge  Outcome: Progressing  Goal: Verbalize thoughts and feelings  Description: Interventions:  - Assess and re-assess patient's lethality and potential for self-injury  - Engage patient in 1:1 interactions, daily, for a minimum of 15 minutes  - Encourage patient to express feelings, fears, frustrations, hopes  - Establish rapport/trust with patient   Outcome: Progressing  Goal: Refrain from harming self  Description: Interventions:  - Monitor  patient closely, per order  - Develop a trusting relationship  - Supervise medication ingestion, monitor effects and side effects   Outcome: Progressing  Goal: Attend and participate in unit activities, including therapeutic, recreational, and educational groups  Description: Interventions:  - Provide therapeutic and educational activities daily, encourage attendance and participation, and document same in the medical record  - Obtain collateral information, encourage visitation and family involvement in care   Outcome: Progressing  Goal: Recognize maladaptive responses and adopt new coping mechanisms  Outcome: Progressing  Goal: Complete daily ADLs, including personal hygiene independently, as able  Description: Interventions:  - Observe, teach, and assist patient with ADLS  - Monitor and promote a balance of rest/activity, with adequate nutrition and elimination  Outcome: Progressing     Problem: Depression  Goal: Treatment Goal: Demonstrate behavioral control of depressive symptoms, verbalize feelings of improved mood/affect, and adopt new coping skills prior to discharge  Outcome: Progressing  Goal: Verbalize thoughts and feelings  Description: Interventions:  - Assess and re-assess patient's level of risk   - Engage patient in 1:1 interactions, daily, for a minimum of 15 minutes   - Encourage patient to express feelings, fears, frustrations, hopes   Outcome: Progressing  Goal: Refrain from harming self  Description: Interventions:  - Monitor patient closely, per order   - Supervise medication ingestion, monitor effects and side effects   Outcome: Progressing  Goal: Refrain from isolation  Description: Interventions:  - Develop a trusting relationship   - Encourage socialization   Outcome: Progressing  Goal: Refrain from self-neglect  Outcome: Progressing  Goal: Attend and participate in unit activities, including therapeutic, recreational, and educational groups  Description: Interventions:  - Provide  therapeutic and educational activities daily, encourage attendance and participation, and document same in the medical record   Outcome: Progressing  Goal: Complete daily ADLs, including personal hygiene independently, as able  Description: Interventions:  - Observe, teach, and assist patient with ADLS  -  Monitor and promote a balance of rest/activity, with adequate nutrition and elimination   Outcome: Progressing     Problem: Anxiety  Goal: Anxiety is at manageable level  Description: Interventions:  - Assess and monitor patient's anxiety level.   - Monitor for signs and symptoms (heart palpitations, chest pain, shortness of breath, headaches, nausea, feeling jumpy, restlessness, irritable, apprehensive).   - Collaborate with interdisciplinary team and initiate plan and interventions as ordered.  - [place] patient to unit/surroundings  - Explain treatment plan  - Encourage participation in care  - Encourage verbalization of concerns/fears  - Identify coping mechanisms  - Assist in developing anxiety-reducing skills  - Administer/offer alternative therapies  - Limit or eliminate stimulants  Outcome: Progressing     Problem: Risk for Violence/Aggression Toward Others  Goal: Treatment Goal: Refrain from acts of violence/aggression during length of stay, and demonstrate improved impulse control at the time of discharge  Outcome: Progressing  Goal: Verbalize thoughts and feelings  Description: Interventions:  - Assess and re-assess patient's level of risk, every waking shift  - Engage patient in 1:1 interactions, daily, for a minimum of 15 minutes   - Allow patient to express feelings and frustrations in a safe and non-threatening manner   - Establish rapport/trust with patient   Outcome: Progressing  Goal: Refrain from harming others  Outcome: Progressing  Goal: Refrain from destructive acts on the environment or property  Outcome: Progressing  Goal: Control angry outbursts  Description: Interventions:  - Monitor  patient closely, per order  - Ensure early verbal de-escalation  - Monitor prn medication needs  - Set reasonable/therapeutic limits, outline behavioral expectations, and consequences   - Provide a non-threatening milieu, utilizing the least restrictive interventions   Outcome: Progressing  Goal: Attend and participate in unit activities, including therapeutic, recreational, and educational groups  Description: Interventions:  - Provide therapeutic and educational activities daily, encourage attendance and participation, and document same in the medical record   Outcome: Progressing  Goal: Identify appropriate positive anger management techniques  Description: Interventions:  - Offer anger management and coping skills groups   - Staff will provide positive feedback for appropriate anger control  Outcome: Progressing     Problem: Alteration in Orientation  Goal: Treatment Goal: Demonstrate a reduction of confusion and improved orientation to person, place, time and/or situation upon discharge, according to optimum baseline/ability  Outcome: Progressing  Goal: Interact with staff daily  Description: Interventions:  - Assess and re-assess patient's level of orientation  - Engage patient in 1 on 1 interactions, daily, for a minimum of 15 minutes   - Establish rapport/trust with patient   Outcome: Progressing  Goal: Express concerns related to confused thinking related to:  Description: Interventions:  - Encourage patient to express feelings, fears, frustrations, hopes  - Assign consistent caregivers   - [place]/re-orient patient as needed  - Allow comfort items, as appropriate  - Provide visual cues, signs, etc.   Outcome: Progressing  Goal: Allow medical examinations, as recommended  Description: Interventions:  - Provide physical/neurological exams and/or referrals, per provider   Outcome: Progressing  Goal: Attend and participate in unit activities, including therapeutic, recreational, and educational  groups  Description: Interventions:  - Provide therapeutic and educational activities daily, encourage attendance and participation, and document same in the medical record   - Provide appropriate opportunities for reminiscence   - Provide a consistent daily routine   - Encourage family contact/visitation   Outcome: Progressing  Goal: Complete daily ADLs, including personal hygiene independently, as able  Description: Interventions:  - Observe, teach, and assist patient with ADLS  Outcome: Progressing     Problem: SELF HARM/SUICIDALITY  Goal: Will have no self-injury during hospital stay  Description: INTERVENTIONS:  - Q 15 MINUTES: Routine safety checks  - Q WAKING SHIFT & PRN: Assess risk to determine if routine checks are adequate to maintain patient safety  - Encourage patient to participate actively in care by formulating a plan to combat response to suicidal ideation, identify supports and resources  Outcome: Progressing

## 2024-09-28 NOTE — NURSING NOTE
Patient isolative to room all evening. Cooperative with staff as well..  Keeps to himself. Offered no complaints.  Mediation compliant. Behaviors controlled and appropriate. No prn medication requested or required

## 2024-09-28 NOTE — NURSING NOTE
Alert, cooperative and isolative. Consumed 100% of dinner. Took all medication without prompting. Maintained on safe precautions without incident.

## 2024-09-28 NOTE — ASSESSMENT & PLAN NOTE
Progressing  Awaiting placement - CRR interview complete, ACT interview complete, awaiting next steps  Continue Vraylar 3mg PO Daily, Atarax 25mg PO TID, Lamictal 50mg PO Daily, add Zoloft 150mg PO QHS  Continue with group therapy, milieu therapy and occupational therapy   Behavioral Health checks every 7 minutes   Continue frequent safety checks and vitals per unit protocol  Continue with SLIM medical management as indicated  Continue current medications as per medication list.

## 2024-09-29 PROCEDURE — 99232 SBSQ HOSP IP/OBS MODERATE 35: CPT

## 2024-09-29 RX ADMIN — LAMOTRIGINE 50 MG: 25 TABLET ORAL at 08:40

## 2024-09-29 RX ADMIN — CHOLECALCIFEROL TAB 25 MCG (1000 UNIT) 2000 UNITS: 25 TAB at 08:40

## 2024-09-29 RX ADMIN — SERTRALINE HYDROCHLORIDE 150 MG: 100 TABLET ORAL at 21:17

## 2024-09-29 RX ADMIN — HYDROXYZINE HYDROCHLORIDE 25 MG: 25 TABLET ORAL at 17:34

## 2024-09-29 RX ADMIN — CYANOCOBALAMIN TAB 1000 MCG 1000 MCG: 1000 TAB at 08:40

## 2024-09-29 RX ADMIN — HYDROXYZINE HYDROCHLORIDE 25 MG: 25 TABLET ORAL at 21:17

## 2024-09-29 RX ADMIN — ATORVASTATIN CALCIUM 10 MG: 10 TABLET, FILM COATED ORAL at 08:40

## 2024-09-29 RX ADMIN — CARIPRAZINE 3 MG: 3 CAPSULE, GELATIN COATED ORAL at 08:40

## 2024-09-29 RX ADMIN — HYDROXYZINE HYDROCHLORIDE 25 MG: 25 TABLET ORAL at 08:40

## 2024-09-29 RX ADMIN — LISINOPRIL 10 MG: 10 TABLET ORAL at 08:40

## 2024-09-29 NOTE — NURSING NOTE
[place] maintained on ongoing Safe precaution without incident  on this shift.  Awake, alert, isolative and cooperative upon approach.  Did not part take in group today.  Continues to be compliant with medication regimen.  Denies any pain, or discomfort.  Denies delusion or anxiety.  No overt delusion or A/T/V hallucination noted. Behavior control.

## 2024-09-29 NOTE — PLAN OF CARE
Problem: Alteration in Orientation  Goal: Attend and participate in unit activities, including therapeutic, recreational, and educational groups  Description: Interventions:  - Provide therapeutic and educational activities daily, encourage attendance and participation, and document same in the medical record   - Provide appropriate opportunities for reminiscence   - Provide a consistent daily routine   - Encourage family contact/visitation   Outcome: Not Progressing     Problem: Alteration in Thoughts and Perception  Goal: Treatment Goal: Gain control of psychotic behaviors/thinking, reduce/eliminate presenting symptoms and demonstrate improved reality functioning upon discharge  Outcome: Progressing  Goal: Verbalize thoughts and feelings  Description: Interventions:  - Promote a nonjudgmental and trusting relationship with the patient through active listening and therapeutic communication  - Assess patient's level of functioning, behavior and potential for risk  - Engage patient in 1 on 1 interactions  - Encourage patient to express fears, feelings, frustrations, and discuss symptoms    - [place] patient to reality, help patient recognize reality-based thinking   - Administer medications as ordered and assess for potential side effects  - Provide the patient education related to the signs and symptoms of the illness and desired effects of prescribed medications  Outcome: Progressing  Goal: Refrain from acting on delusional thinking/internal stimuli  Description: Interventions:  - Monitor patient closely, per order   - Utilize least restrictive measures   - Set reasonable limits, give positive feedback for acceptable   - Administer medications as ordered and monitor of potential side effects  Outcome: Progressing  Goal: Agree to be compliant with medication regime, as prescribed and report medication side effects  Description: Interventions:  - Offer appropriate PRN medication and supervise ingestion; conduct  AIMS, as needed   Outcome: Progressing  Goal: Attend and participate in unit activities, including therapeutic, recreational, and educational groups  Description: Interventions:  -Encourage Visitation and family involvement in care  Outcome: Progressing  Goal: Recognize dysfunctional thoughts, communicate reality-based thoughts at the time of discharge  Description: Interventions:  - Provide medication and psycho-education to assist patient in compliance and developing insight into his/her illness   Outcome: Progressing  Goal: Complete daily ADLs, including personal hygiene independently, as able  Description: Interventions:  - Observe, teach, and assist patient with ADLS  - Monitor and promote a balance of rest/activity, with adequate nutrition and elimination   Outcome: Progressing     Problem: Ineffective Coping  Goal: Identifies ineffective coping skills  Outcome: Progressing  Goal: Identifies healthy coping skills  Outcome: Progressing  Goal: Demonstrates healthy coping skills  Outcome: Progressing  Goal: Participates in unit activities  Description: Interventions:  - Provide therapeutic environment   - Provide required programming   - Redirect inappropriate behaviors   Outcome: Progressing  Goal: Patient/Family participate in treatment and DC plans  Description: Interventions:  - Provide therapeutic environment  Outcome: Progressing  Goal: Patient/Family verbalizes awareness of resources  Outcome: Progressing  Goal: Understands least restrictive measures  Description: Interventions:  - Utilize least restrictive behavior  Outcome: Progressing  Goal: Free from restraint events  Description: - Utilize least restrictive measures   - Provide behavioral interventions   - Redirect inappropriate behaviors   Outcome: Progressing     Problem: Risk for Self Injury/Neglect  Goal: Treatment Goal: Remain safe during length of stay, learn and adopt new coping skills, and be free of self-injurious ideation, impulses and acts  at the time of discharge  Outcome: Progressing  Goal: Verbalize thoughts and feelings  Description: Interventions:  - Assess and re-assess patient's lethality and potential for self-injury  - Engage patient in 1:1 interactions, daily, for a minimum of 15 minutes  - Encourage patient to express feelings, fears, frustrations, hopes  - Establish rapport/trust with patient   Outcome: Progressing  Goal: Refrain from harming self  Description: Interventions:  - Monitor patient closely, per order  - Develop a trusting relationship  - Supervise medication ingestion, monitor effects and side effects   Outcome: Progressing  Goal: Attend and participate in unit activities, including therapeutic, recreational, and educational groups  Description: Interventions:  - Provide therapeutic and educational activities daily, encourage attendance and participation, and document same in the medical record  - Obtain collateral information, encourage visitation and family involvement in care   Outcome: Progressing  Goal: Recognize maladaptive responses and adopt new coping mechanisms  Outcome: Progressing  Goal: Complete daily ADLs, including personal hygiene independently, as able  Description: Interventions:  - Observe, teach, and assist patient with ADLS  - Monitor and promote a balance of rest/activity, with adequate nutrition and elimination  Outcome: Progressing     Problem: Depression  Goal: Treatment Goal: Demonstrate behavioral control of depressive symptoms, verbalize feelings of improved mood/affect, and adopt new coping skills prior to discharge  Outcome: Progressing  Goal: Verbalize thoughts and feelings  Description: Interventions:  - Assess and re-assess patient's level of risk   - Engage patient in 1:1 interactions, daily, for a minimum of 15 minutes   - Encourage patient to express feelings, fears, frustrations, hopes   Outcome: Progressing  Goal: Refrain from harming self  Description: Interventions:  - Monitor patient  closely, per order   - Supervise medication ingestion, monitor effects and side effects   Outcome: Progressing  Goal: Refrain from isolation  Description: Interventions:  - Develop a trusting relationship   - Encourage socialization   Outcome: Progressing  Goal: Refrain from self-neglect  Outcome: Progressing  Goal: Attend and participate in unit activities, including therapeutic, recreational, and educational groups  Description: Interventions:  - Provide therapeutic and educational activities daily, encourage attendance and participation, and document same in the medical record   Outcome: Progressing  Goal: Complete daily ADLs, including personal hygiene independently, as able  Description: Interventions:  - Observe, teach, and assist patient with ADLS  -  Monitor and promote a balance of rest/activity, with adequate nutrition and elimination   Outcome: Progressing     Problem: Anxiety  Goal: Anxiety is at manageable level  Description: Interventions:  - Assess and monitor patient's anxiety level.   - Monitor for signs and symptoms (heart palpitations, chest pain, shortness of breath, headaches, nausea, feeling jumpy, restlessness, irritable, apprehensive).   - Collaborate with interdisciplinary team and initiate plan and interventions as ordered.  - [place] patient to unit/surroundings  - Explain treatment plan  - Encourage participation in care  - Encourage verbalization of concerns/fears  - Identify coping mechanisms  - Assist in developing anxiety-reducing skills  - Administer/offer alternative therapies  - Limit or eliminate stimulants  Outcome: Progressing     Problem: Risk for Violence/Aggression Toward Others  Goal: Treatment Goal: Refrain from acts of violence/aggression during length of stay, and demonstrate improved impulse control at the time of discharge  Outcome: Progressing  Goal: Verbalize thoughts and feelings  Description: Interventions:  - Assess and re-assess patient's level of risk, every  waking shift  - Engage patient in 1:1 interactions, daily, for a minimum of 15 minutes   - Allow patient to express feelings and frustrations in a safe and non-threatening manner   - Establish rapport/trust with patient   Outcome: Progressing  Goal: Refrain from harming others  Outcome: Progressing  Goal: Refrain from destructive acts on the environment or property  Outcome: Progressing  Goal: Control angry outbursts  Description: Interventions:  - Monitor patient closely, per order  - Ensure early verbal de-escalation  - Monitor prn medication needs  - Set reasonable/therapeutic limits, outline behavioral expectations, and consequences   - Provide a non-threatening milieu, utilizing the least restrictive interventions   Outcome: Progressing  Goal: Attend and participate in unit activities, including therapeutic, recreational, and educational groups  Description: Interventions:  - Provide therapeutic and educational activities daily, encourage attendance and participation, and document same in the medical record   Outcome: Progressing  Goal: Identify appropriate positive anger management techniques  Description: Interventions:  - Offer anger management and coping skills groups   - Staff will provide positive feedback for appropriate anger control  Outcome: Progressing     Problem: Alteration in Orientation  Goal: Treatment Goal: Demonstrate a reduction of confusion and improved orientation to person, place, time and/or situation upon discharge, according to optimum baseline/ability  Outcome: Progressing  Goal: Interact with staff daily  Description: Interventions:  - Assess and re-assess patient's level of orientation  - Engage patient in 1 on 1 interactions, daily, for a minimum of 15 minutes   - Establish rapport/trust with patient   Outcome: Progressing  Goal: Express concerns related to confused thinking related to:  Description: Interventions:  - Encourage patient to express feelings, fears, frustrations,  hopes  - Assign consistent caregivers   - [place]/re-orient patient as needed  - Allow comfort items, as appropriate  - Provide visual cues, signs, etc.   Outcome: Progressing  Goal: Allow medical examinations, as recommended  Description: Interventions:  - Provide physical/neurological exams and/or referrals, per provider   Outcome: Progressing  Goal: Cooperate with recommended testing/procedures  Description: Interventions:  - Determine need for ancillary testing  - Observe for mental status changes  - Implement falls/precaution protocol   Outcome: Progressing  Goal: Complete daily ADLs, including personal hygiene independently, as able  Description: Interventions:  - Observe, teach, and assist patient with ADLS  Outcome: Progressing     Problem: SELF HARM/SUICIDALITY  Goal: Will have no self-injury during hospital stay  Description: INTERVENTIONS:  - Q 15 MINUTES: Routine safety checks  - Q WAKING SHIFT & PRN: Assess risk to determine if routine checks are adequate to maintain patient safety  - Encourage patient to participate actively in care by formulating a plan to combat response to suicidal ideation, identify supports and resources  Outcome: Progressing     Problem: DEPRESSION  Goal: Will be euthymic at discharge  Description: INTERVENTIONS:  - Administer medication as ordered  - Provide emotional support via 1:1 interaction with staff  - Encourage involvement in milieu/groups/activities  - Monitor for social isolation  Outcome: Progressing     Problem: ANXIETY  Goal: Will report anxiety at manageable levels  Description: INTERVENTIONS:  - Administer medication as ordered  - Teach and encourage coping skills  - Provide emotional support  - Assess patient/family for anxiety and ability to cope  Outcome: Progressing

## 2024-09-29 NOTE — NURSING NOTE
Weekly wellness assessment completed. Pt lung sounds clear in all lung fields. No edema noted. Bowel sounds +x4. B/l pedal pulses papable. Skin intact, warm and color within normal limits for patient except acrocyanosis noted to b/l feet and nailbeds.

## 2024-09-29 NOTE — PROGRESS NOTES
Progress Note - Behavioral Health   Name: Deyvi Cao 54 y.o. male I MRN: [id number]  Unit/Bed#: [facility] 101-02 I Date of Admission: 6/25/2024   Date of Service: 9/29/2024 I Hospital Day: 96        Assessment & Plan  Bipolar disorder with severe depression (HCC)  Progressing  Awaiting placement - CRR interview complete, ACT interview complete, awaiting next steps  Continue medications as follows:  Continue Vraylar 3mg PO Daily  Continue Atarax 25mg PO TID  Continue Lamictal 50mg PO Daily  Continue Zoloft 150mg PO QHS  Continue with group therapy, milieu therapy and occupational therapy   Behavioral Health checks every 7 minutes   Continue frequent safety checks and vitals per unit protocol  Continue with SLIM medical management as indicated  Continue current medications as per medication list.       Planned medication and treatment changes:    All current active medications have been reviewed  Encourage group therapy, milieu therapy and occupational therapy  Behavioral Health checks every 7 minutes  Continue with SLIM medical management as indicated  Disposition planning ongoing      Continue current medications:     Current medications:  Current Facility-Administered Medications   Medication Dose Route Frequency Provider Last Rate    acetaminophen  650 mg Oral Q6H PRN ALVINA Harley      acetaminophen  650 mg Oral Q4H PRN ALVINA Harley      acetaminophen  975 mg Oral Q6H PRN ALVINA Harley      aluminum-magnesium hydroxide-simethicone  30 mL Oral Q4H PRN ALVINA Harley      ammonium lactate   Topical BID PRN ALVINA Harley      atorvastatin  10 mg Oral Daily ALVINA Lewis      benztropine  1 mg Intramuscular Q4H PRN Max 6/day ALVINA Harley      benztropine  1 mg Oral Q4H PRN Max 6/day ALVINA Harley      bisacodyl  10 mg Rectal Daily PRN ALVINA Harley      cariprazine  3 mg Oral Daily Martin Cardenas MD      Cholecalciferol  2,000 Units Oral Daily Sary Rodriguez  "Kalyan, CRREBECCA      cyanocobalamin  1,000 mcg Oral Daily Sary Rodriguez Kalyan, ALVINA      hydrOXYzine HCL  25 mg Oral Q6H PRN Max 4/day Melva Zenia, ALVINA      hydrOXYzine HCL  25 mg Oral TID Martin Cardenas MD      hydrOXYzine HCL  50 mg Oral Q4H PRN Max 4/day Melva ALVINA Knutson      Or    LORazepam  1 mg Intramuscular Q4H PRN Melva Knutson, ALVINA      lamoTRIgine  50 mg Oral Daily Martin Cardenas MD      lisinopril  10 mg Oral Daily Sary Rodriguez Kalyan, ALVINA      LORazepam  1 mg Oral Q4H PRN Max 6/day Melvamelanie Knutson, ALVINA      Or    LORazepam  2 mg Intramuscular Q6H PRN Max 3/day Melvamelanie Knutson, TITINP      OLANZapine  10 mg Oral Q3H PRN Max 3/day Melva Zenia, TITINP      Or    OLANZapine  10 mg Intramuscular Q3H PRN Max 3/day Melva Zenia, TITINP      OLANZapine  5 mg Oral Q3H PRN Max 6/day MelvaALVINA Cordova      Or    OLANZapine  5 mg Intramuscular Q3H PRN Max 6/day Melva Zenia, TITINP      OLANZapine  2.5 mg Oral Q3H PRN Max 8/day Melva Knutson, TITINP      polyethylene glycol  17 g Oral Daily PRN Melva Andersonrichard, ALVINA      propranolol  10 mg Oral Q8H PRN Melva Andersonrichard, ALVINA      senna-docusate sodium  1 tablet Oral Daily PRN Melva Andersonrichard, CRNP      sertraline  150 mg Oral HS Martin Cardenas MD         Risks / Benefits of Treatment:    Risks, benefits, and possible side effects of medications explained to patient and patient verbalizes understanding and agreement for treatment.    Subjective:    Behavior over the last 24 hours: unchanged.     Deyvi observed laying in bed awake.  He reports depression and anxiety have been generally well controlled. He states \"I am ready\" regarding upcoming plans for CRR placement.  He denies current passive or active SI/HI with plan or intent.  He denies panic attacks.  He denies AVH and does not appear to be responding to IS.  NO overt symptoms of guillermo/hypomania.  No overt delusional content elicited during conversation. He denies any side effects to current psychotropic medications.     As " per nursing, there have been no concerning behaviors over past 24 hours.     Sleep: normal  Appetite: normal  Medication side effects: No   ROS: no complaints    Mental Status Evaluation:    Appearance:  age appropriate, casually dressed   Behavior:  pleasant, calm   Speech:  normal rate, normal volume, normal pitch   Mood:  mildly anxious, mildly depressed   Affect:  constricted   Thought Process:  logical, coherent, goal directed   Associations: intact associations   Thought Content:  no overt delusions   Perceptual Disturbances: denies auditory or visual hallucinations when asked, does not appear responding to internal stimuli   Risk Potential: Suicidal ideation - None at present  Homicidal ideation - None  Potential for aggression - No   Sensorium:  oriented to person, place, and time/date   Memory:  recent and remote memory grossly intact   Consciousness:  alert and awake   Attention/Concentration: attention span and concentration are age appropriate   Insight:  fair   Judgment: fair   Gait/Station: normal gait/station   Motor Activity: no abnormal movements     Vital signs in last 24 hours:    Temp:  [97.6 °F (36.4 °C)-97.8 °F (36.6 °C)] 97.6 °F (36.4 °C)  HR:  [89-96] 96  Resp:  [16-18] 16  BP: (122-132)/(78-87) 132/87         Laboratory results: I have personally reviewed all pertinent laboratory/tests results    Results from the past 24 hours: No results found for this or any previous visit (from the past 24 hour(s)).    Progress Toward Goals: no significant improvement today    Counseling / Coordination of Care:    Total floor / unit time spent today 15 minutes. Greater than 50% of total time was spent with the patient and / or family counseling and / or coordination of care. A description of counseling / coordination of care:  Patient's progress reviewed with nursing staff.  Reoriented to reality and reassured.  Encouraged participation in milieu and group therapy on the unit.      ALVINA Whitney  09/29/24

## 2024-09-29 NOTE — ASSESSMENT & PLAN NOTE
Progressing  Awaiting placement - CRR interview complete, ACT interview complete, awaiting next steps  Continue medications as follows:  Continue Vraylar 3mg PO Daily  Continue Atarax 25mg PO TID  Continue Lamictal 50mg PO Daily  Continue Zoloft 150mg PO QHS  Continue with group therapy, milieu therapy and occupational therapy   Behavioral Health checks every 7 minutes   Continue frequent safety checks and vitals per unit protocol  Continue with SLIM medical management as indicated  Continue current medications as per medication list.

## 2024-09-30 PROCEDURE — 99232 SBSQ HOSP IP/OBS MODERATE 35: CPT | Performed by: PSYCHIATRY & NEUROLOGY

## 2024-09-30 RX ADMIN — SERTRALINE HYDROCHLORIDE 150 MG: 100 TABLET ORAL at 21:14

## 2024-09-30 RX ADMIN — HYDROXYZINE HYDROCHLORIDE 25 MG: 25 TABLET ORAL at 21:14

## 2024-09-30 RX ADMIN — LISINOPRIL 10 MG: 10 TABLET ORAL at 08:17

## 2024-09-30 RX ADMIN — CARIPRAZINE 3 MG: 3 CAPSULE, GELATIN COATED ORAL at 08:17

## 2024-09-30 RX ADMIN — CYANOCOBALAMIN TAB 1000 MCG 1000 MCG: 1000 TAB at 08:17

## 2024-09-30 RX ADMIN — ATORVASTATIN CALCIUM 10 MG: 10 TABLET, FILM COATED ORAL at 08:17

## 2024-09-30 RX ADMIN — LAMOTRIGINE 50 MG: 25 TABLET ORAL at 08:17

## 2024-09-30 RX ADMIN — HYDROXYZINE HYDROCHLORIDE 25 MG: 25 TABLET ORAL at 17:06

## 2024-09-30 RX ADMIN — CHOLECALCIFEROL TAB 25 MCG (1000 UNIT) 2000 UNITS: 25 TAB at 08:17

## 2024-09-30 RX ADMIN — HYDROXYZINE HYDROCHLORIDE 25 MG: 25 TABLET ORAL at 08:17

## 2024-09-30 NOTE — NURSING NOTE
[place] maintained on ongoing Safe precaution without incident  on this shift.  Awake, alert, isolative and cooperative upon approach.  Attended and participated 1 out of 8 group today.  Continues to be compliant with medication regimen.  Denies any pain, or discomfort.  Denies delusion or anxiety.  No overt delusion or A/T/V hallucination noted. Behavior control.

## 2024-09-30 NOTE — PROGRESS NOTES
09/30/24 0745   Team Meeting   Meeting Type Daily Rounds   Team Members Present   Team Members Present Physician;Nurse;;Other (Discipline and Name)   Patient/Family Present   Patient Present No   Patient's Family Present No     In attendance:  Dr. Alex Thomas, MD Dr. Jordan Holter, ALVINA Melgoza, RN  Judi Garcia, JAYLONW  [employer] LCSW    Groups: 4/10    Pt had a quiet weekend. Pt minimally social, waiting on CRR approval to schedule tour. Pt remains isolative.

## 2024-09-30 NOTE — NURSING NOTE
[place] maintained on ongoing SAFE precaution without incident on this shift. Observed in bed resting with eyes closed, respiration even and unlabored. Q7 minutes rounding implemented. No behavioral noted overnigh

## 2024-09-30 NOTE — NURSING NOTE
Deyvi has been pleasant and cooperative.  Denied all psych sx on this shift.   Pt offers no complaints or concerns.  100% x 3 for meals.  No behavioral issues.

## 2024-09-30 NOTE — ASSESSMENT & PLAN NOTE
Progressing  Awaiting placement - CRR interview complete, ACT interview complete, awaiting next steps  Continue medications as follows:, Vraylar 3mg PO Daily, Atarax 25mg PO TID, Lamictal 50mg PO Daily, Zoloft 150mg PO QHS  Continue with group therapy, milieu therapy and occupational therapy   Behavioral Health checks every 7 minutes   Continue frequent safety checks and vitals per unit protocol  Continue with SLIM medical management as indicated

## 2024-09-30 NOTE — PLAN OF CARE
Problem: Alteration in Thoughts and Perception  Goal: Treatment Goal: Gain control of psychotic behaviors/thinking, reduce/eliminate presenting symptoms and demonstrate improved reality functioning upon discharge  Outcome: Progressing  Goal: Verbalize thoughts and feelings  Description: Interventions:  - Promote a nonjudgmental and trusting relationship with the patient through active listening and therapeutic communication  - Assess patient's level of functioning, behavior and potential for risk  - Engage patient in 1 on 1 interactions  - Encourage patient to express fears, feelings, frustrations, and discuss symptoms    - [place] patient to reality, help patient recognize reality-based thinking   - Administer medications as ordered and assess for potential side effects  - Provide the patient education related to the signs and symptoms of the illness and desired effects of prescribed medications  Outcome: Progressing  Goal: Refrain from acting on delusional thinking/internal stimuli  Description: Interventions:  - Monitor patient closely, per order   - Utilize least restrictive measures   - Set reasonable limits, give positive feedback for acceptable   - Administer medications as ordered and monitor of potential side effects  Outcome: Progressing  Goal: Agree to be compliant with medication regime, as prescribed and report medication side effects  Description: Interventions:  - Offer appropriate PRN medication and supervise ingestion; conduct AIMS, as needed   Outcome: Progressing  Goal: Attend and participate in unit activities, including therapeutic, recreational, and educational groups  Description: Interventions:  -Encourage Visitation and family involvement in care  Outcome: Progressing  Goal: Recognize dysfunctional thoughts, communicate reality-based thoughts at the time of discharge  Description: Interventions:  - Provide medication and psycho-education to assist patient in compliance and developing  insight into his/her illness   Outcome: Progressing  Goal: Complete daily ADLs, including personal hygiene independently, as able  Description: Interventions:  - Observe, teach, and assist patient with ADLS  - Monitor and promote a balance of rest/activity, with adequate nutrition and elimination   Outcome: Progressing     Problem: Ineffective Coping  Goal: Identifies ineffective coping skills  Outcome: Progressing  Goal: Identifies healthy coping skills  Outcome: Progressing  Goal: Demonstrates healthy coping skills  Outcome: Progressing  Goal: Participates in unit activities  Description: Interventions:  - Provide therapeutic environment   - Provide required programming   - Redirect inappropriate behaviors   Outcome: Progressing  Goal: Patient/Family participate in treatment and DC plans  Description: Interventions:  - Provide therapeutic environment  Outcome: Progressing  Goal: Patient/Family verbalizes awareness of resources  Outcome: Progressing  Goal: Understands least restrictive measures  Description: Interventions:  - Utilize least restrictive behavior  Outcome: Progressing  Goal: Free from restraint events  Description: - Utilize least restrictive measures   - Provide behavioral interventions   - Redirect inappropriate behaviors   Outcome: Progressing     Problem: Risk for Self Injury/Neglect  Goal: Treatment Goal: Remain safe during length of stay, learn and adopt new coping skills, and be free of self-injurious ideation, impulses and acts at the time of discharge  Outcome: Progressing  Goal: Verbalize thoughts and feelings  Description: Interventions:  - Assess and re-assess patient's lethality and potential for self-injury  - Engage patient in 1:1 interactions, daily, for a minimum of 15 minutes  - Encourage patient to express feelings, fears, frustrations, hopes  - Establish rapport/trust with patient   Outcome: Progressing  Goal: Refrain from harming self  Description: Interventions:  - Monitor  patient closely, per order  - Develop a trusting relationship  - Supervise medication ingestion, monitor effects and side effects   Outcome: Progressing  Goal: Attend and participate in unit activities, including therapeutic, recreational, and educational groups  Description: Interventions:  - Provide therapeutic and educational activities daily, encourage attendance and participation, and document same in the medical record  - Obtain collateral information, encourage visitation and family involvement in care   Outcome: Progressing  Goal: Recognize maladaptive responses and adopt new coping mechanisms  Outcome: Progressing  Goal: Complete daily ADLs, including personal hygiene independently, as able  Description: Interventions:  - Observe, teach, and assist patient with ADLS  - Monitor and promote a balance of rest/activity, with adequate nutrition and elimination  Outcome: Progressing     Problem: Depression  Goal: Treatment Goal: Demonstrate behavioral control of depressive symptoms, verbalize feelings of improved mood/affect, and adopt new coping skills prior to discharge  Outcome: Progressing  Goal: Verbalize thoughts and feelings  Description: Interventions:  - Assess and re-assess patient's level of risk   - Engage patient in 1:1 interactions, daily, for a minimum of 15 minutes   - Encourage patient to express feelings, fears, frustrations, hopes   Outcome: Progressing  Goal: Refrain from harming self  Description: Interventions:  - Monitor patient closely, per order   - Supervise medication ingestion, monitor effects and side effects   Outcome: Progressing  Goal: Refrain from isolation  Description: Interventions:  - Develop a trusting relationship   - Encourage socialization   Outcome: Progressing  Goal: Refrain from self-neglect  Outcome: Progressing  Goal: Attend and participate in unit activities, including therapeutic, recreational, and educational groups  Description: Interventions:  - Provide  therapeutic and educational activities daily, encourage attendance and participation, and document same in the medical record   Outcome: Progressing  Goal: Complete daily ADLs, including personal hygiene independently, as able  Description: Interventions:  - Observe, teach, and assist patient with ADLS  -  Monitor and promote a balance of rest/activity, with adequate nutrition and elimination   Outcome: Progressing     Problem: Anxiety  Goal: Anxiety is at manageable level  Description: Interventions:  - Assess and monitor patient's anxiety level.   - Monitor for signs and symptoms (heart palpitations, chest pain, shortness of breath, headaches, nausea, feeling jumpy, restlessness, irritable, apprehensive).   - Collaborate with interdisciplinary team and initiate plan and interventions as ordered.  - [place] patient to unit/surroundings  - Explain treatment plan  - Encourage participation in care  - Encourage verbalization of concerns/fears  - Identify coping mechanisms  - Assist in developing anxiety-reducing skills  - Administer/offer alternative therapies  - Limit or eliminate stimulants  Outcome: Progressing     Problem: Risk for Violence/Aggression Toward Others  Goal: Treatment Goal: Refrain from acts of violence/aggression during length of stay, and demonstrate improved impulse control at the time of discharge  Outcome: Progressing  Goal: Verbalize thoughts and feelings  Description: Interventions:  - Assess and re-assess patient's level of risk, every waking shift  - Engage patient in 1:1 interactions, daily, for a minimum of 15 minutes   - Allow patient to express feelings and frustrations in a safe and non-threatening manner   - Establish rapport/trust with patient   Outcome: Progressing  Goal: Refrain from harming others  Outcome: Progressing  Goal: Refrain from destructive acts on the environment or property  Outcome: Progressing  Goal: Control angry outbursts  Description: Interventions:  - Monitor  patient closely, per order  - Ensure early verbal de-escalation  - Monitor prn medication needs  - Set reasonable/therapeutic limits, outline behavioral expectations, and consequences   - Provide a non-threatening milieu, utilizing the least restrictive interventions   Outcome: Progressing  Goal: Attend and participate in unit activities, including therapeutic, recreational, and educational groups  Description: Interventions:  - Provide therapeutic and educational activities daily, encourage attendance and participation, and document same in the medical record   Outcome: Progressing  Goal: Identify appropriate positive anger management techniques  Description: Interventions:  - Offer anger management and coping skills groups   - Staff will provide positive feedback for appropriate anger control  Outcome: Progressing     Problem: Alteration in Orientation  Goal: Treatment Goal: Demonstrate a reduction of confusion and improved orientation to person, place, time and/or situation upon discharge, according to optimum baseline/ability  Outcome: Progressing  Goal: Interact with staff daily  Description: Interventions:  - Assess and re-assess patient's level of orientation  - Engage patient in 1 on 1 interactions, daily, for a minimum of 15 minutes   - Establish rapport/trust with patient   Outcome: Progressing  Goal: Express concerns related to confused thinking related to:  Description: Interventions:  - Encourage patient to express feelings, fears, frustrations, hopes  - Assign consistent caregivers   - [place]/re-orient patient as needed  - Allow comfort items, as appropriate  - Provide visual cues, signs, etc.   Outcome: Progressing  Goal: Allow medical examinations, as recommended  Description: Interventions:  - Provide physical/neurological exams and/or referrals, per provider   Outcome: Progressing  Goal: Cooperate with recommended testing/procedures  Description: Interventions:  - Determine need for ancillary  testing  - Observe for mental status changes  - Implement falls/precaution protocol   Outcome: Progressing  Goal: Complete daily ADLs, including personal hygiene independently, as able  Description: Interventions:  - Observe, teach, and assist patient with ADLS  Outcome: Progressing     Problem: SELF HARM/SUICIDALITY  Goal: Will have no self-injury during hospital stay  Description: INTERVENTIONS:  - Q 15 MINUTES: Routine safety checks  - Q WAKING SHIFT & PRN: Assess risk to determine if routine checks are adequate to maintain patient safety  - Encourage patient to participate actively in care by formulating a plan to combat response to suicidal ideation, identify supports and resources  Outcome: Progressing     Problem: ANXIETY  Goal: Will report anxiety at manageable levels  Description: INTERVENTIONS:  - Administer medication as ordered  - Teach and encourage coping skills  - Provide emotional support  - Assess patient/family for anxiety and ability to cope  Outcome: Progressing     Problem: SELF CARE DEFICIT  Goal: Return ADL status to a safe level of function  Description: INTERVENTIONS:  - Administer medication as ordered  - Assess ADL deficits and provide assistive devices as needed  - Obtain PT/OT consults as needed  - Assist and instruct patient to increase activity and self care as tolerated  Outcome: Progressing

## 2024-09-30 NOTE — SOCIAL WORK
SW and pt met 1:1  SW reviewed weekend. Pt calm, quiet, guarded, scant.   SW and pt discussed next steps and timeframe for discharge planning. SW identified currently waiting on clinical director to give final approval for the group home. SW encouraged patience and ongoing participation while waiting. SW and pt discussed symptoms of anxiety and depressive/anxious thoughts that accompany times of uncertainty.   SW inquired about any bills due as tomorrow is the first of the new month; pt reports all bills are up to date at this time.     SW informed pt about upcoming staff change and provided reassurance of continued care with covering SW. Pt reacted appropriately.

## 2024-09-30 NOTE — NURSING NOTE
Patient is quiet, social with select peers. Pt reports no s/s, scant in conversation but able to make needs known. Pt takes all medications without incidence.

## 2024-10-01 PROCEDURE — 99232 SBSQ HOSP IP/OBS MODERATE 35: CPT | Performed by: PSYCHIATRY & NEUROLOGY

## 2024-10-01 PROCEDURE — 90673 RIV3 VACCINE NO PRESERV IM: CPT | Performed by: PSYCHIATRY & NEUROLOGY

## 2024-10-01 RX ADMIN — HYDROXYZINE HYDROCHLORIDE 25 MG: 25 TABLET ORAL at 08:21

## 2024-10-01 RX ADMIN — HYDROXYZINE HYDROCHLORIDE 25 MG: 25 TABLET ORAL at 21:10

## 2024-10-01 RX ADMIN — CHOLECALCIFEROL TAB 25 MCG (1000 UNIT) 2000 UNITS: 25 TAB at 08:21

## 2024-10-01 RX ADMIN — INFLUENZA A VIRUS A/WEST VIRGINIA/30/2022 (H1N1) RECOMBINANT HEMAGGLUTININ ANTIGEN, INFLUENZA A VIRUS A/MASSACHUSETTS/18/2022 (H3N2) RECOMBINANT HEMAGGLUTININ ANTIGEN, AND INFLUENZA B VIRUS B/AUSTRIA/1359417/2021 RECOMBINANT HEMAGGLUTININ ANTIGEN 0.5 ML: 45; 45; 45 INJECTION INTRAMUSCULAR at 15:01

## 2024-10-01 RX ADMIN — LAMOTRIGINE 50 MG: 25 TABLET ORAL at 08:21

## 2024-10-01 RX ADMIN — CYANOCOBALAMIN TAB 1000 MCG 1000 MCG: 1000 TAB at 08:21

## 2024-10-01 RX ADMIN — CARIPRAZINE 3 MG: 3 CAPSULE, GELATIN COATED ORAL at 08:21

## 2024-10-01 RX ADMIN — SERTRALINE HYDROCHLORIDE 150 MG: 100 TABLET ORAL at 21:10

## 2024-10-01 RX ADMIN — ATORVASTATIN CALCIUM 10 MG: 10 TABLET, FILM COATED ORAL at 08:21

## 2024-10-01 RX ADMIN — HYDROXYZINE HYDROCHLORIDE 25 MG: 25 TABLET ORAL at 17:03

## 2024-10-01 RX ADMIN — LISINOPRIL 10 MG: 10 TABLET ORAL at 08:21

## 2024-10-01 NOTE — NURSING NOTE
Patient is pleasant cooperative, quiet and scant in conversation. Pt minimal needs, reports no s/s. Pt takes medication without issue. Pt is visible and social with select peers.

## 2024-10-01 NOTE — PLAN OF CARE
Problem: Alteration in Thoughts and Perception  Goal: Verbalize thoughts and feelings  Description: Interventions:  - Promote a nonjudgmental and trusting relationship with the patient through active listening and therapeutic communication  - Assess patient's level of functioning, behavior and potential for risk  - Engage patient in 1 on 1 interactions  - Encourage patient to express fears, feelings, frustrations, and discuss symptoms    - [place] patient to reality, help patient recognize reality-based thinking   - Administer medications as ordered and assess for potential side effects  - Provide the patient education related to the signs and symptoms of the illness and desired effects of prescribed medications  Outcome: Progressing  Goal: Refrain from acting on delusional thinking/internal stimuli  Description: Interventions:  - Monitor patient closely, per order   - Utilize least restrictive measures   - Set reasonable limits, give positive feedback for acceptable   - Administer medications as ordered and monitor of potential side effects  Outcome: Progressing  Goal: Agree to be compliant with medication regime, as prescribed and report medication side effects  Description: Interventions:  - Offer appropriate PRN medication and supervise ingestion; conduct AIMS, as needed   Outcome: Progressing  Goal: Complete daily ADLs, including personal hygiene independently, as able  Description: Interventions:  - Observe, teach, and assist patient with ADLS  - Monitor and promote a balance of rest/activity, with adequate nutrition and elimination   Outcome: Progressing     Problem: Ineffective Coping  Goal: Identifies ineffective coping skills  Outcome: Progressing  Goal: Identifies healthy coping skills  Outcome: Progressing  Goal: Demonstrates healthy coping skills  Outcome: Progressing     Problem: Risk for Self Injury/Neglect  Goal: Verbalize thoughts and feelings  Description: Interventions:  - Assess and re-assess  patient's lethality and potential for self-injury  - Engage patient in 1:1 interactions, daily, for a minimum of 15 minutes  - Encourage patient to express feelings, fears, frustrations, hopes  - Establish rapport/trust with patient   Outcome: Progressing  Goal: Refrain from harming self  Description: Interventions:  - Monitor patient closely, per order  - Develop a trusting relationship  - Supervise medication ingestion, monitor effects and side effects   Outcome: Progressing  Goal: Complete daily ADLs, including personal hygiene independently, as able  Description: Interventions:  - Observe, teach, and assist patient with ADLS  - Monitor and promote a balance of rest/activity, with adequate nutrition and elimination  Outcome: Progressing     Problem: Depression  Goal: Refrain from isolation  Description: Interventions:  - Develop a trusting relationship   - Encourage socialization   Outcome: Progressing  Goal: Refrain from self-neglect  Outcome: Progressing     Problem: Anxiety  Goal: Anxiety is at manageable level  Description: Interventions:  - Assess and monitor patient's anxiety level.   - Monitor for signs and symptoms (heart palpitations, chest pain, shortness of breath, headaches, nausea, feeling jumpy, restlessness, irritable, apprehensive).   - Collaborate with interdisciplinary team and initiate plan and interventions as ordered.  - [place] patient to unit/surroundings  - Explain treatment plan  - Encourage participation in care  - Encourage verbalization of concerns/fears  - Identify coping mechanisms  - Assist in developing anxiety-reducing skills  - Administer/offer alternative therapies  - Limit or eliminate stimulants  Outcome: Progressing     Problem: Risk for Violence/Aggression Toward Others  Goal: Refrain from harming others  Outcome: Progressing  Goal: Refrain from destructive acts on the environment or property  Outcome: Progressing  Goal: Control angry outbursts  Description: Interventions:  -  Monitor patient closely, per order  - Ensure early verbal de-escalation  - Monitor prn medication needs  - Set reasonable/therapeutic limits, outline behavioral expectations, and consequences   - Provide a non-threatening milieu, utilizing the least restrictive interventions   Outcome: Progressing     Problem: ANXIETY  Goal: Will report anxiety at manageable levels  Description: INTERVENTIONS:  - Administer medication as ordered  - Teach and encourage coping skills  - Provide emotional support  - Assess patient/family for anxiety and ability to cope  Outcome: Progressing

## 2024-10-01 NOTE — PROGRESS NOTES
Progress Note - Behavioral Health   Name: Deyvi Cao 54 y.o. male I MRN: [id number]   Unit/Bed#: [facility] 101-02 I Date of Admission: 6/25/2024   Date of Service: 10/1/2024 I Hospital Day: 98     Assessment & Plan  Bipolar disorder with severe depression (HCC)  Progressing  Awaiting placement - CRR interview complete, ACT interview complete, awaiting next steps  Continue medications as follows:, Vraylar 3mg PO Daily, Atarax 25mg PO TID, Lamictal 50mg PO Daily, Zoloft 150mg PO QHS  Continue with group therapy, milieu therapy and occupational therapy   Behavioral Health checks every 7 minutes   Continue frequent safety checks and vitals per unit protocol  Continue with [facility] medical management as indicated  Benign essential hypertension  Managed by [facility]  Continue Lisinopril 10mg PO Daily  Mixed hyperlipidemia  Managed by [facility]  Continue Lipitor 10mg PO Daily  Allergic rhinitis due to allergen  Managed by [facility]  Rosacea  Managed by [facility]    Subjective:    Patient was seen today for continuation of care, records reviewed and patient was discussed with the morning case review team.    Deyvi was seen today for psychiatric follow-up.  On assessment today, Deyvi was found laying in bed.  He is calm and cooperative on approach.  Offers no new concerns.  Deyvi reports adequate daytime energy and denies any difficulties with initiating or staying asleep.  Oral appetite and hydration is adequate.  He is hoping for an update on his discharge soon.  We reviewed once more the specific as-needed medications they can use going forward if they experience any insomnia or destabilization of their mood, they understood and were agreeable. Milieu visibility and group attendance encouraged to promote an active participation in treatment.    Deyvi denies acute suicidal/self-harm ideation/intent/plan upon direct inquiry at this time. Deyvi is able to contract for safety while on the unit and would feel comfortable seeking staff  support should suicidal symptoms or urges appear or worsen. Deyvi remains behaviorally appropriate, no agitation or aggression noted on exam or in report. Deyvi also denies HI/AH/VH, and does not appear overtly manic.  Patient does not verbalize any experiences that can be categorized as paranoid, persecutory, bizarre, or somatic delusions. Deyvi remains adherent to his current psychotropic medication regimen and denies any side effects from medications, as well as none noted on exam.    Deyvi is currently assessed as being at their baseline with continued need for medication management, supervision for safety and ADL’s. These services are not currently available in a less restrictive environment necessitating continued hospital stay.  Deyvi should remain on the unit until these services are available, due to likelihood of mental decompensation and readmission if discharged to an unsupervised setting.  Assertive discharge planning and collaboration with multiple providers (inpatient and community based) remains ongoing.  Deyvi is currently awaiting for Enhanced CRR.    Group Attendance: 4 / 9  Treatment Team: This Thursday  Psychiatric PRN's Needed: None    Review of Systems:  Behavior over the last 24 hours: Unchanged  Sleep: sleeping okay throughout the night  Appetite: adequate  Medication side effects: none reported  ROS:no complaints    Objective:    Vitals:  Vitals:    10/01/24 0738   BP: 148/94   Pulse: 92   Resp: 18   Temp: 97.8 °F (36.6 °C)   SpO2: 95%     Laboratory Results:  I have personally reviewed all pertinent laboratory/tests results.  Most Recent Labs:   Lab Results   Component Value Date    WBC 7.39 06/26/2024    RBC 4.70 06/26/2024    HGB 15.2 06/26/2024    HCT 45.5 06/26/2024     06/26/2024    RDW 12.9 06/26/2024    NEUTROABS 4.63 06/26/2024    SODIUM 137 06/26/2024    K 4.1 06/26/2024     06/26/2024    CO2 26 06/26/2024    BUN 17 06/26/2024    CREATININE 0.63 06/26/2024     GLUC 98 06/26/2024    GLUF 98 06/26/2024    CALCIUM 9.6 06/26/2024    AST 25 06/26/2024    ALT 45 06/26/2024    ALKPHOS 114 (H) 06/26/2024    TP 7.0 06/26/2024    ALB 4.4 06/26/2024    TBILI 0.44 06/26/2024    CHOLESTEROL 177 06/26/2024    HDL 52 06/26/2024    TRIG 73 06/26/2024    LDLCALC 110 (H) 06/26/2024    NONHDLC 125 06/26/2024    LITHIUM 0.4 (L) 01/28/2021    VAR6WSXAISZA 2.636 06/26/2024    HGBA1C 5.2 11/22/2023     11/22/2023     Mental Status Evaluation:  Appearance:  age appropriate, casually dressed   Behavior:  cooperative, calm   Speech:  normal volume   Mood:  anxious   Affect:  constricted   Thought Process:  goal directed   Associations: intact associations   Thought Content:  no overt delusions   Perceptual Disturbances: no auditory hallucinations, no visual hallucinations, denies when asked, does not appear responding to internal stimuli   Risk Potential: Suicidal ideation - None at present, contracts for safety on the unit, would talk to staff if not feeling safe on the unit  Homicidal ideation - None at present  Potential for aggression - Not at present   Sensorium:  oriented to person, place, and time/date   Memory:  recent memory intact   Consciousness:  alert and awake   Attention/Concentration: attention span and concentration appear shorter than expected for age   Insight:  limited   Judgment: limited   Gait/Station: normal gait/station, normal balance   Motor Activity: no abnormal movements     Progress Toward Goals: making gradual improvement.  Deyvi continues to require inpatient psychiatric hospitalization for continued medication management and titration to optimize symptom reduction, improve sleep hygiene, and demonstrate adequate self-care.     Suicide/Homicide Risk Assessment:  Risk of Harm to Self:   Nursing Suicide Risk Assessment Last 24 hours: C-SSRS Risk (Since Last Contact)  Calculated C-SSRS Risk Score (Since Last Contact): No Risk Indicated    Risk of Harm to  Others:  Nursing Homicide Risk Assessment: Violence Risk to Others: Denies within past 6 months    Behavioral Health Medications: all current active meds have been reviewed and continue current psychiatric medications.  Current Facility-Administered Medications   Medication Dose Route Frequency Provider Last Rate    acetaminophen  650 mg Oral Q6H PRN ALVINA Harley      acetaminophen  650 mg Oral Q4H PRN ALVINA Harley      acetaminophen  975 mg Oral Q6H PRN ALVINA Harley      aluminum-magnesium hydroxide-simethicone  30 mL Oral Q4H PRN ALVINA Harley      ammonium lactate   Topical BID PRN ALVINA Harley      atorvastatin  10 mg Oral Daily ALVINA Lewis      benztropine  1 mg Intramuscular Q4H PRN Max 6/day ALVINA Harley      benztropine  1 mg Oral Q4H PRN Max 6/day ALVINA Harley      bisacodyl  10 mg Rectal Daily PRN ALVINA Harley      cariprazine  3 mg Oral Daily Martin Cardenas MD      Cholecalciferol  2,000 Units Oral Daily ALVINA Lewis      cyanocobalamin  1,000 mcg Oral Daily ALVINA Lewis      hydrOXYzine HCL  25 mg Oral Q6H PRN Max 4/day ALVINA Harley      hydrOXYzine HCL  25 mg Oral TID Martin Cardenas MD      hydrOXYzine HCL  50 mg Oral Q4H PRN Max 4/day ALVINA Harley      Or    LORazepam  1 mg Intramuscular Q4H PRN ALVINA Harley      lamoTRIgine  50 mg Oral Daily Martin Cardenas MD      lisinopril  10 mg Oral Daily ALVINA Lewis      LORazepam  1 mg Oral Q4H PRN Max 6/day ALVINA Harley      Or    LORazepam  2 mg Intramuscular Q6H PRN Max 3/day ALVINA Harley      OLANZapine  10 mg Oral Q3H PRN Max 3/day ALVINA Harley      Or    OLANZapine  10 mg Intramuscular Q3H PRN Max 3/day ALVINA Harley      OLANZapine  5 mg Oral Q3H PRN Max 6/day ALVINA Harley      Or    OLANZapine  5 mg Intramuscular Q3H PRN Max 6/day ALVINA Harley      OLANZapine  2.5 mg Oral Q3H PRN Max 8/day  ALVINA Harley      polyethylene glycol  17 g Oral Daily PRN ALVINA Harley      propranolol  10 mg Oral Q8H PRN ALVINA Harley      senna-docusate sodium  1 tablet Oral Daily PRN ALVINA Harley      sertraline  150 mg Oral HS Martin Cardenas MD       Risks / Benefits of Treatment:  Risks, benefits, and possible side effects of medications explained to patient. Patient has limited understanding of risks and benefits of treatment at this time, but agrees to take medications as prescribed.    Counseling / Coordination of Care:  Total floor/unit time spent today 25 minutes. Greater than 50% of total time was spent with the patient and / or family counseling and / or coordination of care. A description of the counseling / coordination of care:   Patient's progress discussed with staff in treatment team meeting.  Medications, treatment progress and treatment plan reviewed with patient.   Educated on importance of medication and treatment compliance.  Reassurance and supportive therapy provided.   Encouraged participation in milieu and group therapy on the unit.    ALVINA Harley 10/01/24

## 2024-10-01 NOTE — SOCIAL WORK
SW and pt met 1:1. Pt obtained belongings from Security. SW supported pt with paying his phone bill and checking his other balances. Pt had some questions about notifications on his account and exhibited some anxiety regarding paying these bills and slow connection to his online phone account. Pt effectively managed anxiety and is now up to date for October bills.   Pt's belongings placed in new security bag; SW notified security they are ready for .

## 2024-10-01 NOTE — PROGRESS NOTES
10/01/24 0741   Team Meeting   Meeting Type Daily Rounds   Team Members Present   Team Members Present Physician;Nurse;;Other (Discipline and Name)   Patient/Family Present   Patient Present No   Patient's Family Present No     In attendance:  Dr. Alex Thomas, MD Dr. Jordan Holter, DO Mahamed Haywood, RN  Judi Garcia, [facility]  Mer Sales, [facility]  Gris Arizmendi, M.S.    Groups: 4/9    Pt scant, quiet, guarded. Pleasant, compliant. Waiting on CRR next steps.

## 2024-10-02 PROCEDURE — 99232 SBSQ HOSP IP/OBS MODERATE 35: CPT | Performed by: PSYCHIATRY & NEUROLOGY

## 2024-10-02 RX ADMIN — HYDROXYZINE HYDROCHLORIDE 25 MG: 25 TABLET ORAL at 17:03

## 2024-10-02 RX ADMIN — CHOLECALCIFEROL TAB 25 MCG (1000 UNIT) 2000 UNITS: 25 TAB at 08:07

## 2024-10-02 RX ADMIN — LISINOPRIL 10 MG: 10 TABLET ORAL at 08:07

## 2024-10-02 RX ADMIN — CARIPRAZINE 3 MG: 3 CAPSULE, GELATIN COATED ORAL at 08:07

## 2024-10-02 RX ADMIN — ATORVASTATIN CALCIUM 10 MG: 10 TABLET, FILM COATED ORAL at 08:07

## 2024-10-02 RX ADMIN — SERTRALINE HYDROCHLORIDE 150 MG: 100 TABLET ORAL at 21:24

## 2024-10-02 RX ADMIN — HYDROXYZINE HYDROCHLORIDE 25 MG: 25 TABLET ORAL at 08:07

## 2024-10-02 RX ADMIN — HYDROXYZINE HYDROCHLORIDE 25 MG: 25 TABLET ORAL at 21:24

## 2024-10-02 RX ADMIN — LAMOTRIGINE 50 MG: 25 TABLET ORAL at 08:07

## 2024-10-02 RX ADMIN — CYANOCOBALAMIN TAB 1000 MCG 1000 MCG: 1000 TAB at 08:08

## 2024-10-02 NOTE — PLAN OF CARE
Problem: Alteration in Thoughts and Perception  Goal: Treatment Goal: Gain control of psychotic behaviors/thinking, reduce/eliminate presenting symptoms and demonstrate improved reality functioning upon discharge  Outcome: Progressing  Goal: Verbalize thoughts and feelings  Description: Interventions:  - Promote a nonjudgmental and trusting relationship with the patient through active listening and therapeutic communication  - Assess patient's level of functioning, behavior and potential for risk  - Engage patient in 1 on 1 interactions  - Encourage patient to express fears, feelings, frustrations, and discuss symptoms    - [place] patient to reality, help patient recognize reality-based thinking   - Administer medications as ordered and assess for potential side effects  - Provide the patient education related to the signs and symptoms of the illness and desired effects of prescribed medications  Outcome: Progressing  Goal: Refrain from acting on delusional thinking/internal stimuli  Description: Interventions:  - Monitor patient closely, per order   - Utilize least restrictive measures   - Set reasonable limits, give positive feedback for acceptable   - Administer medications as ordered and monitor of potential side effects  Outcome: Progressing  Goal: Agree to be compliant with medication regime, as prescribed and report medication side effects  Description: Interventions:  - Offer appropriate PRN medication and supervise ingestion; conduct AIMS, as needed   Outcome: Progressing  Goal: Recognize dysfunctional thoughts, communicate reality-based thoughts at the time of discharge  Description: Interventions:  - Provide medication and psycho-education to assist patient in compliance and developing insight into his/her illness   Outcome: Progressing  Goal: Complete daily ADLs, including personal hygiene independently, as able  Description: Interventions:  - Observe, teach, and assist patient with ADLS  - Monitor and  promote a balance of rest/activity, with adequate nutrition and elimination   Outcome: Progressing     Problem: Ineffective Coping  Goal: Identifies ineffective coping skills  Outcome: Progressing  Goal: Identifies healthy coping skills  Outcome: Progressing  Goal: Demonstrates healthy coping skills  Outcome: Progressing     Problem: Risk for Self Injury/Neglect  Goal: Treatment Goal: Remain safe during length of stay, learn and adopt new coping skills, and be free of self-injurious ideation, impulses and acts at the time of discharge  Outcome: Progressing  Goal: Refrain from harming self  Description: Interventions:  - Monitor patient closely, per order  - Develop a trusting relationship  - Supervise medication ingestion, monitor effects and side effects   Outcome: Progressing     Problem: Depression  Goal: Treatment Goal: Demonstrate behavioral control of depressive symptoms, verbalize feelings of improved mood/affect, and adopt new coping skills prior to discharge  Outcome: Progressing     Problem: Anxiety  Goal: Anxiety is at manageable level  Description: Interventions:  - Assess and monitor patient's anxiety level.   - Monitor for signs and symptoms (heart palpitations, chest pain, shortness of breath, headaches, nausea, feeling jumpy, restlessness, irritable, apprehensive).   - Collaborate with interdisciplinary team and initiate plan and interventions as ordered.  - [place] patient to unit/surroundings  - Explain treatment plan  - Encourage participation in care  - Encourage verbalization of concerns/fears  - Identify coping mechanisms  - Assist in developing anxiety-reducing skills  - Administer/offer alternative therapies  - Limit or eliminate stimulants  Outcome: Progressing     Problem: SELF HARM/SUICIDALITY  Goal: Will have no self-injury during hospital stay  Description: INTERVENTIONS:  - Q 15 MINUTES: Routine safety checks  - Q WAKING SHIFT & PRN: Assess risk to determine if routine checks are  adequate to maintain patient safety  - Encourage patient to participate actively in care by formulating a plan to combat response to suicidal ideation, identify supports and resources  Outcome: Progressing     Problem: DEPRESSION  Goal: Will be euthymic at discharge  Description: INTERVENTIONS:  - Administer medication as ordered  - Provide emotional support via 1:1 interaction with staff  - Encourage involvement in milieu/groups/activities  - Monitor for social isolation  Outcome: Progressing     Problem: ANXIETY  Goal: Will report anxiety at manageable levels  Description: INTERVENTIONS:  - Administer medication as ordered  - Teach and encourage coping skills  - Provide emotional support  - Assess patient/family for anxiety and ability to cope  Outcome: Progressing     Problem: SELF CARE DEFICIT  Goal: Return ADL status to a safe level of function  Description: INTERVENTIONS:  - Administer medication as ordered  - Assess ADL deficits and provide assistive devices as needed  - Obtain PT/OT consults as needed  - Assist and instruct patient to increase activity and self care as tolerated  Outcome: Progressing     Problem: DISCHARGE PLANNING - CARE MANAGEMENT  Goal: Discharge to post-acute care or home with appropriate resources  Description: INTERVENTIONS:  - Conduct assessment to determine patient/family and health care team treatment goals, and need for post-acute services based on payer coverage, community resources, and patient preferences, and barriers to discharge  - Address psychosocial, clinical, and financial barriers to discharge as identified in assessment in conjunction with the patient/family and health care team  - Arrange appropriate level of post-acute services according to patient’s   needs and preference and payer coverage in collaboration with the physician and health care team  - Communicate with and update the patient/family, physician, and health care team regarding progress on the discharge  plan  - Arrange appropriate transportation to post-acute venues  Outcome: Progressing

## 2024-10-02 NOTE — NURSING NOTE
Patient has been out on the unit minimally for needs and meals. Attended approx. Half of the groups this shift.  Appetite excellent. Anxious appearance upon approach. He states feeling better since admission. Denies any psychological symptoms or suicidal ideations. Medication compliant. Guarded. Social with select peers. Offers no complaints.

## 2024-10-02 NOTE — PROGRESS NOTES
Progress Note - Behavioral Health   Name: Deyvi Cao 54 y.o. male I MRN: [id number]   Unit/Bed#: [facility] 101-02 I Date of Admission: 6/25/2024   Date of Service: 10/2/2024 I Hospital Day: 99     Assessment & Plan  Bipolar disorder with severe depression (HCC)  Progressing  Awaiting placement - CRR interview complete, ACT interview complete, awaiting next steps  Continue medications as follows:, Vraylar 3mg PO Daily, Atarax 25mg PO TID, Lamictal 50mg PO Daily, Zoloft 150mg PO QHS  Continue with group therapy, milieu therapy and occupational therapy   Behavioral Health checks every 7 minutes   Continue frequent safety checks and vitals per unit protocol  Continue with [facility] medical management as indicated  Benign essential hypertension  Managed by [facility]  Continue Lisinopril 10mg PO Daily  Mixed hyperlipidemia  Managed by [facility]  Continue Lipitor 10mg PO Daily  Allergic rhinitis due to allergen  Managed by [facility]  Rosacea  Managed by [facility]    Subjective:    Patient was seen today for continuation of care, records reviewed and patient was discussed with the morning case review team.    Deyvi was seen today for psychiatric follow-up.  On assessment today, Deyvi was found laying in bed.  He is calm and cooperative.  Offers no new concerns.  Deyvi reports adequate daytime energy and denies any difficulties with initiating or staying asleep.  Oral appetite and hydration is adequate.   We reviewed once more the specific as-needed medications they can use going forward if they experience any insomnia or destabilization of their mood, they understood and were agreeable. Milieu visibility and group attendance encouraged to promote an active participation in treatment.    Deyvi denies acute suicidal/self-harm ideation/intent/plan upon direct inquiry at this time. Deyvi is able to contract for safety while on the unit and would feel comfortable seeking staff support should suicidal symptoms or urges appear or worsen. Deyvi  remains behaviorally appropriate, no agitation or aggression noted on exam or in report. Deyvi also denies HI/AH/VH, and does not appear overtly manic.  Patient does not verbalize any experiences that can be categorized as paranoid, persecutory, bizarre, or somatic delusions. Deyvi remains adherent to his current psychotropic medication regimen and denies any side effects from medications, as well as none noted on exam.    Deyvi is currently assessed as being at their baseline with continued need for medication management, supervision for safety and ADL’s. These services are not currently available in a less restrictive environment necessitating continued hospital stay.  Deyiv should remain on the unit until these services are available, due to likelihood of mental decompensation and readmission if discharged to an unsupervised setting.  Assertive discharge planning and collaboration with multiple providers (inpatient and community based) remains ongoing.  Deyvi is currently awaiting for [facility].    Group Attendance: 2 / 10  Treatment Team: This Thursday  Psychiatric PRN's Needed: None    Review of Systems:  Behavior over the last 24 hours: Unchanged  Sleep: sleeping okay throughout the night  Appetite: adequate  Medication side effects: none reported  ROS:no complaints    Objective:    Vitals:  Vitals:    10/02/24 0742   BP: 145/97   Pulse: 95   Resp: 18   Temp: (!) 97.4 °F (36.3 °C)   SpO2: 95%     Laboratory Results:  I have personally reviewed all pertinent laboratory/tests results.  Most Recent Labs:   Lab Results   Component Value Date    WBC 7.39 06/26/2024    RBC 4.70 06/26/2024    HGB 15.2 06/26/2024    HCT 45.5 06/26/2024     06/26/2024    RDW 12.9 06/26/2024    NEUTROABS 4.63 06/26/2024    SODIUM 137 06/26/2024    K 4.1 06/26/2024     06/26/2024    CO2 26 06/26/2024    BUN 17 06/26/2024    CREATININE 0.63 06/26/2024    GLUC 98 06/26/2024    GLUF 98 06/26/2024    CALCIUM 9.6  06/26/2024    AST 25 06/26/2024    ALT 45 06/26/2024    ALKPHOS 114 (H) 06/26/2024    TP 7.0 06/26/2024    ALB 4.4 06/26/2024    TBILI 0.44 06/26/2024    CHOLESTEROL 177 06/26/2024    HDL 52 06/26/2024    TRIG 73 06/26/2024    LDLCALC 110 (H) 06/26/2024    NONHDLC 125 06/26/2024    LITHIUM 0.4 (L) 01/28/2021    ESQ0KPCHKBXX 2.636 06/26/2024    HGBA1C 5.2 11/22/2023     11/22/2023     Mental Status Evaluation:  Appearance:  age appropriate, casually dressed, dressed appropriately   Behavior:  cooperative, calm   Speech:  normal volume   Mood:  anxious   Affect:  constricted   Thought Process:  goal directed   Associations: intact associations   Thought Content:  no overt delusions   Perceptual Disturbances: no auditory hallucinations, no visual hallucinations, denies when asked, does not appear responding to internal stimuli   Risk Potential: Suicidal ideation - None at present, contracts for safety on the unit, would talk to staff if not feeling safe on the unit  Homicidal ideation - None at present  Potential for aggression - Not at present   Sensorium:  oriented to person, place, and time/date   Memory:  recent memory intact   Consciousness:  alert and awake   Attention/Concentration: attention span and concentration appear shorter than expected for age   Insight:  limited   Judgment: limited   Gait/Station: normal gait/station, normal balance   Motor Activity: no abnormal movements     Progress Toward Goals: making gradual improvement.  Deyvi continues to require inpatient psychiatric hospitalization for continued medication management and titration to optimize symptom reduction, improve sleep hygiene, and demonstrate adequate self-care.     Suicide/Homicide Risk Assessment:  Risk of Harm to Self:   Nursing Suicide Risk Assessment Last 24 hours: C-SSRS Risk (Since Last Contact)  Calculated C-SSRS Risk Score (Since Last Contact): No Risk Indicated    Risk of Harm to Others:  Nursing Homicide Risk  Assessment: Violence Risk to Others: Denies within past 6 months    Behavioral Health Medications: all current active meds have been reviewed and continue current psychiatric medications.  Current Facility-Administered Medications   Medication Dose Route Frequency Provider Last Rate    acetaminophen  650 mg Oral Q6H PRN ALVINA Harley      acetaminophen  650 mg Oral Q4H PRN ALVINA Harley      acetaminophen  975 mg Oral Q6H PRN ALVINA Harley      aluminum-magnesium hydroxide-simethicone  30 mL Oral Q4H PRN ALVINA Harley      ammonium lactate   Topical BID PRN ALVINA Harley      atorvastatin  10 mg Oral Daily ALVINA Lewis      benztropine  1 mg Intramuscular Q4H PRN Max 6/day ALVINA Harley      benztropine  1 mg Oral Q4H PRN Max 6/day ALVINA Harley      bisacodyl  10 mg Rectal Daily PRN ALVINA Harley      cariprazine  3 mg Oral Daily Martin Cardenas MD      Cholecalciferol  2,000 Units Oral Daily ALVINA Lewis      cyanocobalamin  1,000 mcg Oral Daily ALVINA Lewis      hydrOXYzine HCL  25 mg Oral Q6H PRN Max 4/day ALVINA Harley      hydrOXYzine HCL  25 mg Oral TID Martin Cardenas MD      hydrOXYzine HCL  50 mg Oral Q4H PRN Max 4/day ALVINA Harley      Or    LORazepam  1 mg Intramuscular Q4H PRN ALVINA Harley      lamoTRIgine  50 mg Oral Daily Martin Cardenas MD      lisinopril  10 mg Oral Daily ALVINA Lewis      LORazepam  1 mg Oral Q4H PRN Max 6/day ALVINA Harley      Or    LORazepam  2 mg Intramuscular Q6H PRN Max 3/day ALVINA Harley      OLANZapine  10 mg Oral Q3H PRN Max 3/day ALVINA Harley      Or    OLANZapine  10 mg Intramuscular Q3H PRN Max 3/day ALVINA Harley      OLANZapine  5 mg Oral Q3H PRN Max 6/day ALVINA Harley      Or    OLANZapine  5 mg Intramuscular Q3H PRN Max 6/day ALVINA Harley      OLANZapine  2.5 mg Oral Q3H PRN Max 8/day ALVINA Harley       polyethylene glycol  17 g Oral Daily PRN ALVINA Harley      propranolol  10 mg Oral Q8H PRN ALVINA Harley      senna-docusate sodium  1 tablet Oral Daily PRN ALVINA Harley      sertraline  150 mg Oral HS Martin Cardenas MD       Risks / Benefits of Treatment:  Risks, benefits, and possible side effects of medications explained to patient. Patient has limited understanding of risks and benefits of treatment at this time, but agrees to take medications as prescribed.    Counseling / Coordination of Care:  Total floor/unit time spent today 25 minutes. Greater than 50% of total time was spent with the patient and / or family counseling and / or coordination of care. A description of the counseling / coordination of care:   Patient's progress discussed with staff in treatment team meeting.  Medications, treatment progress and treatment plan reviewed with patient.   Educated on importance of medication and treatment compliance.  Reassurance and supportive therapy provided.   Encouraged participation in milieu and group therapy on the unit.    ALVINA Harley 10/02/24

## 2024-10-02 NOTE — NURSING NOTE
Patient is quiet. During conversation he appears anxious with pressured speech. Admits feeling better since his admission.  Appetite excellent. Attended less than half of the groups this shift. Medication compliant.  Offers no complaints. Smiles when approached. Minimal socialization with select peers. Denies suicidal ideations or any psychological symptoms.

## 2024-10-02 NOTE — NURSING NOTE
Pt is present on the milieu intermittently. Able to make needs known. He consumed 100% of dinner. Took his medications without incidence. Pt is pleasant, polite, and cooperative. Scant in conversation, but smiles on approach.  Denied all psychiatric symptoms. Pt offered no complaints. No behavioral issues.

## 2024-10-02 NOTE — PROGRESS NOTES
10/02/24 0734   Team Meeting   Meeting Type Daily Rounds   Team Members Present   Team Members Present Physician;Nurse;;Other (Discipline and Name)   Patient/Family Present   Patient Present No   Patient's Family Present No     In attendance:  Dr. Alex Thomas, MD Dr. Jordan Holter, DO Daniel Teles, RN  Judi Garcia, [facility]  Mer Sales [facility]    Groups: 2/10    Pt received flu shot. Denies symptoms. Waiting on CRR next steps. No bx issues noted.

## 2024-10-03 PROCEDURE — 99232 SBSQ HOSP IP/OBS MODERATE 35: CPT | Performed by: PSYCHIATRY & NEUROLOGY

## 2024-10-03 RX ADMIN — HYDROXYZINE HYDROCHLORIDE 25 MG: 25 TABLET ORAL at 08:18

## 2024-10-03 RX ADMIN — ATORVASTATIN CALCIUM 10 MG: 10 TABLET, FILM COATED ORAL at 08:18

## 2024-10-03 RX ADMIN — SERTRALINE HYDROCHLORIDE 150 MG: 100 TABLET ORAL at 21:04

## 2024-10-03 RX ADMIN — LAMOTRIGINE 50 MG: 25 TABLET ORAL at 08:18

## 2024-10-03 RX ADMIN — CARIPRAZINE 3 MG: 3 CAPSULE, GELATIN COATED ORAL at 08:18

## 2024-10-03 RX ADMIN — HYDROXYZINE HYDROCHLORIDE 25 MG: 25 TABLET ORAL at 21:04

## 2024-10-03 RX ADMIN — LISINOPRIL 10 MG: 10 TABLET ORAL at 08:18

## 2024-10-03 RX ADMIN — CYANOCOBALAMIN TAB 1000 MCG 1000 MCG: 1000 TAB at 08:18

## 2024-10-03 RX ADMIN — HYDROXYZINE HYDROCHLORIDE 25 MG: 25 TABLET ORAL at 17:10

## 2024-10-03 RX ADMIN — CHOLECALCIFEROL TAB 25 MCG (1000 UNIT) 2000 UNITS: 25 TAB at 08:18

## 2024-10-03 NOTE — PROGRESS NOTES
10/03/24 0739   Team Meeting   Meeting Type Daily Rounds   Team Members Present   Team Members Present Physician;Nurse;;Other (Discipline and Name)   Patient/Family Present   Patient Present No   Patient's Family Present No     In attendance:  Dr. Alex Thomas, MD Dr. Jordan Holter, DO Daniel Teles, RN  Judi Garcia, [facility]  Mer Sales, [facility]    Groups: 4/11    Pt is guarded, less quiet. Pt waiting on CRR next steps. Good appetite; no bx concerns.

## 2024-10-03 NOTE — NURSING NOTE
Patient has been out of his room for meals and a few groups. Quiet and isolative to himself. Soft spoken with an anxious affect upon approach. Offers no complaints. Denies suicidal ideations or thoughts of self harm.  Appetite good. Medication compliant. Preoccupied with wanting to be discharged.

## 2024-10-03 NOTE — PLAN OF CARE
Problem: Alteration in Thoughts and Perception  Goal: Treatment Goal: Gain control of psychotic behaviors/thinking, reduce/eliminate presenting symptoms and demonstrate improved reality functioning upon discharge  Outcome: Progressing  Goal: Verbalize thoughts and feelings  Description: Interventions:  - Promote a nonjudgmental and trusting relationship with the patient through active listening and therapeutic communication  - Assess patient's level of functioning, behavior and potential for risk  - Engage patient in 1 on 1 interactions  - Encourage patient to express fears, feelings, frustrations, and discuss symptoms    - [place] patient to reality, help patient recognize reality-based thinking   - Administer medications as ordered and assess for potential side effects  - Provide the patient education related to the signs and symptoms of the illness and desired effects of prescribed medications  Outcome: Progressing  Goal: Refrain from acting on delusional thinking/internal stimuli  Description: Interventions:  - Monitor patient closely, per order   - Utilize least restrictive measures   - Set reasonable limits, give positive feedback for acceptable   - Administer medications as ordered and monitor of potential side effects  Outcome: Progressing  Goal: Agree to be compliant with medication regime, as prescribed and report medication side effects  Description: Interventions:  - Offer appropriate PRN medication and supervise ingestion; conduct AIMS, as needed   Outcome: Progressing  Goal: Attend and participate in unit activities, including therapeutic, recreational, and educational groups  Description: Interventions:  -Encourage Visitation and family involvement in care  Outcome: Progressing  Goal: Recognize dysfunctional thoughts, communicate reality-based thoughts at the time of discharge  Description: Interventions:  - Provide medication and psycho-education to assist patient in compliance and developing  insight into his/her illness   Outcome: Progressing  Goal: Complete daily ADLs, including personal hygiene independently, as able  Description: Interventions:  - Observe, teach, and assist patient with ADLS  - Monitor and promote a balance of rest/activity, with adequate nutrition and elimination   Outcome: Progressing     Problem: Ineffective Coping  Goal: Identifies ineffective coping skills  Outcome: Progressing  Goal: Identifies healthy coping skills  Outcome: Progressing  Goal: Demonstrates healthy coping skills  Outcome: Progressing  Goal: Participates in unit activities  Description: Interventions:  - Provide therapeutic environment   - Provide required programming   - Redirect inappropriate behaviors   Outcome: Progressing     Problem: Risk for Self Injury/Neglect  Goal: Treatment Goal: Remain safe during length of stay, learn and adopt new coping skills, and be free of self-injurious ideation, impulses and acts at the time of discharge  Outcome: Progressing  Goal: Attend and participate in unit activities, including therapeutic, recreational, and educational groups  Description: Interventions:  - Provide therapeutic and educational activities daily, encourage attendance and participation, and document same in the medical record  - Obtain collateral information, encourage visitation and family involvement in care   Outcome: Progressing     Problem: Depression  Goal: Treatment Goal: Demonstrate behavioral control of depressive symptoms, verbalize feelings of improved mood/affect, and adopt new coping skills prior to discharge  Outcome: Progressing     Problem: Anxiety  Goal: Anxiety is at manageable level  Description: Interventions:  - Assess and monitor patient's anxiety level.   - Monitor for signs and symptoms (heart palpitations, chest pain, shortness of breath, headaches, nausea, feeling jumpy, restlessness, irritable, apprehensive).   - Collaborate with interdisciplinary team and initiate plan and  interventions as ordered.  - [place] patient to unit/surroundings  - Explain treatment plan  - Encourage participation in care  - Encourage verbalization of concerns/fears  - Identify coping mechanisms  - Assist in developing anxiety-reducing skills  - Administer/offer alternative therapies  - Limit or eliminate stimulants  Outcome: Progressing     Problem: SELF HARM/SUICIDALITY  Goal: Will have no self-injury during hospital stay  Description: INTERVENTIONS:  - Q 15 MINUTES: Routine safety checks  - Q WAKING SHIFT & PRN: Assess risk to determine if routine checks are adequate to maintain patient safety  - Encourage patient to participate actively in care by formulating a plan to combat response to suicidal ideation, identify supports and resources  Outcome: Progressing     Problem: ANXIETY  Goal: Will report anxiety at manageable levels  Description: INTERVENTIONS:  - Administer medication as ordered  - Teach and encourage coping skills  - Provide emotional support  - Assess patient/family for anxiety and ability to cope  Outcome: Progressing     Problem: SELF CARE DEFICIT  Goal: Return ADL status to a safe level of function  Description: INTERVENTIONS:  - Administer medication as ordered  - Assess ADL deficits and provide assistive devices as needed  - Obtain PT/OT consults as needed  - Assist and instruct patient to increase activity and self care as tolerated  Outcome: Progressing     Problem: DISCHARGE PLANNING - CARE MANAGEMENT  Goal: Discharge to post-acute care or home with appropriate resources  Description: INTERVENTIONS:  - Conduct assessment to determine patient/family and health care team treatment goals, and need for post-acute services based on payer coverage, community resources, and patient preferences, and barriers to discharge  - Address psychosocial, clinical, and financial barriers to discharge as identified in assessment in conjunction with the patient/family and health care team  - Arrange  appropriate level of post-acute services according to patient’s   needs and preference and payer coverage in collaboration with the physician and health care team  - Communicate with and update the patient/family, physician, and health care team regarding progress on the discharge plan  - Arrange appropriate transportation to post-acute venues  Outcome: Progressing

## 2024-10-03 NOTE — SOCIAL WORK
Email received from Sydnie Chin with [employer] is having Deyvi's application processed through there evaluation team. Hoping to hear back from them in the next couple of days. [facility] cannot sign him on until any time after 10/15 due to Senia being off next week. Once I get more specific dates and details, I will let you know.

## 2024-10-03 NOTE — PROGRESS NOTES
10/03/24 1001   Team Meeting   Meeting Type Tx Team Meeting   Initial Conference Date 10/03/24   Next Conference Date 10/17/24   Team Members Present   Team Members Present Physician;Nurse;;Other (Discipline and Name)   Physician Team Member Zenia TINSLEY   Nursing Team Member Chastity   Social Work Team Member Tanvi Sales   Other (Discipline and Name) Giuseppe Snow   Patient/Family Present   Patient Present Yes   Patient's Family Present No   OTHER   Team Meeting - Additional Comments Pt attended tx team meeting. Pt reports his only questions/concerns are related to when he will be discharging and what his next steps are.  inquired with the [facility] if any updates have been received. [facility] reports CRR is waiting on confirmation from ACT. CRR told  they are waiting on clinical review. Expressed concerns with miscommunication. SW validated pt's frustrations and encouraged continued participation in treatment until the point of discharge.

## 2024-10-03 NOTE — PROGRESS NOTES
Progress Note - Behavioral Health   Name: Deyvi Cao 54 y.o. male I MRN: [id number]   Unit/Bed#: [facility] 101-02 I Date of Admission: 6/25/2024   Date of Service: 10/3/2024 I Hospital Day: 100     Assessment & Plan  Bipolar disorder with severe depression (HCC)  Progressing  Awaiting placement - CRR interview complete, [facility] interview complete, awaiting next steps  Continue medications as follows:, Vraylar 3mg PO Daily, Atarax 25mg PO TID, Lamictal 50mg PO Daily, Zoloft 150mg PO QHS  Continue with group therapy, milieu therapy and occupational therapy   Behavioral Health checks every 7 minutes   Continue frequent safety checks and vitals per unit protocol  Continue with [facility] medical management as indicated  Benign essential hypertension  Managed by [facility]  Continue Lisinopril 10mg PO Daily  Mixed hyperlipidemia  Managed by [facility]  Continue Lipitor 10mg PO Daily  Allergic rhinitis due to allergen  Managed by [facility]  Rosacea  Managed by [facility]    Subjective:    Patient was seen today for continuation of care, records reviewed and patient was discussed with the morning case review team.    Deyvi was seen today for psychiatric follow-up.  On assessment today, Deyvi was found sitting in his room.  He did attend treatment team.  Calm and cooperative throughout.  Deyvi reports adequate daytime energy and denies any difficulties with initiating or staying asleep.  Oral appetite and hydration is adequate.  We reviewed once more the specific as-needed medications they can use going forward if they experience any insomnia or destabilization of their mood, they understood and were agreeable. Milieu visibility and group attendance encouraged to promote an active participation in treatment.    Deyvi denies acute suicidal/self-harm ideation/intent/plan upon direct inquiry at this time. Deyvi is able to contract for safety while on the unit and would feel comfortable seeking staff support should suicidal symptoms or urges appear or  worsen. Deyvi remains behaviorally appropriate, no agitation or aggression noted on exam or in report. Deyvi also denies HI/AH/VH, and does not appear overtly manic.  Patient does not verbalize any experiences that can be categorized as paranoid, persecutory, bizarre, or somatic delusions. Deyvi remains adherent to his current psychotropic medication regimen and denies any side effects from medications, as well as none noted on exam.    Deyvi is currently assessed as being at their baseline with continued need for medication management, supervision for safety and ADL’s. These services are not currently available in a less restrictive environment necessitating continued hospital stay.  Deyvi should remain on the unit until these services are available, due to likelihood of mental decompensation and readmission if discharged to an unsupervised setting.  Assertive discharge planning and collaboration with multiple providers (inpatient and community based) remains ongoing.  Deyvi is currently awaiting for enhanced CRR.    Group Attendance: 4 / 11  Treatment Team: Today  Psychiatric PRN's Needed: None    Review of Systems:  Behavior over the last 24 hours: Unchanged  Sleep: sleeping okay throughout the night  Appetite: adequate  Medication side effects: none reported  ROS:no complaints    Objective:    Vitals:  Vitals:    10/03/24 0728   BP: 129/80   Pulse: 97   Resp: 18   Temp: 97.8 °F (36.6 °C)   SpO2: 93%     Laboratory Results:  I have personally reviewed all pertinent laboratory/tests results.  Most Recent Labs:   Lab Results   Component Value Date    WBC 7.39 06/26/2024    RBC 4.70 06/26/2024    HGB 15.2 06/26/2024    HCT 45.5 06/26/2024     06/26/2024    RDW 12.9 06/26/2024    NEUTROABS 4.63 06/26/2024    SODIUM 137 06/26/2024    K 4.1 06/26/2024     06/26/2024    CO2 26 06/26/2024    BUN 17 06/26/2024    CREATININE 0.63 06/26/2024    GLUC 98 06/26/2024    GLUF 98 06/26/2024    CALCIUM 9.6  06/26/2024    AST 25 06/26/2024    ALT 45 06/26/2024    ALKPHOS 114 (H) 06/26/2024    TP 7.0 06/26/2024    ALB 4.4 06/26/2024    TBILI 0.44 06/26/2024    CHOLESTEROL 177 06/26/2024    HDL 52 06/26/2024    TRIG 73 06/26/2024    LDLCALC 110 (H) 06/26/2024    NONHDLC 125 06/26/2024    LITHIUM 0.4 (L) 01/28/2021    [id number] 2.636 06/26/2024    HGBA1C 5.2 11/22/2023     11/22/2023     Mental Status Evaluation:  Appearance:  age appropriate, casually dressed, dressed appropriately   Behavior:  cooperative, calm   Speech:  normal volume   Mood:  anxious   Affect:  slightly brighter   Thought Process:  circumstantial   Associations: intact associations   Thought Content:  no overt delusions   Perceptual Disturbances: no auditory hallucinations, no visual hallucinations, denies when asked, does not appear responding to internal stimuli   Risk Potential: Suicidal ideation - None at present, contracts for safety on the unit, would talk to staff if not feeling safe on the unit  Homicidal ideation - None at present  Potential for aggression - Not at present   Sensorium:  oriented to person, place, and time/date   Memory:  recent memory intact   Consciousness:  alert and awake   Attention/Concentration: attention span and concentration appear shorter than expected for age   Insight:  limited   Judgment: limited   Gait/Station: normal gait/station, normal balance   Motor Activity: no abnormal movements     Progress Toward Goals: making gradual improvement.  Deyvi continues to require inpatient psychiatric hospitalization for continued medication management and titration to optimize symptom reduction, improve sleep hygiene, and demonstrate adequate self-care.     Suicide/Homicide Risk Assessment:  Risk of Harm to Self:   Nursing Suicide Risk Assessment Last 24 hours: C-SSRS Risk (Since Last Contact)  Calculated C-SSRS Risk Score (Since Last Contact): No Risk Indicated    Risk of Harm to Others:  Nursing Homicide Risk  Assessment: Violence Risk to Others: Denies within past 6 months    Behavioral Health Medications: all current active meds have been reviewed and continue current psychiatric medications.  Current Facility-Administered Medications   Medication Dose Route Frequency Provider Last Rate    acetaminophen  650 mg Oral Q6H PRN ALVINA Harley      acetaminophen  650 mg Oral Q4H PRN ALVINA Harley      acetaminophen  975 mg Oral Q6H PRN ALVINA Harley      aluminum-magnesium hydroxide-simethicone  30 mL Oral Q4H PRN ALVINA Harley      ammonium lactate   Topical BID PRN ALVINA Harley      atorvastatin  10 mg Oral Daily ALVINA Lewis      benztropine  1 mg Intramuscular Q4H PRN Max 6/day ALVINA Harley      benztropine  1 mg Oral Q4H PRN Max 6/day ALVINA Harley      bisacodyl  10 mg Rectal Daily PRN ALVINA Harley      cariprazine  3 mg Oral Daily Martin Cardenas MD      Cholecalciferol  2,000 Units Oral Daily ALVINA Lewis      cyanocobalamin  1,000 mcg Oral Daily ALVINA Lewis      hydrOXYzine HCL  25 mg Oral Q6H PRN Max 4/day ALVINA Harley      hydrOXYzine HCL  25 mg Oral TID Martin Cardenas MD      hydrOXYzine HCL  50 mg Oral Q4H PRN Max 4/day ALVINA Harley      Or    LORazepam  1 mg Intramuscular Q4H PRN ALVINA Harley      lamoTRIgine  50 mg Oral Daily Martin Cardenas MD      lisinopril  10 mg Oral Daily ALVINA Lewis      LORazepam  1 mg Oral Q4H PRN Max 6/day ALVINA Harley      Or    LORazepam  2 mg Intramuscular Q6H PRN Max 3/day ALVINA Harley      OLANZapine  10 mg Oral Q3H PRN Max 3/day ALVINA Harley      Or    OLANZapine  10 mg Intramuscular Q3H PRN Max 3/day ALVINA Harley      OLANZapine  5 mg Oral Q3H PRN Max 6/day ALVINA Harley      Or    OLANZapine  5 mg Intramuscular Q3H PRN Max 6/day ALVINA Harley      OLANZapine  2.5 mg Oral Q3H PRN Max 8/day ALVINA Harley       polyethylene glycol  17 g Oral Daily PRN ALVINA Harley      propranolol  10 mg Oral Q8H PRN ALVINA Harley      senna-docusate sodium  1 tablet Oral Daily PRN ALVINA Harley      sertraline  150 mg Oral HS Martin Cardenas MD       Risks / Benefits of Treatment:  Risks, benefits, and possible side effects of medications explained to patient. Patient has limited understanding of risks and benefits of treatment at this time, but agrees to take medications as prescribed.    Counseling / Coordination of Care:  Total floor/unit time spent today 25 minutes. Greater than 50% of total time was spent with the patient and / or family counseling and / or coordination of care. A description of the counseling / coordination of care:   Patient's progress discussed with staff in treatment team meeting.  Medications, treatment progress and treatment plan reviewed with patient.   Educated on importance of medication and treatment compliance.  Reassurance and supportive therapy provided.   Encouraged participation in milieu and group therapy on the unit.    ALVINA Harley 10/03/24

## 2024-10-03 NOTE — NURSING NOTE
Patient has been out of his room for meals only. Anxious affect Smiles on approach. Admits feeling better since admission. Denies suicidal ideations. Appetite good. Attended less than half of the groups today. Hopeful for discharge. Social with minimal peers. Medication compliant.

## 2024-10-04 PROCEDURE — 99232 SBSQ HOSP IP/OBS MODERATE 35: CPT | Performed by: PSYCHIATRY & NEUROLOGY

## 2024-10-04 RX ADMIN — LAMOTRIGINE 50 MG: 25 TABLET ORAL at 08:18

## 2024-10-04 RX ADMIN — HYDROXYZINE HYDROCHLORIDE 25 MG: 25 TABLET ORAL at 21:17

## 2024-10-04 RX ADMIN — SERTRALINE HYDROCHLORIDE 150 MG: 100 TABLET ORAL at 21:17

## 2024-10-04 RX ADMIN — HYDROXYZINE HYDROCHLORIDE 25 MG: 25 TABLET ORAL at 17:05

## 2024-10-04 RX ADMIN — CYANOCOBALAMIN TAB 1000 MCG 1000 MCG: 1000 TAB at 08:18

## 2024-10-04 RX ADMIN — CHOLECALCIFEROL TAB 25 MCG (1000 UNIT) 2000 UNITS: 25 TAB at 08:18

## 2024-10-04 RX ADMIN — ATORVASTATIN CALCIUM 10 MG: 10 TABLET, FILM COATED ORAL at 08:18

## 2024-10-04 RX ADMIN — HYDROXYZINE HYDROCHLORIDE 25 MG: 25 TABLET ORAL at 08:18

## 2024-10-04 RX ADMIN — CARIPRAZINE 3 MG: 3 CAPSULE, GELATIN COATED ORAL at 08:18

## 2024-10-04 RX ADMIN — LISINOPRIL 10 MG: 10 TABLET ORAL at 08:18

## 2024-10-04 NOTE — NURSING NOTE
Patient is pleasant,cooperative, quiet and scant in conversation. Pt only attending one group today so far, isolating to room but out for meals with good appetite. Pt minimal needs, reports no s/s. Pt takes medication without issue.

## 2024-10-04 NOTE — PROGRESS NOTES
10/04/24 0742   Team Meeting   Meeting Type Daily Rounds   Team Members Present   Team Members Present Physician;Nurse;;Other (Discipline and Name)   Patient/Family Present   Patient Present No   Patient's Family Present No     In attendance:  Dr. Alex Thomas, MD Dr. Jordan Holter, DO Mahamed Haywood, RN  Judi Garcia, [facility]  Mer Slaes, [facility]  Gris Arizmendi M.S.    Groups: 1/10    Pt exhibits flat affect, quiet, keeps to self. Pt expressed looking forward to discharge. Clinical review for CRR still pending. No bx issues noted.

## 2024-10-04 NOTE — SOCIAL WORK
"SW and pt met 1:1  Pt reports some mild anxiety related to next steps. Due to not having a definitive \"yes\" from the CRR and waiting clinical review, pt has some anxiety that he will be denied. SW provided reassurance that they identified feeling it would be a good fit and [facility] has accepted him at this time. SW validated challenges with waiting and encouraged maintaining treatment goals and engaging to keep his mind off of the wait.   Pt denied any other concerns.   SW reminded pt of staff change as of next week; pt appropriate and agreeable.   "

## 2024-10-04 NOTE — PLAN OF CARE
Problem: Alteration in Thoughts and Perception  Goal: Verbalize thoughts and feelings  Description: Interventions:  - Promote a nonjudgmental and trusting relationship with the patient through active listening and therapeutic communication  - Assess patient's level of functioning, behavior and potential for risk  - Engage patient in 1 on 1 interactions  - Encourage patient to express fears, feelings, frustrations, and discuss symptoms    - [place] patient to reality, help patient recognize reality-based thinking   - Administer medications as ordered and assess for potential side effects  - Provide the patient education related to the signs and symptoms of the illness and desired effects of prescribed medications  Outcome: Progressing  Goal: Refrain from acting on delusional thinking/internal stimuli  Description: Interventions:  - Monitor patient closely, per order   - Utilize least restrictive measures   - Set reasonable limits, give positive feedback for acceptable   - Administer medications as ordered and monitor of potential side effects  Outcome: Progressing  Goal: Agree to be compliant with medication regime, as prescribed and report medication side effects  Description: Interventions:  - Offer appropriate PRN medication and supervise ingestion; conduct AIMS, as needed   Outcome: Progressing     Problem: Ineffective Coping  Goal: Demonstrates healthy coping skills  Outcome: Progressing     Problem: Risk for Self Injury/Neglect  Goal: Refrain from harming self  Description: Interventions:  - Monitor patient closely, per order  - Develop a trusting relationship  - Supervise medication ingestion, monitor effects and side effects   Outcome: Progressing     Problem: Depression  Goal: Refrain from harming self  Description: Interventions:  - Monitor patient closely, per order   - Supervise medication ingestion, monitor effects and side effects   Outcome: Progressing  Goal: Refrain from isolation  Description:  Interventions:  - Develop a trusting relationship   - Encourage socialization   Outcome: Progressing  Goal: Refrain from self-neglect  Outcome: Progressing     Problem: Anxiety  Goal: Anxiety is at manageable level  Description: Interventions:  - Assess and monitor patient's anxiety level.   - Monitor for signs and symptoms (heart palpitations, chest pain, shortness of breath, headaches, nausea, feeling jumpy, restlessness, irritable, apprehensive).   - Collaborate with interdisciplinary team and initiate plan and interventions as ordered.  - [place] patient to unit/surroundings  - Explain treatment plan  - Encourage participation in care  - Encourage verbalization of concerns/fears  - Identify coping mechanisms  - Assist in developing anxiety-reducing skills  - Administer/offer alternative therapies  - Limit or eliminate stimulants  Outcome: Progressing     Problem: Risk for Violence/Aggression Toward Others  Goal: Refrain from harming others  Outcome: Progressing  Goal: Refrain from destructive acts on the environment or property  Outcome: Progressing  Goal: Control angry outbursts  Description: Interventions:  - Monitor patient closely, per order  - Ensure early verbal de-escalation  - Monitor prn medication needs  - Set reasonable/therapeutic limits, outline behavioral expectations, and consequences   - Provide a non-threatening milieu, utilizing the least restrictive interventions   Outcome: Progressing     Problem: SELF HARM/SUICIDALITY  Goal: Will have no self-injury during hospital stay  Description: INTERVENTIONS:  - Q 15 MINUTES: Routine safety checks  - Q WAKING SHIFT & PRN: Assess risk to determine if routine checks are adequate to maintain patient safety  - Encourage patient to participate actively in care by formulating a plan to combat response to suicidal ideation, identify supports and resources  Outcome: Progressing     Problem: ANXIETY  Goal: Will report anxiety at manageable levels  Description:  INTERVENTIONS:  - Administer medication as ordered  - Teach and encourage coping skills  - Provide emotional support  - Assess patient/family for anxiety and ability to cope  Outcome: Progressing

## 2024-10-04 NOTE — NURSING NOTE
Pt in bed asleep, observed eyes closed, and chest movement noted. Continuous safety checks maintained. No unmet needs at this time. Will continue to monitor patient needs, sleep pattern, and behaviors.     0616: Patient has slept all night, 7+ hours of uninterrupted sleep

## 2024-10-04 NOTE — PROGRESS NOTES
Progress Note - Behavioral Health   Name: Deyvi Cao 54 y.o. male I MRN: [id number]   Unit/Bed#: [facility]-02 I Date of Admission: 6/25/2024   Date of Service: 10/4/2024 I Hospital Day: 101     Assessment & Plan  Bipolar disorder with severe depression (HCC)  Progressing  Awaiting placement - CRR interview complete, [facility] interview complete, awaiting next steps  Continue medications as follows:, Vraylar 3mg PO Daily, Atarax 25mg PO TID, Lamictal 50mg PO Daily, Zoloft 150mg PO QHS  Continue with group therapy, milieu therapy and occupational therapy   Behavioral Health checks every 7 minutes   Continue frequent safety checks and vitals per unit protocol  Continue with [facility] medical management as indicated  Benign essential hypertension  Managed by [facility]  Continue Lisinopril 10mg PO Daily  Mixed hyperlipidemia  Managed by [facility]  Continue Lipitor 10mg PO Daily  Allergic rhinitis due to allergen  Managed by [facility]  Rosacea  Managed by [facility]    Subjective:    Patient was seen today for continuation of care, records reviewed and patient was discussed with the morning case review team.    Deyvi was seen today for psychiatric follow-up.  On assessment today, Deyvi was found in his room.  He is calm and cooperative, a little bit more quiet than usual.  He tells me he is just waiting for discharge, noting some anxiety about the wait.  Deyvi reports adequate daytime energy and denies any difficulties with initiating or staying asleep.  Oral appetite and hydration is adequate.  We reviewed once more the specific as-needed medications they can use going forward if they experience any insomnia or destabilization of their mood, they understood and were agreeable. Milieu visibility and group attendance encouraged to promote an active participation in treatment.    Deyvi denies acute suicidal/self-harm ideation/intent/plan upon direct inquiry at this time. Deyvi is able to contract for safety while on the unit and would feel  comfortable seeking staff support should suicidal symptoms or urges appear or worsen. Deyvi remains behaviorally appropriate, no agitation or aggression noted on exam or in report. Deyvi also denies HI/AH/VH, and does not appear overtly manic.  Patient does not verbalize any experiences that can be categorized as paranoid, persecutory, bizarre, or somatic delusions. Deyvi remains adherent to his current psychotropic medication regimen and denies any side effects from medications, as well as none noted on exam.    Deyvi is currently assessed as being at their baseline with continued need for medication management, supervision for safety and ADL’s. These services are not currently available in a less restrictive environment necessitating continued hospital stay.  Deyvi should remain on the unit until these services are available, due to likelihood of mental decompensation and readmission if discharged to an unsupervised setting.  Assertive discharge planning and collaboration with multiple providers (inpatient and community based) remains ongoing.  Deyvi is currently awaiting for Enhanced CRR    Group Attendance: 1 / 10  Treatment Team: TBD  Psychiatric PRN's Needed: None    Review of Systems:  Behavior over the last 24 hours: Unchanged  Sleep: sleeping okay throughout the night  Appetite: adequate  Medication side effects: none reported  ROS:no complaints    Objective:    Vitals:  Vitals:    10/04/24 0740   BP: 130/85   Pulse: 91   Resp: 18   Temp: (!) 97.1 °F (36.2 °C)   SpO2: 95%     Laboratory Results:  I have personally reviewed all pertinent laboratory/tests results.  Most Recent Labs:   Lab Results   Component Value Date    WBC 7.39 06/26/2024    RBC 4.70 06/26/2024    HGB 15.2 06/26/2024    HCT 45.5 06/26/2024     06/26/2024    RDW 12.9 06/26/2024    NEUTROABS 4.63 06/26/2024    SODIUM 137 06/26/2024    K 4.1 06/26/2024     06/26/2024    CO2 26 06/26/2024    BUN 17 06/26/2024     CREATININE 0.63 06/26/2024    GLUC 98 06/26/2024    GLUF 98 06/26/2024    CALCIUM 9.6 06/26/2024    AST 25 06/26/2024    ALT 45 06/26/2024    ALKPHOS 114 (H) 06/26/2024    TP 7.0 06/26/2024    ALB 4.4 06/26/2024    TBILI 0.44 06/26/2024    CHOLESTEROL 177 06/26/2024    HDL 52 06/26/2024    TRIG 73 06/26/2024    LDLCALC 110 (H) 06/26/2024    NONHDLC 125 06/26/2024    LITHIUM 0.4 (L) 01/28/2021    [id number] 2.636 06/26/2024    HGBA1C 5.2 11/22/2023     11/22/2023     Mental Status Evaluation:  Appearance:  age appropriate, casually dressed   Behavior:  cooperative, calm   Speech:  normal volume   Mood:  anxious   Affect:  slightly brighter   Thought Process:  circumstantial   Associations: intact associations   Thought Content:  no overt delusions   Perceptual Disturbances: no auditory hallucinations, no visual hallucinations, denies when asked, does not appear responding to internal stimuli   Risk Potential: Suicidal ideation - None at present, contracts for safety on the unit, would talk to staff if not feeling safe on the unit  Homicidal ideation - None at present  Potential for aggression - Not at present   Sensorium:  oriented to person, place, and time/date   Memory:  recent memory intact   Consciousness:  alert and awake   Attention/Concentration: attention span and concentration appear shorter than expected for age   Insight:  limited   Judgment: limited   Gait/Station: normal gait/station, normal balance   Motor Activity: no abnormal movements     Progress Toward Goals: making gradual improvement.  Deyvi continues to require inpatient psychiatric hospitalization for continued medication management and titration to optimize symptom reduction, improve sleep hygiene, and demonstrate adequate self-care.     Suicide/Homicide Risk Assessment:  Risk of Harm to Self:   Nursing Suicide Risk Assessment Last 24 hours: C-SSRS Risk (Since Last Contact)  Calculated C-SSRS Risk Score (Since Last Contact): No  Risk Indicated    Risk of Harm to Others:  Nursing Homicide Risk Assessment: Violence Risk to Others: Denies within past 6 months    Behavioral Health Medications: all current active meds have been reviewed and continue current psychiatric medications.  Current Facility-Administered Medications   Medication Dose Route Frequency Provider Last Rate    acetaminophen  650 mg Oral Q6H PRN ALVINA Harley      acetaminophen  650 mg Oral Q4H PRN ALVINA Harley      acetaminophen  975 mg Oral Q6H PRN ALVINA Harley      aluminum-magnesium hydroxide-simethicone  30 mL Oral Q4H PRN ALVINA Harley      ammonium lactate   Topical BID PRN ALVINA Harley      atorvastatin  10 mg Oral Daily ALVINA Lewis      benztropine  1 mg Intramuscular Q4H PRN Max 6/day ALVINA Harley      benztropine  1 mg Oral Q4H PRN Max 6/day ALVINA Harley      bisacodyl  10 mg Rectal Daily PRN ALVINA Harley      cariprazine  3 mg Oral Daily Martin Cardenas MD      Cholecalciferol  2,000 Units Oral Daily ALVINA Lewis      cyanocobalamin  1,000 mcg Oral Daily ALVINA Lewis      hydrOXYzine HCL  25 mg Oral Q6H PRN Max 4/day ALVINA Harley      hydrOXYzine HCL  25 mg Oral TID Martin Cardenas MD      hydrOXYzine HCL  50 mg Oral Q4H PRN Max 4/day ALVINA Harley      Or    LORazepam  1 mg Intramuscular Q4H PRN ALVINA Harley      lamoTRIgine  50 mg Oral Daily Martin Cardenas MD      lisinopril  10 mg Oral Daily ALVINA Lewis      LORazepam  1 mg Oral Q4H PRN Max 6/day ALVINA Harley      Or    LORazepam  2 mg Intramuscular Q6H PRN Max 3/day ALVINA Harley      OLANZapine  10 mg Oral Q3H PRN Max 3/day ALVINA Harley      Or    OLANZapine  10 mg Intramuscular Q3H PRN Max 3/day ALVINA Harley      OLANZapine  5 mg Oral Q3H PRN Max 6/day ALVINA Harley      Or    OLANZapine  5 mg Intramuscular Q3H PRN Max 6/day ALVINA Harley       OLANZapine  2.5 mg Oral Q3H PRN Max 8/day ALVINA Harley      polyethylene glycol  17 g Oral Daily PRN ALVINA Harley      propranolol  10 mg Oral Q8H PRN ALVINA Harley      senna-docusate sodium  1 tablet Oral Daily PRN ALVINA Harley      sertraline  150 mg Oral HS Martin Cardenas MD       Risks / Benefits of Treatment:  Risks, benefits, and possible side effects of medications explained to patient. Patient has limited understanding of risks and benefits of treatment at this time, but agrees to take medications as prescribed.    Counseling / Coordination of Care:  Total floor/unit time spent today 25 minutes. Greater than 50% of total time was spent with the patient and / or family counseling and / or coordination of care. A description of the counseling / coordination of care:   Patient's progress discussed with staff in treatment team meeting.  Medications, treatment progress and treatment plan reviewed with patient.   Educated on importance of medication and treatment compliance.  Reassurance and supportive therapy provided.   Encouraged participation in milieu and group therapy on the unit.    ALVINA Harley 10/04/24

## 2024-10-05 PROCEDURE — 99232 SBSQ HOSP IP/OBS MODERATE 35: CPT | Performed by: PHYSICIAN ASSISTANT

## 2024-10-05 RX ADMIN — LAMOTRIGINE 50 MG: 25 TABLET ORAL at 08:38

## 2024-10-05 RX ADMIN — CYANOCOBALAMIN TAB 1000 MCG 1000 MCG: 1000 TAB at 08:38

## 2024-10-05 RX ADMIN — ATORVASTATIN CALCIUM 10 MG: 10 TABLET, FILM COATED ORAL at 08:38

## 2024-10-05 RX ADMIN — HYDROXYZINE HYDROCHLORIDE 25 MG: 25 TABLET ORAL at 08:38

## 2024-10-05 RX ADMIN — SERTRALINE HYDROCHLORIDE 150 MG: 100 TABLET ORAL at 21:16

## 2024-10-05 RX ADMIN — CARIPRAZINE 3 MG: 3 CAPSULE, GELATIN COATED ORAL at 08:38

## 2024-10-05 RX ADMIN — HYDROXYZINE HYDROCHLORIDE 25 MG: 25 TABLET ORAL at 21:16

## 2024-10-05 RX ADMIN — HYDROXYZINE HYDROCHLORIDE 25 MG: 25 TABLET ORAL at 16:53

## 2024-10-05 RX ADMIN — CHOLECALCIFEROL TAB 25 MCG (1000 UNIT) 2000 UNITS: 25 TAB at 08:38

## 2024-10-05 NOTE — NURSING NOTE
Deyvi has been hanging inside of his room. Visible intermittently.  Pleasant and cooperative.  No unmet needs.  Meal and med compliant.  Denied depression and anxiety.  No SI/HI/AVH on this shift.  No behavioral issues.  Safety precautions ongoing.

## 2024-10-05 NOTE — PLAN OF CARE
Problem: Alteration in Thoughts and Perception  Goal: Treatment Goal: Gain control of psychotic behaviors/thinking, reduce/eliminate presenting symptoms and demonstrate improved reality functioning upon discharge  Outcome: Progressing  Goal: Verbalize thoughts and feelings  Description: Interventions:  - Promote a nonjudgmental and trusting relationship with the patient through active listening and therapeutic communication  - Assess patient's level of functioning, behavior and potential for risk  - Engage patient in 1 on 1 interactions  - Encourage patient to express fears, feelings, frustrations, and discuss symptoms    - [place] patient to reality, help patient recognize reality-based thinking   - Administer medications as ordered and assess for potential side effects  - Provide the patient education related to the signs and symptoms of the illness and desired effects of prescribed medications  Outcome: Progressing  Goal: Refrain from acting on delusional thinking/internal stimuli  Description: Interventions:  - Monitor patient closely, per order   - Utilize least restrictive measures   - Set reasonable limits, give positive feedback for acceptable   - Administer medications as ordered and monitor of potential side effects  Outcome: Progressing  Goal: Agree to be compliant with medication regime, as prescribed and report medication side effects  Description: Interventions:  - Offer appropriate PRN medication and supervise ingestion; conduct AIMS, as needed   Outcome: Progressing  Goal: Attend and participate in unit activities, including therapeutic, recreational, and educational groups  Description: Interventions:  -Encourage Visitation and family involvement in care  Outcome: Progressing  Goal: Recognize dysfunctional thoughts, communicate reality-based thoughts at the time of discharge  Description: Interventions:  - Provide medication and psycho-education to assist patient in compliance and developing  insight into his/her illness   Outcome: Progressing  Goal: Complete daily ADLs, including personal hygiene independently, as able  Description: Interventions:  - Observe, teach, and assist patient with ADLS  - Monitor and promote a balance of rest/activity, with adequate nutrition and elimination   Outcome: Progressing     Problem: Ineffective Coping  Goal: Identifies ineffective coping skills  Outcome: Progressing  Goal: Identifies healthy coping skills  Outcome: Progressing  Goal: Demonstrates healthy coping skills  Outcome: Progressing  Goal: Participates in unit activities  Description: Interventions:  - Provide therapeutic environment   - Provide required programming   - Redirect inappropriate behaviors   Outcome: Progressing  Goal: Patient/Family participate in treatment and DC plans  Description: Interventions:  - Provide therapeutic environment  Outcome: Progressing  Goal: Patient/Family verbalizes awareness of resources  Outcome: Progressing  Goal: Understands least restrictive measures  Description: Interventions:  - Utilize least restrictive behavior  Outcome: Progressing  Goal: Free from restraint events  Description: - Utilize least restrictive measures   - Provide behavioral interventions   - Redirect inappropriate behaviors   Outcome: Progressing     Problem: Risk for Self Injury/Neglect  Goal: Treatment Goal: Remain safe during length of stay, learn and adopt new coping skills, and be free of self-injurious ideation, impulses and acts at the time of discharge  Outcome: Progressing  Goal: Verbalize thoughts and feelings  Description: Interventions:  - Assess and re-assess patient's lethality and potential for self-injury  - Engage patient in 1:1 interactions, daily, for a minimum of 15 minutes  - Encourage patient to express feelings, fears, frustrations, hopes  - Establish rapport/trust with patient   Outcome: Progressing  Goal: Refrain from harming self  Description: Interventions:  - Monitor  patient closely, per order  - Develop a trusting relationship  - Supervise medication ingestion, monitor effects and side effects   Outcome: Progressing  Goal: Attend and participate in unit activities, including therapeutic, recreational, and educational groups  Description: Interventions:  - Provide therapeutic and educational activities daily, encourage attendance and participation, and document same in the medical record  - Obtain collateral information, encourage visitation and family involvement in care   Outcome: Progressing  Goal: Recognize maladaptive responses and adopt new coping mechanisms  Outcome: Progressing  Goal: Complete daily ADLs, including personal hygiene independently, as able  Description: Interventions:  - Observe, teach, and assist patient with ADLS  - Monitor and promote a balance of rest/activity, with adequate nutrition and elimination  Outcome: Progressing     Problem: Depression  Goal: Treatment Goal: Demonstrate behavioral control of depressive symptoms, verbalize feelings of improved mood/affect, and adopt new coping skills prior to discharge  Outcome: Progressing  Goal: Verbalize thoughts and feelings  Description: Interventions:  - Assess and re-assess patient's level of risk   - Engage patient in 1:1 interactions, daily, for a minimum of 15 minutes   - Encourage patient to express feelings, fears, frustrations, hopes   Outcome: Progressing  Goal: Refrain from harming self  Description: Interventions:  - Monitor patient closely, per order   - Supervise medication ingestion, monitor effects and side effects   Outcome: Progressing  Goal: Refrain from isolation  Description: Interventions:  - Develop a trusting relationship   - Encourage socialization   Outcome: Progressing  Goal: Refrain from self-neglect  Outcome: Progressing  Goal: Attend and participate in unit activities, including therapeutic, recreational, and educational groups  Description: Interventions:  - Provide  therapeutic and educational activities daily, encourage attendance and participation, and document same in the medical record   Outcome: Progressing  Goal: Complete daily ADLs, including personal hygiene independently, as able  Description: Interventions:  - Observe, teach, and assist patient with ADLS  -  Monitor and promote a balance of rest/activity, with adequate nutrition and elimination   Outcome: Progressing     Problem: Anxiety  Goal: Anxiety is at manageable level  Description: Interventions:  - Assess and monitor patient's anxiety level.   - Monitor for signs and symptoms (heart palpitations, chest pain, shortness of breath, headaches, nausea, feeling jumpy, restlessness, irritable, apprehensive).   - Collaborate with interdisciplinary team and initiate plan and interventions as ordered.  - [place] patient to unit/surroundings  - Explain treatment plan  - Encourage participation in care  - Encourage verbalization of concerns/fears  - Identify coping mechanisms  - Assist in developing anxiety-reducing skills  - Administer/offer alternative therapies  - Limit or eliminate stimulants  Outcome: Progressing     Problem: Risk for Violence/Aggression Toward Others  Goal: Treatment Goal: Refrain from acts of violence/aggression during length of stay, and demonstrate improved impulse control at the time of discharge  Outcome: Progressing  Goal: Verbalize thoughts and feelings  Description: Interventions:  - Assess and re-assess patient's level of risk, every waking shift  - Engage patient in 1:1 interactions, daily, for a minimum of 15 minutes   - Allow patient to express feelings and frustrations in a safe and non-threatening manner   - Establish rapport/trust with patient   Outcome: Progressing  Goal: Refrain from harming others  Outcome: Progressing  Goal: Refrain from destructive acts on the environment or property  Outcome: Progressing  Goal: Control angry outbursts  Description: Interventions:  - Monitor  patient closely, per order  - Ensure early verbal de-escalation  - Monitor prn medication needs  - Set reasonable/therapeutic limits, outline behavioral expectations, and consequences   - Provide a non-threatening milieu, utilizing the least restrictive interventions   Outcome: Progressing  Goal: Attend and participate in unit activities, including therapeutic, recreational, and educational groups  Description: Interventions:  - Provide therapeutic and educational activities daily, encourage attendance and participation, and document same in the medical record   Outcome: Progressing  Goal: Identify appropriate positive anger management techniques  Description: Interventions:  - Offer anger management and coping skills groups   - Staff will provide positive feedback for appropriate anger control  Outcome: Progressing     Problem: Alteration in Orientation  Goal: Treatment Goal: Demonstrate a reduction of confusion and improved orientation to person, place, time and/or situation upon discharge, according to optimum baseline/ability  Outcome: Progressing  Goal: Interact with staff daily  Description: Interventions:  - Assess and re-assess patient's level of orientation  - Engage patient in 1 on 1 interactions, daily, for a minimum of 15 minutes   - Establish rapport/trust with patient   Outcome: Progressing  Goal: Express concerns related to confused thinking related to:  Description: Interventions:  - Encourage patient to express feelings, fears, frustrations, hopes  - Assign consistent caregivers   - [place]/re-orient patient as needed  - Allow comfort items, as appropriate  - Provide visual cues, signs, etc.   Outcome: Progressing  Goal: Allow medical examinations, as recommended  Description: Interventions:  - Provide physical/neurological exams and/or referrals, per provider   Outcome: Progressing  Goal: Cooperate with recommended testing/procedures  Description: Interventions:  - Determine need for ancillary  testing  - Observe for mental status changes  - Implement falls/precaution protocol   Outcome: Progressing  Goal: Attend and participate in unit activities, including therapeutic, recreational, and educational groups  Description: Interventions:  - Provide therapeutic and educational activities daily, encourage attendance and participation, and document same in the medical record   - Provide appropriate opportunities for reminiscence   - Provide a consistent daily routine   - Encourage family contact/visitation   Outcome: Progressing  Goal: Complete daily ADLs, including personal hygiene independently, as able  Description: Interventions:  - Observe, teach, and assist patient with ADLS  Outcome: Progressing     Problem: SELF HARM/SUICIDALITY  Goal: Will have no self-injury during hospital stay  Description: INTERVENTIONS:  - Q 15 MINUTES: Routine safety checks  - Q WAKING SHIFT & PRN: Assess risk to determine if routine checks are adequate to maintain patient safety  - Encourage patient to participate actively in care by formulating a plan to combat response to suicidal ideation, identify supports and resources  Outcome: Progressing     Problem: DEPRESSION  Goal: Will be euthymic at discharge  Description: INTERVENTIONS:  - Administer medication as ordered  - Provide emotional support via 1:1 interaction with staff  - Encourage involvement in milieu/groups/activities  - Monitor for social isolation  Outcome: Progressing     Problem: ANXIETY  Goal: Will report anxiety at manageable levels  Description: INTERVENTIONS:  - Administer medication as ordered  - Teach and encourage coping skills  - Provide emotional support  - Assess patient/family for anxiety and ability to cope  Outcome: Progressing  Goal: By discharge: Patient will verbalize 2 strategies to deal with anxiety  Description: Interventions:  - Identify any obvious source/trigger to anxiety  - Staff will assist patient in applying identified coping  technique/skills  - Encourage attendance of scheduled groups and activities  Outcome: Progressing     Problem: SELF CARE DEFICIT  Goal: Return ADL status to a safe level of function  Description: INTERVENTIONS:  - Administer medication as ordered  - Assess ADL deficits and provide assistive devices as needed  - Obtain PT/OT consults as needed  - Assist and instruct patient to increase activity and self care as tolerated  Outcome: Progressing     Problem: DISCHARGE PLANNING - CARE MANAGEMENT  Goal: Discharge to post-acute care or home with appropriate resources  Description: INTERVENTIONS:  - Conduct assessment to determine patient/family and health care team treatment goals, and need for post-acute services based on payer coverage, community resources, and patient preferences, and barriers to discharge  - Address psychosocial, clinical, and financial barriers to discharge as identified in assessment in conjunction with the patient/family and health care team  - Arrange appropriate level of post-acute services according to patient’s   needs and preference and payer coverage in collaboration with the physician and health care team  - Communicate with and update the patient/family, physician, and health care team regarding progress on the discharge plan  - Arrange appropriate transportation to post-acute venues  Outcome: Progressing

## 2024-10-05 NOTE — NURSING NOTE
Pt is calm, cooperative, visible on the unit. Pt is social with select peers. Pt denies anxiety, denies depression, denies SI/HI/AVH. Pt is meal and medication complaint, safety checks ongoing.

## 2024-10-06 PROCEDURE — 99232 SBSQ HOSP IP/OBS MODERATE 35: CPT | Performed by: PHYSICIAN ASSISTANT

## 2024-10-06 RX ADMIN — CYANOCOBALAMIN TAB 1000 MCG 1000 MCG: 1000 TAB at 08:26

## 2024-10-06 RX ADMIN — HYDROXYZINE HYDROCHLORIDE 25 MG: 25 TABLET ORAL at 21:16

## 2024-10-06 RX ADMIN — HYDROXYZINE HYDROCHLORIDE 25 MG: 25 TABLET ORAL at 17:05

## 2024-10-06 RX ADMIN — LISINOPRIL 10 MG: 10 TABLET ORAL at 08:26

## 2024-10-06 RX ADMIN — ATORVASTATIN CALCIUM 10 MG: 10 TABLET, FILM COATED ORAL at 08:26

## 2024-10-06 RX ADMIN — SERTRALINE HYDROCHLORIDE 150 MG: 100 TABLET ORAL at 21:16

## 2024-10-06 RX ADMIN — HYDROXYZINE HYDROCHLORIDE 25 MG: 25 TABLET ORAL at 08:26

## 2024-10-06 RX ADMIN — CHOLECALCIFEROL TAB 25 MCG (1000 UNIT) 2000 UNITS: 25 TAB at 08:26

## 2024-10-06 RX ADMIN — CARIPRAZINE 3 MG: 3 CAPSULE, GELATIN COATED ORAL at 08:26

## 2024-10-06 RX ADMIN — LAMOTRIGINE 50 MG: 25 TABLET ORAL at 08:26

## 2024-10-06 NOTE — ASSESSMENT & PLAN NOTE
Managed by [facility]      No associated orders from this encounter found during lookback period of 72 hours.

## 2024-10-06 NOTE — ASSESSMENT & PLAN NOTE
Progressing  Awaiting placement - CRR interview complete, ACT interview complete, awaiting next steps  Continue medications as follows:, Vraylar 3mg PO Daily, Atarax 25mg PO TID, Lamictal 50mg PO Daily, Zoloft 150mg PO QHS  Continue with group therapy, milieu therapy and occupational therapy   Behavioral Health checks every 7 minutes   Continue frequent safety checks and vitals per unit protocol  Continue with [facility] medical management as indicated    No associated orders from this encounter found during lookback period of 72 hours.

## 2024-10-06 NOTE — PLAN OF CARE
Problem: Alteration in Thoughts and Perception  Goal: Verbalize thoughts and feelings  Description: Interventions:  - Promote a nonjudgmental and trusting relationship with the patient through active listening and therapeutic communication  - Assess patient's level of functioning, behavior and potential for risk  - Engage patient in 1 on 1 interactions  - Encourage patient to express fears, feelings, frustrations, and discuss symptoms    - [place] patient to reality, help patient recognize reality-based thinking   - Administer medications as ordered and assess for potential side effects  - Provide the patient education related to the signs and symptoms of the illness and desired effects of prescribed medications  Outcome: Progressing  Goal: Refrain from acting on delusional thinking/internal stimuli  Description: Interventions:  - Monitor patient closely, per order   - Utilize least restrictive measures   - Set reasonable limits, give positive feedback for acceptable   - Administer medications as ordered and monitor of potential side effects  Outcome: Progressing  Goal: Agree to be compliant with medication regime, as prescribed and report medication side effects  Description: Interventions:  - Offer appropriate PRN medication and supervise ingestion; conduct AIMS, as needed   Outcome: Progressing     Problem: Risk for Violence/Aggression Toward Others  Goal: Refrain from destructive acts on the environment or property  Outcome: Progressing  Goal: Control angry outbursts  Description: Interventions:  - Monitor patient closely, per order  - Ensure early verbal de-escalation  - Monitor prn medication needs  - Set reasonable/therapeutic limits, outline behavioral expectations, and consequences   - Provide a non-threatening milieu, utilizing the least restrictive interventions   Outcome: Progressing  Goal: Identify appropriate positive anger management techniques  Description: Interventions:  - Offer anger management  and coping skills groups   - Staff will provide positive feedback for appropriate anger control  Outcome: Progressing     Problem: Alteration in Orientation  Goal: Treatment Goal: Demonstrate a reduction of confusion and improved orientation to person, place, time and/or situation upon discharge, according to optimum baseline/ability  Outcome: Progressing  Goal: Interact with staff daily  Description: Interventions:  - Assess and re-assess patient's level of orientation  - Engage patient in 1 on 1 interactions, daily, for a minimum of 15 minutes   - Establish rapport/trust with patient   Outcome: Progressing     Problem: SELF HARM/SUICIDALITY  Goal: Will have no self-injury during hospital stay  Description: INTERVENTIONS:  - Q 15 MINUTES: Routine safety checks  - Q WAKING SHIFT & PRN: Assess risk to determine if routine checks are adequate to maintain patient safety  - Encourage patient to participate actively in care by formulating a plan to combat response to suicidal ideation, identify supports and resources  Outcome: Progressing     Problem: DEPRESSION  Goal: Will be euthymic at discharge  Description: INTERVENTIONS:  - Administer medication as ordered  - Provide emotional support via 1:1 interaction with staff  - Encourage involvement in milieu/groups/activities  - Monitor for social isolation  Outcome: Progressing     Problem: ANXIETY  Goal: Will report anxiety at manageable levels  Description: INTERVENTIONS:  - Administer medication as ordered  - Teach and encourage coping skills  - Provide emotional support  - Assess patient/family for anxiety and ability to cope  Outcome: Progressing

## 2024-10-06 NOTE — NURSING NOTE
Patient isolative to room and self all shift. Did not come out for HS snack. Patient cooperative  and offered no complaints. Medication complaint. Patient's behaviors were controlled and appropriate. Denies pain. No prn medication requested or required.

## 2024-10-06 NOTE — ASSESSMENT & PLAN NOTE
Managed by [facility]  Continue Lisinopril 10mg PO Daily    No associated orders from this encounter found during lookback period of 72 hours.

## 2024-10-06 NOTE — NURSING NOTE
Weekly wellness assessment completed. Lungs CTA, normoactive bowl sounds in all quadrants, abdomin soft, non distended, no tenderness, or guarding. Cyanosis noted to b/l feet. no edema noted at this time.   Pt is calm and cooperative, visible on the unit intermittently, minimal interaction with peers. Pt denies anxiety and depression, denies SI/HI/AVH. Pt is meal and medication complaint, safety checks ongoing.

## 2024-10-06 NOTE — ASSESSMENT & PLAN NOTE
Managed by [facility]  Continue Lipitor 10mg PO Daily    No associated orders from this encounter found during lookback period of 72 hours.

## 2024-10-06 NOTE — NURSING NOTE
Pt is calm and cooperative, visible on the unit for dinner. Pt ate 100% of dinner. Pt is medication compliant, safety checks ongoing.

## 2024-10-06 NOTE — PROGRESS NOTES
Psychiatric Progress Note - Department of Behavioral Health   Deyvi Cao 54 y.o. male MRN: [id number]  Unit/Bed#: [facility] 101-02 Encounter: [id number]    ASSESSMENT & PLAN     Assessment & Plan  Bipolar disorder with severe depression (HCC)  Progressing  Awaiting placement - CRR interview complete, ACT interview complete, awaiting next steps  Continue medications as follows:, Vraylar 3mg PO Daily, Atarax 25mg PO TID, Lamictal 50mg PO Daily, Zoloft 150mg PO QHS  Continue with group therapy, milieu therapy and occupational therapy   Behavioral Health checks every 7 minutes   Continue frequent safety checks and vitals per unit protocol  Continue with [facility] medical management as indicated    No associated orders from this encounter found during lookback period of 72 hours.    Benign essential hypertension  Managed by [facility]  Continue Lisinopril 10mg PO Daily    No associated orders from this encounter found during lookback period of 72 hours.    Mixed hyperlipidemia  Managed by [facility]  Continue Lipitor 10mg PO Daily    No associated orders from this encounter found during lookback period of 72 hours.    Allergic rhinitis due to allergen  Managed by [facility]    No associated orders from this encounter found during lookback period of 72 hours.    Rosacea  Managed by [facility]      No associated orders from this encounter found during lookback period of 72 hours.    Treatment Recommendations/Precautions:  Continue to promote patient participation in therapeutic milieu.  Continue medical management per medicine.  Continue previously prescribed psychotropic medication regimen; see below.  Continue behavioral health checks q.7 minutes.   Continue vitals per behavioral health unit protocol.  Discharge date per primary team;     SUBJECTIVE     Patient evaluated this a.m. for continuity of care. Patient was discussed at length with charge RN. Per nursing, he has been calm and cooperative in the milieu.  Yesterday he attended 2 out of 8  groups.  He consumed 100% of his meals.  He has been sleeping adequately.  Today he is found in the milieu and he was agreeable to encounter.  He is looking forward to discharge.  No acute distress is noted throughout evaluation. Deyvi Cao denies suicidal/homicidal ideation in addition to thoughts of self-injury, receptive to crisis planning provided by this writer, contacting for safety in the inpatient setting.  He denies any auditory or visual hallucinations.  He has been compliant with his current psychotropic medication regimen and denies any side effects.    PSYCHIATRIC REVIEW OF SYSTEMS     Behavior over the last 24 hours:  unchanged  Sleep: normal  Appetite: normal  Medication side effects: No    REVIEW OF SYSTEMS   Review of systems: no complaints    OBJECTIVE     Vital Signs in Past 24 Hours:  Temp:  [97.2 °F (36.2 °C)-97.5 °F (36.4 °C)] 97.5 °F (36.4 °C)  HR:  [91-94] 94  BP: (110-127)/(63-79) 127/79  Resp:  [18] 18  SpO2:  [91 %-94 %] 91 %    Intake/Output in Past 24 hours:  No intake/output data recorded.  No intake/output data recorded.        Laboratory Results:  I have personally reviewed all pertinent laboratory/tests results.  Most Recent Labs:   Lab Results   Component Value Date    WBC 7.39 06/26/2024    RBC 4.70 06/26/2024    HGB 15.2 06/26/2024    HCT 45.5 06/26/2024     06/26/2024    RDW 12.9 06/26/2024    NEUTROABS 4.63 06/26/2024    SODIUM 137 06/26/2024    K 4.1 06/26/2024     06/26/2024    CO2 26 06/26/2024    BUN 17 06/26/2024    CREATININE 0.63 06/26/2024    GLUC 98 06/26/2024    GLUF 98 06/26/2024    CALCIUM 9.6 06/26/2024    AST 25 06/26/2024    ALT 45 06/26/2024    ALKPHOS 114 (H) 06/26/2024    TP 7.0 06/26/2024    ALB 4.4 06/26/2024    TBILI 0.44 06/26/2024    CHOLESTEROL 177 06/26/2024    HDL 52 06/26/2024    TRIG 73 06/26/2024    LDLCALC 110 (H) 06/26/2024    NONHDLC 125 06/26/2024    LITHIUM 0.4 (L) 01/28/2021    SFZ2HNEPCHBG 2.636 06/26/2024    HGBA1C 5.2  11/22/2023     11/22/2023       Behavioral Health Medications: all current active meds have been reviewed, continue current psychiatric medications, and current meds:   Current Facility-Administered Medications:     acetaminophen (TYLENOL) tablet 650 mg, Q6H PRN    acetaminophen (TYLENOL) tablet 650 mg, Q4H PRN    acetaminophen (TYLENOL) tablet 975 mg, Q6H PRN    aluminum-magnesium hydroxide-simethicone (MAALOX) oral suspension 30 mL, Q4H PRN    ammonium lactate (LAC-HYDRIN) 12 % lotion, BID PRN    atorvastatin (LIPITOR) tablet 10 mg, Daily    benztropine (COGENTIN) injection 1 mg, Q4H PRN Max 6/day    benztropine (COGENTIN) tablet 1 mg, Q4H PRN Max 6/day    bisacodyl (DULCOLAX) rectal suppository 10 mg, Daily PRN    cariprazine (VRAYLAR) capsule 3 mg, Daily    Cholecalciferol (VITAMIN D3) tablet 2,000 Units, Daily    cyanocobalamin (VITAMIN B-12) tablet 1,000 mcg, Daily    hydrOXYzine HCL (ATARAX) tablet 25 mg, Q6H PRN Max 4/day    hydrOXYzine HCL (ATARAX) tablet 25 mg, TID    hydrOXYzine HCL (ATARAX) tablet 50 mg, Q4H PRN Max 4/day **OR** LORazepam (ATIVAN) injection 1 mg, Q4H PRN    lamoTRIgine (LaMICtal) tablet 50 mg, Daily    lisinopril (ZESTRIL) tablet 10 mg, Daily    LORazepam (ATIVAN) tablet 1 mg, Q4H PRN Max 6/day **OR** LORazepam (ATIVAN) injection 2 mg, Q6H PRN Max 3/day    OLANZapine (ZyPREXA) tablet 10 mg, Q3H PRN Max 3/day **OR** OLANZapine (ZyPREXA) IM injection 10 mg, Q3H PRN Max 3/day    OLANZapine (ZyPREXA) tablet 5 mg, Q3H PRN Max 6/day **OR** OLANZapine (ZyPREXA) IM injection 5 mg, Q3H PRN Max 6/day    OLANZapine (ZyPREXA) tablet 2.5 mg, Q3H PRN Max 8/day    polyethylene glycol (MIRALAX) packet 17 g, Daily PRN    propranolol (INDERAL) tablet 10 mg, Q8H PRN    senna-docusate sodium (SENOKOT S) 8.6-50 mg per tablet 1 tablet, Daily PRN    sertraline (ZOLOFT) tablet 150 mg, HS.    Risks, benefits and possible side effects of Medications:   Risks, benefits, and possible side effects of  medications explained to patient and patient verbalizes understanding.          Mental Status Evaluation:  Appearance:  age appropriate and casually dressed   Behavior:  Guarded initially, pleasant with time   Speech:  normal pitch and normal volume   Mood:  anxious   Affect:  constricted   Language sparse   Thought Process:  goal directed and logical   Thought Content:  No overt delusions   Perceptual Disturbances: No auditory or visual hallucinations, denies when asked, does not appear to be responding to internal stimuli   Risk Potential: Suicidal Ideations none, Homicidal Ideations none, and Potential for Aggression No   Sensorium:  person, place, and time/date   Cognition:  recent and remote memory grossly intact   Consciousness:  alert and awake    Attention: attention span appeared shorter than expected for age   Insight:  limited   Judgment: limited   Intellect Not assessed   Gait/Station: normal gait/station   Motor Activity: no abnormal movements     Memory: Short and long term memory  intact     Progress Toward Goals: unchanged, as evidenced by their participation (or lack thereof) in individual, social and therapeutic milieu in addition to adherence to their medication regimen.      Suicide/Homicide Risk Assessment:  Risk of Harm to Self:   Nursing Suicide Risk Assessment Last 24 hours: C-SSRS Risk (Since Last Contact)  Calculated C-SSRS Risk Score (Since Last Contact): No Risk Indicated    Risk of Harm to Others:  Nursing Homicide Risk Assessment: Violence Risk to Others: Denies within past 6 months      Recommended Treatment:   See above for assessment and plan.    Counseling/Coordination of Care    I have expended greater than 15 minutes in which more than 50% of this time was expended in counseling/coordination of patient care relating to diagnostic results, prognosis, potential risks and benefits of management options, instructions for appropriate management, patient and/or collateral education,  importance of adherence to management and/or risk factor reductions. Patient's rights, confidentiality, exceptions to confidentiality, use of electronic medical record including appropriate staff access to medical record regarding behavioral health services and consent to treatment were reviewed.    Note Share:     This note was not shared with the patient due to reasonable likelihood of causing patient harm     This note has been constructed using a voice recognition system. There may be translation, syntax,  or grammatical errors. If you have any questions, please contact the dictating provider.    Gay Polanco PA-C 10/05/24

## 2024-10-06 NOTE — PROGRESS NOTES
"    Psychiatric Progress Note - Department of Behavioral Health   Deyvi Cao 54 y.o. male MRN: [id number]  Unit/Bed#: [facility] 101-02 Encounter: [id number]    ASSESSMENT & PLAN     Assessment & Plan  Bipolar disorder with severe depression (HCC)  Progressing  Awaiting placement - CRR interview complete, ACT interview complete, awaiting next steps  Continue medications as follows:, Vraylar 3mg PO Daily, Atarax 25mg PO TID, Lamictal 50mg PO Daily, Zoloft 150mg PO QHS  Continue with group therapy, milieu therapy and occupational therapy   Behavioral Health checks every 7 minutes   Continue frequent safety checks and vitals per unit protocol  Continue with [facility] medical management as indicated    No associated orders from this encounter found during lookback period of 72 hours.    Benign essential hypertension  Managed by [facility]  Continue Lisinopril 10mg PO Daily    No associated orders from this encounter found during lookback period of 72 hours.    Mixed hyperlipidemia  Managed by [facility]  Continue Lipitor 10mg PO Daily    No associated orders from this encounter found during lookback period of 72 hours.    Allergic rhinitis due to allergen  Managed by [facility]    No associated orders from this encounter found during lookback period of 72 hours.    Rosacea  Managed by [facility]      No associated orders from this encounter found during lookback period of 72 hours.    Treatment Recommendations/Precautions:  Continue to promote patient participation in therapeutic milieu.  Continue medical management per medicine.  Continue previously prescribed psychotropic medication regimen; see below.  Continue behavioral health checks q.7 minutes.   Continue vitals per behavioral health unit protocol.  Discharge date per primary team;     SUBJECTIVE     Patient evaluated this a.m. for continuity of care. Patient was discussed at length with charge RN. Today he was found in his room, seated on his bed. He describes his mood as \"calm\" today. He " shares that he did sleep well last evening. He ate 100% of all three meals yesterday. He reportedly did not attend any groups. No acute distress is noted throughout evaluation. Deyvi Cao denies suicidal/homicidal ideation in addition to thoughts of self-injury, receptive to crisis planning provided by this writer, contacting for safety in the inpatient setting.  He denies any auditory or visual hallucinations.  He has been compliant with his current psychotropic medication regimen and denies any side effects.    PSYCHIATRIC REVIEW OF SYSTEMS     Behavior over the last 24 hours:  unchanged  Sleep: normal  Appetite: normal  Medication side effects: No    REVIEW OF SYSTEMS   Review of systems: no complaints    OBJECTIVE     Vital Signs in Past 24 Hours:  Temp:  [97.5 °F (36.4 °C)-97.7 °F (36.5 °C)] 97.7 °F (36.5 °C)  HR:  [86-94] 86  BP: (127-143)/(78-79) 143/78  Resp:  [18] 18  SpO2:  [91 %-95 %] 95 %    Intake/Output in Past 24 hours:  No intake/output data recorded.  No intake/output data recorded.        Laboratory Results:  I have personally reviewed all pertinent laboratory/tests results.  Most Recent Labs:   Lab Results   Component Value Date    WBC 7.39 06/26/2024    RBC 4.70 06/26/2024    HGB 15.2 06/26/2024    HCT 45.5 06/26/2024     06/26/2024    RDW 12.9 06/26/2024    NEUTROABS 4.63 06/26/2024    SODIUM 137 06/26/2024    K 4.1 06/26/2024     06/26/2024    CO2 26 06/26/2024    BUN 17 06/26/2024    CREATININE 0.63 06/26/2024    GLUC 98 06/26/2024    GLUF 98 06/26/2024    CALCIUM 9.6 06/26/2024    AST 25 06/26/2024    ALT 45 06/26/2024    ALKPHOS 114 (H) 06/26/2024    TP 7.0 06/26/2024    ALB 4.4 06/26/2024    TBILI 0.44 06/26/2024    CHOLESTEROL 177 06/26/2024    HDL 52 06/26/2024    TRIG 73 06/26/2024    LDLCALC 110 (H) 06/26/2024    NONHDLC 125 06/26/2024    LITHIUM 0.4 (L) 01/28/2021    [id number] 2.636 06/26/2024    HGBA1C 5.2 11/22/2023     11/22/2023       Behavioral Health  Medications: all current active meds have been reviewed, continue current psychiatric medications, and current meds:   Current Facility-Administered Medications:     acetaminophen (TYLENOL) tablet 650 mg, Q6H PRN    acetaminophen (TYLENOL) tablet 650 mg, Q4H PRN    acetaminophen (TYLENOL) tablet 975 mg, Q6H PRN    aluminum-magnesium hydroxide-simethicone (MAALOX) oral suspension 30 mL, Q4H PRN    ammonium lactate (LAC-HYDRIN) 12 % lotion, BID PRN    atorvastatin (LIPITOR) tablet 10 mg, Daily    benztropine (COGENTIN) injection 1 mg, Q4H PRN Max 6/day    benztropine (COGENTIN) tablet 1 mg, Q4H PRN Max 6/day    bisacodyl (DULCOLAX) rectal suppository 10 mg, Daily PRN    cariprazine (VRAYLAR) capsule 3 mg, Daily    Cholecalciferol (VITAMIN D3) tablet 2,000 Units, Daily    cyanocobalamin (VITAMIN B-12) tablet 1,000 mcg, Daily    hydrOXYzine HCL (ATARAX) tablet 25 mg, Q6H PRN Max 4/day    hydrOXYzine HCL (ATARAX) tablet 25 mg, TID    hydrOXYzine HCL (ATARAX) tablet 50 mg, Q4H PRN Max 4/day **OR** LORazepam (ATIVAN) injection 1 mg, Q4H PRN    lamoTRIgine (LaMICtal) tablet 50 mg, Daily    lisinopril (ZESTRIL) tablet 10 mg, Daily    LORazepam (ATIVAN) tablet 1 mg, Q4H PRN Max 6/day **OR** LORazepam (ATIVAN) injection 2 mg, Q6H PRN Max 3/day    OLANZapine (ZyPREXA) tablet 10 mg, Q3H PRN Max 3/day **OR** OLANZapine (ZyPREXA) IM injection 10 mg, Q3H PRN Max 3/day    OLANZapine (ZyPREXA) tablet 5 mg, Q3H PRN Max 6/day **OR** OLANZapine (ZyPREXA) IM injection 5 mg, Q3H PRN Max 6/day    OLANZapine (ZyPREXA) tablet 2.5 mg, Q3H PRN Max 8/day    polyethylene glycol (MIRALAX) packet 17 g, Daily PRN    propranolol (INDERAL) tablet 10 mg, Q8H PRN    senna-docusate sodium (SENOKOT S) 8.6-50 mg per tablet 1 tablet, Daily PRN    sertraline (ZOLOFT) tablet 150 mg, HS.    Risks, benefits and possible side effects of Medications:   Risks, benefits, and possible side effects of medications explained to patient and patient verbalizes  understanding.          Mental Status Evaluation:  Appearance:  age appropriate and casually dressed   Behavior:  Guarded, cooperative    Speech:  normal pitch and normal volume   Mood:  anxious   Affect:  constricted   Language Not pressured, or rapid    Thought Process:  goal directed and logical   Thought Content:  No overt delusions   Perceptual Disturbances: No auditory or visual hallucinations, denies when asked, does not appear to be responding to internal stimuli   Risk Potential: Suicidal Ideations none, Homicidal Ideations none, and Potential for Aggression No   Sensorium:  person, place, time/date, and situation   Cognition:  recent and remote memory grossly intact   Consciousness:  alert and awake    Attention: attention span appeared shorter than expected for age   Insight:  limited   Judgment: limited   Intellect Not assessed   Gait/Station: Not assessed- patient laying in bed   Motor Activity: no abnormal movements     Memory: Short and long term memory  intact     Progress Toward Goals: unchanged, as evidenced by their participation (or lack thereof) in individual, social and therapeutic milieu in addition to adherence to their medication regimen.      Suicide/Homicide Risk Assessment:  Risk of Harm to Self:   Nursing Suicide Risk Assessment Last 24 hours: C-SSRS Risk (Since Last Contact)  Calculated C-SSRS Risk Score (Since Last Contact): No Risk Indicated    Risk of Harm to Others:  Nursing Homicide Risk Assessment: Violence Risk to Others: Denies within past 6 months      Recommended Treatment:   See above for assessment and plan.    Counseling/Coordination of Care    I have expended greater than 15 minutes in which more than 50% of this time was expended in counseling/coordination of patient care relating to diagnostic results, prognosis, potential risks and benefits of management options, instructions for appropriate management, patient and/or collateral education, importance of adherence to  management and/or risk factor reductions. Patient's rights, confidentiality, exceptions to confidentiality, use of electronic medical record including appropriate staff access to medical record regarding behavioral health services and consent to treatment were reviewed.    Note Share:     This note was not shared with the patient due to reasonable likelihood of causing patient harm     This note has been constructed using a voice recognition system. There may be translation, syntax,  or grammatical errors. If you have any questions, please contact the dictating provider.    Gay Polanco PA-C 10/06/24

## 2024-10-07 PROCEDURE — 99232 SBSQ HOSP IP/OBS MODERATE 35: CPT | Performed by: PSYCHIATRY & NEUROLOGY

## 2024-10-07 RX ADMIN — ATORVASTATIN CALCIUM 10 MG: 10 TABLET, FILM COATED ORAL at 08:41

## 2024-10-07 RX ADMIN — LISINOPRIL 10 MG: 10 TABLET ORAL at 08:41

## 2024-10-07 RX ADMIN — CARIPRAZINE 3 MG: 3 CAPSULE, GELATIN COATED ORAL at 08:41

## 2024-10-07 RX ADMIN — CYANOCOBALAMIN TAB 1000 MCG 1000 MCG: 1000 TAB at 08:41

## 2024-10-07 RX ADMIN — LAMOTRIGINE 50 MG: 25 TABLET ORAL at 08:41

## 2024-10-07 RX ADMIN — CHOLECALCIFEROL TAB 25 MCG (1000 UNIT) 2000 UNITS: 25 TAB at 08:41

## 2024-10-07 RX ADMIN — HYDROXYZINE HYDROCHLORIDE 25 MG: 25 TABLET ORAL at 17:09

## 2024-10-07 RX ADMIN — HYDROXYZINE HYDROCHLORIDE 25 MG: 25 TABLET ORAL at 21:15

## 2024-10-07 RX ADMIN — HYDROXYZINE HYDROCHLORIDE 25 MG: 25 TABLET ORAL at 08:41

## 2024-10-07 RX ADMIN — SERTRALINE HYDROCHLORIDE 150 MG: 100 TABLET ORAL at 21:15

## 2024-10-07 NOTE — PROGRESS NOTES
Progress Note - Behavioral Health   Name: Deyvi Cao 54 y.o. male I MRN: [id number]   Unit/Bed#: [facility] 101-02 I Date of Admission: 6/25/2024   Date of Service: 10/7/2024 I Hospital Day: 104       Assessment & Plan  Bipolar disorder with severe depression (HCC)  Progressing  Awaiting placement - CRR interview complete, ACT interview complete, awaiting next steps  Continue medications as follows:, Vraylar 3mg PO Daily, Atarax 25mg PO TID, Lamictal 50mg PO Daily, Zoloft 150mg PO QHS  Continue with group therapy, milieu therapy and occupational therapy   Behavioral Health checks every 7 minutes   Continue frequent safety checks and vitals per unit protocol  Continue with [facility] medical management as indicated    Benign essential hypertension  Managed by [facility]  Continue Lisinopril 10mg PO Daily    Mixed hyperlipidemia  Managed by [facility]  Continue Lipitor 10mg PO Daily    Allergic rhinitis due to allergen  Managed by [facility]    Rosacea  Managed by [facility]     Subjective:    Patient was seen today for continuation of care, records reviewed and patient was discussed with the morning case review team.    Deyvi was seen today for psychiatric follow-up.  On assessment today, Deyvi was found walking in the halls.  He is doing well, offers no new concerns.  He is anxious about discharge planning but tolerating it well.  Deyvi reports adequate daytime energy and denies any difficulties with initiating or staying asleep.  Oral appetite and hydration is adequate.   We reviewed once more the specific as-needed medications they can use going forward if they experience any insomnia or destabilization of their mood, they understood and were agreeable. Milieu visibility and group attendance encouraged to promote an active participation in treatment.    Deyvi denies acute suicidal/self-harm ideation/intent/plan upon direct inquiry at this time. Deyvi is able to contract for safety while on the unit and would feel comfortable seeking  staff support should suicidal symptoms or urges appear or worsen. Deyvi remains behaviorally appropriate, no agitation or aggression noted on exam or in report. Deyvi also denies HI/AH/VH, and does not appear overtly manic.  Patient does not verbalize any experiences that can be categorized as paranoid, persecutory, bizarre, or somatic delusions. Deyvi remains adherent to his current psychotropic medication regimen and denies any side effects from medications, as well as none noted on exam.    Deyvi is currently assessed as being at their baseline with continued need for medication management, supervision for safety and ADL’s. These services are not currently available in a less restrictive environment necessitating continued hospital stay.  Deyvi should remain on the unit until these services are available, due to likelihood of mental decompensation and readmission if discharged to an unsupervised setting.  Assertive discharge planning and collaboration with multiple providers (inpatient and community based) remains ongoing.  Deyvi is currently awaiting for [facility]    Group Attendance: 2 / 8  Treatment Team: TBD  Psychiatric PRN's Needed: None    Review of Systems:  Behavior over the last 24 hours: Unchanged  Sleep: sleeping okay throughout the night  Appetite: adequate  Medication side effects: none reported  ROS:no complaints    Objective:    Vitals:  Vitals:    10/07/24 0746   BP: 131/81   Pulse: 94   Resp: 18   Temp: (!) 97 °F (36.1 °C)   SpO2: 97%     Laboratory Results:  I have personally reviewed all pertinent laboratory/tests results.  Most Recent Labs:   Lab Results   Component Value Date    WBC 7.39 06/26/2024    RBC 4.70 06/26/2024    HGB 15.2 06/26/2024    HCT 45.5 06/26/2024     06/26/2024    RDW 12.9 06/26/2024    NEUTROABS 4.63 06/26/2024    SODIUM 137 06/26/2024    K 4.1 06/26/2024     06/26/2024    CO2 26 06/26/2024    BUN 17 06/26/2024    CREATININE 0.63 06/26/2024     GLUC 98 06/26/2024    GLUF 98 06/26/2024    CALCIUM 9.6 06/26/2024    AST 25 06/26/2024    ALT 45 06/26/2024    ALKPHOS 114 (H) 06/26/2024    TP 7.0 06/26/2024    ALB 4.4 06/26/2024    TBILI 0.44 06/26/2024    CHOLESTEROL 177 06/26/2024    HDL 52 06/26/2024    TRIG 73 06/26/2024    LDLCALC 110 (H) 06/26/2024    NONHDLC 125 06/26/2024    LITHIUM 0.4 (L) 01/28/2021    CFH6TMCPXZIH 2.636 06/26/2024    HGBA1C 5.2 11/22/2023     11/22/2023       Mental Status Evaluation:  Appearance:  age appropriate, casually dressed   Behavior:  cooperative, calm   Speech:  normal rate, normal volume   Mood:  less anxious, less depressed   Affect:  constricted   Thought Process:  goal directed   Associations: intact associations   Thought Content:  no overt delusions   Perceptual Disturbances: no auditory hallucinations, no visual hallucinations, denies when asked, does not appear responding to internal stimuli   Risk Potential: Suicidal ideation - None at present, contracts for safety on the unit, would talk to staff if not feeling safe on the unit  Homicidal ideation - None at present  Potential for aggression - Not at present   Sensorium:  oriented to person, place, and time/date   Memory:  recent memory intact   Consciousness:  alert and awake   Attention/Concentration: attention span and concentration appear shorter than expected for age   Insight:  fair and improving   Judgment: fair and improving   Gait/Station: normal gait/station, normal balance   Motor Activity: no abnormal movements     Progress Toward Goals: making gradual improvement.  Deyvi continues to require inpatient psychiatric hospitalization for continued medication management and titration to optimize symptom reduction, improve sleep hygiene, and demonstrate adequate self-care.     Suicide/Homicide Risk Assessment:  Risk of Harm to Self:   Nursing Suicide Risk Assessment Last 24 hours: C-SSRS Risk (Since Last Contact)  Calculated C-SSRS Risk Score (Since  Last Contact): No Risk Indicated    Risk of Harm to Others:  Nursing Homicide Risk Assessment: Violence Risk to Others: Denies within past 6 months    Behavioral Health Medications: all current active meds have been reviewed and continue current psychiatric medications.  Current Facility-Administered Medications   Medication Dose Route Frequency Provider Last Rate    acetaminophen  650 mg Oral Q6H PRN ALVINA Harley      acetaminophen  650 mg Oral Q4H PRN ALVINA Harley      acetaminophen  975 mg Oral Q6H PRN ALVINA Harley      aluminum-magnesium hydroxide-simethicone  30 mL Oral Q4H PRN ALVINA Harley      ammonium lactate   Topical BID PRN ALVINA Harley      atorvastatin  10 mg Oral Daily ALVINA Lewis      benztropine  1 mg Intramuscular Q4H PRN Max 6/day ALVINA Harley      benztropine  1 mg Oral Q4H PRN Max 6/day ALVINA Harley      bisacodyl  10 mg Rectal Daily PRN ALVINA Harley      cariprazine  3 mg Oral Daily Martin Cardenas MD      Cholecalciferol  2,000 Units Oral Daily ALVINA Lewis      cyanocobalamin  1,000 mcg Oral Daily ALVINA Lewis      hydrOXYzine HCL  25 mg Oral Q6H PRN Max 4/day ALVINA Harley      hydrOXYzine HCL  25 mg Oral TID Martin Cardenas MD      hydrOXYzine HCL  50 mg Oral Q4H PRN Max 4/day ALVINA Harley      Or    LORazepam  1 mg Intramuscular Q4H PRN ALVINA Harley      lamoTRIgine  50 mg Oral Daily Martin Cardenas MD      lisinopril  10 mg Oral Daily ALVINA Lewis      LORazepam  1 mg Oral Q4H PRN Max 6/day ALVINA Harley      Or    LORazepam  2 mg Intramuscular Q6H PRN Max 3/day ALVINA Harley      OLANZapine  10 mg Oral Q3H PRN Max 3/day ALVINA Harley      Or    OLANZapine  10 mg Intramuscular Q3H PRN Max 3/day ALVINA Harley      OLANZapine  5 mg Oral Q3H PRN Max 6/day ALVINA Harley      Or    OLANZapine  5 mg Intramuscular Q3H PRN Max 6/day Melva Knutson  ALVINA      OLANZapine  2.5 mg Oral Q3H PRN Max 8/day ALVINA Harley      polyethylene glycol  17 g Oral Daily PRN ALVINA Harley      propranolol  10 mg Oral Q8H PRN ALVINA Harley      senna-docusate sodium  1 tablet Oral Daily PRN ALVINA Harley      sertraline  150 mg Oral HS Martin Cardenas MD       Risks / Benefits of Treatment:  Risks, benefits, and possible side effects of medications explained to patient. Patient has limited understanding of risks and benefits of treatment at this time, but agrees to take medications as prescribed.    Counseling / Coordination of Care:  Total floor/unit time spent today 25 minutes. Greater than 50% of total time was spent with the patient and / or family counseling and / or coordination of care. A description of the counseling / coordination of care:   Patient's progress discussed with staff in treatment team meeting.  Medications, treatment progress and treatment plan reviewed with patient.   Educated on importance of medication and treatment compliance.  Reassurance and supportive therapy provided.   Encouraged participation in milieu and group therapy on the unit.    ALVINA Harley 10/07/24

## 2024-10-07 NOTE — PLAN OF CARE
Problem: Alteration in Thoughts and Perception  Goal: Treatment Goal: Gain control of psychotic behaviors/thinking, reduce/eliminate presenting symptoms and demonstrate improved reality functioning upon discharge  Outcome: Progressing  Goal: Verbalize thoughts and feelings  Description: Interventions:  - Promote a nonjudgmental and trusting relationship with the patient through active listening and therapeutic communication  - Assess patient's level of functioning, behavior and potential for risk  - Engage patient in 1 on 1 interactions  - Encourage patient to express fears, feelings, frustrations, and discuss symptoms    - [place] patient to reality, help patient recognize reality-based thinking   - Administer medications as ordered and assess for potential side effects  - Provide the patient education related to the signs and symptoms of the illness and desired effects of prescribed medications  Outcome: Progressing  Goal: Refrain from acting on delusional thinking/internal stimuli  Description: Interventions:  - Monitor patient closely, per order   - Utilize least restrictive measures   - Set reasonable limits, give positive feedback for acceptable   - Administer medications as ordered and monitor of potential side effects  Outcome: Progressing  Goal: Agree to be compliant with medication regime, as prescribed and report medication side effects  Description: Interventions:  - Offer appropriate PRN medication and supervise ingestion; conduct AIMS, as needed   Outcome: Progressing  Goal: Attend and participate in unit activities, including therapeutic, recreational, and educational groups  Description: Interventions:  -Encourage Visitation and family involvement in care  Outcome: Progressing  Goal: Recognize dysfunctional thoughts, communicate reality-based thoughts at the time of discharge  Description: Interventions:  - Provide medication and psycho-education to assist patient in compliance and developing  insight into his/her illness   Outcome: Progressing  Goal: Complete daily ADLs, including personal hygiene independently, as able  Description: Interventions:  - Observe, teach, and assist patient with ADLS  - Monitor and promote a balance of rest/activity, with adequate nutrition and elimination   Outcome: Progressing     Problem: Ineffective Coping  Goal: Identifies ineffective coping skills  Outcome: Progressing  Goal: Identifies healthy coping skills  Outcome: Progressing  Goal: Demonstrates healthy coping skills  Outcome: Progressing  Goal: Participates in unit activities  Description: Interventions:  - Provide therapeutic environment   - Provide required programming   - Redirect inappropriate behaviors   Outcome: Progressing  Goal: Patient/Family participate in treatment and DC plans  Description: Interventions:  - Provide therapeutic environment  Outcome: Progressing  Goal: Patient/Family verbalizes awareness of resources  Outcome: Progressing  Goal: Understands least restrictive measures  Description: Interventions:  - Utilize least restrictive behavior  Outcome: Progressing  Goal: Free from restraint events  Description: - Utilize least restrictive measures   - Provide behavioral interventions   - Redirect inappropriate behaviors   Outcome: Progressing     Problem: Risk for Self Injury/Neglect  Goal: Treatment Goal: Remain safe during length of stay, learn and adopt new coping skills, and be free of self-injurious ideation, impulses and acts at the time of discharge  Outcome: Progressing  Goal: Verbalize thoughts and feelings  Description: Interventions:  - Assess and re-assess patient's lethality and potential for self-injury  - Engage patient in 1:1 interactions, daily, for a minimum of 15 minutes  - Encourage patient to express feelings, fears, frustrations, hopes  - Establish rapport/trust with patient   Outcome: Progressing  Goal: Refrain from harming self  Description: Interventions:  - Monitor  patient closely, per order  - Develop a trusting relationship  - Supervise medication ingestion, monitor effects and side effects   Outcome: Progressing  Goal: Attend and participate in unit activities, including therapeutic, recreational, and educational groups  Description: Interventions:  - Provide therapeutic and educational activities daily, encourage attendance and participation, and document same in the medical record  - Obtain collateral information, encourage visitation and family involvement in care   Outcome: Progressing  Goal: Recognize maladaptive responses and adopt new coping mechanisms  Outcome: Progressing  Goal: Complete daily ADLs, including personal hygiene independently, as able  Description: Interventions:  - Observe, teach, and assist patient with ADLS  - Monitor and promote a balance of rest/activity, with adequate nutrition and elimination  Outcome: Progressing     Problem: Depression  Goal: Treatment Goal: Demonstrate behavioral control of depressive symptoms, verbalize feelings of improved mood/affect, and adopt new coping skills prior to discharge  Outcome: Progressing  Goal: Verbalize thoughts and feelings  Description: Interventions:  - Assess and re-assess patient's level of risk   - Engage patient in 1:1 interactions, daily, for a minimum of 15 minutes   - Encourage patient to express feelings, fears, frustrations, hopes   Outcome: Progressing  Goal: Refrain from harming self  Description: Interventions:  - Monitor patient closely, per order   - Supervise medication ingestion, monitor effects and side effects   Outcome: Progressing  Goal: Refrain from isolation  Description: Interventions:  - Develop a trusting relationship   - Encourage socialization   Outcome: Progressing  Goal: Refrain from self-neglect  Outcome: Progressing  Goal: Attend and participate in unit activities, including therapeutic, recreational, and educational groups  Description: Interventions:  - Provide  therapeutic and educational activities daily, encourage attendance and participation, and document same in the medical record   Outcome: Progressing  Goal: Complete daily ADLs, including personal hygiene independently, as able  Description: Interventions:  - Observe, teach, and assist patient with ADLS  -  Monitor and promote a balance of rest/activity, with adequate nutrition and elimination   Outcome: Progressing     Problem: Anxiety  Goal: Anxiety is at manageable level  Description: Interventions:  - Assess and monitor patient's anxiety level.   - Monitor for signs and symptoms (heart palpitations, chest pain, shortness of breath, headaches, nausea, feeling jumpy, restlessness, irritable, apprehensive).   - Collaborate with interdisciplinary team and initiate plan and interventions as ordered.  - [place] patient to unit/surroundings  - Explain treatment plan  - Encourage participation in care  - Encourage verbalization of concerns/fears  - Identify coping mechanisms  - Assist in developing anxiety-reducing skills  - Administer/offer alternative therapies  - Limit or eliminate stimulants  Outcome: Progressing     Problem: Risk for Violence/Aggression Toward Others  Goal: Treatment Goal: Refrain from acts of violence/aggression during length of stay, and demonstrate improved impulse control at the time of discharge  Outcome: Progressing  Goal: Verbalize thoughts and feelings  Description: Interventions:  - Assess and re-assess patient's level of risk, every waking shift  - Engage patient in 1:1 interactions, daily, for a minimum of 15 minutes   - Allow patient to express feelings and frustrations in a safe and non-threatening manner   - Establish rapport/trust with patient   Outcome: Progressing  Goal: Refrain from harming others  Outcome: Progressing  Goal: Refrain from destructive acts on the environment or property  Outcome: Progressing  Goal: Control angry outbursts  Description: Interventions:  - Monitor  patient closely, per order  - Ensure early verbal de-escalation  - Monitor prn medication needs  - Set reasonable/therapeutic limits, outline behavioral expectations, and consequences   - Provide a non-threatening milieu, utilizing the least restrictive interventions   Outcome: Progressing  Goal: Attend and participate in unit activities, including therapeutic, recreational, and educational groups  Description: Interventions:  - Provide therapeutic and educational activities daily, encourage attendance and participation, and document same in the medical record   Outcome: Progressing  Goal: Identify appropriate positive anger management techniques  Description: Interventions:  - Offer anger management and coping skills groups   - Staff will provide positive feedback for appropriate anger control  Outcome: Progressing     Problem: Alteration in Orientation  Goal: Treatment Goal: Demonstrate a reduction of confusion and improved orientation to person, place, time and/or situation upon discharge, according to optimum baseline/ability  Outcome: Progressing  Goal: Interact with staff daily  Description: Interventions:  - Assess and re-assess patient's level of orientation  - Engage patient in 1 on 1 interactions, daily, for a minimum of 15 minutes   - Establish rapport/trust with patient   Outcome: Progressing  Goal: Express concerns related to confused thinking related to:  Description: Interventions:  - Encourage patient to express feelings, fears, frustrations, hopes  - Assign consistent caregivers   - [place]/re-orient patient as needed  - Allow comfort items, as appropriate  - Provide visual cues, signs, etc.   Outcome: Progressing  Goal: Allow medical examinations, as recommended  Description: Interventions:  - Provide physical/neurological exams and/or referrals, per provider   Outcome: Progressing  Goal: Cooperate with recommended testing/procedures  Description: Interventions:  - Determine need for ancillary  testing  - Observe for mental status changes  - Implement falls/precaution protocol   Outcome: Progressing  Goal: Attend and participate in unit activities, including therapeutic, recreational, and educational groups  Description: Interventions:  - Provide therapeutic and educational activities daily, encourage attendance and participation, and document same in the medical record   - Provide appropriate opportunities for reminiscence   - Provide a consistent daily routine   - Encourage family contact/visitation   Outcome: Progressing  Goal: Complete daily ADLs, including personal hygiene independently, as able  Description: Interventions:  - Observe, teach, and assist patient with ADLS  Outcome: Progressing     Problem: SELF HARM/SUICIDALITY  Goal: Will have no self-injury during hospital stay  Description: INTERVENTIONS:  - Q 15 MINUTES: Routine safety checks  - Q WAKING SHIFT & PRN: Assess risk to determine if routine checks are adequate to maintain patient safety  - Encourage patient to participate actively in care by formulating a plan to combat response to suicidal ideation, identify supports and resources  Outcome: Progressing     Problem: DEPRESSION  Goal: Will be euthymic at discharge  Description: INTERVENTIONS:  - Administer medication as ordered  - Provide emotional support via 1:1 interaction with staff  - Encourage involvement in milieu/groups/activities  - Monitor for social isolation  Outcome: Progressing     Problem: ANXIETY  Goal: Will report anxiety at manageable levels  Description: INTERVENTIONS:  - Administer medication as ordered  - Teach and encourage coping skills  - Provide emotional support  - Assess patient/family for anxiety and ability to cope  Outcome: Progressing  Goal: By discharge: Patient will verbalize 2 strategies to deal with anxiety  Description: Interventions:  - Identify any obvious source/trigger to anxiety  - Staff will assist patient in applying identified coping  technique/skills  - Encourage attendance of scheduled groups and activities  Outcome: Progressing     Problem: SELF CARE DEFICIT  Goal: Return ADL status to a safe level of function  Description: INTERVENTIONS:  - Administer medication as ordered  - Assess ADL deficits and provide assistive devices as needed  - Obtain PT/OT consults as needed  - Assist and instruct patient to increase activity and self care as tolerated  Outcome: Progressing     Problem: DISCHARGE PLANNING - CARE MANAGEMENT  Goal: Discharge to post-acute care or home with appropriate resources  Description: INTERVENTIONS:  - Conduct assessment to determine patient/family and health care team treatment goals, and need for post-acute services based on payer coverage, community resources, and patient preferences, and barriers to discharge  - Address psychosocial, clinical, and financial barriers to discharge as identified in assessment in conjunction with the patient/family and health care team  - Arrange appropriate level of post-acute services according to patient’s   needs and preference and payer coverage in collaboration with the physician and health care team  - Communicate with and update the patient/family, physician, and health care team regarding progress on the discharge plan  - Arrange appropriate transportation to post-acute venues  Outcome: Progressing

## 2024-10-07 NOTE — PROGRESS NOTES
10/07/24 0746   Team Meeting   Meeting Type Daily Rounds   Team Members Present   Team Members Present Physician;Nurse;;Other (Discipline and Name)   Physician Team Member Holter, Thomas, Hangey CRNP   Nursing Team Member Chastity   Social Work Team Member Henrry FRY   Patient/Family Present   Patient Present No   Patient's Family Present No     G[place] PCM  Groups Participation  2/8.   Patient's compliant with  medications. He appears depressed at times. Referral pending for [facility]. He's appropriate and engaged in treatment.

## 2024-10-07 NOTE — NURSING NOTE
Patient isolative to his room most of the evening. Quiet and not seen interacting with anyone. Patient waited for the writer to go to his bedside to give him his scheduled hs medications. Offered no complaints. Uneventful shift. Behaviors controlled and appropriate.

## 2024-10-07 NOTE — NURSING NOTE
"Pt is calm, cooperative, visible on the unit. Social with select peers. Pt denies anxiety and depression, denies SI/HI/AVH. Pt states \"Just Tired\". Pt is meal and medication complaint, safety checks   "

## 2024-10-08 PROCEDURE — 99232 SBSQ HOSP IP/OBS MODERATE 35: CPT | Performed by: PSYCHIATRY & NEUROLOGY

## 2024-10-08 RX ADMIN — CARIPRAZINE 3 MG: 3 CAPSULE, GELATIN COATED ORAL at 08:13

## 2024-10-08 RX ADMIN — LISINOPRIL 10 MG: 10 TABLET ORAL at 08:13

## 2024-10-08 RX ADMIN — HYDROXYZINE HYDROCHLORIDE 25 MG: 25 TABLET ORAL at 08:13

## 2024-10-08 RX ADMIN — HYDROXYZINE HYDROCHLORIDE 25 MG: 25 TABLET ORAL at 21:12

## 2024-10-08 RX ADMIN — ATORVASTATIN CALCIUM 10 MG: 10 TABLET, FILM COATED ORAL at 08:13

## 2024-10-08 RX ADMIN — LAMOTRIGINE 50 MG: 25 TABLET ORAL at 08:13

## 2024-10-08 RX ADMIN — SERTRALINE HYDROCHLORIDE 150 MG: 100 TABLET ORAL at 21:12

## 2024-10-08 RX ADMIN — CHOLECALCIFEROL TAB 25 MCG (1000 UNIT) 2000 UNITS: 25 TAB at 08:13

## 2024-10-08 RX ADMIN — HYDROXYZINE HYDROCHLORIDE 25 MG: 25 TABLET ORAL at 17:12

## 2024-10-08 RX ADMIN — CYANOCOBALAMIN TAB 1000 MCG 1000 MCG: 1000 TAB at 08:13

## 2024-10-08 NOTE — NURSING NOTE
Patient is quiet, polite and pleasant. Pt reports no s/s and voices no concerns. Pt attending minimal groups today.

## 2024-10-08 NOTE — PROGRESS NOTES
10/08/24 0733   Team Meeting   Meeting Type Daily Rounds   Team Members Present   Team Members Present Physician;Nurse;;Other (Discipline and Name)   Physician Team Member Holter, Thomas   Nursing Team Member Chastity   Social Work Team Member Henrry FRY   Patient/Family Present   Patient Present No   Patient's Family Present No     Chava Pharm D  Groups Participation  5/9.   Patient's compliant with medications. He's depressed at times. He's engaged in his treatment. Pending referral to [facility]

## 2024-10-08 NOTE — SOCIAL WORK
This writer contacted the county regarding patient's referral status.[facility] reports [facility] will complete patient's intake after 10/15.

## 2024-10-08 NOTE — PROGRESS NOTES
Progress Note - Behavioral Health   Name: Deyvi Cao 54 y.o. male I MRN: [id number]   Unit/Bed#: [facility] 101-02 I Date of Admission: 6/25/2024   Date of Service: 10/8/2024 I Hospital Day: 105     Assessment & Plan  Bipolar disorder with severe depression (HCC)  Progressing  Awaiting placement - CRR interview complete, ACT interview complete, awaiting next steps  Continue medications as follows:, Vraylar 3mg PO Daily, Atarax 25mg PO TID, Lamictal 50mg PO Daily, Zoloft 150mg PO QHS  Continue with group therapy, milieu therapy and occupational therapy   Behavioral Health checks every 7 minutes   Continue frequent safety checks and vitals per unit protocol  Continue with [facility] medical management as indicated    Benign essential hypertension  Managed by [facility]  Continue Lisinopril 10mg PO Daily    Mixed hyperlipidemia  Managed by [facility]  Continue Lipitor 10mg PO Daily    Allergic rhinitis due to allergen  Managed by [facility]    Rosacea  Managed by [facility]     Subjective:    Patient was seen today for continuation of care, records reviewed and patient was discussed with the morning case review team.    Deyvi was seen today for psychiatric follow-up.  On assessment today, Deyvi was found walking in the halls.  He is doing well, he is asking about an update on his discharge plan.  I told him we are continuing to try and get an answer from the [facility].  Deyvi reports adequate daytime energy and denies any difficulties with initiating or staying asleep.  Oral appetite and hydration is adequate.  We reviewed once more the specific as-needed medications they can use going forward if they experience any insomnia or destabilization of their mood, they understood and were agreeable. Milieu visibility and group attendance encouraged to promote an active participation in treatment.    Deyvi denies acute suicidal/self-harm ideation/intent/plan upon direct inquiry at this time. Deyvi is able to contract for safety while on the unit and  would feel comfortable seeking staff support should suicidal symptoms or urges appear or worsen. Deyvi remains behaviorally appropriate, no agitation or aggression noted on exam or in report. Deyvi also denies HI/AH/VH, and does not appear overtly manic.  Patient does not verbalize any experiences that can be categorized as paranoid, persecutory, bizarre, or somatic delusions. Deyvi remains adherent to his current psychotropic medication regimen and denies any side effects from medications, as well as none noted on exam.    Deyvi is currently assessed as being at their baseline with continued need for medication management, supervision for safety and ADL’s. These services are not currently available in a less restrictive environment necessitating continued hospital stay.  Deyvi should remain on the unit until these services are available, due to likelihood of mental decompensation and readmission if discharged to an unsupervised setting.  Assertive discharge planning and collaboration with multiple providers (inpatient and community based) remains ongoing.  Deyvi is currently awaiting for Enhanced CRR    Group Attendance: 5 / 9  Treatment Team: TBD  Psychiatric PRN's Needed: None    Review of Systems:  Behavior over the last 24 hours: Slowly improving  Sleep: sleeping okay throughout the night  Appetite: adequate  Medication side effects: none reported  ROS:no complaints    Objective:    Vitals:  Vitals:    10/08/24 0738   BP: 163/97   Pulse: 97   Resp: 19   Temp: 98.2 °F (36.8 °C)   SpO2: 95%     Laboratory Results:  I have personally reviewed all pertinent laboratory/tests results.  Most Recent Labs:   Lab Results   Component Value Date    WBC 7.39 06/26/2024    RBC 4.70 06/26/2024    HGB 15.2 06/26/2024    HCT 45.5 06/26/2024     06/26/2024    RDW 12.9 06/26/2024    NEUTROABS 4.63 06/26/2024    SODIUM 137 06/26/2024    K 4.1 06/26/2024     06/26/2024    CO2 26 06/26/2024    BUN 17  06/26/2024    CREATININE 0.63 06/26/2024    GLUC 98 06/26/2024    GLUF 98 06/26/2024    CALCIUM 9.6 06/26/2024    AST 25 06/26/2024    ALT 45 06/26/2024    ALKPHOS 114 (H) 06/26/2024    TP 7.0 06/26/2024    ALB 4.4 06/26/2024    TBILI 0.44 06/26/2024    CHOLESTEROL 177 06/26/2024    HDL 52 06/26/2024    TRIG 73 06/26/2024    LDLCALC 110 (H) 06/26/2024    NONHDLC 125 06/26/2024    LITHIUM 0.4 (L) 01/28/2021    UDQ9CYAQXLBL 2.636 06/26/2024    HGBA1C 5.2 11/22/2023     11/22/2023     Mental Status Evaluation:  Appearance:  age appropriate, casually dressed   Behavior:  cooperative, calm   Speech:  normal volume, normal pitch   Mood:  less anxious, less depressed   Affect:  constricted   Thought Process:  goal directed   Associations: intact associations   Thought Content:  no overt delusions   Perceptual Disturbances: no auditory hallucinations, no visual hallucinations, denies when asked, does not appear responding to internal stimuli   Risk Potential: Suicidal ideation - None at present, contracts for safety on the unit, would talk to staff if not feeling safe on the unit  Homicidal ideation - None at present  Potential for aggression - Not at present   Sensorium:  oriented to person, place, and time/date   Memory:  recent memory intact   Consciousness:  alert and awake   Attention/Concentration: attention span and concentration appear shorter than expected for age   Insight:  fair and improving   Judgment: fair and improving   Gait/Station: normal gait/station, normal balance   Motor Activity: no abnormal movements     Progress Toward Goals: making gradual improvement.  Deyvi continues to require inpatient psychiatric hospitalization for continued medication management and titration to optimize symptom reduction, improve sleep hygiene, and demonstrate adequate self-care.     Suicide/Homicide Risk Assessment:  Risk of Harm to Self:   Nursing Suicide Risk Assessment Last 24 hours: C-SSRS Risk (Since Last  Contact)  Calculated C-SSRS Risk Score (Since Last Contact): No Risk Indicated    Risk of Harm to Others:  Nursing Homicide Risk Assessment: Violence Risk to Others: Denies within past 6 months    Behavioral Health Medications: all current active meds have been reviewed and continue current psychiatric medications.  Current Facility-Administered Medications   Medication Dose Route Frequency Provider Last Rate    acetaminophen  650 mg Oral Q6H PRN ALVINA Harley      acetaminophen  650 mg Oral Q4H PRN ALVINA Harley      acetaminophen  975 mg Oral Q6H PRN ALVINA Harley      aluminum-magnesium hydroxide-simethicone  30 mL Oral Q4H PRN ALVINA Harley      ammonium lactate   Topical BID PRN ALVINA Harley      atorvastatin  10 mg Oral Daily ALVINA Lewis      benztropine  1 mg Intramuscular Q4H PRN Max 6/day ALVINA Harley      benztropine  1 mg Oral Q4H PRN Max 6/day ALVINA Harley      bisacodyl  10 mg Rectal Daily PRN ALVINA Harley      cariprazine  3 mg Oral Daily Martin Cardenas MD      Cholecalciferol  2,000 Units Oral Daily ALVINA Lewis      cyanocobalamin  1,000 mcg Oral Daily ALVINA Lewis      hydrOXYzine HCL  25 mg Oral Q6H PRN Max 4/day ALVINA Harley      hydrOXYzine HCL  25 mg Oral TID Martin Cardenas MD      hydrOXYzine HCL  50 mg Oral Q4H PRN Max 4/day ALVINA Harley      Or    LORazepam  1 mg Intramuscular Q4H PRN ALVINA Harley      lamoTRIgine  50 mg Oral Daily Martin Cardenas MD      lisinopril  10 mg Oral Daily ALVINA Lewis      LORazepam  1 mg Oral Q4H PRN Max 6/day ALVINA Harley      Or    LORazepam  2 mg Intramuscular Q6H PRN Max 3/day ALVINA Harley      OLANZapine  10 mg Oral Q3H PRN Max 3/day ALVINA Harley      Or    OLANZapine  10 mg Intramuscular Q3H PRN Max 3/day ALVINA Harley      OLANZapine  5 mg Oral Q3H PRN Max 6/day ALVINA Harley      Or    OLANZapine  5 mg  Intramuscular Q3H PRN Max 6/day ALVINA Harley      OLANZapine  2.5 mg Oral Q3H PRN Max 8/day ALVINA Harley      polyethylene glycol  17 g Oral Daily PRN ALVINA Harley      propranolol  10 mg Oral Q8H PRN ALVINA Harley      senna-docusate sodium  1 tablet Oral Daily PRN ALVINA Harley      sertraline  150 mg Oral HS Martin Cardenas MD       Risks / Benefits of Treatment:  Risks, benefits, and possible side effects of medications explained to patient. Patient has limited understanding of risks and benefits of treatment at this time, but agrees to take medications as prescribed.    Counseling / Coordination of Care:  Total floor/unit time spent today 25 minutes. Greater than 50% of total time was spent with the patient and / or family counseling and / or coordination of care. A description of the counseling / coordination of care:   Patient's progress discussed with staff in treatment team meeting.  Medications, treatment progress and treatment plan reviewed with patient.   Educated on importance of medication and treatment compliance.  Reassurance and supportive therapy provided.   Encouraged participation in milieu and group therapy on the unit.    ALVINA Harley 10/08/24

## 2024-10-08 NOTE — PLAN OF CARE
Problem: Alteration in Thoughts and Perception  Goal: Treatment Goal: Gain control of psychotic behaviors/thinking, reduce/eliminate presenting symptoms and demonstrate improved reality functioning upon discharge  Outcome: Progressing  Goal: Verbalize thoughts and feelings  Description: Interventions:  - Promote a nonjudgmental and trusting relationship with the patient through active listening and therapeutic communication  - Assess patient's level of functioning, behavior and potential for risk  - Engage patient in 1 on 1 interactions  - Encourage patient to express fears, feelings, frustrations, and discuss symptoms    - [place] patient to reality, help patient recognize reality-based thinking   - Administer medications as ordered and assess for potential side effects  - Provide the patient education related to the signs and symptoms of the illness and desired effects of prescribed medications  Outcome: Not Progressing  Goal: Refrain from acting on delusional thinking/internal stimuli  Description: Interventions:  - Monitor patient closely, per order   - Utilize least restrictive measures   - Set reasonable limits, give positive feedback for acceptable   - Administer medications as ordered and monitor of potential side effects  Outcome: Progressing  Goal: Agree to be compliant with medication regime, as prescribed and report medication side effects  Description: Interventions:  - Offer appropriate PRN medication and supervise ingestion; conduct AIMS, as needed   Outcome: Progressing  Goal: Attend and participate in unit activities, including therapeutic, recreational, and educational groups  Description: Interventions:  -Encourage Visitation and family involvement in care  Outcome: Not Progressing  Goal: Recognize dysfunctional thoughts, communicate reality-based thoughts at the time of discharge  Description: Interventions:  - Provide medication and psycho-education to assist patient in compliance and  developing insight into his/her illness   Outcome: Progressing  Goal: Complete daily ADLs, including personal hygiene independently, as able  Description: Interventions:  - Observe, teach, and assist patient with ADLS  - Monitor and promote a balance of rest/activity, with adequate nutrition and elimination   Outcome: Progressing     Problem: Ineffective Coping  Goal: Identifies ineffective coping skills  Outcome: Progressing  Goal: Identifies healthy coping skills  Outcome: Progressing  Goal: Demonstrates healthy coping skills  Outcome: Progressing  Goal: Participates in unit activities  Description: Interventions:  - Provide therapeutic environment   - Provide required programming   - Redirect inappropriate behaviors   Outcome: Not Progressing  Goal: Understands least restrictive measures  Description: Interventions:  - Utilize least restrictive behavior  Outcome: Progressing  Goal: Free from restraint events  Description: - Utilize least restrictive measures   - Provide behavioral interventions   - Redirect inappropriate behaviors   Outcome: Progressing     Problem: Risk for Self Injury/Neglect  Goal: Treatment Goal: Remain safe during length of stay, learn and adopt new coping skills, and be free of self-injurious ideation, impulses and acts at the time of discharge  Outcome: Progressing  Goal: Verbalize thoughts and feelings  Description: Interventions:  - Assess and re-assess patient's lethality and potential for self-injury  - Engage patient in 1:1 interactions, daily, for a minimum of 15 minutes  - Encourage patient to express feelings, fears, frustrations, hopes  - Establish rapport/trust with patient   Outcome: Not Progressing  Goal: Refrain from harming self  Description: Interventions:  - Monitor patient closely, per order  - Develop a trusting relationship  - Supervise medication ingestion, monitor effects and side effects   Outcome: Progressing  Goal: Recognize maladaptive responses and adopt new  coping mechanisms  Outcome: Progressing  Goal: Complete daily ADLs, including personal hygiene independently, as able  Description: Interventions:  - Observe, teach, and assist patient with ADLS  - Monitor and promote a balance of rest/activity, with adequate nutrition and elimination  Outcome: Progressing     Problem: Depression  Goal: Treatment Goal: Demonstrate behavioral control of depressive symptoms, verbalize feelings of improved mood/affect, and adopt new coping skills prior to discharge  Outcome: Progressing  Goal: Refrain from harming self  Description: Interventions:  - Monitor patient closely, per order   - Supervise medication ingestion, monitor effects and side effects   Outcome: Progressing  Goal: Refrain from self-neglect  Outcome: Progressing  Goal: Complete daily ADLs, including personal hygiene independently, as able  Description: Interventions:  - Observe, teach, and assist patient with ADLS  -  Monitor and promote a balance of rest/activity, with adequate nutrition and elimination   Outcome: Progressing     Problem: Anxiety  Goal: Anxiety is at manageable level  Description: Interventions:  - Assess and monitor patient's anxiety level.   - Monitor for signs and symptoms (heart palpitations, chest pain, shortness of breath, headaches, nausea, feeling jumpy, restlessness, irritable, apprehensive).   - Collaborate with interdisciplinary team and initiate plan and interventions as ordered.  - [place] patient to unit/surroundings  - Explain treatment plan  - Encourage participation in care  - Encourage verbalization of concerns/fears  - Identify coping mechanisms  - Assist in developing anxiety-reducing skills  - Administer/offer alternative therapies  - Limit or eliminate stimulants  Outcome: Progressing     Problem: Risk for Violence/Aggression Toward Others  Goal: Treatment Goal: Refrain from acts of violence/aggression during length of stay, and demonstrate improved impulse control at the time of  discharge  Outcome: Progressing  Goal: Refrain from harming others  Outcome: Progressing  Goal: Refrain from destructive acts on the environment or property  Outcome: Progressing  Goal: Control angry outbursts  Description: Interventions:  - Monitor patient closely, per order  - Ensure early verbal de-escalation  - Monitor prn medication needs  - Set reasonable/therapeutic limits, outline behavioral expectations, and consequences   - Provide a non-threatening milieu, utilizing the least restrictive interventions   Outcome: Progressing     Problem: Alteration in Orientation  Goal: Treatment Goal: Demonstrate a reduction of confusion and improved orientation to person, place, time and/or situation upon discharge, according to optimum baseline/ability  Outcome: Progressing  Goal: Express concerns related to confused thinking related to:  Description: Interventions:  - Encourage patient to express feelings, fears, frustrations, hopes  - Assign consistent caregivers   - [place]/re-orient patient as needed  - Allow comfort items, as appropriate  - Provide visual cues, signs, etc.   Outcome: Progressing  Goal: Allow medical examinations, as recommended  Description: Interventions:  - Provide physical/neurological exams and/or referrals, per provider   Outcome: Progressing     Problem: SELF HARM/SUICIDALITY  Goal: Will have no self-injury during hospital stay  Description: INTERVENTIONS:  - Q 15 MINUTES: Routine safety checks  - Q WAKING SHIFT & PRN: Assess risk to determine if routine checks are adequate to maintain patient safety  - Encourage patient to participate actively in care by formulating a plan to combat response to suicidal ideation, identify supports and resources  Outcome: Progressing     Problem: ANXIETY  Goal: Will report anxiety at manageable levels  Description: INTERVENTIONS:  - Administer medication as ordered  - Teach and encourage coping skills  - Provide emotional support  - Assess patient/family for  anxiety and ability to cope  Outcome: Progressing  Goal: By discharge: Patient will verbalize 2 strategies to deal with anxiety  Description: Interventions:  - Identify any obvious source/trigger to anxiety  - Staff will assist patient in applying identified coping technique/skills  - Encourage attendance of scheduled groups and activities  Outcome: Progressing

## 2024-10-08 NOTE — NURSING NOTE
Pt is visible on the milieu for meals and mostly isolative to self. He consumed 100 % of dinner. Took his medications without incidence. Pt is calm, polite, pleasant, and cooperative. Flat affect, but brightens on approach. Denied all psychiatric symptoms. Pt offered no complaints. Attended Community meeting group. No behavioral issues.

## 2024-10-09 PROCEDURE — 99232 SBSQ HOSP IP/OBS MODERATE 35: CPT | Performed by: PSYCHIATRY & NEUROLOGY

## 2024-10-09 RX ADMIN — CARIPRAZINE 3 MG: 3 CAPSULE, GELATIN COATED ORAL at 08:28

## 2024-10-09 RX ADMIN — HYDROXYZINE HYDROCHLORIDE 25 MG: 25 TABLET ORAL at 21:11

## 2024-10-09 RX ADMIN — HYDROXYZINE HYDROCHLORIDE 25 MG: 25 TABLET ORAL at 16:49

## 2024-10-09 RX ADMIN — CHOLECALCIFEROL TAB 25 MCG (1000 UNIT) 2000 UNITS: 25 TAB at 08:28

## 2024-10-09 RX ADMIN — ATORVASTATIN CALCIUM 10 MG: 10 TABLET, FILM COATED ORAL at 08:28

## 2024-10-09 RX ADMIN — SERTRALINE HYDROCHLORIDE 150 MG: 100 TABLET ORAL at 21:11

## 2024-10-09 RX ADMIN — LISINOPRIL 10 MG: 10 TABLET ORAL at 08:28

## 2024-10-09 RX ADMIN — LAMOTRIGINE 50 MG: 25 TABLET ORAL at 08:28

## 2024-10-09 RX ADMIN — HYDROXYZINE HYDROCHLORIDE 25 MG: 25 TABLET ORAL at 08:28

## 2024-10-09 RX ADMIN — CYANOCOBALAMIN TAB 1000 MCG 1000 MCG: 1000 TAB at 08:28

## 2024-10-09 NOTE — PLAN OF CARE
Problem: Alteration in Thoughts and Perception  Goal: Treatment Goal: Gain control of psychotic behaviors/thinking, reduce/eliminate presenting symptoms and demonstrate improved reality functioning upon discharge  Outcome: Progressing  Goal: Verbalize thoughts and feelings  Description: Interventions:  - Promote a nonjudgmental and trusting relationship with the patient through active listening and therapeutic communication  - Assess patient's level of functioning, behavior and potential for risk  - Engage patient in 1 on 1 interactions  - Encourage patient to express fears, feelings, frustrations, and discuss symptoms    - [place] patient to reality, help patient recognize reality-based thinking   - Administer medications as ordered and assess for potential side effects  - Provide the patient education related to the signs and symptoms of the illness and desired effects of prescribed medications  Outcome: Progressing  Goal: Refrain from acting on delusional thinking/internal stimuli  Description: Interventions:  - Monitor patient closely, per order   - Utilize least restrictive measures   - Set reasonable limits, give positive feedback for acceptable   - Administer medications as ordered and monitor of potential side effects  Outcome: Progressing  Goal: Agree to be compliant with medication regime, as prescribed and report medication side effects  Description: Interventions:  - Offer appropriate PRN medication and supervise ingestion; conduct AIMS, as needed   Outcome: Progressing  Goal: Attend and participate in unit activities, including therapeutic, recreational, and educational groups  Description: Interventions:  -Encourage Visitation and family involvement in care  Outcome: Progressing  Goal: Recognize dysfunctional thoughts, communicate reality-based thoughts at the time of discharge  Description: Interventions:  - Provide medication and psycho-education to assist patient in compliance and developing  insight into his/her illness   Outcome: Progressing  Goal: Complete daily ADLs, including personal hygiene independently, as able  Description: Interventions:  - Observe, teach, and assist patient with ADLS  - Monitor and promote a balance of rest/activity, with adequate nutrition and elimination   Outcome: Progressing     Problem: Ineffective Coping  Goal: Identifies ineffective coping skills  Outcome: Progressing  Goal: Identifies healthy coping skills  Outcome: Progressing     Problem: Anxiety  Goal: Anxiety is at manageable level  Description: Interventions:  - Assess and monitor patient's anxiety level.   - Monitor for signs and symptoms (heart palpitations, chest pain, shortness of breath, headaches, nausea, feeling jumpy, restlessness, irritable, apprehensive).   - Collaborate with interdisciplinary team and initiate plan and interventions as ordered.  - [place] patient to unit/surroundings  - Explain treatment plan  - Encourage participation in care  - Encourage verbalization of concerns/fears  - Identify coping mechanisms  - Assist in developing anxiety-reducing skills  - Administer/offer alternative therapies  - Limit or eliminate stimulants  Outcome: Progressing     Problem: SELF HARM/SUICIDALITY  Goal: Will have no self-injury during hospital stay  Description: INTERVENTIONS:  - Q 15 MINUTES: Routine safety checks  - Q WAKING SHIFT & PRN: Assess risk to determine if routine checks are adequate to maintain patient safety  - Encourage patient to participate actively in care by formulating a plan to combat response to suicidal ideation, identify supports and resources  Outcome: Progressing     Problem: DEPRESSION  Goal: Will be euthymic at discharge  Description: INTERVENTIONS:  - Administer medication as ordered  - Provide emotional support via 1:1 interaction with staff  - Encourage involvement in milieu/groups/activities  - Monitor for social isolation  Outcome: Progressing

## 2024-10-09 NOTE — PROGRESS NOTES
Progress Note - Behavioral Health   Name: Deyvi Cao 54 y.o. male I MRN: [id number]   Unit/Bed#: [facility] 101-02 I Date of Admission: 6/25/2024   Date of Service: 10/9/2024 I Hospital Day: 106       Assessment & Plan  Bipolar disorder with severe depression (HCC)  Still progressing  Awaiting placement - CRR interview complete, ACT interview complete, awaiting next steps  Continue medications as follows:, Vraylar 3mg PO Daily, Atarax 25mg PO TID, Lamictal 50mg PO Daily, Zoloft 150mg PO QHS  Continue with group therapy, milieu therapy and occupational therapy   Behavioral Health checks every 7 minutes   Continue frequent safety checks and vitals per unit protocol  Continue with [facility] management as indicated    Benign essential hypertension  Managed by [facility]  Continue Lisinopril 10mg PO Daily    Mixed hyperlipidemia  Managed by [facility]  Continue Lipitor 10mg PO Daily    Allergic rhinitis due to allergen  Managed by [facility]    Rosacea  Managed by [facility]     Subjective:    Patient was seen today for continuation of care, records reviewed and patient was discussed with the morning case review team.    Deyvi was seen today for psychiatric follow-up.  On assessment today, Deyvi was found in his bed.  He is calm and cooperative on approach.  He is asking if there is an update regarding his discharge plan.  He is doing very well, seems discouraged by LOS but overall there have been no issues with his behaviors or aggression.  We reviewed once more the specific as-needed medications they can use going forward if they experience any insomnia or destabilization of their mood, they understood and were agreeable. Milieu visibility and group attendance encouraged to promote an active participation in treatment.    Deyvi denies acute suicidal/self-harm ideation/intent/plan upon direct inquiry at this time. Deyvi is able to contract for safety while on the unit and would feel comfortable seeking staff support should suicidal  symptoms or urges appear or worsen. Deyvi remains behaviorally appropriate, no agitation or aggression noted on exam or in report. Deyvi also denies HI/AH/VH, and does not appear overtly manic.  Patient does not verbalize any experiences that can be categorized as paranoid, persecutory, bizarre, or somatic delusions. Deyvi remains adherent to his current psychotropic medication regimen and denies any side effects from medications, as well as none noted on exam.    Deyvi has improved and appears to be at their baseline level of functioning while receiving medication administration, prepared meals, supervision, structure and redirection for safety. At this time, Deyvi is able to be discharged safely as soon as nursing home services can provide continuation of these services. Without these services in place, Deyvi would be at risk for rapid psychiatric and medical decompensation. Placement and a plan for community provider supports are actively being pursued with assertive collaboration from a multisystemic approach and yet these resources are currently not available. Deyvi requires continued hospitalization until adequate resources can be established to maintain patient’s safety at a lower level of care.    Group Attendance: 2 / 10  Treatment Team: SOLDEAD  Psychiatric PRN's Needed: None    Review of Systems:  Behavior over the last 24 hours: Unchanged  Sleep: sleeping okay throughout the night  Appetite: adequate  Medication side effects: none reported  ROS:no complaints    Objective:    Vitals:  Vitals:    10/09/24 0736   BP: 124/79   Pulse: 94   Resp: 18   Temp: (!) 97.4 °F (36.3 °C)   SpO2: 94%     Laboratory Results:  I have personally reviewed all pertinent laboratory/tests results.  Most Recent Labs:   Lab Results   Component Value Date    WBC 7.39 06/26/2024    RBC 4.70 06/26/2024    HGB 15.2 06/26/2024    HCT 45.5 06/26/2024     06/26/2024    RDW 12.9 06/26/2024    NEUTROABS 4.63 06/26/2024     SODIUM 137 06/26/2024    K 4.1 06/26/2024     06/26/2024    CO2 26 06/26/2024    BUN 17 06/26/2024    CREATININE 0.63 06/26/2024    GLUC 98 06/26/2024    GLUF 98 06/26/2024    CALCIUM 9.6 06/26/2024    AST 25 06/26/2024    ALT 45 06/26/2024    ALKPHOS 114 (H) 06/26/2024    TP 7.0 06/26/2024    ALB 4.4 06/26/2024    TBILI 0.44 06/26/2024    CHOLESTEROL 177 06/26/2024    HDL 52 06/26/2024    TRIG 73 06/26/2024    LDLCALC 110 (H) 06/26/2024    NONHDLC 125 06/26/2024    LITHIUM 0.4 (L) 01/28/2021    YAD4PBASGYBG 2.636 06/26/2024    HGBA1C 5.2 11/22/2023     11/22/2023     Mental Status Evaluation:  Appearance:  age appropriate, casually dressed   Behavior:  cooperative, calm   Speech:  normal rate, normal volume   Mood:  less anxious, less depressed   Affect:  constricted   Thought Process:  goal directed   Associations: intact associations   Thought Content:  no overt delusions   Perceptual Disturbances: no auditory hallucinations, no visual hallucinations, denies when asked, does not appear responding to internal stimuli   Risk Potential: Suicidal ideation - None at present, contracts for safety on the unit, would talk to staff if not feeling safe on the unit  Homicidal ideation - None at present  Potential for aggression - Not at present   Sensorium:  oriented to person, place, and time/date   Memory:  recent memory intact   Consciousness:  alert and awake   Attention/Concentration: attention span and concentration appear shorter than expected for age   Insight:  fair and improving   Judgment: fair and improving   Gait/Station: normal gait/station, normal balance   Motor Activity: no abnormal movements     Progress Toward Goals: making gradual improvement.  Deyvi continues to require inpatient psychiatric hospitalization for continued medication management and titration to optimize symptom reduction, improve sleep hygiene, and demonstrate adequate self-care.     Suicide/Homicide Risk  Assessment:  Risk of Harm to Self:   Nursing Suicide Risk Assessment Last 24 hours: C-SSRS Risk (Since Last Contact)  Calculated C-SSRS Risk Score (Since Last Contact): No Risk Indicated    Risk of Harm to Others:  Nursing Homicide Risk Assessment: Violence Risk to Others: Denies within past 6 months    Behavioral Health Medications: all current active meds have been reviewed and continue current psychiatric medications.  Current Facility-Administered Medications   Medication Dose Route Frequency Provider Last Rate    acetaminophen  650 mg Oral Q6H PRN ALVINA Harley      acetaminophen  650 mg Oral Q4H PRN ALVINA Harley      acetaminophen  975 mg Oral Q6H PRN ALVINA Harley      aluminum-magnesium hydroxide-simethicone  30 mL Oral Q4H PRN ALVINA Harley      ammonium lactate   Topical BID PRN ALVINA Harley      atorvastatin  10 mg Oral Daily ALVINA Lewis      benztropine  1 mg Intramuscular Q4H PRN Max 6/day ALVINA Harley      benztropine  1 mg Oral Q4H PRN Max 6/day ALVINA Harley      bisacodyl  10 mg Rectal Daily PRN ALVINA Harley      cariprazine  3 mg Oral Daily Martin Cardenas MD      Cholecalciferol  2,000 Units Oral Daily ALVINA Lewis      cyanocobalamin  1,000 mcg Oral Daily ALVINA Lewis      hydrOXYzine HCL  25 mg Oral Q6H PRN Max 4/day ALVINA Harley      hydrOXYzine HCL  25 mg Oral TID Martin Cardenas MD      hydrOXYzine HCL  50 mg Oral Q4H PRN Max 4/day ALVINA Harley      Or    LORazepam  1 mg Intramuscular Q4H PRN ALVINA Harley      lamoTRIgine  50 mg Oral Daily Martin Cardenas MD      lisinopril  10 mg Oral Daily ALVINA Lewis      LORazepam  1 mg Oral Q4H PRN Max 6/day ALVINA Harley      Or    LORazepam  2 mg Intramuscular Q6H PRN Max 3/day ALVINA Harley      OLANZapine  10 mg Oral Q3H PRN Max 3/day ALVINA Harley      Or    OLANZapine  10 mg Intramuscular Q3H PRN Max 3/day  ALVINA Harley      OLANZapine  5 mg Oral Q3H PRN Max 6/day ALVINA Harley      Or    OLANZapine  5 mg Intramuscular Q3H PRN Max 6/day ALVINA Harley      OLANZapine  2.5 mg Oral Q3H PRN Max 8/day ALVINA Harley      polyethylene glycol  17 g Oral Daily PRN ALVINA Harley      propranolol  10 mg Oral Q8H PRN ALVINA Harley      senna-docusate sodium  1 tablet Oral Daily PRN ALVINA Harley      sertraline  150 mg Oral HS Martin Cardenas MD       Risks / Benefits of Treatment:  Risks, benefits, and possible side effects of medications explained to patient. Patient has limited understanding of risks and benefits of treatment at this time, but agrees to take medications as prescribed.    Counseling / Coordination of Care:  A description of the counseling / coordination of care:   Patient's progress discussed with staff in treatment team meeting.  Medications, treatment progress and treatment plan reviewed with patient.   Educated on importance of medication and treatment compliance.  Reassurance and supportive therapy provided.   Encouraged participation in milieu and group therapy on the unit.    ALVINA Harley 10/09/24

## 2024-10-09 NOTE — PROGRESS NOTES
10/09/24 0739   Team Meeting   Meeting Type Daily Rounds   Team Members Present   Team Members Present Physician;Nurse;;Other (Discipline and Name)   Physician Team Member Holter, Thomas   Nursing Team Member Chastity   Social Work Team Member Henrry FRY   Other (Discipline and Name) Goff PCM   Patient/Family Present   Patient Present No   Patient's Family Present No     Groups Participation  2.10   Patient's compliant with medications. He has minimal engagement in his treatment. Pending ACT sign on after 10/15. Patient has a flat affect and isolates. [facility] referral pending.

## 2024-10-09 NOTE — ASSESSMENT & PLAN NOTE
Still progressing  Awaiting placement - CRR interview complete, ACT interview complete, awaiting next steps  Continue medications as follows:, Vraylar 3mg PO Daily, Atarax 25mg PO TID, Lamictal 50mg PO Daily, Zoloft 150mg PO QHS  Continue with group therapy, milieu therapy and occupational therapy   Behavioral Health checks every 7 minutes   Continue frequent safety checks and vitals per unit protocol  Continue with [facility] medical management as indicated

## 2024-10-09 NOTE — NURSING NOTE
Pt is visible on the milieu and isolative to self. He consumed 100 % of dinner. Took his medications without incidence. Pt is polite and cooperative. Guarded, flat affect, and with minimal conversation. Denied all psychiatric symptoms. Pt offers no complaints. No group attendance. No unmet needs. No behavioral issues.

## 2024-10-10 PROCEDURE — 99232 SBSQ HOSP IP/OBS MODERATE 35: CPT | Performed by: PSYCHIATRY & NEUROLOGY

## 2024-10-10 RX ORDER — KETOCONAZOLE 20 MG/ML
SHAMPOO, SUSPENSION TOPICAL DAILY PRN
Status: DISCONTINUED | OUTPATIENT
Start: 2024-10-10 | End: 2024-12-04 | Stop reason: HOSPADM

## 2024-10-10 RX ADMIN — HYDROXYZINE HYDROCHLORIDE 25 MG: 25 TABLET ORAL at 17:06

## 2024-10-10 RX ADMIN — HYDROXYZINE HYDROCHLORIDE 25 MG: 25 TABLET ORAL at 08:13

## 2024-10-10 RX ADMIN — ATORVASTATIN CALCIUM 10 MG: 10 TABLET, FILM COATED ORAL at 08:13

## 2024-10-10 RX ADMIN — LAMOTRIGINE 50 MG: 25 TABLET ORAL at 08:13

## 2024-10-10 RX ADMIN — CYANOCOBALAMIN TAB 1000 MCG 1000 MCG: 1000 TAB at 08:13

## 2024-10-10 RX ADMIN — LISINOPRIL 10 MG: 10 TABLET ORAL at 08:13

## 2024-10-10 RX ADMIN — CARIPRAZINE 3 MG: 3 CAPSULE, GELATIN COATED ORAL at 08:13

## 2024-10-10 RX ADMIN — CHOLECALCIFEROL TAB 25 MCG (1000 UNIT) 2000 UNITS: 25 TAB at 08:13

## 2024-10-10 RX ADMIN — SERTRALINE HYDROCHLORIDE 150 MG: 100 TABLET ORAL at 21:07

## 2024-10-10 RX ADMIN — HYDROXYZINE HYDROCHLORIDE 25 MG: 25 TABLET ORAL at 21:07

## 2024-10-10 NOTE — NURSING NOTE
Patient more isolative today, attending no groups, though he is out for meals with good appetite. Pt reports no s/s, voices no concerns. Pt does take all medications without issue.

## 2024-10-10 NOTE — PLAN OF CARE
Problem: Alteration in Thoughts and Perception  Goal: Treatment Goal: Gain control of psychotic behaviors/thinking, reduce/eliminate presenting symptoms and demonstrate improved reality functioning upon discharge  Outcome: Progressing  Goal: Refrain from acting on delusional thinking/internal stimuli  Description: Interventions:  - Monitor patient closely, per order   - Utilize least restrictive measures   - Set reasonable limits, give positive feedback for acceptable   - Administer medications as ordered and monitor of potential side effects  Outcome: Progressing  Goal: Agree to be compliant with medication regime, as prescribed and report medication side effects  Description: Interventions:  - Offer appropriate PRN medication and supervise ingestion; conduct AIMS, as needed   Outcome: Progressing  Goal: Attend and participate in unit activities, including therapeutic, recreational, and educational groups  Description: Interventions:  -Encourage Visitation and family involvement in care  Outcome: Progressing  Goal: Recognize dysfunctional thoughts, communicate reality-based thoughts at the time of discharge  Description: Interventions:  - Provide medication and psycho-education to assist patient in compliance and developing insight into his/her illness   Outcome: Progressing     Problem: Ineffective Coping  Goal: Identifies ineffective coping skills  Outcome: Progressing     Problem: Risk for Self Injury/Neglect  Goal: Treatment Goal: Remain safe during length of stay, learn and adopt new coping skills, and be free of self-injurious ideation, impulses and acts at the time of discharge  Outcome: Progressing  Goal: Recognize maladaptive responses and adopt new coping mechanisms  Outcome: Progressing     Problem: Depression  Goal: Treatment Goal: Demonstrate behavioral control of depressive symptoms, verbalize feelings of improved mood/affect, and adopt new coping skills prior to discharge  Outcome: Progressing      Problem: Anxiety  Goal: Anxiety is at manageable level  Description: Interventions:  - Assess and monitor patient's anxiety level.   - Monitor for signs and symptoms (heart palpitations, chest pain, shortness of breath, headaches, nausea, feeling jumpy, restlessness, irritable, apprehensive).   - Collaborate with interdisciplinary team and initiate plan and interventions as ordered.  - [place] patient to unit/surroundings  - Explain treatment plan  - Encourage participation in care  - Encourage verbalization of concerns/fears  - Identify coping mechanisms  - Assist in developing anxiety-reducing skills  - Administer/offer alternative therapies  - Limit or eliminate stimulants  Outcome: Progressing     Problem: ANXIETY  Goal: Will report anxiety at manageable levels  Description: INTERVENTIONS:  - Administer medication as ordered  - Teach and encourage coping skills  - Provide emotional support  - Assess patient/family for anxiety and ability to cope  Outcome: Progressing     Problem: Depression  Goal: Refrain from isolation  Description: Interventions:  - Develop a trusting relationship   - Encourage socialization   Outcome: Not Progressing

## 2024-10-10 NOTE — NURSING NOTE
Patient remains quiet, mostly isolative with not much interactions with peers. Pt reports no s/s, takes medications without issue and offers no complaints.

## 2024-10-10 NOTE — PROGRESS NOTES
Progress Note - Behavioral Health   Name: Deyvi Cao 54 y.o. male I MRN: [id number]   Unit/Bed#: [facility]02 I Date of Admission: 6/25/2024   Date of Service: 10/10/2024 I Hospital Day: 107     Assessment & Plan  Bipolar disorder with severe depression (HCC)  Still making progressing  Awaiting placement - CRR interview complete, ACT interview complete, awaiting next steps  Continue medications as follows:, Vraylar 3mg PO Daily, Atarax 25mg PO TID, Lamictal 50mg PO Daily, Zoloft 150mg PO QHS  Continue with group therapy, milieu therapy and occupational therapy   Behavioral Health checks every 7 minutes   Continue frequent safety checks and vitals per unit protocol  Continue with [facility] medical management as indicated    Benign essential hypertension  Managed by [facility]  Continue Lisinopril 10mg PO Daily    Mixed hyperlipidemia  Managed by [facility]  Continue Lipitor 10mg PO Daily    Allergic rhinitis due to allergen  Managed by [facility]    Rosacea  Managed by [facility]     Subjective:    Patient was seen today for continuation of care, records reviewed and patient was discussed with the morning case review team.    Deyvi was seen today for psychiatric follow-up.  On assessment today, Deyvi was found laying in his bed.  He quiet, guarded at times, but overall doing well.  Reports mild depression and anxiety, mostly relating to LOS and discharge planning.  Deyvi reports adequate daytime energy and denies any difficulties with initiating or staying asleep.  Oral appetite and hydration is adequate.  He has no other concerns or questions.  Patient reports showering and washing his hair, however appears to have some dandruff.  Will order ketoconazole shampoo.  We reviewed once more the specific as-needed medications they can use going forward if they experience any insomnia or destabilization of their mood, they understood and were agreeable. Milieu visibility and group attendance encouraged to promote an active participation in  treatment.    Deyvi denies acute suicidal/self-harm ideation/intent/plan upon direct inquiry at this time. Deyvi is able to contract for safety while on the unit and would feel comfortable seeking staff support should suicidal symptoms or urges appear or worsen. Deyvi remains behaviorally appropriate, no agitation or aggression noted on exam or in report. Deyvi also denies HI/AH/VH, and does not appear overtly manic.  Patient does not verbalize any experiences that can be categorized as paranoid, persecutory, bizarre, or somatic delusions. Deyvi remains adherent to his current psychotropic medication regimen and denies any side effects from medications, as well as none noted on exam.    Deyvi is currently assessed as being at their baseline with continued need for medication management, supervision for safety and ADL’s. These services are not currently available in a less restrictive environment necessitating continued hospital stay.  Deyvi should remain on the unit until these services are available, due to likelihood of mental decompensation and readmission if discharged to an unsupervised setting.  Assertive discharge planning and collaboration with multiple providers (inpatient and community based) remains ongoing.  Deyvi is currently awaiting for [facility] CRR.    Group Attendance: 2 / 9  Treatment Team: TBD  Psychiatric PRN's Needed: None    Review of Systems:  Behavior over the last 24 hours: Unchanged  Sleep: sleeping okay throughout the night  Appetite: adequate  Medication side effects: none reported  ROS:no complaints    Objective:    Vitals:  Vitals:    10/10/24 0738   BP: 132/79   Pulse: 98   Resp: 18   Temp: 97.9 °F (36.6 °C)   SpO2: 97%     Laboratory Results:  I have personally reviewed all pertinent laboratory/tests results.  Most Recent Labs:   Lab Results   Component Value Date    WBC 7.39 06/26/2024    RBC 4.70 06/26/2024    HGB 15.2 06/26/2024    HCT 45.5 06/26/2024      06/26/2024    RDW 12.9 06/26/2024    NEUTROABS 4.63 06/26/2024    SODIUM 137 06/26/2024    K 4.1 06/26/2024     06/26/2024    CO2 26 06/26/2024    BUN 17 06/26/2024    CREATININE 0.63 06/26/2024    GLUC 98 06/26/2024    GLUF 98 06/26/2024    CALCIUM 9.6 06/26/2024    AST 25 06/26/2024    ALT 45 06/26/2024    ALKPHOS 114 (H) 06/26/2024    TP 7.0 06/26/2024    ALB 4.4 06/26/2024    TBILI 0.44 06/26/2024    CHOLESTEROL 177 06/26/2024    HDL 52 06/26/2024    TRIG 73 06/26/2024    LDLCALC 110 (H) 06/26/2024    NONHDLC 125 06/26/2024    LITHIUM 0.4 (L) 01/28/2021    [id number] 2.636 06/26/2024    HGBA1C 5.2 11/22/2023     11/22/2023     Mental Status Evaluation:  Appearance:  age appropriate, casually dressed   Behavior:  cooperative, calm   Speech:  normal volume   Mood:  less anxious, less depressed   Affect:  constricted   Thought Process:  goal directed   Associations: intact associations   Thought Content:  no overt delusions   Perceptual Disturbances: no auditory hallucinations, no visual hallucinations, denies when asked, does not appear responding to internal stimuli   Risk Potential: Suicidal ideation - None at present, contracts for safety on the unit, would talk to staff if not feeling safe on the unit  Homicidal ideation - None at present  Potential for aggression - Not at present   Sensorium:  oriented to person, place, and time/date   Memory:  recent memory intact   Consciousness:  alert and awake   Attention/Concentration: attention span and concentration appear shorter than expected for age   Insight:  fair and improving   Judgment: fair and improving   Gait/Station: normal gait/station, normal balance   Motor Activity: no abnormal movements     Progress Toward Goals: making gradual improvement.  Deyvi continues to require inpatient psychiatric hospitalization for continued medication management and titration to optimize symptom reduction, improve sleep hygiene, and demonstrate adequate  self-care.     Suicide/Homicide Risk Assessment:  Risk of Harm to Self:   Nursing Suicide Risk Assessment Last 24 hours: C-SSRS Risk (Since Last Contact)  Calculated C-SSRS Risk Score (Since Last Contact): No Risk Indicated    Risk of Harm to Others:  Nursing Homicide Risk Assessment: Violence Risk to Others: Denies within past 6 months    Behavioral Health Medications: all current active meds have been reviewed and continue current psychiatric medications.  Current Facility-Administered Medications   Medication Dose Route Frequency Provider Last Rate    acetaminophen  650 mg Oral Q6H PRN ALVINA Harley      acetaminophen  650 mg Oral Q4H PRN ALVINA Harley      acetaminophen  975 mg Oral Q6H PRN ALVINA Harley      aluminum-magnesium hydroxide-simethicone  30 mL Oral Q4H PRN ALVINA Harley      ammonium lactate   Topical BID PRN ALVINA Harley      atorvastatin  10 mg Oral Daily ALVINA Lewis      benztropine  1 mg Intramuscular Q4H PRN Max 6/day ALVINA Harley      benztropine  1 mg Oral Q4H PRN Max 6/day ALVINA Harley      bisacodyl  10 mg Rectal Daily PRN ALVINA Harley      cariprazine  3 mg Oral Daily Martin Cardenas MD      Cholecalciferol  2,000 Units Oral Daily ALVINA Lewis      cyanocobalamin  1,000 mcg Oral Daily ALVINA Lewis      hydrOXYzine HCL  25 mg Oral Q6H PRN Max 4/day ALVINA Harley      hydrOXYzine HCL  25 mg Oral TID Martin Cardenas MD      hydrOXYzine HCL  50 mg Oral Q4H PRN Max 4/day ALVINA Harley      Or    LORazepam  1 mg Intramuscular Q4H PRN ALVINA Harley      lamoTRIgine  50 mg Oral Daily Martin Cardenas MD      lisinopril  10 mg Oral Daily ALVINA Lewis      LORazepam  1 mg Oral Q4H PRN Max 6/day ALVINA Harley      Or    LORazepam  2 mg Intramuscular Q6H PRN Max 3/day ALVINA Harley      OLANZapine  10 mg Oral Q3H PRN Max 3/day ALVINA Harley      Or    OLANZapine  10 mg  Intramuscular Q3H PRN Max 3/day ALVINA Harley      OLANZapine  5 mg Oral Q3H PRN Max 6/day ALVINA Harley      Or    OLANZapine  5 mg Intramuscular Q3H PRN Max 6/day ALVINA Harley      OLANZapine  2.5 mg Oral Q3H PRN Max 8/day ALVINA Harley      polyethylene glycol  17 g Oral Daily PRN ALVINA Harley      propranolol  10 mg Oral Q8H PRN ALVINA Harley      senna-docusate sodium  1 tablet Oral Daily PRN ALVINA Harley      sertraline  150 mg Oral HS Martin Cardenas MD       Risks / Benefits of Treatment:  Risks, benefits, and possible side effects of medications explained to patient. Patient has limited understanding of risks and benefits of treatment at this time, but agrees to take medications as prescribed.    Counseling / Coordination of Care:  Patient's progress discussed with staff in treatment team meeting.  Medications, treatment progress and treatment plan reviewed with patient.   Educated on importance of medication and treatment compliance.  Reassurance and supportive therapy provided.   Encouraged participation in milieu and group therapy on the unit.    ALVINA Harley 10/10/24

## 2024-10-10 NOTE — ASSESSMENT & PLAN NOTE
Still making progressing  Awaiting placement - CRR interview complete, ACT interview complete, awaiting next steps  Continue medications as follows:, Vraylar 3mg PO Daily, Atarax 25mg PO TID, Lamictal 50mg PO Daily, Zoloft 150mg PO QHS  Continue with group therapy, milieu therapy and occupational therapy   Behavioral Health checks every 7 minutes   Continue frequent safety checks and vitals per unit protocol  Continue with [facility] medical management as indicated

## 2024-10-10 NOTE — SOCIAL WORK
This writer met with patient  for an individual session. This writer provided patient with his mail. This writer discussed we are awaiting ACT to sign on patient and the decision will be finalized next week. Patient reports he's doing well and awaiting discharge to [place].

## 2024-10-10 NOTE — NURSING NOTE
Pt is visible in the milieu and isolative to self. He consumed 100 % of dinner. Took his medications without incidence. Pt is polite and cooperative. Flat affect, guarded, but does smile when approached. Denied all psychiatric symptoms. Pt offered no complaints. No behavioral issues.

## 2024-10-10 NOTE — PROGRESS NOTES
10/10/24 0731   Team Meeting   Meeting Type Daily Rounds   Team Members Present   Team Members Present Physician;Nurse;;Other (Discipline and Name)   Physician Team Member Zenia Cardenas   Nursing Team Member Chastity   Social Work Team Member Henrry FRY   Other (Discipline and Name) Ugo Baliey MS   Patient/Family Present   Patient Present No   Patient's Family Present No     Groups Participation  2/9.   Patient's compliant with medications. He's depressed and isolates at times. Pending ACT services. [facility] referral pending.

## 2024-10-11 PROCEDURE — 99232 SBSQ HOSP IP/OBS MODERATE 35: CPT | Performed by: PSYCHIATRY & NEUROLOGY

## 2024-10-11 RX ADMIN — HYDROXYZINE HYDROCHLORIDE 25 MG: 25 TABLET ORAL at 21:29

## 2024-10-11 RX ADMIN — CHOLECALCIFEROL TAB 25 MCG (1000 UNIT) 2000 UNITS: 25 TAB at 08:05

## 2024-10-11 RX ADMIN — ATORVASTATIN CALCIUM 10 MG: 10 TABLET, FILM COATED ORAL at 08:05

## 2024-10-11 RX ADMIN — HYDROXYZINE HYDROCHLORIDE 25 MG: 25 TABLET ORAL at 17:02

## 2024-10-11 RX ADMIN — LAMOTRIGINE 50 MG: 25 TABLET ORAL at 08:05

## 2024-10-11 RX ADMIN — CARIPRAZINE 3 MG: 3 CAPSULE, GELATIN COATED ORAL at 08:06

## 2024-10-11 RX ADMIN — LISINOPRIL 10 MG: 10 TABLET ORAL at 08:06

## 2024-10-11 RX ADMIN — CYANOCOBALAMIN TAB 1000 MCG 1000 MCG: 1000 TAB at 08:06

## 2024-10-11 RX ADMIN — HYDROXYZINE HYDROCHLORIDE 25 MG: 25 TABLET ORAL at 08:06

## 2024-10-11 RX ADMIN — SERTRALINE HYDROCHLORIDE 150 MG: 100 TABLET ORAL at 21:29

## 2024-10-11 NOTE — PROGRESS NOTES
Progress Note - Behavioral Health   Name: Deyvi Cao 54 y.o. male I MRN: [id number]  Unit/Bed#: [facility] 101-02 I Date of Admission: 6/25/2024   Date of Service: 10/11/2024 I Hospital Day: 108     Assessment & Plan  Bipolar disorder with severe depression (HCC)  Still making progressing - 10/11/2024  Awaiting placement - CRR interview complete, ACT interview complete, awaiting next steps  Continue medications as follows:, Vraylar 3mg PO Daily, Atarax 25mg PO TID, Lamictal 50mg PO Daily, Zoloft 150mg PO QHS  Continue with group therapy, milieu therapy and occupational therapy   Behavioral Health checks every 7 minutes   Continue frequent safety checks and vitals per unit protocol  Continue with [facility] medical management as indicated    Benign essential hypertension  Managed by [facility]  10/11/2024 - This is a chronic condition, this is stable unless otherwise noted  Continue Lisinopril 10mg PO Daily    Mixed hyperlipidemia  Managed by [facility] - 10/11/2024  10/11/2024 - This is a chronic condition, this is stable unless otherwise noted  Continue Lipitor 10mg PO Daily    Allergic rhinitis due to allergen  Managed by [facility]   10/11/2024 - This is a chronic condition, this is stable unless otherwise noted    Rosacea  Managed by [facility]  10/11/2024 - This is a chronic condition, this is stable unless otherwise noted    Subjective:    Patient was seen today for continuation of care, records reviewed and patient was discussed with the morning case review team.    Deyvi was seen today for psychiatric follow-up.  On assessment today, Deyvi was found in his room.  He is guarded, reports mild anxiety relating to LOS.  Seems more discouraged recently,  down at times.  Deyvi reports adequate daytime energy and denies any difficulties with initiating or staying asleep.  Oral appetite and hydration is adequate.  We reviewed once more the specific as-needed medications they can use going forward if they experience any insomnia or  destabilization of their mood, they understood and were agreeable. Milieu visibility and group attendance encouraged to promote an active participation in treatment.    Deyvi denies acute suicidal/self-harm ideation/intent/plan upon direct inquiry at this time. Deyvi is able to contract for safety while on the unit and would feel comfortable seeking staff support should suicidal symptoms or urges appear or worsen. Deyvi remains behaviorally appropriate, no agitation or aggression noted on exam or in report. Deyvi also denies HI/AH/VH, and does not appear overtly manic.  Patient does not verbalize any experiences that can be categorized as paranoid, persecutory, bizarre, or somatic delusions. Deyvi remains adherent to his current psychotropic medication regimen and denies any side effects from medications, as well as none noted on exam.    Deyvi is currently assessed as being at their baseline with continued need for medication management, supervision for safety and ADL’s. These services are not currently available in a less restrictive environment necessitating continued hospital stay.  Deyvi should remain on the unit until these services are available, due to likelihood of mental decompensation and readmission if discharged to an unsupervised setting.  Assertive discharge planning and collaboration with multiple providers (inpatient and community based) remains ongoing.  Deyvi is currently awaiting for [facility]    Group Attendance: 0 / 11  Treatment Team: TBD  Psychiatric PRN's Needed: None    Review of Systems:  Behavior over the last 24 hours: Unchanged  Sleep: sleeping okay throughout the night  Appetite: adequate  Medication side effects: none reported  ROS:no complaints    Objective:    Vitals:  Vitals:    10/11/24 0732   BP: 141/78   Pulse: 98   Resp: 18   Temp: 97.6 °F (36.4 °C)   SpO2: 95%     Laboratory Results:  I have personally reviewed all pertinent laboratory/tests results.  Most  Recent Labs:   Lab Results   Component Value Date    WBC 7.39 06/26/2024    RBC 4.70 06/26/2024    HGB 15.2 06/26/2024    HCT 45.5 06/26/2024     06/26/2024    RDW 12.9 06/26/2024    NEUTROABS 4.63 06/26/2024    SODIUM 137 06/26/2024    K 4.1 06/26/2024     06/26/2024    CO2 26 06/26/2024    BUN 17 06/26/2024    CREATININE 0.63 06/26/2024    GLUC 98 06/26/2024    GLUF 98 06/26/2024    CALCIUM 9.6 06/26/2024    AST 25 06/26/2024    ALT 45 06/26/2024    ALKPHOS 114 (H) 06/26/2024    TP 7.0 06/26/2024    ALB 4.4 06/26/2024    TBILI 0.44 06/26/2024    CHOLESTEROL 177 06/26/2024    HDL 52 06/26/2024    TRIG 73 06/26/2024    LDLCALC 110 (H) 06/26/2024    NONHDLC 125 06/26/2024    LITHIUM 0.4 (L) 01/28/2021    [id number] 2.636 06/26/2024    HGBA1C 5.2 11/22/2023     11/22/2023     Mental Status Evaluation:  Appearance:  age appropriate, casually dressed, dandruff noted on scalp   Behavior:  cooperative, calm   Speech:  normal volume   Mood:  less anxious, less depressed   Affect:  constricted   Thought Process:  goal directed   Associations: intact associations   Thought Content:  no overt delusions   Perceptual Disturbances: no auditory hallucinations, no visual hallucinations, denies when asked, does not appear responding to internal stimuli   Risk Potential: Suicidal ideation - None at present, contracts for safety on the unit, would talk to staff if not feeling safe on the unit  Homicidal ideation - None at present  Potential for aggression - Not at present   Sensorium:  oriented to person, place, and time/date   Memory:  recent memory intact   Consciousness:  alert and awake   Attention/Concentration: attention span and concentration appear shorter than expected for age   Insight:  fair and improving   Judgment: fair and improving   Gait/Station: normal gait/station, normal balance   Motor Activity: no abnormal movements     Progress Toward Goals: making slow improvement.  Deyvi continues to  require inpatient psychiatric hospitalization for continued medication management and titration to optimize symptom reduction, improve sleep hygiene, and demonstrate adequate self-care.     Suicide/Homicide Risk Assessment:  Risk of Harm to Self:   Nursing Suicide Risk Assessment Last 24 hours: C-SSRS Risk (Since Last Contact)  Calculated C-SSRS Risk Score (Since Last Contact): No Risk Indicated    Risk of Harm to Others:  Nursing Homicide Risk Assessment: Violence Risk to Others: Denies within past 6 months    Behavioral Health Medications: all current active meds have been reviewed and continue current psychiatric medications.  Current Facility-Administered Medications   Medication Dose Route Frequency Provider Last Rate    acetaminophen  650 mg Oral Q6H PRN ALVINA Harley      acetaminophen  650 mg Oral Q4H PRN ALVINA Harley      acetaminophen  975 mg Oral Q6H PRN ALVINA Harley      aluminum-magnesium hydroxide-simethicone  30 mL Oral Q4H PRN ALVINA Harley      ammonium lactate   Topical BID PRN ALVINA Harley      atorvastatin  10 mg Oral Daily ALVINA Lewis      benztropine  1 mg Intramuscular Q4H PRN Max 6/day ALVIAN Harley      benztropine  1 mg Oral Q4H PRN Max 6/day ALVINA Harley      bisacodyl  10 mg Rectal Daily PRN ALVINA Harley      cariprazine  3 mg Oral Daily Martin Cardenas MD      Cholecalciferol  2,000 Units Oral Daily ALVINA Lewis      cyanocobalamin  1,000 mcg Oral Daily ALVINA Lewis      hydrOXYzine HCL  25 mg Oral Q6H PRN Max 4/day ALVINA Harley      hydrOXYzine HCL  25 mg Oral TID Martin Cardenas MD      hydrOXYzine HCL  50 mg Oral Q4H PRN Max 4/day ALVINA Harley      Or    LORazepam  1 mg Intramuscular Q4H PRN ALVINA Harley      ketoconazole   Topical Daily PRN ALVINA Harley      lamoTRIgine  50 mg Oral Daily Martin Cardenas MD      lisinopril  10 mg Oral Daily ALVINA Lewis       LORazepam  1 mg Oral Q4H PRN Max 6/day ALVINA Harley      Or    LORazepam  2 mg Intramuscular Q6H PRN Max 3/day ALVINA Harley      OLANZapine  10 mg Oral Q3H PRN Max 3/day ALVINA Harley      Or    OLANZapine  10 mg Intramuscular Q3H PRN Max 3/day ALVINA Harley      OLANZapine  5 mg Oral Q3H PRN Max 6/day ALVINA Harley      Or    OLANZapine  5 mg Intramuscular Q3H PRN Max 6/day ALVINA Harley      OLANZapine  2.5 mg Oral Q3H PRN Max 8/day ALVINA Harley      polyethylene glycol  17 g Oral Daily PRN ALVINA Harley      propranolol  10 mg Oral Q8H PRN ALVINA Harley      senna-docusate sodium  1 tablet Oral Daily PRN ALVINA Harley      sertraline  150 mg Oral HS Martin Cardenas MD       Risks / Benefits of Treatment:  Risks, benefits, and possible side effects of medications explained to patient. Patient has limited understanding of risks and benefits of treatment at this time, but agrees to take medications as prescribed.    Counseling / Coordination of Care:  Patient's progress discussed with staff in treatment team meeting.  Medications, treatment progress and treatment plan reviewed with patient.   Educated on importance of medication and treatment compliance.  Reassurance and supportive therapy provided.   Encouraged participation in milieu and group therapy on the unit.    ALVINA Harley 10/11/24

## 2024-10-11 NOTE — PLAN OF CARE
Problem: Alteration in Thoughts and Perception  Goal: Treatment Goal: Gain control of psychotic behaviors/thinking, reduce/eliminate presenting symptoms and demonstrate improved reality functioning upon discharge  Outcome: Progressing  Goal: Verbalize thoughts and feelings  Description: Interventions:  - Promote a nonjudgmental and trusting relationship with the patient through active listening and therapeutic communication  - Assess patient's level of functioning, behavior and potential for risk  - Engage patient in 1 on 1 interactions  - Encourage patient to express fears, feelings, frustrations, and discuss symptoms    - [place] patient to reality, help patient recognize reality-based thinking   - Administer medications as ordered and assess for potential side effects  - Provide the patient education related to the signs and symptoms of the illness and desired effects of prescribed medications  Outcome: Progressing  Goal: Refrain from acting on delusional thinking/internal stimuli  Description: Interventions:  - Monitor patient closely, per order   - Utilize least restrictive measures   - Set reasonable limits, give positive feedback for acceptable   - Administer medications as ordered and monitor of potential side effects  Outcome: Progressing  Goal: Agree to be compliant with medication regime, as prescribed and report medication side effects  Description: Interventions:  - Offer appropriate PRN medication and supervise ingestion; conduct AIMS, as needed   Outcome: Progressing  Goal: Attend and participate in unit activities, including therapeutic, recreational, and educational groups  Description: Interventions:  -Encourage Visitation and family involvement in care  Outcome: Progressing  Goal: Recognize dysfunctional thoughts, communicate reality-based thoughts at the time of discharge  Description: Interventions:  - Provide medication and psycho-education to assist patient in compliance and developing  insight into his/her illness   Outcome: Progressing  Goal: Complete daily ADLs, including personal hygiene independently, as able  Description: Interventions:  - Observe, teach, and assist patient with ADLS  - Monitor and promote a balance of rest/activity, with adequate nutrition and elimination   Outcome: Progressing     Problem: Ineffective Coping  Goal: Identifies ineffective coping skills  Outcome: Progressing  Goal: Identifies healthy coping skills  Outcome: Progressing  Goal: Demonstrates healthy coping skills  Outcome: Progressing     Problem: Risk for Self Injury/Neglect  Goal: Treatment Goal: Remain safe during length of stay, learn and adopt new coping skills, and be free of self-injurious ideation, impulses and acts at the time of discharge  Outcome: Progressing     Problem: Depression  Goal: Treatment Goal: Demonstrate behavioral control of depressive symptoms, verbalize feelings of improved mood/affect, and adopt new coping skills prior to discharge  Outcome: Progressing     Problem: Anxiety  Goal: Anxiety is at manageable level  Description: Interventions:  - Assess and monitor patient's anxiety level.   - Monitor for signs and symptoms (heart palpitations, chest pain, shortness of breath, headaches, nausea, feeling jumpy, restlessness, irritable, apprehensive).   - Collaborate with interdisciplinary team and initiate plan and interventions as ordered.  - [place] patient to unit/surroundings  - Explain treatment plan  - Encourage participation in care  - Encourage verbalization of concerns/fears  - Identify coping mechanisms  - Assist in developing anxiety-reducing skills  - Administer/offer alternative therapies  - Limit or eliminate stimulants  Outcome: Progressing     Problem: SELF HARM/SUICIDALITY  Goal: Will have no self-injury during hospital stay  Description: INTERVENTIONS:  - Q 15 MINUTES: Routine safety checks  - Q WAKING SHIFT & PRN: Assess risk to determine if routine checks are adequate to  maintain patient safety  - Encourage patient to participate actively in care by formulating a plan to combat response to suicidal ideation, identify supports and resources  Outcome: Progressing     Problem: DISCHARGE PLANNING - CARE MANAGEMENT  Goal: Discharge to post-acute care or home with appropriate resources  Description: INTERVENTIONS:  - Conduct assessment to determine patient/family and health care team treatment goals, and need for post-acute services based on payer coverage, community resources, and patient preferences, and barriers to discharge  - Address psychosocial, clinical, and financial barriers to discharge as identified in assessment in conjunction with the patient/family and health care team  - Arrange appropriate level of post-acute services according to patient’s   needs and preference and payer coverage in collaboration with the physician and health care team  - Communicate with and update the patient/family, physician, and health care team regarding progress on the discharge plan  - Arrange appropriate transportation to post-acute venues  Outcome: Progressing     Problem: Ineffective Coping  Goal: Participates in unit activities  Description: Interventions:  - Provide therapeutic environment   - Provide required programming   - Redirect inappropriate behaviors   Outcome: Not Progressing     Problem: Depression  Goal: Refrain from isolation  Description: Interventions:  - Develop a trusting relationship   - Encourage socialization   Outcome: Not Progressing

## 2024-10-11 NOTE — PROGRESS NOTES
10/11/24 0742   Team Meeting   Meeting Type Daily Rounds   Team Members Present   Team Members Present Physician;Nurse;;Other (Discipline and Name)   Physician Team Member Zenia Cardenas   Nursing Team Member Chatsity   Social Work Team Member Henrry FRY   Other (Discipline and Name) [place]off PCM   Patient/Family Present   Patient Present No   Patient's Family Present No     Groups Participation  0/11.   Patient's compliant with medications. He's depressed and have a flat affect. Awaiting decision from [facility] and sign on date for [facility]. No engagement in groups yesterday.

## 2024-10-11 NOTE — ASSESSMENT & PLAN NOTE
Still making progressing - 10/11/2024  Awaiting placement - CRR interview complete, ACT interview complete, awaiting next steps  Continue medications as follows:, Vraylar 3mg PO Daily, Atarax 25mg PO TID, Lamictal 50mg PO Daily, Zoloft 150mg PO QHS  Continue with group therapy, milieu therapy and occupational therapy   Behavioral Health checks every 7 minutes   Continue frequent safety checks and vitals per unit protocol  Continue with [facility] medical management as indicated

## 2024-10-11 NOTE — NURSING NOTE
Patient quiet, pleasant and able to make needs known Pt requests and given shorn haircut today. Pt takes all medications without issue. Voices no concerns.

## 2024-10-12 PROCEDURE — 99232 SBSQ HOSP IP/OBS MODERATE 35: CPT | Performed by: PSYCHIATRY & NEUROLOGY

## 2024-10-12 RX ADMIN — HYDROXYZINE HYDROCHLORIDE 25 MG: 25 TABLET ORAL at 17:00

## 2024-10-12 RX ADMIN — LISINOPRIL 10 MG: 10 TABLET ORAL at 08:27

## 2024-10-12 RX ADMIN — HYDROXYZINE HYDROCHLORIDE 25 MG: 25 TABLET ORAL at 08:28

## 2024-10-12 RX ADMIN — LAMOTRIGINE 50 MG: 25 TABLET ORAL at 08:28

## 2024-10-12 RX ADMIN — CHOLECALCIFEROL TAB 25 MCG (1000 UNIT) 2000 UNITS: 25 TAB at 08:28

## 2024-10-12 RX ADMIN — ATORVASTATIN CALCIUM 10 MG: 10 TABLET, FILM COATED ORAL at 08:28

## 2024-10-12 RX ADMIN — SERTRALINE HYDROCHLORIDE 150 MG: 100 TABLET ORAL at 21:12

## 2024-10-12 RX ADMIN — CYANOCOBALAMIN TAB 1000 MCG 1000 MCG: 1000 TAB at 08:27

## 2024-10-12 RX ADMIN — CARIPRAZINE 3 MG: 3 CAPSULE, GELATIN COATED ORAL at 08:27

## 2024-10-12 RX ADMIN — HYDROXYZINE HYDROCHLORIDE 25 MG: 25 TABLET ORAL at 21:12

## 2024-10-12 NOTE — NURSING NOTE
Deyvi has been sleeping throughout the shift. Respirations easy and non labored. No issues or behaviors. Continue to monitor. Precautions maintained.

## 2024-10-12 NOTE — PLAN OF CARE
Problem: Alteration in Thoughts and Perception  Goal: Treatment Goal: Gain control of psychotic behaviors/thinking, reduce/eliminate presenting symptoms and demonstrate improved reality functioning upon discharge  Outcome: Progressing  Goal: Verbalize thoughts and feelings  Description: Interventions:  - Promote a nonjudgmental and trusting relationship with the patient through active listening and therapeutic communication  - Assess patient's level of functioning, behavior and potential for risk  - Engage patient in 1 on 1 interactions  - Encourage patient to express fears, feelings, frustrations, and discuss symptoms    - [place] patient to reality, help patient recognize reality-based thinking   - Administer medications as ordered and assess for potential side effects  - Provide the patient education related to the signs and symptoms of the illness and desired effects of prescribed medications  Outcome: Progressing  Goal: Refrain from acting on delusional thinking/internal stimuli  Description: Interventions:  - Monitor patient closely, per order   - Utilize least restrictive measures   - Set reasonable limits, give positive feedback for acceptable   - Administer medications as ordered and monitor of potential side effects  Outcome: Progressing  Goal: Agree to be compliant with medication regime, as prescribed and report medication side effects  Description: Interventions:  - Offer appropriate PRN medication and supervise ingestion; conduct AIMS, as needed   Outcome: Progressing

## 2024-10-12 NOTE — PROGRESS NOTES
Progress Note - Behavioral Health   Name: Deyvi Cao 54 y.o. male I MRN: [id number]  Unit/Bed#: [facility] 101-02 I Date of Admission: 6/25/2024   Date of Service: 10/12/2024 I Hospital Day: 109     Assessment & Plan  Bipolar disorder with severe depression (HCC)  Awaiting placement - CRR interview complete, ACT interview complete, awaiting next steps and hoping to meet with [facility] ACT team on 10/15/2024 for intake and further tour at the  group home and eventual placement  Continue medications as follows:, Vraylar 3mg PO Daily for mood swings and psychosis, Atarax 25mg PO TID for anxiety, Lamictal 50mg PO Daily for bipolar depression, Zoloft 150mg PO QHS for bipolar depression  Continue with group therapy, milieu therapy and occupational therapy   Behavioral Health checks every 7 minutes   Continue frequent safety checks and vitals per unit protocol  Continue with [facility] medical management as indicated  Reviewed on 10/12/2024    Benign essential hypertension  Managed by [facility]  Continue Lisinopril 10mg PO Daily  Reviewed on 10/12/2024    Mixed hyperlipidemia  Managed by [facility]  Continue Lipitor 10mg PO Daily  Reviewed on 10/12/2024    Allergic rhinitis due to allergen  Managed by [facility]  Reviewed on 10/12/2024    Rosacea  Managed by [facility]  Reviewed on 10/12/2024    Patient Active Problem List   Diagnosis    Acne    Benign essential hypertension    Mixed hyperlipidemia    Tobacco dependence syndrome    Allergic rhinitis due to allergen    Lung cancer screening declined by patient    Moderate depressed bipolar I disorder (HCC)    Bipolar disorder with severe depression (HCC)    Rosacea     Review of systems: Unremarkable  Psychiatric Diagnosis: Bipolar depression continues to be somewhat timid show I    Assessment  Overall Status: Continues to be somewhat timid and shy and withdrawn with minimal mood reactivity which is his baseline and still anxious about delay in discharge but accepting it for now with no major  outbursts or relapse of overt psychotic manic symptoms or suicidal thoughts   certification Statement: The patient will continue to require additional inpatient hospital stay due to recurrent depression and repeated suicide attempts in the community     Medications: Vraylar 3 mg a day, hydroxyzine 25 mg 3 times a day, Zoloft 150 mg a day, Lamictal 50 mg a day,  All medications reviewed   side effects from treatment: None reported  Medication changes   No significant changes but medications were being titrated as ordered   Medication education   Risks side effects benefits and precautions of medications discussed with patient and he did verbalize an understanding about risks for metabolic syndrome from being on neuroleptics and is form tardive dyskinesia etc.     Understanding of medications: Has good understanding about medications and side effects of his precautions benefits   Justification for dual anti-psychotics: Not applicable    Non-pharmacological treatments  Continue with individual, group, milieu and occupational therapy using recovery principles and psycho-education about accepting illness and the need for treatment.  Behavioral health checks every 7 minutes  Safety with the patient about illness and need for treatment    Safety  Safety and communication plan established to target dynamic risk factors discussed above.    Discharge Plan   Consider a supervised setting with an ACT team at the [facility] at the [facility] Progress   Continues to do fairly well with no major issues or concerns claiming he has not had any suicidal thoughts in several months and has not had any psychotic or manic symptoms either.  Depression is only 2 out of 10 as usual and waiting for the supportive living program for ia tour after he is picked up by the [facility] ACT team formerly on 10/15/2024.  No relapse of psychotic manic symptoms and is friendly pleasant attending some groups but  superficial in his interaction which is his baseline.  Compliant with medications no need for behavioral PRNs and not aggressive or agitated or threatening  Acceptance by patient: Accepting living at a group home  Hopefulness in recovery: Living at a group home and working with the ACT team  Involved in reintegration process: No contact with anyone in the community  trusting in relationship with psychiatrist: Trusting  Sleep: Good  Appetite: Good  Compliance with Medications: Good  Group attendance: I improving  Significant events and progress towards goals: Improving waiting for placement    Mental Status Exam  Appearance: casually dressed, dressed appropriately, adequate grooming, looks stated age  Behavior: cooperative, mildly anxious, slow responses found in the dining mckoy waiting for his laundry and over the dandruff on his scalp has now disappeared  speech: slow, scant, increased latency of response, delayed, soft, decreased volume somewhat cold and aloof as usual  Mood: depressed, dysphoric, anxious  Affect: constricted, slightly brighter, mood-congruent  Thought Process: organized, goal directed, decreased rate of thoughts, slowing of thoughts, negative thinking, concrete  Thought Content: no overt delusions, no current suicidal or homicidal thoughts and no plans verbalized.  No phobias obsessions compulsions reported.  Not appearing to respond to any delusional beliefs  Perceptual Disturbances: no auditory hallucinations, no visual hallucinations  Hx Risk Factors: chronic depressive symptoms, chronic anxiety symptoms, history of suicide attempts  Sensorium: Alert and oriented x 3 spheres and situation  Cognition: recent and remote memory grossly intact  Consciousness: alert, awake, and not sedated  Attention: attention span and concentration are age appropriate  Intellect: appears to be of average intelligence  Insight: limited  Judgement: limited  Motor Activity: no abnormal movements     Vitals  Temp:   [96.7 °F (35.9 °C)-97.5 °F (36.4 °C)] 96.7 °F (35.9 °C)  HR:  [93-97] 93  Resp:  [18] 18  BP: (120-124)/(72-74) 124/74  SpO2:  [93 %] 93 %  No intake or output data in the 24 hours ending 10/12/24 1040    Lab Results: All Labs For Current Hospital Admission Reviewed      Current Facility-Administered Medications   Medication Dose Route Frequency Provider Last Rate    acetaminophen  650 mg Oral Q6H PRN ALVINA Harley      acetaminophen  650 mg Oral Q4H PRN ALVINA Harley      acetaminophen  975 mg Oral Q6H PRN ALVINA Harley      aluminum-magnesium hydroxide-simethicone  30 mL Oral Q4H PRN ALVINA Harley      ammonium lactate   Topical BID PRN ALVINA Harley      atorvastatin  10 mg Oral Daily ALVINA Lewis      benztropine  1 mg Intramuscular Q4H PRN Max 6/day ALVINA Harley      benztropine  1 mg Oral Q4H PRN Max 6/day ALVINA Harley      bisacodyl  10 mg Rectal Daily PRN ALVINA Harley      cariprazine  3 mg Oral Daily Martin Cardenas MD      Cholecalciferol  2,000 Units Oral Daily ALVINA Lewis      cyanocobalamin  1,000 mcg Oral Daily ALVINA Lewis      hydrOXYzine HCL  25 mg Oral Q6H PRN Max 4/day ALVINA Harley      hydrOXYzine HCL  25 mg Oral TID Martin Cardenas MD      hydrOXYzine HCL  50 mg Oral Q4H PRN Max 4/day ALVINA Harley      Or    LORazepam  1 mg Intramuscular Q4H PRN ALVINA Harley      ketoconazole   Topical Daily PRN ALVINA Harley      lamoTRIgine  50 mg Oral Daily Martin Cardenas MD      lisinopril  10 mg Oral Daily ALVINA Lewis      LORazepam  1 mg Oral Q4H PRN Max 6/day ALVINA Harley      Or    LORazepam  2 mg Intramuscular Q6H PRN Max 3/day ALVINA Harley      OLANZapine  10 mg Oral Q3H PRN Max 3/day ALVINA Harley      Or    OLANZapine  10 mg Intramuscular Q3H PRN Max 3/day ALVINA Harley      OLANZapine  5 mg Oral Q3H PRN Max 6/day ALVINA Harley      Or     OLANZapine  5 mg Intramuscular Q3H PRN Max 6/day ALVINA Harley      OLANZapine  2.5 mg Oral Q3H PRN Max 8/day ALVINA Harley      polyethylene glycol  17 g Oral Daily PRN ALVINA Harley      propranolol  10 mg Oral Q8H PRN ALVINA Harley      senna-docusate sodium  1 tablet Oral Daily PRN ALVINA Harley      sertraline  150 mg Oral HS Martin Cardenas MD         Counseling / Coordination of Care: Total floor / unit time spent today 15 minutes. Greater than 50% of total time was spent with the patient and / or family counseling and / or somewhat receptive to supportive listening and teaching positive coping skills to deal with symptom mangement.     Patient's Rights, confidentiality and exceptions to confidentiality, use of automated medical record, Behavioral Health Services staff access to medical record, and consent to treatment reviewed.    This note has been dictated and hence there may be problems with punctuation, spelling and formatting and if anyone has any concerns please address them to Dr. Cardenas   This note is not shared with patient due to potential for making patient's condition worse by knowing the content of the note.

## 2024-10-12 NOTE — NURSING NOTE
Deyvi has been quiet.  Out or meals.  Socializes with select peers.  Med and meal compliant.  No behavioral issues.

## 2024-10-12 NOTE — NURSING NOTE
Alert, cooperative and visible intermittently. Isolative to self. No SI or HI noted. Denies depression, anxiety and pain. Did not attend any meals. Consumed 100% of breakfast and 100% of lunch. Took all medication without prompting. Maintained on safe precautions without incident. Will continue to monitor progress and recovery program.

## 2024-10-13 VITALS
RESPIRATION RATE: 17 BRPM | HEART RATE: 94 BPM | DIASTOLIC BLOOD PRESSURE: 81 MMHG | WEIGHT: 213 LBS | TEMPERATURE: 97.7 F | OXYGEN SATURATION: 93 % | SYSTOLIC BLOOD PRESSURE: 130 MMHG | HEIGHT: 72 IN | BODY MASS INDEX: 28.85 KG/M2

## 2024-10-13 PROCEDURE — 99232 SBSQ HOSP IP/OBS MODERATE 35: CPT | Performed by: PSYCHIATRY & NEUROLOGY

## 2024-10-13 RX ADMIN — LAMOTRIGINE 50 MG: 25 TABLET ORAL at 08:29

## 2024-10-13 RX ADMIN — ATORVASTATIN CALCIUM 10 MG: 10 TABLET, FILM COATED ORAL at 08:29

## 2024-10-13 RX ADMIN — CARIPRAZINE 3 MG: 3 CAPSULE, GELATIN COATED ORAL at 08:29

## 2024-10-13 RX ADMIN — SERTRALINE HYDROCHLORIDE 150 MG: 100 TABLET ORAL at 21:15

## 2024-10-13 RX ADMIN — HYDROXYZINE HYDROCHLORIDE 25 MG: 25 TABLET ORAL at 08:29

## 2024-10-13 RX ADMIN — HYDROXYZINE HYDROCHLORIDE 25 MG: 25 TABLET ORAL at 21:15

## 2024-10-13 RX ADMIN — CYANOCOBALAMIN TAB 1000 MCG 1000 MCG: 1000 TAB at 08:29

## 2024-10-13 RX ADMIN — HYDROXYZINE HYDROCHLORIDE 25 MG: 25 TABLET ORAL at 17:00

## 2024-10-13 RX ADMIN — CHOLECALCIFEROL TAB 25 MCG (1000 UNIT) 2000 UNITS: 25 TAB at 08:29

## 2024-10-13 RX ADMIN — LISINOPRIL 10 MG: 10 TABLET ORAL at 08:29

## 2024-10-13 NOTE — NURSING NOTE
Weekly wellness assessment completed. Pt lung sounds clear in all lung fields. No edema noted. Bowel sounds +x4. B/l pedal pulses papable. Skin intact and dryness noted to b/l feet. Skin warm and color within normal limits for patient except cyanosis noted to b/l toes.

## 2024-10-13 NOTE — PROGRESS NOTES
Progress Note - Behavioral Health   Name: Deyvi Cao 54 y.o. male I MRN: [id number]  Unit/Bed#: [facility] 101-02 I Date of Admission: 6/25/2024   Date of Service: 10/13/2024 I Hospital Day: 110     Assessment & Plan  Bipolar disorder with severe depression (HCC)  Reviewed on 10/13/2024  Doing well waiting patiently for the CR tour formula intake by [facility] ACT team on 10/15/2024   continue medications as follows:, Vraylar 3mg PO Daily for mood, Atarax 25mg PO TID for anxiety, Lamictal 50mg PO Daily and Zoloft 150mg PO QHS for bipolar depression  Continue with group therapy, milieu therapy and occupational therapy   Behavioral Health checks every 7 minutes   Continue frequent safety checks and vitals per unit protocol  Continue with [facility] medical management as indicated    Benign essential hypertension  Reviewed on 10/13/2024  Managed by [facility]  Continue Lisinopril 10mg PO Daily    Mixed hyperlipidemia  Reviewed on 10/13/2024  Managed by [facility]  Continue Lipitor 10mg PO Daily    Allergic rhinitis due to allergen  Reviewed on 10/13/2024  Managed by [facility]    Rosacea  Reviewed on 10/13/2024  Managed by [facility]      Patient Active Problem List   Diagnosis    Acne    Benign essential hypertension    Mixed hyperlipidemia    Tobacco dependence syndrome    Allergic rhinitis due to allergen    Lung cancer screening declined by patient    Moderate depressed bipolar I disorder (HCC)    Bipolar disorder with severe depression (HCC)    Rosacea     Review of systems: Unremarkable  Psychiatric Diagnosis: Bipolar depression    Assessment  Overall Status: Remains somewhat timid, shy, withdrawn with minimal mood reactivity and still anxious at times with mild dysphoria and waiting patiently to be interviewed by [facility] ACT team on 10/15/2024 when he hopes to get it date for touring the [facility] [employer] living program   All medications reviewed certification Statement: The patient will continue to require additional inpatient  hospital stay due to recurrent depression and repeated suicide attempts in the community     Medications: Vraylar 3 mg a day, hydroxyzine 25 mg 3 times a day, Zoloft 150 mg a day, Lamictal 50 mg a day,  All medications reviewed   side effects from treatment: None reported  Medication changes   No significant changes but medications were being titrated as ordered   Medication education   Risks side effects benefits and precautions of medications discussed with patient and he did verbalize an understanding about risks for metabolic syndrome from being on neuroleptics and is form tardive dyskinesia etc.     Understanding of medications: Has good understanding about medications and side effects of his precautions benefits   Justification for dual anti-psychotics: Not applicable    Non-pharmacological treatments  Continue with individual, group, milieu and occupational therapy using recovery principles and psycho-education about accepting illness and the need for treatment.  Behavioral health checks every 7 minutes  Safety with the patient about illness and need for treatment    Safety  Safety and communication plan established to target dynamic risk factors discussed above.    Discharge Plan   Waiting for touring the supportive living program and for the ACT team to pick him up on 10/15/2024    Interval Progress   Continues to wait for her residence ACT team to formulate accepting more than 10/15/2024 for language he hopes to get an appointment for touring the supportive living group home run by [facility].  Continues to report depression as 2 out of 10 which is his baseline for being here and continues to report no psychotic or manic symptoms or suicidal thoughts or self-abusive thoughts since he came to the unit remaining friendly pleasant and cooperative attending groups superficial interaction and compliant with medications with no need for behavioral PRNs and has not been aggressive/agitated/threatening.  Still  somewhat withdrawn and isolated as usual which is his baseline    Acceptance by patient: Accepting  Hopefulness in recovery: Living at a group home and working with the ACT team  Involved in reintegration process: No contact with anyone in the community  trusting in relationship with psychiatrist: Trusting  Sleep: Good  Appetite: Good  Compliance with Medications: Good  Group attendance: Improving  Significant events and progress towards goals: Improved and waiting for placement    Mental Status Exam  Appearance: casually dressed, dressed appropriately, adequate grooming, looks stated age with good eye contact  Behavior: cooperative, mildly anxious, slow responses laying on bed friendly cooperative  speech: slow, scant, increased latency of response, delayed, soft, decreased volume somewhat cold and aloof as usual  Mood: depressed, dysphoric, anxious  Affect: constricted, slightly brighter, mood-congruent  Thought Process: organized, goal directed, decreased rate of thoughts, slowing of thoughts, negative thinking, concrete  Thought Content: no overt delusions, no current suicidal or homicidal thoughts and no plans verbalized.  No phobias obsessions compulsions reported.  Not appearing to respond to any delusional beliefs  Perceptual Disturbances: no auditory hallucinations, no visual hallucinations  Hx Risk Factors: chronic depressive symptoms, chronic anxiety symptoms, history of suicide attempts  Sensorium: Oriented x 3 spheres and situation  Cognition: recent and remote memory grossly intact  Consciousness: alert, awake, and not sedated  Attention: attention span and concentration are age appropriate  Intellect: appears to be of average intelligence  Insight: limited  Judgement: limited  Motor Activity: no abnormal movements     Vitals  Temp:  [32 °F (0 °C)-97.8 °F (36.6 °C)] 97.8 °F (36.6 °C)  HR:  [97-98] 98  Resp:  [18] 18  BP: (114-122)/(73-75) 122/73  SpO2:  [98 %] 98 %  No intake or output data in the 24  hours ending 10/13/24 0826    Lab Results: All Labs For Current Hospital Admission Reviewed      Current Facility-Administered Medications   Medication Dose Route Frequency Provider Last Rate    acetaminophen  650 mg Oral Q6H PRN ALVINA Harley      acetaminophen  650 mg Oral Q4H PRN ALVINA Harley      acetaminophen  975 mg Oral Q6H PRN ALVINA Harley      aluminum-magnesium hydroxide-simethicone  30 mL Oral Q4H PRN ALVINA Harley      ammonium lactate   Topical BID PRN ALVINA Harley      atorvastatin  10 mg Oral Daily ALVINA Lewis      benztropine  1 mg Intramuscular Q4H PRN Max 6/day ALVINA Harley      benztropine  1 mg Oral Q4H PRN Max 6/day ALVINA Harley      bisacodyl  10 mg Rectal Daily PRN ALVINA Harley      cariprazine  3 mg Oral Daily Martin Cardenas MD      Cholecalciferol  2,000 Units Oral Daily ALVINA Lewis      cyanocobalamin  1,000 mcg Oral Daily ALVINA Lewis      hydrOXYzine HCL  25 mg Oral Q6H PRN Max 4/day ALVINA Harley      hydrOXYzine HCL  25 mg Oral TID Martin Cardenas MD      hydrOXYzine HCL  50 mg Oral Q4H PRN Max 4/day ALVINA Harley      Or    LORazepam  1 mg Intramuscular Q4H PRN ALVINA Harley      ketoconazole   Topical Daily PRN ALVINA Harley      lamoTRIgine  50 mg Oral Daily Martin Cardenas MD      lisinopril  10 mg Oral Daily ALVINA Lewis      LORazepam  1 mg Oral Q4H PRN Max 6/day ALVINA Harley      Or    LORazepam  2 mg Intramuscular Q6H PRN Max 3/day ALVINA Harley      OLANZapine  10 mg Oral Q3H PRN Max 3/day ALVINA Harley      Or    OLANZapine  10 mg Intramuscular Q3H PRN Max 3/day ALVINA Harley      OLANZapine  5 mg Oral Q3H PRN Max 6/day ALVINA Harley      Or    OLANZapine  5 mg Intramuscular Q3H PRN Max 6/day ALVINA Harley      OLANZapine  2.5 mg Oral Q3H PRN Max 8/day ALVINA Harley      polyethylene glycol  17 g Oral Daily PRN  ALVINA Harley      propranolol  10 mg Oral Q8H PRN ALVINA Harley      senna-docusate sodium  1 tablet Oral Daily PRN ALVINA Harley      sertraline  150 mg Oral HS Martin Cardenas MD         Counseling / Coordination of Care: Total floor / unit time spent today 15 minutes. Greater than 50% of total time was spent with the patient and / or family counseling and / or somewhat receptive to supportive listening and teaching positive coping skills to deal with symptom mangement.     Patient's Rights, confidentiality and exceptions to confidentiality, use of automated medical record, Behavioral Health Services staff access to medical record, and consent to treatment reviewed.    This note has been dictated and hence there may be problems with punctuation, spelling and formatting and if anyone has any concerns please address them to Dr. Cardenas   This note is not shared with patient due to potential for making patient's condition worse by knowing the content of the note.

## 2024-10-13 NOTE — PLAN OF CARE
Problem: Alteration in Thoughts and Perception  Goal: Complete daily ADLs, including personal hygiene independently, as able  Description: Interventions:  - Observe, teach, and assist patient with ADLS  - Monitor and promote a balance of rest/activity, with adequate nutrition and elimination   Outcome: Progressing     Problem: Ineffective Coping  Goal: Understands least restrictive measures  Description: Interventions:  - Utilize least restrictive behavior  Outcome: Progressing  Goal: Free from restraint events  Description: - Utilize least restrictive measures   - Provide behavioral interventions   - Redirect inappropriate behaviors   Outcome: Progressing     Problem: Risk for Self Injury/Neglect  Goal: Refrain from harming self  Description: Interventions:  - Monitor patient closely, per order  - Develop a trusting relationship  - Supervise medication ingestion, monitor effects and side effects   Outcome: Progressing     Problem: Depression  Goal: Refrain from harming self  Description: Interventions:  - Monitor patient closely, per order   - Supervise medication ingestion, monitor effects and side effects   Outcome: Progressing     Problem: Risk for Violence/Aggression Toward Others  Goal: Refrain from harming others  Outcome: Progressing

## 2024-10-14 PROCEDURE — 99232 SBSQ HOSP IP/OBS MODERATE 35: CPT | Performed by: PSYCHIATRY & NEUROLOGY

## 2024-10-14 RX ADMIN — HYDROXYZINE HYDROCHLORIDE 25 MG: 25 TABLET ORAL at 08:21

## 2024-10-14 RX ADMIN — HYDROXYZINE HYDROCHLORIDE 25 MG: 25 TABLET ORAL at 17:08

## 2024-10-14 RX ADMIN — LAMOTRIGINE 50 MG: 25 TABLET ORAL at 08:21

## 2024-10-14 RX ADMIN — LISINOPRIL 10 MG: 10 TABLET ORAL at 08:21

## 2024-10-14 RX ADMIN — HYDROXYZINE HYDROCHLORIDE 25 MG: 25 TABLET ORAL at 21:29

## 2024-10-14 RX ADMIN — CARIPRAZINE 3 MG: 3 CAPSULE, GELATIN COATED ORAL at 08:21

## 2024-10-14 RX ADMIN — CHOLECALCIFEROL TAB 25 MCG (1000 UNIT) 2000 UNITS: 25 TAB at 08:21

## 2024-10-14 RX ADMIN — SERTRALINE HYDROCHLORIDE 150 MG: 100 TABLET ORAL at 21:29

## 2024-10-14 RX ADMIN — CYANOCOBALAMIN TAB 1000 MCG 1000 MCG: 1000 TAB at 08:21

## 2024-10-14 RX ADMIN — ATORVASTATIN CALCIUM 10 MG: 10 TABLET, FILM COATED ORAL at 08:21

## 2024-10-14 NOTE — PROGRESS NOTES
Progress Note - Behavioral Health   Name: Deyvi Cao 54 y.o. male I MRN: [id number]   Unit/Bed#: [facility] 101-02 I Date of Admission: 6/25/2024   Date of Service: 10/14/2024 I Hospital Day: 111       Assessment & Plan  Bipolar disorder with severe depression (HCC)  Still progressing - 10/14/2024  Awaiting placement - CRR interview complete, ACT interview complete, awaiting next steps  Continue medications as follows:, Vraylar 3mg PO Daily, Atarax 25mg PO TID, Lamictal 50mg PO Daily, Zoloft 150mg PO QHS  Continue with group therapy, milieu therapy and occupational therapy   Behavioral Health checks every 7 minutes   Continue frequent safety checks and vitals per unit protocol  Continue with [facility] management as indicated    Benign essential hypertension  Managed by [facility] - reviewed 10/14  Continue Lisinopril 10mg PO Daily    Mixed hyperlipidemia  Managed by [facility] - reviewed 10/14  Continue Lipitor 10mg PO Daily    Allergic rhinitis due to allergen  Managed by [facility] - reviewed 10/14    Rosacea  Managed by [facility] - reviewed 10/14     Subjective:    Patient was seen today for continuation of care, records reviewed and patient was discussed with the morning case review team.    Deyvi was seen today for psychiatric follow-up.  On assessment today, Deyvi was found in his room.  He is quiet on approach, but polite.  Overall, no major changes in his presentation.  He still reports mild depression and anxiety but it is manageable.  Deyvi reports adequate daytime energy and denies any difficulties with initiating or staying asleep.  Oral appetite and hydration is adequate.  He is hopeful for discharge soon.   We reviewed once more the specific as-needed medications they can use going forward if they experience any insomnia or destabilization of their mood, they understood and were agreeable. Milieu visibility and group attendance encouraged to promote an active participation in treatment.    Deyvi denies acute  suicidal/self-harm ideation/intent/plan upon direct inquiry at this time. Deyvi is able to contract for safety while on the unit and would feel comfortable seeking staff support should suicidal symptoms or urges appear or worsen. Deyvi remains behaviorally appropriate, no agitation or aggression noted on exam or in report. Deyvi also denies HI/AH/VH, and does not appear overtly manic.  Patient does not verbalize any experiences that can be categorized as paranoid, persecutory, bizarre, or somatic delusions. Deyvi remains adherent to his current psychotropic medication regimen and denies any side effects from medications, as well as none noted on exam.    Deyvi is currently assessed as being at their baseline with continued need for medication management, supervision for safety and ADL’s. These services are not currently available in a less restrictive environment necessitating continued hospital stay.  Deyvi should remain on the unit until these services are available, due to likelihood of mental decompensation and readmission if discharged to an unsupervised setting.  Assertive discharge planning and collaboration with multiple providers (inpatient and community based) remains ongoing.     Group Attendance: 2 / 9  Treatment Team: This Thursday  Psychiatric PRN's Needed: None    Review of Systems:  Behavior over the last 24 hours: Slowly improving  Sleep: sleeping okay throughout the night  Appetite: adequate  Medication side effects: none reported  ROS:no complaints    Objective:    Vitals:  Vitals:    10/14/24 0732   BP: 150/89   Pulse: 98   Resp: 18   Temp: (!) 97.4 °F (36.3 °C)   SpO2: 95%     Laboratory Results:  I have personally reviewed all pertinent laboratory/tests results.  Most Recent Labs:   Lab Results   Component Value Date    WBC 7.39 06/26/2024    RBC 4.70 06/26/2024    HGB 15.2 06/26/2024    HCT 45.5 06/26/2024     06/26/2024    RDW 12.9 06/26/2024    NEUTROABS 4.63 06/26/2024     SODIUM 137 06/26/2024    K 4.1 06/26/2024     06/26/2024    CO2 26 06/26/2024    BUN 17 06/26/2024    CREATININE 0.63 06/26/2024    GLUC 98 06/26/2024    GLUF 98 06/26/2024    CALCIUM 9.6 06/26/2024    AST 25 06/26/2024    ALT 45 06/26/2024    ALKPHOS 114 (H) 06/26/2024    TP 7.0 06/26/2024    ALB 4.4 06/26/2024    TBILI 0.44 06/26/2024    CHOLESTEROL 177 06/26/2024    HDL 52 06/26/2024    TRIG 73 06/26/2024    LDLCALC 110 (H) 06/26/2024    NONHDLC 125 06/26/2024    LITHIUM 0.4 (L) 01/28/2021    DNL1OZIYQTAV 2.636 06/26/2024    HGBA1C 5.2 11/22/2023     11/22/2023       Mental Status Evaluation:  Appearance:  age appropriate, casually dressed, shaved head, no longer has dandruff   Behavior:  cooperative   Speech:  normal volume   Mood:  less anxious, less depressed   Affect:  constricted   Thought Process:  goal directed   Associations: intact associations   Thought Content:  no overt delusions   Perceptual Disturbances: no auditory hallucinations, no visual hallucinations, denies when asked, does not appear responding to internal stimuli   Risk Potential: Suicidal ideation - None at present, contracts for safety on the unit, would talk to staff if not feeling safe on the unit  Homicidal ideation - None at present  Potential for aggression - Not at present   Sensorium:  oriented to person, place, and time/date   Memory:  recent memory intact   Consciousness:  alert and awake   Attention/Concentration: attention span and concentration appear shorter than expected for age   Insight:  limited   Judgment: limited   Gait/Station: normal gait/station, normal balance   Motor Activity: no abnormal movements     Progress Toward Goals: making gradual improvement.  Deyvi continues to require inpatient psychiatric hospitalization for continued medication management and titration to optimize symptom reduction, improve sleep hygiene, and demonstrate adequate self-care.     Suicide/Homicide Risk Assessment:  Risk of  Harm to Self:   Nursing Suicide Risk Assessment Last 24 hours: C-SSRS Risk (Since Last Contact)  Calculated C-SSRS Risk Score (Since Last Contact): No Risk Indicated    Risk of Harm to Others:  Nursing Homicide Risk Assessment: Violence Risk to Others: Denies within past 6 months    Behavioral Health Medications: all current active meds have been reviewed and continue current psychiatric medications.  Current Facility-Administered Medications   Medication Dose Route Frequency Provider Last Rate    acetaminophen  650 mg Oral Q6H PRN ALVINA Harley      acetaminophen  650 mg Oral Q4H PRN ALVINA Harley      acetaminophen  975 mg Oral Q6H PRN ALVINA Harley      aluminum-magnesium hydroxide-simethicone  30 mL Oral Q4H PRN ALVINA Harley      ammonium lactate   Topical BID PRN ALVINA Harley      atorvastatin  10 mg Oral Daily ALVINA Lewis      benztropine  1 mg Intramuscular Q4H PRN Max 6/day ALVINA Harley      benztropine  1 mg Oral Q4H PRN Max 6/day ALVINA Harley      bisacodyl  10 mg Rectal Daily PRN ALVINA Harley      cariprazine  3 mg Oral Daily Martin Cardenas MD      Cholecalciferol  2,000 Units Oral Daily ALVINA Lewis      cyanocobalamin  1,000 mcg Oral Daily ALVINA Lewis      hydrOXYzine HCL  25 mg Oral Q6H PRN Max 4/day ALVINA Harley      hydrOXYzine HCL  25 mg Oral TID Martin Cardenas MD      hydrOXYzine HCL  50 mg Oral Q4H PRN Max 4/day ALVINA Harley      Or    LORazepam  1 mg Intramuscular Q4H PRN ALVINA Harley      ketoconazole   Topical Daily PRN ALVINA Harley      lamoTRIgine  50 mg Oral Daily Martin Cardenas MD      lisinopril  10 mg Oral Daily ALVINA Lewis      LORazepam  1 mg Oral Q4H PRN Max 6/day ALVINA Harley      Or    LORazepam  2 mg Intramuscular Q6H PRN Max 3/day ALVINA Harley      OLANZapine  10 mg Oral Q3H PRN Max 3/day ALVINA Harley      Or    OLANZapine  10 mg  Intramuscular Q3H PRN Max 3/day ALVINA Harley      OLANZapine  5 mg Oral Q3H PRN Max 6/day ALVINA Harley      Or    OLANZapine  5 mg Intramuscular Q3H PRN Max 6/day ALVINA Harley      OLANZapine  2.5 mg Oral Q3H PRN Max 8/day ALVINA Harley      polyethylene glycol  17 g Oral Daily PRN ALVINA Harley      propranolol  10 mg Oral Q8H PRN ALVINA Harley      senna-docusate sodium  1 tablet Oral Daily PRN ALVINA Harley      sertraline  150 mg Oral HS Martin Cardenas MD       Risks / Benefits of Treatment:  Risks, benefits, and possible side effects of medications explained to patient. Patient has limited understanding of risks and benefits of treatment at this time, but agrees to take medications as prescribed.    Counseling / Coordination of Care:  Patient's progress discussed with staff in treatment team meeting.  Medications, treatment progress and treatment plan reviewed with patient.   Educated on importance of medication and treatment compliance.  Reassurance and supportive therapy provided.   Encouraged participation in milieu and group therapy on the unit.    ALVINA Harley 10/14/24

## 2024-10-14 NOTE — NURSING NOTE
Elielodettel denied all psych sx. Stayed inside his room after dinner.  Med and meal compliant.  100% x 3 for meals.  No behavioral issues and no PRN's administered.

## 2024-10-14 NOTE — ASSESSMENT & PLAN NOTE
Still progressing - 10/14/2024  Awaiting placement - CRR interview complete, ACT interview complete, awaiting next steps  Continue medications as follows:, Vraylar 3mg PO Daily, Atarax 25mg PO TID, Lamictal 50mg PO Daily, Zoloft 150mg PO QHS  Continue with group therapy, milieu therapy and occupational therapy   Behavioral Health checks every 7 minutes   Continue frequent safety checks and vitals per unit protocol  Continue with [facility] medical management as indicated     Medication (1) And Associated J-Code Units: Triamcinolone acetonide, 10mg

## 2024-10-14 NOTE — PROGRESS NOTES
10/14/24 0807   Team Meeting   Meeting Type Daily Rounds   Team Members Present   Team Members Present Physician;Nurse;;Other (Discipline and Name)   Physician Team Member Holter, Thomas   Nursing Team Member Chastity   Social Work Team Member Henrry FRY   Patient/Family Present   Patient Present No   Patient's Family Present No     Groups Participation  2/9.   Patient's compliant with medications. He has minimal engagment in his treatment. He's depressed and isolates. He's waiting ACT sign on.

## 2024-10-14 NOTE — NURSING NOTE
"Pt is accepting of medications without incidence and meal compliant. Pt is polite, pleasant and brightens on approach. Pt denies s/s and reports \"feeling better\" after his haircut also stating \" it was getting out of hand\". Pt is social with select few peers or rests in bed. No new concerns.   "

## 2024-10-14 NOTE — PLAN OF CARE
Problem: Alteration in Thoughts and Perception  Goal: Refrain from acting on delusional thinking/internal stimuli  Description: Interventions:  - Monitor patient closely, per order   - Utilize least restrictive measures   - Set reasonable limits, give positive feedback for acceptable   - Administer medications as ordered and monitor of potential side effects  Outcome: Progressing  Goal: Agree to be compliant with medication regime, as prescribed and report medication side effects  Description: Interventions:  - Offer appropriate PRN medication and supervise ingestion; conduct AIMS, as needed   Outcome: Progressing  Goal: Recognize dysfunctional thoughts, communicate reality-based thoughts at the time of discharge  Description: Interventions:  - Provide medication and psycho-education to assist patient in compliance and developing insight into his/her illness   Outcome: Progressing  Goal: Complete daily ADLs, including personal hygiene independently, as able  Description: Interventions:  - Observe, teach, and assist patient with ADLS  - Monitor and promote a balance of rest/activity, with adequate nutrition and elimination   Outcome: Progressing     Problem: Ineffective Coping  Goal: Understands least restrictive measures  Description: Interventions:  - Utilize least restrictive behavior  Outcome: Progressing  Goal: Free from restraint events  Description: - Utilize least restrictive measures   - Provide behavioral interventions   - Redirect inappropriate behaviors   Outcome: Progressing     Problem: Depression  Goal: Treatment Goal: Demonstrate behavioral control of depressive symptoms, verbalize feelings of improved mood/affect, and adopt new coping skills prior to discharge  Outcome: Progressing  Goal: Refrain from harming self  Description: Interventions:  - Monitor patient closely, per order   - Supervise medication ingestion, monitor effects and side effects   Outcome: Progressing  Goal: Refrain from  self-neglect  Outcome: Progressing     Problem: Depression  Goal: Refrain from isolation  Description: Interventions:  - Develop a trusting relationship   - Encourage socialization   Outcome: Not Progressing

## 2024-10-14 NOTE — NURSING NOTE
Germain maintained on ongoing Safe precaution without incident  on this shift.  Awake, alert, isolative and cooperative upon approach.  He did not part take in any group today.  Continues to be compliant with medication regimen.  Denies any pain, or discomfort.  Denies delusion or anxiety.  No overt delusion or A/T/V hallucination noted. Behavior control

## 2024-10-14 NOTE — NURSING NOTE
Germain maintained on ongoing SAFE precaution without incident on this shift. Observed in bed resting with eyes closed, respiration even and unlabored. Q 15 minutes rounding implemented. No behavioral noted overnight

## 2024-10-15 PROCEDURE — 99232 SBSQ HOSP IP/OBS MODERATE 35: CPT

## 2024-10-15 RX ADMIN — HYDROXYZINE HYDROCHLORIDE 25 MG: 25 TABLET ORAL at 17:08

## 2024-10-15 RX ADMIN — CHOLECALCIFEROL TAB 25 MCG (1000 UNIT) 2000 UNITS: 25 TAB at 08:13

## 2024-10-15 RX ADMIN — HYDROXYZINE HYDROCHLORIDE 25 MG: 25 TABLET ORAL at 08:14

## 2024-10-15 RX ADMIN — SERTRALINE HYDROCHLORIDE 150 MG: 100 TABLET ORAL at 21:09

## 2024-10-15 RX ADMIN — CYANOCOBALAMIN TAB 1000 MCG 1000 MCG: 1000 TAB at 08:14

## 2024-10-15 RX ADMIN — LISINOPRIL 10 MG: 10 TABLET ORAL at 08:14

## 2024-10-15 RX ADMIN — CARIPRAZINE 3 MG: 3 CAPSULE, GELATIN COATED ORAL at 08:14

## 2024-10-15 RX ADMIN — HYDROXYZINE HYDROCHLORIDE 25 MG: 25 TABLET ORAL at 21:09

## 2024-10-15 RX ADMIN — LAMOTRIGINE 50 MG: 25 TABLET ORAL at 08:14

## 2024-10-15 RX ADMIN — ATORVASTATIN CALCIUM 10 MG: 10 TABLET, FILM COATED ORAL at 08:14

## 2024-10-15 NOTE — NURSING NOTE
Germain maintained on ongoing SAFE precaution without incident on this shift. Observed in bed resting with eyes closed, respiration even and unlabored. Q 15 minutes rounding implemented. No behavioral noted.

## 2024-10-15 NOTE — ASSESSMENT & PLAN NOTE
"Admission Medication Reconciliation - Pharmacy Consult Note    The home medication history was taken by Teressa Campbell CPhT.  Based on information gathered and subsequent review by the clinical pharmacist, the items below may need attention.    You may go to "Admission" then "Reconcile Home Medications" tabs to review and/or act upon these items.        No issues noted with the medication reconciliation.        Potential issues to be addressed PRIOR TO DISCHARGE  o Amlodipine and Losartan are currently held for soft blood pressures, restart when clinically appropriate.     Please address this information as you see fit.  Feel free to contact us if you have any questions or require assistance.    Yasmine Penn, PharmD, PGY-1 Resident   [id number]              .    .          " Still progressing - 10/15/2024  Awaiting placement - CRR interview complete, ACT interview complete, awaiting next steps  Continue medications as follows:, Vraylar 3mg PO Daily, Atarax 25mg PO TID, Lamictal 50mg PO Daily, Zoloft 150mg PO QHS  Continue with group therapy, milieu therapy and occupational therapy   Behavioral Health checks every 7 minutes   Continue frequent safety checks and vitals per unit protocol  Continue with [facility] medical management as indicated

## 2024-10-15 NOTE — PLAN OF CARE
Problem: Alteration in Thoughts and Perception  Goal: Treatment Goal: Gain control of psychotic behaviors/thinking, reduce/eliminate presenting symptoms and demonstrate improved reality functioning upon discharge  Outcome: Progressing  Goal: Verbalize thoughts and feelings  Description: Interventions:  - Promote a nonjudgmental and trusting relationship with the patient through active listening and therapeutic communication  - Assess patient's level of functioning, behavior and potential for risk  - Engage patient in 1 on 1 interactions  - Encourage patient to express fears, feelings, frustrations, and discuss symptoms    - Winston patient to reality, help patient recognize reality-based thinking   - Administer medications as ordered and assess for potential side effects  - Provide the patient education related to the signs and symptoms of the illness and desired effects of prescribed medications  Outcome: Progressing  Goal: Refrain from acting on delusional thinking/internal stimuli  Description: Interventions:  - Monitor patient closely, per order   - Utilize least restrictive measures   - Set reasonable limits, give positive feedback for acceptable   - Administer medications as ordered and monitor of potential side effects  Outcome: Progressing  Goal: Agree to be compliant with medication regime, as prescribed and report medication side effects  Description: Interventions:  - Offer appropriate PRN medication and supervise ingestion; conduct AIMS, as needed   Outcome: Progressing  Goal: Complete daily ADLs, including personal hygiene independently, as able  Description: Interventions:  - Observe, teach, and assist patient with ADLS  - Monitor and promote a balance of rest/activity, with adequate nutrition and elimination   Outcome: Progressing     Problem: Ineffective Coping  Goal: Identifies ineffective coping skills  Outcome: Progressing  Goal: Identifies healthy coping skills  Outcome: Progressing  Goal:  Demonstrates healthy coping skills  Outcome: Progressing     Problem: Risk for Self Injury/Neglect  Goal: Verbalize thoughts and feelings  Description: Interventions:  - Assess and re-assess patient's lethality and potential for self-injury  - Engage patient in 1:1 interactions, daily, for a minimum of 15 minutes  - Encourage patient to express feelings, fears, frustrations, hopes  - Establish rapport/trust with patient   Outcome: Progressing  Goal: Refrain from harming self  Description: Interventions:  - Monitor patient closely, per order  - Develop a trusting relationship  - Supervise medication ingestion, monitor effects and side effects   Outcome: Progressing     Problem: Depression  Goal: Refrain from harming self  Description: Interventions:  - Monitor patient closely, per order   - Supervise medication ingestion, monitor effects and side effects   Outcome: Progressing  Goal: Refrain from isolation  Description: Interventions:  - Develop a trusting relationship   - Encourage socialization   Outcome: Progressing  Goal: Refrain from self-neglect  Outcome: Progressing     Problem: Anxiety  Goal: Anxiety is at manageable level  Description: Interventions:  - Assess and monitor patient's anxiety level.   - Monitor for signs and symptoms (heart palpitations, chest pain, shortness of breath, headaches, nausea, feeling jumpy, restlessness, irritable, apprehensive).   - Collaborate with interdisciplinary team and initiate plan and interventions as ordered.  - [place] patient to unit/surroundings  - Explain treatment plan  - Encourage participation in care  - Encourage verbalization of concerns/fears  - Identify coping mechanisms  - Assist in developing anxiety-reducing skills  - Administer/offer alternative therapies  - Limit or eliminate stimulants  Outcome: Progressing     Problem: Risk for Violence/Aggression Toward Others  Goal: Refrain from harming others  Outcome: Progressing  Goal: Refrain from destructive acts  on the environment or property  Outcome: Progressing  Goal: Control angry outbursts  Description: Interventions:  - Monitor patient closely, per order  - Ensure early verbal de-escalation  - Monitor prn medication needs  - Set reasonable/therapeutic limits, outline behavioral expectations, and consequences   - Provide a non-threatening milieu, utilizing the least restrictive interventions   Outcome: Progressing

## 2024-10-15 NOTE — NURSING NOTE
Pt calm and pleasant brightens on approach. Pt visible in milieu periodically social with peers and staff. Pt denies SI,HI and AV/H. Pt cooperative with care and medication compliant.

## 2024-10-15 NOTE — PROGRESS NOTES
10/15/24 0831   Team Meeting   Meeting Type Daily Rounds   Team Members Present   Team Members Present Physician;Nurse;   Physician Team Member Holter, Thomas   Nursing Team Member Chastity   Care Management Team Member Keira   Patient/Family Present   Patient Present No   Patient's Family Present No     Pt pleasant cooperative, Attended 4/10 groups. Appears depressed. Denying symptoms.

## 2024-10-15 NOTE — PROGRESS NOTES
Progress Note - Behavioral Health   Name: Deyvi Cao 54 y.o. male I MRN: [id number]   Unit/Bed#: [facility] 101-02 I Date of Admission: 6/25/2024   Date of Service: 10/15/2024 I Hospital Day: 112     Assessment & Plan  Bipolar disorder with severe depression (HCC)  Still progressing - 10/15/2024  Awaiting placement - CRR interview complete, ACT interview complete, awaiting next steps  Continue medications as follows:, Vraylar 3mg PO Daily, Atarax 25mg PO TID, Lamictal 50mg PO Daily, Zoloft 150mg PO QHS  Continue with group therapy, milieu therapy and occupational therapy   Behavioral Health checks every 7 minutes   Continue frequent safety checks and vitals per unit protocol  Continue with [facility] medical management as indicated    Benign essential hypertension  Managed by [facility] - reviewed 10/15  Continue Lisinopril 10mg PO Daily    Mixed hyperlipidemia  Managed by [facility] - reviewed 10/15  Continue Lipitor 10mg PO Daily    Allergic rhinitis due to allergen  Managed by [facility] - reviewed 10/15    Rosacea  Managed by [facility] - reviewed 10/15    Subjective:    Patient was seen today for continuation of care, records reviewed and patient was discussed with the morning case review team.    Deyvi was seen today for psychiatric follow-up.  On assessment today, Deyvi was found in his room.  He is quiet, but polite and cooperative.  He hopes to hear some news regarding his discharge soon.  Deyvi reports adequate daytime energy and denies any difficulties with initiating or staying asleep.  Oral appetite and hydration is adequate.   We reviewed once more the specific as-needed medications they can use going forward if they experience any insomnia or destabilization of their mood, they understood and were agreeable. Milieu visibility and group attendance encouraged to promote an active participation in treatment.    Deyvi denies acute suicidal/self-harm ideation/intent/plan upon direct inquiry at this time. Deyvi is able to  contract for safety while on the unit and would feel comfortable seeking staff support should suicidal symptoms or urges appear or worsen. Deyvi remains behaviorally appropriate, no agitation or aggression noted on exam or in report. Deyvi also denies HI/AH/VH, and does not appear overtly manic.  Patient does not verbalize any experiences that can be categorized as paranoid, persecutory, bizarre, or somatic delusions. Deyvi remains adherent to his current psychotropic medication regimen and denies any side effects from medications, as well as none noted on exam.    Deyvi is currently assessed as being at their baseline with continued need for medication management, supervision for safety and ADL’s. These services are not currently available in a less restrictive environment necessitating continued hospital stay.  Deyvi should remain on the unit until these services are available, due to likelihood of mental decompensation and readmission if discharged to an unsupervised setting.  Assertive discharge planning and collaboration with multiple providers (inpatient and community based) remains ongoing.  Deyvi is currently awaiting for [facility].    Group Attendance: 4 / 10  Treatment Team: This Thursday  Psychiatric PRN's Needed: None    Review of Systems:  Behavior over the last 24 hours: Slowly improving  Sleep: sleeping okay throughout the night  Appetite: adequate  Medication side effects: none reported  ROS:no complaints    Objective:    Vitals:  Vitals:    10/15/24 0742   BP: 135/83   Pulse: 92   Resp: 18   Temp: (!) 96.8 °F (36 °C)   SpO2: 95%     Laboratory Results:  I have personally reviewed all pertinent laboratory/tests results.  Most Recent Labs:   Lab Results   Component Value Date    WBC 7.39 06/26/2024    RBC 4.70 06/26/2024    HGB 15.2 06/26/2024    HCT 45.5 06/26/2024     06/26/2024    RDW 12.9 06/26/2024    NEUTROABS 4.63 06/26/2024    SODIUM 137 06/26/2024    K 4.1 06/26/2024    CL  101 06/26/2024    CO2 26 06/26/2024    BUN 17 06/26/2024    CREATININE 0.63 06/26/2024    GLUC 98 06/26/2024    GLUF 98 06/26/2024    CALCIUM 9.6 06/26/2024    AST 25 06/26/2024    ALT 45 06/26/2024    ALKPHOS 114 (H) 06/26/2024    TP 7.0 06/26/2024    ALB 4.4 06/26/2024    TBILI 0.44 06/26/2024    CHOLESTEROL 177 06/26/2024    HDL 52 06/26/2024    TRIG 73 06/26/2024    LDLCALC 110 (H) 06/26/2024    NONHDLC 125 06/26/2024    LITHIUM 0.4 (L) 01/28/2021    [id number] 2.636 06/26/2024    HGBA1C 5.2 11/22/2023     11/22/2023     Mental Status Evaluation:  Appearance:  age appropriate, casually dressed, dressed appropriately   Behavior:  pleasant, cooperative, calm   Speech:  normal rate, normal volume, normal pitch   Mood:  less anxious, less depressed   Affect:  constricted   Thought Process:  goal directed   Associations: intact associations   Thought Content:  no overt delusions   Perceptual Disturbances: no auditory hallucinations, no visual hallucinations, denies when asked, does not appear responding to internal stimuli   Risk Potential: Suicidal ideation - None at present, contracts for safety on the unit, would talk to staff if not feeling safe on the unit  Homicidal ideation - None at present  Potential for aggression - Not at present   Sensorium:  oriented to person, place, and time/date   Memory:  recent memory intact   Consciousness:  alert and awake   Attention/Concentration: attention span and concentration appear shorter than expected for age   Insight:  fair and improving   Judgment: fair and improving   Gait/Station: normal gait/station, normal balance   Motor Activity: no abnormal movements     Progress Toward Goals: making gradual improvement.  Deyvi continues to require inpatient psychiatric hospitalization for continued medication management and titration to optimize symptom reduction, improve sleep hygiene, and demonstrate adequate self-care.     Suicide/Homicide Risk Assessment:  Risk  of Harm to Self:   Nursing Suicide Risk Assessment Last 24 hours: C-SSRS Risk (Since Last Contact)  Calculated C-SSRS Risk Score (Since Last Contact): No Risk Indicated    Risk of Harm to Others:  Nursing Homicide Risk Assessment: Violence Risk to Others: Denies within past 6 months    Behavioral Health Medications: all current active meds have been reviewed and continue current psychiatric medications.  Current Facility-Administered Medications   Medication Dose Route Frequency Provider Last Rate    acetaminophen  650 mg Oral Q6H PRN ALVINA Harley      acetaminophen  650 mg Oral Q4H PRN ALVINA Harley      acetaminophen  975 mg Oral Q6H PRN ALVINA Harley      aluminum-magnesium hydroxide-simethicone  30 mL Oral Q4H PRN ALVINA Harley      ammonium lactate   Topical BID PRN ALVINA Harley      atorvastatin  10 mg Oral Daily ALVINA Lewis      benztropine  1 mg Intramuscular Q4H PRN Max 6/day ALVINA Harley      benztropine  1 mg Oral Q4H PRN Max 6/day ALVINA Harley      bisacodyl  10 mg Rectal Daily PRN ALVINA Harley      cariprazine  3 mg Oral Daily Martin Cardenas MD      Cholecalciferol  2,000 Units Oral Daily ALVINA Lewis      cyanocobalamin  1,000 mcg Oral Daily ALVINA Lewis      hydrOXYzine HCL  25 mg Oral Q6H PRN Max 4/day ALVINA Harley      hydrOXYzine HCL  25 mg Oral TID Martin Cardenas MD      hydrOXYzine HCL  50 mg Oral Q4H PRN Max 4/day ALVINA Harley      Or    LORazepam  1 mg Intramuscular Q4H PRN ALVINA Harley      ketoconazole   Topical Daily PRN ALVINA Harley      lamoTRIgine  50 mg Oral Daily Martin Cardenas MD      lisinopril  10 mg Oral Daily ALVINA Lewis      LORazepam  1 mg Oral Q4H PRN Max 6/day ALVINA Harley      Or    LORazepam  2 mg Intramuscular Q6H PRN Max 3/day ALVINA Harley      OLANZapine  10 mg Oral Q3H PRN Max 3/day ALVINA Harley      Or    OLANZapine  10  mg Intramuscular Q3H PRN Max 3/day ALVINA Harley      OLANZapine  5 mg Oral Q3H PRN Max 6/day ALVINA Harley      Or    OLANZapine  5 mg Intramuscular Q3H PRN Max 6/day ALVINA Harley      OLANZapine  2.5 mg Oral Q3H PRN Max 8/day ALVINA Harley      polyethylene glycol  17 g Oral Daily PRN ALVINA Harley      propranolol  10 mg Oral Q8H PRN ALVINA Harley      senna-docusate sodium  1 tablet Oral Daily PRN ALVINA Harley      sertraline  150 mg Oral HS Martin Cardenas MD       Risks / Benefits of Treatment:  Risks, benefits, and possible side effects of medications explained to patient. Patient has limited understanding of risks and benefits of treatment at this time, but agrees to take medications as prescribed.    Counseling / Coordination of Care:  Patient's progress discussed with staff in treatment team meeting.  Medications, treatment progress and treatment plan reviewed with patient.   Educated on importance of medication and treatment compliance.  Reassurance and supportive therapy provided.   Encouraged participation in milieu and group therapy on the unit.    ALVINA Harley 10/15/24

## 2024-10-16 PROCEDURE — 99232 SBSQ HOSP IP/OBS MODERATE 35: CPT | Performed by: PSYCHIATRY & NEUROLOGY

## 2024-10-16 RX ADMIN — SERTRALINE HYDROCHLORIDE 150 MG: 100 TABLET ORAL at 21:16

## 2024-10-16 RX ADMIN — CYANOCOBALAMIN TAB 1000 MCG 1000 MCG: 1000 TAB at 09:00

## 2024-10-16 RX ADMIN — ATORVASTATIN CALCIUM 10 MG: 10 TABLET, FILM COATED ORAL at 08:17

## 2024-10-16 RX ADMIN — HYDROXYZINE HYDROCHLORIDE 25 MG: 25 TABLET ORAL at 17:05

## 2024-10-16 RX ADMIN — HYDROXYZINE HYDROCHLORIDE 25 MG: 25 TABLET ORAL at 08:17

## 2024-10-16 RX ADMIN — LAMOTRIGINE 50 MG: 25 TABLET ORAL at 08:17

## 2024-10-16 RX ADMIN — HYDROXYZINE HYDROCHLORIDE 25 MG: 25 TABLET ORAL at 21:16

## 2024-10-16 RX ADMIN — CHOLECALCIFEROL TAB 25 MCG (1000 UNIT) 2000 UNITS: 25 TAB at 08:16

## 2024-10-16 RX ADMIN — CARIPRAZINE 3 MG: 3 CAPSULE, GELATIN COATED ORAL at 08:17

## 2024-10-16 RX ADMIN — LISINOPRIL 10 MG: 10 TABLET ORAL at 08:17

## 2024-10-16 NOTE — ASSESSMENT & PLAN NOTE
Still progressing - 10/15/2024  Awaiting placement - CRR interview complete, ACT interview complete, awaiting next steps  Continue medications as follows:, Vraylar 3mg PO Daily, Atarax 25mg PO TID, Lamictal 50mg PO Daily, Zoloft 150mg PO QHS  Continue with group therapy, milieu therapy and occupational therapy   Behavioral Health checks every 7 minutes   Continue frequent safety checks and vitals per unit protocol  Continue with [facility] medical management as indicated

## 2024-10-16 NOTE — PROGRESS NOTES
10/16/24 0749   Team Meeting   Meeting Type Daily Rounds   Team Members Present   Team Members Present Physician;Nurse;;Other (Discipline and Name)   Physician Team Member Juana Medina    Nursing Team Member Chastity   Social Work Team Member Henrry FRY   Patient/Family Present   Patient Present No   Patient's Family Present No     Groups Participation  4/11.   Patient's compliant with medications. He has some engagement in his treatment. He's pending referral to [place], awaiting sign on for [facility]. Patient reports anxiety.

## 2024-10-16 NOTE — PROGRESS NOTES
Progress Note - Behavioral Health     Deyvi Cao 54 y.o. male MRN: [id number]   Unit/Bed#: [facility] 101-02 Encounter: [id number]  Assessment & Plan  Bipolar disorder with severe depression (HCC)  Still progressing - 10/15/2024  Awaiting placement - CRR interview complete, ACT interview complete, awaiting next steps  Continue medications as follows:, Vraylar 3mg PO Daily, Atarax 25mg PO TID, Lamictal 50mg PO Daily, Zoloft 150mg PO QHS  Continue with group therapy, milieu therapy and occupational therapy   Behavioral Health checks every 7 minutes   Continue frequent safety checks and vitals per unit protocol  Continue with [facility] medical management as indicated    Benign essential hypertension  Managed by [facility] - reviewed 10/15  Continue Lisinopril 10mg PO Daily    Mixed hyperlipidemia  Managed by [facility] - reviewed 10/15  Continue Lipitor 10mg PO Daily    Allergic rhinitis due to allergen  Managed by [facility] - reviewed 10/15    Rosacea  Managed by [facility] - reviewed 10/15       Recommended Treatment:     Planned medication and treatment changes:    All current active medications have been reviewed  Encourage group therapy, milieu therapy and occupational therapy  Behavioral Health checks for safety monitoring      Current medications:  Current Facility-Administered Medications   Medication Dose Route Frequency Provider Last Rate    acetaminophen  650 mg Oral Q6H PRN ALVINA Harley      acetaminophen  650 mg Oral Q4H PRN ALVINA Harley      acetaminophen  975 mg Oral Q6H PRN ALVINA Harley      aluminum-magnesium hydroxide-simethicone  30 mL Oral Q4H PRN ALVINA Harley      ammonium lactate   Topical BID PRN ALVINA Harley      atorvastatin  10 mg Oral Daily ALVINA Lewis      benztropine  1 mg Intramuscular Q4H PRN Max 6/day ALVINA Harley      benztropine  1 mg Oral Q4H PRN Max 6/day ALVINA Harley      bisacodyl  10 mg Rectal Daily PRN ALVINA Harley      cariprazine  3 mg Oral  "Daily Martin Cardenas MD      Cholecalciferol  2,000 Units Oral Daily ALVINA Lewis      cyanocobalamin  1,000 mcg Oral Daily ALVINA Lewis      hydrOXYzine HCL  25 mg Oral Q6H PRN Max 4/day ALVINA Harley      hydrOXYzine HCL  25 mg Oral TID Martin Cardenas MD      hydrOXYzine HCL  50 mg Oral Q4H PRN Max 4/day ALVINA Harley      Or    LORazepam  1 mg Intramuscular Q4H PRN ALVINA Harley      ketoconazole   Topical Daily PRN ALVINA Harley      lamoTRIgine  50 mg Oral Daily Martin Cardenas MD      lisinopril  10 mg Oral Daily ALVINA Lewis      LORazepam  1 mg Oral Q4H PRN Max 6/day ALVINA Harley      Or    LORazepam  2 mg Intramuscular Q6H PRN Max 3/day ALVINA Harley      OLANZapine  10 mg Oral Q3H PRN Max 3/day ALVINA Harley      Or    OLANZapine  10 mg Intramuscular Q3H PRN Max 3/day ALVINA Harley      OLANZapine  5 mg Oral Q3H PRN Max 6/day ALVINA Harley      Or    OLANZapine  5 mg Intramuscular Q3H PRN Max 6/day ALVINA Harley      OLANZapine  2.5 mg Oral Q3H PRN Max 8/day ALVINA Harley      polyethylene glycol  17 g Oral Daily PRN ALVINA Harley      propranolol  10 mg Oral Q8H PRN ALVINA Harley      senna-docusate sodium  1 tablet Oral Daily PRN ALVINA Harley      sertraline  150 mg Oral HS Martin Cardenas MD         Risks / Benefits of Treatment:    Risks, benefits, and possible side effects of medications explained to patient and patient verbalizes understanding and agreement for treatment.    Subjective:    Behavior over the last 24 hours: unchanged.     Deyvi was seen today in follow-up for continuation of care. Per staff, no acute events reported overnight. Otherwise he has been generally pleasant and cooperative. Deyvi states he is feeling, \"good\" today with no new complaints. He is dressed in regular attire with fair self-care. He is slightly guarded and anxious appearing. However he notes to " improved mood and lessened depression. States he was previously struggling with severe suicidal thoughts which he eid been able to better manage and identify coping skills and supports.  Deyvi adamantly denies suicidal/homicidal ideation in addition to thoughts of self-injury. Deyvi presently is contacting for safety on the unit and feels comfortable to confide in staff if thoughts arise. At time of interview, no overt psychosis elicited. Deyvi has been complaint with medications and tolerating without any side effects.       Sleep: normal  Appetite: normal  Medication side effects: No   ROS: no complaints    Mental Status Evaluation:    Appearance:  age appropriate, casually dressed, looks stated age, fair self-are, good eye contact   Behavior:  cooperative, calm, guarded   Speech:  scant, soft, decreased volume   Mood:  improved   Affect:  blunted, anxious appearing   Thought Process:  goal directed, linear   Associations: intact associations   Thought Content:  no overt delusions   Perceptual Disturbances: no auditory hallucinations, no visual hallucinations, does not appear responding to internal stimuli   Risk Potential: Suicidal ideation - None at present  Homicidal ideation - None at present  Potential for aggression - No   Sensorium:  oriented to person, place, and time/date   Memory:  recent and remote memory grossly intact   Consciousness:  alert and awake   Attention/Concentration: attention span and concentration are age appropriate   Insight:  fair   Judgment: fair   Gait/Station: normal gait/station   Motor Activity: no abnormal movements     Vital signs in last 24 hours:    Temp:  [96.8 °F (36 °C)-97.7 °F (36.5 °C)] 97.7 °F (36.5 °C)  HR:  [83-92] 83  BP: (123-135)/(74-83) 123/74  Resp:  [18] 18  SpO2:  [95 %] 95 %  O2 Device: None (Room air)    Laboratory results: I have personally reviewed all pertinent laboratory/tests results    Results from the past 24 hours: No results found for this or  any previous visit (from the past 24 hour(s)).  Most Recent Labs:   Lab Results   Component Value Date    WBC 7.39 06/26/2024    RBC 4.70 06/26/2024    HGB 15.2 06/26/2024    HCT 45.5 06/26/2024     06/26/2024    RDW 12.9 06/26/2024    NEUTROABS 4.63 06/26/2024    SODIUM 137 06/26/2024    K 4.1 06/26/2024     06/26/2024    CO2 26 06/26/2024    BUN 17 06/26/2024    CREATININE 0.63 06/26/2024    GLUC 98 06/26/2024    CALCIUM 9.6 06/26/2024    AST 25 06/26/2024    ALT 45 06/26/2024    ALKPHOS 114 (H) 06/26/2024    TP 7.0 06/26/2024    ALB 4.4 06/26/2024    TBILI 0.44 06/26/2024    CHOLESTEROL 177 06/26/2024    HDL 52 06/26/2024    TRIG 73 06/26/2024    LDLCALC 110 (H) 06/26/2024    NONHDLC 125 06/26/2024    LITHIUM 0.4 (L) 01/28/2021    [id number] 2.636 06/26/2024    HGBA1C 5.2 11/22/2023     11/22/2023       Suicide/Homicide Risk Assessment:    Risk of Harm to Self:   Nursing Suicide Risk Assessment Last 24 hours: C-SSRS Risk (Since Last Contact)  Calculated C-SSRS Risk Score (Since Last Contact): No Risk Indicated    Risk of Harm to Others:  Nursing Homicide Risk Assessment: Violence Risk to Others: Denies within past 6 months    The following interventions are recommended: Behavioral Health checks for safety monitoring, continued hospitalization on locked unit    Progress Toward Goals: progressing    Counseling / Coordination of Care:    Total floor / unit time spent today 30 minutes. Greater than 50% of total time was spent with the patient and / or family counseling and / or coordination of care. A description of counseling / coordination of care:    Carine Arias PA-C 10/16/24

## 2024-10-16 NOTE — NURSING NOTE
Pt is visible in the milieu and is social with a few select peers. He consumed 100 % of dinner. Took his medications without incidence. Pt is polite and cooperative. Guarded and flat affect, but smiles on approach. Denied all psychiatric symptoms. No unmet needs. Attended 4/12 groups. No behavioral issues.

## 2024-10-16 NOTE — NURSING NOTE
Patient has been out of his room minimally on day shift. Appetite good. Anxious affect but pleasant and cooperative. Medication compliant. Eager for discharge. Denies any psychological symptoms or suicidal ideations. Appetite good.  Attended 1 group. Encouraged to attend more groups with peers.

## 2024-10-16 NOTE — NURSING NOTE
Patient has been more visible on this shift. Currently attending walking group. Appetite good. Anxious at intervals. No PRN's requested. Hopeful for discharge. Denies depression or thoughts of self harm. Social with minimal peers. Medication compliant. Somewhat guarded during conversation.

## 2024-10-16 NOTE — PLAN OF CARE
Problem: Alteration in Thoughts and Perception  Goal: Treatment Goal: Gain control of psychotic behaviors/thinking, reduce/eliminate presenting symptoms and demonstrate improved reality functioning upon discharge  Outcome: Progressing  Goal: Verbalize thoughts and feelings  Description: Interventions:  - Promote a nonjudgmental and trusting relationship with the patient through active listening and therapeutic communication  - Assess patient's level of functioning, behavior and potential for risk  - Engage patient in 1 on 1 interactions  - Encourage patient to express fears, feelings, frustrations, and discuss symptoms    - Independence patient to reality, help patient recognize reality-based thinking   - Administer medications as ordered and assess for potential side effects  - Provide the patient education related to the signs and symptoms of the illness and desired effects of prescribed medications  Outcome: Progressing  Goal: Refrain from acting on delusional thinking/internal stimuli  Description: Interventions:  - Monitor patient closely, per order   - Utilize least restrictive measures   - Set reasonable limits, give positive feedback for acceptable   - Administer medications as ordered and monitor of potential side effects  Outcome: Progressing  Goal: Agree to be compliant with medication regime, as prescribed and report medication side effects  Description: Interventions:  - Offer appropriate PRN medication and supervise ingestion; conduct AIMS, as needed   Outcome: Progressing  Goal: Attend and participate in unit activities, including therapeutic, recreational, and educational groups  Description: Interventions:  -Encourage Visitation and family involvement in care  Outcome: Progressing  Goal: Recognize dysfunctional thoughts, communicate reality-based thoughts at the time of discharge  Description: Interventions:  - Provide medication and psycho-education to assist patient in compliance and developing  insight into his/her illness   Outcome: Progressing  Goal: Complete daily ADLs, including personal hygiene independently, as able  Description: Interventions:  - Observe, teach, and assist patient with ADLS  - Monitor and promote a balance of rest/activity, with adequate nutrition and elimination   Outcome: Progressing     Problem: Ineffective Coping  Goal: Identifies ineffective coping skills  Outcome: Progressing  Goal: Identifies healthy coping skills  Outcome: Progressing  Goal: Demonstrates healthy coping skills  Outcome: Progressing     Problem: Risk for Self Injury/Neglect  Goal: Treatment Goal: Remain safe during length of stay, learn and adopt new coping skills, and be free of self-injurious ideation, impulses and acts at the time of discharge  Outcome: Progressing     Problem: Depression  Goal: Treatment Goal: Demonstrate behavioral control of depressive symptoms, verbalize feelings of improved mood/affect, and adopt new coping skills prior to discharge  Outcome: Progressing     Problem: Anxiety  Goal: Anxiety is at manageable level  Description: Interventions:  - Assess and monitor patient's anxiety level.   - Monitor for signs and symptoms (heart palpitations, chest pain, shortness of breath, headaches, nausea, feeling jumpy, restlessness, irritable, apprehensive).   - Collaborate with interdisciplinary team and initiate plan and interventions as ordered.  - [place] patient to unit/surroundings  - Explain treatment plan  - Encourage participation in care  - Encourage verbalization of concerns/fears  - Identify coping mechanisms  - Assist in developing anxiety-reducing skills  - Administer/offer alternative therapies  - Limit or eliminate stimulants  Outcome: Progressing     Problem: ANXIETY  Goal: Will report anxiety at manageable levels  Description: INTERVENTIONS:  - Administer medication as ordered  - Teach and encourage coping skills  - Provide emotional support  - Assess patient/family for anxiety  and ability to cope  Outcome: Progressing     Problem: DISCHARGE PLANNING - CARE MANAGEMENT  Goal: Discharge to post-acute care or home with appropriate resources  Description: INTERVENTIONS:  - Conduct assessment to determine patient/family and health care team treatment goals, and need for post-acute services based on payer coverage, community resources, and patient preferences, and barriers to discharge  - Address psychosocial, clinical, and financial barriers to discharge as identified in assessment in conjunction with the patient/family and health care team  - Arrange appropriate level of post-acute services according to patient’s   needs and preference and payer coverage in collaboration with the physician and health care team  - Communicate with and update the patient/family, physician, and health care team regarding progress on the discharge plan  - Arrange appropriate transportation to post-acute venues  Outcome: Progressing

## 2024-10-17 PROCEDURE — 99232 SBSQ HOSP IP/OBS MODERATE 35: CPT | Performed by: PSYCHIATRY & NEUROLOGY

## 2024-10-17 RX ADMIN — HYDROXYZINE HYDROCHLORIDE 25 MG: 25 TABLET ORAL at 17:11

## 2024-10-17 RX ADMIN — CHOLECALCIFEROL TAB 25 MCG (1000 UNIT) 2000 UNITS: 25 TAB at 08:17

## 2024-10-17 RX ADMIN — CYANOCOBALAMIN TAB 1000 MCG 1000 MCG: 1000 TAB at 08:18

## 2024-10-17 RX ADMIN — CARIPRAZINE 3 MG: 3 CAPSULE, GELATIN COATED ORAL at 08:18

## 2024-10-17 RX ADMIN — HYDROXYZINE HYDROCHLORIDE 25 MG: 25 TABLET ORAL at 08:18

## 2024-10-17 RX ADMIN — ATORVASTATIN CALCIUM 10 MG: 10 TABLET, FILM COATED ORAL at 08:18

## 2024-10-17 RX ADMIN — LAMOTRIGINE 50 MG: 25 TABLET ORAL at 08:18

## 2024-10-17 RX ADMIN — LISINOPRIL 10 MG: 10 TABLET ORAL at 08:18

## 2024-10-17 RX ADMIN — HYDROXYZINE HYDROCHLORIDE 25 MG: 25 TABLET ORAL at 21:09

## 2024-10-17 RX ADMIN — SERTRALINE HYDROCHLORIDE 150 MG: 100 TABLET ORAL at 21:09

## 2024-10-17 NOTE — NURSING NOTE
Patient has been out of his room minimally today. Affect flat but smiles on approach. Preoccupied with when he will be discharged.  Support offered. Denies suicidal ideations. Appetite good. Denies any psychological symptoms or suicidal ideations. Medication compliant. Some anxiety observed while patient was shaking his  leg when sitting in the dining room. No PRN's requested.

## 2024-10-17 NOTE — SOCIAL WORK
This writer received an email from [facility] requesting updated information on the patient to make a decision on his referral. This writer submitted the last 7 days of progress notes to [facility] and informed them of his ACT intake and awaiting sign on once a decision and admission date was determined by [facility].

## 2024-10-17 NOTE — PROGRESS NOTES
10/17/24 0747   Team Meeting   Meeting Type Daily Rounds   Team Members Present   Team Members Present Physician;Nurse;;Other (Discipline and Name)   Physician Team Member Ronald Medina Jayne PA-C    Nursing Team Member Chastity   Social Work Team Member Henrry FRY   Other (Discipline and Name) [facility]   Patient/Family Present   Patient Present No   Patient's Family Present No   OTHER   Team Meeting - Additional Comments Goff PCM     Groups Participation  3/11.   Patient's compliant with medications. He has minimal engagement in his treatment. He isolates in his room. Awaiting decision from TASIA Rahman requesting an update .

## 2024-10-17 NOTE — PROGRESS NOTES
10/17/24 1150   Team Meeting   Meeting Type Tx Team Meeting   Initial Conference Date 10/17/24   Next Conference Date 11/17/24   Team Members Present   Team Members Present Physician;Nurse;;Other (Discipline and Name)   Physician Team Member Ronald   Nursing Team Member Chastity   Social Work Team Member Henrry FRY   Patient/Family Present   Patient Present Yes   Patient's Family Present No     Treatment team reviewed patient's 30 days plan with patient. Patient signed treatment plan.

## 2024-10-17 NOTE — NURSING NOTE
Patient has been quiet and isolative to himself. Anxious affect but offers no complaints. Denies suicidal ideations. Medication compliant. Appetite good. Attending walking group with peers but no interaction. Preoccupied with discharge.

## 2024-10-17 NOTE — ASSESSMENT & PLAN NOTE
Still progressing - 10/17/2024  Awaiting placement - CRR interview complete, ACT interview complete.     10/17: Patient had treatment team meeting today, currently being reviewed by Josiah DOZIER and approval from upper management.  Continue medications as follows:, Vraylar 3mg PO Daily, Atarax 25mg PO TID, Lamictal 50mg PO Daily, Zoloft 150mg PO QHS  Continue with group therapy, milieu therapy and occupational therapy   Behavioral Health checks every 7 minutes   Continue frequent safety checks and vitals per unit protocol  Continue with [facility] medical management as indicated

## 2024-10-17 NOTE — PLAN OF CARE
Problem: Alteration in Thoughts and Perception  Goal: Treatment Goal: Gain control of psychotic behaviors/thinking, reduce/eliminate presenting symptoms and demonstrate improved reality functioning upon discharge  Outcome: Progressing  Goal: Verbalize thoughts and feelings  Description: Interventions:  - Promote a nonjudgmental and trusting relationship with the patient through active listening and therapeutic communication  - Assess patient's level of functioning, behavior and potential for risk  - Engage patient in 1 on 1 interactions  - Encourage patient to express fears, feelings, frustrations, and discuss symptoms    - Barker patient to reality, help patient recognize reality-based thinking   - Administer medications as ordered and assess for potential side effects  - Provide the patient education related to the signs and symptoms of the illness and desired effects of prescribed medications  Outcome: Progressing  Goal: Refrain from acting on delusional thinking/internal stimuli  Description: Interventions:  - Monitor patient closely, per order   - Utilize least restrictive measures   - Set reasonable limits, give positive feedback for acceptable   - Administer medications as ordered and monitor of potential side effects  Outcome: Progressing  Goal: Agree to be compliant with medication regime, as prescribed and report medication side effects  Description: Interventions:  - Offer appropriate PRN medication and supervise ingestion; conduct AIMS, as needed   Outcome: Progressing  Goal: Recognize dysfunctional thoughts, communicate reality-based thoughts at the time of discharge  Description: Interventions:  - Provide medication and psycho-education to assist patient in compliance and developing insight into his/her illness   Outcome: Progressing     Problem: Ineffective Coping  Goal: Identifies ineffective coping skills  Outcome: Progressing     Problem: Risk for Self Injury/Neglect  Goal: Treatment Goal: Remain  safe during length of stay, learn and adopt new coping skills, and be free of self-injurious ideation, impulses and acts at the time of discharge  Outcome: Progressing  Goal: Recognize maladaptive responses and adopt new coping mechanisms  Outcome: Progressing     Problem: Depression  Goal: Treatment Goal: Demonstrate behavioral control of depressive symptoms, verbalize feelings of improved mood/affect, and adopt new coping skills prior to discharge  Outcome: Progressing  Goal: Complete daily ADLs, including personal hygiene independently, as able  Description: Interventions:  - Observe, teach, and assist patient with ADLS  -  Monitor and promote a balance of rest/activity, with adequate nutrition and elimination   Outcome: Progressing     Problem: Anxiety  Goal: Anxiety is at manageable level  Description: Interventions:  - Assess and monitor patient's anxiety level.   - Monitor for signs and symptoms (heart palpitations, chest pain, shortness of breath, headaches, nausea, feeling jumpy, restlessness, irritable, apprehensive).   - Collaborate with interdisciplinary team and initiate plan and interventions as ordered.  - Wayside patient to unit/surroundings  - Explain treatment plan  - Encourage participation in care  - Encourage verbalization of concerns/fears  - Identify coping mechanisms  - Assist in developing anxiety-reducing skills  - Administer/offer alternative therapies  - Limit or eliminate stimulants  Outcome: Progressing     Problem: SELF HARM/SUICIDALITY  Goal: Will have no self-injury during hospital stay  Description: INTERVENTIONS:  - Q 15 MINUTES: Routine safety checks  - Q WAKING SHIFT & PRN: Assess risk to determine if routine checks are adequate to maintain patient safety  - Encourage patient to participate actively in care by formulating a plan to combat response to suicidal ideation, identify supports and resources  Outcome: Progressing     Problem: DISCHARGE PLANNING - CARE MANAGEMENT  Goal:  Discharge to post-acute care or home with appropriate resources  Description: INTERVENTIONS:  - Conduct assessment to determine patient/family and health care team treatment goals, and need for post-acute services based on payer coverage, community resources, and patient preferences, and barriers to discharge  - Address psychosocial, clinical, and financial barriers to discharge as identified in assessment in conjunction with the patient/family and health care team  - Arrange appropriate level of post-acute services according to patient’s   needs and preference and payer coverage in collaboration with the physician and health care team  - Communicate with and update the patient/family, physician, and health care team regarding progress on the discharge plan  - Arrange appropriate transportation to post-acute venues  Outcome: Progressing

## 2024-10-17 NOTE — PROGRESS NOTES
10/17/24 0939   Team Meeting   Meeting Type Tx Team Meeting   Initial Conference Date 10/17/24   Next Conference Date 10/31/24   Team Members Present   Team Members Present Physician;Nurse;;Other (Discipline and Name)   Physician Team Member Nydia KO   Nursing Team Member Chastity   Social Work Team Member Henrry FRY   Other (Discipline and Name) Giuseppe Jefferson    Patient/Family Present   Patient Present Yes   Patient's Family Present No   OTHER   Team Meeting - Additional Comments Patient attended his treatment team meeting. He did not complete his self assessment. He discussed not attending groups  and feeling tired from his medications. He's currently reviewed by Josiah DOZIER and awaiting approval from upper management.

## 2024-10-17 NOTE — PROGRESS NOTES
Progress Note - Behavioral Health   Name: Deyvi Cao 54 y.o. male I MRN: [id number]  Unit/Bed#: [facility] 101-02 I Date of Admission: 6/25/2024   Date of Service: 10/17/2024 I Hospital Day: 114     Assessment & Plan  Bipolar disorder with severe depression (HCC)  Still progressing - 10/17/2024  Awaiting placement - CRR interview complete, ACT interview complete.     10/17: Patient had treatment team meeting today, currently being reviewed by Josiah DOZIER and approval from upper management.  Continue medications as follows:, Vraylar 3mg PO Daily, Atarax 25mg PO TID, Lamictal 50mg PO Daily, Zoloft 150mg PO QHS  Continue with group therapy, milieu therapy and occupational therapy   Behavioral Health checks every 7 minutes   Continue frequent safety checks and vitals per unit protocol  Continue with [facility] medical management as indicated    Benign essential hypertension  Managed by [facility] - reviewed 10/15  Continue Lisinopril 10mg PO Daily    Mixed hyperlipidemia  Managed by [facility] - reviewed 10/15  Continue Lipitor 10mg PO Daily    Allergic rhinitis due to allergen  Managed by [facility] - reviewed 10/15    Rosacea  Managed by [facility] - reviewed 10/15    Planned medication and treatment changes:    All current active medications have been reviewed  Encourage group therapy, milieu therapy and occupational therapy  Behavioral Health checks for safety monitoring  Continue current medications:  Awaiting placement.    Behavior over the last 24 hours: unchanged.     Deyvi is seen today for psychiatric follow up. Per nursing notes, patient has been intermittently visible, anxious but pleasant, eager for discharge, denying SI/HI, attended walking gorup.   Patient remains in behavioral control. No psych prns in last 24 hours.     Today Deyvi is calm and superficially cooperative. He reports that he is excited for discharge and is eager asking about when it may get approved/can discharge. He attended his treatment team meeting and reports he  "feels his anxiety and depression are both a 1 out of 10, with 10 being the worst. Appears somewhat anxious but answers questions appropriately. He denies suicidal and homicidal ideations when asked. Reports he feels he has improved from admission and is at his \"baseline.\" Notes some drowsiness from medications but eats and sleep adequate amounts. Denies further concerns or questions. Encouraged further group attendance. No overt guillermo, psychosis, or delusional content elicited.    Denies medication side effects when asked, tolerating current regimen. Attended 3/11 groups. Per , awaiting decision from TASIA Rahman.     Sleep:  adequate  Appetite:  adequate  Medication side effects:  Notes drowsiness possibly due to medications, otherwise denies.    ROS: no complaints    Mental Status Evaluation:    Appearance:  age appropriate, casually dressed, adequate grooming, looks stated age   Behavior:  cooperative, calm, slightly guarded   Speech:  normal rate, soft, answers questions appropriately   Mood:  euthymic   Affect:  blunted   Thought Process:  goal directed, linear   Associations: intact associations   Thought Content:  no overt delusions   Perceptual Disturbances: denies auditory hallucinations when asked, does not appear responding to internal stimuli, denies visual hallucinations when asked   Risk Potential: Suicidal ideation - None at present  Homicidal ideation - None at present  Potential for aggression - No   Sensorium:  oriented to person, place, and time/date   Memory:  recent and remote memory grossly intact   Consciousness:  alert and awake   Attention/Concentration: attention span and concentration are age appropriate   Insight:  fair   Judgment: fair   Gait/Station: normal gait/station   Motor Activity: no abnormal movements     Vital signs in last 24 hours:    Temp:  [96.8 °F (36 °C)-97.5 °F (36.4 °C)] 96.8 °F (36 °C)  HR:  [85-94] 94  BP: (133-146)/(81-88) 146/88  Resp:  [18] 18  SpO2:  [94 %-95 %] " 95 %  O2 Device: None (Room air)    Laboratory results: I have personally reviewed all pertinent laboratory/tests results    Results from the past 24 hours: No results found for this or any previous visit (from the past 24 hour(s)).    Progress Toward Goals: Awaiting Josiah DOZIER and approval of upper management. Notes some drowsiness with medications, however reports tolerating his medication regimen.Notes adequate sleep/appetite. Denies SI/HI/AVH.    Assessment & Plan   Principal Problem:    Bipolar disorder with severe depression (HCC)  Active Problems:    Benign essential hypertension    Mixed hyperlipidemia    Allergic rhinitis due to allergen    Rosacea        Current Facility-Administered Medications   Medication Dose Route Frequency Provider Last Rate    acetaminophen  650 mg Oral Q6H PRN ALVINA Harley      acetaminophen  650 mg Oral Q4H PRN ALVINA Harley      acetaminophen  975 mg Oral Q6H PRN ALVINA Harley      aluminum-magnesium hydroxide-simethicone  30 mL Oral Q4H PRN ALVINA Harley      ammonium lactate   Topical BID PRN ALVINA Harley      atorvastatin  10 mg Oral Daily ALVINA Lewis      benztropine  1 mg Intramuscular Q4H PRN Max 6/day ALVINA Harley      benztropine  1 mg Oral Q4H PRN Max 6/day ALVINA Harley      bisacodyl  10 mg Rectal Daily PRN ALVINA Harley      cariprazine  3 mg Oral Daily Martin Cardenas MD      Cholecalciferol  2,000 Units Oral Daily ALVINA Lewis      cyanocobalamin  1,000 mcg Oral Daily ALVINA Lewis      hydrOXYzine HCL  25 mg Oral Q6H PRN Max 4/day ALVINA Harley      hydrOXYzine HCL  25 mg Oral TID Martin Cardenas MD      hydrOXYzine HCL  50 mg Oral Q4H PRN Max 4/day ALVINA Harley      Or    LORazepam  1 mg Intramuscular Q4H PRN ALVINA Harley      ketoconazole   Topical Daily PRN ALVINA Harley      lamoTRIgine  50 mg Oral Daily Martin Cardenas MD      lisinopril  10 mg Oral  Daily Sarygurinder LinkALVINA Thompson      LORazepam  1 mg Oral Q4H PRN Max 6/day ALVINA Harley      Or    LORazepam  2 mg Intramuscular Q6H PRN Max 3/day Melva Knutson, ALVINA      OLANZapine  10 mg Oral Q3H PRN Max 3/day Melvamelanie Knutson, TITINP      Or    OLANZapine  10 mg Intramuscular Q3H PRN Max 3/day Melva Knutson, TITINP      OLANZapine  5 mg Oral Q3H PRN Max 6/day ALVINA Harley      Or    OLANZapine  5 mg Intramuscular Q3H PRN Max 6/day Melva Knutson, TITINP      OLANZapine  2.5 mg Oral Q3H PRN Max 8/day Melva Knutson, TITINP      polyethylene glycol  17 g Oral Daily PRN ALVINA Harley      propranolol  10 mg Oral Q8H PRN ALVINA Harley      senna-docusate sodium  1 tablet Oral Daily PRN Melva Knutson, ALVINA      sertraline  150 mg Oral HS Martin Cardenas MD       Risks / Benefits of Treatment:    Risks, benefits, and possible side effects of medications explained to patient and patient verbalizes understanding and agreement for treatment.    Counseling / Coordination of Care:    Patient's progress discussed with staff in treatment team meeting.  Medication education provided to patient.  Educated on importance of medication and treatment compliance.  Group attendance encouraged.    Pedro Stafford PA-C 10/17/24

## 2024-10-18 PROCEDURE — 99232 SBSQ HOSP IP/OBS MODERATE 35: CPT | Performed by: PSYCHIATRY & NEUROLOGY

## 2024-10-18 RX ADMIN — CARIPRAZINE 3 MG: 3 CAPSULE, GELATIN COATED ORAL at 08:07

## 2024-10-18 RX ADMIN — HYDROXYZINE HYDROCHLORIDE 25 MG: 25 TABLET ORAL at 08:08

## 2024-10-18 RX ADMIN — CYANOCOBALAMIN TAB 1000 MCG 1000 MCG: 1000 TAB at 08:07

## 2024-10-18 RX ADMIN — SERTRALINE HYDROCHLORIDE 150 MG: 100 TABLET ORAL at 21:17

## 2024-10-18 RX ADMIN — ATORVASTATIN CALCIUM 10 MG: 10 TABLET, FILM COATED ORAL at 08:08

## 2024-10-18 RX ADMIN — CHOLECALCIFEROL TAB 25 MCG (1000 UNIT) 2000 UNITS: 25 TAB at 08:08

## 2024-10-18 RX ADMIN — LISINOPRIL 10 MG: 10 TABLET ORAL at 08:08

## 2024-10-18 RX ADMIN — HYDROXYZINE HYDROCHLORIDE 25 MG: 25 TABLET ORAL at 21:17

## 2024-10-18 RX ADMIN — LAMOTRIGINE 50 MG: 25 TABLET ORAL at 08:08

## 2024-10-18 RX ADMIN — HYDROXYZINE HYDROCHLORIDE 25 MG: 25 TABLET ORAL at 17:10

## 2024-10-18 NOTE — PROGRESS NOTES
Progress Note - Behavioral Health   Name: Deyvi Cao 54 y.o. male I MRN: [id number]  Unit/Bed#: [facility] 101-02 I Date of Admission: 6/25/2024   Date of Service: 10/18/2024 I Hospital Day: 115     Assessment & Plan  Bipolar disorder with severe depression (HCC)  Still progressing - 10/18/2024  Awaiting placement - CRR interview complete, ACT interview complete. STEFAN sent last seven days of charts to Josiah for review, Pending Josiah DOZIER.    10/17: Patient had treatment team meeting today, currently being reviewed by Josiah DOZIER.  Continue medications as follows: Vraylar 3mg PO Daily, Atarax 25mg PO TID, Lamictal 50mg PO Daily, Zoloft 150mg PO QHS  Continue with group therapy, milieu therapy and occupational therapy   Behavioral Health checks every 7 minutes   Continue frequent safety checks and vitals per unit protocol  Continue with [facility] medical management as indicated    Benign essential hypertension  Managed by [facility] - reviewed 10/15  Continue Lisinopril 10mg PO Daily    Mixed hyperlipidemia  Managed by [facility] - reviewed 10/15  Continue Lipitor 10mg PO Daily    Allergic rhinitis due to allergen  Managed by [facility] - reviewed 10/15    Rosacea  Managed by [facility] - reviewed 10/15    Planned medication and treatment changes:    All current active medications have been reviewed  Encourage group therapy, milieu therapy and occupational therapy  Behavioral Health checks for safety monitoring  Continue current medications as prescribed.  Awaiting placement at this time.    Behavior over the last 24 hours: unchanged.     Deyvi is seen today for psychiatric follow up. Per nursing notes, patient patient has been withdrawn to room moreso, anxious appearing at times, minimal but pleasant, quiet, denies SI/HI/AVH.  Patient remains in behavioral control. No psych prns in last 24 hours.     Today Deyvi remains calm and cooperative, somewhat guarded/minimal. Withdrawn/isolative to room this morning. Answers questions appropriately,  but does not offer further information. Denies suicidal and homicidal ideations when asked. Discharge focused. Notes taking a break from groups, but notes he is going to try and attend more today. Appears depressed/anxious. Notes his depression and anxiety are minimal however. No overt guillermo, psychosis, or delusional content elicited.     Denies medication side effects when asked, tolerating current regimen. Attended 4/10 groups. Per , forms have been sent to Josiah for review, pending Josiah CRR.    Sleep:  adequate  Appetite:  adequate  Medication side effects: No   ROS: no complaints    Mental Status Evaluation:    Appearance:  age appropriate, casually dressed, adequate grooming, looks stated age   Behavior:  cooperative, calm, slightly guarded, minimal in conversation   Speech:  normal rate, soft, monotone, answers questions appropriately   Mood:  euthymic   Affect:  constricted, appears depressed/anxious at times   Thought Process:  goal directed, linear   Associations: intact associations   Thought Content:  no overt delusions   Perceptual Disturbances: denies auditory hallucinations when asked, does not appear responding to internal stimuli, denies visual hallucinations when asked   Risk Potential: Suicidal ideation - None at present  Homicidal ideation - None at present  Potential for aggression - No   Sensorium:  oriented to person, place, and time/date   Memory:  recent and remote memory grossly intact   Consciousness:  alert and awake   Attention/Concentration: attention span and concentration are age appropriate   Insight:  fair   Judgment: fair   Gait/Station: normal gait/station   Motor Activity: no abnormal movements     Vital signs in last 24 hours:    Temp:  [98 °F (36.7 °C)-98.1 °F (36.7 °C)] 98.1 °F (36.7 °C)  HR:  [89-93] 93  BP: (119-131)/(75-81) 119/81  Resp:  [18] 18  SpO2:  [94 %-96 %] 94 %  O2 Device: None (Room air)    Laboratory results: I have personally reviewed all pertinent  laboratory/tests results    Results from the past 24 hours: No results found for this or any previous visit (from the past 24 hour(s)).    Progress Toward Goals: Awaiting Josiah DOZIER, remains calm and cooperative, isolative to room, minimal in conversation, however pleasant, reports minimal depression/anxiety despite appearing depressed, constricted, denies SI/HI/AVH.    Assessment & Plan   Principal Problem:    Bipolar disorder with severe depression (HCC)  Active Problems:    Benign essential hypertension    Mixed hyperlipidemia    Allergic rhinitis due to allergen    Rosacea        Current Facility-Administered Medications   Medication Dose Route Frequency Provider Last Rate    acetaminophen  650 mg Oral Q6H PRN ALVINA Harley      acetaminophen  650 mg Oral Q4H PRN ALVINA Harley      acetaminophen  975 mg Oral Q6H PRN ALVINA Harley      aluminum-magnesium hydroxide-simethicone  30 mL Oral Q4H PRN ALVINA Harley      ammonium lactate   Topical BID PRN ALVINA Harley      atorvastatin  10 mg Oral Daily ALVINA Lewis      benztropine  1 mg Intramuscular Q4H PRN Max 6/day ALVINA Harley      benztropine  1 mg Oral Q4H PRN Max 6/day ALVINA Harley      bisacodyl  10 mg Rectal Daily PRN ALVINA Harley      cariprazine  3 mg Oral Daily Martin Cardenas MD      Cholecalciferol  2,000 Units Oral Daily ALVINA Lewis      cyanocobalamin  1,000 mcg Oral Daily ALVINA Lewis      hydrOXYzine HCL  25 mg Oral Q6H PRN Max 4/day ALVINA Harley      hydrOXYzine HCL  25 mg Oral TID Martin Cardenas MD      hydrOXYzine HCL  50 mg Oral Q4H PRN Max 4/day ALVINA Harley      Or    LORazepam  1 mg Intramuscular Q4H PRN ALVINA Harley      ketoconazole   Topical Daily PRN ALVINA Harley      lamoTRIgine  50 mg Oral Daily Martin Cardenas MD      lisinopril  10 mg Oral Daily ALVINA Lewis      LORazepam  1 mg Oral Q4H PRN Max 6/day  ALVINA Harley      Or    LORazepam  2 mg Intramuscular Q6H PRN Max 3/day ALVINA Harley      OLANZapine  10 mg Oral Q3H PRN Max 3/day ALVINA Harley      Or    OLANZapine  10 mg Intramuscular Q3H PRN Max 3/day ALVINA Harley      OLANZapine  5 mg Oral Q3H PRN Max 6/day ALVINA Harley      Or    OLANZapine  5 mg Intramuscular Q3H PRN Max 6/day ALVINA Harley      OLANZapine  2.5 mg Oral Q3H PRN Max 8/day ALVINA Harley      polyethylene glycol  17 g Oral Daily PRN ALVINA Harley      propranolol  10 mg Oral Q8H PRN ALVINA Harley      senna-docusate sodium  1 tablet Oral Daily PRN ALVINA Harley      sertraline  150 mg Oral HS Martin Cardenas MD       Risks / Benefits of Treatment:    Risks, benefits, and possible side effects of medications explained to patient and patient verbalizes understanding and agreement for treatment.    Counseling / Coordination of Care:    Patient's progress discussed with staff in treatment team meeting.  Educated on importance of medication and treatment compliance.  Group attendance encouraged.    Pedro Stafford PA-C 10/18/24

## 2024-10-18 NOTE — ASSESSMENT & PLAN NOTE
Still progressing - 10/18/2024  Awaiting placement - CRR interview complete, ACT interview complete. STEFAN sent last seven days of charts to Josiah for review, Pending Josiah DOZIER.    10/17: Patient had treatment team meeting today, currently being reviewed by Josiah DOZIER.  Continue medications as follows: Vraylar 3mg PO Daily, Atarax 25mg PO TID, Lamictal 50mg PO Daily, Zoloft 150mg PO QHS  Continue with group therapy, milieu therapy and occupational therapy   Behavioral Health checks every 7 minutes   Continue frequent safety checks and vitals per unit protocol  Continue with [facility] medical management as indicated

## 2024-10-18 NOTE — PROGRESS NOTES
"     Assessment & Plan  Bipolar disorder with severe depression (HCC)  Still progressing - 10/19/2024  Awaiting placement - CRR interview complete, ACT interview complete. STEFAN sent last seven days of charts to [facility] for review, Pending [facility].  Continue medications as follows: Vraylar 3mg PO Daily, Atarax 25mg PO TID, Lamictal 50mg PO Daily, Zoloft 150mg PO QHS  Continue with group therapy, milieu therapy and occupational therapy   Behavioral Health checks every 7 minutes   Continue frequent safety checks and vitals per unit protocol  Continue with [facility] medical management as indicated      No associated orders from this encounter found during lookback period of 72 hours.    Benign essential hypertension  Managed by [facility] - reviewed 10/19  Continue Lisinopril 10mg PO Daily      No associated orders from this encounter found during lookback period of 72 hours.    Mixed hyperlipidemia  Managed by [facility] - reviewed 10/19  Continue Lipitor 10mg PO Daily      No associated orders from this encounter found during lookback period of 72 hours.    Allergic rhinitis due to allergen  Managed by [facility] - reviewed 10/19      No associated orders from this encounter found during lookback period of 72 hours.    Rosacea  Managed by [facility] - reviewed 10/19    No associated orders from this encounter found during lookback period of 72 hours.         Progress Note - Behavioral Health     Deyvi Cao 54 y.o. male MRN: [id number]  Unit/Bed#: [facility] 101-02 Encounter: [id number]      Deyvi Cao was seen for continuing care and reviewed with treatment team.  Pt was walking on unit on approach, dressed, smiled at me a few times during interview, which is more animated than any previous interviews I've had with him.  He was not very spontaneous but did respond readily to questions.  He reported \"I am just a little anxious\" due to awaiting discharge.  He otherwise voices no mood complaints and admits he does not go to many therapeutic " groups.  When asked about future plans he stated that eventually he wants to be in his own apartment.  He likes to draw, paint, and take walks and may even consider joining a community art group in the future.  Pt presently denies depression, self-injurious thoughts/behaviors, SI, HI,  elevated or irritable moods, over-normal energy, reduced sleep requirement, impulsivity, hallucinations or paranoia.   Pt reports sleeping and eating well, to which nursing concurred.       Per EMR and floor team, the Pt has been calm, cooperative, and medication compliant.  He attends few groups but does behave appropriately when in them.  He is mostly isolative to his room and when walking in the [place]oy or milieu there is generally no peer interaction.  No report of any self-harm/suicidal behaviors.      Behavior over the last 24 hours: unchanged.   Sleep:Good  Appetite: Good   Medication side effects: None per Pt    ROS: No complaints per Pt, (All ROS Negative)    Labs/Tests: Reviewed    Mental Status Evaluation:  Appearance:  Dressed, clean, good eye contact   Behavior:  Calm, cooperative, pleasant   Speech:  Not very spontaneous, but readily answers questions.,  Voice is clear, with normal rate and volume   Mood:  Anxious   Affect:  Less constricted   Thought Process:  Organized, Goal directed, preoccupied with discharge but not demanding   Associations: Intact associations   Thought Content:  No verbalized delusions   Perceptual Disturbances: Pt denies any hallucinations or paranoia and does not appear to be responding to internal stimuli   Risk Potential: Pt presently denies SI or HI    Sensorium:  Self, birthday, Place, Day of the week, Month, Year   Memory:  short term memory grossly intact   Consciousness:  alert, awake   Attention: Good   Insight:  Partial   Judgment: Partial   Gait/Station: Normal gait/station   Motor Activity: No abnormal movements     Vitals:    10/17/24 1519 10/18/24 0744 10/18/24 1533 10/19/24 0737   BP:  131/75 119/81 129/65 127/81   BP Location: Right arm Right arm Left arm Left arm   Pulse: 89 93 100 101   Resp: 18 18 18 18   Temp: 98 °F (36.7 °C) 98.1 °F (36.7 °C) 97.5 °F (36.4 °C) 97.6 °F (36.4 °C)   TempSrc: Temporal Temporal Temporal Temporal   SpO2: 96% 94% 96% 97%   Weight:       Height:           Admission on 06/25/2024   Component Date Value    TSH 3RD GENERATON 06/26/2024 2.636     Cholesterol 06/26/2024 177     Triglycerides 06/26/2024 73     HDL, Direct 06/26/2024 52     LDL Calculated 06/26/2024 110 (H)     Non-HDL-Chol (CHOL-HDL) 06/26/2024 125     Vitamin B-12 06/26/2024 367     Folate 06/26/2024 13.0     Vit D, 25-Hydroxy 06/26/2024 19.2 (L)     Sodium 06/26/2024 137     Potassium 06/26/2024 4.1     Chloride 06/26/2024 101     CO2 06/26/2024 26     ANION GAP 06/26/2024 10     BUN 06/26/2024 17     Creatinine 06/26/2024 0.63     Glucose 06/26/2024 98     Glucose, Fasting 06/26/2024 98     Calcium 06/26/2024 9.6     AST 06/26/2024 25     ALT 06/26/2024 45     Alkaline Phosphatase 06/26/2024 114 (H)     Total Protein 06/26/2024 7.0     Albumin 06/26/2024 4.4     Total Bilirubin 06/26/2024 0.44     eGFR 06/26/2024 110     WBC 06/26/2024 7.39     RBC 06/26/2024 4.70     Hemoglobin 06/26/2024 15.2     Hematocrit 06/26/2024 45.5     MCV 06/26/2024 97     MCH 06/26/2024 32.3     MCHC 06/26/2024 33.4     RDW 06/26/2024 12.9     MPV 06/26/2024 8.9     Platelets 06/26/2024 246     nRBC 06/26/2024 0     Segmented % 06/26/2024 64     Immature Grans % 06/26/2024 0     Lymphocytes % 06/26/2024 25     Monocytes % 06/26/2024 7     Eosinophils Relative 06/26/2024 3     Basophils Relative 06/26/2024 1     Absolute Neutrophils 06/26/2024 4.63     Absolute Immature Grans 06/26/2024 0.03     Absolute Lymphocytes 06/26/2024 1.88     Absolute Monocytes 06/26/2024 0.55     Eosinophils Absolute 06/26/2024 0.22     Basophils Absolute 06/26/2024 0.08     Ventricular Rate 06/26/2024 75     Atrial Rate 06/26/2024 75     IA  Interval 06/26/2024 176     QRSD Interval 06/26/2024 104     QT Interval 06/26/2024 396     QTC Interval 06/26/2024 442     P Axis 06/26/2024 76     QRS Axis 06/26/2024 73     T Wave Axis 06/26/2024 69          Progress Toward Goals:  Based on today's interview and review of prior notes, Pt has remained stable.    Assessment & Plan   Principal Problem:    Bipolar disorder with severe depression (HCC)  Active Problems:    Benign essential hypertension    Mixed hyperlipidemia    Allergic rhinitis due to allergen    Rosacea      Recommended Treatment: Continue with pharmacotherapy, group therapy, milieu therapy and occupational therapy.  The patient will be maintained on the following medications:  Current Facility-Administered Medications   Medication Dose Route Frequency Provider Last Rate    acetaminophen  650 mg Oral Q6H PRN TITI HarleyNP      acetaminophen  650 mg Oral Q4H PRN ALVINA Harley      acetaminophen  975 mg Oral Q6H PRN ALVINA Harley      aluminum-magnesium hydroxide-simethicone  30 mL Oral Q4H PRN ALVINA Harley      ammonium lactate   Topical BID PRN ALVINA Harley      atorvastatin  10 mg Oral Daily ALVINA Lewis      benztropine  1 mg Intramuscular Q4H PRN Max 6/day ALVINA Harley      benztropine  1 mg Oral Q4H PRN Max 6/day ALVINA Harley      bisacodyl  10 mg Rectal Daily PRN ALVINA Harley      cariprazine  3 mg Oral Daily Martin Cardenas MD      Cholecalciferol  2,000 Units Oral Daily ALVINA Lewis      cyanocobalamin  1,000 mcg Oral Daily ALVINA Lewis      hydrOXYzine HCL  25 mg Oral Q6H PRN Max 4/day ALVINA Harley      hydrOXYzine HCL  25 mg Oral TID Martin Cardenas MD      hydrOXYzine HCL  50 mg Oral Q4H PRN Max 4/day ALVINA Harley      Or    LORazepam  1 mg Intramuscular Q4H PRN Melva Knutson, ALVINA      ketoconazole   Topical Daily PRN ALVINA Harley      lamoTRIgine  50 mg Oral Daily Martin Cardenas,  MD      lisinopril  10 mg Oral Daily ALVINA Lewis      LORazepam  1 mg Oral Q4H PRN Max 6/day ALVINA Harley      Or    LORazepam  2 mg Intramuscular Q6H PRN Max 3/day ALVINA Harley      OLANZapine  10 mg Oral Q3H PRN Max 3/day ALVINA Harley      Or    OLANZapine  10 mg Intramuscular Q3H PRN Max 3/day ALVINA Harley      OLANZapine  5 mg Oral Q3H PRN Max 6/day ALVINA Harley      Or    OLANZapine  5 mg Intramuscular Q3H PRN Max 6/day ALVINA Harley      OLANZapine  2.5 mg Oral Q3H PRN Max 8/day ALVINA Harley      polyethylene glycol  17 g Oral Daily PRN ALVINA Harley      propranolol  10 mg Oral Q8H PRN ALVINA Harley      senna-docusate sodium  1 tablet Oral Daily PRN ALVINA Harley      sertraline  150 mg Oral HS Martin Cardenas MD         Risks, benefits and possible side effects of Medications:   Risks, benefits, and possible side effects of medications explained to patient and patient verbalizes understanding       I have spent a total time of 20 minutes in caring for this patient on the day of the visit/encounter including Importance of tx compliance, Counseling / Coordination of care, Documenting in the medical record, and Obtaining or reviewing history  .

## 2024-10-18 NOTE — PROGRESS NOTES
10/18/24 0755   Team Meeting   Meeting Type Daily Rounds   Team Members Present   Team Members Present Physician;Nurse;;Other (Discipline and Name)   Physician Team Member Holter, Thomas, Jayne PA-C   Nursing Team Member Chastity   Social Work Team Member Henrry FRY   Other (Discipline and Name) [facility]   Patient/Family Present   Patient Present No   Patient's Family Present No   OTHER   Team Meeting - Additional Comments [facility]     Groups Participation  4/10.   Patient's compliant with medications. He has minimal engagement in his treatment. Referral decision pending with [facility]. He's depressed and has a flat affect.

## 2024-10-18 NOTE — PLAN OF CARE
Problem: Alteration in Thoughts and Perception  Goal: Treatment Goal: Gain control of psychotic behaviors/thinking, reduce/eliminate presenting symptoms and demonstrate improved reality functioning upon discharge  Outcome: Progressing  Goal: Agree to be compliant with medication regime, as prescribed and report medication side effects  Description: Interventions:  - Offer appropriate PRN medication and supervise ingestion; conduct AIMS, as needed   Outcome: Progressing     Problem: Ineffective Coping  Goal: Understands least restrictive measures  Description: Interventions:  - Utilize least restrictive behavior  Outcome: Progressing  Goal: Free from restraint events  Description: - Utilize least restrictive measures   - Provide behavioral interventions   - Redirect inappropriate behaviors   Outcome: Progressing     Problem: Depression  Goal: Refrain from harming self  Description: Interventions:  - Monitor patient closely, per order   - Supervise medication ingestion, monitor effects and side effects   Outcome: Progressing     Problem: Anxiety  Goal: Anxiety is at manageable level  Description: Interventions:  - Assess and monitor patient's anxiety level.   - Monitor for signs and symptoms (heart palpitations, chest pain, shortness of breath, headaches, nausea, feeling jumpy, restlessness, irritable, apprehensive).   - Collaborate with interdisciplinary team and initiate plan and interventions as ordered.  - [place] patient to unit/surroundings  - Explain treatment plan  - Encourage participation in care  - Encourage verbalization of concerns/fears  - Identify coping mechanisms  - Assist in developing anxiety-reducing skills  - Administer/offer alternative therapies  - Limit or eliminate stimulants  Outcome: Progressing     Problem: Risk for Violence/Aggression Toward Others  Goal: Refrain from harming others  Outcome: Progressing  Goal: Refrain from destructive acts on the environment or property  Outcome:  Progressing  Goal: Control angry outbursts  Description: Interventions:  - Monitor patient closely, per order  - Ensure early verbal de-escalation  - Monitor prn medication needs  - Set reasonable/therapeutic limits, outline behavioral expectations, and consequences   - Provide a non-threatening milieu, utilizing the least restrictive interventions   Outcome: Progressing     Problem: Depression  Goal: Refrain from isolation  Description: Interventions:  - Develop a trusting relationship   - Encourage socialization   Outcome: Not Progressing

## 2024-10-18 NOTE — NURSING NOTE
Patient is out of his room minimally for meals and some groups. Otherwise he is quiet and isolative to himself in his room. Presents as being anxious when approached and during conversation. Eager for discharge. Appetite good. Medication compliant. Denies suicidal ideations.

## 2024-10-19 PROCEDURE — 99232 SBSQ HOSP IP/OBS MODERATE 35: CPT | Performed by: PHYSICIAN ASSISTANT

## 2024-10-19 RX ADMIN — LISINOPRIL 10 MG: 10 TABLET ORAL at 08:30

## 2024-10-19 RX ADMIN — ATORVASTATIN CALCIUM 10 MG: 10 TABLET, FILM COATED ORAL at 08:31

## 2024-10-19 RX ADMIN — CYANOCOBALAMIN TAB 1000 MCG 1000 MCG: 1000 TAB at 08:32

## 2024-10-19 RX ADMIN — CHOLECALCIFEROL TAB 25 MCG (1000 UNIT) 2000 UNITS: 25 TAB at 08:30

## 2024-10-19 RX ADMIN — CARIPRAZINE 3 MG: 3 CAPSULE, GELATIN COATED ORAL at 08:31

## 2024-10-19 RX ADMIN — HYDROXYZINE HYDROCHLORIDE 25 MG: 25 TABLET ORAL at 08:31

## 2024-10-19 RX ADMIN — HYDROXYZINE HYDROCHLORIDE 25 MG: 25 TABLET ORAL at 21:19

## 2024-10-19 RX ADMIN — LAMOTRIGINE 50 MG: 25 TABLET ORAL at 08:30

## 2024-10-19 RX ADMIN — HYDROXYZINE HYDROCHLORIDE 25 MG: 25 TABLET ORAL at 16:56

## 2024-10-19 RX ADMIN — SERTRALINE HYDROCHLORIDE 150 MG: 100 TABLET ORAL at 21:19

## 2024-10-19 NOTE — ASSESSMENT & PLAN NOTE
Still progressing - 10/19/2024  Awaiting placement - CRR interview complete, ACT interview complete. STEFAN sent last seven days of charts to [facility] for review, Pending [facility].  Continue medications as follows: Vraylar 3mg PO Daily, Atarax 25mg PO TID, Lamictal 50mg PO Daily, Zoloft 150mg PO QHS  Continue with group therapy, milieu therapy and occupational therapy   Behavioral Health checks every 7 minutes   Continue frequent safety checks and vitals per unit protocol  Continue with [facility] medical management as indicated      No associated orders from this encounter found during lookback period of 72 hours.

## 2024-10-19 NOTE — ASSESSMENT & PLAN NOTE
Managed by [facility] - reviewed 10/19      No associated orders from this encounter found during lookback period of 72 hours.

## 2024-10-19 NOTE — NURSING NOTE
Patient has been out of his room minimally and for meals. Anxious affect. Pleasant and cooperative. Smiles on approach. Preoccupied and awaiting on discharge arrangements. Medication compliant. Denies any psychological symptoms or suicidal ideations. Appetite excellent. Minimal peer interaction.

## 2024-10-19 NOTE — NURSING NOTE
Patient is pleasant and cooperative. He remains withdrawn to his room. He is complaint with medications. He denied anxiety and depression. He did not attend groups. His appetite is good.

## 2024-10-19 NOTE — ASSESSMENT & PLAN NOTE
Managed by [facility] - reviewed 10/19  Continue Lipitor 10mg PO Daily      No associated orders from this encounter found during lookback period of 72 hours.

## 2024-10-19 NOTE — PLAN OF CARE
Problem: Depression  Goal: Treatment Goal: Demonstrate behavioral control of depressive symptoms, verbalize feelings of improved mood/affect, and adopt new coping skills prior to discharge  Outcome: Progressing  Goal: Verbalize thoughts and feelings  Description: Interventions:  - Assess and re-assess patient's level of risk   - Engage patient in 1:1 interactions, daily, for a minimum of 15 minutes   - Encourage patient to express feelings, fears, frustrations, hopes   Outcome: Progressing  Goal: Refrain from harming self  Description: Interventions:  - Monitor patient closely, per order   - Supervise medication ingestion, monitor effects and side effects   Outcome: Progressing

## 2024-10-19 NOTE — ASSESSMENT & PLAN NOTE
Managed by [facility] - reviewed 10/19  Continue Lisinopril 10mg PO Daily      No associated orders from this encounter found during lookback period of 72 hours.

## 2024-10-19 NOTE — NURSING NOTE
Deyvi spends a great deal of time in his room but when visible he brightens on approach. He attends some groups and states his anxiety is much better. He denies suicidal ideations. He states he is waiting fro placement with [facility]  He is calm and cooperative and states he is ready to get on with his life  He is pleasant and more open lately

## 2024-10-20 VITALS
BODY MASS INDEX: 29.15 KG/M2 | RESPIRATION RATE: 18 BRPM | HEART RATE: 92 BPM | DIASTOLIC BLOOD PRESSURE: 80 MMHG | TEMPERATURE: 98 F | WEIGHT: 215.2 LBS | OXYGEN SATURATION: 93 % | HEIGHT: 72 IN | SYSTOLIC BLOOD PRESSURE: 129 MMHG

## 2024-10-20 PROCEDURE — 99232 SBSQ HOSP IP/OBS MODERATE 35: CPT | Performed by: PHYSICIAN ASSISTANT

## 2024-10-20 RX ADMIN — LISINOPRIL 10 MG: 10 TABLET ORAL at 08:34

## 2024-10-20 RX ADMIN — CYANOCOBALAMIN TAB 1000 MCG 1000 MCG: 1000 TAB at 08:34

## 2024-10-20 RX ADMIN — CHOLECALCIFEROL TAB 25 MCG (1000 UNIT) 2000 UNITS: 25 TAB at 08:34

## 2024-10-20 RX ADMIN — HYDROXYZINE HYDROCHLORIDE 25 MG: 25 TABLET ORAL at 08:34

## 2024-10-20 RX ADMIN — LAMOTRIGINE 50 MG: 25 TABLET ORAL at 08:34

## 2024-10-20 RX ADMIN — SERTRALINE HYDROCHLORIDE 150 MG: 100 TABLET ORAL at 21:34

## 2024-10-20 RX ADMIN — CARIPRAZINE 3 MG: 3 CAPSULE, GELATIN COATED ORAL at 08:34

## 2024-10-20 RX ADMIN — ATORVASTATIN CALCIUM 10 MG: 10 TABLET, FILM COATED ORAL at 08:34

## 2024-10-20 RX ADMIN — HYDROXYZINE HYDROCHLORIDE 25 MG: 25 TABLET ORAL at 21:34

## 2024-10-20 RX ADMIN — HYDROXYZINE HYDROCHLORIDE 25 MG: 25 TABLET ORAL at 15:23

## 2024-10-20 NOTE — ASSESSMENT & PLAN NOTE
Managed by [facility] - reviewed 10/20/2024      No associated orders from this encounter found during lookback period of 72 hours.

## 2024-10-20 NOTE — PROGRESS NOTES
"Progress Note - Behavioral Health   Name: Deyvi Cao 54 y.o. male I MRN: [id number]  Unit/Bed#: [facility] 101-02 I Date of Admission: 6/25/2024   Date of Service: 10/20/2024 I Hospital Day: 117     Assessment & Plan  Bipolar disorder with severe depression (HCC)  Still progressing - 10/20/2024  Awaiting placement - CRR interview complete, ACT interview complete.  sent last seven days of charts to [facility] for review, Pending [facility] CRR.  Continue medications as follows: Vraylar 3mg PO Daily, Atarax 25mg PO TID, Lamictal 50mg PO Daily, Zoloft 150mg PO QHS  Continue with group therapy, milieu therapy and occupational therapy   Behavioral Health checks every 7 minutes   Continue frequent safety checks and vitals per unit protocol  Continue with [facility] management as indicated      No associated orders from this encounter found during lookback period of 72 hours.    Benign essential hypertension  Managed by [facility] - reviewed 10/20/2024  Continue Lisinopril 10mg PO Daily      No associated orders from this encounter found during lookback period of 72 hours.    Mixed hyperlipidemia  Managed by [facility] - reviewed 10/20/2024  Continue Lipitor 10mg PO Daily      No associated orders from this encounter found during lookback period of 72 hours.    Allergic rhinitis due to allergen  Managed by [facility] - reviewed 10/20/2024      No associated orders from this encounter found during lookback period of 72 hours.    Rosacea  Managed by [facility] - reviewed 10/20/2024    No associated orders from this encounter found during lookback period of 72 hours.    Progress Note - Behavioral Health     Deyvi Cao 54 y.o. male MRN: [id number]  Unit/Bed#: [facility] 101-02 Encounter: [id number]      Deyvi Cao was seen for continuing care and reviewed with treatment team.  Pt was pleasant and cooperative on approach, though generally scant in conversation.  He did report feeling \"Good\" and his thoughts have been mainly preoccupied with waiting for " "his discharge.  He does have plans to reconnect with his once he is out of here because he does not have her number.   He reported having gone to 3 groups yesterday and that he stayed for the whole time.  Pt presently denies depression, self-injurious thoughts/behaviors, SI, HI, anxiety, hallucinations or paranoia.  He reported sleeping adequately last night son woke him up.  Would prefer getting 8 hours total if possible.  He reports his appetite has been \"Normal.\"        Per EMR and floor team, the Pt has remained calm, cooperative, and medication compliant thus far through the weekend.  No behavioral as needed medications been needed.  He is still mostly isolative to his room and not socially interactive.  No report of any self-harm/suicidal behaviors.      Behavior over the last 24 hours: unchanged.   Sleep:Adequate  Appetite: Good   Medication side effects: None per Pt    ROS: No complaints per Pt, (All ROS Negative)    Labs/Tests: Reviewed    Mental Status Evaluation:  Appearance:  Dressed, clean, good eye contact   Behavior:  Calm, cooperative, pleasant   Speech:  Voices clear, with normal rate and volume.  He is generally scant, but readily answers questions when asked   Mood:  Anxious   Affect:  Less constricted   Thought Process:  Organized, Goal directed, preoccupied with discharge, but not demanding   Associations: Intact associations   Thought Content:  No verbalized delusions   Perceptual Disturbances: Pt denies any hallucinations or paranoia and does not appear to be responding to internal stimuli   Risk Potential: Pt presently denies SI or HI    Sensorium:  Self, birthday, Place, Day of the week, Month, Year   Memory:  short term memory grossly intact   Consciousness:  alert, awake   Attention: Good   Insight:  Partial   Judgment: Partial   Gait/Station: Normal gait/station   Motor Activity: No abnormal movements     Vitals:    10/18/24 1533 10/19/24 0737 10/19/24 1506 10/20/24 0757   BP: 129/65 " 127/81 133/70 138/94   BP Location: Left arm Left arm Right arm Left arm   Pulse: 100 101 100 101   Resp: 18 18 18 16   Temp: 97.5 °F (36.4 °C) 97.6 °F (36.4 °C) 97.7 °F (36.5 °C) 98.3 °F (36.8 °C)   TempSrc: Temporal Temporal Skin Temporal   SpO2: 96% 97% 98% 94%   Weight:    97.6 kg (215 lb 3.2 oz)   Height:           Admission on 06/25/2024   Component Date Value    TSH 3RD GENERATON 06/26/2024 2.636     Cholesterol 06/26/2024 177     Triglycerides 06/26/2024 73     HDL, Direct 06/26/2024 52     LDL Calculated 06/26/2024 110 (H)     Non-HDL-Chol (CHOL-HDL) 06/26/2024 125     Vitamin B-12 06/26/2024 367     Folate 06/26/2024 13.0     Vit D, 25-Hydroxy 06/26/2024 19.2 (L)     Sodium 06/26/2024 137     Potassium 06/26/2024 4.1     Chloride 06/26/2024 101     CO2 06/26/2024 26     ANION GAP 06/26/2024 10     BUN 06/26/2024 17     Creatinine 06/26/2024 0.63     Glucose 06/26/2024 98     Glucose, Fasting 06/26/2024 98     Calcium 06/26/2024 9.6     AST 06/26/2024 25     ALT 06/26/2024 45     Alkaline Phosphatase 06/26/2024 114 (H)     Total Protein 06/26/2024 7.0     Albumin 06/26/2024 4.4     Total Bilirubin 06/26/2024 0.44     eGFR 06/26/2024 110     WBC 06/26/2024 7.39     RBC 06/26/2024 4.70     Hemoglobin 06/26/2024 15.2     Hematocrit 06/26/2024 45.5     MCV 06/26/2024 97     MCH 06/26/2024 32.3     MCHC 06/26/2024 33.4     RDW 06/26/2024 12.9     MPV 06/26/2024 8.9     Platelets 06/26/2024 246     nRBC 06/26/2024 0     Segmented % 06/26/2024 64     Immature Grans % 06/26/2024 0     Lymphocytes % 06/26/2024 25     Monocytes % 06/26/2024 7     Eosinophils Relative 06/26/2024 3     Basophils Relative 06/26/2024 1     Absolute Neutrophils 06/26/2024 4.63     Absolute Immature Grans 06/26/2024 0.03     Absolute Lymphocytes 06/26/2024 1.88     Absolute Monocytes 06/26/2024 0.55     Eosinophils Absolute 06/26/2024 0.22     Basophils Absolute 06/26/2024 0.08     Ventricular Rate 06/26/2024 75     Atrial Rate 06/26/2024  75     ND Interval 06/26/2024 176     QRSD Interval 06/26/2024 104     QT Interval 06/26/2024 396     QTC Interval 06/26/2024 442     P Axis 06/26/2024 76     QRS Axis 06/26/2024 73     T Wave Axis 06/26/2024 69        Progress Toward Goals:  Based on today's interview and review of prior notes, Pt has remained stable.    Assessment & Plan   Principal Problem:    Bipolar disorder with severe depression (HCC)  Active Problems:    Benign essential hypertension    Mixed hyperlipidemia    Allergic rhinitis due to allergen    Rosacea      Recommended Treatment: Continue with pharmacotherapy, group therapy, milieu therapy and occupational therapy.  The patient will be maintained on the following medications:  Current Facility-Administered Medications   Medication Dose Route Frequency Provider Last Rate    acetaminophen  650 mg Oral Q6H PRN TITI HarleyNP      acetaminophen  650 mg Oral Q4H PRN ALVINA Harley      acetaminophen  975 mg Oral Q6H PRN ALVINA Harley      aluminum-magnesium hydroxide-simethicone  30 mL Oral Q4H PRN ALVINA Harley      ammonium lactate   Topical BID PRN ALVINA Harlye      atorvastatin  10 mg Oral Daily ALVINA Lewis      benztropine  1 mg Intramuscular Q4H PRN Max 6/day ALVINA Harley      benztropine  1 mg Oral Q4H PRN Max 6/day ALVINA Harley      bisacodyl  10 mg Rectal Daily PRN ALVINA Harley      cariprazine  3 mg Oral Daily Martin Cardenas MD      Cholecalciferol  2,000 Units Oral Daily ALVINA Lewis      cyanocobalamin  1,000 mcg Oral Daily ALVINA Lewis      hydrOXYzine HCL  25 mg Oral Q6H PRN Max 4/day ALVINA Harley      hydrOXYzine HCL  25 mg Oral TID Martin Cardenas MD      hydrOXYzine HCL  50 mg Oral Q4H PRN Max 4/day ALVINA Harley      Or    LORazepam  1 mg Intramuscular Q4H PRN ALVINA Harley      ketoconazole   Topical Daily PRN ALVINA Harley      lamoTRIgine  50 mg Oral Daily Martin SIMMS  MD Ronald      lisinopril  10 mg Oral Daily Sary Rodriguez ALVINA Biggs      LORazepam  1 mg Oral Q4H PRN Max 6/day ALVINA Harley      Or    LORazepam  2 mg Intramuscular Q6H PRN Max 3/day ALVINA Harely      OLANZapine  10 mg Oral Q3H PRN Max 3/day ALVINA Harley      Or    OLANZapine  10 mg Intramuscular Q3H PRN Max 3/day ALVINA Harley      OLANZapine  5 mg Oral Q3H PRN Max 6/day ALVINA Harley      Or    OLANZapine  5 mg Intramuscular Q3H PRN Max 6/day ALVINA Harley      OLANZapine  2.5 mg Oral Q3H PRN Max 8/day ALVINA Harley      polyethylene glycol  17 g Oral Daily PRN ALVINA Harley      propranolol  10 mg Oral Q8H PRN ALVINA Harley      senna-docusate sodium  1 tablet Oral Daily PRN ALVINA Harley      sertraline  150 mg Oral HS Martin Cardenas MD         Risks, benefits and possible side effects of Medications:   Risks, benefits, and possible side effects of medications explained to patient and patient verbalizes understanding       I have spent a total time of 15 minutes in caring for this patient on the day of the visit/encounter including Risks and benefits of tx options, Importance of tx compliance, Counseling / Coordination of care, Documenting in the medical record, Reviewing / ordering tests, medicine, procedures  , and Obtaining or reviewing history  .

## 2024-10-20 NOTE — ASSESSMENT & PLAN NOTE
Managed by [facility] - reviewed 10/20/2024  Continue Lisinopril 10mg PO Daily      No associated orders from this encounter found during lookback period of 72 hours.

## 2024-10-20 NOTE — PLAN OF CARE
Problem: Alteration in Thoughts and Perception  Goal: Treatment Goal: Gain control of psychotic behaviors/thinking, reduce/eliminate presenting symptoms and demonstrate improved reality functioning upon discharge  Outcome: Progressing  Goal: Verbalize thoughts and feelings  Description: Interventions:  - Promote a nonjudgmental and trusting relationship with the patient through active listening and therapeutic communication  - Assess patient's level of functioning, behavior and potential for risk  - Engage patient in 1 on 1 interactions  - Encourage patient to express fears, feelings, frustrations, and discuss symptoms    - Paicines patient to reality, help patient recognize reality-based thinking   - Administer medications as ordered and assess for potential side effects  - Provide the patient education related to the signs and symptoms of the illness and desired effects of prescribed medications  Outcome: Progressing  Goal: Refrain from acting on delusional thinking/internal stimuli  Description: Interventions:  - Monitor patient closely, per order   - Utilize least restrictive measures   - Set reasonable limits, give positive feedback for acceptable   - Administer medications as ordered and monitor of potential side effects  Outcome: Progressing  Goal: Agree to be compliant with medication regime, as prescribed and report medication side effects  Description: Interventions:  - Offer appropriate PRN medication and supervise ingestion; conduct AIMS, as needed   Outcome: Progressing  Goal: Attend and participate in unit activities, including therapeutic, recreational, and educational groups  Description: Interventions:  -Encourage Visitation and family involvement in care  Outcome: Progressing  Goal: Recognize dysfunctional thoughts, communicate reality-based thoughts at the time of discharge  Description: Interventions:  - Provide medication and psycho-education to assist patient in compliance and developing  insight into his/her illness   Outcome: Progressing  Goal: Complete daily ADLs, including personal hygiene independently, as able  Description: Interventions:  - Observe, teach, and assist patient with ADLS  - Monitor and promote a balance of rest/activity, with adequate nutrition and elimination   Outcome: Progressing     Problem: Ineffective Coping  Goal: Identifies ineffective coping skills  Outcome: Progressing  Goal: Identifies healthy coping skills  Outcome: Progressing  Goal: Demonstrates healthy coping skills  Outcome: Progressing  Goal: Participates in unit activities  Description: Interventions:  - Provide therapeutic environment   - Provide required programming   - Redirect inappropriate behaviors   Outcome: Progressing  Goal: Patient/Family participate in treatment and DC plans  Description: Interventions:  - Provide therapeutic environment  Outcome: Progressing     Problem: Risk for Self Injury/Neglect  Goal: Treatment Goal: Remain safe during length of stay, learn and adopt new coping skills, and be free of self-injurious ideation, impulses and acts at the time of discharge  Outcome: Progressing  Goal: Recognize maladaptive responses and adopt new coping mechanisms  Outcome: Progressing     Problem: Risk for Violence/Aggression Toward Others  Goal: Refrain from harming others  Outcome: Progressing  Goal: Identify appropriate positive anger management techniques  Description: Interventions:  - Offer anger management and coping skills groups   - Staff will provide positive feedback for appropriate anger control  Outcome: Progressing     Problem: Alteration in Orientation  Goal: Complete daily ADLs, including personal hygiene independently, as able  Description: Interventions:  - Observe, teach, and assist patient with ADLS  Outcome: Progressing     Problem: ANXIETY  Goal: Will report anxiety at manageable levels  Description: INTERVENTIONS:  - Administer medication as ordered  - Teach and encourage  coping skills  - Provide emotional support  - Assess patient/family for anxiety and ability to cope  Outcome: Progressing  Goal: By discharge: Patient will verbalize 2 strategies to deal with anxiety  Description: Interventions:  - Identify any obvious source/trigger to anxiety  - Staff will assist patient in applying identified coping technique/skills  - Encourage attendance of scheduled groups and activities  Outcome: Progressing     Problem: DEPRESSION  Goal: Will be euthymic at discharge  Description: INTERVENTIONS:  - Administer medication as ordered  - Provide emotional support via 1:1 interaction with staff  - Encourage involvement in milieu/groups/activities  - Monitor for social isolation  Outcome: Progressing

## 2024-10-20 NOTE — NURSING NOTE
Patient is pleasant and cooperative. He was cooperative with weekly assessment. Patient noted dry skin.  He remains isolative to his room and states the milieu at times is loud. He denied anxiety and depression. His appetite is good.

## 2024-10-20 NOTE — PLAN OF CARE
Problem: Alteration in Thoughts and Perception  Goal: Treatment Goal: Gain control of psychotic behaviors/thinking, reduce/eliminate presenting symptoms and demonstrate improved reality functioning upon discharge  Outcome: Progressing  Goal: Verbalize thoughts and feelings  Description: Interventions:  - Promote a nonjudgmental and trusting relationship with the patient through active listening and therapeutic communication  - Assess patient's level of functioning, behavior and potential for risk  - Engage patient in 1 on 1 interactions  - Encourage patient to express fears, feelings, frustrations, and discuss symptoms    - [place] patient to reality, help patient recognize reality-based thinking   - Administer medications as ordered and assess for potential side effects  - Provide the patient education related to the signs and symptoms of the illness and desired effects of prescribed medications  Outcome: Progressing  Goal: Refrain from acting on delusional thinking/internal stimuli  Description: Interventions:  - Monitor patient closely, per order   - Utilize least restrictive measures   - Set reasonable limits, give positive feedback for acceptable   - Administer medications as ordered and monitor of potential side effects  Outcome: Progressing  Goal: Agree to be compliant with medication regime, as prescribed and report medication side effects  Description: Interventions:  - Offer appropriate PRN medication and supervise ingestion; conduct AIMS, as needed   Outcome: Progressing  Goal: Attend and participate in unit activities, including therapeutic, recreational, and educational groups  Description: Interventions:  -Encourage Visitation and family involvement in care  Outcome: Progressing  Goal: Recognize dysfunctional thoughts, communicate reality-based thoughts at the time of discharge  Description: Interventions:  - Provide medication and psycho-education to assist patient in compliance and developing  insight into his/her illness   Outcome: Progressing  Goal: Complete daily ADLs, including personal hygiene independently, as able  Description: Interventions:  - Observe, teach, and assist patient with ADLS  - Monitor and promote a balance of rest/activity, with adequate nutrition and elimination   Outcome: Progressing     Problem: Ineffective Coping  Goal: Identifies ineffective coping skills  Outcome: Progressing  Goal: Identifies healthy coping skills  Outcome: Progressing  Goal: Demonstrates healthy coping skills  Outcome: Progressing  Goal: Participates in unit activities  Description: Interventions:  - Provide therapeutic environment   - Provide required programming   - Redirect inappropriate behaviors   Outcome: Progressing  Goal: Patient/Family participate in treatment and DC plans  Description: Interventions:  - Provide therapeutic environment  Outcome: Progressing  Goal: Patient/Family verbalizes awareness of resources  Outcome: Progressing  Goal: Understands least restrictive measures  Description: Interventions:  - Utilize least restrictive behavior  Outcome: Progressing  Goal: Free from restraint events  Description: - Utilize least restrictive measures   - Provide behavioral interventions   - Redirect inappropriate behaviors   Outcome: Progressing     Problem: Risk for Self Injury/Neglect  Goal: Treatment Goal: Remain safe during length of stay, learn and adopt new coping skills, and be free of self-injurious ideation, impulses and acts at the time of discharge  Outcome: Progressing  Goal: Verbalize thoughts and feelings  Description: Interventions:  - Assess and re-assess patient's lethality and potential for self-injury  - Engage patient in 1:1 interactions, daily, for a minimum of 15 minutes  - Encourage patient to express feelings, fears, frustrations, hopes  - Establish rapport/trust with patient   Outcome: Progressing  Goal: Refrain from harming self  Description: Interventions:  - Monitor  patient closely, per order  - Develop a trusting relationship  - Supervise medication ingestion, monitor effects and side effects   Outcome: Progressing  Goal: Attend and participate in unit activities, including therapeutic, recreational, and educational groups  Description: Interventions:  - Provide therapeutic and educational activities daily, encourage attendance and participation, and document same in the medical record  - Obtain collateral information, encourage visitation and family involvement in care   Outcome: Progressing  Goal: Recognize maladaptive responses and adopt new coping mechanisms  Outcome: Progressing  Goal: Complete daily ADLs, including personal hygiene independently, as able  Description: Interventions:  - Observe, teach, and assist patient with ADLS  - Monitor and promote a balance of rest/activity, with adequate nutrition and elimination  Outcome: Progressing     Problem: Depression  Goal: Treatment Goal: Demonstrate behavioral control of depressive symptoms, verbalize feelings of improved mood/affect, and adopt new coping skills prior to discharge  Outcome: Progressing  Goal: Verbalize thoughts and feelings  Description: Interventions:  - Assess and re-assess patient's level of risk   - Engage patient in 1:1 interactions, daily, for a minimum of 15 minutes   - Encourage patient to express feelings, fears, frustrations, hopes   Outcome: Progressing  Goal: Refrain from harming self  Description: Interventions:  - Monitor patient closely, per order   - Supervise medication ingestion, monitor effects and side effects   Outcome: Progressing  Goal: Refrain from isolation  Description: Interventions:  - Develop a trusting relationship   - Encourage socialization   Outcome: Progressing  Goal: Refrain from self-neglect  Outcome: Progressing  Goal: Attend and participate in unit activities, including therapeutic, recreational, and educational groups  Description: Interventions:  - Provide  therapeutic and educational activities daily, encourage attendance and participation, and document same in the medical record   Outcome: Progressing  Goal: Complete daily ADLs, including personal hygiene independently, as able  Description: Interventions:  - Observe, teach, and assist patient with ADLS  -  Monitor and promote a balance of rest/activity, with adequate nutrition and elimination   Outcome: Progressing     Problem: Anxiety  Goal: Anxiety is at manageable level  Description: Interventions:  - Assess and monitor patient's anxiety level.   - Monitor for signs and symptoms (heart palpitations, chest pain, shortness of breath, headaches, nausea, feeling jumpy, restlessness, irritable, apprehensive).   - Collaborate with interdisciplinary team and initiate plan and interventions as ordered.  - [place] patient to unit/surroundings  - Explain treatment plan  - Encourage participation in care  - Encourage verbalization of concerns/fears  - Identify coping mechanisms  - Assist in developing anxiety-reducing skills  - Administer/offer alternative therapies  - Limit or eliminate stimulants  Outcome: Progressing     Problem: Risk for Violence/Aggression Toward Others  Goal: Treatment Goal: Refrain from acts of violence/aggression during length of stay, and demonstrate improved impulse control at the time of discharge  Outcome: Progressing  Goal: Verbalize thoughts and feelings  Description: Interventions:  - Assess and re-assess patient's level of risk, every waking shift  - Engage patient in 1:1 interactions, daily, for a minimum of 15 minutes   - Allow patient to express feelings and frustrations in a safe and non-threatening manner   - Establish rapport/trust with patient   Outcome: Progressing  Goal: Refrain from harming others  Outcome: Progressing  Goal: Refrain from destructive acts on the environment or property  Outcome: Progressing  Goal: Control angry outbursts  Description: Interventions:  - Monitor  patient closely, per order  - Ensure early verbal de-escalation  - Monitor prn medication needs  - Set reasonable/therapeutic limits, outline behavioral expectations, and consequences   - Provide a non-threatening milieu, utilizing the least restrictive interventions   Outcome: Progressing  Goal: Attend and participate in unit activities, including therapeutic, recreational, and educational groups  Description: Interventions:  - Provide therapeutic and educational activities daily, encourage attendance and participation, and document same in the medical record   Outcome: Progressing  Goal: Identify appropriate positive anger management techniques  Description: Interventions:  - Offer anger management and coping skills groups   - Staff will provide positive feedback for appropriate anger control  Outcome: Progressing     Problem: Alteration in Orientation  Goal: Treatment Goal: Demonstrate a reduction of confusion and improved orientation to person, place, time and/or situation upon discharge, according to optimum baseline/ability  Outcome: Progressing  Goal: Interact with staff daily  Description: Interventions:  - Assess and re-assess patient's level of orientation  - Engage patient in 1 on 1 interactions, daily, for a minimum of 15 minutes   - Establish rapport/trust with patient   Outcome: Progressing  Goal: Express concerns related to confused thinking related to:  Description: Interventions:  - Encourage patient to express feelings, fears, frustrations, hopes  - Assign consistent caregivers   - [place]/re-orient patient as needed  - Allow comfort items, as appropriate  - Provide visual cues, signs, etc.   Outcome: Progressing  Goal: Allow medical examinations, as recommended  Description: Interventions:  - Provide physical/neurological exams and/or referrals, per provider   Outcome: Progressing  Goal: Cooperate with recommended testing/procedures  Description: Interventions:  - Determine need for ancillary  testing  - Observe for mental status changes  - Implement falls/precaution protocol   Outcome: Progressing  Goal: Attend and participate in unit activities, including therapeutic, recreational, and educational groups  Description: Interventions:  - Provide therapeutic and educational activities daily, encourage attendance and participation, and document same in the medical record   - Provide appropriate opportunities for reminiscence   - Provide a consistent daily routine   - Encourage family contact/visitation   Outcome: Progressing  Goal: Complete daily ADLs, including personal hygiene independently, as able  Description: Interventions:  - Observe, teach, and assist patient with ADLS  Outcome: Progressing     Problem: SELF HARM/SUICIDALITY  Goal: Will have no self-injury during hospital stay  Description: INTERVENTIONS:  - Q 15 MINUTES: Routine safety checks  - Q WAKING SHIFT & PRN: Assess risk to determine if routine checks are adequate to maintain patient safety  - Encourage patient to participate actively in care by formulating a plan to combat response to suicidal ideation, identify supports and resources  Outcome: Progressing     Problem: DEPRESSION  Goal: Will be euthymic at discharge  Description: INTERVENTIONS:  - Administer medication as ordered  - Provide emotional support via 1:1 interaction with staff  - Encourage involvement in milieu/groups/activities  - Monitor for social isolation  Outcome: Progressing     Problem: ANXIETY  Goal: Will report anxiety at manageable levels  Description: INTERVENTIONS:  - Administer medication as ordered  - Teach and encourage coping skills  - Provide emotional support  - Assess patient/family for anxiety and ability to cope  Outcome: Progressing  Goal: By discharge: Patient will verbalize 2 strategies to deal with anxiety  Description: Interventions:  - Identify any obvious source/trigger to anxiety  - Staff will assist patient in applying identified coping  technique/skills  - Encourage attendance of scheduled groups and activities  Outcome: Progressing     Problem: SELF CARE DEFICIT  Goal: Return ADL status to a safe level of function  Description: INTERVENTIONS:  - Administer medication as ordered  - Assess ADL deficits and provide assistive devices as needed  - Obtain PT/OT consults as needed  - Assist and instruct patient to increase activity and self care as tolerated  Outcome: Progressing     Problem: DISCHARGE PLANNING - CARE MANAGEMENT  Goal: Discharge to post-acute care or home with appropriate resources  Description: INTERVENTIONS:  - Conduct assessment to determine patient/family and health care team treatment goals, and need for post-acute services based on payer coverage, community resources, and patient preferences, and barriers to discharge  - Address psychosocial, clinical, and financial barriers to discharge as identified in assessment in conjunction with the patient/family and health care team  - Arrange appropriate level of post-acute services according to patient’s   needs and preference and payer coverage in collaboration with the physician and health care team  - Communicate with and update the patient/family, physician, and health care team regarding progress on the discharge plan  - Arrange appropriate transportation to post-acute venues  Outcome: Progressing

## 2024-10-20 NOTE — ASSESSMENT & PLAN NOTE
Managed by [facility] - reviewed 10/20/2024  Continue Lipitor 10mg PO Daily      No associated orders from this encounter found during lookback period of 72 hours.

## 2024-10-20 NOTE — ASSESSMENT & PLAN NOTE
Still progressing - 10/20/2024  Awaiting placement - CRR interview complete, ACT interview complete. STEFAN sent last seven days of charts to [facility] for review, Pending [facility].  Continue medications as follows: Vraylar 3mg PO Daily, Atarax 25mg PO TID, Lamictal 50mg PO Daily, Zoloft 150mg PO QHS  Continue with group therapy, milieu therapy and occupational therapy   Behavioral Health checks every 7 minutes   Continue frequent safety checks and vitals per unit protocol  Continue with [facility] medical management as indicated      No associated orders from this encounter found during lookback period of 72 hours.

## 2024-10-21 PROCEDURE — 99232 SBSQ HOSP IP/OBS MODERATE 35: CPT | Performed by: PSYCHIATRY & NEUROLOGY

## 2024-10-21 RX ADMIN — HYDROXYZINE HYDROCHLORIDE 25 MG: 25 TABLET ORAL at 10:55

## 2024-10-21 RX ADMIN — CYANOCOBALAMIN TAB 1000 MCG 1000 MCG: 1000 TAB at 10:56

## 2024-10-21 RX ADMIN — ATORVASTATIN CALCIUM 10 MG: 10 TABLET, FILM COATED ORAL at 10:56

## 2024-10-21 RX ADMIN — SERTRALINE HYDROCHLORIDE 150 MG: 100 TABLET ORAL at 21:18

## 2024-10-21 RX ADMIN — CARIPRAZINE 3 MG: 3 CAPSULE, GELATIN COATED ORAL at 10:56

## 2024-10-21 RX ADMIN — HYDROXYZINE HYDROCHLORIDE 25 MG: 25 TABLET ORAL at 17:21

## 2024-10-21 RX ADMIN — HYDROXYZINE HYDROCHLORIDE 25 MG: 25 TABLET ORAL at 21:17

## 2024-10-21 RX ADMIN — CHOLECALCIFEROL TAB 25 MCG (1000 UNIT) 2000 UNITS: 25 TAB at 10:56

## 2024-10-21 RX ADMIN — LAMOTRIGINE 50 MG: 25 TABLET ORAL at 10:55

## 2024-10-21 RX ADMIN — LISINOPRIL 10 MG: 10 TABLET ORAL at 10:55

## 2024-10-21 NOTE — PROGRESS NOTES
Progress Note - Behavioral Health   Name: Deyvi Cao 54 y.o. male I MRN: [id number]   Unit/Bed#: [facility] 101-02 I Date of Admission: 6/25/2024   Date of Service: 10/21/2024 I Hospital Day: 118       Assessment & Plan  Bipolar disorder with severe depression (HCC)  Still progressing - 10/21/2024  Awaiting placement - CRR interview complete, ACT interview complete. Patient was denied by CRR, awaiting response at to why and what they would recommend given patients readiness for discharge  Continue medications as follows: Vraylar 3mg PO Daily, Atarax 25mg PO TID, Lamictal 50mg PO Daily, Zoloft 150mg PO QHS  Continue with group therapy, milieu therapy and occupational therapy   Behavioral Health checks every 7 minutes   Continue frequent safety checks and vitals per unit protocol  Continue with [facility] medical management as indicated    Benign essential hypertension  Managed by [facility] - This is a chronic condition, it is considered stable unless otherwise noted  Continue Lisinopril 10mg PO Daily    Mixed hyperlipidemia  Managed by [facility] - This is a chronic condition, it is considered stable unless otherwise noted  Continue Lipitor 10mg PO Daily    Allergic rhinitis due to allergen  Managed by [facility] - This is a chronic condition, it is considered stable unless otherwise noted  No associated orders from this encounter found during lookback period of 72 hours.    Rosacea  Managed by [facility] - This is a chronic condition, it is considered stable unless otherwise noted    Subjective:    Patient was seen today for continuation of care, records reviewed and patient was discussed with the morning case review team.    Deyvi was seen today for psychiatric follow-up.  On assessment today, Deyvi was found in his room.  He is quiet on approach, withdrawn to his room but will interact if approached.  Deyvi reports adequate daytime energy and denies any difficulties with initiating or staying asleep.  Oral appetite and hydration is  adequate.  He expresses annoyance with certain peers on the unit, so he hasn't been going to groups as much.   We reviewed once more the specific as-needed medications they can use going forward if they experience any insomnia or destabilization of their mood, they understood and were agreeable. Milieu visibility and group attendance encouraged to promote an active participation in treatment.    Deyvi denies acute suicidal/self-harm ideation/intent/plan upon direct inquiry at this time. Deyvi is able to contract for safety while on the unit and would feel comfortable seeking staff support should suicidal symptoms or urges appear or worsen. Deyvi remains behaviorally appropriate, no agitation or aggression noted on exam or in report. Deyvi also denies HI/AH/VH, and does not appear overtly manic.  Patient does not verbalize any experiences that can be categorized as paranoid, persecutory, bizarre, or somatic delusions. Deyvi remains adherent to his current psychotropic medication regimen and denies any side effects from medications, as well as none noted on exam.    Deyvi is currently assessed as being at their baseline with continued need for medication management, supervision for safety and ADL’s. These services are not currently available in a less restrictive environment necessitating continued hospital stay.  Deyvi should remain on the unit until these services are available, due to likelihood of mental decompensation and readmission if discharged to an unsupervised setting.  Assertive discharge planning and collaboration with multiple providers (inpatient and community based) remains ongoing.    Group Attendance: 0/11  Treatment Team: TBD  Psychiatric PRN's Needed: None    Review of Systems:  Behavior over the last 24 hours: Unchanged  Sleep: sleeping okay throughout the night  Appetite: adequate  Medication side effects: none reported  ROS:no complaints    Objective:    Vitals:  Vitals:     10/21/24 0738   BP: 162/95   Pulse: 96   Resp: 18   Temp: (!) 97.1 °F (36.2 °C)   SpO2: 95%     Laboratory Results:  I have personally reviewed all pertinent laboratory/tests results.  Most Recent Labs:   Lab Results   Component Value Date    WBC 7.39 06/26/2024    RBC 4.70 06/26/2024    HGB 15.2 06/26/2024    HCT 45.5 06/26/2024     06/26/2024    RDW 12.9 06/26/2024    NEUTROABS 4.63 06/26/2024    SODIUM 137 06/26/2024    K 4.1 06/26/2024     06/26/2024    CO2 26 06/26/2024    BUN 17 06/26/2024    CREATININE 0.63 06/26/2024    GLUC 98 06/26/2024    GLUF 98 06/26/2024    CALCIUM 9.6 06/26/2024    AST 25 06/26/2024    ALT 45 06/26/2024    ALKPHOS 114 (H) 06/26/2024    TP 7.0 06/26/2024    ALB 4.4 06/26/2024    TBILI 0.44 06/26/2024    CHOLESTEROL 177 06/26/2024    HDL 52 06/26/2024    TRIG 73 06/26/2024    LDLCALC 110 (H) 06/26/2024    NONHDLC 125 06/26/2024    LITHIUM 0.4 (L) 01/28/2021    [id number] 2.636 06/26/2024    HGBA1C 5.2 11/22/2023     11/22/2023     Mental Status Evaluation:  Appearance:  age appropriate, casually dressed   Behavior:  cooperative, calm   Speech:  soft   Mood:  less anxious, less depressed   Affect:  constricted   Thought Process:  goal directed   Associations: intact associations   Thought Content:  no overt delusions   Perceptual Disturbances: no auditory hallucinations, no visual hallucinations, denies when asked, does not appear responding to internal stimuli   Risk Potential: Suicidal ideation - None at present, contracts for safety on the unit, would talk to staff if not feeling safe on the unit  Homicidal ideation - None at present  Potential for aggression - Not at present   Sensorium:  oriented to person, place, and time/date   Memory:  recent memory intact   Consciousness:  alert and awake   Attention/Concentration: attention span and concentration appear shorter than expected for age   Insight:  limited   Judgment: limited   Gait/Station: normal  gait/station, normal balance   Motor Activity: no abnormal movements     Progress Toward Goals: making gradual improvement.  Deyvi continues to require inpatient psychiatric hospitalization for continued medication management and titration to optimize symptom reduction, improve sleep hygiene, and demonstrate adequate self-care.     Suicide/Homicide Risk Assessment:  Risk of Harm to Self:   Nursing Suicide Risk Assessment Last 24 hours: C-SSRS Risk (Since Last Contact)  Calculated C-SSRS Risk Score (Since Last Contact): No Risk Indicated    Risk of Harm to Others:  Nursing Homicide Risk Assessment: Violence Risk to Others: Denies within past 6 months    Behavioral Health Medications: all current active meds have been reviewed and continue current psychiatric medications.  Current Facility-Administered Medications   Medication Dose Route Frequency Provider Last Rate    acetaminophen  650 mg Oral Q6H PRN ALVINA Harley      acetaminophen  650 mg Oral Q4H PRN ALVINA Harley      acetaminophen  975 mg Oral Q6H PRN ALVINA Harley      aluminum-magnesium hydroxide-simethicone  30 mL Oral Q4H PRN ALVINA Harley      ammonium lactate   Topical BID PRN ALVINA Harley      atorvastatin  10 mg Oral Daily ALVINA Lewis      benztropine  1 mg Intramuscular Q4H PRN Max 6/day ALVINA Harley      benztropine  1 mg Oral Q4H PRN Max 6/day ALVINA Harley      bisacodyl  10 mg Rectal Daily PRN ALVINA Harley      cariprazine  3 mg Oral Daily Martin Cardenas MD      Cholecalciferol  2,000 Units Oral Daily ALVINA Lewis      cyanocobalamin  1,000 mcg Oral Daily ALVINA Lewis      hydrOXYzine HCL  25 mg Oral Q6H PRN Max 4/day ALVINA Harley      hydrOXYzine HCL  25 mg Oral TID Martin Cardenas MD      hydrOXYzine HCL  50 mg Oral Q4H PRN Max 4/day ALVINA Harley      Or    LORazepam  1 mg Intramuscular Q4H PRN ALVINA Harley      ketoconazole   Topical  Daily PRN ALVINA Harley      lamoTRIgine  50 mg Oral Daily Martin Cardenas MD      lisinopril  10 mg Oral Daily Sary Rodriguez ALVINA Biggs      LORazepam  1 mg Oral Q4H PRN Max 6/day ALVINA Harley      Or    LORazepam  2 mg Intramuscular Q6H PRN Max 3/day ALVINA Harley      OLANZapine  10 mg Oral Q3H PRN Max 3/day ALVINA Harley      Or    OLANZapine  10 mg Intramuscular Q3H PRN Max 3/day ALVINA Harley      OLANZapine  5 mg Oral Q3H PRN Max 6/day ALVINA Harley      Or    OLANZapine  5 mg Intramuscular Q3H PRN Max 6/day ALVINA Harley      OLANZapine  2.5 mg Oral Q3H PRN Max 8/day ALVINA Harley      polyethylene glycol  17 g Oral Daily PRN ALVINA Harley      propranolol  10 mg Oral Q8H PRN ALVINA Harley      senna-docusate sodium  1 tablet Oral Daily PRN ALVINA Harley      sertraline  150 mg Oral HS Martin Cardenas MD       Risks / Benefits of Treatment:  Risks, benefits, and possible side effects of medications explained to patient. Patient has limited understanding of risks and benefits of treatment at this time, but agrees to take medications as prescribed.    Counseling / Coordination of Care:  Patient's progress discussed with staff in treatment team meeting.  Medications, treatment progress and treatment plan reviewed with patient.   Educated on importance of medication and treatment compliance.  Reassurance and supportive therapy provided.   Encouraged participation in milieu and group therapy on the unit.    ALVINA Harley 10/21/24

## 2024-10-21 NOTE — NURSING NOTE
Patient has been quiet and isolative to his room. Appetite good. Group attendance on day shift 2/5. Anxious but pleasant on approach. Offers no complaints. Medication compliant. Denies suicidal ideations. Support offered. No interaction. Anxious for discharge.

## 2024-10-21 NOTE — PROGRESS NOTES
10/21/24 0759   Team Meeting   Meeting Type Daily Rounds   Team Members Present   Team Members Present Physician;Nurse;;Other (Discipline and Name)   Physician Team Member Holter, Thomas, Hangey CRNP, Spoletti   Nursing Team Member Chastity   Social Work Team Member Henrry FRY   Other (Discipline and Name) [facility]   Patient/Family Present   Patient Present No   Patient's Family Present No   OTHER   Team Meeting - Additional Comments [facility]     Groups Participation  0/11.   Patient's compliant with medications. He's not engaged in his treatment. He isolates in his room, depressed, flat affect. He's denied by [facility]. Awaiting their reasons for denial.

## 2024-10-21 NOTE — PLAN OF CARE
Problem: Alteration in Thoughts and Perception  Goal: Treatment Goal: Gain control of psychotic behaviors/thinking, reduce/eliminate presenting symptoms and demonstrate improved reality functioning upon discharge  Outcome: Progressing  Goal: Verbalize thoughts and feelings  Description: Interventions:  - Promote a nonjudgmental and trusting relationship with the patient through active listening and therapeutic communication  - Assess patient's level of functioning, behavior and potential for risk  - Engage patient in 1 on 1 interactions  - Encourage patient to express fears, feelings, frustrations, and discuss symptoms    - [place] patient to reality, help patient recognize reality-based thinking   - Administer medications as ordered and assess for potential side effects  - Provide the patient education related to the signs and symptoms of the illness and desired effects of prescribed medications  Outcome: Progressing  Goal: Refrain from acting on delusional thinking/internal stimuli  Description: Interventions:  - Monitor patient closely, per order   - Utilize least restrictive measures   - Set reasonable limits, give positive feedback for acceptable   - Administer medications as ordered and monitor of potential side effects  Outcome: Progressing  Goal: Agree to be compliant with medication regime, as prescribed and report medication side effects  Description: Interventions:  - Offer appropriate PRN medication and supervise ingestion; conduct AIMS, as needed   Outcome: Progressing  Goal: Attend and participate in unit activities, including therapeutic, recreational, and educational groups  Description: Interventions:  -Encourage Visitation and family involvement in care  Outcome: Progressing  Goal: Recognize dysfunctional thoughts, communicate reality-based thoughts at the time of discharge  Description: Interventions:  - Provide medication and psycho-education to assist patient in compliance and developing  insight into his/her illness   Outcome: Progressing  Goal: Complete daily ADLs, including personal hygiene independently, as able  Description: Interventions:  - Observe, teach, and assist patient with ADLS  - Monitor and promote a balance of rest/activity, with adequate nutrition and elimination   Outcome: Progressing     Problem: Ineffective Coping  Goal: Identifies ineffective coping skills  Outcome: Progressing  Goal: Identifies healthy coping skills  Outcome: Progressing  Goal: Demonstrates healthy coping skills  Outcome: Progressing  Goal: Participates in unit activities  Description: Interventions:  - Provide therapeutic environment   - Provide required programming   - Redirect inappropriate behaviors   Outcome: Progressing  Goal: Patient/Family participate in treatment and DC plans  Description: Interventions:  - Provide therapeutic environment  Outcome: Progressing  Goal: Patient/Family verbalizes awareness of resources  Outcome: Progressing  Goal: Understands least restrictive measures  Description: Interventions:  - Utilize least restrictive behavior  Outcome: Progressing  Goal: Free from restraint events  Description: - Utilize least restrictive measures   - Provide behavioral interventions   - Redirect inappropriate behaviors   Outcome: Progressing     Problem: Risk for Self Injury/Neglect  Goal: Treatment Goal: Remain safe during length of stay, learn and adopt new coping skills, and be free of self-injurious ideation, impulses and acts at the time of discharge  Outcome: Progressing  Goal: Verbalize thoughts and feelings  Description: Interventions:  - Assess and re-assess patient's lethality and potential for self-injury  - Engage patient in 1:1 interactions, daily, for a minimum of 15 minutes  - Encourage patient to express feelings, fears, frustrations, hopes  - Establish rapport/trust with patient   Outcome: Progressing  Goal: Refrain from harming self  Description: Interventions:  - Monitor  patient closely, per order  - Develop a trusting relationship  - Supervise medication ingestion, monitor effects and side effects   Outcome: Progressing  Goal: Attend and participate in unit activities, including therapeutic, recreational, and educational groups  Description: Interventions:  - Provide therapeutic and educational activities daily, encourage attendance and participation, and document same in the medical record  - Obtain collateral information, encourage visitation and family involvement in care   Outcome: Progressing  Goal: Recognize maladaptive responses and adopt new coping mechanisms  Outcome: Progressing  Goal: Complete daily ADLs, including personal hygiene independently, as able  Description: Interventions:  - Observe, teach, and assist patient with ADLS  - Monitor and promote a balance of rest/activity, with adequate nutrition and elimination  Outcome: Progressing     Problem: Depression  Goal: Treatment Goal: Demonstrate behavioral control of depressive symptoms, verbalize feelings of improved mood/affect, and adopt new coping skills prior to discharge  Outcome: Progressing  Goal: Verbalize thoughts and feelings  Description: Interventions:  - Assess and re-assess patient's level of risk   - Engage patient in 1:1 interactions, daily, for a minimum of 15 minutes   - Encourage patient to express feelings, fears, frustrations, hopes   Outcome: Progressing  Goal: Refrain from harming self  Description: Interventions:  - Monitor patient closely, per order   - Supervise medication ingestion, monitor effects and side effects   Outcome: Progressing  Goal: Refrain from isolation  Description: Interventions:  - Develop a trusting relationship   - Encourage socialization   Outcome: Progressing  Goal: Refrain from self-neglect  Outcome: Progressing  Goal: Attend and participate in unit activities, including therapeutic, recreational, and educational groups  Description: Interventions:  - Provide  therapeutic and educational activities daily, encourage attendance and participation, and document same in the medical record   Outcome: Progressing  Goal: Complete daily ADLs, including personal hygiene independently, as able  Description: Interventions:  - Observe, teach, and assist patient with ADLS  -  Monitor and promote a balance of rest/activity, with adequate nutrition and elimination   Outcome: Progressing     Problem: Anxiety  Goal: Anxiety is at manageable level  Description: Interventions:  - Assess and monitor patient's anxiety level.   - Monitor for signs and symptoms (heart palpitations, chest pain, shortness of breath, headaches, nausea, feeling jumpy, restlessness, irritable, apprehensive).   - Collaborate with interdisciplinary team and initiate plan and interventions as ordered.  - [place] patient to unit/surroundings  - Explain treatment plan  - Encourage participation in care  - Encourage verbalization of concerns/fears  - Identify coping mechanisms  - Assist in developing anxiety-reducing skills  - Administer/offer alternative therapies  - Limit or eliminate stimulants  Outcome: Progressing     Problem: Risk for Violence/Aggression Toward Others  Goal: Treatment Goal: Refrain from acts of violence/aggression during length of stay, and demonstrate improved impulse control at the time of discharge  Outcome: Progressing  Goal: Verbalize thoughts and feelings  Description: Interventions:  - Assess and re-assess patient's level of risk, every waking shift  - Engage patient in 1:1 interactions, daily, for a minimum of 15 minutes   - Allow patient to express feelings and frustrations in a safe and non-threatening manner   - Establish rapport/trust with patient   Outcome: Progressing  Goal: Refrain from harming others  Outcome: Progressing  Goal: Refrain from destructive acts on the environment or property  Outcome: Progressing  Goal: Control angry outbursts  Description: Interventions:  - Monitor  patient closely, per order  - Ensure early verbal de-escalation  - Monitor prn medication needs  - Set reasonable/therapeutic limits, outline behavioral expectations, and consequences   - Provide a non-threatening milieu, utilizing the least restrictive interventions   Outcome: Progressing  Goal: Attend and participate in unit activities, including therapeutic, recreational, and educational groups  Description: Interventions:  - Provide therapeutic and educational activities daily, encourage attendance and participation, and document same in the medical record   Outcome: Progressing  Goal: Identify appropriate positive anger management techniques  Description: Interventions:  - Offer anger management and coping skills groups   - Staff will provide positive feedback for appropriate anger control  Outcome: Progressing     Problem: Alteration in Orientation  Goal: Treatment Goal: Demonstrate a reduction of confusion and improved orientation to person, place, time and/or situation upon discharge, according to optimum baseline/ability  Outcome: Progressing  Goal: Interact with staff daily  Description: Interventions:  - Assess and re-assess patient's level of orientation  - Engage patient in 1 on 1 interactions, daily, for a minimum of 15 minutes   - Establish rapport/trust with patient   Outcome: Progressing  Goal: Express concerns related to confused thinking related to:  Description: Interventions:  - Encourage patient to express feelings, fears, frustrations, hopes  - Assign consistent caregivers   - [place]/re-orient patient as needed  - Allow comfort items, as appropriate  - Provide visual cues, signs, etc.   Outcome: Progressing  Goal: Allow medical examinations, as recommended  Description: Interventions:  - Provide physical/neurological exams and/or referrals, per provider   Outcome: Progressing  Goal: Cooperate with recommended testing/procedures  Description: Interventions:  - Determine need for ancillary  testing  - Observe for mental status changes  - Implement falls/precaution protocol   Outcome: Progressing  Goal: Attend and participate in unit activities, including therapeutic, recreational, and educational groups  Description: Interventions:  - Provide therapeutic and educational activities daily, encourage attendance and participation, and document same in the medical record   - Provide appropriate opportunities for reminiscence   - Provide a consistent daily routine   - Encourage family contact/visitation   Outcome: Progressing  Goal: Complete daily ADLs, including personal hygiene independently, as able  Description: Interventions:  - Observe, teach, and assist patient with ADLS  Outcome: Progressing     Problem: SELF HARM/SUICIDALITY  Goal: Will have no self-injury during hospital stay  Description: INTERVENTIONS:  - Q 15 MINUTES: Routine safety checks  - Q WAKING SHIFT & PRN: Assess risk to determine if routine checks are adequate to maintain patient safety  - Encourage patient to participate actively in care by formulating a plan to combat response to suicidal ideation, identify supports and resources  Outcome: Progressing     Problem: DEPRESSION  Goal: Will be euthymic at discharge  Description: INTERVENTIONS:  - Administer medication as ordered  - Provide emotional support via 1:1 interaction with staff  - Encourage involvement in milieu/groups/activities  - Monitor for social isolation  Outcome: Progressing     Problem: ANXIETY  Goal: Will report anxiety at manageable levels  Description: INTERVENTIONS:  - Administer medication as ordered  - Teach and encourage coping skills  - Provide emotional support  - Assess patient/family for anxiety and ability to cope  Outcome: Progressing  Goal: By discharge: Patient will verbalize 2 strategies to deal with anxiety  Description: Interventions:  - Identify any obvious source/trigger to anxiety  - Staff will assist patient in applying identified coping  technique/skills  - Encourage attendance of scheduled groups and activities  Outcome: Progressing     Problem: SELF CARE DEFICIT  Goal: Return ADL status to a safe level of function  Description: INTERVENTIONS:  - Administer medication as ordered  - Assess ADL deficits and provide assistive devices as needed  - Obtain PT/OT consults as needed  - Assist and instruct patient to increase activity and self care as tolerated  Outcome: Progressing     Problem: DISCHARGE PLANNING - CARE MANAGEMENT  Goal: Discharge to post-acute care or home with appropriate resources  Description: INTERVENTIONS:  - Conduct assessment to determine patient/family and health care team treatment goals, and need for post-acute services based on payer coverage, community resources, and patient preferences, and barriers to discharge  - Address psychosocial, clinical, and financial barriers to discharge as identified in assessment in conjunction with the patient/family and health care team  - Arrange appropriate level of post-acute services according to patient’s   needs and preference and payer coverage in collaboration with the physician and health care team  - Communicate with and update the patient/family, physician, and health care team regarding progress on the discharge plan  - Arrange appropriate transportation to post-acute venues  Outcome: Progressing

## 2024-10-21 NOTE — SOCIAL WORK
This writer received e-mail from Saba Rahman stating patient was denied, but did not provide reasons for denial. This writer requested additional details regarding their denial.

## 2024-10-22 PROCEDURE — 99232 SBSQ HOSP IP/OBS MODERATE 35: CPT | Performed by: PSYCHIATRY & NEUROLOGY

## 2024-10-22 RX ADMIN — CARIPRAZINE 3 MG: 3 CAPSULE, GELATIN COATED ORAL at 08:16

## 2024-10-22 RX ADMIN — LAMOTRIGINE 50 MG: 25 TABLET ORAL at 08:16

## 2024-10-22 RX ADMIN — LISINOPRIL 10 MG: 10 TABLET ORAL at 08:16

## 2024-10-22 RX ADMIN — CHOLECALCIFEROL TAB 25 MCG (1000 UNIT) 2000 UNITS: 25 TAB at 08:16

## 2024-10-22 RX ADMIN — CYANOCOBALAMIN TAB 1000 MCG 1000 MCG: 1000 TAB at 08:16

## 2024-10-22 RX ADMIN — SERTRALINE HYDROCHLORIDE 150 MG: 100 TABLET ORAL at 21:03

## 2024-10-22 RX ADMIN — HYDROXYZINE HYDROCHLORIDE 25 MG: 25 TABLET ORAL at 08:16

## 2024-10-22 RX ADMIN — ATORVASTATIN CALCIUM 10 MG: 10 TABLET, FILM COATED ORAL at 08:16

## 2024-10-22 RX ADMIN — HYDROXYZINE HYDROCHLORIDE 25 MG: 25 TABLET ORAL at 17:00

## 2024-10-22 RX ADMIN — HYDROXYZINE HYDROCHLORIDE 25 MG: 25 TABLET ORAL at 21:04

## 2024-10-22 NOTE — PROGRESS NOTES
Progress Note - Behavioral Health   Name: Deyvi Cao 54 y.o. male I MRN: [id number]   Unit/Bed#: [facility] 101-02 I Date of Admission: 6/25/2024   Date of Service: 10/22/2024 I Hospital Day: 119     Assessment & Plan  Bipolar disorder with severe depression (HCC)  Still progressing - 10/22/2024  Awaiting placement - CRR interview complete, ACT interview complete. STEFAN sent last seven days of charts to [facility] for review.  Patient denied by [facility], we are awaiting to hear back as to why and what alternative arrangements can be made for him because he has been waiting for placement for a couple of months now.  Continue medications as follows: Vraylar 3mg PO Daily, Atarax 25mg PO TID, Lamictal 50mg PO Daily, Zoloft 150mg PO QHS  Continue with group therapy, milieu therapy and occupational therapy   Behavioral Health checks every 7 minutes   Continue frequent safety checks and vitals per unit protocol  Continue with [facility] medical management as indicated  Benign essential hypertension  Managed by [facility] - reviewed 10/22/2024  Continue Lisinopril 10mg PO Daily  Mixed hyperlipidemia  Managed by [facility] - reviewed 10/22/2024  Continue Lipitor 10mg PO Daily  Allergic rhinitis due to allergen  Managed by [facility] - reviewed 10/22/2024  Rosacea  Managed by [facility] - reviewed 10/22/2024     Subjective:    Patient was seen today for continuation of care, records reviewed and patient was discussed with the morning case review team.    Deyvi was seen today for psychiatric follow-up.  On assessment today, Deyvi was found in his room.  He is calm and cooperative.  He is psychiatrically cleared for discharge, however continues to wait for placement.  Deyvi reports adequate daytime energy and denies any difficulties with initiating or staying asleep.  Oral appetite and hydration is adequate.   We reviewed once more the specific as-needed medications they can use going forward if they experience any insomnia or destabilization of their mood, they  understood and were agreeable. Milieu visibility and group attendance encouraged to promote an active participation in treatment.    Deyvi denies acute suicidal/self-harm ideation/intent/plan upon direct inquiry at this time. Deyvi is able to contract for safety while on the unit and would feel comfortable seeking staff support should suicidal symptoms or urges appear or worsen. Deyvi remains behaviorally appropriate, no agitation or aggression noted on exam or in report. Deyvi also denies HI/AH/VH, and does not appear overtly manic.  Patient does not verbalize any experiences that can be categorized as paranoid, persecutory, bizarre, or somatic delusions. Deyvi remains adherent to his current psychotropic medication regimen and denies any side effects from medications, as well as none noted on exam.    Deyvi is currently assessed as being at their baseline with continued need for medication management, supervision for safety and ADL’s. These services are not currently available in a less restrictive environment necessitating continued hospital stay.  Deyvi should remain on the unit until these services are available, due to likelihood of mental decompensation and readmission if discharged to an unsupervised setting.  Assertive discharge planning and collaboration with multiple providers (inpatient and community based) remains ongoing.     Group Attendance: 5 / 10  Treatment Team: SOLEDAD  Psychiatric PRN's Needed: None    Review of Systems:  Behavior over the last 24 hours: Unchanged  Sleep: sleeping okay throughout the night  Appetite: adequate  Medication side effects: none reported  ROS:no complaints    Objective:    Vitals:  Vitals:    10/22/24 0751   BP: 130/67   Pulse: 95   Resp: 18   Temp: (!) 97.1 °F (36.2 °C)   SpO2: 94%     Laboratory Results:  I have personally reviewed all pertinent laboratory/tests results.  Most Recent Labs:   Lab Results   Component Value Date    WBC 7.39 06/26/2024    RBC  4.70 06/26/2024    HGB 15.2 06/26/2024    HCT 45.5 06/26/2024     06/26/2024    RDW 12.9 06/26/2024    NEUTROABS 4.63 06/26/2024    SODIUM 137 06/26/2024    K 4.1 06/26/2024     06/26/2024    CO2 26 06/26/2024    BUN 17 06/26/2024    CREATININE 0.63 06/26/2024    GLUC 98 06/26/2024    GLUF 98 06/26/2024    CALCIUM 9.6 06/26/2024    AST 25 06/26/2024    ALT 45 06/26/2024    ALKPHOS 114 (H) 06/26/2024    TP 7.0 06/26/2024    ALB 4.4 06/26/2024    TBILI 0.44 06/26/2024    CHOLESTEROL 177 06/26/2024    HDL 52 06/26/2024    TRIG 73 06/26/2024    LDLCALC 110 (H) 06/26/2024    NONHDLC 125 06/26/2024    LITHIUM 0.4 (L) 01/28/2021    WMQ4RRGHAAHK 2.636 06/26/2024    HGBA1C 5.2 11/22/2023     11/22/2023     Mental Status Evaluation:  Appearance:  age appropriate, casually dressed   Behavior:  cooperative, calm   Speech:  soft   Mood:  less anxious, less depressed   Affect:  constricted   Thought Process:  goal directed   Associations: intact associations   Thought Content:  no overt delusions   Perceptual Disturbances: no auditory hallucinations, no visual hallucinations, denies when asked, does not appear responding to internal stimuli   Risk Potential: Suicidal ideation - None at present, contracts for safety on the unit, would talk to staff if not feeling safe on the unit  Homicidal ideation - None at present  Potential for aggression - Not at present   Sensorium:  oriented to person, place, and time/date   Memory:  recent memory intact   Consciousness:  alert and awake   Attention/Concentration: attention span and concentration appear shorter than expected for age   Insight:  limited   Judgment: limited   Gait/Station: normal gait/station, normal balance   Motor Activity: no abnormal movements     Progress Toward Goals: making gradual improvement.  Deyvi continues to require inpatient psychiatric hospitalization for continued medication management and titration to optimize symptom reduction, improve  sleep hygiene, and demonstrate adequate self-care.     Suicide/Homicide Risk Assessment:  Risk of Harm to Self:   Nursing Suicide Risk Assessment Last 24 hours: C-SSRS Risk (Since Last Contact)  Calculated C-SSRS Risk Score (Since Last Contact): No Risk Indicated    Risk of Harm to Others:  Nursing Homicide Risk Assessment: Violence Risk to Others: Denies within past 6 months    Behavioral Health Medications: all current active meds have been reviewed and continue current psychiatric medications.  Current Facility-Administered Medications   Medication Dose Route Frequency Provider Last Rate    acetaminophen  650 mg Oral Q6H PRN ALVINA Harley      acetaminophen  650 mg Oral Q4H PRN ALVINA Harley      acetaminophen  975 mg Oral Q6H PRN ALVINA Harley      aluminum-magnesium hydroxide-simethicone  30 mL Oral Q4H PRN ALVINA Harley      ammonium lactate   Topical BID PRN ALVINA Harley      atorvastatin  10 mg Oral Daily ALVINA Lewis      benztropine  1 mg Intramuscular Q4H PRN Max 6/day ALVINA Harley      benztropine  1 mg Oral Q4H PRN Max 6/day ALVINA Harley      bisacodyl  10 mg Rectal Daily PRN ALVINA Harley      cariprazine  3 mg Oral Daily Martin Cardenas MD      Cholecalciferol  2,000 Units Oral Daily ALVINA Lewis      cyanocobalamin  1,000 mcg Oral Daily ALVINA Lewis      hydrOXYzine HCL  25 mg Oral Q6H PRN Max 4/day ALVINA Harley      hydrOXYzine HCL  25 mg Oral TID Martin Cardenas MD      hydrOXYzine HCL  50 mg Oral Q4H PRN Max 4/day ALVINA Harley      Or    LORazepam  1 mg Intramuscular Q4H PRN ALVINA Harley      ketoconazole   Topical Daily PRN ALVINA Harley      lamoTRIgine  50 mg Oral Daily Martin Cardenas MD      lisinopril  10 mg Oral Daily ALVINA Lewis      LORazepam  1 mg Oral Q4H PRN Max 6/day ALVINA Harley      Or    LORazepam  2 mg Intramuscular Q6H PRN Max 3/day Melva Knutson  ALVINA      OLANZapine  10 mg Oral Q3H PRN Max 3/day ALVINA Harley      Or    OLANZapine  10 mg Intramuscular Q3H PRN Max 3/day ALVINA Harley      OLANZapine  5 mg Oral Q3H PRN Max 6/day ALVINA Harley      Or    OLANZapine  5 mg Intramuscular Q3H PRN Max 6/day ALVINA Harley      OLANZapine  2.5 mg Oral Q3H PRN Max 8/day ALVINA Harley      polyethylene glycol  17 g Oral Daily PRN ALVINA Harley      propranolol  10 mg Oral Q8H PRN ALVINA Harlye      senna-docusate sodium  1 tablet Oral Daily PRN ALVINA Harley      sertraline  150 mg Oral HS Martin Cardenas MD       Risks / Benefits of Treatment:  Risks, benefits, and possible side effects of medications explained to patient. Patient has limited understanding of risks and benefits of treatment at this time, but agrees to take medications as prescribed.    Counseling / Coordination of Care:  Patient's progress discussed with staff in treatment team meeting.  Medications, treatment progress and treatment plan reviewed with patient.   Educated on importance of medication and treatment compliance.  Reassurance and supportive therapy provided.   Encouraged participation in milieu and group therapy on the unit.    ALVINA Harley 10/22/24

## 2024-10-22 NOTE — NURSING NOTE
Patient is quiet, isolative to room and able to make needs known. Pt reports no s/s, voices no concerns. Pt is pleasant, takes all medications without issue.

## 2024-10-22 NOTE — NURSING NOTE
Patient has been isolative to his room most of the shift. Affect flat. Smiles when approached. Anxious during conversation. Pleasant and cooperative. Denies suicidal ideations. Medication compliant. Hopefull for discharge. Isolative to himself. No interaction. Attended minimal groups on this shift.

## 2024-10-22 NOTE — PROGRESS NOTES
10/22/24 0750   Team Meeting   Meeting Type Daily Rounds   Team Members Present   Team Members Present Physician;Nurse;;Other (Discipline and Name)   Physician Team Member Zenia Cardenas   Nursing Team Member Chastity   Social Work Team Member Henrry FRY   Other (Discipline and Name) Ugo Bailey MS    Patient/Family Present   Patient Present No   Patient's Family Present No   OTHER   Team Meeting - Additional Comments [facility]     Groups Participation  6/10.   Patient's compliant with medications. He was denied by [facility]. He's engaged in treatment. He's quiet and pleasant.

## 2024-10-22 NOTE — ASSESSMENT & PLAN NOTE
Still progressing - 10/22/2024  Awaiting placement - CRR interview complete, ACT interview complete. STEFAN sent last seven days of charts to [facility] for review.  Patient denied by [facility], we are awaiting to hear back as to why and what alternative arrangements can be made for him because he has been waiting for placement for a couple of months now.  Continue medications as follows: Vraylar 3mg PO Daily, Atarax 25mg PO TID, Lamictal 50mg PO Daily, Zoloft 150mg PO QHS  Continue with group therapy, milieu therapy and occupational therapy   Behavioral Health checks every 7 minutes   Continue frequent safety checks and vitals per unit protocol  Continue with [facility] medical management as indicated

## 2024-10-22 NOTE — SOCIAL WORK
This writer received e-mail from [facility] stating they are recommending patient referred to a LTSR. The county reports patient does not have a forensic history therefore he cannot be referred to a LTSR. This writer requested to know specifically why they are recommending a LTSR. This writer is awaiting a reply from the  Carine Sutton.   This writer met with patient for an individual session. Patient states he's doing well and coping well with the unit. This writer discussed his interview with [facility] and if he was in agreement with the [facility] program, which he reports he was in agreement. This writer discussed if he was in agreement with. Patient states he's able to make his own meals and shop for his meals. He reports he's interested in working Part Time as a  once he's at a group home.

## 2024-10-22 NOTE — PLAN OF CARE
Problem: Alteration in Thoughts and Perception  Goal: Treatment Goal: Gain control of psychotic behaviors/thinking, reduce/eliminate presenting symptoms and demonstrate improved reality functioning upon discharge  Outcome: Progressing  Goal: Verbalize thoughts and feelings  Description: Interventions:  - Promote a nonjudgmental and trusting relationship with the patient through active listening and therapeutic communication  - Assess patient's level of functioning, behavior and potential for risk  - Engage patient in 1 on 1 interactions  - Encourage patient to express fears, feelings, frustrations, and discuss symptoms    - [place] patient to reality, help patient recognize reality-based thinking   - Administer medications as ordered and assess for potential side effects  - Provide the patient education related to the signs and symptoms of the illness and desired effects of prescribed medications  Outcome: Progressing  Goal: Refrain from acting on delusional thinking/internal stimuli  Description: Interventions:  - Monitor patient closely, per order   - Utilize least restrictive measures   - Set reasonable limits, give positive feedback for acceptable   - Administer medications as ordered and monitor of potential side effects  Outcome: Progressing  Goal: Agree to be compliant with medication regime, as prescribed and report medication side effects  Description: Interventions:  - Offer appropriate PRN medication and supervise ingestion; conduct AIMS, as needed   Outcome: Progressing  Goal: Attend and participate in unit activities, including therapeutic, recreational, and educational groups  Description: Interventions:  -Encourage Visitation and family involvement in care  Outcome: Progressing     Problem: Ineffective Coping  Goal: Participates in unit activities  Description: Interventions:  - Provide therapeutic environment   - Provide required programming   - Redirect inappropriate behaviors   Outcome:  Progressing  Goal: Patient/Family verbalizes awareness of resources  Outcome: Progressing  Goal: Free from restraint events  Description: - Utilize least restrictive measures   - Provide behavioral interventions   - Redirect inappropriate behaviors   Outcome: Progressing     Problem: Risk for Self Injury/Neglect  Goal: Refrain from harming self  Description: Interventions:  - Monitor patient closely, per order  - Develop a trusting relationship  - Supervise medication ingestion, monitor effects and side effects   Outcome: Progressing  Goal: Attend and participate in unit activities, including therapeutic, recreational, and educational groups  Description: Interventions:  - Provide therapeutic and educational activities daily, encourage attendance and participation, and document same in the medical record  - Obtain collateral information, encourage visitation and family involvement in care   Outcome: Progressing     Problem: Depression  Goal: Refrain from harming self  Description: Interventions:  - Monitor patient closely, per order   - Supervise medication ingestion, monitor effects and side effects   Outcome: Progressing  Goal: Complete daily ADLs, including personal hygiene independently, as able  Description: Interventions:  - Observe, teach, and assist patient with ADLS  -  Monitor and promote a balance of rest/activity, with adequate nutrition and elimination   Outcome: Progressing     Problem: Anxiety  Goal: Anxiety is at manageable level  Description: Interventions:  - Assess and monitor patient's anxiety level.   - Monitor for signs and symptoms (heart palpitations, chest pain, shortness of breath, headaches, nausea, feeling jumpy, restlessness, irritable, apprehensive).   - Collaborate with interdisciplinary team and initiate plan and interventions as ordered.  - [place] patient to unit/surroundings  - Explain treatment plan  - Encourage participation in care  - Encourage verbalization of concerns/fears  -  Identify coping mechanisms  - Assist in developing anxiety-reducing skills  - Administer/offer alternative therapies  - Limit or eliminate stimulants  Outcome: Progressing     Problem: Risk for Violence/Aggression Toward Others  Goal: Treatment Goal: Refrain from acts of violence/aggression during length of stay, and demonstrate improved impulse control at the time of discharge  Outcome: Progressing  Goal: Refrain from harming others  Outcome: Progressing  Goal: Refrain from destructive acts on the environment or property  Outcome: Progressing  Goal: Control angry outbursts  Description: Interventions:  - Monitor patient closely, per order  - Ensure early verbal de-escalation  - Monitor prn medication needs  - Set reasonable/therapeutic limits, outline behavioral expectations, and consequences   - Provide a non-threatening milieu, utilizing the least restrictive interventions   Outcome: Progressing  Goal: Attend and participate in unit activities, including therapeutic, recreational, and educational groups  Description: Interventions:  - Provide therapeutic and educational activities daily, encourage attendance and participation, and document same in the medical record   Outcome: Progressing     Problem: Alteration in Orientation  Goal: Treatment Goal: Demonstrate a reduction of confusion and improved orientation to person, place, time and/or situation upon discharge, according to optimum baseline/ability  Outcome: Progressing     Problem: SELF HARM/SUICIDALITY  Goal: Will have no self-injury during hospital stay  Description: INTERVENTIONS:  - Q 15 MINUTES: Routine safety checks  - Q WAKING SHIFT & PRN: Assess risk to determine if routine checks are adequate to maintain patient safety  - Encourage patient to participate actively in care by formulating a plan to combat response to suicidal ideation, identify supports and resources  Outcome: Progressing     Problem: DEPRESSION  Goal: Will be euthymic at  discharge  Description: INTERVENTIONS:  - Administer medication as ordered  - Provide emotional support via 1:1 interaction with staff  - Encourage involvement in milieu/groups/activities  - Monitor for social isolation  Outcome: Progressing     Problem: SELF CARE DEFICIT  Goal: Return ADL status to a safe level of function  Description: INTERVENTIONS:  - Administer medication as ordered  - Assess ADL deficits and provide assistive devices as needed  - Obtain PT/OT consults as needed  - Assist and instruct patient to increase activity and self care as tolerated  Outcome: Progressing     Problem: DISCHARGE PLANNING - CARE MANAGEMENT  Goal: Discharge to post-acute care or home with appropriate resources  Description: INTERVENTIONS:  - Conduct assessment to determine patient/family and health care team treatment goals, and need for post-acute services based on payer coverage, community resources, and patient preferences, and barriers to discharge  - Address psychosocial, clinical, and financial barriers to discharge as identified in assessment in conjunction with the patient/family and health care team  - Arrange appropriate level of post-acute services according to patient’s   needs and preference and payer coverage in collaboration with the physician and health care team  - Communicate with and update the patient/family, physician, and health care team regarding progress on the discharge plan  - Arrange appropriate transportation to post-acute venues  Outcome: Progressing     Problem: Depression  Goal: Refrain from isolation  Description: Interventions:  - Develop a trusting relationship   - Encourage socialization   Outcome: Not Progressing  Goal: Refrain from self-neglect  Outcome: Not Progressing

## 2024-10-22 NOTE — NURSING NOTE
Pt is intermittently visible in unit. Denies psychiatric symptoms, otherwise isolative to self in room, in bed with the lights out. Compliant with meds and meals.  No behavior issues

## 2024-10-23 PROCEDURE — 99232 SBSQ HOSP IP/OBS MODERATE 35: CPT | Performed by: PSYCHIATRY & NEUROLOGY

## 2024-10-23 RX ADMIN — HYDROXYZINE HYDROCHLORIDE 25 MG: 25 TABLET ORAL at 21:19

## 2024-10-23 RX ADMIN — SERTRALINE HYDROCHLORIDE 150 MG: 100 TABLET ORAL at 21:19

## 2024-10-23 RX ADMIN — CHOLECALCIFEROL TAB 25 MCG (1000 UNIT) 2000 UNITS: 25 TAB at 08:29

## 2024-10-23 RX ADMIN — CARIPRAZINE 3 MG: 3 CAPSULE, GELATIN COATED ORAL at 08:29

## 2024-10-23 RX ADMIN — LISINOPRIL 10 MG: 10 TABLET ORAL at 08:29

## 2024-10-23 RX ADMIN — ATORVASTATIN CALCIUM 10 MG: 10 TABLET, FILM COATED ORAL at 08:29

## 2024-10-23 RX ADMIN — HYDROXYZINE HYDROCHLORIDE 25 MG: 25 TABLET ORAL at 08:29

## 2024-10-23 RX ADMIN — LAMOTRIGINE 50 MG: 25 TABLET ORAL at 08:29

## 2024-10-23 RX ADMIN — HYDROXYZINE HYDROCHLORIDE 25 MG: 25 TABLET ORAL at 17:09

## 2024-10-23 RX ADMIN — CYANOCOBALAMIN TAB 1000 MCG 1000 MCG: 1000 TAB at 08:29

## 2024-10-23 NOTE — ASSESSMENT & PLAN NOTE
Still progressing - [id number]  Awaiting placement - CRR interview complete, ACT interview complete. STEFAN sent last seven days of charts to [facility] for review.  Patient denied by CR[facility], we are awaiting to hear back as to why and what alternative arrangements can be made for him because he has been waiting for placement for a couple of months now.  Continue medications as follows: Vraylar 3mg PO Daily, Atarax 25mg PO TID, Lamictal 50mg PO Daily, Zoloft 150mg PO QHS  Continue with group therapy, milieu therapy and occupational therapy   Behavioral Health checks every 7 minutes   Continue frequent safety checks and vitals per unit protocol  Continue with [facility] medical management as indicated

## 2024-10-23 NOTE — PROGRESS NOTES
Progress Note - Behavioral Health   Name: Deyvi Cao 54 y.o. male I MRN: [id number]   Unit/Bed#: [facility] 101-02 I Date of Admission: 6/25/2024   Date of Service: 10/23/2024 I Hospital Day: 120     Assessment & Plan  Bipolar disorder with severe depression (HCC)  Still progressing - [id number]  Awaiting placement - CRR interview complete, ACT interview complete.  sent last seven days of charts to [facility] for review.  Patient denied by CRR, we are awaiting to hear back as to why and what alternative arrangements can be made for him because he has been waiting for placement for a couple of months now.  Continue medications as follows: Vraylar 3mg PO Daily, Atarax 25mg PO TID, Lamictal 50mg PO Daily, Zoloft 150mg PO QHS  Continue with group therapy, milieu therapy and occupational therapy   Behavioral Health checks every 7 minutes   Continue frequent safety checks and vitals per unit protocol  Continue with [facility] medical management as indicated  Benign essential hypertension  Managed by [facility] - reviewed 10/23/2024  Continue Lisinopril 10mg PO Daily  Mixed hyperlipidemia  Managed by [facility] - reviewed 10/23/2024  Continue Lipitor 10mg PO Daily  Allergic rhinitis due to allergen  Managed by [facility] - reviewed 10/23/2024  Rosacea  Managed by [facility] - reviewed 10/23/2024     Subjective:    Patient was seen today for continuation of care, records reviewed and patient was discussed with the morning case review team.    Deyvi was seen today for psychiatric follow-up.  On assessment today, Deyvi was found in his room.  He is quiet, appears more down today.  I asked him what he didn't go to any groups yesterday and he said he is feeling tired, but does not report increased depression.  He seems discouraged at times, he is frustrated with increased LOS.  Deyvi reports adequate daytime energy and denies any difficulties with initiating or staying asleep.  Oral appetite and hydration is adequate.  We reviewed once more the specific  as-needed medications they can use going forward if they experience any insomnia or destabilization of their mood, they understood and were agreeable. Milieu visibility and group attendance encouraged to promote an active participation in treatment.    Deyvi denies acute suicidal/self-harm ideation/intent/plan upon direct inquiry at this time. Deyvi is able to contract for safety while on the unit and would feel comfortable seeking staff support should suicidal symptoms or urges appear or worsen. Deyvi remains behaviorally appropriate, no agitation or aggression noted on exam or in report. Deyvi also denies HI/AH/VH, and does not appear overtly manic.  Patient does not verbalize any experiences that can be categorized as paranoid, persecutory, bizarre, or somatic delusions. Deyvi remains adherent to his current psychotropic medication regimen and denies any side effects from medications, as well as none noted on exam.    Deyvi is currently assessed as being at their baseline with continued need for medication management, supervision for safety and ADL’s. These services are not currently available in a less restrictive environment necessitating continued hospital stay.  Deyvi should remain on the unit until these services are available, due to likelihood of mental decompensation and readmission if discharged to an unsupervised setting.  Assertive discharge planning and collaboration with multiple providers (inpatient and community based) remains ongoing.     Group Attendance: 0/12  Treatment Team: TBD  Psychiatric PRN's Needed: None    Review of Systems:  Behavior over the last 24 hours: Unchanged  Sleep: sleeping okay throughout the night  Appetite: adequate  Medication side effects: none reported  ROS:no complaints    Objective:    Vitals:  Vitals:    10/23/24 0741   BP: 133/72   Pulse: 97   Resp: 18   Temp: 97.6 °F (36.4 °C)   SpO2: 94%     Laboratory Results:  I have personally reviewed all  pertinent laboratory/tests results.  Most Recent Labs:   Lab Results   Component Value Date    WBC 7.39 06/26/2024    RBC 4.70 06/26/2024    HGB 15.2 06/26/2024    HCT 45.5 06/26/2024     06/26/2024    RDW 12.9 06/26/2024    NEUTROABS 4.63 06/26/2024    SODIUM 137 06/26/2024    K 4.1 06/26/2024     06/26/2024    CO2 26 06/26/2024    BUN 17 06/26/2024    CREATININE 0.63 06/26/2024    GLUC 98 06/26/2024    GLUF 98 06/26/2024    CALCIUM 9.6 06/26/2024    AST 25 06/26/2024    ALT 45 06/26/2024    ALKPHOS 114 (H) 06/26/2024    TP 7.0 06/26/2024    ALB 4.4 06/26/2024    TBILI 0.44 06/26/2024    CHOLESTEROL 177 06/26/2024    HDL 52 06/26/2024    TRIG 73 06/26/2024    LDLCALC 110 (H) 06/26/2024    NONHDLC 125 06/26/2024    LITHIUM 0.4 (L) 01/28/2021    RKA1JMGEZQGW 2.636 06/26/2024    HGBA1C 5.2 11/22/2023     11/22/2023     Mental Status Evaluation:  Appearance:  age appropriate, casually dressed   Behavior:  cooperative, calm   Speech:  soft   Mood:  less anxious, less depressed   Affect:  constricted   Thought Process:  goal directed   Associations: intact associations   Thought Content:  no overt delusions   Perceptual Disturbances: no auditory hallucinations, no visual hallucinations, denies when asked, does not appear responding to internal stimuli   Risk Potential: Suicidal ideation - None at present, contracts for safety on the unit, would talk to staff if not feeling safe on the unit  Homicidal ideation - None at present  Potential for aggression - Not at present   Sensorium:  oriented to person, place, and time/date   Memory:  recent memory intact   Consciousness:  alert and awake   Attention/Concentration: attention span and concentration appear shorter than expected for age   Insight:  limited   Judgment: limited   Gait/Station: normal gait/station, normal balance   Motor Activity: no abnormal movements     Progress Toward Goals: making gradual improvement.  Deyvi continues to require  inpatient psychiatric hospitalization for continued medication management and titration to optimize symptom reduction, improve sleep hygiene, and demonstrate adequate self-care.     Suicide/Homicide Risk Assessment:  Risk of Harm to Self:   Nursing Suicide Risk Assessment Last 24 hours: C-SSRS Risk (Since Last Contact)  Calculated C-SSRS Risk Score (Since Last Contact): No Risk Indicated    Risk of Harm to Others:  Nursing Homicide Risk Assessment: Violence Risk to Others: Denies within past 6 months    Behavioral Health Medications: all current active meds have been reviewed and continue current psychiatric medications.  Current Facility-Administered Medications   Medication Dose Route Frequency Provider Last Rate    acetaminophen  650 mg Oral Q6H PRN ALVINA Harley      acetaminophen  650 mg Oral Q4H PRN ALVINA Harley      acetaminophen  975 mg Oral Q6H PRN ALVINA Harley      aluminum-magnesium hydroxide-simethicone  30 mL Oral Q4H PRN ALVINA Harley      ammonium lactate   Topical BID PRN ALVINA Harley      atorvastatin  10 mg Oral Daily ALVINA Lewis      benztropine  1 mg Intramuscular Q4H PRN Max 6/day ALVINA Harley      benztropine  1 mg Oral Q4H PRN Max 6/day ALVINA Harley      bisacodyl  10 mg Rectal Daily PRN ALVINA Harley      cariprazine  3 mg Oral Daily Martin Cardenas MD      Cholecalciferol  2,000 Units Oral Daily ALVINA Lewis      cyanocobalamin  1,000 mcg Oral Daily ALVINA Lewis      hydrOXYzine HCL  25 mg Oral Q6H PRN Max 4/day ALVINA Harley      hydrOXYzine HCL  25 mg Oral TID Martin Cardenas MD      hydrOXYzine HCL  50 mg Oral Q4H PRN Max 4/day ALVINA Harley      Or    LORazepam  1 mg Intramuscular Q4H PRN ALVINA Harley      ketoconazole   Topical Daily PRN ALVINA Harley      lamoTRIgine  50 mg Oral Daily Martin Cardenas MD      lisinopril  10 mg Oral Daily ALVINA Lewis       LORazepam  1 mg Oral Q4H PRN Max 6/day ALVINA Harley      Or    LORazepam  2 mg Intramuscular Q6H PRN Max 3/day ALVINA Harley      OLANZapine  10 mg Oral Q3H PRN Max 3/day ALVINA Harley      Or    OLANZapine  10 mg Intramuscular Q3H PRN Max 3/day ALVINA Harley      OLANZapine  5 mg Oral Q3H PRN Max 6/day ALVINA Harley      Or    OLANZapine  5 mg Intramuscular Q3H PRN Max 6/day ALVINA Harley      OLANZapine  2.5 mg Oral Q3H PRN Max 8/day ALVINA Harley      polyethylene glycol  17 g Oral Daily PRN ALVINA Harley      propranolol  10 mg Oral Q8H PRN ALVINA Harley      senna-docusate sodium  1 tablet Oral Daily PRN ALVINA Harley      sertraline  150 mg Oral HS Martin Cardenas MD       Risks / Benefits of Treatment:  Risks, benefits, and possible side effects of medications explained to patient. Patient has limited understanding of risks and benefits of treatment at this time, but agrees to take medications as prescribed.    Counseling / Coordination of Care:  Patient's progress discussed with staff in treatment team meeting.  Medications, treatment progress and treatment plan reviewed with patient.   Educated on importance of medication and treatment compliance.  Reassurance and supportive therapy provided.   Encouraged participation in milieu and group therapy on the unit.    ALVINA Harley 10/23/24

## 2024-10-23 NOTE — NURSING NOTE
Pt is accepting of medications without incidence and meal compliant. Pt is visible in the milieu for meals and select groups, but otherwise keeps to self. Pt denies s/s and offers no new concerns.

## 2024-10-23 NOTE — PROGRESS NOTES
10/23/24 0733   Team Meeting   Meeting Type Daily Rounds   Team Members Present   Team Members Present Physician;Nurse;;Other (Discipline and Name)   Physician Team Member Holter, Thomas   Nursing Team Member Kunal   Social Work Team Member Henrry FRY   Other (Discipline and Name) [facility]   Patient/Family Present   Patient Present No   Patient's Family Present No   OTHER   Team Meeting - Additional Comments [facility]     Groups Participation  0/12.   Patient's compliant with medications. He's not engaged in his treatment. Patient will be referred to a CRR once the county makes the recommendations.

## 2024-10-23 NOTE — PLAN OF CARE
Problem: Alteration in Thoughts and Perception  Goal: Refrain from acting on delusional thinking/internal stimuli  Description: Interventions:  - Monitor patient closely, per order   - Utilize least restrictive measures   - Set reasonable limits, give positive feedback for acceptable   - Administer medications as ordered and monitor of potential side effects  Outcome: Progressing  Goal: Complete daily ADLs, including personal hygiene independently, as able  Description: Interventions:  - Observe, teach, and assist patient with ADLS  - Monitor and promote a balance of rest/activity, with adequate nutrition and elimination   Outcome: Progressing     Problem: Ineffective Coping  Goal: Patient/Family participate in treatment and DC plans  Description: Interventions:  - Provide therapeutic environment  Outcome: Progressing  Goal: Patient/Family verbalizes awareness of resources  Outcome: Progressing     Problem: Risk for Self Injury/Neglect  Goal: Treatment Goal: Remain safe during length of stay, learn and adopt new coping skills, and be free of self-injurious ideation, impulses and acts at the time of discharge  Outcome: Progressing  Goal: Verbalize thoughts and feelings  Description: Interventions:  - Assess and re-assess patient's lethality and potential for self-injury  - Engage patient in 1:1 interactions, daily, for a minimum of 15 minutes  - Encourage patient to express feelings, fears, frustrations, hopes  - Establish rapport/trust with patient   Outcome: Progressing  Goal: Complete daily ADLs, including personal hygiene independently, as able  Description: Interventions:  - Observe, teach, and assist patient with ADLS  - Monitor and promote a balance of rest/activity, with adequate nutrition and elimination  Outcome: Progressing     Problem: Depression  Goal: Treatment Goal: Demonstrate behavioral control of depressive symptoms, verbalize feelings of improved mood/affect, and adopt new coping skills prior to  discharge  Outcome: Progressing     Problem: Alteration in Thoughts and Perception  Goal: Attend and participate in unit activities, including therapeutic, recreational, and educational groups  Description: Interventions:  -Encourage Visitation and family involvement in care  Outcome: Not Progressing     Problem: Ineffective Coping  Goal: Participates in unit activities  Description: Interventions:  - Provide therapeutic environment   - Provide required programming   - Redirect inappropriate behaviors   Outcome: Not Progressing     Problem: Risk for Self Injury/Neglect  Goal: Attend and participate in unit activities, including therapeutic, recreational, and educational groups  Description: Interventions:  - Provide therapeutic and educational activities daily, encourage attendance and participation, and document same in the medical record  - Obtain collateral information, encourage visitation and family involvement in care   Outcome: Not Progressing

## 2024-10-24 PROCEDURE — 99232 SBSQ HOSP IP/OBS MODERATE 35: CPT | Performed by: PSYCHIATRY & NEUROLOGY

## 2024-10-24 RX ADMIN — LAMOTRIGINE 50 MG: 25 TABLET ORAL at 08:18

## 2024-10-24 RX ADMIN — LISINOPRIL 10 MG: 10 TABLET ORAL at 08:18

## 2024-10-24 RX ADMIN — HYDROXYZINE HYDROCHLORIDE 25 MG: 25 TABLET ORAL at 08:18

## 2024-10-24 RX ADMIN — HYDROXYZINE HYDROCHLORIDE 25 MG: 25 TABLET ORAL at 17:20

## 2024-10-24 RX ADMIN — CARIPRAZINE 3 MG: 3 CAPSULE, GELATIN COATED ORAL at 08:18

## 2024-10-24 RX ADMIN — HYDROXYZINE HYDROCHLORIDE 25 MG: 25 TABLET ORAL at 21:13

## 2024-10-24 RX ADMIN — ATORVASTATIN CALCIUM 10 MG: 10 TABLET, FILM COATED ORAL at 08:18

## 2024-10-24 RX ADMIN — CHOLECALCIFEROL TAB 25 MCG (1000 UNIT) 2000 UNITS: 25 TAB at 08:18

## 2024-10-24 RX ADMIN — CYANOCOBALAMIN TAB 1000 MCG 1000 MCG: 1000 TAB at 08:18

## 2024-10-24 RX ADMIN — SERTRALINE HYDROCHLORIDE 150 MG: 100 TABLET ORAL at 21:13

## 2024-10-24 NOTE — NURSING NOTE
Patient has been out of his room at intervals.  Appetite good. Attended less than half of groups on day shift.  Quiet and isolative to himself but brightens on approach. Denies suicidal ideations. Hopeful for discharge.  Medication compliant.

## 2024-10-24 NOTE — NURSING NOTE
Received pt in bed at change of shift with eyes closed; chest movement noted.  Continues the same thus this far as per q 15 min room checks.   Will continue to monitor behavior, sleeping pattern and any medical issues that may arise.    0649  Sleeping 7+ hrs thus this far

## 2024-10-24 NOTE — NURSING NOTE
Pt is visible in the milieu intermittently and isolative to self. He consumed 100 % of dinner. Took his medications without incidence. Pt is polite, pleasant, and cooperative. Pt is calm, quiet, and guarded. Pt offered no complaints. No unmet needs. Attended Community meeting group. No behavioral issues.

## 2024-10-24 NOTE — PROGRESS NOTES
10/24/24 0746   Team Meeting   Meeting Type Daily Rounds   Team Members Present   Team Members Present Physician;Nurse;;Other (Discipline and Name)   Physician Team Member Holter, Thomas, Hangey CRNP   Nursing Team Member Chastity   Social Work Team Member Henrry FRY   Other (Discipline and Name) Shell PCM   Patient/Family Present   Patient Present No   Patient's Family Present No   OTHER   Team Meeting - Additional Comments Goff PCM     Groups Participation  3/9.   Patient's compliant with medications. Minimal engagement in treatment. CRR referral pending, waiting for county to determine which program to refer patient to.

## 2024-10-24 NOTE — PROGRESS NOTES
Progress Note - Behavioral Health   Name: Deyvi Cao 54 y.o. male I MRN: [id number]   Unit/Bed#: [facility] 101-02 I Date of Admission: 6/25/2024   Date of Service: 10/24/2024 I Hospital Day: 121     Assessment & Plan  Bipolar disorder with severe depression (HCC)  Still progressing - 10/24/2024  Awaiting placement - CRR interview complete, ACT interview complete. SW sent last seven days of charts to [facility] for review.  Patient denied by CRR  Continue medications as follows: Vraylar 3mg PO Daily, Atarax 25mg PO TID, Lamictal 50mg PO Daily, Zoloft 150mg PO QHS  Continue with group therapy, milieu therapy and occupational therapy   Behavioral Health checks every 7 minutes   Continue frequent safety checks and vitals per unit protocol  Continue with [facility] medical management as indicated  Benign essential hypertension  Managed by [facility] - reviewed 10/24/2024  Continue Lisinopril 10mg PO Daily  Mixed hyperlipidemia  Managed by [facility] - reviewed 10/24/2024  Continue Lipitor 10mg PO Daily  Allergic rhinitis due to allergen  Managed by [facility] - reviewed 10/24/2024  Rosacea  Managed by [facility] - reviewed 10/24/2024    Subjective:    Patient was seen today for continuation of care, records reviewed and patient was discussed with the morning case review team.    Deyvi was seen today for psychiatric follow-up.  On assessment today, Deyvi was found in his room.  He specifically asks for an update regarding placement, I redirected him to speak with his SW.  Deyvi reports adequate daytime energy and denies any difficulties with initiating or staying asleep.  Oral appetite and hydration is adequate.  Overall, he is doing well.  Limited depression and anxiety.   We reviewed once more the specific as-needed medications they can use going forward if they experience any insomnia or destabilization of their mood, they understood and were agreeable. Milieu visibility and group attendance encouraged to promote an active participation in  treatment.    Deyvi denies acute suicidal/self-harm ideation/intent/plan upon direct inquiry at this time. Deyvi is able to contract for safety while on the unit and would feel comfortable seeking staff support should suicidal symptoms or urges appear or worsen. Deyvi remains behaviorally appropriate, no agitation or aggression noted on exam or in report. Deyvi also denies HI/AH/VH, and does not appear overtly manic.  Patient does not verbalize any experiences that can be categorized as paranoid, persecutory, bizarre, or somatic delusions. Deyvi remains adherent to his current psychotropic medication regimen and denies any side effects from medications, as well as none noted on exam.    Deyvi is currently assessed as being at their baseline with continued need for medication management, supervision for safety and ADL’s. These services are not currently available in a less restrictive environment necessitating continued hospital stay.  Deyvi should remain on the unit until these services are available, due to likelihood of mental decompensation and readmission if discharged to an unsupervised setting.  Assertive discharge planning and collaboration with multiple providers (inpatient and community based) remains ongoing.    Group Attendance: 3 / 10  Treatment Team: SOLEDAD  Psychiatric PRN's Needed: None    Review of Systems:  Behavior over the last 24 hours: Unchanged  Sleep: sleeping okay throughout the night  Appetite: adequate  Medication side effects: none reported  ROS:no complaints    Objective:    Vitals:  Vitals:    10/24/24 0749   BP: 142/83   Pulse: 82   Resp: 18   Temp: 97.6 °F (36.4 °C)   SpO2: 94%     Laboratory Results:  I have personally reviewed all pertinent laboratory/tests results.  Most Recent Labs:   Lab Results   Component Value Date    WBC 7.39 06/26/2024    RBC 4.70 06/26/2024    HGB 15.2 06/26/2024    HCT 45.5 06/26/2024     06/26/2024    RDW 12.9 06/26/2024    NEUTROABS 4.63  06/26/2024    SODIUM 137 06/26/2024    K 4.1 06/26/2024     06/26/2024    CO2 26 06/26/2024    BUN 17 06/26/2024    CREATININE 0.63 06/26/2024    GLUC 98 06/26/2024    GLUF 98 06/26/2024    CALCIUM 9.6 06/26/2024    AST 25 06/26/2024    ALT 45 06/26/2024    ALKPHOS 114 (H) 06/26/2024    TP 7.0 06/26/2024    ALB 4.4 06/26/2024    TBILI 0.44 06/26/2024    CHOLESTEROL 177 06/26/2024    HDL 52 06/26/2024    TRIG 73 06/26/2024    LDLCALC 110 (H) 06/26/2024    NONHDLC 125 06/26/2024    LITHIUM 0.4 (L) 01/28/2021    [id number] 2.636 06/26/2024    HGBA1C 5.2 11/22/2023     11/22/2023     Mental Status Evaluation:  Appearance:  casually dressed   Behavior:  cooperative, calm   Speech:  normal volume   Mood:  less anxious, less depressed   Affect:  constricted   Thought Process:  goal directed   Associations: intact associations   Thought Content:  no overt delusions   Perceptual Disturbances: no auditory hallucinations, no visual hallucinations, denies when asked, does not appear responding to internal stimuli   Risk Potential: Suicidal ideation - None at present, contracts for safety on the unit, would talk to staff if not feeling safe on the unit  Homicidal ideation - None at present  Potential for aggression - Not at present   Sensorium:  oriented to person, place, and time/date   Memory:  recent memory intact   Consciousness:  alert and awake   Attention/Concentration: attention span and concentration appear shorter than expected for age   Insight:  limited   Judgment: limited   Gait/Station: normal gait/station, normal balance   Motor Activity: no abnormal movements     Progress Toward Goals: making slow improvement.  Deyvi continues to require inpatient psychiatric hospitalization for continued medication management and titration to optimize symptom reduction, improve sleep hygiene, and demonstrate adequate self-care.     Suicide/Homicide Risk Assessment:  Risk of Harm to Self:   Nursing Suicide Risk  Assessment Last 24 hours: C-SSRS Risk (Since Last Contact)  Calculated C-SSRS Risk Score (Since Last Contact): No Risk Indicated    Risk of Harm to Others:  Nursing Homicide Risk Assessment: Violence Risk to Others: Denies within past 6 months    Behavioral Health Medications: all current active meds have been reviewed and continue current psychiatric medications.  Current Facility-Administered Medications   Medication Dose Route Frequency Provider Last Rate    acetaminophen  650 mg Oral Q6H PRN ALVINA Harley      acetaminophen  650 mg Oral Q4H PRN ALVINA Harley      acetaminophen  975 mg Oral Q6H PRN ALVINA Harley      aluminum-magnesium hydroxide-simethicone  30 mL Oral Q4H PRN ALVINA Harley      ammonium lactate   Topical BID PRN ALVINA Harley      atorvastatin  10 mg Oral Daily ALVINA Lewis      benztropine  1 mg Intramuscular Q4H PRN Max 6/day ALVINA Harley      benztropine  1 mg Oral Q4H PRN Max 6/day ALVINA Harley      bisacodyl  10 mg Rectal Daily PRN ALVINA Harley      cariprazine  3 mg Oral Daily Martin Cardenas MD      Cholecalciferol  2,000 Units Oral Daily ALVINA Lewis      cyanocobalamin  1,000 mcg Oral Daily ALVINA Lewis      hydrOXYzine HCL  25 mg Oral Q6H PRN Max 4/day ALVINA Harley      hydrOXYzine HCL  25 mg Oral TID Martin Cardenas MD      hydrOXYzine HCL  50 mg Oral Q4H PRN Max 4/day ALVINA Harley      Or    LORazepam  1 mg Intramuscular Q4H PRN ALVINA Harley      ketoconazole   Topical Daily PRN ALVINA Harley      lamoTRIgine  50 mg Oral Daily Martin Cardenas MD      lisinopril  10 mg Oral Daily ALVINA Lewis      LORazepam  1 mg Oral Q4H PRN Max 6/day ALVINA Harley      Or    LORazepam  2 mg Intramuscular Q6H PRN Max 3/day ALVINA Harley      OLANZapine  10 mg Oral Q3H PRN Max 3/day ALVINA Harley      Or    OLANZapine  10 mg Intramuscular Q3H PRN Max 3/day Melva  ALVINA Knutson      OLANZapine  5 mg Oral Q3H PRN Max 6/day ALVINA Harley      Or    OLANZapine  5 mg Intramuscular Q3H PRN Max 6/day ALVINA Harley      OLANZapine  2.5 mg Oral Q3H PRN Max 8/day ALVINA Harley      polyethylene glycol  17 g Oral Daily PRN ALVINA Harley      propranolol  10 mg Oral Q8H PRN ALVINA Harley      senna-docusate sodium  1 tablet Oral Daily PRN ALVINA Harley      sertraline  150 mg Oral HS Martin Cardenas MD       Risks / Benefits of Treatment:  Risks, benefits, and possible side effects of medications explained to patient. Patient has limited understanding of risks and benefits of treatment at this time, but agrees to take medications as prescribed.    Counseling / Coordination of Care:  Patient's progress discussed with staff in treatment team meeting.  Medications, treatment progress and treatment plan reviewed with patient.   Educated on importance of medication and treatment compliance.  Reassurance and supportive therapy provided.   Encouraged participation in milieu and group therapy on the unit.    ALVINA Harley 10/24/24

## 2024-10-24 NOTE — SOCIAL WORK
This writer met with patient for an individual session. This writer discussed [facility] has denied patient's referral. This writer discussed we will discuss with the [facility] alternative placements for the patient. Patient discussed living independently. This writer discussed the benefits of transitioning to a supported living with a long term plan to live independently. This writer discussed a referral to [facility] was submitted prior to his admission to the hospital and this will be discussed with the [facility]. This writer and patient will begin to create a recovery action plan.

## 2024-10-24 NOTE — ASSESSMENT & PLAN NOTE
Still progressing - 10/24/2024  Awaiting placement - CRR interview complete, ACT interview complete. STEFAN [facility] [place] [facility].  Patient denied by CRR  Continue medications as follows: Vraylar 3mg PO Daily, Atarax 25mg PO TID, Lamictal 50mg PO Daily, Zoloft 150mg PO QHS  Continue with group therapy, milieu therapy and occupational therapy   Behavioral Health checks every 7 minutes   Continue frequent safety checks and vitals per unit protocol  Continue with [facility] medical management as indicated

## 2024-10-25 PROCEDURE — 99232 SBSQ HOSP IP/OBS MODERATE 35: CPT | Performed by: PSYCHIATRY & NEUROLOGY

## 2024-10-25 RX ADMIN — SERTRALINE HYDROCHLORIDE 150 MG: 100 TABLET ORAL at 21:14

## 2024-10-25 RX ADMIN — CHOLECALCIFEROL TAB 25 MCG (1000 UNIT) 2000 UNITS: 25 TAB at 08:13

## 2024-10-25 RX ADMIN — HYDROXYZINE HYDROCHLORIDE 25 MG: 25 TABLET ORAL at 17:00

## 2024-10-25 RX ADMIN — LISINOPRIL 10 MG: 10 TABLET ORAL at 08:13

## 2024-10-25 RX ADMIN — LAMOTRIGINE 50 MG: 25 TABLET ORAL at 08:13

## 2024-10-25 RX ADMIN — ATORVASTATIN CALCIUM 10 MG: 10 TABLET, FILM COATED ORAL at 08:13

## 2024-10-25 RX ADMIN — HYDROXYZINE HYDROCHLORIDE 25 MG: 25 TABLET ORAL at 21:14

## 2024-10-25 RX ADMIN — HYDROXYZINE HYDROCHLORIDE 25 MG: 25 TABLET ORAL at 08:13

## 2024-10-25 RX ADMIN — CYANOCOBALAMIN TAB 1000 MCG 1000 MCG: 1000 TAB at 08:13

## 2024-10-25 RX ADMIN — CARIPRAZINE 3 MG: 3 CAPSULE, GELATIN COATED ORAL at 08:13

## 2024-10-25 NOTE — NURSING NOTE
Patient's quiet, in room except for meals. Pt reports no s/s and takes medications without issue. No distress noted.

## 2024-10-25 NOTE — NURSING NOTE
Patient has been in his room mostly all day except for meals and minimal group attendance. Pleasant and cooperative. Anxious affect and during conversation. Hopeful for discharge housing accommodations . Denies suicidal ideations. Medication compliant. Offers no other complaints.

## 2024-10-25 NOTE — PROGRESS NOTES
10/25/24 0739   Team Meeting   Meeting Type Daily Rounds   Team Members Present   Team Members Present Physician;Nurse;;Other (Discipline and Name)   Physician Team Member Holter, Thomas, Hangey CRNP   Nursing Team Member Chastity   Social Work Team Member Henrry FRY   Other (Discipline and Name) Shell PCM   Patient/Family Present   Patient Present No   Patient's Family Present No   OTHER   Team Meeting - Additional Comments Goff PCM     Groups Participation  4/9.   Patient's compliant with medications. He has some engagement in his treatment. CRR referral pending, awaiting counties recommendation of which CRR he will be reviewed by.

## 2024-10-25 NOTE — SOCIAL WORK
This writer submitted e-mail to [facility] and the [facility] requesting further explanation for their denial of the patient. This writer inquired from the [facility], patient's previous [facility] referral. Sydnie reports [facility] does not have openings. She reports [facility] will have an opening and he can be referred to their program.

## 2024-10-25 NOTE — PROGRESS NOTES
Progress Note - Behavioral Health   Name: Deyvi Cao 54 y.o. male I MRN: [id number]   Unit/Bed#: [facility] 101-02 I Date of Admission: 6/25/2024   Date of Service: 10/25/2024 I Hospital Day: 122     Assessment & Plan  Bipolar disorder with severe depression (HCC)  Still progressing - 10/25/2024  Awaiting placement - CRR interview complete, ACT interview complete.  sent last seven days of charts to [facility] for review.  Patient denied by CRR, awaiting further recommendations from the county on where patient should be placed  Continue medications as follows: Vraylar 3mg PO Daily, Atarax 25mg PO TID, Lamictal 50mg PO Daily, Zoloft 150mg PO QHS  Continue with group therapy, milieu therapy and occupational therapy   Behavioral Health checks every 7 minutes   Continue frequent safety checks and vitals per unit protocol  Continue with [facility] medical management as indicated  Benign essential hypertension  Managed by [facility] - reviewed 10/25/2024  Continue Lisinopril 10mg PO Daily  Mixed hyperlipidemia  Managed by [facility] - reviewed 10/25/2024  Continue Lipitor 10mg PO Daily  Allergic rhinitis due to allergen  Managed by [facility] - reviewed 10/25/2024  Rosacea  Managed by [facility] - reviewed 10/25/2024    Subjective:    Patient was seen today for continuation of care, records reviewed and patient was discussed with the morning case review team.    Deyvi was seen today for psychiatric follow-up.  On assessment today, Deyvi was found in his room.  He is calm and cooperative.  He seems a little disappointed by not being accepted into group home but otherwise he is doing okay.  Deyvi reports adequate daytime energy and denies any difficulties with initiating or staying asleep.  Oral appetite and hydration is adequate.  He continues to progress well in his recovery.  We reviewed once more the specific as-needed medications they can use going forward if they experience any insomnia or destabilization of their mood, they understood and were  agreeable. Milieu visibility and group attendance encouraged to promote an active participation in treatment.    Deyvi denies acute suicidal/self-harm ideation/intent/plan upon direct inquiry at this time. Deyvi is able to contract for safety while on the unit and would feel comfortable seeking staff support should suicidal symptoms or urges appear or worsen. Deyvi remains behaviorally appropriate, no agitation or aggression noted on exam or in report. Deyvi also denies HI/AH/VH, and does not appear overtly manic.  Patient does not verbalize any experiences that can be categorized as paranoid, persecutory, bizarre, or somatic delusions. Deyvi remains adherent to his current psychotropic medication regimen and denies any side effects from medications, as well as none noted on exam.    Deyvi is currently assessed as being at their baseline with continued need for medication management, supervision for safety and ADL’s. These services are not currently available in a less restrictive environment necessitating continued hospital stay.  Deyvi should remain on the unit until these services are available, due to likelihood of mental decompensation and readmission if discharged to an unsupervised setting.  Assertive discharge planning and collaboration with multiple providers (inpatient and community based) remains ongoing.     Group Attendance: 4 / 9  Treatment Team: Next Thursday  Psychiatric PRN's Needed: None    Review of Systems:  Behavior over the last 24 hours: Unchanged  Sleep: sleeping okay throughout the night  Appetite: adequate  Medication side effects: none reported  ROS:no complaints    Objective:    Vitals:  Vitals:    10/25/24 0727   BP: 138/80   Pulse: 95   Resp: 18   Temp: 97.8 °F (36.6 °C)   SpO2: 99%     Laboratory Results:  I have personally reviewed all pertinent laboratory/tests results.  Most Recent Labs:   Lab Results   Component Value Date    WBC 7.39 06/26/2024    RBC 4.70 06/26/2024     HGB 15.2 06/26/2024    HCT 45.5 06/26/2024     06/26/2024    RDW 12.9 06/26/2024    NEUTROABS 4.63 06/26/2024    SODIUM 137 06/26/2024    K 4.1 06/26/2024     06/26/2024    CO2 26 06/26/2024    BUN 17 06/26/2024    CREATININE 0.63 06/26/2024    GLUC 98 06/26/2024    GLUF 98 06/26/2024    CALCIUM 9.6 06/26/2024    AST 25 06/26/2024    ALT 45 06/26/2024    ALKPHOS 114 (H) 06/26/2024    TP 7.0 06/26/2024    ALB 4.4 06/26/2024    TBILI 0.44 06/26/2024    CHOLESTEROL 177 06/26/2024    HDL 52 06/26/2024    TRIG 73 06/26/2024    LDLCALC 110 (H) 06/26/2024    NONHDLC 125 06/26/2024    LITHIUM 0.4 (L) 01/28/2021    [id number] 2.636 06/26/2024    HGBA1C 5.2 11/22/2023     11/22/2023     Mental Status Evaluation:  Appearance:  casually dressed   Behavior:  cooperative, calm   Speech:  normal volume   Mood:  less anxious, less depressed   Affect:  constricted   Thought Process:  goal directed   Associations: intact associations   Thought Content:  no overt delusions   Perceptual Disturbances: no auditory hallucinations, no visual hallucinations, denies when asked, does not appear responding to internal stimuli   Risk Potential: Suicidal ideation - None at present, contracts for safety on the unit, would talk to staff if not feeling safe on the unit  Homicidal ideation - None at present  Potential for aggression - Not at present   Sensorium:  oriented to person, place, and time/date   Memory:  recent memory intact   Consciousness:  alert and awake   Attention/Concentration: attention span and concentration appear shorter than expected for age   Insight:  limited   Judgment: limited   Gait/Station: normal gait/station, normal balance   Motor Activity: no abnormal movements     Progress Toward Goals: making gradual improvement.  Deyvi continues to require inpatient psychiatric hospitalization for continued medication management and titration to optimize symptom reduction, improve sleep hygiene, and  demonstrate adequate self-care.     Suicide/Homicide Risk Assessment:  Risk of Harm to Self:   Nursing Suicide Risk Assessment Last 24 hours: C-SSRS Risk (Since Last Contact)  Calculated C-SSRS Risk Score (Since Last Contact): No Risk Indicated    Risk of Harm to Others:  Nursing Homicide Risk Assessment: Violence Risk to Others: Denies within past 6 months    Behavioral Health Medications: all current active meds have been reviewed and continue current psychiatric medications.  Current Facility-Administered Medications   Medication Dose Route Frequency Provider Last Rate    acetaminophen  650 mg Oral Q6H PRN ALVINA Harley      acetaminophen  650 mg Oral Q4H PRN ALVINA Harley      acetaminophen  975 mg Oral Q6H PRN ALVINA Harley      aluminum-magnesium hydroxide-simethicone  30 mL Oral Q4H PRN ALVINA Harley      ammonium lactate   Topical BID PRN ALVINA Harley      atorvastatin  10 mg Oral Daily ALVINA Lewis      benztropine  1 mg Intramuscular Q4H PRN Max 6/day ALVINA Harley      benztropine  1 mg Oral Q4H PRN Max 6/day ALVINA Harley      bisacodyl  10 mg Rectal Daily PRN ALVINA Harley      cariprazine  3 mg Oral Daily Martin Cardenas MD      Cholecalciferol  2,000 Units Oral Daily ALVINA Lewis      cyanocobalamin  1,000 mcg Oral Daily ALVINA Lewis      hydrOXYzine HCL  25 mg Oral Q6H PRN Max 4/day ALVINA Harley      hydrOXYzine HCL  25 mg Oral TID Martin Cardenas MD      hydrOXYzine HCL  50 mg Oral Q4H PRN Max 4/day ALVINA Harley      Or    LORazepam  1 mg Intramuscular Q4H PRN ALVINA Harley      ketoconazole   Topical Daily PRN ALVINA Harley      lamoTRIgine  50 mg Oral Daily Martin Cardenas MD      lisinopril  10 mg Oral Daily ALVINA Lewis      LORazepam  1 mg Oral Q4H PRN Max 6/day ALVINA Harley      Or    LORazepam  2 mg Intramuscular Q6H PRN Max 3/day ALVINA Harley      OLANZapine   10 mg Oral Q3H PRN Max 3/day ALVINA Harley      Or    OLANZapine  10 mg Intramuscular Q3H PRN Max 3/day ALVINA Harley      OLANZapine  5 mg Oral Q3H PRN Max 6/day ALVINA Harley      Or    OLANZapine  5 mg Intramuscular Q3H PRN Max 6/day ALVINA Harley      OLANZapine  2.5 mg Oral Q3H PRN Max 8/day ALVINA Harley      polyethylene glycol  17 g Oral Daily PRN ALVINA Harley      propranolol  10 mg Oral Q8H PRN ALVINA Harley      senna-docusate sodium  1 tablet Oral Daily PRN ALVINA Harley      sertraline  150 mg Oral HS Martin Cardenas MD       Risks / Benefits of Treatment:  Risks, benefits, and possible side effects of medications explained to patient. Patient has limited understanding of risks and benefits of treatment at this time, but agrees to take medications as prescribed.    Counseling / Coordination of Care:  Patient's progress discussed with staff in treatment team meeting.  Medications, treatment progress and treatment plan reviewed with patient.   Educated on importance of medication and treatment compliance.  Reassurance and supportive therapy provided.   Encouraged participation in milieu and group therapy on the unit.    ALVINA Harley 10/25/24

## 2024-10-25 NOTE — ASSESSMENT & PLAN NOTE
Still progressing - 10/25/2024  Awaiting placement - CRR interview complete, ACT interview complete.  sent last seven days of charts to [facility] for review.  Patient denied by CRR, awaiting further recommendations from the county on where patient should be placed  Continue medications as follows: Vraylar 3mg PO Daily, Atarax 25mg PO TID, Lamictal 50mg PO Daily, Zoloft 150mg PO QHS  Continue with group therapy, milieu therapy and occupational therapy   Behavioral Health checks every 7 minutes   Continue frequent safety checks and vitals per unit protocol  Continue with [facility] medical management as indicated

## 2024-10-25 NOTE — NURSING NOTE
Received pt in bed at change of shift with eyes closed; chest movement noted.  Continues the same thus this far as per q 15 min room checks.   Will continue to monitor behavior, sleeping pattern and any medical issues that may arise.    0639:  Sleeping 7+ hrs thus this far

## 2024-10-25 NOTE — NURSING NOTE
Deyvi is quiet, resting in room at the beginning of the shift. Denies psychiatric symptoms. Compliant with meds and meals No behavior issues.

## 2024-10-26 PROCEDURE — 99232 SBSQ HOSP IP/OBS MODERATE 35: CPT | Performed by: PSYCHIATRY & NEUROLOGY

## 2024-10-26 RX ADMIN — CYANOCOBALAMIN TAB 1000 MCG 1000 MCG: 1000 TAB at 08:31

## 2024-10-26 RX ADMIN — CARIPRAZINE 3 MG: 3 CAPSULE, GELATIN COATED ORAL at 08:31

## 2024-10-26 RX ADMIN — CHOLECALCIFEROL TAB 25 MCG (1000 UNIT) 2000 UNITS: 25 TAB at 08:31

## 2024-10-26 RX ADMIN — HYDROXYZINE HYDROCHLORIDE 25 MG: 25 TABLET ORAL at 08:31

## 2024-10-26 RX ADMIN — LAMOTRIGINE 50 MG: 25 TABLET ORAL at 08:31

## 2024-10-26 RX ADMIN — SERTRALINE HYDROCHLORIDE 150 MG: 100 TABLET ORAL at 21:24

## 2024-10-26 RX ADMIN — LISINOPRIL 10 MG: 10 TABLET ORAL at 08:31

## 2024-10-26 RX ADMIN — HYDROXYZINE HYDROCHLORIDE 25 MG: 25 TABLET ORAL at 21:24

## 2024-10-26 RX ADMIN — HYDROXYZINE HYDROCHLORIDE 25 MG: 25 TABLET ORAL at 16:58

## 2024-10-26 RX ADMIN — ATORVASTATIN CALCIUM 10 MG: 10 TABLET, FILM COATED ORAL at 08:32

## 2024-10-26 NOTE — PLAN OF CARE
Problem: Alteration in Thoughts and Perception  Goal: Treatment Goal: Gain control of psychotic behaviors/thinking, reduce/eliminate presenting symptoms and demonstrate improved reality functioning upon discharge  Outcome: Progressing  Goal: Verbalize thoughts and feelings  Description: Interventions:  - Promote a nonjudgmental and trusting relationship with the patient through active listening and therapeutic communication  - Assess patient's level of functioning, behavior and potential for risk  - Engage patient in 1 on 1 interactions  - Encourage patient to express fears, feelings, frustrations, and discuss symptoms    - [place] patient to reality, help patient recognize reality-based thinking   - Administer medications as ordered and assess for potential side effects  - Provide the patient education related to the signs and symptoms of the illness and desired effects of prescribed medications  Outcome: Progressing  Goal: Agree to be compliant with medication regime, as prescribed and report medication side effects  Description: Interventions:  - Offer appropriate PRN medication and supervise ingestion; conduct AIMS, as needed   Outcome: Progressing  Goal: Attend and participate in unit activities, including therapeutic, recreational, and educational groups  Description: Interventions:  -Encourage Visitation and family involvement in care  Outcome: Progressing  Goal: Complete daily ADLs, including personal hygiene independently, as able  Description: Interventions:  - Observe, teach, and assist patient with ADLS  - Monitor and promote a balance of rest/activity, with adequate nutrition and elimination   Outcome: Progressing     Problem: Ineffective Coping  Goal: Participates in unit activities  Description: Interventions:  - Provide therapeutic environment   - Provide required programming   - Redirect inappropriate behaviors   Outcome: Progressing  Goal: Patient/Family participate in treatment and DC  plans  Description: Interventions:  - Provide therapeutic environment  Outcome: Progressing  Goal: Free from restraint events  Description: - Utilize least restrictive measures   - Provide behavioral interventions   - Redirect inappropriate behaviors   Outcome: Progressing     Problem: Risk for Self Injury/Neglect  Goal: Treatment Goal: Remain safe during length of stay, learn and adopt new coping skills, and be free of self-injurious ideation, impulses and acts at the time of discharge  Outcome: Progressing  Goal: Refrain from harming self  Description: Interventions:  - Monitor patient closely, per order  - Develop a trusting relationship  - Supervise medication ingestion, monitor effects and side effects   Outcome: Progressing  Goal: Attend and participate in unit activities, including therapeutic, recreational, and educational groups  Description: Interventions:  - Provide therapeutic and educational activities daily, encourage attendance and participation, and document same in the medical record  - Obtain collateral information, encourage visitation and family involvement in care   Outcome: Progressing     Problem: Depression  Goal: Treatment Goal: Demonstrate behavioral control of depressive symptoms, verbalize feelings of improved mood/affect, and adopt new coping skills prior to discharge  Outcome: Progressing  Goal: Refrain from harming self  Description: Interventions:  - Monitor patient closely, per order   - Supervise medication ingestion, monitor effects and side effects   Outcome: Progressing  Goal: Refrain from isolation  Description: Interventions:  - Develop a trusting relationship   - Encourage socialization   Outcome: Progressing  Goal: Complete daily ADLs, including personal hygiene independently, as able  Description: Interventions:  - Observe, teach, and assist patient with ADLS  -  Monitor and promote a balance of rest/activity, with adequate nutrition and elimination   Outcome: Progressing      Problem: Risk for Violence/Aggression Toward Others  Goal: Treatment Goal: Refrain from acts of violence/aggression during length of stay, and demonstrate improved impulse control at the time of discharge  Outcome: Progressing  Goal: Control angry outbursts  Description: Interventions:  - Monitor patient closely, per order  - Ensure early verbal de-escalation  - Monitor prn medication needs  - Set reasonable/therapeutic limits, outline behavioral expectations, and consequences   - Provide a non-threatening milieu, utilizing the least restrictive interventions   Outcome: Progressing     Problem: Alteration in Orientation  Goal: Interact with staff daily  Description: Interventions:  - Assess and re-assess patient's level of orientation  - Engage patient in 1 on 1 interactions, daily, for a minimum of 15 minutes   - Establish rapport/trust with patient   Outcome: Progressing  Goal: Allow medical examinations, as recommended  Description: Interventions:  - Provide physical/neurological exams and/or referrals, per provider   Outcome: Progressing  Goal: Complete daily ADLs, including personal hygiene independently, as able  Description: Interventions:  - Observe, teach, and assist patient with ADLS  Outcome: Progressing     Problem: SELF HARM/SUICIDALITY  Goal: Will have no self-injury during hospital stay  Description: INTERVENTIONS:  - Q 15 MINUTES: Routine safety checks  - Q WAKING SHIFT & PRN: Assess risk to determine if routine checks are adequate to maintain patient safety  - Encourage patient to participate actively in care by formulating a plan to combat response to suicidal ideation, identify supports and resources  Outcome: Progressing     Problem: DEPRESSION  Goal: Will be euthymic at discharge  Description: INTERVENTIONS:  - Administer medication as ordered  - Provide emotional support via 1:1 interaction with staff  - Encourage involvement in milieu/groups/activities  - Monitor for social  isolation  Outcome: Progressing     Problem: ANXIETY  Goal: Will report anxiety at manageable levels  Description: INTERVENTIONS:  - Administer medication as ordered  - Teach and encourage coping skills  - Provide emotional support  - Assess patient/family for anxiety and ability to cope  Outcome: Progressing     Problem: SELF CARE DEFICIT  Goal: Return ADL status to a safe level of function  Description: INTERVENTIONS:  - Administer medication as ordered  - Assess ADL deficits and provide assistive devices as needed  - Obtain PT/OT consults as needed  - Assist and instruct patient to increase activity and self care as tolerated  Outcome: Progressing     Problem: DISCHARGE PLANNING - CARE MANAGEMENT  Goal: Discharge to post-acute care or home with appropriate resources  Description: INTERVENTIONS:  - Conduct assessment to determine patient/family and health care team treatment goals, and need for post-acute services based on payer coverage, community resources, and patient preferences, and barriers to discharge  - Address psychosocial, clinical, and financial barriers to discharge as identified in assessment in conjunction with the patient/family and health care team  - Arrange appropriate level of post-acute services according to patient’s   needs and preference and payer coverage in collaboration with the physician and health care team  - Communicate with and update the patient/family, physician, and health care team regarding progress on the discharge plan  - Arrange appropriate transportation to post-acute venues  Outcome: Progressing

## 2024-10-26 NOTE — ASSESSMENT & PLAN NOTE
Still progressing - 10/26/2024  Pt pleasant on interview today, denies SI/HI/AVH; continue current meds unchanged  Awaiting placement - CRR interview complete, ACT interview complete. STEFAN sent last seven days of charts to [facility] for review.  Patient denied by CRR, awaiting further recommendations from the county on where patient should be placed  Continue medications as follows: Vraylar 3mg PO Daily, Atarax 25mg PO TID, Lamictal 50mg PO Daily, Zoloft 150mg PO QHS  Continue with group therapy, milieu therapy and occupational therapy   Behavioral Health checks every 7 minutes   Continue frequent safety checks and vitals per unit protocol  Continue with [facility] medical management as indicated

## 2024-10-26 NOTE — NURSING NOTE
Received pt in bed at change of shift with eyes closed; chest movement noted.  Continues the same thus this far as per q 15 min room checks.   Will continue to monitor behavior, sleeping pattern and any medical issues that may arise.    0553:  Sleeping 7+ hrs thus this far

## 2024-10-26 NOTE — NURSING NOTE
Pt is calm and cooperative, visible on the unit intermittently. Pt is social with select peers. Pt denies anxiety and depression, denies SI/HI/AVH. Pt is medication compliant, safety checks ongoing.    hypothyroid on synthroid, and type 2 diabetes on metformin/Diabetes Mellitus

## 2024-10-26 NOTE — PROGRESS NOTES
This note was not shared with the patient due to reasonable likelihood of causing patient harm    Progress Note - Behavioral Health   Name: Deyvi Cao 54 y.o. male I MRN: [id number]  Unit/Bed#: [facility] 101-02 I Date of Admission: 6/25/2024   Date of Service: 10/26/2024 I Hospital Day: 123    Deyvi Cao 54 y.o. male MRN: [id number]   Unit/Bed#: [facility] 101-02 Encounter: [id number]        Assessment & Plan  Bipolar disorder with severe depression (HCC)  Still progressing - 10/26/2024  Pt pleasant on interview today, denies SI/HI/AVH; continue current meds unchanged  Awaiting placement - CRR interview complete, ACT interview complete.  sent last seven days of charts to [facility] for review.  Patient denied by CRR, awaiting further recommendations from the [facility] on where patient should be placed  Continue medications as follows: Vraylar 3mg PO Daily, Atarax 25mg PO TID, Lamictal 50mg PO Daily, Zoloft 150mg PO QHS  Continue with group therapy, milieu therapy and occupational therapy   Behavioral Health checks every 7 minutes   Continue frequent safety checks and vitals per unit protocol  Continue with [facility] medical management as indicated  Benign essential hypertension  Managed by [facility] - reviewed 10/26/2024  Continue Lisinopril 10mg PO Daily  Mixed hyperlipidemia  Managed by [facility] - reviewed 10/26/2024  Continue Lipitor 10mg PO Daily  Allergic rhinitis due to allergen  Managed by [facility] - reviewed 10/26/2024  Rosacea  Managed by [facility] - reviewed 10/26/2024     Current medications:  Current Facility-Administered Medications   Medication Dose Route Frequency Provider Last Rate    acetaminophen  650 mg Oral Q6H PRN ALVINA Harley      acetaminophen  650 mg Oral Q4H PRN ALVINA Harley      acetaminophen  975 mg Oral Q6H PRN ALVINA Harley      aluminum-magnesium hydroxide-simethicone  30 mL Oral Q4H PRN ALVINA Harley      ammonium lactate   Topical BID PRN ALVINA Harley      atorvastatin  10 mg Oral Daily  ALVINA Lewis      benztropine  1 mg Intramuscular Q4H PRN Max 6/day ALVINA Harley      benztropine  1 mg Oral Q4H PRN Max 6/day ALVINA Harley      bisacodyl  10 mg Rectal Daily PRN ALVINA Harley      cariprazine  3 mg Oral Daily Martin Cardenas MD      Cholecalciferol  2,000 Units Oral Daily ALVINA Lewis      cyanocobalamin  1,000 mcg Oral Daily ALVINA Lewis      hydrOXYzine HCL  25 mg Oral Q6H PRN Max 4/day ALVINA Harley      hydrOXYzine HCL  25 mg Oral TID Martin Cardenas MD      hydrOXYzine HCL  50 mg Oral Q4H PRN Max 4/day ALVINA Harley      Or    LORazepam  1 mg Intramuscular Q4H PRN ALVINA Harley      ketoconazole   Topical Daily PRN ALVINA Harley      lamoTRIgine  50 mg Oral Daily Martin Cardenas MD      lisinopril  10 mg Oral Daily ALVINA Lewis      LORazepam  1 mg Oral Q4H PRN Max 6/day ALVINA Harley      Or    LORazepam  2 mg Intramuscular Q6H PRN Max 3/day ALVINA Harley      OLANZapine  10 mg Oral Q3H PRN Max 3/day ALVINA Harley      Or    OLANZapine  10 mg Intramuscular Q3H PRN Max 3/day ALVINA Harley      OLANZapine  5 mg Oral Q3H PRN Max 6/day ALVINA Harley      Or    OLANZapine  5 mg Intramuscular Q3H PRN Max 6/day ALVINA Harley      OLANZapine  2.5 mg Oral Q3H PRN Max 8/day ALVINA Harley      polyethylene glycol  17 g Oral Daily PRN ALVINA Harley      propranolol  10 mg Oral Q8H PRN ALVINA Harley      senna-docusate sodium  1 tablet Oral Daily PRN ALVINA Harley      sertraline  150 mg Oral HS Martin Cardenas MD         Risks / Benefits of Treatment:    Risks, benefits, and possible side effects of medications explained to patient and patient verbalizes understanding and agreement for treatment.    Patient was seen today for continuation of care, records reviewed and patient was discussed with the charge nurse.  No behavioral outbursts or acute events noted  "overnight and No significant changes in behaviors or clinical status over the last 24 hours.      Deyvi was seen today while in bed. He sat up to speak to provider and was engaged. He reports his mood as \"my normal\" and denies significant depression or anxiety. Denies SI/HI/AVH. Does not appear overtly delusions. He reports eating and sleeping well. Nursing reports no change in behaviors and that team is continuing to look for placement for pt.       Deyvi does not appear overtly anxious or depressed.    He denies any suicidal ideation, intent or plan at present; denies any homicidal ideation, intent or plan at present.  He denies any auditory hallucinations, denies any visual hallucinations, denies any delusions.  He denies any side effects from current psychiatric medications.  No medication changes indicated at this time, continue current medication regimen.    Psychiatric Review of Systems:  Behavior over the last 24 hours: unchanged.   Sleep: sleeping okay throughout the night  Appetite: adequate  Medication side effects: none reported  ROS:no complaints    Mental Status Evaluation:    Appearance:  casually dressed, looks stated age   Behavior:  pleasant, cooperative, calm, interacts appropriately with this writer   Speech:  normal rate and volume   Mood:  euthymic   Affect:  constricted   Thought Process:  goal directed, linear   Associations: intact associations   Thought Content:  no overt delusions, no paranoia noted on exam   Perceptual Disturbances: denies auditory or visual hallucinations when asked, does not appear responding to internal stimuli   Risk Potential: Suicidal ideation - None at present  Homicidal ideation - None at present  Potential for aggression - Not at present   Sensorium:  oriented to person, place, and time/date   Memory:  recent and remote memory grossly intact   Consciousness:  alert and awake   Attention/Concentration: attention span and concentration are age appropriate "   Insight:  limited   Judgment: limited   Gait/Station: in bed   Motor Activity: no abnormal movements     Vital signs in last 24 hours:    Temp:  [97.5 °F (36.4 °C)-97.8 °F (36.6 °C)] 97.8 °F (36.6 °C)  HR:  [] 89  BP: (121-143)/(67-79) 143/79  Resp:  [18] 18  SpO2:  [96 %-98 %] 98 %  O2 Device: None (Room air)    Laboratory results: I have personally reviewed all pertinent laboratory/tests results    Discharge Disposition: Discharge disposition and planning remain ongoing    Counseling / Coordination of Care:    Medication changes reviewed with nursing staff.  Patient's progress reviewed with nursing staff.  Reassurance and supportive therapy provided.  Group attendance encouraged.  Encouraged participation in milieu and group therapy on the unit.    Lupe Michael 10/26/24

## 2024-10-27 PROCEDURE — 99232 SBSQ HOSP IP/OBS MODERATE 35: CPT | Performed by: PSYCHIATRY & NEUROLOGY

## 2024-10-27 RX ADMIN — CARIPRAZINE 3 MG: 3 CAPSULE, GELATIN COATED ORAL at 08:59

## 2024-10-27 RX ADMIN — LISINOPRIL 10 MG: 10 TABLET ORAL at 09:00

## 2024-10-27 RX ADMIN — CHOLECALCIFEROL TAB 25 MCG (1000 UNIT) 2000 UNITS: 25 TAB at 08:59

## 2024-10-27 RX ADMIN — ATORVASTATIN CALCIUM 10 MG: 10 TABLET, FILM COATED ORAL at 08:59

## 2024-10-27 RX ADMIN — LAMOTRIGINE 50 MG: 25 TABLET ORAL at 09:00

## 2024-10-27 RX ADMIN — HYDROXYZINE HYDROCHLORIDE 25 MG: 25 TABLET ORAL at 17:09

## 2024-10-27 RX ADMIN — SERTRALINE HYDROCHLORIDE 150 MG: 100 TABLET ORAL at 21:16

## 2024-10-27 RX ADMIN — HYDROXYZINE HYDROCHLORIDE 25 MG: 25 TABLET ORAL at 21:16

## 2024-10-27 RX ADMIN — CYANOCOBALAMIN TAB 1000 MCG 1000 MCG: 1000 TAB at 08:59

## 2024-10-27 RX ADMIN — HYDROXYZINE HYDROCHLORIDE 25 MG: 25 TABLET ORAL at 08:59

## 2024-10-27 NOTE — PLAN OF CARE
Problem: Alteration in Thoughts and Perception  Goal: Treatment Goal: Gain control of psychotic behaviors/thinking, reduce/eliminate presenting symptoms and demonstrate improved reality functioning upon discharge  Outcome: Progressing  Goal: Refrain from acting on delusional thinking/internal stimuli  Description: Interventions:  - Monitor patient closely, per order   - Utilize least restrictive measures   - Set reasonable limits, give positive feedback for acceptable   - Administer medications as ordered and monitor of potential side effects  Outcome: Progressing  Goal: Agree to be compliant with medication regime, as prescribed and report medication side effects  Description: Interventions:  - Offer appropriate PRN medication and supervise ingestion; conduct AIMS, as needed   Outcome: Progressing  Goal: Complete daily ADLs, including personal hygiene independently, as able  Description: Interventions:  - Observe, teach, and assist patient with ADLS  - Monitor and promote a balance of rest/activity, with adequate nutrition and elimination   Outcome: Progressing     Problem: Alteration in Thoughts and Perception  Goal: Verbalize thoughts and feelings  Description: Interventions:  - Promote a nonjudgmental and trusting relationship with the patient through active listening and therapeutic communication  - Assess patient's level of functioning, behavior and potential for risk  - Engage patient in 1 on 1 interactions  - Encourage patient to express fears, feelings, frustrations, and discuss symptoms    - [place] patient to reality, help patient recognize reality-based thinking   - Administer medications as ordered and assess for potential side effects  - Provide the patient education related to the signs and symptoms of the illness and desired effects of prescribed medications  Outcome: Not Progressing  Goal: Attend and participate in unit activities, including therapeutic, recreational, and educational  groups  Description: Interventions:  -Encourage Visitation and family involvement in care  Outcome: Not Progressing

## 2024-10-27 NOTE — NURSING NOTE
Pt is calm and cooperative, visible on the unit intermittently. Pt social with select peers. Pt denies all psych s/s, medication complaint, safety checks ongoing.

## 2024-10-27 NOTE — NURSING NOTE
Pt is calm and cooperative, in his room after dinner. Pt ate 100% of dinner. Pt is medication compliant, safety checks ongoing.

## 2024-10-27 NOTE — NURSING NOTE
Patient isolative to room and self most of the evening. Medication compliant. Not seen interacting with peers. Quiet and keeps to herself. Behaviors controlled and appropriate. No prn medication requested or required..

## 2024-10-27 NOTE — PROGRESS NOTES
This note was not shared with the patient due to reasonable likelihood of causing patient harm    Progress Note - Behavioral Health   Name: Deyvi Cao 54 y.o. male I MRN: [id number]  Unit/Bed#: [facility] 101-02 I Date of Admission: 6/25/2024   Date of Service: 10/27/2024 I Hospital Day: 124    Deyvi Cao 54 y.o. male MRN: [id number]   Unit/Bed#: [facility] 101-02 Encounter: [id number]        Assessment & Plan  Bipolar disorder with severe depression (HCC)  Still progressing - 10/27/2024  Pt remains pleasant on interview and denies current depression/anxiety/SI/HI/AVH, nursing reports no acute behaviors  Awaiting placement - CRR interview complete, ACT interview complete.  sent last seven days of charts to [facility] for review.  Patient denied by CRR, awaiting further recommendations from the [facility] on where patient should be placed  Continue medications as follows: Vraylar 3mg PO Daily, Atarax 25mg PO TID, Lamictal 50mg PO Daily, Zoloft 150mg PO QHS  Continue with group therapy, milieu therapy and occupational therapy   Behavioral Health checks every 7 minutes   Continue frequent safety checks and vitals per unit protocol  Continue with [facility] medical management as indicated  Benign essential hypertension  Managed by [facility] - reviewed 10/27/2024  Continue Lisinopril 10mg PO Daily  Mixed hyperlipidemia  Managed by [facility] - reviewed 10/27/2024  Continue Lipitor 10mg PO Daily  Allergic rhinitis due to allergen  Managed by [facility] - reviewed 10/27/2024  Rosacea  Managed by [facility] - reviewed 10/27/2024     Current medications:  Current Facility-Administered Medications   Medication Dose Route Frequency Provider Last Rate    acetaminophen  650 mg Oral Q6H PRN ALVINA Harley      acetaminophen  650 mg Oral Q4H PRN ALVINA Harley      acetaminophen  975 mg Oral Q6H PRN ALVINA Harley      aluminum-magnesium hydroxide-simethicone  30 mL Oral Q4H PRN ALVINA Harley      ammonium lactate   Topical BID PRN ALVINA Harley       atorvastatin  10 mg Oral Daily Sary Rodriguez ALVINA Biggs      benztropine  1 mg Intramuscular Q4H PRN Max 6/day ALVINA Harley      benztropine  1 mg Oral Q4H PRN Max 6/day ALVINA Harley      bisacodyl  10 mg Rectal Daily PRN Melva Knutson, ALVINA      cariprazine  3 mg Oral Daily Martin Cardenas MD      Cholecalciferol  2,000 Units Oral Daily Sary LinkALVINA Thompson      cyanocobalamin  1,000 mcg Oral Daily Sary LinkALVINA Thompson      hydrOXYzine HCL  25 mg Oral Q6H PRN Max 4/day ALVINA Harley      hydrOXYzine HCL  25 mg Oral TID Martin Cardenas MD      hydrOXYzine HCL  50 mg Oral Q4H PRN Max 4/day ALVINA Harley      Or    LORazepam  1 mg Intramuscular Q4H PRN ALVINA Harley      ketoconazole   Topical Daily PRN ALVINA Harley      lamoTRIgine  50 mg Oral Daily Martin Cardenas MD      lisinopril  10 mg Oral Daily Sary Linkbraxton Biggs, ALVINA      LORazepam  1 mg Oral Q4H PRN Max 6/day ALVINA Harley      Or    LORazepam  2 mg Intramuscular Q6H PRN Max 3/day ALVINA Harley      OLANZapine  10 mg Oral Q3H PRN Max 3/day Melvamelanie Knutson, ALVINA      Or    OLANZapine  10 mg Intramuscular Q3H PRN Max 3/day Melvamelanie Knutson, TITINP      OLANZapine  5 mg Oral Q3H PRN Max 6/day ALVINA Harley      Or    OLANZapine  5 mg Intramuscular Q3H PRN Max 6/day ALVINA Harley      OLANZapine  2.5 mg Oral Q3H PRN Max 8/day Melvamelanie Knutson, ALVINA      polyethylene glycol  17 g Oral Daily PRN MelvaALVINA Cordova      propranolol  10 mg Oral Q8H PRN ALVINA Harley      senna-docusate sodium  1 tablet Oral Daily PRN Melva Knutson, ALVINA      sertraline  150 mg Oral HS Martin Cardenas MD       Risks / Benefits of Treatment:    Risks, benefits, and possible side effects of medications explained to patient and patient verbalizes understanding and agreement for treatment.    Patient was seen today for continuation of care, records reviewed and patient was discussed with the charge nurse.  No behavioral  "outbursts or acute events noted overnight and No significant changes in behaviors or clinical status over the last 24 hours.      Deyvi was seen today while in bed. He again sat up to speak to provider and was engaged. He still reports his mood as \"normal\" and denies SI/HI/AVH. He denies depression and anxiety. He reports eating and sleeping well. Nursing reports he has had no acute behaviors.       Deyvi does not appear overtly anxious or depressed.    He denies any suicidal ideation, intent or plan at present; denies any homicidal ideation, intent or plan at present.  He denies any auditory hallucinations, denies any visual hallucinations, denies any delusions.  He denies any side effects from current psychiatric medications.  No medication changes indicated at this time, continue current medication regimen.    Psychiatric Review of Systems:  Behavior over the last 24 hours: unchanged.   Sleep: sleeping okay throughout the night  Appetite: adequate  Medication side effects: none reported  ROS:no complaints    Mental Status Evaluation:    Appearance:  casually dressed, looks stated age   Behavior:  pleasant, cooperative, calm, interacts appropriately with this writer   Speech:  normal rate and volume   Mood:  \"Normal\"   Affect:  constricted   Thought Process:  goal directed, linear   Associations: intact associations   Thought Content:  no overt delusions, no paranoia noted on exam   Perceptual Disturbances: denies auditory or visual hallucinations when asked, does not appear responding to internal stimuli   Risk Potential: Suicidal ideation - None at present  Homicidal ideation - None at present  Potential for aggression - Not at present   Sensorium:  oriented to person, place, and time/date   Memory:  recent and remote memory grossly intact   Consciousness:  alert and awake   Attention/Concentration: attention span and concentration are age appropriate   Insight:  limited   Judgment: limited   Gait/Station: " in bed   Motor Activity: no abnormal movements     Vital signs in last 24 hours:    Temp:  [97.5 °F (36.4 °C)-97.9 °F (36.6 °C)] 97.9 °F (36.6 °C)  HR:  [91-95] 95  BP: (123-132)/(71-76) 132/76  Resp:  [18] 18  SpO2:  [94 %-98 %] 98 %  O2 Device: None (Room air)    Laboratory results: I have personally reviewed all pertinent laboratory/tests results    Discharge Disposition: Discharge disposition and planning remain ongoing    Counseling / Coordination of Care:    Medication changes reviewed with nursing staff.  Patient's progress reviewed with nursing staff.  Reassurance and supportive therapy provided.  Group attendance encouraged.  Encouraged participation in milieu and group therapy on the unit.    Lupe Michael 10/27/24

## 2024-10-27 NOTE — ASSESSMENT & PLAN NOTE
Still progressing - 10/27/2024  Pt remains pleasant on interview and denies current depression/anxiety/SI/HI/AVH, nursing reports no acute behaviors  Awaiting placement - CRR interview complete, ACT interview complete. STEFAN sent last seven days of charts to [facility] for review.  Patient denied by CRR, awaiting further recommendations from the county on where patient should be placed  Continue medications as follows: Vraylar 3mg PO Daily, Atarax 25mg PO TID, Lamictal 50mg PO Daily, Zoloft 150mg PO QHS  Continue with group therapy, milieu therapy and occupational therapy   Behavioral Health checks every 7 minutes   Continue frequent safety checks and vitals per unit protocol  Continue with [facility] medical management as indicated

## 2024-10-28 PROCEDURE — 99232 SBSQ HOSP IP/OBS MODERATE 35: CPT | Performed by: PSYCHIATRY & NEUROLOGY

## 2024-10-28 RX ADMIN — HYDROXYZINE HYDROCHLORIDE 25 MG: 25 TABLET ORAL at 17:29

## 2024-10-28 RX ADMIN — HYDROXYZINE HYDROCHLORIDE 25 MG: 25 TABLET ORAL at 21:12

## 2024-10-28 RX ADMIN — CARIPRAZINE 3 MG: 3 CAPSULE, GELATIN COATED ORAL at 08:14

## 2024-10-28 RX ADMIN — CHOLECALCIFEROL TAB 25 MCG (1000 UNIT) 2000 UNITS: 25 TAB at 08:15

## 2024-10-28 RX ADMIN — LISINOPRIL 10 MG: 10 TABLET ORAL at 08:14

## 2024-10-28 RX ADMIN — HYDROXYZINE HYDROCHLORIDE 25 MG: 25 TABLET ORAL at 08:14

## 2024-10-28 RX ADMIN — LAMOTRIGINE 50 MG: 25 TABLET ORAL at 08:14

## 2024-10-28 RX ADMIN — CYANOCOBALAMIN TAB 1000 MCG 1000 MCG: 1000 TAB at 08:15

## 2024-10-28 RX ADMIN — ATORVASTATIN CALCIUM 10 MG: 10 TABLET, FILM COATED ORAL at 08:14

## 2024-10-28 RX ADMIN — SERTRALINE HYDROCHLORIDE 150 MG: 100 TABLET ORAL at 21:12

## 2024-10-28 NOTE — ASSESSMENT & PLAN NOTE
Still progressing - 10/28/2024  Pt remains pleasant on interview and denies current depression/anxiety/SI/HI/AVH, nursing reports no acute behaviors  Awaiting placement - CRR interview complete, ACT interview complete. STEFAN sent last seven days of charts to [facility] for review.  Patient denied by CRR, awaiting further recommendations from the county on where patient should be placed  Continue medications as follows: Vraylar 3mg PO Daily, Atarax 25mg PO TID, Lamictal 50mg PO Daily, Zoloft 150mg PO QHS  Continue with group therapy, milieu therapy and occupational therapy   Behavioral Health checks every 7 minutes   Continue frequent safety checks and vitals per unit protocol  Continue with [facility] medical management as indicated

## 2024-10-28 NOTE — PROGRESS NOTES
Progress Note - Behavioral Health   Name: Deyvi Cao 54 y.o. male I MRN: [id number]   Unit/Bed#: [facility]02 I Date of Admission: 6/25/2024   Date of Service: 10/28/2024 I Hospital Day: 125     Assessment & Plan  Bipolar disorder with severe depression (HCC)  Still progressing - 10/28/2024  Pt remains pleasant on interview and denies current depression/anxiety/SI/HI/AVH, nursing reports no acute behaviors  Awaiting placement - CRR interview complete, ACT interview complete.  sent last seven days of charts to [facility] for review.  Patient denied by CRR, awaiting further recommendations from the [facility] on where patient should be placed  Continue medications as follows: Vraylar 3mg PO Daily, Atarax 25mg PO TID, Lamictal 50mg PO Daily, Zoloft 150mg PO QHS  Continue with group therapy, milieu therapy and occupational therapy   Behavioral Health checks every 7 minutes   Continue frequent safety checks and vitals per unit protocol  Continue with [facility] medical management as indicated  Benign essential hypertension  Managed by [facility] - reviewed 10/28/2024  Continue Lisinopril 10mg PO Daily  Mixed hyperlipidemia  Managed by [facility] - reviewed 10/28/2024  Continue Lipitor 10mg PO Daily  Allergic rhinitis due to allergen  Managed by [facility] - reviewed 10/28/2024  Rosacea  Managed by [facility] - reviewed 10/28/2024    Subjective:    Patient was seen today for continuation of care, records reviewed and patient was discussed with the morning case review team.    Deyvi was seen today for psychiatric follow-up.  On assessment today, Deyvi was found in his room.  He is calm, cooperative.  He is doing okay, offers no major concerns.  He shares that he has a phone bill due by the 1st, and he will need help from his SW to complete this task.  Deyvi reports adequate daytime energy and denies any difficulties with initiating or staying asleep.  Oral appetite and hydration is adequate.   We reviewed once more the specific as-needed  medications they can use going forward if they experience any insomnia or destabilization of their mood, they understood and were agreeable. Milieu visibility and group attendance encouraged to promote an active participation in treatment.    Deyvi denies acute suicidal/self-harm ideation/intent/plan upon direct inquiry at this time. Deyvi is able to contract for safety while on the unit and would feel comfortable seeking staff support should suicidal symptoms or urges appear or worsen. Deyvi remains behaviorally appropriate, no agitation or aggression noted on exam or in report. Deyvi also denies HI/AH/VH, and does not appear overtly manic.  Patient does not verbalize any experiences that can be categorized as paranoid, persecutory, bizarre, or somatic delusions. Deyvi remains adherent to his current psychotropic medication regimen and denies any side effects from medications, as well as none noted on exam.    Deyvi is currently assessed as being at their baseline with continued need for medication management, supervision for safety and ADL’s. These services are not currently available in a less restrictive environment necessitating continued hospital stay.  Deyvi should remain on the unit until these services are available, due to likelihood of mental decompensation and readmission if discharged to an unsupervised setting.  Assertive discharge planning and collaboration with multiple providers (inpatient and community based) remains ongoing.    Group Attendance: 3 / 9  Treatment Team: This Thursday  Psychiatric PRN's Needed: None    Review of Systems:  Behavior over the last 24 hours: Unchanged  Sleep: sleeping okay throughout the night  Appetite: adequate  Medication side effects: none reported  ROS:no complaints    Objective:    Vitals:  Vitals:    10/28/24 0725   BP: 120/63   Pulse: 87   Resp: 18   Temp: 97.5 °F (36.4 °C)   SpO2:      Laboratory Results:  I have personally reviewed all pertinent  laboratory/tests results.  Most Recent Labs:   Lab Results   Component Value Date    WBC 7.39 06/26/2024    RBC 4.70 06/26/2024    HGB 15.2 06/26/2024    HCT 45.5 06/26/2024     06/26/2024    RDW 12.9 06/26/2024    NEUTROABS 4.63 06/26/2024    SODIUM 137 06/26/2024    K 4.1 06/26/2024     06/26/2024    CO2 26 06/26/2024    BUN 17 06/26/2024    CREATININE 0.63 06/26/2024    GLUC 98 06/26/2024    GLUF 98 06/26/2024    CALCIUM 9.6 06/26/2024    AST 25 06/26/2024    ALT 45 06/26/2024    ALKPHOS 114 (H) 06/26/2024    TP 7.0 06/26/2024    ALB 4.4 06/26/2024    TBILI 0.44 06/26/2024    CHOLESTEROL 177 06/26/2024    HDL 52 06/26/2024    TRIG 73 06/26/2024    LDLCALC 110 (H) 06/26/2024    NONHDLC 125 06/26/2024    LITHIUM 0.4 (L) 01/28/2021    IFM6ITBFRFLD 2.636 06/26/2024    HGBA1C 5.2 11/22/2023     11/22/2023     Mental Status Evaluation:  Appearance:  age appropriate, casually dressed, dressed appropriately   Behavior:  cooperative, calm   Speech:  normal volume   Mood:  less anxious, less depressed   Affect:  constricted   Thought Process:  goal directed   Associations: intact associations   Thought Content:  no overt delusions   Perceptual Disturbances: no auditory hallucinations, no visual hallucinations, denies when asked, does not appear responding to internal stimuli   Risk Potential: Suicidal ideation - None at present, contracts for safety on the unit, would talk to staff if not feeling safe on the unit  Homicidal ideation - None at present  Potential for aggression - Not at present   Sensorium:  oriented to person, place, and time/date   Memory:  recent memory intact   Consciousness:  alert and awake   Attention/Concentration: attention span and concentration appear shorter than expected for age   Insight:  limited   Judgment: limited   Gait/Station: normal gait/station, normal balance   Motor Activity: no abnormal movements     Progress Toward Goals: making gradual improvement.  Deyvi  continues to require inpatient psychiatric hospitalization for continued medication management and titration to optimize symptom reduction, improve sleep hygiene, and demonstrate adequate self-care.     Suicide/Homicide Risk Assessment:  Risk of Harm to Self:   Nursing Suicide Risk Assessment Last 24 hours: C-SSRS Risk (Since Last Contact)  Calculated C-SSRS Risk Score (Since Last Contact): No Risk Indicated    Risk of Harm to Others:  Nursing Homicide Risk Assessment: Violence Risk to Others: Denies within past 6 months    Behavioral Health Medications: all current active meds have been reviewed and continue current psychiatric medications.  Current Facility-Administered Medications   Medication Dose Route Frequency Provider Last Rate    acetaminophen  650 mg Oral Q6H PRN ALVINA Harley      acetaminophen  650 mg Oral Q4H PRN ALVINA Harley      acetaminophen  975 mg Oral Q6H PRN ALVINA Harley      aluminum-magnesium hydroxide-simethicone  30 mL Oral Q4H PRN ALVINA Harley      ammonium lactate   Topical BID PRN ALVINA Harley      atorvastatin  10 mg Oral Daily ALVINA Lewis      benztropine  1 mg Intramuscular Q4H PRN Max 6/day ALVINA Harley      benztropine  1 mg Oral Q4H PRN Max 6/day ALVINA Harley      bisacodyl  10 mg Rectal Daily PRN ALVINA Harley      cariprazine  3 mg Oral Daily Martin Cardenas MD      Cholecalciferol  2,000 Units Oral Daily ALVINA Lewis      cyanocobalamin  1,000 mcg Oral Daily ALVINA Lewis      hydrOXYzine HCL  25 mg Oral Q6H PRN Max 4/day ALVINA Harley      hydrOXYzine HCL  25 mg Oral TID Martin Cardenas MD      hydrOXYzine HCL  50 mg Oral Q4H PRN Max 4/day ALVINA Harley      Or    LORazepam  1 mg Intramuscular Q4H PRN ALVINA Harley      ketoconazole   Topical Daily PRN ALVINA Harley      lamoTRIgine  50 mg Oral Daily Martin Cardenas MD      lisinopril  10 mg Oral Daily Sary Rodriguez  ALVINA Biggs      LORazepam  1 mg Oral Q4H PRN Max 6/day ALVINA Harley      Or    LORazepam  2 mg Intramuscular Q6H PRN Max 3/day ALVINA Harley      OLANZapine  10 mg Oral Q3H PRN Max 3/day ALVINA Harley      Or    OLANZapine  10 mg Intramuscular Q3H PRN Max 3/day ALVINA Harley      OLANZapine  5 mg Oral Q3H PRN Max 6/day ALVINA Harley      Or    OLANZapine  5 mg Intramuscular Q3H PRN Max 6/day ALVINA Harley      OLANZapine  2.5 mg Oral Q3H PRN Max 8/day ALVINA Harley      polyethylene glycol  17 g Oral Daily PRN ALVINA Harley      propranolol  10 mg Oral Q8H PRN ALVINA Harley      senna-docusate sodium  1 tablet Oral Daily PRN ALVINA Harley      sertraline  150 mg Oral HS Martin Cardenas MD       Risks / Benefits of Treatment:  Risks, benefits, and possible side effects of medications explained to patient. Patient has limited understanding of risks and benefits of treatment at this time, but agrees to take medications as prescribed.    Counseling / Coordination of Care:  Patient's progress discussed with staff in treatment team meeting.  Medications, treatment progress and treatment plan reviewed with patient.   Educated on importance of medication and treatment compliance.  Reassurance and supportive therapy provided.   Encouraged participation in milieu and group therapy on the unit.    ALVINA Harley 10/28/24

## 2024-10-28 NOTE — PLAN OF CARE
Problem: Alteration in Thoughts and Perception  Goal: Agree to be compliant with medication regime, as prescribed and report medication side effects  Description: Interventions:  - Offer appropriate PRN medication and supervise ingestion; conduct AIMS, as needed   Outcome: Progressing  Goal: Attend and participate in unit activities, including therapeutic, recreational, and educational groups  Description: Interventions:  -Encourage Visitation and family involvement in care  Outcome: Progressing  Goal: Complete daily ADLs, including personal hygiene independently, as able  Description: Interventions:  - Observe, teach, and assist patient with ADLS  - Monitor and promote a balance of rest/activity, with adequate nutrition and elimination   Outcome: Progressing     Problem: Ineffective Coping  Goal: Demonstrates healthy coping skills  Outcome: Progressing  Goal: Participates in unit activities  Description: Interventions:  - Provide therapeutic environment   - Provide required programming   - Redirect inappropriate behaviors   Outcome: Progressing  Goal: Understands least restrictive measures  Description: Interventions:  - Utilize least restrictive behavior  Outcome: Progressing  Goal: Free from restraint events  Description: - Utilize least restrictive measures   - Provide behavioral interventions   - Redirect inappropriate behaviors   Outcome: Progressing

## 2024-10-28 NOTE — NURSING NOTE
Patient Isolative to room and self all evening.  Patient not seen interacting with anyone.  Patient quiet and keeps to himself. Behaviors controlled and appropriate.  Medication complaint. Offered no complaints. No prn medications required or requested.

## 2024-10-28 NOTE — NURSING NOTE
Patient has been out of his room minimally. Anxious during conversation. Smiles when approached. Denies any psychological symptoms or suicidal ideations. Medication compliant. Pleasant and cooperative. Appetite good. Attended half of groups today. Participated in playing games with other male peers in group.

## 2024-10-28 NOTE — NURSING NOTE
Pt is calm and cooperative, visible on the unit. Pt is social with select peers. Pt denies anxiety and depression, denies SI/HI/AVH. Pt is meal and medication complaint, safety checks ongoing.

## 2024-10-28 NOTE — PROGRESS NOTES
10/28/24 0807   Team Meeting   Meeting Type Daily Rounds   Team Members Present   Team Members Present Physician;Nurse;;Other (Discipline and Name)   Physician Team Member Holter, Thomas, Hnagey CRNP, Spoletti   Nursing Team Member Chastity   Social Work Team Member Henrry FRY   Other (Discipline and Name) Goff PCM   Patient/Family Present   Patient Present No   Patient's Family Present No     Groups Participation  3/8.   Patient's compliant with medications. He will be reviewed by [facility] [place] the [place]. He has minimal engagement in his treatment. He isolates but he's pleasant.

## 2024-10-29 PROCEDURE — 99232 SBSQ HOSP IP/OBS MODERATE 35: CPT | Performed by: PSYCHIATRY & NEUROLOGY

## 2024-10-29 RX ADMIN — LAMOTRIGINE 50 MG: 25 TABLET ORAL at 08:25

## 2024-10-29 RX ADMIN — HYDROXYZINE HYDROCHLORIDE 25 MG: 25 TABLET ORAL at 21:02

## 2024-10-29 RX ADMIN — CHOLECALCIFEROL TAB 25 MCG (1000 UNIT) 2000 UNITS: 25 TAB at 08:26

## 2024-10-29 RX ADMIN — ATORVASTATIN CALCIUM 10 MG: 10 TABLET, FILM COATED ORAL at 08:25

## 2024-10-29 RX ADMIN — LISINOPRIL 10 MG: 10 TABLET ORAL at 08:25

## 2024-10-29 RX ADMIN — HYDROXYZINE HYDROCHLORIDE 25 MG: 25 TABLET ORAL at 08:26

## 2024-10-29 RX ADMIN — HYDROXYZINE HYDROCHLORIDE 25 MG: 25 TABLET ORAL at 17:02

## 2024-10-29 RX ADMIN — CARIPRAZINE 3 MG: 3 CAPSULE, GELATIN COATED ORAL at 08:24

## 2024-10-29 RX ADMIN — SERTRALINE HYDROCHLORIDE 150 MG: 100 TABLET ORAL at 21:02

## 2024-10-29 RX ADMIN — CYANOCOBALAMIN TAB 1000 MCG 1000 MCG: 1000 TAB at 08:24

## 2024-10-29 NOTE — PROGRESS NOTES
Progress Note - Behavioral Health   Name: Deyvi Cao 54 y.o. male I MRN: [id number]   Unit/Bed#: [facility] 101-02 I Date of Admission: 6/25/2024   Date of Service: 10/29/2024 I Hospital Day: 126     Assessment & Plan  Bipolar disorder with severe depression (HCC)  Still progressing - 10/29/2024  Pt remains pleasant on interview and denies current depression/anxiety/SI/HI/AVH, nursing reports no acute behaviors  Awaiting placement - CRR interview complete, ACT interview complete.  sent last seven days of charts to [facility] for review.  Patient denied by CRR, awaiting further recommendations from the county on where patient should be placed  Continue medications as follows: Vraylar 3mg PO Daily, Atarax 25mg PO TID, Lamictal 50mg PO Daily, Zoloft 150mg PO QHS  Continue with group therapy, milieu therapy and occupational therapy   Behavioral Health checks every 7 minutes   Continue frequent safety checks and vitals per unit protocol  Continue with [facility] medical management as indicated  Benign essential hypertension  Managed by [facility] - reviewed 10/29/2024  Continue Lisinopril 10mg PO Daily  Mixed hyperlipidemia  Managed by [facility] - reviewed 10/29/2024  Continue Lipitor 10mg PO Daily  Allergic rhinitis due to allergen  Managed by [facility] - reviewed 10/29/2024  Rosacea  Managed by [facility] - reviewed 10/29/2024    Subjective:    Patient was seen today for continuation of care, records reviewed and patient was discussed with the morning case review team.    Deyvi was seen today for psychiatric follow-up.  On assessment today, Deyvi was found in his room. He is quiet, guarded.  Deyvi reports adequate daytime energy and denies any difficulties with initiating or staying asleep.  Oral appetite and hydration is adequate.  He is asking for an update on placement.   We reviewed once more the specific as-needed medications they can use going forward if they experience any insomnia or destabilization of their mood, they understood and  were agreeable. Milieu visibility and group attendance encouraged to promote an active participation in treatment.    Deyvi denies acute suicidal/self-harm ideation/intent/plan upon direct inquiry at this time. Deyvi is able to contract for safety while on the unit and would feel comfortable seeking staff support should suicidal symptoms or urges appear or worsen. Deyvi remains behaviorally appropriate, no agitation or aggression noted on exam or in report. Deyvi also denies HI/AH/VH, and does not appear overtly manic.  Patient does not verbalize any experiences that can be categorized as paranoid, persecutory, bizarre, or somatic delusions. Deyvi remains adherent to his current psychotropic medication regimen and denies any side effects from medications, as well as none noted on exam.    Deyvi is currently assessed as being at their baseline with continued need for medication management, supervision for safety and ADL’s. These services are not currently available in a less restrictive environment necessitating continued hospital stay.  Deyvi should remain on the unit until these services are available, due to likelihood of mental decompensation and readmission if discharged to an unsupervised setting.  Assertive discharge planning and collaboration with multiple providers (inpatient and community based) remains ongoing.    Group Attendance: 4 / 9  Treatment Team: This Thursday  Psychiatric PRN's Needed: None    Review of Systems:  Behavior over the last 24 hours: Unchanged  Sleep: sleeping okay throughout the night  Appetite: adequate  Medication side effects: none reported  ROS:no complaints    Objective:    Vitals:  Vitals:    10/29/24 0732   BP: 150/86   Pulse: 101   Resp: 18   Temp: 97.7 °F (36.5 °C)   SpO2: 95%     Laboratory Results:  I have personally reviewed all pertinent laboratory/tests results.  Most Recent Labs:   Lab Results   Component Value Date    WBC 7.39 06/26/2024    RBC 4.70  06/26/2024    HGB 15.2 06/26/2024    HCT 45.5 06/26/2024     06/26/2024    RDW 12.9 06/26/2024    NEUTROABS 4.63 06/26/2024    SODIUM 137 06/26/2024    K 4.1 06/26/2024     06/26/2024    CO2 26 06/26/2024    BUN 17 06/26/2024    CREATININE 0.63 06/26/2024    GLUC 98 06/26/2024    GLUF 98 06/26/2024    CALCIUM 9.6 06/26/2024    AST 25 06/26/2024    ALT 45 06/26/2024    ALKPHOS 114 (H) 06/26/2024    TP 7.0 06/26/2024    ALB 4.4 06/26/2024    TBILI 0.44 06/26/2024    CHOLESTEROL 177 06/26/2024    HDL 52 06/26/2024    TRIG 73 06/26/2024    LDLCALC 110 (H) 06/26/2024    NONHDLC 125 06/26/2024    LITHIUM 0.4 (L) 01/28/2021    [id number] 2.636 06/26/2024    HGBA1C 5.2 11/22/2023     11/22/2023     Mental Status Evaluation:  Appearance:  age appropriate   Behavior:  cooperative   Speech:  normal volume   Mood:  anxious   Affect:  constricted   Thought Process:  goal directed   Associations: intact associations   Thought Content:  no overt delusions   Perceptual Disturbances: no auditory hallucinations, no visual hallucinations, denies when asked, does not appear responding to internal stimuli   Risk Potential: Suicidal ideation - None at present, contracts for safety on the unit, would talk to staff if not feeling safe on the unit  Homicidal ideation - None at present  Potential for aggression - Not at present   Sensorium:  oriented to person, place, and time/date   Memory:  recent memory intact   Consciousness:  alert and awake   Attention/Concentration: attention span and concentration appear shorter than expected for age   Insight:  limited   Judgment: limited   Gait/Station: normal gait/station, normal balance   Motor Activity: no abnormal movements     Progress Toward Goals: making gradual improvement.  Deyvi continues to require inpatient psychiatric hospitalization for continued medication management and titration to optimize symptom reduction, improve sleep hygiene, and demonstrate adequate  self-care.     Suicide/Homicide Risk Assessment:  Risk of Harm to Self:   Nursing Suicide Risk Assessment Last 24 hours: C-SSRS Risk (Since Last Contact)  Calculated C-SSRS Risk Score (Since Last Contact): No Risk Indicated    Risk of Harm to Others:  Nursing Homicide Risk Assessment: Violence Risk to Others: Denies within past 6 months    Behavioral Health Medications: all current active meds have been reviewed and continue current psychiatric medications.  Current Facility-Administered Medications   Medication Dose Route Frequency Provider Last Rate    acetaminophen  650 mg Oral Q6H PRN ALVINA Harley      acetaminophen  650 mg Oral Q4H PRN ALVINA Harley      acetaminophen  975 mg Oral Q6H PRN ALVINA Harley      aluminum-magnesium hydroxide-simethicone  30 mL Oral Q4H PRN ALVINA Harley      ammonium lactate   Topical BID PRN ALVINA Harley      atorvastatin  10 mg Oral Daily ALVINA Lewis      benztropine  1 mg Intramuscular Q4H PRN Max 6/day ALVINA Harley      benztropine  1 mg Oral Q4H PRN Max 6/day ALVINA Harley      bisacodyl  10 mg Rectal Daily PRN ALVINA Harley      cariprazine  3 mg Oral Daily Martin Cardenas MD      Cholecalciferol  2,000 Units Oral Daily ALVINA Lewis      cyanocobalamin  1,000 mcg Oral Daily ALVINA Lewis      hydrOXYzine HCL  25 mg Oral Q6H PRN Max 4/day ALVINA Harley      hydrOXYzine HCL  25 mg Oral TID Martin Cardenas MD      hydrOXYzine HCL  50 mg Oral Q4H PRN Max 4/day ALVINA Harley      Or    LORazepam  1 mg Intramuscular Q4H PRN ALVINA Harley      ketoconazole   Topical Daily PRN ALVINA Harley      lamoTRIgine  50 mg Oral Daily Martin Cardenas MD      lisinopril  10 mg Oral Daily ALVINA Lewis      LORazepam  1 mg Oral Q4H PRN Max 6/day ALVINA Harley      Or    LORazepam  2 mg Intramuscular Q6H PRN Max 3/day ALVINA Harley      OLANZapine  10 mg Oral Q3H PRN  Max 3/day ALVINA Harley      Or    OLANZapine  10 mg Intramuscular Q3H PRN Max 3/day ALVINA Harley      OLANZapine  5 mg Oral Q3H PRN Max 6/day ALVINA Harley      Or    OLANZapine  5 mg Intramuscular Q3H PRN Max 6/day ALVINA Harley      OLANZapine  2.5 mg Oral Q3H PRN Max 8/day ALVINA Harley      polyethylene glycol  17 g Oral Daily PRN ALVINA Harley      propranolol  10 mg Oral Q8H PRN ALVINA Harley      senna-docusate sodium  1 tablet Oral Daily PRN ALVINA Harley      sertraline  150 mg Oral HS Martin Cardenas MD       Risks / Benefits of Treatment:  Risks, benefits, and possible side effects of medications explained to patient. Patient has limited understanding of risks and benefits of treatment at this time, but agrees to take medications as prescribed.    Counseling / Coordination of Care:  Patient's progress discussed with staff in treatment team meeting.  Medications, treatment progress and treatment plan reviewed with patient.   Educated on importance of medication and treatment compliance.  Reassurance and supportive therapy provided.   Encouraged participation in milieu and group therapy on the unit.    ALVINA Harley 10/29/24

## 2024-10-29 NOTE — NURSING NOTE
Received pt in bed at change of shift with eyes closed; chest movement noted.  Continues the same thus this far as per q 15 min room checks.   Will continue to monitor behavior, sleeping pattern and any medical issues that may arise.    0546;  Sleeping 7+ hrs thus this far

## 2024-10-29 NOTE — NURSING NOTE
Pt is calm, cooperative and visible on milieu. Pt denies depression and anxiety; denies SI/HI/AH/VH. Pt has minimal interaction with peers and reports sleeping well. Pt is able to make needs known and is medication compliant. Q 15 min checks maintained.

## 2024-10-29 NOTE — ASSESSMENT & PLAN NOTE
Still progressing - 10/29/2024  Pt remains pleasant on interview and denies current depression/anxiety/SI/HI/AVH, nursing reports no acute behaviors  Awaiting placement - CRR interview complete, ACT interview complete. STEFAN sent last seven days of charts to [facility] for review.  Patient denied by CRR, awaiting further recommendations from the county on where patient should be placed  Continue medications as follows: Vraylar 3mg PO Daily, Atarax 25mg PO TID, Lamictal 50mg PO Daily, Zoloft 150mg PO QHS  Continue with group therapy, milieu therapy and occupational therapy   Behavioral Health checks every 7 minutes   Continue frequent safety checks and vitals per unit protocol  Continue with [facility] medical management as indicated

## 2024-10-29 NOTE — PLAN OF CARE
Problem: Alteration in Thoughts and Perception  Goal: Treatment Goal: Gain control of psychotic behaviors/thinking, reduce/eliminate presenting symptoms and demonstrate improved reality functioning upon discharge  Outcome: Progressing  Goal: Verbalize thoughts and feelings  Description: Interventions:  - Promote a nonjudgmental and trusting relationship with the patient through active listening and therapeutic communication  - Assess patient's level of functioning, behavior and potential for risk  - Engage patient in 1 on 1 interactions  - Encourage patient to express fears, feelings, frustrations, and discuss symptoms    - [place] patient to reality, help patient recognize reality-based thinking   - Administer medications as ordered and assess for potential side effects  - Provide the patient education related to the signs and symptoms of the illness and desired effects of prescribed medications  Outcome: Progressing  Goal: Refrain from acting on delusional thinking/internal stimuli  Description: Interventions:  - Monitor patient closely, per order   - Utilize least restrictive measures   - Set reasonable limits, give positive feedback for acceptable   - Administer medications as ordered and monitor of potential side effects  Outcome: Progressing  Goal: Agree to be compliant with medication regime, as prescribed and report medication side effects  Description: Interventions:  - Offer appropriate PRN medication and supervise ingestion; conduct AIMS, as needed   Outcome: Progressing  Goal: Attend and participate in unit activities, including therapeutic, recreational, and educational groups  Description: Interventions:  -Encourage Visitation and family involvement in care  Outcome: Progressing  Goal: Complete daily ADLs, including personal hygiene independently, as able  Description: Interventions:  - Observe, teach, and assist patient with ADLS  - Monitor and promote a balance of rest/activity, with adequate  nutrition and elimination   Outcome: Progressing     Problem: Ineffective Coping  Goal: Participates in unit activities  Description: Interventions:  - Provide therapeutic environment   - Provide required programming   - Redirect inappropriate behaviors   Outcome: Progressing  Goal: Free from restraint events  Description: - Utilize least restrictive measures   - Provide behavioral interventions   - Redirect inappropriate behaviors   Outcome: Progressing     Problem: Risk for Self Injury/Neglect  Goal: Treatment Goal: Remain safe during length of stay, learn and adopt new coping skills, and be free of self-injurious ideation, impulses and acts at the time of discharge  Outcome: Progressing  Goal: Refrain from harming self  Description: Interventions:  - Monitor patient closely, per order  - Develop a trusting relationship  - Supervise medication ingestion, monitor effects and side effects   Outcome: Progressing     Problem: Depression  Goal: Treatment Goal: Demonstrate behavioral control of depressive symptoms, verbalize feelings of improved mood/affect, and adopt new coping skills prior to discharge  Outcome: Progressing     Problem: Anxiety  Goal: Anxiety is at manageable level  Description: Interventions:  - Assess and monitor patient's anxiety level.   - Monitor for signs and symptoms (heart palpitations, chest pain, shortness of breath, headaches, nausea, feeling jumpy, restlessness, irritable, apprehensive).   - Collaborate with interdisciplinary team and initiate plan and interventions as ordered.  - [place] patient to unit/surroundings  - Explain treatment plan  - Encourage participation in care  - Encourage verbalization of concerns/fears  - Identify coping mechanisms  - Assist in developing anxiety-reducing skills  - Administer/offer alternative therapies  - Limit or eliminate stimulants  Outcome: Progressing     Problem: Risk for Violence/Aggression Toward Others  Goal: Treatment Goal: Refrain from acts of  violence/aggression during length of stay, and demonstrate improved impulse control at the time of discharge  Outcome: Progressing  Goal: Refrain from harming others  Outcome: Progressing     Problem: ANXIETY  Goal: Will report anxiety at manageable levels  Description: INTERVENTIONS:  - Administer medication as ordered  - Teach and encourage coping skills  - Provide emotional support  - Assess patient/family for anxiety and ability to cope  Outcome: Progressing     Problem: SELF CARE DEFICIT  Goal: Return ADL status to a safe level of function  Description: INTERVENTIONS:  - Administer medication as ordered  - Assess ADL deficits and provide assistive devices as needed  - Obtain PT/OT consults as needed  - Assist and instruct patient to increase activity and self care as tolerated  Outcome: Progressing     Problem: DISCHARGE PLANNING - CARE MANAGEMENT  Goal: Discharge to post-acute care or home with appropriate resources  Description: INTERVENTIONS:  - Conduct assessment to determine patient/family and health care team treatment goals, and need for post-acute services based on payer coverage, community resources, and patient preferences, and barriers to discharge  - Address psychosocial, clinical, and financial barriers to discharge as identified in assessment in conjunction with the patient/family and health care team  - Arrange appropriate level of post-acute services according to patient’s   needs and preference and payer coverage in collaboration with the physician and health care team  - Communicate with and update the patient/family, physician, and health care team regarding progress on the discharge plan  - Arrange appropriate transportation to post-acute venues  Outcome: Progressing

## 2024-10-29 NOTE — PROGRESS NOTES
10/29/24 0755   Team Meeting   Meeting Type Daily Rounds   Team Members Present   Team Members Present Physician;Nurse;;Other (Discipline and Name)   Physician Team Member Ronald Ashraf   Nursing Team Member Chastity   Social Work Team Member Henrry FRY   Other (Discipline and Name) Goff PCM   Patient/Family Present   Patient Present No   Patient's Family Present No     Groups Participation  4/9.   Patient's compliant with medications. [facility] suggested referral to [facility] [facility]. He's engaged in his treatment. He's depressed at times.

## 2024-10-29 NOTE — NURSING NOTE
Pt is visible for meals, mostly isolative to his room in the evening, social with select peers on the unit. Consumed 100% of all meals. Took medications without incidence. Pt is polite and cooperative, patiently awaiting placement. Attended 3/12 groups. Denies anxiety, depression, and SI/HI/AVH, but appears depressed at times. No behavioral issues. Pt offers no complaints.

## 2024-10-29 NOTE — SOCIAL WORK
This writer discussed with treatment team, [facility] representative mimi patient referred to [facility] [facility]. Treatment team is in agreement as patient will have ACT services. This writer informed [facility] representative Sydnie Otoole will forward referral to [facility] to review patient.

## 2024-10-30 PROCEDURE — 99232 SBSQ HOSP IP/OBS MODERATE 35: CPT | Performed by: PSYCHIATRY & NEUROLOGY

## 2024-10-30 RX ADMIN — HYDROXYZINE HYDROCHLORIDE 25 MG: 25 TABLET ORAL at 17:00

## 2024-10-30 RX ADMIN — LAMOTRIGINE 50 MG: 25 TABLET ORAL at 08:33

## 2024-10-30 RX ADMIN — SERTRALINE HYDROCHLORIDE 150 MG: 100 TABLET ORAL at 21:11

## 2024-10-30 RX ADMIN — ATORVASTATIN CALCIUM 10 MG: 10 TABLET, FILM COATED ORAL at 08:32

## 2024-10-30 RX ADMIN — HYDROXYZINE HYDROCHLORIDE 25 MG: 25 TABLET ORAL at 21:11

## 2024-10-30 RX ADMIN — CYANOCOBALAMIN TAB 1000 MCG 1000 MCG: 1000 TAB at 08:32

## 2024-10-30 RX ADMIN — CARIPRAZINE 3 MG: 3 CAPSULE, GELATIN COATED ORAL at 08:33

## 2024-10-30 RX ADMIN — LISINOPRIL 10 MG: 10 TABLET ORAL at 08:32

## 2024-10-30 RX ADMIN — CHOLECALCIFEROL TAB 25 MCG (1000 UNIT) 2000 UNITS: 25 TAB at 08:33

## 2024-10-30 RX ADMIN — HYDROXYZINE HYDROCHLORIDE 25 MG: 25 TABLET ORAL at 08:32

## 2024-10-30 NOTE — ASSESSMENT & PLAN NOTE
Still progressing - 10/30/2024  Pt remains pleasant on interview and denies current depression/anxiety/SI/HI/AVH, nursing reports no acute behaviors  Awaiting placement - CRR interview complete, ACT interview complete. STEFAN sent last seven days of charts to [facility] for review.  Patient denied by CRR, awaiting further recommendations from the county on where patient should be placed  Continue medications as follows: Vraylar 3mg PO Daily, Atarax 25mg PO TID, Lamictal 50mg PO Daily, Zoloft 150mg PO QHS  Continue with group therapy, milieu therapy and occupational therapy   Behavioral Health checks every 7 minutes   Continue frequent safety checks and vitals per unit protocol  Continue with [facility] medical management as indicated

## 2024-10-30 NOTE — SOCIAL WORK
This writer completed referral for [facility] [facility] and [facility] and requested documents submitted to all parties for review.

## 2024-10-30 NOTE — PROGRESS NOTES
Progress Note - Behavioral Health   Name: Deyvi Cao 54 y.o. male I MRN: [id number]   Unit/Bed#: [facility] 101-02 I Date of Admission: 6/25/2024   Date of Service: 10/30/2024 I Hospital Day: 127     Assessment & Plan  Bipolar disorder with severe depression (HCC)  Still progressing - 10/30/2024  Pt remains pleasant on interview and denies current depression/anxiety/SI/HI/AVH, nursing reports no acute behaviors  Awaiting placement - CRR interview complete, ACT interview complete.  sent last seven days of charts to [facility] for review.  Patient denied by CRR, awaiting further recommendations from the county on where patient should be placed  Continue medications as follows: Vraylar 3mg PO Daily, Atarax 25mg PO TID, Lamictal 50mg PO Daily, Zoloft 150mg PO QHS  Continue with group therapy, milieu therapy and occupational therapy   Behavioral Health checks every 7 minutes   Continue frequent safety checks and vitals per unit protocol  Continue with [facility] medical management as indicated  Benign essential hypertension  Managed by [facility] - reviewed 10/30/2024  Continue Lisinopril 10mg PO Daily  Mixed hyperlipidemia  Managed by [facility] - reviewed 10/30/2024  Continue Lipitor 10mg PO Daily  Allergic rhinitis due to allergen  Managed by [facility] - reviewed 10/30/2024  Rosacea  Managed by [facility] - reviewed 10/30/2024     Subjective:    Patient was seen today for continuation of care, records reviewed and patient was discussed with the morning case review team.    Deyvi was seen today for psychiatric follow-up.  On assessment today, Deyvi was found in his room.  He is calm and cooperative.  He offers no new concerns.  Deyvi reports adequate daytime energy and denies any difficulties with initiating or staying asleep.  Oral appetite and hydration is adequate.  He is asking for an update on his disposition.   We reviewed once more the specific as-needed medications they can use going forward if they experience any insomnia or  destabilization of their mood, they understood and were agreeable. Milieu visibility and group attendance encouraged to promote an active participation in treatment.    Deyvi denies acute suicidal/self-harm ideation/intent/plan upon direct inquiry at this time. Deyvi is able to contract for safety while on the unit and would feel comfortable seeking staff support should suicidal symptoms or urges appear or worsen. Deyvi remains behaviorally appropriate, no agitation or aggression noted on exam or in report. Deyvi also denies HI/AH/VH, and does not appear overtly manic.  Patient does not verbalize any experiences that can be categorized as paranoid, persecutory, bizarre, or somatic delusions. Deyvi remains adherent to his current psychotropic medication regimen and denies any side effects from medications, as well as none noted on exam.    Discharge to an unsupervised setting will represent a significant deterioration in ability to function and present a severe risk to the patients physical and mental health.  Patient cannot be safety treated at a lower level of care and requires further psychiatric evaluation, medication treatment, other treatment which can be reasonably be provided only in a psychiatric hospital.    Review of Systems:  Behavior over the last 24 hours: Slowly improving  Sleep: sleeping okay throughout the night  Appetite: adequate  Medication side effects: none reported  ROS:no complaints    Objective:    Vitals:  Vitals:    10/30/24 0739   BP: 132/58   Pulse: 92   Resp: 18   Temp: (!) 97.4 °F (36.3 °C)   SpO2: 95%     Laboratory Results:  I have personally reviewed all pertinent laboratory/tests results.  Most Recent Labs:   Lab Results   Component Value Date    WBC 7.39 06/26/2024    RBC 4.70 06/26/2024    HGB 15.2 06/26/2024    HCT 45.5 06/26/2024     06/26/2024    RDW 12.9 06/26/2024    NEUTROABS 4.63 06/26/2024    SODIUM 137 06/26/2024    K 4.1 06/26/2024     06/26/2024     CO2 26 06/26/2024    BUN 17 06/26/2024    CREATININE 0.63 06/26/2024    GLUC 98 06/26/2024    GLUF 98 06/26/2024    CALCIUM 9.6 06/26/2024    AST 25 06/26/2024    ALT 45 06/26/2024    ALKPHOS 114 (H) 06/26/2024    TP 7.0 06/26/2024    ALB 4.4 06/26/2024    TBILI 0.44 06/26/2024    CHOLESTEROL 177 06/26/2024    HDL 52 06/26/2024    TRIG 73 06/26/2024    LDLCALC 110 (H) 06/26/2024    NONHDLC 125 06/26/2024    LITHIUM 0.4 (L) 01/28/2021    DOR9HOYXYHSC 2.636 06/26/2024    HGBA1C 5.2 11/22/2023     11/22/2023     Mental Status Evaluation:  Appearance:  age appropriate   Behavior:  cooperative   Speech:  normal volume   Mood:  anxious   Affect:  constricted   Thought Process:  goal directed   Associations: intact associations   Thought Content:  no overt delusions   Perceptual Disturbances: no auditory hallucinations, no visual hallucinations, denies when asked, does not appear responding to internal stimuli   Risk Potential: Suicidal ideation - None at present, contracts for safety on the unit, would talk to staff if not feeling safe on the unit  Homicidal ideation - None at present  Potential for aggression - Not at present   Sensorium:  oriented to person, place, and time/date   Memory:  recent memory intact   Consciousness:  alert and awake   Attention/Concentration: attention span and concentration appear shorter than expected for age   Insight:  limited   Judgment: limited   Gait/Station: normal gait/station, normal balance   Motor Activity: no abnormal movements     Progress Toward Goals: making gradual improvement.  Deyvi continues to require inpatient psychiatric hospitalization for continued medication management and titration to optimize symptom reduction, improve sleep hygiene, and demonstrate adequate self-care.     Suicide/Homicide Risk Assessment:  Risk of Harm to Self:   Nursing Suicide Risk Assessment Last 24 hours: C-SSRS Risk (Since Last Contact)  Calculated C-SSRS Risk Score (Since Last  Contact): No Risk Indicated    Risk of Harm to Others:  Nursing Homicide Risk Assessment: Violence Risk to Others: Denies within past 6 months    Behavioral Health Medications: all current active meds have been reviewed and continue current psychiatric medications.  Current Facility-Administered Medications   Medication Dose Route Frequency Provider Last Rate    acetaminophen  650 mg Oral Q6H PRN ALVINA Harley      acetaminophen  650 mg Oral Q4H PRN ALVINA Harley      acetaminophen  975 mg Oral Q6H PRN ALVINA Harley      aluminum-magnesium hydroxide-simethicone  30 mL Oral Q4H PRN ALVINA Harley      ammonium lactate   Topical BID PRN ALVINA Harley      atorvastatin  10 mg Oral Daily ALVINA Lewis      benztropine  1 mg Intramuscular Q4H PRN Max 6/day ALVINA Harley      benztropine  1 mg Oral Q4H PRN Max 6/day ALVINA Harley      bisacodyl  10 mg Rectal Daily PRN ALVINA Harley      cariprazine  3 mg Oral Daily Martin Cardenas MD      Cholecalciferol  2,000 Units Oral Daily ALVINA Lewis      cyanocobalamin  1,000 mcg Oral Daily ALVINA Lewis      hydrOXYzine HCL  25 mg Oral Q6H PRN Max 4/day ALVINA Harley      hydrOXYzine HCL  25 mg Oral TID Martin Cardenas MD      hydrOXYzine HCL  50 mg Oral Q4H PRN Max 4/day ALVINA Harley      Or    LORazepam  1 mg Intramuscular Q4H PRN ALVINA Harley      ketoconazole   Topical Daily PRN ALVINA Harley      lamoTRIgine  50 mg Oral Daily Martin Cardenas MD      lisinopril  10 mg Oral Daily ALVINA Lewis      LORazepam  1 mg Oral Q4H PRN Max 6/day ALVINA Harley      Or    LORazepam  2 mg Intramuscular Q6H PRN Max 3/day ALVINA Harley      OLANZapine  10 mg Oral Q3H PRN Max 3/day ALVINA Harley      Or    OLANZapine  10 mg Intramuscular Q3H PRN Max 3/day ALVINA Harley      OLANZapine  5 mg Oral Q3H PRN Max 6/day ALVINA Harley      Or    OLANZapine   5 mg Intramuscular Q3H PRN Max 6/day ALVINA Harley      OLANZapine  2.5 mg Oral Q3H PRN Max 8/day ALVINA Harley      polyethylene glycol  17 g Oral Daily PRN ALVINA Harley      propranolol  10 mg Oral Q8H PRN ALVINA Harley      senna-docusate sodium  1 tablet Oral Daily PRN ALVINA Harley      sertraline  150 mg Oral HS Martin Cardenas MD       Risks / Benefits of Treatment:  Risks, benefits, and possible side effects of medications explained to patient. Patient has limited understanding of risks and benefits of treatment at this time, but agrees to take medications as prescribed.    Counseling / Coordination of Care:  Patient's progress discussed with staff in treatment team meeting.  Medications, treatment progress and treatment plan reviewed with patient.   Educated on importance of medication and treatment compliance.  Reassurance and supportive therapy provided.   Encouraged participation in milieu and group therapy on the unit.    ALVINA Harley 10/30/24

## 2024-10-30 NOTE — NURSING NOTE
Pt is calm, cooperative and visible on unit. Pt denies all psych symptoms. Compliant with medications and meals. Pt attends groups with minimal peer interaction. Pt offers no complaints. Q 15 min checks maintained.

## 2024-10-30 NOTE — NURSING NOTE
Received pt in bed at change of shift with eyes closed; chest movement noted.  Continues the same thus this far as per q 15 min room checks.   Will continue to monitor behavior, sleeping pattern and any medical issues that may arise.    0557:  Sleeping 7+ hrs thus this far

## 2024-10-30 NOTE — PROGRESS NOTES
10/30/24 0732   Team Meeting   Meeting Type Daily Rounds   Team Members Present   Team Members Present Physician;Nurse;;Other (Discipline and Name)   Physician Team Member Holter, Thomas   Nursing Team Member Chastity   Social Work Team Member Henrry FRY   Other (Discipline and Name) Goff PCM   Patient/Family Present   Patient Present No   Patient's Family Present No     Groups Participation  3/12.   Expected D/C Date:  Patient's compliant with medications. Patient's not engaged in treatment. He's being reviewed for a CRR in [facility].

## 2024-10-30 NOTE — PLAN OF CARE
Problem: Alteration in Thoughts and Perception  Goal: Treatment Goal: Gain control of psychotic behaviors/thinking, reduce/eliminate presenting symptoms and demonstrate improved reality functioning upon discharge  Outcome: Progressing  Goal: Agree to be compliant with medication regime, as prescribed and report medication side effects  Description: Interventions:  - Offer appropriate PRN medication and supervise ingestion; conduct AIMS, as needed   Outcome: Progressing  Goal: Attend and participate in unit activities, including therapeutic, recreational, and educational groups  Description: Interventions:  -Encourage Visitation and family involvement in care  Outcome: Progressing  Goal: Complete daily ADLs, including personal hygiene independently, as able  Description: Interventions:  - Observe, teach, and assist patient with ADLS  - Monitor and promote a balance of rest/activity, with adequate nutrition and elimination   Outcome: Progressing     Problem: Ineffective Coping  Goal: Participates in unit activities  Description: Interventions:  - Provide therapeutic environment   - Provide required programming   - Redirect inappropriate behaviors   Outcome: Progressing  Goal: Free from restraint events  Description: - Utilize least restrictive measures   - Provide behavioral interventions   - Redirect inappropriate behaviors   Outcome: Progressing     Problem: Risk for Self Injury/Neglect  Goal: Treatment Goal: Remain safe during length of stay, learn and adopt new coping skills, and be free of self-injurious ideation, impulses and acts at the time of discharge  Outcome: Progressing  Goal: Refrain from harming self  Description: Interventions:  - Monitor patient closely, per order  - Develop a trusting relationship  - Supervise medication ingestion, monitor effects and side effects   Outcome: Progressing  Goal: Attend and participate in unit activities, including therapeutic, recreational, and educational  groups  Description: Interventions:  - Provide therapeutic and educational activities daily, encourage attendance and participation, and document same in the medical record  - Obtain collateral information, encourage visitation and family involvement in care   Outcome: Progressing  Goal: Complete daily ADLs, including personal hygiene independently, as able  Description: Interventions:  - Observe, teach, and assist patient with ADLS  - Monitor and promote a balance of rest/activity, with adequate nutrition and elimination  Outcome: Progressing     Problem: Depression  Goal: Treatment Goal: Demonstrate behavioral control of depressive symptoms, verbalize feelings of improved mood/affect, and adopt new coping skills prior to discharge  Outcome: Progressing  Goal: Attend and participate in unit activities, including therapeutic, recreational, and educational groups  Description: Interventions:  - Provide therapeutic and educational activities daily, encourage attendance and participation, and document same in the medical record   Outcome: Progressing  Goal: Complete daily ADLs, including personal hygiene independently, as able  Description: Interventions:  - Observe, teach, and assist patient with ADLS  -  Monitor and promote a balance of rest/activity, with adequate nutrition and elimination   Outcome: Progressing     Problem: Anxiety  Goal: Anxiety is at manageable level  Description: Interventions:  - Assess and monitor patient's anxiety level.   - Monitor for signs and symptoms (heart palpitations, chest pain, shortness of breath, headaches, nausea, feeling jumpy, restlessness, irritable, apprehensive).   - Collaborate with interdisciplinary team and initiate plan and interventions as ordered.  - [place] patient to unit/surroundings  - Explain treatment plan  - Encourage participation in care  - Encourage verbalization of concerns/fears  - Identify coping mechanisms  - Assist in developing anxiety-reducing skills  -  Administer/offer alternative therapies  - Limit or eliminate stimulants  Outcome: Progressing     Problem: Risk for Violence/Aggression Toward Others  Goal: Treatment Goal: Refrain from acts of violence/aggression during length of stay, and demonstrate improved impulse control at the time of discharge  Outcome: Progressing  Goal: Refrain from harming others  Outcome: Progressing  Goal: Control angry outbursts  Description: Interventions:  - Monitor patient closely, per order  - Ensure early verbal de-escalation  - Monitor prn medication needs  - Set reasonable/therapeutic limits, outline behavioral expectations, and consequences   - Provide a non-threatening milieu, utilizing the least restrictive interventions   Outcome: Progressing     Problem: Alteration in Orientation  Goal: Treatment Goal: Demonstrate a reduction of confusion and improved orientation to person, place, time and/or situation upon discharge, according to optimum baseline/ability  Outcome: Progressing  Goal: Interact with staff daily  Description: Interventions:  - Assess and re-assess patient's level of orientation  - Engage patient in 1 on 1 interactions, daily, for a minimum of 15 minutes   - Establish rapport/trust with patient   Outcome: Progressing  Goal: Complete daily ADLs, including personal hygiene independently, as able  Description: Interventions:  - Observe, teach, and assist patient with ADLS  Outcome: Progressing     Problem: SELF HARM/SUICIDALITY  Goal: Will have no self-injury during hospital stay  Description: INTERVENTIONS:  - Q 15 MINUTES: Routine safety checks  - Q WAKING SHIFT & PRN: Assess risk to determine if routine checks are adequate to maintain patient safety  - Encourage patient to participate actively in care by formulating a plan to combat response to suicidal ideation, identify supports and resources  Outcome: Progressing     Problem: ANXIETY  Goal: Will report anxiety at manageable levels  Description:  INTERVENTIONS:  - Administer medication as ordered  - Teach and encourage coping skills  - Provide emotional support  - Assess patient/family for anxiety and ability to cope  Outcome: Progressing     Problem: DISCHARGE PLANNING - CARE MANAGEMENT  Goal: Discharge to post-acute care or home with appropriate resources  Description: INTERVENTIONS:  - Conduct assessment to determine patient/family and health care team treatment goals, and need for post-acute services based on payer coverage, community resources, and patient preferences, and barriers to discharge  - Address psychosocial, clinical, and financial barriers to discharge as identified in assessment in conjunction with the patient/family and health care team  - Arrange appropriate level of post-acute services according to patient’s   needs and preference and payer coverage in collaboration with the physician and health care team  - Communicate with and update the patient/family, physician, and health care team regarding progress on the discharge plan  - Arrange appropriate transportation to post-acute venues  Outcome: Progressing     Problem: Depression  Goal: Refrain from isolation  Description: Interventions:  - Develop a trusting relationship   - Encourage socialization   Outcome: Not Progressing

## 2024-10-30 NOTE — NURSING NOTE
Deyvi was visible in the milieu for awhile. He brightens on approach. He carries on a brief conversation but is awaiting discharge and housing which is a lengthy process so he is trying to be patient  He denies significant anxiety or depression and states his dose of Atarax Zoloft seem sufficient at helping his symptoms and he feels well

## 2024-10-31 PROCEDURE — 99232 SBSQ HOSP IP/OBS MODERATE 35: CPT | Performed by: PSYCHIATRY & NEUROLOGY

## 2024-10-31 RX ADMIN — SERTRALINE HYDROCHLORIDE 150 MG: 100 TABLET ORAL at 21:17

## 2024-10-31 RX ADMIN — LAMOTRIGINE 50 MG: 25 TABLET ORAL at 08:19

## 2024-10-31 RX ADMIN — CYANOCOBALAMIN TAB 1000 MCG 1000 MCG: 1000 TAB at 08:18

## 2024-10-31 RX ADMIN — HYDROXYZINE HYDROCHLORIDE 25 MG: 25 TABLET ORAL at 17:00

## 2024-10-31 RX ADMIN — ATORVASTATIN CALCIUM 10 MG: 10 TABLET, FILM COATED ORAL at 08:18

## 2024-10-31 RX ADMIN — CARIPRAZINE 3 MG: 3 CAPSULE, GELATIN COATED ORAL at 08:19

## 2024-10-31 RX ADMIN — CHOLECALCIFEROL TAB 25 MCG (1000 UNIT) 2000 UNITS: 25 TAB at 08:18

## 2024-10-31 RX ADMIN — HYDROXYZINE HYDROCHLORIDE 25 MG: 25 TABLET ORAL at 08:18

## 2024-10-31 RX ADMIN — HYDROXYZINE HYDROCHLORIDE 25 MG: 25 TABLET ORAL at 21:17

## 2024-10-31 RX ADMIN — LISINOPRIL 10 MG: 10 TABLET ORAL at 08:17

## 2024-10-31 NOTE — SOCIAL WORK
This writer assisted patient with paying his telephone bill. Patient completed his payment online.

## 2024-10-31 NOTE — NURSING NOTE
Received pt in bed at change of shift with eyes closed; chest movement noted.  Continues the same thus this far as per q 15 min room checks.   Will continue to monitor behavior, sleeping pattern and any medical issues that may arise.    0547:  Sleeping 7+ hrs thus this far

## 2024-10-31 NOTE — PROGRESS NOTES
10/31/24 0747   Team Meeting   Meeting Type Daily Rounds   Team Members Present   Team Members Present Physician;Nurse;;Other (Discipline and Name)   Physician Team Member Ty Cardenas   Nursing Team Member Mare   Social Work Team Member Henrry FRY   Other (Discipline and Name) Shell PCM   Patient/Family Present   Patient Present No   Patient's Family Present No     Expected D/C Date:11/29/24    Groups Participation  5/11.   Patient's compliant with medications. He's engaged in his treatment. He has a brighter affect. [facility] and [facility] referrals pending.

## 2024-10-31 NOTE — ASSESSMENT & PLAN NOTE
Still progressing - 10/31/2024  Pt remains pleasant on interview and denies current depression/anxiety/SI/HI/AVH, nursing reports no acute behaviors  Awaiting placement - CRR interview complete, ACT interview complete. STEFAN sent last seven days of charts to [facility] for review.  Patient denied by CRR, awaiting further recommendations from the county on where patient should be placed  Continue medications as follows: Vraylar 3mg PO Daily, Atarax 25mg PO TID, Lamictal 50mg PO Daily, Zoloft 150mg PO QHS  Continue with group therapy, milieu therapy and occupational therapy   Behavioral Health checks every 7 minutes   Continue frequent safety checks and vitals per unit protocol  Continue with [facility] medical management as indicated

## 2024-10-31 NOTE — PLAN OF CARE
Problem: Alteration in Thoughts and Perception  Goal: Treatment Goal: Gain control of psychotic behaviors/thinking, reduce/eliminate presenting symptoms and demonstrate improved reality functioning upon discharge  Outcome: Progressing  Goal: Verbalize thoughts and feelings  Description: Interventions:  - Promote a nonjudgmental and trusting relationship with the patient through active listening and therapeutic communication  - Assess patient's level of functioning, behavior and potential for risk  - Engage patient in 1 on 1 interactions  - Encourage patient to express fears, feelings, frustrations, and discuss symptoms    - [place] patient to reality, help patient recognize reality-based thinking   - Administer medications as ordered and assess for potential side effects  - Provide the patient education related to the signs and symptoms of the illness and desired effects of prescribed medications  Outcome: Progressing  Goal: Recognize dysfunctional thoughts, communicate reality-based thoughts at the time of discharge  Description: Interventions:  - Provide medication and psycho-education to assist patient in compliance and developing insight into his/her illness   Outcome: Progressing  Goal: Complete daily ADLs, including personal hygiene independently, as able  Description: Interventions:  - Observe, teach, and assist patient with ADLS  - Monitor and promote a balance of rest/activity, with adequate nutrition and elimination   Outcome: Progressing     Problem: Ineffective Coping  Goal: Identifies ineffective coping skills  Outcome: Progressing  Goal: Identifies healthy coping skills  Outcome: Progressing  Goal: Patient/Family participate in treatment and DC plans  Description: Interventions:  - Provide therapeutic environment  Outcome: Progressing  Goal: Patient/Family verbalizes awareness of resources  Outcome: Progressing  Goal: Free from restraint events  Description: - Utilize least restrictive measures   -  Provide behavioral interventions   - Redirect inappropriate behaviors   Outcome: Progressing     Problem: Risk for Self Injury/Neglect  Goal: Treatment Goal: Remain safe during length of stay, learn and adopt new coping skills, and be free of self-injurious ideation, impulses and acts at the time of discharge  Outcome: Progressing  Goal: Refrain from harming self  Description: Interventions:  - Monitor patient closely, per order  - Develop a trusting relationship  - Supervise medication ingestion, monitor effects and side effects   Outcome: Progressing  Goal: Attend and participate in unit activities, including therapeutic, recreational, and educational groups  Description: Interventions:  - Provide therapeutic and educational activities daily, encourage attendance and participation, and document same in the medical record  - Obtain collateral information, encourage visitation and family involvement in care   Outcome: Progressing     Problem: Depression  Goal: Treatment Goal: Demonstrate behavioral control of depressive symptoms, verbalize feelings of improved mood/affect, and adopt new coping skills prior to discharge  Outcome: Progressing  Goal: Refrain from harming self  Description: Interventions:  - Monitor patient closely, per order   - Supervise medication ingestion, monitor effects and side effects   Outcome: Progressing  Goal: Refrain from isolation  Description: Interventions:  - Develop a trusting relationship   - Encourage socialization   Outcome: Progressing  Goal: Refrain from self-neglect  Outcome: Progressing  Goal: Attend and participate in unit activities, including therapeutic, recreational, and educational groups  Description: Interventions:  - Provide therapeutic and educational activities daily, encourage attendance and participation, and document same in the medical record   Outcome: Progressing  Goal: Complete daily ADLs, including personal hygiene independently, as able  Description:  Interventions:  - Observe, teach, and assist patient with ADLS  -  Monitor and promote a balance of rest/activity, with adequate nutrition and elimination   Outcome: Progressing     Problem: Anxiety  Goal: Anxiety is at manageable level  Description: Interventions:  - Assess and monitor patient's anxiety level.   - Monitor for signs and symptoms (heart palpitations, chest pain, shortness of breath, headaches, nausea, feeling jumpy, restlessness, irritable, apprehensive).   - Collaborate with interdisciplinary team and initiate plan and interventions as ordered.  - [place] patient to unit/surroundings  - Explain treatment plan  - Encourage participation in care  - Encourage verbalization of concerns/fears  - Identify coping mechanisms  - Assist in developing anxiety-reducing skills  - Administer/offer alternative therapies  - Limit or eliminate stimulants  Outcome: Progressing     Problem: Risk for Violence/Aggression Toward Others  Goal: Treatment Goal: Refrain from acts of violence/aggression during length of stay, and demonstrate improved impulse control at the time of discharge  Outcome: Progressing  Goal: Refrain from harming others  Outcome: Progressing  Goal: Control angry outbursts  Description: Interventions:  - Monitor patient closely, per order  - Ensure early verbal de-escalation  - Monitor prn medication needs  - Set reasonable/therapeutic limits, outline behavioral expectations, and consequences   - Provide a non-threatening milieu, utilizing the least restrictive interventions   Outcome: Progressing  Goal: Attend and participate in unit activities, including therapeutic, recreational, and educational groups  Description: Interventions:  - Provide therapeutic and educational activities daily, encourage attendance and participation, and document same in the medical record   Outcome: Progressing  Goal: Identify appropriate positive anger management techniques  Description: Interventions:  - Offer anger  management and coping skills groups   - Staff will provide positive feedback for appropriate anger control  Outcome: Progressing     Problem: Alteration in Orientation  Goal: Treatment Goal: Demonstrate a reduction of confusion and improved orientation to person, place, time and/or situation upon discharge, according to optimum baseline/ability  Outcome: Progressing  Goal: Interact with staff daily  Description: Interventions:  - Assess and re-assess patient's level of orientation  - Engage patient in 1 on 1 interactions, daily, for a minimum of 15 minutes   - Establish rapport/trust with patient   Outcome: Progressing  Goal: Attend and participate in unit activities, including therapeutic, recreational, and educational groups  Description: Interventions:  - Provide therapeutic and educational activities daily, encourage attendance and participation, and document same in the medical record   - Provide appropriate opportunities for reminiscence   - Provide a consistent daily routine   - Encourage family contact/visitation   Outcome: Progressing  Goal: Complete daily ADLs, including personal hygiene independently, as able  Description: Interventions:  - Observe, teach, and assist patient with ADLS  Outcome: Progressing     Problem: SELF HARM/SUICIDALITY  Goal: Will have no self-injury during hospital stay  Description: INTERVENTIONS:  - Q 15 MINUTES: Routine safety checks  - Q WAKING SHIFT & PRN: Assess risk to determine if routine checks are adequate to maintain patient safety  - Encourage patient to participate actively in care by formulating a plan to combat response to suicidal ideation, identify supports and resources  Outcome: Progressing     Problem: DEPRESSION  Goal: Will be euthymic at discharge  Description: INTERVENTIONS:  - Administer medication as ordered  - Provide emotional support via 1:1 interaction with staff  - Encourage involvement in milieu/groups/activities  - Monitor for social  isolation  Outcome: Progressing     Problem: ANXIETY  Goal: Will report anxiety at manageable levels  Description: INTERVENTIONS:  - Administer medication as ordered  - Teach and encourage coping skills  - Provide emotional support  - Assess patient/family for anxiety and ability to cope  Outcome: Progressing     Problem: SELF CARE DEFICIT  Goal: Return ADL status to a safe level of function  Description: INTERVENTIONS:  - Administer medication as ordered  - Assess ADL deficits and provide assistive devices as needed  - Obtain PT/OT consults as needed  - Assist and instruct patient to increase activity and self care as tolerated  Outcome: Progressing     Problem: DISCHARGE PLANNING - CARE MANAGEMENT  Goal: Discharge to post-acute care or home with appropriate resources  Description: INTERVENTIONS:  - Conduct assessment to determine patient/family and health care team treatment goals, and need for post-acute services based on payer coverage, community resources, and patient preferences, and barriers to discharge  - Address psychosocial, clinical, and financial barriers to discharge as identified in assessment in conjunction with the patient/family and health care team  - Arrange appropriate level of post-acute services according to patient’s   needs and preference and payer coverage in collaboration with the physician and health care team  - Communicate with and update the patient/family, physician, and health care team regarding progress on the discharge plan  - Arrange appropriate transportation to post-acute venues  Outcome: Progressing     Problem: Alteration in Thoughts and Perception  Goal: Treatment Goal: Gain control of psychotic behaviors/thinking, reduce/eliminate presenting symptoms and demonstrate improved reality functioning upon discharge  Outcome: Progressing  Goal: Verbalize thoughts and feelings  Description: Interventions:  - Promote a nonjudgmental and trusting relationship with the patient through  active listening and therapeutic communication  - Assess patient's level of functioning, behavior and potential for risk  - Engage patient in 1 on 1 interactions  - Encourage patient to express fears, feelings, frustrations, and discuss symptoms    - [place] patient to reality, help patient recognize reality-based thinking   - Administer medications as ordered and assess for potential side effects  - Provide the patient education related to the signs and symptoms of the illness and desired effects of prescribed medications  Outcome: Progressing  Goal: Recognize dysfunctional thoughts, communicate reality-based thoughts at the time of discharge  Description: Interventions:  - Provide medication and psycho-education to assist patient in compliance and developing insight into his/her illness   Outcome: Progressing  Goal: Complete daily ADLs, including personal hygiene independently, as able  Description: Interventions:  - Observe, teach, and assist patient with ADLS  - Monitor and promote a balance of rest/activity, with adequate nutrition and elimination   Outcome: Progressing     Problem: Ineffective Coping  Goal: Identifies ineffective coping skills  Outcome: Progressing  Goal: Identifies healthy coping skills  Outcome: Progressing  Goal: Patient/Family participate in treatment and DC plans  Description: Interventions:  - Provide therapeutic environment  Outcome: Progressing  Goal: Patient/Family verbalizes awareness of resources  Outcome: Progressing  Goal: Free from restraint events  Description: - Utilize least restrictive measures   - Provide behavioral interventions   - Redirect inappropriate behaviors   Outcome: Progressing     Problem: Risk for Self Injury/Neglect  Goal: Treatment Goal: Remain safe during length of stay, learn and adopt new coping skills, and be free of self-injurious ideation, impulses and acts at the time of discharge  Outcome: Progressing  Goal: Refrain from harming self  Description:  Interventions:  - Monitor patient closely, per order  - Develop a trusting relationship  - Supervise medication ingestion, monitor effects and side effects   Outcome: Progressing  Goal: Attend and participate in unit activities, including therapeutic, recreational, and educational groups  Description: Interventions:  - Provide therapeutic and educational activities daily, encourage attendance and participation, and document same in the medical record  - Obtain collateral information, encourage visitation and family involvement in care   Outcome: Progressing     Problem: Depression  Goal: Treatment Goal: Demonstrate behavioral control of depressive symptoms, verbalize feelings of improved mood/affect, and adopt new coping skills prior to discharge  Outcome: Progressing  Goal: Refrain from harming self  Description: Interventions:  - Monitor patient closely, per order   - Supervise medication ingestion, monitor effects and side effects   Outcome: Progressing  Goal: Refrain from isolation  Description: Interventions:  - Develop a trusting relationship   - Encourage socialization   Outcome: Progressing  Goal: Refrain from self-neglect  Outcome: Progressing  Goal: Attend and participate in unit activities, including therapeutic, recreational, and educational groups  Description: Interventions:  - Provide therapeutic and educational activities daily, encourage attendance and participation, and document same in the medical record   Outcome: Progressing  Goal: Complete daily ADLs, including personal hygiene independently, as able  Description: Interventions:  - Observe, teach, and assist patient with ADLS  -  Monitor and promote a balance of rest/activity, with adequate nutrition and elimination   Outcome: Progressing     Problem: Anxiety  Goal: Anxiety is at manageable level  Description: Interventions:  - Assess and monitor patient's anxiety level.   - Monitor for signs and symptoms (heart palpitations, chest pain,  shortness of breath, headaches, nausea, feeling jumpy, restlessness, irritable, apprehensive).   - Collaborate with interdisciplinary team and initiate plan and interventions as ordered.  - [place] patient to unit/surroundings  - Explain treatment plan  - Encourage participation in care  - Encourage verbalization of concerns/fears  - Identify coping mechanisms  - Assist in developing anxiety-reducing skills  - Administer/offer alternative therapies  - Limit or eliminate stimulants  Outcome: Progressing     Problem: Risk for Violence/Aggression Toward Others  Goal: Treatment Goal: Refrain from acts of violence/aggression during length of stay, and demonstrate improved impulse control at the time of discharge  Outcome: Progressing  Goal: Refrain from harming others  Outcome: Progressing  Goal: Control angry outbursts  Description: Interventions:  - Monitor patient closely, per order  - Ensure early verbal de-escalation  - Monitor prn medication needs  - Set reasonable/therapeutic limits, outline behavioral expectations, and consequences   - Provide a non-threatening milieu, utilizing the least restrictive interventions   Outcome: Progressing  Goal: Attend and participate in unit activities, including therapeutic, recreational, and educational groups  Description: Interventions:  - Provide therapeutic and educational activities daily, encourage attendance and participation, and document same in the medical record   Outcome: Progressing  Goal: Identify appropriate positive anger management techniques  Description: Interventions:  - Offer anger management and coping skills groups   - Staff will provide positive feedback for appropriate anger control  Outcome: Progressing     Problem: Alteration in Orientation  Goal: Treatment Goal: Demonstrate a reduction of confusion and improved orientation to person, place, time and/or situation upon discharge, according to optimum baseline/ability  Outcome: Progressing  Goal: Interact  with staff daily  Description: Interventions:  - Assess and re-assess patient's level of orientation  - Engage patient in 1 on 1 interactions, daily, for a minimum of 15 minutes   - Establish rapport/trust with patient   Outcome: Progressing  Goal: Attend and participate in unit activities, including therapeutic, recreational, and educational groups  Description: Interventions:  - Provide therapeutic and educational activities daily, encourage attendance and participation, and document same in the medical record   - Provide appropriate opportunities for reminiscence   - Provide a consistent daily routine   - Encourage family contact/visitation   Outcome: Progressing  Goal: Complete daily ADLs, including personal hygiene independently, as able  Description: Interventions:  - Observe, teach, and assist patient with ADLS  Outcome: Progressing     Problem: SELF HARM/SUICIDALITY  Goal: Will have no self-injury during hospital stay  Description: INTERVENTIONS:  - Q 15 MINUTES: Routine safety checks  - Q WAKING SHIFT & PRN: Assess risk to determine if routine checks are adequate to maintain patient safety  - Encourage patient to participate actively in care by formulating a plan to combat response to suicidal ideation, identify supports and resources  Outcome: Progressing     Problem: DEPRESSION  Goal: Will be euthymic at discharge  Description: INTERVENTIONS:  - Administer medication as ordered  - Provide emotional support via 1:1 interaction with staff  - Encourage involvement in milieu/groups/activities  - Monitor for social isolation  Outcome: Progressing     Problem: ANXIETY  Goal: Will report anxiety at manageable levels  Description: INTERVENTIONS:  - Administer medication as ordered  - Teach and encourage coping skills  - Provide emotional support  - Assess patient/family for anxiety and ability to cope  Outcome: Progressing     Problem: SELF CARE DEFICIT  Goal: Return ADL status to a safe level of  function  Description: INTERVENTIONS:  - Administer medication as ordered  - Assess ADL deficits and provide assistive devices as needed  - Obtain PT/OT consults as needed  - Assist and instruct patient to increase activity and self care as tolerated  Outcome: Progressing     Problem: DISCHARGE PLANNING - CARE MANAGEMENT  Goal: Discharge to post-acute care or home with appropriate resources  Description: INTERVENTIONS:  - Conduct assessment to determine patient/family and health care team treatment goals, and need for post-acute services based on payer coverage, community resources, and patient preferences, and barriers to discharge  - Address psychosocial, clinical, and financial barriers to discharge as identified in assessment in conjunction with the patient/family and health care team  - Arrange appropriate level of post-acute services according to patient’s   needs and preference and payer coverage in collaboration with the physician and health care team  - Communicate with and update the patient/family, physician, and health care team regarding progress on the discharge plan  - Arrange appropriate transportation to post-acute venues  Outcome: Progressing

## 2024-10-31 NOTE — PROGRESS NOTES
10/31/24 0919   Team Meeting   Meeting Type Tx Team Meeting   Initial Conference Date 10/31/24   Next Conference Date 11/14/24   Team Members Present   Team Members Present Physician;Nurse;;Other (Discipline and Name)   Physician Team Member Zenia TINSLEY   Nursing Team Member Mare   Social Work Team Member Henrry FRY   Other (Discipline and Name) Giuseppe PRADO   Patient/Family Present   Patient Present Yes   Patient's Family Present No   OTHER   Team Meeting - Additional Comments Patient attended his treatment team meeting. Patient did not complete his self-assessment. [facility] reports referrals were submitted for [place] and [facility]. Patient reports he's at his baseline. He reports he's engaged in his treatment. Patient discussed his long term goal to be able to work. He will be referred to [facility].

## 2024-10-31 NOTE — NURSING NOTE
Pt is calm, cooperative and visible on milieu. Pt reports depression and anxiety; denies SI/HI/AH/VH. Pt has minimal interaction with peers and reports sleeping well. Pt is able to make needs known and is medication compliant. Q 15 min checks maintained.

## 2024-10-31 NOTE — PROGRESS NOTES
Progress Note - Behavioral Health   Name: Deyvi Cao 54 y.o. male I MRN: [id number]   Unit/Bed#: [facility] 101-02 I Date of Admission: 6/25/2024   Date of Service: 10/31/2024 I Hospital Day: 128     Assessment & Plan  Bipolar disorder with severe depression (HCC)  Still progressing - 10/31/2024  Pt remains pleasant on interview and denies current depression/anxiety/SI/HI/AVH, nursing reports no acute behaviors  Awaiting placement - CRR interview complete, ACT interview complete.  sent last seven days of charts to [facility] for review.  Patient denied by CRR, awaiting further recommendations from the county on where patient should be placed  Continue medications as follows: Vraylar 3mg PO Daily, Atarax 25mg PO TID, Lamictal 50mg PO Daily, Zoloft 150mg PO QHS  Continue with group therapy, milieu therapy and occupational therapy   Behavioral Health checks every 7 minutes   Continue frequent safety checks and vitals per unit protocol  Continue with [facility] medical management as indicated  Benign essential hypertension  Managed by [facility] - reviewed 10/31/2024  Continue Lisinopril 10mg PO Daily  Mixed hyperlipidemia  Managed by [facility] - reviewed 10/31/2024  Continue Lipitor 10mg PO Daily  Allergic rhinitis due to allergen  Managed by [facility] - reviewed 10/31/2024  Rosacea  Managed by [facility] - reviewed 10/31/2024     Subjective:    Patient was seen today for continuation of care, records reviewed and patient was discussed with the morning case review team.    Deyvi was seen today for psychiatric follow-up.  On assessment today, Deyvi was found in his room.  He is calm and cooperative.  He understands his treatment team meeting is today.  He hopes for an update regarding his discharge.  Deyvi reports adequate daytime energy and denies any difficulties with initiating or staying asleep.  Oral appetite and hydration is adequate.   We reviewed once more the specific as-needed medications they can use going forward if they experience  any insomnia or destabilization of their mood, they understood and were agreeable. Milieu visibility and group attendance encouraged to promote an active participation in treatment.    Deyvi denies acute suicidal/self-harm ideation/intent/plan upon direct inquiry at this time. Deyvi is able to contract for safety while on the unit and would feel comfortable seeking staff support should suicidal symptoms or urges appear or worsen. Deyvi remains behaviorally appropriate, no agitation or aggression noted on exam or in report. Deyvi also denies HI/AH/VH, and does not appear overtly manic.  Patient does not verbalize any experiences that can be categorized as paranoid, persecutory, bizarre, or somatic delusions. Deyvi remains adherent to his current psychotropic medication regimen and denies any side effects from medications, as well as none noted on exam.    Deyvi is currently assessed as being at their baseline with continued need for medication management, supervision for safety and ADL’s. These services are not currently available in a less restrictive environment necessitating continued hospital stay.  Deyvi should remain on the unit until these services are available, due to likelihood of mental decompensation and readmission if discharged to an unsupervised setting.  Assertive discharge planning and collaboration with multiple providers (inpatient and community based) remains ongoing.    Group Attendance: 5 / 11  Treatment Team: Today  Psychiatric PRN's Needed: None    Review of Systems:  Behavior over the last 24 hours: Slowly improving  Sleep: sleeping okay throughout the night  Appetite: adequate  Medication side effects: none reported  ROS:no complaints    Objective:    Vitals:  Vitals:    10/31/24 0732   BP: 136/84   Pulse: 99   Resp: 18   Temp: 97.7 °F (36.5 °C)   SpO2: 95%     Laboratory Results:  I have personally reviewed all pertinent laboratory/tests results.  Most Recent Labs:   Lab  Results   Component Value Date    WBC 7.39 06/26/2024    RBC 4.70 06/26/2024    HGB 15.2 06/26/2024    HCT 45.5 06/26/2024     06/26/2024    RDW 12.9 06/26/2024    NEUTROABS 4.63 06/26/2024    SODIUM 137 06/26/2024    K 4.1 06/26/2024     06/26/2024    CO2 26 06/26/2024    BUN 17 06/26/2024    CREATININE 0.63 06/26/2024    GLUC 98 06/26/2024    GLUF 98 06/26/2024    CALCIUM 9.6 06/26/2024    AST 25 06/26/2024    ALT 45 06/26/2024    ALKPHOS 114 (H) 06/26/2024    TP 7.0 06/26/2024    ALB 4.4 06/26/2024    TBILI 0.44 06/26/2024    CHOLESTEROL 177 06/26/2024    HDL 52 06/26/2024    TRIG 73 06/26/2024    LDLCALC 110 (H) 06/26/2024    NONHDLC 125 06/26/2024    LITHIUM 0.4 (L) 01/28/2021    RYP9OFULFSWR 2.636 06/26/2024    HGBA1C 5.2 11/22/2023     11/22/2023     Mental Status Evaluation:  Appearance:  age appropriate, casually dressed   Behavior:  pleasant, cooperative, calm   Speech:  normal rate, normal volume, normal pitch   Mood:  less anxious, less depressed   Affect:  constricted   Thought Process:  coherent, goal directed   Associations: intact associations   Thought Content:  no overt delusions   Perceptual Disturbances: no auditory hallucinations, no visual hallucinations, denies when asked, does not appear responding to internal stimuli   Risk Potential: Suicidal ideation - None at present, contracts for safety on the unit, would talk to staff if not feeling safe on the unit  Homicidal ideation - None at present  Potential for aggression - Not at present   Sensorium:  oriented to person, place, and time/date   Memory:  recent memory intact   Consciousness:  alert and awake   Attention/Concentration: attention span and concentration appear shorter than expected for age   Insight:  fair and improving   Judgment: fair and improving   Gait/Station: normal gait/station, normal balance   Motor Activity: no abnormal movements     Progress Toward Goals: making gradual improvement.  Deyvi randhawa  to require inpatient psychiatric hospitalization for continued medication management and titration to optimize symptom reduction, improve sleep hygiene, and demonstrate adequate self-care.     Suicide/Homicide Risk Assessment:  Risk of Harm to Self:   Nursing Suicide Risk Assessment Last 24 hours: C-SSRS Risk (Since Last Contact)  Calculated C-SSRS Risk Score (Since Last Contact): No Risk Indicated    Risk of Harm to Others:  Nursing Homicide Risk Assessment: Violence Risk to Others: Denies within past 6 months    Behavioral Health Medications: all current active meds have been reviewed and continue current psychiatric medications.  Current Facility-Administered Medications   Medication Dose Route Frequency Provider Last Rate    acetaminophen  650 mg Oral Q6H PRN ALVINA Harley      acetaminophen  650 mg Oral Q4H PRN ALVINA Harley      acetaminophen  975 mg Oral Q6H PRN ALVINA Harley      aluminum-magnesium hydroxide-simethicone  30 mL Oral Q4H PRN ALVINA Harley      ammonium lactate   Topical BID PRN ALVINA Harley      atorvastatin  10 mg Oral Daily ALVINA Lewis      benztropine  1 mg Intramuscular Q4H PRN Max 6/day ALVINA Harley      benztropine  1 mg Oral Q4H PRN Max 6/day ALVINA Harley      bisacodyl  10 mg Rectal Daily PRN ALVINA Harley      cariprazine  3 mg Oral Daily Martin Cardenas MD      Cholecalciferol  2,000 Units Oral Daily ALVINA Lewis      cyanocobalamin  1,000 mcg Oral Daily ALVINA Lewis      hydrOXYzine HCL  25 mg Oral Q6H PRN Max 4/day ALVINA Harley      hydrOXYzine HCL  25 mg Oral TID Martin Cardenas MD      hydrOXYzine HCL  50 mg Oral Q4H PRN Max 4/day ALVINA Harley      Or    LORazepam  1 mg Intramuscular Q4H PRN ALVINA Harley      ketoconazole   Topical Daily PRN ALVINA Harley      lamoTRIgine  50 mg Oral Daily Martin Cardenas MD      lisinopril  10 mg Oral Daily ALVINA Lewis       LORazepam  1 mg Oral Q4H PRN Max 6/day ALVINA Harley      Or    LORazepam  2 mg Intramuscular Q6H PRN Max 3/day ALVINA Harley      OLANZapine  10 mg Oral Q3H PRN Max 3/day ALVINA Harley      Or    OLANZapine  10 mg Intramuscular Q3H PRN Max 3/day ALVINA Harley      OLANZapine  5 mg Oral Q3H PRN Max 6/day ALVINA Harley      Or    OLANZapine  5 mg Intramuscular Q3H PRN Max 6/day ALVINA Harley      OLANZapine  2.5 mg Oral Q3H PRN Max 8/day ALVINA Harley      polyethylene glycol  17 g Oral Daily PRN ALVINA Harley      propranolol  10 mg Oral Q8H PRN ALVINA Harley      senna-docusate sodium  1 tablet Oral Daily PRN ALVINA Harley      sertraline  150 mg Oral HS Martin Cardenas MD       Risks / Benefits of Treatment:  Risks, benefits, and possible side effects of medications explained to patient. Patient has limited understanding of risks and benefits of treatment at this time, but agrees to take medications as prescribed.    Counseling / Coordination of Care:  Patient's progress discussed with staff in treatment team meeting.  Medications, treatment progress and treatment plan reviewed with patient.   Educated on importance of medication and treatment compliance.  Reassurance and supportive therapy provided.   Encouraged participation in milieu and group therapy on the unit.    ALVINA Harley 10/31/24

## 2024-10-31 NOTE — NURSING NOTE
Deyvi is calm, cooperative. Compliant with meds and meals. Brightens with approach. Denies psychiatric symptoms. Denies SI/HI. Q15 min checks maintained.  No behavior issues tonight.

## 2024-11-01 PROCEDURE — 99232 SBSQ HOSP IP/OBS MODERATE 35: CPT | Performed by: PSYCHIATRY & NEUROLOGY

## 2024-11-01 RX ADMIN — ATORVASTATIN CALCIUM 10 MG: 10 TABLET, FILM COATED ORAL at 08:25

## 2024-11-01 RX ADMIN — LISINOPRIL 10 MG: 10 TABLET ORAL at 08:25

## 2024-11-01 RX ADMIN — SERTRALINE HYDROCHLORIDE 150 MG: 100 TABLET ORAL at 21:19

## 2024-11-01 RX ADMIN — HYDROXYZINE HYDROCHLORIDE 25 MG: 25 TABLET ORAL at 21:20

## 2024-11-01 RX ADMIN — HYDROXYZINE HYDROCHLORIDE 25 MG: 25 TABLET ORAL at 08:25

## 2024-11-01 RX ADMIN — CARIPRAZINE 3 MG: 3 CAPSULE, GELATIN COATED ORAL at 08:25

## 2024-11-01 RX ADMIN — CYANOCOBALAMIN TAB 1000 MCG 1000 MCG: 1000 TAB at 08:25

## 2024-11-01 RX ADMIN — CHOLECALCIFEROL TAB 25 MCG (1000 UNIT) 2000 UNITS: 25 TAB at 08:25

## 2024-11-01 RX ADMIN — HYDROXYZINE HYDROCHLORIDE 25 MG: 25 TABLET ORAL at 17:16

## 2024-11-01 RX ADMIN — LAMOTRIGINE 50 MG: 25 TABLET ORAL at 08:25

## 2024-11-01 NOTE — NURSING NOTE
Pt is accepting of medications without incidence and meal compliant. Pt is polite, pleasant and brightens on approach. Pt denies s/s and offers no new concerns.

## 2024-11-01 NOTE — PLAN OF CARE
Problem: Alteration in Thoughts and Perception  Goal: Treatment Goal: Gain control of psychotic behaviors/thinking, reduce/eliminate presenting symptoms and demonstrate improved reality functioning upon discharge  Outcome: Progressing  Goal: Verbalize thoughts and feelings  Description: Interventions:  - Promote a nonjudgmental and trusting relationship with the patient through active listening and therapeutic communication  - Assess patient's level of functioning, behavior and potential for risk  - Engage patient in 1 on 1 interactions  - Encourage patient to express fears, feelings, frustrations, and discuss symptoms    - [place] patient to reality, help patient recognize reality-based thinking   - Administer medications as ordered and assess for potential side effects  - Provide the patient education related to the signs and symptoms of the illness and desired effects of prescribed medications  Outcome: Progressing  Goal: Refrain from acting on delusional thinking/internal stimuli  Description: Interventions:  - Monitor patient closely, per order   - Utilize least restrictive measures   - Set reasonable limits, give positive feedback for acceptable   - Administer medications as ordered and monitor of potential side effects  Outcome: Progressing  Goal: Agree to be compliant with medication regime, as prescribed and report medication side effects  Description: Interventions:  - Offer appropriate PRN medication and supervise ingestion; conduct AIMS, as needed   Outcome: Progressing  Goal: Attend and participate in unit activities, including therapeutic, recreational, and educational groups  Description: Interventions:  -Encourage Visitation and family involvement in care  Outcome: Progressing  Goal: Complete daily ADLs, including personal hygiene independently, as able  Description: Interventions:  - Observe, teach, and assist patient with ADLS  - Monitor and promote a balance of rest/activity, with adequate  nutrition and elimination   Outcome: Progressing     Problem: Ineffective Coping  Goal: Patient/Family verbalizes awareness of resources  Outcome: Progressing     Problem: Risk for Self Injury/Neglect  Goal: Treatment Goal: Remain safe during length of stay, learn and adopt new coping skills, and be free of self-injurious ideation, impulses and acts at the time of discharge  Outcome: Progressing  Goal: Verbalize thoughts and feelings  Description: Interventions:  - Assess and re-assess patient's lethality and potential for self-injury  - Engage patient in 1:1 interactions, daily, for a minimum of 15 minutes  - Encourage patient to express feelings, fears, frustrations, hopes  - Establish rapport/trust with patient   Outcome: Progressing  Goal: Refrain from harming self  Description: Interventions:  - Monitor patient closely, per order  - Develop a trusting relationship  - Supervise medication ingestion, monitor effects and side effects   Outcome: Progressing  Goal: Attend and participate in unit activities, including therapeutic, recreational, and educational groups  Description: Interventions:  - Provide therapeutic and educational activities daily, encourage attendance and participation, and document same in the medical record  - Obtain collateral information, encourage visitation and family involvement in care   Outcome: Progressing     Problem: Depression  Goal: Treatment Goal: Demonstrate behavioral control of depressive symptoms, verbalize feelings of improved mood/affect, and adopt new coping skills prior to discharge  Outcome: Progressing  Goal: Verbalize thoughts and feelings  Description: Interventions:  - Assess and re-assess patient's level of risk   - Engage patient in 1:1 interactions, daily, for a minimum of 15 minutes   - Encourage patient to express feelings, fears, frustrations, hopes   Outcome: Progressing  Goal: Refrain from harming self  Description: Interventions:  - Monitor patient closely,  per order   - Supervise medication ingestion, monitor effects and side effects   Outcome: Progressing  Goal: Refrain from isolation  Description: Interventions:  - Develop a trusting relationship   - Encourage socialization   Outcome: Progressing  Goal: Refrain from self-neglect  Outcome: Progressing  Goal: Complete daily ADLs, including personal hygiene independently, as able  Description: Interventions:  - Observe, teach, and assist patient with ADLS  -  Monitor and promote a balance of rest/activity, with adequate nutrition and elimination   Outcome: Progressing     Problem: Anxiety  Goal: Anxiety is at manageable level  Description: Interventions:  - Assess and monitor patient's anxiety level.   - Monitor for signs and symptoms (heart palpitations, chest pain, shortness of breath, headaches, nausea, feeling jumpy, restlessness, irritable, apprehensive).   - Collaborate with interdisciplinary team and initiate plan and interventions as ordered.  - [place] patient to unit/surroundings  - Explain treatment plan  - Encourage participation in care  - Encourage verbalization of concerns/fears  - Identify coping mechanisms  - Assist in developing anxiety-reducing skills  - Administer/offer alternative therapies  - Limit or eliminate stimulants  Outcome: Progressing     Problem: Risk for Violence/Aggression Toward Others  Goal: Treatment Goal: Refrain from acts of violence/aggression during length of stay, and demonstrate improved impulse control at the time of discharge  Outcome: Progressing  Goal: Refrain from destructive acts on the environment or property  Outcome: Progressing  Goal: Control angry outbursts  Description: Interventions:  - Monitor patient closely, per order  - Ensure early verbal de-escalation  - Monitor prn medication needs  - Set reasonable/therapeutic limits, outline behavioral expectations, and consequences   - Provide a non-threatening milieu, utilizing the least restrictive interventions    Outcome: Progressing     Problem: Alteration in Orientation  Goal: Treatment Goal: Demonstrate a reduction of confusion and improved orientation to person, place, time and/or situation upon discharge, according to optimum baseline/ability  Outcome: Progressing  Goal: Interact with staff daily  Description: Interventions:  - Assess and re-assess patient's level of orientation  - Engage patient in 1 on 1 interactions, daily, for a minimum of 15 minutes   - Establish rapport/trust with patient   Outcome: Progressing  Goal: Express concerns related to confused thinking related to:  Description: Interventions:  - Encourage patient to express feelings, fears, frustrations, hopes  - Assign consistent caregivers   - [place]/re-orient patient as needed  - Allow comfort items, as appropriate  - Provide visual cues, signs, etc.   Outcome: Progressing     Problem: SELF HARM/SUICIDALITY  Goal: Will have no self-injury during hospital stay  Description: INTERVENTIONS:  - Q 15 MINUTES: Routine safety checks  - Q WAKING SHIFT & PRN: Assess risk to determine if routine checks are adequate to maintain patient safety  - Encourage patient to participate actively in care by formulating a plan to combat response to suicidal ideation, identify supports and resources  Outcome: Progressing     Problem: DEPRESSION  Goal: Will be euthymic at discharge  Description: INTERVENTIONS:  - Administer medication as ordered  - Provide emotional support via 1:1 interaction with staff  - Encourage involvement in milieu/groups/activities  - Monitor for social isolation  Outcome: Progressing     Problem: ANXIETY  Goal: Will report anxiety at manageable levels  Description: INTERVENTIONS:  - Administer medication as ordered  - Teach and encourage coping skills  - Provide emotional support  - Assess patient/family for anxiety and ability to cope  Outcome: Progressing     Problem: SELF CARE DEFICIT  Goal: Return ADL status to a safe level of  function  Description: INTERVENTIONS:  - Administer medication as ordered  - Assess ADL deficits and provide assistive devices as needed  - Obtain PT/OT consults as needed  - Assist and instruct patient to increase activity and self care as tolerated  Outcome: Progressing     Problem: DISCHARGE PLANNING - CARE MANAGEMENT  Goal: Discharge to post-acute care or home with appropriate resources  Description: INTERVENTIONS:  - Conduct assessment to determine patient/family and health care team treatment goals, and need for post-acute services based on payer coverage, community resources, and patient preferences, and barriers to discharge  - Address psychosocial, clinical, and financial barriers to discharge as identified in assessment in conjunction with the patient/family and health care team  - Arrange appropriate level of post-acute services according to patient’s   needs and preference and payer coverage in collaboration with the physician and health care team  - Communicate with and update the patient/family, physician, and health care team regarding progress on the discharge plan  - Arrange appropriate transportation to post-acute venues  Outcome: Progressing

## 2024-11-01 NOTE — ASSESSMENT & PLAN NOTE
Still progressing - 11/1/2024  Awaiting placement - CRR interview complete, ACT interview complete.  sent last seven days of charts to [facility] for review.  Patient denied by CRR, awaiting further recommendations from the county on where patient should be placed  Continue medications as follows: Vraylar 3mg PO Daily, Atarax 25mg PO TID, Lamictal 50mg PO Daily, Zoloft 150mg PO QHS  Continue with group therapy, milieu therapy and occupational therapy   Behavioral Health checks every 7 minutes   Continue frequent safety checks and vitals per unit protocol  Continue with [facility] medical management as indicated

## 2024-11-01 NOTE — PROGRESS NOTES
Progress Note - Behavioral Health   Name: Deyvi Cao 54 y.o. male I MRN: [id number]   Unit/Bed#: [facility] 101-02 I Date of Admission: 6/25/2024   Date of Service: 11/1/2024 I Hospital Day: 129     Assessment & Plan  Bipolar disorder with severe depression (HCC)  Still progressing - 11/1/2024  Awaiting placement - CRR interview complete, ACT interview complete.  sent last seven days of charts to [facility] for review.  Patient denied by CRR, awaiting further recommendations from the county on where patient should be placed  Continue medications as follows: Vraylar 3mg PO Daily, Atarax 25mg PO TID, Lamictal 50mg PO Daily, Zoloft 150mg PO QHS  Continue with group therapy, milieu therapy and occupational therapy   Behavioral Health checks every 7 minutes   Continue frequent safety checks and vitals per unit protocol  Continue with [facility] medical management as indicated  Benign essential hypertension  Managed by [facility] - reviewed 11/1/2024  Continue Lisinopril 10mg PO Daily  Mixed hyperlipidemia  Managed by [facility] - reviewed 11/1/2024  Continue Lipitor 10mg PO Daily  Allergic rhinitis due to allergen  Managed by [facility] - reviewed 11/1/2024  Rosacea  Managed by [facility] - reviewed 11/1/2024    Subjective:    Patient was seen today for continuation of care, records reviewed and patient was discussed with the morning case review team.    Deyvi was seen today for psychiatric follow-up.  On assessment today, Deyvi was found in his room.  He is doing well, offers no new concerns or complaints.  He seems relieved with less anxiety because he paid his phone bills.  Deyvi reports adequate daytime energy and denies any difficulties with initiating or staying asleep.  Oral appetite and hydration is adequate.   We reviewed once more the specific as-needed medications they can use going forward if they experience any insomnia or destabilization of their mood, they understood and were agreeable. Milieu visibility and group attendance  encouraged to promote an active participation in treatment.    Deyvi denies acute suicidal/self-harm ideation/intent/plan upon direct inquiry at this time. Deyvi is able to contract for safety while on the unit and would feel comfortable seeking staff support should suicidal symptoms or urges appear or worsen. Deyvi remains behaviorally appropriate, no agitation or aggression noted on exam or in report. Deyvi also denies HI/AH/VH, and does not appear overtly manic.  Patient does not verbalize any experiences that can be categorized as paranoid, persecutory, bizarre, or somatic delusions. Deyvi remains adherent to his current psychotropic medication regimen and denies any side effects from medications, as well as none noted on exam.    Deyvi is currently assessed as being at their baseline with continued need for medication management, supervision for safety and ADL’s. These services are not currently available in a less restrictive environment necessitating continued hospital stay.  Deyvi should remain on the unit until these services are available, due to likelihood of mental decompensation and readmission if discharged to an unsupervised setting.  Assertive discharge planning and collaboration with multiple providers (inpatient and community based) remains ongoing.      Group Attendance: 5 / 11  Treatment Team: TBD  Psychiatric PRN's Needed: None    Review of Systems:  Behavior over the last 24 hours: Slowly improving  Sleep: sleeping okay throughout the night  Appetite: adequate  Medication side effects: none reported  ROS:no complaints    Objective:    Vitals:  Vitals:    11/01/24 0744   BP: 140/86   Pulse: 98   Resp: 18   Temp: 97.6 °F (36.4 °C)   SpO2: 96%     Laboratory Results:  I have personally reviewed all pertinent laboratory/tests results.  Most Recent Labs:   Lab Results   Component Value Date    WBC 7.39 06/26/2024    RBC 4.70 06/26/2024    HGB 15.2 06/26/2024    HCT 45.5 06/26/2024      06/26/2024    RDW 12.9 06/26/2024    NEUTROABS 4.63 06/26/2024    SODIUM 137 06/26/2024    K 4.1 06/26/2024     06/26/2024    CO2 26 06/26/2024    BUN 17 06/26/2024    CREATININE 0.63 06/26/2024    GLUC 98 06/26/2024    GLUF 98 06/26/2024    CALCIUM 9.6 06/26/2024    AST 25 06/26/2024    ALT 45 06/26/2024    ALKPHOS 114 (H) 06/26/2024    TP 7.0 06/26/2024    ALB 4.4 06/26/2024    TBILI 0.44 06/26/2024    CHOLESTEROL 177 06/26/2024    HDL 52 06/26/2024    TRIG 73 06/26/2024    LDLCALC 110 (H) 06/26/2024    NONHDLC 125 06/26/2024    LITHIUM 0.4 (L) 01/28/2021    DTF0HHCGAPXQ 2.636 06/26/2024    HGBA1C 5.2 11/22/2023     11/22/2023     Mental Status Evaluation:  Appearance:  age appropriate, casually dressed   Behavior:  cooperative, calm   Speech:  normal volume   Mood:  less anxious, less depressed   Affect:  constricted   Thought Process:  goal directed   Associations: intact associations   Thought Content:  no overt delusions   Perceptual Disturbances: no auditory hallucinations, no visual hallucinations, denies when asked, does not appear responding to internal stimuli   Risk Potential: Suicidal ideation - None at present, contracts for safety on the unit, would talk to staff if not feeling safe on the unit  Homicidal ideation - None at present  Potential for aggression - Not at present   Sensorium:  oriented to person, place, and time/date   Memory:  recent memory intact   Consciousness:  alert and awake   Attention/Concentration: attention span and concentration appear shorter than expected for age   Insight:  fair and improving   Judgment: fair and improving   Gait/Station: normal gait/station, normal balance   Motor Activity: no abnormal movements     Progress Toward Goals: making gradual improvement.  Deyvi continues to require inpatient psychiatric hospitalization for continued medication management and titration to optimize symptom reduction, improve sleep hygiene, and demonstrate  adequate self-care.     Suicide/Homicide Risk Assessment:  Risk of Harm to Self:   Nursing Suicide Risk Assessment Last 24 hours: C-SSRS Risk (Since Last Contact)  Calculated C-SSRS Risk Score (Since Last Contact): No Risk Indicated    Risk of Harm to Others:  Nursing Homicide Risk Assessment: Violence Risk to Others: Denies within past 6 months    Behavioral Health Medications: all current active meds have been reviewed and continue current psychiatric medications.  Current Facility-Administered Medications   Medication Dose Route Frequency Provider Last Rate    acetaminophen  650 mg Oral Q6H PRN ALVINA aHrley      acetaminophen  650 mg Oral Q4H PRN ALVINA Harley      acetaminophen  975 mg Oral Q6H PRN ALVINA Harley      aluminum-magnesium hydroxide-simethicone  30 mL Oral Q4H PRN ALVINA Harley      ammonium lactate   Topical BID PRN ALVINA Harley      atorvastatin  10 mg Oral Daily ALVINA Lewis      benztropine  1 mg Intramuscular Q4H PRN Max 6/day ALVINA Harley      benztropine  1 mg Oral Q4H PRN Max 6/day ALVINA Harley      bisacodyl  10 mg Rectal Daily PRN ALVINA Harley      cariprazine  3 mg Oral Daily Martin Cardenas MD      Cholecalciferol  2,000 Units Oral Daily ALVINA Lewis      cyanocobalamin  1,000 mcg Oral Daily ALVINA Lewis      hydrOXYzine HCL  25 mg Oral Q6H PRN Max 4/day ALVINA Harley      hydrOXYzine HCL  25 mg Oral TID Martin Cardenas MD      hydrOXYzine HCL  50 mg Oral Q4H PRN Max 4/day ALVINA Harley      Or    LORazepam  1 mg Intramuscular Q4H PRN ALVINA Harley      ketoconazole   Topical Daily PRN ALVINA Harley      lamoTRIgine  50 mg Oral Daily Martin Cardenas MD      lisinopril  10 mg Oral Daily ALVINA Lewis      LORazepam  1 mg Oral Q4H PRN Max 6/day ALVINA Harley      Or    LORazepam  2 mg Intramuscular Q6H PRN Max 3/day ALVINA Harley      OLANZapine  10 mg Oral  Q3H PRN Max 3/day ALVINA Harley      Or    OLANZapine  10 mg Intramuscular Q3H PRN Max 3/day ALVINA Harley      OLANZapine  5 mg Oral Q3H PRN Max 6/day ALVINA Harley      Or    OLANZapine  5 mg Intramuscular Q3H PRN Max 6/day ALVINA Harley      OLANZapine  2.5 mg Oral Q3H PRN Max 8/day ALVINA Harley      polyethylene glycol  17 g Oral Daily PRN ALVINA Harley      propranolol  10 mg Oral Q8H PRN ALVINA Harley      senna-docusate sodium  1 tablet Oral Daily PRN ALVINA Harley      sertraline  150 mg Oral HS Martin Cardenas MD       Risks / Benefits of Treatment:  Risks, benefits, and possible side effects of medications explained to patient. Patient has limited understanding of risks and benefits of treatment at this time, but agrees to take medications as prescribed.    Counseling / Coordination of Care:  Patient's progress discussed with staff in treatment team meeting.  Medications, treatment progress and treatment plan reviewed with patient.   Educated on importance of medication and treatment compliance.  Reassurance and supportive therapy provided.   Encouraged participation in milieu and group therapy on the unit.    ALVINA Harley 11/01/24

## 2024-11-01 NOTE — PROGRESS NOTES
11/01/24 0801   Team Meeting   Meeting Type Daily Rounds   Team Members Present   Team Members Present Physician;Nurse;;Other (Discipline and Name)   Physician Team Member Holter, Thomas, Hangey CRNP   Nursing Team Member Kunal   Social Work Team Member Henrry FRY   Other (Discipline and Name) Goff PCM   Patient/Family Present   Patient Present No   Patient's Family Present No     Expected D/C Date:11/29  Groups Participation  5/11.   Patient's compliant with medications. He has some engagement in treatment. He's being reviewed by [facility] and Josiah. Minimal peer interaction.

## 2024-11-01 NOTE — NURSING NOTE
Pt is calm, cooperative and visible on milieu. Pt denies depression and anxiety; denies SI/HI/AH/VH. Compliant with medications and meals. Attends groups with minimal peer interaction. Q 15 min checks maintained.

## 2024-11-01 NOTE — NURSING NOTE
Received pt in bed at change of shift with eyes closed; chest movement noted.  Continues the same thus this far as per q 15 min room checks.   Will continue to monitor behavior, sleeping pattern and any medical issues that may arise.    0539:  Sleeping 6+ hrs thus this far

## 2024-11-01 NOTE — SOCIAL WORK
This writer spoke with Alina WAYNE, with [facility] regarding patient's referral. Alnia requested an in person interview with patient for admission to the [facility] program. Interview is scheduled for 11/4@1030.   This writer provided update to the patient, which he is in agreement with.

## 2024-11-02 PROCEDURE — 99232 SBSQ HOSP IP/OBS MODERATE 35: CPT | Performed by: NURSE PRACTITIONER

## 2024-11-02 RX ADMIN — HYDROXYZINE HYDROCHLORIDE 25 MG: 25 TABLET ORAL at 08:29

## 2024-11-02 RX ADMIN — CHOLECALCIFEROL TAB 25 MCG (1000 UNIT) 2000 UNITS: 25 TAB at 08:29

## 2024-11-02 RX ADMIN — HYDROXYZINE HYDROCHLORIDE 25 MG: 25 TABLET ORAL at 21:18

## 2024-11-02 RX ADMIN — LAMOTRIGINE 50 MG: 25 TABLET ORAL at 08:29

## 2024-11-02 RX ADMIN — HYDROXYZINE HYDROCHLORIDE 25 MG: 25 TABLET ORAL at 17:00

## 2024-11-02 RX ADMIN — LISINOPRIL 10 MG: 10 TABLET ORAL at 08:29

## 2024-11-02 RX ADMIN — ATORVASTATIN CALCIUM 10 MG: 10 TABLET, FILM COATED ORAL at 08:29

## 2024-11-02 RX ADMIN — CYANOCOBALAMIN TAB 1000 MCG 1000 MCG: 1000 TAB at 08:29

## 2024-11-02 RX ADMIN — CARIPRAZINE 3 MG: 3 CAPSULE, GELATIN COATED ORAL at 08:29

## 2024-11-02 RX ADMIN — SERTRALINE HYDROCHLORIDE 150 MG: 100 TABLET ORAL at 21:18

## 2024-11-02 NOTE — NURSING NOTE
Deyvi has been visible intermittently in the milieu. No interaction with peers noted. Quiet and keeps to himself most of the time. Denies anxiety, depression, voices and pain. He will rest in bed at intervals. Ate 100% of his meals.  Did not attend any groups. No issues or behaviors. Continue to monitor. Precautions maintained.

## 2024-11-02 NOTE — NURSING NOTE
Received pt in bed at change of shift with eyes closed; chest movement noted.  Continues the same thus this far as per q 15 min room checks.   Will continue to monitor behavior, sleeping pattern and any medical issues that may arise.    0542: Sleeping 7+ hrs thus this far

## 2024-11-02 NOTE — PLAN OF CARE
Problem: Alteration in Thoughts and Perception  Goal: Treatment Goal: Gain control of psychotic behaviors/thinking, reduce/eliminate presenting symptoms and demonstrate improved reality functioning upon discharge  Outcome: Progressing  Goal: Verbalize thoughts and feelings  Description: Interventions:  - Promote a nonjudgmental and trusting relationship with the patient through active listening and therapeutic communication  - Assess patient's level of functioning, behavior and potential for risk  - Engage patient in 1 on 1 interactions  - Encourage patient to express fears, feelings, frustrations, and discuss symptoms    - [place] patient to reality, help patient recognize reality-based thinking   - Administer medications as ordered and assess for potential side effects  - Provide the patient education related to the signs and symptoms of the illness and desired effects of prescribed medications  Outcome: Progressing  Goal: Refrain from acting on delusional thinking/internal stimuli  Description: Interventions:  - Monitor patient closely, per order   - Utilize least restrictive measures   - Set reasonable limits, give positive feedback for acceptable   - Administer medications as ordered and monitor of potential side effects  Outcome: Progressing  Goal: Agree to be compliant with medication regime, as prescribed and report medication side effects  Description: Interventions:  - Offer appropriate PRN medication and supervise ingestion; conduct AIMS, as needed   Outcome: Progressing  Goal: Attend and participate in unit activities, including therapeutic, recreational, and educational groups  Description: Interventions:  -Encourage Visitation and family involvement in care  Outcome: Progressing  Goal: Complete daily ADLs, including personal hygiene independently, as able  Description: Interventions:  - Observe, teach, and assist patient with ADLS  - Monitor and promote a balance of rest/activity, with adequate  nutrition and elimination   Outcome: Progressing     Problem: Ineffective Coping  Goal: Participates in unit activities  Description: Interventions:  - Provide therapeutic environment   - Provide required programming   - Redirect inappropriate behaviors   Outcome: Progressing  Goal: Patient/Family participate in treatment and DC plans  Description: Interventions:  - Provide therapeutic environment  Outcome: Progressing  Goal: Free from restraint events  Description: - Utilize least restrictive measures   - Provide behavioral interventions   - Redirect inappropriate behaviors   Outcome: Progressing     Problem: Risk for Self Injury/Neglect  Goal: Treatment Goal: Remain safe during length of stay, learn and adopt new coping skills, and be free of self-injurious ideation, impulses and acts at the time of discharge  Outcome: Progressing  Goal: Verbalize thoughts and feelings  Description: Interventions:  - Assess and re-assess patient's lethality and potential for self-injury  - Engage patient in 1:1 interactions, daily, for a minimum of 15 minutes  - Encourage patient to express feelings, fears, frustrations, hopes  - Establish rapport/trust with patient   Outcome: Progressing  Goal: Refrain from harming self  Description: Interventions:  - Monitor patient closely, per order  - Develop a trusting relationship  - Supervise medication ingestion, monitor effects and side effects   Outcome: Progressing  Goal: Attend and participate in unit activities, including therapeutic, recreational, and educational groups  Description: Interventions:  - Provide therapeutic and educational activities daily, encourage attendance and participation, and document same in the medical record  - Obtain collateral information, encourage visitation and family involvement in care   Outcome: Progressing  Goal: Complete daily ADLs, including personal hygiene independently, as able  Description: Interventions:  - Observe, teach, and assist patient  with ADLS  - Monitor and promote a balance of rest/activity, with adequate nutrition and elimination  Outcome: Progressing     Problem: Depression  Goal: Treatment Goal: Demonstrate behavioral control of depressive symptoms, verbalize feelings of improved mood/affect, and adopt new coping skills prior to discharge  Outcome: Progressing  Goal: Verbalize thoughts and feelings  Description: Interventions:  - Assess and re-assess patient's level of risk   - Engage patient in 1:1 interactions, daily, for a minimum of 15 minutes   - Encourage patient to express feelings, fears, frustrations, hopes   Outcome: Progressing  Goal: Refrain from harming self  Description: Interventions:  - Monitor patient closely, per order   - Supervise medication ingestion, monitor effects and side effects   Outcome: Progressing  Goal: Refrain from isolation  Description: Interventions:  - Develop a trusting relationship   - Encourage socialization   Outcome: Progressing  Goal: Complete daily ADLs, including personal hygiene independently, as able  Description: Interventions:  - Observe, teach, and assist patient with ADLS  -  Monitor and promote a balance of rest/activity, with adequate nutrition and elimination   Outcome: Progressing     Problem: Anxiety  Goal: Anxiety is at manageable level  Description: Interventions:  - Assess and monitor patient's anxiety level.   - Monitor for signs and symptoms (heart palpitations, chest pain, shortness of breath, headaches, nausea, feeling jumpy, restlessness, irritable, apprehensive).   - Collaborate with interdisciplinary team and initiate plan and interventions as ordered.  - [place] patient to unit/surroundings  - Explain treatment plan  - Encourage participation in care  - Encourage verbalization of concerns/fears  - Identify coping mechanisms  - Assist in developing anxiety-reducing skills  - Administer/offer alternative therapies  - Limit or eliminate stimulants  Outcome: Progressing      Problem: Risk for Violence/Aggression Toward Others  Goal: Refrain from harming others  Outcome: Progressing  Goal: Refrain from destructive acts on the environment or property  Outcome: Progressing  Goal: Control angry outbursts  Description: Interventions:  - Monitor patient closely, per order  - Ensure early verbal de-escalation  - Monitor prn medication needs  - Set reasonable/therapeutic limits, outline behavioral expectations, and consequences   - Provide a non-threatening milieu, utilizing the least restrictive interventions   Outcome: Progressing  Goal: Attend and participate in unit activities, including therapeutic, recreational, and educational groups  Description: Interventions:  - Provide therapeutic and educational activities daily, encourage attendance and participation, and document same in the medical record   Outcome: Progressing     Problem: Alteration in Orientation  Goal: Treatment Goal: Demonstrate a reduction of confusion and improved orientation to person, place, time and/or situation upon discharge, according to optimum baseline/ability  Outcome: Progressing  Goal: Interact with staff daily  Description: Interventions:  - Assess and re-assess patient's level of orientation  - Engage patient in 1 on 1 interactions, daily, for a minimum of 15 minutes   - Establish rapport/trust with patient   Outcome: Progressing  Goal: Complete daily ADLs, including personal hygiene independently, as able  Description: Interventions:  - Observe, teach, and assist patient with ADLS  Outcome: Progressing     Problem: SELF HARM/SUICIDALITY  Goal: Will have no self-injury during hospital stay  Description: INTERVENTIONS:  - Q 15 MINUTES: Routine safety checks  - Q WAKING SHIFT & PRN: Assess risk to determine if routine checks are adequate to maintain patient safety  - Encourage patient to participate actively in care by formulating a plan to combat response to suicidal ideation, identify supports and  resources  Outcome: Progressing     Problem: DEPRESSION  Goal: Will be euthymic at discharge  Description: INTERVENTIONS:  - Administer medication as ordered  - Provide emotional support via 1:1 interaction with staff  - Encourage involvement in milieu/groups/activities  - Monitor for social isolation  Outcome: Progressing     Problem: ANXIETY  Goal: Will report anxiety at manageable levels  Description: INTERVENTIONS:  - Administer medication as ordered  - Teach and encourage coping skills  - Provide emotional support  - Assess patient/family for anxiety and ability to cope  Outcome: Progressing     Problem: SELF CARE DEFICIT  Goal: Return ADL status to a safe level of function  Description: INTERVENTIONS:  - Administer medication as ordered  - Assess ADL deficits and provide assistive devices as needed  - Obtain PT/OT consults as needed  - Assist and instruct patient to increase activity and self care as tolerated  Outcome: Progressing     Problem: DISCHARGE PLANNING - CARE MANAGEMENT  Goal: Discharge to post-acute care or home with appropriate resources  Description: INTERVENTIONS:  - Conduct assessment to determine patient/family and health care team treatment goals, and need for post-acute services based on payer coverage, community resources, and patient preferences, and barriers to discharge  - Address psychosocial, clinical, and financial barriers to discharge as identified in assessment in conjunction with the patient/family and health care team  - Arrange appropriate level of post-acute services according to patient’s   needs and preference and payer coverage in collaboration with the physician and health care team  - Communicate with and update the patient/family, physician, and health care team regarding progress on the discharge plan  - Arrange appropriate transportation to post-acute venues  Outcome: Progressing

## 2024-11-02 NOTE — PROGRESS NOTES
This note was not shared with the patient due to reasonable likelihood of causing patient harm    Progress Note - Behavioral Health   Name: Deyvi Cao 54 y.o. male I MRN: [id number]  Unit/Bed#: [facility] 101-02 I Date of Admission: 6/25/2024   Date of Service: 11/2/2024 I Hospital Day: 130    Deyvi Cao 54 y.o. male MRN: [id number]   Unit/Bed#: [facility] 101-02 Encounter: [id number]        Assessment & Plan  Bipolar disorder with severe depression (HCC)  Still progressing - 11/2/2024  Awaiting placement - CRR interview complete, ACT interview complete.  sent last seven days of charts to [facility] for review.  Patient denied by CRR, awaiting further recommendations from [facility] on [facility] should [facility]; In-person interview with   [facility] scheduled for admission to [facility] scheduled for 11/4 at 1030.  Continue medications as follows: Vraylar 3mg PO Daily, Atarax 25mg PO TID, Lamictal 50mg PO Daily, Zoloft 150mg PO QHS  Continue with group therapy, milieu therapy and occupational therapy   Behavioral Health checks every 7 minutes   Continue frequent safety checks and vitals per unit protocol  Continue with [facility] medical management as indicated  Benign essential hypertension  Managed by [facility] - reviewed 11/2/2024  Continue Lisinopril 10mg PO Daily  Mixed hyperlipidemia  Managed by [facility] - reviewed 11/2/2024  Continue Lipitor 10mg PO Daily  Allergic rhinitis due to allergen  Managed by [facility] - reviewed 11/2/2024  Rosacea  Managed by [facility] - reviewed 11/2/2024     Current medications:  Current Facility-Administered Medications   Medication Dose Route Frequency Provider Last Rate    acetaminophen  650 mg Oral Q6H PRN ALVINA Harley      acetaminophen  650 mg Oral Q4H PRN ALVINA Harley      acetaminophen  975 mg Oral Q6H PRN ALVINA Harley      aluminum-magnesium hydroxide-simethicone  30 mL Oral Q4H PRN ALVINA Harley      ammonium lactate   Topical BID PRN Melva  ALVINA Knutson      atorvastatin  10 mg Oral Daily Sary Rodriguez ALVINA Biggs      benztropine  1 mg Intramuscular Q4H PRN Max 6/day ALVINA Harley      benztropine  1 mg Oral Q4H PRN Max 6/day ALVINA Harley      bisacodyl  10 mg Rectal Daily PRN ALVINA Harley      cariprazine  3 mg Oral Daily Martin Cardenas MD      Cholecalciferol  2,000 Units Oral Daily Sary LinkALVINA Thompson      cyanocobalamin  1,000 mcg Oral Daily Sary LinkALVINA Thompson      hydrOXYzine HCL  25 mg Oral Q6H PRN Max 4/day ALVINA Harley      hydrOXYzine HCL  25 mg Oral TID Martin Cardenas MD      hydrOXYzine HCL  50 mg Oral Q4H PRN Max 4/day AVLINA Harley      Or    LORazepam  1 mg Intramuscular Q4H PRN ALVINA Harley      ketoconazole   Topical Daily PRN ALVINA Harley      lamoTRIgine  50 mg Oral Daily Martin Cardenas MD      lisinopril  10 mg Oral Daily Sary LinkALVINA Thompson      LORazepam  1 mg Oral Q4H PRN Max 6/day ALVINA Harley      Or    LORazepam  2 mg Intramuscular Q6H PRN Max 3/day ALVINA Harley      OLANZapine  10 mg Oral Q3H PRN Max 3/day ALVINA Harley      Or    OLANZapine  10 mg Intramuscular Q3H PRN Max 3/day ALVINA Harley      OLANZapine  5 mg Oral Q3H PRN Max 6/day ALVINA Harley      Or    OLANZapine  5 mg Intramuscular Q3H PRN Max 6/day ALVINA Harley      OLANZapine  2.5 mg Oral Q3H PRN Max 8/day ALVINA Harley      polyethylene glycol  17 g Oral Daily PRN ALVINA Harley      propranolol  10 mg Oral Q8H PRN ALVINA Harley      senna-docusate sodium  1 tablet Oral Daily PRN ALVINA Harley      sertraline  150 mg Oral HS Martin Cardenas MD         Risks / Benefits of Treatment:    Risks, benefits, and possible side effects of medications explained to patient and patient verbalizes understanding and agreement for treatment.    Patient was seen today for continuation of care, records reviewed and patient was discussed with the charge nurse.  " No behavioral outbursts or acute events noted overnight and No significant changes in behaviors or clinical status over the last 24 hours.      Deyvi was seen today while relaxing in his room, sitting on the edge of his bed.  He is soft-spoken, polite, and engages in assessment appropriately.  Describes his current mood as \"good\" adding that he feels medications are appropriately dosed and effective.  He does feel a mildly fatigued, but otherwise is tolerating medications well and does not feel his overall energy level is impacted.  He is looking forward to his upcoming interview on Monday with [facility].  He is sleeping well, at times waking in the night, but has the ability to resume sleep quickly.    Deyvi does not appear overtly anxious or depressed.    He denies suicidal ideation, intent or plan at present; denies homicidal ideation, intent or plan at present.  He denies auditory hallucinations, denies visual hallucinations,  and does not appear to have overt  delusional thoughts.  He reports mild fatigue. Able to tolerate those symptoms. Denied any other side effects from psychiatric medications.  No medication changes indicated at this time, continue current medication regimen.    Psychiatric Review of Systems:  Behavior over the last 24 hours: unchanged.   Sleep: sleeping okay throughout the night. At times wakes in the night, easily able to resume sleep  Appetite: adequate  Medication side effects: yes, mild fatigue  ROS:no complaints    Mental Status Evaluation:    Appearance:  age appropriate, casually dressed, dressed appropriately, adequate grooming   Behavior:  cooperative, calm, guarded, interacts appropriately with this writer   Speech:  normal rate, normal volume, normal pitch   Mood:  euthymic   Affect:  constricted, flat   Thought Process:  goal directed   Associations: intact associations   Thought Content:  no overt delusions, no paranoia noted on exam   Perceptual Disturbances: no " auditory hallucinations, no visual hallucinations, does not appear responding to internal stimuli   Risk Potential: Suicidal ideation - None at present  Homicidal ideation - None at present  Potential for aggression - No   Sensorium:  oriented to person, place, and time/date   Memory:  recent and remote memory grossly intact   Consciousness:  alert and awake   Attention/Concentration: attention span and concentration appear shorter than expected for age   Insight:  fair and improving   Judgment: fair and improving   Gait/Station: normal gait/station, normal balance   Motor Activity: no abnormal movements     Vital signs in last 24 hours:    Temp:  [97.5 °F (36.4 °C)-97.9 °F (36.6 °C)] 97.9 °F (36.6 °C)  HR:  [93-97] 97  BP: (109-146)/(72-76) 146/76  Resp:  [18] 18  SpO2:  [95 %] 95 %  O2 Device: None (Room air)    Laboratory results: I have personally reviewed all pertinent laboratory/tests results    Results from the past 24 hours: No results found for this or any previous visit (from the past 24 hour(s)).    Discharge Disposition: Discharge disposition and planning remain ongoing    Counseling / Coordination of Care:    Total floor / unit time spent today 15 minutes. Greater than 50% of total time was spent with the patient and / or family counseling and / or coordination of care. A description of counseling / coordination of care:  Patient's progress discussed with staff in treatment team meeting.  Medications, treatment progress and treatment plan reviewed with patient.    ALVINA Quevedo 11/02/24

## 2024-11-02 NOTE — ASSESSMENT & PLAN NOTE
Still progressing - 11/2/2024  Awaiting placement - CRR interview complete, ACT interview complete.  sent last seven days of charts to [facility] for review.  Patient denied by CRR, awaiting further recommendations from the county on where patient should be placed; In-person interview with   [facility] scheduled for admission to [facility] scheduled for 11/4 at 1030.  Continue medications as follows: Vraylar 3mg PO Daily, Atarax 25mg PO TID, Lamictal 50mg PO Daily, Zoloft 150mg PO QHS  Continue with group therapy, milieu therapy and occupational therapy   Behavioral Health checks every 7 minutes   Continue frequent safety checks and vitals per unit protocol  Continue with [facility] medical management as indicated

## 2024-11-03 PROCEDURE — 99232 SBSQ HOSP IP/OBS MODERATE 35: CPT | Performed by: NURSE PRACTITIONER

## 2024-11-03 RX ADMIN — ATORVASTATIN CALCIUM 10 MG: 10 TABLET, FILM COATED ORAL at 08:21

## 2024-11-03 RX ADMIN — HYDROXYZINE HYDROCHLORIDE 25 MG: 25 TABLET ORAL at 17:03

## 2024-11-03 RX ADMIN — LISINOPRIL 10 MG: 10 TABLET ORAL at 08:21

## 2024-11-03 RX ADMIN — LAMOTRIGINE 50 MG: 25 TABLET ORAL at 08:21

## 2024-11-03 RX ADMIN — CYANOCOBALAMIN TAB 1000 MCG 1000 MCG: 1000 TAB at 08:21

## 2024-11-03 RX ADMIN — HYDROXYZINE HYDROCHLORIDE 25 MG: 25 TABLET ORAL at 08:21

## 2024-11-03 RX ADMIN — CARIPRAZINE 3 MG: 3 CAPSULE, GELATIN COATED ORAL at 08:21

## 2024-11-03 RX ADMIN — CHOLECALCIFEROL TAB 25 MCG (1000 UNIT) 2000 UNITS: 25 TAB at 08:21

## 2024-11-03 RX ADMIN — SERTRALINE HYDROCHLORIDE 150 MG: 100 TABLET ORAL at 21:20

## 2024-11-03 RX ADMIN — HYDROXYZINE HYDROCHLORIDE 25 MG: 25 TABLET ORAL at 21:20

## 2024-11-03 NOTE — PLAN OF CARE
Problem: Risk for Violence/Aggression Toward Others  Goal: Refrain from harming others  Outcome: Progressing  Goal: Refrain from destructive acts on the environment or property  Outcome: Progressing  Goal: Control angry outbursts  Description: Interventions:  - Monitor patient closely, per order  - Ensure early verbal de-escalation  - Monitor prn medication needs  - Set reasonable/therapeutic limits, outline behavioral expectations, and consequences   - Provide a non-threatening milieu, utilizing the least restrictive interventions   Outcome: Progressing  Goal: Identify appropriate positive anger management techniques  Description: Interventions:  - Offer anger management and coping skills groups   - Staff will provide positive feedback for appropriate anger control  Outcome: Progressing     Problem: Alteration in Orientation  Goal: Interact with staff daily  Description: Interventions:  - Assess and re-assess patient's level of orientation  - Engage patient in 1 on 1 interactions, daily, for a minimum of 15 minutes   - Establish rapport/trust with patient   Outcome: Progressing     Problem: SELF HARM/SUICIDALITY  Goal: Will have no self-injury during hospital stay  Description: INTERVENTIONS:  - Q 15 MINUTES: Routine safety checks  - Q WAKING SHIFT & PRN: Assess risk to determine if routine checks are adequate to maintain patient safety  - Encourage patient to participate actively in care by formulating a plan to combat response to suicidal ideation, identify supports and resources  Outcome: Progressing

## 2024-11-03 NOTE — ASSESSMENT & PLAN NOTE
Progressing - 11/3/2024  Awaiting placement - CRR interview complete, ACT interview complete.  sent last seven days of charts to [facility] for review.  Patient denied by CRR, awaiting further recommendations from the county on where patient should be placed; In-person interview with   [facility] scheduled for admission to [facility] scheduled for 11/4 at 10:30.  Continue medications as follows: Vraylar 3mg PO Daily, Atarax 25mg PO TID, Lamictal 50mg PO Daily, Zoloft 150mg PO QHS  Continue with group therapy, milieu therapy and occupational therapy   Behavioral Health checks every 7 minutes   Continue frequent safety checks and vitals per unit protocol  Continue with [facility] medical management as indicated

## 2024-11-03 NOTE — NURSING NOTE
Deyvi has been in his room most of the shift. Rests in bed. Quiet and keeps to himself. No interaction with peers. Able to make needs known to staff. Brightens upon approach. Denies anxiety, depression, voices and pain. Ate 100% of his meals. Took medication without difficulty. Did not attend any groups. No issues or behaviors. Weekly Wellness Assessment WDL, except for dry skin on face. Continue to monitor. Precautions maintained.

## 2024-11-03 NOTE — PROGRESS NOTES
This note was not shared with the patient due to reasonable likelihood of causing patient harm    Progress Note - Behavioral Health   Name: Deyvi Cao 54 y.o. male I MRN: [id number]  Unit/Bed#: [facility]-02 I Date of Admission: 6/25/2024   Date of Service: 11/3/2024 I Hospital Day: 131    Deyvi Cao 54 y.o. male MRN: [id number]   Unit/Bed#: [facility] 101-02 Encounter: [id number]        Assessment & Plan  Bipolar disorder with severe depression (HCC)  Progressing - 11/3/2024  Awaiting placement - CRR interview complete, ACT interview complete.  sent last seven days of charts to [facility] for review.  Patient denied by CRR, awaiting further recommendations from the [facility] on where patient should be placed; In-person interview with   [facility] scheduled for admission to [facility] scheduled for 11/4 at 10:30.  Continue medications as follows: Vraylar 3mg PO Daily, Atarax 25mg PO TID, Lamictal 50mg PO Daily, Zoloft 150mg PO QHS  Continue with group therapy, milieu therapy and occupational therapy   Behavioral Health checks every 7 minutes   Continue frequent safety checks and vitals per unit protocol  Continue with [facility] medical management as indicated  Benign essential hypertension  Managed by [facility] - reviewed 11/3/2024  Continue Lisinopril 10mg PO Daily  Mixed hyperlipidemia  Managed by [facility] - reviewed 11/3/2024  Continue Lipitor 10mg PO Daily  Allergic rhinitis due to allergen  Managed by [facility] - reviewed 11/3/2024  Rosacea  Managed by [facility] - reviewed 11/3/2024     Current medications:  Current Facility-Administered Medications   Medication Dose Route Frequency Provider Last Rate    acetaminophen  650 mg Oral Q6H PRN ALVINA Harley      acetaminophen  650 mg Oral Q4H PRN ALVINA Harley      acetaminophen  975 mg Oral Q6H PRN ALVINA Harley      aluminum-magnesium hydroxide-simethicone  30 mL Oral Q4H PRN ALVINA Harley      ammonium lactate   Topical BID PRN Melva  ALVINA Knutson      atorvastatin  10 mg Oral Daily Sary Rodriguez ALVINA Biggs      benztropine  1 mg Intramuscular Q4H PRN Max 6/day ALVINA Harley      benztropine  1 mg Oral Q4H PRN Max 6/day ALVINA Harley      bisacodyl  10 mg Rectal Daily PRN ALVINA Harley      cariprazine  3 mg Oral Daily Martin Cardenas MD      Cholecalciferol  2,000 Units Oral Daily Sary LinkALVINA Thompson      cyanocobalamin  1,000 mcg Oral Daily Sary LinkALVINA Thompson      hydrOXYzine HCL  25 mg Oral Q6H PRN Max 4/day ALVINA Harley      hydrOXYzine HCL  25 mg Oral TID Martin Cardenas MD      hydrOXYzine HCL  50 mg Oral Q4H PRN Max 4/day ALVINA Harley      Or    LORazepam  1 mg Intramuscular Q4H PRN ALVINA Harley      ketoconazole   Topical Daily PRN ALVINA Harley      lamoTRIgine  50 mg Oral Daily Martin Cardenas MD      lisinopril  10 mg Oral Daily Sary LinkALVINA Thompson      LORazepam  1 mg Oral Q4H PRN Max 6/day ALVINA Harley      Or    LORazepam  2 mg Intramuscular Q6H PRN Max 3/day ALVINA Harley      OLANZapine  10 mg Oral Q3H PRN Max 3/day ALVINA Harley      Or    OLANZapine  10 mg Intramuscular Q3H PRN Max 3/day ALVINA Harley      OLANZapine  5 mg Oral Q3H PRN Max 6/day ALVINA Harley      Or    OLANZapine  5 mg Intramuscular Q3H PRN Max 6/day ALVINA Harley      OLANZapine  2.5 mg Oral Q3H PRN Max 8/day ALVINA Harley      polyethylene glycol  17 g Oral Daily PRN ALVINA Harley      propranolol  10 mg Oral Q8H PRN ALVINA Harley      senna-docusate sodium  1 tablet Oral Daily PRN ALVINA Harley      sertraline  150 mg Oral HS Martin Cardenas MD         Risks / Benefits of Treatment:    Risks, benefits, and possible side effects of medications explained to patient and patient verbalizes understanding and agreement for treatment.    Patient was seen today for continuation of care, records reviewed and patient was discussed with the charge nurse.  " No behavioral outbursts or acute events noted overnight and No significant changes in behaviors or clinical status over the last 24 hours.      Deyvi was seen today while sitting in his room awaiting breakfast he is.  Showered, well-groomed, dressed appropriately for the day.  He is polite and describes his current mood as \"stable\" adding that any anxiety or depressive symptoms he is currently experiencing are minimal and have significantly improved over time.  He is not in any distress, adamantly denies any thoughts of self-harm or suicide, no thoughts to harm others.  He remains isolative and has little contact with peers.  No group attendance yesterday.  He noticeably brightens and affect when discussing upcoming meeting tomorrow and states that he is \"looking forward to it.\"  He does plan on attending groups today.  During time of encounter, Deyvi does not appear hypomanic/manic.  He is not irritable, grandiose, or pathologically euphoric.  He is without perceptual disturbances and does not appear internally preoccupied.  No other questions or concerns.    No medication changes indicated at this time, continue current medication regimen.    Psychiatric Review of Systems:  Behavior over the last 24 hours: unchanged.   Sleep: sleeping okay throughout the night  Appetite: adequate  Medication side effects: none reported  ROS:no complaints    Mental Status Evaluation:    Appearance:  age appropriate, casually dressed, dressed appropriately, adequate grooming   Behavior:  cooperative, calm, guarded, interacts appropriately with this writer   Speech:  scant, soft   Mood:  euthymic   Affect:  constricted   Thought Process:  coherent, goal directed   Associations: intact associations   Thought Content:  no overt delusions, no paranoia noted on exam   Perceptual Disturbances: no auditory hallucinations, no visual hallucinations, does not appear responding to internal stimuli   Risk Potential: Suicidal ideation - " None  Homicidal ideation - None  Potential for aggression - No   Sensorium:  oriented to person, place, and time/date   Memory:  recent and remote memory grossly intact   Consciousness:  alert and awake   Attention/Concentration: attention span and concentration appear shorter than expected for age   Insight:  fair and improving   Judgment: fair and improving   Gait/Station: normal gait/station, normal balance   Motor Activity: no abnormal movements     Vital signs in last 24 hours:    Temp:  [98 °F (36.7 °C)] 98 °F (36.7 °C)  HR:  [] 100  BP: (126-132)/(70-75) 132/75  Resp:  [18] 18  SpO2:  [95 %] 95 %  O2 Device: None (Room air)    Laboratory results: I have personally reviewed all pertinent laboratory/tests results        Discharge Disposition: Discharge disposition and planning remain ongoing    Counseling / Coordination of Care:    Total floor / unit time spent today 15 minutes. Greater than 50% of total time was spent with the patient and / or family counseling and / or coordination of care. A description of counseling / coordination of care: diagnostic results, potential risks and benefits of management options, patient education, importance of adherence to management and/or risk factor reductions. Patient's rights, confidentiality, exceptions to confidentiality, use of electronic medical record including appropriate staff access to medical record regarding behavioral health services and consent to treatment were reviewed.    ALVINA Quevedo 11/03/24

## 2024-11-04 PROCEDURE — 99232 SBSQ HOSP IP/OBS MODERATE 35: CPT | Performed by: PSYCHIATRY & NEUROLOGY

## 2024-11-04 RX ADMIN — SERTRALINE HYDROCHLORIDE 150 MG: 100 TABLET ORAL at 21:16

## 2024-11-04 RX ADMIN — ATORVASTATIN CALCIUM 10 MG: 10 TABLET, FILM COATED ORAL at 08:24

## 2024-11-04 RX ADMIN — HYDROXYZINE HYDROCHLORIDE 25 MG: 25 TABLET ORAL at 21:16

## 2024-11-04 RX ADMIN — CYANOCOBALAMIN TAB 1000 MCG 1000 MCG: 1000 TAB at 08:25

## 2024-11-04 RX ADMIN — LAMOTRIGINE 50 MG: 25 TABLET ORAL at 08:25

## 2024-11-04 RX ADMIN — LISINOPRIL 10 MG: 10 TABLET ORAL at 08:24

## 2024-11-04 RX ADMIN — CHOLECALCIFEROL TAB 25 MCG (1000 UNIT) 2000 UNITS: 25 TAB at 08:24

## 2024-11-04 RX ADMIN — CARIPRAZINE 3 MG: 3 CAPSULE, GELATIN COATED ORAL at 08:25

## 2024-11-04 RX ADMIN — HYDROXYZINE HYDROCHLORIDE 25 MG: 25 TABLET ORAL at 08:24

## 2024-11-04 RX ADMIN — HYDROXYZINE HYDROCHLORIDE 25 MG: 25 TABLET ORAL at 17:15

## 2024-11-04 NOTE — PROGRESS NOTES
Progress Note - Behavioral Health   Name: Deyvi Cao 54 y.o. male I MRN: [id number]   Unit/Bed#: [facility] 101-02 I Date of Admission: 6/25/2024   Date of Service: 11/4/2024 I Hospital Day: 132     Assessment & Plan  Bipolar disorder with severe depression (HCC)  Progressing - 11/4/2024  Awaiting placement - In-person interview with   [facility] scheduled for admission to [facility] scheduled for 11/4 at 10:30  Continue medications as follows: Vraylar 3mg PO Daily, Atarax 25mg PO TID, Lamictal 50mg PO Daily, Zoloft 150mg PO QHS  Continue with group therapy, milieu therapy and occupational therapy   Behavioral Health checks every 7 minutes   Continue frequent safety checks and vitals per unit protocol  Continue with [facility] management as indicated  Benign essential hypertension  Managed by [facility] - reviewed 11/4/2024  Continue Lisinopril 10mg PO Daily  Mixed hyperlipidemia  Managed by [facility] - reviewed 11/4/2024  Continue Lipitor 10mg PO Daily  Allergic rhinitis due to allergen  Managed by [facility] - reviewed 11/4/2024  Rosacea  Managed by [facility] - reviewed 11/4/2024     Subjective:    Patient was seen today for continuation of care, records reviewed and patient was discussed with the morning case review team.    Deyvi was seen today for psychiatric follow-up.  On assessment today, Deyvi was found in his room.  He is looking forward to his interview at 1030 today.  Deyvi reports adequate daytime energy and denies any difficulties with initiating or staying asleep.  Oral appetite and hydration is adequate.  He is polite, pleasant, and cooperative.   We reviewed once more the specific as-needed medications they can use going forward if they experience any insomnia or destabilization of their mood, they understood and were agreeable. Milieu visibility and group attendance encouraged to promote an active participation in treatment.    Deyvi denies acute suicidal/self-harm  ideation/intent/plan upon direct inquiry at this time. Deyvi is able to contract for safety while on the unit and would feel comfortable seeking staff support should suicidal symptoms or urges appear or worsen. Deyvi remains behaviorally appropriate, no agitation or aggression noted on exam or in report. Deyvi also denies HI/AH/VH, and does not appear overtly manic.  Patient does not verbalize any experiences that can be categorized as paranoid, persecutory, bizarre, or somatic delusions. Deyvi remains adherent to his current psychotropic medication regimen and denies any side effects from medications, as well as none noted on exam.    Deyvi is currently assessed as being at their baseline with continued need for medication management, supervision for safety and ADL’s. These services are not currently available in a less restrictive environment necessitating continued hospital stay.  Deyvi should remain on the unit until these services are available, due to likelihood of mental decompensation and readmission if discharged to an unsupervised setting.  Assertive discharge planning and collaboration with multiple providers (inpatient and community based) remains ongoing.    Group Attendance: 3 / 10  Treatment Team: TBD  Psychiatric PRN's Needed: None    Review of Systems:  Behavior over the last 24 hours: Unchanged  Sleep: sleeping okay throughout the night  Appetite: adequate  Medication side effects: none reported  ROS:no complaints    Objective:    Vitals:  Vitals:    11/04/24 0741   BP: 156/87   Pulse: 89   Resp: 18   Temp: (!) 96.7 °F (35.9 °C)   SpO2: 100%     Laboratory Results:  I have personally reviewed all pertinent laboratory/tests results.  Most Recent Labs:   Lab Results   Component Value Date    WBC 7.39 06/26/2024    RBC 4.70 06/26/2024    HGB 15.2 06/26/2024    HCT 45.5 06/26/2024     06/26/2024    RDW 12.9 06/26/2024    NEUTROABS 4.63 06/26/2024    SODIUM 137 06/26/2024    K 4.1  06/26/2024     06/26/2024    CO2 26 06/26/2024    BUN 17 06/26/2024    CREATININE 0.63 06/26/2024    GLUC 98 06/26/2024    GLUF 98 06/26/2024    CALCIUM 9.6 06/26/2024    AST 25 06/26/2024    ALT 45 06/26/2024    ALKPHOS 114 (H) 06/26/2024    TP 7.0 06/26/2024    ALB 4.4 06/26/2024    TBILI 0.44 06/26/2024    CHOLESTEROL 177 06/26/2024    HDL 52 06/26/2024    TRIG 73 06/26/2024    LDLCALC 110 (H) 06/26/2024    NONHDLC 125 06/26/2024    LITHIUM 0.4 (L) 01/28/2021    AHE9LJDOMWDT 2.636 06/26/2024    HGBA1C 5.2 11/22/2023     11/22/2023     Mental Status Evaluation:  Appearance:  age appropriate, casually dressed   Behavior:  cooperative, calm   Speech:  normal volume   Mood:  euthymic   Affect:  constricted   Thought Process:  goal directed   Associations: intact associations   Thought Content:  no overt delusions   Perceptual Disturbances: no auditory hallucinations, no visual hallucinations, denies when asked, does not appear responding to internal stimuli   Risk Potential: Suicidal ideation - None at present, contracts for safety on the unit, would talk to staff if not feeling safe on the unit  Homicidal ideation - None at present  Potential for aggression - Not at present   Sensorium:  oriented to person, place, and time/date   Memory:  recent memory intact   Consciousness:  alert and awake   Attention/Concentration: attention span and concentration appear shorter than expected for age   Insight:  limited   Judgment: limited   Gait/Station: normal gait/station, normal balance   Motor Activity: no abnormal movements     Progress Toward Goals: making gradual improvement.  Deyvi continues to require inpatient psychiatric hospitalization for continued medication management and titration to optimize symptom reduction, improve sleep hygiene, and demonstrate adequate self-care.     Suicide/Homicide Risk Assessment:  Risk of Harm to Self:   Nursing Suicide Risk Assessment Last 24 hours: C-SSRS Risk (Since  Last Contact)  Calculated C-SSRS Risk Score (Since Last Contact): No Risk Indicated    Risk of Harm to Others:  Nursing Homicide Risk Assessment: Violence Risk to Others: Denies within past 6 months    Behavioral Health Medications: all current active meds have been reviewed and continue current psychiatric medications.  Current Facility-Administered Medications   Medication Dose Route Frequency Provider Last Rate    acetaminophen  650 mg Oral Q6H PRN ALVINA Harley      acetaminophen  650 mg Oral Q4H PRN ALVINA Harley      acetaminophen  975 mg Oral Q6H PRN ALVINA Harley      aluminum-magnesium hydroxide-simethicone  30 mL Oral Q4H PRN ALVINA Harley      ammonium lactate   Topical BID PRN ALVINA Harley      atorvastatin  10 mg Oral Daily ALVINA Lewis      benztropine  1 mg Intramuscular Q4H PRN Max 6/day ALVINA Harley      benztropine  1 mg Oral Q4H PRN Max 6/day ALVINA Harley      bisacodyl  10 mg Rectal Daily PRN ALVINA Harley      cariprazine  3 mg Oral Daily Martin Cardenas MD      Cholecalciferol  2,000 Units Oral Daily ALVINA Lewis      cyanocobalamin  1,000 mcg Oral Daily ALVINA Lewis      hydrOXYzine HCL  25 mg Oral Q6H PRN Max 4/day ALVINA Harley      hydrOXYzine HCL  25 mg Oral TID Martin Cardenas MD      hydrOXYzine HCL  50 mg Oral Q4H PRN Max 4/day ALVINA Harley      Or    LORazepam  1 mg Intramuscular Q4H PRN ALVINA Harley      ketoconazole   Topical Daily PRN ALVINA Harley      lamoTRIgine  50 mg Oral Daily Martin Cardenas MD      lisinopril  10 mg Oral Daily ALVINA Lewis      LORazepam  1 mg Oral Q4H PRN Max 6/day ALVINA Harley      Or    LORazepam  2 mg Intramuscular Q6H PRN Max 3/day ALVINA Harley      OLANZapine  10 mg Oral Q3H PRN Max 3/day ALVINA Harley      Or    OLANZapine  10 mg Intramuscular Q3H PRN Max 3/day ALVINA Harley      OLANZapine  5 mg Oral Q3H  PRN Max 6/day ALVINA Harley      Or    OLANZapine  5 mg Intramuscular Q3H PRN Max 6/day ALVINA Harley      OLANZapine  2.5 mg Oral Q3H PRN Max 8/day ALVINA Harley      polyethylene glycol  17 g Oral Daily PRN ALVINA Harley      propranolol  10 mg Oral Q8H PRN ALVINA Harley      senna-docusate sodium  1 tablet Oral Daily PRN ALVINA Harley      sertraline  150 mg Oral HS Martin Cardenas MD       Risks / Benefits of Treatment:  Risks, benefits, and possible side effects of medications explained to patient. Patient has limited understanding of risks and benefits of treatment at this time, but agrees to take medications as prescribed.    Counseling / Coordination of Care:  Patient's progress discussed with staff in treatment team meeting.  Medications, treatment progress and treatment plan reviewed with patient.   Educated on importance of medication and treatment compliance.  Reassurance and supportive therapy provided.   Encouraged participation in milieu and group therapy on the unit.    ALVINA Harley 11/04/24

## 2024-11-04 NOTE — PROGRESS NOTES
11/04/24 0808   Team Meeting   Meeting Type Daily Rounds   Team Members Present   Team Members Present Physician;Nurse;;Other (Discipline and Name)   Physician Team Member Holter, Thomas   Nursing Team Member Mayte   Social Work Team Member Henrry FRY   Other (Discipline and Name) Goff PCM   Patient/Family Present   Patient Present No   Patient's Family Present No     Groups Participation  4/10.   Patient's compliant with medications. He has minimal engagement in treatment. Has an interview with LENIN today @ 1030.

## 2024-11-04 NOTE — PLAN OF CARE
Problem: Alteration in Thoughts and Perception  Goal: Treatment Goal: Gain control of psychotic behaviors/thinking, reduce/eliminate presenting symptoms and demonstrate improved reality functioning upon discharge  Outcome: Progressing  Goal: Refrain from acting on delusional thinking/internal stimuli  Description: Interventions:  - Monitor patient closely, per order   - Utilize least restrictive measures   - Set reasonable limits, give positive feedback for acceptable   - Administer medications as ordered and monitor of potential side effects  Outcome: Progressing  Goal: Agree to be compliant with medication regime, as prescribed and report medication side effects  Description: Interventions:  - Offer appropriate PRN medication and supervise ingestion; conduct AIMS, as needed   Outcome: Progressing  Goal: Complete daily ADLs, including personal hygiene independently, as able  Description: Interventions:  - Observe, teach, and assist patient with ADLS  - Monitor and promote a balance of rest/activity, with adequate nutrition and elimination   Outcome: Progressing     Problem: Ineffective Coping  Goal: Free from restraint events  Description: - Utilize least restrictive measures   - Provide behavioral interventions   - Redirect inappropriate behaviors   Outcome: Progressing     Problem: Risk for Self Injury/Neglect  Goal: Treatment Goal: Remain safe during length of stay, learn and adopt new coping skills, and be free of self-injurious ideation, impulses and acts at the time of discharge  Outcome: Progressing     Problem: Alteration in Thoughts and Perception  Goal: Verbalize thoughts and feelings  Description: Interventions:  - Promote a nonjudgmental and trusting relationship with the patient through active listening and therapeutic communication  - Assess patient's level of functioning, behavior and potential for risk  - Engage patient in 1 on 1 interactions  - Encourage patient to express fears, feelings,  frustrations, and discuss symptoms    - [place] patient to reality, help patient recognize reality-based thinking   - Administer medications as ordered and assess for potential side effects  - Provide the patient education related to the signs and symptoms of the illness and desired effects of prescribed medications  Outcome: Not Progressing  Goal: Attend and participate in unit activities, including therapeutic, recreational, and educational groups  Description: Interventions:  -Encourage Visitation and family involvement in care  Outcome: Not Progressing     Problem: Ineffective Coping  Goal: Demonstrates healthy coping skills  Outcome: Not Progressing

## 2024-11-04 NOTE — SOCIAL WORK
Patient met with [facility] Intake team and completed his interview. Ptaient did well during the interview per [facility]. We discussed with patient's he's able to take the bus to [facility] once he learns the route. Patient identified his strengths and his coping skills. [facility] will complete his back ground check. Once this is complete, they will contact this writer to schedule an in person tour of the facility.   This writer e-mailed [facility] to confirm they had completed his intake.

## 2024-11-04 NOTE — NURSING NOTE
Pt is isolative to self and room. Consumed 100% of dinner. Took medications without incidence. Pt is pleasant and cooperative. Reports his meeting today with [facility] went well. Denies anxiety, depression, and SI/HI/AVH. No behavioral issues. Pt offers no complaints.

## 2024-11-04 NOTE — NURSING NOTE
Germain maintained on ongoing Safe precaution without incident  on this shift.  Awake, alert, isolative and cooperative upon approach.  Attended and participated 1 out of 7 group today.  Continues to be compliant with medication regimen.  Denies any pain, or discomfort.  Denies delusion or anxiety.  No overt delusion or A/T/V hallucination noted. Behavior control.

## 2024-11-04 NOTE — NURSING NOTE
Pt is calm and cooperative, visible on the unit intermittently, social with select peers. Pt denies anxiety and depression, denies SI/HI/AVH. Pt is medication complaint, safety checks ongoing.

## 2024-11-04 NOTE — ASSESSMENT & PLAN NOTE
Progressing - 11/4/2024  Awaiting placement - In-person interview with   [facility] for admission to [facility] scheduled for 11/4 at 10:30  Continue medications as follows: Vraylar 3mg PO Daily, Atarax 25mg PO TID, Lamictal 50mg PO Daily, Zoloft 150mg PO QHS  Continue with group therapy, milieu therapy and occupational therapy   Behavioral Health checks every 7 minutes   Continue frequent safety checks and vitals per unit protocol  Continue with [facility] medical management as indicated

## 2024-11-05 PROCEDURE — 99232 SBSQ HOSP IP/OBS MODERATE 35: CPT | Performed by: PSYCHIATRY & NEUROLOGY

## 2024-11-05 RX ADMIN — LAMOTRIGINE 50 MG: 25 TABLET ORAL at 08:30

## 2024-11-05 RX ADMIN — CARIPRAZINE 3 MG: 3 CAPSULE, GELATIN COATED ORAL at 08:30

## 2024-11-05 RX ADMIN — SERTRALINE HYDROCHLORIDE 150 MG: 100 TABLET ORAL at 21:09

## 2024-11-05 RX ADMIN — HYDROXYZINE HYDROCHLORIDE 25 MG: 25 TABLET ORAL at 08:30

## 2024-11-05 RX ADMIN — CHOLECALCIFEROL TAB 25 MCG (1000 UNIT) 2000 UNITS: 25 TAB at 08:30

## 2024-11-05 RX ADMIN — ATORVASTATIN CALCIUM 10 MG: 10 TABLET, FILM COATED ORAL at 08:30

## 2024-11-05 RX ADMIN — HYDROXYZINE HYDROCHLORIDE 25 MG: 25 TABLET ORAL at 17:12

## 2024-11-05 RX ADMIN — LISINOPRIL 10 MG: 10 TABLET ORAL at 08:30

## 2024-11-05 RX ADMIN — CYANOCOBALAMIN TAB 1000 MCG 1000 MCG: 1000 TAB at 08:30

## 2024-11-05 RX ADMIN — HYDROXYZINE HYDROCHLORIDE 25 MG: 25 TABLET ORAL at 21:09

## 2024-11-05 NOTE — PROGRESS NOTES
11/05/24 0754   Team Meeting   Meeting Type Daily Rounds   Team Members Present   Team Members Present Physician;Nurse;;Other (Discipline and Name)   Physician Team Member Zenia Cardenas   Nursing Team Member Mayte   Social Work Team Member Henrry FRY   Other (Discipline and Name) Goff PCM   Patient/Family Present   Patient Present No   Patient's Family Present No     Expected D/C Date:11/29  Groups Participation  5/10.   Patient's compliant with medications. He's engaged in his treatment. He completed his interview with LENIN.

## 2024-11-05 NOTE — PLAN OF CARE
Problem: Alteration in Thoughts and Perception  Goal: Treatment Goal: Gain control of psychotic behaviors/thinking, reduce/eliminate presenting symptoms and demonstrate improved reality functioning upon discharge  Outcome: Progressing  Goal: Verbalize thoughts and feelings  Description: Interventions:  - Promote a nonjudgmental and trusting relationship with the patient through active listening and therapeutic communication  - Assess patient's level of functioning, behavior and potential for risk  - Engage patient in 1 on 1 interactions  - Encourage patient to express fears, feelings, frustrations, and discuss symptoms    - [place] patient to reality, help patient recognize reality-based thinking   - Administer medications as ordered and assess for potential side effects  - Provide the patient education related to the signs and symptoms of the illness and desired effects of prescribed medications  Outcome: Progressing  Goal: Refrain from acting on delusional thinking/internal stimuli  Description: Interventions:  - Monitor patient closely, per order   - Utilize least restrictive measures   - Set reasonable limits, give positive feedback for acceptable   - Administer medications as ordered and monitor of potential side effects  Outcome: Progressing  Goal: Agree to be compliant with medication regime, as prescribed and report medication side effects  Description: Interventions:  - Offer appropriate PRN medication and supervise ingestion; conduct AIMS, as needed   Outcome: Progressing  Goal: Attend and participate in unit activities, including therapeutic, recreational, and educational groups  Description: Interventions:  -Encourage Visitation and family involvement in care  Outcome: Progressing  Goal: Complete daily ADLs, including personal hygiene independently, as able  Description: Interventions:  - Observe, teach, and assist patient with ADLS  - Monitor and promote a balance of rest/activity, with adequate  nutrition and elimination   Outcome: Progressing     Problem: Ineffective Coping  Goal: Patient/Family participate in treatment and DC plans  Description: Interventions:  - Provide therapeutic environment  Outcome: Progressing  Goal: Free from restraint events  Description: - Utilize least restrictive measures   - Provide behavioral interventions   - Redirect inappropriate behaviors   Outcome: Progressing     Problem: Risk for Self Injury/Neglect  Goal: Treatment Goal: Remain safe during length of stay, learn and adopt new coping skills, and be free of self-injurious ideation, impulses and acts at the time of discharge  Outcome: Progressing  Goal: Verbalize thoughts and feelings  Description: Interventions:  - Assess and re-assess patient's lethality and potential for self-injury  - Engage patient in 1:1 interactions, daily, for a minimum of 15 minutes  - Encourage patient to express feelings, fears, frustrations, hopes  - Establish rapport/trust with patient   Outcome: Progressing  Goal: Refrain from harming self  Description: Interventions:  - Monitor patient closely, per order  - Develop a trusting relationship  - Supervise medication ingestion, monitor effects and side effects   Outcome: Progressing  Goal: Complete daily ADLs, including personal hygiene independently, as able  Description: Interventions:  - Observe, teach, and assist patient with ADLS  - Monitor and promote a balance of rest/activity, with adequate nutrition and elimination  Outcome: Progressing     Problem: Depression  Goal: Treatment Goal: Demonstrate behavioral control of depressive symptoms, verbalize feelings of improved mood/affect, and adopt new coping skills prior to discharge  Outcome: Progressing  Goal: Refrain from isolation  Description: Interventions:  - Develop a trusting relationship   - Encourage socialization   Outcome: Progressing  Goal: Refrain from self-neglect  Outcome: Progressing  Goal: Attend and participate in unit  activities, including therapeutic, recreational, and educational groups  Description: Interventions:  - Provide therapeutic and educational activities daily, encourage attendance and participation, and document same in the medical record   Outcome: Progressing  Goal: Complete daily ADLs, including personal hygiene independently, as able  Description: Interventions:  - Observe, teach, and assist patient with ADLS  -  Monitor and promote a balance of rest/activity, with adequate nutrition and elimination   Outcome: Progressing     Problem: Anxiety  Goal: Anxiety is at manageable level  Description: Interventions:  - Assess and monitor patient's anxiety level.   - Monitor for signs and symptoms (heart palpitations, chest pain, shortness of breath, headaches, nausea, feeling jumpy, restlessness, irritable, apprehensive).   - Collaborate with interdisciplinary team and initiate plan and interventions as ordered.  - [place] patient to unit/surroundings  - Explain treatment plan  - Encourage participation in care  - Encourage verbalization of concerns/fears  - Identify coping mechanisms  - Assist in developing anxiety-reducing skills  - Administer/offer alternative therapies  - Limit or eliminate stimulants  Outcome: Progressing     Problem: Risk for Violence/Aggression Toward Others  Goal: Treatment Goal: Refrain from acts of violence/aggression during length of stay, and demonstrate improved impulse control at the time of discharge  Outcome: Progressing  Goal: Refrain from harming others  Outcome: Progressing  Goal: Refrain from destructive acts on the environment or property  Outcome: Progressing  Goal: Control angry outbursts  Description: Interventions:  - Monitor patient closely, per order  - Ensure early verbal de-escalation  - Monitor prn medication needs  - Set reasonable/therapeutic limits, outline behavioral expectations, and consequences   - Provide a non-threatening milieu, utilizing the least restrictive  interventions   Outcome: Progressing  Goal: Attend and participate in unit activities, including therapeutic, recreational, and educational groups  Description: Interventions:  - Provide therapeutic and educational activities daily, encourage attendance and participation, and document same in the medical record   Outcome: Progressing  Goal: Identify appropriate positive anger management techniques  Description: Interventions:  - Offer anger management and coping skills groups   - Staff will provide positive feedback for appropriate anger control  Outcome: Progressing     Problem: Alteration in Orientation  Goal: Treatment Goal: Demonstrate a reduction of confusion and improved orientation to person, place, time and/or situation upon discharge, according to optimum baseline/ability  Outcome: Progressing  Goal: Interact with staff daily  Description: Interventions:  - Assess and re-assess patient's level of orientation  - Engage patient in 1 on 1 interactions, daily, for a minimum of 15 minutes   - Establish rapport/trust with patient   Outcome: Progressing  Goal: Complete daily ADLs, including personal hygiene independently, as able  Description: Interventions:  - Observe, teach, and assist patient with ADLS  Outcome: Progressing     Problem: ANXIETY  Goal: Will report anxiety at manageable levels  Description: INTERVENTIONS:  - Administer medication as ordered  - Teach and encourage coping skills  - Provide emotional support  - Assess patient/family for anxiety and ability to cope  Outcome: Progressing     Problem: SELF CARE DEFICIT  Goal: Return ADL status to a safe level of function  Description: INTERVENTIONS:  - Administer medication as ordered  - Assess ADL deficits and provide assistive devices as needed  - Obtain PT/OT consults as needed  - Assist and instruct patient to increase activity and self care as tolerated  Outcome: Progressing     Problem: DISCHARGE PLANNING - CARE MANAGEMENT  Goal: Discharge to  post-acute care or home with appropriate resources  Description: INTERVENTIONS:  - Conduct assessment to determine patient/family and health care team treatment goals, and need for post-acute services based on payer coverage, community resources, and patient preferences, and barriers to discharge  - Address psychosocial, clinical, and financial barriers to discharge as identified in assessment in conjunction with the patient/family and health care team  - Arrange appropriate level of post-acute services according to patient’s   needs and preference and payer coverage in collaboration with the physician and health care team  - Communicate with and update the patient/family, physician, and health care team regarding progress on the discharge plan  - Arrange appropriate transportation to post-acute venues  Outcome: Progressing

## 2024-11-05 NOTE — ASSESSMENT & PLAN NOTE
Progressing - 11/5/2024  Awaiting placement - In-person interview with  [facility] completed yesterday.  Continue medications as follows: Vraylar 3mg PO Daily, Atarax 25mg PO TID, Lamictal 50mg PO Daily, Zoloft 150mg PO QHS  Continue with group therapy, milieu therapy and occupational therapy   Behavioral Health checks every 7 minutes   Continue frequent safety checks and vitals per unit protocol  Continue with [facility] medical management as indicated

## 2024-11-05 NOTE — NURSING NOTE
Patient has been out of his room briefly for meals and some groups. Continues to display an anxious affect when approached. Smiles. Pressured speech during conversation. Hopeful for discharge. Appetite good. Medication compliant. Denies any psychological symptoms or suicidal ideations. Minimal interaction.

## 2024-11-05 NOTE — NURSING NOTE
Received pt in bed at change of shift with eyes closed; chest movement noted.  Continues the same thus this far as per q 15 min room checks.   Will continue to monitor behavior, sleeping pattern and any medical issues that may arise.    0544:  Sleeping 7+ hrs thus this far

## 2024-11-05 NOTE — PROGRESS NOTES
Progress Note - Behavioral Health   Name: Deyvi Cao 54 y.o. male I MRN: [id number]   Unit/Bed#: [facility] 101-02 I Date of Admission: 6/25/2024   Date of Service: 11/5/2024 I Hospital Day: 133     Assessment & Plan  Bipolar disorder with severe depression (HCC)  Progressing - 11/5/2024  Awaiting placement - In-person interview with  [facility] completed yesterday.  Continue medications as follows: Vraylar 3mg PO Daily, Atarax 25mg PO TID, Lamictal 50mg PO Daily, Zoloft 150mg PO QHS  Continue with group therapy, milieu therapy and occupational therapy   Behavioral Health checks every 7 minutes   Continue frequent safety checks and vitals per unit protocol  Continue with [facility] management as indicated  Benign essential hypertension  Managed by [facility] - reviewed 11/5/2024  Continue Lisinopril 10mg PO Daily  Mixed hyperlipidemia  Managed by [facility] - reviewed 11/5/2024  Continue Lipitor 10mg PO Daily  Allergic rhinitis due to allergen  Managed by [facility] - reviewed 11/5/2024  Rosacea  Managed by [facility] - reviewed 11/5/2024     Subjective:    Patient was seen today for continuation of care, records reviewed and patient was discussed with the morning case review team.    Deyvi was seen today for psychiatric follow-up.  On assessment today, Deyvi was found in his room.  He is doing well, offers no new concerns.  He said his interview went well.  He hopes for discharge soon.  Deyvi reports adequate daytime energy and denies any difficulties with initiating or staying asleep.  Oral appetite and hydration is adequate.   We reviewed once more the specific as-needed medications they can use going forward if they experience any insomnia or destabilization of their mood, they understood and were agreeable. Milieu visibility and group attendance encouraged to promote an active participation in treatment.    Deyvi denies acute suicidal/self-harm ideation/intent/plan upon direct inquiry at this time.  Deyvi is able to contract for safety while on the unit and would feel comfortable seeking staff support should suicidal symptoms or urges appear or worsen. Deyvi remains behaviorally appropriate, no agitation or aggression noted on exam or in report. Deyvi also denies HI/AH/VH, and does not appear overtly manic.  Patient does not verbalize any experiences that can be categorized as paranoid, persecutory, bizarre, or somatic delusions. Deyvi remains adherent to his current psychotropic medication regimen and denies any side effects from medications, as well as none noted on exam.    Deyvi is currently assessed as being at their baseline with continued need for medication management, supervision for safety and ADL’s. These services are not currently available in a less restrictive environment necessitating continued hospital stay.  Deyvi should remain on the unit until these services are available, due to likelihood of mental decompensation and readmission if discharged to an unsupervised setting.  Assertive discharge planning and collaboration with multiple providers (inpatient and community based) remains ongoing.      Group Attendance: 5 / 10  Treatment Team: HEIKED  Psychiatric PRN's Needed: None    Review of Systems:  Behavior over the last 24 hours: Unchanged  Sleep: sleeping okay throughout the night  Appetite: adequate  Medication side effects: none reported  ROS:no complaints    Objective:    Vitals:  Vitals:    11/05/24 0739   BP: 136/78   Pulse: 80   Resp: 18   Temp: 97.5 °F (36.4 °C)   SpO2: 92%     Laboratory Results:  I have personally reviewed all pertinent laboratory/tests results.  Most Recent Labs:   Lab Results   Component Value Date    WBC 7.39 06/26/2024    RBC 4.70 06/26/2024    HGB 15.2 06/26/2024    HCT 45.5 06/26/2024     06/26/2024    RDW 12.9 06/26/2024    NEUTROABS 4.63 06/26/2024    SODIUM 137 06/26/2024    K 4.1 06/26/2024     06/26/2024    CO2 26 06/26/2024    BUN  17 06/26/2024    CREATININE 0.63 06/26/2024    GLUC 98 06/26/2024    GLUF 98 06/26/2024    CALCIUM 9.6 06/26/2024    AST 25 06/26/2024    ALT 45 06/26/2024    ALKPHOS 114 (H) 06/26/2024    TP 7.0 06/26/2024    ALB 4.4 06/26/2024    TBILI 0.44 06/26/2024    CHOLESTEROL 177 06/26/2024    HDL 52 06/26/2024    TRIG 73 06/26/2024    LDLCALC 110 (H) 06/26/2024    NONHDLC 125 06/26/2024    LITHIUM 0.4 (L) 01/28/2021    [id number] 2.636 06/26/2024    HGBA1C 5.2 11/22/2023     11/22/2023     Mental Status Evaluation:  Appearance:  age appropriate, casually dressed   Behavior:  pleasant, cooperative, calm   Speech:  normal rate, normal volume   Mood:  less anxious, less depressed   Affect:  constricted   Thought Process:  goal directed   Associations: intact associations   Thought Content:  no overt delusions   Perceptual Disturbances: no auditory hallucinations, no visual hallucinations, denies when asked, does not appear responding to internal stimuli   Risk Potential: Suicidal ideation - None at present, contracts for safety on the unit, would talk to staff if not feeling safe on the unit  Homicidal ideation - None at present  Potential for aggression - Not at present   Sensorium:  oriented to person, place, and time/date   Memory:  recent memory intact   Consciousness:  alert and awake   Attention/Concentration: attention span and concentration appear shorter than expected for age   Insight:  limited   Judgment: limited   Gait/Station: normal gait/station, normal balance   Motor Activity: no abnormal movements     Progress Toward Goals: making gradual improvement.  Deyvi continues to require inpatient psychiatric hospitalization for continued medication management and titration to optimize symptom reduction, improve sleep hygiene, and demonstrate adequate self-care.     Suicide/Homicide Risk Assessment:  Risk of Harm to Self:   Nursing Suicide Risk Assessment Last 24 hours: C-SSRS Risk (Since Last  Contact)  Calculated C-SSRS Risk Score (Since Last Contact): No Risk Indicated    Risk of Harm to Others:  Nursing Homicide Risk Assessment: Violence Risk to Others: Denies within past 6 months    Behavioral Health Medications: all current active meds have been reviewed and continue current psychiatric medications.  Current Facility-Administered Medications   Medication Dose Route Frequency Provider Last Rate    acetaminophen  650 mg Oral Q6H PRN ALVINA Harley      acetaminophen  650 mg Oral Q4H PRN ALVINA Harley      acetaminophen  975 mg Oral Q6H PRN ALVINA Harley      aluminum-magnesium hydroxide-simethicone  30 mL Oral Q4H PRN ALVINA Harley      ammonium lactate   Topical BID PRN ALVINA Harley      atorvastatin  10 mg Oral Daily ALVINA Lewis      benztropine  1 mg Intramuscular Q4H PRN Max 6/day ALVINA Harley      benztropine  1 mg Oral Q4H PRN Max 6/day ALVINA Harley      bisacodyl  10 mg Rectal Daily PRN ALVINA Harley      cariprazine  3 mg Oral Daily Martin Cardenas MD      Cholecalciferol  2,000 Units Oral Daily ALVINA Lewis      cyanocobalamin  1,000 mcg Oral Daily ALVINA Lewis      hydrOXYzine HCL  25 mg Oral Q6H PRN Max 4/day ALVINA Harley      hydrOXYzine HCL  25 mg Oral TID Martin Cardenas MD      hydrOXYzine HCL  50 mg Oral Q4H PRN Max 4/day ALVINA Harley      Or    LORazepam  1 mg Intramuscular Q4H PRN ALVINA Harley      ketoconazole   Topical Daily PRN ALVINA Harley      lamoTRIgine  50 mg Oral Daily Martin Cardenas MD      lisinopril  10 mg Oral Daily ALVINA Lewis      LORazepam  1 mg Oral Q4H PRN Max 6/day ALVINA Harley      Or    LORazepam  2 mg Intramuscular Q6H PRN Max 3/day ALVINA Harley      OLANZapine  10 mg Oral Q3H PRN Max 3/day ALVINA Harley      Or    OLANZapine  10 mg Intramuscular Q3H PRN Max 3/day ALVINA Harley      OLANZapine  5 mg Oral Q3H PRN  Max 6/day ALVINA Harley      Or    OLANZapine  5 mg Intramuscular Q3H PRN Max 6/day ALVINA Harley      OLANZapine  2.5 mg Oral Q3H PRN Max 8/day ALVINA Harley      polyethylene glycol  17 g Oral Daily PRN ALVINA Harley      propranolol  10 mg Oral Q8H PRN ALVINA Harley      senna-docusate sodium  1 tablet Oral Daily PRN ALVINA Harley      sertraline  150 mg Oral HS Martin Cardenas MD       Risks / Benefits of Treatment:  Risks, benefits, and possible side effects of medications explained to patient. Patient has limited understanding of risks and benefits of treatment at this time, but agrees to take medications as prescribed.    Counseling / Coordination of Care:  Patient's progress discussed with staff in treatment team meeting.  Medications, treatment progress and treatment plan reviewed with patient.   Educated on importance of medication and treatment compliance.  Reassurance and supportive therapy provided.   Encouraged participation in milieu and group therapy on the unit.    ALVINA Harley 11/05/24

## 2024-11-06 PROCEDURE — 99232 SBSQ HOSP IP/OBS MODERATE 35: CPT | Performed by: PSYCHIATRY & NEUROLOGY

## 2024-11-06 RX ADMIN — CHOLECALCIFEROL TAB 25 MCG (1000 UNIT) 2000 UNITS: 25 TAB at 08:36

## 2024-11-06 RX ADMIN — SERTRALINE HYDROCHLORIDE 150 MG: 100 TABLET ORAL at 21:11

## 2024-11-06 RX ADMIN — CARIPRAZINE 3 MG: 3 CAPSULE, GELATIN COATED ORAL at 08:35

## 2024-11-06 RX ADMIN — LISINOPRIL 10 MG: 10 TABLET ORAL at 08:35

## 2024-11-06 RX ADMIN — ATORVASTATIN CALCIUM 10 MG: 10 TABLET, FILM COATED ORAL at 08:36

## 2024-11-06 RX ADMIN — LAMOTRIGINE 50 MG: 25 TABLET ORAL at 08:36

## 2024-11-06 RX ADMIN — HYDROXYZINE HYDROCHLORIDE 25 MG: 25 TABLET ORAL at 21:11

## 2024-11-06 RX ADMIN — HYDROXYZINE HYDROCHLORIDE 25 MG: 25 TABLET ORAL at 17:13

## 2024-11-06 RX ADMIN — HYDROXYZINE HYDROCHLORIDE 25 MG: 25 TABLET ORAL at 08:36

## 2024-11-06 RX ADMIN — CYANOCOBALAMIN TAB 1000 MCG 1000 MCG: 1000 TAB at 08:36

## 2024-11-06 NOTE — NURSING NOTE
Pleasant and cooperative. Appears less anxious when approached during medication pass. Smiles with good eye contact. Appetite excellent. Medication compliant. Denies any psychological symptoms or suicidal ideations. Attends half of the groups today. Isolative to himself unless he is asked questions. Hopeful for discharge.

## 2024-11-06 NOTE — NURSING NOTE
Received pt in bed at change of shift with eyes closed; chest movement noted.  Continues the same thus this far as per q 15 min room checks.   Will continue to monitor behavior, sleeping pattern and any medical issues that may arise.    0552:  Sleeping 7+ hrs thus this far

## 2024-11-06 NOTE — ASSESSMENT & PLAN NOTE
Progressing - 11/6/2024  Awaiting placement - In-person interview with  [facility] completed yesterday.  Continue medications as follows: Vraylar 3mg PO Daily, Atarax 25mg PO TID, Lamictal 50mg PO Daily, Zoloft 150mg PO QHS  Continue with group therapy, milieu therapy and occupational therapy   Behavioral Health checks every 7 minutes   Continue frequent safety checks and vitals per unit protocol  Continue with [facility] medical management as indicated

## 2024-11-06 NOTE — NURSING NOTE
Patient is pleasant and cooperative. He is withdrawn to his room except for meal times. Appetite is good. He has minimal interaction with peers and staff. He did not attend groups. He denied anxiety and depression to writer.

## 2024-11-06 NOTE — PROGRESS NOTES
Progress Note - Behavioral Health   Name: Deyvi Cao 54 y.o. male I MRN: [id number]   Unit/Bed#: [facility]-02 I Date of Admission: 6/25/2024   Date of Service: 11/6/2024 I Hospital Day: 134     Assessment & Plan  Bipolar disorder with severe depression (HCC)  Progressing - 11/6/2024  Awaiting placement - In-person interview with  [facility] completed yesterday.  Continue medications as follows: Vraylar 3mg PO Daily, Atarax 25mg PO TID, Lamictal 50mg PO Daily, Zoloft 150mg PO QHS  Continue with group therapy, milieu therapy and occupational therapy   Behavioral Health checks every 7 minutes   Continue frequent safety checks and vitals per unit protocol  Continue with [facility] management as indicated  Benign essential hypertension  Managed by [facility] - reviewed 11/6/2024  Continue Lisinopril 10mg PO Daily  Mixed hyperlipidemia  Managed by [facility] - reviewed 11/6/2024  Continue Lipitor 10mg PO Daily  Allergic rhinitis due to allergen  Managed by [facility] - reviewed 11/6/2024  Rosacea  Managed by [facility] - reviewed 11/6/2024    Subjective:    Patient was seen today for continuation of care, records reviewed and patient was discussed with the morning case review team.    Deyvi was seen today for psychiatric follow-up.  On assessment today, Deyvi was found in his room.  He is quiet, calm, and cooperative.  Deyvi reports adequate daytime energy and denies any difficulties with initiating or staying asleep.  Oral appetite and hydration is adequate.   We reviewed once more the specific as-needed medications they can use going forward if they experience any insomnia or destabilization of their mood, they understood and were agreeable. Milieu visibility and group attendance encouraged to promote an active participation in treatment.    Deyvi denies acute suicidal/self-harm ideation/intent/plan upon direct inquiry at this time. Deyvi is able to contract for safety while on the unit and would feel  comfortable seeking staff support should suicidal symptoms or urges appear or worsen. Deyvi remains behaviorally appropriate, no agitation or aggression noted on exam or in report. Deyvi also denies HI/AH/VH, and does not appear overtly manic.  Patient does not verbalize any experiences that can be categorized as paranoid, persecutory, bizarre, or somatic delusions. Deyvi remains adherent to his current psychotropic medication regimen and denies any side effects from medications, as well as none noted on exam.    Deyvi is currently assessed as being at their baseline with continued need for medication management, supervision for safety and ADL’s. These services are not currently available in a less restrictive environment necessitating continued hospital stay.  Deyvi should remain on the unit until these services are available, due to likelihood of mental decompensation and readmission if discharged to an unsupervised setting.  Assertive discharge planning and collaboration with multiple providers (inpatient and community based) remains ongoing.     Group Attendance: 1 / 10  Treatment Team: SOLEDAD  Psychiatric PRN's Needed: None    Review of Systems:  Behavior over the last 24 hours: Unchanged  Sleep: sleeping okay throughout the night  Appetite: adequate  Medication side effects: none reported  ROS:no complaints    Objective:    Vitals:  Vitals:    11/06/24 0726   BP: 158/90   Pulse: 98   Resp: 17   Temp: 97.7 °F (36.5 °C)   SpO2: 94%     Laboratory Results:  I have personally reviewed all pertinent laboratory/tests results.  Most Recent Labs:   Lab Results   Component Value Date    WBC 7.39 06/26/2024    RBC 4.70 06/26/2024    HGB 15.2 06/26/2024    HCT 45.5 06/26/2024     06/26/2024    RDW 12.9 06/26/2024    NEUTROABS 4.63 06/26/2024    SODIUM 137 06/26/2024    K 4.1 06/26/2024     06/26/2024    CO2 26 06/26/2024    BUN 17 06/26/2024    CREATININE 0.63 06/26/2024    GLUC 98 06/26/2024    GLUF  98 06/26/2024    CALCIUM 9.6 06/26/2024    AST 25 06/26/2024    ALT 45 06/26/2024    ALKPHOS 114 (H) 06/26/2024    TP 7.0 06/26/2024    ALB 4.4 06/26/2024    TBILI 0.44 06/26/2024    CHOLESTEROL 177 06/26/2024    HDL 52 06/26/2024    TRIG 73 06/26/2024    LDLCALC 110 (H) 06/26/2024    NONHDLC 125 06/26/2024    LITHIUM 0.4 (L) 01/28/2021    [id number] 2.636 06/26/2024    HGBA1C 5.2 11/22/2023     11/22/2023     Mental Status Evaluation:  Appearance:  age appropriate, casually dressed   Behavior:  cooperative   Speech:  scant, soft   Mood:  less anxious, less depressed   Affect:  constricted   Thought Process:  goal directed   Associations: intact associations   Thought Content:  no overt delusions   Perceptual Disturbances: no auditory hallucinations, no visual hallucinations, denies when asked, does not appear responding to internal stimuli   Risk Potential: Suicidal ideation - None at present, contracts for safety on the unit, would talk to staff if not feeling safe on the unit  Homicidal ideation - None at present  Potential for aggression - Not at present   Sensorium:  oriented to person, place, and time/date   Memory:  recent memory intact   Consciousness:  alert and awake   Attention/Concentration: attention span and concentration appear shorter than expected for age   Insight:  limited   Judgment: limited   Gait/Station: normal gait/station, normal balance   Motor Activity: no abnormal movements     Progress Toward Goals: making gradual improvement.  Deyvi continues to require inpatient psychiatric hospitalization for continued medication management and titration to optimize symptom reduction, improve sleep hygiene, and demonstrate adequate self-care.     Suicide/Homicide Risk Assessment:  Risk of Harm to Self:   Nursing Suicide Risk Assessment Last 24 hours: C-SSRS Risk (Since Last Contact)  Calculated C-SSRS Risk Score (Since Last Contact): No Risk Indicated    Risk of Harm to Others:  Nursing  Homicide Risk Assessment: Violence Risk to Others: Denies within past 6 months    Behavioral Health Medications: all current active meds have been reviewed and continue current psychiatric medications.  Current Facility-Administered Medications   Medication Dose Route Frequency Provider Last Rate    acetaminophen  650 mg Oral Q6H PRN ALVINA Harley      acetaminophen  650 mg Oral Q4H PRN ALVINA Harley      acetaminophen  975 mg Oral Q6H PRN ALVINA Harley      aluminum-magnesium hydroxide-simethicone  30 mL Oral Q4H PRN ALVINA Harley      ammonium lactate   Topical BID PRN ALVINA Harley      atorvastatin  10 mg Oral Daily ALVINA Lewis      benztropine  1 mg Intramuscular Q4H PRN Max 6/day ALVINA Harley      benztropine  1 mg Oral Q4H PRN Max 6/day ALVINA Harley      bisacodyl  10 mg Rectal Daily PRN ALVINA Harley      cariprazine  3 mg Oral Daily Martin aCrdenas MD      Cholecalciferol  2,000 Units Oral Daily ALVINA Lewis      cyanocobalamin  1,000 mcg Oral Daily ALVINA Lewis      hydrOXYzine HCL  25 mg Oral Q6H PRN Max 4/day ALVINA Harley      hydrOXYzine HCL  25 mg Oral TID Martin Cardenas MD      hydrOXYzine HCL  50 mg Oral Q4H PRN Max 4/day ALVINA Harley      Or    LORazepam  1 mg Intramuscular Q4H PRN ALVINA Harley      ketoconazole   Topical Daily PRN ALVINA Harley      lamoTRIgine  50 mg Oral Daily Martin Cardenas MD      lisinopril  10 mg Oral Daily ALVINA Lewis      LORazepam  1 mg Oral Q4H PRN Max 6/day ALVINA Harley      Or    LORazepam  2 mg Intramuscular Q6H PRN Max 3/day ALVINA Harley      OLANZapine  10 mg Oral Q3H PRN Max 3/day ALVINA Harley      Or    OLANZapine  10 mg Intramuscular Q3H PRN Max 3/day ALVINA Harley      OLANZapine  5 mg Oral Q3H PRN Max 6/day ALVINA Harley      Or    OLANZapine  5 mg Intramuscular Q3H PRN Max 6/day ALVINA Harley       OLANZapine  2.5 mg Oral Q3H PRN Max 8/day ALVINA Harley      polyethylene glycol  17 g Oral Daily PRN ALVINA Harley      propranolol  10 mg Oral Q8H PRN ALVINA Harley      senna-docusate sodium  1 tablet Oral Daily PRN ALVINA Harley      sertraline  150 mg Oral HS Martin Cardenas MD       Risks / Benefits of Treatment:  Risks, benefits, and possible side effects of medications explained to patient. Patient has limited understanding of risks and benefits of treatment at this time, but agrees to take medications as prescribed.    Counseling / Coordination of Care:  Patient's progress discussed with staff in treatment team meeting.  Medications, treatment progress and treatment plan reviewed with patient.   Educated on importance of medication and treatment compliance.  Reassurance and supportive therapy provided.   Encouraged participation in milieu and group therapy on the unit.    ALVINA Harley 11/06/24

## 2024-11-06 NOTE — PROGRESS NOTES
11/06/24 0739   Team Meeting   Meeting Type Daily Rounds   Team Members Present   Team Members Present Physician;Nurse;;Other (Discipline and Name)   Physician Team Member Holter, Thomas, Spoleti   Nursing Team Member Kunal   Social Work Team Member Henrry FRY   Other (Discipline and Name) Ugo Bailey MS    Patient/Family Present   Patient Present No   Patient's Family Present No     Expected D/C Date:11/29  Groups Participation  1/8.   Patient's compliant with medications. He's not engaged in his treatment. He isolates in his room. Reports anxiety. CRR referral pending.

## 2024-11-07 PROCEDURE — 99232 SBSQ HOSP IP/OBS MODERATE 35: CPT

## 2024-11-07 RX ADMIN — ATORVASTATIN CALCIUM 10 MG: 10 TABLET, FILM COATED ORAL at 08:18

## 2024-11-07 RX ADMIN — CYANOCOBALAMIN TAB 1000 MCG 1000 MCG: 1000 TAB at 08:18

## 2024-11-07 RX ADMIN — HYDROXYZINE HYDROCHLORIDE 25 MG: 25 TABLET ORAL at 21:11

## 2024-11-07 RX ADMIN — HYDROXYZINE HYDROCHLORIDE 25 MG: 25 TABLET ORAL at 17:06

## 2024-11-07 RX ADMIN — HYDROXYZINE HYDROCHLORIDE 25 MG: 25 TABLET ORAL at 08:18

## 2024-11-07 RX ADMIN — CARIPRAZINE 3 MG: 3 CAPSULE, GELATIN COATED ORAL at 08:18

## 2024-11-07 RX ADMIN — LISINOPRIL 10 MG: 10 TABLET ORAL at 08:18

## 2024-11-07 RX ADMIN — SERTRALINE HYDROCHLORIDE 150 MG: 100 TABLET ORAL at 21:11

## 2024-11-07 RX ADMIN — LAMOTRIGINE 50 MG: 25 TABLET ORAL at 08:18

## 2024-11-07 RX ADMIN — CHOLECALCIFEROL TAB 25 MCG (1000 UNIT) 2000 UNITS: 25 TAB at 08:17

## 2024-11-07 NOTE — PROGRESS NOTES
Progress Note - Behavioral Health   Name: Deyvi Cao 54 y.o. male I MRN: [id number]   Unit/Bed#: [facility] 101-02 I Date of Admission: 6/25/2024   Date of Service: 11/7/2024 I Hospital Day: 135     Assessment & Plan  Bipolar disorder with severe depression (HCC)  Progressing - 11/7/2024  Awaiting placement - In-person interview with   [facility] completed yesterday.  Continue medications as follows: Vraylar 3mg PO Daily, Atarax 25mg PO TID, Lamictal 50mg PO Daily, Zoloft 150mg PO QHS  Continue with group therapy, milieu therapy and occupational therapy   Behavioral Health checks every 7 minutes   Continue frequent safety checks and vitals per unit protocol  Continue with [facility] medical management as indicated  Benign essential hypertension  Managed by [facility] - reviewed 11/7/2024  Continue Lisinopril 10mg PO Daily  Mixed hyperlipidemia  Managed by [facility] - reviewed 11/7/2024  Continue Lipitor 10mg PO Daily  Allergic rhinitis due to allergen  Managed by [facility] - reviewed 11/7/2024  Rosacea  Managed by [facility] - reviewed 11/7/2024     Subjective:    Patient was seen today for continuation of care, records reviewed and patient was discussed with the morning case review team.    Deyvi was seen today for psychiatric follow-up.  On assessment today, Deyvi was found sitting in his room.  He is calm and cooperative on approach.  Still reports minimal depression and anxiety.  He continues to wait for placement.  Deyvi reports adequate daytime energy and denies any difficulties with initiating or staying asleep.  Oral appetite and hydration is adequate.   We reviewed once more the specific as-needed medications they can use going forward if they experience any insomnia or destabilization of their mood, they understood and were agreeable. Milieu visibility and group attendance encouraged to promote an active participation in treatment.    Deyvi denies acute suicidal/self-harm ideation/intent/plan upon direct  inquiry at this time. Deyvi is able to contract for safety while on the unit and would feel comfortable seeking staff support should suicidal symptoms or urges appear or worsen. Deyvi remains behaviorally appropriate, no agitation or aggression noted on exam or in report. Deyvi also denies HI/AH/VH, and does not appear overtly manic.  Patient does not verbalize any experiences that can be categorized as paranoid, persecutory, bizarre, or somatic delusions. Deyvi remains adherent to his current psychotropic medication regimen and denies any side effects from medications, as well as none noted on exam.    Deyvi is currently assessed as being at their baseline with continued need for medication management, supervision for safety and ADL’s. These services are not currently available in a less restrictive environment necessitating continued hospital stay.  Deyvi should remain on the unit until these services are available, due to likelihood of mental decompensation and readmission if discharged to an unsupervised setting.  Assertive discharge planning and collaboration with multiple providers (inpatient and community based) remains ongoing.     Group Attendance: 4 / 11  Treatment Team: TBD  Psychiatric PRN's Needed: None    Review of Systems:  Behavior over the last 24 hours: Unchanged  Sleep: sleeping okay throughout the night  Appetite: adequate  Medication side effects: none reported  ROS:no complaints    Objective:    Vitals:  Vitals:    11/07/24 0726   BP: 137/83   Pulse: 94   Resp: 18   Temp: 97.7 °F (36.5 °C)   SpO2: 95%     Laboratory Results:  I have personally reviewed all pertinent laboratory/tests results.  Most Recent Labs:   Lab Results   Component Value Date    WBC 7.39 06/26/2024    RBC 4.70 06/26/2024    HGB 15.2 06/26/2024    HCT 45.5 06/26/2024     06/26/2024    RDW 12.9 06/26/2024    NEUTROABS 4.63 06/26/2024    SODIUM 137 06/26/2024    K 4.1 06/26/2024     06/26/2024    CO2  26 06/26/2024    BUN 17 06/26/2024    CREATININE 0.63 06/26/2024    GLUC 98 06/26/2024    GLUF 98 06/26/2024    CALCIUM 9.6 06/26/2024    AST 25 06/26/2024    ALT 45 06/26/2024    ALKPHOS 114 (H) 06/26/2024    TP 7.0 06/26/2024    ALB 4.4 06/26/2024    TBILI 0.44 06/26/2024    CHOLESTEROL 177 06/26/2024    HDL 52 06/26/2024    TRIG 73 06/26/2024    LDLCALC 110 (H) 06/26/2024    NONHDLC 125 06/26/2024    LITHIUM 0.4 (L) 01/28/2021    [id number] 2.636 06/26/2024    HGBA1C 5.2 11/22/2023     11/22/2023     Mental Status Evaluation:  Appearance:  age appropriate, casually dressed   Behavior:  pleasant, cooperative, calm   Speech:  normal rate, normal volume, normal pitch   Mood:  mildly anxious, mildly depressed   Affect:  constricted   Thought Process:  coherent, goal directed   Associations: intact associations   Thought Content:  no overt delusions   Perceptual Disturbances: no auditory hallucinations, no visual hallucinations, denies when asked, does not appear responding to internal stimuli   Risk Potential: Suicidal ideation - None at present, contracts for safety on the unit, would talk to staff if not feeling safe on the unit  Homicidal ideation - None at present  Potential for aggression - Not at present   Sensorium:  oriented to person, place, and time/date   Memory:  recent memory intact   Consciousness:  alert and awake   Attention/Concentration: attention span and concentration appear shorter than expected for age   Insight:  limited   Judgment: limited   Gait/Station: normal gait/station, normal balance   Motor Activity: no abnormal movements     Progress Toward Goals: making slow improvement.  Deyvi continues to require inpatient psychiatric hospitalization for continued medication management and titration to optimize symptom reduction, improve sleep hygiene, and demonstrate adequate self-care.     Suicide/Homicide Risk Assessment:  Risk of Harm to Self:   Nursing Suicide Risk Assessment Last  24 hours: C-SSRS Risk (Since Last Contact)  Calculated C-SSRS Risk Score (Since Last Contact): No Risk Indicated    Risk of Harm to Others:  Nursing Homicide Risk Assessment: Violence Risk to Others: Denies within past 6 months    Behavioral Health Medications: all current active meds have been reviewed and continue current psychiatric medications.  Current Facility-Administered Medications   Medication Dose Route Frequency Provider Last Rate    acetaminophen  650 mg Oral Q6H PRN ALVINA Harley      acetaminophen  650 mg Oral Q4H PRN ALVINA Harley      acetaminophen  975 mg Oral Q6H PRN ALVINA Harley      aluminum-magnesium hydroxide-simethicone  30 mL Oral Q4H PRN ALVINA Harley      ammonium lactate   Topical BID PRN ALVINA Harley      atorvastatin  10 mg Oral Daily ALVINA Lewis      benztropine  1 mg Intramuscular Q4H PRN Max 6/day ALVINA Harley      benztropine  1 mg Oral Q4H PRN Max 6/day ALVINA Harley      bisacodyl  10 mg Rectal Daily PRN ALVINA Harley      cariprazine  3 mg Oral Daily Martin Cardenas MD      Cholecalciferol  2,000 Units Oral Daily ALVINA Lewis      cyanocobalamin  1,000 mcg Oral Daily ALVINA Lewis      hydrOXYzine HCL  25 mg Oral Q6H PRN Max 4/day ALVINA Harley      hydrOXYzine HCL  25 mg Oral TID Martin Carednas MD      hydrOXYzine HCL  50 mg Oral Q4H PRN Max 4/day ALVINA Harley      Or    LORazepam  1 mg Intramuscular Q4H PRN ALVINA Harley      ketoconazole   Topical Daily PRN ALVINA Harley      lamoTRIgine  50 mg Oral Daily Martin Cardenas MD      lisinopril  10 mg Oral Daily ALVINA Lewis      LORazepam  1 mg Oral Q4H PRN Max 6/day ALVINA Harley      Or    LORazepam  2 mg Intramuscular Q6H PRN Max 3/day ALVINA Harley      OLANZapine  10 mg Oral Q3H PRN Max 3/day ALVINA Harley      Or    OLANZapine  10 mg Intramuscular Q3H PRN Max 3/day ALVINA Harley       OLANZapine  5 mg Oral Q3H PRN Max 6/day ALVINA Harley      Or    OLANZapine  5 mg Intramuscular Q3H PRN Max 6/day ALVINA Harley      OLANZapine  2.5 mg Oral Q3H PRN Max 8/day ALVINA Harley      polyethylene glycol  17 g Oral Daily PRN ALVINA Harley      propranolol  10 mg Oral Q8H PRN ALVINA Harley      senna-docusate sodium  1 tablet Oral Daily PRN ALVINA Harley      sertraline  150 mg Oral HS Martin Cardenas MD       Risks / Benefits of Treatment:  Risks, benefits, and possible side effects of medications explained to patient. Patient has limited understanding of risks and benefits of treatment at this time, but agrees to take medications as prescribed.    Counseling / Coordination of Care:  Patient's progress discussed with staff in treatment team meeting.  Medications, treatment progress and treatment plan reviewed with patient.   Educated on importance of medication and treatment compliance.  Reassurance and supportive therapy provided.   Encouraged participation in milieu and group therapy on the unit.    ALVINA Harley 11/07/24

## 2024-11-07 NOTE — NURSING NOTE
Patient has been visible on the unit at intervals. Quiet and isolative to himself. Smiles when approached. Pleasant and cooperative. Remains with an anxious affect but offers no psychological or suicidal ideation complaints. Medication compliant. Appetite excellent. Attended half of the groups today. Awaiting discharge. Support  offered .

## 2024-11-07 NOTE — ASSESSMENT & PLAN NOTE
Progressing - 11/7/2024  Awaiting placement - In-person interview with  [facility] completed yesterday.  Continue medications as follows: Vraylar 3mg PO Daily, Atarax 25mg PO TID, Lamictal 50mg PO Daily, Zoloft 150mg PO QHS  Continue with group therapy, milieu therapy and occupational therapy   Behavioral Health checks every 7 minutes   Continue frequent safety checks and vitals per unit protocol  Continue with [facility] medical management as indicated

## 2024-11-07 NOTE — NURSING NOTE
Patient is pleasant and cooperative. He denied anxiety and depression. He is compliant with medications. He spends the morning in bed however is more interactive in the afternoon. He was social playing cards with writer and peers. Appetite is good.

## 2024-11-07 NOTE — PROGRESS NOTES
11/07/24 0745   Team Meeting   Meeting Type Daily Rounds   Team Members Present   Team Members Present Physician;Nurse;;Other (Discipline and Name)   Physician Team Member  Jacqueline Cardenas Spoletti   Nursing Team Member Chastity   Social Work Team Member Henrry FRY   Other (Discipline and Name) Goff PCM   Patient/Family Present   Patient Present No   Patient's Family Present No     Expected D/C Date:11/29  Groups Participation  4/11.   Patient had minimal engagement in groups. He's compliant with medications. CRR referral pending. He isolates at times. Minimal interactions with peers.

## 2024-11-07 NOTE — NURSING NOTE
Received pt in bed at change of shift with eyes closed; chest movement noted.  Continues the same thus this far as per q 15 min room checks.   Will continue to monitor behavior, sleeping pattern and any medical issues that may arise.    0542;  Sleeping 7+ hrs thus this far

## 2024-11-07 NOTE — PLAN OF CARE
Problem: Alteration in Thoughts and Perception  Goal: Treatment Goal: Gain control of psychotic behaviors/thinking, reduce/eliminate presenting symptoms and demonstrate improved reality functioning upon discharge  Outcome: Progressing  Goal: Verbalize thoughts and feelings  Description: Interventions:  - Promote a nonjudgmental and trusting relationship with the patient through active listening and therapeutic communication  - Assess patient's level of functioning, behavior and potential for risk  - Engage patient in 1 on 1 interactions  - Encourage patient to express fears, feelings, frustrations, and discuss symptoms    - [place] patient to reality, help patient recognize reality-based thinking   - Administer medications as ordered and assess for potential side effects  - Provide the patient education related to the signs and symptoms of the illness and desired effects of prescribed medications  Outcome: Progressing  Goal: Attend and participate in unit activities, including therapeutic, recreational, and educational groups  Description: Interventions:  -Encourage Visitation and family involvement in care  Outcome: Progressing  Goal: Complete daily ADLs, including personal hygiene independently, as able  Description: Interventions:  - Observe, teach, and assist patient with ADLS  - Monitor and promote a balance of rest/activity, with adequate nutrition and elimination   Outcome: Progressing     Problem: Ineffective Coping  Goal: Participates in unit activities  Description: Interventions:  - Provide therapeutic environment   - Provide required programming   - Redirect inappropriate behaviors   Outcome: Progressing  Goal: Patient/Family verbalizes awareness of resources  Outcome: Progressing  Goal: Free from restraint events  Description: - Utilize least restrictive measures   - Provide behavioral interventions   - Redirect inappropriate behaviors   Outcome: Progressing     Problem: Risk for Self  Injury/Neglect  Goal: Treatment Goal: Remain safe during length of stay, learn and adopt new coping skills, and be free of self-injurious ideation, impulses and acts at the time of discharge  Outcome: Progressing  Goal: Refrain from harming self  Description: Interventions:  - Monitor patient closely, per order  - Develop a trusting relationship  - Supervise medication ingestion, monitor effects and side effects   Outcome: Progressing     Problem: Depression  Goal: Treatment Goal: Demonstrate behavioral control of depressive symptoms, verbalize feelings of improved mood/affect, and adopt new coping skills prior to discharge  Outcome: Progressing  Goal: Verbalize thoughts and feelings  Description: Interventions:  - Assess and re-assess patient's level of risk   - Engage patient in 1:1 interactions, daily, for a minimum of 15 minutes   - Encourage patient to express feelings, fears, frustrations, hopes   Outcome: Progressing  Goal: Refrain from harming self  Description: Interventions:  - Monitor patient closely, per order   - Supervise medication ingestion, monitor effects and side effects   Outcome: Progressing  Goal: Refrain from isolation  Description: Interventions:  - Develop a trusting relationship   - Encourage socialization   Outcome: Progressing  Goal: Refrain from self-neglect  Outcome: Progressing  Goal: Complete daily ADLs, including personal hygiene independently, as able  Description: Interventions:  - Observe, teach, and assist patient with ADLS  -  Monitor and promote a balance of rest/activity, with adequate nutrition and elimination   Outcome: Progressing     Problem: Anxiety  Goal: Anxiety is at manageable level  Description: Interventions:  - Assess and monitor patient's anxiety level.   - Monitor for signs and symptoms (heart palpitations, chest pain, shortness of breath, headaches, nausea, feeling jumpy, restlessness, irritable, apprehensive).   - Collaborate with interdisciplinary team and  initiate plan and interventions as ordered.  - [place] patient to unit/surroundings  - Explain treatment plan  - Encourage participation in care  - Encourage verbalization of concerns/fears  - Identify coping mechanisms  - Assist in developing anxiety-reducing skills  - Administer/offer alternative therapies  - Limit or eliminate stimulants  Outcome: Progressing     Problem: Risk for Violence/Aggression Toward Others  Goal: Treatment Goal: Refrain from acts of violence/aggression during length of stay, and demonstrate improved impulse control at the time of discharge  Outcome: Progressing  Goal: Refrain from harming others  Outcome: Progressing  Goal: Refrain from destructive acts on the environment or property  Outcome: Progressing  Goal: Control angry outbursts  Description: Interventions:  - Monitor patient closely, per order  - Ensure early verbal de-escalation  - Monitor prn medication needs  - Set reasonable/therapeutic limits, outline behavioral expectations, and consequences   - Provide a non-threatening milieu, utilizing the least restrictive interventions   Outcome: Progressing     Problem: Alteration in Orientation  Goal: Treatment Goal: Demonstrate a reduction of confusion and improved orientation to person, place, time and/or situation upon discharge, according to optimum baseline/ability  Outcome: Progressing     Problem: SELF HARM/SUICIDALITY  Goal: Will have no self-injury during hospital stay  Description: INTERVENTIONS:  - Q 15 MINUTES: Routine safety checks  - Q WAKING SHIFT & PRN: Assess risk to determine if routine checks are adequate to maintain patient safety  - Encourage patient to participate actively in care by formulating a plan to combat response to suicidal ideation, identify supports and resources  Outcome: Progressing     Problem: SELF CARE DEFICIT  Goal: Return ADL status to a safe level of function  Description: INTERVENTIONS:  - Administer medication as ordered  - Assess ADL deficits  and provide assistive devices as needed  - Obtain PT/OT consults as needed  - Assist and instruct patient to increase activity and self care as tolerated  Outcome: Progressing     Problem: DISCHARGE PLANNING - CARE MANAGEMENT  Goal: Discharge to post-acute care or home with appropriate resources  Description: INTERVENTIONS:  - Conduct assessment to determine patient/family and health care team treatment goals, and need for post-acute services based on payer coverage, community resources, and patient preferences, and barriers to discharge  - Address psychosocial, clinical, and financial barriers to discharge as identified in assessment in conjunction with the patient/family and health care team  - Arrange appropriate level of post-acute services according to patient’s   needs and preference and payer coverage in collaboration with the physician and health care team  - Communicate with and update the patient/family, physician, and health care team regarding progress on the discharge plan  - Arrange appropriate transportation to post-acute venues  Outcome: Progressing

## 2024-11-08 PROBLEM — E55.9 VITAMIN D3 DEFICIENCY: Status: ACTIVE | Noted: 2024-11-08

## 2024-11-08 PROCEDURE — 99232 SBSQ HOSP IP/OBS MODERATE 35: CPT | Performed by: PSYCHIATRY & NEUROLOGY

## 2024-11-08 RX ADMIN — ATORVASTATIN CALCIUM 10 MG: 10 TABLET, FILM COATED ORAL at 08:11

## 2024-11-08 RX ADMIN — HYDROXYZINE HYDROCHLORIDE 25 MG: 25 TABLET ORAL at 17:05

## 2024-11-08 RX ADMIN — CHOLECALCIFEROL TAB 25 MCG (1000 UNIT) 2000 UNITS: 25 TAB at 08:12

## 2024-11-08 RX ADMIN — CARIPRAZINE 3 MG: 3 CAPSULE, GELATIN COATED ORAL at 08:12

## 2024-11-08 RX ADMIN — CYANOCOBALAMIN TAB 1000 MCG 1000 MCG: 1000 TAB at 08:11

## 2024-11-08 RX ADMIN — SERTRALINE HYDROCHLORIDE 150 MG: 100 TABLET ORAL at 21:22

## 2024-11-08 RX ADMIN — HYDROXYZINE HYDROCHLORIDE 25 MG: 25 TABLET ORAL at 08:12

## 2024-11-08 RX ADMIN — LISINOPRIL 10 MG: 10 TABLET ORAL at 08:12

## 2024-11-08 RX ADMIN — LAMOTRIGINE 50 MG: 25 TABLET ORAL at 08:12

## 2024-11-08 RX ADMIN — HYDROXYZINE HYDROCHLORIDE 25 MG: 25 TABLET ORAL at 21:22

## 2024-11-08 NOTE — PLAN OF CARE
Problem: Ineffective Coping  Goal: Identifies ineffective coping skills  Outcome: Progressing  Goal: Identifies healthy coping skills  Outcome: Progressing  Goal: Demonstrates healthy coping skills  Outcome: Progressing  Goal: Participates in unit activities  Description: Interventions:  - Provide therapeutic environment   - Provide required programming   - Redirect inappropriate behaviors   Outcome: Not Progressing   Deyvi continues to show progressed towards the goals by engaging and actively participating in groups.  However, the number of groups has decreased.

## 2024-11-08 NOTE — PLAN OF CARE
Problem: Alteration in Thoughts and Perception  Goal: Agree to be compliant with medication regime, as prescribed and report medication side effects  Description: Interventions:  - Offer appropriate PRN medication and supervise ingestion; conduct AIMS, as needed   Outcome: Progressing     Problem: Ineffective Coping  Goal: Identifies ineffective coping skills  Outcome: Progressing  Goal: Identifies healthy coping skills  Outcome: Progressing  Goal: Patient/Family participate in treatment and DC plans  Description: Interventions:  - Provide therapeutic environment  Outcome: Progressing     Problem: Anxiety  Goal: Anxiety is at manageable level  Description: Interventions:  - Assess and monitor patient's anxiety level.   - Monitor for signs and symptoms (heart palpitations, chest pain, shortness of breath, headaches, nausea, feeling jumpy, restlessness, irritable, apprehensive).   - Collaborate with interdisciplinary team and initiate plan and interventions as ordered.  - [place] patient to unit/surroundings  - Explain treatment plan  - Encourage participation in care  - Encourage verbalization of concerns/fears  - Identify coping mechanisms  - Assist in developing anxiety-reducing skills  - Administer/offer alternative therapies  - Limit or eliminate stimulants  Outcome: Progressing     Problem: ANXIETY  Goal: Will report anxiety at manageable levels  Description: INTERVENTIONS:  - Administer medication as ordered  - Teach and encourage coping skills  - Provide emotional support  - Assess patient/family for anxiety and ability to cope  Outcome: Progressing  Goal: By discharge: Patient will verbalize 2 strategies to deal with anxiety  Description: Interventions:  - Identify any obvious source/trigger to anxiety  - Staff will assist patient in applying identified coping technique/skills  - Encourage attendance of scheduled groups and activities  Outcome: Progressing     Problem: SELF CARE DEFICIT  Goal: Return ADL  status to a safe level of function  Outcome: Progressing     Problem: Alteration in Thoughts and Perception  Goal: Attend and participate in unit activities, including therapeutic, recreational, and educational groups  Outcome: Not Progressing     Problem: Depression  Goal: Refrain from isolation  Description: Interventions:  - Develop a trusting relationship   - Encourage socialization   Outcome: Not Progressing

## 2024-11-08 NOTE — ASSESSMENT & PLAN NOTE
Managed by [facility]  Continue Lisinopril 10mg PO Daily   normal/cranial nerves II-XII intact/sensation intact

## 2024-11-08 NOTE — PROGRESS NOTES
11/08/24 0731   Team Meeting   Meeting Type Daily Rounds   Team Members Present   Team Members Present Physician;Nurse;;Other (Discipline and Name)   Physician Team Member Holter, Thomas   Nursing Team Member Chastity   Social Work Team Member Henrry FRY   Other (Discipline and Name) Goff PCM   Patient/Family Present   Patient Present No   Patient's Family Present No     Expected D/C Date:12/12  Groups Participation 3/10.   Patient's has minimal engagement in his treatment. He often isolates. CRR referral pending. He appears depressed at times. Poor hygiene. He isolates in his room.

## 2024-11-08 NOTE — PROGRESS NOTES
Progress Note - Behavioral Health   Name: Deyvi Cao 54 y.o. male I MRN: [id number]  Unit/Bed#: [facility] 101-02 I Date of Admission: 6/25/2024   Date of Service: 11/8/2024 I Hospital Day: 136     Assessment & Plan  Bipolar disorder with severe depression (HCC)  Reviewed on 11/8/2024  Continues to do well with no major issues and still with some residual symptoms but not aggressive or agitated or manic or psychotic or suicidal and still with some preoccupation and delayed responses as usual with a constricted affect but no acute management problems  Awaiting placement - [facility] with  [facility] and waiting to hear back from them after, and I will check for finalizing discharge  Continue medications as follows: Vraylar 3mg PO Daily, Atarax 25mg PO TID, Lamictal 50mg PO Daily, Zoloft 150mg PO QHS  Continue with individual therapy, group therapy, milieu therapy and occupational therapy   Behavioral Health checks every 7 minutes   Continue frequent safety checks and vitals per unit protocol    Benign essential hypertension  Managed by [facility] -reviewed on 11/8/2024  Continue Lisinopril 10mg PO Daily  Mixed hyperlipidemia  Managed by [facility] -reviewed on 11/8/2024  Continue Lipitor 10mg PO Daily  Allergic rhinitis due to allergen  Managed by [facility] -reviewed on 11/8/2024  Rosacea  Managed by [facility] -reviewed on 11/8/2024  Medical clearance for psychiatric admission  Reviewed on 11/8/2024 followed up by medical  Vitamin D3 deficiency  Reviewed on 11/8/2024 on vitamin D3 supplements    Patient Active Problem List   Diagnosis    Acne    Benign essential hypertension    Mixed hyperlipidemia    Tobacco dependence syndrome    Allergic rhinitis due to allergen    Lung cancer screening declined by patient    Moderate depressed bipolar I disorder (HCC)    Bipolar disorder with severe depression (HCC)    Rosacea     Review of systems: Unremarkable  Psychiatric Diagnosis: Bipolar  depression    Assessment  Overall Status: Still somewhat timid, shy, withdrawn with minimal mood reactivity with mild dysphoria and anxiety staying to himself with minimal interaction with peers claiming he is a loner reporting no overt psychotic or manic symptoms or suicidal thoughts  certification Statement: The patient will continue to require additional inpatient hospital stay due to recurrent depression and repeated suicide attempts in the community     Medications: Vraylar 3 mg a day, hydroxyzine 25 mg 3 times a day, Zoloft 150 mg a day, Lamictal 50 mg a day,  All medications were reviewed  side effects from treatment: None reported  Medication changes   No significant changes but medications were being titrated as ordered   Medication education   Risks side effects benefits and precautions of medications discussed with patient and he did verbalize an understanding about risks for metabolic syndrome from being on neuroleptics and is form tardive dyskinesia etc.     Understanding of medications: Has good understanding about medications and side effects of his precautions benefits   Justification for dual anti-psychotics: Not applicable    Non-pharmacological treatments  Continue with individual, group, milieu and occupational therapy using recovery principles and psycho-education about accepting illness and the need for treatment.  Behavioral health checks every 7 minutes  Safety with the patient about illness and need for treatment    Safety  Safety and communication plan established to target dynamic risk factors discussed above.    Discharge Plan   [facility]ing for [facility] to accept him once criminal check is done with an ACT team and referral to [facility]    Interval Progress   Continues to wait to hear from [facility] for the community check to come back with an ACT team through [facility].  Still with mild depression 1 out of 10 and anxiety 2 out of 10 and somewhat timid shy  withdrawn without much interaction with peers claiming that is his baseline and that he prefers to be alone or.  Continues to deny having had any psychotic manic or depressive symptoms or suicidal thoughts lately and able to continue to contract for safety.  Remains somewhat superficial called aloof in interaction with delayed responses which is his baseline.  Not aggressive or agitated or threatening or self-abusive  Acceptance by patient: Accepting  Hopefulness in recovery: Living at a group home and working with ACT team   involved in reintegration process: No contact with anyone including his sister or mother  trusting in relationship with psychiatrist: Appears to trust  Sleep: Good  Appetite: Good  Compliance with Medications: Good  Group attendance: Attending some groups  Significant events and progress towards goals: Waiting to hear from [facility] for discharge otherwise unremarkable progressing well    Mental Status Exam  Appearance: casually dressed, dressed appropriately, adequate grooming, looks stated age with good eye contact  Behavior: cooperative, mildly anxious, slow responses laying on bed friendly cooperative but did get up when approached  speech: slow, scant, increased latency of response, delayed, soft, decreased volume somewhat cold aloof as usual  Mood: depressed, dysphoric, anxious  Affect: constricted, slightly brighter, mood-congruent  Thought Process: organized, goal directed, decreased rate of thoughts, slowing of thoughts, negative thinking, concrete  Thought Content: no overt delusions, no current suicidal or homicidal thoughts and no plans verbalized.  No phobias obsessions compulsions reported.  Not appearing to respond to any delusional beliefs  Perceptual Disturbances: no auditory hallucinations, no visual hallucinations  Hx Risk Factors: chronic depressive symptoms, chronic anxiety symptoms, history of suicide attempts  Sensorium: Oriented x 3 spheres and situation  Cognition:  recent and remote memory grossly intact  Consciousness: alert, awake, and not sedated  Attention: attention span and concentration are age appropriate  Intellect: appears to be of average intelligence  Insight: limited  Judgement: limited  Motor Activity: no abnormal movements     Vitals  Temp:  [97.5 °F (36.4 °C)-97.7 °F (36.5 °C)] 97.5 °F (36.4 °C)  HR:  [93-94] 93  Resp:  [18] 18  BP: (113-137)/(77-83) 113/77  SpO2:  [95 %-96 %] 96 %  No intake or output data in the 24 hours ending 11/08/24 0547    Lab Results: All Labs For Current Hospital Admission Reviewed      Current Facility-Administered Medications   Medication Dose Route Frequency Provider Last Rate    acetaminophen  650 mg Oral Q6H PRN TITI HarleyNP      acetaminophen  650 mg Oral Q4H PRN TITI HarleyNP      acetaminophen  975 mg Oral Q6H PRN ALVINA Harley      aluminum-magnesium hydroxide-simethicone  30 mL Oral Q4H PRN ALVINA Harley      ammonium lactate   Topical BID PRN AVLINA Harley      atorvastatin  10 mg Oral Daily ALVINA Lewis      benztropine  1 mg Intramuscular Q4H PRN Max 6/day ALVINA Harley      benztropine  1 mg Oral Q4H PRN Max 6/day ALVINA Harley      bisacodyl  10 mg Rectal Daily PRN ALVINA Harley      cariprazine  3 mg Oral Daily Martin Cardenas MD      Cholecalciferol  2,000 Units Oral Daily ALVINA Lewis      cyanocobalamin  1,000 mcg Oral Daily ALVINA Lewis      hydrOXYzine HCL  25 mg Oral Q6H PRN Max 4/day ALVINA Harley      hydrOXYzine HCL  25 mg Oral TID Martin Cardenas MD      hydrOXYzine HCL  50 mg Oral Q4H PRN Max 4/day ALVINA Harley      Or    LORazepam  1 mg Intramuscular Q4H PRN ALVINA Harley      ketoconazole   Topical Daily PRN ALVINA Harley      lamoTRIgine  50 mg Oral Daily Martin Cardenas MD      lisinopril  10 mg Oral Daily ALVINA Lewis      LORazepam  1 mg Oral Q4H PRN Max 6/day ALVINA Harley       Or    LORazepam  2 mg Intramuscular Q6H PRN Max 3/day Melva Knutson, TITINP      OLANZapine  10 mg Oral Q3H PRN Max 3/day ALVINA Harley      Or    OLANZapine  10 mg Intramuscular Q3H PRN Max 3/day Melvamelanie Knutson, TITINP      OLANZapine  5 mg Oral Q3H PRN Max 6/day Melvamelanie Knutson, CRNP      Or    OLANZapine  5 mg Intramuscular Q3H PRN Max 6/day Melva Knutson, CRNP      OLANZapine  2.5 mg Oral Q3H PRN Max 8/day Melva Knutson, TITINP      polyethylene glycol  17 g Oral Daily PRN Melva Knutson, CRNP      propranolol  10 mg Oral Q8H PRN Melvamelanie Knutson, TITINP      senna-docusate sodium  1 tablet Oral Daily PRN Melva Knutson, ALVINA      sertraline  150 mg Oral HS Martin Cardenas MD         Counseling / Coordination of Care: Total floor / unit time spent today 15 minutes. Greater than 50% of total time was spent with the patient and / or family counseling and / or somewhat receptive to supportive listening and teaching positive coping skills to deal with symptom mangement.     Patient's Rights, confidentiality and exceptions to confidentiality, use of automated medical record, Behavioral Health Services staff access to medical record, and consent to treatment reviewed.    This note has been dictated and hence there may be problems with punctuation, spelling and formatting and if anyone has any concerns please address them to Dr. Cardenas   This note is not shared with patient due to potential for making patient's condition worse by knowing the content of the note.

## 2024-11-08 NOTE — NURSING NOTE
Pt in bed asleep, observed eyes closed, and chest movement noted. 15 minute safety checks maintained. No unmet needs at this time. Will continue to monitor patient needs, sleep pattern, and behaviors.     0632: Patient has slept all night, 7+ hours of uninterrupted sleep

## 2024-11-08 NOTE — NURSING NOTE
Patient mostly isolative today, quiet , able to make needs known. Pt reports no s/s. Pt not attending groups yet today, voices no concerns at this time. Pt takes his medications without issue.

## 2024-11-08 NOTE — PROGRESS NOTES
Progress Note - Behavioral Health   Name: Deyvi Cao 54 y.o. male I MRN: [id number]  Unit/Bed#: [facility] 101-02 I Date of Admission: 6/25/2024   Date of Service: 11/9/2024 I Hospital Day: 137     Assessment & Plan  Bipolar disorder with severe depression (HCC)  Progressing - 11/7/2024  Awaiting placement - [facility].  Continue medications as follows: Vraylar 3mg PO Daily, Atarax 25mg PO TID, Lamictal 50mg PO Daily, Zoloft 150mg PO QHS  Continue with group therapy, milieu therapy and occupational therapy   Behavioral Health checks every 7 minutes   Continue frequent safety checks and vitals per unit protocol  Continue with [facility] medical management as indicated  Benign essential hypertension  Managed by [facility] - reviewed 11/7/2024  Continue Lisinopril 10mg PO Daily  Mixed hyperlipidemia  Managed by [facility] - reviewed 11/7/2024  Continue Lipitor 10mg PO Daily  Allergic rhinitis due to allergen  Managed by [facility] - reviewed 11/7/2024  Rosacea  Managed by [facility] - reviewed 11/7/2024  Medical clearance for psychiatric admission    Vitamin D3 deficiency      Progress Toward Goals: progressing    Recommended Treatment: Continue with group therapy, milieu therapy and occupational therapy.      Risks, benefits and possible side effects of Medications:   Risks, benefits, and possible side effects of medications explained to patient and patient verbalizes understanding.      History of Present Illness   Behavior over the last 24 hours:  unchanged  Sleep: normal  Appetite: normal  Medication side effects: No  ROS: no complaints    Subjective: Per nursing report, patient isolative to room, no group participation, medication compliant. On evaluation today, Deyvi describes his mood as good. He denies any acute issues. He denies SI/HI or AH/VH. He denies side effects or concerns with current medications.    Objective   Mental Status Evaluation:  Appearance:  casually  "dressed   Behavior:  Cooperative, pleasant   Speech:  normal pitch, normal volume, and scant, delayed   Mood:  \"Good\" appears mildly anxious   Affect:  constricted and mood-congruent   Thought Process:  goal directed and slowing of thoughts   Associations: concrete associations   Thought Content:  No overt delusions   Perceptual Disturbances: None   Risk Potential: Suicidal Ideations none  Homicidal Ideations none  Potential for Aggression No   Sensorium:  person, place, time/date, and situation   Memory:  recent and remote memory grossly intact   Consciousness:  alert and awake    Attention: attention span and concentration were age appropriate   Insight:  fair   Judgment: fair   Gait/Station: normal gait/station and normal balance   Motor Activity: no abnormal movements     Medications: all current active meds have been reviewed, continue current psychiatric medications, and current meds:   Current Facility-Administered Medications:     acetaminophen (TYLENOL) tablet 650 mg, Q6H PRN    acetaminophen (TYLENOL) tablet 650 mg, Q4H PRN    acetaminophen (TYLENOL) tablet 975 mg, Q6H PRN    aluminum-magnesium hydroxide-simethicone (MAALOX) oral suspension 30 mL, Q4H PRN    ammonium lactate (LAC-HYDRIN) 12 % lotion, BID PRN    atorvastatin (LIPITOR) tablet 10 mg, Daily    benztropine (COGENTIN) injection 1 mg, Q4H PRN Max 6/day    benztropine (COGENTIN) tablet 1 mg, Q4H PRN Max 6/day    bisacodyl (DULCOLAX) rectal suppository 10 mg, Daily PRN    cariprazine (VRAYLAR) capsule 3 mg, Daily    Cholecalciferol (VITAMIN D3) tablet 2,000 Units, Daily    cyanocobalamin (VITAMIN B-12) tablet 1,000 mcg, Daily    hydrOXYzine HCL (ATARAX) tablet 25 mg, Q6H PRN Max 4/day    hydrOXYzine HCL (ATARAX) tablet 25 mg, TID    hydrOXYzine HCL (ATARAX) tablet 50 mg, Q4H PRN Max 4/day **OR** LORazepam (ATIVAN) injection 1 mg, Q4H PRN    ketoconazole (NIZORAL) 2 % shampoo, Daily PRN    lamoTRIgine (LaMICtal) tablet 50 mg, Daily    lisinopril " (ZESTRIL) tablet 10 mg, Daily    LORazepam (ATIVAN) tablet 1 mg, Q4H PRN Max 6/day **OR** LORazepam (ATIVAN) injection 2 mg, Q6H PRN Max 3/day    OLANZapine (ZyPREXA) tablet 10 mg, Q3H PRN Max 3/day **OR** OLANZapine (ZyPREXA) IM injection 10 mg, Q3H PRN Max 3/day    OLANZapine (ZyPREXA) tablet 5 mg, Q3H PRN Max 6/day **OR** OLANZapine (ZyPREXA) IM injection 5 mg, Q3H PRN Max 6/day    OLANZapine (ZyPREXA) tablet 2.5 mg, Q3H PRN Max 8/day    polyethylene glycol (MIRALAX) packet 17 g, Daily PRN    propranolol (INDERAL) tablet 10 mg, Q8H PRN    senna-docusate sodium (SENOKOT S) 8.6-50 mg per tablet 1 tablet, Daily PRN    sertraline (ZOLOFT) tablet 150 mg, HS.      Lab Results: I have reviewed the following results:  Most Recent Labs:   Lab Results   Component Value Date    WBC 7.39 06/26/2024    RBC 4.70 06/26/2024    HGB 15.2 06/26/2024    HCT 45.5 06/26/2024     06/26/2024    RDW 12.9 06/26/2024    NEUTROABS 4.63 06/26/2024    SODIUM 137 06/26/2024    K 4.1 06/26/2024     06/26/2024    CO2 26 06/26/2024    BUN 17 06/26/2024    CREATININE 0.63 06/26/2024    GLUC 98 06/26/2024    GLUF 98 06/26/2024    CALCIUM 9.6 06/26/2024    AST 25 06/26/2024    ALT 45 06/26/2024    ALKPHOS 114 (H) 06/26/2024    TP 7.0 06/26/2024    ALB 4.4 06/26/2024    TBILI 0.44 06/26/2024    CHOLESTEROL 177 06/26/2024    HDL 52 06/26/2024    TRIG 73 06/26/2024    LDLCALC 110 (H) 06/26/2024    NONHDLC 125 06/26/2024    LITHIUM 0.4 (L) 01/28/2021    [id number] 2.636 06/26/2024    HGBA1C 5.2 11/22/2023     11/22/2023

## 2024-11-08 NOTE — NURSING NOTE
Pt is isolative to self and room this evening. Consumed 100% of all meals. Took medications without incidence. Pt is polite and cooperative. Attended 3/10 groups. Denies anxiety, depression, and SI/HI/AVH. No behavioral issues. Pt offers no complaints.

## 2024-11-08 NOTE — PROGRESS NOTES
Inpatient Behavioral Health Safety/Relapse Prevention Plan with the following results:  My strengths and things worth living for include: I am helpful, taking care of myself, I am a nice person    Triggers, stressors, and situations that place me at risk for a crisis situation are:  [x]Things people say to me  []Things people do  []Losses  [x]My own negative thoughts  []My own behaviors  []Being alone  []Loneliness   [x]Preoccupation with the past  []Perceived failure  []Sleep problems  []Work or School  []Things I am worried about  []Substance abuse  []Financial problems  []Physical problems    Warning Signs (thoughts, image, feelings and behaviors) that indicate I may need help: Lose of interest in everything. Manic Behavior. Feeling extremely tired.     Coping Skills, I can use/things that help me calm down and keep me safe  [x]Taking my medication appropriately  [x]Deep Breathing  [x]Exercise  []Journaling  [x]Keeping my mental health appointments  []Talking with supports  []Crafts/Arts  []Reading  [x]Taking a walk break  [x]Helping others  []Music  []Spirituality  []Volunteering   []Mindfulness   []Keeping a daily schedule for structure/purpose  []Hot shower or bath   []Support groups    Family, friends and organizations I can call for support: Jose Cruz

## 2024-11-09 PROCEDURE — 99232 SBSQ HOSP IP/OBS MODERATE 35: CPT

## 2024-11-09 RX ADMIN — HYDROXYZINE HYDROCHLORIDE 25 MG: 25 TABLET ORAL at 17:12

## 2024-11-09 RX ADMIN — LISINOPRIL 10 MG: 10 TABLET ORAL at 08:56

## 2024-11-09 RX ADMIN — HYDROXYZINE HYDROCHLORIDE 25 MG: 25 TABLET ORAL at 21:19

## 2024-11-09 RX ADMIN — LAMOTRIGINE 50 MG: 25 TABLET ORAL at 08:56

## 2024-11-09 RX ADMIN — CYANOCOBALAMIN TAB 1000 MCG 1000 MCG: 1000 TAB at 08:56

## 2024-11-09 RX ADMIN — HYDROXYZINE HYDROCHLORIDE 25 MG: 25 TABLET ORAL at 08:56

## 2024-11-09 RX ADMIN — SERTRALINE HYDROCHLORIDE 150 MG: 100 TABLET ORAL at 21:19

## 2024-11-09 RX ADMIN — CARIPRAZINE 3 MG: 3 CAPSULE, GELATIN COATED ORAL at 08:56

## 2024-11-09 RX ADMIN — CHOLECALCIFEROL TAB 25 MCG (1000 UNIT) 2000 UNITS: 25 TAB at 08:56

## 2024-11-09 RX ADMIN — ATORVASTATIN CALCIUM 10 MG: 10 TABLET, FILM COATED ORAL at 08:56

## 2024-11-09 NOTE — NURSING NOTE
Pt is calm and cooperative, visible intermittently, minimal interaction with peers. Pt denies all psych s/s, meal and medication compliant, safety checks ongoing.

## 2024-11-09 NOTE — NURSING NOTE
Received pt in bed at change of shift with eyes closed; chest movement noted.  Continues the same thus this far as per q 15 min room checks.   Will continue to monitor behavior, sleeping pattern and any medical issues that may arise.    0536;  Sleeping 7+ hrs thus this far

## 2024-11-09 NOTE — PLAN OF CARE
Problem: Alteration in Thoughts and Perception  Goal: Treatment Goal: Gain control of psychotic behaviors/thinking, reduce/eliminate presenting symptoms and demonstrate improved reality functioning upon discharge  Outcome: Progressing  Goal: Refrain from acting on delusional thinking/internal stimuli  Description: Interventions:  - Monitor patient closely, per order   - Utilize least restrictive measures   - Set reasonable limits, give positive feedback for acceptable   - Administer medications as ordered and monitor of potential side effects  Outcome: Progressing  Goal: Agree to be compliant with medication regime, as prescribed and report medication side effects  Description: Interventions:  - Offer appropriate PRN medication and supervise ingestion; conduct AIMS, as needed   Outcome: Progressing  Goal: Complete daily ADLs, including personal hygiene independently, as able  Description: Interventions:  - Observe, teach, and assist patient with ADLS  - Monitor and promote a balance of rest/activity, with adequate nutrition and elimination   Outcome: Progressing     Problem: Ineffective Coping  Goal: Patient/Family participate in treatment and DC plans  Description: Interventions:  - Provide therapeutic environment  Outcome: Progressing  Goal: Patient/Family verbalizes awareness of resources  Outcome: Progressing  Goal: Free from restraint events  Description: - Utilize least restrictive measures   - Provide behavioral interventions   - Redirect inappropriate behaviors   Outcome: Progressing     Problem: Risk for Self Injury/Neglect  Goal: Refrain from harming self  Description: Interventions:  - Monitor patient closely, per order  - Develop a trusting relationship  - Supervise medication ingestion, monitor effects and side effects   Outcome: Progressing  Goal: Attend and participate in unit activities, including therapeutic, recreational, and educational groups  Description: Interventions:  - Provide therapeutic  and educational activities daily, encourage attendance and participation, and document same in the medical record  - Obtain collateral information, encourage visitation and family involvement in care   Outcome: Progressing  Goal: Recognize maladaptive responses and adopt new coping mechanisms  Outcome: Progressing  Goal: Complete daily ADLs, including personal hygiene independently, as able  Description: Interventions:  - Observe, teach, and assist patient with ADLS  - Monitor and promote a balance of rest/activity, with adequate nutrition and elimination  Outcome: Progressing     Problem: Depression  Goal: Treatment Goal: Demonstrate behavioral control of depressive symptoms, verbalize feelings of improved mood/affect, and adopt new coping skills prior to discharge  Outcome: Progressing  Goal: Refrain from harming self  Description: Interventions:  - Monitor patient closely, per order   - Supervise medication ingestion, monitor effects and side effects   Outcome: Progressing  Goal: Refrain from self-neglect  Outcome: Progressing  Goal: Complete daily ADLs, including personal hygiene independently, as able  Description: Interventions:  - Observe, teach, and assist patient with ADLS  -  Monitor and promote a balance of rest/activity, with adequate nutrition and elimination   Outcome: Progressing     Problem: Anxiety  Goal: Anxiety is at manageable level  Description: Interventions:  - Assess and monitor patient's anxiety level.   - Monitor for signs and symptoms (heart palpitations, chest pain, shortness of breath, headaches, nausea, feeling jumpy, restlessness, irritable, apprehensive).   - Collaborate with interdisciplinary team and initiate plan and interventions as ordered.  - [place] patient to unit/surroundings  - Explain treatment plan  - Encourage participation in care  - Encourage verbalization of concerns/fears  - Identify coping mechanisms  - Assist in developing anxiety-reducing skills  - Administer/offer  alternative therapies  - Limit or eliminate stimulants  Outcome: Progressing     Problem: Risk for Violence/Aggression Toward Others  Goal: Treatment Goal: Refrain from acts of violence/aggression during length of stay, and demonstrate improved impulse control at the time of discharge  Outcome: Progressing  Goal: Refrain from harming others  Outcome: Progressing  Goal: Refrain from destructive acts on the environment or property  Outcome: Progressing  Goal: Control angry outbursts  Description: Interventions:  - Monitor patient closely, per order  - Ensure early verbal de-escalation  - Monitor prn medication needs  - Set reasonable/therapeutic limits, outline behavioral expectations, and consequences   - Provide a non-threatening milieu, utilizing the least restrictive interventions   Outcome: Progressing  Goal: Attend and participate in unit activities, including therapeutic, recreational, and educational groups  Description: Interventions:  - Provide therapeutic and educational activities daily, encourage attendance and participation, and document same in the medical record   Outcome: Progressing  Goal: Identify appropriate positive anger management techniques  Description: Interventions:  - Offer anger management and coping skills groups   - Staff will provide positive feedback for appropriate anger control  Outcome: Progressing     Problem: Alteration in Orientation  Goal: Treatment Goal: Demonstrate a reduction of confusion and improved orientation to person, place, time and/or situation upon discharge, according to optimum baseline/ability  Outcome: Progressing  Goal: Interact with staff daily  Description: Interventions:  - Assess and re-assess patient's level of orientation  - Engage patient in 1 on 1 interactions, daily, for a minimum of 15 minutes   - Establish rapport/trust with patient   Outcome: Progressing  Goal: Express concerns related to confused thinking related to:  Description: Interventions:  -  Encourage patient to express feelings, fears, frustrations, hopes  - Assign consistent caregivers   - [place]/re-orient patient as needed  - Allow comfort items, as appropriate  - Provide visual cues, signs, etc.   Outcome: Progressing  Goal: Allow medical examinations, as recommended  Description: Interventions:  - Provide physical/neurological exams and/or referrals, per provider   Outcome: Progressing  Goal: Cooperate with recommended testing/procedures  Description: Interventions:  - Determine need for ancillary testing  - Observe for mental status changes  - Implement falls/precaution protocol   Outcome: Progressing  Goal: Attend and participate in unit activities, including therapeutic, recreational, and educational groups  Description: Interventions:  - Provide therapeutic and educational activities daily, encourage attendance and participation, and document same in the medical record   - Provide appropriate opportunities for reminiscence   - Provide a consistent daily routine   - Encourage family contact/visitation   Outcome: Progressing  Goal: Complete daily ADLs, including personal hygiene independently, as able  Description: Interventions:  - Observe, teach, and assist patient with ADLS  Outcome: Progressing     Problem: SELF HARM/SUICIDALITY  Goal: Will have no self-injury during hospital stay  Description: INTERVENTIONS:  - Q 15 MINUTES: Routine safety checks  - Q WAKING SHIFT & PRN: Assess risk to determine if routine checks are adequate to maintain patient safety  - Encourage patient to participate actively in care by formulating a plan to combat response to suicidal ideation, identify supports and resources  Outcome: Progressing     Problem: DEPRESSION  Goal: Will be euthymic at discharge  Description: INTERVENTIONS:  - Administer medication as ordered  - Provide emotional support via 1:1 interaction with staff  - Encourage involvement in milieu/groups/activities  - Monitor for social  isolation  Outcome: Progressing     Problem: ANXIETY  Goal: Will report anxiety at manageable levels  Description: INTERVENTIONS:  - Administer medication as ordered  - Teach and encourage coping skills  - Provide emotional support  - Assess patient/family for anxiety and ability to cope  Outcome: Progressing     Problem: SELF CARE DEFICIT  Goal: Return ADL status to a safe level of function  Description: INTERVENTIONS:  - Administer medication as ordered  - Assess ADL deficits and provide assistive devices as needed  - Obtain PT/OT consults as needed  - Assist and instruct patient to increase activity and self care as tolerated  Outcome: Progressing     Problem: DISCHARGE PLANNING - CARE MANAGEMENT  Goal: Discharge to post-acute care or home with appropriate resources  Description: INTERVENTIONS:  - Conduct assessment to determine patient/family and health care team treatment goals, and need for post-acute services based on payer coverage, community resources, and patient preferences, and barriers to discharge  - Address psychosocial, clinical, and financial barriers to discharge as identified in assessment in conjunction with the patient/family and health care team  - Arrange appropriate level of post-acute services according to patient’s   needs and preference and payer coverage in collaboration with the physician and health care team  - Communicate with and update the patient/family, physician, and health care team regarding progress on the discharge plan  - Arrange appropriate transportation to post-acute venues  Outcome: Progressing     Problem: Alteration in Thoughts and Perception  Goal: Verbalize thoughts and feelings  Description: Interventions:  - Promote a nonjudgmental and trusting relationship with the patient through active listening and therapeutic communication  - Assess patient's level of functioning, behavior and potential for risk  - Engage patient in 1 on 1 interactions  - Encourage patient to  express fears, feelings, frustrations, and discuss symptoms    - [place] patient to reality, help patient recognize reality-based thinking   - Administer medications as ordered and assess for potential side effects  - Provide the patient education related to the signs and symptoms of the illness and desired effects of prescribed medications  Outcome: Not Progressing     Problem: Depression  Goal: Refrain from isolation  Description: Interventions:  - Develop a trusting relationship   - Encourage socialization   Outcome: Not Progressing

## 2024-11-09 NOTE — NURSING NOTE
Deyvi denied all psych on this shift.  Isolated himself to his room.  Med and meal compliant.  Offered no complaints or concerns.

## 2024-11-10 PROCEDURE — 99232 SBSQ HOSP IP/OBS MODERATE 35: CPT

## 2024-11-10 RX ADMIN — LISINOPRIL 10 MG: 10 TABLET ORAL at 08:32

## 2024-11-10 RX ADMIN — LAMOTRIGINE 50 MG: 25 TABLET ORAL at 08:32

## 2024-11-10 RX ADMIN — HYDROXYZINE HYDROCHLORIDE 25 MG: 25 TABLET ORAL at 08:32

## 2024-11-10 RX ADMIN — SERTRALINE HYDROCHLORIDE 150 MG: 100 TABLET ORAL at 21:15

## 2024-11-10 RX ADMIN — HYDROXYZINE HYDROCHLORIDE 25 MG: 25 TABLET ORAL at 21:15

## 2024-11-10 RX ADMIN — HYDROXYZINE HYDROCHLORIDE 25 MG: 25 TABLET ORAL at 17:03

## 2024-11-10 RX ADMIN — CARIPRAZINE 3 MG: 3 CAPSULE, GELATIN COATED ORAL at 08:32

## 2024-11-10 RX ADMIN — CYANOCOBALAMIN TAB 1000 MCG 1000 MCG: 1000 TAB at 08:32

## 2024-11-10 RX ADMIN — CHOLECALCIFEROL TAB 25 MCG (1000 UNIT) 2000 UNITS: 25 TAB at 08:32

## 2024-11-10 RX ADMIN — ATORVASTATIN CALCIUM 10 MG: 10 TABLET, FILM COATED ORAL at 08:32

## 2024-11-10 NOTE — PROGRESS NOTES
Progress Note - Behavioral Health   Name: Deyvi Cao 54 y.o. male I MRN: [id number]  Unit/Bed#: [facility] 101-02 I Date of Admission: 6/25/2024   Date of Service: 11/10/2024 I Hospital Day: 138     Assessment & Plan  Bipolar disorder with severe depression (HCC)  Progressing - 11/7/2024  Awaiting placement - In-person interview with  [facility] completed yesterday.  Continue medications as follows: Vraylar 3mg PO Daily, Atarax 25mg PO TID, Lamictal 50mg PO Daily, Zoloft 150mg PO QHS  Continue with group therapy, milieu therapy and occupational therapy   Behavioral Health checks every 7 minutes   Continue frequent safety checks and vitals per unit protocol  Continue with [facility] medical management as indicated  Benign essential hypertension  Managed by [facility] - reviewed 11/7/2024  Continue Lisinopril 10mg PO Daily  Mixed hyperlipidemia  Managed by [facility] - reviewed 11/7/2024  Continue Lipitor 10mg PO Daily  Allergic rhinitis due to allergen  Managed by [facility] - reviewed 11/7/2024  Rosacea  Managed by [facility] - reviewed 11/7/2024  Medical clearance for psychiatric admission    Vitamin D3 deficiency      Progress Toward Goals: progressing    Recommended Treatment: Continue with group therapy, milieu therapy and occupational therapy.      Risks, benefits and possible side effects of Medications:   Risks, benefits, and possible side effects of medications explained to patient and patient verbalizes understanding.      History of Present Illness   Behavior over the last 24 hours:  unchanged  Sleep: normal  Appetite: normal  Medication side effects: No  ROS: no complaints    Subjective: Per nursing, Deyvi was calm and cooperative last evening, visible in milieu at times, medication compliant. On evaluation today, Deyvi is found resting in bed. He denies any acute issues. He reports his mood has been good. He denies SI/HI or AH/VH. He denies side effects or concerns with current medications.     Objective    Mental Status Evaluation:  Appearance:  age appropriate and casually dressed   Behavior:  Cooperative, pleasant   Speech:  normal pitch, normal volume, and scant   Mood:  anxious   Affect:  constricted   Thought Process:  goal directed and slowing of thoughts   Associations: concrete associations   Thought Content:  No overt delusions   Perceptual Disturbances: None   Risk Potential: Suicidal Ideations none  Homicidal Ideations none  Potential for Aggression No   Sensorium:  person, place, time/date, and situation   Memory:  recent and remote memory grossly intact   Consciousness:  alert and awake    Attention: attention span appeared shorter than expected for age   Insight:  limited   Judgment: limited   Gait/Station: In bed, not observed   Motor Activity: no abnormal movements     Medications: all current active meds have been reviewed, continue current psychiatric medications, and current meds:   Current Facility-Administered Medications:     acetaminophen (TYLENOL) tablet 650 mg, Q6H PRN    acetaminophen (TYLENOL) tablet 650 mg, Q4H PRN    acetaminophen (TYLENOL) tablet 975 mg, Q6H PRN    aluminum-magnesium hydroxide-simethicone (MAALOX) oral suspension 30 mL, Q4H PRN    ammonium lactate (LAC-HYDRIN) 12 % lotion, BID PRN    atorvastatin (LIPITOR) tablet 10 mg, Daily    benztropine (COGENTIN) injection 1 mg, Q4H PRN Max 6/day    benztropine (COGENTIN) tablet 1 mg, Q4H PRN Max 6/day    bisacodyl (DULCOLAX) rectal suppository 10 mg, Daily PRN    cariprazine (VRAYLAR) capsule 3 mg, Daily    Cholecalciferol (VITAMIN D3) tablet 2,000 Units, Daily    cyanocobalamin (VITAMIN B-12) tablet 1,000 mcg, Daily    hydrOXYzine HCL (ATARAX) tablet 25 mg, Q6H PRN Max 4/day    hydrOXYzine HCL (ATARAX) tablet 25 mg, TID    hydrOXYzine HCL (ATARAX) tablet 50 mg, Q4H PRN Max 4/day **OR** LORazepam (ATIVAN) injection 1 mg, Q4H PRN    ketoconazole (NIZORAL) 2 % shampoo, Daily PRN    lamoTRIgine (LaMICtal) tablet 50 mg, Daily     lisinopril (ZESTRIL) tablet 10 mg, Daily    LORazepam (ATIVAN) tablet 1 mg, Q4H PRN Max 6/day **OR** LORazepam (ATIVAN) injection 2 mg, Q6H PRN Max 3/day    OLANZapine (ZyPREXA) tablet 10 mg, Q3H PRN Max 3/day **OR** OLANZapine (ZyPREXA) IM injection 10 mg, Q3H PRN Max 3/day    OLANZapine (ZyPREXA) tablet 5 mg, Q3H PRN Max 6/day **OR** OLANZapine (ZyPREXA) IM injection 5 mg, Q3H PRN Max 6/day    OLANZapine (ZyPREXA) tablet 2.5 mg, Q3H PRN Max 8/day    polyethylene glycol (MIRALAX) packet 17 g, Daily PRN    propranolol (INDERAL) tablet 10 mg, Q8H PRN    senna-docusate sodium (SENOKOT S) 8.6-50 mg per tablet 1 tablet, Daily PRN    sertraline (ZOLOFT) tablet 150 mg, HS.      Lab Results: I have reviewed the following results:  Most Recent Labs:   Lab Results   Component Value Date    WBC 7.39 06/26/2024    RBC 4.70 06/26/2024    HGB 15.2 06/26/2024    HCT 45.5 06/26/2024     06/26/2024    RDW 12.9 06/26/2024    NEUTROABS 4.63 06/26/2024    SODIUM 137 06/26/2024    K 4.1 06/26/2024     06/26/2024    CO2 26 06/26/2024    BUN 17 06/26/2024    CREATININE 0.63 06/26/2024    GLUC 98 06/26/2024    GLUF 98 06/26/2024    CALCIUM 9.6 06/26/2024    AST 25 06/26/2024    ALT 45 06/26/2024    ALKPHOS 114 (H) 06/26/2024    TP 7.0 06/26/2024    ALB 4.4 06/26/2024    TBILI 0.44 06/26/2024    CHOLESTEROL 177 06/26/2024    HDL 52 06/26/2024    TRIG 73 06/26/2024    LDLCALC 110 (H) 06/26/2024    NONHDLC 125 06/26/2024    LITHIUM 0.4 (L) 01/28/2021    [id number] 2.636 06/26/2024    HGBA1C 5.2 11/22/2023     11/22/2023

## 2024-11-10 NOTE — NURSING NOTE
[place] maintained on ongoing SAFE precaution without incident on this shift. Observed in bed resting with eyes closed, respiration even and unlabored. Q 15 minutes rounding implemented. No behavioral noted

## 2024-11-10 NOTE — NURSING NOTE
Pt is calm and cooperative, visible on the unit intermittently. Pt ate 100% of dinner. Pt is medication compliant, safety checks ongoing.

## 2024-11-10 NOTE — NURSING NOTE
Pt is calm and cooperative, visible on the unit intermittently, minimal interaction with peers. Pt denies anxiety and depression, denies SI/HI/AVH. Pt is medication compliant, safety checks ongoing.

## 2024-11-10 NOTE — NURSING NOTE
Patient has been out of his room for meals only. Isolative to his room. Smiles when approached. Guarded. Awaiting discharge. Denies any psychological symptoms or suicidal ideations. Medication compliant. Appetite excellent. No group attendance today.

## 2024-11-10 NOTE — PLAN OF CARE
Problem: Alteration in Thoughts and Perception  Goal: Treatment Goal: Gain control of psychotic behaviors/thinking, reduce/eliminate presenting symptoms and demonstrate improved reality functioning upon discharge  Outcome: Progressing  Goal: Refrain from acting on delusional thinking/internal stimuli  Description: Interventions:  - Monitor patient closely, per order   - Utilize least restrictive measures   - Set reasonable limits, give positive feedback for acceptable   - Administer medications as ordered and monitor of potential side effects  Outcome: Progressing  Goal: Agree to be compliant with medication regime, as prescribed and report medication side effects  Description: Interventions:  - Offer appropriate PRN medication and supervise ingestion; conduct AIMS, as needed   Outcome: Progressing     Problem: Ineffective Coping  Goal: Understands least restrictive measures  Description: Interventions:  - Utilize least restrictive behavior  Outcome: Progressing  Goal: Free from restraint events  Description: - Utilize least restrictive measures   - Provide behavioral interventions   - Redirect inappropriate behaviors   Outcome: Progressing     Problem: Alteration in Thoughts and Perception  Goal: Attend and participate in unit activities, including therapeutic, recreational, and educational groups  Description: Interventions:  -Encourage Visitation and family involvement in care  Outcome: Not Progressing     Problem: Risk for Self Injury/Neglect  Goal: Attend and participate in unit activities, including therapeutic, recreational, and educational groups  Description: Interventions:  - Provide therapeutic and educational activities daily, encourage attendance and participation, and document same in the medical record  - Obtain collateral information, encourage visitation and family involvement in care   Outcome: Not Progressing

## 2024-11-11 PROCEDURE — 99232 SBSQ HOSP IP/OBS MODERATE 35: CPT | Performed by: PSYCHIATRY & NEUROLOGY

## 2024-11-11 RX ADMIN — LAMOTRIGINE 50 MG: 25 TABLET ORAL at 08:53

## 2024-11-11 RX ADMIN — LISINOPRIL 10 MG: 10 TABLET ORAL at 08:53

## 2024-11-11 RX ADMIN — CARIPRAZINE 3 MG: 3 CAPSULE, GELATIN COATED ORAL at 08:53

## 2024-11-11 RX ADMIN — HYDROXYZINE HYDROCHLORIDE 25 MG: 25 TABLET ORAL at 08:53

## 2024-11-11 RX ADMIN — CYANOCOBALAMIN TAB 1000 MCG 1000 MCG: 1000 TAB at 08:53

## 2024-11-11 RX ADMIN — CHOLECALCIFEROL TAB 25 MCG (1000 UNIT) 2000 UNITS: 25 TAB at 08:53

## 2024-11-11 RX ADMIN — ATORVASTATIN CALCIUM 10 MG: 10 TABLET, FILM COATED ORAL at 08:53

## 2024-11-11 RX ADMIN — HYDROXYZINE HYDROCHLORIDE 25 MG: 25 TABLET ORAL at 21:04

## 2024-11-11 RX ADMIN — HYDROXYZINE HYDROCHLORIDE 25 MG: 25 TABLET ORAL at 16:59

## 2024-11-11 RX ADMIN — SERTRALINE HYDROCHLORIDE 150 MG: 100 TABLET ORAL at 21:04

## 2024-11-11 NOTE — PROGRESS NOTES
Progress Note - Behavioral Health   Name: Deyvi Cao 54 y.o. male I MRN: [id number]  Unit/Bed#: [facility] 101-02 I Date of Admission: 6/25/2024   Date of Service: 11/11/2024 I Hospital Day: 139     Assessment & Plan  Bipolar disorder with severe depression (HCC)  Progressing -11/11/2024, not attending too many groups, staying to himself preoccupied with minimal mood reactivity and delayed responses which is his baseline not reporting any overt manic psychotic symptoms or suicidal thoughts at all and is attending ADLs somewhat anxious about delaying discharge but no acute behavioral issues or problems  Waiting to hear back from [facility] about acceptance and follow-up IRS and has ACT team  Continue medications as follows: Vraylar 3mg PO Daily, Atarax 25mg PO TID, Lamictal 50mg PO Daily, Zoloft 150mg PO QHS  Continue with individual therapy, group therapy, milieu therapy and occupational therapy   Behavioral Health checks every 7 minutes   Continue frequent safety checks and vitals per unit protocol    Benign essential hypertension  Managed by [facility] - reviewed 11/11/2024  Continue Lisinopril 10mg PO Daily  Mixed hyperlipidemia    Managed by [facility] - reviewed 11/11/2024  Continue Lipitor 10mg PO Daily  Allergic rhinitis due to allergen  Managed by [facility] - reviewed: 11/11/2024  Rosacea  Managed by [facility] - reviewed 11/11/2024  Medical clearance for psychiatric admission  Reviewed on 11/11/2024 followed up by medical  Vitamin D3 deficiency  Reviewed on 11/11/2024 on vitamin D3 supplements followed up by medical      Patient Active Problem List   Diagnosis    Acne    Benign essential hypertension    Mixed hyperlipidemia    Tobacco dependence syndrome    Allergic rhinitis due to allergen    Medical clearance for psychiatric admission    Lung cancer screening declined by patient    Moderate depressed bipolar I disorder (HCC)    Bipolar disorder with severe depression (HCC)    Rosacea    Vitamin D3 deficiency      Review of systems: Unremarkable  Psychiatric Diagnosis: Bipolar depression    Assessment  Overall Status: Remains somewhat timid, withdrawn, shy with minimal mood reactivity and admits that he is usually shy and a loner not attending to many groups but waiting for placement at the [facility] group Home with [facility] ACT team once accepted after criminal check is back, not reporting any relapse of psychotic or manic symptoms or any suicidal thoughts with no need for behavioral PRNs but not attending to many groups  certification Statement: The patient will continue to require additional inpatient hospital stay due to recurrent depression and repeated suicide attempts in the community     Medications: Vraylar 3 mg a day, hydroxyzine 25 mg 3 times a day, Zoloft 150 mg a day, Lamictal 50 mg a day,  All medications reviewed  side effects from treatment: None reported  Medication changes   No  changes    Medication education   Risks side effects benefits and precautions of medications discussed with patient and he did verbalize an understanding about risks for metabolic syndrome from being on neuroleptics and is form tardive dyskinesia etc.     Understanding of medications: Has good understanding about medications and side effects of his precautions benefits   Justification for dual anti-psychotics: Not applicable    Non-pharmacological treatments  Continue with individual, group, milieu and occupational therapy using recovery principles and psycho-education about accepting illness and the need for treatment.  Behavioral health checks every 7 minutes  Safety with the patient about illness and need for treatment  Support transition to [facility] with the ACT team    Safety  Safety and communication plan established to target dynamic risk factors discussed above.    Discharge Plan   Waiting for [facility] program at [facility] to accept him once criminal check to be back with [facility]  [place] ACT team and referral to [facility].    Interval Progress   Waiting to hear back from [facility] group home at [facility] to be accepted with the [facility] ACT team and referral to [facility].  Reports depression is only 1 out of 10 and anxiety only 2 out of 10 but remains somewhat shy and withdrawn timid without much interaction with peers claiming he is a loner and has minimal mood reactivity but fair grooming.  Continues to report no relapse of any overt psychotic or manic or depressive symptoms or suicidal thoughts at all and able to continue to contract for safety.  Still somewhat superficial, cold and aloof in interaction with delayed responses which he admits to being his baseline.  Has not been aggressive or agitated or threatening or self-abusive with no need for behavioral PRNs and not attending too many groups and needs reminders to keep attending  Acceptance by patient: Accepting  Hopefulness in recovery: Living at a group home working with the ACT team  involved in reintegration process: No contact with anyone including his mother or sister   trusting in relationship with psychiatrist: Trusting  Sleep: Good  Appetite: Good  Compliance with Medications: Good  Group attendance: Attending some  Significant events and progress towards goals: Anticipating to be accepted by [facility] group Home and progressing well    Mental Status Exam  Appearance: casually dressed, dressed appropriately, adequate grooming, looks stated age with good eye contact  Behavior: cooperative, mildly anxious, slow responses found pacing in his room casually dressed fairly groomed   Somewhat to speech: slow, scant, increased latency of response, delayed, soft, decreased volume delayed to some extent as usual  Mood: depressed, dysphoric, anxious  Affect: constricted, slightly brighter, mood-congruent  Thought Process: organized, goal directed, decreased rate of thoughts, slowing of thoughts, negative  thinking, concrete  Thought Content: no overt delusions, no current suicidal or homicidal thoughts and no plans verbalized.  No phobias obsessions compulsions reported.  Not appearing to respond to any delusional beliefs  Perceptual Disturbances: no auditory hallucinations, no visual hallucinations  Hx Risk Factors: chronic depressive symptoms, chronic anxiety symptoms, history of suicide attempts  Sensorium: Oriented x 3 spheres and situation  Cognition: recent and remote memory grossly intact  Consciousness: alert, awake, and not sedated  Attention: attention span and concentration are age appropriate  Intellect: appears to be of average intelligence  Insight: limited  Judgement: limited  Motor Activity: no abnormal movements     Vitals  Temp:  [97.4 °F (36.3 °C)-97.8 °F (36.6 °C)] 97.8 °F (36.6 °C)  HR:  [] 100  Resp:  [18] 18  BP: (117-138)/(71-78) 117/71  SpO2:  [94 %-98 %] 98 %  No intake or output data in the 24 hours ending 11/11/24 0503    Lab Results: All Labs For Current Hospital Admission Reviewed      Current Facility-Administered Medications   Medication Dose Route Frequency Provider Last Rate    acetaminophen  650 mg Oral Q6H PRN ALVINA Harley      acetaminophen  650 mg Oral Q4H PRN ALVINA Harley      acetaminophen  975 mg Oral Q6H PRN ALVINA Harley      aluminum-magnesium hydroxide-simethicone  30 mL Oral Q4H PRN ALVINA Harley      ammonium lactate   Topical BID PRN ALVINA Harley      atorvastatin  10 mg Oral Daily ALVINA Lewis      benztropine  1 mg Intramuscular Q4H PRN Max 6/day ALVINA Harley      benztropine  1 mg Oral Q4H PRN Max 6/day ALVINA Harley      bisacodyl  10 mg Rectal Daily PRN ALVINA Harley      cariprazine  3 mg Oral Daily Martin Cardenas MD      Cholecalciferol  2,000 Units Oral Daily ALVINA Lewis      cyanocobalamin  1,000 mcg Oral Daily ALVINA Lewis      hydrOXYzine HCL  25 mg Oral Q6H PRN  Max 4/day ALVINA Harley      hydrOXYzine HCL  25 mg Oral TID Martin Cardenas MD      hydrOXYzine HCL  50 mg Oral Q4H PRN Max 4/day ALVINA Harley      Or    LORazepam  1 mg Intramuscular Q4H PRN ALVINA Harley      ketoconazole   Topical Daily PRN ALVINA Harley      lamoTRIgine  50 mg Oral Daily Martin Cardenas MD      lisinopril  10 mg Oral Daily Sary Coradonolbertogretchen, ALVINA      LORazepam  1 mg Oral Q4H PRN Max 6/day ALVINA Harley      Or    LORazepam  2 mg Intramuscular Q6H PRN Max 3/day Melva Knutson, TITINP      OLANZapine  10 mg Oral Q3H PRN Max 3/day ALVINA Harley      Or    OLANZapine  10 mg Intramuscular Q3H PRN Max 3/day Melva Knutson, TITINP      OLANZapine  5 mg Oral Q3H PRN Max 6/day ALVINA Harley      Or    OLANZapine  5 mg Intramuscular Q3H PRN Max 6/day TITI HarleyNP      OLANZapine  2.5 mg Oral Q3H PRN Max 8/day TITI HarleyNP      polyethylene glycol  17 g Oral Daily PRN ALVINA Harley      propranolol  10 mg Oral Q8H PRN ALVINA Harley      senna-docusate sodium  1 tablet Oral Daily PRN ALVINA Harley      sertraline  150 mg Oral HS Martin Cardenas MD         Counseling / Coordination of Care: Total floor / unit time spent today 15 minutes. Greater than 50% of total time was spent with the patient and / or family counseling and / or somewhat receptive to supportive listening and teaching positive coping skills to deal with symptom mangement.     Patient's Rights, confidentiality and exceptions to confidentiality, use of automated medical record, Behavioral Health Services staff access to medical record, and consent to treatment reviewed.    This note has been dictated and hence there may be problems with punctuation, spelling and formatting and if anyone has any concerns please address them to Dr. Cardenas   This note is not shared with patient due to potential for making patient's condition worse by knowing the content of the note.

## 2024-11-11 NOTE — PROGRESS NOTES
11/11/24 0807   Team Meeting   Meeting Type Daily Rounds   Team Members Present   Team Members Present Physician;Nurse;;Other (Discipline and Name)   Physician Team Member Holter, Thomas   Nursing Team Member Chastity   Social Work Team Member Henrry FRY   Other (Discipline and Name) Goff PCM   Patient/Family Present   Patient Present No   Patient's Family Present No     Groups Participation  3/9.   Patient's compliant with medications. He has minimal engagement in treatment. CRR referral pending. Flat affect, isolates in his room.

## 2024-11-11 NOTE — NURSING NOTE
Pt is isolative to self and room. Consumed 100% of all meals. Took medications without incidence. Pt is quiet, polite, withdrawn. Attended 4/10 groups. Denies anxiety, depression, and SI/HI/AVH. No behavioral issues. Pt offers no complaints.

## 2024-11-11 NOTE — NURSING NOTE
Patient isolative to room and self all evening.  Not seen interacting with anyone. Pleasant and cooperative when approached. Medication compliant. No complaints. Behaviors controlled and appropriate. No prn medications requested or required.

## 2024-11-11 NOTE — PLAN OF CARE
Problem: Ineffective Coping  Goal: Identifies ineffective coping skills  Outcome: Progressing  Goal: Identifies healthy coping skills  Outcome: Progressing  Goal: Demonstrates healthy coping skills  Outcome: Progressing  Goal: Participates in unit activities  Description: Interventions:  - Provide therapeutic environment   - Provide required programming   - Redirect inappropriate behaviors   Outcome: Not Progressing  Deyvi shows that he uses his coping skills but his attendance to groups continues to diminished.

## 2024-11-11 NOTE — NURSING NOTE
Pt is calm and cooperative, visible on the unit intermittently, minimal interaction with peers. Pt denies anxiety and depression, denies SI/HI/AVH. Pt is medication complaint, safety checks ongoing.

## 2024-11-11 NOTE — NURSING NOTE
Patient slept well through the night (8 hours).  Patient resting comfortably in bed. Eyes closed and chest movements noted

## 2024-11-11 NOTE — PLAN OF CARE
Problem: Alteration in Thoughts and Perception  Goal: Treatment Goal: Gain control of psychotic behaviors/thinking, reduce/eliminate presenting symptoms and demonstrate improved reality functioning upon discharge  Outcome: Progressing  Goal: Verbalize thoughts and feelings  Description: Interventions:  - Promote a nonjudgmental and trusting relationship with the patient through active listening and therapeutic communication  - Assess patient's level of functioning, behavior and potential for risk  - Engage patient in 1 on 1 interactions  - Encourage patient to express fears, feelings, frustrations, and discuss symptoms    - [place] patient to reality, help patient recognize reality-based thinking   - Administer medications as ordered and assess for potential side effects  - Provide the patient education related to the signs and symptoms of the illness and desired effects of prescribed medications  Outcome: Progressing  Goal: Refrain from acting on delusional thinking/internal stimuli  Description: Interventions:  - Monitor patient closely, per order   - Utilize least restrictive measures   - Set reasonable limits, give positive feedback for acceptable   - Administer medications as ordered and monitor of potential side effects  Outcome: Progressing  Goal: Agree to be compliant with medication regime, as prescribed and report medication side effects  Description: Interventions:  - Offer appropriate PRN medication and supervise ingestion; conduct AIMS, as needed   Outcome: Progressing  Goal: Recognize dysfunctional thoughts, communicate reality-based thoughts at the time of discharge  Description: Interventions:  - Provide medication and psycho-education to assist patient in compliance and developing insight into his/her illness   Outcome: Progressing  Goal: Complete daily ADLs, including personal hygiene independently, as able  Description: Interventions:  - Observe, teach, and assist patient with ADLS  - Monitor and  promote a balance of rest/activity, with adequate nutrition and elimination   Outcome: Progressing     Problem: Risk for Violence/Aggression Toward Others  Goal: Refrain from harming others  Outcome: Progressing  Goal: Refrain from destructive acts on the environment or property  Outcome: Progressing  Goal: Control angry outbursts  Description: Interventions:  - Monitor patient closely, per order  - Ensure early verbal de-escalation  - Monitor prn medication needs  - Set reasonable/therapeutic limits, outline behavioral expectations, and consequences   - Provide a non-threatening milieu, utilizing the least restrictive interventions   Outcome: Progressing  Goal: Identify appropriate positive anger management techniques  Description: Interventions:  - Offer anger management and coping skills groups   - Staff will provide positive feedback for appropriate anger control  Outcome: Progressing     Problem: Alteration in Orientation  Goal: Interact with staff daily  Description: Interventions:  - Assess and re-assess patient's level of orientation  - Engage patient in 1 on 1 interactions, daily, for a minimum of 15 minutes   - Establish rapport/trust with patient   Outcome: Progressing

## 2024-11-12 PROCEDURE — 99232 SBSQ HOSP IP/OBS MODERATE 35: CPT | Performed by: PSYCHIATRY & NEUROLOGY

## 2024-11-12 RX ADMIN — SERTRALINE HYDROCHLORIDE 150 MG: 100 TABLET ORAL at 21:05

## 2024-11-12 RX ADMIN — CHOLECALCIFEROL TAB 25 MCG (1000 UNIT) 2000 UNITS: 25 TAB at 08:15

## 2024-11-12 RX ADMIN — CYANOCOBALAMIN TAB 1000 MCG 1000 MCG: 1000 TAB at 08:15

## 2024-11-12 RX ADMIN — CARIPRAZINE 3 MG: 3 CAPSULE, GELATIN COATED ORAL at 08:14

## 2024-11-12 RX ADMIN — LAMOTRIGINE 50 MG: 25 TABLET ORAL at 08:14

## 2024-11-12 RX ADMIN — HYDROXYZINE HYDROCHLORIDE 25 MG: 25 TABLET ORAL at 17:07

## 2024-11-12 RX ADMIN — HYDROXYZINE HYDROCHLORIDE 25 MG: 25 TABLET ORAL at 08:14

## 2024-11-12 RX ADMIN — HYDROXYZINE HYDROCHLORIDE 25 MG: 25 TABLET ORAL at 21:05

## 2024-11-12 RX ADMIN — ATORVASTATIN CALCIUM 10 MG: 10 TABLET, FILM COATED ORAL at 08:15

## 2024-11-12 NOTE — SOCIAL WORK
[facility] requested to complete in person tour on 11/14 at 1215. [facility] transport scheduled. This writer will accompany patient to this tour. [facility] requested their medical assessment form completed. This writer informed attending.

## 2024-11-12 NOTE — PROGRESS NOTES
Progress Note - Behavioral Health   Name: Deyvi Cao 54 y.o. male I MRN: [id number]  Unit/Bed#: [facility] 101-02 I Date of Admission: 6/25/2024   Date of Service: 11/12/2024 I Hospital Day: 140     Assessment & Plan  Bipolar disorder with severe depression (HCC)  Progressing -on 11/12/2024 with no overt psychotic or manic symptoms or suicidal thoughts anticipating discharge in the near future  Awaiting placement - In-person interview with   [facility] completed and waiting for criminally check to come back for final acceptance  Continue medications as follows: Vraylar 3mg PO Daily, Atarax 25mg PO TID, Lamictal 50mg PO Daily, Zoloft 150mg PO QHS  Continue with group therapy, milieu therapy and occupational therapy   Behavioral Health checks every 7 minutes   Continue frequent safety checks and vitals per unit protocol  Continue with [facility] medical management as indicated  Continue individual group milieu treatment and prepare for discharge  Benign essential hypertension  Managed by [facility] - reviewed on 11/12/2024  Continue Lisinopril 10mg PO Daily  Mixed hyperlipidemia  Managed by [facility] - reviewed on 11/12/2024  Continue Lipitor 10mg PO Daily  Allergic rhinitis due to allergen  Managed by [facility] - reviewed on 11/12/2024  Rosacea    Managed by [facility] - reviewed on 11/12/2024  Vitamin D3 deficiency  Reviewed on 11/12/2024 on vitamin D3 2000 units daily followed up by medical    Patient Active Problem List   Diagnosis    Acne    Benign essential hypertension    Mixed hyperlipidemia    Tobacco dependence syndrome    Allergic rhinitis due to allergen    Medical clearance for psychiatric admission    Lung cancer screening declined by patient    Moderate depressed bipolar I disorder (HCC)    Bipolar disorder with severe depression (HCC)    Rosacea    Vitamin D3 deficiency     Review of systems: Unremarkable  Psychiatric Diagnosis: Bipolar depression    Assessment  Overall Status: Continues to remain somewhat  timid withdrawn child with minimal mood reactivity which is his baseline as he claims he is a loner.  Hardly interacting with peers staying to himself but does attend some of the groups anticipating placement at the [facility] with [facility] ACT team once [place] and a check case back with no relapse of any psychotic or manic symptoms or suicidal thoughts and no need for behavioral PRNs  certification Statement: The patient will continue to require additional inpatient hospital stay due to recurrent depression and repeated suicide attempts in the community     Medications: Vraylar 3 mg a day, hydroxyzine 25 mg 3 times a day, Zoloft 150 mg a day, Lamictal 50 mg a day,  All medications reviewed  side effects from treatment: None reported  Medication changes   No  changes    Medication education   Risks side effects benefits and precautions of medications discussed with patient and he did verbalize an understanding about risks for metabolic syndrome from being on neuroleptics and is form tardive dyskinesia etc.     Understanding of medications: Has good understanding about medications and side effects of his precautions benefits   Justification for dual anti-psychotics: Not applicable    Non-pharmacological treatments  Continue with individual, group, milieu and occupational therapy using recovery principles and psycho-education about accepting illness and the need for treatment.  Behavioral health checks every 7 minutes  Safety with the patient about illness and need for treatment  Support transition to [facility] with the ACT team    Safety  Safety and communication plan established to target dynamic risk factors discussed above.    Discharge Plan   Waiting for [facility] program at [facility] to accept him once criminal check to be back with [facility] ACT team and referral to [facility].    Interval Progress   Anticipating to hear back from [facility] group home and  while straight for acceptance and follow-up by [facility] ACT team and referral to [facility] once the criminal charges back for them for final acceptance.  Still somewhat timid and shy and withdrawn with minimal interaction with peers admitting that he is a loner.  Grooming is fair and reports no relapse of any overt psychotic manic or depressive symptoms or suicidal thoughts overall and continues to contract for safety.  Comes across as superficial, cold and aloof with delayed responses which is his baseline.  Has not been aggressive or agitated or threatening or self-abusive with no need for behavioral PRNs and needs reminders to keep attending groups  Acceptance by patient: Accepting  Hopefulness in recovery: Living at the [facility] with ACT and follow-up  involved in reintegration process: No contact with anyone including his sister or mother   trusting in relationship with psychiatrist: Trusting  Sleep: Good  Appetite: Good  Compliance with Medications: Good  Group attendance: Attending some 4/10  Significant events and progress towards goals: Waiting for final acceptance by [facility] while still group home    Mental Status Exam  Appearance: casually dressed, dressed appropriately, adequate grooming, looks stated age with good eye contact found pacing in his room  Behavior: cooperative, mildly anxious, slow responses fairly groomed casually dressed with good eye contact friendly cooperative but anxious for discharge speech: slow, scant, increased latency of response, delayed, soft, decreased volume delayed to some extent  Mood: depressed, dysphoric, anxious  Affect: constricted, slightly brighter, mood-congruent  Thought Process: organized, goal directed, decreased rate of thoughts, slowing of thoughts, negative thinking, concrete  Thought Content: no overt delusions, no current suicidal or homicidal thoughts and no plans verbalized.  No phobias obsessions compulsions reported.  Not  appearing to respond to any delusional beliefs  Perceptual Disturbances: no auditory hallucinations, no visual hallucinations  Hx Risk Factors: chronic depressive symptoms, chronic anxiety symptoms, history of suicide attempts  Sensorium: Alert and oriented x 3 spheres and situation  Cognition: recent and remote memory grossly intact  Consciousness: alert, awake, and not sedated  Attention: attention span and concentration are age appropriate  Intellect: appears to be of average intelligence  Insight: limited  Judgement: limited  Motor Activity: no abnormal movements     Vitals  Temp:  [98 °F (36.7 °C)-98.3 °F (36.8 °C)] 98.3 °F (36.8 °C)  HR:  [94-99] 99  Resp:  [18-19] 18  BP: (114-137)/(69-71) 114/69  SpO2:  [95 %] 95 %  No intake or output data in the 24 hours ending 11/12/24 0359    Lab Results: All Labs For Current Hospital Admission Reviewed      Current Facility-Administered Medications   Medication Dose Route Frequency Provider Last Rate    acetaminophen  650 mg Oral Q6H PRN ALVINA Harley      acetaminophen  650 mg Oral Q4H PRN ALVINA Harley      acetaminophen  975 mg Oral Q6H PRN ALVINA Harley      aluminum-magnesium hydroxide-simethicone  30 mL Oral Q4H PRN ALVINA Harley      ammonium lactate   Topical BID PRN ALVINA Harley      atorvastatin  10 mg Oral Daily ALVINA Lewis      benztropine  1 mg Intramuscular Q4H PRN Max 6/day ALVINA Harley      benztropine  1 mg Oral Q4H PRN Max 6/day ALVINA Harley      bisacodyl  10 mg Rectal Daily PRN ALVINA Harley      cariprazine  3 mg Oral Daily Martin Cardenas MD      Cholecalciferol  2,000 Units Oral Daily ALVINA Lewis      cyanocobalamin  1,000 mcg Oral Daily ALVINA Lewis      hydrOXYzine HCL  25 mg Oral Q6H PRN Max 4/day ALVINA Harley      hydrOXYzine HCL  25 mg Oral TID Martin Cardenas MD      hydrOXYzine HCL  50 mg Oral Q4H PRN Max 4/day ALVINA Harley      Or     LORazepam  1 mg Intramuscular Q4H PRN Melva Zenia, CRNP      ketoconazole   Topical Daily PRN Melva Andersonrichard, CRNP      lamoTRIgine  50 mg Oral Daily Martin Cardenas MD      lisinopril  10 mg Oral Daily Sary Coradodennis, CRNP      LORazepam  1 mg Oral Q4H PRN Max 6/day Melva Justiney, CRNP      Or    LORazepam  2 mg Intramuscular Q6H PRN Max 3/day Melvamelanie Andersoney, CRNP      OLANZapine  10 mg Oral Q3H PRN Max 3/day Melva Justiney, CRNP      Or    OLANZapine  10 mg Intramuscular Q3H PRN Max 3/day Melva Justiney, CRNP      OLANZapine  5 mg Oral Q3H PRN Max 6/day Melva Justiney, CRNP      Or    OLANZapine  5 mg Intramuscular Q3H PRN Max 6/day Melvamelanie Andersoney, CRNP      OLANZapine  2.5 mg Oral Q3H PRN Max 8/day Melva Justiney, CRNP      polyethylene glycol  17 g Oral Daily PRN Melva Andersoney, CRNP      propranolol  10 mg Oral Q8H PRN Melva Zenia, CRNP      senna-docusate sodium  1 tablet Oral Daily PRN Melva Andersonrichard, CRNP      sertraline  150 mg Oral HS Martin Cardenas MD         Counseling / Coordination of Care: Total floor / unit time spent today 15 minutes. Greater than 50% of total time was spent with the patient and / or family counseling and / or somewhat receptive to supportive listening and teaching positive coping skills to deal with symptom mangement.     Patient's Rights, confidentiality and exceptions to confidentiality, use of automated medical record, Behavioral Health Services staff access to medical record, and consent to treatment reviewed.    This note has been dictated and hence there may be problems with punctuation, spelling and formatting and if anyone has any concerns please address them to Dr. Cardenas   This note is not shared with patient due to potential for making patient's condition worse by knowing the content of the note.

## 2024-11-12 NOTE — PROGRESS NOTES
11/12/24 0802   Team Meeting   Meeting Type Daily Rounds   Team Members Present   Team Members Present Physician;Nurse;;Other (Discipline and Name)   Physician Team Member Thomas, Holter   Nursing Team Member Chastity   Social Work Team Member Henrry FRY   Other (Discipline and Name) Shell PCM   Patient/Family Present   Patient Present No   Patient's Family Present No     Groups Participation  4/10.   Expected D/C Date:12/11  Patient's compliant with medications. CRR referral pending. Isolates in his room, poor hygiene. Flat affect and depressed at times.

## 2024-11-12 NOTE — NURSING NOTE
Pt is visible intermittently on the unit, minimal peer interactions. Consumed 100% of all meals. Took medications without incidence. Pt is quiet, pleasant on approach, and cooperative. Attended 4/11 groups. Denies anxiety, depression, and SI/HI/AVH. No behavioral issues. Pt offers no complaints.

## 2024-11-13 PROBLEM — R79.89 LOW VITAMIN B12 LEVEL: Status: ACTIVE | Noted: 2024-11-13

## 2024-11-13 PROCEDURE — 99232 SBSQ HOSP IP/OBS MODERATE 35: CPT | Performed by: PHYSICIAN ASSISTANT

## 2024-11-13 PROCEDURE — 99232 SBSQ HOSP IP/OBS MODERATE 35: CPT | Performed by: PSYCHIATRY & NEUROLOGY

## 2024-11-13 RX ADMIN — CYANOCOBALAMIN TAB 1000 MCG 1000 MCG: 1000 TAB at 08:22

## 2024-11-13 RX ADMIN — LAMOTRIGINE 50 MG: 25 TABLET ORAL at 08:22

## 2024-11-13 RX ADMIN — HYDROXYZINE HYDROCHLORIDE 25 MG: 25 TABLET ORAL at 08:21

## 2024-11-13 RX ADMIN — LISINOPRIL 10 MG: 10 TABLET ORAL at 08:21

## 2024-11-13 RX ADMIN — SERTRALINE HYDROCHLORIDE 150 MG: 100 TABLET ORAL at 21:28

## 2024-11-13 RX ADMIN — ATORVASTATIN CALCIUM 10 MG: 10 TABLET, FILM COATED ORAL at 08:22

## 2024-11-13 RX ADMIN — HYDROXYZINE HYDROCHLORIDE 25 MG: 25 TABLET ORAL at 17:00

## 2024-11-13 RX ADMIN — HYDROXYZINE HYDROCHLORIDE 25 MG: 25 TABLET ORAL at 21:28

## 2024-11-13 RX ADMIN — CARIPRAZINE 3 MG: 3 CAPSULE, GELATIN COATED ORAL at 08:23

## 2024-11-13 RX ADMIN — CHOLECALCIFEROL TAB 25 MCG (1000 UNIT) 2000 UNITS: 25 TAB at 08:22

## 2024-11-13 NOTE — ASSESSMENT & PLAN NOTE
Vitamin D level low back in June 2024 @ 19.2  Patient taking daily Vitamin D3 supplements, which should be continues at discharge.    Obtain repeat Vitamin D3 level in Dec/Jan timeframe after being on supplements for 6 months.

## 2024-11-13 NOTE — PLAN OF CARE
Problem: Alteration in Thoughts and Perception  Goal: Treatment Goal: Gain control of psychotic behaviors/thinking, reduce/eliminate presenting symptoms and demonstrate improved reality functioning upon discharge  Outcome: Progressing  Goal: Refrain from acting on delusional thinking/internal stimuli  Description: Interventions:  - Monitor patient closely, per order   - Utilize least restrictive measures   - Set reasonable limits, give positive feedback for acceptable   - Administer medications as ordered and monitor of potential side effects  Outcome: Progressing  Goal: Agree to be compliant with medication regime, as prescribed and report medication side effects  Description: Interventions:  - Offer appropriate PRN medication and supervise ingestion; conduct AIMS, as needed   Outcome: Progressing  Goal: Attend and participate in unit activities, including therapeutic, recreational, and educational groups  Description: Interventions:  -Encourage Visitation and family involvement in care  Outcome: Progressing  Goal: Complete daily ADLs, including personal hygiene independently, as able  Description: Interventions:  - Observe, teach, and assist patient with ADLS  - Monitor and promote a balance of rest/activity, with adequate nutrition and elimination   Outcome: Progressing     Problem: Ineffective Coping  Goal: Participates in unit activities  Description: Interventions:  - Provide therapeutic environment   - Provide required programming   - Redirect inappropriate behaviors   Outcome: Progressing  Goal: Free from restraint events  Description: - Utilize least restrictive measures   - Provide behavioral interventions   - Redirect inappropriate behaviors   Outcome: Progressing     Problem: Risk for Self Injury/Neglect  Goal: Treatment Goal: Remain safe during length of stay, learn and adopt new coping skills, and be free of self-injurious ideation, impulses and acts at the time of discharge  Outcome:  Progressing  Goal: Refrain from harming self  Description: Interventions:  - Monitor patient closely, per order  - Develop a trusting relationship  - Supervise medication ingestion, monitor effects and side effects   Outcome: Progressing  Goal: Recognize maladaptive responses and adopt new coping mechanisms  Outcome: Progressing     Problem: Depression  Goal: Treatment Goal: Demonstrate behavioral control of depressive symptoms, verbalize feelings of improved mood/affect, and adopt new coping skills prior to discharge  Outcome: Progressing  Goal: Verbalize thoughts and feelings  Description: Interventions:  - Assess and re-assess patient's level of risk   - Engage patient in 1:1 interactions, daily, for a minimum of 15 minutes   - Encourage patient to express feelings, fears, frustrations, hopes   Outcome: Progressing  Goal: Refrain from harming self  Description: Interventions:  - Monitor patient closely, per order   - Supervise medication ingestion, monitor effects and side effects   Outcome: Progressing  Goal: Refrain from self-neglect  Outcome: Progressing  Goal: Complete daily ADLs, including personal hygiene independently, as able  Description: Interventions:  - Observe, teach, and assist patient with ADLS  -  Monitor and promote a balance of rest/activity, with adequate nutrition and elimination   Outcome: Progressing     Problem: Risk for Violence/Aggression Toward Others  Goal: Treatment Goal: Refrain from acts of violence/aggression during length of stay, and demonstrate improved impulse control at the time of discharge  Outcome: Progressing  Goal: Refrain from harming others  Outcome: Progressing  Goal: Refrain from destructive acts on the environment or property  Outcome: Progressing     Problem: Alteration in Orientation  Goal: Treatment Goal: Demonstrate a reduction of confusion and improved orientation to person, place, time and/or situation upon discharge, according to optimum  baseline/ability  Outcome: Progressing  Goal: Attend and participate in unit activities, including therapeutic, recreational, and educational groups  Description: Interventions:  - Provide therapeutic and educational activities daily, encourage attendance and participation, and document same in the medical record   - Provide appropriate opportunities for reminiscence   - Provide a consistent daily routine   - Encourage family contact/visitation   Outcome: Progressing  Goal: Complete daily ADLs, including personal hygiene independently, as able  Description: Interventions:  - Observe, teach, and assist patient with ADLS  Outcome: Progressing

## 2024-11-13 NOTE — PLAN OF CARE
Problem: Ineffective Coping  Goal: Identifies ineffective coping skills  Outcome: Progressing  Goal: Identifies healthy coping skills  Outcome: Progressing  Goal: Demonstrates healthy coping skills  Outcome: Progressing  Goal: Participates in unit activities  Description: Interventions:  - Provide therapeutic environment   - Provide required programming   - Redirect inappropriate behaviors   Outcome: Progressing   He continues to make progressed towards the goal by attending more groups.

## 2024-11-13 NOTE — ASSESSMENT & PLAN NOTE
Reviewed 11/13/24   Awaiting placement - had in person interview with  at [facility] completed on Monday. Patient has tour with facility tomorrow, 11/14/24.  Background check completed and accepted by group home  [facility] medical evaluation needed - medical team notified   Continue medications as follows:  Vraylar 3mg PO Daily  Atarax 25mg PO TID  Lamictal 50mg PO Daily  Zoloft 150mg PO QHS  Continue with group therapy, milieu therapy and occupational therapy   Behavioral Health checks every 7 minutes   Continue frequent safety checks and vitals per unit protocol  Continue with [facility] medical management as indicated

## 2024-11-13 NOTE — PROGRESS NOTES
Progress Note - Behavioral Health   Name: Deyvi Cao 54 y.o. male I MRN: [id number]  Unit/Bed#: [facility] 101-02 I Date of Admission: 6/25/2024   Date of Service: 11/13/2024 I Hospital Day: 141     Assessment & Plan  Bipolar disorder with severe depression (HCC)  Reviewed 11/13/24   Awaiting placement - had in person interview with  at [facility] completed on Monday. Patient has tour with facility tomorrow, 11/14/24.  Background check completed and accepted by group home  [facility] medical evaluation needed - medical team notified   Continue medications as follows:  Vraylar 3mg PO Daily  Atarax 25mg PO TID  Lamictal 50mg PO Daily  Zoloft 150mg PO QHS  Continue with group therapy, milieu therapy and occupational therapy   Behavioral Health checks every 7 minutes   Continue frequent safety checks and vitals per unit protocol  Continue with [facility] medical management as indicated  Benign essential hypertension  Managed by [facility] - reviewed 11/13/2024  Continue Lisinopril 10mg PO Daily  Mixed hyperlipidemia  Managed by [facility] - reviewed 11/13/2024  Continue Lipitor 10mg PO Daily  Allergic rhinitis due to allergen  Managed by [facility] - reviewed 11/13/2024  Rosacea  Managed by [facility] - reviewed 11/13/2024  Vitamin D3 deficiency  Managed by [facility] - reviewed 11/13/24    Psychiatry Progress Note [facility]    Progress towards goals: progressing    Review of systems: denied    Psychiatric Diagnosis: Bipolar affective disorder with severe depression    Assessment  Overall Status:  Progressing, timid and withdrawn.   Certification Statement: The patient will continue to require additional inpatient hospital stay due to recurrent depression and repeated suicide attempts in the community.     Medications: as above  Side effects from treatment: denied  Medication changes: as above  Medication education: Risks side effects benefits and precautions of medications discussed with patient and they did  "verbalize an understanding about risks for metabolic syndrome from being on neuroleptics and tardive dyskinesia etc.  All medications reviewed and I recommend that they be continued for symptom management  Understanding of medications: Risks side effects benefits and precautions of medications discussed with patient and they did verbalize an understanding about risks for metabolic syndrome from being on neuroleptics and tardive dyskinesia etc., and patient appeared to have understanding.  Justification for dual anti-psychotics: Not applicable    Non-pharmacological treatments  Continue with individual, group, milieu and occupational therapy using recovery principles and psycho-education about accepting illness and the need for treatment.  Behavioral health checks every 7 minutes    Safety  Safety and communication plan established to target dynamic risk factors discussed above.    Discharge Plan   To [facility] with [facility] team services.    Interval Progress: Per treatment team, patient compliant and cooperative with treatment and medications. Patient was visible on the unit for some groups, largely isolative to self. Per case management, patient requires medical evaluation for acceptance to group home, [facility], and this provider notified medical team to need. Patient's background check was accepted per case management.  Today, the patient notes he is feeling, \"good.\" He is soft spoken, though cooperative with interview with this provider. The patient denies SI, HI, passive death wishes, or thoughts to harm self or others - he does discuss how SI is what brought him to the hospital initially. The patient denies AVH and does not appear to be responding to internal stimuli. No paranoid statements made at time of evaluation. The patient appears tremulous, though denies this as an adverse effect of medication or anxiety - objectively, patient appears to be anxious.    Acceptance by patient: accepting " "of treatment  Hopefulness in recovery: Living at [facility] group home with ACT follow up  Involved in reintegration process: No contact with family or community members  Trusting in relationship with psychiatrist: present  Sleep: good  Appetite: good  Compliance with Medications: compliant  Group attendance: attended 4 out of 11 groups  Significant events: None in past 24 hours    Mental Status Exam  Appearance: age appropriate, casually dressed, adequate grooming, looks stated age, fair eye contact  Behavior: cooperative, mildly anxious  Speech: normal rate, normal pitch, clear, coherent, soft, not hyperverbal, not pressured  Mood:  \"good\"  Affect: constricted, anxious  Thought Process: organized, logical, coherent, goal directed, decreased rate of thoughts, slowing of thoughts, concrete  Thought Content: no overt delusions, intermittent negative thoughts  Perceptual Disturbances: denies auditory or visual hallucinations when asked, does not appear responding to internal stimuli  Sensorium: alert and oriented to person, place, time and situation  Cognition: recent and remote memory grossly intact  Consciousness: alert and awake  Attention: attention span and concentration are age appropriate  Intellect: appears to be of average intelligence  Insight: limited  Judgement: limited  Motor Activity: no abnormal movements, normal gait and balance     Vitals  Temp:  [97.5 °F (36.4 °C)-97.9 °F (36.6 °C)] 97.9 °F (36.6 °C)  HR:  [89-95] 95  Resp:  [18] 18  BP: (125-131)/(70-88) 131/88  SpO2:  [95 %-96 %] 95 %  No intake or output data in the 24 hours ending 11/13/24 0833    Lab Results: All Labs For Current Hospital Admission Reviewed        Current Facility-Administered Medications   Medication Dose Route Frequency Provider Last Rate    acetaminophen  650 mg Oral Q6H PRN ALVINA Harley      acetaminophen  650 mg Oral Q4H PRN ALVINA Harley      acetaminophen  975 mg Oral Q6H PRN ALVINA Harley      " aluminum-magnesium hydroxide-simethicone  30 mL Oral Q4H PRN ALVINA Harley      ammonium lactate   Topical BID PRN ALVINA Harley      atorvastatin  10 mg Oral Daily Sary ALVINA Gupta      benztropine  1 mg Intramuscular Q4H PRN Max 6/day ALVINA Harley      benztropine  1 mg Oral Q4H PRN Max 6/day ALVINA Harley      bisacodyl  10 mg Rectal Daily PRN ALVINA Harley      cariprazine  3 mg Oral Daily Martin Cardenas MD      Cholecalciferol  2,000 Units Oral Daily Sary LinkALVINA Thompson      cyanocobalamin  1,000 mcg Oral Daily SaryALVINA Starkey      hydrOXYzine HCL  25 mg Oral Q6H PRN Max 4/day ALVINA Harley      hydrOXYzine HCL  25 mg Oral TID Martin Cardenas MD      hydrOXYzine HCL  50 mg Oral Q4H PRN Max 4/day ALVINA Harley      Or    LORazepam  1 mg Intramuscular Q4H PRN ALVINA Harley      ketoconazole   Topical Daily PRN ALVINA Harley      lamoTRIgine  50 mg Oral Daily Martin Cardenas MD      lisinopril  10 mg Oral Daily Sary ALVINA Gupta      LORazepam  1 mg Oral Q4H PRN Max 6/day ALVINA Harley      Or    LORazepam  2 mg Intramuscular Q6H PRN Max 3/day ALVINA Harley      OLANZapine  10 mg Oral Q3H PRN Max 3/day ALVINA Harley      Or    OLANZapine  10 mg Intramuscular Q3H PRN Max 3/day ALVINA Harley      OLANZapine  5 mg Oral Q3H PRN Max 6/day ALVINA Harley      Or    OLANZapine  5 mg Intramuscular Q3H PRN Max 6/day ALVINA Harley      OLANZapine  2.5 mg Oral Q3H PRN Max 8/day ALVINA Harley      polyethylene glycol  17 g Oral Daily PRN ALVINA Harley      propranolol  10 mg Oral Q8H PRN ALVINA Harley      senna-docusate sodium  1 tablet Oral Daily PRN ALVINA Harley      sertraline  150 mg Oral HS Martin Cardenas MD         Counseling / Coordination of Care: Total floor / unit time spent today 15 minutes. Greater than 50% of total time was spent with the patient and / or family counseling  and / or somewhat receptive to supportive listening and teaching positive coping skills to deal with symptom mangement.     Patient's Rights, confidentiality and exceptions to confidentiality, use of automated medical record, Behavioral Health Services staff access to medical record, and consent to treatment reviewed.    This note has been dictated and hence there may be problems with punctuation, spelling and formatting and if anyone has any concerns please address them to Dr. Crawford.  This note is not shared with patient due to potential for making patient's condition worse by knowing the content of the note.    Ariane Crawford, DO  Psychiatry Resident, PGY-IV

## 2024-11-13 NOTE — ASSESSMENT & PLAN NOTE
Vitamin B12 level was low-normal back in June 2024 @ 367 (normal >400)  Patient has been taking daily supplements, which should be continued at discharge.  Obtain repeat Vitamin B12 level in Dec/Jan timeframe after being on supplements for 6 months.

## 2024-11-13 NOTE — ASSESSMENT & PLAN NOTE
Blood pressures are well controlled on lisinopril 10 mg daily which should be continued at discharge.

## 2024-11-13 NOTE — NURSING NOTE
0434 Received pt in bed at change of shift with eyes closed; chest movement noted.  Continues the same thus this far as per q 15 min room checks.   Will continue to monitor behavior, sleeping pattern and any medical issues that may arise.    0554:  Sleeping 7+ hrs thus this far

## 2024-11-13 NOTE — ASSESSMENT & PLAN NOTE
Lipid panel obtained in June 2024 demonstrates good control with total cholesterol: 177, triglycerides: 73, HDL: 52 and LDL: 110  Continue Lipitor 10 mg daily at discharge.

## 2024-11-13 NOTE — PROGRESS NOTES
Progress Note - Hospitalist   Name: Deyvi Cao 54 y.o. male I MRN: [id number]  Unit/Bed#: [facility] 101-02 I Date of Admission: 6/25/2024   Date of Service: 11/13/2024 I Hospital Day: 141    Assessment & Plan  Bipolar disorder with severe depression (HCC)  [facility]  Management per primary service   Benign essential hypertension  Blood pressures are well controlled on lisinopril 10 mg daily which should be continued at discharge.  Mixed hyperlipidemia  Lipid panel obtained in June 2024 demonstrates good control with total cholesterol: 177, triglycerides: 73, HDL: 52 and LDL: 110  Continue Lipitor 10 mg daily at discharge.  Allergic rhinitis due to allergen  Patient has utilized Claritin in the past with relief of allergy symptoms.  Rosacea  Continue with ketoconazole shampoo as needed.  Vitamin D3 deficiency  Vitamin D level low back in June 2024 @ 19.2  Patient taking daily Vitamin D3 supplements, which should be continues at discharge.    Obtain repeat Vitamin D3 level in Dec/Jan timeframe after being on supplements for 6 months.  Low vitamin B12 level  Vitamin B12 level was low-normal back in June 2024 @ 367 (normal >400)  Patient has been taking daily supplements, which should be continued at discharge.  Obtain repeat Vitamin B12 level in Dec/Jan timeframe after being on supplements for 6 months.    Current Length of Stay: 141 day(s)    Code Status: Level 1 - Full Code    Subjective   Saw patient for medical clearance for discharge to group home in the up-coming dates.    Objective :  Temp:  [97.5 °F (36.4 °C)-97.9 °F (36.6 °C)] 97.9 °F (36.6 °C)  HR:  [89-95] 95  BP: (125-131)/(70-88) 131/88  Resp:  [18] 18  SpO2:  [95 %-96 %] 95 %  O2 Device: None (Room air)    Body mass index is 29.57 kg/m².   Physical Exam  Constitutional:       General: He is not in acute distress.  HENT:      Head: Normocephalic and atraumatic.      Nose: Nose normal.      Mouth/Throat:      Mouth: Mucous membranes are moist.    Eyes:      Extraocular Movements: Extraocular movements intact.      Pupils: Pupils are equal, round, and reactive to light.   Cardiovascular:      Rate and Rhythm: Normal rate and regular rhythm.      Heart sounds: No murmur heard.  Pulmonary:      Effort: Pulmonary effort is normal.      Breath sounds: Normal breath sounds.   Abdominal:      General: Bowel sounds are normal.   Musculoskeletal:         General: Normal range of motion.      Cervical back: Normal range of motion.   Skin:     Findings: No rash.   Neurological:      General: No focal deficit present.      Mental Status: He is alert and oriented to person, place, and time.   Psychiatric:         Mood and Affect: Mood normal.             Last 24 Hours Medication List:   Current Facility-Administered Medications   Medication Dose Route Frequency Provider Last Rate    acetaminophen  650 mg Oral Q6H PRN ALVINA Harley      acetaminophen  650 mg Oral Q4H PRN ALVINA Harley      acetaminophen  975 mg Oral Q6H PRN ALVINA Harley      aluminum-magnesium hydroxide-simethicone  30 mL Oral Q4H PRN ALVINA Harley      ammonium lactate   Topical BID PRN ALVINA Harley      atorvastatin  10 mg Oral Daily ALVINA Lewis      benztropine  1 mg Intramuscular Q4H PRN Max 6/day ALVINA Harley      benztropine  1 mg Oral Q4H PRN Max 6/day ALVINA Harley      bisacodyl  10 mg Rectal Daily PRN ALVINA Harley      cariprazine  3 mg Oral Daily Martin Cardenas MD      Cholecalciferol  2,000 Units Oral Daily ALVINA Lewis      cyanocobalamin  1,000 mcg Oral Daily ALVINA Lewis      hydrOXYzine HCL  25 mg Oral Q6H PRN Max 4/day ALVINA Harley      hydrOXYzine HCL  25 mg Oral TID Martin Cardenas MD      hydrOXYzine HCL  50 mg Oral Q4H PRN Max 4/day ALVINA Harley      Or    LORazepam  1 mg Intramuscular Q4H PRN ALVINA Harley      ketoconazole   Topical Daily PRN ALVINA Harley       lamoTRIgine  50 mg Oral Daily Martin Cardenas MD      lisinopril  10 mg Oral Daily Sary Rodriguez Kalyan, ALVINA      LORazepam  1 mg Oral Q4H PRN Max 6/day Melva Knutson, ALVINA      Or    LORazepam  2 mg Intramuscular Q6H PRN Max 3/day Melvamelanie Knutson, CRNP      OLANZapine  10 mg Oral Q3H PRN Max 3/day Melvamelanie Andersoney, CRNP      Or    OLANZapine  10 mg Intramuscular Q3H PRN Max 3/day Melva Knutson, CRNP      OLANZapine  5 mg Oral Q3H PRN Max 6/day Melvamelanie Knutson, CRNP      Or    OLANZapine  5 mg Intramuscular Q3H PRN Max 6/day Melvamelanie Knutson, CRNP      OLANZapine  2.5 mg Oral Q3H PRN Max 8/day Melva Knutson, TITINP      polyethylene glycol  17 g Oral Daily PRN Melva Knutson, TITINP      propranolol  10 mg Oral Q8H PRN Melva Knutson, TITINP      senna-docusate sodium  1 tablet Oral Daily PRN Melva Knutson, TITINP      sertraline  150 mg Oral HS Martin Cardenas MD        Administrative Statements     Today, Patient Was Seen By: Vicky Meyer PA-C  **Please Note: This note may have been constructed using a voice recognition system.**

## 2024-11-13 NOTE — PROGRESS NOTES
11/13/24 0741   Team Meeting   Meeting Type Daily Rounds   Team Members Present   Team Members Present Physician;Nurse;;Other (Discipline and Name)   Physician Team Member Ronald Crawford   Nursing Team Member Chastity   Social Work Team Member Henrry FRY   Other (Discipline and Name) Ugo MS    Patient/Family Present   Patient Present No   Patient's Family Present No     Groups Participation  4/11.   Patient's compliant with medications. He has minimal engagement in his treatment. [facility] tour on 11/14. He isolates and has poor hygiene. Medical assessment requested for group home placement.

## 2024-11-14 PROCEDURE — 99232 SBSQ HOSP IP/OBS MODERATE 35: CPT | Performed by: PSYCHIATRY & NEUROLOGY

## 2024-11-14 RX ADMIN — CYANOCOBALAMIN TAB 1000 MCG 1000 MCG: 1000 TAB at 08:51

## 2024-11-14 RX ADMIN — HYDROXYZINE HYDROCHLORIDE 25 MG: 25 TABLET ORAL at 21:06

## 2024-11-14 RX ADMIN — CHOLECALCIFEROL TAB 25 MCG (1000 UNIT) 2000 UNITS: 25 TAB at 08:51

## 2024-11-14 RX ADMIN — HYDROXYZINE HYDROCHLORIDE 25 MG: 25 TABLET ORAL at 08:51

## 2024-11-14 RX ADMIN — ATORVASTATIN CALCIUM 10 MG: 10 TABLET, FILM COATED ORAL at 08:51

## 2024-11-14 RX ADMIN — CARIPRAZINE 3 MG: 3 CAPSULE, GELATIN COATED ORAL at 08:50

## 2024-11-14 RX ADMIN — HYDROXYZINE HYDROCHLORIDE 25 MG: 25 TABLET ORAL at 16:59

## 2024-11-14 RX ADMIN — LAMOTRIGINE 50 MG: 25 TABLET ORAL at 08:50

## 2024-11-14 RX ADMIN — LISINOPRIL 10 MG: 10 TABLET ORAL at 08:51

## 2024-11-14 RX ADMIN — SERTRALINE HYDROCHLORIDE 150 MG: 100 TABLET ORAL at 21:06

## 2024-11-14 NOTE — NURSING NOTE
Deyvi is compliant with meds and meals. Tends to be isolative to his room. Does brighten with approach. Denies psychiatric symptoms. Denies SI/HI. Q15 min checks maintained.

## 2024-11-14 NOTE — PROGRESS NOTES
Progress Note - Behavioral Health   Name: Deyvi Cao 54 y.o. male I MRN: [id number]  Unit/Bed#: [facility] 101-02 I Date of Admission: 6/25/2024   Date of Service: 11/14/2024 I Hospital Day: 142     Assessment & Plan  Bipolar disorder with severe depression (HCC)  Reviewed 11/14/24   Awaiting placement - had in person interview with  at [facility] completed on Monday and he toured the facility on [address] yesterday  Background check completed and accepted by group home  [facility] medical evaluation needed - medical team completed the same  Continue medications as follows:  Vraylar 3mg PO Daily  Atarax 25mg PO TID  Lamictal 50mg PO Daily  Zoloft 150mg PO QHS  Continue with individual therapy group therapy, milieu therapy and occupational therapy   Behavioral Health checks every 7 minutes   Continue frequent safety checks and vitals per unit protocol  Continue with [facility] medical management as indicated  Benign essential hypertension  Managed by [facility] - reviewed 11/14/2024  Continue Lisinopril 10mg PO Daily  Mixed hyperlipidemia  Managed by [facility] - reviewed 11/14/2024  Continue Lipitor 10mg PO Daily  Allergic rhinitis due to allergen  Managed by [facility] - reviewed 11/14/2024  Rosacea  Managed by [facility] - reviewed 11/142024  Vitamin D3 deficiency  Managed by [facility] - reviewed 11/14/24  Low vitamin B12 level  Reviewed on 11/14/2024 followed up by [facility]      Patient Active Problem List   Diagnosis    Acne    Benign essential hypertension    Mixed hyperlipidemia    Tobacco dependence syndrome    Allergic rhinitis due to allergen    Medical clearance for psychiatric admission    Lung cancer screening declined by patient    Moderate depressed bipolar I disorder (HCC)    Bipolar disorder with severe depression (HCC)    Rosacea    Vitamin D3 deficiency    Low vitamin B12 level     Review of systems: Unremarkable  Psychiatric Diagnosis: Bipolar depression    Assessment  Overall Status:Will have a tour of the  [facility] facility today awaiting final discharge date otherwise doing well with no relapse of psychotic manic symptoms or suicidal thoughts and depression and anxiety are under control   certification Statement: The patient will continue to require additional inpatient hospital stay due to recurrent depression and repeated suicide attempts in the community     Medications: Vraylar 3 mg a day, hydroxyzine 25 mg 3 times a day, Zoloft 150 mg a day, Lamictal 50 mg a day,  All medications reviewed  Side effects from treatment: None reported  Medication changes   No  changes    Medication education   Risks side effects benefits and precautions of medications discussed with patient and he did verbalize an understanding about risks for metabolic syndrome from being on neuroleptics and is form tardive dyskinesia etc.     Understanding of medications: Has good understanding about medications and side effects of his precautions benefits   Justification for dual anti-psychotics: Not applicable    Non-pharmacological treatments  Continue with individual, group, milieu and occupational therapy using recovery principles and psycho-education about accepting illness and the need for treatment.  Behavioral health checks every 7 minutes  Safety with the patient about illness and need for treatment  Support transition to [facility] with the ACT team    Safety  Safety and communication plan established to target dynamic risk factors discussed above.    Discharge Plan   To finalize discharge to [facility] program at [facility] with [facility] ACT team and referral to [facility].    Interval Progress   Patient will tour the facility on [facility] today and is waiting for a final discharge date with follow-up by [facility] as acting and referral to [facility].  Reports no active symptoms other than appearing somewhat timid, shy and withdrawn, somewhat cold and aloof with delayed responses  which is his baseline with minimal interaction with peers admitting that he is a loner rating depression as 2 out of 10 and anxiety as 1 out of 10.  Continues to deny having had any suicidal thoughts or active psychotic or manic symptoms.  Has not been aggressive or agitated or threatening or self-abusive with no need for behavioral PRNs but continues to need reminders to keep up with group attendance  Acceptance by patient: Accepting  Hopefulness in recovery: Living [facility] with [facility] team   involved in reintegration process: No contact with sister or mother or anyone in the community   trusting in relationship with psychiatrist: Appears to trust  Sleep: Good  Appetite: Good  Compliance with Medications: Good  Group attendance: Attending some 6/11  Significant events and progress towards goals: Waiting for discharge [facility]    Mental Status Exam  Appearance: casually dressed, dressed appropriately, adequate grooming, looks stated age with good eye contact attended team meeting   Behavior: cooperative, mildly anxious, slow responses fairly groomed, cooperative, still with some delayed responses  Speech: slow, scant, increased latency of response, delayed, soft, decreased volume delayed responses  Mood: depressed, dysphoric, anxious  Affect: constricted, slightly brighter, mood-congruent  Thought Process: organized, goal directed, decreased rate of thoughts, slowing of thoughts, negative thinking, concrete  Thought Content: no overt delusions, no current suicidal or homicidal thoughts and no plans verbalized.  No phobias obsessions compulsions reported.  Not appearing to respond to any delusional beliefs  Perceptual Disturbances: no auditory hallucinations, no visual hallucinations  Hx Risk Factors: chronic depressive symptoms, chronic anxiety symptoms, history of suicide attempts  Sensorium:alert and oriented x 3 spheres  Cognition: recent and remote memory grossly  intact  Consciousness: alert, awake, and not sedated  Attention: attention span and concentration are age appropriate  Intellect: appears to be of average intelligence  Insight: limited  Judgement: limited  Motor Activity: no abnormal movements     Vitals  Temp:  [97.5 °F (36.4 °C)-97.9 °F (36.6 °C)] 97.5 °F (36.4 °C)  HR:  [87-95] 87  Resp:  [18] 18  BP: (124-131)/(80-88) 124/80  SpO2:  [94 %-95 %] 94 %  No intake or output data in the 24 hours ending 11/14/24 0401    Lab Results: All Labs For Current Hospital Admission Reviewed      Current Facility-Administered Medications   Medication Dose Route Frequency Provider Last Rate    acetaminophen  650 mg Oral Q6H PRN Melva Knutson, TITINP      acetaminophen  650 mg Oral Q4H PRN Melva Knutson, TITINP      acetaminophen  975 mg Oral Q6H PRN TITI HarleyNP      aluminum-magnesium hydroxide-simethicone  30 mL Oral Q4H PRN TITI HarleyNP      ammonium lactate   Topical BID PRN ALVINA Harley      atorvastatin  10 mg Oral Daily ALVINA Lewis      benztropine  1 mg Intramuscular Q4H PRN Max 6/day ALVINA Harley      benztropine  1 mg Oral Q4H PRN Max 6/day ALVINA Harley      bisacodyl  10 mg Rectal Daily PRN ALVINA Harley      cariprazine  3 mg Oral Daily Martin Cardenas MD      Cholecalciferol  2,000 Units Oral Daily ALVINA Lewis      cyanocobalamin  1,000 mcg Oral Daily ALVINA Lewis      hydrOXYzine HCL  25 mg Oral Q6H PRN Max 4/day ALVINA Harley      hydrOXYzine HCL  25 mg Oral TID Martin Cardenas MD      hydrOXYzine HCL  50 mg Oral Q4H PRN Max 4/day ALVINA Harley      Or    LORazepam  1 mg Intramuscular Q4H PRN ALVINA Harley      ketoconazole   Topical Daily PRN ALVINA Harley      lamoTRIgine  50 mg Oral Daily Martin Cardenas MD      lisinopril  10 mg Oral Daily Sary Jennifer Kormandy, CRNP      LORazepam  1 mg Oral Q4H PRN Max 6/day ALVINA Harley      Or    LORazepam  2 mg  Intramuscular Q6H PRN Max 3/day ALVINA Harley      OLANZapine  10 mg Oral Q3H PRN Max 3/day Melva ALVINA Knutson      Or    OLANZapine  10 mg Intramuscular Q3H PRN Max 3/day Melva Knutson, TITINP      OLANZapine  5 mg Oral Q3H PRN Max 6/day TITI HarleyNP      Or    OLANZapine  5 mg Intramuscular Q3H PRN Max 6/day Melvamelanie Knutson, TITINP      OLANZapine  2.5 mg Oral Q3H PRN Max 8/day Melva Knutson, TITINP      polyethylene glycol  17 g Oral Daily PRN Melva Knutson, TITINP      propranolol  10 mg Oral Q8H PRN Melvamelanie Knutson, ALVINA      senna-docusate sodium  1 tablet Oral Daily PRN ALVINA Harley      sertraline  150 mg Oral HS Martin Cardenas MD         Counseling / Coordination of Care: Total floor / unit time spent today 15 minutes. Greater than 50% of total time was spent with the patient and / or family counseling and / or somewhat receptive to supportive listening and teaching positive coping skills to deal with symptom mangement.     Patient's Rights, confidentiality and exceptions to confidentiality, use of automated medical record, Behavioral Health Services staff access to medical record, and consent to treatment reviewed.    This note has been dictated and hence there may be problems with punctuation, spelling and formatting and if anyone has any concerns please address them to Dr. Cardenas   This note is not shared with patient due to potential for making patient's condition worse by knowing the content of the note.

## 2024-11-14 NOTE — PROGRESS NOTES
11/14/24 1107   Team Meeting   Meeting Type Tx Team Meeting   Initial Conference Date 11/14/24   Next Conference Date 12/14/24   Team Members Present   Team Members Present Physician;Nurse;   Physician Team Member Ronald   Nursing Team Member Chastity   Social Work Team Member Henrry FRY   Patient/Family Present   Patient Present Yes   Patient's Family Present No     Treatment team reviewed patient's updated treatment plan. Patient is in agreement with plan and signed document.

## 2024-11-14 NOTE — NURSING NOTE
Pt is accepting of medications without incidence and meal compliant. Pt is visible in the milieu attending select groups or resting in bed. Attended [facility] tour with CM and returned without issue. Polite, pleasant and cooperative. No new concerns.

## 2024-11-14 NOTE — PROGRESS NOTES
11/14/24 0840   Team Meeting   Meeting Type Daily Rounds   Team Members Present   Team Members Present Physician;Nurse;;Other (Discipline and Name)   Physician Team Member Ronald   Nursing Team Member Chastity   Social Work Team Member Henrry FRY   Other (Discipline and Name) Goff PCM   Patient/Family Present   Patient Present No   Patient's Family Present No     Groups Participation 6/11.   Patient's compliant with medications. He's appropriate and engaged. [facility] tour today.

## 2024-11-14 NOTE — PROGRESS NOTES
11/14/24 0916   Team Meeting   Meeting Type Tx Team Meeting   Initial Conference Date 11/14/24   Next Conference Date 11/28/24   Team Members Present   Team Members Present Physician;Nurse;;Other (Discipline and Name)   Physician Team Member Ronald   Nursing Team Member Chastity   Social Work Team Member Henrry FRY   Other (Discipline and Name) Giuseppe Bailey   Patient/Family Present   Patient Present Yes   Patient's Family Present No   OTHER   Team Meeting - Additional Comments Patient attened his treatment team meeting. Patient had not completed his self-assessment. [facility] discussed his upcoming tour with [facility]. [facility] discussed ACT will provide limited transportation to his medical appointments. Team discussed patient is in agreement with referral to [facility]. ACT will assist patient with utilization of public transportation to [facility].

## 2024-11-14 NOTE — PLAN OF CARE
Problem: Alteration in Thoughts and Perception  Goal: Treatment Goal: Gain control of psychotic behaviors/thinking, reduce/eliminate presenting symptoms and demonstrate improved reality functioning upon discharge  Outcome: Progressing  Goal: Verbalize thoughts and feelings  Description: Interventions:  - Promote a nonjudgmental and trusting relationship with the patient through active listening and therapeutic communication  - Assess patient's level of functioning, behavior and potential for risk  - Engage patient in 1 on 1 interactions  - Encourage patient to express fears, feelings, frustrations, and discuss symptoms    - [place] patient to reality, help patient recognize reality-based thinking   - Administer medications as ordered and assess for potential side effects  - Provide the patient education related to the signs and symptoms of the illness and desired effects of prescribed medications  Outcome: Progressing  Goal: Refrain from acting on delusional thinking/internal stimuli  Description: Interventions:  - Monitor patient closely, per order   - Utilize least restrictive measures   - Set reasonable limits, give positive feedback for acceptable   - Administer medications as ordered and monitor of potential side effects  Outcome: Progressing  Goal: Agree to be compliant with medication regime, as prescribed and report medication side effects  Description: Interventions:  - Offer appropriate PRN medication and supervise ingestion; conduct AIMS, as needed   Outcome: Progressing  Goal: Attend and participate in unit activities, including therapeutic, recreational, and educational groups  Description: Interventions:  -Encourage Visitation and family involvement in care  Outcome: Progressing  Goal: Recognize dysfunctional thoughts, communicate reality-based thoughts at the time of discharge  Description: Interventions:  - Provide medication and psycho-education to assist patient in compliance and developing  insight into his/her illness   Outcome: Progressing  Goal: Complete daily ADLs, including personal hygiene independently, as able  Description: Interventions:  - Observe, teach, and assist patient with ADLS  - Monitor and promote a balance of rest/activity, with adequate nutrition and elimination   Outcome: Progressing     Problem: Ineffective Coping  Goal: Identifies ineffective coping skills  Outcome: Progressing  Goal: Identifies healthy coping skills  Outcome: Progressing     Problem: Risk for Self Injury/Neglect  Goal: Treatment Goal: Remain safe during length of stay, learn and adopt new coping skills, and be free of self-injurious ideation, impulses and acts at the time of discharge  Outcome: Progressing  Goal: Refrain from harming self  Description: Interventions:  - Monitor patient closely, per order  - Develop a trusting relationship  - Supervise medication ingestion, monitor effects and side effects   Outcome: Progressing     Problem: Depression  Goal: Treatment Goal: Demonstrate behavioral control of depressive symptoms, verbalize feelings of improved mood/affect, and adopt new coping skills prior to discharge  Outcome: Progressing     Problem: Anxiety  Goal: Anxiety is at manageable level  Description: Interventions:  - Assess and monitor patient's anxiety level.   - Monitor for signs and symptoms (heart palpitations, chest pain, shortness of breath, headaches, nausea, feeling jumpy, restlessness, irritable, apprehensive).   - Collaborate with interdisciplinary team and initiate plan and interventions as ordered.  - [place] patient to unit/surroundings  - Explain treatment plan  - Encourage participation in care  - Encourage verbalization of concerns/fears  - Identify coping mechanisms  - Assist in developing anxiety-reducing skills  - Administer/offer alternative therapies  - Limit or eliminate stimulants  Outcome: Progressing     Problem: SELF HARM/SUICIDALITY  Goal: Will have no self-injury during  hospital stay  Description: INTERVENTIONS:  - Q 15 MINUTES: Routine safety checks  - Q WAKING SHIFT & PRN: Assess risk to determine if routine checks are adequate to maintain patient safety  - Encourage patient to participate actively in care by formulating a plan to combat response to suicidal ideation, identify supports and resources  Outcome: Progressing     Problem: DEPRESSION  Goal: Will be euthymic at discharge  Description: INTERVENTIONS:  - Administer medication as ordered  - Provide emotional support via 1:1 interaction with staff  - Encourage involvement in milieu/groups/activities  - Monitor for social isolation  Outcome: Progressing     Problem: DISCHARGE PLANNING - CARE MANAGEMENT  Goal: Discharge to post-acute care or home with appropriate resources  Description: INTERVENTIONS:  - Conduct assessment to determine patient/family and health care team treatment goals, and need for post-acute services based on payer coverage, community resources, and patient preferences, and barriers to discharge  - Address psychosocial, clinical, and financial barriers to discharge as identified in assessment in conjunction with the patient/family and health care team  - Arrange appropriate level of post-acute services according to patient’s   needs and preference and payer coverage in collaboration with the physician and health care team  - Communicate with and update the patient/family, physician, and health care team regarding progress on the discharge plan  - Arrange appropriate transportation to post-acute venues  Outcome: Progressing

## 2024-11-15 PROCEDURE — 99232 SBSQ HOSP IP/OBS MODERATE 35: CPT | Performed by: PSYCHIATRY & NEUROLOGY

## 2024-11-15 RX ADMIN — HYDROXYZINE HYDROCHLORIDE 25 MG: 25 TABLET ORAL at 17:07

## 2024-11-15 RX ADMIN — CYANOCOBALAMIN TAB 1000 MCG 1000 MCG: 1000 TAB at 08:08

## 2024-11-15 RX ADMIN — CHOLECALCIFEROL TAB 25 MCG (1000 UNIT) 2000 UNITS: 25 TAB at 08:09

## 2024-11-15 RX ADMIN — SERTRALINE HYDROCHLORIDE 150 MG: 100 TABLET ORAL at 21:23

## 2024-11-15 RX ADMIN — ATORVASTATIN CALCIUM 10 MG: 10 TABLET, FILM COATED ORAL at 08:08

## 2024-11-15 RX ADMIN — LAMOTRIGINE 50 MG: 25 TABLET ORAL at 08:09

## 2024-11-15 RX ADMIN — CARIPRAZINE 3 MG: 3 CAPSULE, GELATIN COATED ORAL at 08:09

## 2024-11-15 RX ADMIN — HYDROXYZINE HYDROCHLORIDE 25 MG: 25 TABLET ORAL at 21:23

## 2024-11-15 RX ADMIN — HYDROXYZINE HYDROCHLORIDE 25 MG: 25 TABLET ORAL at 08:08

## 2024-11-15 NOTE — SOCIAL WORK
This writer accompanied patient to his tour at [facility] [facility]. Patient completed tour and discussed his goals with the staff at [address]. Patient identified his goals of attending the club house, obtaining his own apartment and working part time. Patient is in agreement with residing at [facility]. He reports he liked the home and the support he will receive.   The team discussed this writer will contact [facility] and confirm they will assist patient to learn a bus route. This writer will submit all requested documents for admission.   [facility] requested the following documents for admission  Mer is sending us his updated physical. Once we receive that we will have our nurse Delphine review it and get back to you with any questions.  She is also sending us an updated med list.  We will need to schedule a CSP & intake.

## 2024-11-15 NOTE — NURSING NOTE
Deyvi spoke about his tour of the group home he is going to. He said he will have his own bedroom and there will be 7 people there including him. There will be 2 living rooms and 2 cam and they take turns cooking.   I told him it will be best to go there and introduce himself and say a ;little about himself and be sure to communicate. If there seems to be a problem with a peer ask if there is something wrong: communication is the key  I said he will do great there. Positive reinforcement was given He brightens on approach. He denies significant anxiety or depression

## 2024-11-15 NOTE — PROGRESS NOTES
Progress Note - Behavioral Health   Name: Deyvi Cao 54 y.o. male I MRN: [id number]  Unit/Bed#: [facility] 101-02 I Date of Admission: 6/25/2024   Date of Service: 11/15/2024 I Hospital Day: 143     Assessment & Plan  Bipolar disorder with severe depression (HCC)  Reviewed 11/14/24   Patient did have a tour of the [facility] facility yesterday with  and is waiting to hear back as to when he can be discharged.  Still somewhat anxious but has not had any suicidal thoughts or psychotic or manic symptoms except for some residual anxiety and delayed responses which is his baseline  Background check completed and accepted by group home    Continue medications as follows:  Vraylar 3mg PO Daily  Atarax 25mg PO TID  Lamictal 50mg PO Daily  Zoloft 150mg PO QHS  Continue with group therapy, milieu therapy and occupational therapy   Behavioral Health checks every 7 minutes   Continue frequent safety checks and vitals per unit protocol  Continue with [facility] management as indicated  Benign essential hypertension  Managed by [facility] - reviewed 11/15/2024  Continue Lisinopril 10mg PO Daily  Mixed hyperlipidemia  Managed by [facility] - reviewed 11/15/2024  Continue Lipitor 10mg PO Daily  Allergic rhinitis due to allergen  Managed by [facility] - reviewed 11/15/2024  Rosacea  Managed by [facility] - reviewed 11/15/2024  Vitamin D3 deficiency  Managed by [facility] - reviewed 11/15/24  Low vitamin B12 level  Reviewed on 11/15/24 followed up by medical    Patient Active Problem List   Diagnosis    Acne    Benign essential hypertension    Mixed hyperlipidemia    Tobacco dependence syndrome    Allergic rhinitis due to allergen    Medical clearance for psychiatric admission    Lung cancer screening declined by patient    Moderate depressed bipolar I disorder (HCC)    Bipolar disorder with severe depression (HCC)    Rosacea    Vitamin D3 deficiency    Low vitamin B12 level     Review of systems: Unremarkable  Psychiatric Diagnosis:  Bipolar depression    Assessment  Overall Status: Did have a tour of the [facility] facility yesterday with  and is hoping that he will be discharged and accepted by the [facility] ACT team and plans to use public transportation to attend the [facility].  He denies having had any suicidal thoughts or psychotic or manic symptoms lately and is looking forward to getting out   certification Statement: The patient will continue to require additional inpatient hospital stay due to recurrent depression and repeated suicide attempts in the community     Medications: Vraylar 3 mg a day, hydroxyzine 25 mg 3 times a day, Zoloft 150 mg a day, Lamictal 50 mg a day,  All medications were reviewed  Side effects from treatment: None reported  Medication changes   No  changes    Medication education   Risks side effects benefits and precautions of medications discussed with patient and he did verbalize an understanding about risks for metabolic syndrome from being on neuroleptics and is form tardive dyskinesia etc.     Understanding of medications: Has good understanding about medications and side effects of his precautions benefits   Justification for dual anti-psychotics: Not applicable    Non-pharmacological treatments  Continue with individual, group, milieu and occupational therapy using recovery principles and psycho-education about accepting illness and the need for treatment.  Behavioral health checks every 7 minutes  Safety with the patient about illness and need for treatment  Support transition to [facility] with the ACT team    Safety  Safety and communication plan established to target dynamic risk factors discussed above.    Discharge Plan   To finalize discharge to [facility] program at [facility] with [facility] ACT team and referral to [facility].    Interval Progress   Patient did have a tour of the facility on [facility] run by [facility] yesterday  and is hoping for discharge with the ACT team and referral to [facility] and he plans to use public transportation to get there 2-3 times a week.  Continues to deny having had any suicidal thoughts or psychotic symptoms or manic symptoms lately.  Still somewhat timid show albuterol cold aloof with delayed responses which is his baseline and he continues to have only minimal interaction with peers admitting that he is a loner.  Still rates anxiety as 1 out of 10 and depression only as 2 out of 10.  Has not been aggressive or agitated or threatening or self-abusive with no need for behavioral PRNs and has been attending some groups.  Happy that they accepted him at the group home and he was even able to select a room for himself and is anxiously waiting to be discharged once they finalize the details    Acceptance by patient: Accepting  Hopefulness in recovery: Living [facility] with ACT team  involved in reintegration process: No contact with sister or mother or anyone in the community   trusting in relationship with psychiatrist: Trusting  Sleep: Good  Appetite: Good  Compliance with Medications: Good  Group attendance: Attending 6/11  Significant events and progress towards goals: Waiting for discharge to [facility] group home on [facility]    Mental Status Exam  Appearance: casually dressed, dressed appropriately, adequate grooming, looks stated age with good eye contact pacing in his room happy that he was accepted by the group home  Behavior: cooperative, mildly anxious, slow responses fairly groomed, cooperative, still with some delayed responses as usual  Speech: slow, scant, increased latency of response, delayed, soft, decreased volume delayed responses  Mood: depressed, dysphoric, anxious  Affect: constricted, slightly brighter, mood-congruent  Thought Process: organized, goal directed, decreased rate of thoughts, slowing of thoughts, negative thinking, concrete  Thought Content: no overt  delusions, no current suicidal or homicidal thoughts and no plans verbalized.  No phobias obsessions compulsions reported.  Not appearing to respond to any delusional beliefs  Perceptual Disturbances: no auditory hallucinations, no visual hallucinations  Hx Risk Factors: chronic depressive symptoms, chronic anxiety symptoms, history of suicide attempts  Sensorium: Alert and oriented x 3 spheres  Cognition: recent and remote memory grossly intact  Consciousness: alert, awake, and not sedated  Attention: attention span and concentration are age appropriate  Intellect: appears to be of average intelligence  Insight: limited  Judgement: limited  Motor Activity: no abnormal movements     Vitals  Temp:  [98 °F (36.7 °C)-98.1 °F (36.7 °C)] 98 °F (36.7 °C)  HR:  [] 102  Resp:  [18] 18  BP: (112-145)/(67-75) 112/75  SpO2:  [93 %-95 %] 95 %  No intake or output data in the 24 hours ending 11/15/24 0359    Lab Results: All Labs For Current Hospital Admission Reviewed      Current Facility-Administered Medications   Medication Dose Route Frequency Provider Last Rate    acetaminophen  650 mg Oral Q6H PRN ALVINA Harley      acetaminophen  650 mg Oral Q4H PRN ALVINA Harley      acetaminophen  975 mg Oral Q6H PRN ALVINA Harley      aluminum-magnesium hydroxide-simethicone  30 mL Oral Q4H PRN ALVINA Harley      ammonium lactate   Topical BID PRN ALVINA Harley      atorvastatin  10 mg Oral Daily ALVINA Lewis      benztropine  1 mg Intramuscular Q4H PRN Max 6/day ALVINA Harley      benztropine  1 mg Oral Q4H PRN Max 6/day ALVINA Harley      bisacodyl  10 mg Rectal Daily PRN ALVINA Harley      cariprazine  3 mg Oral Daily Martin Cardenas MD      Cholecalciferol  2,000 Units Oral Daily ALVINA Lewis      cyanocobalamin  1,000 mcg Oral Daily ALVINA Lewis      hydrOXYzine HCL  25 mg Oral Q6H PRN Max 4/day ALVINA Harley      hydrOXYzine HCL  25  mg Oral TID Martin Cardenas MD      hydrOXYzine HCL  50 mg Oral Q4H PRN Max 4/day ALVINA Harley      Or    LORazepam  1 mg Intramuscular Q4H PRN ALVINA Harley      ketoconazole   Topical Daily PRN ALVINA Harley      lamoTRIgine  50 mg Oral Daily Martin Cardenas MD      lisinopril  10 mg Oral Daily Sary Coradonolbertogretchen, ALVINA      LORazepam  1 mg Oral Q4H PRN Max 6/day ALVINA Harley      Or    LORazepam  2 mg Intramuscular Q6H PRN Max 3/day Melva Knutson, TITINP      OLANZapine  10 mg Oral Q3H PRN Max 3/day ALVINA Harley      Or    OLANZapine  10 mg Intramuscular Q3H PRN Max 3/day ALVINA Harley      OLANZapine  5 mg Oral Q3H PRN Max 6/day ALVINA Harley      Or    OLANZapine  5 mg Intramuscular Q3H PRN Max 6/day Melva Knutson, TITINP      OLANZapine  2.5 mg Oral Q3H PRN Max 8/day ALVINA Harley      polyethylene glycol  17 g Oral Daily PRN ALVINA Harley      propranolol  10 mg Oral Q8H PRN ALVINA Harley      senna-docusate sodium  1 tablet Oral Daily PRN ALVINA Harley      sertraline  150 mg Oral HS Martin Cardenas MD         Counseling / Coordination of Care: Total floor / unit time spent today 15 minutes. Greater than 50% of total time was spent with the patient and / or family counseling and / or somewhat receptive to supportive listening and teaching positive coping skills to deal with symptom mangement.     Patient's Rights, confidentiality and exceptions to confidentiality, use of automated medical record, Behavioral Health Services staff access to medical record, and consent to treatment reviewed.    This note has been dictated and hence there may be problems with punctuation, spelling and formatting and if anyone has any concerns please address them to Dr. Cardenas   This note is not shared with patient due to potential for making patient's condition worse by knowing the content of the note.

## 2024-11-15 NOTE — PLAN OF CARE
Problem: Alteration in Thoughts and Perception  Goal: Treatment Goal: Gain control of psychotic behaviors/thinking, reduce/eliminate presenting symptoms and demonstrate improved reality functioning upon discharge  Outcome: Progressing  Goal: Refrain from acting on delusional thinking/internal stimuli  Description: Interventions:  - Monitor patient closely, per order   - Utilize least restrictive measures   - Set reasonable limits, give positive feedback for acceptable   - Administer medications as ordered and monitor of potential side effects  Outcome: Progressing  Goal: Agree to be compliant with medication regime, as prescribed and report medication side effects  Description: Interventions:  - Offer appropriate PRN medication and supervise ingestion; conduct AIMS, as needed   Outcome: Progressing  Goal: Recognize dysfunctional thoughts, communicate reality-based thoughts at the time of discharge  Description: Interventions:  - Provide medication and psycho-education to assist patient in compliance and developing insight into his/her illness   Outcome: Progressing  Goal: Complete daily ADLs, including personal hygiene independently, as able  Description: Interventions:  - Observe, teach, and assist patient with ADLS  - Monitor and promote a balance of rest/activity, with adequate nutrition and elimination   Outcome: Progressing     Problem: Ineffective Coping  Goal: Identifies healthy coping skills  Outcome: Progressing  Goal: Participates in unit activities  Description: Interventions:  - Provide therapeutic environment   - Provide required programming   - Redirect inappropriate behaviors   Outcome: Progressing  Goal: Patient/Family participate in treatment and DC plans  Description: Interventions:  - Provide therapeutic environment  Outcome: Progressing  Goal: Patient/Family verbalizes awareness of resources  Outcome: Progressing  Goal: Free from restraint events  Description: - Utilize least restrictive  measures   - Provide behavioral interventions   - Redirect inappropriate behaviors   Outcome: Progressing     Problem: Risk for Self Injury/Neglect  Goal: Treatment Goal: Remain safe during length of stay, learn and adopt new coping skills, and be free of self-injurious ideation, impulses and acts at the time of discharge  Outcome: Progressing  Goal: Verbalize thoughts and feelings  Description: Interventions:  - Assess and re-assess patient's lethality and potential for self-injury  - Engage patient in 1:1 interactions, daily, for a minimum of 15 minutes  - Encourage patient to express feelings, fears, frustrations, hopes  - Establish rapport/trust with patient   Outcome: Progressing  Goal: Refrain from harming self  Description: Interventions:  - Monitor patient closely, per order  - Develop a trusting relationship  - Supervise medication ingestion, monitor effects and side effects   Outcome: Progressing  Goal: Attend and participate in unit activities, including therapeutic, recreational, and educational groups  Description: Interventions:  - Provide therapeutic and educational activities daily, encourage attendance and participation, and document same in the medical record  - Obtain collateral information, encourage visitation and family involvement in care   Outcome: Progressing  Goal: Recognize maladaptive responses and adopt new coping mechanisms  Outcome: Progressing  Goal: Complete daily ADLs, including personal hygiene independently, as able  Description: Interventions:  - Observe, teach, and assist patient with ADLS  - Monitor and promote a balance of rest/activity, with adequate nutrition and elimination  Outcome: Progressing     Problem: Depression  Goal: Treatment Goal: Demonstrate behavioral control of depressive symptoms, verbalize feelings of improved mood/affect, and adopt new coping skills prior to discharge  Outcome: Progressing  Goal: Verbalize thoughts and feelings  Description:  Interventions:  - Assess and re-assess patient's level of risk   - Engage patient in 1:1 interactions, daily, for a minimum of 15 minutes   - Encourage patient to express feelings, fears, frustrations, hopes   Outcome: Progressing  Goal: Refrain from harming self  Description: Interventions:  - Monitor patient closely, per order   - Supervise medication ingestion, monitor effects and side effects   Outcome: Progressing  Goal: Refrain from isolation  Description: Interventions:  - Develop a trusting relationship   - Encourage socialization   Outcome: Progressing  Goal: Attend and participate in unit activities, including therapeutic, recreational, and educational groups  Description: Interventions:  - Provide therapeutic and educational activities daily, encourage attendance and participation, and document same in the medical record   Outcome: Progressing  Goal: Complete daily ADLs, including personal hygiene independently, as able  Description: Interventions:  - Observe, teach, and assist patient with ADLS  -  Monitor and promote a balance of rest/activity, with adequate nutrition and elimination   Outcome: Progressing     Problem: Anxiety  Goal: Anxiety is at manageable level  Description: Interventions:  - Assess and monitor patient's anxiety level.   - Monitor for signs and symptoms (heart palpitations, chest pain, shortness of breath, headaches, nausea, feeling jumpy, restlessness, irritable, apprehensive).   - Collaborate with interdisciplinary team and initiate plan and interventions as ordered.  - [place] patient to unit/surroundings  - Explain treatment plan  - Encourage participation in care  - Encourage verbalization of concerns/fears  - Identify coping mechanisms  - Assist in developing anxiety-reducing skills  - Administer/offer alternative therapies  - Limit or eliminate stimulants  Outcome: Progressing     Problem: Risk for Violence/Aggression Toward Others  Goal: Treatment Goal: Refrain from acts of  violence/aggression during length of stay, and demonstrate improved impulse control at the time of discharge  Outcome: Progressing  Goal: Verbalize thoughts and feelings  Description: Interventions:  - Assess and re-assess patient's level of risk, every waking shift  - Engage patient in 1:1 interactions, daily, for a minimum of 15 minutes   - Allow patient to express feelings and frustrations in a safe and non-threatening manner   - Establish rapport/trust with patient   Outcome: Progressing  Goal: Refrain from harming others  Outcome: Progressing  Goal: Refrain from destructive acts on the environment or property  Outcome: Progressing  Goal: Control angry outbursts  Description: Interventions:  - Monitor patient closely, per order  - Ensure early verbal de-escalation  - Monitor prn medication needs  - Set reasonable/therapeutic limits, outline behavioral expectations, and consequences   - Provide a non-threatening milieu, utilizing the least restrictive interventions   Outcome: Progressing  Goal: Identify appropriate positive anger management techniques  Description: Interventions:  - Offer anger management and coping skills groups   - Staff will provide positive feedback for appropriate anger control  Outcome: Progressing     Problem: Alteration in Orientation  Goal: Treatment Goal: Demonstrate a reduction of confusion and improved orientation to person, place, time and/or situation upon discharge, according to optimum baseline/ability  Outcome: Progressing  Goal: Interact with staff daily  Description: Interventions:  - Assess and re-assess patient's level of orientation  - Engage patient in 1 on 1 interactions, daily, for a minimum of 15 minutes   - Establish rapport/trust with patient   Outcome: Progressing  Goal: Complete daily ADLs, including personal hygiene independently, as able  Description: Interventions:  - Observe, teach, and assist patient with ADLS  Outcome: Progressing     Problem: DISCHARGE  PLANNING - CARE MANAGEMENT  Goal: Discharge to post-acute care or home with appropriate resources  Description: INTERVENTIONS:  - Conduct assessment to determine patient/family and health care team treatment goals, and need for post-acute services based on payer coverage, community resources, and patient preferences, and barriers to discharge  - Address psychosocial, clinical, and financial barriers to discharge as identified in assessment in conjunction with the patient/family and health care team  - Arrange appropriate level of post-acute services according to patient’s   needs and preference and payer coverage in collaboration with the physician and health care team  - Communicate with and update the patient/family, physician, and health care team regarding progress on the discharge plan  - Arrange appropriate transportation to post-acute venues  Outcome: Progressing

## 2024-11-15 NOTE — SOCIAL WORK
This writer and patient completed admission packet for [facility]. This writer completed and submitted referral to [facility] [place]. Roddy Mckinney [facility] reports they will process document on Monday. ACT reports they will  patient's case on 12/2 or 12/3.

## 2024-11-15 NOTE — PROGRESS NOTES
11/15/24 0738   Team Meeting   Meeting Type Daily Rounds   Team Members Present   Team Members Present Physician;Nurse;;Other (Discipline and Name)   Physician Team Member Holter, Thomas   Nursing Team Member Chastity   Social Work Team Member Henrry FRY   Other (Discipline and Name) Goff PCM   Patient/Family Present   Patient Present No   Patient's Family Present No     Groups Participation  7/11.   Patient's compliant with medications. Accepted to [facility]. Admission date TBD by [facility]. He's engaged in his treatment. He's pleasant and cooperative.

## 2024-11-15 NOTE — PLAN OF CARE
Problem: Ineffective Coping  Goal: Identifies ineffective coping skills  Outcome: Progressing  Goal: Identifies healthy coping skills  Outcome: Progressing  Goal: Demonstrates healthy coping skills  Outcome: Progressing  Goal: Participates in unit activities  Description: Interventions:  - Provide therapeutic environment   - Provide required programming   - Redirect inappropriate behaviors   Outcome: Progressing   He continues to make progressed towards the goals by actively participating and engaging in the groups.

## 2024-11-16 PROCEDURE — 99232 SBSQ HOSP IP/OBS MODERATE 35: CPT | Performed by: PHYSICIAN ASSISTANT

## 2024-11-16 RX ADMIN — HYDROXYZINE HYDROCHLORIDE 25 MG: 25 TABLET ORAL at 08:23

## 2024-11-16 RX ADMIN — LAMOTRIGINE 50 MG: 25 TABLET ORAL at 08:24

## 2024-11-16 RX ADMIN — CARIPRAZINE 3 MG: 3 CAPSULE, GELATIN COATED ORAL at 08:24

## 2024-11-16 RX ADMIN — ATORVASTATIN CALCIUM 10 MG: 10 TABLET, FILM COATED ORAL at 08:23

## 2024-11-16 RX ADMIN — LISINOPRIL 10 MG: 10 TABLET ORAL at 08:23

## 2024-11-16 RX ADMIN — HYDROXYZINE HYDROCHLORIDE 25 MG: 25 TABLET ORAL at 16:42

## 2024-11-16 RX ADMIN — CHOLECALCIFEROL TAB 25 MCG (1000 UNIT) 2000 UNITS: 25 TAB at 08:24

## 2024-11-16 RX ADMIN — SERTRALINE HYDROCHLORIDE 150 MG: 100 TABLET ORAL at 21:05

## 2024-11-16 RX ADMIN — HYDROXYZINE HYDROCHLORIDE 25 MG: 25 TABLET ORAL at 21:05

## 2024-11-16 RX ADMIN — CYANOCOBALAMIN TAB 1000 MCG 1000 MCG: 1000 TAB at 08:23

## 2024-11-16 NOTE — PLAN OF CARE
Problem: Alteration in Thoughts and Perception  Goal: Treatment Goal: Gain control of psychotic behaviors/thinking, reduce/eliminate presenting symptoms and demonstrate improved reality functioning upon discharge  Outcome: Progressing  Goal: Refrain from acting on delusional thinking/internal stimuli  Description: Interventions:  - Monitor patient closely, per order   - Utilize least restrictive measures   - Set reasonable limits, give positive feedback for acceptable   - Administer medications as ordered and monitor of potential side effects  Outcome: Progressing  Goal: Agree to be compliant with medication regime, as prescribed and report medication side effects  Description: Interventions:  - Offer appropriate PRN medication and supervise ingestion; conduct AIMS, as needed   Outcome: Progressing  Goal: Recognize dysfunctional thoughts, communicate reality-based thoughts at the time of discharge  Description: Interventions:  - Provide medication and psycho-education to assist patient in compliance and developing insight into his/her illness   Outcome: Progressing  Goal: Complete daily ADLs, including personal hygiene independently, as able  Description: Interventions:  - Observe, teach, and assist patient with ADLS  - Monitor and promote a balance of rest/activity, with adequate nutrition and elimination   Outcome: Progressing     Problem: Ineffective Coping  Goal: Participates in unit activities  Description: Interventions:  - Provide therapeutic environment   - Provide required programming   - Redirect inappropriate behaviors   Outcome: Progressing  Goal: Free from restraint events  Description: - Utilize least restrictive measures   - Provide behavioral interventions   - Redirect inappropriate behaviors   Outcome: Progressing     Problem: Risk for Self Injury/Neglect  Goal: Treatment Goal: Remain safe during length of stay, learn and adopt new coping skills, and be free of self-injurious ideation, impulses  and acts at the time of discharge  Outcome: Progressing  Goal: Refrain from harming self  Description: Interventions:  - Monitor patient closely, per order  - Develop a trusting relationship  - Supervise medication ingestion, monitor effects and side effects   Outcome: Progressing  Goal: Complete daily ADLs, including personal hygiene independently, as able  Description: Interventions:  - Observe, teach, and assist patient with ADLS  - Monitor and promote a balance of rest/activity, with adequate nutrition and elimination  Outcome: Progressing     Problem: Depression  Goal: Treatment Goal: Demonstrate behavioral control of depressive symptoms, verbalize feelings of improved mood/affect, and adopt new coping skills prior to discharge  Outcome: Progressing  Goal: Refrain from harming self  Description: Interventions:  - Monitor patient closely, per order   - Supervise medication ingestion, monitor effects and side effects   Outcome: Progressing  Goal: Refrain from self-neglect  Outcome: Progressing  Goal: Attend and participate in unit activities, including therapeutic, recreational, and educational groups  Description: Interventions:  - Provide therapeutic and educational activities daily, encourage attendance and participation, and document same in the medical record   Outcome: Progressing  Goal: Complete daily ADLs, including personal hygiene independently, as able  Description: Interventions:  - Observe, teach, and assist patient with ADLS  -  Monitor and promote a balance of rest/activity, with adequate nutrition and elimination   Outcome: Progressing     Problem: Anxiety  Goal: Anxiety is at manageable level  Description: Interventions:  - Assess and monitor patient's anxiety level.   - Monitor for signs and symptoms (heart palpitations, chest pain, shortness of breath, headaches, nausea, feeling jumpy, restlessness, irritable, apprehensive).   - Collaborate with interdisciplinary team and initiate plan and  interventions as ordered.  - [place] patient to unit/surroundings  - Explain treatment plan  - Encourage participation in care  - Encourage verbalization of concerns/fears  - Identify coping mechanisms  - Assist in developing anxiety-reducing skills  - Administer/offer alternative therapies  - Limit or eliminate stimulants  Outcome: Progressing     Problem: Risk for Violence/Aggression Toward Others  Goal: Treatment Goal: Refrain from acts of violence/aggression during length of stay, and demonstrate improved impulse control at the time of discharge  Outcome: Progressing  Goal: Refrain from harming others  Outcome: Progressing  Goal: Control angry outbursts  Description: Interventions:  - Monitor patient closely, per order  - Ensure early verbal de-escalation  - Monitor prn medication needs  - Set reasonable/therapeutic limits, outline behavioral expectations, and consequences   - Provide a non-threatening milieu, utilizing the least restrictive interventions   Outcome: Progressing  Goal: Attend and participate in unit activities, including therapeutic, recreational, and educational groups  Description: Interventions:  - Provide therapeutic and educational activities daily, encourage attendance and participation, and document same in the medical record   Outcome: Progressing  Goal: Identify appropriate positive anger management techniques  Description: Interventions:  - Offer anger management and coping skills groups   - Staff will provide positive feedback for appropriate anger control  Outcome: Progressing     Problem: Alteration in Orientation  Goal: Interact with staff daily  Description: Interventions:  - Assess and re-assess patient's level of orientation  - Engage patient in 1 on 1 interactions, daily, for a minimum of 15 minutes   - Establish rapport/trust with patient   Outcome: Progressing  Goal: Complete daily ADLs, including personal hygiene independently, as able  Description: Interventions:  - Observe,  teach, and assist patient with ADLS  Outcome: Progressing     Problem: DISCHARGE PLANNING - CARE MANAGEMENT  Goal: Discharge to post-acute care or home with appropriate resources  Description: INTERVENTIONS:  - Conduct assessment to determine patient/family and health care team treatment goals, and need for post-acute services based on payer coverage, community resources, and patient preferences, and barriers to discharge  - Address psychosocial, clinical, and financial barriers to discharge as identified in assessment in conjunction with the patient/family and health care team  - Arrange appropriate level of post-acute services according to patient’s   needs and preference and payer coverage in collaboration with the physician and health care team  - Communicate with and update the patient/family, physician, and health care team regarding progress on the discharge plan  - Arrange appropriate transportation to post-acute venues  Outcome: Progressing

## 2024-11-16 NOTE — PROGRESS NOTES
This note was not shared with the patient due to reasonable likelihood of causing patient harm    Progress Note - Behavioral Health   Name: Deyvi Cao 54 y.o. male I MRN: [id number]  Unit/Bed#: [facility] 101-02 I Date of Admission: 6/25/2024   Date of Service: 11/16/2024 I Hospital Day: 144    Deyvi Cao 54 y.o. male MRN: [id number]   Unit/Bed#: [facility] 101-02 Encounter: [id number]        Assessment & Plan  Bipolar disorder with severe depression (HCC)  Reviewed 11/16/24     Pt is guarded but pleasant, denies depression/anxiety/SI/HI/AVH. Reports eating/sleeping well. Nursing denies acute behaviors.  Accept[facility] by [facility]; has ACT team on 12/3  Continue medications as follows:  Vraylar 3mg PO Daily  Atarax 25mg PO TID  Lamictal 50mg PO Daily  Zoloft 150mg PO QHS  Continue with group therapy, milieu therapy and occupational therapy   Behavioral Health checks every 7 minutes   Continue frequent safety checks and vitals per unit protocol  Continue with [facility] medical management as indicated  Benign essential hypertension  Managed by [facility] - reviewed 11/16/2024  Continue Lisinopril 10mg PO Daily  Mixed hyperlipidemia  Managed by [facility] - reviewed 11/16/2024  Continue Lipitor 10mg PO Daily  Allergic rhinitis due to allergen  Managed by [facility] - reviewed 11/16/2024  Rosacea  Managed by [facility] - reviewed 11/16/2024  Vitamin D3 deficiency  Managed by [facility] - reviewed 11/16/24  Low vitamin B12 level  Managed by [facility] - reviewed 11/16/24     Current medications:  Current Facility-Administered Medications   Medication Dose Route Frequency Provider Last Rate    acetaminophen  650 mg Oral Q6H PRN ALVINA Harley      acetaminophen  650 mg Oral Q4H PRN ALVINA Harley      acetaminophen  975 mg Oral Q6H PRN ALVINA Harley      aluminum-magnesium hydroxide-simethicone  30 mL Oral Q4H PRN ALVINA Harley      ammonium lactate   Topical BID PRN ALVINA Harley      atorvastatin  10 mg Oral Daily ALVINA Lewis       benztropine  1 mg Intramuscular Q4H PRN Max 6/day Melva Knutson, ALVINA      benztropine  1 mg Oral Q4H PRN Max 6/day ALVINA Harley      bisacodyl  10 mg Rectal Daily PRN ALVINA Harley      cariprazine  3 mg Oral Daily Martin Cardenas MD      Cholecalciferol  2,000 Units Oral Daily ALVINA Lewis      cyanocobalamin  1,000 mcg Oral Daily ALVINA Lewis      hydrOXYzine HCL  25 mg Oral Q6H PRN Max 4/day ALVINA Harley      hydrOXYzine HCL  25 mg Oral TID Martin Cardenas MD      hydrOXYzine HCL  50 mg Oral Q4H PRN Max 4/day ALVINA Harley      Or    LORazepam  1 mg Intramuscular Q4H PRN Melva Knutson, ALVINA      ketoconazole   Topical Daily PRN ALVINA Harley      lamoTRIgine  50 mg Oral Daily Martin Cardenas MD      lisinopril  10 mg Oral Daily ALVINA Lewis      LORazepam  1 mg Oral Q4H PRN Max 6/day ALVINA Harley      Or    LORazepam  2 mg Intramuscular Q6H PRN Max 3/day ALVINA Harley      OLANZapine  10 mg Oral Q3H PRN Max 3/day ALVINA Harley      Or    OLANZapine  10 mg Intramuscular Q3H PRN Max 3/day Melva Knutson, TITINP      OLANZapine  5 mg Oral Q3H PRN Max 6/day ALVINA Harley      Or    OLANZapine  5 mg Intramuscular Q3H PRN Max 6/day ALVINA Harley      OLANZapine  2.5 mg Oral Q3H PRN Max 8/day ALVINA Harley      polyethylene glycol  17 g Oral Daily PRN ALVINA Harley      propranolol  10 mg Oral Q8H PRN ALVINA Harley      senna-docusate sodium  1 tablet Oral Daily PRN ALVINA Harley      sertraline  150 mg Oral HS Martin Cardenas MD         Risks / Benefits of Treatment:    Risks, benefits, and possible side effects of medications explained to patient and patient verbalizes understanding and agreement for treatment.    Patient was seen today for continuation of care, records reviewed and patient was discussed with the charge nurse.  No behavioral outbursts or acute events noted overnight and No significant  "changes in behaviors or clinical status over the last 24 hours.      Deyvi was seen today while in bed. He is known to provider from previous shifts. Sat up and made decent eye contact during interview. Denies depression/anxiety/SI/HI/AVH. Reports eating/sleeping well. Nursing denies acute behaviors.       Deyvi does not appear overtly anxious or depressed.    He denies any suicidal ideation, intent or plan at present; denies any homicidal ideation, intent or plan at present.  He denies any auditory hallucinations, denies any visual hallucinations, denies any delusions.  He denies any side effects from current psychiatric medications.  No medication changes indicated at this time, continue current medication regimen.    Psychiatric Review of Systems:  Behavior over the last 24 hours: unchanged.   Sleep: sleeping okay throughout the night  Appetite: adequate  Medication side effects: none reported  ROS:no complaints    Mental Status Evaluation:    Appearance:  casually dressed, adequate grooming, looks stated age   Behavior:  pleasant, cooperative, calm, slightly guarded, interacts appropriately with this writer   Speech:  normal rate and volume   Mood:  \"Good\"   Affect:  constricted   Thought Process:  goal directed, linear   Associations: intact associations   Thought Content:  no overt delusions, no paranoia noted on exam   Perceptual Disturbances: denies auditory or visual hallucinations when asked, does not appear responding to internal stimuli   Risk Potential: Suicidal ideation - None at present  Homicidal ideation - None at present  Potential for aggression - Not at present   Sensorium:  oriented to person, place, and time/date   Memory:  recent and remote memory grossly intact   Consciousness:  alert and awake   Attention/Concentration: attention span and concentration appear shorter than expected for age   Insight:  limited   Judgment: limited   Gait/Station: in bed   Motor Activity: no abnormal " movements     Vital signs in last 24 hours:    Temp:  [97.5 °F (36.4 °C)-97.7 °F (36.5 °C)] 97.7 °F (36.5 °C)  HR:  [] 97  BP: (131-155)/(70-91) 155/91  Resp:  [18] 18  SpO2:  [95 %] 95 %    Laboratory results: I have personally reviewed all pertinent laboratory/tests results    Discharge Disposition: Discharge disposition and planning remain ongoing    Counseling / Coordination of Care:    Medication changes reviewed with nursing staff.  Patient's progress reviewed with nursing staff.  Reassurance and supportive therapy provided.  Group attendance encouraged.  Encouraged participation in milieu and group therapy on the unit.    Lupe Michael 11/16/24

## 2024-11-16 NOTE — ASSESSMENT & PLAN NOTE
Reviewed 11/16/24     Pt is guarded but pleasant, denies depression/anxiety/SI/HI/AVH. Reports eating/sleeping well. Nursing denies acute behaviors.  Accepted by [facility]; has ACT team on 12/3  Continue medications as follows:  Vraylar 3mg PO Daily  Atarax 25mg PO TID  Lamictal 50mg PO Daily  Zoloft 150mg PO QHS  Continue with group therapy, milieu therapy and occupational therapy   Behavioral Health checks every 7 minutes   Continue frequent safety checks and vitals per unit protocol  Continue with [facility] medical management as indicated

## 2024-11-16 NOTE — NURSING NOTE
Patient is pleasant and cooperative. Patient spent most of his day laying in bed. Patient was compliant with medications. His appetite is good. He denied anxiety and depression. Patient did not attend groups even when encouraged.

## 2024-11-16 NOTE — NURSING NOTE
Received pt in bed at change of shift with eyes closed; chest movement noted.  Continues the same thus this far as per q 15 min room checks.   Will continue to monitor behavior, sleeping pattern and any medical issues that may arise.    0552;  Sleeping 7+ hrs thus this far

## 2024-11-16 NOTE — ASSESSMENT & PLAN NOTE
Called [facility] in another encounter. Conveyed Davide Taylor MD's recommendations below. [facility] agreeable.     Placed referral to pain management. Informed [facility] that their team will reach out to patient within the next few business days to schedule an appointment.    Davide Taylor MD - Tramadol prepped. Please sign. [facility] states pt is in a lot of pain with back/sternal pain s/p heart surgery - [facility] requesting if oxycodone dose be increased. Ok to increase?   Managed by [facility] - reviewed 11/16/24

## 2024-11-17 VITALS
OXYGEN SATURATION: 93 % | HEART RATE: 88 BPM | WEIGHT: 219.8 LBS | BODY MASS INDEX: 29.77 KG/M2 | TEMPERATURE: 97.7 F | DIASTOLIC BLOOD PRESSURE: 74 MMHG | RESPIRATION RATE: 18 BRPM | SYSTOLIC BLOOD PRESSURE: 125 MMHG | HEIGHT: 72 IN

## 2024-11-17 PROCEDURE — 99232 SBSQ HOSP IP/OBS MODERATE 35: CPT | Performed by: PSYCHIATRY & NEUROLOGY

## 2024-11-17 RX ADMIN — HYDROXYZINE HYDROCHLORIDE 25 MG: 25 TABLET ORAL at 21:13

## 2024-11-17 RX ADMIN — ATORVASTATIN CALCIUM 10 MG: 10 TABLET, FILM COATED ORAL at 08:30

## 2024-11-17 RX ADMIN — LISINOPRIL 10 MG: 10 TABLET ORAL at 08:30

## 2024-11-17 RX ADMIN — HYDROXYZINE HYDROCHLORIDE 25 MG: 25 TABLET ORAL at 16:52

## 2024-11-17 RX ADMIN — HYDROXYZINE HYDROCHLORIDE 25 MG: 25 TABLET ORAL at 08:30

## 2024-11-17 RX ADMIN — CHOLECALCIFEROL TAB 25 MCG (1000 UNIT) 2000 UNITS: 25 TAB at 08:30

## 2024-11-17 RX ADMIN — CYANOCOBALAMIN TAB 1000 MCG 1000 MCG: 1000 TAB at 08:30

## 2024-11-17 RX ADMIN — CARIPRAZINE 3 MG: 3 CAPSULE, GELATIN COATED ORAL at 08:30

## 2024-11-17 RX ADMIN — LAMOTRIGINE 50 MG: 25 TABLET ORAL at 08:30

## 2024-11-17 RX ADMIN — SERTRALINE HYDROCHLORIDE 150 MG: 100 TABLET ORAL at 21:13

## 2024-11-17 NOTE — NURSING NOTE
Deyvi was resting in bed without distress. Denies anxiety, depression, voices and pain. Pleasant and cooperative upon approach. Did not attend Movie or Wrap Up group. Took HS medication at bedside. No issues or behaviors. Continue to monitor. Precautions maintained.

## 2024-11-17 NOTE — PLAN OF CARE
Problem: Alteration in Thoughts and Perception  Goal: Refrain from acting on delusional thinking/internal stimuli  Description: Interventions:  - Monitor patient closely, per order   - Utilize least restrictive measures   - Set reasonable limits, give positive feedback for acceptable   - Administer medications as ordered and monitor of potential side effects  Outcome: Progressing  Goal: Agree to be compliant with medication regime, as prescribed and report medication side effects  Description: Interventions:  - Offer appropriate PRN medication and supervise ingestion; conduct AIMS, as needed   Outcome: Progressing  Goal: Recognize dysfunctional thoughts, communicate reality-based thoughts at the time of discharge  Description: Interventions:  - Provide medication and psycho-education to assist patient in compliance and developing insight into his/her illness   Outcome: Progressing     Problem: Ineffective Coping  Goal: Understands least restrictive measures  Description: Interventions:  - Utilize least restrictive behavior  Outcome: Progressing  Goal: Free from restraint events  Description: - Utilize least restrictive measures   - Provide behavioral interventions   - Redirect inappropriate behaviors   Outcome: Progressing     Problem: Risk for Self Injury/Neglect  Goal: Complete daily ADLs, including personal hygiene independently, as able  Description: Interventions:  - Observe, teach, and assist patient with ADLS  - Monitor and promote a balance of rest/activity, with adequate nutrition and elimination  Outcome: Progressing     Problem: Depression  Goal: Treatment Goal: Demonstrate behavioral control of depressive symptoms, verbalize feelings of improved mood/affect, and adopt new coping skills prior to discharge  Outcome: Progressing

## 2024-11-17 NOTE — ASSESSMENT & PLAN NOTE
Reviewed 11/17/24     Pt remains pleasant but somewhat guarded. Denies depression/anxiety/SI/HI/AVH. Reports eating and sleeping well. Nursing denies any acute behaviors.   Accepted by [facility]; has ACT team on 12/3  Continue medications as follows:  Vraylar 3mg PO Daily  Atarax 25mg PO TID  Lamictal 50mg PO Daily  Zoloft 150mg PO QHS  Continue with group therapy, milieu therapy and occupational therapy   Behavioral Health checks every 7 minutes   Continue frequent safety checks and vitals per unit protocol  Continue with [facility] medical management as indicated

## 2024-11-17 NOTE — NURSING NOTE
Patient is pleasant and cooperative. He is withdrawn to his room. He denied anxiety and depression.He did not attend groups. His appetite is good.

## 2024-11-17 NOTE — DISCHARGE INSTR - OTHER ORDERS
You are being discharged to [facility] [address] [place] [address]    If you or someone you know is struggling or in crisis, help is available. Call or text 774 or chat [employer]. You can also reach Crisis Text Line by texting [facility] to 704642.    *[facility] Crisis Intervention: 291.819.5193  *[facility] Crisis Intervention: 524.860.9181  *National Suicide Prevention Lifeline:  1-837.468.1060  *Peer Support Talk Line (Seven days a week, 1:00 PM - 9:00 PM) Call: 420.975.4038 or Text: 907.821.8633  *National [place] [employer] Mental [employer]ness (GUY) HELPLINE: 519.485.5926/Website: www.guy.org  *Substance Abuse and Mental Health Services Administration([facility]) National Helpline, which is a confidential, free, 24-hour-a-day, 365-day-a-year, information service for individuals and family members facing mental health and/or substance use disorders. This service provides referrals to local treatment facilities, support groups, and community-based organizations. Callers can also order free publications and other information.  Call 1-845.237.1862/Website: www.[facility].gov  *[facility] 2-1-1: This is a toll free, confidential, 24-hour-a-day service which connects you to a community  in your area who can help you find services and resources that are available to you locally and provide critical services that can improve and save lives.  Call: 211  /Website: http://www.[place].org/     Your prescriptions were filled by [facility]

## 2024-11-17 NOTE — DISCHARGE INSTR - APPOINTMENTS
Behavioral Health Nurse Navigator, Reva or Sydnie will be calling you after your discharge, on the phone number that you provided.  They will be available as an additional support, if needed.   If you wish to speak with Reva, you may contact her at [phone].

## 2024-11-17 NOTE — PROGRESS NOTES
This note was not shared with the patient due to reasonable likelihood of causing patient harm    Progress Note - Behavioral Health   Name: Deyvi Cao 54 y.o. male I MRN: [id number]  Unit/Bed#: [facility] 101-02 I Date of Admission: 6/25/2024   Date of Service: 11/17/2024 I Hospital Day: 145    Deyvi Cao 54 y.o. male MRN: [id number]   Unit/Bed#: [facility] 101-02 Encounter: [id number]        Assessment & Plan  Bipolar disorder with severe depression (HCC)  Reviewed 11/17/24     Pt remains pleasant but somewhat guarded. Denies depression/anxiety/SI/HI/AVH. Reports eating and sleeping well. Nursing denies any acute behaviors.   Accepted by [facility]; has ACT team on 12/3  Continue medications as follows:  Vraylar 3mg PO Daily  Atarax 25mg PO TID  Lamictal 50mg PO Daily  Zoloft 150mg PO QHS  Continue with group therapy, milieu therapy and occupational therapy   Behavioral Health checks every 7 minutes   Continue frequent safety checks and vitals per unit protocol  Continue with [facility] medical management as indicated  Benign essential hypertension  Managed by [facility] - reviewed 11/17/2024  Continue Lisinopril 10mg PO Daily  Mixed hyperlipidemia  Managed by [facility] - reviewed 11/17/2024  Continue Lipitor 10mg PO Daily  Allergic rhinitis due to allergen  Managed by [facility] - reviewed 11/17/2024  Rosacea  Managed by [facility] - reviewed 11/17/2024  Vitamin D3 deficiency  Managed by [facility] - reviewed 11/17/24  Low vitamin B12 level  Managed by [facility] - reviewed 11/17/24     Current medications:  Current Facility-Administered Medications   Medication Dose Route Frequency Provider Last Rate    acetaminophen  650 mg Oral Q6H PRN ALVINA Harley      acetaminophen  650 mg Oral Q4H PRN ALVINA Harley      acetaminophen  975 mg Oral Q6H PRN ALVINA Harley      aluminum-magnesium hydroxide-simethicone  30 mL Oral Q4H PRN ALVINA Harley      ammonium lactate   Topical BID PRN ALVINA Harley      atorvastatin  10 mg Oral Daily  ALVINA Lewis      benztropine  1 mg Intramuscular Q4H PRN Max 6/day ALVINA Harley      benztropine  1 mg Oral Q4H PRN Max 6/day ALVINA Harley      bisacodyl  10 mg Rectal Daily PRN ALVINA Harley      cariprazine  3 mg Oral Daily Martin Cardenas MD      Cholecalciferol  2,000 Units Oral Daily ALVINA Lewis      cyanocobalamin  1,000 mcg Oral Daily ALVINA Lewis      hydrOXYzine HCL  25 mg Oral Q6H PRN Max 4/day ALVINA Harley      hydrOXYzine HCL  25 mg Oral TID Martin Cardenas MD      hydrOXYzine HCL  50 mg Oral Q4H PRN Max 4/day ALVINA Harley      Or    LORazepam  1 mg Intramuscular Q4H PRN ALVINA Harley      ketoconazole   Topical Daily PRN ALVINA Harley      lamoTRIgine  50 mg Oral Daily Martin Cardenas MD      lisinopril  10 mg Oral Daily ALVINA Lewis      LORazepam  1 mg Oral Q4H PRN Max 6/day ALVINA Harley      Or    LORazepam  2 mg Intramuscular Q6H PRN Max 3/day ALVINA Harley      OLANZapine  10 mg Oral Q3H PRN Max 3/day ALVINA Harley      Or    OLANZapine  10 mg Intramuscular Q3H PRN Max 3/day ALVINA Harley      OLANZapine  5 mg Oral Q3H PRN Max 6/day ALVINA Harley      Or    OLANZapine  5 mg Intramuscular Q3H PRN Max 6/day ALVINA Harley      OLANZapine  2.5 mg Oral Q3H PRN Max 8/day ALVINA Harley      polyethylene glycol  17 g Oral Daily PRN ALVINA Harley      propranolol  10 mg Oral Q8H PRN ALVINA Harley      senna-docusate sodium  1 tablet Oral Daily PRN ALVINA Harley      sertraline  150 mg Oral HS Martin Cardenas MD         Risks / Benefits of Treatment:    Risks, benefits, and possible side effects of medications explained to patient and patient verbalizes understanding and agreement for treatment.    Patient was seen today for continuation of care, records reviewed and patient was discussed with the charge nurse.  No behavioral outbursts or acute events noted  overnight and No significant changes in behaviors or clinical status over the last 24 hours.      Deyvi was seen today while in bed. He remains pleasant but somewhat guarded. Denies depression/anxiety/SI/HI/AVH. He reports eating and sleeping well. Nursing denies any acute behaviors.       Deyvi does not appear overtly anxious or depressed.    He denies any suicidal ideation, intent or plan at present; denies any homicidal ideation, intent or plan at present.  He denies any auditory hallucinations, denies any visual hallucinations, denies any delusions.  He denies any side effects from current psychiatric medications.  No medication changes indicated at this time, continue current medication regimen.    Psychiatric Review of Systems:  Behavior over the last 24 hours: unchanged.   Sleep: sleeping okay throughout the night  Appetite: adequate  Medication side effects: none reported  ROS:no complaints    Mental Status Evaluation:    Appearance:  casually dressed, adequate grooming, looks stated age   Behavior:  pleasant, calm, still somewhat guarded, interacts appropriately with this writer   Speech:  normal rate and volume   Mood:  euthymic   Affect:  constricted   Thought Process:  goal directed, linear   Associations: intact associations   Thought Content:  no overt delusions, no paranoia noted on exam   Perceptual Disturbances: denies auditory or visual hallucinations when asked, does not appear responding to internal stimuli   Risk Potential: Suicidal ideation - None at present  Homicidal ideation - None at present  Potential for aggression - Not at present   Sensorium:  oriented to person, place, and time/date   Memory:  recent and remote memory grossly intact   Consciousness:  alert and awake   Attention/Concentration: attention span and concentration appear shorter than expected for age   Insight:  limited   Judgment: limited   Gait/Station: in bed, unable to assess   Motor Activity: no abnormal movements      Vital signs in last 24 hours:    Temp:  [97.5 °F (36.4 °C)-97.8 °F (36.6 °C)] 97.8 °F (36.6 °C)  HR:  [87-98] 98  BP: (113-133)/(75-78) 133/78  Resp:  [18] 18  SpO2:  [97 %] 97 %  O2 Device: None (Room air)    Laboratory results: I have personally reviewed all pertinent laboratory/tests results    Discharge Disposition: Discharge disposition and planning remain ongoing    Counseling / Coordination of Care:    Medication changes reviewed with nursing staff.  Patient's progress reviewed with nursing staff.  Reassurance and supportive therapy provided.  Group attendance encouraged.  Encouraged participation in milieu and group therapy on the unit.    Lupe Michael 11/17/24

## 2024-11-18 PROCEDURE — 99232 SBSQ HOSP IP/OBS MODERATE 35: CPT | Performed by: PSYCHIATRY & NEUROLOGY

## 2024-11-18 RX ADMIN — CARIPRAZINE 3 MG: 3 CAPSULE, GELATIN COATED ORAL at 08:17

## 2024-11-18 RX ADMIN — HYDROXYZINE HYDROCHLORIDE 25 MG: 25 TABLET ORAL at 08:18

## 2024-11-18 RX ADMIN — SERTRALINE HYDROCHLORIDE 150 MG: 100 TABLET ORAL at 21:21

## 2024-11-18 RX ADMIN — CYANOCOBALAMIN TAB 1000 MCG 1000 MCG: 1000 TAB at 08:17

## 2024-11-18 RX ADMIN — HYDROXYZINE HYDROCHLORIDE 25 MG: 25 TABLET ORAL at 21:21

## 2024-11-18 RX ADMIN — CHOLECALCIFEROL TAB 25 MCG (1000 UNIT) 2000 UNITS: 25 TAB at 08:17

## 2024-11-18 RX ADMIN — LISINOPRIL 10 MG: 10 TABLET ORAL at 08:17

## 2024-11-18 RX ADMIN — ATORVASTATIN CALCIUM 10 MG: 10 TABLET, FILM COATED ORAL at 08:17

## 2024-11-18 RX ADMIN — HYDROXYZINE HYDROCHLORIDE 25 MG: 25 TABLET ORAL at 16:42

## 2024-11-18 RX ADMIN — LAMOTRIGINE 50 MG: 25 TABLET ORAL at 08:17

## 2024-11-18 NOTE — NURSING NOTE
Patient is quiet, keeps to self but social with a few select peers. Pt takes his medications without issue. Pt is scant , reports no s/s.

## 2024-11-18 NOTE — NURSING NOTE
Deyvi was isolative to his room and self. Denies anxiety and depression. Did not attend any groups this evening.  Took HS medication at bedside. Did not eat snack. No issues or behaviors. Continue to monitor. Precautions maintained.

## 2024-11-18 NOTE — PROGRESS NOTES
Progress Note - Behavioral Health   Name: Deyvi Cao 54 y.o. male I MRN: [id number]  Unit/Bed#: [facility] I Date of Admission: 6/25/2024   Date of Service: 11/18/2024 I Hospital Day: 146     Assessment & Plan  Bipolar disorder with severe depression (HCC)  Reviewed 11/18/24     Doing fairly well waiting to hear back from [facility] as to final date of discharge with no relapse of overt manic psychotic symptoms or suicidal thoughts but continues to have same baseline behaviors being somewhat isolated staying to himself but overall doing well   accepted by [facility]; has ACT team on 12/3  Continue medications as follows:  Vraylar 3mg PO Daily  Atarax 25mg PO TID  Lamictal 50mg PO Daily  Zoloft 150mg PO QHS  Continue with individual therapy, group therapy, milieu therapy and occupational therapy   Behavioral Health checks every 7 minutes   Continue frequent safety checks and vitals per unit protocol  Continue with [facility] medical management as indicated  Benign essential hypertension  Managed by [facility] - reviewed 11/18/2024  Continue Lisinopril 10mg PO Daily  Mixed hyperlipidemia  Managed by [facility] - reviewed 11/18/2024  Continue Lipitor 10mg PO Daily  Allergic rhinitis due to allergen  Managed by [facility] - reviewed 11/18/2024  Rosacea  Managed by [facility] - reviewed 11/18/2024  Vitamin D3 deficiency  Managed by [facility] - reviewed 11/1824  Low vitamin B12 level  Managed by [facility] - reviewed 11/18/24    Patient Active Problem List   Diagnosis    Acne    Benign essential hypertension    Mixed hyperlipidemia    Tobacco dependence syndrome    Allergic rhinitis due to allergen    Medical clearance for psychiatric admission    Lung cancer screening declined by patient    Moderate depressed bipolar I disorder (HCC)    Bipolar disorder with severe depression (HCC)    Rosacea    Vitamin D3 deficiency    Low vitamin B12 level     Review of systems: Unremarkable, refused wellness check this weekend  Psychiatric Diagnosis: Bipolar  depression    Assessment  Overall Status: Anxiously waiting to hear back as to when suggested provide state and her  has taken him for good as they both accepted him and waiting for final discharge date.  No relapse of manic or psychotic symptoms or suicidal thoughts verbalized but still somewhat withdrawn and isolated as usual staying to himself but offers no complaints   certification Statement: The patient will continue to require additional inpatient hospital stay due to recurrent depression and repeated suicide attempts in the community     Medications: Vraylar 3 mg a day, hydroxyzine 25 mg 3 times a day, Zoloft 150 mg a day, Lamictal 50 mg a day,  All medications reviewed  Side effects from treatment: None reported  Medication changes   No  changes    Medication education   Risks side effects benefits and precautions of medications discussed with patient and he did verbalize an understanding about risks for metabolic syndrome from being on neuroleptics and is form tardive dyskinesia etc.     Understanding of medications: Has good understanding about medications and side effects of his precautions benefits   Justification for dual anti-psychotics: Not applicable    Non-pharmacological treatments  Continue with individual, group, milieu and occupational therapy using recovery principles and psycho-education about accepting illness and the need for treatment.  Behavioral health checks every 7 minutes  Safety with the patient about illness and need for treatment  Support transition to [facility] with the ACT team    Safety  Safety and communication plan established to target dynamic risk factors discussed above.    Discharge Plan   To finalize discharge to [facility] at [facility] with [facility] ACT team and referral to [facility].    Interval Progress   Happy about being accepted by the [facility] group Home run by [facility] with [facility] ACT team which may likely  happen at the beginning of next month.  He is willing to work with them and go to the clubhouse 2 times a week as required.   Continues to report depression and anxiety are under control and continues to have only minimal interaction with peers stating he is a loner and somewhat shy but does attend some of the groups.  Still waiting anxiously for the discharge with no relapse of psychotic manic symptoms or suicidal thoughts at all and hygiene is fair.  No need for behavioral PRNs lately and has not been aggressive or agitated or threatening or self-abusive on the unit  Acceptance by patient: Accepting  Hopefulness in recovery: Living [facility] with [facility] team  involved in reintegration process: No contact with his sister or mother in the community   trusting in relationship with psychiatrist: Trusting  Sleep: Good  Appetite: Good  Compliance with Medications: Good  Group attendance: Attending some 4/10  Significant events and progress towards goals: Waiting for discharge to [facility] home on [facility] with the ACT team in place    Mental Status Exam  Appearance: casually dressed, dressed appropriately, adequate grooming, looks stated age found sitting on his bed in his room  Behavior: cooperative, mildly anxious, slow responses cooperative, fairly groomed   Speech: slow, scant, increased latency of response, delayed, soft, decreased volume delayed responses as usual   Mood: depressed, dysphoric, anxious  Affect: constricted, slightly brighter, mood-congruent  Thought Process: organized, goal directed, decreased rate of thoughts, slowing of thoughts, negative thinking, concrete  Thought Content: no overt delusions, no current suicidal or homicidal thoughts and no plans verbalized.  No phobias obsessions compulsions reported.  Not appearing to respond to any delusional beliefs  Perceptual Disturbances: no auditory hallucinations, no visual hallucinations  Hx Risk Factors: chronic depressive  symptoms, chronic anxiety symptoms, history of suicide attempts  Sensorium: alert and oriented x 3 spheres  Cognition: recent and remote memory grossly intact  Consciousness: alert, awake, and not sedated  Attention: attention span and concentration are age appropriate  Intellect: appears to be of average intelligence  Insight: limited  Judgement: limited  Motor Activity: no abnormal movements     Vitals  Temp:  [97.7 °F (36.5 °C)-97.8 °F (36.6 °C)] 97.7 °F (36.5 °C)  HR:  [88-98] 88  Resp:  [18] 18  BP: (125-133)/(74-78) 125/74  SpO2:  [93 %-97 %] 93 %  No intake or output data in the 24 hours ending 11/18/24 0420    Lab Results: All Labs For Current Hospital Admission Reviewed      Current Facility-Administered Medications   Medication Dose Route Frequency Provider Last Rate    acetaminophen  650 mg Oral Q6H PRN TITI HarleyNP      acetaminophen  650 mg Oral Q4H PRN ALVINA Harley      acetaminophen  975 mg Oral Q6H PRN ALVINA Harley      aluminum-magnesium hydroxide-simethicone  30 mL Oral Q4H PRN ALVINA Harley      ammonium lactate   Topical BID PRN ALVINA Harley      atorvastatin  10 mg Oral Daily ALVINA Lewis      benztropine  1 mg Intramuscular Q4H PRN Max 6/day ALVINA Harley      benztropine  1 mg Oral Q4H PRN Max 6/day ALVINA Harley      bisacodyl  10 mg Rectal Daily PRN ALVINA Harley      cariprazine  3 mg Oral Daily Martin Cardenas MD      Cholecalciferol  2,000 Units Oral Daily ALVINA Lewis      cyanocobalamin  1,000 mcg Oral Daily ALVINA Lewis      hydrOXYzine HCL  25 mg Oral Q6H PRN Max 4/day ALVINA Harley      hydrOXYzine HCL  25 mg Oral TID Martin Cardenas MD      hydrOXYzine HCL  50 mg Oral Q4H PRN Max 4/day ALVINA Harley      Or    LORazepam  1 mg Intramuscular Q4H PRN ALVINA Harley      ketoconazole   Topical Daily PRN ALVINA Harley      lamoTRIgine  50 mg Oral Daily Martin Cardenas MD       lisinopril  10 mg Oral Daily Sary Rodriguez Kalyan, CRREBECCA      LORazepam  1 mg Oral Q4H PRN Max 6/day Melva Zenia, ALVINA      Or    LORazepam  2 mg Intramuscular Q6H PRN Max 3/day Melva Zenia, TITINP      OLANZapine  10 mg Oral Q3H PRN Max 3/day Melva Justiney, CRNP      Or    OLANZapine  10 mg Intramuscular Q3H PRN Max 3/day Melva Zenia, CRNP      OLANZapine  5 mg Oral Q3H PRN Max 6/day Melva Zenia, CRNP      Or    OLANZapine  5 mg Intramuscular Q3H PRN Max 6/day Melva Zenia, CRNP      OLANZapine  2.5 mg Oral Q3H PRN Max 8/day Melva Knutson, CRNP      polyethylene glycol  17 g Oral Daily PRN Melva Zenia, TITINP      propranolol  10 mg Oral Q8H PRN Melva Zenia, TITINP      senna-docusate sodium  1 tablet Oral Daily PRN Melva Zenia, TITINP      sertraline  150 mg Oral HS Martin Cardenas MD         Counseling / Coordination of Care: Total floor / unit time spent today 15 minutes. Greater than 50% of total time was spent with the patient and / or family counseling and / or somewhat receptive to supportive listening and teaching positive coping skills to deal with symptom mangement.     Patient's Rights, confidentiality and exceptions to confidentiality, use of automated medical record, Behavioral Health Services staff access to medical record, and consent to treatment reviewed.    This note has been dictated and hence there may be problems with punctuation, spelling and formatting and if anyone has any concerns please address them to Dr. Cardenas   This note is not shared with patient due to potential for making patient's condition worse by knowing the content of the note.

## 2024-11-18 NOTE — PROGRESS NOTES
11/18/24 0800   Team Meeting   Meeting Type Daily Rounds   Team Members Present   Team Members Present Physician;Nurse;;Other (Discipline and Name)   Physician Team Member Holter, Thomas   Nursing Team Member Chastity   Social Work Team Member Henrry FRY   Other (Discipline and Name) Shell PCM   Patient/Family Present   Patient Present No   Patient's Family Present No     Groups Participation  4/10.   Patient's compliant with medications. He has minimal engagement in treatment. He has poor hygiene. He has a flat affect at times. [address] admission pending. Refused weekly wellness check.

## 2024-11-18 NOTE — PLAN OF CARE
Problem: Ineffective Coping  Goal: Identifies ineffective coping skills  Outcome: Progressing  Goal: Identifies healthy coping skills  Outcome: Progressing  Goal: Demonstrates healthy coping skills  Outcome: Progressing  Goal: Participates in unit activities  Description: Interventions:  - Provide therapeutic environment   - Provide required programming   - Redirect inappropriate behaviors   Outcome: Progressing   He continues to work towards the goals by actively participating in the groups.

## 2024-11-18 NOTE — PLAN OF CARE
Problem: Alteration in Thoughts and Perception  Goal: Treatment Goal: Gain control of psychotic behaviors/thinking, reduce/eliminate presenting symptoms and demonstrate improved reality functioning upon discharge  Outcome: Progressing  Goal: Refrain from acting on delusional thinking/internal stimuli  Description: Interventions:  - Monitor patient closely, per order   - Utilize least restrictive measures   - Set reasonable limits, give positive feedback for acceptable   - Administer medications as ordered and monitor of potential side effects  Outcome: Progressing  Goal: Agree to be compliant with medication regime, as prescribed and report medication side effects  Description: Interventions:  - Offer appropriate PRN medication and supervise ingestion; conduct AIMS, as needed   Outcome: Progressing  Goal: Recognize dysfunctional thoughts, communicate reality-based thoughts at the time of discharge  Description: Interventions:  - Provide medication and psycho-education to assist patient in compliance and developing insight into his/her illness   Outcome: Progressing  Goal: Complete daily ADLs, including personal hygiene independently, as able  Description: Interventions:  - Observe, teach, and assist patient with ADLS  - Monitor and promote a balance of rest/activity, with adequate nutrition and elimination   Outcome: Progressing     Problem: Ineffective Coping  Goal: Understands least restrictive measures  Description: Interventions:  - Utilize least restrictive behavior  Outcome: Progressing  Goal: Free from restraint events  Description: - Utilize least restrictive measures   - Provide behavioral interventions   - Redirect inappropriate behaviors   Outcome: Progressing     Problem: Risk for Self Injury/Neglect  Goal: Refrain from harming self  Description: Interventions:  - Monitor patient closely, per order  - Develop a trusting relationship  - Supervise medication ingestion, monitor effects and side effects    Outcome: Progressing  Goal: Complete daily ADLs, including personal hygiene independently, as able  Description: Interventions:  - Observe, teach, and assist patient with ADLS  - Monitor and promote a balance of rest/activity, with adequate nutrition and elimination  Outcome: Progressing

## 2024-11-19 PROCEDURE — 99232 SBSQ HOSP IP/OBS MODERATE 35: CPT | Performed by: PSYCHIATRY & NEUROLOGY

## 2024-11-19 RX ADMIN — HYDROXYZINE HYDROCHLORIDE 25 MG: 25 TABLET ORAL at 21:13

## 2024-11-19 RX ADMIN — ATORVASTATIN CALCIUM 10 MG: 10 TABLET, FILM COATED ORAL at 09:06

## 2024-11-19 RX ADMIN — CARIPRAZINE 3 MG: 3 CAPSULE, GELATIN COATED ORAL at 09:06

## 2024-11-19 RX ADMIN — LISINOPRIL 10 MG: 10 TABLET ORAL at 09:06

## 2024-11-19 RX ADMIN — CYANOCOBALAMIN TAB 1000 MCG 1000 MCG: 1000 TAB at 09:06

## 2024-11-19 RX ADMIN — HYDROXYZINE HYDROCHLORIDE 25 MG: 25 TABLET ORAL at 17:13

## 2024-11-19 RX ADMIN — SERTRALINE HYDROCHLORIDE 150 MG: 100 TABLET ORAL at 21:13

## 2024-11-19 RX ADMIN — HYDROXYZINE HYDROCHLORIDE 25 MG: 25 TABLET ORAL at 09:06

## 2024-11-19 RX ADMIN — LAMOTRIGINE 50 MG: 25 TABLET ORAL at 09:06

## 2024-11-19 RX ADMIN — CHOLECALCIFEROL TAB 25 MCG (1000 UNIT) 2000 UNITS: 25 TAB at 09:06

## 2024-11-19 NOTE — NURSING NOTE
Patient has been visible on the unit minimally. Quiet with minimal socialization. Appetite good. Medication compliant. Anxious affect but denies any psychological symptoms. Smiles on approach with good eye contact. Support offered. Attended less than half of the groups today. Awaiting discharge. Guarded.

## 2024-11-19 NOTE — ASSESSMENT & PLAN NOTE
Reviewed 11/19/24     Awaiting to hear back from [facility] in the [facility] as to when he will be discharged.  Denies all symptoms but still with baseline constricted affect attending some groups  Accepted by [facility]; has ACT team on 12/3 and anticipated discharge on 12/4/2024  Continue medications as follows:  Vraylar 3mg PO Daily  Atarax 25mg PO TID  Lamictal 50mg PO Daily  Zoloft 150mg PO QHS  Continue with individual therapy, group therapy, milieu therapy and occupational therapy   Behavioral Health checks every 7 minutes   Continue frequent safety checks and vitals per unit protocol  Continue with [facility] medical management as indicated

## 2024-11-19 NOTE — NURSING NOTE
Pt is polite and cooperative. Took HS medications without incidence. Denied s/s. Pt is looking forward to upcoming D/C. No unmet needs. No behavioral issues.

## 2024-11-19 NOTE — PROGRESS NOTES
Progress Note - Behavioral Health   Name: Deyvi Cao 54 y.o. male I MRN: [id number]  Unit/Bed#: [facility] 101-02 I Date of Admission: 6/25/2024   Date of Service: 11/19/2024 I Hospital Day: 147     Assessment & Plan  Bipolar disorder with severe depression (HCC)  Reviewed 11/19/24     Awaiting to hear back from [facility] house in the [facility] as to when he will be discharged.  Denies all symptoms but still with baseline constricted affect attending some groups  Accepted by [facility]; has ACT team on 12/3 and anticipated discharge on 12/4/2024  Continue medications as follows:  Vraylar 3mg PO Daily  Atarax 25mg PO TID  Lamictal 50mg PO Daily  Zoloft 150mg PO QHS  Continue with individual therapy, group therapy, milieu therapy and occupational therapy   Behavioral Health checks every 7 minutes   Continue frequent safety checks and vitals per unit protocol  Continue with [facility] medical management as indicated  Benign essential hypertension  Managed by [facility] - reviewed 11/19/2024  Continue Lisinopril 10mg PO Daily  Mixed hyperlipidemia  Managed by [facility] - reviewed 11/19/2024  Continue Lipitor 10mg PO Daily  Allergic rhinitis due to allergen  Managed by [facility] - reviewed 11/19/2024  Rosacea  Managed by [facility] - reviewed 11/19/2024  Vitamin D3 deficiency  Managed by [facility] - reviewed 11/19/24  Low vitamin B12 level  Managed by [facility] - reviewed 11/19/24      Patient Active Problem List   Diagnosis    Acne    Benign essential hypertension    Mixed hyperlipidemia    Tobacco dependence syndrome    Allergic rhinitis due to allergen    Medical clearance for psychiatric admission    Lung cancer screening declined by patient    Moderate depressed bipolar I disorder (HCC)    Bipolar disorder with severe depression (HCC)    Rosacea    Vitamin D3 deficiency    Low vitamin B12 level     Review of systems: Unremarkable  Psychiatric Diagnosis: Bipolar depression    Assessment  Overall Status: Awaiting discharge offering no  complaints with no relapse of psychotic or manic symptoms or suicidal thoughts and still with some residual delayed responses and constricted affect staying to himself attending only some groups being a loner which is his baseline   certification Statement: The patient will continue to require additional inpatient hospital stay due to recurrent depression and repeated suicide attempts in the community     Medications: Vraylar 3 mg a day, hydroxyzine 25 mg 3 times a day, Zoloft 150 mg a day, Lamictal 50 mg a day,  All medications were reviewed  Side effects from treatment: None reported  Medication changes   No  changes    Medication education   Risks side effects benefits and precautions of medications discussed with patient and he did verbalize an understanding about risks for metabolic syndrome from being on neuroleptics and is form tardive dyskinesia etc.     Understanding of medications: Has good understanding about medications and side effects of his precautions benefits   Justification for dual anti-psychotics: Not applicable    Non-pharmacological treatments  Continue with individual, group, milieu and occupational therapy using recovery principles and psycho-education about accepting illness and the need for treatment.  Behavioral health checks every 7 minutes  Safety with the patient about illness and need for treatment  Support transition to [facility] with the ACT team    Safety  Safety and communication plan established to target dynamic risk factors discussed above.    Discharge Plan   To finalize discharge to [facility] program at [facility] with [facility] ACT team and referral to [facility].    Interval Progress   Waiting patiently to hear from the [facility]step group home and [facility] as active as to when he can be discharged which is anticipated more  likely at the beginning of next month.  Still with minimal interaction with peers stating he is alone and somewhat shy  with constricted affect staying to himself mostly attending only some groups.  Reports that anxiety and depression are under control.  Continues to deny having had any psychotic or manic symptoms or suicidal thoughts or self-abusive thoughts and hygiene is fair.  No need for behavioral PRNs and has not been aggressive or agitated or threatening or self-abusive on the unit    Acceptance by patient: Accepting  Hopefulness in recovery: Living at the [facility] group home with an ACT team  involved in reintegration process: No contact with sister or mother  trusting in relationship with psychiatrist: Trusting  Sleep: Good  Appetite: Good  Compliance with Medications: Good  Group attendance: Attending some 4/9  Significant events and progress towards goals: Waiting for discharge to [facility] group home and [facility] with the ACT team in place    Mental Status Exam  Appearance: casually dressed, dressed appropriately, adequate grooming, looks stated age found sitting on his bed in his room but with good eye contact  Behavior: cooperative, mildly anxious, slow responses cooperative and fairly groomed  Speech: slow, scant, increased latency of response, delayed, soft, decreased volume delayed responses as usual  Mood: depressed, dysphoric, anxious  Affect: constricted, slightly brighter, mood-congruent  Thought Process: organized, goal directed, decreased rate of thoughts, slowing of thoughts, negative thinking, concrete  Thought Content: no overt delusions, no current suicidal or homicidal thoughts and no plans verbalized.  No phobias obsessions compulsions reported.  Not appearing to respond to any delusional beliefs able to continue to contract for safety  Perceptual Disturbances: no auditory hallucinations, no visual hallucinations  Hx Risk Factors: chronic depressive symptoms, chronic anxiety symptoms, history of suicide attempts  Sensorium: Oriented x 3 spheres and situation  Cognition: recent and remote memory  grossly intact  Consciousness: alert, awake, and not sedated  Attention: attention span and concentration are age appropriate  Intellect: appears to be of average intelligence  Insight: limited  Judgement: limited  Motor Activity: no abnormal movements     Vitals  Temp:  [97.5 °F (36.4 °C)-97.6 °F (36.4 °C)] 97.5 °F (36.4 °C)  HR:  [89-95] 89  Resp:  [17-18] 18  BP: (121-144)/(67-82) 121/67  SpO2:  [94 %-96 %] 96 %  No intake or output data in the 24 hours ending 11/19/24 0406    Lab Results: All Labs For Current Hospital Admission Reviewed      Current Facility-Administered Medications   Medication Dose Route Frequency Provider Last Rate    acetaminophen  650 mg Oral Q6H PRN TITI HarleyNP      acetaminophen  650 mg Oral Q4H PRN ALVINA Harley      acetaminophen  975 mg Oral Q6H PRN ALVINA Harley      aluminum-magnesium hydroxide-simethicone  30 mL Oral Q4H PRN ALVINA Harley      ammonium lactate   Topical BID PRN ALVINA Harley      atorvastatin  10 mg Oral Daily ALVINA Lewis      benztropine  1 mg Intramuscular Q4H PRN Max 6/day ALVINA Harley      benztropine  1 mg Oral Q4H PRN Max 6/day ALVINA Harley      bisacodyl  10 mg Rectal Daily PRN ALVINA Harley      cariprazine  3 mg Oral Daily Martin Cardenas MD      Cholecalciferol  2,000 Units Oral Daily ALVINA Lewis      cyanocobalamin  1,000 mcg Oral Daily ALVINA Lewis      hydrOXYzine HCL  25 mg Oral Q6H PRN Max 4/day ALVINA Harley      hydrOXYzine HCL  25 mg Oral TID Martin Cardenas MD      hydrOXYzine HCL  50 mg Oral Q4H PRN Max 4/day ALVINA Harley      Or    LORazepam  1 mg Intramuscular Q4H PRN ALVINA Harley      ketoconazole   Topical Daily PRN ALVINA Harley      lamoTRIgine  50 mg Oral Daily Martin Cardenas MD      lisinopril  10 mg Oral Daily ALVINA Lewis      LORazepam  1 mg Oral Q4H PRN Max 6/day ALVINA Harley      Or    LORazepam  2  mg Intramuscular Q6H PRN Max 3/day ALVINA Harley      OLANZapine  10 mg Oral Q3H PRN Max 3/day Melva ALVINA Knutson      Or    OLANZapine  10 mg Intramuscular Q3H PRN Max 3/day Melva Knutson, TITINP      OLANZapine  5 mg Oral Q3H PRN Max 6/day Melva Knutson, TITINP      Or    OLANZapine  5 mg Intramuscular Q3H PRN Max 6/day Melva Knutson, TITINP      OLANZapine  2.5 mg Oral Q3H PRN Max 8/day Melva Knutson, TITINP      polyethylene glycol  17 g Oral Daily PRN Melva Knutson, TITINP      propranolol  10 mg Oral Q8H PRN Melvamelanie Knutson, ALVINA      senna-docusate sodium  1 tablet Oral Daily PRN ALVINA Harley      sertraline  150 mg Oral HS Martin Cardenas MD         Counseling / Coordination of Care: Total floor / unit time spent today 15 minutes. Greater than 50% of total time was spent with the patient and / or family counseling and / or somewhat receptive to supportive listening and teaching positive coping skills to deal with symptom mangement.     Patient's Rights, confidentiality and exceptions to confidentiality, use of automated medical record, Behavioral Health Services staff access to medical record, and consent to treatment reviewed.    This note has been dictated and hence there may be problems with punctuation, spelling and formatting and if anyone has any concerns please address them to Dr. Cardenas   This note is not shared with patient due to potential for making patient's condition worse by knowing the content of the note.

## 2024-11-19 NOTE — NURSING NOTE
Patient has been visible on the unit minimally. Isolative to himself. Anxious affect at times. Denies any psychological symptoms or suicidal ideations. Medication compliant. Awaiting discharge. Hopeful. Support offered.

## 2024-11-19 NOTE — PROGRESS NOTES
11/19/24 0733   Team Meeting   Meeting Type Daily Rounds   Team Members Present   Team Members Present Physician;Nurse;;Other (Discipline and Name)   Physician Team Member Holter, Thomas   Nursing Team Member Chastity   Social Work Team Member Henrry FRY   Other (Discipline and Name) Renato Bailey PharmD   Patient/Family Present   Patient Present No   Patient's Family Present No     Groups Participation  4/9.   Patient's compliant with medications. He has minimal engagement in treatment. CSP 11/26, d/c 12/4 to [facility] [address].

## 2024-11-19 NOTE — PLAN OF CARE
Problem: Alteration in Thoughts and Perception  Goal: Treatment Goal: Gain control of psychotic behaviors/thinking, reduce/eliminate presenting symptoms and demonstrate improved reality functioning upon discharge  Outcome: Progressing  Goal: Refrain from acting on delusional thinking/internal stimuli  Description: Interventions:  - Monitor patient closely, per order   - Utilize least restrictive measures   - Set reasonable limits, give positive feedback for acceptable   - Administer medications as ordered and monitor of potential side effects  Outcome: Progressing  Goal: Agree to be compliant with medication regime, as prescribed and report medication side effects  Description: Interventions:  - Offer appropriate PRN medication and supervise ingestion; conduct AIMS, as needed   Outcome: Progressing  Goal: Recognize dysfunctional thoughts, communicate reality-based thoughts at the time of discharge  Description: Interventions:  - Provide medication and psycho-education to assist patient in compliance and developing insight into his/her illness   Outcome: Progressing  Goal: Complete daily ADLs, including personal hygiene independently, as able  Description: Interventions:  - Observe, teach, and assist patient with ADLS  - Monitor and promote a balance of rest/activity, with adequate nutrition and elimination   Outcome: Progressing     Problem: Ineffective Coping  Goal: Identifies ineffective coping skills  Outcome: Progressing  Goal: Identifies healthy coping skills  Outcome: Progressing  Goal: Participates in unit activities  Description: Interventions:  - Provide therapeutic environment   - Provide required programming   - Redirect inappropriate behaviors   Outcome: Progressing     Problem: Risk for Self Injury/Neglect  Goal: Treatment Goal: Remain safe during length of stay, learn and adopt new coping skills, and be free of self-injurious ideation, impulses and acts at the time of discharge  Outcome:  Progressing  Goal: Refrain from harming self  Description: Interventions:  - Monitor patient closely, per order  - Develop a trusting relationship  - Supervise medication ingestion, monitor effects and side effects   Outcome: Progressing  Goal: Recognize maladaptive responses and adopt new coping mechanisms  Outcome: Progressing     Problem: Depression  Goal: Treatment Goal: Demonstrate behavioral control of depressive symptoms, verbalize feelings of improved mood/affect, and adopt new coping skills prior to discharge  Outcome: Progressing

## 2024-11-20 PROCEDURE — 99232 SBSQ HOSP IP/OBS MODERATE 35: CPT | Performed by: PSYCHIATRY & NEUROLOGY

## 2024-11-20 RX ADMIN — SERTRALINE HYDROCHLORIDE 150 MG: 100 TABLET ORAL at 21:22

## 2024-11-20 RX ADMIN — CYANOCOBALAMIN TAB 1000 MCG 1000 MCG: 1000 TAB at 08:27

## 2024-11-20 RX ADMIN — HYDROXYZINE HYDROCHLORIDE 25 MG: 25 TABLET ORAL at 21:22

## 2024-11-20 RX ADMIN — LISINOPRIL 10 MG: 10 TABLET ORAL at 08:27

## 2024-11-20 RX ADMIN — HYDROXYZINE HYDROCHLORIDE 25 MG: 25 TABLET ORAL at 08:27

## 2024-11-20 RX ADMIN — CHOLECALCIFEROL TAB 25 MCG (1000 UNIT) 2000 UNITS: 25 TAB at 08:27

## 2024-11-20 RX ADMIN — HYDROXYZINE HYDROCHLORIDE 25 MG: 25 TABLET ORAL at 17:12

## 2024-11-20 RX ADMIN — CARIPRAZINE 3 MG: 3 CAPSULE, GELATIN COATED ORAL at 08:27

## 2024-11-20 RX ADMIN — LAMOTRIGINE 50 MG: 25 TABLET ORAL at 08:27

## 2024-11-20 RX ADMIN — ATORVASTATIN CALCIUM 10 MG: 10 TABLET, FILM COATED ORAL at 08:27

## 2024-11-20 NOTE — ASSESSMENT & PLAN NOTE
Reviewed 11/2024     Happy about being accepted by [facility] and has ACT team anticipating discharge as planned hopefully on 12/4/2024 with no relapse of psychotic or manic or self-abusive or suicidal thoughts but still with some residual preoccupation and a constricted affect and delayed responses as usual but no acute management issues or problems  Accepted by [facility]; has ACT team on 12/3 and anticipated discharge on 12/4/2024  Continue medications as follows:  Vraylar 3mg PO Daily  Atarax 25mg PO TID  Lamictal 50mg PO Daily  Zoloft 150mg PO QHS  Continue with individual therapy, group therapy, milieu therapy and occupational therapy   Behavioral Health checks every 7 minutes   Continue frequent safety checks and vitals per unit protocol  Continue with [facility] medical management as indicated

## 2024-11-20 NOTE — PROGRESS NOTES
11/20/24 0801   Team Meeting   Meeting Type Daily Rounds   Team Members Present   Team Members Present Physician;Nurse;;Other (Discipline and Name)   Physician Team Member Holter, Thomas   Nursing Team Member Chastity   Social Work Team Member Henrry FRY   Other (Discipline and Name) Goff PCM   Patient/Family Present   Patient Present No   Patient's Family Present No     Groups Participation  3/11.   Patient's compliant with  medications. CSP 11/26, D/C 12/4 with ACT services to [facility]. He has minimal engagement in his treatment. He's appropriate.

## 2024-11-20 NOTE — NURSING NOTE
Patient has been visible on the unit minimally. Paces at times in his room.  Anxious affect but denies any psychological symptoms or suicidal ideations. Medication compliant. Attends minimal groups. Pleasant. Quiet. Support offered.

## 2024-11-20 NOTE — PLAN OF CARE
Problem: Ineffective Coping  Goal: Identifies ineffective coping skills  Outcome: Progressing  Goal: Identifies healthy coping skills  Outcome: Progressing  Goal: Demonstrates healthy coping skills  Outcome: Progressing  Goal: Participates in unit activities  Description: Interventions:  - Provide therapeutic environment   - Provide required programming   - Redirect inappropriate behaviors   Outcome: Progressing   Deyvi continues to work towards the goals by attending and actively participating in the groups activities.

## 2024-11-20 NOTE — PLAN OF CARE
Problem: Alteration in Thoughts and Perception  Goal: Treatment Goal: Gain control of psychotic behaviors/thinking, reduce/eliminate presenting symptoms and demonstrate improved reality functioning upon discharge  Outcome: Progressing  Goal: Refrain from acting on delusional thinking/internal stimuli  Description: Interventions:  - Monitor patient closely, per order   - Utilize least restrictive measures   - Set reasonable limits, give positive feedback for acceptable   - Administer medications as ordered and monitor of potential side effects  Outcome: Progressing  Goal: Agree to be compliant with medication regime, as prescribed and report medication side effects  Description: Interventions:  - Offer appropriate PRN medication and supervise ingestion; conduct AIMS, as needed   Outcome: Progressing  Goal: Recognize dysfunctional thoughts, communicate reality-based thoughts at the time of discharge  Description: Interventions:  - Provide medication and psycho-education to assist patient in compliance and developing insight into his/her illness   Outcome: Progressing  Goal: Complete daily ADLs, including personal hygiene independently, as able  Description: Interventions:  - Observe, teach, and assist patient with ADLS  - Monitor and promote a balance of rest/activity, with adequate nutrition and elimination   Outcome: Progressing     Problem: Ineffective Coping  Goal: Participates in unit activities  Description: Interventions:  - Provide therapeutic environment   - Provide required programming   - Redirect inappropriate behaviors   Outcome: Progressing  Goal: Patient/Family participate in treatment and DC plans  Description: Interventions:  - Provide therapeutic environment  Outcome: Progressing  Goal: Patient/Family verbalizes awareness of resources  Outcome: Progressing  Goal: Understands least restrictive measures  Description: Interventions:  - Utilize least restrictive behavior  Outcome: Progressing  Goal:  Free from restraint events  Description: - Utilize least restrictive measures   - Provide behavioral interventions   - Redirect inappropriate behaviors   Outcome: Progressing     Problem: Risk for Self Injury/Neglect  Goal: Treatment Goal: Remain safe during length of stay, learn and adopt new coping skills, and be free of self-injurious ideation, impulses and acts at the time of discharge  Outcome: Progressing  Goal: Verbalize thoughts and feelings  Description: Interventions:  - Assess and re-assess patient's lethality and potential for self-injury  - Engage patient in 1:1 interactions, daily, for a minimum of 15 minutes  - Encourage patient to express feelings, fears, frustrations, hopes  - Establish rapport/trust with patient   Outcome: Progressing  Goal: Refrain from harming self  Description: Interventions:  - Monitor patient closely, per order  - Develop a trusting relationship  - Supervise medication ingestion, monitor effects and side effects   Outcome: Progressing  Goal: Attend and participate in unit activities, including therapeutic, recreational, and educational groups  Description: Interventions:  - Provide therapeutic and educational activities daily, encourage attendance and participation, and document same in the medical record  - Obtain collateral information, encourage visitation and family involvement in care   Outcome: Progressing  Goal: Complete daily ADLs, including personal hygiene independently, as able  Description: Interventions:  - Observe, teach, and assist patient with ADLS  - Monitor and promote a balance of rest/activity, with adequate nutrition and elimination  Outcome: Progressing     Problem: Depression  Goal: Treatment Goal: Demonstrate behavioral control of depressive symptoms, verbalize feelings of improved mood/affect, and adopt new coping skills prior to discharge  Outcome: Progressing  Goal: Verbalize thoughts and feelings  Description: Interventions:  - Assess and re-assess  patient's level of risk   - Engage patient in 1:1 interactions, daily, for a minimum of 15 minutes   - Encourage patient to express feelings, fears, frustrations, hopes   Outcome: Progressing  Goal: Refrain from harming self  Description: Interventions:  - Monitor patient closely, per order   - Supervise medication ingestion, monitor effects and side effects   Outcome: Progressing  Goal: Refrain from isolation  Description: Interventions:  - Develop a trusting relationship   - Encourage socialization   Outcome: Progressing  Goal: Refrain from self-neglect  Outcome: Progressing  Goal: Attend and participate in unit activities, including therapeutic, recreational, and educational groups  Description: Interventions:  - Provide therapeutic and educational activities daily, encourage attendance and participation, and document same in the medical record   Outcome: Progressing  Goal: Complete daily ADLs, including personal hygiene independently, as able  Description: Interventions:  - Observe, teach, and assist patient with ADLS  -  Monitor and promote a balance of rest/activity, with adequate nutrition and elimination   Outcome: Progressing     Problem: Anxiety  Goal: Anxiety is at manageable level  Description: Interventions:  - Assess and monitor patient's anxiety level.   - Monitor for signs and symptoms (heart palpitations, chest pain, shortness of breath, headaches, nausea, feeling jumpy, restlessness, irritable, apprehensive).   - Collaborate with interdisciplinary team and initiate plan and interventions as ordered.  - [place] patient to unit/surroundings  - Explain treatment plan  - Encourage participation in care  - Encourage verbalization of concerns/fears  - Identify coping mechanisms  - Assist in developing anxiety-reducing skills  - Administer/offer alternative therapies  - Limit or eliminate stimulants  Outcome: Progressing     Problem: Risk for Violence/Aggression Toward Others  Goal: Treatment Goal: Refrain  from acts of violence/aggression during length of stay, and demonstrate improved impulse control at the time of discharge  Outcome: Progressing  Goal: Verbalize thoughts and feelings  Description: Interventions:  - Assess and re-assess patient's level of risk, every waking shift  - Engage patient in 1:1 interactions, daily, for a minimum of 15 minutes   - Allow patient to express feelings and frustrations in a safe and non-threatening manner   - Establish rapport/trust with patient   Outcome: Progressing  Goal: Refrain from harming others  Outcome: Progressing  Goal: Refrain from destructive acts on the environment or property  Outcome: Progressing  Goal: Control angry outbursts  Description: Interventions:  - Monitor patient closely, per order  - Ensure early verbal de-escalation  - Monitor prn medication needs  - Set reasonable/therapeutic limits, outline behavioral expectations, and consequences   - Provide a non-threatening milieu, utilizing the least restrictive interventions   Outcome: Progressing  Goal: Attend and participate in unit activities, including therapeutic, recreational, and educational groups  Description: Interventions:  - Provide therapeutic and educational activities daily, encourage attendance and participation, and document same in the medical record   Outcome: Progressing  Goal: Identify appropriate positive anger management techniques  Description: Interventions:  - Offer anger management and coping skills groups   - Staff will provide positive feedback for appropriate anger control  Outcome: Progressing     Problem: Alteration in Orientation  Goal: Treatment Goal: Demonstrate a reduction of confusion and improved orientation to person, place, time and/or situation upon discharge, according to optimum baseline/ability  Outcome: Progressing  Goal: Interact with staff daily  Description: Interventions:  - Assess and re-assess patient's level of orientation  - Engage patient in 1 on 1  interactions, daily, for a minimum of 15 minutes   - Establish rapport/trust with patient   Outcome: Progressing  Goal: Attend and participate in unit activities, including therapeutic, recreational, and educational groups  Description: Interventions:  - Provide therapeutic and educational activities daily, encourage attendance and participation, and document same in the medical record   - Provide appropriate opportunities for reminiscence   - Provide a consistent daily routine   - Encourage family contact/visitation   Outcome: Progressing  Goal: Complete daily ADLs, including personal hygiene independently, as able  Description: Interventions:  - Observe, teach, and assist patient with ADLS  Outcome: Progressing     Problem: DISCHARGE PLANNING - CARE MANAGEMENT  Goal: Discharge to post-acute care or home with appropriate resources  Description: INTERVENTIONS:  - Conduct assessment to determine patient/family and health care team treatment goals, and need for post-acute services based on payer coverage, community resources, and patient preferences, and barriers to discharge  - Address psychosocial, clinical, and financial barriers to discharge as identified in assessment in conjunction with the patient/family and health care team  - Arrange appropriate level of post-acute services according to patient’s   needs and preference and payer coverage in collaboration with the physician and health care team  - Communicate with and update the patient/family, physician, and health care team regarding progress on the discharge plan  - Arrange appropriate transportation to post-acute venues  Outcome: Progressing

## 2024-11-20 NOTE — PROGRESS NOTES
Progress Note - Behavioral Health   Name: Deyvi Cao 54 y.o. male I MRN: [id number]  Unit/Bed#: [facility] 101-02 I Date of Admission: 6/25/2024   Date of Service: 11/20/2024 I Hospital Day: 148     Assessment & Plan  Bipolar disorder with severe depression (HCC)  Reviewed 11/2024     Happy about being accepted by [facility] [facility] and [facility] and has ACT team anticipating discharge as planned hopefully on 12/4/2024 with no relapse of psychotic or manic or self-abusive or suicidal thoughts but still with some residual preoccupation and a constricted affect and delayed responses as usual but no acute management issues or problems  Accepted by [facility]; has ACT team on 12/3 and anticipated discharge on 12/4/2024  Continue medications as follows:  Vraylar 3mg PO Daily  Atarax 25mg PO TID  Lamictal 50mg PO Daily  Zoloft 150mg PO QHS  Continue with individual therapy, group therapy, milieu therapy and occupational therapy   Behavioral Health checks every 7 minutes   Continue frequent safety checks and vitals per unit protocol  Continue with [facility] medical management as indicated  Benign essential hypertension  Managed by [facility] - reviewed 11/20/2024  Continue Lisinopril 10mg PO Daily  Mixed hyperlipidemia  Managed by [facility] - reviewed 11/20/2024  Continue Lipitor 10mg PO Daily  Allergic rhinitis due to allergen  Managed by [facility] - reviewed 11/20/2024  Rosacea  Managed by [facility] - reviewed 11/20/2024  Vitamin D3 deficiency  Managed by [facility] - reviewed 11/20/24  Low vitamin B12 level  Managed by [facility] - reviewed 11/20/24      Patient Active Problem List   Diagnosis    Acne    Benign essential hypertension    Mixed hyperlipidemia    Tobacco dependence syndrome    Allergic rhinitis due to allergen    Medical clearance for psychiatric admission    Lung cancer screening declined by patient    Moderate depressed bipolar I disorder (HCC)    Bipolar disorder with severe depression (HCC)    Rosacea    Vitamin D3 deficiency    Low  vitamin B12 level     Review of systems: Unremarkable  Psychiatric Diagnosis: Bipolar depression    Assessment  Overall Status: Awaiting discharge as planned on 12/4/2024 but still with some delayed responses and a constricted affect which is his baseline stating he is usually a shy person and a loner but reports no suicidal thoughts or psychotic or manic symptoms and attending groups preparing himself for discharge  Certification Statement: The patient will continue to require additional inpatient hospital stay due to recurrent depression and repeated suicide attempts in the community     Medications: Vraylar 3 mg a day, hydroxyzine 25 mg 3 times a day, Zoloft 150 mg a day, Lamictal 50 mg a day,  All medications were reviewed  Side effects from treatment: None reported  Medication changes   No  changes    Medication education   Risks side effects benefits and precautions of medications discussed with patient and he did verbalize an understanding about risks for metabolic syndrome from being on neuroleptics and is form tardive dyskinesia etc.     Understanding of medications: Has good understanding about medications and side effects of his precautions benefits   Justification for dual anti-psychotics: Not applicable    Non-pharmacological treatments  Continue with individual, group, milieu and occupational therapy using recovery principles and psycho-education about accepting illness and the need for treatment.  Behavioral health checks every 7 minutes  Safety with the patient about illness and need for treatment  Support transition to [facility] with the ACT team    Safety  Safety and communication plan established to target dynamic risk factors discussed above.    Discharge Plan   To finalize discharge to [facility] program at [facility] with [facility] ACT team as he was accepted by them and referral to [facility] with [facility] scheduled for tomorrow 324 and discharged on 12/4/2024.    Interval  Progress   Happy to hear that he was accepted by [facility] group home and [facility] ACT team and hopefully be discharged on 12/4/2024 after release he has been needing help before that.  Reports no relapse of psychotic or manic or excessive depression with no suicidal thoughts at all but continues to have same baseline behaviors of somewhat isolated shy with constricted affect staying to himself mostly attending some groups stating that it is his baseline.  Reports anxiety and depression IRS 1 or 2 out of 10 with no need for behavioral PRNs with no acting out or aggressive or threatening or self-abusive episodes  Acceptance by patient: Accepting  Hopefulness in recovery: Living at the [facility] group home with an ACT team  involved in reintegration process: No contact with his sister or mother or with anyone else in the community  trusting in relationship with psychiatrist: Trusting  Sleep: Good  Appetite: Good  Compliance with Medications: Good  Group attendance: Attendance 3/11  Significant events and progress towards goals: Happy about proposed discharged on 12/4/2020 [facility] group home with ACT team with no relapse of symptoms    Mental Status Exam  Appearance: casually dressed, dressed appropriately, adequate grooming, looks stated age found walking the hallway friendly cooperative  Behavior: cooperative, mildly anxious, slow responses well-groomed cooperative but still with delayed responses  Speech: slow, scant, increased latency of response, delayed, soft, decreased volume somewhat delayed in responses as usual   mood: depressed, dysphoric, anxious  Affect: constricted, slightly brighter, mood-congruent with some more reactivity  Thought Process: organized, goal directed, decreased rate of thoughts, slowing of thoughts, negative thinking, concrete  Thought Content: no overt delusions, no current suicidal or homicidal thoughts and no plans verbalized.  No phobias obsessions  compulsions reported.  Not appearing to respond to any delusional beliefs able to continue to contract for safety.  No distorted body perceptions reported  Perceptual Disturbances: no auditory hallucinations, no visual hallucinations  Hx Risk Factors: chronic depressive symptoms, chronic anxiety symptoms, history of suicide attempts  Sensorium: Oriented x 3 spheres and situation  Cognition: recent and remote memory grossly intact  Consciousness: alert, awake, and not sedated  Attention: attention span and concentration are age appropriate  Intellect: appears to be of average intelligence  Insight: limited  Judgement: limited  Motor Activity: no abnormal movements     Vitals  Temp:  [97.5 °F (36.4 °C)-97.8 °F (36.6 °C)] 97.5 °F (36.4 °C)  HR:  [] 90  Resp:  [18] 18  BP: (121-140)/(77-88) 121/77  SpO2:  [98 %] 98 %  No intake or output data in the 24 hours ending 11/20/24 0315    Lab Results: All Labs For Current Hospital Admission Reviewed      Current Facility-Administered Medications   Medication Dose Route Frequency Provider Last Rate    acetaminophen  650 mg Oral Q6H PRN TITI HarleyNP      acetaminophen  650 mg Oral Q4H PRN ALVINA Harley      acetaminophen  975 mg Oral Q6H PRN ALVINA Harley      aluminum-magnesium hydroxide-simethicone  30 mL Oral Q4H PRN ALVINA Harley      ammonium lactate   Topical BID PRN ALVINA Harley      atorvastatin  10 mg Oral Daily ALVINA Lewis      benztropine  1 mg Intramuscular Q4H PRN Max 6/day ALVINA Harley      benztropine  1 mg Oral Q4H PRN Max 6/day ALVINA Harley      bisacodyl  10 mg Rectal Daily PRN ALVINA Harley      cariprazine  3 mg Oral Daily Martin Cardenas MD      Cholecalciferol  2,000 Units Oral Daily ALVINA Lewis      cyanocobalamin  1,000 mcg Oral Daily ALVINA Lewis      hydrOXYzine HCL  25 mg Oral Q6H PRN Max 4/day ALVINA Harley      hydrOXYzine HCL  25 mg Oral TID Martin  MENDEZ Cardenas MD      hydrOXYzine HCL  50 mg Oral Q4H PRN Max 4/day ALVINA Harley      Or    LORazepam  1 mg Intramuscular Q4H PRN Melva Zenia, TITINP      ketoconazole   Topical Daily PRN Melva Knutson, CRNP      lamoTRIgine  50 mg Oral Daily Martin Cardenas MD      lisinopril  10 mg Oral Daily Sary Biggs, ALVINA      LORazepam  1 mg Oral Q4H PRN Max 6/day Melvamelanie Knutson, TITINP      Or    LORazepam  2 mg Intramuscular Q6H PRN Max 3/day Melva Justiney, CRNP      OLANZapine  10 mg Oral Q3H PRN Max 3/day Melvamelanie Andersoney, CRNP      Or    OLANZapine  10 mg Intramuscular Q3H PRN Max 3/day Melva Justiney, CRNP      OLANZapine  5 mg Oral Q3H PRN Max 6/day Melvamelanie Andersoney, CRNP      Or    OLANZapine  5 mg Intramuscular Q3H PRN Max 6/day Melva Justiney, CRNP      OLANZapine  2.5 mg Oral Q3H PRN Max 8/day Melvamelanie Knutson, CRNP      polyethylene glycol  17 g Oral Daily PRN Melvamelanie Knutson, CRNP      propranolol  10 mg Oral Q8H PRN Melva Zenia, CRNP      senna-docusate sodium  1 tablet Oral Daily PRN Melvamelanie Knutson, TITINP      sertraline  150 mg Oral HS Martin Cardenas MD         Counseling / Coordination of Care: Total floor / unit time spent today 15 minutes. Greater than 50% of total time was spent with the patient and / or family counseling and / or somewhat receptive to supportive listening and teaching positive coping skills to deal with symptom mangement.     Patient's Rights, confidentiality and exceptions to confidentiality, use of automated medical record, Behavioral Health Services staff access to medical record, and consent to treatment reviewed.    This note has been dictated and hence there may be problems with punctuation, spelling and formatting and if anyone has any concerns please address them to Dr. Cardenas   This note is not shared with patient due to potential for making patient's condition worse by knowing the content of the note.

## 2024-11-20 NOTE — NURSING NOTE
Received pt in bed at change of shift with eyes closed; chest movement noted.  Continues the same thus this far as per q 15 min room checks.   Will continue to monitor behavior, sleeping pattern and any medical issues that may arise.    0545:  Sleeping 7+ hrs thus this far

## 2024-11-21 PROCEDURE — 99232 SBSQ HOSP IP/OBS MODERATE 35: CPT | Performed by: PSYCHIATRY & NEUROLOGY

## 2024-11-21 RX ADMIN — ACETAMINOPHEN 975 MG: 325 TABLET ORAL at 09:51

## 2024-11-21 RX ADMIN — HYDROXYZINE HYDROCHLORIDE 25 MG: 25 TABLET ORAL at 21:08

## 2024-11-21 RX ADMIN — HYDROXYZINE HYDROCHLORIDE 25 MG: 25 TABLET ORAL at 16:59

## 2024-11-21 RX ADMIN — LISINOPRIL 10 MG: 10 TABLET ORAL at 08:29

## 2024-11-21 RX ADMIN — CYANOCOBALAMIN TAB 1000 MCG 1000 MCG: 1000 TAB at 08:30

## 2024-11-21 RX ADMIN — HYDROXYZINE HYDROCHLORIDE 25 MG: 25 TABLET ORAL at 08:29

## 2024-11-21 RX ADMIN — CARIPRAZINE 3 MG: 3 CAPSULE, GELATIN COATED ORAL at 08:29

## 2024-11-21 RX ADMIN — CHOLECALCIFEROL TAB 25 MCG (1000 UNIT) 2000 UNITS: 25 TAB at 08:30

## 2024-11-21 RX ADMIN — ATORVASTATIN CALCIUM 10 MG: 10 TABLET, FILM COATED ORAL at 08:29

## 2024-11-21 RX ADMIN — SERTRALINE HYDROCHLORIDE 150 MG: 100 TABLET ORAL at 21:08

## 2024-11-21 RX ADMIN — LAMOTRIGINE 50 MG: 25 TABLET ORAL at 08:29

## 2024-11-21 NOTE — PLAN OF CARE
Problem: Alteration in Thoughts and Perception  Goal: Treatment Goal: Gain control of psychotic behaviors/thinking, reduce/eliminate presenting symptoms and demonstrate improved reality functioning upon discharge  Outcome: Progressing  Goal: Refrain from acting on delusional thinking/internal stimuli  Description: Interventions:  - Monitor patient closely, per order   - Utilize least restrictive measures   - Set reasonable limits, give positive feedback for acceptable   - Administer medications as ordered and monitor of potential side effects  Outcome: Progressing  Goal: Agree to be compliant with medication regime, as prescribed and report medication side effects  Description: Interventions:  - Offer appropriate PRN medication and supervise ingestion; conduct AIMS, as needed   Outcome: Progressing  Goal: Recognize dysfunctional thoughts, communicate reality-based thoughts at the time of discharge  Description: Interventions:  - Provide medication and psycho-education to assist patient in compliance and developing insight into his/her illness   Outcome: Progressing  Goal: Complete daily ADLs, including personal hygiene independently, as able  Description: Interventions:  - Observe, teach, and assist patient with ADLS  - Monitor and promote a balance of rest/activity, with adequate nutrition and elimination   Outcome: Progressing     Problem: Ineffective Coping  Goal: Participates in unit activities  Description: Interventions:  - Provide therapeutic environment   - Provide required programming   - Redirect inappropriate behaviors   Outcome: Progressing  Goal: Patient/Family verbalizes awareness of resources  Outcome: Progressing  Goal: Free from restraint events  Description: - Utilize least restrictive measures   - Provide behavioral interventions   - Redirect inappropriate behaviors   Outcome: Progressing     Problem: Risk for Self Injury/Neglect  Goal: Treatment Goal: Remain safe during length of stay, learn  and adopt new coping skills, and be free of self-injurious ideation, impulses and acts at the time of discharge  Outcome: Progressing  Goal: Verbalize thoughts and feelings  Description: Interventions:  - Assess and re-assess patient's lethality and potential for self-injury  - Engage patient in 1:1 interactions, daily, for a minimum of 15 minutes  - Encourage patient to express feelings, fears, frustrations, hopes  - Establish rapport/trust with patient   Outcome: Progressing  Goal: Refrain from harming self  Description: Interventions:  - Monitor patient closely, per order  - Develop a trusting relationship  - Supervise medication ingestion, monitor effects and side effects   Outcome: Progressing  Goal: Attend and participate in unit activities, including therapeutic, recreational, and educational groups  Description: Interventions:  - Provide therapeutic and educational activities daily, encourage attendance and participation, and document same in the medical record  - Obtain collateral information, encourage visitation and family involvement in care   Outcome: Progressing  Goal: Recognize maladaptive responses and adopt new coping mechanisms  Outcome: Progressing  Goal: Complete daily ADLs, including personal hygiene independently, as able  Description: Interventions:  - Observe, teach, and assist patient with ADLS  - Monitor and promote a balance of rest/activity, with adequate nutrition and elimination  Outcome: Progressing     Problem: Depression  Goal: Treatment Goal: Demonstrate behavioral control of depressive symptoms, verbalize feelings of improved mood/affect, and adopt new coping skills prior to discharge  Outcome: Progressing  Goal: Refrain from harming self  Description: Interventions:  - Monitor patient closely, per order   - Supervise medication ingestion, monitor effects and side effects   Outcome: Progressing  Goal: Refrain from isolation  Description: Interventions:  - Develop a trusting  relationship   - Encourage socialization   Outcome: Progressing  Goal: Refrain from self-neglect  Outcome: Progressing  Goal: Attend and participate in unit activities, including therapeutic, recreational, and educational groups  Description: Interventions:  - Provide therapeutic and educational activities daily, encourage attendance and participation, and document same in the medical record   Outcome: Progressing     Problem: Anxiety  Goal: Anxiety is at manageable level  Description: Interventions:  - Assess and monitor patient's anxiety level.   - Monitor for signs and symptoms (heart palpitations, chest pain, shortness of breath, headaches, nausea, feeling jumpy, restlessness, irritable, apprehensive).   - Collaborate with interdisciplinary team and initiate plan and interventions as ordered.  - [place] patient to unit/surroundings  - Explain treatment plan  - Encourage participation in care  - Encourage verbalization of concerns/fears  - Identify coping mechanisms  - Assist in developing anxiety-reducing skills  - Administer/offer alternative therapies  - Limit or eliminate stimulants  Outcome: Progressing     Problem: Risk for Violence/Aggression Toward Others  Goal: Treatment Goal: Refrain from acts of violence/aggression during length of stay, and demonstrate improved impulse control at the time of discharge  Outcome: Progressing  Goal: Refrain from harming others  Outcome: Progressing  Goal: Refrain from destructive acts on the environment or property  Outcome: Progressing  Goal: Control angry outbursts  Description: Interventions:  - Monitor patient closely, per order  - Ensure early verbal de-escalation  - Monitor prn medication needs  - Set reasonable/therapeutic limits, outline behavioral expectations, and consequences   - Provide a non-threatening milieu, utilizing the least restrictive interventions   Outcome: Progressing  Goal: Attend and participate in unit activities, including therapeutic,  recreational, and educational groups  Description: Interventions:  - Provide therapeutic and educational activities daily, encourage attendance and participation, and document same in the medical record   Outcome: Progressing  Goal: Identify appropriate positive anger management techniques  Description: Interventions:  - Offer anger management and coping skills groups   - Staff will provide positive feedback for appropriate anger control  Outcome: Progressing     Problem: Alteration in Orientation  Goal: Treatment Goal: Demonstrate a reduction of confusion and improved orientation to person, place, time and/or situation upon discharge, according to optimum baseline/ability  Outcome: Progressing  Goal: Interact with staff daily  Description: Interventions:  - Assess and re-assess patient's level of orientation  - Engage patient in 1 on 1 interactions, daily, for a minimum of 15 minutes   - Establish rapport/trust with patient   Outcome: Progressing     Problem: DISCHARGE PLANNING - CARE MANAGEMENT  Goal: Discharge to post-acute care or home with appropriate resources  Description: INTERVENTIONS:  - Conduct assessment to determine patient/family and health care team treatment goals, and need for post-acute services based on payer coverage, community resources, and patient preferences, and barriers to discharge  - Address psychosocial, clinical, and financial barriers to discharge as identified in assessment in conjunction with the patient/family and health care team  - Arrange appropriate level of post-acute services according to patient’s   needs and preference and payer coverage in collaboration with the physician and health care team  - Communicate with and update the patient/family, physician, and health care team regarding progress on the discharge plan  - Arrange appropriate transportation to post-acute venues  Outcome: Progressing

## 2024-11-21 NOTE — NURSING NOTE
Pt visible on unit but isolative to self. Pt brightens on approach and denies all psych symptoms. Compliant with medications and meals. Social with select peers and is able to make needs known. Q 15 min checks maintained.

## 2024-11-21 NOTE — ASSESSMENT & PLAN NOTE
Reviewed 11/21/2424     Happy about being accepted by [facility] and has ACT team anticipating discharge as planned hopefully on 12/4/2024 with no relapse of psychotic or manic or self-abusive or suicidal thoughts but still with some residual preoccupation and a constricted affect and delayed responses as usual but no acute management issues or problems  Accepted by [facility]; has ACT team on 12/3 and anticipated discharge on 12/4/2024  Continue medications as follows:  Vraylar 3mg PO Daily  Atarax 25mg PO TID  Lamictal 50mg PO Daily  Zoloft 150mg PO QHS  Continue with individual therapy, group therapy, milieu therapy and occupational therapy   Behavioral Health checks every 7 minutes   Continue frequent safety checks and vitals per unit protocol  Continue with [facility] medical management as indicated

## 2024-11-21 NOTE — PROGRESS NOTES
Progress Note - Behavioral Health   Name: Deyvi Cao 54 y.o. male I MRN: [id number]  Unit/Bed#: [facility] 101-02 I Date of Admission: 6/25/2024   Date of Service: 11/21/2024 I Hospital Day: 149     Assessment & Plan  Bipolar disorder with severe depression (HCC)  Reviewed 11/21/2424     Happy about being accepted by [facility] and has ACT team anticipating discharge as planned hopefully on 12/4/2024 with no relapse of psychotic or manic or self-abusive or suicidal thoughts but still with some residual preoccupation and a constricted affect and delayed responses as usual but no acute management issues or problems  Accepted by [facility]; has ACT team on 12/3 and anticipated discharge on 12/4/2024  Continue medications as follows:  Vraylar 3mg PO Daily  Atarax 25mg PO TID  Lamictal 50mg PO Daily  Zoloft 150mg PO QHS  Continue with individual therapy, group therapy, milieu therapy and occupational therapy   Behavioral Health checks every 7 minutes   Continue frequent safety checks and vitals per unit protocol  Continue with [facility] medical management as indicated  Benign essential hypertension  Managed by [facility] - reviewed 11/21/2024  Continue Lisinopril 10mg PO Daily  Mixed hyperlipidemia  Managed by [facility] - reviewed 11/21/2024  Continue Lipitor 10mg PO Daily  Allergic rhinitis due to allergen  Managed by [facility] - reviewed 11/21/2024  Rosacea  Managed by [facility] - reviewed 11/21/2024  Vitamin D3 deficiency  Managed by [facility] - reviewed 11/21/24  Low vitamin B12 level  Managed by [facility] - reviewed 11/21/24    Patient Active Problem List   Diagnosis    Acne    Benign essential hypertension    Mixed hyperlipidemia    Tobacco dependence syndrome    Allergic rhinitis due to allergen    Medical clearance for psychiatric admission    Lung cancer screening declined by patient    Moderate depressed bipolar I disorder (HCC)    Bipolar disorder with severe depression (HCC)    Rosacea    Vitamin D3 deficiency    Low  vitamin B12 level     Review of systems: Unremarkable bipolar depression  Psychiatric Diagnosis: Bipolar depression    Assessment  Overall Status: No significant changes attending some groups awaiting discharge as planned on 12/4/2020 certification Statement: The patient will continue to require additional inpatient hospital stay due to recurrent depression and repeated suicide attempts in the community  Certification with need for hospitalization: Patient needs structured setting with supervision of an ACT team due to frequent rehospitalizations and suspiciousness of suicidal attempt prior to admission     Medications: Vraylar 3 mg a day, hydroxyzine 25 mg 3 times a day, Zoloft 150 mg a day, Lamictal 50 mg a day,  All medications reviewed  Side effects from treatment: None reported  Medication changes   No  changes    Medication education   Risks side effects benefits and precautions of medications discussed with patient and he did verbalize an understanding about risks for metabolic syndrome from being on neuroleptics and is form tardive dyskinesia etc.     Understanding of medications: Has good understanding about medications and side effects of his precautions benefits   Justification for dual anti-psychotics: Not applicable    Non-pharmacological treatments  Continue with individual, group, milieu and occupational therapy using recovery principles and psycho-education about accepting illness and the need for treatment.  Behavioral health checks every 7 minutes  Safety with the patient about illness and need for treatment  Support transition to [facility] with the ACT team as scheduled on 12/4/2025    Safety  Safety and communication plan established to target dynamic risk factors discussed above.    Discharge Plan   To finalize discharge to [facility] with [facility] ACT team as he was accepted by them and referral to [facility] with CSP scheduled to be discharged on  12/4/2024 as scheduled with CSP scheduled for 11/26/2024    Interval Progress   Continues to wait to be discharged on 12/4/2024 after she has been meeting with her [facility] ACT team and he plans to go to the clubhouse as required.  Reports no relapse of psychotic manic or excessive depression or suicidal thoughts at all but still with same baseline behaviors of isolation, shyness with constricted affect and delayed responses staying to himself mostly attending some groups but has increased group attendance lately.  Still reports anxiety or depression are only 1 or 2 out of 10 with no need for behavioral PRNs and has not been acting out or aggressive or self-abusive or threatening.    Acceptance by patient: Accepting  Hopefulness in recovery: Living at the [facility] with an ACT team  involved in reintegration process: No contact with his sister or mother or with anyone in the community   trusting in relationship with psychiatrist: trusting  Sleep: Good  Appetite: Good  Compliance with Medications: Good  Group attendance: 5 out of 11  Significant events and progress towards goals: Waiting for discharge on 12/4/2024 with same baseline behaviors no significant changes    Mental Status Exam  Appearance: casually dressed, dressed appropriately, adequate grooming, looks stated age found pacing in his room friendly cooperative with better eye contact  Behavior: cooperative, mildly anxious, slow responses well-groomed cooperative but still with delayed responses   speech: slow, scant, increased latency of response, delayed, soft, decreased volume somewhat delayed in responses  mood: depressed, dysphoric, anxious  Affect: constricted, slightly brighter, mood-congruent with better mood reactivity  Thought Process: organized, goal directed, decreased rate of thoughts, slowing of thoughts, negative thinking, concrete  Thought Content: no overt delusions, no current suicidal or homicidal thoughts and no plans verbalized.   No phobias obsessions compulsions reported.  Not appearing to respond to any delusional beliefs able to continue to contract for safety.  No distorted body perceptions reported   perceptual Disturbances: no auditory hallucinations, no visual hallucinations  Hx Risk Factors: chronic depressive symptoms, chronic anxiety symptoms, history of suicide attempts  Sensorium: Oriented x 3 spheres and situation  Cognition: recent and remote memory grossly intact  Consciousness: alert, awake, and not sedated  Attention: attention span and concentration are age appropriate  Intellect: appears to be of average intelligence  Insight: limited  Judgement: limited  Motor Activity: no abnormal movements     Vitals  Temp:  [97.5 °F (36.4 °C)-97.7 °F (36.5 °C)] 97.5 °F (36.4 °C)  HR:  [] 89  Resp:  [18] 18  BP: (122-150)/(75-84) 122/75  SpO2:  [94 %-95 %] 94 %  No intake or output data in the 24 hours ending 11/21/24 0345    Lab Results: All Labs For Current Hospital Admission Reviewed      Current Facility-Administered Medications   Medication Dose Route Frequency Provider Last Rate    acetaminophen  650 mg Oral Q6H PRN ALVINA Harley      acetaminophen  650 mg Oral Q4H PRN ALVINA Harley      acetaminophen  975 mg Oral Q6H PRN ALVINA Harley      aluminum-magnesium hydroxide-simethicone  30 mL Oral Q4H PRN ALVINA Harley      ammonium lactate   Topical BID PRN ALVINA Harley      atorvastatin  10 mg Oral Daily ALVINA Lewis      benztropine  1 mg Intramuscular Q4H PRN Max 6/day ALVINA Harley      benztropine  1 mg Oral Q4H PRN Max 6/day ALVINA Harley      bisacodyl  10 mg Rectal Daily PRN ALVINA Harley      cariprazine  3 mg Oral Daily Martin Cardenas MD      Cholecalciferol  2,000 Units Oral Daily ALVINA Lewis      cyanocobalamin  1,000 mcg Oral Daily ALVINA Lewis      hydrOXYzine HCL  25 mg Oral Q6H PRN Max 4/day ALVINA Harley       hydrOXYzine HCL  25 mg Oral TID Martin Cardenas MD      hydrOXYzine HCL  50 mg Oral Q4H PRN Max 4/day ALVINA Harley      Or    LORazepam  1 mg Intramuscular Q4H PRN Melva Knutson, TITINP      ketoconazole   Topical Daily PRN Melvamelanie Knutson, CRNP      lamoTRIgine  50 mg Oral Daily Martin Cardenas MD      lisinopril  10 mg Oral Daily Sary Coradonolbertogretchen, ALVINA      LORazepam  1 mg Oral Q4H PRN Max 6/day MelvaALVINA Cordova      Or    LORazepam  2 mg Intramuscular Q6H PRN Max 3/day Melvamelanie Knutson, CRNP      OLANZapine  10 mg Oral Q3H PRN Max 3/day Melvamelanie Knutson, ALVINA      Or    OLANZapine  10 mg Intramuscular Q3H PRN Max 3/day Melvamelanie Knutson, CRNP      OLANZapine  5 mg Oral Q3H PRN Max 6/day Melvamelanie Knutson, TITINP      Or    OLANZapine  5 mg Intramuscular Q3H PRN Max 6/day Melvamelanie nKutson, CRNP      OLANZapine  2.5 mg Oral Q3H PRN Max 8/day Melva Knutson, TITINP      polyethylene glycol  17 g Oral Daily PRN Melvamelanie Knutson, CRNP      propranolol  10 mg Oral Q8H PRN Melvamelanie Knutson, CRNP      senna-docusate sodium  1 tablet Oral Daily PRN Melva Knutson, ALVINA      sertraline  150 mg Oral HS Martin Cardenas MD         Counseling / Coordination of Care: Total floor / unit time spent today 15 minutes. Greater than 50% of total time was spent with the patient and / or family counseling and / or somewhat receptive to supportive listening and teaching positive coping skills to deal with symptom mangement.     Patient's Rights, confidentiality and exceptions to confidentiality, use of automated medical record, Behavioral Health Services staff access to medical record, and consent to treatment reviewed.    This note has been dictated and hence there may be problems with punctuation, spelling and formatting and if anyone has any concerns please address them to Dr. Cardenas   This note is not shared with patient due to potential for making patient's condition worse by knowing the content of the note.

## 2024-11-21 NOTE — NURSING NOTE
Received pt in bed at change of shift with eyes closed; chest movement noted.  Continues the same thus this far as per q 15 min room checks.   Will continue to monitor behavior, sleeping pattern and any medical issues that may arise.    0551: Sleeping 7+ hrs thus this far

## 2024-11-21 NOTE — SOCIAL WORK
This writer confirmed with ACT  they can participate in patient's CSP BEFORE he complete's their sign on. CSP proposed for 11/26 at 1000

## 2024-11-21 NOTE — SOCIAL WORK
Patient's CSP is scheduled for 11/26 at 1000.   This writer met with patient for a patient check in. This writer discussed his upcoming CSP, sign on date with [facility] 12/2 or 12/3 and proposed d/c date of 12/4/ This writer's awaiting confirmation from [facility] of this discharge date. Patient reports he's doing well and eager for discharge. This writer confirmed his referral to [facility] was received.

## 2024-11-21 NOTE — PROGRESS NOTES
11/21/24 0709   Team Meeting   Meeting Type Daily Rounds   Team Members Present   Team Members Present Physician;Nurse;;Other (Discipline and Name)   Physician Team Member Ronald   Nursing Team Member Chastity   Social Work Team Member Henrry FRY   Other (Discipline and Name) Goff PCM   Patient/Family Present   Patient Present No   Patient's Family Present No     Groups Participation  5/11.   Patient's compliant with medications. CSP 11/26, D/C 12/4. Patient has minimal engagement in his treatment. Patient isolates in his room.

## 2024-11-21 NOTE — NURSING NOTE
Patient has been on the unit minimally. Anxious affect. Medication and meal compliant. Eager for discharge. Offers no complaints or suicidal ideations. Attended 5/11 groups today. Attending walking group with peers but no interaction. Support offered.

## 2024-11-22 PROCEDURE — 99232 SBSQ HOSP IP/OBS MODERATE 35: CPT | Performed by: PSYCHIATRY & NEUROLOGY

## 2024-11-22 RX ADMIN — HYDROXYZINE HYDROCHLORIDE 25 MG: 25 TABLET ORAL at 17:00

## 2024-11-22 RX ADMIN — CYANOCOBALAMIN TAB 1000 MCG 1000 MCG: 1000 TAB at 08:50

## 2024-11-22 RX ADMIN — LAMOTRIGINE 50 MG: 25 TABLET ORAL at 08:50

## 2024-11-22 RX ADMIN — SERTRALINE HYDROCHLORIDE 150 MG: 100 TABLET ORAL at 21:15

## 2024-11-22 RX ADMIN — HYDROXYZINE HYDROCHLORIDE 25 MG: 25 TABLET ORAL at 21:15

## 2024-11-22 RX ADMIN — HYDROXYZINE HYDROCHLORIDE 25 MG: 25 TABLET ORAL at 08:49

## 2024-11-22 RX ADMIN — ATORVASTATIN CALCIUM 10 MG: 10 TABLET, FILM COATED ORAL at 08:50

## 2024-11-22 RX ADMIN — LISINOPRIL 10 MG: 10 TABLET ORAL at 08:50

## 2024-11-22 RX ADMIN — CARIPRAZINE 3 MG: 3 CAPSULE, GELATIN COATED ORAL at 08:49

## 2024-11-22 RX ADMIN — CHOLECALCIFEROL TAB 25 MCG (1000 UNIT) 2000 UNITS: 25 TAB at 08:50

## 2024-11-22 NOTE — NURSING NOTE
0408: Received pt in bed at change of shift with eyes closed; chest movement noted.  Continues the same thus this far as per q 15 min room checks.   Will continue to monitor behavior, sleeping pattern and any medical issues that may arise.    .0546:  Sleeping 7+ hrs thus this far

## 2024-11-22 NOTE — PROGRESS NOTES
Progress Note - Behavioral Health   Name: Deyvi Cao 54 y.o. male I MRN: [id number]  Unit/Bed#: [facility] 101-02 I Date of Admission: 6/25/2024   Date of Service: 11/22/2024 I Hospital Day: 150     Assessment & Plan  Bipolar disorder with severe depression (HCC)  Reviewed 11/22/2424   Still waiting for CSP on 11/26/2024 followed by possible discharge either on 12 /3 or 12 /4/24.  Eager for discharge attending some groups with no relapse of psychotic manic symptoms or suicidal thoughts at all but still with some residual negative symptoms  Accept[facility] by [facility]; has ACT team on 12/3 and anticipated discharge on 12/4/2024  Continue medications as follows:  Vraylar 3mg PO Daily  Atarax 25mg PO TID  Lamictal 50mg PO Daily  Zoloft 150mg PO QHS  Continue with individual therapy, group therapy, milieu therapy and occupational therapy   Behavioral Health checks every 7 minutes   Continue frequent safety checks and vitals per unit protocol  Continue with [facility] medical management as indicated  Benign essential hypertension  Managed by [facility] - reviewed 11/22/2024  Continue Lisinopril 10mg PO Daily  Mixed hyperlipidemia  Managed by [facility] - reviewed 11/22/2024  Continue Lipitor 10mg PO Daily  Allergic rhinitis due to allergen  Managed by [facility] - reviewed 11/22/2024  Rosacea  Managed by [facility] - reviewed 11/22/2024  Vitamin D3 deficiency  Managed by [facility] - reviewed 11/22/24  Low vitamin B12 level  Managed by [facility] - reviewed 11/22/24      Patient Active Problem List   Diagnosis    Acne    Benign essential hypertension    Mixed hyperlipidemia    Tobacco dependence syndrome    Allergic rhinitis due to allergen    Medical clearance for psychiatric admission    Lung cancer screening declined by patient    Moderate depressed bipolar I disorder (HCC)    Bipolar disorder with severe depression (HCC)    Rosacea    Vitamin D3 deficiency    Low vitamin B12 level     Review of systems: Unremarkable got tylenol for headache in am  Psychiatric  Diagnosis: Bipolar depression    Assessment  Overall Status: No significant changes waiting for CSP on 11/26/2024 and discharged on 12/30/2024 or 12/4/2024   certification Statement: The patient will continue to require additional inpatient hospital stay due to recurrent depression and repeated suicide attempts in the community  Certification with need for hospitalization: Patient needs structured setting with supervision of an ACT team due to frequent rehospitalizations and suspiciousness of suicidal attempt prior to admission     Medications: Vraylar 3 mg a day, hydroxyzine 25 mg 3 times a day, Zoloft 150 mg a day, Lamictal 50 mg a day,  All medications reviewed  Side effects from treatment: None reported  Medication changes   No  changes    Medication education   Risks side effects benefits and precautions of medications discussed with patient and he did verbalize an understanding about risks for metabolic syndrome from being on neuroleptics and is form tardive dyskinesia etc.     Understanding of medications: Has good understanding about medications and side effects of his precautions benefits   Justification for dual anti-psychotics: Not applicable    Non-pharmacological treatments  Continue with individual, group, milieu and occupational therapy using recovery principles and psycho-education about accepting illness and the need for treatment.  Behavioral health checks every 7 minutes  Safety with the patient about illness and need for treatment  Support transition to [facility] with the ACT team as scheduled on 12/4/2025    Safety  Safety and communication plan established to target dynamic risk factors discussed above.    Discharge Plan   To finalize discharge to [facility] with [facility] ACT team as he was accepted by them and referral to [facility] with CSP scheduled to be discharged on 12/4/2024 as scheduled with CSP scheduled for 11/26/2024    Interval Progress    No major issues waiting patiently for possible discharge on 12/30/2024 about 12/4/2024 with CSP scheduled for 11/26/2024.  He is still agreeing to go to [facility] as required from the group home.  No relapse of overt psychotic or manic symptoms or excessive depression or suicidal thoughts reported but still somewhat isolated show I with constricted affect and delayed responses staying to himself hardly interacting with peers but attending some groups spending a lot of time pacing in his room.  Still with mild anxiety and depression as 1 or 2 out of 10 but no need for behavioral PRNs and has not been aggressive or self-abusive or threatening and is generally friendly and somewhat pleasant when approached  Acceptance by patient: Accepting  Hopefulness in recovery: Living [facility] with an ACT team  involved in reintegration process: No contact with mother or sister   trusting in relationship with psychiatrist: Appears to  Sleep: Good  Appetite: Good  Compliance with Medications: Compliant  Group attendance: Some  3 /10  Significant events and progress towards goals: No changes waiting for discharge    Mental Status Exam  Appearance: casually dressed, dressed appropriately, adequate grooming, looks stated age pacing in his home as usual with good eye contact  behavior: cooperative, mildly anxious, slow responses superficially friendly pleasant well-groomed but with some delayed responses   speech: slow, scant, increased latency of response, delayed, soft, decreased volume somewhat delayed in responses as usual  mood: depressed, dysphoric, anxious  Affect: constricted, slightly brighter, mood-congruent with better mood reactivity thought Process: organized, goal directed, decreased rate of thoughts, slowing of thoughts, negative thinking, concrete  Thought Content: no overt delusions, no current suicidal or homicidal thoughts and no plans verbalized.  No phobias obsessions compulsions reported.  Not  appearing to respond to any delusional beliefs able to continue to contract for safety  perceptual Disturbances: no auditory hallucinations, no visual hallucinations  Hx Risk Factors: chronic depressive symptoms, chronic anxiety symptoms, history of suicide attempts  Sensorium: Oriented x 3 spheres and situation  Cognition: recent and remote memory grossly intact  Consciousness: alert, awake, and not sedated  Attention: attention span and concentration are age appropriate  Intellect: appears to be of average intelligence  Insight: limited  Judgement: limited  Motor Activity: no abnormal movements     Vitals  Temp:  [97.3 °F (36.3 °C)-97.5 °F (36.4 °C)] 97.5 °F (36.4 °C)  HR:  [] 93  Resp:  [18] 18  BP: (125-143)/(70-86) 125/70  SpO2:  [90 %-93 %] 90 %  No intake or output data in the 24 hours ending 11/22/24 0403    Lab Results: All Labs For Current Hospital Admission Reviewed      Current Facility-Administered Medications   Medication Dose Route Frequency Provider Last Rate    acetaminophen  650 mg Oral Q6H PRN ALVINA Harley      acetaminophen  650 mg Oral Q4H PRN ALVINA Harley      acetaminophen  975 mg Oral Q6H PRN ALVINA Harley      aluminum-magnesium hydroxide-simethicone  30 mL Oral Q4H PRN ALVINA Harley      ammonium lactate   Topical BID PRN ALVINA Harley      atorvastatin  10 mg Oral Daily ALVINA Lewis      benztropine  1 mg Intramuscular Q4H PRN Max 6/day ALVINA Harley      benztropine  1 mg Oral Q4H PRN Max 6/day ALVINA Harley      bisacodyl  10 mg Rectal Daily PRN ALVINA Harley      cariprazine  3 mg Oral Daily Martin Cardenas MD      Cholecalciferol  2,000 Units Oral Daily ALVINA Lewis      cyanocobalamin  1,000 mcg Oral Daily ALVINA Lewis      hydrOXYzine HCL  25 mg Oral Q6H PRN Max 4/day ALVINA Harley      hydrOXYzine HCL  25 mg Oral TID Martin Cardenas MD      hydrOXYzine HCL  50 mg Oral Q4H PRN Max 4/day  ALVINA Harley      Or    LORazepam  1 mg Intramuscular Q4H PRN Melva Knutson, ALVINA      ketoconazole   Topical Daily PRN ALVINA Harley      lamoTRIgine  50 mg Oral Daily Martin Cardenas MD      lisinopril  10 mg Oral Daily Sary Coradodennis, ALVINA      LORazepam  1 mg Oral Q4H PRN Max 6/day ALVINA Harley      Or    LORazepam  2 mg Intramuscular Q6H PRN Max 3/day Melva Knutson, TITINP      OLANZapine  10 mg Oral Q3H PRN Max 3/day Melvamelanie Knutson, TITINP      Or    OLANZapine  10 mg Intramuscular Q3H PRN Max 3/day Melva Knutson, CRNP      OLANZapine  5 mg Oral Q3H PRN Max 6/day ALVINA Harley      Or    OLANZapine  5 mg Intramuscular Q3H PRN Max 6/day Melva Knutson, TITINP      OLANZapine  2.5 mg Oral Q3H PRN Max 8/day Melva Knutson, TITINP      polyethylene glycol  17 g Oral Daily PRN ALVINA Harley      propranolol  10 mg Oral Q8H PRN ALVINA Harley      senna-docusate sodium  1 tablet Oral Daily PRN ALVINA Harley      sertraline  150 mg Oral HS Martin Cardenas MD         Counseling / Coordination of Care: Total floor / unit time spent today 15 minutes. Greater than 50% of total time was spent with the patient and / or family counseling and / or somewhat receptive to supportive listening and teaching positive coping skills to deal with symptom mangement.     Patient's Rights, confidentiality and exceptions to confidentiality, use of automated medical record, Behavioral Health Services staff access to medical record, and consent to treatment reviewed.    This note has been dictated and hence there may be problems with punctuation, spelling and formatting and if anyone has any concerns please address them to Dr. Cardenas   This note is not shared with patient due to potential for making patient's condition worse by knowing the content of the note.

## 2024-11-22 NOTE — PLAN OF CARE
Problem: Alteration in Thoughts and Perception  Goal: Treatment Goal: Gain control of psychotic behaviors/thinking, reduce/eliminate presenting symptoms and demonstrate improved reality functioning upon discharge  Outcome: Progressing  Goal: Refrain from acting on delusional thinking/internal stimuli  Description: Interventions:  - Monitor patient closely, per order   - Utilize least restrictive measures   - Set reasonable limits, give positive feedback for acceptable   - Administer medications as ordered and monitor of potential side effects  Outcome: Progressing  Goal: Complete daily ADLs, including personal hygiene independently, as able  Description: Interventions:  - Observe, teach, and assist patient with ADLS  - Monitor and promote a balance of rest/activity, with adequate nutrition and elimination   Outcome: Progressing     Problem: Ineffective Coping  Goal: Participates in unit activities  Description: Interventions:  - Provide therapeutic environment   - Provide required programming   - Redirect inappropriate behaviors   Outcome: Progressing  Goal: Patient/Family participate in treatment and DC plans  Description: Interventions:  - Provide therapeutic environment  Outcome: Progressing  Goal: Free from restraint events  Description: - Utilize least restrictive measures   - Provide behavioral interventions   - Redirect inappropriate behaviors   Outcome: Progressing     Problem: Depression  Goal: Treatment Goal: Demonstrate behavioral control of depressive symptoms, verbalize feelings of improved mood/affect, and adopt new coping skills prior to discharge  Outcome: Progressing  Goal: Refrain from harming self  Description: Interventions:  - Monitor patient closely, per order   - Supervise medication ingestion, monitor effects and side effects   Outcome: Progressing  Goal: Refrain from isolation  Description: Interventions:  - Develop a trusting relationship   - Encourage socialization   Outcome:  Progressing  Goal: Complete daily ADLs, including personal hygiene independently, as able  Description: Interventions:  - Observe, teach, and assist patient with ADLS  -  Monitor and promote a balance of rest/activity, with adequate nutrition and elimination   Outcome: Progressing     Problem: Anxiety  Goal: Anxiety is at manageable level  Description: Interventions:  - Assess and monitor patient's anxiety level.   - Monitor for signs and symptoms (heart palpitations, chest pain, shortness of breath, headaches, nausea, feeling jumpy, restlessness, irritable, apprehensive).   - Collaborate with interdisciplinary team and initiate plan and interventions as ordered.  - [place] patient to unit/surroundings  - Explain treatment plan  - Encourage participation in care  - Encourage verbalization of concerns/fears  - Identify coping mechanisms  - Assist in developing anxiety-reducing skills  - Administer/offer alternative therapies  - Limit or eliminate stimulants  Outcome: Progressing     Problem: Risk for Violence/Aggression Toward Others  Goal: Treatment Goal: Refrain from acts of violence/aggression during length of stay, and demonstrate improved impulse control at the time of discharge  Outcome: Progressing  Goal: Verbalize thoughts and feelings  Description: Interventions:  - Assess and re-assess patient's level of risk, every waking shift  - Engage patient in 1:1 interactions, daily, for a minimum of 15 minutes   - Allow patient to express feelings and frustrations in a safe and non-threatening manner   - Establish rapport/trust with patient   Outcome: Progressing  Goal: Refrain from harming others  Outcome: Progressing  Goal: Attend and participate in unit activities, including therapeutic, recreational, and educational groups  Description: Interventions:  - Provide therapeutic and educational activities daily, encourage attendance and participation, and document same in the medical record   Outcome: Progressing      Problem: Alteration in Orientation  Goal: Treatment Goal: Demonstrate a reduction of confusion and improved orientation to person, place, time and/or situation upon discharge, according to optimum baseline/ability  Outcome: Progressing  Goal: Interact with staff daily  Description: Interventions:  - Assess and re-assess patient's level of orientation  - Engage patient in 1 on 1 interactions, daily, for a minimum of 15 minutes   - Establish rapport/trust with patient   Outcome: Progressing  Goal: Cooperate with recommended testing/procedures  Description: Interventions:  - Determine need for ancillary testing  - Observe for mental status changes  - Implement falls/precaution protocol   Outcome: Progressing  Goal: Attend and participate in unit activities, including therapeutic, recreational, and educational groups  Description: Interventions:  - Provide therapeutic and educational activities daily, encourage attendance and participation, and document same in the medical record   - Provide appropriate opportunities for reminiscence   - Provide a consistent daily routine   - Encourage family contact/visitation   Outcome: Progressing     Problem: DISCHARGE PLANNING - CARE MANAGEMENT  Goal: Discharge to post-acute care or home with appropriate resources  Description: INTERVENTIONS:  - Conduct assessment to determine patient/family and health care team treatment goals, and need for post-acute services based on payer coverage, community resources, and patient preferences, and barriers to discharge  - Address psychosocial, clinical, and financial barriers to discharge as identified in assessment in conjunction with the patient/family and health care team  - Arrange appropriate level of post-acute services according to patient’s   needs and preference and payer coverage in collaboration with the physician and health care team  - Communicate with and update the patient/family, physician, and health care team regarding  progress on the discharge plan  - Arrange appropriate transportation to post-acute venues  Outcome: Progressing

## 2024-11-22 NOTE — NURSING NOTE
Patient calm and quiet, mainly isolative to self in room this evening. Patient cooperative upon approach, offered no complaints. Patient behaviors controlled, compliant with bedtime meds. Currently in own room, will continue to monitor.

## 2024-11-22 NOTE — PROGRESS NOTES
11/22/24 0719   Team Meeting   Meeting Type Daily Rounds   Team Members Present   Team Members Present Physician;Nurse;;Other (Discipline and Name)   Physician Team Member Holter, Thomas   Nursing Team Member Chastity   Social Work Team Member Henrry FRY   Other (Discipline and Name) Renato Bailey PharmD   Patient/Family Present   Patient Present No   Patient's Family Present No     Groups Participation  3/10.   Patient's compliant with medications. He has minimal engagement in his treatment. He has a flat affect. CSP 11/26, D/C 12/4 to [facility]. He's appropriate.

## 2024-11-22 NOTE — ASSESSMENT & PLAN NOTE
Reviewed 11/22/2424   Still waiting for CSP on 11/26/2024 followed by possible discharge either on 12 /3 or 12 /4/24.  Eager for discharge attending some groups with no relapse of psychotic manic symptoms or suicidal thoughts at all but still with some residual negative symptoms  Accepted by [facility]; has ACT team on 12/3 and anticipated discharge on 12/4/2024  Continue medications as follows:  Vraylar 3mg PO Daily  Atarax 25mg PO TID  Lamictal 50mg PO Daily  Zoloft 150mg PO QHS  Continue with individual therapy, group therapy, milieu therapy and occupational therapy   Behavioral Health checks every 7 minutes   Continue frequent safety checks and vitals per unit protocol  Continue with [facility] medical management as indicated

## 2024-11-22 NOTE — NURSING NOTE
Pt is calm, cooperative and visible on milieu intermittently. Pt denies depression and anxiety; denies SI/HI/AH/VH. Pt has minimal interaction with peers and reports sleeping well. Pt is able to make needs known and is medication and meal compliant. Q 15 min checks maintained.

## 2024-11-23 PROCEDURE — 99232 SBSQ HOSP IP/OBS MODERATE 35: CPT

## 2024-11-23 RX ADMIN — CARIPRAZINE 3 MG: 3 CAPSULE, GELATIN COATED ORAL at 08:12

## 2024-11-23 RX ADMIN — HYDROXYZINE HYDROCHLORIDE 25 MG: 25 TABLET ORAL at 08:12

## 2024-11-23 RX ADMIN — SERTRALINE HYDROCHLORIDE 150 MG: 100 TABLET ORAL at 21:06

## 2024-11-23 RX ADMIN — LAMOTRIGINE 50 MG: 25 TABLET ORAL at 08:12

## 2024-11-23 RX ADMIN — CYANOCOBALAMIN TAB 1000 MCG 1000 MCG: 1000 TAB at 08:12

## 2024-11-23 RX ADMIN — CHOLECALCIFEROL TAB 25 MCG (1000 UNIT) 2000 UNITS: 25 TAB at 08:12

## 2024-11-23 RX ADMIN — LISINOPRIL 10 MG: 10 TABLET ORAL at 08:12

## 2024-11-23 RX ADMIN — ATORVASTATIN CALCIUM 10 MG: 10 TABLET, FILM COATED ORAL at 08:12

## 2024-11-23 RX ADMIN — HYDROXYZINE HYDROCHLORIDE 25 MG: 25 TABLET ORAL at 21:06

## 2024-11-23 RX ADMIN — HYDROXYZINE HYDROCHLORIDE 25 MG: 25 TABLET ORAL at 17:00

## 2024-11-23 NOTE — PROGRESS NOTES
Progress Note - Behavioral Health     Deyvi Cao 54 y.o. male MRN: [id number]   Unit/Bed#: [facility] 101-02 Encounter: [id number]  Assessment & Plan  Bipolar disorder with severe depression (HCC)  Reviewed 11/23/2424     Deyvi is cooperative with assessment. Denies any anxiety or depression currently. Denies SH,HI,AH,VH. No overt delusions on assessment. Visible on unit, but isolative to self.     Accepted by [facility]; has ACT team on 12/3 and anticipated discharge on 12/4/2024  Continue medications as follows:  Vraylar 3mg PO Daily  Atarax 25mg PO TID  Lamictal 50mg PO Daily  Zoloft 150mg PO QHS  Continue with individual therapy, group therapy, milieu therapy and occupational therapy   Behavioral Health checks every 7 minutes   Continue frequent safety checks and vitals per unit protocol  Continue with [facility] management as indicated  Benign essential hypertension  Managed by [facility] - reviewed 11/23/2024  Continue Lisinopril 10mg PO Daily  Mixed hyperlipidemia  Managed by [facility] - reviewed 11/23/2024  Continue Lipitor 10mg PO Daily  Allergic rhinitis due to allergen  Managed by [facility] - reviewed 11/23/2024  Rosacea  Managed by [facility] - reviewed 11/23/2024  Vitamin D3 deficiency  Managed by [facility] - reviewed 11/23/24  Low vitamin B12 level  Managed by [facility] - reviewed 11/23/24       Recommended Treatment:     Planned medication and treatment changes:    All current active medications have been reviewed  Encourage group therapy, milieu therapy and occupational therapy  Behavioral Health checks for safety monitoring  Continue current medications:    Current medications:  Current Facility-Administered Medications   Medication Dose Route Frequency Provider Last Rate    acetaminophen  650 mg Oral Q6H PRN ALVINA Harley      acetaminophen  650 mg Oral Q4H PRN ALVINA Harley      acetaminophen  975 mg Oral Q6H PRN ALVINA Harley      aluminum-magnesium hydroxide-simethicone  30 mL Oral Q4H PRN ALVINA Harley       ammonium lactate   Topical BID PRN ALVINA Harley      atorvastatin  10 mg Oral Daily Sary Rodriguez ALVINA Biggs      benztropine  1 mg Intramuscular Q4H PRN Max 6/day ALVINA Harley      benztropine  1 mg Oral Q4H PRN Max 6/day ALVINA Harley      bisacodyl  10 mg Rectal Daily PRN ALVINA Harley      cariprazine  3 mg Oral Daily Martin Cardenas MD      Cholecalciferol  2,000 Units Oral Daily Sary Rodriguez ALVINA Biggs      cyanocobalamin  1,000 mcg Oral Daily Sary LinkALVINA Thompson      hydrOXYzine HCL  25 mg Oral Q6H PRN Max 4/day ALVINA Harley      hydrOXYzine HCL  25 mg Oral TID Martin Cardenas MD      hydrOXYzine HCL  50 mg Oral Q4H PRN Max 4/day ALVINA Harley      Or    LORazepam  1 mg Intramuscular Q4H PRN ALVINA Harley      ketoconazole   Topical Daily PRN ALVINA Harley      lamoTRIgine  50 mg Oral Daily Martin Cardenas MD      lisinopril  10 mg Oral Daily Sary Rodriguez ALVINA Bgigs      LORazepam  1 mg Oral Q4H PRN Max 6/day ALVINA Harley      Or    LORazepam  2 mg Intramuscular Q6H PRN Max 3/day ALVINA Harley      OLANZapine  10 mg Oral Q3H PRN Max 3/day ALVINA Harley      Or    OLANZapine  10 mg Intramuscular Q3H PRN Max 3/day ALVINA Harley      OLANZapine  5 mg Oral Q3H PRN Max 6/day ALVINA Harley      Or    OLANZapine  5 mg Intramuscular Q3H PRN Max 6/day ALVINA Harley      OLANZapine  2.5 mg Oral Q3H PRN Max 8/day ALVINA Harley      polyethylene glycol  17 g Oral Daily PRN ALVINA Harley      propranolol  10 mg Oral Q8H PRN ALVINA Harley      senna-docusate sodium  1 tablet Oral Daily PRN ALVINA Harley      sertraline  150 mg Oral HS Martin Cardenas MD         Risks / Benefits of Treatment:    Risks, benefits, and possible side effects of medications explained to patient and patient verbalizes understanding and agreement for treatment.    Subjective:    Patient was seen today for continuation of care,  records reviewed and patient was discussed with the morning case review team.  Per nursing report, Deyvi is pending discharge on the 12/4/2024.  Minimal interaction with peers.  He has been denying all psychiatric symptoms.  Last BM and shower reported on 11/22/2024.  No behaviors reported.  No PRNs administered.    Deyvi was seen today for psychiatric follow-up. On assessment , Deyvi was in the dayroom sitting by himself.  Deyvi denies anxiety and depression.  Sleep and appetite remain adequate.  Deyvi denies acute suicidal/self-harm ideation/intent/plan upon direct inquiry at this time.  Deyvi remains behaviorally appropriate, no agitation or aggression noted on exam or in report.  Deyvi also denies HI/AH/VH, and does not appear overtly manic. No overt delusions or paranoia are verbalized.  Deyvi remains adherent to his current psychotropic medication regimen and denies any side effects from medications, as well as none noted on exam.  Denies any pain at this time.    Behavior over the last 24 hours: unchanged.     Sleep: normal  Appetite: normal  Medication side effects: No   ROS: no complaints    Mental Status Evaluation:    Appearance:  age appropriate, casually dressed, adequate grooming   Behavior:  pleasant, cooperative, calm   Speech:  normal rate and volume   Mood:  euthymic   Affect:  normal range and intensity, appropriate   Thought Process:  organized, logical, coherent, linear, normal rate of thoughts   Associations: intact associations   Thought Content:  normal, no overt delusions   Perceptual Disturbances: none, denies auditory hallucinations when asked, does not appear responding to internal stimuli   Risk Potential: Suicidal ideation - None at present  Homicidal ideation - None at present  Potential for aggression - Not at present   Sensorium:  oriented to person, place, and time/date   Memory:  recent and remote memory grossly intact   Consciousness:  alert and awake    Attention/Concentration: attention span and concentration appear shorter than expected for age   Insight:  fair   Judgment: fair   Gait/Station: normal gait/station, normal balance   Motor Activity: no abnormal movements     Vital signs in last 24 hours:    Temp:  [97.8 °F (36.6 °C)-98.1 °F (36.7 °C)] 97.8 °F (36.6 °C)  HR:  [93-98] 98  BP: (122-142)/(72-78) 142/78  Resp:  [18] 18  SpO2:  [93 %-98 %] 98 %  O2 Device: None (Room air)    Laboratory results: I have personally reviewed all pertinent laboratory/tests results    Results from the past 24 hours: No results found for this or any previous visit (from the past 24 hours).    Suicide/Homicide Risk Assessment:    Risk of Harm to Self:   Nursing Suicide Risk Assessment Last 24 hours: C-SSRS Risk (Since Last Contact)  Calculated C-SSRS Risk Score (Since Last Contact): No Risk Indicated    Risk of Harm to Others:  Nursing Homicide Risk Assessment: Violence Risk to Others: Denies within past 6 months    The following interventions are recommended: Behavioral Health checks for safety monitoring    Progress Toward Goals: progressing    Counseling / Coordination of Care:    Total floor / unit time spent today 15 minutes. Greater than 50% of total time was spent with the patient and / or family counseling and / or coordination of care. A description of counseling / coordination of care:    ALVINA Stanford 11/23/24     This note was not shared with the patient due to reasonable likelihood of causing patient harm

## 2024-11-23 NOTE — PLAN OF CARE
Problem: Alteration in Thoughts and Perception  Goal: Treatment Goal: Gain control of psychotic behaviors/thinking, reduce/eliminate presenting symptoms and demonstrate improved reality functioning upon discharge  Outcome: Progressing  Goal: Agree to be compliant with medication regime, as prescribed and report medication side effects  Description: Interventions:  - Offer appropriate PRN medication and supervise ingestion; conduct AIMS, as needed   Outcome: Progressing  Goal: Recognize dysfunctional thoughts, communicate reality-based thoughts at the time of discharge  Description: Interventions:  - Provide medication and psycho-education to assist patient in compliance and developing insight into his/her illness   Outcome: Progressing  Goal: Complete daily ADLs, including personal hygiene independently, as able  Description: Interventions:  - Observe, teach, and assist patient with ADLS  - Monitor and promote a balance of rest/activity, with adequate nutrition and elimination   Outcome: Progressing     Problem: Ineffective Coping  Goal: Participates in unit activities  Description: Interventions:  - Provide therapeutic environment   - Provide required programming   - Redirect inappropriate behaviors   Outcome: Progressing  Goal: Free from restraint events  Description: - Utilize least restrictive measures   - Provide behavioral interventions   - Redirect inappropriate behaviors   Outcome: Progressing     Problem: Risk for Self Injury/Neglect  Goal: Refrain from harming self  Description: Interventions:  - Monitor patient closely, per order  - Develop a trusting relationship  - Supervise medication ingestion, monitor effects and side effects   Outcome: Progressing  Goal: Attend and participate in unit activities, including therapeutic, recreational, and educational groups  Description: Interventions:  - Provide therapeutic and educational activities daily, encourage attendance and participation, and document same  in the medical record  - Obtain collateral information, encourage visitation and family involvement in care   Outcome: Progressing  Goal: Complete daily ADLs, including personal hygiene independently, as able  Description: Interventions:  - Observe, teach, and assist patient with ADLS  - Monitor and promote a balance of rest/activity, with adequate nutrition and elimination  Outcome: Progressing     Problem: Depression  Goal: Treatment Goal: Demonstrate behavioral control of depressive symptoms, verbalize feelings of improved mood/affect, and adopt new coping skills prior to discharge  Outcome: Progressing  Goal: Refrain from harming self  Description: Interventions:  - Monitor patient closely, per order   - Supervise medication ingestion, monitor effects and side effects   Outcome: Progressing  Goal: Refrain from isolation  Description: Interventions:  - Develop a trusting relationship   - Encourage socialization   Outcome: Progressing  Goal: Refrain from self-neglect  Outcome: Progressing  Goal: Attend and participate in unit activities, including therapeutic, recreational, and educational groups  Description: Interventions:  - Provide therapeutic and educational activities daily, encourage attendance and participation, and document same in the medical record   Outcome: Progressing  Goal: Complete daily ADLs, including personal hygiene independently, as able  Description: Interventions:  - Observe, teach, and assist patient with ADLS  -  Monitor and promote a balance of rest/activity, with adequate nutrition and elimination   Outcome: Progressing     Problem: Anxiety  Goal: Anxiety is at manageable level  Description: Interventions:  - Assess and monitor patient's anxiety level.   - Monitor for signs and symptoms (heart palpitations, chest pain, shortness of breath, headaches, nausea, feeling jumpy, restlessness, irritable, apprehensive).   - Collaborate with interdisciplinary team and initiate plan and  interventions as ordered.  - [place] patient to unit/surroundings  - Explain treatment plan  - Encourage participation in care  - Encourage verbalization of concerns/fears  - Identify coping mechanisms  - Assist in developing anxiety-reducing skills  - Administer/offer alternative therapies  - Limit or eliminate stimulants  Outcome: Progressing     Problem: Risk for Violence/Aggression Toward Others  Goal: Treatment Goal: Refrain from acts of violence/aggression during length of stay, and demonstrate improved impulse control at the time of discharge  Outcome: Progressing  Goal: Refrain from harming others  Outcome: Progressing  Goal: Refrain from destructive acts on the environment or property  Outcome: Progressing  Goal: Control angry outbursts  Description: Interventions:  - Monitor patient closely, per order  - Ensure early verbal de-escalation  - Monitor prn medication needs  - Set reasonable/therapeutic limits, outline behavioral expectations, and consequences   - Provide a non-threatening milieu, utilizing the least restrictive interventions   Outcome: Progressing

## 2024-11-23 NOTE — NURSING NOTE
Alert, cooperative and visible intermittently. Isolative to self. No SI or HI noted. Denies depression, anxiety and pain. Did not attend any groups. Consumed 100% of breakfast and 100% of lunch.Took all medication without prompting. Maintained on safe precautions without incident. Will continue to monitor progress and recovery program.

## 2024-11-23 NOTE — ASSESSMENT & PLAN NOTE
Reviewed 11/23/2424     Deyvi is cooperative with assessment. Denies any anxiety or depression currently. Denies SH,HI,AH,VH. No overt delusions on assessment. Visible on unit, but isolative to self.     Accepted by [facility]; has ACT team on 12/3 and anticipated discharge on 12/4/2024  Continue medications as follows:  Vraylar 3mg PO Daily  Atarax 25mg PO TID  Lamictal 50mg PO Daily  Zoloft 150mg PO QHS  Continue with individual therapy, group therapy, milieu therapy and occupational therapy   Behavioral Health checks every 7 minutes   Continue frequent safety checks and vitals per unit protocol  Continue with [facility] medical management as indicated

## 2024-11-23 NOTE — NURSING NOTE
Deyvi is calm . Cooperative. Denies psychiatric symptoms. Tends to be isolative to room most of the evening. Does brighten with approach. Compliant with meds and meals. Q15 min checks continue. Denies SI/HI. Looking forward to discharge.

## 2024-11-24 VITALS
WEIGHT: 218.4 LBS | TEMPERATURE: 97.7 F | HEIGHT: 72 IN | OXYGEN SATURATION: 98 % | DIASTOLIC BLOOD PRESSURE: 89 MMHG | BODY MASS INDEX: 29.58 KG/M2 | SYSTOLIC BLOOD PRESSURE: 145 MMHG | RESPIRATION RATE: 18 BRPM | HEART RATE: 97 BPM

## 2024-11-24 PROCEDURE — 99232 SBSQ HOSP IP/OBS MODERATE 35: CPT

## 2024-11-24 RX ADMIN — SERTRALINE HYDROCHLORIDE 150 MG: 100 TABLET ORAL at 21:26

## 2024-11-24 RX ADMIN — HYDROXYZINE HYDROCHLORIDE 25 MG: 25 TABLET ORAL at 21:26

## 2024-11-24 RX ADMIN — CYANOCOBALAMIN TAB 1000 MCG 1000 MCG: 1000 TAB at 08:18

## 2024-11-24 RX ADMIN — ATORVASTATIN CALCIUM 10 MG: 10 TABLET, FILM COATED ORAL at 08:18

## 2024-11-24 RX ADMIN — HYDROXYZINE HYDROCHLORIDE 25 MG: 25 TABLET ORAL at 08:18

## 2024-11-24 RX ADMIN — CARIPRAZINE 3 MG: 3 CAPSULE, GELATIN COATED ORAL at 08:18

## 2024-11-24 RX ADMIN — CHOLECALCIFEROL TAB 25 MCG (1000 UNIT) 2000 UNITS: 25 TAB at 08:18

## 2024-11-24 RX ADMIN — HYDROXYZINE HYDROCHLORIDE 25 MG: 25 TABLET ORAL at 17:00

## 2024-11-24 RX ADMIN — LAMOTRIGINE 50 MG: 25 TABLET ORAL at 08:18

## 2024-11-24 NOTE — ASSESSMENT & PLAN NOTE
Reviewed 11/24/2424     Deyvi is pleasant and cooperative with assessment. Denies any anxiety or depression. Denies SI,HI,AH,VH. Has been visible on unit.     Accepted by [facility]; has ACT team on 12/3 and anticipated discharge on 12/4/2024  Continue medications as follows:  Vraylar 3mg PO Daily  Atarax 25mg PO TID  Lamictal 50mg PO Daily  Zoloft 150mg PO QHS  Continue with individual therapy, group therapy, milieu therapy and occupational therapy   Behavioral Health checks every 7 minutes   Continue frequent safety checks and vitals per unit protocol  Continue with [facility] medical management as indicated

## 2024-11-24 NOTE — PLAN OF CARE
Problem: Risk for Self Injury/Neglect  Goal: Refrain from harming self  Description: Interventions:  - Monitor patient closely, per order  - Develop a trusting relationship  - Supervise medication ingestion, monitor effects and side effects   Outcome: Progressing  Goal: Complete daily ADLs, including personal hygiene independently, as able  Description: Interventions:  - Observe, teach, and assist patient with ADLS  - Monitor and promote a balance of rest/activity, with adequate nutrition and elimination  Outcome: Progressing     Problem: Depression  Goal: Refrain from harming self  Description: Interventions:  - Monitor patient closely, per order   - Supervise medication ingestion, monitor effects and side effects   Outcome: Progressing  Goal: Refrain from isolation  Description: Interventions:  - Develop a trusting relationship   - Encourage socialization   Outcome: Progressing  Goal: Refrain from self-neglect  Outcome: Progressing  Goal: Complete daily ADLs, including personal hygiene independently, as able  Description: Interventions:  - Observe, teach, and assist patient with ADLS  -  Monitor and promote a balance of rest/activity, with adequate nutrition and elimination   Outcome: Progressing     Problem: Anxiety  Goal: Anxiety is at manageable level  Description: Interventions:  - Assess and monitor patient's anxiety level.   - Monitor for signs and symptoms (heart palpitations, chest pain, shortness of breath, headaches, nausea, feeling jumpy, restlessness, irritable, apprehensive).   - Collaborate with interdisciplinary team and initiate plan and interventions as ordered.  - [place] patient to unit/surroundings  - Explain treatment plan  - Encourage participation in care  - Encourage verbalization of concerns/fears  - Identify coping mechanisms  - Assist in developing anxiety-reducing skills  - Administer/offer alternative therapies  - Limit or eliminate stimulants  Outcome: Progressing     Problem: Risk  for Violence/Aggression Toward Others  Goal: Refrain from harming others  Outcome: Progressing  Goal: Refrain from destructive acts on the environment or property  11/24/2024 0655 by Sierra Mccray RN  Outcome: Progressing  11/24/2024 0556 by Sierra Mccray RN  Outcome: Progressing  Goal: Control angry outbursts  Description: Interventions:  - Monitor patient closely, per order  - Ensure early verbal de-escalation  - Monitor prn medication needs  - Set reasonable/therapeutic limits, outline behavioral expectations, and consequences   - Provide a non-threatening milieu, utilizing the least restrictive interventions   Outcome: Progressing  Goal: Attend and participate in unit activities, including therapeutic, recreational, and educational groups  Description: Interventions:  - Provide therapeutic and educational activities daily, encourage attendance and participation, and document same in the medical record   Outcome: Progressing  Goal: Identify appropriate positive anger management techniques  Description: Interventions:  - Offer anger management and coping skills groups   - Staff will provide positive feedback for appropriate anger control  11/24/2024 0655 by Sierra Mccray RN  Outcome: Progressing  11/24/2024 0556 by Sierra Mccray RN  Outcome: Progressing     Problem: Alteration in Orientation  Goal: Interact with staff daily  Description: Interventions:  - Assess and re-assess patient's level of orientation  - Engage patient in 1 on 1 interactions, daily, for a minimum of 15 minutes   - Establish rapport/trust with patient   11/24/2024 0655 by Sierra Mccray RN  Outcome: Progressing  11/24/2024 0556 by Sierra Mccray RN  Outcome: Progressing  Goal: Allow medical examinations, as recommended  Description: Interventions:  - Provide physical/neurological exams and/or referrals, per provider   Outcome: Progressing  Goal: Attend and participate in unit activities, including therapeutic, recreational, and educational  groups  Description: Interventions:  - Provide therapeutic and educational activities daily, encourage attendance and participation, and document same in the medical record   - Provide appropriate opportunities for reminiscence   - Provide a consistent daily routine   - Encourage family contact/visitation   Outcome: Progressing  Goal: Complete daily ADLs, including personal hygiene independently, as able  Description: Interventions:  - Observe, teach, and assist patient with ADLS  Outcome: Progressing

## 2024-11-24 NOTE — PROGRESS NOTES
Progress Note - Behavioral Health     Deyvi Cao 54 y.o. male MRN: [id number]   Unit/Bed#: [facility] 101-02 Encounter: [id number]  Assessment & Plan  Bipolar disorder with severe depression (HCC)  Reviewed 11/24/2424     Deyvi is pleasant and cooperative with assessment. Denies any anxiety or depression. Denies SI,HI,AH,VH. Has been visible on unit.     Accepted by [facility]; has ACT team on 12/3 and anticipated discharge on 12/4/2024  Continue medications as follows:  Vraylar 3mg PO Daily  Atarax 25mg PO TID  Lamictal 50mg PO Daily  Zoloft 150mg PO QHS  Continue with individual therapy, group therapy, milieu therapy and occupational therapy   Behavioral Health checks every 7 minutes   Continue frequent safety checks and vitals per unit protocol  Continue with [facility] management as indicated  Benign essential hypertension  Managed by [facility] - reviewed 11/24/2024  Continue Lisinopril 10mg PO Daily  Mixed hyperlipidemia  Managed by [facility] - reviewed 11/24/2024  Continue Lipitor 10mg PO Daily  Allergic rhinitis due to allergen  Managed by [facility] - reviewed 11/24/2024  Rosacea  Managed by [facility] - reviewed 11/24/2024  Vitamin D3 deficiency  Managed by [facility] - reviewed 11/24/24  Low vitamin B12 level  Managed by [facility] - reviewed 11/24/24       Recommended Treatment:     Planned medication and treatment changes:    All current active medications have been reviewed  Encourage group therapy, milieu therapy and occupational therapy  Behavioral Health checks for safety monitoring  Continue current medications:    Current medications:  Current Facility-Administered Medications   Medication Dose Route Frequency Provider Last Rate    acetaminophen  650 mg Oral Q6H PRN ALVINA Harley      acetaminophen  650 mg Oral Q4H PRN ALVINA Harley      acetaminophen  975 mg Oral Q6H PRN ALVINA Harley      aluminum-magnesium hydroxide-simethicone  30 mL Oral Q4H PRN ALVINA Harley      ammonium lactate   Topical BID PRN  ALVINA Harley      atorvastatin  10 mg Oral Daily Sary Rodriguez ALVINA Biggs      benztropine  1 mg Intramuscular Q4H PRN Max 6/day ALVINA Harley      benztropine  1 mg Oral Q4H PRN Max 6/day ALVINA Harley      bisacodyl  10 mg Rectal Daily PRN ALVINA Harley      cariprazine  3 mg Oral Daily Martin Cardenas MD      Cholecalciferol  2,000 Units Oral Daily Sary LinkALVINA Thompson      cyanocobalamin  1,000 mcg Oral Daily Sary LinkALVINA Thompson      hydrOXYzine HCL  25 mg Oral Q6H PRN Max 4/day ALVINA Harley      hydrOXYzine HCL  25 mg Oral TID Martin Cardenas MD      hydrOXYzine HCL  50 mg Oral Q4H PRN Max 4/day ALVINA Harley      Or    LORazepam  1 mg Intramuscular Q4H PRN ALVINA Harley      ketoconazole   Topical Daily PRN ALVINA Harley      lamoTRIgine  50 mg Oral Daily Martin Cardenas MD      lisinopril  10 mg Oral Daily Sary LinkALVINA Thompson      LORazepam  1 mg Oral Q4H PRN Max 6/day ALVINA Harley      Or    LORazepam  2 mg Intramuscular Q6H PRN Max 3/day ALVINA Harley      OLANZapine  10 mg Oral Q3H PRN Max 3/day ALVINA Harley      Or    OLANZapine  10 mg Intramuscular Q3H PRN Max 3/day ALVINA Harley      OLANZapine  5 mg Oral Q3H PRN Max 6/day ALVINA aHrley      Or    OLANZapine  5 mg Intramuscular Q3H PRN Max 6/day ALVINA Harley      OLANZapine  2.5 mg Oral Q3H PRN Max 8/day ALVINA Harley      polyethylene glycol  17 g Oral Daily PRN ALVINA Harley      propranolol  10 mg Oral Q8H PRN ALVINA Harley      senna-docusate sodium  1 tablet Oral Daily PRN ALVINA Harley      sertraline  150 mg Oral HS Martin Cardenas MD         Risks / Benefits of Treatment:    Risks, benefits, and possible side effects of medications explained to patient and patient verbalizes understanding and agreement for treatment.    Subjective:    Patient was seen today for continuation of care, records reviewed and patient was discussed  with the morning case review team.  Per nursing report, Deyvi has a tentative discharge date of 12/4/2024.  He has been visible on the unit with no behaviors.  He slept well and received no PRNs.    Deyvi was seen today for psychiatric follow-up. On assessment today, Deyvi was sitting on the edge of his bed in his room.  Deyvi denies anxiety or depression.  Deyvi denies acute suicidal/self-harm ideation/intent/plan upon direct inquiry at this time.  Deyvi remains behaviorally appropriate, no agitation or aggression noted on exam or in report.  Deyvi also denies HI/AH/VH, and does not appear overtly manic. No overt delusions or paranoia are verbalized.  Deyvi remains adherent to his current psychotropic medication regimen and denies any side effects from medications, as well as none noted on exam.  Sleep and appetite remain adequate.  Deyvi does not endorse any pain at this time.    Behavior over the last 24 hours: unchanged.     Sleep: normal  Appetite: normal  Medication side effects: No   ROS: no complaints    Mental Status Evaluation:    Appearance:  age appropriate, casually dressed, dressed appropriately, looks stated age   Behavior:  pleasant, cooperative, calm   Speech:  normal rate, soft   Mood:  euthymic   Affect:  blunted   Thought Process:  logical, coherent, linear   Associations: intact associations   Thought Content:  no overt delusions   Perceptual Disturbances: no visual hallucinations, denies auditory hallucinations when asked, does not appear responding to internal stimuli   Risk Potential: Suicidal ideation - None at present  Homicidal ideation - None at present  Potential for aggression - Not at present   Sensorium:  oriented to person, place, and time/date   Memory:  recent and remote memory grossly intact   Consciousness:  alert and awake   Attention/Concentration: attention span and concentration appear shorter than expected for age   Insight:  fair   Judgment: fair    Gait/Station: normal gait/station, normal balance   Motor Activity: no abnormal movements     Vital signs in last 24 hours:    Temp:  [97.7 °F (36.5 °C)-97.8 °F (36.6 °C)] 97.8 °F (36.6 °C)  HR:  [90-92] 90  BP: (115-121)/(68-80) 115/80  Resp:  [18] 18  SpO2:  [91 %-98 %] 98 %  O2 Device: None (Room air)    Laboratory results: I have personally reviewed all pertinent laboratory/tests results    Results from the past 24 hours: No results found for this or any previous visit (from the past 24 hours).    Suicide/Homicide Risk Assessment:    Risk of Harm to Self:   Nursing Suicide Risk Assessment Last 24 hours: C-SSRS Risk (Since Last Contact)  Calculated C-SSRS Risk Score (Since Last Contact): No Risk Indicated    Risk of Harm to Others:  Nursing Homicide Risk Assessment: Violence Risk to Others: Denies within past 6 months    The following interventions are recommended: Behavioral Health checks for safety monitoring    Progress Toward Goals: progressing    Counseling / Coordination of Care:    Total floor / unit time spent today 15 minutes. Greater than 50% of total time was spent with the patient and / or family counseling and / or coordination of care. A description of counseling / coordination of care:    ALVINA Stanford 11/24/24     This note was not shared with the patient due to reasonable likelihood of causing patient harm

## 2024-11-24 NOTE — NURSING NOTE
Germain maintained on ongoing Safe precaution without incident  on this shift.  Awake, alert, visible and cooperative upon approach.  Attended and participated 2 out of 8 group today.  Continues to be compliant with medication regimen.  Playing cards with male peers in the dining room. Denies any pain, or discomfort.  Denies delusion or anxiety.  No overt delusion or A/T/V hallucination noted. Behavior control.

## 2024-11-24 NOTE — PLAN OF CARE
Problem: Risk for Self Injury/Neglect  Goal: Refrain from harming self  Description: Interventions:  - Monitor patient closely, per order  - Develop a trusting relationship  - Supervise medication ingestion, monitor effects and side effects   Outcome: Progressing  Goal: Complete daily ADLs, including personal hygiene independently, as able  Description: Interventions:  - Observe, teach, and assist patient with ADLS  - Monitor and promote a balance of rest/activity, with adequate nutrition and elimination  Outcome: Progressing     Problem: Depression  Goal: Refrain from harming self  Description: Interventions:  - Monitor patient closely, per order   - Supervise medication ingestion, monitor effects and side effects   Outcome: Progressing  Goal: Refrain from isolation  Description: Interventions:  - Develop a trusting relationship   - Encourage socialization   Outcome: Progressing  Goal: Refrain from self-neglect  Outcome: Progressing  Goal: Complete daily ADLs, including personal hygiene independently, as able  Description: Interventions:  - Observe, teach, and assist patient with ADLS  -  Monitor and promote a balance of rest/activity, with adequate nutrition and elimination   Outcome: Progressing     Problem: Anxiety  Goal: Anxiety is at manageable level  Description: Interventions:  - Assess and monitor patient's anxiety level.   - Monitor for signs and symptoms (heart palpitations, chest pain, shortness of breath, headaches, nausea, feeling jumpy, restlessness, irritable, apprehensive).   - Collaborate with interdisciplinary team and initiate plan and interventions as ordered.  - [place] patient to unit/surroundings  - Explain treatment plan  - Encourage participation in care  - Encourage verbalization of concerns/fears  - Identify coping mechanisms  - Assist in developing anxiety-reducing skills  - Administer/offer alternative therapies  - Limit or eliminate stimulants  Outcome: Progressing     Problem: Risk  for Violence/Aggression Toward Others  Goal: Refrain from destructive acts on the environment or property  Outcome: Progressing  Goal: Identify appropriate positive anger management techniques  Description: Interventions:  - Offer anger management and coping skills groups   - Staff will provide positive feedback for appropriate anger control  Outcome: Progressing     Problem: Alteration in Orientation  Goal: Interact with staff daily  Description: Interventions:  - Assess and re-assess patient's level of orientation  - Engage patient in 1 on 1 interactions, daily, for a minimum of 15 minutes   - Establish rapport/trust with patient   Outcome: Progressing  Goal: Allow medical examinations, as recommended  Description: Interventions:  - Provide physical/neurological exams and/or referrals, per provider   Outcome: Progressing

## 2024-11-25 PROCEDURE — 99232 SBSQ HOSP IP/OBS MODERATE 35: CPT | Performed by: PSYCHIATRY & NEUROLOGY

## 2024-11-25 RX ADMIN — SERTRALINE HYDROCHLORIDE 150 MG: 100 TABLET ORAL at 21:22

## 2024-11-25 RX ADMIN — CARIPRAZINE 3 MG: 3 CAPSULE, GELATIN COATED ORAL at 08:46

## 2024-11-25 RX ADMIN — HYDROXYZINE HYDROCHLORIDE 25 MG: 25 TABLET ORAL at 21:22

## 2024-11-25 RX ADMIN — LISINOPRIL 10 MG: 10 TABLET ORAL at 08:46

## 2024-11-25 RX ADMIN — HYDROXYZINE HYDROCHLORIDE 25 MG: 25 TABLET ORAL at 08:46

## 2024-11-25 RX ADMIN — LAMOTRIGINE 50 MG: 25 TABLET ORAL at 08:46

## 2024-11-25 RX ADMIN — ATORVASTATIN CALCIUM 10 MG: 10 TABLET, FILM COATED ORAL at 08:46

## 2024-11-25 RX ADMIN — CHOLECALCIFEROL TAB 25 MCG (1000 UNIT) 2000 UNITS: 25 TAB at 08:46

## 2024-11-25 RX ADMIN — CYANOCOBALAMIN TAB 1000 MCG 1000 MCG: 1000 TAB at 08:46

## 2024-11-25 RX ADMIN — HYDROXYZINE HYDROCHLORIDE 25 MG: 25 TABLET ORAL at 17:02

## 2024-11-25 NOTE — PLAN OF CARE
Problem: Alteration in Thoughts and Perception  Goal: Treatment Goal: Gain control of psychotic behaviors/thinking, reduce/eliminate presenting symptoms and demonstrate improved reality functioning upon discharge  Outcome: Progressing  Goal: Refrain from acting on delusional thinking/internal stimuli  Description: Interventions:  - Monitor patient closely, per order   - Utilize least restrictive measures   - Set reasonable limits, give positive feedback for acceptable   - Administer medications as ordered and monitor of potential side effects  Outcome: Progressing  Goal: Agree to be compliant with medication regime, as prescribed and report medication side effects  Description: Interventions:  - Offer appropriate PRN medication and supervise ingestion; conduct AIMS, as needed   Outcome: Progressing  Goal: Recognize dysfunctional thoughts, communicate reality-based thoughts at the time of discharge  Description: Interventions:  - Provide medication and psycho-education to assist patient in compliance and developing insight into his/her illness   Outcome: Progressing  Goal: Complete daily ADLs, including personal hygiene independently, as able  Description: Interventions:  - Observe, teach, and assist patient with ADLS  - Monitor and promote a balance of rest/activity, with adequate nutrition and elimination   Outcome: Progressing     Problem: Ineffective Coping  Goal: Identifies ineffective coping skills  Outcome: Progressing  Goal: Identifies healthy coping skills  Outcome: Progressing  Goal: Demonstrates healthy coping skills  Outcome: Progressing  Goal: Participates in unit activities  Description: Interventions:  - Provide therapeutic environment   - Provide required programming   - Redirect inappropriate behaviors   Outcome: Progressing  Goal: Patient/Family participate in treatment and DC plans  Description: Interventions:  - Provide therapeutic environment  Outcome: Progressing  Goal: Patient/Family  verbalizes awareness of resources  Outcome: Progressing  Goal: Understands least restrictive measures  Description: Interventions:  - Utilize least restrictive behavior  Outcome: Progressing  Goal: Free from restraint events  Description: - Utilize least restrictive measures   - Provide behavioral interventions   - Redirect inappropriate behaviors   Outcome: Progressing     Problem: Risk for Self Injury/Neglect  Goal: Treatment Goal: Remain safe during length of stay, learn and adopt new coping skills, and be free of self-injurious ideation, impulses and acts at the time of discharge  Outcome: Progressing  Goal: Verbalize thoughts and feelings  Description: Interventions:  - Assess and re-assess patient's lethality and potential for self-injury  - Engage patient in 1:1 interactions, daily, for a minimum of 15 minutes  - Encourage patient to express feelings, fears, frustrations, hopes  - Establish rapport/trust with patient   Outcome: Progressing  Goal: Refrain from harming self  Description: Interventions:  - Monitor patient closely, per order  - Develop a trusting relationship  - Supervise medication ingestion, monitor effects and side effects   Outcome: Progressing  Goal: Attend and participate in unit activities, including therapeutic, recreational, and educational groups  Description: Interventions:  - Provide therapeutic and educational activities daily, encourage attendance and participation, and document same in the medical record  - Obtain collateral information, encourage visitation and family involvement in care   Outcome: Progressing  Goal: Recognize maladaptive responses and adopt new coping mechanisms  Outcome: Progressing  Goal: Complete daily ADLs, including personal hygiene independently, as able  Description: Interventions:  - Observe, teach, and assist patient with ADLS  - Monitor and promote a balance of rest/activity, with adequate nutrition and elimination  Outcome: Progressing     Problem:  Depression  Goal: Treatment Goal: Demonstrate behavioral control of depressive symptoms, verbalize feelings of improved mood/affect, and adopt new coping skills prior to discharge  Outcome: Progressing  Goal: Verbalize thoughts and feelings  Description: Interventions:  - Assess and re-assess patient's level of risk   - Engage patient in 1:1 interactions, daily, for a minimum of 15 minutes   - Encourage patient to express feelings, fears, frustrations, hopes   Outcome: Progressing  Goal: Refrain from harming self  Description: Interventions:  - Monitor patient closely, per order   - Supervise medication ingestion, monitor effects and side effects   Outcome: Progressing  Goal: Refrain from isolation  Description: Interventions:  - Develop a trusting relationship   - Encourage socialization   Outcome: Progressing  Goal: Refrain from self-neglect  Outcome: Progressing  Goal: Attend and participate in unit activities, including therapeutic, recreational, and educational groups  Description: Interventions:  - Provide therapeutic and educational activities daily, encourage attendance and participation, and document same in the medical record   Outcome: Progressing  Goal: Complete daily ADLs, including personal hygiene independently, as able  Description: Interventions:  - Observe, teach, and assist patient with ADLS  -  Monitor and promote a balance of rest/activity, with adequate nutrition and elimination   Outcome: Progressing     Problem: Anxiety  Goal: Anxiety is at manageable level  Description: Interventions:  - Assess and monitor patient's anxiety level.   - Monitor for signs and symptoms (heart palpitations, chest pain, shortness of breath, headaches, nausea, feeling jumpy, restlessness, irritable, apprehensive).   - Collaborate with interdisciplinary team and initiate plan and interventions as ordered.  - [place] patient to unit/surroundings  - Explain treatment plan  - Encourage participation in care  - Encourage  verbalization of concerns/fears  - Identify coping mechanisms  - Assist in developing anxiety-reducing skills  - Administer/offer alternative therapies  - Limit or eliminate stimulants  Outcome: Progressing     Problem: Risk for Violence/Aggression Toward Others  Goal: Treatment Goal: Refrain from acts of violence/aggression during length of stay, and demonstrate improved impulse control at the time of discharge  Outcome: Progressing  Goal: Verbalize thoughts and feelings  Description: Interventions:  - Assess and re-assess patient's level of risk, every waking shift  - Engage patient in 1:1 interactions, daily, for a minimum of 15 minutes   - Allow patient to express feelings and frustrations in a safe and non-threatening manner   - Establish rapport/trust with patient   Outcome: Progressing  Goal: Refrain from harming others  Outcome: Progressing  Goal: Refrain from destructive acts on the environment or property  Outcome: Progressing  Goal: Control angry outbursts  Description: Interventions:  - Monitor patient closely, per order  - Ensure early verbal de-escalation  - Monitor prn medication needs  - Set reasonable/therapeutic limits, outline behavioral expectations, and consequences   - Provide a non-threatening milieu, utilizing the least restrictive interventions   Outcome: Progressing  Goal: Attend and participate in unit activities, including therapeutic, recreational, and educational groups  Description: Interventions:  - Provide therapeutic and educational activities daily, encourage attendance and participation, and document same in the medical record   Outcome: Progressing  Goal: Identify appropriate positive anger management techniques  Description: Interventions:  - Offer anger management and coping skills groups   - Staff will provide positive feedback for appropriate anger control  Outcome: Progressing     Problem: Alteration in Orientation  Goal: Treatment Goal: Demonstrate a reduction of confusion  and improved orientation to person, place, time and/or situation upon discharge, according to optimum baseline/ability  Outcome: Progressing  Goal: Interact with staff daily  Description: Interventions:  - Assess and re-assess patient's level of orientation  - Engage patient in 1 on 1 interactions, daily, for a minimum of 15 minutes   - Establish rapport/trust with patient   Outcome: Progressing  Goal: Express concerns related to confused thinking related to:  Description: Interventions:  - Encourage patient to express feelings, fears, frustrations, hopes  - Assign consistent caregivers   - [place]/re-orient patient as needed  - Allow comfort items, as appropriate  - Provide visual cues, signs, etc.   Outcome: Progressing  Goal: Allow medical examinations, as recommended  Description: Interventions:  - Provide physical/neurological exams and/or referrals, per provider   Outcome: Progressing  Goal: Cooperate with recommended testing/procedures  Description: Interventions:  - Determine need for ancillary testing  - Observe for mental status changes  - Implement falls/precaution protocol   Outcome: Progressing  Goal: Attend and participate in unit activities, including therapeutic, recreational, and educational groups  Description: Interventions:  - Provide therapeutic and educational activities daily, encourage attendance and participation, and document same in the medical record   - Provide appropriate opportunities for reminiscence   - Provide a consistent daily routine   - Encourage family contact/visitation   Outcome: Progressing  Goal: Complete daily ADLs, including personal hygiene independently, as able  Description: Interventions:  - Observe, teach, and assist patient with ADLS  Outcome: Progressing     Problem: DISCHARGE PLANNING - CARE MANAGEMENT  Goal: Discharge to post-acute care or home with appropriate resources  Description: INTERVENTIONS:  - Conduct assessment to determine patient/family and health care  team treatment goals, and need for post-acute services based on payer coverage, community resources, and patient preferences, and barriers to discharge  - Address psychosocial, clinical, and financial barriers to discharge as identified in assessment in conjunction with the patient/family and health care team  - Arrange appropriate level of post-acute services according to patient’s   needs and preference and payer coverage in collaboration with the physician and health care team  - Communicate with and update the patient/family, physician, and health care team regarding progress on the discharge plan  - Arrange appropriate transportation to post-acute venues  Outcome: Progressing     Problem: Alteration in Thoughts and Perception  Goal: Treatment Goal: Gain control of psychotic behaviors/thinking, reduce/eliminate presenting symptoms and demonstrate improved reality functioning upon discharge  Outcome: Progressing  Goal: Refrain from acting on delusional thinking/internal stimuli  Description: Interventions:  - Monitor patient closely, per order   - Utilize least restrictive measures   - Set reasonable limits, give positive feedback for acceptable   - Administer medications as ordered and monitor of potential side effects  Outcome: Progressing  Goal: Agree to be compliant with medication regime, as prescribed and report medication side effects  Description: Interventions:  - Offer appropriate PRN medication and supervise ingestion; conduct AIMS, as needed   Outcome: Progressing  Goal: Recognize dysfunctional thoughts, communicate reality-based thoughts at the time of discharge  Description: Interventions:  - Provide medication and psycho-education to assist patient in compliance and developing insight into his/her illness   Outcome: Progressing  Goal: Complete daily ADLs, including personal hygiene independently, as able  Description: Interventions:  - Observe, teach, and assist patient with ADLS  - Monitor and  promote a balance of rest/activity, with adequate nutrition and elimination   Outcome: Progressing     Problem: Ineffective Coping  Goal: Identifies ineffective coping skills  Outcome: Progressing  Goal: Identifies healthy coping skills  Outcome: Progressing  Goal: Demonstrates healthy coping skills  Outcome: Progressing  Goal: Participates in unit activities  Description: Interventions:  - Provide therapeutic environment   - Provide required programming   - Redirect inappropriate behaviors   Outcome: Progressing  Goal: Patient/Family participate in treatment and DC plans  Description: Interventions:  - Provide therapeutic environment  Outcome: Progressing  Goal: Patient/Family verbalizes awareness of resources  Outcome: Progressing  Goal: Understands least restrictive measures  Description: Interventions:  - Utilize least restrictive behavior  Outcome: Progressing  Goal: Free from restraint events  Description: - Utilize least restrictive measures   - Provide behavioral interventions   - Redirect inappropriate behaviors   Outcome: Progressing     Problem: Risk for Self Injury/Neglect  Goal: Treatment Goal: Remain safe during length of stay, learn and adopt new coping skills, and be free of self-injurious ideation, impulses and acts at the time of discharge  Outcome: Progressing  Goal: Verbalize thoughts and feelings  Description: Interventions:  - Assess and re-assess patient's lethality and potential for self-injury  - Engage patient in 1:1 interactions, daily, for a minimum of 15 minutes  - Encourage patient to express feelings, fears, frustrations, hopes  - Establish rapport/trust with patient   Outcome: Progressing  Goal: Refrain from harming self  Description: Interventions:  - Monitor patient closely, per order  - Develop a trusting relationship  - Supervise medication ingestion, monitor effects and side effects   Outcome: Progressing  Goal: Attend and participate in unit activities, including therapeutic,  recreational, and educational groups  Description: Interventions:  - Provide therapeutic and educational activities daily, encourage attendance and participation, and document same in the medical record  - Obtain collateral information, encourage visitation and family involvement in care   Outcome: Progressing  Goal: Recognize maladaptive responses and adopt new coping mechanisms  Outcome: Progressing  Goal: Complete daily ADLs, including personal hygiene independently, as able  Description: Interventions:  - Observe, teach, and assist patient with ADLS  - Monitor and promote a balance of rest/activity, with adequate nutrition and elimination  Outcome: Progressing     Problem: Depression  Goal: Treatment Goal: Demonstrate behavioral control of depressive symptoms, verbalize feelings of improved mood/affect, and adopt new coping skills prior to discharge  Outcome: Progressing  Goal: Verbalize thoughts and feelings  Description: Interventions:  - Assess and re-assess patient's level of risk   - Engage patient in 1:1 interactions, daily, for a minimum of 15 minutes   - Encourage patient to express feelings, fears, frustrations, hopes   Outcome: Progressing  Goal: Refrain from harming self  Description: Interventions:  - Monitor patient closely, per order   - Supervise medication ingestion, monitor effects and side effects   Outcome: Progressing  Goal: Refrain from isolation  Description: Interventions:  - Develop a trusting relationship   - Encourage socialization   Outcome: Progressing  Goal: Refrain from self-neglect  Outcome: Progressing  Goal: Attend and participate in unit activities, including therapeutic, recreational, and educational groups  Description: Interventions:  - Provide therapeutic and educational activities daily, encourage attendance and participation, and document same in the medical record   Outcome: Progressing  Goal: Complete daily ADLs, including personal hygiene independently, as  able  Description: Interventions:  - Observe, teach, and assist patient with ADLS  -  Monitor and promote a balance of rest/activity, with adequate nutrition and elimination   Outcome: Progressing     Problem: Anxiety  Goal: Anxiety is at manageable level  Description: Interventions:  - Assess and monitor patient's anxiety level.   - Monitor for signs and symptoms (heart palpitations, chest pain, shortness of breath, headaches, nausea, feeling jumpy, restlessness, irritable, apprehensive).   - Collaborate with interdisciplinary team and initiate plan and interventions as ordered.  - [place] patient to unit/surroundings  - Explain treatment plan  - Encourage participation in care  - Encourage verbalization of concerns/fears  - Identify coping mechanisms  - Assist in developing anxiety-reducing skills  - Administer/offer alternative therapies  - Limit or eliminate stimulants  Outcome: Progressing     Problem: Risk for Violence/Aggression Toward Others  Goal: Treatment Goal: Refrain from acts of violence/aggression during length of stay, and demonstrate improved impulse control at the time of discharge  Outcome: Progressing  Goal: Verbalize thoughts and feelings  Description: Interventions:  - Assess and re-assess patient's level of risk, every waking shift  - Engage patient in 1:1 interactions, daily, for a minimum of 15 minutes   - Allow patient to express feelings and frustrations in a safe and non-threatening manner   - Establish rapport/trust with patient   Outcome: Progressing  Goal: Refrain from harming others  Outcome: Progressing  Goal: Refrain from destructive acts on the environment or property  Outcome: Progressing  Goal: Control angry outbursts  Description: Interventions:  - Monitor patient closely, per order  - Ensure early verbal de-escalation  - Monitor prn medication needs  - Set reasonable/therapeutic limits, outline behavioral expectations, and consequences   - Provide a non-threatening milieu,  utilizing the least restrictive interventions   Outcome: Progressing  Goal: Attend and participate in unit activities, including therapeutic, recreational, and educational groups  Description: Interventions:  - Provide therapeutic and educational activities daily, encourage attendance and participation, and document same in the medical record   Outcome: Progressing  Goal: Identify appropriate positive anger management techniques  Description: Interventions:  - Offer anger management and coping skills groups   - Staff will provide positive feedback for appropriate anger control  Outcome: Progressing     Problem: Alteration in Orientation  Goal: Treatment Goal: Demonstrate a reduction of confusion and improved orientation to person, place, time and/or situation upon discharge, according to optimum baseline/ability  Outcome: Progressing  Goal: Interact with staff daily  Description: Interventions:  - Assess and re-assess patient's level of orientation  - Engage patient in 1 on 1 interactions, daily, for a minimum of 15 minutes   - Establish rapport/trust with patient   Outcome: Progressing  Goal: Express concerns related to confused thinking related to:  Description: Interventions:  - Encourage patient to express feelings, fears, frustrations, hopes  - Assign consistent caregivers   - [place]/re-orient patient as needed  - Allow comfort items, as appropriate  - Provide visual cues, signs, etc.   Outcome: Progressing  Goal: Allow medical examinations, as recommended  Description: Interventions:  - Provide physical/neurological exams and/or referrals, per provider   Outcome: Progressing  Goal: Cooperate with recommended testing/procedures  Description: Interventions:  - Determine need for ancillary testing  - Observe for mental status changes  - Implement falls/precaution protocol   Outcome: Progressing  Goal: Attend and participate in unit activities, including therapeutic, recreational, and educational  groups  Description: Interventions:  - Provide therapeutic and educational activities daily, encourage attendance and participation, and document same in the medical record   - Provide appropriate opportunities for reminiscence   - Provide a consistent daily routine   - Encourage family contact/visitation   Outcome: Progressing  Goal: Complete daily ADLs, including personal hygiene independently, as able  Description: Interventions:  - Observe, teach, and assist patient with ADLS  Outcome: Progressing     Problem: DISCHARGE PLANNING - CARE MANAGEMENT  Goal: Discharge to post-acute care or home with appropriate resources  Description: INTERVENTIONS:  - Conduct assessment to determine patient/family and health care team treatment goals, and need for post-acute services based on payer coverage, community resources, and patient preferences, and barriers to discharge  - Address psychosocial, clinical, and financial barriers to discharge as identified in assessment in conjunction with the patient/family and health care team  - Arrange appropriate level of post-acute services according to patient’s   needs and preference and payer coverage in collaboration with the physician and health care team  - Communicate with and update the patient/family, physician, and health care team regarding progress on the discharge plan  - Arrange appropriate transportation to post-acute venues  Outcome: Progressing

## 2024-11-25 NOTE — PROGRESS NOTES
11/25/24 0742   Team Meeting   Meeting Type Daily Rounds   Team Members Present   Team Members Present Physician;Nurse;;Other (Discipline and Name)   Physician Team Member Holter, Sharma   Nursing Team Member Chastity   Social Work Team Member Henrry FRY   Other (Discipline and Name) Goff PCM   Patient/Family Present   Patient Present No   Patient's Family Present No     Groups Participation  2/9.   Patient's compliant with medications. He has minimal engagement in treatment. D/C to [facility] pending 12/4. CSP 11/26. Isolates in his room.

## 2024-11-25 NOTE — NURSING NOTE
Patient visible on the unit minimally. Observed playing cards with select male peers. Guarded on approach. Anxious affect but denies any psychological symptoms or suicidal ideations. Medication compliant. Appetite good. Offers no current complaints. Hopefull for discharge. Support offered.

## 2024-11-25 NOTE — NURSING NOTE
Patient is quiet, isolating to room. Pt is pleasant and bright on approach, reports no concerns. Pt takes medications without issue.

## 2024-11-25 NOTE — PROGRESS NOTES
"Progress Note - Behavioral Health     Deyvi Cao 54 y.o. male MRN: [id number]   Unit/Bed#: [facility] 101-02 Encounter: [id number]    Behavior over the last 24 hours: unchanged.     Deyvi seen today, per staff report has been improving and waiting for his discharge on December 4.  The staff reported that he has been up and out of his room.  Over the weekend on Sunday he was isolating but had no behaviors.   I saw the patient as a covering physician.  The patient was cooperative with evaluation.  I had reviewed his chart as well as the previous notes.    Chief complaint: \"I am doing well\"  The patient reported that he continues to do well and is anticipating his discharge.  He says that he is excited.  The patient states that he had visited group home and he liked the place.  He says that as far as his moods are concerned he is doing well.  The patient stated that he has been getting out and participating in the group.  Denied any concerns regarding the weekend.  Denied any sleep or appetite disturbance.  Expressed no thoughts of self-harm.  Remains future oriented and hopeful.  He is not reporting any loss of interest motivation or sad mood.  No paranoid thoughts or hallucinations are reported.  He says that he plans to continue taking his medication.    Sleep: improved  Appetite: normal  Medication side effects: Denies any side effects from the medication.      Mental Status Evaluation:    Appearance:  age appropriate   Behavior:  pleasant, cooperative, calm   Speech:  normal rate, normal volume, normal pitch   Mood:  euthymic   Affect:  normal range and intensity, appropriate   Thought Process:  organized, logical, coherent   Associations: intact associations   Thought Content:  no overt delusions   Perceptual Disturbances: no auditory hallucinations, no visual hallucinations   Risk Potential: Suicidal ideation - None  Homicidal ideation - None   Sensorium:  oriented to person, place, and time/date   Memory:  " recent and remote memory grossly intact   Consciousness:  alert and awake   Attention: attention span and concentration are age appropriate   Insight:  good   Judgment: good   Gait/Station: normal gait/station   Motor Activity: no abnormal movements     Vital signs in last 24 hours:    Temp:  [97.5 °F (36.4 °C)-97.7 °F (36.5 °C)] 97.5 °F (36.4 °C)  HR:  [86-97] 86  BP: (134-145)/(89) 134/89  Resp:  [18] 18  SpO2:  [92 %] 92 %  O2 Device: None (Room air)    Laboratory results: I have personally reviewed all pertinent laboratory/tests results.  Most Recent Labs:   Lab Results   Component Value Date    WBC 7.39 06/26/2024    RBC 4.70 06/26/2024    HGB 15.2 06/26/2024    HCT 45.5 06/26/2024     06/26/2024    RDW 12.9 06/26/2024    NEUTROABS 4.63 06/26/2024    SODIUM 137 06/26/2024    K 4.1 06/26/2024     06/26/2024    CO2 26 06/26/2024    BUN 17 06/26/2024    CREATININE 0.63 06/26/2024    GLUC 98 06/26/2024    GLUF 98 06/26/2024    CALCIUM 9.6 06/26/2024    AST 25 06/26/2024    ALT 45 06/26/2024    ALKPHOS 114 (H) 06/26/2024    TP 7.0 06/26/2024    ALB 4.4 06/26/2024    TBILI 0.44 06/26/2024    CHOLESTEROL 177 06/26/2024    HDL 52 06/26/2024    TRIG 73 06/26/2024    LDLCALC 110 (H) 06/26/2024    NONHDLC 125 06/26/2024    LITHIUM 0.4 (L) 01/28/2021    [id number] 2.636 06/26/2024    HGBA1C 5.2 11/22/2023     11/22/2023           Assessment & Plan   Principal Problem:    Bipolar disorder with severe depression (HCC)  Active Problems:    Benign essential hypertension    Mixed hyperlipidemia    Allergic rhinitis due to allergen    Medical clearance for psychiatric admission    Rosacea    Vitamin D3 deficiency    Low vitamin B12 level    Recommended Treatment:     Planned medication and treatment changes:      All current active medications have been reviewed  Encourage group therapy, milieu therapy and occupational therapy  Behavioral Health checks for safety monitoring  Continue treatment with group  therapy, milieu therapy and occupational therapy  No current changes in medication recommended.  Current Facility-Administered Medications   Medication Dose Route Frequency Provider Last Rate    acetaminophen  650 mg Oral Q6H PRN ALVINA Harley      acetaminophen  650 mg Oral Q4H PRN ALVINA Harley      acetaminophen  975 mg Oral Q6H PRN ALVINA Harley      aluminum-magnesium hydroxide-simethicone  30 mL Oral Q4H PRN ALVINA Harley      ammonium lactate   Topical BID PRN ALVINA Harley      atorvastatin  10 mg Oral Daily ALVINA Lewis      benztropine  1 mg Intramuscular Q4H PRN Max 6/day ALVINA Harley      benztropine  1 mg Oral Q4H PRN Max 6/day ALVINA Harley      bisacodyl  10 mg Rectal Daily PRN ALVINA Harley      cariprazine  3 mg Oral Daily Martin Cardenas MD      Cholecalciferol  2,000 Units Oral Daily ALVINA Lewis      cyanocobalamin  1,000 mcg Oral Daily ALVINA Lewis      hydrOXYzine HCL  25 mg Oral Q6H PRN Max 4/day ALVINA Harley      hydrOXYzine HCL  25 mg Oral TID Martin Cardenas MD      hydrOXYzine HCL  50 mg Oral Q4H PRN Max 4/day ALVINA Harley      Or    LORazepam  1 mg Intramuscular Q4H PRN ALVINA Harley      ketoconazole   Topical Daily PRN ALVINA Harley      lamoTRIgine  50 mg Oral Daily Martin Cardenas MD      lisinopril  10 mg Oral Daily ALVINA Lewis      LORazepam  1 mg Oral Q4H PRN Max 6/day ALVINA Harley      Or    LORazepam  2 mg Intramuscular Q6H PRN Max 3/day ALVINA Harley      OLANZapine  10 mg Oral Q3H PRN Max 3/day ALVINA Harley      Or    OLANZapine  10 mg Intramuscular Q3H PRN Max 3/day ALVINA Harley      OLANZapine  5 mg Oral Q3H PRN Max 6/day ALVINA Harley      Or    OLANZapine  5 mg Intramuscular Q3H PRN Max 6/day ALVINA Harley      OLANZapine  2.5 mg Oral Q3H PRN Max 8/day ALVINA Harley      polyethylene glycol  17 g Oral Daily PRN  ALVINA Harley      propranolol  10 mg Oral Q8H PRN ALVINA Harley      senna-docusate sodium  1 tablet Oral Daily PRN ALVINA Harley      sertraline  150 mg Oral HS Martin Cardenas MD         Risks / Benefits of Treatment:    Risks, benefits, and possible side effects of medications explained to patient and patient verbalizes understanding and agreement for treatment.  Discussed any concerns regarding the medication with the patient.  He is not reporting any current side effects and feels that medications are helping him.    Counseling / Coordination of Care:    Total floor / unit time spent today 15 minutes. Greater than 50% of total time was spent with the patient and / or family counseling and / or coordination of care. A description of counseling / coordination of care:  Patient's progress discussed with staff in treatment team meeting.  Medications, treatment progress and treatment plan reviewed with patient.    Encouraged to attending the groups and treatment plan was also reviewed.  Discussed about his plans after discharge.  Talked about compliance with the medication and his living situation.    Darryl Enciso MD 11/25/24

## 2024-11-26 PROCEDURE — 99232 SBSQ HOSP IP/OBS MODERATE 35: CPT | Performed by: PSYCHIATRY & NEUROLOGY

## 2024-11-26 RX ADMIN — CYANOCOBALAMIN TAB 1000 MCG 1000 MCG: 1000 TAB at 08:31

## 2024-11-26 RX ADMIN — HYDROXYZINE HYDROCHLORIDE 25 MG: 25 TABLET ORAL at 21:15

## 2024-11-26 RX ADMIN — HYDROXYZINE HYDROCHLORIDE 25 MG: 25 TABLET ORAL at 08:31

## 2024-11-26 RX ADMIN — ATORVASTATIN CALCIUM 10 MG: 10 TABLET, FILM COATED ORAL at 08:31

## 2024-11-26 RX ADMIN — SERTRALINE HYDROCHLORIDE 150 MG: 100 TABLET ORAL at 21:15

## 2024-11-26 RX ADMIN — LAMOTRIGINE 50 MG: 25 TABLET ORAL at 08:31

## 2024-11-26 RX ADMIN — CARIPRAZINE 3 MG: 3 CAPSULE, GELATIN COATED ORAL at 08:32

## 2024-11-26 RX ADMIN — CHOLECALCIFEROL TAB 25 MCG (1000 UNIT) 2000 UNITS: 25 TAB at 08:32

## 2024-11-26 RX ADMIN — LISINOPRIL 10 MG: 10 TABLET ORAL at 08:32

## 2024-11-26 RX ADMIN — HYDROXYZINE HYDROCHLORIDE 25 MG: 25 TABLET ORAL at 17:08

## 2024-11-26 NOTE — PLAN OF CARE
Problem: Alteration in Thoughts and Perception  Goal: Treatment Goal: Gain control of psychotic behaviors/thinking, reduce/eliminate presenting symptoms and demonstrate improved reality functioning upon discharge  Outcome: Progressing  Goal: Refrain from acting on delusional thinking/internal stimuli  Description: Interventions:  - Monitor patient closely, per order   - Utilize least restrictive measures   - Set reasonable limits, give positive feedback for acceptable   - Administer medications as ordered and monitor of potential side effects  Outcome: Progressing  Goal: Agree to be compliant with medication regime, as prescribed and report medication side effects  Description: Interventions:  - Offer appropriate PRN medication and supervise ingestion; conduct AIMS, as needed   Outcome: Progressing  Goal: Complete daily ADLs, including personal hygiene independently, as able  Description: Interventions:  - Observe, teach, and assist patient with ADLS  - Monitor and promote a balance of rest/activity, with adequate nutrition and elimination   Outcome: Progressing     Problem: Ineffective Coping  Goal: Participates in unit activities  Description: Interventions:  - Provide therapeutic environment   - Provide required programming   - Redirect inappropriate behaviors   Outcome: Progressing  Goal: Patient/Family participate in treatment and DC plans  Description: Interventions:  - Provide therapeutic environment  Outcome: Progressing  Goal: Free from restraint events  Description: - Utilize least restrictive measures   - Provide behavioral interventions   - Redirect inappropriate behaviors   Outcome: Progressing     Problem: Risk for Self Injury/Neglect  Goal: Treatment Goal: Remain safe during length of stay, learn and adopt new coping skills, and be free of self-injurious ideation, impulses and acts at the time of discharge  Outcome: Progressing  Goal: Refrain from harming self  Description: Interventions:  - Monitor  patient closely, per order  - Develop a trusting relationship  - Supervise medication ingestion, monitor effects and side effects   Outcome: Progressing  Goal: Attend and participate in unit activities, including therapeutic, recreational, and educational groups  Description: Interventions:  - Provide therapeutic and educational activities daily, encourage attendance and participation, and document same in the medical record  - Obtain collateral information, encourage visitation and family involvement in care   Outcome: Progressing     Problem: Depression  Goal: Refrain from harming self  Description: Interventions:  - Monitor patient closely, per order   - Supervise medication ingestion, monitor effects and side effects   Outcome: Progressing  Goal: Refrain from isolation  Description: Interventions:  - Develop a trusting relationship   - Encourage socialization   Outcome: Progressing  Goal: Refrain from self-neglect  Outcome: Progressing  Goal: Complete daily ADLs, including personal hygiene independently, as able  Description: Interventions:  - Observe, teach, and assist patient with ADLS  -  Monitor and promote a balance of rest/activity, with adequate nutrition and elimination   Outcome: Progressing     Problem: Anxiety  Goal: Anxiety is at manageable level  Description: Interventions:  - Assess and monitor patient's anxiety level.   - Monitor for signs and symptoms (heart palpitations, chest pain, shortness of breath, headaches, nausea, feeling jumpy, restlessness, irritable, apprehensive).   - Collaborate with interdisciplinary team and initiate plan and interventions as ordered.  - [place] patient to unit/surroundings  - Explain treatment plan  - Encourage participation in care  - Encourage verbalization of concerns/fears  - Identify coping mechanisms  - Assist in developing anxiety-reducing skills  - Administer/offer alternative therapies  - Limit or eliminate stimulants  Outcome: Progressing     Problem:  Risk for Violence/Aggression Toward Others  Goal: Treatment Goal: Refrain from acts of violence/aggression during length of stay, and demonstrate improved impulse control at the time of discharge  Outcome: Progressing  Goal: Refrain from harming others  Outcome: Progressing  Goal: Refrain from destructive acts on the environment or property  Outcome: Progressing  Goal: Control angry outbursts  Description: Interventions:  - Monitor patient closely, per order  - Ensure early verbal de-escalation  - Monitor prn medication needs  - Set reasonable/therapeutic limits, outline behavioral expectations, and consequences   - Provide a non-threatening milieu, utilizing the least restrictive interventions   Outcome: Progressing  Goal: Identify appropriate positive anger management techniques  Description: Interventions:  - Offer anger management and coping skills groups   - Staff will provide positive feedback for appropriate anger control  Outcome: Progressing     Problem: Alteration in Orientation  Goal: Interact with staff daily  Description: Interventions:  - Assess and re-assess patient's level of orientation  - Engage patient in 1 on 1 interactions, daily, for a minimum of 15 minutes   - Establish rapport/trust with patient   Outcome: Progressing  Goal: Express concerns related to confused thinking related to:  Description: Interventions:  - Encourage patient to express feelings, fears, frustrations, hopes  - Assign consistent caregivers   - Ullin/re-orient patient as needed  - Allow comfort items, as appropriate  - Provide visual cues, signs, etc.   Outcome: Progressing  Goal: Allow medical examinations, as recommended  Description: Interventions:  - Provide physical/neurological exams and/or referrals, per provider   Outcome: Progressing  Goal: Cooperate with recommended testing/procedures  Description: Interventions:  - Determine need for ancillary testing  - Observe for mental status changes  - Implement  falls/precaution protocol   Outcome: Progressing  Goal: Complete daily ADLs, including personal hygiene independently, as able  Description: Interventions:  - Observe, teach, and assist patient with ADLS  Outcome: Progressing     Problem: DISCHARGE PLANNING - CARE MANAGEMENT  Goal: Discharge to post-acute care or home with appropriate resources  Description: INTERVENTIONS:  - Conduct assessment to determine patient/family and health care team treatment goals, and need for post-acute services based on payer coverage, community resources, and patient preferences, and barriers to discharge  - Address psychosocial, clinical, and financial barriers to discharge as identified in assessment in conjunction with the patient/family and health care team  - Arrange appropriate level of post-acute services according to patient’s   needs and preference and payer coverage in collaboration with the physician and health care team  - Communicate with and update the patient/family, physician, and health care team regarding progress on the discharge plan  - Arrange appropriate transportation to post-acute venues  Outcome: Progressing

## 2024-11-26 NOTE — NURSING NOTE
Patient has been out of his room on the milieu at intervals. Pleasant and cooperative. Anxious affect but denies anxiety. Isolative to himself most of the times but does play cards minimally with male peers. Denies any psychological symptoms or suicidal ideations. Appetite good. Medication compliant. Attends some groups.

## 2024-11-26 NOTE — PROGRESS NOTES
11/26/24 0738   Team Meeting   Meeting Type Daily Rounds   Team Members Present   Team Members Present Physician;Nurse;;Other (Discipline and Name)   Physician Team Member Fernie   Nursing Team Member Chastity   Social Work Team Member Henrry FRY   Other (Discipline and Name) Goff PCM   Patient/Family Present   Patient Present No   Patient's Family Present No     Groups Participation  3/7.   Patient's compliant with medications. He's engaged in his treatment. CSP today, d/c 12/4 to [facility]. Flat affect at times.

## 2024-11-26 NOTE — SOCIAL WORK
Patient was admitted on a 201 from [facility] on 6/25/24  for significant history of hospitalizations due to severe depression and suicidal ideation. Patient has been unsuccessful with several outpatient services. Patient presented to [facility] on 4/26/24 with severe depression and suicidal ideation. Deyvi had just discharged from [facility] on 4/4/24 and went to live at a nearby [facility]. Pt had an appointment for his Abilify Maintena on 4/9 but did not show up, reportedly because he was too depressed to leave his motel room. Patient reports ongoing suicidal ideation and that he attempted to end his life by choking himself with a ball in his mouth to close his airways but was unsuccessful. Patient has reportedly also been considering jumping off of one of the local bridges. Deyvi has been unable to make significant progress or reach stasis while inpatient and has transferred to the [facility].      7/5: CRR referral completed  7/8: Enhanced CRR referral to [facility] on 6/3 which was submitted during his [facility] hospitalization being reviewed.   7/10: ACT referral submitted to [facility]  8/5: No beds available at [facility] beds at the [facility]  8/8: Enhanced CRR referral submitted to [facility] representative Sydnie Palacios.  9/17: [facility] Enhanced CRR interview  9/24: ACT Intake  10/21: [facility] ECRR denied patient's referral   10/30: [facility] [address] and [facility] referral submitted to the [facility]  11/4:  [facility] Intake   11/14: [facility] Tour and acceptance to their program  11/15: [facility] referral submitted   11/26: [facility] meeting   12/2: ACT Intake  12/4: D/C to [facility]

## 2024-11-26 NOTE — PROGRESS NOTES
"Progress Note - Behavioral Health     Deyvi Cao 54 y.o. male MRN: [id number]   Unit/Bed#: [facility] 101-02 Encounter: [id number]    Behavior over the last 24 hours: improved.     Deyvi seen today, per staff report has been visible interacting with peers.  Not reporting any symptoms like suicidal ideas or exhibiting any agitation.  Compliant with his medication.  Participating in some groups on the unit.  Chief complaints: \"I slept good last night\"    The patient reports that he is doing well and is excited about his discharge next week.  He reported that some of the identifying symptoms when he is not doing well or lack of energy and motivation.  He says that besides having depressed moods he also notices loss of interest.  He says that he will be more vigilant when he is discharged about the symptoms.  He says that he will continue to work on recognizing his symptoms earlier to prevent future hospitalization.  Denies any current symptoms of sad moods or loss of interest motivation.  He says that he is not experiencing any suicidal thinking or any hopelessness helplessness.  Denies having any ruminating thoughts and remains future oriented.         Sleep: normal  Appetite: normal  Medication side effects: No side effects reported.  Denies any shakes or tremors restlessness constipation.      Mental Status Evaluation:    Appearance:  age appropriate   Behavior:  pleasant, cooperative, calm   Speech:  normal rate, normal volume, normal pitch   Mood:  euthymic   Affect:  constricted   Thought Process:  organized, logical, coherent, goal directed   Associations: intact associations   Thought Content:  no overt delusions   Perceptual Disturbances: no auditory hallucinations, no visual hallucinations   Risk Potential: Suicidal ideation - None  Homicidal ideation - None   Sensorium:  oriented to person, place, and time/date   Memory:  recent and remote memory grossly intact   Consciousness:  alert and awake   Attention: " attention span and concentration are age appropriate   Insight:  good   Judgment: good   Gait/Station: normal gait/station   Motor Activity: no abnormal movements     Vital signs in last 24 hours:    Temp:  [96.9 °F (36.1 °C)-97.8 °F (36.6 °C)] 96.9 °F (36.1 °C)  HR:  [85-99] 99  BP: (124-139)/(74-82) 139/82  Resp:  [18] 18  SpO2:  [94 %] 94 %  O2 Device: None (Room air)    Laboratory results: I have personally reviewed all pertinent laboratory/tests results.  Most Recent Labs:   Lab Results   Component Value Date    WBC 7.39 06/26/2024    RBC 4.70 06/26/2024    HGB 15.2 06/26/2024    HCT 45.5 06/26/2024     06/26/2024    RDW 12.9 06/26/2024    NEUTROABS 4.63 06/26/2024    SODIUM 137 06/26/2024    K 4.1 06/26/2024     06/26/2024    CO2 26 06/26/2024    BUN 17 06/26/2024    CREATININE 0.63 06/26/2024    GLUC 98 06/26/2024    GLUF 98 06/26/2024    CALCIUM 9.6 06/26/2024    AST 25 06/26/2024    ALT 45 06/26/2024    ALKPHOS 114 (H) 06/26/2024    TP 7.0 06/26/2024    ALB 4.4 06/26/2024    TBILI 0.44 06/26/2024    CHOLESTEROL 177 06/26/2024    HDL 52 06/26/2024    TRIG 73 06/26/2024    LDLCALC 110 (H) 06/26/2024    NONHDLC 125 06/26/2024    LITHIUM 0.4 (L) 01/28/2021    [id number] 2.636 06/26/2024    HGBA1C 5.2 11/22/2023     11/22/2023           Assessment & Plan   Principal Problem:    Bipolar disorder with severe depression (HCC)  Active Problems:    Benign essential hypertension    Mixed hyperlipidemia    Allergic rhinitis due to allergen    Medical clearance for psychiatric admission    Rosacea    Vitamin D3 deficiency    Low vitamin B12 level    Recommended Treatment:     Planned medication and treatment changes:  No medication changes are recommended at this time.  The patient remains stable at current dosage and is waiting for his discharge.    All current active medications have been reviewed  Encourage group therapy, milieu therapy and occupational therapy  Behavioral Health checks for  safety monitoring  Continue treatment with group therapy, milieu therapy and occupational therapy  Current Facility-Administered Medications   Medication Dose Route Frequency Provider Last Rate    acetaminophen  650 mg Oral Q6H PRN ALVINA Harley      acetaminophen  650 mg Oral Q4H PRN ALVINA Harley      acetaminophen  975 mg Oral Q6H PRN ALVINA Harley      aluminum-magnesium hydroxide-simethicone  30 mL Oral Q4H PRN ALVINA Harley      ammonium lactate   Topical BID PRN ALVINA Harley      atorvastatin  10 mg Oral Daily ALVINA Lewis      benztropine  1 mg Intramuscular Q4H PRN Max 6/day ALVINA Harley      benztropine  1 mg Oral Q4H PRN Max 6/day ALVINA Harley      bisacodyl  10 mg Rectal Daily PRN ALVINA Harley      cariprazine  3 mg Oral Daily Martin Cardenas MD      Cholecalciferol  2,000 Units Oral Daily ALVINA Lewis      cyanocobalamin  1,000 mcg Oral Daily ALVINA Lewis      hydrOXYzine HCL  25 mg Oral Q6H PRN Max 4/day ALVINA Harley      hydrOXYzine HCL  25 mg Oral TID Martin Cardenas MD      hydrOXYzine HCL  50 mg Oral Q4H PRN Max 4/day ALVINA Harley      Or    LORazepam  1 mg Intramuscular Q4H PRN ALVINA Harley      ketoconazole   Topical Daily PRN ALVINA Harley      lamoTRIgine  50 mg Oral Daily Martin Cardenas MD      lisinopril  10 mg Oral Daily ALVINA Lewis      LORazepam  1 mg Oral Q4H PRN Max 6/day ALVINA Harley      Or    LORazepam  2 mg Intramuscular Q6H PRN Max 3/day ALVINA Harley      OLANZapine  10 mg Oral Q3H PRN Max 3/day ALVINA Harley      Or    OLANZapine  10 mg Intramuscular Q3H PRN Max 3/day ALVINA Harley      OLANZapine  5 mg Oral Q3H PRN Max 6/day ALVINA Harley      Or    OLANZapine  5 mg Intramuscular Q3H PRN Max 6/day ALVINA Harley      OLANZapine  2.5 mg Oral Q3H PRN Max 8/day ALVINA Harley      polyethylene glycol  17 g Oral Daily PRN  ALVINA Harley      propranolol  10 mg Oral Q8H PRN ALVINA Harley      senna-docusate sodium  1 tablet Oral Daily PRN ALVINA Harley      sertraline  150 mg Oral HS Martin Cardenas MD         Risks / Benefits of Treatment:    Risks, benefits, and possible side effects of medications explained to patient and patient verbalizes understanding and agreement for treatment.    Counseling / Coordination of Care:    Total floor / unit time spent today 15 minutes. Greater than 50% of total time was spent with the patient and / or family counseling and / or coordination of care. A description of counseling / coordination of care:  Patient's progress discussed with staff in treatment team meeting.  Discharge plan discussed with patient.  Reviewed his symptoms that occur when he is not doing well.  Encouraged exercise and to be observant when noticing any decrease in motivation or energy.    Darryl Enciso MD 11/26/24

## 2024-11-26 NOTE — PLAN OF CARE
Problem: Ineffective Coping  Goal: Identifies ineffective coping skills  Outcome: Progressing  Goal: Identifies healthy coping skills  Outcome: Progressing  Goal: Demonstrates healthy coping skills  Outcome: Progressing  Goal: Participates in unit activities  Description: Interventions:  - Provide therapeutic environment   - Provide required programming   - Redirect inappropriate behaviors   Outcome: Progressing   He continus to make progress towards the goals by attending and actively engaging in the groups.

## 2024-11-26 NOTE — NURSING NOTE
Received pt in bed at change of shift with eyes closed; chest movement noted.  Continues the same thus this far as per q 15 min room checks.   Will continue to monitor behavior, sleeping pattern and any medical issues that may arise.    0548:  Sleeping 7+ hrs thus this far

## 2024-11-26 NOTE — SOCIAL WORK
11/26/24 1100   Team Meeting   Meeting Type Tx Team Meeting   Initial Conference Date 11/26/24   Team Members Present   Team Members Present Physician;;Other (Discipline and Name)   Physician Team Member Holter   Social Work Team Member Henrry FRY   Other (Discipline and Name) Giuseppe PRADO, Shala CROUCHA, Vlad [facility], Anthrium [facility]   Patient/Family Present   Patient Present Yes   Patient's Family Present No     CSP Meeting completed. Psychiatrist reviewed patient's medications, labs and appointments. Scripts will go to [facility] on 12/2. This writer will schedule patient's PCP appointment. ACT will complete his sign on 12/2. Patient will be transported by [facility] day of discharge. ACT will meet with patient day of discharge. This writer will complete the Recovery Plan and Crisis plan with the patient.

## 2024-11-27 PROCEDURE — 99232 SBSQ HOSP IP/OBS MODERATE 35: CPT | Performed by: PSYCHIATRY & NEUROLOGY

## 2024-11-27 RX ADMIN — SERTRALINE HYDROCHLORIDE 150 MG: 100 TABLET ORAL at 21:20

## 2024-11-27 RX ADMIN — CHOLECALCIFEROL TAB 25 MCG (1000 UNIT) 2000 UNITS: 25 TAB at 08:37

## 2024-11-27 RX ADMIN — CYANOCOBALAMIN TAB 1000 MCG 1000 MCG: 1000 TAB at 08:36

## 2024-11-27 RX ADMIN — HYDROXYZINE HYDROCHLORIDE 25 MG: 25 TABLET ORAL at 21:20

## 2024-11-27 RX ADMIN — HYDROXYZINE HYDROCHLORIDE 25 MG: 25 TABLET ORAL at 17:09

## 2024-11-27 RX ADMIN — LAMOTRIGINE 50 MG: 25 TABLET ORAL at 08:36

## 2024-11-27 RX ADMIN — ATORVASTATIN CALCIUM 10 MG: 10 TABLET, FILM COATED ORAL at 08:36

## 2024-11-27 RX ADMIN — HYDROXYZINE HYDROCHLORIDE 25 MG: 25 TABLET ORAL at 08:36

## 2024-11-27 RX ADMIN — LISINOPRIL 10 MG: 10 TABLET ORAL at 08:36

## 2024-11-27 RX ADMIN — CARIPRAZINE 3 MG: 3 CAPSULE, GELATIN COATED ORAL at 08:37

## 2024-11-27 NOTE — SOCIAL WORK
This writer met with patient and discussed Recovery and Crisis Planning. This writer provided patient a crisis plan to complete prior to his discharge.

## 2024-11-27 NOTE — NURSING NOTE
Patient with brighter affect, still isolative and only coffee group attended so far. Pt reports no s/s, voices no concerns. Pt takes all medication without issue.

## 2024-11-27 NOTE — PROGRESS NOTES
Progress Note - Behavioral Health     Deyvi Cao 54 y.o. male MRN: [id number]   Unit/Bed#: [facility] 101-02 Encounter: [id number]    Behavior over the last 24 hours: improved.     Deyvi seen today, per staff report has been visible but quiet with minimal socialization on the unit.  Sometimes the patient would engage with other peers.  He has been compliant with his medication.  No complaints to the staff.  Waiting for his discharge.  The patient was evaluated independently.  He was seen in his room.  The patient also attended team meeting this morning.  He reports that he continues to do well and offers no complaints at this time.  He says that he is just waiting for his discharge.  Does feel a little sad around the holidays because he has not been able to get in touch with his sister.  He says that he does not have her address or phone number.  The patient says that he was working on it by getting the information from his cousin.  He says that is wondering if she is upset for some reason.  He reported that he is worried how she is going to respond if she is upset with him.  Denies any persistent sad moods or any loss of interest motivation and no hopelessness helplessness is reported.  He is not reporting any hallucination or paranoid thoughts.  He says that he is not obsessing over any ideas but just around the holidays he thinks about his family and how things were in the past.         Sleep: normal  Appetite: normal  Medication side effects: He denies any side effects from the medication.  He says that he has been taking his medication as prescribed.      Mental Status Evaluation:    Appearance:  age appropriate, dressed appropriately, adequate grooming   Behavior:  pleasant, cooperative, calm   Speech:  normal rate, normal volume, normal pitch   Mood:  euthymic   Affect:  normal range and intensity, appropriate   Thought Process:  organized, logical, goal directed   Associations: intact associations   Thought  Content:  no overt delusions   Perceptual Disturbances: no auditory hallucinations, no visual hallucinations   Risk Potential: Suicidal ideation - None  Homicidal ideation - None   Sensorium:  oriented to person, place, and time/date   Memory:  recent and remote memory grossly intact   Consciousness:  alert   Attention: attention span and concentration are age appropriate   Insight:  good   Judgment: good   Gait/Station: normal gait/station   Motor Activity: no abnormal movements     Vital signs in last 24 hours:    Temp:  [96.8 °F (36 °C)-97.5 °F (36.4 °C)] 96.8 °F (36 °C)  HR:  [] 65  BP: (124-137)/(76-86) 137/86  Resp:  [18] 18  SpO2:  [90 %-94 %] 90 %  O2 Device: None (Room air)    Laboratory results: I have personally reviewed all pertinent laboratory/tests results.  Most Recent Labs:   Lab Results   Component Value Date    WBC 7.39 06/26/2024    RBC 4.70 06/26/2024    HGB 15.2 06/26/2024    HCT 45.5 06/26/2024     06/26/2024    RDW 12.9 06/26/2024    NEUTROABS 4.63 06/26/2024    SODIUM 137 06/26/2024    K 4.1 06/26/2024     06/26/2024    CO2 26 06/26/2024    BUN 17 06/26/2024    CREATININE 0.63 06/26/2024    GLUC 98 06/26/2024    GLUF 98 06/26/2024    CALCIUM 9.6 06/26/2024    AST 25 06/26/2024    ALT 45 06/26/2024    ALKPHOS 114 (H) 06/26/2024    TP 7.0 06/26/2024    ALB 4.4 06/26/2024    TBILI 0.44 06/26/2024    CHOLESTEROL 177 06/26/2024    HDL 52 06/26/2024    TRIG 73 06/26/2024    LDLCALC 110 (H) 06/26/2024    NONHDLC 125 06/26/2024    LITHIUM 0.4 (L) 01/28/2021    OON7QEPRZRKB 2.636 06/26/2024    HGBA1C 5.2 11/22/2023     11/22/2023           Assessment & Plan   Principal Problem:    Bipolar disorder with severe depression (HCC)  Active Problems:    Benign essential hypertension    Mixed hyperlipidemia    Allergic rhinitis due to allergen    Medical clearance for psychiatric admission    Rosacea    Vitamin D3 deficiency    Low vitamin B12 level    Recommended Treatment:      Planned medication and treatment changes:      All current active medications have been reviewed  Behavioral Health checks for safety monitoring  Continue treatment with group therapy, milieu therapy and occupational therapy  No changes in medication are recommended.  Current Facility-Administered Medications   Medication Dose Route Frequency Provider Last Rate    acetaminophen  650 mg Oral Q6H PRN ALVINA Harley      acetaminophen  650 mg Oral Q4H PRN ALVINA Harley      acetaminophen  975 mg Oral Q6H PRN ALVINA Harley      aluminum-magnesium hydroxide-simethicone  30 mL Oral Q4H PRN ALVINA Harley      ammonium lactate   Topical BID PRN ALVINA Harley      atorvastatin  10 mg Oral Daily ALVINA Lewis      benztropine  1 mg Intramuscular Q4H PRN Max 6/day ALVINA Harley      benztropine  1 mg Oral Q4H PRN Max 6/day ALVINA Harley      bisacodyl  10 mg Rectal Daily PRN ALVINA Harley      cariprazine  3 mg Oral Daily Martin Cardenas MD      Cholecalciferol  2,000 Units Oral Daily ALVINA Lewis      cyanocobalamin  1,000 mcg Oral Daily ALVINA Lewis      hydrOXYzine HCL  25 mg Oral Q6H PRN Max 4/day ALVINA Harley      hydrOXYzine HCL  25 mg Oral TID Martin Cardenas MD      hydrOXYzine HCL  50 mg Oral Q4H PRN Max 4/day ALVINA Harley      Or    LORazepam  1 mg Intramuscular Q4H PRN ALVINA Harley      ketoconazole   Topical Daily PRN ALVINA Harley      lamoTRIgine  50 mg Oral Daily Martin Cardenas MD      lisinopril  10 mg Oral Daily ALVINA Lewis      LORazepam  1 mg Oral Q4H PRN Max 6/day ALVINA Harley      Or    LORazepam  2 mg Intramuscular Q6H PRN Max 3/day ALVINA Harley      OLANZapine  10 mg Oral Q3H PRN Max 3/day ALVINA Harely      Or    OLANZapine  10 mg Intramuscular Q3H PRN Max 3/day ALVINA Harley      OLANZapine  5 mg Oral Q3H PRN Max 6/day ALVINA Harley      Or     OLANZapine  5 mg Intramuscular Q3H PRN Max 6/day ALVINA Harley      OLANZapine  2.5 mg Oral Q3H PRN Max 8/day ALVINA Harley      polyethylene glycol  17 g Oral Daily PRN ALVINA Harley      propranolol  10 mg Oral Q8H PRN ALVINA Harley      senna-docusate sodium  1 tablet Oral Daily PRN MelvaALVINA Cordova      sertraline  150 mg Oral HS Martin Cardenas MD         Risks / Benefits of Treatment:    Risks, benefits, and possible side effects of medications explained to patient and patient verbalizes understanding and agreement for treatment.    Counseling / Coordination of Care:    Total floor / unit time spent today 20 minutes. Greater than 50% of total time was spent with the patient and / or family counseling and / or coordination of care. A description of counseling / coordination of care:  Patient's progress discussed with staff in treatment team meeting.  Treatment Plan was updated today with patient in Treatment Team Planning Meeting.  Group attendance encouraged.  Crisis/safety plan discussed with patient.  Discharge plan discussed with patient.  Discussed the importance of medication.  Treatment plan was signed I was also present during the treatment team planning meeting    Darryl Enciso MD 11/27/24

## 2024-11-27 NOTE — PLAN OF CARE
Problem: Alteration in Thoughts and Perception  Goal: Treatment Goal: Gain control of psychotic behaviors/thinking, reduce/eliminate presenting symptoms and demonstrate improved reality functioning upon discharge  Outcome: Progressing  Goal: Refrain from acting on delusional thinking/internal stimuli  Description: Interventions:  - Monitor patient closely, per order   - Utilize least restrictive measures   - Set reasonable limits, give positive feedback for acceptable   - Administer medications as ordered and monitor of potential side effects  Outcome: Progressing  Goal: Agree to be compliant with medication regime, as prescribed and report medication side effects  Description: Interventions:  - Offer appropriate PRN medication and supervise ingestion; conduct AIMS, as needed   Outcome: Progressing  Goal: Recognize dysfunctional thoughts, communicate reality-based thoughts at the time of discharge  Description: Interventions:  - Provide medication and psycho-education to assist patient in compliance and developing insight into his/her illness   Outcome: Progressing  Goal: Complete daily ADLs, including personal hygiene independently, as able  Description: Interventions:  - Observe, teach, and assist patient with ADLS  - Monitor and promote a balance of rest/activity, with adequate nutrition and elimination   Outcome: Progressing     Problem: Ineffective Coping  Goal: Participates in unit activities  Description: Interventions:  - Provide therapeutic environment   - Provide required programming   - Redirect inappropriate behaviors   Outcome: Progressing  Goal: Patient/Family participate in treatment and DC plans  Description: Interventions:  - Provide therapeutic environment  Outcome: Progressing  Goal: Patient/Family verbalizes awareness of resources  Outcome: Progressing  Goal: Understands least restrictive measures  Description: Interventions:  - Utilize least restrictive behavior  Outcome: Progressing  Goal:  Free from restraint events  Description: - Utilize least restrictive measures   - Provide behavioral interventions   - Redirect inappropriate behaviors   Outcome: Progressing     Problem: Risk for Self Injury/Neglect  Goal: Treatment Goal: Remain safe during length of stay, learn and adopt new coping skills, and be free of self-injurious ideation, impulses and acts at the time of discharge  Outcome: Progressing  Goal: Verbalize thoughts and feelings  Description: Interventions:  - Assess and re-assess patient's lethality and potential for self-injury  - Engage patient in 1:1 interactions, daily, for a minimum of 15 minutes  - Encourage patient to express feelings, fears, frustrations, hopes  - Establish rapport/trust with patient   Outcome: Progressing  Goal: Refrain from harming self  Description: Interventions:  - Monitor patient closely, per order  - Develop a trusting relationship  - Supervise medication ingestion, monitor effects and side effects   Outcome: Progressing  Goal: Attend and participate in unit activities, including therapeutic, recreational, and educational groups  Description: Interventions:  - Provide therapeutic and educational activities daily, encourage attendance and participation, and document same in the medical record  - Obtain collateral information, encourage visitation and family involvement in care   Outcome: Progressing  Goal: Complete daily ADLs, including personal hygiene independently, as able  Description: Interventions:  - Observe, teach, and assist patient with ADLS  - Monitor and promote a balance of rest/activity, with adequate nutrition and elimination  Outcome: Progressing     Problem: Depression  Goal: Treatment Goal: Demonstrate behavioral control of depressive symptoms, verbalize feelings of improved mood/affect, and adopt new coping skills prior to discharge  Outcome: Progressing  Goal: Refrain from harming self  Description: Interventions:  - Monitor patient closely,  per order   - Supervise medication ingestion, monitor effects and side effects   Outcome: Progressing  Goal: Refrain from isolation  Description: Interventions:  - Develop a trusting relationship   - Encourage socialization   Outcome: Progressing  Goal: Refrain from self-neglect  Outcome: Progressing  Goal: Attend and participate in unit activities, including therapeutic, recreational, and educational groups  Description: Interventions:  - Provide therapeutic and educational activities daily, encourage attendance and participation, and document same in the medical record   Outcome: Progressing  Goal: Complete daily ADLs, including personal hygiene independently, as able  Description: Interventions:  - Observe, teach, and assist patient with ADLS  -  Monitor and promote a balance of rest/activity, with adequate nutrition and elimination   Outcome: Progressing     Problem: Anxiety  Goal: Anxiety is at manageable level  Description: Interventions:  - Assess and monitor patient's anxiety level.   - Monitor for signs and symptoms (heart palpitations, chest pain, shortness of breath, headaches, nausea, feeling jumpy, restlessness, irritable, apprehensive).   - Collaborate with interdisciplinary team and initiate plan and interventions as ordered.  - [place] patient to unit/surroundings  - Explain treatment plan  - Encourage participation in care  - Encourage verbalization of concerns/fears  - Identify coping mechanisms  - Assist in developing anxiety-reducing skills  - Administer/offer alternative therapies  - Limit or eliminate stimulants  Outcome: Progressing     Problem: Risk for Violence/Aggression Toward Others  Goal: Treatment Goal: Refrain from acts of violence/aggression during length of stay, and demonstrate improved impulse control at the time of discharge  Outcome: Progressing  Goal: Verbalize thoughts and feelings  Description: Interventions:  - Assess and re-assess patient's level of risk, every waking  shift  - Engage patient in 1:1 interactions, daily, for a minimum of 15 minutes   - Allow patient to express feelings and frustrations in a safe and non-threatening manner   - Establish rapport/trust with patient   Outcome: Progressing  Goal: Refrain from harming others  Outcome: Progressing  Goal: Refrain from destructive acts on the environment or property  Outcome: Progressing  Goal: Control angry outbursts  Description: Interventions:  - Monitor patient closely, per order  - Ensure early verbal de-escalation  - Monitor prn medication needs  - Set reasonable/therapeutic limits, outline behavioral expectations, and consequences   - Provide a non-threatening milieu, utilizing the least restrictive interventions   Outcome: Progressing  Goal: Identify appropriate positive anger management techniques  Description: Interventions:  - Offer anger management and coping skills groups   - Staff will provide positive feedback for appropriate anger control  Outcome: Progressing     Problem: Alteration in Orientation  Goal: Treatment Goal: Demonstrate a reduction of confusion and improved orientation to person, place, time and/or situation upon discharge, according to optimum baseline/ability  Outcome: Progressing  Goal: Interact with staff daily  Description: Interventions:  - Assess and re-assess patient's level of orientation  - Engage patient in 1 on 1 interactions, daily, for a minimum of 15 minutes   - Establish rapport/trust with patient   Outcome: Progressing  Goal: Express concerns related to confused thinking related to:  Description: Interventions:  - Encourage patient to express feelings, fears, frustrations, hopes  - Assign consistent caregivers   - [place]/re-orient patient as needed  - Allow comfort items, as appropriate  - Provide visual cues, signs, etc.   Outcome: Progressing  Goal: Allow medical examinations, as recommended  Description: Interventions:  - Provide physical/neurological exams and/or referrals,  per provider   Outcome: Progressing  Goal: Cooperate with recommended testing/procedures  Description: Interventions:  - Determine need for ancillary testing  - Observe for mental status changes  - Implement falls/precaution protocol   Outcome: Progressing  Goal: Attend and participate in unit activities, including therapeutic, recreational, and educational groups  Description: Interventions:  - Provide therapeutic and educational activities daily, encourage attendance and participation, and document same in the medical record   - Provide appropriate opportunities for reminiscence   - Provide a consistent daily routine   - Encourage family contact/visitation   Outcome: Progressing  Goal: Complete daily ADLs, including personal hygiene independently, as able  Description: Interventions:  - Observe, teach, and assist patient with ADLS  Outcome: Progressing     Problem: DISCHARGE PLANNING - CARE MANAGEMENT  Goal: Discharge to post-acute care or home with appropriate resources  Description: INTERVENTIONS:  - Conduct assessment to determine patient/family and health care team treatment goals, and need for post-acute services based on payer coverage, community resources, and patient preferences, and barriers to discharge  - Address psychosocial, clinical, and financial barriers to discharge as identified in assessment in conjunction with the patient/family and health care team  - Arrange appropriate level of post-acute services according to patient’s   needs and preference and payer coverage in collaboration with the physician and health care team  - Communicate with and update the patient/family, physician, and health care team regarding progress on the discharge plan  - Arrange appropriate transportation to post-acute venues  Outcome: Progressing

## 2024-11-27 NOTE — PROGRESS NOTES
11/27/24 0731   Team Meeting   Meeting Type Daily Rounds   Team Members Present   Team Members Present Physician;Nurse;;Other (Discipline and Name)   Physician Team Member Holter, Sharma   Nursing Team Member Chastity   Social Work Team Member Henrry FRY   Other (Discipline and Name) Goff PCM   Patient/Family Present   Patient Present No   Patient's Family Present No     Groups Participation  6/12.   Patient's compliant with medications. He's engaged in treatment. D/C to [facility] on 12/4. He's appropriate.

## 2024-11-27 NOTE — NURSING NOTE
Patient has been visible on the unit at intervals. Quiet with minimal socialization. Observed playing cards with select male peers. Anxious affect but denies anxiety or suicidal ideations.  Pleasant and cooperative. Awaiting discharge. Hopeful. Offers no complaints.

## 2024-11-27 NOTE — PROGRESS NOTES
11/27/24 1030   Team Meeting   Meeting Type Tx Team Meeting   Initial Conference Date 11/27/24   Next Conference Date 12/12/24   Team Members Present   Team Members Present Physician;Nurse;;Other (Discipline and Name)   Physician Team Member Fernie   Nursing Team Member Chastity   Social Work Team Member Kenneth Sales LCSW   Other (Discipline and Name) Giuseppe ESPARZA, Ugo MS   Patient/Family Present   Patient Present Yes   Patient's Family Present No   OTHER   Team Meeting - Additional Comments Patient attended his treatment team meeting. Patient did not complete his self assessment. The team discussed his pending discharge to [facility]. Psychiatrist discussed patient's progress and patient being aware of his symptoms being proactive in his recovery. Patient's scehduled for discharge on 12/4/24

## 2024-11-28 PROCEDURE — 99232 SBSQ HOSP IP/OBS MODERATE 35: CPT | Performed by: PHYSICIAN ASSISTANT

## 2024-11-28 RX ADMIN — SERTRALINE HYDROCHLORIDE 150 MG: 100 TABLET ORAL at 21:12

## 2024-11-28 RX ADMIN — CHOLECALCIFEROL TAB 25 MCG (1000 UNIT) 2000 UNITS: 25 TAB at 08:26

## 2024-11-28 RX ADMIN — CYANOCOBALAMIN TAB 1000 MCG 1000 MCG: 1000 TAB at 08:26

## 2024-11-28 RX ADMIN — ATORVASTATIN CALCIUM 10 MG: 10 TABLET, FILM COATED ORAL at 08:26

## 2024-11-28 RX ADMIN — HYDROXYZINE HYDROCHLORIDE 25 MG: 25 TABLET ORAL at 16:53

## 2024-11-28 RX ADMIN — LAMOTRIGINE 50 MG: 25 TABLET ORAL at 08:26

## 2024-11-28 RX ADMIN — HYDROXYZINE HYDROCHLORIDE 25 MG: 25 TABLET ORAL at 08:26

## 2024-11-28 RX ADMIN — HYDROXYZINE HYDROCHLORIDE 25 MG: 25 TABLET ORAL at 21:12

## 2024-11-28 RX ADMIN — LISINOPRIL 10 MG: 10 TABLET ORAL at 08:26

## 2024-11-28 RX ADMIN — CARIPRAZINE 3 MG: 3 CAPSULE, GELATIN COATED ORAL at 08:26

## 2024-11-28 NOTE — NURSING NOTE
Germain maintained on ongoing Safe precaution without incident  on this shift.  Awake, alert, isolative and cooperative upon approach.  Attended and participated 4 out of 7 group today.  Continues to be compliant with medication regimen.   Denies any pain, or discomfort.  Denies delusion or anxiety.  No overt delusion or A/T/V hallucination noted. Behavior control.

## 2024-11-28 NOTE — NURSING NOTE
Pt is accepting of medications and meal compliant. Pt is polite, pleasant and brightens on approach. Pt denies s/s and offers no new concerns.

## 2024-11-28 NOTE — PROGRESS NOTES
"Progress Note - Behavioral Health   Name: Deyvi Cao 54 y.o. male I MRN: [id number]  Unit/Bed#: [facility] 101-02 I Date of Admission: 6/25/2024   Date of Service: 11/27/2024 I Hospital Day: 155     Assessment & Plan  Bipolar disorder with severe depression (HCC)  Reviewed 11/28/2024     Deyvi is pleasant and cooperative with assessment. Denies any anxiety or depression. Denies SI,HI,AH,VH. Has been visible on unit.     Accept[facility] by [facility]; has ACT team on 12/3 and anticipated discharge on 12/4/2024  Continue medications as follows:  Vraylar 3mg PO Daily  Atarax 25mg PO TID  Lamictal 50mg PO Daily  Zoloft 150mg PO QHS  Continue with individual therapy, group therapy, milieu therapy and occupational therapy   Behavioral Health checks every 7 minutes   Continue frequent safety checks and vitals per unit protocol  Continue with [facility] medical management as indicated  Benign essential hypertension  Managed by [facility] - reviewed   Continue Lisinopril 10mg PO Daily  Mixed hyperlipidemia  Managed by [facility] - reviewed   Continue Lipitor 10mg PO Daily  Allergic rhinitis due to allergen  Managed by [facility] - reviewed   Rosacea  Managed by [facility] - reviewed   Vitamin D3 deficiency  Managed by [facility] - reviewed   Low vitamin B12 level  Managed by [facility] - reviewed     Progress Note - Behavioral Health     Deyvi Cao 54 y.o. male MRN: [id number]   Unit/Bed#: [facility] 101-02 Encounter: [id number]    Behavior over the last 24 hours: unchanged.     Deyvi was seen and evaluated today. Per nursing, patient with brighter affect, still isolative and only coffee group attended so far. Pt reports no s/s, voices no concerns. Pt takes all medication without issue.     Today patient states his mood is \"stable\". He is found resting in bed. He states that he was admitted to the hospital to get help with depression and that both his depression and his anxiety are improving.  In regard to medication tolerance, denies side effects.  Patient denies " "SI/HI/AH/VH. In regard to sleep and appetite, denies disturbances.  No acute events over the past 24H.       ROS: no complaints    Mental Status Evaluation:    Appearance:  age appropriate, dressed appropriately, adequate grooming, in bed   Behavior:  pleasant, cooperative, calm   Speech:  normal rate, normal volume, normal pitch   Mood:  \"Stable\"   Affect:  normal range and intensity, appropriate   Thought Process:  organized, logical, goal directed   Associations: intact associations   Thought Content:  no overt delusions   Perceptual Disturbances: no auditory hallucinations, no visual hallucinations   Risk Potential: Suicidal ideation - None  Homicidal ideation - None   Sensorium:  oriented to person, place, and time/date   Memory:  recent and remote memory grossly intact   Consciousness:  alert   Attention: attention span and concentration are age appropriate   Insight:  fair   Judgment: fair   Gait/Station: In bed   Motor Activity: no abnormal movements     Vital signs in last 24 hours:    Temp:  [96.8 °F (36 °C)-97.8 °F (36.6 °C)] 97.8 °F (36.6 °C)  HR:  [65-96] 96  BP: (127-137)/(79-86) 127/79  Resp:  [18] 18  SpO2:  [90 %-96 %] 96 %  O2 Device: None (Room air)    Laboratory results: I have personally reviewed all pertinent laboratory/tests results    Results from the past 24 hours: No results found for this or any previous visit (from the past 24 hours).    Progress Toward Goals: progressing    Assessment & Plan   Principal Problem:    Bipolar disorder with severe depression (HCC)  Active Problems:    Benign essential hypertension    Mixed hyperlipidemia    Allergic rhinitis due to allergen    Medical clearance for psychiatric admission    Rosacea    Vitamin D3 deficiency    Low vitamin B12 level      Recommended Treatment:     Planned medication and treatment changes:    All current active medications have been reviewed  Encourage group therapy, milieu therapy and occupational therapy  Behavioral Health " checks every 7 minutes  Continue current medications:    Current Facility-Administered Medications   Medication Dose Route Frequency Provider Last Rate    acetaminophen  650 mg Oral Q6H PRN ALVINA Harley      acetaminophen  650 mg Oral Q4H PRN ALVINA Harley      acetaminophen  975 mg Oral Q6H PRN ALVINA Harley      aluminum-magnesium hydroxide-simethicone  30 mL Oral Q4H PRN ALVINA Harley      ammonium lactate   Topical BID PRN ALVINA Harley      atorvastatin  10 mg Oral Daily ALVINA Lewis      benztropine  1 mg Intramuscular Q4H PRN Max 6/day ALVINA Harley      benztropine  1 mg Oral Q4H PRN Max 6/day ALVINA Harley      bisacodyl  10 mg Rectal Daily PRN ALVINA Harley      cariprazine  3 mg Oral Daily Martin Cardenas MD      Cholecalciferol  2,000 Units Oral Daily ALVINA Lewis      cyanocobalamin  1,000 mcg Oral Daily ALVINA Lewis      hydrOXYzine HCL  25 mg Oral Q6H PRN Max 4/day ALVINA Harley      hydrOXYzine HCL  25 mg Oral TID Martin Cardenas MD      hydrOXYzine HCL  50 mg Oral Q4H PRN Max 4/day ALVINA Harley      Or    LORazepam  1 mg Intramuscular Q4H PRN ALVINA Harley      ketoconazole   Topical Daily PRN ALVINA Harley      lamoTRIgine  50 mg Oral Daily Martin Cardenas MD      lisinopril  10 mg Oral Daily ALVINA Lewis      LORazepam  1 mg Oral Q4H PRN Max 6/day ALVINA Harley      Or    LORazepam  2 mg Intramuscular Q6H PRN Max 3/day ALVINA Harley      OLANZapine  10 mg Oral Q3H PRN Max 3/day ALVINA Harley      Or    OLANZapine  10 mg Intramuscular Q3H PRN Max 3/day ALVINA Harley      OLANZapine  5 mg Oral Q3H PRN Max 6/day ALVINA Harley      Or    OLANZapine  5 mg Intramuscular Q3H PRN Max 6/day ALVINA Harley      OLANZapine  2.5 mg Oral Q3H PRN Max 8/day Melva Hangey, CRNP      polyethylene glycol  17 g Oral Daily PRN ALVINA Harley      propranolol  10 mg  Oral Q8H PRN ALVINA Harley      senna-docusate sodium  1 tablet Oral Daily PRN ALVINA Harley      sertraline  150 mg Oral HS Martin Cardenas MD       Risks / Benefits of Treatment:    Risks, benefits, and possible side effects of medications explained to patient and patient verbalizes understanding and agreement for treatment.    Counseling / Coordination of Care:    Patient's progress discussed with staff in treatment team meeting.  Medications, treatment progress and treatment plan reviewed with patient.    Ludivina Hernandez PA-C 11/27/24

## 2024-11-28 NOTE — NURSING NOTE
Pt is visible on the unit and social with select peers, playing cards in the dayroom. Consumed 75% of dinner. Took medications without incidence. Pt is polite and cooperative, looking forward to discharge. Denies anxiety, depression, and SI/HI/AVH at this time. No behavioral issues. Pt offers no complaints.

## 2024-11-28 NOTE — NURSING NOTE
Germani maintained on ongoing SAFE precaution without incident on this shift. Observed in bed resting with eyes closed, respiration even and unlabored. Q 15 minutes rounding implemented. No behavioral noted

## 2024-11-28 NOTE — PLAN OF CARE
Problem: Alteration in Thoughts and Perception  Goal: Treatment Goal: Gain control of psychotic behaviors/thinking, reduce/eliminate presenting symptoms and demonstrate improved reality functioning upon discharge  Outcome: Progressing  Goal: Refrain from acting on delusional thinking/internal stimuli  Description: Interventions:  - Monitor patient closely, per order   - Utilize least restrictive measures   - Set reasonable limits, give positive feedback for acceptable   - Administer medications as ordered and monitor of potential side effects  Outcome: Progressing  Goal: Recognize dysfunctional thoughts, communicate reality-based thoughts at the time of discharge  Description: Interventions:  - Provide medication and psycho-education to assist patient in compliance and developing insight into his/her illness   Outcome: Progressing  Goal: Complete daily ADLs, including personal hygiene independently, as able  Description: Interventions:  - Observe, teach, and assist patient with ADLS  - Monitor and promote a balance of rest/activity, with adequate nutrition and elimination   Outcome: Progressing     Problem: Ineffective Coping  Goal: Participates in unit activities  Description: Interventions:  - Provide therapeutic environment   - Provide required programming   - Redirect inappropriate behaviors   Outcome: Progressing  Goal: Patient/Family participate in treatment and DC plans  Description: Interventions:  - Provide therapeutic environment  Outcome: Progressing  Goal: Patient/Family verbalizes awareness of resources  Outcome: Progressing  Goal: Free from restraint events  Description: - Utilize least restrictive measures   - Provide behavioral interventions   - Redirect inappropriate behaviors   Outcome: Progressing     Problem: Risk for Self Injury/Neglect  Goal: Treatment Goal: Remain safe during length of stay, learn and adopt new coping skills, and be free of self-injurious ideation, impulses and acts at the  time of discharge  Outcome: Progressing  Goal: Refrain from harming self  Description: Interventions:  - Monitor patient closely, per order  - Develop a trusting relationship  - Supervise medication ingestion, monitor effects and side effects   Outcome: Progressing  Goal: Complete daily ADLs, including personal hygiene independently, as able  Description: Interventions:  - Observe, teach, and assist patient with ADLS  - Monitor and promote a balance of rest/activity, with adequate nutrition and elimination  Outcome: Progressing     Problem: Depression  Goal: Treatment Goal: Demonstrate behavioral control of depressive symptoms, verbalize feelings of improved mood/affect, and adopt new coping skills prior to discharge  Outcome: Progressing  Goal: Refrain from harming self  Description: Interventions:  - Monitor patient closely, per order   - Supervise medication ingestion, monitor effects and side effects   Outcome: Progressing  Goal: Refrain from isolation  Description: Interventions:  - Develop a trusting relationship   - Encourage socialization   Outcome: Progressing  Goal: Refrain from self-neglect  Outcome: Progressing  Goal: Complete daily ADLs, including personal hygiene independently, as able  Description: Interventions:  - Observe, teach, and assist patient with ADLS  -  Monitor and promote a balance of rest/activity, with adequate nutrition and elimination   Outcome: Progressing     Problem: Anxiety  Goal: Anxiety is at manageable level  Description: Interventions:  - Assess and monitor patient's anxiety level.   - Monitor for signs and symptoms (heart palpitations, chest pain, shortness of breath, headaches, nausea, feeling jumpy, restlessness, irritable, apprehensive).   - Collaborate with interdisciplinary team and initiate plan and interventions as ordered.  - [place] patient to unit/surroundings  - Explain treatment plan  - Encourage participation in care  - Encourage verbalization of concerns/fears  -  Identify coping mechanisms  - Assist in developing anxiety-reducing skills  - Administer/offer alternative therapies  - Limit or eliminate stimulants  Outcome: Progressing     Problem: Risk for Violence/Aggression Toward Others  Goal: Treatment Goal: Refrain from acts of violence/aggression during length of stay, and demonstrate improved impulse control at the time of discharge  Outcome: Progressing  Goal: Refrain from harming others  Outcome: Progressing  Goal: Control angry outbursts  Description: Interventions:  - Monitor patient closely, per order  - Ensure early verbal de-escalation  - Monitor prn medication needs  - Set reasonable/therapeutic limits, outline behavioral expectations, and consequences   - Provide a non-threatening milieu, utilizing the least restrictive interventions   Outcome: Progressing     Problem: Alteration in Orientation  Goal: Treatment Goal: Demonstrate a reduction of confusion and improved orientation to person, place, time and/or situation upon discharge, according to optimum baseline/ability  Outcome: Progressing  Goal: Interact with staff daily  Description: Interventions:  - Assess and re-assess patient's level of orientation  - Engage patient in 1 on 1 interactions, daily, for a minimum of 15 minutes   - Establish rapport/trust with patient   Outcome: Progressing  Goal: Complete daily ADLs, including personal hygiene independently, as able  Description: Interventions:  - Observe, teach, and assist patient with ADLS  Outcome: Progressing     Problem: DISCHARGE PLANNING - CARE MANAGEMENT  Goal: Discharge to post-acute care or home with appropriate resources  Description: INTERVENTIONS:  - Conduct assessment to determine patient/family and health care team treatment goals, and need for post-acute services based on payer coverage, community resources, and patient preferences, and barriers to discharge  - Address psychosocial, clinical, and financial barriers to discharge as identified  in assessment in conjunction with the patient/family and health care team  - Arrange appropriate level of post-acute services according to patient’s   needs and preference and payer coverage in collaboration with the physician and health care team  - Communicate with and update the patient/family, physician, and health care team regarding progress on the discharge plan  - Arrange appropriate transportation to post-acute venues  Outcome: Progressing

## 2024-11-28 NOTE — ASSESSMENT & PLAN NOTE
Reviewed 11/28/2024     Deyvi is pleasant and cooperative with assessment. Denies any anxiety or depression. Denies SI,HI,AH,VH. Has been visible on unit.     [facility]; has ACT team on 12/3 and anticipated discharge on 12/4/2024  Continue medications as follows:  Vraylar 3mg PO Daily  Atarax 25mg PO TID  Lamictal 50mg PO Daily  Zoloft 150mg PO QHS  Continue with individual therapy, group therapy, milieu therapy and occupational therapy   Behavioral Health checks every 7 minutes   Continue frequent safety checks and vitals per unit protocol  Continue with [facility] medical management as indicated

## 2024-11-29 PROCEDURE — 99232 SBSQ HOSP IP/OBS MODERATE 35: CPT

## 2024-11-29 RX ADMIN — LISINOPRIL 10 MG: 10 TABLET ORAL at 08:50

## 2024-11-29 RX ADMIN — SERTRALINE HYDROCHLORIDE 150 MG: 100 TABLET ORAL at 21:15

## 2024-11-29 RX ADMIN — LAMOTRIGINE 50 MG: 25 TABLET ORAL at 08:50

## 2024-11-29 RX ADMIN — HYDROXYZINE HYDROCHLORIDE 25 MG: 25 TABLET ORAL at 16:36

## 2024-11-29 RX ADMIN — HYDROXYZINE HYDROCHLORIDE 25 MG: 25 TABLET ORAL at 21:15

## 2024-11-29 RX ADMIN — HYDROXYZINE HYDROCHLORIDE 25 MG: 25 TABLET ORAL at 08:50

## 2024-11-29 RX ADMIN — CARIPRAZINE 3 MG: 3 CAPSULE, GELATIN COATED ORAL at 08:50

## 2024-11-29 RX ADMIN — CYANOCOBALAMIN TAB 1000 MCG 1000 MCG: 1000 TAB at 08:50

## 2024-11-29 RX ADMIN — CHOLECALCIFEROL TAB 25 MCG (1000 UNIT) 2000 UNITS: 25 TAB at 08:50

## 2024-11-29 RX ADMIN — ATORVASTATIN CALCIUM 10 MG: 10 TABLET, FILM COATED ORAL at 08:50

## 2024-11-29 NOTE — NURSING NOTE
"Pt is accepting of medications without incidence and meal compliant. Pt is visible in the milieu intermittently or resting in bed. Pt reports he is \"excited for his discharge\". Pt brightens with conversation and is polite and pleasant. Pt is social with roommate at times and attends select few groups. No new concerns at this time.   "

## 2024-11-29 NOTE — PROGRESS NOTES
11/29/24 0709   Team Meeting   Meeting Type Daily Rounds   Team Members Present   Team Members Present Physician;Nurse;;Other (Discipline and Name)   Physician Team Member Holter, Noonan CRNP   Nursing Team Member Chastity   Social Work Team Member Henrry FRY   Patient/Family Present   Patient Present No   Patient's Family Present No     Groups Participation  /9.   Patient's compliant with medications. D/C to [facility] on 12/4. Interacts with his peers. Engaged in some treatment.

## 2024-11-29 NOTE — NURSING NOTE
Deyvi has not had any issues or behaviors. He appeared to be sleeping since the beginning of the shift. Change in position noted. Respirations easy and non labored. Continue to monitor. Precautions maintained.

## 2024-11-29 NOTE — SOCIAL WORK
This writer called [facility] ([phone]) and confirmed they received his consent packet. This writer attempted to schedule his PCP appointment, however they were not available. This writer will attempt at a later time. This writer scheduled patient's PCP appointment for 12/9@2889.

## 2024-11-29 NOTE — PLAN OF CARE
Problem: Alteration in Thoughts and Perception  Goal: Treatment Goal: Gain control of psychotic behaviors/thinking, reduce/eliminate presenting symptoms and demonstrate improved reality functioning upon discharge  Outcome: Progressing  Goal: Refrain from acting on delusional thinking/internal stimuli  Description: Interventions:  - Monitor patient closely, per order   - Utilize least restrictive measures   - Set reasonable limits, give positive feedback for acceptable   - Administer medications as ordered and monitor of potential side effects  Outcome: Progressing  Goal: Agree to be compliant with medication regime, as prescribed and report medication side effects  Description: Interventions:  - Offer appropriate PRN medication and supervise ingestion; conduct AIMS, as needed   Outcome: Progressing  Goal: Recognize dysfunctional thoughts, communicate reality-based thoughts at the time of discharge  Description: Interventions:  - Provide medication and psycho-education to assist patient in compliance and developing insight into his/her illness   Outcome: Progressing  Goal: Complete daily ADLs, including personal hygiene independently, as able  Description: Interventions:  - Observe, teach, and assist patient with ADLS  - Monitor and promote a balance of rest/activity, with adequate nutrition and elimination   Outcome: Progressing     Problem: Ineffective Coping  Goal: Participates in unit activities  Description: Interventions:  - Provide therapeutic environment   - Provide required programming   - Redirect inappropriate behaviors   Outcome: Progressing  Goal: Patient/Family participate in treatment and DC plans  Description: Interventions:  - Provide therapeutic environment  Outcome: Progressing  Goal: Patient/Family verbalizes awareness of resources  Outcome: Progressing  Goal: Understands least restrictive measures  Description: Interventions:  - Utilize least restrictive behavior  Outcome: Progressing  Goal:  Free from restraint events  Description: - Utilize least restrictive measures   - Provide behavioral interventions   - Redirect inappropriate behaviors   Outcome: Progressing     Problem: Risk for Self Injury/Neglect  Goal: Treatment Goal: Remain safe during length of stay, learn and adopt new coping skills, and be free of self-injurious ideation, impulses and acts at the time of discharge  Outcome: Progressing  Goal: Verbalize thoughts and feelings  Description: Interventions:  - Assess and re-assess patient's lethality and potential for self-injury  - Engage patient in 1:1 interactions, daily, for a minimum of 15 minutes  - Encourage patient to express feelings, fears, frustrations, hopes  - Establish rapport/trust with patient   Outcome: Progressing  Goal: Refrain from harming self  Description: Interventions:  - Monitor patient closely, per order  - Develop a trusting relationship  - Supervise medication ingestion, monitor effects and side effects   Outcome: Progressing  Goal: Attend and participate in unit activities, including therapeutic, recreational, and educational groups  Description: Interventions:  - Provide therapeutic and educational activities daily, encourage attendance and participation, and document same in the medical record  - Obtain collateral information, encourage visitation and family involvement in care   Outcome: Progressing  Goal: Recognize maladaptive responses and adopt new coping mechanisms  Outcome: Progressing  Goal: Complete daily ADLs, including personal hygiene independently, as able  Description: Interventions:  - Observe, teach, and assist patient with ADLS  - Monitor and promote a balance of rest/activity, with adequate nutrition and elimination  Outcome: Progressing     Problem: Depression  Goal: Treatment Goal: Demonstrate behavioral control of depressive symptoms, verbalize feelings of improved mood/affect, and adopt new coping skills prior to discharge  Outcome:  Progressing  Goal: Verbalize thoughts and feelings  Description: Interventions:  - Assess and re-assess patient's level of risk   - Engage patient in 1:1 interactions, daily, for a minimum of 15 minutes   - Encourage patient to express feelings, fears, frustrations, hopes   Outcome: Progressing  Goal: Refrain from harming self  Description: Interventions:  - Monitor patient closely, per order   - Supervise medication ingestion, monitor effects and side effects   Outcome: Progressing  Goal: Refrain from isolation  Description: Interventions:  - Develop a trusting relationship   - Encourage socialization   Outcome: Progressing  Goal: Refrain from self-neglect  Outcome: Progressing  Goal: Attend and participate in unit activities, including therapeutic, recreational, and educational groups  Description: Interventions:  - Provide therapeutic and educational activities daily, encourage attendance and participation, and document same in the medical record   Outcome: Progressing  Goal: Complete daily ADLs, including personal hygiene independently, as able  Description: Interventions:  - Observe, teach, and assist patient with ADLS  -  Monitor and promote a balance of rest/activity, with adequate nutrition and elimination   Outcome: Progressing     Problem: Anxiety  Goal: Anxiety is at manageable level  Description: Interventions:  - Assess and monitor patient's anxiety level.   - Monitor for signs and symptoms (heart palpitations, chest pain, shortness of breath, headaches, nausea, feeling jumpy, restlessness, irritable, apprehensive).   - Collaborate with interdisciplinary team and initiate plan and interventions as ordered.  - [place] patient to unit/surroundings  - Explain treatment plan  - Encourage participation in care  - Encourage verbalization of concerns/fears  - Identify coping mechanisms  - Assist in developing anxiety-reducing skills  - Administer/offer alternative therapies  - Limit or eliminate  stimulants  Outcome: Progressing     Problem: Risk for Violence/Aggression Toward Others  Goal: Treatment Goal: Refrain from acts of violence/aggression during length of stay, and demonstrate improved impulse control at the time of discharge  Outcome: Progressing  Goal: Verbalize thoughts and feelings  Description: Interventions:  - Assess and re-assess patient's level of risk, every waking shift  - Engage patient in 1:1 interactions, daily, for a minimum of 15 minutes   - Allow patient to express feelings and frustrations in a safe and non-threatening manner   - Establish rapport/trust with patient   Outcome: Progressing  Goal: Refrain from harming others  Outcome: Progressing  Goal: Refrain from destructive acts on the environment or property  Outcome: Progressing  Goal: Control angry outbursts  Description: Interventions:  - Monitor patient closely, per order  - Ensure early verbal de-escalation  - Monitor prn medication needs  - Set reasonable/therapeutic limits, outline behavioral expectations, and consequences   - Provide a non-threatening milieu, utilizing the least restrictive interventions   Outcome: Progressing  Goal: Attend and participate in unit activities, including therapeutic, recreational, and educational groups  Description: Interventions:  - Provide therapeutic and educational activities daily, encourage attendance and participation, and document same in the medical record   Outcome: Progressing  Goal: Identify appropriate positive anger management techniques  Description: Interventions:  - Offer anger management and coping skills groups   - Staff will provide positive feedback for appropriate anger control  Outcome: Progressing     Problem: Alteration in Orientation  Goal: Treatment Goal: Demonstrate a reduction of confusion and improved orientation to person, place, time and/or situation upon discharge, according to optimum baseline/ability  Outcome: Progressing  Goal: Interact with staff  daily  Description: Interventions:  - Assess and re-assess patient's level of orientation  - Engage patient in 1 on 1 interactions, daily, for a minimum of 15 minutes   - Establish rapport/trust with patient   Outcome: Progressing  Goal: Express concerns related to confused thinking related to:  Description: Interventions:  - Encourage patient to express feelings, fears, frustrations, hopes  - Assign consistent caregivers   - [place]/re-orient patient as needed  - Allow comfort items, as appropriate  - Provide visual cues, signs, etc.   Outcome: Progressing  Goal: Allow medical examinations, as recommended  Description: Interventions:  - Provide physical/neurological exams and/or referrals, per provider   Outcome: Progressing  Goal: Cooperate with recommended testing/procedures  Description: Interventions:  - Determine need for ancillary testing  - Observe for mental status changes  - Implement falls/precaution protocol   Outcome: Progressing  Goal: Attend and participate in unit activities, including therapeutic, recreational, and educational groups  Description: Interventions:  - Provide therapeutic and educational activities daily, encourage attendance and participation, and document same in the medical record   - Provide appropriate opportunities for reminiscence   - Provide a consistent daily routine   - Encourage family contact/visitation   Outcome: Progressing  Goal: Complete daily ADLs, including personal hygiene independently, as able  Description: Interventions:  - Observe, teach, and assist patient with ADLS  Outcome: Progressing     Problem: DISCHARGE PLANNING - CARE MANAGEMENT  Goal: Discharge to post-acute care or home with appropriate resources  Description: INTERVENTIONS:  - Conduct assessment to determine patient/family and health care team treatment goals, and need for post-acute services based on payer coverage, community resources, and patient preferences, and barriers to discharge  - Address  psychosocial, clinical, and financial barriers to discharge as identified in assessment in conjunction with the patient/family and health care team  - Arrange appropriate level of post-acute services according to patient’s   needs and preference and payer coverage in collaboration with the physician and health care team  - Communicate with and update the patient/family, physician, and health care team regarding progress on the discharge plan  - Arrange appropriate transportation to post-acute venues  Outcome: Progressing

## 2024-11-29 NOTE — NURSING NOTE
Deyvi has been visible intermittently in the milieu. Pleasant and cooperative upon approach. Denies anxiety, depression, voices and pain. Interacts with select peers. Played cards with male peers in the dining room. Participated in Community Meeting, Evening Walk and Coping Skills group. Ate 100% of his meal. Took medication without difficulty. No issues or behaviors. Continue to monitor. Precautions maintained.

## 2024-11-29 NOTE — PROGRESS NOTES
Progress Note - Behavioral Health   Name: Deyvi Cao 54 y.o. male I MRN: [id number]   Unit/Bed#: [facility] 101-02 I Date of Admission: 6/25/2024   Date of Service: 11/29/2024 I Hospital Day: 157         Assessment & Plan  Bipolar disorder with severe depression (HCC)  Reviewed 11/28/2024     Deyvi is pleasant and cooperative with assessment. Denies any anxiety or depression. Denies SI,HI,AH,VH. Has been visible on unit.     Accepted by [facility]; has ACT team on 12/3 and anticipated discharge on 12/4/2024  Continue medications as follows:  Vraylar 3mg PO Daily  Atarax 25mg PO TID  Lamictal 50mg PO Daily  Zoloft 150mg PO QHS  Continue with individual therapy, group therapy, milieu therapy and occupational therapy   Behavioral Health checks every 7 minutes   Continue frequent safety checks and vitals per unit protocol  Continue with [facility] medical management as indicated  Benign essential hypertension  Managed by [facility] - reviewed   Continue Lisinopril 10mg PO Daily  Mixed hyperlipidemia  Managed by [facility] - reviewed   Continue Lipitor 10mg PO Daily  Allergic rhinitis due to allergen  Managed by [facility] - reviewed   Rosacea  Managed by [facility] - reviewed   Vitamin D3 deficiency  Managed by [facility] - reviewed   Low vitamin B12 level  Managed by [facility] - reviewed         Recommended Treatment:     Treatment plan and medication changes discussed and per the attending physician the plan is:     1.Continue with group therapy, milieu therapy and occupational therapy  2.Behavioral Health checks every 15 minutes  3.Continue frequent safety checks and vitals per unit protocol  4.Continue with [facility] medical management as indicated  5.Continue with current medication regimen  6.Will review labs in the a.m.  7.Disposition Planning: Discharge planning and efforts remain ongoing     Planned medication and treatment changes:    All current active medications have been reviewed  Encourage group therapy, milieu therapy and occupational  therapy  Behavioral Health checks for safety monitoring  Continue treatment with group therapy, milieu therapy and occupational therapy  Discharge planning  Plan for discharge 12/4/2024  Continue current medications:    Current medications:  Current Facility-Administered Medications   Medication Dose Route Frequency Provider Last Rate    acetaminophen  650 mg Oral Q6H PRN ALVINA Harley      acetaminophen  650 mg Oral Q4H PRN ALVINA Harley      acetaminophen  975 mg Oral Q6H PRN ALVINA Harley      aluminum-magnesium hydroxide-simethicone  30 mL Oral Q4H PRN ALVINA Harley      ammonium lactate   Topical BID PRN ALVINA Harley      atorvastatin  10 mg Oral Daily ALVINA Lewis      benztropine  1 mg Intramuscular Q4H PRN Max 6/day ALVINA Harley      benztropine  1 mg Oral Q4H PRN Max 6/day ALVINA Harley      bisacodyl  10 mg Rectal Daily PRN ALVINA Harley      cariprazine  3 mg Oral Daily Martin Cardenas MD      Cholecalciferol  2,000 Units Oral Daily ALVINA Lewis      cyanocobalamin  1,000 mcg Oral Daily ALVINA Lewis      hydrOXYzine HCL  25 mg Oral Q6H PRN Max 4/day ALVINA Hraley      hydrOXYzine HCL  25 mg Oral TID Martin Cardenas MD      hydrOXYzine HCL  50 mg Oral Q4H PRN Max 4/day ALVINA Harley      Or    LORazepam  1 mg Intramuscular Q4H PRN ALVINA Harley      ketoconazole   Topical Daily PRN ALVINA Harley      lamoTRIgine  50 mg Oral Daily Martin Cardenas MD      lisinopril  10 mg Oral Daily ALVINA Lewis      LORazepam  1 mg Oral Q4H PRN Max 6/day ALVINA Harley      Or    LORazepam  2 mg Intramuscular Q6H PRN Max 3/day ALVINA Harley      OLANZapine  10 mg Oral Q3H PRN Max 3/day ALVINA Harley      Or    OLANZapine  10 mg Intramuscular Q3H PRN Max 3/day ALVINA Harley      OLANZapine  5 mg Oral Q3H PRN Max 6/day ALVINA Harley      Or    OLANZapine  5 mg Intramuscular Q3H  PRN Max 6/day Melva ALVINA Knutson      OLANZapine  2.5 mg Oral Q3H PRN Max 8/day Melva ALVINA Knutson      polyethylene glycol  17 g Oral Daily PRN Melva ALVINA Knutson      propranolol  10 mg Oral Q8H PRN Melva AndersonALVINA ramos      senna-docusate sodium  1 tablet Oral Daily PRN Melva AndersonALVINA ramos      sertraline  150 mg Oral HS Martin Cardenas MD         Risks / Benefits of Treatment:    Risks, benefits, and possible side effects of medications explained to patient and patient verbalizes understanding and agreement for treatment.    Subjective:    Behavior over the last 24 hours: unchanged.     Per staff, Deyvi has been denying any current anxiety or depression.  He had no recent acute behavioral issues.  He attend 4/11 groups on Wednesday.  Plan for him to discharge with ACT team on 12/4/2024.    Deyvi was seen in his room for psychiatric follow up today.  He was calm and cooperative with the interview.  He is denying any side effects from current medications.  He reports looking forward to discharge as he is looking to eat a decent slice of pizza.  He is able to verbalize his current medications.  He has been social with select peers on the unit.  He appears future thinking.  He is denying any current anxiety or depression.  He denies suicidal or homicidal ideation intent or plan.  He denies auditory or visual hallucinations.  He does not appear to be internally preoccupied or RIS.    Sleep: normal  Appetite: normal  Medication side effects: No   ROS: no complaints    Mental Status Evaluation:    Appearance:  casually dressed, adequate grooming   Behavior:  cooperative, calm   Speech:  normal rate and volume   Mood:  euthymic   Affect:  constricted   Thought Process:  organized, goal directed, linear   Associations: intact associations   Thought Content:  no overt delusions   Perceptual Disturbances: none   Risk Potential: Suicidal ideation - None  Homicidal ideation - None  Potential for aggression - No   Sensorium:   oriented to person, place, time/date, and situation   Memory:  recent memory intact   Consciousness:  alert and awake   Attention/Concentration: attention span and concentration are age appropriate   Insight:  fair   Judgment: fair   Gait/Station: normal gait/station   Motor Activity: no abnormal movements     Vital signs in last 24 hours:    Temp:  [97.3 °F (36.3 °C)-97.5 °F (36.4 °C)] 97.3 °F (36.3 °C)  HR:  [] 65  BP: (131-136)/(73-86) 136/86  Resp:  [18] 18  SpO2:  [94 %-97 %] 97 %  O2 Device: None (Room air)         Laboratory results: I have personally reviewed all pertinent laboratory/tests results    Results from the past 24 hours: No results found for this or any previous visit (from the past 24 hours).  Most Recent Labs:   Lab Results   Component Value Date    WBC 7.39 06/26/2024    RBC 4.70 06/26/2024    HGB 15.2 06/26/2024    HCT 45.5 06/26/2024     06/26/2024    RDW 12.9 06/26/2024    NEUTROABS 4.63 06/26/2024    SODIUM 137 06/26/2024    K 4.1 06/26/2024     06/26/2024    CO2 26 06/26/2024    BUN 17 06/26/2024    CREATININE 0.63 06/26/2024    GLUC 98 06/26/2024    CALCIUM 9.6 06/26/2024    AST 25 06/26/2024    ALT 45 06/26/2024    ALKPHOS 114 (H) 06/26/2024    TP 7.0 06/26/2024    ALB 4.4 06/26/2024    TBILI 0.44 06/26/2024    CHOLESTEROL 177 06/26/2024    HDL 52 06/26/2024    TRIG 73 06/26/2024    LDLCALC 110 (H) 06/26/2024    NONHDLC 125 06/26/2024    LITHIUM 0.4 (L) 01/28/2021    OEO5GGAAMVCC 2.636 06/26/2024    HGBA1C 5.2 11/22/2023     11/22/2023       Suicide/Homicide Risk Assessment:    Risk of Harm to Self:   Nursing Suicide Risk Assessment Last 24 hours: C-SSRS Risk (Since Last Contact)  Calculated C-SSRS Risk Score (Since Last Contact): No Risk Indicated  Current Specific Risk Factors include: diagnosis of mood disorder  Protective Factors: no current suicidal ideation, ability to communicate with staff on the unit, able to contract for safety on the unit, taking  medications as ordered on the unit, ability to adapt to change, improved mood, responds to redirection  Based on today's assessment, Deyvi presents the following risk of harm to self: low    Risk of Harm to Others:  Nursing Homicide Risk Assessment: Violence Risk to Others: Denies within past 6 months  Current Specific Risk Factors include: social difficulties  Protective Factors: no current homicidal ideation, able to communicate with staff on the unit, improved mood, no current psychotic symptoms, compliant with medications on the unit as ordered, compliant with unit milieu, follows staff redirection  Based on today's assessment, Deyvi presents the following risk of harm to others: low    The following interventions are recommended: Behavioral Health checks for safety monitoring, continued hospitalization on locked unit    Progress Toward Goals: progressing, attends groups, participates in milieu therapy, mood is stabilizing, depression is improving, working on coping skills    Counseling / Coordination of Care:    Total floor / unit time spent today 30 minutes. Greater than 50% of total time was spent with the patient and / or family counseling and / or coordination of care. A description of counseling / coordination of care:    Administrative Statements   Topics discussed with the patient / family include medication review.    ALVINA Merchant 11/29/24

## 2024-11-30 PROCEDURE — 99232 SBSQ HOSP IP/OBS MODERATE 35: CPT

## 2024-11-30 RX ADMIN — LAMOTRIGINE 50 MG: 25 TABLET ORAL at 08:30

## 2024-11-30 RX ADMIN — HYDROXYZINE HYDROCHLORIDE 25 MG: 25 TABLET ORAL at 08:30

## 2024-11-30 RX ADMIN — CARIPRAZINE 3 MG: 3 CAPSULE, GELATIN COATED ORAL at 08:30

## 2024-11-30 RX ADMIN — CYANOCOBALAMIN TAB 1000 MCG 1000 MCG: 1000 TAB at 08:30

## 2024-11-30 RX ADMIN — HYDROXYZINE HYDROCHLORIDE 25 MG: 25 TABLET ORAL at 17:13

## 2024-11-30 RX ADMIN — LISINOPRIL 10 MG: 10 TABLET ORAL at 08:30

## 2024-11-30 RX ADMIN — HYDROXYZINE HYDROCHLORIDE 25 MG: 25 TABLET ORAL at 21:28

## 2024-11-30 RX ADMIN — SERTRALINE HYDROCHLORIDE 150 MG: 100 TABLET ORAL at 21:28

## 2024-11-30 RX ADMIN — CHOLECALCIFEROL TAB 25 MCG (1000 UNIT) 2000 UNITS: 25 TAB at 08:30

## 2024-11-30 RX ADMIN — ATORVASTATIN CALCIUM 10 MG: 10 TABLET, FILM COATED ORAL at 08:30

## 2024-11-30 NOTE — PROGRESS NOTES
Progress Note - Behavioral Health   Name: Deyvi Cao 54 y.o. male I MRN: [id number]   Unit/Bed#: [facility] 101-02 I Date of Admission: 6/25/2024   Date of Service: 11/30/2024 I Hospital Day: 158         Assessment & Plan  Bipolar disorder with severe depression (HCC)  Reviewed 11/30/2024  Deyvi is pleasant and cooperative with assessment,  reports depression and anxiety have been generally well controlled.  He denies current passive or active SI/HI with plan or intent.  He denies any AVH.  He reports he is looking forward to anticipated discharge on 12/4/2024.    Accepted by [facility]; has ACT team on 12/3 and anticipated discharge on 12/4/2024  Continue medications as follows:  Vraylar 3mg PO Daily  Atarax 25mg PO TID  Lamictal 50mg PO Daily  Zoloft 150mg PO QHS  Continue with individual therapy, group therapy, milieu therapy and occupational therapy   Behavioral Health checks every 7 minutes   Continue frequent safety checks and vitals per unit protocol  Continue with [facility] medical management as indicated  Benign essential hypertension  Managed by [facility] - reviewed   Continue Lisinopril 10mg PO Daily  Mixed hyperlipidemia  Managed by [facility] - reviewed   Continue Lipitor 10mg PO Daily  Allergic rhinitis due to allergen  Managed by [facility] - reviewed   Rosacea  Managed by [facility] - reviewed   Vitamin D3 deficiency  Managed by [facility] - reviewed   Low vitamin B12 level  Managed by [facility] - reviewed        Current medications:    Current Facility-Administered Medications   Medication Dose Route Frequency Provider Last Rate    acetaminophen  650 mg Oral Q6H PRN ALVINA Harley      acetaminophen  650 mg Oral Q4H PRN ALVINA Harley      acetaminophen  975 mg Oral Q6H PRN ALVINA Harley      aluminum-magnesium hydroxide-simethicone  30 mL Oral Q4H PRN ALVINA Harley      ammonium lactate   Topical BID PRN ALVINA Harley      atorvastatin  10 mg Oral Daily ALVINA Lewis      benztropine  1 mg  Intramuscular Q4H PRN Max 6/day ALVINA Harley      benztropine  1 mg Oral Q4H PRN Max 6/day ALVINA Harley      bisacodyl  10 mg Rectal Daily PRN ALVINA Harley      cariprazine  3 mg Oral Daily Martin Cardenas MD      Cholecalciferol  2,000 Units Oral Daily Sary ALVINA Gupta      cyanocobalamin  1,000 mcg Oral Daily SaryALVINA Starkey      hydrOXYzine HCL  25 mg Oral Q6H PRN Max 4/day ALVINA Harley      hydrOXYzine HCL  25 mg Oral TID Martin Cardenas MD      hydrOXYzine HCL  50 mg Oral Q4H PRN Max 4/day ALVINA Harley      Or    LORazepam  1 mg Intramuscular Q4H PRN ALVINA Harley      ketoconazole   Topical Daily PRN ALVINA Harley      lamoTRIgine  50 mg Oral Daily Martin Cardenas MD      lisinopril  10 mg Oral Daily ALVINA Lewis      LORazepam  1 mg Oral Q4H PRN Max 6/day ALVINA Harley      Or    LORazepam  2 mg Intramuscular Q6H PRN Max 3/day ALVINA Harley      OLANZapine  10 mg Oral Q3H PRN Max 3/day ALVINA Harley      Or    OLANZapine  10 mg Intramuscular Q3H PRN Max 3/day ALVINA Harley      OLANZapine  5 mg Oral Q3H PRN Max 6/day ALVINA Harley      Or    OLANZapine  5 mg Intramuscular Q3H PRN Max 6/day ALVINA Harley      OLANZapine  2.5 mg Oral Q3H PRN Max 8/day ALVINA Harley      polyethylene glycol  17 g Oral Daily PRN ALVINA Harley      propranolol  10 mg Oral Q8H PRN ALVINA Harley      senna-docusate sodium  1 tablet Oral Daily PRN ALVINA Harley      sertraline  150 mg Oral HS Martin Cardenas MD         Risks / Benefits of Treatment:    Risks, benefits, and possible side effects of medications explained to patient and patient verbalizes understanding and agreement for treatment.    Treatment Planning:     All current active medications have been reviewed  Encourage group therapy, milieu therapy and occupational therapy  Behavioral Health checks for safety monitoring  Discharge  planning  Continue current medications:  Estimated Discharge Date: 12/4/2024 4 days - 12/4/2024    Subjective:    Behavior over the last 24 hours: unchanged.     Deyvi reports depression and anxiety have been generally well controlled.  He denies any current passive or active SI/HI with plan or intent.  He denies AVH.  He reports he slept well overnight and appetite is good.  He reports he is looking forward to anticipated discharge on 12/4/2024.    Sleep: normal  Appetite: normal  Medication side effects: No   ROS: no complaints    Mental Status Evaluation:    Appearance:  age appropriate, casually dressed   Behavior:  pleasant, cooperative, calm   Speech:  normal rate, normal volume, normal pitch   Mood:  euthymic   Affect:  constricted   Thought Process:  coherent, goal directed   Associations: intact associations   Thought Content:  no overt delusions   Perceptual Disturbances: denies auditory or visual hallucinations when asked, does not appear responding to internal stimuli   Risk Potential: Suicidal ideation - None  Homicidal ideation - None  Potential for aggression - No   Sensorium:  oriented to person, place, and time/date   Memory:  recent and remote memory grossly intact   Consciousness:  alert and awake   Attention/Concentration: attention span and concentration are age appropriate   Insight:  fair   Judgment: fair   Gait/Station: normal gait/station   Motor Activity: no abnormal movements     Vital signs in last 24 hours:    Temp:  [97.8 °F (36.6 °C)] 97.8 °F (36.6 °C)  HR:  [] 100  BP: (130-135)/(76-81) 135/76  Resp:  [18] 18  SpO2:  [98 %] 98 %  O2 Device: None (Room air)         Laboratory results: I have personally reviewed all pertinent laboratory/tests results    Results from the past 24 hours: No results found for this or any previous visit (from the past 24 hours).    Suicide/Homicide Risk Assessment:    Risk of Harm to Self:   Nursing Suicide Risk Assessment Last 24 hours: C-SSRS Risk  (Since Last Contact)  Calculated C-SSRS Risk Score (Since Last Contact): No Risk Indicated    Risk of Harm to Others:  Nursing Homicide Risk Assessment: Violence Risk to Others: Denies within past 6 months

## 2024-11-30 NOTE — ASSESSMENT & PLAN NOTE
Reviewed 11/30/2024  Deyvi is pleasant and cooperative with assessment,  reports depression and anxiety have been generally well controlled.  He denies current passive or active SI/HI with plan or intent.  He denies any AVH.  He reports he is looking forward to anticipated discharge on 12/4/2024.    Accepted by [facility]; has ACT team on 12/3 and anticipated discharge on 12/4/2024  Continue medications as follows:  Vraylar 3mg PO Daily  Atarax 25mg PO TID  Lamictal 50mg PO Daily  Zoloft 150mg PO QHS  Continue with individual therapy, group therapy, milieu therapy and occupational therapy   Behavioral Health checks every 7 minutes   Continue frequent safety checks and vitals per unit protocol  Continue with [facility] medical management as indicated

## 2024-11-30 NOTE — PLAN OF CARE
Problem: Alteration in Thoughts and Perception  Goal: Treatment Goal: Gain control of psychotic behaviors/thinking, reduce/eliminate presenting symptoms and demonstrate improved reality functioning upon discharge  Outcome: Progressing  Goal: Refrain from acting on delusional thinking/internal stimuli  Description: Interventions:  - Monitor patient closely, per order   - Utilize least restrictive measures   - Set reasonable limits, give positive feedback for acceptable   - Administer medications as ordered and monitor of potential side effects  Outcome: Progressing  Goal: Agree to be compliant with medication regime, as prescribed and report medication side effects  Description: Interventions:  - Offer appropriate PRN medication and supervise ingestion; conduct AIMS, as needed   Outcome: Progressing     Problem: Ineffective Coping  Goal: Demonstrates healthy coping skills  Outcome: Progressing  Goal: Participates in unit activities  Description: Interventions:  - Provide therapeutic environment   - Provide required programming   - Redirect inappropriate behaviors   Outcome: Progressing  Goal: Patient/Family participate in treatment and DC plans  Description: Interventions:  - Provide therapeutic environment  Outcome: Progressing  Goal: Free from restraint events  Description: - Utilize least restrictive measures   - Provide behavioral interventions   - Redirect inappropriate behaviors   Outcome: Progressing     Problem: Risk for Self Injury/Neglect  Goal: Verbalize thoughts and feelings  Description: Interventions:  - Assess and re-assess patient's lethality and potential for self-injury  - Engage patient in 1:1 interactions, daily, for a minimum of 15 minutes  - Encourage patient to express feelings, fears, frustrations, hopes  - Establish rapport/trust with patient   Outcome: Progressing  Goal: Refrain from harming self  Description: Interventions:  - Monitor patient closely, per order  - Develop a trusting  relationship  - Supervise medication ingestion, monitor effects and side effects   Outcome: Progressing  Goal: Attend and participate in unit activities, including therapeutic, recreational, and educational groups  Description: Interventions:  - Provide therapeutic and educational activities daily, encourage attendance and participation, and document same in the medical record  - Obtain collateral information, encourage visitation and family involvement in care   Outcome: Progressing  Goal: Recognize maladaptive responses and adopt new coping mechanisms  Outcome: Progressing  Goal: Complete daily ADLs, including personal hygiene independently, as able  Description: Interventions:  - Observe, teach, and assist patient with ADLS  - Monitor and promote a balance of rest/activity, with adequate nutrition and elimination  Outcome: Progressing     Problem: Depression  Goal: Treatment Goal: Demonstrate behavioral control of depressive symptoms, verbalize feelings of improved mood/affect, and adopt new coping skills prior to discharge  Outcome: Progressing  Goal: Refrain from harming self  Description: Interventions:  - Monitor patient closely, per order   - Supervise medication ingestion, monitor effects and side effects   Outcome: Progressing  Goal: Refrain from isolation  Description: Interventions:  - Develop a trusting relationship   - Encourage socialization   Outcome: Progressing  Goal: Refrain from self-neglect  Outcome: Progressing  Goal: Attend and participate in unit activities, including therapeutic, recreational, and educational groups  Description: Interventions:  - Provide therapeutic and educational activities daily, encourage attendance and participation, and document same in the medical record   Outcome: Progressing  Goal: Complete daily ADLs, including personal hygiene independently, as able  Description: Interventions:  - Observe, teach, and assist patient with ADLS  -  Monitor and promote a balance of  rest/activity, with adequate nutrition and elimination   Outcome: Progressing     Problem: Anxiety  Goal: Anxiety is at manageable level  Description: Interventions:  - Assess and monitor patient's anxiety level.   - Monitor for signs and symptoms (heart palpitations, chest pain, shortness of breath, headaches, nausea, feeling jumpy, restlessness, irritable, apprehensive).   - Collaborate with interdisciplinary team and initiate plan and interventions as ordered.  - [place] patient to unit/surroundings  - Explain treatment plan  - Encourage participation in care  - Encourage verbalization of concerns/fears  - Identify coping mechanisms  - Assist in developing anxiety-reducing skills  - Administer/offer alternative therapies  - Limit or eliminate stimulants  Outcome: Progressing     Problem: Risk for Violence/Aggression Toward Others  Goal: Treatment Goal: Refrain from acts of violence/aggression during length of stay, and demonstrate improved impulse control at the time of discharge  Outcome: Progressing  Goal: Verbalize thoughts and feelings  Description: Interventions:  - Assess and re-assess patient's level of risk, every waking shift  - Engage patient in 1:1 interactions, daily, for a minimum of 15 minutes   - Allow patient to express feelings and frustrations in a safe and non-threatening manner   - Establish rapport/trust with patient   Outcome: Progressing  Goal: Refrain from harming others  Outcome: Progressing  Goal: Control angry outbursts  Description: Interventions:  - Monitor patient closely, per order  - Ensure early verbal de-escalation  - Monitor prn medication needs  - Set reasonable/therapeutic limits, outline behavioral expectations, and consequences   - Provide a non-threatening milieu, utilizing the least restrictive interventions   Outcome: Progressing     Problem: Alteration in Orientation  Goal: Treatment Goal: Demonstrate a reduction of confusion and improved orientation to person, place,  time and/or situation upon discharge, according to optimum baseline/ability  Outcome: Progressing  Goal: Interact with staff daily  Description: Interventions:  - Assess and re-assess patient's level of orientation  - Engage patient in 1 on 1 interactions, daily, for a minimum of 15 minutes   - Establish rapport/trust with patient   Outcome: Progressing  Goal: Allow medical examinations, as recommended  Description: Interventions:  - Provide physical/neurological exams and/or referrals, per provider   Outcome: Progressing  Goal: Cooperate with recommended testing/procedures  Description: Interventions:  - Determine need for ancillary testing  - Observe for mental status changes  - Implement falls/precaution protocol   Outcome: Progressing  Goal: Attend and participate in unit activities, including therapeutic, recreational, and educational groups  Description: Interventions:  - Provide therapeutic and educational activities daily, encourage attendance and participation, and document same in the medical record   - Provide appropriate opportunities for reminiscence   - Provide a consistent daily routine   - Encourage family contact/visitation   Outcome: Progressing  Goal: Complete daily ADLs, including personal hygiene independently, as able  Description: Interventions:  - Observe, teach, and assist patient with ADLS  Outcome: Progressing     Problem: DISCHARGE PLANNING - CARE MANAGEMENT  Goal: Discharge to post-acute care or home with appropriate resources  Description: INTERVENTIONS:  - Conduct assessment to determine patient/family and health care team treatment goals, and need for post-acute services based on payer coverage, community resources, and patient preferences, and barriers to discharge  - Address psychosocial, clinical, and financial barriers to discharge as identified in assessment in conjunction with the patient/family and health care team  - Arrange appropriate level of post-acute services according  to patient’s   needs and preference and payer coverage in collaboration with the physician and health care team  - Communicate with and update the patient/family, physician, and health care team regarding progress on the discharge plan  - Arrange appropriate transportation to post-acute venues  Outcome: Progressing

## 2024-11-30 NOTE — NURSING NOTE
Pt is accepting of medications without incidence and meal compliant. Pt is polite, pleasant and brightens on approach. Pt sits with peers at times in the milieu, but mostly keeps to self during shift. Rests in bed at intervals. Pt denies s/s and offers no new concerns at this time.

## 2024-11-30 NOTE — NURSING NOTE
Received pt in bed at change of shift with eyes closed; chest movement noted.  Continues the same thus this far as per q 15 min room checks.   Will continue to monitor behavior, sleeping pattern and any medical issues that may arise.    0546: Sleeping 7+ hrs thus this far

## 2024-12-01 PROCEDURE — 99232 SBSQ HOSP IP/OBS MODERATE 35: CPT

## 2024-12-01 RX ADMIN — CHOLECALCIFEROL TAB 25 MCG (1000 UNIT) 2000 UNITS: 25 TAB at 08:40

## 2024-12-01 RX ADMIN — SERTRALINE HYDROCHLORIDE 150 MG: 100 TABLET ORAL at 21:21

## 2024-12-01 RX ADMIN — ATORVASTATIN CALCIUM 10 MG: 10 TABLET, FILM COATED ORAL at 08:40

## 2024-12-01 RX ADMIN — HYDROXYZINE HYDROCHLORIDE 25 MG: 25 TABLET ORAL at 08:40

## 2024-12-01 RX ADMIN — CYANOCOBALAMIN TAB 1000 MCG 1000 MCG: 1000 TAB at 08:40

## 2024-12-01 RX ADMIN — LAMOTRIGINE 50 MG: 25 TABLET ORAL at 08:40

## 2024-12-01 RX ADMIN — HYDROXYZINE HYDROCHLORIDE 25 MG: 25 TABLET ORAL at 21:21

## 2024-12-01 RX ADMIN — HYDROXYZINE HYDROCHLORIDE 25 MG: 25 TABLET ORAL at 17:08

## 2024-12-01 RX ADMIN — LISINOPRIL 10 MG: 10 TABLET ORAL at 08:40

## 2024-12-01 RX ADMIN — CARIPRAZINE 3 MG: 3 CAPSULE, GELATIN COATED ORAL at 08:40

## 2024-12-01 NOTE — NURSING NOTE
Pt is visible in the milieu and social with select peers. He consumed 100 % of dinner. Took his medications without incidence. Played cards with peers. Pt is polite, pleasant, and cooperative. Flat affect. Pt is calm, quiet and counting the days toward discharge to [facility]. No unmet needs. Offered prn-lac-hydrin cream and Nizoral shampoo for dandruff and dry skin. Pt declined. No behavioral issues.

## 2024-12-01 NOTE — PLAN OF CARE
Problem: Alteration in Thoughts and Perception  Goal: Treatment Goal: Gain control of psychotic behaviors/thinking, reduce/eliminate presenting symptoms and demonstrate improved reality functioning upon discharge  Outcome: Progressing  Goal: Refrain from acting on delusional thinking/internal stimuli  Description: Interventions:  - Monitor patient closely, per order   - Utilize least restrictive measures   - Set reasonable limits, give positive feedback for acceptable   - Administer medications as ordered and monitor of potential side effects  Outcome: Progressing  Goal: Agree to be compliant with medication regime, as prescribed and report medication side effects  Description: Interventions:  - Offer appropriate PRN medication and supervise ingestion; conduct AIMS, as needed   Outcome: Progressing  Goal: Recognize dysfunctional thoughts, communicate reality-based thoughts at the time of discharge  Description: Interventions:  - Provide medication and psycho-education to assist patient in compliance and developing insight into his/her illness   Outcome: Progressing  Goal: Complete daily ADLs, including personal hygiene independently, as able  Description: Interventions:  - Observe, teach, and assist patient with ADLS  - Monitor and promote a balance of rest/activity, with adequate nutrition and elimination   Outcome: Progressing     Problem: Ineffective Coping  Goal: Identifies ineffective coping skills  Outcome: Progressing  Goal: Identifies healthy coping skills  Outcome: Progressing  Goal: Demonstrates healthy coping skills  Outcome: Progressing     Problem: Risk for Self Injury/Neglect  Goal: Treatment Goal: Remain safe during length of stay, learn and adopt new coping skills, and be free of self-injurious ideation, impulses and acts at the time of discharge  Outcome: Progressing  Goal: Refrain from harming self  Description: Interventions:  - Monitor patient closely, per order  - Develop a trusting  relationship  - Supervise medication ingestion, monitor effects and side effects   Outcome: Progressing     Problem: Depression  Goal: Treatment Goal: Demonstrate behavioral control of depressive symptoms, verbalize feelings of improved mood/affect, and adopt new coping skills prior to discharge  Outcome: Progressing  Goal: Refrain from isolation  Description: Interventions:  - Develop a trusting relationship   - Encourage socialization   Outcome: Progressing  Goal: Refrain from self-neglect  Outcome: Progressing     Problem: Anxiety  Goal: Anxiety is at manageable level  Description: Interventions:  - Assess and monitor patient's anxiety level.   - Monitor for signs and symptoms (heart palpitations, chest pain, shortness of breath, headaches, nausea, feeling jumpy, restlessness, irritable, apprehensive).   - Collaborate with interdisciplinary team and initiate plan and interventions as ordered.  - [place] patient to unit/surroundings  - Explain treatment plan  - Encourage participation in care  - Encourage verbalization of concerns/fears  - Identify coping mechanisms  - Assist in developing anxiety-reducing skills  - Administer/offer alternative therapies  - Limit or eliminate stimulants  Outcome: Progressing     Problem: DISCHARGE PLANNING - CARE MANAGEMENT  Goal: Discharge to post-acute care or home with appropriate resources  Description: INTERVENTIONS:  - Conduct assessment to determine patient/family and health care team treatment goals, and need for post-acute services based on payer coverage, community resources, and patient preferences, and barriers to discharge  - Address psychosocial, clinical, and financial barriers to discharge as identified in assessment in conjunction with the patient/family and health care team  - Arrange appropriate level of post-acute services according to patient’s   needs and preference and payer coverage in collaboration with the physician and health care team  - Communicate with  and update the patient/family, physician, and health care team regarding progress on the discharge plan  - Arrange appropriate transportation to post-acute venues  Outcome: Progressing

## 2024-12-01 NOTE — NURSING NOTE
"Pt politely refused weekly assessment stating \" I'm leaving this week, I feel good I don't think I need it\". No obvious s/s of issue and weight has decreased 2 lbs since last weeks assessment. No concerns at this time.   "

## 2024-12-01 NOTE — ASSESSMENT & PLAN NOTE
Reviewed 12/1/2024    Accepted by [facility]; has ACT team on 12/3 and anticipated discharge on 12/4/2024  Continue medications as follows:  Vraylar 3mg PO Daily  Atarax 25mg PO TID  Lamictal 50mg PO Daily  Zoloft 150mg PO QHS  Continue with individual therapy, group therapy, milieu therapy and occupational therapy   Behavioral Health checks every 7 minutes   Continue frequent safety checks and vitals per unit protocol  Continue with [facility] medical management as indicated

## 2024-12-01 NOTE — PROGRESS NOTES
Progress Note - Behavioral Health   Name: Deyvi Cao 54 y.o. male I MRN: [id number]   Unit/Bed#: [facility] 101-02 I Date of Admission: 6/25/2024   Date of Service: 12/1/2024 I Hospital Day: 159         Assessment & Plan  Bipolar disorder with severe depression (HCC)  Reviewed 12/1/2024    Accepted by [facility]; has ACT team on 12/3 and anticipated discharge on 12/4/2024  Continue medications as follows:  Vraylar 3mg PO Daily  Atarax 25mg PO TID  Lamictal 50mg PO Daily  Zoloft 150mg PO QHS  Continue with individual therapy, group therapy, milieu therapy and occupational therapy   Behavioral Health checks every 7 minutes   Continue frequent safety checks and vitals per unit protocol  Continue with SL medical management as indicated  Benign essential hypertension  Managed by [facility]IM - reviewed   Continue Lisinopril 10mg PO Daily  Mixed hyperlipidemia  Managed by [facility] - reviewed   Continue Lipitor 10mg PO Daily  Allergic rhinitis due to allergen  Managed by [facility] - reviewed   Rosacea  Managed by [facility] - reviewed   Vitamin D3 deficiency  Managed by [facility] - reviewed   Low vitamin B12 level  Managed by [facility] - reviewed        Current medications:    Current Facility-Administered Medications   Medication Dose Route Frequency Provider Last Rate    acetaminophen  650 mg Oral Q6H PRN ALVINA Harley      acetaminophen  650 mg Oral Q4H PRN ALVINA Harley      acetaminophen  975 mg Oral Q6H PRN ALVINA Harley      aluminum-magnesium hydroxide-simethicone  30 mL Oral Q4H PRN ALVINA Harley      ammonium lactate   Topical BID PRN ALVINA Harley      atorvastatin  10 mg Oral Daily ALVINA Lewis      benztropine  1 mg Intramuscular Q4H PRN Max 6/day ALVINA Harley      benztropine  1 mg Oral Q4H PRN Max 6/day ALVINA Harley      bisacodyl  10 mg Rectal Daily PRN ALVINA Harley      cariprazine  3 mg Oral Daily Martin Cardenas MD      Cholecalciferol  2,000 Units Oral Daily Sary Rodriguez  Kalyan, ALVINA      cyanocobalamin  1,000 mcg Oral Daily Sary Rodriguez ALVINA Biggs      hydrOXYzine HCL  25 mg Oral Q6H PRN Max 4/day ALVINA Harley      hydrOXYzine HCL  25 mg Oral TID Martin Cardenas MD      hydrOXYzine HCL  50 mg Oral Q4H PRN Max 4/day ALVINA Harley      Or    LORazepam  1 mg Intramuscular Q4H PRN ALVINA Harley      ketoconazole   Topical Daily PRN ALVINA Harley      lamoTRIgine  50 mg Oral Daily Martin Cardenas MD      lisinopril  10 mg Oral Daily Sary Rodriguez Kalyan, ALVINA      LORazepam  1 mg Oral Q4H PRN Max 6/day ALVINA Harley      Or    LORazepam  2 mg Intramuscular Q6H PRN Max 3/day Melva Knutson, TITINP      OLANZapine  10 mg Oral Q3H PRN Max 3/day Melva Knutson, ALVINA      Or    OLANZapine  10 mg Intramuscular Q3H PRN Max 3/day ALVINA Harley      OLANZapine  5 mg Oral Q3H PRN Max 6/day ALVINA Harley      Or    OLANZapine  5 mg Intramuscular Q3H PRN Max 6/day Melva Knutson, TITINP      OLANZapine  2.5 mg Oral Q3H PRN Max 8/day Melva Knutson, ALVINA      polyethylene glycol  17 g Oral Daily PRN ALVINA Harley      propranolol  10 mg Oral Q8H PRN ALVINA Harley      senna-docusate sodium  1 tablet Oral Daily PRN Melva Knutson, ALVINA      sertraline  150 mg Oral HS Martin Cardenas MD         Risks / Benefits of Treatment:    Risks, benefits, and possible side effects of medications explained to patient and patient verbalizes understanding and agreement for treatment.    Treatment Planning:     All current active medications have been reviewed  Encourage group therapy, milieu therapy and occupational therapy  Behavioral Health checks for safety monitoring  Discharge planning  Continue current medications:  Estimated Discharge Date: 12/4/2024     Subjective:    Behavior over the last 24 hours: unchanged.     Deyvi is received laying in bed awake.  He is pleasant on approach and sits up to cooperate with assessment.  He reports depression has been generally  controlled.  He denies passive or active SI/HI with plan or intent.  He denies any recent panic symptoms.  He denies any AVH and does not appear to be overtly responding to IS.  He reports he is eating well and sleeping well.  He denies any further concerns when asked.      Sleep: normal  Appetite: normal  Medication side effects: No   ROS: no complaints    Mental Status Evaluation:    Appearance:  age appropriate, casually dressed   Behavior:  pleasant, cooperative, calm   Speech:  normal rate, normal volume, normal pitch   Mood:  euthymic   Affect:  constricted   Thought Process:  coherent, goal directed   Associations: intact associations   Thought Content:  no overt delusions   Perceptual Disturbances: denies auditory or visual hallucinations when asked, does not appear responding to internal stimuli   Risk Potential: Suicidal ideation - None  Homicidal ideation - None  Potential for aggression - No   Sensorium:  oriented to person, place, and time/date   Memory:  recent and remote memory grossly intact   Consciousness:  alert and awake   Attention/Concentration: attention span and concentration are age appropriate   Insight:  fair   Judgment: fair   Gait/Station: normal gait/station   Motor Activity: no abnormal movements     Vital signs in last 24 hours:    Temp:  [97.7 °F (36.5 °C)-98 °F (36.7 °C)] 98 °F (36.7 °C)  HR:  [] 90  BP: (119-131)/(84) 131/84  Resp:  [17-18] 17  SpO2:  [92 %] 92 %  O2 Device: None (Room air)         Laboratory results: I have personally reviewed all pertinent laboratory/tests results    Results from the past 24 hours: No results found for this or any previous visit (from the past 24 hours).    Suicide/Homicide Risk Assessment:    Risk of Harm to Self:   Nursing Suicide Risk Assessment Last 24 hours: C-SSRS Risk (Since Last Contact)  Calculated C-SSRS Risk Score (Since Last Contact): No Risk Indicated    Risk of Harm to Others:  Nursing Homicide Risk Assessment: Violence Risk  to Others: Denies within past 6 months

## 2024-12-01 NOTE — NURSING NOTE
Pt is accepting of medications without incidence and meal compliant. Pt is visible in the milieu at times or sitting in room. Pt is polite, pleasant, and brightens on approach. Pt denies s/s and offers no new concerns.

## 2024-12-02 PROCEDURE — 99232 SBSQ HOSP IP/OBS MODERATE 35: CPT | Performed by: PSYCHIATRY & NEUROLOGY

## 2024-12-02 RX ORDER — ATORVASTATIN CALCIUM 10 MG/1
10 TABLET, FILM COATED ORAL DAILY
Qty: 30 TABLET | Refills: 1 | Status: SHIPPED | OUTPATIENT
Start: 2024-12-02

## 2024-12-02 RX ORDER — HYDROXYZINE HYDROCHLORIDE 25 MG/1
25 TABLET, FILM COATED ORAL 3 TIMES DAILY
Qty: 90 TABLET | Refills: 1 | Status: SHIPPED | OUTPATIENT
Start: 2024-12-02

## 2024-12-02 RX ORDER — LAMOTRIGINE 25 MG/1
50 TABLET ORAL DAILY
Qty: 60 TABLET | Refills: 1 | Status: SHIPPED | OUTPATIENT
Start: 2024-12-02

## 2024-12-02 RX ORDER — LISINOPRIL 10 MG/1
10 TABLET ORAL DAILY
Qty: 30 TABLET | Refills: 1 | Status: SHIPPED | OUTPATIENT
Start: 2024-12-02

## 2024-12-02 RX ORDER — SERTRALINE HYDROCHLORIDE 100 MG/1
TABLET, FILM COATED ORAL
Qty: 30 TABLET | Refills: 1 | Status: SHIPPED | OUTPATIENT
Start: 2024-12-02

## 2024-12-02 RX ORDER — HYDROXYZINE HYDROCHLORIDE 25 MG/1
25 TABLET, FILM COATED ORAL EVERY 6 HOURS PRN
Qty: 15 TABLET | Refills: 0 | Status: SHIPPED | OUTPATIENT
Start: 2024-12-02

## 2024-12-02 RX ORDER — ACETAMINOPHEN 325 MG/1
650 TABLET ORAL EVERY 6 HOURS PRN
Qty: 15 TABLET | Refills: 0 | Status: SHIPPED | OUTPATIENT
Start: 2024-12-02

## 2024-12-02 RX ADMIN — LISINOPRIL 10 MG: 10 TABLET ORAL at 08:42

## 2024-12-02 RX ADMIN — HYDROXYZINE HYDROCHLORIDE 25 MG: 25 TABLET ORAL at 17:05

## 2024-12-02 RX ADMIN — ATORVASTATIN CALCIUM 10 MG: 10 TABLET, FILM COATED ORAL at 08:42

## 2024-12-02 RX ADMIN — CHOLECALCIFEROL TAB 25 MCG (1000 UNIT) 2000 UNITS: 25 TAB at 08:42

## 2024-12-02 RX ADMIN — HYDROXYZINE HYDROCHLORIDE 25 MG: 25 TABLET ORAL at 21:28

## 2024-12-02 RX ADMIN — CARIPRAZINE 3 MG: 3 CAPSULE, GELATIN COATED ORAL at 08:42

## 2024-12-02 RX ADMIN — CYANOCOBALAMIN TAB 1000 MCG 1000 MCG: 1000 TAB at 08:41

## 2024-12-02 RX ADMIN — HYDROXYZINE HYDROCHLORIDE 25 MG: 25 TABLET ORAL at 08:42

## 2024-12-02 RX ADMIN — LAMOTRIGINE 50 MG: 25 TABLET ORAL at 08:42

## 2024-12-02 RX ADMIN — SERTRALINE HYDROCHLORIDE 150 MG: 100 TABLET ORAL at 21:28

## 2024-12-02 NOTE — PROGRESS NOTES
Progress Note - Behavioral Health   Name: Deyvi Cao 54 y.o. male I MRN: [id number]  Unit/Bed#: [facility] 101-02 I Date of Admission: 6/25/2024   Date of Service: 12/2/2024 I Hospital Day: 160     Assessment & Plan  Bipolar disorder with severe depression (HCC)  Reviewed 12/2/2024    Accepted by [facility]; has ACT team on 12/3 and anticipated discharge on 12/4/2024 with CSP meeting s done on 11/26/2024 , continue medications as follows:  Vraylar 3mg PO Daily  Atarax 25mg PO TID  Lamictal 50mg PO Daily  Zoloft 150mg PO QHS  Continue with individual therapy, group therapy, milieu therapy and occupational therapy   Behavioral Health checks every 7 minutes   Continue frequent safety checks and vitals per unit protocol  Continue with [facility] medical management as indicated  Benign essential hypertension  Reviewed on total 124  Managed by [facility] - reviewed   Continue Lisinopril 10mg PO Daily  Mixed hyperlipidemia  Reviewed on 12/2/2024  Managed by [facility] - reviewed   Continue Lipitor 10mg PO Daily  Allergic rhinitis due to allergen  Reviewed on 12/2/2024  Managed by [facility] - reviewed   Rosacea  Reviewed on 12/2/24  Managed by [facility] - reviewed   Vitamin D3 deficiency  Reviewed on 12/2/24  Managed by [facility] - reviewed   Low vitamin B12 level  Reviewed on 12/2/24  Managed by [facility] - reviewed       Patient Active Problem List   Diagnosis    Acne    Benign essential hypertension    Mixed hyperlipidemia    Tobacco dependence syndrome    Allergic rhinitis due to allergen    Medical clearance for psychiatric admission    Lung cancer screening declined by patient    Moderate depressed bipolar I disorder (HCC)    Bipolar disorder with severe depression (HCC)    Rosacea    Vitamin D3 deficiency    Low vitamin B12 level     Review of systems: Unremarkable.  Psychiatric Diagnosis: Bipolar depression    Assessment  Overall Status: Had CSP on 11/26/2024 accepted by [facility] ACT team scheduled for discharge on 12/4/2024 to [facility] with no relapse of any overt psychotic manic depressive symptoms or suicidal thoughts reported no issues or concerns excited for discharge   Certification Statement: The patient will continue to require additional inpatient hospital stay due to recurrent depression and repeated suicide attempts in the community  Certification with need for hospitalization: Patient needs structured setting with supervision of an ACT team due to frequent rehospitalizations and suspiciousness of suicidal attempt prior to admission     Medications: Vraylar 3 mg a day, hydroxyzine 25 mg 3 times a day, Zoloft 150 mg a day, Lamictal 50 mg a day,  All medications reviewed  Side effects from treatment: None reported  Medication changes   No  changes    Medication education   Risks side effects benefits and precautions of medications discussed with patient and he did verbalize an understanding about risks for metabolic syndrome from being on neuroleptics and is form tardive dyskinesia etc.     Understanding of medications: Has good understanding about medications and side effects of his precautions benefits   Justification for dual anti-psychotics: Not applicable    Non-pharmacological treatments  Continue with individual, group, milieu and occupational therapy using recovery principles and psycho-education about accepting illness and the need for treatment.  Behavioral health checks every 7 minutes  Safety with the patient about illness and need for treatment  Support transition to [facility] with the ACT team as scheduled on 12/4/2025    Safety  Safety and communication plan established to target dynamic risk factors discussed above.    Discharge Plan   To finalize discharge to [facility] program at [facility] with [facility] ACT team as he was accepted by them and referral to [facility] with CSP scheduled to be discharged on 12/4/2024 as scheduled with CSP done on 11/26/2024    Interval Progress   Continues  to wait for discharge tomorrow as scheduled to the [facility] group home as he is accepted by [facility]'s ACT team assessment today.  He plans to attend [facility] from the group home 2 or 3 days a week as per the requirement.  He continues to deny any relapse of suicidal thoughts or excessive sadness or overt psychotic or manic symptoms but still with same baseline behaviors of some isolation and constricted affect with delayed responses which is his baseline and he is attending some of the groups and spends a lot of time pacing in his room and he claims he is basically a loner.  No need for behavioral PRNs over the weekend and has not been aggressive or agitated or threatening or self-abusive and he is polite friendly pleasant when approached  Acceptance by patient: Accepting  Hopefulness in recovery: Living at [facility] group [facility] with an ACT team in place and going to [facility]  involved in reintegration process: No contact with mother or sister  trusting in relationship with psychiatrist: Trusting  Sleep: Good  Appetite: Good  Compliance with Medications: Compliant  Group attendance: Some 5/10  Significant events and progress towards goals: Anticipating discharge tomorrow with no new issues or concerns  Mental Status Exam  Appearance: casually dressed, dressed appropriately, adequate grooming, looks stated age laying in bed when approached but did get up  behavior: cooperative, mildly anxious, slow responses superficially friendly pleasant well-groomed but with some delayed responses as usual but brighter  speech: slow, scant, increased latency of response, delayed, soft, decreased volume delayed responses as usual  mood: dysphoric, less anxious, less depressed  Affect: constricted, slightly brighter, mood-congruent brighter today looking forward to getting out with better mood reactivity   thought Process: organized, goal directed, decreased rate of thoughts, slowing of thoughts, negative  thinking, concrete  Thought Content: no overt delusions, no current suicidal or homicidal thoughts and no plans verbalized.  No phobias obsessions compulsions reported.  Not appearing to respond to any delusional beliefs able to continue to contract for safety perceptual Disturbances: no auditory hallucinations, no visual hallucinations  Hx Risk Factors: chronic depressive symptoms, chronic anxiety symptoms, history of suicide attempts  Sensorium: Oriented x 3 spheres and situation cognition: recent and remote memory grossly intact  Consciousness: alert, awake, and not sedated  Attention: attention span and concentration are age appropriate  Intellect: appears to be of average intelligence  Insight: limited  Judgement: limited  Motor Activity: no abnormal movements     Vitals  Temp:  [97.9 °F (36.6 °C)-98 °F (36.7 °C)] 97.9 °F (36.6 °C)  HR:  [90-95] 95  Resp:  [17-18] 18  BP: (112-131)/(72-84) 112/72  SpO2:  [92 %] 92 %  No intake or output data in the 24 hours ending 12/02/24 0414    Lab Results: All Labs For Current Hospital Admission Reviewed      Current Facility-Administered Medications   Medication Dose Route Frequency Provider Last Rate    acetaminophen  650 mg Oral Q6H PRN ALVINA Harley      acetaminophen  650 mg Oral Q4H PRN ALVINA Harley      acetaminophen  975 mg Oral Q6H PRN ALVINA Harley      aluminum-magnesium hydroxide-simethicone  30 mL Oral Q4H PRN ALVINA Harley      ammonium lactate   Topical BID PRN ALVINA Harley      atorvastatin  10 mg Oral Daily ALVINA Lewis      benztropine  1 mg Intramuscular Q4H PRN Max 6/day ALVINA Harley      benztropine  1 mg Oral Q4H PRN Max 6/day ALVINA Harley      bisacodyl  10 mg Rectal Daily PRN ALVINA Harley      cariprazine  3 mg Oral Daily Martin Cardenas MD      Cholecalciferol  2,000 Units Oral Daily ALVINA Lewis      cyanocobalamin  1,000 mcg Oral Daily ALVINA Lewis       hydrOXYzine HCL  25 mg Oral Q6H PRN Max 4/day Melvamelanie Knutson, CRNP      hydrOXYzine HCL  25 mg Oral TID Martin Cardenas MD      hydrOXYzine HCL  50 mg Oral Q4H PRN Max 4/day Melvamelanie Knutson, TITINP      Or    LORazepam  1 mg Intramuscular Q4H PRN Melva Justiney, CRNP      ketoconazole   Topical Daily PRN Melvamelanie Andersoney, CRNP      lamoTRIgine  50 mg Oral Daily Martin Cardenas MD      lisinopril  10 mg Oral Daily Sary Coradonolbertogretchen, ALVINA      LORazepam  1 mg Oral Q4H PRN Max 6/day Melvamelanie Andersoney, CRNP      Or    LORazepam  2 mg Intramuscular Q6H PRN Max 3/day Melvamelanie Andersoney, CRNP      OLANZapine  10 mg Oral Q3H PRN Max 3/day Melvamelanie Andersoney, CRNP      Or    OLANZapine  10 mg Intramuscular Q3H PRN Max 3/day Melvamelanie Andersoney, CRNP      OLANZapine  5 mg Oral Q3H PRN Max 6/day Melvamelanie Knutson, CRREBECCA      Or    OLANZapine  5 mg Intramuscular Q3H PRN Max 6/day Melvamelanie Anedrsoney, CRNP      OLANZapine  2.5 mg Oral Q3H PRN Max 8/day Melvamelanie Andersoney, CRNP      polyethylene glycol  17 g Oral Daily PRN Melvamelanie Andersoney, CRNP      propranolol  10 mg Oral Q8H PRN Melvamelanie Knutson, CRNP      senna-docusate sodium  1 tablet Oral Daily PRN Melvamelanie Knutson, CRNP      sertraline  150 mg Oral HS Martin Cardenas MD         Counseling / Coordination of Care: Total floor / unit time spent today 15 minutes. Greater than 50% of total time was spent with the patient and / or family counseling and / or somewhat receptive to supportive listening and teaching positive coping skills to deal with symptom mangement.     Patient's Rights, confidentiality and exceptions to confidentiality, use of automated medical record, Behavioral Health Services staff access to medical record, and consent to treatment reviewed.    This note has been dictated and hence there may be problems with punctuation, spelling and formatting and if anyone has any concerns please address them to Dr. Cardenas   This note is not shared with patient due to potential for making patient's condition worse by knowing the content  of the note.

## 2024-12-02 NOTE — NURSING NOTE
Patient quiet , isolates to room, only out for meals and attending no groups thus far today. Pt with good appetite and medication compliant. Pt reports no s/s, voices no concern.

## 2024-12-02 NOTE — NURSING NOTE
Deyvi denied all psych sx on this shift.  He has been visible and social.  Pleasant and polite. Med and meal compliant.  Offers not complaints or concerns.  No unmet needs.

## 2024-12-02 NOTE — PLAN OF CARE
Problem: Alteration in Thoughts and Perception  Goal: Treatment Goal: Gain control of psychotic behaviors/thinking, reduce/eliminate presenting symptoms and demonstrate improved reality functioning upon discharge  12/2/2024 0300 by Sierra Mccray RN  Outcome: Progressing  12/2/2024 0300 by Sierra Mccray RN  Outcome: Progressing  Goal: Refrain from acting on delusional thinking/internal stimuli  Description: Interventions:  - Monitor patient closely, per order   - Utilize least restrictive measures   - Set reasonable limits, give positive feedback for acceptable   - Administer medications as ordered and monitor of potential side effects  12/2/2024 0300 by Sierra Mccray RN  Outcome: Progressing  12/2/2024 0300 by Sierra Mccray RN  Outcome: Progressing  Goal: Agree to be compliant with medication regime, as prescribed and report medication side effects  Description: Interventions:  - Offer appropriate PRN medication and supervise ingestion; conduct AIMS, as needed   12/2/2024 0300 by Sierra Mccray RN  Outcome: Progressing  12/2/2024 0300 by Sierra Mccray RN  Outcome: Progressing  Goal: Recognize dysfunctional thoughts, communicate reality-based thoughts at the time of discharge  Description: Interventions:  - Provide medication and psycho-education to assist patient in compliance and developing insight into his/her illness   12/2/2024 0300 by Sierra Mccray RN  Outcome: Progressing  12/2/2024 0300 by Sierra Mccray RN  Outcome: Progressing  Goal: Complete daily ADLs, including personal hygiene independently, as able  Description: Interventions:  - Observe, teach, and assist patient with ADLS  - Monitor and promote a balance of rest/activity, with adequate nutrition and elimination   12/2/2024 0300 by Sierra Mccray RN  Outcome: Progressing  12/2/2024 0300 by Sierra Mccray RN  Outcome: Progressing     Problem: Ineffective Coping  Goal: Identifies ineffective coping skills  12/2/2024 0300 by Sierra Mccray  RN  Outcome: Progressing  12/2/2024 0300 by Sierra Mccray RN  Outcome: Progressing  Goal: Identifies healthy coping skills  12/2/2024 0300 by Sierra Mccray RN  Outcome: Progressing  12/2/2024 0300 by Sierra Mccray RN  Outcome: Progressing  Goal: Demonstrates healthy coping skills  12/2/2024 0300 by Sierra Mccray RN  Outcome: Progressing  12/2/2024 0300 by Sierra Mccray RN  Outcome: Progressing  Goal: Participates in unit activities  Description: Interventions:  - Provide therapeutic environment   - Provide required programming   - Redirect inappropriate behaviors   12/2/2024 0300 by Sierra Mccray RN  Outcome: Progressing  12/2/2024 0300 by Sierra Mccray RN  Outcome: Progressing  Goal: Patient/Family participate in treatment and DC plans  Description: Interventions:  - Provide therapeutic environment  12/2/2024 0300 by Sierra Mccray RN  Outcome: Progressing  12/2/2024 0300 by Sierra Mccray RN  Outcome: Progressing  Goal: Patient/Family verbalizes awareness of resources  12/2/2024 0300 by Sierra Mccray RN  Outcome: Progressing  12/2/2024 0300 by Sierra Mccray RN  Outcome: Progressing  Goal: Understands least restrictive measures  Description: Interventions:  - Utilize least restrictive behavior  12/2/2024 0300 by Sierra Mccray RN  Outcome: Progressing  12/2/2024 0300 by Sierra Mccray RN  Outcome: Progressing  Goal: Free from restraint events  Description: - Utilize least restrictive measures   - Provide behavioral interventions   - Redirect inappropriate behaviors   12/2/2024 0300 by Sierra Mccray RN  Outcome: Progressing  12/2/2024 0300 by Sierra Mccray RN  Outcome: Progressing     Problem: Risk for Self Injury/Neglect  Goal: Treatment Goal: Remain safe during length of stay, learn and adopt new coping skills, and be free of self-injurious ideation, impulses and acts at the time of discharge  12/2/2024 0300 by Sierra Mccray RN  Outcome: Progressing  12/2/2024 0300 by Sierra Mccray RN  Outcome:  Progressing  Goal: Verbalize thoughts and feelings  Description: Interventions:  - Assess and re-assess patient's lethality and potential for self-injury  - Engage patient in 1:1 interactions, daily, for a minimum of 15 minutes  - Encourage patient to express feelings, fears, frustrations, hopes  - Establish rapport/trust with patient   12/2/2024 0300 by Sierra Mccray RN  Outcome: Progressing  12/2/2024 0300 by Sierra Mccray RN  Outcome: Progressing  Goal: Refrain from harming self  Description: Interventions:  - Monitor patient closely, per order  - Develop a trusting relationship  - Supervise medication ingestion, monitor effects and side effects   12/2/2024 0300 by Sierra Mccray RN  Outcome: Progressing  12/2/2024 0300 by Sierra Mccray RN  Outcome: Progressing  Goal: Attend and participate in unit activities, including therapeutic, recreational, and educational groups  Description: Interventions:  - Provide therapeutic and educational activities daily, encourage attendance and participation, and document same in the medical record  - Obtain collateral information, encourage visitation and family involvement in care   12/2/2024 0300 by Sierra Mccray RN  Outcome: Progressing  12/2/2024 0300 by Sierra Mccray RN  Outcome: Progressing  Goal: Recognize maladaptive responses and adopt new coping mechanisms  12/2/2024 0300 by Sierra Mccray RN  Outcome: Progressing  12/2/2024 0300 by Sierra Mccray RN  Outcome: Progressing  Goal: Complete daily ADLs, including personal hygiene independently, as able  Description: Interventions:  - Observe, teach, and assist patient with ADLS  - Monitor and promote a balance of rest/activity, with adequate nutrition and elimination  12/2/2024 0300 by Sierra Mccray RN  Outcome: Progressing  12/2/2024 0300 by Sierra Mccray RN  Outcome: Progressing     Problem: Depression  Goal: Treatment Goal: Demonstrate behavioral control of depressive symptoms, verbalize feelings of improved  mood/affect, and adopt new coping skills prior to discharge  12/2/2024 0300 by Sierra Mccray RN  Outcome: Progressing  12/2/2024 0300 by Sierra Mccray RN  Outcome: Progressing  Goal: Verbalize thoughts and feelings  Description: Interventions:  - Assess and re-assess patient's level of risk   - Engage patient in 1:1 interactions, daily, for a minimum of 15 minutes   - Encourage patient to express feelings, fears, frustrations, hopes   12/2/2024 0300 by Sierra Mccray RN  Outcome: Progressing  12/2/2024 0300 by Sierra Mcrcay RN  Outcome: Progressing  Goal: Refrain from harming self  Description: Interventions:  - Monitor patient closely, per order   - Supervise medication ingestion, monitor effects and side effects   12/2/2024 0300 by Sierra Mccray RN  Outcome: Progressing  12/2/2024 0300 by Sierra Mccray RN  Outcome: Progressing  Goal: Refrain from isolation  Description: Interventions:  - Develop a trusting relationship   - Encourage socialization   12/2/2024 0300 by Sierra Mccray RN  Outcome: Progressing  12/2/2024 0300 by Sierra Mccray RN  Outcome: Progressing  Goal: Refrain from self-neglect  12/2/2024 0300 by Sierra Mccray RN  Outcome: Progressing  12/2/2024 0300 by Sierra Mccray RN  Outcome: Progressing  Goal: Attend and participate in unit activities, including therapeutic, recreational, and educational groups  Description: Interventions:  - Provide therapeutic and educational activities daily, encourage attendance and participation, and document same in the medical record   12/2/2024 0300 by Sierra Mccray RN  Outcome: Progressing  12/2/2024 0300 by Sierra Mccray RN  Outcome: Progressing  Goal: Complete daily ADLs, including personal hygiene independently, as able  Description: Interventions:  - Observe, teach, and assist patient with ADLS  -  Monitor and promote a balance of rest/activity, with adequate nutrition and elimination   12/2/2024 0300 by Sierra Mccray RN  Outcome:  Progressing  12/2/2024 0300 by Sierra Mccray RN  Outcome: Progressing     Problem: Anxiety  Goal: Anxiety is at manageable level  Description: Interventions:  - Assess and monitor patient's anxiety level.   - Monitor for signs and symptoms (heart palpitations, chest pain, shortness of breath, headaches, nausea, feeling jumpy, restlessness, irritable, apprehensive).   - Collaborate with interdisciplinary team and initiate plan and interventions as ordered.  - [place] patient to unit/surroundings  - Explain treatment plan  - Encourage participation in care  - Encourage verbalization of concerns/fears  - Identify coping mechanisms  - Assist in developing anxiety-reducing skills  - Administer/offer alternative therapies  - Limit or eliminate stimulants  12/2/2024 0300 by Sierra Mccray RN  Outcome: Progressing  12/2/2024 0300 by Sierra Mccray RN  Outcome: Progressing     Problem: Risk for Violence/Aggression Toward Others  Goal: Treatment Goal: Refrain from acts of violence/aggression during length of stay, and demonstrate improved impulse control at the time of discharge  12/2/2024 0300 by Sierra Mccray RN  Outcome: Progressing  12/2/2024 0300 by Sierra Mccray RN  Outcome: Progressing  Goal: Verbalize thoughts and feelings  Description: Interventions:  - Assess and re-assess patient's level of risk, every waking shift  - Engage patient in 1:1 interactions, daily, for a minimum of 15 minutes   - Allow patient to express feelings and frustrations in a safe and non-threatening manner   - Establish rapport/trust with patient   12/2/2024 0300 by Sierra Mccray RN  Outcome: Progressing  12/2/2024 0300 by Sierra Mccray RN  Outcome: Progressing  Goal: Refrain from harming others  12/2/2024 0300 by Sierra Mccray RN  Outcome: Progressing  12/2/2024 0300 by Sierra Mccray RN  Outcome: Progressing  Goal: Refrain from destructive acts on the environment or property  12/2/2024 0300 by Sierra Mccray RN  Outcome:  Progressing  12/2/2024 0300 by Sierra Mccray RN  Outcome: Progressing  Goal: Control angry outbursts  Description: Interventions:  - Monitor patient closely, per order  - Ensure early verbal de-escalation  - Monitor prn medication needs  - Set reasonable/therapeutic limits, outline behavioral expectations, and consequences   - Provide a non-threatening milieu, utilizing the least restrictive interventions   12/2/2024 0300 by Sierra Mccray RN  Outcome: Progressing  12/2/2024 0300 by Sierra Mccray RN  Outcome: Progressing  Goal: Attend and participate in unit activities, including therapeutic, recreational, and educational groups  Description: Interventions:  - Provide therapeutic and educational activities daily, encourage attendance and participation, and document same in the medical record   12/2/2024 0300 by Sierra Mccray RN  Outcome: Progressing  12/2/2024 0300 by Sierra Mccray RN  Outcome: Progressing  Goal: Identify appropriate positive anger management techniques  Description: Interventions:  - Offer anger management and coping skills groups   - Staff will provide positive feedback for appropriate anger control  12/2/2024 0300 by Sierra Mccray RN  Outcome: Progressing  12/2/2024 0300 by Sierra Mccray RN  Outcome: Progressing     Problem: Alteration in Orientation  Goal: Treatment Goal: Demonstrate a reduction of confusion and improved orientation to person, place, time and/or situation upon discharge, according to optimum baseline/ability  12/2/2024 0300 by Sierra Mccray RN  Outcome: Progressing  12/2/2024 0300 by Sierra Mccray RN  Outcome: Progressing  Goal: Interact with staff daily  Description: Interventions:  - Assess and re-assess patient's level of orientation  - Engage patient in 1 on 1 interactions, daily, for a minimum of 15 minutes   - Establish rapport/trust with patient   12/2/2024 0300 by Sierra Mccray RN  Outcome: Progressing  12/2/2024 0300 by Sierra Mccray RN  Outcome:  Progressing  Goal: Express concerns related to confused thinking related to:  Description: Interventions:  - Encourage patient to express feelings, fears, frustrations, hopes  - Assign consistent caregivers   - [place]/re-orient patient as needed  - Allow comfort items, as appropriate  - Provide visual cues, signs, etc.   12/2/2024 0300 by Sierra Mccray RN  Outcome: Progressing  12/2/2024 0300 by Sierra Mccray RN  Outcome: Progressing  Goal: Allow medical examinations, as recommended  Description: Interventions:  - Provide physical/neurological exams and/or referrals, per provider   12/2/2024 0300 by Sierra Mccray RN  Outcome: Progressing  12/2/2024 0300 by Sierra Mccray RN  Outcome: Progressing  Goal: Cooperate with recommended testing/procedures  Description: Interventions:  - Determine need for ancillary testing  - Observe for mental status changes  - Implement falls/precaution protocol   12/2/2024 0300 by Sirera Mccray RN  Outcome: Progressing  12/2/2024 0300 by Sierra Mccray RN  Outcome: Progressing  Goal: Attend and participate in unit activities, including therapeutic, recreational, and educational groups  Description: Interventions:  - Provide therapeutic and educational activities daily, encourage attendance and participation, and document same in the medical record   - Provide appropriate opportunities for reminiscence   - Provide a consistent daily routine   - Encourage family contact/visitation   12/2/2024 0300 by Sierra Mccray RN  Outcome: Progressing  12/2/2024 0300 by Sierra Mccray RN  Outcome: Progressing  Goal: Complete daily ADLs, including personal hygiene independently, as able  Description: Interventions:  - Observe, teach, and assist patient with ADLS  12/2/2024 0300 by Sierra Mccray RN  Outcome: Progressing  12/2/2024 0300 by Sierra Mccray RN  Outcome: Progressing     Problem: DISCHARGE PLANNING - CARE MANAGEMENT  Goal: Discharge to post-acute care or home with appropriate  resources  Description: INTERVENTIONS:  - Conduct assessment to determine patient/family and health care team treatment goals, and need for post-acute services based on payer coverage, community resources, and patient preferences, and barriers to discharge  - Address psychosocial, clinical, and financial barriers to discharge as identified in assessment in conjunction with the patient/family and health care team  - Arrange appropriate level of post-acute services according to patient’s   needs and preference and payer coverage in collaboration with the physician and health care team  - Communicate with and update the patient/family, physician, and health care team regarding progress on the discharge plan  - Arrange appropriate transportation to post-acute venues  Outcome: Progressing

## 2024-12-02 NOTE — PROGRESS NOTES
12/02/24 0751   Team Meeting   Meeting Type Daily Rounds   Team Members Present   Team Members Present Physician;Nurse;;Other (Discipline and Name)   Physician Team Member Nydia Cardenas PA-C   Nursing Team Member Chastity   Social Work Team Member Henrry FRY   Patient/Family Present   Patient Present No   Patient's Family Present No     Groups Participation  5/10.   Patient's compliant with medications. He's engaged in his treatment. D/C 12/4 to [facility]. Rxs to [facility] pharmacy today.

## 2024-12-02 NOTE — SOCIAL WORK
[facility] request submitted for transport on 12/4 at 1100. This writer spoke with Kennedi at [facility] and confirmed they received his scripts and medications will be delivered to [facility] on 12/4

## 2024-12-02 NOTE — ASSESSMENT & PLAN NOTE
Reviewed 12/2/2024    Accepted by [facility]; has ACT team on 12/3 and anticipated discharge on 12/4/2024 with CSP meeting s done on 11/26/2024 , continue medications as follows:  Vraylar 3mg PO Daily  Atarax 25mg PO TID  Lamictal 50mg PO Daily  Zoloft 150mg PO QHS  Continue with individual therapy, group therapy, milieu therapy and occupational therapy   Behavioral Health checks every 7 minutes   Continue frequent safety checks and vitals per unit protocol  Continue with [facility] medical management as indicated

## 2024-12-02 NOTE — NURSING NOTE
Patient slept well throughout the night.(At least 8 hours)  Appears to be resting comfortably at this time. Eyes closed and chest movements noted.

## 2024-12-03 PROCEDURE — 99232 SBSQ HOSP IP/OBS MODERATE 35: CPT | Performed by: PSYCHIATRY & NEUROLOGY

## 2024-12-03 RX ADMIN — HYDROXYZINE HYDROCHLORIDE 25 MG: 25 TABLET ORAL at 08:21

## 2024-12-03 RX ADMIN — ATORVASTATIN CALCIUM 10 MG: 10 TABLET, FILM COATED ORAL at 08:22

## 2024-12-03 RX ADMIN — HYDROXYZINE HYDROCHLORIDE 25 MG: 25 TABLET ORAL at 17:00

## 2024-12-03 RX ADMIN — CHOLECALCIFEROL TAB 25 MCG (1000 UNIT) 2000 UNITS: 25 TAB at 08:21

## 2024-12-03 RX ADMIN — HYDROXYZINE HYDROCHLORIDE 25 MG: 25 TABLET ORAL at 21:13

## 2024-12-03 RX ADMIN — CARIPRAZINE 3 MG: 3 CAPSULE, GELATIN COATED ORAL at 08:21

## 2024-12-03 RX ADMIN — LISINOPRIL 10 MG: 10 TABLET ORAL at 08:21

## 2024-12-03 RX ADMIN — LAMOTRIGINE 50 MG: 25 TABLET ORAL at 08:21

## 2024-12-03 RX ADMIN — SERTRALINE HYDROCHLORIDE 150 MG: 100 TABLET ORAL at 21:13

## 2024-12-03 RX ADMIN — CYANOCOBALAMIN TAB 1000 MCG 1000 MCG: 1000 TAB at 08:21

## 2024-12-03 NOTE — PROGRESS NOTES
Progress Note - Behavioral Health     Deyvi Cao 54 y.o. male MRN: [id number]   Unit/Bed#: [facility] 101-02 Encounter: [id number]  Assessment & Plan  Bipolar disorder with severe depression (HCC)  Reviewed 12/3/2024  Accepted by [facility]; has ACT team on 12/3 and anticipated discharge on 12/4/2024 with CSP meetings done on 11/26/2024 , continue medications as follows:  Vraylar 3mg PO Daily  Atarax 25mg PO TID  Lamictal 50mg PO Daily  Zoloft 150mg PO QHS  Continue with individual therapy, group therapy, milieu therapy and occupational therapy   Behavioral Health checks every 7 minutes   Continue frequent safety checks and vitals per unit protocol  Continue with [facility] medical management as indicated  Benign essential hypertension  Reviewed on total 124  Managed by [facility] - reviewed   Continue Lisinopril 10mg PO Daily  Mixed hyperlipidemia  Reviewed on 12/3/2024  Managed by [facility] - reviewed   Continue Lipitor 10mg PO Daily  Allergic rhinitis due to allergen  Reviewed on 12/3/2024  Managed by [facility] - reviewed   Rosacea  Reviewed on 12/3/24  Managed by [facility] - reviewed   Vitamin D3 deficiency  Reviewed on 12/3/24  Managed by [facility] - reviewed   Low vitamin B12 level  Reviewed on 12/3/24  Managed by [facility] - reviewed        Recommended Treatment:     Planned medication and treatment changes:    All current active medications have been reviewed  Encourage group therapy, milieu therapy and occupational therapy  Behavioral Health checks for safety monitoring      Current medications:  Current Facility-Administered Medications   Medication Dose Route Frequency Provider Last Rate    acetaminophen  650 mg Oral Q6H PRN ALVINA Harley      acetaminophen  650 mg Oral Q4H PRN ALVINA Harley      acetaminophen  975 mg Oral Q6H PRN ALVINA Harley      aluminum-magnesium hydroxide-simethicone  30 mL Oral Q4H PRN ALVINA Harley      ammonium lactate   Topical BID PRN ALVINA Harley      atorvastatin  10 mg Oral Daily  ALVINA Lewis      benztropine  1 mg Intramuscular Q4H PRN Max 6/day ALVINA Harley      benztropine  1 mg Oral Q4H PRN Max 6/day ALVINA Harley      bisacodyl  10 mg Rectal Daily PRN ALVINA Harley      cariprazine  3 mg Oral Daily Martin Cardenas MD      Cholecalciferol  2,000 Units Oral Daily ALVINA Lewis      cyanocobalamin  1,000 mcg Oral Daily ALVINA Lewis      hydrOXYzine HCL  25 mg Oral Q6H PRN Max 4/day ALVINA Harley      hydrOXYzine HCL  25 mg Oral TID Martin Cardenas MD      hydrOXYzine HCL  50 mg Oral Q4H PRN Max 4/day ALVINA Harley      Or    LORazepam  1 mg Intramuscular Q4H PRN ALVINA Harley      ketoconazole   Topical Daily PRN ALVINA Harley      lamoTRIgine  50 mg Oral Daily Martin Cardenas MD      lisinopril  10 mg Oral Daily ALVINA Lewis      LORazepam  1 mg Oral Q4H PRN Max 6/day ALVINA Harley      Or    LORazepam  2 mg Intramuscular Q6H PRN Max 3/day ALVINA Harley      OLANZapine  10 mg Oral Q3H PRN Max 3/day ALVINA Harley      Or    OLANZapine  10 mg Intramuscular Q3H PRN Max 3/day ALVINA Harley      OLANZapine  5 mg Oral Q3H PRN Max 6/day ALVINA Harley      Or    OLANZapine  5 mg Intramuscular Q3H PRN Max 6/day ALVINA Harley      OLANZapine  2.5 mg Oral Q3H PRN Max 8/day ALVINA Harley      polyethylene glycol  17 g Oral Daily PRN ALVINA Harley      propranolol  10 mg Oral Q8H PRN ALVINA Harley      senna-docusate sodium  1 tablet Oral Daily PRN ALVINA Harley      sertraline  150 mg Oral HS Martin Cardenas MD         Risks / Benefits of Treatment:    Risks, benefits, and possible side effects of medications explained to patient and patient verbalizes understanding and agreement for treatment.    Subjective:    Behavior over the last 24 hours: unchanged.     Deyvi was seen today in follow-up for continuation of care. Per staff, he has been pleasant and  "cooperative with medications. Deyvi states that he is \"doing good\". He reports excitement and anxiousness for his discharge, states \"he is nervous to get back into a routine\". He notes that it will be beneficial to him. He denies any manic or depressive symptoms. He states that he has been eating and sleeping fine. Denies any AVH and overt psychosis. He reports no complaints or side effects with his current medication regimen. Deyvi adamantly denies suicidal/homicidal ideation in addition to thoughts of self-injury. Deyvi presently is contacting for safety on the unit and feels comfortable to confide in staff if thoughts arise. At time of interview, no overt psychosis elicited. Deyvi has been complaint with medications and tolerating without any side effects.     Sleep: normal  Appetite: normal  Medication side effects: No   ROS: no complaints    Mental Status Evaluation:    Appearance:  casually dressed, adequate grooming   Behavior:  cooperative, slightly guarded   Speech:  decreased rate, delayed, soft   Mood:  anxious, less depressed   Affect:  constricted   Thought Process:  organized, goal directed   Associations: intact associations   Thought Content:  no overt delusions   Perceptual Disturbances: no auditory hallucinations, no visual hallucinations   Risk Potential: Suicidal ideation - None at present  Homicidal ideation - None at present  Potential for aggression - No   Sensorium:  oriented to person, place, and time/date   Memory:  recent and remote memory grossly intact   Consciousness:  alert   Attention/Concentration: attention span and concentration are age appropriate   Insight:  fair   Judgment: fair   Gait/Station: normal gait/station   Motor Activity: no abnormal movements     Vital signs in last 24 hours:    Temp:  [97 °F (36.1 °C)-97.5 °F (36.4 °C)] 97 °F (36.1 °C)  HR:  [] 91  BP: (126-138)/(80-94) 138/94  Resp:  [18] 18  SpO2:  [91 %-92 %] 91 %  O2 Device: None (Room " air)    Laboratory results: I have personally reviewed all pertinent laboratory/tests results    Results from the past 24 hours: No results found for this or any previous visit (from the past 24 hours).  Most Recent Labs:   Lab Results   Component Value Date    WBC 7.39 06/26/2024    RBC 4.70 06/26/2024    HGB 15.2 06/26/2024    HCT 45.5 06/26/2024     06/26/2024    RDW 12.9 06/26/2024    NEUTROABS 4.63 06/26/2024    SODIUM 137 06/26/2024    K 4.1 06/26/2024     06/26/2024    CO2 26 06/26/2024    BUN 17 06/26/2024    CREATININE 0.63 06/26/2024    GLUC 98 06/26/2024    CALCIUM 9.6 06/26/2024    AST 25 06/26/2024    ALT 45 06/26/2024    ALKPHOS 114 (H) 06/26/2024    TP 7.0 06/26/2024    ALB 4.4 06/26/2024    TBILI 0.44 06/26/2024    CHOLESTEROL 177 06/26/2024    HDL 52 06/26/2024    TRIG 73 06/26/2024    LDLCALC 110 (H) 06/26/2024    NONHDLC 125 06/26/2024    LITHIUM 0.4 (L) 01/28/2021    [id number] 2.636 06/26/2024    HGBA1C 5.2 11/22/2023     11/22/2023       Suicide/Homicide Risk Assessment:    Risk of Harm to Self:   Nursing Suicide Risk Assessment Last 24 hours: C-SSRS Risk (Since Last Contact)  Calculated C-SSRS Risk Score (Since Last Contact): No Risk Indicated    Risk of Harm to Others:  Nursing Homicide Risk Assessment: Violence Risk to Others: Denies within past 6 months    The following interventions are recommended: Behavioral Health checks for safety monitoring, continued hospitalization on locked unit    Progress Toward Goals: progressing    Counseling / Coordination of Care:    Total floor / unit time spent today 30 minutes. Greater than 50% of total time was spent with the patient and / or family counseling and / or coordination of care. A description of counseling / coordination of care:    Carine Arias PA-C 12/03/24

## 2024-12-03 NOTE — NURSING NOTE
Germain maintained on ongoing SAFE precaution without incident on this shift. Observed in bed resting with eyes closed, respiration even and unlabored. Q 15 minutes rounding implemented. No behavioral note

## 2024-12-03 NOTE — NURSING NOTE
I offered to discuss end of life issues, including information on how to make advance directives that the patient could use to name someone who would make medical decisions on their behalf if they became too ill to make themselves.    ___Patient declined  _X_Patient is interested, I provided paper work and offered to discuss.   Germain maintained on ongoing Safe precaution without incident  on this shift.  Awake, alert, isolative and cooperative upon approach.  Attended and participated 2 out of 9 group today.  Continues to be compliant with medication regimen.   Denies any pain, or discomfort.  Denies delusion or anxiety.  No overt delusion or A/T/V hallucination noted. Behavior control.

## 2024-12-03 NOTE — ASSESSMENT & PLAN NOTE
Reviewed 12/3/2024  Accepted by [facility]; has ACT team on 12/3 and anticipated discharge on 12/4/2024 with CSP meetings done on 11/26/2024 , continue medications as follows:  Vraylar 3mg PO Daily  Atarax 25mg PO TID  Lamictal 50mg PO Daily  Zoloft 150mg PO QHS  Continue with individual therapy, group therapy, milieu therapy and occupational therapy   Behavioral Health checks every 7 minutes   Continue frequent safety checks and vitals per unit protocol  Continue with SLIM medical management as indicated

## 2024-12-03 NOTE — PLAN OF CARE
Problem: Alteration in Thoughts and Perception  Goal: Treatment Goal: Gain control of psychotic behaviors/thinking, reduce/eliminate presenting symptoms and demonstrate improved reality functioning upon discharge  Outcome: Progressing  Goal: Refrain from acting on delusional thinking/internal stimuli  Description: Interventions:  - Monitor patient closely, per order   - Utilize least restrictive measures   - Set reasonable limits, give positive feedback for acceptable   - Administer medications as ordered and monitor of potential side effects  Outcome: Progressing  Goal: Complete daily ADLs, including personal hygiene independently, as able  Description: Interventions:  - Observe, teach, and assist patient with ADLS  - Monitor and promote a balance of rest/activity, with adequate nutrition and elimination   Outcome: Progressing     Problem: Ineffective Coping  Goal: Identifies ineffective coping skills  Outcome: Progressing  Goal: Participates in unit activities  Description: Interventions:  - Provide therapeutic environment   - Provide required programming   - Redirect inappropriate behaviors   Outcome: Progressing  Goal: Patient/Family verbalizes awareness of resources  Outcome: Progressing  Goal: Free from restraint events  Description: - Utilize least restrictive measures   - Provide behavioral interventions   - Redirect inappropriate behaviors   Outcome: Progressing     Problem: Risk for Self Injury/Neglect  Goal: Treatment Goal: Remain safe during length of stay, learn and adopt new coping skills, and be free of self-injurious ideation, impulses and acts at the time of discharge  Outcome: Progressing  Goal: Verbalize thoughts and feelings  Description: Interventions:  - Assess and re-assess patient's lethality and potential for self-injury  - Engage patient in 1:1 interactions, daily, for a minimum of 15 minutes  - Encourage patient to express feelings, fears, frustrations, hopes  - Establish rapport/trust  with patient   Outcome: Progressing  Goal: Refrain from harming self  Description: Interventions:  - Monitor patient closely, per order  - Develop a trusting relationship  - Supervise medication ingestion, monitor effects and side effects   Outcome: Progressing  Goal: Complete daily ADLs, including personal hygiene independently, as able  Description: Interventions:  - Observe, teach, and assist patient with ADLS  - Monitor and promote a balance of rest/activity, with adequate nutrition and elimination  Outcome: Progressing     Problem: Depression  Goal: Treatment Goal: Demonstrate behavioral control of depressive symptoms, verbalize feelings of improved mood/affect, and adopt new coping skills prior to discharge  Outcome: Progressing  Goal: Refrain from harming self  Description: Interventions:  - Monitor patient closely, per order   - Supervise medication ingestion, monitor effects and side effects   Outcome: Progressing  Goal: Refrain from isolation  Description: Interventions:  - Develop a trusting relationship   - Encourage socialization   Outcome: Progressing  Goal: Refrain from self-neglect  Outcome: Progressing  Goal: Complete daily ADLs, including personal hygiene independently, as able  Description: Interventions:  - Observe, teach, and assist patient with ADLS  -  Monitor and promote a balance of rest/activity, with adequate nutrition and elimination   Outcome: Progressing     Problem: Anxiety  Goal: Anxiety is at manageable level  Description: Interventions:  - Assess and monitor patient's anxiety level.   - Monitor for signs and symptoms (heart palpitations, chest pain, shortness of breath, headaches, nausea, feeling jumpy, restlessness, irritable, apprehensive).   - Collaborate with interdisciplinary team and initiate plan and interventions as ordered.  - [place] patient to unit/surroundings  - Explain treatment plan  - Encourage participation in care  - Encourage verbalization of concerns/fears  -  Identify coping mechanisms  - Assist in developing anxiety-reducing skills  - Administer/offer alternative therapies  - Limit or eliminate stimulants  Outcome: Progressing     Problem: Risk for Violence/Aggression Toward Others  Goal: Treatment Goal: Refrain from acts of violence/aggression during length of stay, and demonstrate improved impulse control at the time of discharge  Outcome: Progressing  Goal: Refrain from harming others  Outcome: Progressing  Goal: Control angry outbursts  Description: Interventions:  - Monitor patient closely, per order  - Ensure early verbal de-escalation  - Monitor prn medication needs  - Set reasonable/therapeutic limits, outline behavioral expectations, and consequences   - Provide a non-threatening milieu, utilizing the least restrictive interventions   Outcome: Progressing  Goal: Identify appropriate positive anger management techniques  Description: Interventions:  - Offer anger management and coping skills groups   - Staff will provide positive feedback for appropriate anger control  Outcome: Progressing     Problem: Alteration in Orientation  Goal: Treatment Goal: Demonstrate a reduction of confusion and improved orientation to person, place, time and/or situation upon discharge, according to optimum baseline/ability  Outcome: Progressing  Goal: Attend and participate in unit activities, including therapeutic, recreational, and educational groups  Description: Interventions:  - Provide therapeutic and educational activities daily, encourage attendance and participation, and document same in the medical record   - Provide appropriate opportunities for reminiscence   - Provide a consistent daily routine   - Encourage family contact/visitation   Outcome: Progressing  Goal: Complete daily ADLs, including personal hygiene independently, as able  Description: Interventions:  - Observe, teach, and assist patient with ADLS  Outcome: Progressing     Problem: DISCHARGE PLANNING - CARE  MANAGEMENT  Goal: Discharge to post-acute care or home with appropriate resources  Description: INTERVENTIONS:  - Conduct assessment to determine patient/family and health care team treatment goals, and need for post-acute services based on payer coverage, community resources, and patient preferences, and barriers to discharge  - Address psychosocial, clinical, and financial barriers to discharge as identified in assessment in conjunction with the patient/family and health care team  - Arrange appropriate level of post-acute services according to patient’s   needs and preference and payer coverage in collaboration with the physician and health care team  - Communicate with and update the patient/family, physician, and health care team regarding progress on the discharge plan  - Arrange appropriate transportation to post-acute venues  Outcome: Progressing

## 2024-12-03 NOTE — NURSING NOTE
Deyvi tends to be isolative to  room. Comes out for meals. Does appear anxious during verbal interactions. Presently denies SI/HI. Compliant with meds and meals   No behavior issues tonight.  For discharge tomorrow @ 11AM [facility]

## 2024-12-03 NOTE — NURSING NOTE
Pt visible mostly for meals. Pt appears anxious but denies all psych signs and symptoms. Compliant with medications and meals. Minimal peer interaction and minimal group attendance. Able to make needs known. Q 15 min checks maintained.

## 2024-12-03 NOTE — PROGRESS NOTES
12/03/24 0748   Team Meeting   Meeting Type Daily Rounds   Team Members Present   Team Members Present Physician;Nurse;;Other (Discipline and Name)   Physician Team Member Juana KO   Nursing Team Member Chastity   Social Work Team Member Henrry FRY   Patient/Family Present   Patient Present No   Patient's Family Present No   Ugo MS rGoss PharmD    Groups Participation  2/9.   Not engaged in treatment. Depressed and isolates at times. D/C to [facility] on 12/4. Compliant with medications.

## 2024-12-04 VITALS
BODY MASS INDEX: 29.71 KG/M2 | HEART RATE: 99 BPM | WEIGHT: 219.38 LBS | OXYGEN SATURATION: 95 % | HEIGHT: 72 IN | RESPIRATION RATE: 18 BRPM | SYSTOLIC BLOOD PRESSURE: 142 MMHG | TEMPERATURE: 97.8 F | DIASTOLIC BLOOD PRESSURE: 87 MMHG

## 2024-12-04 PROCEDURE — 99238 HOSP IP/OBS DSCHRG MGMT 30/<: CPT | Performed by: PSYCHIATRY & NEUROLOGY

## 2024-12-04 RX ADMIN — ATORVASTATIN CALCIUM 10 MG: 10 TABLET, FILM COATED ORAL at 08:24

## 2024-12-04 RX ADMIN — HYDROXYZINE HYDROCHLORIDE 25 MG: 25 TABLET ORAL at 08:24

## 2024-12-04 RX ADMIN — CHOLECALCIFEROL TAB 25 MCG (1000 UNIT) 2000 UNITS: 25 TAB at 08:24

## 2024-12-04 RX ADMIN — CYANOCOBALAMIN TAB 1000 MCG 1000 MCG: 1000 TAB at 08:24

## 2024-12-04 RX ADMIN — LISINOPRIL 10 MG: 10 TABLET ORAL at 08:24

## 2024-12-04 RX ADMIN — LAMOTRIGINE 50 MG: 25 TABLET ORAL at 08:24

## 2024-12-04 RX ADMIN — CARIPRAZINE 3 MG: 3 CAPSULE, GELATIN COATED ORAL at 08:25

## 2024-12-04 NOTE — PLAN OF CARE
Problem: Alteration in Thoughts and Perception  Goal: Treatment Goal: Gain control of psychotic behaviors/thinking, reduce/eliminate presenting symptoms and demonstrate improved reality functioning upon discharge  Outcome: Progressing  Goal: Refrain from acting on delusional thinking/internal stimuli  Description: Interventions:  - Monitor patient closely, per order   - Utilize least restrictive measures   - Set reasonable limits, give positive feedback for acceptable   - Administer medications as ordered and monitor of potential side effects  Outcome: Progressing     Problem: Ineffective Coping  Goal: Free from restraint events  Description: - Utilize least restrictive measures   - Provide behavioral interventions   - Redirect inappropriate behaviors   Outcome: Progressing     Problem: Risk for Self Injury/Neglect  Goal: Treatment Goal: Remain safe during length of stay, learn and adopt new coping skills, and be free of self-injurious ideation, impulses and acts at the time of discharge  Outcome: Progressing  Goal: Refrain from harming self  Description: Interventions:  - Monitor patient closely, per order  - Develop a trusting relationship  - Supervise medication ingestion, monitor effects and side effects   Outcome: Progressing  Goal: Complete daily ADLs, including personal hygiene independently, as able  Description: Interventions:  - Observe, teach, and assist patient with ADLS  - Monitor and promote a balance of rest/activity, with adequate nutrition and elimination  Outcome: Progressing     Problem: Depression  Goal: Refrain from isolation  Description: Interventions:  - Develop a trusting relationship   - Encourage socialization   Outcome: Progressing  Goal: Refrain from self-neglect  Outcome: Progressing  Goal: Complete daily ADLs, including personal hygiene independently, as able  Description: Interventions:  - Observe, teach, and assist patient with ADLS  -  Monitor and promote a balance of  rest/activity, with adequate nutrition and elimination   Outcome: Progressing     Problem: Anxiety  Goal: Anxiety is at manageable level  Description: Interventions:  - Assess and monitor patient's anxiety level.   - Monitor for signs and symptoms (heart palpitations, chest pain, shortness of breath, headaches, nausea, feeling jumpy, restlessness, irritable, apprehensive).   - Collaborate with interdisciplinary team and initiate plan and interventions as ordered.  - [place] patient to unit/surroundings  - Explain treatment plan  - Encourage participation in care  - Encourage verbalization of concerns/fears  - Identify coping mechanisms  - Assist in developing anxiety-reducing skills  - Administer/offer alternative therapies  - Limit or eliminate stimulants  Outcome: Progressing     Problem: Risk for Violence/Aggression Toward Others  Goal: Refrain from harming others  Outcome: Progressing  Goal: Refrain from destructive acts on the environment or property  Outcome: Progressing  Goal: Control angry outbursts  Description: Interventions:  - Monitor patient closely, per order  - Ensure early verbal de-escalation  - Monitor prn medication needs  - Set reasonable/therapeutic limits, outline behavioral expectations, and consequences   - Provide a non-threatening milieu, utilizing the least restrictive interventions   Outcome: Progressing     Problem: Alteration in Orientation  Goal: Interact with staff daily  Description: Interventions:  - Assess and re-assess patient's level of orientation  - Engage patient in 1 on 1 interactions, daily, for a minimum of 15 minutes   - Establish rapport/trust with patient   Outcome: Progressing  Goal: Allow medical examinations, as recommended  Description: Interventions:  - Provide physical/neurological exams and/or referrals, per provider   Outcome: Progressing  Goal: Cooperate with recommended testing/procedures  Description: Interventions:  - Determine need for ancillary testing  -  Observe for mental status changes  - Implement falls/precaution protocol   Outcome: Progressing  Goal: Attend and participate in unit activities, including therapeutic, recreational, and educational groups  Description: Interventions:  - Provide therapeutic and educational activities daily, encourage attendance and participation, and document same in the medical record   - Provide appropriate opportunities for reminiscence   - Provide a consistent daily routine   - Encourage family contact/visitation   Outcome: Progressing  Goal: Complete daily ADLs, including personal hygiene independently, as able  Description: Interventions:  - Observe, teach, and assist patient with ADLS  Outcome: Progressing     Problem: DISCHARGE PLANNING - CARE MANAGEMENT  Goal: Discharge to post-acute care or home with appropriate resources  Description: INTERVENTIONS:  - Conduct assessment to determine patient/family and health care team treatment goals, and need for post-acute services based on payer coverage, community resources, and patient preferences, and barriers to discharge  - Address psychosocial, clinical, and financial barriers to discharge as identified in assessment in conjunction with the patient/family and health care team  - Arrange appropriate level of post-acute services according to patient’s   needs and preference and payer coverage in collaboration with the physician and health care team  - Communicate with and update the patient/family, physician, and health care team regarding progress on the discharge plan  - Arrange appropriate transportation to post-acute venues  Outcome: Progressing     Problem: Ineffective Coping  Goal: Participates in unit activities  Description: Interventions:  - Provide therapeutic environment   - Provide required programming   - Redirect inappropriate behaviors   Outcome: Not Progressing     Problem: Risk for Self Injury/Neglect  Goal: Verbalize thoughts and feelings  Description:  Interventions:  - Assess and re-assess patient's lethality and potential for self-injury  - Engage patient in 1:1 interactions, daily, for a minimum of 15 minutes  - Encourage patient to express feelings, fears, frustrations, hopes  - Establish rapport/trust with patient   Outcome: Not Progressing

## 2024-12-04 NOTE — CASE MANAGEMENT
Case Management Discharge Planning Note    Patient name Deyvi Cao  Location [facility] MRN [id number]  : 1970 Date 2024       Current Admission Date: 2024  Current Admission Diagnosis:Bipolar disorder with severe depression (HCC)   Patient Active Problem List    Diagnosis Date Noted Date Diagnosed    Low vitamin B12 level 2024     Vitamin D3 deficiency 2024     Bipolar disorder with severe depression (HCC) 2024     Rosacea 2024     Lung cancer screening declined by patient 2023     Moderate depressed bipolar I disorder (HCC) 2023     Medical clearance for psychiatric admission 2020     Allergic rhinitis due to allergen 2019     Benign essential hypertension 2014     Acne 10/31/2012     Mixed hyperlipidemia 10/31/2012     Tobacco dependence syndrome 10/31/2012       LOS (days): 162  Geometric Mean LOS (GMLOS) (days):   Days to GMLOS:     OBJECTIVE:  Risk of Unplanned Readmission Score: 14.8         Current admission status: Psych - Long Term   Preferred Pharmacy:   [id number] - [place], [place] - [address] [place] [address] [address]  [address] [address]  Phone: [phone] Fax: [phone]    [address]  [facility] [address]  Phone: [phone] Fax: [phone]    Primary Care Provider: Manuel Chris MD    Primary Insurance: [facility]  Secondary Insurance: [facility]    DISCHARGE DETAILS:    Discharge planning discussed with:: Patient, county representative and ACT Team  Freedom of Choice: Yes     CM contacted family/caregiver?: Yes     Did patient/caregiver verbalize understanding of patient care needs?: Yes  Were patient/caregiver advised of the risks associated with not following Treatment Team discharge recommendations?: Yes    Contacts  Patient Contacts: [facility]  Relationship to Patient::  Treatment Provider  Contact Method: Phone  Phone Number: [phone]  Reason/Outcome: Continuity of Care, Referral, Discharge Planning              Other Referral/Resources/Interventions Provided:  Referrals Provided:: ACT  Post Acute Placement to:: Group Home ([facility])    Would you like to participate in our [facility] service program?  : No - Declined        12/04/24 1255   Discharge Planning   Living Arrangements Lives Alone   Support Systems Self;Psychiatrist;/;Family members   Assistance Needed None   Type of Current Residence Private residence   Current Home Care Services No   Other Referral/Resources/Interventions Provided:   Referrals Provided: ACT   Post Acute Placement to: Group Home  ([facility])   Discharge Communications   Discharge planning discussed with: Patient, county representative and ACT Team   Freedom of Choice Yes   CM contacted family/caregiver? Yes   Did patient/caregiver verbalize understanding of patient care needs? Yes   Were patient/caregiver advised of the risks associated with not following Treatment Team discharge recommendations? Yes   Contacts   Patient Contacts [facility] ACT   Relationship to Patient: Treatment Provider   Contact Method Phone   Phone Number [phone]   Reason/Outcome Continuity of Care;Referral;Discharge Planning   [facility]   Would you like to participate in our [facility] service program?   No - Declined      12/04/24 1259    Discharge Notification   Notification of Discharge Provided to: ACT Team;Residence   ACT Team Notified via: Fax   Residence Notified via: Face to Face contact      Patient discharged from the unit on this day. Denied SI/HI, psychosis and/or A/V.  No questions verbalized. Patient is in agreement with discharge.  Patient provided with after care appointment, discharge instructions and medication regime reviewed. Affect is appropriate Accompanied to the lobby where pt was  met by [facility]. Patient transported to [facility] by [facility] and this writer..   All belongings returned.    Discharge summary e-mailed to [facility], A and County Representative.

## 2024-12-04 NOTE — NURSING NOTE
Received pt in bed at change of shift with eyes closed; chest movement noted.  Continues the same thus this far as per q 15 min room checks.   Will continue to monitor behavior, sleeping pattern and any medical issues that may arise.    0540:  Sleeping 7+ hrs thus this far

## 2024-12-04 NOTE — PROGRESS NOTES
12/04/24 0729   Team Meeting   Meeting Type Daily Rounds   Team Members Present   Team Members Present Physician;Nurse;;Other (Discipline and Name)   Physician Team Member Thomas, Holter   Nursing Team Member Chastity   Social Work Team Member Henrry FRY   Patient/Family Present   Patient Present No   Patient's Family Present No     Groups Participation  0/11.   Did not engage in treatment. He's compliant with medications. D/C to [facility] today.

## 2024-12-04 NOTE — DISCHARGE SUMMARY
"Psychiatric Discharge Summary    Medical Record Number: [id number]  Encounter: [id number]    Discharge diagnosis:  Bipolar disorder with severe depression (HCC)    Secondary diagnoses:  Medical Problems       Problem List       * (Principal) Bipolar disorder with severe depression (HCC) (Chronic)    Acne    Benign essential hypertension    Mixed hyperlipidemia    Tobacco dependence syndrome    Allergic rhinitis due to allergen    Medical clearance for psychiatric admission    Lung cancer screening declined by patient    Moderate depressed bipolar I disorder (HCC)    Rosacea    Vitamin D3 deficiency    Low vitamin B12 level                 Identification: A 54-year-old  single male from [facility] who was living in a hotel for a few weeks after discharge from [facility] psychiatric inpatient unit after a brief stay for 2 weeks on a 201 voluntary status admitted on 4/26/2024 and now sent to extended acute care unit at the [facility] on 6/25/2024 on a 201 voluntary status.  He has no children and is unemployed on Social Security benefits for the last 20 years and has had school diploma and unemployed since 2017.  Chief Complaint: \"I wanted to die\"     HPI patient was supposed to be taking Abilify (Celexa and the Abilify Maintena injection at the clinic but he did not even show up for the injection as he was too depressed.  He was feeling down in the dumps was feeling hopeless and worthless with no energy or motivation and felt like wanting to die.  He could not enjoy anything and was having difficulty with appetite and sleep even though he did not lose any weight.  He was feeling tired and tried to choke himself with a bottle caps which did not work and then he wanted to jump off a bridge and end his life and changed his mind and sought help.  He was not having any active psychotic symptoms or manic symptoms and was not under the influence of drugs or alcohol and his urine drug screen was " negative in the emergency room.  He was admitted to the inpatient psychiatric unit and placed on Zoloft 100 mg a day and Abilify 10 mg a day and sent to extended acute care unit for further stabilization because of 6 hospitalizations since October 2023 all of them mostly at [facility].  He did notice endorse any active psychotic symptoms or drug use and it appears that he was not responding to it treatment with medications and even trial with ECTs on 3 different occasions within the last 3 months which was not effective according to him.  At the time of his transfer, he is on Abilify 10 mg a day hydroxyzine 25 mg twice a day and Zoloft 100 mg a day and has limited understanding of the nature of his illness and need for hospitalization.  He reports that he still has death wishes and has no hope for his future as his mother is in some nursing home with Alzheimer's and he lost touch with his sister who is his mother's guardian he has not heard from both in a while and has no family support  Psychiatric Review Of Systems:  sleep: yes but too much  appetite changes: no  weight changes: no  energy/anergy: yes had no energy or interest  interest/pleasure/anhedonia: yes had severe loss of interest  somatic symptoms: no  anxiety/panic: yes  guillermo: no  guilty/hopeless: yes was feeling extremely hopeless  self injurious behavior/risky behavior: yes did attempt to choke himself once or AND wanted to jump off a bridge        Past Psychiatric History:      Patient reports that his illness started in 1995 with depression and he was also hearing voices putting him down but never commanding.  He was not eating or sleeping and was also feeling like wanting to give up on his life for no reason and he was laid off from work and had quit his job because of lack of interest before he was laid off and was feeling like a failure.  He was in and out of various hospitals from 18 .  Then he went to a clinic called [facility] and  then switched to [facility] until 2022 when they closed the clinic and he was not able to get into a clinic or go back on medications for almost a year until October 2023 when he started going into hospitals again 6 times mostly at [facility] once in [place] once at [facility] and all of them for severe depression.  He says he has had recurrent suicidal thoughts and has not acted on suicide any other time.  No self-injurious behaviors reported any other time.  He was told that he was having manic symptoms when his 30s when he was feeling like on top of the world excessively happy almost running around like a maniac when he would stay up all night and people thought he was on drugs even though he was not and he was labeled as bipolar at that time.  He claims that he was on lithium for the longest time he was out of hospitals but when they put him back on it since October 2023 that did not work and the ECTs on 3 separate occasions.  In the last 6 months or so did not work     Currently in treatment with Abilify 10 mg a day Zoloft 100 mg a day and hydroxyzine 25 mg twice a day.  Past Suicide attempts: Most recent attempt by trying to choke himself with swallowing somewhat AND wanting to jump off a bridge  Past Violent behavior: Denied by the patient  Past Psychiatric medication trial: Celexa, lithium, Zoloft, Abilify never tried Lamictal or Depakote or cariprazine  Substance Abuse History:  Completely denied by the patient     Family Psychiatric History:   His sister had an anger problem and was hospitalized.  Father was suicidal in 1995 and was hospitalized.  Mother was depressed before diagnosed with Alzheimer's disease     Social History: He was born in [place] is the oldest of 2 children with a sister 6 years younger to him.  Father worked in a packaging factory as a  and mother was a housewife and later when he was in high school she worked in retail.  He was in regular classes did not have to  repeat grades and had no behavioral issues or out-of-home placements and was not identified as having ADD.  He was in regular classes did not have to repeat grades and finished high school.  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  He was on unemployment for a while and then on Social Security disability since  for the last 20 years.  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  He lived in [facility] for 3 years from [id number] and then came back because his mother was diagnosed with Alzheimer's at that time.  When he was 24 his parents split up.  His father is  since  and he does not know about his mother is most likely in a nursing home as his sister whom he lost contact is his mother's guardian.  He considers himself as bisexual and has had a few relationships with both males and females longest for a year last time was in  and he has been basically single since then.  He was never  and has no children.  No legal problems reported in time in the past        Education: high school diploma/GED  Learning Disabilities:  Denied by the patient  Marital history: single  Living arrangement, social support:  Was basically homeless for the last few years.  Occupational History: unemployed  Functioning Relationships: alone & isolated, poor relationship with parents, and poor support system.  Other Pertinent History: Was alienated from his mother and sister with no support from anyone in the community and felt like a failure with chronic depression        Traumatic History:   Abuse: Denied by the patient  Other Traumatic Events:  Denied by the patient     Medical History        Past Medical History:   Diagnosis Date    Acne      Bipolar disorder (HCC)      Bipolar I disorder (HCC) 10/31/2012    Hyperlipidemia      Hypertension      Manic  depression (HCC)      Psychiatric disorder      Tobacco abuse              Medical Review Of Systems:  Pertinent items are noted in HPI.  To be done for physical by medical.  He is 6 foot tall weighs about 186 pounds with no suspicious head injuries and takes medications for hypertension and cholesterol with no known allergies or major medical issues     Meds/Allergies  Allergies   No Known Allergies        Risks, benefits and possible side effects of Medications:   Risks, benefits, and possible side effects of medications explained to patient and patient verbalizes understanding.       Objective[]Expand by Default  Vital signs in last 24 hours:  Temp:  [97.5 °F (36.4 °C)-98 °F (36.7 °C)] 97.5 °F (36.4 °C)  HR:  [] 78  Resp:  [18] 18  BP: (123-137)/(64-83) 126/64        mental Status Evaluation upon admission:  Appearance:  casually dressed and younger than stated age with good eye contact somewhat aloof and cold with no good mood reactivity   Behavior:  Superficial guarded but with good eye contact slow responses with no good mood reactivity somewhat aloof   Speech:  delayed, increased latency of response, and soft   Mood:  anxious, decreased range, and depressed   Affect:  blunted, flat, and mood-congruent   Language: naming objects and repeating phrases   Thought Process:  logical and somewhat limited in production   Thought Content:  normal no current suicidal or homicidal thoughts and no plans verbalized.  No phobias obsessions compulsions or distorted body perceptions reported.  No overt delusional material elicited and the nature of persecutory, grandiose, bizarre or somatic nature and not appearing as if paranoid or responding to any delusions. He still expresses hopelessness worthlessness and low self-esteem   Perceptual Disturbances: None and not appearing to respond   Risk Potential: Suicidal Ideations without plan and history of recent suicide attempt by trying to choke himself with sore AND  wanting to jump off a bridge and continuing to feel hopeless   Sensorium:  person, place, time/date, situation, day of week, month of year, and year   Cognition:  recent and remote memory grossly intact   Consciousness:  alert and awake    Attention: attention span and concentration were age appropriate   Intellect: within normal limits   Insight:  limited   Judgment: limited   Motor Activity: no abnormal movements      Lab Results: I have personally reviewed pertinent lab results.    Imaging Studies: None pending  EKG, Pathology, and Other Studies: Pending EKG     Code Status: Level 1 - Full Code  Advance Directive and Living Will: no  Power of : no        Diagnosis /Plan:  54-year-old  male with clear history of onset of depressive symptoms since age 25 with psychosis and then had bipolar symptoms in his 30s for the whole summer with clear manic symptoms and then was able to stay out of hospitals from 2004 until 2023 and at that time he was on lithium and Zoloft.  He was off medications for almost a year until October 2023 and became suicidally depressed with no relapse of psychosis or manic symptoms needing at least 6 hospitalizations since then most all of them for suicidal thoughts and severe depressive symptoms with neurovegetative signs tempted choking himself on soda cans and wanting to jump off a bridge recently.  He was given 3 series of ECTs without help and even lithium retrial without any response.  He continues to present with symptoms of depression and because of history of guillermo I diagnose him as follows     Bipolar depression severe with history of psychosis     Patient Strengths/Assets: ability for insight, average or above intelligence, cooperative, communication skills, financial means, general fund of knowledge, good physical health, motivation for treatment/growth, negotiates basic needs, patient is on a voluntary commitment, patient is willing to work on problems     Patient  Barriers/Limitations: homeless, lack of social/family support, lack of stable employment, limited family ties, limited support system, low self esteem, no/few hobbies or interests, noncompliant with treatment, poor insight, poor past treatment response, poor self-care, poor support system, resistance to treatment, self-care deficit, unresourceful     Recommended Treatment:    Patient was admitted to the inpatient psychiatric unit at the extended acute care unit and incorporated into the recovery program.  With respect to medications I dd increase the Zoloft 150 mg/ day and added Vraylar instead of Abilify, p to  mg a day for bipolar depression and  and increased the hydroxyzine as 3 times a day 25 mg for anxiety . Lamctal as cntned as 50 mg daily after reviewing the risks side effects benefits precautions especially risk for rashes from being on lamotrigine and precautions to take and he did verbalize an understanding     Non-pharmacological treatments:     1) Cooperate in individual therapy, group therapy, milieu therapy, and art therapy.      2) Participate with the multidisciplinary treatment team (consisting of psychiatrist, nursing, psychotherapist, case management, pharmacist and county representatives) in reintegrating back into the community as well as working through the recovery program provided at the extended acute care psychiatric unit.      3) Medical was consulted to help manage comorbid conditions    Brief Hospital Course:     After the patient was admitted to the psychiatric unit  the patient had psychotropic medication adjustments made to help stabilize their mood.  Following these medication changes, the patient started to stabilize.  At no time during his entire stay on the extended care unit, he was expressing any suicidal thoughts and was not any acute management problem with excessive sadness or overt psychotic or manic symptoms.  He attended some of the groups but basically a loner stating  that it is his baseline.  He was somewhat isolated and had a constricted affect with delayed responses and would usually be found pacing in his room or laying on his bed.  He did not need any behavioral PRNs over the weekend and was not aggressive or agitated or threatening or self-abusive and was polite friendly pleasant when approached.  He tolerated all medications without any side effects or problems .  He chose not to contact his sister and his mother whom he thought was staying at a nursing home in this area as he had strained relationships with them.  He was sleeping and eating well.  He was compliant with medications and attended some of the groups,    He was initially referred to the [facility] supportive living program run by Farzaneh Mccloud and they did not feel he was a good fit for the program and subsequently he was referred to the step-by-step CRR at [facility] with the [employer] ACT team in place and he did have a tour and was accepted by them.  He agreed to also go to [facility] and he attended the [facility] meeting on 11/26/2024 and it was decided to discharge him on 12/4/2024  Finally on 12/4/2024, the patient was found to be stable for discharge.     Carlos tests and procedures  No major procedures done.    6/26/2024 CMP showed sodium 137 potassium 4.1 and BUN 17 and creatinine 0.63 and glucose 98 AST 25 ALT 45, alkaline phosphatase 114  CBC showed WBC 7.39 hemoglobin 15.2 hematocrit 45.5 and platelet count 246,000  Vitamin D level 19.2, folic acid 13.0, vitamin B12 367, cholesterol 177 and triglycerides 73, , HDL 52 TSH 2.636  EKG on 6/26/2024 showed QTc 442 otherwise normal    Benign essential hypertension  Reviewed on total 124  Managed by [facility] - reviewed   Continue Lisinopril 10mg PO Daily  Mixed hyperlipidemia  Reviewed on 12/2/2024  Managed by [facility] - reviewed   Continue Lipitor 10mg PO Daily  Allergic rhinitis due to allergen  Reviewed on 12/2/2024  Managed by [facility] - reviewed   Rosacea  Reviewed  on 12/2/24  Managed by [facility] - reviewed   Vitamin D3 deficiency  Reviewed on 12/2/24  Managed by [facility] - reviewed   Low vitamin B12 level  Reviewed on 12/2/24  Managed by [facility] - reviewed     Condition on discharge:   Patient appears to be excited about discharge today as scheduled to the [facility] step-by-step group home when the  is supposed to drop him off.  He had an ACT assessment the other day by [facility] and he plans to attend [facility] from group [place] 2 to 3 days a week as required.  He reports no relapse of suicidal thoughts or excessive sadness or overt psychotic or manic symptoms but still exhibiting same baseline behaviors of some isolation and constricted affect with delayed responses which he admits to is his baseline.  He did not attend any groups yesterday and he says that he is too anxious about getting out and was found pacing his room and admits that he is basically a loner.  He has not decided to contact his mother or sister yet.  Mood is mildly anxious affect is constricted but with better mood reactivity.  Well-groomed well-kept relates well with good eye contact.  No current suicidal or homicidal thoughts and no plans verbalized.  No overt hallucinations or experiences that could be categorized as delusions reported.  Coherent relevant logical and goal directed but responses are slightly delayed as usual.  No overt psychotic production noted.  No gross cognitive deficits elicited.  Attention and concentration span are intact.  Memory for recent and remote memory recall are all intact.  Estimated to at least average intelligence clinically.  Insight and judgment impulse control is of good.  Able to contract for safety and plans to work with the ACT team and stay on medications to stay out of hospitals and work on his recovery upon discharge.  He is aware of all his medications and need to stay on it and seriousness of risk of relapse ascendancy because of medications reviewed  and he verbalized an understanding.  He was in no acute physical distress upon discharge and did not verbalize any side effects or issues.      Medications Upon Discharge:       Current Facility-Administered Medications:     acetaminophen (TYLENOL) tablet 650 mg, 650 mg, Oral, Q6H PRN, ALVINA Harley    acetaminophen (TYLENOL) tablet 650 mg, 650 mg, Oral, Q4H PRN, ALVINA Harley, 650 mg at 08/01/24 1916    acetaminophen (TYLENOL) tablet 975 mg, 975 mg, Oral, Q6H PRN, ALVINA Harley, 975 mg at 11/21/24 0951    aluminum-magnesium hydroxide-simethicone (MAALOX) oral suspension 30 mL, 30 mL, Oral, Q4H PRN, ALVINA Harley    ammonium lactate (LAC-HYDRIN) 12 % lotion, , Topical, BID PRN, ALVINA Harley    atorvastatin (LIPITOR) tablet 10 mg, 10 mg, Oral, Daily, ALVINA Lewis, 10 mg at 12/03/24 0822    benztropine (COGENTIN) injection 1 mg, 1 mg, Intramuscular, Q4H PRN Max 6/day, ALVINA Harley    benztropine (COGENTIN) tablet 1 mg, 1 mg, Oral, Q4H PRN Max 6/day, ALVINA Harley    bisacodyl (DULCOLAX) rectal suppository 10 mg, 10 mg, Rectal, Daily PRN, ALVINA Harley    cariprazine (VRAYLAR) capsule 3 mg, 3 mg, Oral, Daily, Martin Cardenas MD, 3 mg at 12/03/24 0821    Cholecalciferol (VITAMIN D3) tablet 2,000 Units, 2,000 Units, Oral, Daily, ALVINA Lewis, 2,000 Units at 12/03/24 0821    cyanocobalamin (VITAMIN B-12) tablet 1,000 mcg, 1,000 mcg, Oral, Daily, ALVINA Lewis, 1,000 mcg at 12/03/24 0821    hydrOXYzine HCL (ATARAX) tablet 25 mg, 25 mg, Oral, Q6H PRN Max 4/day, ALVINA Harley    hydrOXYzine HCL (ATARAX) tablet 25 mg, 25 mg, Oral, TID, Martin Cardenas MD, 25 mg at 12/03/24 2113    hydrOXYzine HCL (ATARAX) tablet 50 mg, 50 mg, Oral, Q4H PRN Max 4/day **OR** LORazepam (ATIVAN) injection 1 mg, 1 mg, Intramuscular, Q4H PRN, ALVINA Harley    ketoconazole (NIZORAL) 2 % shampoo, , Topical, Daily PRN, ALVINA Harley    lamoTRIgine  (LaMICtal) tablet 50 mg, 50 mg, Oral, Daily, Martin Cardenas MD, 50 mg at 12/03/24 0821    lisinopril (ZESTRIL) tablet 10 mg, 10 mg, Oral, Daily, Sary JenniferALVINA Thompson, 10 mg at 12/03/24 0821    LORazepam (ATIVAN) tablet 1 mg, 1 mg, Oral, Q4H PRN Max 6/day **OR** LORazepam (ATIVAN) injection 2 mg, 2 mg, Intramuscular, Q6H PRN Max 3/day, ALVINA Harley    OLANZapine (ZyPREXA) tablet 10 mg, 10 mg, Oral, Q3H PRN Max 3/day **OR** OLANZapine (ZyPREXA) IM injection 10 mg, 10 mg, Intramuscular, Q3H PRN Max 3/day, ALVINA Harley    OLANZapine (ZyPREXA) tablet 5 mg, 5 mg, Oral, Q3H PRN Max 6/day **OR** OLANZapine (ZyPREXA) IM injection 5 mg, 5 mg, Intramuscular, Q3H PRN Max 6/day, ALVINA Harley    OLANZapine (ZyPREXA) tablet 2.5 mg, 2.5 mg, Oral, Q3H PRN Max 8/day, ALVINA Harley    polyethylene glycol (MIRALAX) packet 17 g, 17 g, Oral, Daily PRN, ALVINA Harley    propranolol (INDERAL) tablet 10 mg, 10 mg, Oral, Q8H PRN, ALVINA Harley    senna-docusate sodium (SENOKOT S) 8.6-50 mg per tablet 1 tablet, 1 tablet, Oral, Daily PRN, ALVINA Harley    sertraline (ZOLOFT) tablet 150 mg, 150 mg, Oral, HS, Martin Cardenas MD, 150 mg at 12/03/24 2113    Discharge plan and aftercare    Please see AVS summary  Psychiatric follow-up through [facility] ACT team with Dr. Reardon  PCP follow-up to be arranged by ACT team                       Name Relationship Specialty Phone Fax Address Order                Manuel Chris MD PCP - General Family Medicine [phone] [facility] [address] [place] [address] [address] [place] [id number]     Next Steps: Go on 12/9/2024  Instructions: Hospital discharge appointment at 1:15pm.        [address]  Behavioral Health [phone] [address] [facility] [address] [place] [address]     Next Steps: Follow up on 12/10/2024  Instructions: Medication Management with Dr. Reardon at 10:30am        Kirill Burciaga  Behavioral  [facility] [phone] [address] [place] [address]     Next Steps: Follow up on 12/4/2024  Instructions: Case Management appointment at 2:00pm        Bell Hendrickson     [address] [place] [id number]     Next Steps: Follow up  Instructions: Allison will contact you for an intake appointment            Last edited by: Mer Sales (11/29/2024 12:59 PM)        Martin Cardenas MD

## 2024-12-04 NOTE — BH TRANSITION RECORD
Contact Information: If you have any questions, concerns, pended studies, tests and/or procedures, or emergencies regarding your inpatient behavioral health visit. Please contact [facility] acute care unit [phone] and ask to speak to a , nurse or physician. A contact is available 24 hours/ 7 days a week at this number.     Summary of Procedures Performed During your Stay:  Below is a list of major procedures performed during your hospital stay and a summary of results:  - No major procedures performed.    Pending Studies (From admission, onward)      None          Please follow up on the above pending studies with your PCP and/or referring provider.

## 2024-12-04 NOTE — NURSING NOTE
Pt is in agreement with discharge. Pt denies depression and anxiety; denies SI/HI/AH/VH. All belongings returned and sent with pt. After visit summary reviewed and sent with pt. Pt acknowledges understanding and offers no complaints.

## 2024-12-04 NOTE — CMS CERTIFICATION NOTE
RECERTIFICATION Of Continued Inpatient Care. On or Before The 30th Day  Date Due: 12/4/24    I certify that inpatient psychiatric hospital services furnished since the previous certification or recertifcation were, and continue to be, medically necessary for either, treatment which could reasonably be expected to improve the patient's condition, diagnostic study and that the hospital records indicate that the services furnished were either intensive treatment services, admission and related services necessary for diagnostic study, or equivalent services. The available community resources are not yet able to support him at this time and further course of action is documented in the individualized treatment plan    I estimate that the additional period of inpatient care will be 30 days or 4 weeks unless he is discharged today as anticipated    Martin Cardenas MD  12/04/24 12/4/24
